# Patient Record
Sex: FEMALE | Race: WHITE | NOT HISPANIC OR LATINO
[De-identification: names, ages, dates, MRNs, and addresses within clinical notes are randomized per-mention and may not be internally consistent; named-entity substitution may affect disease eponyms.]

---

## 2021-10-29 PROBLEM — Z00.00 ENCOUNTER FOR PREVENTIVE HEALTH EXAMINATION: Status: ACTIVE | Noted: 2021-10-29

## 2021-11-08 ENCOUNTER — APPOINTMENT (OUTPATIENT)
Dept: NEUROSURGERY | Facility: CLINIC | Age: 61
End: 2021-11-08
Payer: COMMERCIAL

## 2021-11-08 VITALS
OXYGEN SATURATION: 99 % | HEART RATE: 107 BPM | TEMPERATURE: 98 F | BODY MASS INDEX: 23.04 KG/M2 | SYSTOLIC BLOOD PRESSURE: 124 MMHG | DIASTOLIC BLOOD PRESSURE: 84 MMHG | HEIGHT: 63 IN | WEIGHT: 130 LBS

## 2021-11-08 DIAGNOSIS — Z87.891 PERSONAL HISTORY OF NICOTINE DEPENDENCE: ICD-10-CM

## 2021-11-08 DIAGNOSIS — Z78.9 OTHER SPECIFIED HEALTH STATUS: ICD-10-CM

## 2021-11-08 DIAGNOSIS — Z80.0 FAMILY HISTORY OF MALIGNANT NEOPLASM OF DIGESTIVE ORGANS: ICD-10-CM

## 2021-11-08 PROCEDURE — 99203 OFFICE O/P NEW LOW 30 MIN: CPT

## 2021-11-08 RX ORDER — MULTIVITAMIN
TABLET ORAL
Refills: 0 | Status: ACTIVE | COMMUNITY

## 2021-11-08 RX ORDER — LEVOTHYROXINE SODIUM 0.17 MG/1
TABLET ORAL
Refills: 0 | Status: ACTIVE | COMMUNITY

## 2021-11-08 RX ORDER — PANTOPRAZOLE SODIUM 40 MG/10ML
40 INJECTION, POWDER, FOR SOLUTION INTRAVENOUS
Refills: 0 | Status: ACTIVE | COMMUNITY

## 2021-11-08 NOTE — CONSULT LETTER
[Dear  ___] : Dear  [unfilled], [Courtesy Letter:] : I had the pleasure of seeing your patient, [unfilled], in my office today. [Sincerely,] : Sincerely, [FreeTextEntry2] : [facility]\par [address] [place] [address] Rd\[place] [address] [FreeTextEntry1] : This very pleasant 61-year-old right-handed woman presents for discussion of a recently identified intracranial mass.  The patient has a significant past history of thyroid cancer when she was in her early 20s which required resection followed by chemotherapy and radiation.  There is no metastatic disease.  The patient works as an  and has otherwise been healthy and involved in normal activities without restriction.  The patient suffered incidental fall with a closed head injury on 16 October 2021.  She underwent investigation at local trauma emergency room and underwent cranial imaging.  The imaging was negative for any intracerebral hemorrhage or skull fracture.  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  The recommendation at the outside medical facility was for neurosurgical follow-up and consideration of surgical resection.\par \par The patient denies any problems with headaches or balance.  She has not had any increasing problems with vision blurring.  She does not have any neurologic symptoms with exertion.  She has not had any problems with speech or concentration.  She denies any problems with strength or sensation in the upper or lower limbs.  Bowel and bladder function have remained normal.  She has not experienced any recent constitutional changes such as weight gain or weight loss or night sweats.\par \par I have reviewed with the patient and her  who is present in support the most recent MRI scan of the brain performed at [facility].  I have pointed out clearly the lesion that bridges the septum pellucidum in the midline and does have some impact on the corpus callosum and extends down to but does not extract the for a man of [place].  There is no evidence of obstructive hydrocephalus.  The differential diagnosis here is extensive especially with the patient's past history of thyroid cancer and radiation treatment.  It is most likely this represents a ependymoma or subependymoma, although a post radiation meningioma or a more rare central neurocytoma could also be possible.  No other lesions are seen within the brain parenchyma aside from some incidental finding of deep white matter changes which may be microvascular in nature.\par \par On examination the patient is appropriately concerned and emotional regarding this recent finding and is quite afraid.  The patient's neck is supple.  Pupils are equal and reactive to light.  The patient has intact movement of the face and sensation is normal.  Hearing is intact.  The patient has normal sensation and strength in all 4 limbs.  The patient's balance is stable.\par \par I have had an extended discussion with the patient regarding her diagnosis and potential treatment options.  At this point in time it is very reasonable to do a very short interval follow-up MRI scan because the patient is completely asymptomatic.  An MRI prescription for 1 month is provided.  In addition because of the important differential diagnosis of ependymoma or subependymoma with a high likelihood of drop metastasis MRI imaging of the cervical, thoracic, and lumbar spine have been ordered also.  The patient is aware that the most important risk here is for further expansion causing obstructive hydrocephalus.  The patient is aware of the signs or symptoms that would warrant early or emergent investigation.  Those include increasing headaches especially with exertion as well as early morning headaches associated with visual blurring.  Overall the patient is aware that the most definitive diagnosis will be obtained up by a biopsy.  A subtotal or safe maximal resection would be most important with respect to removing the risk of developing obstructive hydrocephalus.  It is likely that the patient may require post surgery radiation based on the differential diagnosis of the lesion.  I have discussed with the patient and her  that I would consider a minimally invasive brain path approach to this lesion through the cortex on the right-hand side.  The patient and her  expressed good understanding and are in agreement with this further imaging staging as well as consideration of surgery in the next several months.\par \par Thank you for very kindly including me in the evaluation and treatment of your patient.  Please do not hesitate to contact me should you have any questions or concerns regarding this evaluation, the patient's recent finding of an intracranial mass lesion, or the recommendation for further diagnostic studies with a view to surgical planning. [FreeTextEntry3] : Humberto Valle MD, PhD, FRCPSC \par Attending Neurosurgeon \par  of Neurosurgery \par [facility] \par [address], [address] floor \[place] [address] \par Office: [phone] \par Fax: [phone]\par \par

## 2021-11-08 NOTE — REASON FOR VISIT
[New Patient Visit] : a new patient visit [Spouse] : spouse [FreeTextEntry1] : newly diagnosed brain intraventricular mass

## 2021-12-03 ENCOUNTER — APPOINTMENT (OUTPATIENT)
Dept: MRI IMAGING | Facility: CLINIC | Age: 61
End: 2021-12-03

## 2021-12-16 ENCOUNTER — APPOINTMENT (OUTPATIENT)
Dept: MRI IMAGING | Facility: CLINIC | Age: 61
End: 2021-12-16
Payer: COMMERCIAL

## 2021-12-16 ENCOUNTER — OUTPATIENT (OUTPATIENT)
Dept: OUTPATIENT SERVICES | Facility: HOSPITAL | Age: 61
LOS: 1 days | End: 2021-12-16
Payer: COMMERCIAL

## 2021-12-16 DIAGNOSIS — D49.6 NEOPLASM OF UNSPECIFIED BEHAVIOR OF BRAIN: ICD-10-CM

## 2021-12-16 PROCEDURE — 72158 MRI LUMBAR SPINE W/O & W/DYE: CPT | Mod: 26

## 2021-12-16 PROCEDURE — A9585: CPT

## 2021-12-16 PROCEDURE — 72157 MRI CHEST SPINE W/O & W/DYE: CPT

## 2021-12-16 PROCEDURE — 72158 MRI LUMBAR SPINE W/O & W/DYE: CPT

## 2021-12-16 PROCEDURE — 72157 MRI CHEST SPINE W/O & W/DYE: CPT | Mod: 26

## 2021-12-17 ENCOUNTER — APPOINTMENT (OUTPATIENT)
Dept: MRI IMAGING | Facility: CLINIC | Age: 61
End: 2021-12-17
Payer: COMMERCIAL

## 2021-12-17 ENCOUNTER — OUTPATIENT (OUTPATIENT)
Dept: OUTPATIENT SERVICES | Facility: HOSPITAL | Age: 61
LOS: 1 days | End: 2021-12-17
Payer: COMMERCIAL

## 2021-12-17 DIAGNOSIS — D49.6 NEOPLASM OF UNSPECIFIED BEHAVIOR OF BRAIN: ICD-10-CM

## 2021-12-17 PROCEDURE — 72156 MRI NECK SPINE W/O & W/DYE: CPT | Mod: 26

## 2021-12-17 PROCEDURE — 70553 MRI BRAIN STEM W/O & W/DYE: CPT | Mod: 26

## 2021-12-17 PROCEDURE — A9585: CPT

## 2021-12-17 PROCEDURE — 72156 MRI NECK SPINE W/O & W/DYE: CPT

## 2021-12-17 PROCEDURE — 70553 MRI BRAIN STEM W/O & W/DYE: CPT

## 2022-01-12 ENCOUNTER — APPOINTMENT (OUTPATIENT)
Dept: NEUROSURGERY | Facility: CLINIC | Age: 62
End: 2022-01-12
Payer: COMMERCIAL

## 2022-01-12 VITALS
SYSTOLIC BLOOD PRESSURE: 151 MMHG | HEIGHT: 63 IN | TEMPERATURE: 97.8 F | DIASTOLIC BLOOD PRESSURE: 87 MMHG | OXYGEN SATURATION: 98 % | HEART RATE: 106 BPM | BODY MASS INDEX: 23.04 KG/M2 | WEIGHT: 130 LBS

## 2022-01-12 DIAGNOSIS — D49.6 NEOPLASM OF UNSPECIFIED BEHAVIOR OF BRAIN: ICD-10-CM

## 2022-01-12 DIAGNOSIS — E03.9 HYPOTHYROIDISM, UNSPECIFIED: ICD-10-CM

## 2022-01-12 DIAGNOSIS — Z85.850 PERSONAL HISTORY OF MALIGNANT NEOPLASM OF THYROID: ICD-10-CM

## 2022-01-12 PROCEDURE — 99215 OFFICE O/P EST HI 40 MIN: CPT

## 2022-01-12 RX ORDER — LEVOTHYROXINE SODIUM 0.14 MG/1
137 TABLET ORAL DAILY
Qty: 14 | Refills: 0 | Status: ACTIVE | COMMUNITY
Start: 2022-01-12 | End: 1900-01-01

## 2022-01-16 PROBLEM — Z85.850 HISTORY OF MALIGNANT NEOPLASM OF THYROID: Status: RESOLVED | Noted: 2021-11-08 | Resolved: 2022-01-16

## 2022-01-16 PROBLEM — D49.6 INTRACRANIAL TUMOR: Status: ACTIVE | Noted: 2021-11-08

## 2022-01-16 PROBLEM — E03.9 ACQUIRED HYPOTHYROIDISM: Status: ACTIVE | Noted: 2022-01-12

## 2022-03-08 ENCOUNTER — RESULT REVIEW (OUTPATIENT)
Age: 62
End: 2022-03-08

## 2022-03-08 ENCOUNTER — OUTPATIENT (OUTPATIENT)
Dept: OUTPATIENT SERVICES | Facility: HOSPITAL | Age: 62
LOS: 1 days | End: 2022-03-08
Payer: COMMERCIAL

## 2022-03-08 VITALS
RESPIRATION RATE: 16 BRPM | OXYGEN SATURATION: 98 % | HEIGHT: 62 IN | HEART RATE: 92 BPM | SYSTOLIC BLOOD PRESSURE: 132 MMHG | TEMPERATURE: 98 F | WEIGHT: 121.25 LBS | DIASTOLIC BLOOD PRESSURE: 85 MMHG

## 2022-03-08 DIAGNOSIS — Z01.818 ENCOUNTER FOR OTHER PREPROCEDURAL EXAMINATION: ICD-10-CM

## 2022-03-08 DIAGNOSIS — Z98.890 OTHER SPECIFIED POSTPROCEDURAL STATES: Chronic | ICD-10-CM

## 2022-03-08 DIAGNOSIS — Z29.9 ENCOUNTER FOR PROPHYLACTIC MEASURES, UNSPECIFIED: ICD-10-CM

## 2022-03-08 DIAGNOSIS — Z90.89 ACQUIRED ABSENCE OF OTHER ORGANS: Chronic | ICD-10-CM

## 2022-03-08 DIAGNOSIS — Z90.49 ACQUIRED ABSENCE OF OTHER SPECIFIED PARTS OF DIGESTIVE TRACT: Chronic | ICD-10-CM

## 2022-03-08 DIAGNOSIS — E89.0 POSTPROCEDURAL HYPOTHYROIDISM: Chronic | ICD-10-CM

## 2022-03-08 DIAGNOSIS — Z85.850 PERSONAL HISTORY OF MALIGNANT NEOPLASM OF THYROID: ICD-10-CM

## 2022-03-08 LAB
ANION GAP SERPL CALC-SCNC: 7 MMOL/L — SIGNIFICANT CHANGE UP (ref 5–17)
APPEARANCE UR: ABNORMAL
APTT BLD: 31.6 SEC — SIGNIFICANT CHANGE UP (ref 27.5–35.5)
BASOPHILS # BLD AUTO: 0.06 K/UL — SIGNIFICANT CHANGE UP (ref 0–0.2)
BASOPHILS NFR BLD AUTO: 1.3 % — SIGNIFICANT CHANGE UP (ref 0–2)
BILIRUB UR-MCNC: ABNORMAL
BUN SERPL-MCNC: 20 MG/DL — SIGNIFICANT CHANGE UP (ref 7–23)
CALCIUM SERPL-MCNC: 9.6 MG/DL — SIGNIFICANT CHANGE UP (ref 8.5–10.1)
CHLORIDE SERPL-SCNC: 101 MMOL/L — SIGNIFICANT CHANGE UP (ref 96–108)
CO2 SERPL-SCNC: 26 MMOL/L — SIGNIFICANT CHANGE UP (ref 22–31)
COLOR SPEC: ABNORMAL
CREAT SERPL-MCNC: 0.83 MG/DL — SIGNIFICANT CHANGE UP (ref 0.5–1.3)
DIFF PNL FLD: ABNORMAL
EGFR: 80 ML/MIN/1.73M2 — SIGNIFICANT CHANGE UP
EOSINOPHIL # BLD AUTO: 0.05 K/UL — SIGNIFICANT CHANGE UP (ref 0–0.5)
EOSINOPHIL NFR BLD AUTO: 1.1 % — SIGNIFICANT CHANGE UP (ref 0–6)
GLUCOSE SERPL-MCNC: 109 MG/DL — HIGH (ref 70–99)
GLUCOSE UR QL: NEGATIVE — SIGNIFICANT CHANGE UP
HCT VFR BLD CALC: 30.4 % — LOW (ref 34.5–45)
HGB BLD-MCNC: 10.1 G/DL — LOW (ref 11.5–15.5)
IMM GRANULOCYTES NFR BLD AUTO: 0.2 % — SIGNIFICANT CHANGE UP (ref 0–1.5)
INR BLD: 1.21 RATIO — HIGH (ref 0.88–1.16)
KETONES UR-MCNC: ABNORMAL
LEUKOCYTE ESTERASE UR-ACNC: ABNORMAL
LYMPHOCYTES # BLD AUTO: 0.9 K/UL — LOW (ref 1–3.3)
LYMPHOCYTES # BLD AUTO: 20 % — SIGNIFICANT CHANGE UP (ref 13–44)
MCHC RBC-ENTMCNC: 31.5 PG — SIGNIFICANT CHANGE UP (ref 27–34)
MCHC RBC-ENTMCNC: 33.2 GM/DL — SIGNIFICANT CHANGE UP (ref 32–36)
MCV RBC AUTO: 94.7 FL — SIGNIFICANT CHANGE UP (ref 80–100)
MONOCYTES # BLD AUTO: 0.55 K/UL — SIGNIFICANT CHANGE UP (ref 0–0.9)
MONOCYTES NFR BLD AUTO: 12.2 % — SIGNIFICANT CHANGE UP (ref 2–14)
NEUTROPHILS # BLD AUTO: 2.93 K/UL — SIGNIFICANT CHANGE UP (ref 1.8–7.4)
NEUTROPHILS NFR BLD AUTO: 65.2 % — SIGNIFICANT CHANGE UP (ref 43–77)
NITRITE UR-MCNC: NEGATIVE — SIGNIFICANT CHANGE UP
PH UR: 6 — SIGNIFICANT CHANGE UP (ref 5–8)
PLATELET # BLD AUTO: 197 K/UL — SIGNIFICANT CHANGE UP (ref 150–400)
POTASSIUM SERPL-MCNC: 4.1 MMOL/L — SIGNIFICANT CHANGE UP (ref 3.5–5.3)
POTASSIUM SERPL-SCNC: 4.1 MMOL/L — SIGNIFICANT CHANGE UP (ref 3.5–5.3)
PROT UR-MCNC: 30 MG/DL
PROTHROM AB SERPL-ACNC: 14.1 SEC — HIGH (ref 10.5–13.4)
RBC # BLD: 3.21 M/UL — LOW (ref 3.8–5.2)
RBC # FLD: 17.2 % — HIGH (ref 10.3–14.5)
SODIUM SERPL-SCNC: 134 MMOL/L — LOW (ref 135–145)
SP GR SPEC: 1.02 — SIGNIFICANT CHANGE UP (ref 1.01–1.02)
UROBILINOGEN FLD QL: 8
WBC # BLD: 4.5 K/UL — SIGNIFICANT CHANGE UP (ref 3.8–10.5)
WBC # FLD AUTO: 4.5 K/UL — SIGNIFICANT CHANGE UP (ref 3.8–10.5)

## 2022-03-08 PROCEDURE — 85730 THROMBOPLASTIN TIME PARTIAL: CPT

## 2022-03-08 PROCEDURE — 86901 BLOOD TYPING SEROLOGIC RH(D): CPT

## 2022-03-08 PROCEDURE — 71046 X-RAY EXAM CHEST 2 VIEWS: CPT

## 2022-03-08 PROCEDURE — 87640 STAPH A DNA AMP PROBE: CPT

## 2022-03-08 PROCEDURE — 71046 X-RAY EXAM CHEST 2 VIEWS: CPT | Mod: 26

## 2022-03-08 PROCEDURE — 81001 URINALYSIS AUTO W/SCOPE: CPT

## 2022-03-08 PROCEDURE — 85610 PROTHROMBIN TIME: CPT

## 2022-03-08 PROCEDURE — 93005 ELECTROCARDIOGRAM TRACING: CPT

## 2022-03-08 PROCEDURE — 36415 COLL VENOUS BLD VENIPUNCTURE: CPT

## 2022-03-08 PROCEDURE — 93010 ELECTROCARDIOGRAM REPORT: CPT

## 2022-03-08 PROCEDURE — 86920 COMPATIBILITY TEST SPIN: CPT

## 2022-03-08 PROCEDURE — 86850 RBC ANTIBODY SCREEN: CPT

## 2022-03-08 PROCEDURE — 86900 BLOOD TYPING SEROLOGIC ABO: CPT

## 2022-03-08 PROCEDURE — 85025 COMPLETE CBC W/AUTO DIFF WBC: CPT

## 2022-03-08 PROCEDURE — 80048 BASIC METABOLIC PNL TOTAL CA: CPT

## 2022-03-08 PROCEDURE — 87641 MR-STAPH DNA AMP PROBE: CPT

## 2022-03-08 PROCEDURE — G0463: CPT | Mod: 25

## 2022-03-08 NOTE — H&P PST ADULT - NSICDXPASTSURGICALHX_GEN_ALL_CORE_FT
PAST SURGICAL HISTORY:  H/O total thyroidectomy age 21's for Thyroid cancer, postop complication arterial bleed, second surgery    History of appendectomy age 5    History of tonsillectomy age 5

## 2022-03-08 NOTE — H&P PST ADULT - NEGATIVE CARDIOVASCULAR SYMPTOMS
CERTIFICATE OF RETURN TO WORK    February 6, 2017      Re:   Rhina ARVIZU Jeniffermatteo  [address] [place] [id number]                        This is to certify that Rhina Moseley was seen on 2/6/2017 and is unable to work on 2/7/2017.            SIGNATURE:___________________________________________,   2/6/2017      [facility]  [address] [facility] [address]  [phone]   no chest pain/no palpitations/no dyspnea on exertion

## 2022-03-08 NOTE — H&P PST ADULT - ASSESSMENT
62 y.o female scheduled for  62 y.o female scheduled for  Right Frontal Craniotomy and Resection of Ventricle Mass with Brain Path Neuronavigation, Neuromonitoring   Plan  1. Stop all NSAIDS, herbal supplements and vitamins for 7 days.  2. NPO at midnight.  3. Take the following medications Levothyroxine with small sips of water on the morning of your procedure/surgery.  4. Use mupirocin as directed  5. Labs, EKG, CXR as per surgeon  6. PMD D. Stoebe  visit for optimization prior to surgery as per surgeon.  7. COVID swab appt: 3/12/2022    CAPRINI SCORE    AGE RELATED RISK FACTORS                                                       MOBILITY RELATED FACTORS  [ ] Age 41-60 years                                            (1 Point)                  [ ] Bed rest                                                        (1 Point)  [x ] Age: 61-74 years                                           (2 Points)                [ ] Plaster cast                                                   (2 Points)  [ ] Age= 75 years                                              (3 Points)                 [ ] Bed bound for more than 72 hours                   (2 Points)    DISEASE RELATED RISK FACTORS                                               GENDER SPECIFIC FACTORS  [ ] Edema in the lower extremities                       (1 Point)                  [ ] Pregnancy                                                     (1 Point)  [ ] Varicose veins                                               (1 Point)                  [ ] Post-partum < 6 weeks                                   (1 Point)             [x ] BMI > 25 Kg/m2                                            (1 Point)                  [ ] Hormonal therapy  or oral contraception            (1 Point)                 [ ] Sepsis (in the previous month)                        (1 Point)                  [ ] History of pregnancy complications  [ ] Pneumonia or serious lung disease                                               [ ] Unexplained or recurrent                       (1 Point)           (in the previous month)                               (1 Point)  [ ] Abnormal pulmonary function test                     (1 Point)                 SURGERY RELATED RISK FACTORS  [ ] Acute myocardial infarction                              (1 Point)                 [ ]  Section                                            (1 Point)  [ ] Congestive heart failure (in the previous month)  (1 Point)                 [ ] Minor surgery                                                 (1 Point)   [ ] Inflammatory bowel disease                             (1 Point)                 [ ] Arthroscopic surgery                                        (2 Points)  [ ] Central venous access                                    (2 Points)                [x ] General surgery lasting more than 45 minutes   (2 Points)       [ ] Stroke (in the previous month)                          (5 Points)               [ ] Elective arthroplasty                                        (5 Points)                                                                                                                                               HEMATOLOGY RELATED FACTORS                                                 TRAUMA RELATED RISK FACTORS  [ ] Prior episodes of VTE                                     (3 Points)                 [ ] Fracture of the hip, pelvis, or leg                       (5 Points)  [ ] Positive family history for VTE                         (3 Points)                 [ ] Acute spinal cord injury (in the previous month)  (5 Points)  [ ] Prothrombin 37022 A                                      (3 Points)                 [ ] Paralysis  (less than 1 month)                          (5 Points)  [ ] Factor V Leiden                                             (3 Points)                 [ ] Multiple Trauma within 1 month                         (5 Points)  [ ] Lupus anticoagulants                                     (3 Points)                                                           [ ] Anticardiolipin antibodies                                (3 Points)                                                       [ ] High homocysteine in the blood                      (3 Points)                                             [ ] Other congenital or acquired thrombophilia       (3 Points)                                                [ ] Heparin induced thrombocytopenia                  (3 Points)                                          Total Score [     5     ]    The Caprini score indicates this patient is at risk for a VTE event (score 3-5).  Most surgical patients in this group would benefit from pharmacologic prophylaxis.  The surgical team will determine the balance between VTE risk and bleeding risk     62 y.o female scheduled for  Right Frontal Craniotomy and Resection of Ventricle Mass with Brain Path Neuronavigation, Neuromonitoring   Plan  1. Stop all NSAIDS, herbal supplements and vitamins for 7 days.  2. NPO at midnight.  3. Take the following medications Levothyroxine with small sips of water on the morning of your procedure/surgery.  4. Use mupirocin as directed  5. Labs, EKG, CXR as per surgeon  6. PMD D. Stoebe  visit for optimization prior to surgery as per surgeon.  7. COVID swab appt: 3/12/2022    CAPRINI SCORE    AGE RELATED RISK FACTORS                                                       MOBILITY RELATED FACTORS  [ ] Age 41-60 years                                            (1 Point)                  [ ] Bed rest                                                        (1 Point)  [x ] Age: 61-74 years                                           (2 Points)                [ ] Plaster cast                                                   (2 Points)  [ ] Age= 75 years                                              (3 Points)                 [ ] Bed bound for more than 72 hours                   (2 Points)    DISEASE RELATED RISK FACTORS                                               GENDER SPECIFIC FACTORS  [ ] Edema in the lower extremities                       (1 Point)                  [ ] Pregnancy                                                     (1 Point)  [ ] Varicose veins                                               (1 Point)                  [ ] Post-partum < 6 weeks                                   (1 Point)             [x ] BMI > 25 Kg/m2                                            (1 Point)                  [ ] Hormonal therapy  or oral contraception            (1 Point)                 [ ] Sepsis (in the previous month)                        (1 Point)                  [ ] History of pregnancy complications  [ ] Pneumonia or serious lung disease                                               [ ] Unexplained or recurrent                       (1 Point)           (in the previous month)                               (1 Point)  [ ] Abnormal pulmonary function test                     (1 Point)                 SURGERY RELATED RISK FACTORS  [ ] Acute myocardial infarction                              (1 Point)                 [ ]  Section                                            (1 Point)  [ ] Congestive heart failure (in the previous month)  (1 Point)                 [ ] Minor surgery                                                 (1 Point)   [ ] Inflammatory bowel disease                             (1 Point)                 [ ] Arthroscopic surgery                                        (2 Points)  [ ] Central venous access                                    (2 Points)                [x ] General surgery lasting more than 45 minutes   (2 Points)       [ ] Stroke (in the previous month)                          (5 Points)               [ ] Elective arthroplasty                                        (5 Points)                                                                                                                                               HEMATOLOGY RELATED FACTORS                                                 TRAUMA RELATED RISK FACTORS  [ ] Prior episodes of VTE                                     (3 Points)                 [ ] Fracture of the hip, pelvis, or leg                       (5 Points)  [ ] Positive family history for VTE                         (3 Points)                 [ ] Acute spinal cord injury (in the previous month)  (5 Points)  [ ] Prothrombin 89748 A                                      (3 Points)                 [ ] Paralysis  (less than 1 month)                          (5 Points)  [ ] Factor V Leiden                                             (3 Points)                 [ ] Multiple Trauma within 1 month                         (5 Points)  [ ] Lupus anticoagulants                                     (3 Points)                                                           [ ] Anticardiolipin antibodies                                (3 Points)                                                       [ ] High homocysteine in the blood                      (3 Points)                                             [ ] Other congenital or acquired thrombophilia       (3 Points)                                                [ ] Heparin induced thrombocytopenia                  (3 Points)                                          Total Score [     5     ]    The Caprini score indicates this patient is at risk for a VTE event (score 3-5).  Most surgical patients in this group would benefit from pharmacologic prophylaxis.  The surgical team will determine the balance between VTE risk and bleeding risk     62 y.o female scheduled for  Right Frontal Craniotomy and Resection of Ventricle Mass with Brain Path Neuronavigation, Neuromonitoring   Plan  1. Stop all NSAIDS, herbal supplements and vitamins for 7 days.  2. NPO at midnight.  3. Take the following medications Levothyroxine with small sips of water on the morning of your procedure/surgery.  4. Use mupirocin as directed  5. Labs, EKG, CXR as per surgeon  6. PMD D. Stoebe  visit for optimization prior to surgery as per surgeon.  7. COVID swab appt: 3/12/2022    CAPRINI SCORE    AGE RELATED RISK FACTORS                                                       MOBILITY RELATED FACTORS  [ ] Age 41-60 years                                            (1 Point)                  [ ] Bed rest                                                        (1 Point)  [x ] Age: 61-74 years                                           (2 Points)                [ ] Plaster cast                                                   (2 Points)  [ ] Age= 75 years                                              (3 Points)                 [ ] Bed bound for more than 72 hours                   (2 Points)    DISEASE RELATED RISK FACTORS                                               GENDER SPECIFIC FACTORS  [ ] Edema in the lower extremities                       (1 Point)                  [ ] Pregnancy                                                     (1 Point)  [ ] Varicose veins                                               (1 Point)                  [ ] Post-partum < 6 weeks                                   (1 Point)             [x ] BMI > 25 Kg/m2                                            (1 Point)                  [ ] Hormonal therapy  or oral contraception            (1 Point)                 [ ] Sepsis (in the previous month)                        (1 Point)                  [ ] History of pregnancy complications  [ ] Pneumonia or serious lung disease                                               [ ] Unexplained or recurrent                       (1 Point)           (in the previous month)                               (1 Point)  [ ] Abnormal pulmonary function test                     (1 Point)                 SURGERY RELATED RISK FACTORS  [ ] Acute myocardial infarction                              (1 Point)                 [ ]  Section                                            (1 Point)  [ ] Congestive heart failure (in the previous month)  (1 Point)                 [ ] Minor surgery                                                 (1 Point)   [ ] Inflammatory bowel disease                             (1 Point)                 [ ] Arthroscopic surgery                                        (2 Points)  [ ] Central venous access                                    (2 Points)                [x ] General surgery lasting more than 45 minutes   (2 Points)       [ ] Stroke (in the previous month)                          (5 Points)               [ ] Elective arthroplasty                                        (5 Points)                                                                                                                                               HEMATOLOGY RELATED FACTORS                                                 TRAUMA RELATED RISK FACTORS  [ ] Prior episodes of VTE                                     (3 Points)                 [ ] Fracture of the hip, pelvis, or leg                       (5 Points)  [ ] Positive family history for VTE                         (3 Points)                 [ ] Acute spinal cord injury (in the previous month)  (5 Points)  [ ] Prothrombin 78081 A                                      (3 Points)                 [ ] Paralysis  (less than 1 month)                          (5 Points)  [ ] Factor V Leiden                                             (3 Points)                 [ ] Multiple Trauma within 1 month                         (5 Points)  [ ] Lupus anticoagulants                                     (3 Points)                                                           [ ] Anticardiolipin antibodies                                (3 Points)                                                       [ ] High homocysteine in the blood                      (3 Points)                                             [ ] Other congenital or acquired thrombophilia       (3 Points)                                                [ ] Heparin induced thrombocytopenia                  (3 Points)                                          Total Score [     5     ]    The Caprini score indicates this patient is at risk for a VTE event (score 3-5).  Most surgical patients in this group would benefit from pharmacologic prophylaxis.  The surgical team will determine the balance between VTE risk and bleeding risk

## 2022-03-08 NOTE — H&P PST ADULT - DOCUMENT STATUS
After Visit Instructions:     Thank you for coming to [facility] for your care. It is my goal to partner with you to help you reach your optimal state of health.     Continue daily self-care, identifying contributing factors, and monitoring variations in pain level. Continue to integrate self-care into your life.      1. Schedule physical therapy assessment with MARIBE[facility]. They wiill call you to schedule.   2. Schedule VIDEO follow-up with CARLOS A Higuera NP-C in 1-2 months   3. Procedures recommended: Cervical epidural injection. We will call you to schedule.   4. Medication recommendations:   1. No medication changes at this time.       CARLOS A Bass NP-C  [facility]    Clinic Number:  [phone]     Call with any questions about your care and for scheduling assistance.     Calls are returned Monday through Friday between 8 AM and 4:30 PM. We usually get back to you within 2 business days depending on the issue/request.    If we are prescribing your medications:    For opioid medication refills, call the clinic or send a Solulink message 7 days in advance.  Please include:    Name of requested medication    Name of the pharmacy.    For non-opioid medications, call your pharmacy directly to request a refill. Please allow 3-4 days to be processed.     Per [place] State Law:    All controlled substance prescriptions must be filled within 30 days of being written.      For those controlled substances allowing refills, pickup must occur within 30 days of last fill.      We believe regular attendance is key to your success in our program!      Any time you are unable to keep your appointment we ask that you call us at least 24 hours in advance to cancel.This will allow us to offer the appointment time to another patient.   Multiple missed appointments may lead to dismissal from the clinic.         
Authored by Resident/PA/NP

## 2022-03-08 NOTE — H&P PST ADULT - NEUROLOGICAL COMMENTS
See HPI Face/Smile symmetrical, tongue midline, +strong, equal hand grasps, speech clear, ROY well, gait steady

## 2022-03-08 NOTE — H&P PST ADULT - HISTORY OF PRESENT ILLNESS
62 y.o WD, WN  62 y.o WD, WN pleasant female presents to PST with hx of intracranial tumor. Patient reports a fall back in December 2021, tripped over a grate in her driveway. She hit her head and was evaluated in ER. Diagnostics revealed no acute injury but an incidental finding of an intracranial tumor. She does report an occasional headache but denies vertigo or visual disturbance. Hx significant for Thyroid cancer age 21's for which she had a Total Thyroidectomy, radiation and radioactive iodide.  Patient has followed with neurosurgeon and now scheduled for Right Frontal Craniotomy and Resection of Ventricle Mass with Brain Path Neuronavigation, Neuromonitoring

## 2022-03-08 NOTE — H&P PST ADULT - NSICDXPASTMEDICALHX_GEN_ALL_CORE_FT
PAST MEDICAL HISTORY:  COVID-19 virus infection 11/2021--recovered at home    Eczema     GERD (gastroesophageal reflux disease)     HTN (hypertension) off medication for over one year--states BP has been normal    Intracranial tumor     Thyroid cancer surgery. radiation, Radioactive iodine    UTI (urinary tract infection) currently being treated--will complete antibiotics 3/8/2022

## 2022-03-08 NOTE — H&P PST ADULT - NSALCOHOLMD_GEN_A_CORE_SD
Dr Valle/Name of MD Notified: (Please Specify) Dr Valle office aware spoke with Vielka surgical coordinator/Name of MD Notified: (Please Specify)

## 2022-03-09 DIAGNOSIS — Z85.850 PERSONAL HISTORY OF MALIGNANT NEOPLASM OF THYROID: ICD-10-CM

## 2022-03-09 DIAGNOSIS — Z01.818 ENCOUNTER FOR OTHER PREPROCEDURAL EXAMINATION: ICD-10-CM

## 2022-03-09 LAB
MRSA PCR RESULT.: SIGNIFICANT CHANGE UP
S AUREUS DNA NOSE QL NAA+PROBE: SIGNIFICANT CHANGE UP

## 2022-03-14 RX ORDER — OXYCODONE HYDROCHLORIDE 5 MG/1
5 TABLET ORAL ONCE
Refills: 0 | Status: DISCONTINUED | OUTPATIENT
Start: 2022-03-15 | End: 2022-03-15

## 2022-03-14 RX ORDER — FENTANYL CITRATE 50 UG/ML
50 INJECTION INTRAVENOUS
Refills: 0 | Status: DISCONTINUED | OUTPATIENT
Start: 2022-03-15 | End: 2022-03-15

## 2022-03-14 RX ORDER — SODIUM CHLORIDE 9 MG/ML
1000 INJECTION, SOLUTION INTRAVENOUS
Refills: 0 | Status: DISCONTINUED | OUTPATIENT
Start: 2022-03-15 | End: 2022-03-15

## 2022-03-14 RX ORDER — ONDANSETRON 8 MG/1
4 TABLET, FILM COATED ORAL ONCE
Refills: 0 | Status: DISCONTINUED | OUTPATIENT
Start: 2022-03-15 | End: 2022-03-15

## 2022-03-15 ENCOUNTER — RESULT REVIEW (OUTPATIENT)
Age: 62
End: 2022-03-15

## 2022-03-15 ENCOUNTER — INPATIENT (INPATIENT)
Facility: HOSPITAL | Age: 62
LOS: 2 days | Discharge: HOME | DRG: 21 | End: 2022-03-18
Attending: SPECIALIST | Admitting: SPECIALIST
Payer: COMMERCIAL

## 2022-03-15 ENCOUNTER — APPOINTMENT (OUTPATIENT)
Dept: NEUROSURGERY | Facility: HOSPITAL | Age: 62
End: 2022-03-15

## 2022-03-15 VITALS
OXYGEN SATURATION: 100 % | HEIGHT: 62 IN | WEIGHT: 121.92 LBS | SYSTOLIC BLOOD PRESSURE: 127 MMHG | HEART RATE: 80 BPM | DIASTOLIC BLOOD PRESSURE: 68 MMHG | TEMPERATURE: 98 F | RESPIRATION RATE: 16 BRPM

## 2022-03-15 DIAGNOSIS — Z79.899 OTHER LONG TERM (CURRENT) DRUG THERAPY: ICD-10-CM

## 2022-03-15 DIAGNOSIS — Z87.891 PERSONAL HISTORY OF NICOTINE DEPENDENCE: ICD-10-CM

## 2022-03-15 DIAGNOSIS — Z91.81 HISTORY OF FALLING: ICD-10-CM

## 2022-03-15 DIAGNOSIS — Z92.3 PERSONAL HISTORY OF IRRADIATION: ICD-10-CM

## 2022-03-15 DIAGNOSIS — Z88.8 ALLERGY STATUS TO OTHER DRUGS, MEDICAMENTS AND BIOLOGICAL SUBSTANCES: ICD-10-CM

## 2022-03-15 DIAGNOSIS — E43 UNSPECIFIED SEVERE PROTEIN-CALORIE MALNUTRITION: ICD-10-CM

## 2022-03-15 DIAGNOSIS — D49.6 NEOPLASM OF UNSPECIFIED BEHAVIOR OF BRAIN: ICD-10-CM

## 2022-03-15 DIAGNOSIS — K21.9 GASTRO-ESOPHAGEAL REFLUX DISEASE WITHOUT ESOPHAGITIS: ICD-10-CM

## 2022-03-15 DIAGNOSIS — R29.810 FACIAL WEAKNESS: ICD-10-CM

## 2022-03-15 DIAGNOSIS — Z86.16 PERSONAL HISTORY OF COVID-19: ICD-10-CM

## 2022-03-15 DIAGNOSIS — I61.5 NONTRAUMATIC INTRACEREBRAL HEMORRHAGE, INTRAVENTRICULAR: ICD-10-CM

## 2022-03-15 DIAGNOSIS — Z79.890 HORMONE REPLACEMENT THERAPY: ICD-10-CM

## 2022-03-15 DIAGNOSIS — Z90.49 ACQUIRED ABSENCE OF OTHER SPECIFIED PARTS OF DIGESTIVE TRACT: Chronic | ICD-10-CM

## 2022-03-15 DIAGNOSIS — E89.0 POSTPROCEDURAL HYPOTHYROIDISM: ICD-10-CM

## 2022-03-15 DIAGNOSIS — I10 ESSENTIAL (PRIMARY) HYPERTENSION: ICD-10-CM

## 2022-03-15 DIAGNOSIS — Z90.89 ACQUIRED ABSENCE OF OTHER ORGANS: Chronic | ICD-10-CM

## 2022-03-15 DIAGNOSIS — E87.6 HYPOKALEMIA: ICD-10-CM

## 2022-03-15 DIAGNOSIS — Z85.850 PERSONAL HISTORY OF MALIGNANT NEOPLASM OF THYROID: ICD-10-CM

## 2022-03-15 DIAGNOSIS — L30.9 DERMATITIS, UNSPECIFIED: ICD-10-CM

## 2022-03-15 DIAGNOSIS — Z88.1 ALLERGY STATUS TO OTHER ANTIBIOTIC AGENTS STATUS: ICD-10-CM

## 2022-03-15 DIAGNOSIS — E89.0 POSTPROCEDURAL HYPOTHYROIDISM: Chronic | ICD-10-CM

## 2022-03-15 PROCEDURE — 69990 MICROSURGERY ADD-ON: CPT | Mod: AS,59

## 2022-03-15 PROCEDURE — 97162 PT EVAL MOD COMPLEX 30 MIN: CPT | Mod: GP

## 2022-03-15 PROCEDURE — 85027 COMPLETE CBC AUTOMATED: CPT

## 2022-03-15 PROCEDURE — 69990 MICROSURGERY ADD-ON: CPT | Mod: 59

## 2022-03-15 PROCEDURE — 97530 THERAPEUTIC ACTIVITIES: CPT | Mod: GP

## 2022-03-15 PROCEDURE — C1729: CPT

## 2022-03-15 PROCEDURE — 70450 CT HEAD/BRAIN W/O DYE: CPT | Mod: 26

## 2022-03-15 PROCEDURE — 70450 CT HEAD/BRAIN W/O DYE: CPT

## 2022-03-15 PROCEDURE — 88331 PATH CONSLTJ SURG 1 BLK 1SPC: CPT

## 2022-03-15 PROCEDURE — 97116 GAIT TRAINING THERAPY: CPT | Mod: GP

## 2022-03-15 PROCEDURE — 61510 CRNEC TREPH EXC BRN TUM STTL: CPT

## 2022-03-15 PROCEDURE — 88307 TISSUE EXAM BY PATHOLOGIST: CPT | Mod: 26

## 2022-03-15 PROCEDURE — 36415 COLL VENOUS BLD VENIPUNCTURE: CPT

## 2022-03-15 PROCEDURE — 61781 SCAN PROC CRANIAL INTRA: CPT

## 2022-03-15 PROCEDURE — 84100 ASSAY OF PHOSPHORUS: CPT

## 2022-03-15 PROCEDURE — 61781 SCAN PROC CRANIAL INTRA: CPT | Mod: AS

## 2022-03-15 PROCEDURE — 83735 ASSAY OF MAGNESIUM: CPT

## 2022-03-15 PROCEDURE — 80048 BASIC METABOLIC PNL TOTAL CA: CPT

## 2022-03-15 PROCEDURE — 88307 TISSUE EXAM BY PATHOLOGIST: CPT

## 2022-03-15 PROCEDURE — 61510 CRNEC TREPH EXC BRN TUM STTL: CPT | Mod: AS

## 2022-03-15 PROCEDURE — 88331 PATH CONSLTJ SURG 1 BLK 1SPC: CPT | Mod: 26

## 2022-03-15 PROCEDURE — C1763: CPT

## 2022-03-15 PROCEDURE — C1889: CPT

## 2022-03-15 RX ORDER — LEVOTHYROXINE SODIUM 125 MCG
137 TABLET ORAL DAILY
Refills: 0 | Status: DISCONTINUED | OUTPATIENT
Start: 2022-03-15 | End: 2022-03-18

## 2022-03-15 RX ORDER — ACETAMINOPHEN 500 MG
1000 TABLET ORAL ONCE
Refills: 0 | Status: COMPLETED | OUTPATIENT
Start: 2022-03-15 | End: 2022-03-15

## 2022-03-15 RX ORDER — ACETAMINOPHEN 500 MG
1000 TABLET ORAL ONCE
Refills: 0 | Status: DISCONTINUED | OUTPATIENT
Start: 2022-03-15 | End: 2022-03-18

## 2022-03-15 RX ORDER — PANTOPRAZOLE SODIUM 20 MG/1
40 TABLET, DELAYED RELEASE ORAL
Refills: 0 | Status: DISCONTINUED | OUTPATIENT
Start: 2022-03-15 | End: 2022-03-18

## 2022-03-15 RX ORDER — INFLUENZA VIRUS VACCINE 15; 15; 15; 15 UG/.5ML; UG/.5ML; UG/.5ML; UG/.5ML
0.5 SUSPENSION INTRAMUSCULAR ONCE
Refills: 0 | Status: DISCONTINUED | OUTPATIENT
Start: 2022-03-15 | End: 2022-03-18

## 2022-03-15 RX ORDER — FENTANYL CITRATE 50 UG/ML
25 INJECTION INTRAVENOUS
Refills: 0 | Status: DISCONTINUED | OUTPATIENT
Start: 2022-03-15 | End: 2022-03-16

## 2022-03-15 RX ORDER — SODIUM CHLORIDE 9 MG/ML
1000 INJECTION INTRAMUSCULAR; INTRAVENOUS; SUBCUTANEOUS
Refills: 0 | Status: DISCONTINUED | OUTPATIENT
Start: 2022-03-15 | End: 2022-03-16

## 2022-03-15 RX ORDER — CEFAZOLIN SODIUM 1 G
1000 VIAL (EA) INJECTION EVERY 8 HOURS
Refills: 0 | Status: DISCONTINUED | OUTPATIENT
Start: 2022-03-15 | End: 2022-03-15

## 2022-03-15 RX ORDER — ONDANSETRON 8 MG/1
4 TABLET, FILM COATED ORAL EVERY 6 HOURS
Refills: 0 | Status: DISCONTINUED | OUTPATIENT
Start: 2022-03-15 | End: 2022-03-18

## 2022-03-15 RX ORDER — METOPROLOL TARTRATE 50 MG
5 TABLET ORAL ONCE
Refills: 0 | Status: COMPLETED | OUTPATIENT
Start: 2022-03-15 | End: 2022-03-15

## 2022-03-15 RX ORDER — METOCLOPRAMIDE HCL 10 MG
10 TABLET ORAL EVERY 6 HOURS
Refills: 0 | Status: DISCONTINUED | OUTPATIENT
Start: 2022-03-15 | End: 2022-03-16

## 2022-03-15 RX ORDER — HYDRALAZINE HCL 50 MG
10 TABLET ORAL ONCE
Refills: 0 | Status: COMPLETED | OUTPATIENT
Start: 2022-03-15 | End: 2022-03-15

## 2022-03-15 RX ORDER — CEFAZOLIN SODIUM 1 G
1000 VIAL (EA) INJECTION EVERY 8 HOURS
Refills: 0 | Status: DISCONTINUED | OUTPATIENT
Start: 2022-03-15 | End: 2022-03-17

## 2022-03-15 RX ADMIN — Medication 1000 MILLIGRAM(S): at 21:00

## 2022-03-15 RX ADMIN — Medication 400 MILLIGRAM(S): at 20:33

## 2022-03-15 RX ADMIN — Medication 1000 MILLIGRAM(S): at 22:09

## 2022-03-15 RX ADMIN — Medication 10 MILLIGRAM(S): at 17:28

## 2022-03-15 RX ADMIN — Medication 5 MILLIGRAM(S): at 17:09

## 2022-03-15 RX ADMIN — SODIUM CHLORIDE 75 MILLILITER(S): 9 INJECTION INTRAMUSCULAR; INTRAVENOUS; SUBCUTANEOUS at 20:33

## 2022-03-15 RX ADMIN — FENTANYL CITRATE 50 MICROGRAM(S): 50 INJECTION INTRAVENOUS at 16:29

## 2022-03-15 NOTE — CONSULT NOTE ADULT - SUBJECTIVE AND OBJECTIVE BOX
Patient is a 62y old  Female who presents with a chief complaint of   HPI:    Allergies: Macrobid  Nexium    PAST MEDICAL & SURGICAL HISTORY:  HTN (hypertension)  off medication for over one year--states BP has been normal    UTI (urinary tract infection)  currently being treated--will complete antibiotics 3/8/2022    Intracranial tumor    GERD (gastroesophageal reflux disease)    Eczema    Thyroid cancer  surgery. radiation, Radioactive iodine    COVID-19 virus infection  2021--recovered at home    H/O total thyroidectomy  age 21&#x27;s for Thyroid cancer, postop complication arterial bleed, second surgery    History of tonsillectomy  age 5    History of appendectomy  age 5      FAMILY HISTORY:  FH: heart disease  Father, age 85,     FH: pancreatic cancer  Brother, age 59,       SOCIAL HISTORY:    Home Medications:    Review of Systems:  Constitutional: no fever, chills, fatigue  Neuro: no headache, numbness, weakness  Resp: no cough, wheezing, shortness of breath  CVS: no chest pain, palpitations, leg swelling  GI: no abdominal pain, nausea, vomiting, diarrhea   : no dysuria, frequency, incontinence  Skin: no itching, burning, rashes, or lesions   Msk: no joint pain or swelling  Psych: no depression, anxiety, mood swings    T(F): 97.3 (03-15-22 @ 16:00), Max: 97.8 (03-15-22 @ 07:21)  HR: 117 (03-15-22 @ 16:15) (80 - 124)  BP: 137/89 (03-15-22 @ 16:15) (118/99 - 141/90)  RR: 14 (03-15-22 @ 16:15) (12 - 16)  SpO2: 100% (03-15-22 @ 16:15)  Wt(kg): --    CAPILLARY BLOOD GLUCOSE        I&O's Summary    15 Mar 2022 07:01  -  15 Mar 2022 17:03  --------------------------------------------------------  IN: 2700 mL / OUT: 0 mL / NET: 2700 mL        Physical Exam:     Gen:  Neuro:  HEENT:  CVS:  Resp:  Abd:  Ext:  Skin:    Meds:  ceFAZolin  Injectable. 1000 milliGRAM(s) IV Push every 8 hours       levothyroxine 137 MICROGram(s) Oral daily        acetaminophen   IVPB .. 1000 milliGRAM(s) IV Intermittent once PRN  fentaNYL    Injectable 25 MICROGram(s) IV Push every 30 minutes PRN  fentaNYL    Injectable 50 MICROGram(s) IV Push every 10 minutes PRN  metoclopramide Injectable 10 milliGRAM(s) IV Push every 6 hours PRN  ondansetron Injectable 4 milliGRAM(s) IV Push every 6 hours PRN  ondansetron Injectable 4 milliGRAM(s) IV Push once PRN  oxyCODONE    IR 5 milliGRAM(s) Oral once PRN           pantoprazole    Tablet 40 milliGRAM(s) Oral before breakfast        lactated ringers. 1000 milliLiter(s) IV Continuous <Continuous>  sodium chloride 0.9%. 1000 milliLiter(s) IV Continuous <Continuous>     influenza   Vaccine 0.5 milliLiter(s) IntraMuscular once          Radiology:   < from: Xray Chest 2 Views PA/Lat (22 @ 15:27) >    ACC: [id number] EXAM:  XR CHEST PA LAT 2V                          PROCEDURE DATE:  2022          INTERPRETATION:  Clinical history: 62-year-old female, preop.    Two views of the chest without comparison demonstrate a normal cardiac   silhouette and normal pulmonary vasculature with no consolidation,   effusion, gross adenopathy, pneumothorax or acute osseous finding.    IMPRESSION:  No acute radiographic findings    --- End of Report ---            GEORGIA TOLEDO DO; Attending Radiologist  This document has been electronically signed. Mar  9 2022 11:31AM    < end of copied text >      Problems  -Brain Neoplasm    Assessment/Plan:    -Q1 neurovascular checks  -  -  -  -  - Patient is a 62y old  Female who presents with a chief complaint of   HPI:    Allergies: Macrobid  Nexium    PAST MEDICAL & SURGICAL HISTORY:  HTN (hypertension)  off medication for over one year--states BP has been normal    UTI (urinary tract infection)  currently being treated--will complete antibiotics 3/8/2022    Intracranial tumor    GERD (gastroesophageal reflux disease)    Eczema    Thyroid cancer  surgery. radiation, Radioactive iodine    COVID-19 virus infection  2021--recovered at home    H/O total thyroidectomy  age 21&#x27;s for Thyroid cancer, postop complication arterial bleed, second surgery    History of tonsillectomy  age 5    History of appendectomy  age 5      FAMILY HISTORY:  FH: heart disease  Father, age 85,     FH: pancreatic cancer  Brother, age 59,       SOCIAL HISTORY:    Home Medications:    Review of Systems:  Constitutional: no fever, chills, fatigue  Neuro: no headache, numbness, weakness  Resp: no cough, wheezing, shortness of breath  CVS: no chest pain, palpitations, leg swelling  GI: no abdominal pain, nausea, vomiting, diarrhea   : no dysuria, frequency, incontinence  Skin: no itching, burning, rashes, or lesions   Msk: no joint pain or swelling  Psych: no depression, anxiety, mood swings    T(F): 97.3 (03-15-22 @ 16:00), Max: 97.8 (03-15-22 @ 07:21)  HR: 117 (03-15-22 @ 16:15) (80 - 124)  BP: 137/89 (03-15-22 @ 16:15) (118/99 - 141/90)  RR: 14 (03-15-22 @ 16:15) (12 - 16)  SpO2: 100% (03-15-22 @ 16:15)  Wt(kg): --    CAPILLARY BLOOD GLUCOSE        I&O's Summary    15 Mar 2022 07:01  -  15 Mar 2022 17:03  --------------------------------------------------------  IN: 2700 mL / OUT: 0 mL / NET: 2700 mL        Physical Exam:     Gen:  Neuro:  HEENT:  CVS:  Resp:  Abd:  Ext:  Skin:    Meds:  ceFAZolin  Injectable. 1000 milliGRAM(s) IV Push every 8 hours       levothyroxine 137 MICROGram(s) Oral daily        acetaminophen   IVPB .. 1000 milliGRAM(s) IV Intermittent once PRN  fentaNYL    Injectable 25 MICROGram(s) IV Push every 30 minutes PRN  fentaNYL    Injectable 50 MICROGram(s) IV Push every 10 minutes PRN  metoclopramide Injectable 10 milliGRAM(s) IV Push every 6 hours PRN  ondansetron Injectable 4 milliGRAM(s) IV Push every 6 hours PRN  ondansetron Injectable 4 milliGRAM(s) IV Push once PRN  oxyCODONE    IR 5 milliGRAM(s) Oral once PRN           pantoprazole    Tablet 40 milliGRAM(s) Oral before breakfast        lactated ringers. 1000 milliLiter(s) IV Continuous <Continuous>  sodium chloride 0.9%. 1000 milliLiter(s) IV Continuous <Continuous>     influenza   Vaccine 0.5 milliLiter(s) IntraMuscular once          Radiology:   < from: Xray Chest 2 Views PA/Lat (22 @ 15:27) >    ACC: [id number] EXAM:  XR CHEST PA LAT 2V                          PROCEDURE DATE:  2022          INTERPRETATION:  Clinical history: 62-year-old female, preop.    Two views of the chest without comparison demonstrate a normal cardiac   silhouette and normal pulmonary vasculature with no consolidation,   effusion, gross adenopathy, pneumothorax or acute osseous finding.    IMPRESSION:  No acute radiographic findings    --- End of Report ---            GEORGIA TOLEDO DO; Attending Radiologist  This document has been electronically signed. Mar  9 2022 11:31AM    < end of copied text >      Problems  -Brain Neoplasm    Assessment/Plan:    -Q1 neurovascular checks  -  -  -  -  - Patient is a 62y old  Female who presents with a chief complaint of   HPI:    Allergies: Macrobid  Nexium    PAST MEDICAL & SURGICAL HISTORY:  HTN (hypertension)  off medication for over one year--states BP has been normal    UTI (urinary tract infection)  currently being treated--will complete antibiotics 3/8/2022    Intracranial tumor    GERD (gastroesophageal reflux disease)    Eczema    Thyroid cancer  surgery. radiation, Radioactive iodine    COVID-19 virus infection  2021--recovered at home    H/O total thyroidectomy  age 21&#x27;s for Thyroid cancer, postop complication arterial bleed, second surgery    History of tonsillectomy  age 5    History of appendectomy  age 5      FAMILY HISTORY:  FH: heart disease  Father, age 85,     FH: pancreatic cancer  Brother, age 59,       SOCIAL HISTORY:    Home Medications:    Review of Systems:  Constitutional: no fever, chills, fatigue  Neuro: no headache, numbness, weakness  Resp: no cough, wheezing, shortness of breath  CVS: no chest pain, palpitations, leg swelling  GI: no abdominal pain, nausea, vomiting, diarrhea   : no dysuria, frequency, incontinence  Skin: no itching, burning, rashes, or lesions   Msk: no joint pain or swelling  Psych: no depression, anxiety, mood swings    T(F): 97.3 (03-15-22 @ 16:00), Max: 97.8 (03-15-22 @ 07:21)  HR: 117 (03-15-22 @ 16:15) (80 - 124)  BP: 137/89 (03-15-22 @ 16:15) (118/99 - 141/90)  RR: 14 (03-15-22 @ 16:15) (12 - 16)  SpO2: 100% (03-15-22 @ 16:15)  Wt(kg): --    CAPILLARY BLOOD GLUCOSE        I&O's Summary    15 Mar 2022 07:01  -  15 Mar 2022 17:03  --------------------------------------------------------  IN: 2700 mL / OUT: 0 mL / NET: 2700 mL        Physical Exam:     Gen:  Neuro:  HEENT:  CVS:  Resp:  Abd:  Ext:  Skin:    Meds:  ceFAZolin  Injectable. 1000 milliGRAM(s) IV Push every 8 hours       levothyroxine 137 MICROGram(s) Oral daily        acetaminophen   IVPB .. 1000 milliGRAM(s) IV Intermittent once PRN  fentaNYL    Injectable 25 MICROGram(s) IV Push every 30 minutes PRN  fentaNYL    Injectable 50 MICROGram(s) IV Push every 10 minutes PRN  metoclopramide Injectable 10 milliGRAM(s) IV Push every 6 hours PRN  ondansetron Injectable 4 milliGRAM(s) IV Push every 6 hours PRN  ondansetron Injectable 4 milliGRAM(s) IV Push once PRN  oxyCODONE    IR 5 milliGRAM(s) Oral once PRN           pantoprazole    Tablet 40 milliGRAM(s) Oral before breakfast        lactated ringers. 1000 milliLiter(s) IV Continuous <Continuous>  sodium chloride 0.9%. 1000 milliLiter(s) IV Continuous <Continuous>     influenza   Vaccine 0.5 milliLiter(s) IntraMuscular once          Radiology:   < from: Xray Chest 2 Views PA/Lat (22 @ 15:27) >    ACC: [id number] EXAM:  XR CHEST PA LAT 2V                          PROCEDURE DATE:  2022          INTERPRETATION:  Clinical history: 62-year-old female, preop.    Two views of the chest without comparison demonstrate a normal cardiac   silhouette and normal pulmonary vasculature with no consolidation,   effusion, gross adenopathy, pneumothorax or acute osseous finding.    IMPRESSION:  No acute radiographic findings    --- End of Report ---            GEORGIA TOLEDO DO; Attending Radiologist  This document has been electronically signed. Mar  9 2022 11:31AM    < end of copied text >      Problems  -Brain Neoplasm    Assessment/Plan:    -Q1 neurovascular checks  -  -  -  -  - Patient is a 62y old  Female who presents with a chief complaint of brain neoplasm    HPI:  61 yo F pmhx HTN presented to  earlier today for elective resection of brain neoplasm. POD#0 s/p craniotomy for surgical removal of neoplasm. EBL ~100cc. At bedside patient with EVD in place @ 10cm H20. Patient complains of headache with some slurred speech.     Allergies: Macrobid  Nexium    PAST MEDICAL & SURGICAL HISTORY:  HTN (hypertension)  off medication for over one year--states BP has been normal    UTI (urinary tract infection)  currently being treated--will complete antibiotics 3/8/2022    Intracranial tumor    GERD (gastroesophageal reflux disease)    Eczema    Thyroid cancer  surgery. radiation, Radioactive iodine    COVID-19 virus infection  2021--recovered at home    H/O total thyroidectomy  age 21&#x27;s for Thyroid cancer, postop complication arterial bleed, second surgery    History of tonsillectomy  age 5    History of appendectomy  age 5      FAMILY HISTORY:  FH: heart disease  Father, age 85,     FH: pancreatic cancer  Brother, age 59,       SOCIAL HISTORY:    Home Medications:    Review of Systems:  Pertient positives noted above, all other ROS neg x10    T(F): 97.3 (03-15-22 @ 16:00), Max: 97.8 (03-15-22 @ 07:21)  HR: 117 (03-15-22 @ 16:15) (80 - 124)  BP: 137/89 (03-15-22 @ 16:15) (118/99 - 141/90)  RR: 14 (03-15-22 @ 16:15) (12 - 16)  SpO2: 100% (03-15-22 @ 16:15)  Wt(kg): --    CAPILLARY BLOOD GLUCOSE        I&O's Summary    15 Mar 2022 07:01  -  15 Mar 2022 17:03  --------------------------------------------------------  IN: 2700 mL / OUT: 0 mL / NET: 2700 mL        Physical Exam:     Gen: ill appearing  Neuro: awake/alert, follows commands, strength and sensation intact extremities, facial sensation intact. Slurred speech. Cranial dressing C/D/I. EVD @10cm H2O minimal output  CVS: +S1S2  Resp: CTA  Abd: soft, NT, ND  Ext: warm, dry, no edema  Skin: well perfused    Meds:  ceFAZolin  Injectable. 1000 milliGRAM(s) IV Push every 8 hours       levothyroxine 137 MICROGram(s) Oral daily        acetaminophen   IVPB .. 1000 milliGRAM(s) IV Intermittent once PRN  fentaNYL    Injectable 25 MICROGram(s) IV Push every 30 minutes PRN  fentaNYL    Injectable 50 MICROGram(s) IV Push every 10 minutes PRN  metoclopramide Injectable 10 milliGRAM(s) IV Push every 6 hours PRN  ondansetron Injectable 4 milliGRAM(s) IV Push every 6 hours PRN  ondansetron Injectable 4 milliGRAM(s) IV Push once PRN  oxyCODONE    IR 5 milliGRAM(s) Oral once PRN           pantoprazole    Tablet 40 milliGRAM(s) Oral before breakfast        lactated ringers. 1000 milliLiter(s) IV Continuous <Continuous>  sodium chloride 0.9%. 1000 milliLiter(s) IV Continuous <Continuous>     influenza   Vaccine 0.5 milliLiter(s) IntraMuscular once          Radiology:   < from: Xray Chest 2 Views PA/Lat (22 @ 15:27) >    ACC: [id number] EXAM:  XR CHEST PA LAT 2V                          PROCEDURE DATE:  2022          INTERPRETATION:  Clinical history: 62-year-old female, preop.    Two views of the chest without comparison demonstrate a normal cardiac   silhouette and normal pulmonary vasculature with no consolidation,   effusion, gross adenopathy, pneumothorax or acute osseous finding.    IMPRESSION:  No acute radiographic findings    --- End of Report ---            GEORGIA TOLEDO DO; Attending Radiologist  This document has been electronically signed. Mar  9 2022 11:31AM    < end of copied text >      Problems  -Brain Neoplasm    Assessment/Plan:    -Q1 neurovascular checks. Neurosurgery following  -EVD @10cm H20. Monitor drain output  -CTH ordered post-op  -Strict bedrest, keep HOB elevated 15-30 degrees  -Pain control PRN  -Strict BP control, keep SBP <150.   -Perioperative Ancef  -Clear liquid diet, advance as tolerated  -DVT PPX: SCD    Admit to ICU for neurovascular monitoring. Discussed w/ Intensivist Dr. lizarraga Patient is a 62y old  Female who presents with a chief complaint of brain neoplasm    HPI:  61 yo F pmhx HTN presented to  earlier today for elective resection of brain neoplasm. POD#0 s/p craniotomy for surgical removal of neoplasm. EBL ~100cc. At bedside patient with EVD in place @ 10cm H20. Patient complains of headache with some slurred speech.     Allergies: Macrobid  Nexium    PAST MEDICAL & SURGICAL HISTORY:  HTN (hypertension)  off medication for over one year--states BP has been normal    UTI (urinary tract infection)  currently being treated--will complete antibiotics 3/8/2022    Intracranial tumor    GERD (gastroesophageal reflux disease)    Eczema    Thyroid cancer  surgery. radiation, Radioactive iodine    COVID-19 virus infection  2021--recovered at home    H/O total thyroidectomy  age 21&#x27;s for Thyroid cancer, postop complication arterial bleed, second surgery    History of tonsillectomy  age 5    History of appendectomy  age 5      FAMILY HISTORY:  FH: heart disease  Father, age 85,     FH: pancreatic cancer  Brother, age 59,       SOCIAL HISTORY:    Home Medications:    Review of Systems:  Pertient positives noted above, all other ROS neg x10    T(F): 97.3 (03-15-22 @ 16:00), Max: 97.8 (03-15-22 @ 07:21)  HR: 117 (03-15-22 @ 16:15) (80 - 124)  BP: 137/89 (03-15-22 @ 16:15) (118/99 - 141/90)  RR: 14 (03-15-22 @ 16:15) (12 - 16)  SpO2: 100% (03-15-22 @ 16:15)  Wt(kg): --    CAPILLARY BLOOD GLUCOSE        I&O's Summary    15 Mar 2022 07:01  -  15 Mar 2022 17:03  --------------------------------------------------------  IN: 2700 mL / OUT: 0 mL / NET: 2700 mL        Physical Exam:     Gen: ill appearing  Neuro: awake/alert, follows commands, strength and sensation intact extremities, facial sensation intact. Slurred speech. Cranial dressing C/D/I. EVD @10cm H2O minimal output  CVS: +S1S2  Resp: CTA  Abd: soft, NT, ND  Ext: warm, dry, no edema  Skin: well perfused    Meds:  ceFAZolin  Injectable. 1000 milliGRAM(s) IV Push every 8 hours       levothyroxine 137 MICROGram(s) Oral daily        acetaminophen   IVPB .. 1000 milliGRAM(s) IV Intermittent once PRN  fentaNYL    Injectable 25 MICROGram(s) IV Push every 30 minutes PRN  fentaNYL    Injectable 50 MICROGram(s) IV Push every 10 minutes PRN  metoclopramide Injectable 10 milliGRAM(s) IV Push every 6 hours PRN  ondansetron Injectable 4 milliGRAM(s) IV Push every 6 hours PRN  ondansetron Injectable 4 milliGRAM(s) IV Push once PRN  oxyCODONE    IR 5 milliGRAM(s) Oral once PRN           pantoprazole    Tablet 40 milliGRAM(s) Oral before breakfast        lactated ringers. 1000 milliLiter(s) IV Continuous <Continuous>  sodium chloride 0.9%. 1000 milliLiter(s) IV Continuous <Continuous>     influenza   Vaccine 0.5 milliLiter(s) IntraMuscular once          Radiology:   < from: Xray Chest 2 Views PA/Lat (22 @ 15:27) >    ACC: [id number] EXAM:  XR CHEST PA LAT 2V                          PROCEDURE DATE:  2022          INTERPRETATION:  Clinical history: 62-year-old female, preop.    Two views of the chest without comparison demonstrate a normal cardiac   silhouette and normal pulmonary vasculature with no consolidation,   effusion, gross adenopathy, pneumothorax or acute osseous finding.    IMPRESSION:  No acute radiographic findings    --- End of Report ---            GEORGIA TOLEDO DO; Attending Radiologist  This document has been electronically signed. Mar  9 2022 11:31AM    < end of copied text >      Problems  -Brain Neoplasm    Assessment/Plan:    -Q1 neurovascular checks. Neurosurgery following  -EVD @10cm H20. Monitor drain output  -CTH ordered post-op  -Strict bedrest, keep HOB elevated 15-30 degrees  -Pain control PRN  -Strict BP control, keep SBP <150.   -Perioperative Ancef  -Clear liquid diet, advance as tolerated  -DVT PPX: SCD    Admit to ICU for neurovascular monitoring. Discussed w/ Intensivist Dr. lizarraga Patient is a 62y old  Female who presents with a chief complaint of brain neoplasm    HPI:  61 yo F pmhx HTN presented to  earlier today for elective resection of brain neoplasm. POD#0 s/p craniotomy for surgical removal of neoplasm. EBL ~100cc. At bedside patient with EVD in place @ 10cm H20. Patient complains of headache with some slurred speech.     Allergies: Macrobid  Nexium    PAST MEDICAL & SURGICAL HISTORY:  HTN (hypertension)  off medication for over one year--states BP has been normal    UTI (urinary tract infection)  currently being treated--will complete antibiotics 3/8/2022    Intracranial tumor    GERD (gastroesophageal reflux disease)    Eczema    Thyroid cancer  surgery. radiation, Radioactive iodine    COVID-19 virus infection  2021--recovered at home    H/O total thyroidectomy  age 21&#x27;s for Thyroid cancer, postop complication arterial bleed, second surgery    History of tonsillectomy  age 5    History of appendectomy  age 5      FAMILY HISTORY:  FH: heart disease  Father, age 85,     FH: pancreatic cancer  Brother, age 59,       SOCIAL HISTORY:    Home Medications:    Review of Systems:  Pertient positives noted above, all other ROS neg x10    T(F): 97.3 (03-15-22 @ 16:00), Max: 97.8 (03-15-22 @ 07:21)  HR: 117 (03-15-22 @ 16:15) (80 - 124)  BP: 137/89 (03-15-22 @ 16:15) (118/99 - 141/90)  RR: 14 (03-15-22 @ 16:15) (12 - 16)  SpO2: 100% (03-15-22 @ 16:15)  Wt(kg): --    CAPILLARY BLOOD GLUCOSE        I&O's Summary    15 Mar 2022 07:01  -  15 Mar 2022 17:03  --------------------------------------------------------  IN: 2700 mL / OUT: 0 mL / NET: 2700 mL        Physical Exam:     Gen: ill appearing  Neuro: awake/alert, follows commands, strength and sensation intact extremities, facial sensation intact. Slurred speech. Cranial dressing C/D/I. EVD @10cm H2O minimal output  CVS: +S1S2  Resp: CTA  Abd: soft, NT, ND  Ext: warm, dry, no edema  Skin: well perfused    Meds:  ceFAZolin  Injectable. 1000 milliGRAM(s) IV Push every 8 hours       levothyroxine 137 MICROGram(s) Oral daily        acetaminophen   IVPB .. 1000 milliGRAM(s) IV Intermittent once PRN  fentaNYL    Injectable 25 MICROGram(s) IV Push every 30 minutes PRN  fentaNYL    Injectable 50 MICROGram(s) IV Push every 10 minutes PRN  metoclopramide Injectable 10 milliGRAM(s) IV Push every 6 hours PRN  ondansetron Injectable 4 milliGRAM(s) IV Push every 6 hours PRN  ondansetron Injectable 4 milliGRAM(s) IV Push once PRN  oxyCODONE    IR 5 milliGRAM(s) Oral once PRN           pantoprazole    Tablet 40 milliGRAM(s) Oral before breakfast        lactated ringers. 1000 milliLiter(s) IV Continuous <Continuous>  sodium chloride 0.9%. 1000 milliLiter(s) IV Continuous <Continuous>     influenza   Vaccine 0.5 milliLiter(s) IntraMuscular once          Radiology:   < from: Xray Chest 2 Views PA/Lat (22 @ 15:27) >    ACC: [id number] EXAM:  XR CHEST PA LAT 2V                          PROCEDURE DATE:  2022          INTERPRETATION:  Clinical history: 62-year-old female, preop.    Two views of the chest without comparison demonstrate a normal cardiac   silhouette and normal pulmonary vasculature with no consolidation,   effusion, gross adenopathy, pneumothorax or acute osseous finding.    IMPRESSION:  No acute radiographic findings    --- End of Report ---            GEORGIA TOLEDO DO; Attending Radiologist  This document has been electronically signed. Mar  9 2022 11:31AM    < end of copied text >      Problems  -Brain Neoplasm    Assessment/Plan:    -Q1 neurovascular checks. Neurosurgery following  -EVD @10cm H20. Monitor drain output  -CTH ordered post-op  -Strict bedrest, keep HOB elevated 15-30 degrees  -Pain control PRN  -Strict BP control, keep SBP <150.   -Perioperative Ancef  -Clear liquid diet, advance as tolerated  -DVT PPX: SCD    Admit to ICU for neurovascular monitoring. Discussed w/ Intensivist Dr. lizarraga

## 2022-03-15 NOTE — PATIENT PROFILE ADULT - INTERNATIONAL TRAVEL
What Type Of Note Output Would You Prefer (Optional)?: Standard Output Is The Patient Presenting As Previously Scheduled?: No, they are a work-in How Severe Is Your Rash?: mild Is This A New Presentation, Or A Follow-Up?: Rash No

## 2022-03-15 NOTE — PATIENT PROFILE ADULT - FALL HARM RISK - UNIVERSAL INTERVENTIONS
Bed in lowest position, wheels locked, appropriate side rails in place/Call bell, personal items and telephone in reach/Instruct patient to call for assistance before getting out of bed or chair/Non-slip footwear when patient is out of bed/[place] to call system/Physically safe environment - no spills, clutter or unnecessary equipment/Purposeful Proactive Rounding/Room/bathroom lighting operational, light cord in reach Bed in lowest position, wheels locked, appropriate side rails in place/Call bell, personal items and telephone in reach/Instruct patient to call for assistance before getting out of bed or chair/Non-slip footwear when patient is out of bed/[place] to call system/Physically safe environment - no spills, clutter or unnecessary equipment/Purposeful Proactive Rounding/Room/bathroom lighting operational, light cord in reach Bed in lowest position, wheels locked, appropriate side rails in place/Call bell, personal items and telephone in reach/Instruct patient to call for assistance before getting out of bed or chair/Non-slip footwear when patient is out of bed/[place] to call system/Physically safe environment - no spills, clutter or unnecessary equipment/Purposeful Proactive Rounding/Room/bathroom lighting operational, light cord in reach

## 2022-03-15 NOTE — BRIEF OPERATIVE NOTE - OPERATION/FINDINGS
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

## 2022-03-15 NOTE — BRIEF OPERATIVE NOTE - NSICDXBRIEFPROCEDURE_GEN_ALL_CORE_FT
PROCEDURES:  Right sided craniotomy for surgical removal of neoplasm 15-Mar-2022 15:50:14  Humberto Valle  Insertion, external ventricular drain 15-Mar-2022 15:57:11  Humberto Valle

## 2022-03-16 LAB
ANION GAP SERPL CALC-SCNC: 7 MMOL/L — SIGNIFICANT CHANGE UP (ref 5–17)
BUN SERPL-MCNC: 9 MG/DL — SIGNIFICANT CHANGE UP (ref 7–23)
CALCIUM SERPL-MCNC: 8.8 MG/DL — SIGNIFICANT CHANGE UP (ref 8.5–10.1)
CHLORIDE SERPL-SCNC: 109 MMOL/L — HIGH (ref 96–108)
CO2 SERPL-SCNC: 25 MMOL/L — SIGNIFICANT CHANGE UP (ref 22–31)
CREAT SERPL-MCNC: 0.54 MG/DL — SIGNIFICANT CHANGE UP (ref 0.5–1.3)
EGFR: 105 ML/MIN/1.73M2 — SIGNIFICANT CHANGE UP
GLUCOSE SERPL-MCNC: 121 MG/DL — HIGH (ref 70–99)
HCT VFR BLD CALC: 27.1 % — LOW (ref 34.5–45)
HGB BLD-MCNC: 9 G/DL — LOW (ref 11.5–15.5)
MAGNESIUM SERPL-MCNC: 2 MG/DL — SIGNIFICANT CHANGE UP (ref 1.6–2.6)
MCHC RBC-ENTMCNC: 32 PG — SIGNIFICANT CHANGE UP (ref 27–34)
MCHC RBC-ENTMCNC: 33.2 GM/DL — SIGNIFICANT CHANGE UP (ref 32–36)
MCV RBC AUTO: 96.4 FL — SIGNIFICANT CHANGE UP (ref 80–100)
PHOSPHATE SERPL-MCNC: 3.6 MG/DL — SIGNIFICANT CHANGE UP (ref 2.5–4.5)
PLATELET # BLD AUTO: 217 K/UL — SIGNIFICANT CHANGE UP (ref 150–400)
POTASSIUM SERPL-MCNC: 3.5 MMOL/L — SIGNIFICANT CHANGE UP (ref 3.5–5.3)
POTASSIUM SERPL-SCNC: 3.5 MMOL/L — SIGNIFICANT CHANGE UP (ref 3.5–5.3)
RBC # BLD: 2.81 M/UL — LOW (ref 3.8–5.2)
RBC # FLD: 17.1 % — HIGH (ref 10.3–14.5)
SODIUM SERPL-SCNC: 141 MMOL/L — SIGNIFICANT CHANGE UP (ref 135–145)
WBC # BLD: 6.93 K/UL — SIGNIFICANT CHANGE UP (ref 3.8–10.5)
WBC # FLD AUTO: 6.93 K/UL — SIGNIFICANT CHANGE UP (ref 3.8–10.5)

## 2022-03-16 PROCEDURE — 99233 SBSQ HOSP IP/OBS HIGH 50: CPT

## 2022-03-16 PROCEDURE — 70450 CT HEAD/BRAIN W/O DYE: CPT | Mod: 26

## 2022-03-16 RX ORDER — ACETAMINOPHEN 500 MG
1000 TABLET ORAL ONCE
Refills: 0 | Status: COMPLETED | OUTPATIENT
Start: 2022-03-16 | End: 2022-03-16

## 2022-03-16 RX ORDER — ESCITALOPRAM OXALATE 10 MG/1
10 TABLET, FILM COATED ORAL DAILY
Refills: 0 | Status: DISCONTINUED | OUTPATIENT
Start: 2022-03-16 | End: 2022-03-16

## 2022-03-16 RX ORDER — POTASSIUM CHLORIDE 20 MEQ
40 PACKET (EA) ORAL ONCE
Refills: 0 | Status: COMPLETED | OUTPATIENT
Start: 2022-03-16 | End: 2022-03-16

## 2022-03-16 RX ORDER — BENZOCAINE AND MENTHOL 5; 1 G/100ML; G/100ML
1 LIQUID ORAL
Refills: 0 | Status: DISCONTINUED | OUTPATIENT
Start: 2022-03-16 | End: 2022-03-18

## 2022-03-16 RX ORDER — SENNA PLUS 8.6 MG/1
2 TABLET ORAL ONCE
Refills: 0 | Status: COMPLETED | OUTPATIENT
Start: 2022-03-16 | End: 2022-03-16

## 2022-03-16 RX ORDER — ESCITALOPRAM OXALATE 10 MG/1
10 TABLET, FILM COATED ORAL DAILY
Refills: 0 | Status: DISCONTINUED | OUTPATIENT
Start: 2022-03-17 | End: 2022-03-18

## 2022-03-16 RX ORDER — FENTANYL CITRATE 50 UG/ML
25 INJECTION INTRAVENOUS ONCE
Refills: 0 | Status: DISCONTINUED | OUTPATIENT
Start: 2022-03-16 | End: 2022-03-16

## 2022-03-16 RX ORDER — ESCITALOPRAM OXALATE 10 MG/1
10 TABLET, FILM COATED ORAL ONCE
Refills: 0 | Status: COMPLETED | OUTPATIENT
Start: 2022-03-16 | End: 2022-03-16

## 2022-03-16 RX ADMIN — ESCITALOPRAM OXALATE 10 MILLIGRAM(S): 10 TABLET, FILM COATED ORAL at 04:04

## 2022-03-16 RX ADMIN — Medication 400 MILLIGRAM(S): at 13:28

## 2022-03-16 RX ADMIN — Medication 1000 MILLIGRAM(S): at 20:50

## 2022-03-16 RX ADMIN — Medication 400 MILLIGRAM(S): at 02:57

## 2022-03-16 RX ADMIN — Medication 1000 MILLIGRAM(S): at 05:41

## 2022-03-16 RX ADMIN — Medication 1000 MILLIGRAM(S): at 14:00

## 2022-03-16 RX ADMIN — BENZOCAINE AND MENTHOL 1 LOZENGE: 5; 1 LIQUID ORAL at 02:18

## 2022-03-16 RX ADMIN — Medication 1000 MILLIGRAM(S): at 21:06

## 2022-03-16 RX ADMIN — FENTANYL CITRATE 25 MICROGRAM(S): 50 INJECTION INTRAVENOUS at 18:30

## 2022-03-16 RX ADMIN — Medication 40 MILLIEQUIVALENT(S): at 08:22

## 2022-03-16 RX ADMIN — Medication 137 MICROGRAM(S): at 05:42

## 2022-03-16 RX ADMIN — FENTANYL CITRATE 25 MICROGRAM(S): 50 INJECTION INTRAVENOUS at 00:13

## 2022-03-16 RX ADMIN — Medication 10 MILLIGRAM(S): at 03:09

## 2022-03-16 RX ADMIN — Medication 1000 MILLIGRAM(S): at 03:30

## 2022-03-16 RX ADMIN — SODIUM CHLORIDE 75 MILLILITER(S): 9 INJECTION INTRAMUSCULAR; INTRAVENOUS; SUBCUTANEOUS at 10:06

## 2022-03-16 RX ADMIN — Medication 1000 MILLIGRAM(S): at 13:27

## 2022-03-16 RX ADMIN — FENTANYL CITRATE 25 MICROGRAM(S): 50 INJECTION INTRAVENOUS at 19:21

## 2022-03-16 RX ADMIN — Medication 400 MILLIGRAM(S): at 20:19

## 2022-03-16 RX ADMIN — PANTOPRAZOLE SODIUM 40 MILLIGRAM(S): 20 TABLET, DELAYED RELEASE ORAL at 07:39

## 2022-03-16 RX ADMIN — FENTANYL CITRATE 25 MICROGRAM(S): 50 INJECTION INTRAVENOUS at 00:30

## 2022-03-16 RX ADMIN — SENNA PLUS 2 TABLET(S): 8.6 TABLET ORAL at 07:39

## 2022-03-16 NOTE — DIETITIAN INITIAL EVALUATION ADULT. - PHYSCIAL ASSESSMENT
azra score of 14; no PU documented  no BM documented; bowel regimen being added today (3/16) underweight

## 2022-03-16 NOTE — DIETITIAN INITIAL EVALUATION ADULT. - NS FNS REASON FOR WEIGHT CHANG
pt reported usual wt of 135#, 2 months ago; 21# wt loss x 2 mo (16%), clinically significant./decreased po intake/other (specify)

## 2022-03-16 NOTE — DIETITIAN NUTRITION RISK NOTIFICATION - ADDITIONAL COMMENTS/DIETITIAN RECOMMENDATIONS
1) advance diet as tolerated to regular with ensure enlive BID to optimize PO intake 2) consider checking vitamin D level and supplement prn 3) monitor BM; if > 3 days without BM, adjust bowel regimen prn 4) daily wt checks to track/trend changes

## 2022-03-16 NOTE — DIETITIAN INITIAL EVALUATION ADULT. - PERTINENT LABORATORY DATA
03-16    141  |  109<H>  |  9   ----------------------------<  121<H>  3.5   |  25  |  0.54    Ca    8.8      16 Mar 2022 05:45  Phos  3.6     03-16  Mg     2.0     03-16

## 2022-03-16 NOTE — DIETITIAN INITIAL EVALUATION ADULT. - OTHER INFO
63yo female with PMH significant for HTN, UTI, intracranial tumor, GERD, eczema, thyroid CA, COVID-19 admitted for elective resection of brain neoplasm, now s/p craniotomy on 3/15. 61yo female with PMH significant for HTN, UTI, intracranial tumor, GERD, eczema, thyroid CA, COVID-19 admitted for elective resection of brain neoplasm, now s/p craniotomy on 3/15.

## 2022-03-16 NOTE — PHYSICAL THERAPY INITIAL EVALUATION ADULT - GENERAL OBSERVATIONS, REHAB EVAL
Patient received in bed in ICU, +ICU monitors, +A-line monitor, +EVD (both disconnected by RN for session), +Monroe. + head bandaged with gauze.  Patient denied pain. Spouse in post session.

## 2022-03-16 NOTE — PHYSICAL THERAPY INITIAL EVALUATION ADULT - MODALITIES TREATMENT COMMENTS
Patient ambulated well with RW, given HEP for face and L hand.  Patient  left in chair with CBIR and chair alarm active. Spouse at bedside. RN informed of session/status.

## 2022-03-16 NOTE — DIETITIAN INITIAL EVALUATION ADULT. - MALNUTRITION
severe malnutrition in chronic illness severe malnutrition in chronic illness r/t decreased PO intake with increased needs 2/2 CA AEB 6% wt loss x 8 days; meeting <75% of ENN x > 2 mo; severe fat/mod muscle wasting

## 2022-03-16 NOTE — PROGRESS NOTE ADULT - SUBJECTIVE AND OBJECTIVE BOX
Patient is a 62y old  Female who presents with a chief complaint of intracranial tumor     24 hour events: s/p R frontal craniotomy with resection of lateral ventricle tumor and EVD placement POD 1  denies headache today   denies N, V, dizziness    PAST MEDICAL & SURGICAL HISTORY:  HTN (hypertension)  off medication for over one year--states BP has been normal    UTI (urinary tract infection)  currently being treated--will complete antibiotics 3/8/2022    Intracranial tumor    GERD (gastroesophageal reflux disease)    Eczema    Thyroid cancer  surgery. radiation, Radioactive iodine    COVID-19 virus infection  11/2021--recovered at home    H/O total thyroidectomy  age 21&#x27;s for Thyroid cancer, postop complication arterial bleed, second surgery    History of tonsillectomy  age 5    History of appendectomy  age 5        Allergies    Macrobid (Rash)  Nexium (Rash)    Intolerances      REVIEW OF SYSTEMS: SEE BELOW       ICU Vital Signs Last 24 Hrs  T(C): 36.8 (16 Mar 2022 10:00), Max: 37.6 (15 Mar 2022 18:45)  T(F): 98.2 (16 Mar 2022 10:00), Max: 99.7 (15 Mar 2022 18:45)  HR: 80 (16 Mar 2022 10:00) (80 - 134)  BP: 139/84 (16 Mar 2022 06:00) (113/61 - 151/87)  BP(mean): 97 (16 Mar 2022 06:00) (74 - 97)  ABP: 149/76 (16 Mar 2022 10:00) (119/60 - 156/82)  ABP(mean): 106 (16 Mar 2022 10:00) (82 - 113)  RR: 16 (15 Mar 2022 18:45) (12 - 20)  SpO2: 96% (16 Mar 2022 10:00) (95% - 100%)      CAPILLARY BLOOD GLUCOSE          I&O's Summary    15 Mar 2022 07:01  -  16 Mar 2022 07:00  --------------------------------------------------------  IN: 4126 mL / OUT: 2225 mL / NET: 1901 mL    16 Mar 2022 07:01  -  16 Mar 2022 11:10  --------------------------------------------------------  IN: 0 mL / OUT: 128 mL / NET: -128 mL            MEDICATIONS  (STANDING):  ceFAZolin  Injectable. 1000 milliGRAM(s) IV Push every 8 hours  escitalopram 10 milliGRAM(s) Oral daily  influenza   Vaccine 0.5 milliLiter(s) IntraMuscular once  levothyroxine 137 MICROGram(s) Oral daily  pantoprazole    Tablet 40 milliGRAM(s) Oral before breakfast  sodium chloride 0.9%. 1000 milliLiter(s) (75 mL/Hr) IV Continuous <Continuous>      MEDICATIONS  (PRN):  acetaminophen   IVPB .. 1000 milliGRAM(s) IV Intermittent once PRN Moderate Pain (4 - 6)  benzocaine 15 mG/menthol 3.6 mG Lozenge 1 Lozenge Oral every 2 hours PRN Sore Throat  ondansetron Injectable 4 milliGRAM(s) IV Push every 6 hours PRN Nausea and/or Vomiting      PHYSICAL EXAM: SEE BELOW                          9.0    6.93  )-----------( 217      ( 16 Mar 2022 05:45 )             27.1       03-16    141  |  109<H>  |  9   ----------------------------<  121<H>  3.5   |  25  |  0.54    Ca    8.8      16 Mar 2022 05:45  Phos  3.6     03-16  Mg     2.0     03-16

## 2022-03-16 NOTE — DIETITIAN NUTRITION RISK NOTIFICATION - TREATMENT: THE FOLLOWING DIET HAS BEEN RECOMMENDED
Diet, Regular (03-16-22 @ 08:59) [Available for Activation]  Diet, Clear Liquid (03-16-22 @ 08:59) [Available for Activation]

## 2022-03-16 NOTE — PHYSICAL THERAPY INITIAL EVALUATION ADULT - LEVEL OF CONSCIOUSNESS, REHAB EVAL
mixing up times, forgetting spouse was in earlier, stating she was in the ICU before this room./alert/confused

## 2022-03-16 NOTE — DIETITIAN INITIAL EVALUATION ADULT. - PERTINENT MEDS FT
MEDICATIONS  (STANDING):  ceFAZolin  Injectable. 1000 milliGRAM(s) IV Push every 8 hours  influenza   Vaccine 0.5 milliLiter(s) IntraMuscular once  levothyroxine 137 MICROGram(s) Oral daily  pantoprazole    Tablet 40 milliGRAM(s) Oral before breakfast  sodium chloride 0.9%. 1000 milliLiter(s) (75 mL/Hr) IV Continuous <Continuous>    MEDICATIONS  (PRN):  acetaminophen   IVPB .. 1000 milliGRAM(s) IV Intermittent once PRN Moderate Pain (4 - 6)  benzocaine 15 mG/menthol 3.6 mG Lozenge 1 Lozenge Oral every 2 hours PRN Sore Throat  fentaNYL    Injectable 25 MICROGram(s) IV Push every 30 minutes PRN Severe Pain (7 - 10)  metoclopramide Injectable 10 milliGRAM(s) IV Push every 6 hours PRN nausea / vomiting  ondansetron Injectable 4 milliGRAM(s) IV Push every 6 hours PRN Nausea and/or Vomiting

## 2022-03-16 NOTE — PROGRESS NOTE ADULT - MENTAL STATUS
speech clear, L facial droop, motor strength normal and equal bilaterally, no drift, no abnormal eye movements

## 2022-03-16 NOTE — CHART NOTE - NSCHARTNOTEFT_GEN_A_CORE
Pt seen and examined post-operatively   head CT reviewed with Dr. Leonard HERNANDEZ noted to be intact but with no drainage  pt doing well, c/o mild headache at the time, denies any n/v, numbness, tingling, visual disturbances.   Pt is awake and alert oriented x3 clear coherent speech, follows commands well   ROY x4 antigravity no pronator drift, strength intact thru out    d/w Leonard Smith and CT scan reviewed   will give pt IV tylenol and continue close neuro checks  d/w ICU RN advised to notify NSX PA for any changes in MS or any lethargy

## 2022-03-16 NOTE — DIETITIAN INITIAL EVALUATION ADULT. - NAME AND PHONE
Suzanne Hayes MA, RDN, CDN, [facility]: [phone] Suzanne Hayes MA, RDN, CDN, [facility]: [phone] Suzanne Hayes MA, RDN, CDN, [facility]: [phone]

## 2022-03-17 LAB
ADD ON TEST-SPECIMEN IN LAB: SIGNIFICANT CHANGE UP
ANION GAP SERPL CALC-SCNC: 8 MMOL/L — SIGNIFICANT CHANGE UP (ref 5–17)
BUN SERPL-MCNC: 8 MG/DL — SIGNIFICANT CHANGE UP (ref 7–23)
CALCIUM SERPL-MCNC: 8.6 MG/DL — SIGNIFICANT CHANGE UP (ref 8.5–10.1)
CHLORIDE SERPL-SCNC: 103 MMOL/L — SIGNIFICANT CHANGE UP (ref 96–108)
CO2 SERPL-SCNC: 26 MMOL/L — SIGNIFICANT CHANGE UP (ref 22–31)
CREAT SERPL-MCNC: 0.34 MG/DL — LOW (ref 0.5–1.3)
EGFR: 117 ML/MIN/1.73M2 — SIGNIFICANT CHANGE UP
GLUCOSE SERPL-MCNC: 96 MG/DL — SIGNIFICANT CHANGE UP (ref 70–99)
HCT VFR BLD CALC: 29.1 % — LOW (ref 34.5–45)
HGB BLD-MCNC: 9.4 G/DL — LOW (ref 11.5–15.5)
MAGNESIUM SERPL-MCNC: 2 MG/DL — SIGNIFICANT CHANGE UP (ref 1.6–2.6)
MCHC RBC-ENTMCNC: 31.9 PG — SIGNIFICANT CHANGE UP (ref 27–34)
MCHC RBC-ENTMCNC: 32.3 GM/DL — SIGNIFICANT CHANGE UP (ref 32–36)
MCV RBC AUTO: 98.6 FL — SIGNIFICANT CHANGE UP (ref 80–100)
PHOSPHATE SERPL-MCNC: 2.5 MG/DL — SIGNIFICANT CHANGE UP (ref 2.5–4.5)
PLATELET # BLD AUTO: 214 K/UL — SIGNIFICANT CHANGE UP (ref 150–400)
POTASSIUM SERPL-MCNC: 2.9 MMOL/L — CRITICAL LOW (ref 3.5–5.3)
POTASSIUM SERPL-SCNC: 2.9 MMOL/L — CRITICAL LOW (ref 3.5–5.3)
RBC # BLD: 2.95 M/UL — LOW (ref 3.8–5.2)
RBC # FLD: 17.1 % — HIGH (ref 10.3–14.5)
SODIUM SERPL-SCNC: 137 MMOL/L — SIGNIFICANT CHANGE UP (ref 135–145)
WBC # BLD: 6.85 K/UL — SIGNIFICANT CHANGE UP (ref 3.8–10.5)
WBC # FLD AUTO: 6.85 K/UL — SIGNIFICANT CHANGE UP (ref 3.8–10.5)

## 2022-03-17 PROCEDURE — 70450 CT HEAD/BRAIN W/O DYE: CPT | Mod: 26

## 2022-03-17 PROCEDURE — 99232 SBSQ HOSP IP/OBS MODERATE 35: CPT

## 2022-03-17 RX ORDER — POLYETHYLENE GLYCOL 3350 17 G/17G
17 POWDER, FOR SOLUTION ORAL DAILY
Refills: 0 | Status: DISCONTINUED | OUTPATIENT
Start: 2022-03-17 | End: 2022-03-18

## 2022-03-17 RX ORDER — ACETAMINOPHEN 500 MG
650 TABLET ORAL EVERY 6 HOURS
Refills: 0 | Status: DISCONTINUED | OUTPATIENT
Start: 2022-03-17 | End: 2022-03-18

## 2022-03-17 RX ORDER — HYDRALAZINE HCL 50 MG
10 TABLET ORAL ONCE
Refills: 0 | Status: COMPLETED | OUTPATIENT
Start: 2022-03-17 | End: 2022-03-17

## 2022-03-17 RX ORDER — POTASSIUM CHLORIDE 20 MEQ
40 PACKET (EA) ORAL EVERY 4 HOURS
Refills: 0 | Status: COMPLETED | OUTPATIENT
Start: 2022-03-17 | End: 2022-03-17

## 2022-03-17 RX ORDER — ACETAMINOPHEN 500 MG
1000 TABLET ORAL ONCE
Refills: 0 | Status: COMPLETED | OUTPATIENT
Start: 2022-03-17 | End: 2022-03-17

## 2022-03-17 RX ORDER — SENNA PLUS 8.6 MG/1
2 TABLET ORAL AT BEDTIME
Refills: 0 | Status: DISCONTINUED | OUTPATIENT
Start: 2022-03-17 | End: 2022-03-18

## 2022-03-17 RX ORDER — OXYCODONE HYDROCHLORIDE 5 MG/1
5 TABLET ORAL EVERY 6 HOURS
Refills: 0 | Status: DISCONTINUED | OUTPATIENT
Start: 2022-03-17 | End: 2022-03-18

## 2022-03-17 RX ADMIN — OXYCODONE HYDROCHLORIDE 5 MILLIGRAM(S): 5 TABLET ORAL at 18:45

## 2022-03-17 RX ADMIN — SENNA PLUS 2 TABLET(S): 8.6 TABLET ORAL at 22:22

## 2022-03-17 RX ADMIN — Medication 40 MILLIEQUIVALENT(S): at 13:09

## 2022-03-17 RX ADMIN — ESCITALOPRAM OXALATE 10 MILLIGRAM(S): 10 TABLET, FILM COATED ORAL at 09:20

## 2022-03-17 RX ADMIN — PANTOPRAZOLE SODIUM 40 MILLIGRAM(S): 20 TABLET, DELAYED RELEASE ORAL at 05:46

## 2022-03-17 RX ADMIN — Medication 40 MILLIEQUIVALENT(S): at 18:16

## 2022-03-17 RX ADMIN — Medication 400 MILLIGRAM(S): at 02:26

## 2022-03-17 RX ADMIN — Medication 650 MILLIGRAM(S): at 13:39

## 2022-03-17 RX ADMIN — Medication 1000 MILLIGRAM(S): at 03:00

## 2022-03-17 RX ADMIN — Medication 10 MILLIGRAM(S): at 04:21

## 2022-03-17 RX ADMIN — Medication 40 MILLIEQUIVALENT(S): at 09:19

## 2022-03-17 RX ADMIN — OXYCODONE HYDROCHLORIDE 5 MILLIGRAM(S): 5 TABLET ORAL at 18:15

## 2022-03-17 RX ADMIN — POLYETHYLENE GLYCOL 3350 17 GRAM(S): 17 POWDER, FOR SOLUTION ORAL at 13:10

## 2022-03-17 RX ADMIN — Medication 650 MILLIGRAM(S): at 13:09

## 2022-03-17 RX ADMIN — Medication 137 MICROGRAM(S): at 05:46

## 2022-03-17 NOTE — PROGRESS NOTE ADULT - ASSESSMENT
Patient is a 62 year old female presents to  for scheduled procedure.   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    POD#2, right frontal craniotomy  EVD now removed     - Advance diet and activity as tolerated  - continue supportive care in ICU, possible downgrade later today   -Physical therapy  -Maintain SBP < 150   -CT head stable   -f/u pathology- most likely will not be available until after discharge   -Venodynes for DVT PPX   -possible, d/c home tomorrow 
Patient is a 62 year old female presents to  for scheduled procedure. 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    POD#1 right frontal craniotomy, EVD placement    - Continue ICU care  - Advance diet  - Head CT today reviewed- appears stable  - Continue EVD at 10cm H2O for now  - Ancef while drain in place  - OOB to chair/PT  - D/c dhillon pending OOB assessment  - SCDs DVT ppx  - PPI GI ppx    Discussed with Dr. Valle

## 2022-03-17 NOTE — PROGRESS NOTE ADULT - NEUROLOGICAL DETAILS
alert and oriented x 3/responds to pain/responds to verbal commands/no spontaneous movement/normal strength
alert and oriented x 3/responds to pain/responds to verbal commands/no spontaneous movement

## 2022-03-17 NOTE — PROGRESS NOTE ADULT - NUTRITIONAL ASSESSMENT
This patient has been assessed with a concern for Malnutrition and has been determined to have a diagnosis/diagnoses of Severe protein-calorie malnutrition.    This patient is being managed with:   Diet Regular-  Entered: Mar 16 2022 12:32PM    
This patient has been assessed with a concern for Malnutrition and has been determined to have a diagnosis/diagnoses of Severe protein-calorie malnutrition.    This patient is being managed with:   Diet Regular-  Entered: Mar 16 2022 12:32PM    
This patient has been assessed with a concern for Malnutrition and has been determined to have a diagnosis/diagnoses of Severe protein-calorie malnutrition.

## 2022-03-17 NOTE — PROGRESS NOTE ADULT - SUBJECTIVE AND OBJECTIVE BOX
Patient is a 62y old  Female who presents with a chief complaint of intraventricular hemorrhage (17 Mar 2022 10:48)    24 hour events: c/o headache overnight - denies now   stable otherwise   EVD removed 3/16     PAST MEDICAL & SURGICAL HISTORY:  HTN (hypertension)  off medication for over one year--states BP has been normal    UTI (urinary tract infection)  currently being treated--will complete antibiotics 3/8/2022    Intracranial tumor    GERD (gastroesophageal reflux disease)    Eczema    Thyroid cancer  surgery. radiation, Radioactive iodine    COVID-19 virus infection  11/2021--recovered at home    H/O total thyroidectomy  age 21&#x27;s for Thyroid cancer, postop complication arterial bleed, second surgery    History of tonsillectomy  age 5    History of appendectomy  age 5        Allergies    Macrobid (Rash)  Nexium (Rash)    Intolerances      REVIEW OF SYSTEMS: SEE BELOW       ICU Vital Signs Last 24 Hrs  T(C): 36.7 (17 Mar 2022 08:00), Max: 36.9 (16 Mar 2022 20:00)  T(F): 98.1 (17 Mar 2022 08:00), Max: 98.5 (16 Mar 2022 20:00)  HR: 85 (17 Mar 2022 10:00) (73 - 106)  BP: 107/71 (17 Mar 2022 10:00) (107/71 - 152/81)  BP(mean): 81 (17 Mar 2022 10:00) (76 - 104)  ABP: 148/70 (16 Mar 2022 18:00) (119/66 - 158/84)  ABP(mean): 101 (16 Mar 2022 18:00) (87 - 116)  RR: 18 (16 Mar 2022 22:00) (16 - 18)  SpO2: 95% (17 Mar 2022 10:00) (92% - 99%)      CAPILLARY BLOOD GLUCOSE          I&O's Summary    16 Mar 2022 07:01  -  17 Mar 2022 07:00  --------------------------------------------------------  IN: 900 mL / OUT: 1428 mL / NET: -528 mL            MEDICATIONS  (STANDING):  escitalopram 10 milliGRAM(s) Oral daily  influenza   Vaccine 0.5 milliLiter(s) IntraMuscular once  levothyroxine 137 MICROGram(s) Oral daily  pantoprazole    Tablet 40 milliGRAM(s) Oral before breakfast  potassium chloride    Tablet ER 40 milliEquivalent(s) Oral every 4 hours      MEDICATIONS  (PRN):  acetaminophen   IVPB .. 1000 milliGRAM(s) IV Intermittent once PRN Moderate Pain (4 - 6)  benzocaine 15 mG/menthol 3.6 mG Lozenge 1 Lozenge Oral every 2 hours PRN Sore Throat  ondansetron Injectable 4 milliGRAM(s) IV Push every 6 hours PRN Nausea and/or Vomiting      PHYSICAL EXAM: SEE BELOW                          9.4    6.85  )-----------( 214      ( 17 Mar 2022 06:50 )             29.1       03-17    137  |  103  |  8   ----------------------------<  96  2.9<LL>   |  26  |  0.34<L>    Ca    8.6      17 Mar 2022 06:50  Phos  2.5     03-17  Mg     2.0     03-17

## 2022-03-17 NOTE — PROGRESS NOTE ADULT - RS GEN PE MLT RESP DETAILS PC
breath sounds equal/good air movement/respirations non-labored/clear to auscultation bilaterally
breath sounds equal/good air movement/respirations non-labored/clear to auscultation bilaterally

## 2022-03-17 NOTE — PROGRESS NOTE ADULT - TIME BILLING
62F previous smoker, PMH thyroid ca in her 20s s/p thyroidectomy/radiation/radioactive iodine, recently found to have an intracranial tumor while undergoing w/u after a fall, now s/p R frontal craniotomy with resection of lateral ventricle tumor and EVD placement on 3/15 - tolerated surgery well.     Denies any complaints at this time   Clinically stable       Plan:  Monitor neuro status, no deficit currently except L facial droop   EVD d/c'd 3/16  D/c Ancef   Repeat CT head reviewed - mx per neurosx   Surgical path pending   Hypokalemia - replete K     Stable for 1N, d/w neurosurgery       DVT ppx: SCDs  Nutrition: po  Central line: no  Arterial line: no  Monroe: no  Restraints: no  Activity: oob    Code status: full    Disposition: ICU     Updated:  at bedside
62F previous smoker, PMH thyroid ca in her 20s s/p thyroidectomy/radiation/radioactive iodine, recently found to have an intracranial tumor while undergoing w/u after a fall, now s/p R frontal craniotomy with resection of lateral ventricle tumor and EVD placement on 3/15 - tolerated surgery well.     Denies any complaints at this time   Clinically stable       Plan:  Monitor neuro status, no deficit currently except L facial droop   EVD mx per neurosx   Repeat CT head today   F/u path   Resume lexapro (confirmed with outpatient pharmacy)   Continue synthroid   Abx while drain in place   Keep dhillon in for 1 more day per neurosx     Further w/u and mx based on clinical course and path findings       DVT ppx: SCDs  Nutrition: po  Central line: no  Arterial line: no  Dhillon: yes, keep   Restraints: no  Activity: bedrest (defer activity to neurosx)    Code status: full    Disposition: ICU     Updated: patient, will speak with  later today

## 2022-03-17 NOTE — PROGRESS NOTE ADULT - SUBJECTIVE AND OBJECTIVE BOX
HPI: Patient is a 62 year old female presents to  for scheduled procedure. 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 In PACU patient noted to have left facial droop, swelling left side of face. Postop Head CT with hemorrhage right lateral ventricle and postop air. Monitored in ICU EVD open at [id number], no output noted. +alejo    3/16- POD#1 Patient seen and examined with Dr. Valle this AM. Patient with noted left facial droop which per Son at bedside is new for her. She complains of mild headache. Denies n/v, numb/tingling, weakness, dizziness. Tolerating clears.    3/17- POD #2, pt seen and examined in the ICU, c/o mild headache, no nausea or vomiting, afebrile, tolerating PO, continued left lower facial weakness, good strength in all our extremities, was able to ambulate with assistance to the bathroom, EVD was removed yesterday. Needed on dose of IV hydralazine overnight for elevated BP.     Vital Signs Last 24 Hrs  T(C): 36.7 (17 Mar 2022 08:00), Max: 36.9 (16 Mar 2022 20:00)  T(F): 98.1 (17 Mar 2022 08:00), Max: 98.5 (16 Mar 2022 20:00)  HR: 85 (17 Mar 2022 10:00) (73 - 106)  BP: 107/71 (17 Mar 2022 10:00) (107/71 - 152/81)  BP(mean): 81 (17 Mar 2022 10:00) (76 - 104)  RR: 18 (16 Mar 2022 22:00) (16 - 18)  SpO2: 95% (17 Mar 2022 10:00) (92% - 99%)    MEDICATIONS  (STANDING):  escitalopram 10 milliGRAM(s) Oral daily  influenza   Vaccine 0.5 milliLiter(s) IntraMuscular once  levothyroxine 137 MICROGram(s) Oral daily  pantoprazole    Tablet 40 milliGRAM(s) Oral before breakfast  potassium chloride    Tablet ER 40 milliEquivalent(s) Oral every 4 hours    MEDICATIONS  (PRN):  acetaminophen   IVPB .. 1000 milliGRAM(s) IV Intermittent once PRN Moderate Pain (4 - 6)  benzocaine 15 mG/menthol 3.6 mG Lozenge 1 Lozenge Oral every 2 hours PRN Sore Throat  ondansetron Injectable 4 milliGRAM(s) IV Push every 6 hours PRN Nausea and/or Vomiting    ROS: pertinent positives in HPI, all other ROS were reviewed and are negative     PHYSICAL EXAM:  Constitutional: Awake/alert, sitting up in bed NAD  Neurological:  HD: A&Ox3 Normal affect, No dysarthria or aphasia  left facial droop, VFF, tongue midline  Motor: No pronator drift. Strength 5/5 x 4 extremities no tremor or bradykinesia  Sensation: Intact to LT  Coord: No FTN dysmetria  Gait: Not assessed  SKIN: staples intact, no redness, swelling, bleeding or discharge at incision     LABS:                         9.4    6.85  )-----------( 214      ( 17 Mar 2022 06:50 )             29.1     03-17    137  |  103  |  8   ----------------------------<  96  2.9<LL>   |  26  |  0.34<L>    Ca    8.6      17 Mar 2022 06:50  Phos  2.5     03-17  Mg     2.0     03-17    RADIOLOGY:  < from: CT Head No Cont (03.17.22 @ 09:52) >  Bifrontal craniotomy is again seen. Previously noted right frontal shunt   catheter has been removed. There is evidence of hemorrhage and edema   along the shunt catheter tract. There is evidence of abnormal mass with   associated calcification involving the septum pellucidum region. This   finding measures approximately 2.1 x 2.0 cm and previously measured   approximately 1.8 x 1.5 cm.    The size and configuration of the ventricles appear unchanged    Intraventricular hemorrhage is again seen    Bifrontal extra axial air is seen which is related postop changes    Bilateral sphenoid polyps versus retention cysts are seen    Both mastoid and middle ear appear clear.    IMPRESSION: Previously noted right frontal shunt catheter as been removed.     HPI: Patient is a 62 year old female presents to  for scheduled procedure. 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 In PACU patient noted to have left facial droop, swelling left side of face. Postop Head CT with hemorrhage right lateral ventricle and postop air. Monitored in ICU EVD open at [id number], no output noted. +alejo    3/16- POD#1 Patient seen and examined with Dr. Valle this AM. Patient with noted left facial droop which per Son at bedside is new for her. She complains of mild headache. Denies n/v, numb/tingling, weakness, dizziness. Tolerating clears.    3/17- POD #2, pt seen and examined in the ICU, c/o mild headache, no nausea or vomiting, afebrile, tolerating PO, continued left lower facial weakness, good strength in all our extremities, was able to ambulate with assistance to the bathroom, EVD was removed yesterday. Needed on dose of IV hydralazine overnight for elevated BP.     Vital Signs Last 24 Hrs  T(C): 36.7 (17 Mar 2022 08:00), Max: 36.9 (16 Mar 2022 20:00)  T(F): 98.1 (17 Mar 2022 08:00), Max: 98.5 (16 Mar 2022 20:00)  HR: 85 (17 Mar 2022 10:00) (73 - 106)  BP: 107/71 (17 Mar 2022 10:00) (107/71 - 152/81)  BP(mean): 81 (17 Mar 2022 10:00) (76 - 104)  RR: 18 (16 Mar 2022 22:00) (16 - 18)  SpO2: 95% (17 Mar 2022 10:00) (92% - 99%)    MEDICATIONS  (STANDING):  escitalopram 10 milliGRAM(s) Oral daily  influenza   Vaccine 0.5 milliLiter(s) IntraMuscular once  levothyroxine 137 MICROGram(s) Oral daily  pantoprazole    Tablet 40 milliGRAM(s) Oral before breakfast  potassium chloride    Tablet ER 40 milliEquivalent(s) Oral every 4 hours    MEDICATIONS  (PRN):  acetaminophen   IVPB .. 1000 milliGRAM(s) IV Intermittent once PRN Moderate Pain (4 - 6)  benzocaine 15 mG/menthol 3.6 mG Lozenge 1 Lozenge Oral every 2 hours PRN Sore Throat  ondansetron Injectable 4 milliGRAM(s) IV Push every 6 hours PRN Nausea and/or Vomiting    ROS: pertinent positives in HPI, all other ROS were reviewed and are negative     PHYSICAL EXAM:  Constitutional: Awake/alert, sitting up in bed NAD  Neurological:  HD: A&Ox3 Normal affect, No dysarthria or aphasia  left facial droop, VFF, tongue midline  Motor: No pronator drift. Strength 5/5 x 4 extremities no tremor or bradykinesia  Sensation: Intact to LT  Coord: No FTN dysmetria  Gait: Not assessed  SKIN: staples intact, no redness, swelling, bleeding or discharge at incision     LABS:                         9.4    6.85  )-----------( 214      ( 17 Mar 2022 06:50 )             29.1     03-17    137  |  103  |  8   ----------------------------<  96  2.9<LL>   |  26  |  0.34<L>    Ca    8.6      17 Mar 2022 06:50  Phos  2.5     03-17  Mg     2.0     03-17    RADIOLOGY:  < from: CT Head No Cont (03.17.22 @ 09:52) >  Bifrontal craniotomy is again seen. Previously noted right frontal shunt   catheter has been removed. There is evidence of hemorrhage and edema   along the shunt catheter tract. There is evidence of abnormal mass with   associated calcification involving the septum pellucidum region. This   finding measures approximately 2.1 x 2.0 cm and previously measured   approximately 1.8 x 1.5 cm.    The size and configuration of the ventricles appear unchanged    Intraventricular hemorrhage is again seen    Bifrontal extra axial air is seen which is related postop changes    Bilateral sphenoid polyps versus retention cysts are seen    Both mastoid and middle ear appear clear.    IMPRESSION: Previously noted right frontal shunt catheter as been removed.     HPI: Patient is a 62 year old female presents to  for scheduled procedure. 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 In PACU patient noted to have left facial droop, swelling left side of face. Postop Head CT with hemorrhage right lateral ventricle and postop air. Monitored in ICU EVD open at [id number], no output noted. +alejo    3/16- POD#1 Patient seen and examined with Dr. Valle this AM. Patient with noted left facial droop which per Son at bedside is new for her. She complains of mild headache. Denies n/v, numb/tingling, weakness, dizziness. Tolerating clears.    3/17- POD #2, pt seen and examined in the ICU, c/o mild headache, no nausea or vomiting, afebrile, tolerating PO, continued left lower facial weakness, good strength in all our extremities, was able to ambulate with assistance to the bathroom, EVD was removed yesterday. Needed on dose of IV hydralazine overnight for elevated BP.     Vital Signs Last 24 Hrs  T(C): 36.7 (17 Mar 2022 08:00), Max: 36.9 (16 Mar 2022 20:00)  T(F): 98.1 (17 Mar 2022 08:00), Max: 98.5 (16 Mar 2022 20:00)  HR: 85 (17 Mar 2022 10:00) (73 - 106)  BP: 107/71 (17 Mar 2022 10:00) (107/71 - 152/81)  BP(mean): 81 (17 Mar 2022 10:00) (76 - 104)  RR: 18 (16 Mar 2022 22:00) (16 - 18)  SpO2: 95% (17 Mar 2022 10:00) (92% - 99%)    MEDICATIONS  (STANDING):  escitalopram 10 milliGRAM(s) Oral daily  influenza   Vaccine 0.5 milliLiter(s) IntraMuscular once  levothyroxine 137 MICROGram(s) Oral daily  pantoprazole    Tablet 40 milliGRAM(s) Oral before breakfast  potassium chloride    Tablet ER 40 milliEquivalent(s) Oral every 4 hours    MEDICATIONS  (PRN):  acetaminophen   IVPB .. 1000 milliGRAM(s) IV Intermittent once PRN Moderate Pain (4 - 6)  benzocaine 15 mG/menthol 3.6 mG Lozenge 1 Lozenge Oral every 2 hours PRN Sore Throat  ondansetron Injectable 4 milliGRAM(s) IV Push every 6 hours PRN Nausea and/or Vomiting    ROS: pertinent positives in HPI, all other ROS were reviewed and are negative     PHYSICAL EXAM:  Constitutional: Awake/alert, sitting up in bed NAD  Neurological:  HD: A&Ox3 Normal affect, No dysarthria or aphasia  left facial droop, VFF, tongue midline  Motor: No pronator drift. Strength 5/5 x 4 extremities no tremor or bradykinesia  Sensation: Intact to LT  Coord: No FTN dysmetria  Gait: Not assessed  SKIN: staples intact, no redness, swelling, bleeding or discharge at incision     LABS:                         9.4    6.85  )-----------( 214      ( 17 Mar 2022 06:50 )             29.1     03-17    137  |  103  |  8   ----------------------------<  96  2.9<LL>   |  26  |  0.34<L>    Ca    8.6      17 Mar 2022 06:50  Phos  2.5     03-17  Mg     2.0     03-17    RADIOLOGY:  < from: CT Head No Cont (03.17.22 @ 09:52) >  Bifrontal craniotomy is again seen. Previously noted right frontal shunt   catheter has been removed. There is evidence of hemorrhage and edema   along the shunt catheter tract. There is evidence of abnormal mass with   associated calcification involving the septum pellucidum region. This   finding measures approximately 2.1 x 2.0 cm and previously measured   approximately 1.8 x 1.5 cm.    The size and configuration of the ventricles appear unchanged    Intraventricular hemorrhage is again seen    Bifrontal extra axial air is seen which is related postop changes    Bilateral sphenoid polyps versus retention cysts are seen    Both mastoid and middle ear appear clear.    IMPRESSION: Previously noted right frontal shunt catheter as been removed.

## 2022-03-17 NOTE — PROGRESS NOTE ADULT - NS NEC GEN PE MLT EXAM PC
No. BRIANA screening performed.  STOP BANG Legend: 0-2 = LOW Risk; 3-4 = INTERMEDIATE Risk; 5-8 = HIGH Risk
detailed exam
detailed exam

## 2022-03-18 ENCOUNTER — TRANSCRIPTION ENCOUNTER (OUTPATIENT)
Age: 62
End: 2022-03-18

## 2022-03-18 VITALS
RESPIRATION RATE: 18 BRPM | OXYGEN SATURATION: 97 % | TEMPERATURE: 99 F | SYSTOLIC BLOOD PRESSURE: 111 MMHG | HEART RATE: 76 BPM | DIASTOLIC BLOOD PRESSURE: 63 MMHG

## 2022-03-18 LAB
ANION GAP SERPL CALC-SCNC: 3 MMOL/L — LOW (ref 5–17)
BUN SERPL-MCNC: 13 MG/DL — SIGNIFICANT CHANGE UP (ref 7–23)
CALCIUM SERPL-MCNC: 9.4 MG/DL — SIGNIFICANT CHANGE UP (ref 8.5–10.1)
CHLORIDE SERPL-SCNC: 106 MMOL/L — SIGNIFICANT CHANGE UP (ref 96–108)
CO2 SERPL-SCNC: 26 MMOL/L — SIGNIFICANT CHANGE UP (ref 22–31)
CREAT SERPL-MCNC: 0.38 MG/DL — LOW (ref 0.5–1.3)
EGFR: 114 ML/MIN/1.73M2 — SIGNIFICANT CHANGE UP
GLUCOSE SERPL-MCNC: 102 MG/DL — HIGH (ref 70–99)
HCT VFR BLD CALC: 29.7 % — LOW (ref 34.5–45)
HGB BLD-MCNC: 9.6 G/DL — LOW (ref 11.5–15.5)
MAGNESIUM SERPL-MCNC: 2 MG/DL — SIGNIFICANT CHANGE UP (ref 1.6–2.6)
MCHC RBC-ENTMCNC: 32.3 GM/DL — SIGNIFICANT CHANGE UP (ref 32–36)
MCHC RBC-ENTMCNC: 32.4 PG — SIGNIFICANT CHANGE UP (ref 27–34)
MCV RBC AUTO: 100.3 FL — HIGH (ref 80–100)
PHOSPHATE SERPL-MCNC: 2.7 MG/DL — SIGNIFICANT CHANGE UP (ref 2.5–4.5)
PLATELET # BLD AUTO: 232 K/UL — SIGNIFICANT CHANGE UP (ref 150–400)
POTASSIUM SERPL-MCNC: 4.3 MMOL/L — SIGNIFICANT CHANGE UP (ref 3.5–5.3)
POTASSIUM SERPL-SCNC: 4.3 MMOL/L — SIGNIFICANT CHANGE UP (ref 3.5–5.3)
RBC # BLD: 2.96 M/UL — LOW (ref 3.8–5.2)
RBC # FLD: 16.5 % — HIGH (ref 10.3–14.5)
SODIUM SERPL-SCNC: 135 MMOL/L — SIGNIFICANT CHANGE UP (ref 135–145)
WBC # BLD: 6.17 K/UL — SIGNIFICANT CHANGE UP (ref 3.8–10.5)
WBC # FLD AUTO: 6.17 K/UL — SIGNIFICANT CHANGE UP (ref 3.8–10.5)

## 2022-03-18 RX ORDER — OXYCODONE HYDROCHLORIDE 5 MG/1
1 TABLET ORAL
Qty: 10 | Refills: 0
Start: 2022-03-18 | End: 2022-03-21

## 2022-03-18 RX ORDER — SENNA PLUS 8.6 MG/1
2 TABLET ORAL
Qty: 14 | Refills: 0
Start: 2022-03-18 | End: 2022-03-24

## 2022-03-18 RX ADMIN — Medication 137 MICROGRAM(S): at 05:31

## 2022-03-18 RX ADMIN — ESCITALOPRAM OXALATE 10 MILLIGRAM(S): 10 TABLET, FILM COATED ORAL at 09:53

## 2022-03-18 RX ADMIN — OXYCODONE HYDROCHLORIDE 5 MILLIGRAM(S): 5 TABLET ORAL at 01:17

## 2022-03-18 RX ADMIN — PANTOPRAZOLE SODIUM 40 MILLIGRAM(S): 20 TABLET, DELAYED RELEASE ORAL at 05:34

## 2022-03-18 NOTE — DISCHARGE NOTE NURSING/CASE MANAGEMENT/SOCIAL WORK - PATIENT PORTAL LINK FT
You can access the FollowMyHealth Patient Portal offered by [facility] by registering at the following website: [email or web address]. By joining [facility]’s FollowMyHealth portal, you will also be able to view your health information using other applications (apps) compatible with our system. You can access the FollowMyHealth Patient Portal offered by [facility] by registering at the following website: [email or web address]. By joining [email or web address]’s FollowMyHealth portal, you will also be able to view your health information using other applications (apps) compatible with our system. You can access the FollowMyHealth Patient Portal offered by [facility] by registering at the following website: [email or web address]. By joining [facility]’s FollowMyHealth portal, you will also be able to view your health information using other applications (apps) compatible with our system.

## 2022-03-18 NOTE — DISCHARGE NOTE PROVIDER - NSDCCPCAREPLAN_GEN_ALL_CORE_FT
PRINCIPAL DISCHARGE DIAGNOSIS  Diagnosis: Brain mass  Assessment and Plan of Treatment: Follow up with Dr. Valle in 2 weeks. Call the office, or visit the nearest ER if you experience redness, swelling, drainage of your wound, fevers >101.3F, severe headache not relieved by medication, lethargy. You may shower, wash with baby shampoo, rinse and pat dry. Leave open to air. Continue activity as tolerated. No heavy lifting > 10lbs, no strenuous activity. Do not drive.   You may take Tylenol and Advil as needed for pain. You have been prescribed oxycodone as needed for severe pain.

## 2022-03-18 NOTE — DISCHARGE NOTE PROVIDER - DETAILS OF MALNUTRITION DIAGNOSIS/DIAGNOSES
This patient has been assessed with a concern for Malnutrition and was treated during this hospitalization for the following Nutrition diagnosis/diagnoses:     -  03/16/2022: Severe protein-calorie malnutrition

## 2022-03-18 NOTE — DISCHARGE NOTE PROVIDER - CARE PROVIDERS DIRECT ADDRESSES
,[email or web address] ,[email or web address]

## 2022-03-18 NOTE — DISCHARGE NOTE PROVIDER - NSDCMRMEDTOKEN_GEN_ALL_CORE_FT
levothyroxine 137 mcg (0.137 mg) oral tablet: 1 tab(s) orally once a day  Lexapro 10 mg oral tablet: 1 tab(s) orally once a day  oxyCODONE 5 mg oral tablet: 1 tab(s) orally every 6 hours, As needed, Severe Pain (7 - 10) MDD:4  pantoprazole 40 mg oral delayed release tablet: 1 tab(s) orally once a day (at bedtime)  senna oral tablet: 2 tab(s) orally once a day (at bedtime), As Needed -for constipation

## 2022-03-18 NOTE — DISCHARGE NOTE PROVIDER - NSDCCPTREATMENT_GEN_ALL_CORE_FT
PRINCIPAL PROCEDURE  Procedure: Right sided craniotomy for surgical removal of neoplasm  Findings and Treatment:       SECONDARY PROCEDURE  Procedure: Insertion, external ventricular drain  Findings and Treatment:

## 2022-03-18 NOTE — DISCHARGE NOTE NURSING/CASE MANAGEMENT/SOCIAL WORK - NSDCPEFALRISK_GEN_ALL_CORE
For information on Fall & Injury Prevention, visit: [email or web address] OR  [email or web address]://www.[email or web address]-[email or web address]-injury OR  https://www.cdc.gov/steadi/patient.html For information on Fall & Injury Prevention, visit: [email or web address]://www.[facility]/news/fall-prevention-protects-[email or web address]-maintains-health-[email or web address]-mobility OR  https://www.[facility]/news/fall-prevention-tips-to-avoid-injury OR  https://www.cdc.gov/steadi/patient.html For information on Fall & Injury Prevention, visit: [email or web address]://www.[facility]/news/fall-prevention-protects-[email or web address]-maintains-health-and-mobility OR  https://www.[facility]/news/fall-prevention-tips-to-avoid-injury OR  https://www.cdc.gov/steadi/patient.html

## 2022-03-18 NOTE — DISCHARGE NOTE PROVIDER - HOSPITAL COURSE
Patient is a 62 year old female presents to  for scheduled procedure. 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 In PACU patient noted to have left facial droop, swelling left side of face. Postop Head CT with hemorrhage right lateral ventricle and postop air. Monitored in ICU EVD open at [id number], no output noted. +dhillon    3/16- POD#1 Patient seen and examined with Dr. Valle this AM. Patient with noted left facial droop which per Son at bedside is new for her. She complains of mild headache. Denies n/v, numb/tingling, weakness, dizziness. Tolerating clears.    3/17- POD #2, pt seen and examined in the ICU, c/o mild headache, no nausea or vomiting, afebrile, tolerating PO, continued left lower facial weakness, good strength in all our extremities, was able to ambulate with assistance to the bathroom, EVD was removed yesterday. Needed on dose of IV hydralazine overnight for elevated BP.     3/18- POD#3 Patient seen and examined with Dr. Valle on 1 north. No acute events overnight. Head Ct performed yesterday stable. Patient is voiding, tolerating diet. Ambulating with PT recommending discharge home with home PT. Patient is stable from neurosurgical standpoint for discharge. Patient is a 62 year old female presents to  for scheduled procedure. 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 In PACU patient noted to have left facial droop, swelling left side of face. Postop Head CT with hemorrhage right lateral ventricle and postop air. Monitored in ICU EVD open at [id number], no output noted. +dhillon    3/16- POD#1 Patient seen and examined with Dr. Valle this AM. Patient with noted left facial droop which per Son at bedside is new for her. She complains of mild headache. Denies n/v, numb/tingling, weakness, dizziness. Tolerating clears.    3/17- POD #2, pt seen and examined in the ICU, c/o mild headache, no nausea or vomiting, afebrile, tolerating PO, continued left lower facial weakness, good strength in all our extremities, was able to ambulate with assistance to the bathroom, EVD was removed yesterday. Needed on dose of IV hydralazine overnight for elevated BP.     3/18- POD#3 Patient seen and examined with Dr. Valle on 1 north. No acute events overnight. Head Ct performed yesterday stable. Patient is voiding, tolerating diet. Ambulating with PT recommending discharge home with home PT. Patient is stable from neurosurgical standpoint for discharge. Patient is a 62 year old female presents to  for scheduled procedure. 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 In PACU patient noted to have left facial droop, swelling left side of face. Postop Head CT with hemorrhage right lateral ventricle and postop air. Monitored in ICU EVD open at [id number], no output noted. +dhillon    3/16- POD#1 Patient seen and examined with Dr. Valle this AM. Patient with noted left facial droop which per Son at bedside is new for her. She complains of mild headache. Denies n/v, numb/tingling, weakness, dizziness. Tolerating clears.    3/17- POD #2, pt seen and examined in the ICU, c/o mild headache, no nausea or vomiting, afebrile, tolerating PO, continued left lower facial weakness, good strength in all our extremities, was able to ambulate with assistance to the bathroom, EVD was removed yesterday. Needed on dose of IV hydralazine overnight for elevated BP.     3/18- POD#3 Patient seen and examined with Dr. Valle on 1 north. No acute events overnight. Head Ct performed yesterday stable. Patient is voiding, tolerating diet. Ambulating with PT recommending discharge home with home PT. Patient is stable from neurosurgical standpoint for discharge.

## 2022-03-18 NOTE — DISCHARGE NOTE PROVIDER - CARE PROVIDER_API CALL
Humberto Valle; PhD)  Neurosurgery  [address], [address]nd [place] [address]  Phone: [phone]  Fax: [phone]  Follow Up Time: 2 weeks   Humberto Valle; PhD)  Neurosurgery  [address], [address]nd [place] [address]  Phone: [phone]  Fax: [phone]  Follow Up Time: 2 weeks   Humberto Valle; PhD)  Neurosurgery  [address], [address]nd [place] [address]  Phone: [phone]  Fax: [phone]  Follow Up Time: 2 weeks

## 2022-03-21 ENCOUNTER — RESULT REVIEW (OUTPATIENT)
Age: 62
End: 2022-03-21

## 2022-03-29 ENCOUNTER — APPOINTMENT (OUTPATIENT)
Dept: NEUROSURGERY | Facility: CLINIC | Age: 62
End: 2022-03-29
Payer: COMMERCIAL

## 2022-03-29 VITALS
DIASTOLIC BLOOD PRESSURE: 97 MMHG | HEART RATE: 92 BPM | TEMPERATURE: 97.8 F | WEIGHT: 125 LBS | SYSTOLIC BLOOD PRESSURE: 158 MMHG | BODY MASS INDEX: 22.15 KG/M2 | HEIGHT: 63 IN | OXYGEN SATURATION: 98 %

## 2022-03-29 PROCEDURE — 99024 POSTOP FOLLOW-UP VISIT: CPT

## 2022-04-18 NOTE — CONSULT LETTER
[Dear  ___] : Dear ~DOMINGA, [Courtesy Letter:] : I had the pleasure of seeing your patient, [unfilled], in my office today. [Sincerely,] : Sincerely, [FreeTextEntry2] : [facility]\par [address]\[place] [address] \par  [FreeTextEntry1] : This very pleasant 62-year-old woman who returns at her two week postop evaluation s/p right frontal craniotomy and resection of a intracranial ventricular mass.  The tumor was originally identified in October 2021.  As you now know the patient suffered a mechanical fall with closed head injury on 16 October 2021.  She was investigated in the emergency room for potential intracranial hemorrhage.  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  Prior to this event the patient was asymptomatic with respect to headaches.  She has not had any changes in vision.  She specifically denies any exertional head pain.  The patient has a important background history of thyroid cancer when she was in her 20s.  She underwent radiation treatment.  She currently is on thyroid replacement with 137 mcg of levothyroxine daily.  The patient returns today after undergoing a right frontal craniotomy and tumor removal 3/15/2022. Some tumor was left attached to the ventricle floor in order to avoid fornix injury and stroke of the choroidal artery.  She reports no headaches or seizures.  Her sleeping patterns have improved.  Overall she is doing well.   The incision is closed with staples and healing well.  \par \par No images were reviewed today.  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  \par \par On examination the patient is in no acute distress and appears well.  Memory and recall were intact.  EOMI were intact and no upward gaze palsy.  No dizziness.  There is no double vision on lateral gaze.  There is  nystagmus from left to right which resolved upon repetitive pursuit.  Visual fields are full to confrontational exam.  Face sensation is symmetric and normal.  Face movement is symmetric and normal.  Voice quality and swallow mechanism are normal.  There is no finger-nose pointing dysmetria.  Romberg test is negative.  The patient has slight ataxia when walking.  The patient's neck is supple.  Staples were removed from her incision line with out difficulty.  The site was clean and dry with skin intact.  \par \par Overall I am pleased with her recovery.   The patient was given post op instructions and to avoid bending and lifting for the next two weeks.  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  If she chooses to drive she will begin with a  at first.  Wound care was provided for the incision site and all staples were removed.   The wound should be kept clean and dry.  In 24 hours, the patient will wash the site with gentle shampoo and water and pat dry.  No abrasive motion when washing. No hair brushing for one to two weeks.  If any signs of infection including redness, swelling, pain, fever, or drainage she will contact the office. In six weeks, Mid May, she will obtain an MRI of the brain w/wo contrast and return to the office for image review.   If any neurologic symptoms occur she will contact the office.   \par \par Thank you for very kindly including me in the evaluation and treatment of your patient.  Please do not hesitate to contact me should you have any concerns or questions regarding this evaluation, the patient's diagnosis of a Neurocytoma WHO Grade II, intracranial, intraventricular mass, or the follow up plan of care.   [FreeTextEntry3] : Humberto Valle MD, PhD, FRCPSC \par Attending Neurosurgeon \par  of Neurosurgery \par [facility] \par [address] floor \[place] [address] \par Office: [phone] \par Fax: [phone]\par \par

## 2022-04-18 NOTE — REASON FOR VISIT
[de-identified] : right frontal craniotomy and subtotal resection of central neurocytoma [de-identified] : 3/15//2022 [de-identified] : 2 [Spouse] : spouse

## 2022-05-03 ENCOUNTER — OUTPATIENT (OUTPATIENT)
Dept: OUTPATIENT SERVICES | Facility: HOSPITAL | Age: 62
LOS: 1 days | End: 2022-05-03
Payer: COMMERCIAL

## 2022-05-03 ENCOUNTER — APPOINTMENT (OUTPATIENT)
Dept: MRI IMAGING | Facility: CLINIC | Age: 62
End: 2022-05-03
Payer: COMMERCIAL

## 2022-05-03 DIAGNOSIS — Z90.89 ACQUIRED ABSENCE OF OTHER ORGANS: Chronic | ICD-10-CM

## 2022-05-03 DIAGNOSIS — Z00.8 ENCOUNTER FOR OTHER GENERAL EXAMINATION: ICD-10-CM

## 2022-05-03 DIAGNOSIS — Z90.49 ACQUIRED ABSENCE OF OTHER SPECIFIED PARTS OF DIGESTIVE TRACT: Chronic | ICD-10-CM

## 2022-05-03 DIAGNOSIS — D49.6 NEOPLASM OF UNSPECIFIED BEHAVIOR OF BRAIN: ICD-10-CM

## 2022-05-03 DIAGNOSIS — E89.0 POSTPROCEDURAL HYPOTHYROIDISM: Chronic | ICD-10-CM

## 2022-05-03 PROCEDURE — 70553 MRI BRAIN STEM W/O & W/DYE: CPT

## 2022-05-03 PROCEDURE — 70553 MRI BRAIN STEM W/O & W/DYE: CPT | Mod: 26

## 2022-05-03 PROCEDURE — A9585: CPT

## 2022-05-11 ENCOUNTER — APPOINTMENT (OUTPATIENT)
Dept: NEUROSURGERY | Facility: CLINIC | Age: 62
End: 2022-05-11
Payer: COMMERCIAL

## 2022-05-11 VITALS
HEART RATE: 90 BPM | TEMPERATURE: 98.1 F | SYSTOLIC BLOOD PRESSURE: 158 MMHG | HEIGHT: 63 IN | WEIGHT: 125 LBS | BODY MASS INDEX: 22.15 KG/M2 | DIASTOLIC BLOOD PRESSURE: 93 MMHG | OXYGEN SATURATION: 99 %

## 2022-05-11 DIAGNOSIS — D33.2 BENIGN NEOPLASM OF BRAIN, UNSPECIFIED: ICD-10-CM

## 2022-05-11 DIAGNOSIS — Z98.890 OTHER SPECIFIED POSTPROCEDURAL STATES: ICD-10-CM

## 2022-05-11 PROCEDURE — 99024 POSTOP FOLLOW-UP VISIT: CPT

## 2022-05-11 NOTE — CONSULT LETTER
[Dear  ___] : Dear  [unfilled], [Courtesy Letter:] : I had the pleasure of seeing your patient, [unfilled], in my office today. [Sincerely,] : Sincerely, [FreeTextEntry2] : [facility]\par [address]\[place] [address] \par  [FreeTextEntry1] : This very pleasant 61-year-old woman returns at a 2-month interval for evaluation of a intracranial, intraventricular tumor identified in October 2021.  As you now know the patient suffered a mechanical fall with closed head injury on 16 October 2021.  She was investigated in the emergency room for potential intracranial hemorrhage.  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  Prior to this event the patient was asymptomatic with respect to headaches.  She has not had any changes in vision.  She specifically denies any exertional head pain.  The patient has a important background history of thyroid cancer when she was in her 20s.  She underwent radiation treatment.  The patient has not experienced any recent weight gain or weight loss.  She is not having any night sweats.  She denies any heat or cold intolerance.  Her general energy level has been stable.  She currently is on thyroid replacement with 137 mcg of levothyroxine daily.  The patient returns today after undergoing an updated MRI scan of the brain.  In addition she has undergone imaging of the cervical, thoracic, and lumbar spine to rule out the possibility of metastatic disease involving the rest of the neural axis.  The patient indicates no change in her symptoms since we saw her last.\par \par I have reviewed with the patient as well as her  who is present with her the entire imaging sequence of the neural axis.  Imaging was performed at [facility].  Starting particularly with the cranial imaging I have pointed out in detail the mass lesion within the lateral ventricle bridging the septum pellucidum.  It extends inferiorly to the central floor of the lateral ventricles and is not clearly  from the fornices.  It does not occlude the foramen of Mendiola but with further growth this certainly would create obstructive hydrocephalus.  The lesion is nonuniform and has evidence of possible calcification throughout the lesion.  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  Ependymoma, meningioma, and metastatic disease are also possible but now less likely.  No evidence of pathology was found throughout the neural axis.  There is an incidental finding of degenerative disc and osteophyte complex at C67 without cord compression or compromise of the foramina.  At T12 there is evidence of a chronic significant wedge compression fracture.  No degenerative changes were identified within the lumbar spine.\par \par On examination the patient is in no acute distress but shows appropriate concern over her diagnosis of a brain lesion..  Once again the cranial nerve examination is within normal parameters.  Pupils are equal and reactive to light.  There is no upward gaze palsy.  There is no double vision on lateral gaze.  There is no nystagmus.  Visual fields are full to confrontational exam.  Face sensation is symmetric and normal.  Face movement is symmetric and normal.  The tongue protrudes in the midline.  Voice quality and swallow mechanism are normal.  There is no finger-nose pointing dysmetria.  Romberg test is negative.  The patient has intact balance when walking.  The patient's neck is supple.  Strength and sensation in all 4 limbs is normal.  There is no clonus.\par \par I have discussed in detail with the patient the important implications of a mass lesion in this deep central location.  The potential for acute obstructive hydrocephalus is significant.  At this time we do not have a tissue diagnosis and other treatment options such as radiation or chemotherapy are not clear.  I have therefore discussed with the patient who is right-hand dominant a right frontal craniotomy and transcortical approach to the lesion using brain path and neuro navigation.  The objective of the surgery is to obtain tissue diagnosis but also to do maximal resection with opening of the septum pellucidum to avoid the potential for acute obstructive hydrocephalus.  Care will be taken to ensure that there is no injury to the fornices which can cause profound longstanding memory issues.  The patient understands the risks of further observation and acute hydrocephalus with loss of consciousness.  I have used therefore surgical models as well as the patient's own imaging to discuss in detail the technical aspects of surgery.  We have reviewed the potential risks and complications of surgery including those of anesthesia, new neurologic injury especially memory difficulty, seizure, intracranial hemorrhage, hydrocephalus and the potential need for a ventriculoperitoneal shunt, and postoperative pain and recovery expectations.  At the end of our discussion the patient indicated good understanding of my descriptions and explanations.  All questions were fully addressed.  The patient has provided consent to move forward with surgery as soon as medical clearance has been obtained and we can safely schedule surgery during the current COVID-19 surge.\par \par Thank you for very kindly including me in the evaluation and treatment of your patient.  Please do not hesitate to contact me should you have any concerns or questions regarding this evaluation, the patient's diagnosis of a significant intracranial, intraventricular mass most likely a central neurocytoma, or the recommendation for surgical resection. [FreeTextEntry3] : Humberto Valle MD, PhD, FRCPSC \par Attending Neurosurgeon \par  of Neurosurgery \par [facility] \par [address], [address]nd floor \[place] [address] \par Office: [phone] \par Fax: [phone]\par \par

## 2022-05-11 NOTE — CONSULT LETTER
[Dear  ___] : Dear ~DOMINGA, [Courtesy Letter:] : I had the pleasure of seeing your patient, [unfilled], in my office today. [Sincerely,] : Sincerely, [FreeTextEntry2] : [facility]\par [address] [place] [address] Rd\[place] [address]  [FreeTextEntry1] : This very pleasant 62-year-old woman returns to our office for routine postsurgical follow-up.  You may recall that the patient was found to have an incidental ventricular mass arising from the septum pellucidum during an emergency room investigation after a traumatic fall in October 2021.  Further imaging studies defined a lesion consistent with a central neurocytoma with sufficient mass to be a threat to cause obstructive hydrocephalus.  The decision was therefore made to undergo surgical resection.  The patient underwent a right frontal craniotomy and subtotal resection of the lesion on 15 March 2022.  It was not possible to safely resect all of the tumor because of attachment to critical arterial blood supply as well as the fornix which is critical for normal acute memory function.  Since surgery the patient indicates that she had some initial problems with headaches but these have now resolved.  She has some frontal sinus discomfort which is most likely related to seasonal pollen.  She denies headaches in the morning or with exertion.  She denies any changes in vision.  She has not had any balance issues and no nausea or vomiting.  The patient is working with physical therapy and has been able to return to her work environment.\par \par I have reviewed with the patient as well as her  who is present in support the most recent MRI scan of the brain.  This identifies very good resection in the midline and a small amount of tissue is seen attached to the choroidal arteries as well as to the fornix in the roof of the third ventricle.  The foramen of Mendiola are open.  There is no evidence of obstructive hydrocephalus.  Incidental note is made on the T2 flair sequence of inflammation of the maxillary sinuses in particular and some involvement of the frontal sinus.  This is likely consistent with the patient's current headache profile.\par \par On examination the patient is in no acute distress.  The incision area has healed extremely well with no signs of redness swelling or fluctuance.  The hair has grown back along the incision region.  The cranial nerve examination is within normal parameters.  Pupils are equal and reactive to light.  There is no nystagmus.  There is no double vision on lateral gaze and no upward gaze palsy.  Face sensation and movement is symmetric and normal.  The tongue protrudes in the midline.  Hearing is intact.  The patient's neck is supple.  There is no pronator drift.  There is no evidence of focal motor or sensory changes in the upper or lower extremities.  The patient's walking balance is normal.  In particular the patient has normal emotions and is acutely and appropriately engaged in conversation.  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  There is the possibility of recurrence over time.  Currently there is no standard with respect to having the patient undergo stereotactic radiosurgery or chemotherapy.  It is therefore my recommendation that we would continue with close sequential surveillance MRI images.  I recommend a 6-month follow-up with MRI.  The patient is well-informed regarding symptoms or signs that would warrant an earlier investigation.  The patient is in agreement with this conservative approach to ongoing support.\par \par Thank you for very kindly including me in the evaluation and treatment of your patient.  Please do not hesitate to contact me should you have any questions or concerns regarding this evaluation, the patient's diagnosis of a central neurocytoma, her recent surgical excision, or her ongoing follow-up care plan.\par \par  [FreeTextEntry3] : Humberto Valle MD, PhD, FRCPSC                           \par Attending Neurosurgeon \par  of Neurosurgery \par [facility] \par [address], [address]nd floor \[place] [address] \par Office: [phone] \par Fax: [phone]\par

## 2022-05-11 NOTE — REASON FOR VISIT
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

## 2022-11-21 ENCOUNTER — APPOINTMENT (OUTPATIENT)
Dept: MRI IMAGING | Facility: CLINIC | Age: 62
End: 2022-11-21

## 2022-12-01 ENCOUNTER — APPOINTMENT (OUTPATIENT)
Dept: NEUROSURGERY | Facility: CLINIC | Age: 62
End: 2022-12-01

## 2022-12-05 ENCOUNTER — RESULT REVIEW (OUTPATIENT)
Age: 62
End: 2022-12-05

## 2022-12-20 ENCOUNTER — INPATIENT (INPATIENT)
Facility: HOSPITAL | Age: 62
LOS: 19 days | DRG: 432 | End: 2023-01-09
Attending: TRANSPLANT SURGERY | Admitting: STUDENT IN AN ORGANIZED HEALTH CARE EDUCATION/TRAINING PROGRAM
Payer: COMMERCIAL

## 2022-12-20 VITALS
HEART RATE: 94 BPM | WEIGHT: 145.95 LBS | SYSTOLIC BLOOD PRESSURE: 124 MMHG | TEMPERATURE: 98 F | RESPIRATION RATE: 21 BRPM | DIASTOLIC BLOOD PRESSURE: 73 MMHG | HEIGHT: 65 IN

## 2022-12-20 DIAGNOSIS — K76.9 LIVER DISEASE, UNSPECIFIED: ICD-10-CM

## 2022-12-20 DIAGNOSIS — E89.0 POSTPROCEDURAL HYPOTHYROIDISM: Chronic | ICD-10-CM

## 2022-12-20 DIAGNOSIS — Z90.89 ACQUIRED ABSENCE OF OTHER ORGANS: Chronic | ICD-10-CM

## 2022-12-20 DIAGNOSIS — Z90.49 ACQUIRED ABSENCE OF OTHER SPECIFIED PARTS OF DIGESTIVE TRACT: Chronic | ICD-10-CM

## 2022-12-20 DIAGNOSIS — N17.9 ACUTE KIDNEY FAILURE, UNSPECIFIED: ICD-10-CM

## 2022-12-20 LAB
ALBUMIN SERPL ELPH-MCNC: 3.5 G/DL — SIGNIFICANT CHANGE UP (ref 3.3–5)
ALP SERPL-CCNC: 132 U/L — HIGH (ref 40–120)
ALT FLD-CCNC: 44 U/L — SIGNIFICANT CHANGE UP (ref 10–45)
AMMONIA BLD-MCNC: 82 UMOL/L — HIGH (ref 11–55)
ANION GAP SERPL CALC-SCNC: 22 MMOL/L — HIGH (ref 5–17)
APTT BLD: 51.8 SEC — HIGH (ref 27.5–35.5)
AST SERPL-CCNC: 144 U/L — HIGH (ref 10–40)
BILIRUB SERPL-MCNC: 28.9 MG/DL — HIGH (ref 0.2–1.2)
BLD GP AB SCN SERPL QL: NEGATIVE — SIGNIFICANT CHANGE UP
BUN SERPL-MCNC: 46 MG/DL — HIGH (ref 7–23)
CALCIUM SERPL-MCNC: 8.3 MG/DL — LOW (ref 8.4–10.5)
CHLORIDE SERPL-SCNC: 96 MMOL/L — SIGNIFICANT CHANGE UP (ref 96–108)
CO2 SERPL-SCNC: 16 MMOL/L — LOW (ref 22–31)
CREAT SERPL-MCNC: 5.31 MG/DL — HIGH (ref 0.5–1.3)
EGFR: 9 ML/MIN/1.73M2 — LOW
GAS PNL BLDA: SIGNIFICANT CHANGE UP
GLUCOSE SERPL-MCNC: 197 MG/DL — HIGH (ref 70–99)
HCT VFR BLD CALC: 22.5 % — LOW (ref 34.5–45)
HGB BLD-MCNC: 7.8 G/DL — LOW (ref 11.5–15.5)
INR BLD: 2.71 RATIO — HIGH (ref 0.88–1.16)
MAGNESIUM SERPL-MCNC: 2.3 MG/DL — SIGNIFICANT CHANGE UP (ref 1.6–2.6)
MCHC RBC-ENTMCNC: 33.2 PG — SIGNIFICANT CHANGE UP (ref 27–34)
MCHC RBC-ENTMCNC: 34.7 GM/DL — SIGNIFICANT CHANGE UP (ref 32–36)
MCV RBC AUTO: 95.7 FL — SIGNIFICANT CHANGE UP (ref 80–100)
NRBC # BLD: 0 /100 WBCS — SIGNIFICANT CHANGE UP (ref 0–0)
PHOSPHATE SERPL-MCNC: 5.1 MG/DL — HIGH (ref 2.5–4.5)
PLATELET # BLD AUTO: 109 K/UL — LOW (ref 150–400)
POTASSIUM SERPL-MCNC: 3.6 MMOL/L — SIGNIFICANT CHANGE UP (ref 3.5–5.3)
POTASSIUM SERPL-SCNC: 3.6 MMOL/L — SIGNIFICANT CHANGE UP (ref 3.5–5.3)
PROT SERPL-MCNC: 6 G/DL — SIGNIFICANT CHANGE UP (ref 6–8.3)
PROTHROM AB SERPL-ACNC: 31.5 SEC — HIGH (ref 10.5–13.4)
RBC # BLD: 2.35 M/UL — LOW (ref 3.8–5.2)
RBC # FLD: 15.5 % — HIGH (ref 10.3–14.5)
RH IG SCN BLD-IMP: NEGATIVE — SIGNIFICANT CHANGE UP
SODIUM SERPL-SCNC: 134 MMOL/L — LOW (ref 135–145)
WBC # BLD: 26.3 K/UL — HIGH (ref 3.8–10.5)
WBC # FLD AUTO: 26.3 K/UL — HIGH (ref 3.8–10.5)

## 2022-12-20 PROCEDURE — 99223 1ST HOSP IP/OBS HIGH 75: CPT | Mod: GC

## 2022-12-20 PROCEDURE — 99291 CRITICAL CARE FIRST HOUR: CPT | Mod: GC

## 2022-12-20 PROCEDURE — 71045 X-RAY EXAM CHEST 1 VIEW: CPT | Mod: 26

## 2022-12-20 RX ORDER — DEXMEDETOMIDINE HYDROCHLORIDE IN 0.9% SODIUM CHLORIDE 4 UG/ML
0.5 INJECTION INTRAVENOUS
Qty: 200 | Refills: 0 | Status: DISCONTINUED | OUTPATIENT
Start: 2022-12-20 | End: 2022-12-20

## 2022-12-20 RX ORDER — PROPOFOL 10 MG/ML
25 INJECTION, EMULSION INTRAVENOUS
Qty: 1000 | Refills: 0 | Status: DISCONTINUED | OUTPATIENT
Start: 2022-12-20 | End: 2022-12-21

## 2022-12-20 RX ORDER — LACTULOSE 10 G/15ML
20 SOLUTION ORAL EVERY 6 HOURS
Refills: 0 | Status: DISCONTINUED | OUTPATIENT
Start: 2022-12-20 | End: 2022-12-21

## 2022-12-20 RX ORDER — HYDROMORPHONE HYDROCHLORIDE 2 MG/ML
0.5 INJECTION INTRAMUSCULAR; INTRAVENOUS; SUBCUTANEOUS
Refills: 0 | Status: DISCONTINUED | OUTPATIENT
Start: 2022-12-20 | End: 2022-12-24

## 2022-12-20 RX ORDER — MEROPENEM 1 G/30ML
1000 INJECTION INTRAVENOUS EVERY 12 HOURS
Refills: 0 | Status: DISCONTINUED | OUTPATIENT
Start: 2022-12-20 | End: 2022-12-25

## 2022-12-20 RX ORDER — FLUCONAZOLE 150 MG/1
200 TABLET ORAL EVERY 24 HOURS
Refills: 0 | Status: DISCONTINUED | OUTPATIENT
Start: 2022-12-20 | End: 2022-12-26

## 2022-12-20 RX ORDER — CHLORHEXIDINE GLUCONATE 213 G/1000ML
15 SOLUTION TOPICAL EVERY 12 HOURS
Refills: 0 | Status: DISCONTINUED | OUTPATIENT
Start: 2022-12-20 | End: 2022-12-23

## 2022-12-20 RX ORDER — LEVOTHYROXINE SODIUM 125 MCG
70 TABLET ORAL AT BEDTIME
Refills: 0 | Status: DISCONTINUED | OUTPATIENT
Start: 2022-12-20 | End: 2022-12-24

## 2022-12-20 RX ORDER — CHLORHEXIDINE GLUCONATE 213 G/1000ML
1 SOLUTION TOPICAL
Refills: 0 | Status: DISCONTINUED | OUTPATIENT
Start: 2022-12-20 | End: 2022-12-25

## 2022-12-20 RX ORDER — VASOPRESSIN 20 [USP'U]/ML
0.03 INJECTION INTRAVENOUS
Qty: 40 | Refills: 0 | Status: DISCONTINUED | OUTPATIENT
Start: 2022-12-20 | End: 2022-12-25

## 2022-12-20 RX ORDER — CALCIUM GLUCONATE 100 MG/ML
2 VIAL (ML) INTRAVENOUS ONCE
Refills: 0 | Status: COMPLETED | OUTPATIENT
Start: 2022-12-20 | End: 2022-12-21

## 2022-12-20 RX ORDER — DEXMEDETOMIDINE HYDROCHLORIDE IN 0.9% SODIUM CHLORIDE 4 UG/ML
0.5 INJECTION INTRAVENOUS
Qty: 200 | Refills: 0 | Status: DISCONTINUED | OUTPATIENT
Start: 2022-12-20 | End: 2022-12-21

## 2022-12-20 RX ORDER — OCTREOTIDE ACETATE 200 UG/ML
50 INJECTION, SOLUTION INTRAVENOUS; SUBCUTANEOUS
Qty: 500 | Refills: 0 | Status: DISCONTINUED | OUTPATIENT
Start: 2022-12-20 | End: 2022-12-21

## 2022-12-20 RX ORDER — LEVETIRACETAM 250 MG/1
750 TABLET, FILM COATED ORAL EVERY 12 HOURS
Refills: 0 | Status: DISCONTINUED | OUTPATIENT
Start: 2022-12-20 | End: 2022-12-24

## 2022-12-20 RX ORDER — NOREPINEPHRINE BITARTRATE/D5W 8 MG/250ML
0.13 PLASTIC BAG, INJECTION (ML) INTRAVENOUS
Qty: 8 | Refills: 0 | Status: DISCONTINUED | OUTPATIENT
Start: 2022-12-20 | End: 2022-12-25

## 2022-12-20 RX ADMIN — VASOPRESSIN 4.5 UNIT(S)/MIN: 20 INJECTION INTRAVENOUS at 22:39

## 2022-12-20 RX ADMIN — Medication 16.1 MICROGRAM(S)/KG/MIN: at 22:39

## 2022-12-20 RX ADMIN — LEVETIRACETAM 400 MILLIGRAM(S): 250 TABLET, FILM COATED ORAL at 23:48

## 2022-12-20 RX ADMIN — PROPOFOL 9.93 MICROGRAM(S)/KG/MIN: 10 INJECTION, EMULSION INTRAVENOUS at 22:39

## 2022-12-20 RX ADMIN — FLUCONAZOLE 100 MILLIGRAM(S): 150 TABLET ORAL at 22:40

## 2022-12-20 NOTE — H&P ADULT - NSHPPHYSICALEXAM_GEN_ALL_CORE
PHYSICAL EXAM:  Constitutional: Jaundice, malnourished   Eyes: Sclera icteric, PERRLA   ENMT: nc/at. NGT bilious  Neck: SuppleLeft IJ TL cath   Respiratory: CTA B/L, intubated on full vent support   Cardiovascular: RRR  Gastrointestinal: Soft abdomen, NT, distended, OGT, rectal tube    Genitourinary:  Voiding via dhillon  Extremities: SCD's in place and working bilaterally  Vascular: Palpable dp pulses bilaterally, Left Rad a-line, Right fem non tunnled HD cath   Neurological: sedated on Prop, pupils 2 sluggish reactive, nonpurposeful  movements   Skin: Warm,dry,intact, jaundice PHYSICAL EXAM:  Constitutional: Jaundice, malnourished   Eyes: Sclera icteric, PERRLA   ENMT: nc/at. NGT bilious  Neck: Supple, Left IJ TL cath   Respiratory: CTA B/L, intubated on full vent support   Cardiovascular: RRR  Gastrointestinal: Soft abdomen, NT, distended, OGT, rectal tube    Genitourinary:  Voiding via dhillon  Extremities: SCD's in place and working bilaterally  Vascular: Palpable dp pulses bilaterally, Left Rad a-line, Right fem non tunnled HD cath   Neurological: sedated on Prop, pupils 2 sluggish reactive, nonpurposeful  movements   Skin: Warm,dry,intact, jaundice

## 2022-12-20 NOTE — PATIENT PROFILE ADULT - HAS THE PATIENT USED TOBACCO IN THE PAST 30 DAYS?
SPIRITUAL CARE DEPARTMENT - [facility] [address]  PROGRESS NOTE    Shift date: 02/01/2022  Shift day: Tuesday   Shift # 1    Room # 2979/7141-55   Name: Eric Vick            Age: 48 y.o. Gender: female          Sabianism: [address] of [employer]: [facility]    Referral: Routine Visit    Admit Date & Time: 1/22/2022  4:43 PM    PATIENT/EVENT DESCRIPTION:  Eric Vick is a 48 y.o. female   Patient sitting up in bed talking on the phone with back facing the door. SPIRITUAL ASSESSMENT/INTERVENTION:  This  was called in by Nurse Rebeca Smith to talk with patient. This  provided active, compassionate listening for the patient. Patient stated that she was very anxious. Patient stated that she had suffered multiple losses. Patient was grieving appropriately. This  quoted scripture to the patient. This  provided words of comfort and reassurance to the patient. A resident doctor came into the room and stayed and spoke to the patient while this  was present and this  and the doctor continued to / and the doctor provided medical care for the anxiety that the patient was feeling. Both the  and the doctor listened to the patient's needs. The doctor said that this  was providing good spiritual care to the patient and was helping the patient \"talk through\" some of her issues. This  offered prayer after the doctor left. The patient accepted and was grateful. This  offered to come back the following day. The patient was grateful. SPIRITUAL CARE FOLLOW-UP PLAN:  Chaplains will remain available to offer spiritual and emotional support as needed.       Electronically signed by Bert Greenfield on 2/1/2022 at 7:10 PM.  [facility]  [phone]     02/01/22 1903   Encounter Summary   Services provided to: Patient   Referral/Consult From: Nurse   [address] [place] [address] of [employer]ologist [facility]   Contact Anabaptism No   Continue Visiting   (02/01/2022)   Complexity of Encounter Moderate   Length of Encounter 1 hour   Spiritual Assessment Completed Yes   Routine   Type Initial   Assessment Anxious;Grieving;Tearful;Fearful;Loneliness; Hopeless   Intervention Active listening;Explored feelings, thoughts, concerns;Nurtured hope;Prayer;Scripture;Sustaining presence/ Ministry of presence;Empowerment; Discussed meaning/purpose;Discussed relationship with God;Discussed belief system/Judaism practices/henry   Outcome Acceptance;Comfort;Expressed gratitude; Concerns relieved;Engaged in conversation;Expressed feelings/needs/concerns; Less anxious, less agitated; Shared reminiscences; Encouraged;Receptive No

## 2022-12-20 NOTE — CONSULT NOTE ADULT - PROBLEM SELECTOR RECOMMENDATION 9
Patient with Serum creatinine of 0.78 (12/6/22) noted to have had an uptrend on 12/19/22 (SCr: 6.91), suspected to be in the setting of ATN vs HRS. noted to have been oliguric prior to her presentation to the hospital. Patient urine sodium outside noted to be <30 both on 12/19 and 12/1. 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      [] Please send urine studies, Monitor serum chemistries and urine output to monitor for renal recovery   [] will initiate patient on CRRT while awaiting liver transplantation

## 2022-12-20 NOTE — PATIENT PROFILE ADULT - FALL HARM RISK - HARM RISK INTERVENTIONS
Assistance OOB with selected safe patient handling equipment/Communicate Risk of Fall with Harm to all staff/Discuss with provider need for PT consult/Monitor gait and stability/Provide patient with walking aids - walker, cane, crutches/Reinforce activity limits and safety measures with patient and family/Tailored Fall Risk Interventions/Visual Cue: Yellow wristband and red socks/Bed in lowest position, wheels locked, appropriate side rails in place/Call bell, personal items and telephone in reach/Instruct patient to call for assistance before getting out of bed or chair/Non-slip footwear when patient is out of bed/[place] to call system/Physically safe environment - no spills, clutter or unnecessary equipment/Purposeful Proactive Rounding/Room/bathroom lighting operational, light cord in reach Assistance OOB with selected safe patient handling equipment/Communicate Risk of Fall with Harm to all staff/Discuss with provider need for PT consult/Monitor gait and stability/Provide patient with walking aids - walker, cane, crutches/Reinforce activity limits and safety measures with patient and family/Tailored Fall Risk Interventions/Visual Cue: Yellow wristband and red socks/Bed in lowest position, wheels locked, appropriate side rails in place/Call bell, personal items and telephone in reach/Instruct patient to call for assistance before getting out of bed or chair/Non-slip footwear when patient is out of bed/[place] to call system/Physically safe environment - no spills, clutter or unnecessary equipment/Purposeful Proactive Rounding/Room/bathroom lighting operational, light cord in reach Assistance OOB with selected safe patient handling equipment/Communicate Risk of Fall with Harm to all staff/Discuss with provider need for PT consult/Monitor gait and stability/Provide patient with walking aids - walker, cane, crutches/Reinforce activity limits and safety measures with patient and family/Tailored Fall Risk Interventions/Visual Cue: Yellow wristband and red socks/Bed in lowest position, wheels locked, appropriate side rails in place/Call bell, personal items and telephone in reach/Instruct patient to call for assistance before getting out of bed or chair/Non-slip footwear when patient is out of bed/[place] to call system/Physically safe environment - no spills, clutter or unnecessary equipment/Purposeful Proactive Rounding/Room/bathroom lighting operational, light cord in reach

## 2022-12-20 NOTE — CONSULT NOTE ADULT - SUBJECTIVE AND OBJECTIVE BOX
[facility] DIVISION OF KIDNEY DISEASES AND HYPERTENSION -- INITIAL CONSULT NOTE  --------------------------------------------------------------------------------  -----------------------------------------------------------------  HPI:    This is a 62 year old male with PMH of liver cirrhosis secondary to ETOH use as well as brain mass s/p resection admitted to [facility] for hyponatremia. Presented to outside hospital for seizure like activity.     patient was noted to have RED in th setting of cirrhosis and admitted to the ICU, in her stay at [facility] patient was initiated on IV pressors and intubated as well as started on Rifaximin and lactulose for hepatic encephalopathy. Patient Renal function did not improve for which she received 1 session fo HD outside (RED from HRS vs ATN.)  Patient was transferred to [facility] for evaluation of liver transplant.           PAST HISTORY  --------------------------------------------------------------------------------  PAST MEDICAL & SURGICAL HISTORY:  HTN (hypertension)  off medication for over one year--states BP has been normal      UTI (urinary tract infection)  currently being treated--will complete antibiotics 3/8/2022      Intracranial tumor      GERD (gastroesophageal reflux disease)      Eczema      Thyroid cancer  surgery. radiation, Radioactive iodine      COVID-19 virus infection  2021--recovered at home      H/O total thyroidectomy  age 21&#x27;s for Thyroid cancer, postop complication arterial bleed, second surgery      History of tonsillectomy  age 5      History of appendectomy  age 5        FAMILY HISTORY:  FH: heart disease  Father, age 85,     FH: pancreatic cancer  Brother, age 59,       PAST SOCIAL HISTORY:    ALLERGIES & MEDICATIONS  --------------------------------------------------------------------------------  Allergies    Macrobid (Rash)  Nexium (Rash)    Intolerances      Standing Inpatient Medications  calcium gluconate IVPB 2 Gram(s) IV Intermittent once  chlorhexidine 0.12% Liquid 15 milliLiter(s) Oral Mucosa every 12 hours  chlorhexidine 2% Cloths 1 Application(s) Topical <User Schedule>  dexMEDEtomidine Infusion 0.5 MICROgram(s)/kG/Hr IV Continuous <Continuous>  fluconAZOLE IVPB 200 milliGRAM(s) IV Intermittent every 24 hours  lactulose Syrup 20 Gram(s) Oral every 6 hours  levETIRAcetam  IVPB 750 milliGRAM(s) IV Intermittent every 12 hours  levothyroxine Injectable 70 MICROGram(s) IV Push at bedtime  norepinephrine Infusion 0.13 MICROgram(s)/kG/Min IV Continuous <Continuous>  octreotide  Infusion 50 MICROgram(s)/Hr IV Continuous <Continuous>  propofol Infusion 25 MICROgram(s)/kG/Min IV Continuous <Continuous>  rifAXIMin 550 milliGRAM(s) Oral every 12 hours  vasopressin Infusion 0.03 Unit(s)/Min IV Continuous <Continuous>    PRN Inpatient Medications      REVIEW OF SYSTEMS  --------------------------------------------------------------------------------  Constitutional:No Fever,Chills , Fatigue , Weight change   HEENT:No Blurred vision,Eye Pain ,Headache, Runny nose,Sore Throat   Respiratory: No Cough, Wheezing ,Shortness of breath  Cardiovascular: No Chest Pain, Palpitations, RAYA, PND, Orthopnea  Gastrointestinal:No Abdominal Pain, Diarrhea, Constipation, Hemorrhoids, Nausea,  Vomiting  Genitourinary: No Nocturia, Dysuria, Incontinence  Extremities: No Swelling, Joint Pain  Neurologic:No Focal deficit, Paresthesia,  Syncope  Lymphatic: No Swelling, Lymphadenopathy   Skin: No Rash, Ecchymoses, Wounds, Lesions  Psychiatry: No Depression ,  Suicidal/Homicidal Ideation, Anxiety, Sleep Disturbances        All other systems were reviewed and are negative, except as noted.    VITALS/PHYSICAL EXAM  --------------------------------------------------------------------------------  T(C): --  HR: 92 (22 22:00) (92 - 94)  BP: 124/73 (22 @ 21:37) (124/73 - 124/73)  RR: 26 (22 22:00) (21 - 26)  SpO2: 100% (22 22:00) (100% - 100%)  Wt(kg): --    Height (cm): 165.1 (22 @ 21:37)  Weight (kg): 66.2 (22 @ 21:37)  BMI (kg/m2): 24.3 (22 21:37)  BSA (m2): 1.73 (22 @ 21:37)  Daily Height in cm: 165.1 (20 Dec 2022 21:37)    Daily Weight in k.2 (20 Dec 2022 21:37)  I&O's Summary    20 Dec 2022 07:01  -  20 Dec 2022 22:49  --------------------------------------------------------  IN: 43.6 mL / OUT: 15 mL / NET: 28.6 mL          22 @ 07:01  -  22 @ 22:49  --------------------------------------------------------  IN: 43.6 mL / OUT: 15 mL / NET: 28.6 mL        Physical Exam:  Gen: NAD   HEENT: anicteric  Pulm: CTA B/L  CV: RRR, Normal S1 S2  Abd: soft, nontender, nondistended  CNS: AAO x 3, No asterixis  : No Monroe  LE: Warm, no андрей  Skin: Warm, without rashes  Vascular access: none        LABS/STUDIES  --------------------------------------------------------------------------------              7.8    26.30 >-----------<  109      [22 @ 22:41]              22.5                 Creatinine Trend:    Urinalysis - [22 @ 14:40]      Color Yoselin / Appearance Slightly Turbid / SG 1.020 / pH 6.0      Gluc Negative / Ketone Trace  / Bili Small / Urobili 8       Blood Trace / Protein 30 / Leuk Est Small / Nitrite Negative      RBC 0-5 / WBC 6-10 / Hyaline  / Gran  / Sq Epi  / Non Sq Epi Few / Bacteria Occasional              Radiology  --------------------------------------------------------------------------------    --------------------------------------------------------------------------------  Lorenza Vela  [id number] [facility] DIVISION OF KIDNEY DISEASES AND HYPERTENSION -- INITIAL CONSULT NOTE  --------------------------------------------------------------------------------  -----------------------------------------------------------------  HPI:    This is a 62 year old male with PMH of liver cirrhosis secondary to ETOH use as well as brain mass s/p resection admitted to [facility] for hyponatremia. Presented to outside hospital for seizure like activity.     patient was noted to have RED in th setting of cirrhosis and admitted to the ICU, in her stay at [facility] patient was initiated on IV pressors and intubated as well as started on Rifaximin and lactulose for hepatic encephalopathy. Patient Renal function did not improve for which she received 1 session fo HD outside (RED from HRS vs ATN.)  Patient was transferred to [facility] for evaluation of liver transplant.           PAST HISTORY  --------------------------------------------------------------------------------  PAST MEDICAL & SURGICAL HISTORY:  HTN (hypertension)  off medication for over one year--states BP has been normal      UTI (urinary tract infection)  currently being treated--will complete antibiotics 3/8/2022      Intracranial tumor      GERD (gastroesophageal reflux disease)      Eczema      Thyroid cancer  surgery. radiation, Radioactive iodine      COVID-19 virus infection  2021--recovered at home      H/O total thyroidectomy  age 21&#x27;s for Thyroid cancer, postop complication arterial bleed, second surgery      History of tonsillectomy  age 5      History of appendectomy  age 5        FAMILY HISTORY:  FH: heart disease  Father, age 85,     FH: pancreatic cancer  Brother, age 59,       PAST SOCIAL HISTORY:    ALLERGIES & MEDICATIONS  --------------------------------------------------------------------------------  Allergies    Macrobid (Rash)  Nexium (Rash)    Intolerances      Standing Inpatient Medications  calcium gluconate IVPB 2 Gram(s) IV Intermittent once  chlorhexidine 0.12% Liquid 15 milliLiter(s) Oral Mucosa every 12 hours  chlorhexidine 2% Cloths 1 Application(s) Topical <User Schedule>  dexMEDEtomidine Infusion 0.5 MICROgram(s)/kG/Hr IV Continuous <Continuous>  fluconAZOLE IVPB 200 milliGRAM(s) IV Intermittent every 24 hours  lactulose Syrup 20 Gram(s) Oral every 6 hours  levETIRAcetam  IVPB 750 milliGRAM(s) IV Intermittent every 12 hours  levothyroxine Injectable 70 MICROGram(s) IV Push at bedtime  norepinephrine Infusion 0.13 MICROgram(s)/kG/Min IV Continuous <Continuous>  octreotide  Infusion 50 MICROgram(s)/Hr IV Continuous <Continuous>  propofol Infusion 25 MICROgram(s)/kG/Min IV Continuous <Continuous>  rifAXIMin 550 milliGRAM(s) Oral every 12 hours  vasopressin Infusion 0.03 Unit(s)/Min IV Continuous <Continuous>    PRN Inpatient Medications      REVIEW OF SYSTEMS  --------------------------------------------------------------------------------  Constitutional:No Fever,Chills , Fatigue , Weight change   HEENT:No Blurred vision,Eye Pain ,Headache, Runny nose,Sore Throat   Respiratory: No Cough, Wheezing ,Shortness of breath  Cardiovascular: No Chest Pain, Palpitations, RAYA, PND, Orthopnea  Gastrointestinal:No Abdominal Pain, Diarrhea, Constipation, Hemorrhoids, Nausea,  Vomiting  Genitourinary: No Nocturia, Dysuria, Incontinence  Extremities: No Swelling, Joint Pain  Neurologic:No Focal deficit, Paresthesia,  Syncope  Lymphatic: No Swelling, Lymphadenopathy   Skin: No Rash, Ecchymoses, Wounds, Lesions  Psychiatry: No Depression ,  Suicidal/Homicidal Ideation, Anxiety, Sleep Disturbances        All other systems were reviewed and are negative, except as noted.    VITALS/PHYSICAL EXAM  --------------------------------------------------------------------------------  T(C): --  HR: 92 (22 22:00) (92 - 94)  BP: 124/73 (22 @ 21:37) (124/73 - 124/73)  RR: 26 (22 22:00) (21 - 26)  SpO2: 100% (22 22:00) (100% - 100%)  Wt(kg): --    Height (cm): 165.1 (22 @ 21:37)  Weight (kg): 66.2 (22 @ 21:37)  BMI (kg/m2): 24.3 (22 21:37)  BSA (m2): 1.73 (22 @ 21:37)  Daily Height in cm: 165.1 (20 Dec 2022 21:37)    Daily Weight in k.2 (20 Dec 2022 21:37)  I&O's Summary    20 Dec 2022 07:01  -  20 Dec 2022 22:49  --------------------------------------------------------  IN: 43.6 mL / OUT: 15 mL / NET: 28.6 mL          22 @ 07:01  -  22 @ 22:49  --------------------------------------------------------  IN: 43.6 mL / OUT: 15 mL / NET: 28.6 mL        Physical Exam:  Gen: NAD   HEENT: anicteric  Pulm: CTA B/L  CV: RRR, Normal S1 S2  Abd: soft, nontender, nondistended  CNS: AAO x 3, No asterixis  : No Monroe  LE: Warm, no андрей  Skin: Warm, without rashes  Vascular access: none        LABS/STUDIES  --------------------------------------------------------------------------------              7.8    26.30 >-----------<  109      [22 @ 22:41]              22.5                 Creatinine Trend:    Urinalysis - [22 @ 14:40]      Color Yoselin / Appearance Slightly Turbid / SG 1.020 / pH 6.0      Gluc Negative / Ketone Trace  / Bili Small / Urobili 8       Blood Trace / Protein 30 / Leuk Est Small / Nitrite Negative      RBC 0-5 / WBC 6-10 / Hyaline  / Gran  / Sq Epi  / Non Sq Epi Few / Bacteria Occasional              Radiology  --------------------------------------------------------------------------------    --------------------------------------------------------------------------------  Lorenza Vela  [id number] [facility] DIVISION OF KIDNEY DISEASES AND HYPERTENSION -- INITIAL CONSULT NOTE  --------------------------------------------------------------------------------  -----------------------------------------------------------------  HPI:    This is a 62 year old male with PMH of liver cirrhosis secondary to ETOH use as well as brain mass s/p resection admitted to [facility] for hyponatremia. Presented to outside hospital for seizure like activity.     patient was noted to have RED in th setting of cirrhosis and admitted to the ICU, in her stay at [facility] patient was initiated on IV pressors and intubated as well as started on Rifaximin and lactulose for hepatic encephalopathy. Patient Renal function did not improve for which she received 1 session fo HD outside (RED from HRS vs ATN.)  Patient was transferred to [facility] for evaluation of liver transplant.           PAST HISTORY  --------------------------------------------------------------------------------  PAST MEDICAL & SURGICAL HISTORY:  HTN (hypertension)  off medication for over one year--states BP has been normal      UTI (urinary tract infection)  currently being treated--will complete antibiotics 3/8/2022      Intracranial tumor      GERD (gastroesophageal reflux disease)      Eczema      Thyroid cancer  surgery. radiation, Radioactive iodine      COVID-19 virus infection  2021--recovered at home      H/O total thyroidectomy  age 21&#x27;s for Thyroid cancer, postop complication arterial bleed, second surgery      History of tonsillectomy  age 5      History of appendectomy  age 5        FAMILY HISTORY:  FH: heart disease  Father, age 85,     FH: pancreatic cancer  Brother, age 59,       PAST SOCIAL HISTORY:    ALLERGIES & MEDICATIONS  --------------------------------------------------------------------------------  Allergies    Macrobid (Rash)  Nexium (Rash)    Intolerances      Standing Inpatient Medications  calcium gluconate IVPB 2 Gram(s) IV Intermittent once  chlorhexidine 0.12% Liquid 15 milliLiter(s) Oral Mucosa every 12 hours  chlorhexidine 2% Cloths 1 Application(s) Topical <User Schedule>  dexMEDEtomidine Infusion 0.5 MICROgram(s)/kG/Hr IV Continuous <Continuous>  fluconAZOLE IVPB 200 milliGRAM(s) IV Intermittent every 24 hours  lactulose Syrup 20 Gram(s) Oral every 6 hours  levETIRAcetam  IVPB 750 milliGRAM(s) IV Intermittent every 12 hours  levothyroxine Injectable 70 MICROGram(s) IV Push at bedtime  norepinephrine Infusion 0.13 MICROgram(s)/kG/Min IV Continuous <Continuous>  octreotide  Infusion 50 MICROgram(s)/Hr IV Continuous <Continuous>  propofol Infusion 25 MICROgram(s)/kG/Min IV Continuous <Continuous>  rifAXIMin 550 milliGRAM(s) Oral every 12 hours  vasopressin Infusion 0.03 Unit(s)/Min IV Continuous <Continuous>    PRN Inpatient Medications      REVIEW OF SYSTEMS  --------------------------------------------------------------------------------  Constitutional:No Fever,Chills , Fatigue , Weight change   HEENT:No Blurred vision,Eye Pain ,Headache, Runny nose,Sore Throat   Respiratory: No Cough, Wheezing ,Shortness of breath  Cardiovascular: No Chest Pain, Palpitations, RAYA, PND, Orthopnea  Gastrointestinal:No Abdominal Pain, Diarrhea, Constipation, Hemorrhoids, Nausea,  Vomiting  Genitourinary: No Nocturia, Dysuria, Incontinence  Extremities: No Swelling, Joint Pain  Neurologic:No Focal deficit, Paresthesia,  Syncope  Lymphatic: No Swelling, Lymphadenopathy   Skin: No Rash, Ecchymoses, Wounds, Lesions  Psychiatry: No Depression ,  Suicidal/Homicidal Ideation, Anxiety, Sleep Disturbances        All other systems were reviewed and are negative, except as noted.    VITALS/PHYSICAL EXAM  --------------------------------------------------------------------------------  T(C): --  HR: 92 (22 22:00) (92 - 94)  BP: 124/73 (22 @ 21:37) (124/73 - 124/73)  RR: 26 (22 22:00) (21 - 26)  SpO2: 100% (22 22:00) (100% - 100%)  Wt(kg): --    Height (cm): 165.1 (22 @ 21:37)  Weight (kg): 66.2 (22 @ 21:37)  BMI (kg/m2): 24.3 (22 21:37)  BSA (m2): 1.73 (22 @ 21:37)  Daily Height in cm: 165.1 (20 Dec 2022 21:37)    Daily Weight in k.2 (20 Dec 2022 21:37)  I&O's Summary    20 Dec 2022 07:01  -  20 Dec 2022 22:49  --------------------------------------------------------  IN: 43.6 mL / OUT: 15 mL / NET: 28.6 mL          22 @ 07:01  -  22 @ 22:49  --------------------------------------------------------  IN: 43.6 mL / OUT: 15 mL / NET: 28.6 mL        Physical Exam:  Gen: NAD   HEENT: anicteric  Pulm: CTA B/L  CV: RRR, Normal S1 S2  Abd: soft, nontender, nondistended  CNS: AAO x 3, No asterixis  : No Monroe  LE: Warm, no андрей  Skin: Warm, without rashes  Vascular access: none        LABS/STUDIES  --------------------------------------------------------------------------------              7.8    26.30 >-----------<  109      [22 @ 22:41]              22.5                 Creatinine Trend:    Urinalysis - [22 @ 14:40]      Color Yoselin / Appearance Slightly Turbid / SG 1.020 / pH 6.0      Gluc Negative / Ketone Trace  / Bili Small / Urobili 8       Blood Trace / Protein 30 / Leuk Est Small / Nitrite Negative      RBC 0-5 / WBC 6-10 / Hyaline  / Gran  / Sq Epi  / Non Sq Epi Few / Bacteria Occasional              Radiology  --------------------------------------------------------------------------------    --------------------------------------------------------------------------------  Lorenza Vela  [id number]

## 2022-12-20 NOTE — H&P ADULT - NSHPLABSRESULTS_GEN_ALL_CORE
Vital Signs Last 24 Hrs  T(C): --  T(F): --  HR: 92 (20 Dec 2022 22:00) (92 - 94)  BP: 124/73 (20 Dec 2022 21:37) (124/73 - 124/73)  BP(mean): 91 (20 Dec 2022 21:37) (91 - 91)  RR: 26 (20 Dec 2022 22:00) (21 - 26)  SpO2: 100% (20 Dec 2022 22:00) (100% - 100%)    Parameters below as of 20 Dec 2022 21:37  Patient On (Oxygen Delivery Method): ventilator    O2 Concentration (%): 30      12-20-22 @ 07:01  -  12-20-22 @ 22:49  --------------------------------------------------------  IN: 43.6 mL / OUT: 15 mL / NET: 28.6 mL        LABS:                        7.8    26.30 )-----------( 109      ( 20 Dec 2022 22:41 )             22.5

## 2022-12-20 NOTE — H&P ADULT - HISTORY OF PRESENT ILLNESS
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 post operative course complicated  Postop Head CT with hemorrhage right lateral ventricle and postop air is transferred from [facility] for further management of acute liver failure and trans[plant evaluation  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 post operative course complicated  Postop Head CT with hemorrhage right lateral ventricle and postop air is transferred from [facility] for further management of acute liver failure and trans[plant evaluation  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   She was admitted to [facility] on 12/19 with seizures no reported hx of seizures.  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   She was admitted to [facility] on 12/19 with seizures no reported hx of seizures.  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   She was admitted to [facility] on 12/19 with seizures no reported hx of seizures.

## 2022-12-20 NOTE — H&P ADULT - ASSESSMENT
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 post operative course complicated  Postop Head CT with hemorrhage right lateral ventricle and postop air is transferred from [facility] for further management of acute liver failure and trans[plant evaluation  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 post operative course complicated  Postop Head CT with hemorrhage right lateral ventricle and postop air is transferred from [facility] for further management of acute liver failure and trans[plant evaluation  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       Plan:   -Admit to Transplant surgery   -CBC,CMP,Mg/Phos, Ammonia, T&Sx2  -Trend labs  -Infectious workup  -Broad spectrum Abx/Anti-funagl   -Doppler US pending    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 post operative course complicated  Postop Head CT with hemorrhage right lateral ventricle and postop air is transferred from [facility] for further management of acute liver failure and trans[plant evaluation.   She was admitted to [facility] on 12/19 with seizures no reported hx of seizures.     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   She was admitted to [facility] on 12/19 with seizures no reported hx of seizures.     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   She was admitted to [facility] on 12/19 with seizures no reported hx of seizures.     Plan:   -Admit to Transplant surgery   -CBC,CMP,Mg/Phos, Ammonia, T&Sx2  -Trend labs  -Infectious workup  -Broad spectrum Abx/Anti-funagl   -Doppler US pending   -Rifaximin/Lactulose  -Octreotide gtt

## 2022-12-21 ENCOUNTER — RESULT REVIEW (OUTPATIENT)
Age: 62
End: 2022-12-21

## 2022-12-21 LAB
ALBUMIN FLD-MCNC: 0.8 G/DL — SIGNIFICANT CHANGE UP
ALBUMIN SERPL ELPH-MCNC: 3.3 G/DL — SIGNIFICANT CHANGE UP (ref 3.3–5)
ALBUMIN SERPL ELPH-MCNC: 3.7 G/DL — SIGNIFICANT CHANGE UP (ref 3.3–5)
ALBUMIN SERPL ELPH-MCNC: 3.9 G/DL — SIGNIFICANT CHANGE UP (ref 3.3–5)
ALP SERPL-CCNC: 109 U/L — SIGNIFICANT CHANGE UP (ref 40–120)
ALP SERPL-CCNC: 132 U/L — HIGH (ref 40–120)
ALP SERPL-CCNC: 133 U/L — HIGH (ref 40–120)
ALP SERPL-CCNC: 136 U/L — HIGH (ref 40–120)
ALT FLD-CCNC: 37 U/L — SIGNIFICANT CHANGE UP (ref 10–45)
ALT FLD-CCNC: 43 U/L — SIGNIFICANT CHANGE UP (ref 10–45)
ALT FLD-CCNC: 44 U/L — SIGNIFICANT CHANGE UP (ref 10–45)
AMMONIA BLD-MCNC: 69 UMOL/L — HIGH (ref 11–55)
ANION GAP SERPL CALC-SCNC: 18 MMOL/L — HIGH (ref 5–17)
ANION GAP SERPL CALC-SCNC: 19 MMOL/L — HIGH (ref 5–17)
APPEARANCE UR: ABNORMAL
APTT BLD: 51.3 SEC — HIGH (ref 27.5–35.5)
AST SERPL-CCNC: 135 U/L — HIGH (ref 10–40)
AST SERPL-CCNC: 140 U/L — HIGH (ref 10–40)
AST SERPL-CCNC: 144 U/L — HIGH (ref 10–40)
AST SERPL-CCNC: 99 U/L — HIGH (ref 10–40)
B PERT IGG+IGM PNL SER: CLEAR — SIGNIFICANT CHANGE UP
BACTERIA # UR AUTO: NEGATIVE — SIGNIFICANT CHANGE UP
BILIRUB SERPL-MCNC: 24.6 MG/DL — HIGH (ref 0.2–1.2)
BILIRUB SERPL-MCNC: 25.5 MG/DL — HIGH (ref 0.2–1.2)
BILIRUB SERPL-MCNC: 26.5 MG/DL — HIGH (ref 0.2–1.2)
BILIRUB SERPL-MCNC: 26.7 MG/DL — HIGH (ref 0.2–1.2)
BILIRUB UR-MCNC: ABNORMAL
BUN SERPL-MCNC: 29 MG/DL — HIGH (ref 7–23)
BUN SERPL-MCNC: 30 MG/DL — HIGH (ref 7–23)
BUN SERPL-MCNC: 37 MG/DL — HIGH (ref 7–23)
CALCIUM SERPL-MCNC: 8.5 MG/DL — SIGNIFICANT CHANGE UP (ref 8.4–10.5)
CALCIUM SERPL-MCNC: 8.6 MG/DL — SIGNIFICANT CHANGE UP (ref 8.4–10.5)
CALCIUM SERPL-MCNC: 8.8 MG/DL — SIGNIFICANT CHANGE UP (ref 8.4–10.5)
CHLORIDE SERPL-SCNC: 98 MMOL/L — SIGNIFICANT CHANGE UP (ref 96–108)
CHLORIDE SERPL-SCNC: 99 MMOL/L — SIGNIFICANT CHANGE UP (ref 96–108)
CO2 SERPL-SCNC: 17 MMOL/L — LOW (ref 22–31)
CO2 SERPL-SCNC: 18 MMOL/L — LOW (ref 22–31)
COD CRY URNS QL: ABNORMAL
COLOR FLD: YELLOW — SIGNIFICANT CHANGE UP
COLOR SPEC: ABNORMAL
COMMENT - URINE: SIGNIFICANT CHANGE UP
CREAT ?TM UR-MCNC: 21 MG/DL — SIGNIFICANT CHANGE UP
CREAT SERPL-MCNC: 3.52 MG/DL — HIGH (ref 0.5–1.3)
CREAT SERPL-MCNC: 3.59 MG/DL — HIGH (ref 0.5–1.3)
CREAT SERPL-MCNC: 4.3 MG/DL — HIGH (ref 0.5–1.3)
DIFF PNL FLD: ABNORMAL
EGFR: 11 ML/MIN/1.73M2 — LOW
EGFR: 14 ML/MIN/1.73M2 — LOW
EPI CELLS # UR: ABNORMAL
FLUID INTAKE SUBSTANCE CLASS: SIGNIFICANT CHANGE UP
GAS PNL BLDA: SIGNIFICANT CHANGE UP
GLUCOSE BLDC GLUCOMTR-MCNC: 113 MG/DL — HIGH (ref 70–99)
GLUCOSE FLD-MCNC: 140 MG/DL — SIGNIFICANT CHANGE UP
GLUCOSE SERPL-MCNC: 104 MG/DL — HIGH (ref 70–99)
GLUCOSE SERPL-MCNC: 122 MG/DL — HIGH (ref 70–99)
GLUCOSE SERPL-MCNC: 135 MG/DL — HIGH (ref 70–99)
GLUCOSE SERPL-MCNC: 164 MG/DL — HIGH (ref 70–99)
GLUCOSE UR QL: ABNORMAL
GRAM STN FLD: SIGNIFICANT CHANGE UP
HCT VFR BLD CALC: 20.6 % — CRITICAL LOW (ref 34.5–45)
HCT VFR BLD CALC: 22.1 % — LOW (ref 34.5–45)
HCT VFR BLD CALC: 22.5 % — LOW (ref 34.5–45)
HCV AB S/CO SERPL IA: 0.15 S/CO — SIGNIFICANT CHANGE UP (ref 0–0.99)
HCV AB SERPL-IMP: SIGNIFICANT CHANGE UP
HGB BLD-MCNC: 6.9 G/DL — CRITICAL LOW (ref 11.5–15.5)
HGB BLD-MCNC: 7.6 G/DL — LOW (ref 11.5–15.5)
HGB BLD-MCNC: 7.7 G/DL — LOW (ref 11.5–15.5)
HYALINE CASTS # UR AUTO: ABNORMAL /LPF
INR BLD: 2.93 RATIO — HIGH (ref 0.88–1.16)
KETONES UR-MCNC: ABNORMAL
LDH SERPL L TO P-CCNC: 63 U/L — SIGNIFICANT CHANGE UP
LEUKOCYTE ESTERASE UR-ACNC: ABNORMAL
LYMPHOCYTES # FLD: 17 % — SIGNIFICANT CHANGE UP
MAGNESIUM SERPL-MCNC: 2.2 MG/DL — SIGNIFICANT CHANGE UP (ref 1.6–2.6)
MAGNESIUM SERPL-MCNC: 2.3 MG/DL — SIGNIFICANT CHANGE UP (ref 1.6–2.6)
MCHC RBC-ENTMCNC: 32.5 PG — SIGNIFICANT CHANGE UP (ref 27–34)
MCHC RBC-ENTMCNC: 32.6 PG — SIGNIFICANT CHANGE UP (ref 27–34)
MCHC RBC-ENTMCNC: 32.8 PG — SIGNIFICANT CHANGE UP (ref 27–34)
MCHC RBC-ENTMCNC: 33.5 GM/DL — SIGNIFICANT CHANGE UP (ref 32–36)
MCHC RBC-ENTMCNC: 34.2 GM/DL — SIGNIFICANT CHANGE UP (ref 32–36)
MCHC RBC-ENTMCNC: 34.4 GM/DL — SIGNIFICANT CHANGE UP (ref 32–36)
MCV RBC AUTO: 95.3 FL — SIGNIFICANT CHANGE UP (ref 80–100)
MCV RBC AUTO: 95.7 FL — SIGNIFICANT CHANGE UP (ref 80–100)
MCV RBC AUTO: 97.2 FL — SIGNIFICANT CHANGE UP (ref 80–100)
MESOTHL CELL # FLD: 2 % — SIGNIFICANT CHANGE UP
MONOS+MACROS # FLD: 58 % — SIGNIFICANT CHANGE UP
NEUTROPHILS-BODY FLUID: 23 % — SIGNIFICANT CHANGE UP
NITRITE UR-MCNC: NEGATIVE — SIGNIFICANT CHANGE UP
NRBC # BLD: 0 /100 WBCS — SIGNIFICANT CHANGE UP (ref 0–0)
OSMOLALITY SERPL: 286 MOSMOL/KG — SIGNIFICANT CHANGE UP (ref 280–301)
OSMOLALITY UR: 288 MOS/KG — LOW (ref 300–900)
PH UR: 6 — SIGNIFICANT CHANGE UP (ref 5–8)
PHOSPHATE SERPL-MCNC: 4.5 MG/DL — SIGNIFICANT CHANGE UP (ref 2.5–4.5)
PHOSPHATE SERPL-MCNC: 4.8 MG/DL — HIGH (ref 2.5–4.5)
PHOSPHATE SERPL-MCNC: 5.5 MG/DL — HIGH (ref 2.5–4.5)
PHOSPHATE SERPL-MCNC: 5.7 MG/DL — HIGH (ref 2.5–4.5)
PLATELET # BLD AUTO: 81 K/UL — LOW (ref 150–400)
PLATELET # BLD AUTO: 91 K/UL — LOW (ref 150–400)
PLATELET # BLD AUTO: 93 K/UL — LOW (ref 150–400)
PLATELET # BLD AUTO: 99 K/UL — LOW (ref 150–400)
POTASSIUM SERPL-MCNC: 3.7 MMOL/L — SIGNIFICANT CHANGE UP (ref 3.5–5.3)
POTASSIUM SERPL-MCNC: 3.8 MMOL/L — SIGNIFICANT CHANGE UP (ref 3.5–5.3)
POTASSIUM SERPL-SCNC: 3.7 MMOL/L — SIGNIFICANT CHANGE UP (ref 3.5–5.3)
POTASSIUM SERPL-SCNC: 3.8 MMOL/L — SIGNIFICANT CHANGE UP (ref 3.5–5.3)
POTASSIUM UR-SCNC: 8 MMOL/L — SIGNIFICANT CHANGE UP
PROT FLD-MCNC: 1.2 G/DL — SIGNIFICANT CHANGE UP
PROT SERPL-MCNC: 5.8 G/DL — LOW (ref 6–8.3)
PROT SERPL-MCNC: 6 G/DL — SIGNIFICANT CHANGE UP (ref 6–8.3)
PROT SERPL-MCNC: 6.1 G/DL — SIGNIFICANT CHANGE UP (ref 6–8.3)
PROT UR-MCNC: ABNORMAL
PROTHROM AB SERPL-ACNC: 34.1 SEC — HIGH (ref 10.5–13.4)
RBC # BLD: 2.12 M/UL — LOW (ref 3.8–5.2)
RBC # BLD: 2.32 M/UL — LOW (ref 3.8–5.2)
RBC # BLD: 2.35 M/UL — LOW (ref 3.8–5.2)
RBC # BLD: 2.36 M/UL — LOW (ref 3.8–5.2)
RBC # FLD: 15.6 % — HIGH (ref 10.3–14.5)
RBC # FLD: 15.7 % — HIGH (ref 10.3–14.5)
RBC CASTS # UR COMP ASSIST: SIGNIFICANT CHANGE UP /HPF (ref 0–4)
RCV VOL RI: < 2000 /UL — SIGNIFICANT CHANGE UP (ref 0–0)
SARS-COV-2 RNA SPEC QL NAA+PROBE: SIGNIFICANT CHANGE UP
SODIUM SERPL-SCNC: 134 MMOL/L — LOW (ref 135–145)
SODIUM SERPL-SCNC: 135 MMOL/L — SIGNIFICANT CHANGE UP (ref 135–145)
SODIUM SERPL-SCNC: 136 MMOL/L — SIGNIFICANT CHANGE UP (ref 135–145)
SODIUM UR-SCNC: 138 MMOL/L — SIGNIFICANT CHANGE UP
SP GR SPEC: 1.03 — HIGH (ref 1.01–1.02)
SPECIMEN SOURCE: SIGNIFICANT CHANGE UP
TOTAL NUCLEATED CELL COUNT, BODY FLUID: 39 /UL — SIGNIFICANT CHANGE UP
TUBE TYPE: SIGNIFICANT CHANGE UP
UROBILINOGEN FLD QL: NEGATIVE — SIGNIFICANT CHANGE UP
WBC # BLD: 21.55 K/UL — HIGH (ref 3.8–10.5)
WBC # BLD: 24.21 K/UL — HIGH (ref 3.8–10.5)
WBC # BLD: 24.82 K/UL — HIGH (ref 3.8–10.5)
WBC # BLD: 25.81 K/UL — HIGH (ref 3.8–10.5)
WBC # FLD AUTO: 21.55 K/UL — HIGH (ref 3.8–10.5)
WBC # FLD AUTO: 24.21 K/UL — HIGH (ref 3.8–10.5)
WBC # FLD AUTO: 24.82 K/UL — HIGH (ref 3.8–10.5)
WBC # FLD AUTO: 25.81 K/UL — HIGH (ref 3.8–10.5)
WBC UR QL: ABNORMAL

## 2022-12-21 PROCEDURE — 49083 ABD PARACENTESIS W/IMAGING: CPT

## 2022-12-21 PROCEDURE — 70450 CT HEAD/BRAIN W/O DYE: CPT | Mod: 26

## 2022-12-21 PROCEDURE — 99223 1ST HOSP IP/OBS HIGH 75: CPT

## 2022-12-21 PROCEDURE — 88305 TISSUE EXAM BY PATHOLOGIST: CPT | Mod: 26

## 2022-12-21 PROCEDURE — 88112 CYTOPATH CELL ENHANCE TECH: CPT | Mod: 26

## 2022-12-21 PROCEDURE — 99291 CRITICAL CARE FIRST HOUR: CPT

## 2022-12-21 PROCEDURE — 74176 CT ABD & PELVIS W/O CONTRAST: CPT | Mod: 26

## 2022-12-21 PROCEDURE — 99222 1ST HOSP IP/OBS MODERATE 55: CPT

## 2022-12-21 PROCEDURE — 71045 X-RAY EXAM CHEST 1 VIEW: CPT | Mod: 26

## 2022-12-21 PROCEDURE — 93975 VASCULAR STUDY: CPT | Mod: 26

## 2022-12-21 PROCEDURE — 93306 TTE W/DOPPLER COMPLETE: CPT | Mod: 26

## 2022-12-21 PROCEDURE — 71250 CT THORAX DX C-: CPT | Mod: 26

## 2022-12-21 RX ORDER — ALBUMIN HUMAN 25 %
50 VIAL (ML) INTRAVENOUS ONCE
Refills: 0 | Status: COMPLETED | OUTPATIENT
Start: 2022-12-21 | End: 2022-12-21

## 2022-12-21 RX ORDER — SODIUM CHLORIDE 9 MG/ML
10 INJECTION INTRAMUSCULAR; INTRAVENOUS; SUBCUTANEOUS
Refills: 0 | Status: DISCONTINUED | OUTPATIENT
Start: 2022-12-21 | End: 2022-12-22

## 2022-12-21 RX ORDER — CALCIUM GLUCONATE 100 MG/ML
2 VIAL (ML) INTRAVENOUS ONCE
Refills: 0 | Status: COMPLETED | OUTPATIENT
Start: 2022-12-21 | End: 2022-12-21

## 2022-12-21 RX ORDER — INSULIN LISPRO 100/ML
VIAL (ML) SUBCUTANEOUS EVERY 6 HOURS
Refills: 0 | Status: DISCONTINUED | OUTPATIENT
Start: 2022-12-21 | End: 2022-12-24

## 2022-12-21 RX ORDER — VANCOMYCIN HCL 1 G
1000 VIAL (EA) INTRAVENOUS EVERY 12 HOURS
Refills: 0 | Status: DISCONTINUED | OUTPATIENT
Start: 2022-12-21 | End: 2022-12-22

## 2022-12-21 RX ORDER — LACTULOSE 10 G/15ML
20 SOLUTION ORAL EVERY 6 HOURS
Refills: 0 | Status: DISCONTINUED | OUTPATIENT
Start: 2022-12-21 | End: 2022-12-24

## 2022-12-21 RX ORDER — ALBUMIN HUMAN 25 %
100 VIAL (ML) INTRAVENOUS EVERY 8 HOURS
Refills: 0 | Status: COMPLETED | OUTPATIENT
Start: 2022-12-21 | End: 2022-12-24

## 2022-12-21 RX ORDER — DEXMEDETOMIDINE HYDROCHLORIDE IN 0.9% SODIUM CHLORIDE 4 UG/ML
0.5 INJECTION INTRAVENOUS
Qty: 200 | Refills: 0 | Status: DISCONTINUED | OUTPATIENT
Start: 2022-12-21 | End: 2022-12-23

## 2022-12-21 RX ORDER — CHLORHEXIDINE GLUCONATE 213 G/1000ML
1 SOLUTION TOPICAL
Refills: 0 | Status: DISCONTINUED | OUTPATIENT
Start: 2022-12-21 | End: 2022-12-25

## 2022-12-21 RX ADMIN — VASOPRESSIN 4.5 UNIT(S)/MIN: 20 INJECTION INTRAVENOUS at 05:17

## 2022-12-21 RX ADMIN — HYDROMORPHONE HYDROCHLORIDE 0.5 MILLIGRAM(S): 2 INJECTION INTRAMUSCULAR; INTRAVENOUS; SUBCUTANEOUS at 14:20

## 2022-12-21 RX ADMIN — Medication 16.1 MICROGRAM(S)/KG/MIN: at 05:18

## 2022-12-21 RX ADMIN — LEVETIRACETAM 400 MILLIGRAM(S): 250 TABLET, FILM COATED ORAL at 17:02

## 2022-12-21 RX ADMIN — Medication 16.1 MICROGRAM(S)/KG/MIN: at 19:37

## 2022-12-21 RX ADMIN — HYDROMORPHONE HYDROCHLORIDE 0.5 MILLIGRAM(S): 2 INJECTION INTRAMUSCULAR; INTRAVENOUS; SUBCUTANEOUS at 15:02

## 2022-12-21 RX ADMIN — Medication 70 MICROGRAM(S): at 22:32

## 2022-12-21 RX ADMIN — Medication 200 GRAM(S): at 00:07

## 2022-12-21 RX ADMIN — MEROPENEM 100 MILLIGRAM(S): 1 INJECTION INTRAVENOUS at 05:19

## 2022-12-21 RX ADMIN — DEXMEDETOMIDINE HYDROCHLORIDE IN 0.9% SODIUM CHLORIDE 8.28 MICROGRAM(S)/KG/HR: 4 INJECTION INTRAVENOUS at 05:17

## 2022-12-21 RX ADMIN — VASOPRESSIN 4.5 UNIT(S)/MIN: 20 INJECTION INTRAVENOUS at 19:37

## 2022-12-21 RX ADMIN — CHLORHEXIDINE GLUCONATE 15 MILLILITER(S): 213 SOLUTION TOPICAL at 17:01

## 2022-12-21 RX ADMIN — LACTULOSE 20 GRAM(S): 10 SOLUTION ORAL at 05:18

## 2022-12-21 RX ADMIN — Medication 50 MILLILITER(S): at 15:18

## 2022-12-21 RX ADMIN — CHLORHEXIDINE GLUCONATE 15 MILLILITER(S): 213 SOLUTION TOPICAL at 05:18

## 2022-12-21 RX ADMIN — CHLORHEXIDINE GLUCONATE 1 APPLICATION(S): 213 SOLUTION TOPICAL at 05:29

## 2022-12-21 RX ADMIN — LEVETIRACETAM 400 MILLIGRAM(S): 250 TABLET, FILM COATED ORAL at 05:19

## 2022-12-21 RX ADMIN — Medication 16.1 MICROGRAM(S)/KG/MIN: at 09:16

## 2022-12-21 RX ADMIN — LACTULOSE 20 GRAM(S): 10 SOLUTION ORAL at 17:01

## 2022-12-21 RX ADMIN — OCTREOTIDE ACETATE 10 MICROGRAM(S)/HR: 200 INJECTION, SOLUTION INTRAVENOUS; SUBCUTANEOUS at 09:17

## 2022-12-21 RX ADMIN — DEXMEDETOMIDINE HYDROCHLORIDE IN 0.9% SODIUM CHLORIDE 8.28 MICROGRAM(S)/KG/HR: 4 INJECTION INTRAVENOUS at 17:31

## 2022-12-21 RX ADMIN — DEXMEDETOMIDINE HYDROCHLORIDE IN 0.9% SODIUM CHLORIDE 8.28 MICROGRAM(S)/KG/HR: 4 INJECTION INTRAVENOUS at 19:37

## 2022-12-21 RX ADMIN — Medication 50 MILLILITER(S): at 18:23

## 2022-12-21 RX ADMIN — OCTREOTIDE ACETATE 10 MICROGRAM(S)/HR: 200 INJECTION, SOLUTION INTRAVENOUS; SUBCUTANEOUS at 05:17

## 2022-12-21 RX ADMIN — Medication 50 MILLILITER(S): at 18:50

## 2022-12-21 RX ADMIN — FLUCONAZOLE 100 MILLIGRAM(S): 150 TABLET ORAL at 22:32

## 2022-12-21 RX ADMIN — MEROPENEM 100 MILLIGRAM(S): 1 INJECTION INTRAVENOUS at 17:02

## 2022-12-21 RX ADMIN — VASOPRESSIN 4.5 UNIT(S)/MIN: 20 INJECTION INTRAVENOUS at 09:16

## 2022-12-21 RX ADMIN — Medication 250 MILLIGRAM(S): at 17:25

## 2022-12-21 RX ADMIN — LACTULOSE 20 GRAM(S): 10 SOLUTION ORAL at 11:11

## 2022-12-21 RX ADMIN — Medication 50 MILLILITER(S): at 17:57

## 2022-12-21 RX ADMIN — Medication 50 MILLILITER(S): at 21:10

## 2022-12-21 NOTE — DIETITIAN INITIAL EVALUATION ADULT - OTHER INFO
GI/Intake:   -NPO with OGT for medication   -Rectal tube in place; 350cc out thus far (); Lactulose ordered   -Presenting with decompensated ETOH Cirrhosis; MELD 43 ()     Endo:  -No hx of DM noted  -SSI ordered for coverage   -No steroid use at this time   -Synthroid ordered - IV     Renal:   -CRRT with Nepro following   -Lytes WDL     Resp:   -Intubated on vent     CV:  -On pressor support: Levophed, Vasopressin     Weight Hx:   -Current dosin pounds   -Per [facility]aus HIE: 125 pounds (22)   -? fluid vs true weight gain

## 2022-12-21 NOTE — DIETITIAN INITIAL EVALUATION ADULT - REASON INDICATOR FOR ASSESSMENT
Seen for SICU LOS  Sources: EMR, Team, Patient - intubated  -At time of assessment, Pt transported off of unit for further testing

## 2022-12-21 NOTE — CONSULT NOTE ADULT - ATTENDING COMMENTS
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 She was transferred to [facility] for liver transplant evaluation which we anticipate pursuing once her septic shock and respiratory failure improve. Currently, she is on broad-spectrum antimicrobial coverage with meropenem (12/20- ) and fluconazole (12/20- ) pending culture results. S/p 2.9L paracentesis today and will follow-up ascitic fluid studies. Non-contrast CT head, chest, abdomen, and pelvis today suggestive of multifocal pneumonia as the source of her infection and she may need bronchoscopy if not improving. ABO O with current MELD-Na 44. Appreciate continued excellent SICU care.    Please don't hesitate to call with any questions or concerns.    Javier Goldstein M.D., Ph.D.  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 She was transferred to [facility] for liver transplant evaluation which we anticipate pursuing once her septic shock and respiratory failure improve. Currently, she is on broad-spectrum antimicrobial coverage with meropenem (12/20- ) and fluconazole (12/20- ) pending culture results. S/p 2.9L paracentesis today and will follow-up ascitic fluid studies. Non-contrast CT head, chest, abdomen, and pelvis today suggestive of multifocal pneumonia as the source of her infection and she may need bronchoscopy if not improving. ABO O with current MELD-Na 44. Appreciate continued excellent SICU care.    Please don't hesitate to call with any questions or concerns.    Javier Goldstein M.D., Ph.D.  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 She was transferred to [facility] for liver transplant evaluation which we anticipate pursuing once her septic shock and respiratory failure improve. Currently, she is on broad-spectrum antimicrobial coverage with meropenem (12/20- ) and fluconazole (12/20- ) pending culture results. S/p 2.9L paracentesis today and will follow-up ascitic fluid studies. Non-contrast CT head, chest, abdomen, and pelvis today suggestive of multifocal pneumonia as the source of her infection and she may need bronchoscopy if not improving. ABO O with current MELD-Na 44. Appreciate continued excellent SICU care.    Please don't hesitate to call with any questions or concerns.    Javier Goldstein M.D., Ph.D.  Transplant Hepatology

## 2022-12-21 NOTE — PROGRESS NOTE ADULT - SUBJECTIVE AND OBJECTIVE BOX
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  Arthur ([phone]) and Daughter Taylor at [facility] provided additional history.     Of note was recently admitted to  11/2022 for workup and eval of jaundice- during that hospitalization was found to have a UTI and dx with alc hep and started on steroids (was deemed a non-responder and steroids were subsequently stopped); s/p LVP at [facility] 11/2022. Previously hospitalized in 2021 at [place] for ETOH detox. Went to ETOH rehab 08/2022 and was asked to leave the facility when she was COVID+; was sober for 1 week until she started drinking again. Had also attended a few AA meetings in the past. Transferred from [facility] 12/20 for further management of acute liver failure and liver transplant evaluation.       Interval events:  intubated/sedated  on low dose pressors  on CVVH net even, anuric  stable vent settings    Potential Discharge date: pending clinical stability    Education:  Medications    Plan of care:  See Below    MEDICATIONS  (STANDING):  albumin human 25% IVPB 100 milliLiter(s) IV Intermittent every 8 hours  chlorhexidine 0.12% Liquid 15 milliLiter(s) Oral Mucosa every 12 hours  chlorhexidine 2% Cloths 1 Application(s) Topical <User Schedule>  chlorhexidine 4% Liquid 1 Application(s) Topical <User Schedule>  CRRT Treatment    <Continuous>  fluconAZOLE IVPB 200 milliGRAM(s) IV Intermittent every 24 hours  insulin lispro (ADMELOG) corrective regimen sliding scale   SubCutaneous every 6 hours  lactulose Syrup 20 Gram(s) Oral every 6 hours  levETIRAcetam  IVPB 750 milliGRAM(s) IV Intermittent every 12 hours  levothyroxine Injectable 70 MICROGram(s) IV Push at bedtime  meropenem  IVPB 1000 milliGRAM(s) IV Intermittent every 12 hours  norepinephrine Infusion 0.13 MICROgram(s)/kG/Min (16.1 mL/Hr) IV Continuous <Continuous>  Phoxillum Filtration BK 4 / 2.5 5000 milliLiter(s) (800 mL/Hr) CRRT <Continuous>  Phoxillum Filtration BK 4 / 2.5 5000 milliLiter(s) (200 mL/Hr) CRRT <Continuous>  Phoxillum Filtration BK 4 / 2.5 5000 milliLiter(s) (1000 mL/Hr) CRRT <Continuous>  rifAXIMin 550 milliGRAM(s) Oral every 12 hours  vasopressin Infusion 0.03 Unit(s)/Min (4.5 mL/Hr) IV Continuous <Continuous>    MEDICATIONS  (PRN):  HYDROmorphone  Injectable 0.5 milliGRAM(s) IV Push every 3 hours PRN breakthrough pain  sodium chloride 0.9% lock flush 10 milliLiter(s) IV Push every 1 hour PRN Pre/post blood products, medications, blood draw, and to maintain line patency      PAST MEDICAL & SURGICAL HISTORY:  HTN (hypertension)  off medication for over one year--states BP has been normal      UTI (urinary tract infection)  currently being treated--will complete antibiotics 3/8/2022      Intracranial tumor      GERD (gastroesophageal reflux disease)      Eczema      Thyroid cancer  surgery. radiation, Radioactive iodine      COVID-19 virus infection  11/2021--recovered at home      H/O total thyroidectomy  age 21&#x27;s for Thyroid cancer, postop complication arterial bleed, second surgery      History of tonsillectomy  age 5      History of appendectomy  age 5          Vital Signs Last 24 Hrs  T(C): 36.8 (21 Dec 2022 11:00), Max: 36.8 (21 Dec 2022 11:00)  T(F): 98.2 (21 Dec 2022 11:00), Max: 98.2 (21 Dec 2022 11:00)  HR: 75 (21 Dec 2022 13:56) (66 - 100)  BP: 124/73 (20 Dec 2022 21:45) (124/73 - 124/73)  BP(mean): 91 (20 Dec 2022 21:45) (91 - 91)  RR: 16 (21 Dec 2022 13:30) (15 - 26)  SpO2: 98% (21 Dec 2022 13:56) (94% - 100%)    Parameters below as of 21 Dec 2022 11:00  Patient On (Oxygen Delivery Method): ventilator    O2 Concentration (%): 30    I&O's Summary    20 Dec 2022 07:01  -  21 Dec 2022 07:00  --------------------------------------------------------  IN: 959.6 mL / OUT: 793 mL / NET: 166.6 mL    21 Dec 2022 07:01  -  21 Dec 2022 13:59  --------------------------------------------------------  IN: 262.5 mL / OUT: 298 mL / NET: -35.5 mL                              7.7    24.21 )-----------( 93       ( 21 Dec 2022 11:52 )             22.5     12-21    135  |  99  |  29<H>  ----------------------------<  104<H>  3.8   |  18<L>  |  3.52<H>    Ca    8.5      21 Dec 2022 11:52  Phos  4.5     12-21  Mg     2.3     12-21    TPro  6.0  /  Alb  3.3  /  TBili  25.5<H>  /  DBili  x   /  AST  144<H>  /  ALT  44  /  AlkPhos  136<H>  12-21        Review of systems  intubated/sedated      PHYSICAL EXAM:  Constitutional: Well developed / well nourished  Eyes: icteric, PERRLA  ENMT: nc/at, no thrush  Neck: supple, central line   Respiratory: CTA B/L  Cardiovascular: RRR  Gastrointestinal: Soft abdomen, distended  Genitourinary: Urinary catheter in place, anuric  Extremities: SCD's in place and working bilaterally, no edema  Vascular: Palpable dp pulses bilaterally.   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  Arthur ([phone]) and Daughter Taylor at [facility] provided additional history.     Of note was recently admitted to  11/2022 for workup and eval of jaundice- during that hospitalization was found to have a UTI and dx with alc hep and started on steroids (was deemed a non-responder and steroids were subsequently stopped); s/p LVP at [facility] 11/2022. Previously hospitalized in 2021 at [place] for ETOH detox. Went to ETOH rehab 08/2022 and was asked to leave the facility when she was COVID+; was sober for 1 week until she started drinking again. Had also attended a few AA meetings in the past. Transferred from [facility] 12/20 for further management of acute liver failure and liver transplant evaluation.       Interval events:  intubated/sedated  on low dose pressors  on CVVH net even, anuric  stable vent settings    Potential Discharge date: pending clinical stability    Education:  Medications    Plan of care:  See Below    MEDICATIONS  (STANDING):  albumin human 25% IVPB 100 milliLiter(s) IV Intermittent every 8 hours  chlorhexidine 0.12% Liquid 15 milliLiter(s) Oral Mucosa every 12 hours  chlorhexidine 2% Cloths 1 Application(s) Topical <User Schedule>  chlorhexidine 4% Liquid 1 Application(s) Topical <User Schedule>  CRRT Treatment    <Continuous>  fluconAZOLE IVPB 200 milliGRAM(s) IV Intermittent every 24 hours  insulin lispro (ADMELOG) corrective regimen sliding scale   SubCutaneous every 6 hours  lactulose Syrup 20 Gram(s) Oral every 6 hours  levETIRAcetam  IVPB 750 milliGRAM(s) IV Intermittent every 12 hours  levothyroxine Injectable 70 MICROGram(s) IV Push at bedtime  meropenem  IVPB 1000 milliGRAM(s) IV Intermittent every 12 hours  norepinephrine Infusion 0.13 MICROgram(s)/kG/Min (16.1 mL/Hr) IV Continuous <Continuous>  Phoxillum Filtration BK 4 / 2.5 5000 milliLiter(s) (800 mL/Hr) CRRT <Continuous>  Phoxillum Filtration BK 4 / 2.5 5000 milliLiter(s) (200 mL/Hr) CRRT <Continuous>  Phoxillum Filtration BK 4 / 2.5 5000 milliLiter(s) (1000 mL/Hr) CRRT <Continuous>  rifAXIMin 550 milliGRAM(s) Oral every 12 hours  vasopressin Infusion 0.03 Unit(s)/Min (4.5 mL/Hr) IV Continuous <Continuous>    MEDICATIONS  (PRN):  HYDROmorphone  Injectable 0.5 milliGRAM(s) IV Push every 3 hours PRN breakthrough pain  sodium chloride 0.9% lock flush 10 milliLiter(s) IV Push every 1 hour PRN Pre/post blood products, medications, blood draw, and to maintain line patency      PAST MEDICAL & SURGICAL HISTORY:  HTN (hypertension)  off medication for over one year--states BP has been normal      UTI (urinary tract infection)  currently being treated--will complete antibiotics 3/8/2022      Intracranial tumor      GERD (gastroesophageal reflux disease)      Eczema      Thyroid cancer  surgery. radiation, Radioactive iodine      COVID-19 virus infection  11/2021--recovered at home      H/O total thyroidectomy  age 21&#x27;s for Thyroid cancer, postop complication arterial bleed, second surgery      History of tonsillectomy  age 5      History of appendectomy  age 5          Vital Signs Last 24 Hrs  T(C): 36.8 (21 Dec 2022 11:00), Max: 36.8 (21 Dec 2022 11:00)  T(F): 98.2 (21 Dec 2022 11:00), Max: 98.2 (21 Dec 2022 11:00)  HR: 75 (21 Dec 2022 13:56) (66 - 100)  BP: 124/73 (20 Dec 2022 21:45) (124/73 - 124/73)  BP(mean): 91 (20 Dec 2022 21:45) (91 - 91)  RR: 16 (21 Dec 2022 13:30) (15 - 26)  SpO2: 98% (21 Dec 2022 13:56) (94% - 100%)    Parameters below as of 21 Dec 2022 11:00  Patient On (Oxygen Delivery Method): ventilator    O2 Concentration (%): 30    I&O's Summary    20 Dec 2022 07:01  -  21 Dec 2022 07:00  --------------------------------------------------------  IN: 959.6 mL / OUT: 793 mL / NET: 166.6 mL    21 Dec 2022 07:01  -  21 Dec 2022 13:59  --------------------------------------------------------  IN: 262.5 mL / OUT: 298 mL / NET: -35.5 mL                              7.7    24.21 )-----------( 93       ( 21 Dec 2022 11:52 )             22.5     12-21    135  |  99  |  29<H>  ----------------------------<  104<H>  3.8   |  18<L>  |  3.52<H>    Ca    8.5      21 Dec 2022 11:52  Phos  4.5     12-21  Mg     2.3     12-21    TPro  6.0  /  Alb  3.3  /  TBili  25.5<H>  /  DBili  x   /  AST  144<H>  /  ALT  44  /  AlkPhos  136<H>  12-21        Review of systems  intubated/sedated      PHYSICAL EXAM:  Constitutional: Well developed / well nourished  Eyes: icteric, PERRLA  ENMT: nc/at, no thrush  Neck: supple, central line   Respiratory: CTA B/L  Cardiovascular: RRR  Gastrointestinal: Soft abdomen, distended  Genitourinary: Urinary catheter in place, anuric  Extremities: SCD's in place and working bilaterally, no edema  Vascular: Palpable dp pulses bilaterally.   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  Arthur ([phone]) and Daughter Taylor at [facility] provided additional history.     Of note was recently admitted to  11/2022 for workup and eval of jaundice- during that hospitalization was found to have a UTI and dx with alc hep and started on steroids (was deemed a non-responder and steroids were subsequently stopped); s/p LVP at [facility] 11/2022. Previously hospitalized in 2021 at [place] for ETOH detox. Went to ETOH rehab 08/2022 and was asked to leave the facility when she was COVID+; was sober for 1 week until she started drinking again. Had also attended a few AA meetings in the past. Transferred from [facility] 12/20 for further management of acute liver failure and liver transplant evaluation.       Interval events:  intubated/sedated  on low dose pressors  on CVVH net even, anuric  stable vent settings    Potential Discharge date: pending clinical stability    Education:  Medications    Plan of care:  See Below    MEDICATIONS  (STANDING):  albumin human 25% IVPB 100 milliLiter(s) IV Intermittent every 8 hours  chlorhexidine 0.12% Liquid 15 milliLiter(s) Oral Mucosa every 12 hours  chlorhexidine 2% Cloths 1 Application(s) Topical <User Schedule>  chlorhexidine 4% Liquid 1 Application(s) Topical <User Schedule>  CRRT Treatment    <Continuous>  fluconAZOLE IVPB 200 milliGRAM(s) IV Intermittent every 24 hours  insulin lispro (ADMELOG) corrective regimen sliding scale   SubCutaneous every 6 hours  lactulose Syrup 20 Gram(s) Oral every 6 hours  levETIRAcetam  IVPB 750 milliGRAM(s) IV Intermittent every 12 hours  levothyroxine Injectable 70 MICROGram(s) IV Push at bedtime  meropenem  IVPB 1000 milliGRAM(s) IV Intermittent every 12 hours  norepinephrine Infusion 0.13 MICROgram(s)/kG/Min (16.1 mL/Hr) IV Continuous <Continuous>  Phoxillum Filtration BK 4 / 2.5 5000 milliLiter(s) (800 mL/Hr) CRRT <Continuous>  Phoxillum Filtration BK 4 / 2.5 5000 milliLiter(s) (200 mL/Hr) CRRT <Continuous>  Phoxillum Filtration BK 4 / 2.5 5000 milliLiter(s) (1000 mL/Hr) CRRT <Continuous>  rifAXIMin 550 milliGRAM(s) Oral every 12 hours  vasopressin Infusion 0.03 Unit(s)/Min (4.5 mL/Hr) IV Continuous <Continuous>    MEDICATIONS  (PRN):  HYDROmorphone  Injectable 0.5 milliGRAM(s) IV Push every 3 hours PRN breakthrough pain  sodium chloride 0.9% lock flush 10 milliLiter(s) IV Push every 1 hour PRN Pre/post blood products, medications, blood draw, and to maintain line patency      PAST MEDICAL & SURGICAL HISTORY:  HTN (hypertension)  off medication for over one year--states BP has been normal      UTI (urinary tract infection)  currently being treated--will complete antibiotics 3/8/2022      Intracranial tumor      GERD (gastroesophageal reflux disease)      Eczema      Thyroid cancer  surgery. radiation, Radioactive iodine      COVID-19 virus infection  11/2021--recovered at home      H/O total thyroidectomy  age 21&#x27;s for Thyroid cancer, postop complication arterial bleed, second surgery      History of tonsillectomy  age 5      History of appendectomy  age 5          Vital Signs Last 24 Hrs  T(C): 36.8 (21 Dec 2022 11:00), Max: 36.8 (21 Dec 2022 11:00)  T(F): 98.2 (21 Dec 2022 11:00), Max: 98.2 (21 Dec 2022 11:00)  HR: 75 (21 Dec 2022 13:56) (66 - 100)  BP: 124/73 (20 Dec 2022 21:45) (124/73 - 124/73)  BP(mean): 91 (20 Dec 2022 21:45) (91 - 91)  RR: 16 (21 Dec 2022 13:30) (15 - 26)  SpO2: 98% (21 Dec 2022 13:56) (94% - 100%)    Parameters below as of 21 Dec 2022 11:00  Patient On (Oxygen Delivery Method): ventilator    O2 Concentration (%): 30    I&O's Summary    20 Dec 2022 07:01  -  21 Dec 2022 07:00  --------------------------------------------------------  IN: 959.6 mL / OUT: 793 mL / NET: 166.6 mL    21 Dec 2022 07:01  -  21 Dec 2022 13:59  --------------------------------------------------------  IN: 262.5 mL / OUT: 298 mL / NET: -35.5 mL                              7.7    24.21 )-----------( 93       ( 21 Dec 2022 11:52 )             22.5     12-21    135  |  99  |  29<H>  ----------------------------<  104<H>  3.8   |  18<L>  |  3.52<H>    Ca    8.5      21 Dec 2022 11:52  Phos  4.5     12-21  Mg     2.3     12-21    TPro  6.0  /  Alb  3.3  /  TBili  25.5<H>  /  DBili  x   /  AST  144<H>  /  ALT  44  /  AlkPhos  136<H>  12-21        Review of systems  intubated/sedated      PHYSICAL EXAM:  Constitutional: Well developed / well nourished  Eyes: icteric, PERRLA  ENMT: nc/at, no thrush  Neck: supple, central line   Respiratory: CTA B/L  Cardiovascular: RRR  Gastrointestinal: Soft abdomen, distended  Genitourinary: Urinary catheter in place, anuric  Extremities: SCD's in place and working bilaterally, no edema  Vascular: Palpable dp pulses bilaterally.   Neurological: intubated/sedated  Skin: no rashes, ulcerations, lesions  Musculoskeletal: intubated/sedated  Psychiatric: intubated/sedated

## 2022-12-21 NOTE — DIETITIAN INITIAL EVALUATION ADULT - PERTINENT MEDS FT
MEDICATIONS  (STANDING):  albumin human 25% IVPB 100 milliLiter(s) IV Intermittent every 8 hours  chlorhexidine 0.12% Liquid 15 milliLiter(s) Oral Mucosa every 12 hours  chlorhexidine 2% Cloths 1 Application(s) Topical <User Schedule>  chlorhexidine 4% Liquid 1 Application(s) Topical <User Schedule>  CRRT Treatment    <Continuous>  fluconAZOLE IVPB 200 milliGRAM(s) IV Intermittent every 24 hours  insulin lispro (ADMELOG) corrective regimen sliding scale   SubCutaneous every 6 hours  lactulose Syrup 20 Gram(s) Oral every 6 hours  levETIRAcetam  IVPB 750 milliGRAM(s) IV Intermittent every 12 hours  levothyroxine Injectable 70 MICROGram(s) IV Push at bedtime  meropenem  IVPB 1000 milliGRAM(s) IV Intermittent every 12 hours  norepinephrine Infusion 0.13 MICROgram(s)/kG/Min (16.1 mL/Hr) IV Continuous <Continuous>  Phoxillum Filtration BK 4 / 2.5 5000 milliLiter(s) (800 mL/Hr) CRRT <Continuous>  Phoxillum Filtration BK 4 / 2.5 5000 milliLiter(s) (200 mL/Hr) CRRT <Continuous>  Phoxillum Filtration BK 4 / 2.5 5000 milliLiter(s) (1000 mL/Hr) CRRT <Continuous>  rifAXIMin 550 milliGRAM(s) Oral every 12 hours  vasopressin Infusion 0.03 Unit(s)/Min (4.5 mL/Hr) IV Continuous <Continuous>    MEDICATIONS  (PRN):  HYDROmorphone  Injectable 0.5 milliGRAM(s) IV Push every 3 hours PRN breakthrough pain  sodium chloride 0.9% lock flush 10 milliLiter(s) IV Push every 1 hour PRN Pre/post blood products, medications, blood draw, and to maintain line patency

## 2022-12-21 NOTE — PROGRESS NOTE ADULT - CRITICAL CARE ATTENDING COMMENT
Dr. Anthony (Attending Physician)  N - poor mental status, 69 from 82 with lactulose and rifaxamin, had benign cranial mass s/p resection on keppra, mild agitation moving everything with precedex off  P - minimal vent settings,   C - vasoplegic versus septic shock on NE 0.07, vasopressin 0.03   GI - subacute liver failure, with alcoholic hepatitis not responsive to hepatitis panel sent, MELD 43, bili 26   - acute renal failure likely hepatorenal versus possible sepsis-induced   H - INR elevated  ID - on bs abx for elevated wbc on vanc, zonia, fluc  E - ssi,   PPx - ppi, no dvt ppx  Dispo - full code, liver tx work-up

## 2022-12-21 NOTE — CONSULT NOTE ADULT - SUBJECTIVE AND OBJECTIVE BOX
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   She was admitted to [facility] on 12/19 with seizures no reported hx of seizures.        PAST MEDICAL HISTORY: HTN (hypertension)    UTI (urinary tract infection)    Intracranial tumor    GERD (gastroesophageal reflux disease)    Eczema    Thyroid cancer    COVID-19 virus infection        PAST SURGICAL HISTORY: History of thyroid surgery    H/O total thyroidectomy    History of tonsillectomy    History of appendectomy        FAMILY HISTORY: FH: heart disease    FH: pancreatic cancer        SOCIAL HISTORY:    CODE STATUS:     HOME MEDICATIONS:    ALLERGIES: Macrobid (Rash)  Nexium (Rash)      VITAL SIGNS:  ICU Vital Signs Last 24 Hrs  T(C): 36.3 (20 Dec 2022 23:00), Max: 36.3 (20 Dec 2022 23:00)  T(F): 97.3 (20 Dec 2022 23:00), Max: 97.3 (20 Dec 2022 23:00)  HR: 74 (21 Dec 2022 00:00) (74 - 100)  BP: 124/73 (20 Dec 2022 21:45) (124/73 - 124/73)  BP(mean): 91 (20 Dec 2022 21:45) (91 - 91)  ABP: 95/45 (21 Dec 2022 00:00) (95/45 - 145/63)  ABP(mean): 63 (21 Dec 2022 00:00) (63 - 96)  RR: 15 (21 Dec 2022 00:00) (15 - 26)  SpO2: 97% (21 Dec 2022 00:00) (94% - 100%)    O2 Parameters below as of 20 Dec 2022 23:00  Patient On (Oxygen Delivery Method): ventilator    O2 Concentration (%): 30        PHYSICAL EXAM:  Gen: critically ill appearing, jaundice, sedated on propofol  Neuro: sedated, not following commands on propofol  Resp: endotracheally intubated, mechanically ventilated   Cardiac: on levo and vaso, palpable radial pulse   Abd: soft, distended, no palpable masses or organomegaly  : Monroe   Daniel catheter right groin     NEURO  Meds:dexMEDEtomidine Infusion 0.5 MICROgram(s)/kG/Hr IV Continuous <Continuous>  HYDROmorphone  Injectable 0.5 milliGRAM(s) IV Push every 3 hours PRN breakthrough pain  levETIRAcetam  IVPB 750 milliGRAM(s) IV Intermittent every 12 hours  propofol Infusion 25 MICROgram(s)/kG/Min IV Continuous <Continuous>      RESPIRATORY  Mechanical Ventilation: Mode: AC/ CMV (Assist Control/ Continuous Mandatory Ventilation), RR (machine): 15, RR (patient): 22, TV (machine): 450, FiO2: 35, PEEP: 5, ITime: 0.9, MAP: 7.4, PIP: 12  ABG - ( 20 Dec 2022 22:20 )  pH: 7.40  /  pCO2: 27    /  pO2: 93    / HCO3: 17    / Base Excess: -7.2  /  SaO2: 97.9    Lactate: x          Meds:    CARDIOVASCULAR    Cardiac Rhythm:  Meds:norepinephrine Infusion 0.13 MICROgram(s)/kG/Min IV Continuous <Continuous>      GI/NUTRITION  Diet:  Meds:lactulose Syrup 20 Gram(s) Oral every 6 hours      GENITOURINARY/RENAL  Meds:    12-20 @ 07:01  -  12-21 @ 00:29  --------------------------------------------------------  IN:    Norepinephrine: 16.1 mL    Propofol: 23 mL    Vasopressin: 4.5 mL  Total IN: 43.6 mL    OUT:    Indwelling Catheter - Urethral (mL): 15 mL  Total OUT: 15 mL    Total NET: 28.6 mL        Weight (kg): 66.2 (12-20 @ 21:37)  12-20    134<L>  |  96  |  46<H>  ----------------------------<  197<H>  3.6   |  16<L>  |  5.31<H>    Ca    8.3<L>      20 Dec 2022 22:41  Phos  5.1     12-20  Mg     2.3     12-20    TPro  6.0  /  Alb  3.5  /  TBili  28.9<H>  /  DBili  x   /  AST  144<H>  /  ALT  44  /  AlkPhos  132<H>  12-20    [ ] Monroe catheter, indication: urine output monitoring in critically ill patient    HEMATOLOGIC  [ ] VTE Prophylaxis:                          7.8    26.30 )-----------( 109      ( 20 Dec 2022 22:41 )             22.5     PT/INR - ( 20 Dec 2022 22:41 )   PT: 31.5 sec;   INR: 2.71 ratio         PTT - ( 20 Dec 2022 22:41 )  PTT:51.8 sec  Transfusion: [ ] PRBC	[ ] Platelets	[ ] FFP	[ ] Cryoprecipitate      INFECTIOUS DISEASES  Meds:fluconAZOLE IVPB 200 milliGRAM(s) IV Intermittent every 24 hours  meropenem  IVPB 1000 milliGRAM(s) IV Intermittent every 12 hours  rifAXIMin 550 milliGRAM(s) Oral every 12 hours    RECENT CULTURES:      ENDOCRINE  Meds:levothyroxine Injectable 70 MICROGram(s) IV Push at bedtime  octreotide  Infusion 50 MICROgram(s)/Hr IV Continuous <Continuous>  vasopressin Infusion 0.03 Unit(s)/Min IV Continuous <Continuous>    CAPILLARY BLOOD GLUCOSE          PATIENT CARE ACCESS DEVICES:  [x] Peripheral IV  [x] Central Venous Line	[] R	[x] L	[x] IJ	[ ] Fem	[ ] SC	Placed: OSH 12/20  Central line (HD - daniel cath in right femoral vein, placed OSH 12/20  [x] Arterial Line		[ ] R	[x] L	[ ] Fem	[x] Rad	[ ] Ax	Placed: OSH 12/20  [ ] PICC:					[ ] Mediport  [x] Urinary Catheter, Date Placed: OSH 12/20   [x] Necessity of urinary, arterial, and venous catheters discussed    OTHER MEDICATIONS: chlorhexidine 0.12% Liquid 15 milliLiter(s) Oral Mucosa every 12 hours  chlorhexidine 2% Cloths 1 Application(s) Topical <User Schedule>  CRRT Treatment    <Continuous>  Phoxillum Filtration BK 4 / 2.5 5000 milliLiter(s) CRRT <Continuous>  Phoxillum Filtration BK 4 / 2.5 5000 milliLiter(s) CRRT <Continuous>  Phoxillum Filtration BK 4 / 2.5 5000 milliLiter(s) CRRT <Continuous>      IMAGING STUDIES: HISTORY OF PRESENT ILLNESS:  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   She was admitted to [facility] on 12/19 with seizures no reported hx of seizures.        PAST MEDICAL HISTORY: HTN (hypertension)    UTI (urinary tract infection)    Intracranial tumor    GERD (gastroesophageal reflux disease)    Eczema    Thyroid cancer    COVID-19 virus infection        PAST SURGICAL HISTORY: History of thyroid surgery    H/O total thyroidectomy    History of tonsillectomy    History of appendectomy        FAMILY HISTORY: FH: heart disease    FH: pancreatic cancer        SOCIAL HISTORY:    CODE STATUS:     HOME MEDICATIONS:    ALLERGIES: Macrobid (Rash)  Nexium (Rash)      VITAL SIGNS:  ICU Vital Signs Last 24 Hrs  T(C): 36.3 (20 Dec 2022 23:00), Max: 36.3 (20 Dec 2022 23:00)  T(F): 97.3 (20 Dec 2022 23:00), Max: 97.3 (20 Dec 2022 23:00)  HR: 74 (21 Dec 2022 00:00) (74 - 100)  BP: 124/73 (20 Dec 2022 21:45) (124/73 - 124/73)  BP(mean): 91 (20 Dec 2022 21:45) (91 - 91)  ABP: 95/45 (21 Dec 2022 00:00) (95/45 - 145/63)  ABP(mean): 63 (21 Dec 2022 00:00) (63 - 96)  RR: 15 (21 Dec 2022 00:00) (15 - 26)  SpO2: 97% (21 Dec 2022 00:00) (94% - 100%)    O2 Parameters below as of 20 Dec 2022 23:00  Patient On (Oxygen Delivery Method): ventilator    O2 Concentration (%): 30        PHYSICAL EXAM:  Gen: critically ill appearing, jaundice, sedated on propofol  Neuro: sedated, not following commands on propofol  Resp: endotracheally intubated, mechanically ventilated   Cardiac: on levo and vaso, palpable radial pulse   Abd: soft, distended, no palpable masses or organomegaly  : Monroe   Daniel catheter right groin     NEURO  Meds:dexMEDEtomidine Infusion 0.5 MICROgram(s)/kG/Hr IV Continuous <Continuous>  HYDROmorphone  Injectable 0.5 milliGRAM(s) IV Push every 3 hours PRN breakthrough pain  levETIRAcetam  IVPB 750 milliGRAM(s) IV Intermittent every 12 hours  propofol Infusion 25 MICROgram(s)/kG/Min IV Continuous <Continuous>      RESPIRATORY  Mechanical Ventilation: Mode: AC/ CMV (Assist Control/ Continuous Mandatory Ventilation), RR (machine): 15, RR (patient): 22, TV (machine): 450, FiO2: 35, PEEP: 5, ITime: 0.9, MAP: 7.4, PIP: 12  ABG - ( 20 Dec 2022 22:20 )  pH: 7.40  /  pCO2: 27    /  pO2: 93    / HCO3: 17    / Base Excess: -7.2  /  SaO2: 97.9    Lactate: x          Meds:    CARDIOVASCULAR    Cardiac Rhythm:  Meds:norepinephrine Infusion 0.13 MICROgram(s)/kG/Min IV Continuous <Continuous>      GI/NUTRITION  Diet:  Meds:lactulose Syrup 20 Gram(s) Oral every 6 hours      GENITOURINARY/RENAL  Meds:    12-20 @ 07:01  -  12-21 @ 00:29  --------------------------------------------------------  IN:    Norepinephrine: 16.1 mL    Propofol: 23 mL    Vasopressin: 4.5 mL  Total IN: 43.6 mL    OUT:    Indwelling Catheter - Urethral (mL): 15 mL  Total OUT: 15 mL    Total NET: 28.6 mL        Weight (kg): 66.2 (12-20 @ 21:37)  12-20    134<L>  |  96  |  46<H>  ----------------------------<  197<H>  3.6   |  16<L>  |  5.31<H>    Ca    8.3<L>      20 Dec 2022 22:41  Phos  5.1     12-20  Mg     2.3     12-20    TPro  6.0  /  Alb  3.5  /  TBili  28.9<H>  /  DBili  x   /  AST  144<H>  /  ALT  44  /  AlkPhos  132<H>  12-20    [ ] Monroe catheter, indication: urine output monitoring in critically ill patient    HEMATOLOGIC  [ ] VTE Prophylaxis:                          7.8    26.30 )-----------( 109      ( 20 Dec 2022 22:41 )             22.5     PT/INR - ( 20 Dec 2022 22:41 )   PT: 31.5 sec;   INR: 2.71 ratio         PTT - ( 20 Dec 2022 22:41 )  PTT:51.8 sec  Transfusion: [ ] PRBC	[ ] Platelets	[ ] FFP	[ ] Cryoprecipitate      INFECTIOUS DISEASES  Meds:fluconAZOLE IVPB 200 milliGRAM(s) IV Intermittent every 24 hours  meropenem  IVPB 1000 milliGRAM(s) IV Intermittent every 12 hours  rifAXIMin 550 milliGRAM(s) Oral every 12 hours    RECENT CULTURES:      ENDOCRINE  Meds:levothyroxine Injectable 70 MICROGram(s) IV Push at bedtime  octreotide  Infusion 50 MICROgram(s)/Hr IV Continuous <Continuous>  vasopressin Infusion 0.03 Unit(s)/Min IV Continuous <Continuous>    CAPILLARY BLOOD GLUCOSE          PATIENT CARE ACCESS DEVICES:  [x] Peripheral IV  [x] Central Venous Line	[] R	[x] L	[x] IJ	[ ] Fem	[ ] SC	Placed: OSH 12/20  Central line (HD - daniel cath in right femoral vein, placed OSH 12/20  [x] Arterial Line		[ ] R	[x] L	[ ] Fem	[x] Rad	[ ] Ax	Placed: OSH 12/20  [ ] PICC:					[ ] Mediport  [x] Urinary Catheter, Date Placed: OSH 12/20   [x] Necessity of urinary, arterial, and venous catheters discussed    OTHER MEDICATIONS: chlorhexidine 0.12% Liquid 15 milliLiter(s) Oral Mucosa every 12 hours  chlorhexidine 2% Cloths 1 Application(s) Topical <User Schedule>  CRRT Treatment    <Continuous>  Phoxillum Filtration BK 4 / 2.5 5000 milliLiter(s) CRRT <Continuous>  Phoxillum Filtration BK 4 / 2.5 5000 milliLiter(s) CRRT <Continuous>  Phoxillum Filtration BK 4 / 2.5 5000 milliLiter(s) CRRT <Continuous>      IMAGING STUDIES: HISTORY OF PRESENT ILLNESS:  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   She was admitted to [facility] on 12/19 with seizures no reported hx of seizures.        PAST MEDICAL HISTORY: HTN (hypertension)    UTI (urinary tract infection)    Intracranial tumor    GERD (gastroesophageal reflux disease)    Eczema    Thyroid cancer    COVID-19 virus infection        PAST SURGICAL HISTORY: History of thyroid surgery    H/O total thyroidectomy    History of tonsillectomy    History of appendectomy        FAMILY HISTORY: FH: heart disease    FH: pancreatic cancer        SOCIAL HISTORY:    CODE STATUS:     HOME MEDICATIONS:    ALLERGIES: Macrobid (Rash)  Nexium (Rash)      VITAL SIGNS:  ICU Vital Signs Last 24 Hrs  T(C): 36.3 (20 Dec 2022 23:00), Max: 36.3 (20 Dec 2022 23:00)  T(F): 97.3 (20 Dec 2022 23:00), Max: 97.3 (20 Dec 2022 23:00)  HR: 74 (21 Dec 2022 00:00) (74 - 100)  BP: 124/73 (20 Dec 2022 21:45) (124/73 - 124/73)  BP(mean): 91 (20 Dec 2022 21:45) (91 - 91)  ABP: 95/45 (21 Dec 2022 00:00) (95/45 - 145/63)  ABP(mean): 63 (21 Dec 2022 00:00) (63 - 96)  RR: 15 (21 Dec 2022 00:00) (15 - 26)  SpO2: 97% (21 Dec 2022 00:00) (94% - 100%)    O2 Parameters below as of 20 Dec 2022 23:00  Patient On (Oxygen Delivery Method): ventilator    O2 Concentration (%): 30        PHYSICAL EXAM:  Gen: critically ill appearing, jaundice, sedated on propofol  Neuro: sedated, not following commands on propofol  Resp: endotracheally intubated, mechanically ventilated   Cardiac: on levo and vaso, palpable radial pulse   Abd: soft, distended, no palpable masses or organomegaly  : Monroe   Daniel catheter right groin     NEURO  Meds:dexMEDEtomidine Infusion 0.5 MICROgram(s)/kG/Hr IV Continuous <Continuous>  HYDROmorphone  Injectable 0.5 milliGRAM(s) IV Push every 3 hours PRN breakthrough pain  levETIRAcetam  IVPB 750 milliGRAM(s) IV Intermittent every 12 hours  propofol Infusion 25 MICROgram(s)/kG/Min IV Continuous <Continuous>      RESPIRATORY  Mechanical Ventilation: Mode: AC/ CMV (Assist Control/ Continuous Mandatory Ventilation), RR (machine): 15, RR (patient): 22, TV (machine): 450, FiO2: 35, PEEP: 5, ITime: 0.9, MAP: 7.4, PIP: 12  ABG - ( 20 Dec 2022 22:20 )  pH: 7.40  /  pCO2: 27    /  pO2: 93    / HCO3: 17    / Base Excess: -7.2  /  SaO2: 97.9    Lactate: x          Meds:    CARDIOVASCULAR    Cardiac Rhythm:  Meds:norepinephrine Infusion 0.13 MICROgram(s)/kG/Min IV Continuous <Continuous>      GI/NUTRITION  Diet:  Meds:lactulose Syrup 20 Gram(s) Oral every 6 hours      GENITOURINARY/RENAL  Meds:    12-20 @ 07:01  -  12-21 @ 00:29  --------------------------------------------------------  IN:    Norepinephrine: 16.1 mL    Propofol: 23 mL    Vasopressin: 4.5 mL  Total IN: 43.6 mL    OUT:    Indwelling Catheter - Urethral (mL): 15 mL  Total OUT: 15 mL    Total NET: 28.6 mL        Weight (kg): 66.2 (12-20 @ 21:37)  12-20    134<L>  |  96  |  46<H>  ----------------------------<  197<H>  3.6   |  16<L>  |  5.31<H>    Ca    8.3<L>      20 Dec 2022 22:41  Phos  5.1     12-20  Mg     2.3     12-20    TPro  6.0  /  Alb  3.5  /  TBili  28.9<H>  /  DBili  x   /  AST  144<H>  /  ALT  44  /  AlkPhos  132<H>  12-20    [ ] Monroe catheter, indication: urine output monitoring in critically ill patient    HEMATOLOGIC  [ ] VTE Prophylaxis:                          7.8    26.30 )-----------( 109      ( 20 Dec 2022 22:41 )             22.5     PT/INR - ( 20 Dec 2022 22:41 )   PT: 31.5 sec;   INR: 2.71 ratio         PTT - ( 20 Dec 2022 22:41 )  PTT:51.8 sec  Transfusion: [ ] PRBC	[ ] Platelets	[ ] FFP	[ ] Cryoprecipitate      INFECTIOUS DISEASES  Meds:fluconAZOLE IVPB 200 milliGRAM(s) IV Intermittent every 24 hours  meropenem  IVPB 1000 milliGRAM(s) IV Intermittent every 12 hours  rifAXIMin 550 milliGRAM(s) Oral every 12 hours    RECENT CULTURES:      ENDOCRINE  Meds:levothyroxine Injectable 70 MICROGram(s) IV Push at bedtime  octreotide  Infusion 50 MICROgram(s)/Hr IV Continuous <Continuous>  vasopressin Infusion 0.03 Unit(s)/Min IV Continuous <Continuous>    CAPILLARY BLOOD GLUCOSE          PATIENT CARE ACCESS DEVICES:  [x] Peripheral IV  [x] Central Venous Line	[] R	[x] L	[x] IJ	[ ] Fem	[ ] SC	Placed: OSH 12/20  Central line (HD - daniel cath in right femoral vein, placed OSH 12/20  [x] Arterial Line		[ ] R	[x] L	[ ] Fem	[x] Rad	[ ] Ax	Placed: OSH 12/20  [ ] PICC:					[ ] Mediport  [x] Urinary Catheter, Date Placed: OSH 12/20   [x] Necessity of urinary, arterial, and venous catheters discussed    OTHER MEDICATIONS: chlorhexidine 0.12% Liquid 15 milliLiter(s) Oral Mucosa every 12 hours  chlorhexidine 2% Cloths 1 Application(s) Topical <User Schedule>  CRRT Treatment    <Continuous>  Phoxillum Filtration BK 4 / 2.5 5000 milliLiter(s) CRRT <Continuous>  Phoxillum Filtration BK 4 / 2.5 5000 milliLiter(s) CRRT <Continuous>  Phoxillum Filtration BK 4 / 2.5 5000 milliLiter(s) CRRT <Continuous>      IMAGING STUDIES:

## 2022-12-21 NOTE — PROGRESS NOTE ADULT - ASSESSMENT
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   Transferred from [facility] 12/20 for further management of acute liver failure and liver transplant evaluation 2/2 known ETOH Cirrhosis.     Decompensated ETOH Cirrhosis  ABO O, MELD 43 12/21  -vent and pressors per SICU  -CVVH per Nephrology with strict I&Os  -Albumin q8H  -CTH/CAP non-con  -liver doppler  -pan culture  -needs a diagnostic paracentesis/LVP as able (1:1 Albumin replacement)  -stop octreotide  -broad spectrum ABX, Transplant ID consulted  -Lactulose, increase prn. 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   Transferred from [facility] 12/20 for further management of acute liver failure and liver transplant evaluation 2/2 known ETOH Cirrhosis.     Decompensated ETOH Cirrhosis  ABO O, MELD 43 12/21  -vent and pressors per SICU  -CVVH per Nephrology with strict I&Os  -Albumin q8H  -CTH/CAP non-con  -liver doppler  -pan culture  -needs a diagnostic paracentesis/LVP as able (1:1 Albumin replacement)  -stop octreotide  -broad spectrum ABX, Transplant ID consulted  -Lactulose, increase prn. 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 12/19 with new onset seizures.   Transferred from [facility] 12/20 for further management of acute liver failure and liver transplant evaluation 2/2 known ETOH Cirrhosis.     Decompensated ETOH Cirrhosis  ABO O, MELD 43 12/21  -vent and pressors per SICU  -CVVH per Nephrology with strict I&Os  -Albumin q8H  -CTH/CAP non-con  -liver doppler  -pan culture  -needs a diagnostic paracentesis/LVP as able (1:1 Albumin replacement)  -stop octreotide  -broad spectrum ABX, Transplant ID consulted  -Lactulose, increase prn. 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   Transferred from [facility] 12/20 for further management of acute liver failure and liver transplant evaluation 2/2 known ETOH Cirrhosis.     Decompensated ETOH Cirrhosis  ABO O, MELD 43 12/21  -vent and pressors per SICU  -CVVH per Nephrology with strict I&Os  -Albumin q8H  -CTH/CAP non-con  -liver doppler  -pan culture  -needs a diagnostic paracentesis/LVP as able (1:1 Albumin replacement)  -stop octreotide  -broad spectrum ABX, Transplant ID consulted  -Lactulose, increase prn. 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   Transferred from [facility] 12/20 for further management of acute liver failure and liver transplant evaluation 2/2 known ETOH Cirrhosis.     Decompensated ETOH Cirrhosis  ABO O, MELD 43 12/21  -vent and pressors per SICU  -CVVH per Nephrology with strict I&Os  -Albumin q8H  -CTH/CAP non-con  -liver doppler  -pan culture  -needs a diagnostic paracentesis/LVP as able (1:1 Albumin replacement)  -stop octreotide  -broad spectrum ABX, Transplant ID consulted  -Lactulose, increase prn. 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 12/19 with new onset seizures.   Transferred from [facility] 12/20 for further management of acute liver failure and liver transplant evaluation 2/2 known ETOH Cirrhosis.     Decompensated ETOH Cirrhosis  ABO O, MELD 43 12/21  -vent and pressors per SICU  -CVVH per Nephrology with strict I&Os  -Albumin q8H  -CTH/CAP non-con  -liver doppler  -pan culture  -needs a diagnostic paracentesis/LVP as able (1:1 Albumin replacement)  -stop octreotide  -broad spectrum ABX, Transplant ID consulted  -Lactulose, increase prn. continue Rifaximin  -PPI  -on Keppra for seizures, involve Neuro  -check urine Lytes, follow daily Nephrology recs

## 2022-12-21 NOTE — DIETITIAN INITIAL EVALUATION ADULT - ADD RECOMMEND
2) Add multivitamin, folic acid, thiamine daily   3) Monitor lytes closely  4) Monitor diet tolerance, weight trends, labs, GI function, and skin integrity

## 2022-12-21 NOTE — DIETITIAN INITIAL EVALUATION ADULT - PERTINENT LABORATORY DATA
12-21    135  |  99  |  29<H>  ----------------------------<  104<H>  3.8   |  18<L>  |  3.52<H>    Ca    8.5      21 Dec 2022 11:52  Phos  4.5     12-21  Mg     2.3     12-21    TPro  6.0  /  Alb  3.3  /  TBili  25.5<H>  /  DBili  x   /  AST  144<H>  /  ALT  44  /  AlkPhos  136<H>  12-21  POCT Blood Glucose.: 113 mg/dL (12-21-22 @ 05:35)

## 2022-12-21 NOTE — CONSULT NOTE ADULT - SUBJECTIVE AND OBJECTIVE BOX
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   She was admitted to [facility] on  with seizures no reported hx of seizures.  (20 Dec 2022 21:21)     prior hospital charts reviewed [  ]  primary team notes reviewed [  ]  other consultant notes reviewed [  ]    PAST MEDICAL & SURGICAL HISTORY:  HTN (hypertension)  off medication for over one year--states BP has been normal      UTI (urinary tract infection)  currently being treated--will complete antibiotics 3/8/2022      Intracranial tumor      GERD (gastroesophageal reflux disease)      Eczema      Thyroid cancer  surgery. radiation, Radioactive iodine      COVID-19 virus infection  2021--recovered at home      H/O total thyroidectomy  age 21&#x27;s for Thyroid cancer, postop complication arterial bleed, second surgery      History of tonsillectomy  age 5      History of appendectomy  age 5          Allergies  Macrobid (Rash)  Nexium (Rash)    ANTIMICROBIALS (past 90 days)  MEDICATIONS  (STANDING):  fluconAZOLE IVPB   100 mL/Hr IV Intermittent (22 @ 22:40)    meropenem  IVPB   100 mL/Hr IV Intermittent (22 @ 05:19)    rifAXIMin   550 milliGRAM(s) Oral (22 @ 05:18)      ANTIMICROBIALS:    fluconAZOLE IVPB 200 every 24 hours  meropenem  IVPB 1000 every 12 hours  rifAXIMin 550 every 12 hours  vancomycin  IVPB 1000 every 12 hours    OTHER MEDS: MEDICATIONS  (STANDING):  HYDROmorphone  Injectable 0.5 every 3 hours PRN  insulin lispro (ADMELOG) corrective regimen sliding scale  every 6 hours  lactulose Syrup 20 every 6 hours  levETIRAcetam  IVPB 750 every 12 hours  levothyroxine Injectable 70 at bedtime  norepinephrine Infusion 0.13 <Continuous>  vasopressin Infusion 0.03 <Continuous>    SOCIAL HISTORY:   hx smoking  non-smoker    FAMILY HISTORY:  FH: heart disease  Father, age 85,     FH: pancreatic cancer  Brother, age 59,       REVIEW OF SYSTEMS  [  ] ROS unobtainable because:    [  ] All other systems negative except as noted below:	    Constitutional:  [ ] fever [ ] chills  [ ] weight loss  [ ] weakness  Skin:  [ ] rash [ ] phlebitis	  Eyes: [ ] icterus [ ] pain  [ ] discharge	  ENMT: [ ] sore throat  [ ] thrush [ ] ulcers [ ] exudates  Respiratory: [ ] dyspnea [ ] hemoptysis [ ] cough [ ] sputum	  Cardiovascular:  [ ] chest pain [ ] palpitations [ ] edema	  Gastrointestinal:  [ ] nausea [ ] vomiting [ ] diarrhea [ ] constipation [ ] pain	  Genitourinary:  [ ] dysuria [ ] frequency [ ] hematuria [ ] discharge [ ] flank pain  [ ] incontinence  Musculoskeletal:  [ ] myalgias [ ] arthralgias [ ] arthritis  [ ] back pain  Neurological:  [ ] headache [ ] seizures  [ ] confusion/altered mental status  Psychiatric:  [ ] anxiety [ ] depression	  Hematology/Lymphatics:  [ ] lymphadenopathy  Endocrine:  [ ] adrenal [ ] thyroid  Allergic/Immunologic:	 [ ] transplant [ ] seasonal    Vital Signs Last 24 Hrs  T(F): 97 (22 @ 15:00), Max: 98.2 (22 @ 11:00)  Vital Signs Last 24 Hrs  HR: 72 (22 @ 16:30) (66 - 100)  BP: 100/60 (22 @ 16:00) (100/60 - 124/73)  RR: 15 (22 @ 16:30)  SpO2: 96% (22 @ 16:30) (94% - 100%)  Wt(kg): --    PHYSICAL EXAMINATION:  General: Alert and Awake, NAD  HEENT: PERRL, EOMI, No subconjunctival hemorrhages, Oropharynx Clear, MMM  Neck: Supple, No CAROLYN  Cardiac: RRR, No M/R/G  Resp: CTAB, No Wh/Rh/Ra  Abdomen: NBS, NT/ND, No HSM, No rigidity or guarding  MSK: No LE edema. No stigmata of IE. No evidence of phlebitis. No evidence of synovitis.  : No dhillon  Skin: No rashes or lesions. Skin is warm and dry to the touch.   Neuro: Alert and Awake. CN 2-12 Grossly intact. Moves all four extremities spontaneously.  Psych: Calm, Pleasant, Cooperative                          7.7    24. )-----------( 93       ( 21 Dec 2022 11:52 )             22.5     12    135  |  99  |  29<H>  ----------------------------<  104<H>  3.8   |  18<L>  |  3.52<H>    Ca    8.5      21 Dec 2022 11:52  Phos  4.5       Mg     2.3         TPro  6.0  /  Alb  3.3  /  TBili  25.5<H>  /  DBili  x   /  AST  144<H>  /  ALT  44  /  AlkPhos  136<H>      Urinalysis Basic - ( 20 Dec 2022 23:55 )    Color: Dark Yellow / Appearance: Turbid / S.029 / pH: x  Gluc: x / Ketone: Small  / Bili: Large / Urobili: Negative   Blood: x / Protein: 300 mg/dL / Nitrite: Negative   Leuk Esterase: Large / RBC: 5-10 /hpf / WBC 11-25   Sq Epi: x / Non Sq Epi: Moderate / Bacteria: Negative    MICROBIOLOGY:    Culture - Sputum (collected 21 Dec 2022 01:19)  Source: Trach Asp Tracheal Aspirate  Gram Stain (21 Dec 2022 08:45):    No polymorphonuclear leukocytes per low power field    Numerous Squamous epithelial cells per low power field    Moderate Gram Positive Rods seen per oil power field    Few Yeast like cells seen per oil power field    RADIOLOGY:    <The imaging below has been reviewed and visualized by me independently. 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   She was admitted to [facility] on  with seizures no reported hx of seizures.  (20 Dec 2022 21:21)     prior hospital charts reviewed [  ]  primary team notes reviewed [  ]  other consultant notes reviewed [  ]    PAST MEDICAL & SURGICAL HISTORY:  HTN (hypertension)  off medication for over one year--states BP has been normal      UTI (urinary tract infection)  currently being treated--will complete antibiotics 3/8/2022      Intracranial tumor      GERD (gastroesophageal reflux disease)      Eczema      Thyroid cancer  surgery. radiation, Radioactive iodine      COVID-19 virus infection  2021--recovered at home      H/O total thyroidectomy  age 21&#x27;s for Thyroid cancer, postop complication arterial bleed, second surgery      History of tonsillectomy  age 5      History of appendectomy  age 5          Allergies  Macrobid (Rash)  Nexium (Rash)    ANTIMICROBIALS (past 90 days)  MEDICATIONS  (STANDING):  fluconAZOLE IVPB   100 mL/Hr IV Intermittent (22 @ 22:40)    meropenem  IVPB   100 mL/Hr IV Intermittent (22 @ 05:19)    rifAXIMin   550 milliGRAM(s) Oral (22 @ 05:18)      ANTIMICROBIALS:    fluconAZOLE IVPB 200 every 24 hours  meropenem  IVPB 1000 every 12 hours  rifAXIMin 550 every 12 hours  vancomycin  IVPB 1000 every 12 hours    OTHER MEDS: MEDICATIONS  (STANDING):  HYDROmorphone  Injectable 0.5 every 3 hours PRN  insulin lispro (ADMELOG) corrective regimen sliding scale  every 6 hours  lactulose Syrup 20 every 6 hours  levETIRAcetam  IVPB 750 every 12 hours  levothyroxine Injectable 70 at bedtime  norepinephrine Infusion 0.13 <Continuous>  vasopressin Infusion 0.03 <Continuous>    SOCIAL HISTORY:   hx smoking  non-smoker    FAMILY HISTORY:  FH: heart disease  Father, age 85,     FH: pancreatic cancer  Brother, age 59,       REVIEW OF SYSTEMS  [  ] ROS unobtainable because:    [  ] All other systems negative except as noted below:	    Constitutional:  [ ] fever [ ] chills  [ ] weight loss  [ ] weakness  Skin:  [ ] rash [ ] phlebitis	  Eyes: [ ] icterus [ ] pain  [ ] discharge	  ENMT: [ ] sore throat  [ ] thrush [ ] ulcers [ ] exudates  Respiratory: [ ] dyspnea [ ] hemoptysis [ ] cough [ ] sputum	  Cardiovascular:  [ ] chest pain [ ] palpitations [ ] edema	  Gastrointestinal:  [ ] nausea [ ] vomiting [ ] diarrhea [ ] constipation [ ] pain	  Genitourinary:  [ ] dysuria [ ] frequency [ ] hematuria [ ] discharge [ ] flank pain  [ ] incontinence  Musculoskeletal:  [ ] myalgias [ ] arthralgias [ ] arthritis  [ ] back pain  Neurological:  [ ] headache [ ] seizures  [ ] confusion/altered mental status  Psychiatric:  [ ] anxiety [ ] depression	  Hematology/Lymphatics:  [ ] lymphadenopathy  Endocrine:  [ ] adrenal [ ] thyroid  Allergic/Immunologic:	 [ ] transplant [ ] seasonal    Vital Signs Last 24 Hrs  T(F): 97 (22 @ 15:00), Max: 98.2 (22 @ 11:00)  Vital Signs Last 24 Hrs  HR: 72 (22 @ 16:30) (66 - 100)  BP: 100/60 (22 @ 16:00) (100/60 - 124/73)  RR: 15 (22 @ 16:30)  SpO2: 96% (22 @ 16:30) (94% - 100%)  Wt(kg): --    PHYSICAL EXAMINATION:  General: Alert and Awake, NAD  HEENT: PERRL, EOMI, No subconjunctival hemorrhages, Oropharynx Clear, MMM  Neck: Supple, No CAROLYN  Cardiac: RRR, No M/R/G  Resp: CTAB, No Wh/Rh/Ra  Abdomen: NBS, NT/ND, No HSM, No rigidity or guarding  MSK: No LE edema. No stigmata of IE. No evidence of phlebitis. No evidence of synovitis.  : No dhillon  Skin: No rashes or lesions. Skin is warm and dry to the touch.   Neuro: Alert and Awake. CN 2-12 Grossly intact. Moves all four extremities spontaneously.  Psych: Calm, Pleasant, Cooperative                          7.7    24. )-----------( 93       ( 21 Dec 2022 11:52 )             22.5     12    135  |  99  |  29<H>  ----------------------------<  104<H>  3.8   |  18<L>  |  3.52<H>    Ca    8.5      21 Dec 2022 11:52  Phos  4.5       Mg     2.3         TPro  6.0  /  Alb  3.3  /  TBili  25.5<H>  /  DBili  x   /  AST  144<H>  /  ALT  44  /  AlkPhos  136<H>      Urinalysis Basic - ( 20 Dec 2022 23:55 )    Color: Dark Yellow / Appearance: Turbid / S.029 / pH: x  Gluc: x / Ketone: Small  / Bili: Large / Urobili: Negative   Blood: x / Protein: 300 mg/dL / Nitrite: Negative   Leuk Esterase: Large / RBC: 5-10 /hpf / WBC 11-25   Sq Epi: x / Non Sq Epi: Moderate / Bacteria: Negative    MICROBIOLOGY:    Culture - Sputum (collected 21 Dec 2022 01:19)  Source: Trach Asp Tracheal Aspirate  Gram Stain (21 Dec 2022 08:45):    No polymorphonuclear leukocytes per low power field    Numerous Squamous epithelial cells per low power field    Moderate Gram Positive Rods seen per oil power field    Few Yeast like cells seen per oil power field    RADIOLOGY:    <The imaging below has been reviewed and visualized by me independently. 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 for further management of acute liver failure and trans[plant evaluation.   She was admitted to [facility] on  with seizures no reported hx of seizures.  (20 Dec 2022 21:21)     prior hospital charts reviewed [  ]  primary team notes reviewed [  ]  other consultant notes reviewed [  ]    PAST MEDICAL & SURGICAL HISTORY:  HTN (hypertension)  off medication for over one year--states BP has been normal      UTI (urinary tract infection)  currently being treated--will complete antibiotics 3/8/2022      Intracranial tumor      GERD (gastroesophageal reflux disease)      Eczema      Thyroid cancer  surgery. radiation, Radioactive iodine      COVID-19 virus infection  2021--recovered at home      H/O total thyroidectomy  age 21&#x27;s for Thyroid cancer, postop complication arterial bleed, second surgery      History of tonsillectomy  age 5      History of appendectomy  age 5          Allergies  Macrobid (Rash)  Nexium (Rash)    ANTIMICROBIALS (past 90 days)  MEDICATIONS  (STANDING):  fluconAZOLE IVPB   100 mL/Hr IV Intermittent (22 @ 22:40)    meropenem  IVPB   100 mL/Hr IV Intermittent (22 @ 05:19)    rifAXIMin   550 milliGRAM(s) Oral (22 @ 05:18)      ANTIMICROBIALS:    fluconAZOLE IVPB 200 every 24 hours  meropenem  IVPB 1000 every 12 hours  rifAXIMin 550 every 12 hours  vancomycin  IVPB 1000 every 12 hours    OTHER MEDS: MEDICATIONS  (STANDING):  HYDROmorphone  Injectable 0.5 every 3 hours PRN  insulin lispro (ADMELOG) corrective regimen sliding scale  every 6 hours  lactulose Syrup 20 every 6 hours  levETIRAcetam  IVPB 750 every 12 hours  levothyroxine Injectable 70 at bedtime  norepinephrine Infusion 0.13 <Continuous>  vasopressin Infusion 0.03 <Continuous>    SOCIAL HISTORY:   hx smoking  non-smoker    FAMILY HISTORY:  FH: heart disease  Father, age 85,     FH: pancreatic cancer  Brother, age 59,       REVIEW OF SYSTEMS  [  ] ROS unobtainable because:    [  ] All other systems negative except as noted below:	    Constitutional:  [ ] fever [ ] chills  [ ] weight loss  [ ] weakness  Skin:  [ ] rash [ ] phlebitis	  Eyes: [ ] icterus [ ] pain  [ ] discharge	  ENMT: [ ] sore throat  [ ] thrush [ ] ulcers [ ] exudates  Respiratory: [ ] dyspnea [ ] hemoptysis [ ] cough [ ] sputum	  Cardiovascular:  [ ] chest pain [ ] palpitations [ ] edema	  Gastrointestinal:  [ ] nausea [ ] vomiting [ ] diarrhea [ ] constipation [ ] pain	  Genitourinary:  [ ] dysuria [ ] frequency [ ] hematuria [ ] discharge [ ] flank pain  [ ] incontinence  Musculoskeletal:  [ ] myalgias [ ] arthralgias [ ] arthritis  [ ] back pain  Neurological:  [ ] headache [ ] seizures  [ ] confusion/altered mental status  Psychiatric:  [ ] anxiety [ ] depression	  Hematology/Lymphatics:  [ ] lymphadenopathy  Endocrine:  [ ] adrenal [ ] thyroid  Allergic/Immunologic:	 [ ] transplant [ ] seasonal    Vital Signs Last 24 Hrs  T(F): 97 (22 @ 15:00), Max: 98.2 (22 @ 11:00)  Vital Signs Last 24 Hrs  HR: 72 (22 @ 16:30) (66 - 100)  BP: 100/60 (22 @ 16:00) (100/60 - 124/73)  RR: 15 (22 @ 16:30)  SpO2: 96% (22 @ 16:30) (94% - 100%)  Wt(kg): --    PHYSICAL EXAMINATION:  General: Alert and Awake, NAD  HEENT: PERRL, EOMI, No subconjunctival hemorrhages, Oropharynx Clear, MMM  Neck: Supple, No CAROLYN  Cardiac: RRR, No M/R/G  Resp: CTAB, No Wh/Rh/Ra  Abdomen: NBS, NT/ND, No HSM, No rigidity or guarding  MSK: No LE edema. No stigmata of IE. No evidence of phlebitis. No evidence of synovitis.  : No dhillon  Skin: No rashes or lesions. Skin is warm and dry to the touch.   Neuro: Alert and Awake. CN 2-12 Grossly intact. Moves all four extremities spontaneously.  Psych: Calm, Pleasant, Cooperative                          7.7    24. )-----------( 93       ( 21 Dec 2022 11:52 )             22.5     12    135  |  99  |  29<H>  ----------------------------<  104<H>  3.8   |  18<L>  |  3.52<H>    Ca    8.5      21 Dec 2022 11:52  Phos  4.5       Mg     2.3         TPro  6.0  /  Alb  3.3  /  TBili  25.5<H>  /  DBili  x   /  AST  144<H>  /  ALT  44  /  AlkPhos  136<H>      Urinalysis Basic - ( 20 Dec 2022 23:55 )    Color: Dark Yellow / Appearance: Turbid / S.029 / pH: x  Gluc: x / Ketone: Small  / Bili: Large / Urobili: Negative   Blood: x / Protein: 300 mg/dL / Nitrite: Negative   Leuk Esterase: Large / RBC: 5-10 /hpf / WBC 11-25   Sq Epi: x / Non Sq Epi: Moderate / Bacteria: Negative    MICROBIOLOGY:    Culture - Sputum (collected 21 Dec 2022 01:19)  Source: Trach Asp Tracheal Aspirate  Gram Stain (21 Dec 2022 08:45):    No polymorphonuclear leukocytes per low power field    Numerous Squamous epithelial cells per low power field    Moderate Gram Positive Rods seen per oil power field    Few Yeast like cells seen per oil power field    RADIOLOGY:    <The imaging below has been reviewed and visualized by me independently. Findings as detailed in report below>     Patient is a 62y old  Female who presents with a chief complaint of Acute liver failure (21 Dec 2022 15:16)      HPI:    62 y.o Hx significant for decompensated ETOH cirrhosis c/b ascites (dx 2022), alc hep (dx 2022; non-responsive to steroids), remote h/o thyroid cancer in her 20s s/p total thyroidectomy + RTX + radioactive iodide, HTN, ventrical neoplasm (dx ) s/p right frontal craniotomy (2022) for resection post operative course c/b hemorrhage right lateral ventricle (managed non-operatively) who was initially admitted to   with new onset seizures and transferred to [facility] for [facility]. History obtained from pt's /JOSUE Gibson ([phone]) and daughter Taylor at [facility].     Of note was recently admitted to  2022 for workup and eval of new onset jaundice- during that hospitalization was found to have a UTI and dx with alc hep was treated for UTI w/ abx and started on steroids (was deemed a non-responder and steroids were subsequently stopped); s/p LVP at [place] 2022. Previously hospitalized in  at [place] for ETOH detox but pt reportedly unaware of any liver dysfunction at that time. Went to ETOH rehab 2022 and was asked to leave the facility when she was COVID+; was sober for 1 week until she started drinking again. Had also attended a few AA meetings in the past.     Has never had EGD/colon before. Per pt's family- no reported fever/chills, abdominal pain, nausea, vomiting, diarrhea, melena, hematemesis, hematochezia.     prior hospital charts reviewed [  ]  primary team notes reviewed [x  ]  other consultant notes reviewed [ x ]    PAST MEDICAL & SURGICAL HISTORY:  HTN (hypertension)  off medication for over one year--states BP has been normal      UTI (urinary tract infection)  currently being treated--will complete antibiotics 3/8/2022      Intracranial tumor      GERD (gastroesophageal reflux disease)      Eczema      Thyroid cancer  surgery. radiation, Radioactive iodine      COVID-19 virus infection  2021--recovered at home      H/O total thyroidectomy  age 21&#x27;s for Thyroid cancer, postop complication arterial bleed, second surgery      History of tonsillectomy  age 5      History of appendectomy  age 5          Allergies  Macrobid (Rash)  Nexium (Rash)    ANTIMICROBIALS (past 90 days)  MEDICATIONS  (STANDING):  fluconAZOLE IVPB   100 mL/Hr IV Intermittent (22 @ 22:40)    meropenem  IVPB   100 mL/Hr IV Intermittent (22 @ 05:19)    rifAXIMin   550 milliGRAM(s) Oral (22 @ 05:18)      ANTIMICROBIALS:    fluconAZOLE IVPB 200 every 24 hours  meropenem  IVPB 1000 every 12 hours  rifAXIMin 550 every 12 hours  vancomycin  IVPB 1000 every 12 hours    OTHER MEDS: MEDICATIONS  (STANDING):  HYDROmorphone  Injectable 0.5 every 3 hours PRN  insulin lispro (ADMELOG) corrective regimen sliding scale  every 6 hours  lactulose Syrup 20 every 6 hours  levETIRAcetam  IVPB 750 every 12 hours  levothyroxine Injectable 70 at bedtime  norepinephrine Infusion 0.13 <Continuous>  vasopressin Infusion 0.03 <Continuous>    SOCIAL HISTORY:   per : No recent travel. Last etoh drink was on 2022    FAMILY HISTORY:  FH: heart disease  Father, age 85,     FH: pancreatic cancer  Brother, age 59,       REVIEW OF SYSTEMS  [ x ] ROS unobtainable because: intubated    [  ] All other systems negative except as noted below:	    Constitutional:  [ ] fever [ ] chills  [ ] weight loss  [ ] weakness  Skin:  [ ] rash [ ] phlebitis	  Eyes: [ ] icterus [ ] pain  [ ] discharge	  ENMT: [ ] sore throat  [ ] thrush [ ] ulcers [ ] exudates  Respiratory: [ ] dyspnea [ ] hemoptysis [ ] cough [ ] sputum	  Cardiovascular:  [ ] chest pain [ ] palpitations [ ] edema	  Gastrointestinal:  [ ] nausea [ ] vomiting [ ] diarrhea [ ] constipation [ ] pain	  Genitourinary:  [ ] dysuria [ ] frequency [ ] hematuria [ ] discharge [ ] flank pain  [ ] incontinence  Musculoskeletal:  [ ] myalgias [ ] arthralgias [ ] arthritis  [ ] back pain  Neurological:  [ ] headache [ ] seizures  [ ] confusion/altered mental status  Psychiatric:  [ ] anxiety [ ] depression	  Hematology/Lymphatics:  [ ] lymphadenopathy  Endocrine:  [ ] adrenal [ ] thyroid  Allergic/Immunologic:	 [ ] transplant [ ] seasonal    Vital Signs Last 24 Hrs  T(F): 97 (22 @ 15:00), Max: 98.2 (22 @ 11:00)  Vital Signs Last 24 Hrs  HR: 72 (22 @ 16:30) (66 - 100)  BP: 100/60 (22 @ 16:00) (100/60 - 124/73)  RR: 15 (22 @ 16:30)  SpO2: 96% (22 @ 16:30) (94% - 100%)  Wt(kg): --    PHYSICAL EXAMINATION:  General: Intubated and Sedated, Jaundice  HEENT: +ETT, Scleral Icterus  Neck: Supple  Cardiac: RRR, No M/R/G  Resp: CTAB, No Wh/Rh/Ra  Abdomen: NBS, NT/ND, No HSM, No rigidity or guarding  MSK: No LE edema. No Calf tenderness  : [place]  Skin: No rashes or lesions. Skin is warm and dry to the touch.   Neuro: Intubated and Sedated  Psych: Unable to assess - intubated and sedated                        7.7    24. )-----------( 93       ( 21 Dec 2022 11:52 )             22.5     12    135  |  99  |  29<H>  ----------------------------<  104<H>  3.8   |  18<L>  |  3.52<H>    Ca    8.5      21 Dec 2022 11:52  Phos  4.5       Mg     2.3         TPro  6.0  /  Alb  3.3  /  TBili  25.5<H>  /  DBili  x   /  AST  144<H>  /  ALT  44  /  AlkPhos  136<H>      Urinalysis Basic - ( 20 Dec 2022 23:55 )    Color: Dark Yellow / Appearance: Turbid / S.029 / pH: x  Gluc: x / Ketone: Small  / Bili: Large / Urobili: Negative   Blood: x / Protein: 300 mg/dL / Nitrite: Negative   Leuk Esterase: Large / RBC: 5-10 /hpf / WBC 11-25   Sq Epi: x / Non Sq Epi: Moderate / Bacteria: Negative    MICROBIOLOGY:    Culture - Sputum (collected 21 Dec 2022 01:19)  Source: Trach Asp Tracheal Aspirate  Gram Stain (21 Dec 2022 08:45):    No polymorphonuclear leukocytes per low power field    Numerous Squamous epithelial cells per low power field    Moderate Gram Positive Rods seen per oil power field    Few Yeast like cells seen per oil power field    RADIOLOGY:    <The imaging below has been reviewed and visualized by me independently. Findings as detailed in report below>    < from: US Abdomen Doppler (22 @ 13:34) >  IMPRESSION:    Cirrhosis. Splenomegaly. Ascites.    Marked portal hypertension with Hepatofugal flow (reversed flow) within   the portal venous system.  Elevated velocities within the hepatic arteries is due to compensatory   flow.  Patent hepatic veins.    < end of copied text >       Patient is a 62y old  Female who presents with a chief complaint of Acute liver failure (21 Dec 2022 15:16)      HPI:    62 y.o Hx significant for decompensated ETOH cirrhosis c/b ascites (dx 2022), alc hep (dx 2022; non-responsive to steroids), remote h/o thyroid cancer in her 20s s/p total thyroidectomy + RTX + radioactive iodide, HTN, ventrical neoplasm (dx ) s/p right frontal craniotomy (2022) for resection post operative course c/b hemorrhage right lateral ventricle (managed non-operatively) who was initially admitted to   with new onset seizures and transferred to [facility]  for LT eval for [facility]. History obtained from pt's /JOSUE Gibson ([phone]) and daughter Taylor at [facility].     Of note was recently admitted to  2022 for workup and eval of new onset jaundice- during that hospitalization was found to have a UTI and dx with alc hep was treated for UTI w/ abx and started on steroids (was deemed a non-responder and steroids were subsequently stopped); s/p LVP at [place] 2022. Previously hospitalized in  at [place] for ETOH detox but pt reportedly unaware of any liver dysfunction at that time. Went to ETOH rehab 2022 and was asked to leave the facility when she was COVID+; was sober for 1 week until she started drinking again. Had also attended a few AA meetings in the past.     Has never had EGD/colon before. Per pt's family- no reported fever/chills, abdominal pain, nausea, vomiting, diarrhea, melena, hematemesis, hematochezia.     prior hospital charts reviewed [  ]  primary team notes reviewed [x  ]  other consultant notes reviewed [ x ]    PAST MEDICAL & SURGICAL HISTORY:  HTN (hypertension)  off medication for over one year--states BP has been normal      UTI (urinary tract infection)  currently being treated--will complete antibiotics 3/8/2022      Intracranial tumor      GERD (gastroesophageal reflux disease)      Eczema      Thyroid cancer  surgery. radiation, Radioactive iodine      COVID-19 virus infection  2021--recovered at home      H/O total thyroidectomy  age 21&#x27;s for Thyroid cancer, postop complication arterial bleed, second surgery      History of tonsillectomy  age 5      History of appendectomy  age 5          Allergies  Macrobid (Rash)  Nexium (Rash)    ANTIMICROBIALS (past 90 days)  MEDICATIONS  (STANDING):  fluconAZOLE IVPB   100 mL/Hr IV Intermittent (22 @ 22:40)    meropenem  IVPB   100 mL/Hr IV Intermittent (22 @ 05:19)    rifAXIMin   550 milliGRAM(s) Oral (22 @ 05:18)      ANTIMICROBIALS:    fluconAZOLE IVPB 200 every 24 hours  meropenem  IVPB 1000 every 12 hours  rifAXIMin 550 every 12 hours  vancomycin  IVPB 1000 every 12 hours    OTHER MEDS: MEDICATIONS  (STANDING):  HYDROmorphone  Injectable 0.5 every 3 hours PRN  insulin lispro (ADMELOG) corrective regimen sliding scale  every 6 hours  lactulose Syrup 20 every 6 hours  levETIRAcetam  IVPB 750 every 12 hours  levothyroxine Injectable 70 at bedtime  norepinephrine Infusion 0.13 <Continuous>  vasopressin Infusion 0.03 <Continuous>    SOCIAL HISTORY:   per : No recent travel. Last etoh drink was on 2022    FAMILY HISTORY:  FH: heart disease  Father, age 85,     FH: pancreatic cancer  Brother, age 59,       REVIEW OF SYSTEMS  [ x ] ROS unobtainable because: intubated    [  ] All other systems negative except as noted below:	    Constitutional:  [ ] fever [ ] chills  [ ] weight loss  [ ] weakness  Skin:  [ ] rash [ ] phlebitis	  Eyes: [ ] icterus [ ] pain  [ ] discharge	  ENMT: [ ] sore throat  [ ] thrush [ ] ulcers [ ] exudates  Respiratory: [ ] dyspnea [ ] hemoptysis [ ] cough [ ] sputum	  Cardiovascular:  [ ] chest pain [ ] palpitations [ ] edema	  Gastrointestinal:  [ ] nausea [ ] vomiting [ ] diarrhea [ ] constipation [ ] pain	  Genitourinary:  [ ] dysuria [ ] frequency [ ] hematuria [ ] discharge [ ] flank pain  [ ] incontinence  Musculoskeletal:  [ ] myalgias [ ] arthralgias [ ] arthritis  [ ] back pain  Neurological:  [ ] headache [ ] seizures  [ ] confusion/altered mental status  Psychiatric:  [ ] anxiety [ ] depression	  Hematology/Lymphatics:  [ ] lymphadenopathy  Endocrine:  [ ] adrenal [ ] thyroid  Allergic/Immunologic:	 [ ] transplant [ ] seasonal    Vital Signs Last 24 Hrs  T(F): 97 (22 @ 15:00), Max: 98.2 (22 @ 11:00)  Vital Signs Last 24 Hrs  HR: 72 (22 @ 16:30) (66 - 100)  BP: 100/60 (22 @ 16:00) (100/60 - 124/73)  RR: 15 (22 @ 16:30)  SpO2: 96% (22 @ 16:30) (94% - 100%)  Wt(kg): --    PHYSICAL EXAMINATION:  General: Intubated and Sedated, Jaundice  HEENT: +ETT, Scleral Icterus  Neck: Supple  Cardiac: RRR, No M/R/G  Resp: CTAB, No Wh/Rh/Ra  Abdomen: NBS, NT/ND, No HSM, No rigidity or guarding  MSK: No LE edema. No Calf tenderness  : [place]  Skin: No rashes or lesions. Skin is warm and dry to the touch.   Neuro: Intubated and Sedated  Psych: Unable to assess - intubated and sedated                        7.7    24. )-----------( 93       ( 21 Dec 2022 11:52 )             22.5     12    135  |  99  |  29<H>  ----------------------------<  104<H>  3.8   |  18<L>  |  3.52<H>    Ca    8.5      21 Dec 2022 11:52  Phos  4.5       Mg     2.3         TPro  6.0  /  Alb  3.3  /  TBili  25.5<H>  /  DBili  x   /  AST  144<H>  /  ALT  44  /  AlkPhos  136<H>      Urinalysis Basic - ( 20 Dec 2022 23:55 )    Color: Dark Yellow / Appearance: Turbid / S.029 / pH: x  Gluc: x / Ketone: Small  / Bili: Large / Urobili: Negative   Blood: x / Protein: 300 mg/dL / Nitrite: Negative   Leuk Esterase: Large / RBC: 5-10 /hpf / WBC 11-25   Sq Epi: x / Non Sq Epi: Moderate / Bacteria: Negative    MICROBIOLOGY:    Culture - Sputum (collected 21 Dec 2022 01:19)  Source: Trach Asp Tracheal Aspirate  Gram Stain (21 Dec 2022 08:45):    No polymorphonuclear leukocytes per low power field    Numerous Squamous epithelial cells per low power field    Moderate Gram Positive Rods seen per oil power field    Few Yeast like cells seen per oil power field    RADIOLOGY:    <The imaging below has been reviewed and visualized by me independently. Findings as detailed in report below>    < from: US Abdomen Doppler (22 @ 13:34) >  IMPRESSION:    Cirrhosis. Splenomegaly. Ascites.    Marked portal hypertension with Hepatofugal flow (reversed flow) within   the portal venous system.  Elevated velocities within the hepatic arteries is due to compensatory   flow.  Patent hepatic veins.    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 History obtained from pt's /JOSUE Gibson ([phone]) and daughter Taylor at [facility].     Of note was recently admitted to  2022 for workup and eval of new onset jaundice- during that hospitalization was found to have a UTI and dx with alc hep was treated for UTI w/ abx and started on steroids (was deemed a non-responder and steroids were subsequently stopped); s/p LVP at [facility] 2022. Previously hospitalized in  at [facility] for ETOH detox but pt reportedly unaware of any liver dysfunction at that time. Went to ETOH rehab 2022 and was asked to leave the facility when she was COVID+; was sober for 1 week until she started drinking again. Had also attended a few AA meetings in the past.     Has never had EGD/colon before. Per pt's family- no reported fever/chills, abdominal pain, nausea, vomiting, diarrhea, melena, hematemesis, hematochezia.     prior hospital charts reviewed [  ]  primary team notes reviewed [x  ]  other consultant notes reviewed [ x ]    PAST MEDICAL & SURGICAL HISTORY:  HTN (hypertension)  off medication for over one year--states BP has been normal      UTI (urinary tract infection)  currently being treated--will complete antibiotics 3/8/2022      Intracranial tumor      GERD (gastroesophageal reflux disease)      Eczema      Thyroid cancer  surgery. radiation, Radioactive iodine      COVID-19 virus infection  2021--recovered at home      H/O total thyroidectomy  age 21&#x27;s for Thyroid cancer, postop complication arterial bleed, second surgery      History of tonsillectomy  age 5      History of appendectomy  age 5          Allergies  Macrobid (Rash)  Nexium (Rash)    ANTIMICROBIALS (past 90 days)  MEDICATIONS  (STANDING):  fluconAZOLE IVPB   100 mL/Hr IV Intermittent (22 @ 22:40)    meropenem  IVPB   100 mL/Hr IV Intermittent (22 @ 05:19)    rifAXIMin   550 milliGRAM(s) Oral (22 @ 05:18)      ANTIMICROBIALS:    fluconAZOLE IVPB 200 every 24 hours  meropenem  IVPB 1000 every 12 hours  rifAXIMin 550 every 12 hours  vancomycin  IVPB 1000 every 12 hours    OTHER MEDS: MEDICATIONS  (STANDING):  HYDROmorphone  Injectable 0.5 every 3 hours PRN  insulin lispro (ADMELOG) corrective regimen sliding scale  every 6 hours  lactulose Syrup 20 every 6 hours  levETIRAcetam  IVPB 750 every 12 hours  levothyroxine Injectable 70 at bedtime  norepinephrine Infusion 0.13 <Continuous>  vasopressin Infusion 0.03 <Continuous>    SOCIAL HISTORY:   per : No recent travel. Last etoh drink was on 2022    FAMILY HISTORY:  FH: heart disease  Father, age 85,     FH: pancreatic cancer  Brother, age 59,       REVIEW OF SYSTEMS  [ x ] ROS unobtainable because: intubated    [  ] All other systems negative except as noted below:	    Constitutional:  [ ] fever [ ] chills  [ ] weight loss  [ ] weakness  Skin:  [ ] rash [ ] phlebitis	  Eyes: [ ] icterus [ ] pain  [ ] discharge	  ENMT: [ ] sore throat  [ ] thrush [ ] ulcers [ ] exudates  Respiratory: [ ] dyspnea [ ] hemoptysis [ ] cough [ ] sputum	  Cardiovascular:  [ ] chest pain [ ] palpitations [ ] edema	  Gastrointestinal:  [ ] nausea [ ] vomiting [ ] diarrhea [ ] constipation [ ] pain	  Genitourinary:  [ ] dysuria [ ] frequency [ ] hematuria [ ] discharge [ ] flank pain  [ ] incontinence  Musculoskeletal:  [ ] myalgias [ ] arthralgias [ ] arthritis  [ ] back pain  Neurological:  [ ] headache [ ] seizures  [ ] confusion/altered mental status  Psychiatric:  [ ] anxiety [ ] depression	  Hematology/Lymphatics:  [ ] lymphadenopathy  Endocrine:  [ ] adrenal [ ] thyroid  Allergic/Immunologic:	 [ ] transplant [ ] seasonal    Vital Signs Last 24 Hrs  T(F): 97 (22 @ 15:00), Max: 98.2 (22 @ 11:00)  Vital Signs Last 24 Hrs  HR: 72 (22 @ 16:30) (66 - 100)  BP: 100/60 (22 @ 16:00) (100/60 - 124/73)  RR: 15 (22 @ 16:30)  SpO2: 96% (22 @ 16:30) (94% - 100%)  Wt(kg): --    PHYSICAL EXAMINATION:  General: Intubated and Sedated, Jaundice  HEENT: +ETT, Scleral Icterus  Neck: Supple  Cardiac: RRR, No M/R/G  Resp: CTAB, No Wh/Rh/Ra  Abdomen: NBS, NT/ND, No HSM, No rigidity or guarding  MSK: No LE edema. No Calf tenderness  : [place]  Skin: No rashes or lesions. Skin is warm and dry to the touch.   Neuro: Intubated and Sedated  Psych: Unable to assess - intubated and sedated                        7.7    24. )-----------( 93       ( 21 Dec 2022 11:52 )             22.5     12    135  |  99  |  29<H>  ----------------------------<  104<H>  3.8   |  18<L>  |  3.52<H>    Ca    8.5      21 Dec 2022 11:52  Phos  4.5       Mg     2.3         TPro  6.0  /  Alb  3.3  /  TBili  25.5<H>  /  DBili  x   /  AST  144<H>  /  ALT  44  /  AlkPhos  136<H>      Urinalysis Basic - ( 20 Dec 2022 23:55 )    Color: Dark Yellow / Appearance: Turbid / S.029 / pH: x  Gluc: x / Ketone: Small  / Bili: Large / Urobili: Negative   Blood: x / Protein: 300 mg/dL / Nitrite: Negative   Leuk Esterase: Large / RBC: 5-10 /hpf / WBC 11-25   Sq Epi: x / Non Sq Epi: Moderate / Bacteria: Negative    MICROBIOLOGY:    Culture - Sputum (collected 21 Dec 2022 01:19)  Source: Trach Asp Tracheal Aspirate  Gram Stain (21 Dec 2022 08:45):    No polymorphonuclear leukocytes per low power field    Numerous Squamous epithelial cells per low power field    Moderate Gram Positive Rods seen per oil power field    Few Yeast like cells seen per oil power field    RADIOLOGY:    <The imaging below has been reviewed and visualized by me independently. Findings as detailed in report below>    < from: US Abdomen Doppler (22 @ 13:34) >  IMPRESSION:    Cirrhosis. Splenomegaly. Ascites.    Marked portal hypertension with Hepatofugal flow (reversed flow) within   the portal venous system.  Elevated velocities within the hepatic arteries is due to compensatory   flow.  Patent hepatic veins.    < end of copied text >

## 2022-12-21 NOTE — DIETITIAN INITIAL EVALUATION ADULT - ENTERAL
1) Upon advancement, recommend Vital 1.2 @ 10mL increasing x10L q8hr - no knowledge of intake PTA, ? concern for refeeding (monitor lytes closely)   -Goal Rate: 55mL x 24hrs providing 1320mL of formula, 1584kcal/day (24kcal/kg), 99g protein/day (1.5g/kg)   -Defer fluids to Team  -Monitor tolerance and adjust PRN

## 2022-12-21 NOTE — CONSULT NOTE ADULT - ASSESSMENT
ASSESSMENT: 62yFemale presents with hx thyroid cancer s/p thyroidectomy, craniotomy with resection of ventricular mass, with acute liver failure, new seizures, transfer from OSH for transplant evaluation.     PLAN:   Neurologic:  - Precedex, wean as tolerated  - hepatic encephalopathy, lactulose and rifaximin   - F/u ammonia   - keppra (seizure at OSH)    Respiratory:  - Mechanical ventilation, minimal vent settings   - CXR    Cardiovascular:  - Hypotension, on levo and vaso (downtrending since transfer)    Gastrointestinal/Nutrition:  - Acute decompensated liver failure  - Workup per transplant recs   - Monitor BM, rectal tube, on lactulose and rifaximin   - Octreotide   - NPO/OGT    Genitourinary/Renal:  - Dialyzed at OSH  - Chemistry reviewed no emergent HD indication  - No evidence of fluid overload   - Has HD access right groin   - Cr increased (baseline approx 0.6, now 5.31)     Hematologic:  - Hold VTE ppx  - thrombocytopenia   - H/H stable  - INR 2.71 likely hepatic synthetic dysfunction     Infectious Disease:  - Empiric abx meropenem, antifungals fluconazole  - Sepsis workup, F/u cx   - Hepatitis workup     Endocrine:  - Hypothyroidism on synthroid IV 70  - No steroids  - ISS    Disposition: SICU

## 2022-12-21 NOTE — CONSULT NOTE ADULT - ASSESSMENT
Impression:  #acute liver failure - noted by severe hepatocelluar liver injury and synthetic dysfunction  #new onset seizure- unknown trigger/etiology- currently on Keppra    # De***Compensated etiology cirrhosis c/b ascites/SBP/EV/EVH/HE/HCC       -MELD-Na =        -Ascites:        -SBP:        -Varices:        -Hepatic encephalopathy: Ammonia, Serum: 69 umol/L *H* [11 ] (22 @ 04:51)  lactulose Syrup 20 Gram(s) Oral every 6 hours, 22 @ 00:43,   rifAXIMin 550 milliGRAM(s) Oral every 12 hours, 22 @ 22:08, Routine         -HCC:        -Other: ***HRS, PVT, HPS, hepatic hydrothorax, etc***           -LT candidacy and evaluation:            [ ] Blood type:            [ ] Psychosocial            [ ] Infectious            [ ] Cardiology:                    Cardiac cath:                    Stress test:             [ ] Colonoscopy:             [ ] Mammography:             [ ] Pap smear:             [ ] Dental            [ ] PT/Frailty    Recommendations:  -trend clinical symptoms, exam findings, vital signs, CBC, CMP, INR, Mg, phosphorus  ***[for FRANTZ/urgent LT eval]-trend ammonia, fibrinogen, LDH, haptoglobin q6h  -ABO/Rh blood type sent on 2 different blood samples at least 15 minutes apart  -acetaminophen level x2 at least 4 hours apart  -check PETH level and serum/urine drug screening  ***-check pregnancy test with serum HCG  -rule out viral hepatitis with HBV PCR, HSV PCR, VZV PCR, EBV PCR, CMV IgG & PCR  -check HAV IgM, HBsAg, HBsAb, HBcAb IgM, HCV Ab [with reflex HCV PCR if positive], and HEV Ab  -assess for autoimmune etiology with MIRNA [antinuclear antibody], ASMA [anti smooth muscle antibody], anti-LKM [liver kidney microsomal antibody], anti-sLA [soluble liver antigen antibody], and quantitative immunoglobulins [IgG, IgA, IgM]  -check ceruloplasmin  -check RUQ US with dopplers  -contrast enhanced cross-sectional imaging with either CT or MRI abdomen/pelvis  -Social work evaluation +/- psychiatry eval as needed  -Infectious disease consultation       -HIV screening       -check quantiferon gold for TB screening       -rubella IgG, syphillis screen, toxoplasma IgG, VZV IgG  -Cardiology consultation       -TTE +/- NST/LHC as needed  -Dental consultation and evaluation  -ensure up-to-date on remainder of age-appropriate cancer screening        Recommendations:  - please send viral etiologies: HAV IgG, HBVcAb, HBVsAb (quantitative), HBVsAg, HBV PCR, HCV IgG, HCV PCR, HEV IgM, HEV IgG  - HSV 1/2 PCRs (), HSV 1/2 IgM, HSV 1/2 IgG; EBV serologies, EBV PCR, CMV IgM, CMV IgG, CMV PCR, Parvovirus B19 serologies, parvovirus B19 PCR  - send following for autoimmune etiologies: MIRNA, anti-smooth muscle antibody, anti-mitochondrial antibody, immunoglobulin panel  - send ferritin, iron panel, AFP, CEA, CA 19-9  - send UTOx  - send TSH, PRP, quantiferon gold, B12  - please send ABO x2 (to be drawn 10 minutes apart)  - HIV (rapid)  - full infectious workup  - c/w broad spectrum antibiotics  - monitor UOP q 1 hour  - please check the following labs q 4: CBC, CMP, INR, fibrinogen, VBG, Mg, Phos 62 y.o Hx significant for decompensated ETOH cirrhosis c/b ascites (dx 11/2022), alc hep (dx 11/2022; non-responsive to steroids), remote h/o thyroid cancer in her 20s s/p total thyroidectomy + RTX + radioactive iodide, HTN, ventrical neoplasm (dx 2021) s/p right frontal craniotomy (03/2022) for resection post operative course c/b hemorrhage right lateral ventricle (managed non-operatively) who was initially admitted to  12/19 with new onset seizures and transferred to [facility] 12/20 for LT eval for FRANTZ.       Impression:  #acute liver failure - noted by severe hepatocelluar liver injury and synthetic dysfunction  #AUD  #Decompensated ETOH cirrhosis c/b prior ascites req LVP- UNOS/OPTN MELD-Na 43 (12/21)  Of note was recently admitted to  11/2022 for workup and eval of new onset jaundice- during that hospitalization was found to have a UTI and dx with alc hep was treated for UTI w/ abx and started on steroids (was deemed a non-responder and steroids were subsequently stopped); s/p LVP at [place] 11/2022. Previously hospitalized in 2021 at [place] for ETOH detox but pt reportedly unaware of any liver dysfunction at that time. Went to ETOH rehab 08/2022 and was asked to leave the facility when she was COVID+; was sober for 1 week until she started drinking again. Had also attended a few AA meetings in the past.       -Ascites: pending abd imaging       -SBP: pending US RUQ       -Varices: no h/o EGD       -Hepatic encephalopathy: unable to assess; intubated; on lactulose/rifaximin    #new onset seizure- unknown trigger/etiology- currently on Keppra    Recommendations:  -trend clinical symptoms, exam findings, vital signs, CBC, CMP, INR, Mg, phosphorus  -ABO/Rh blood type sent on 2 different blood samples at least 15 minutes apart  -acetaminophen level x2 at least 4 hours apart  -check PETH level and serum/urine drug screening  -rule out viral hepatitis with HBV PCR, HSV PCR, VZV PCR, EBV PCR, CMV IgG & PCR  -check HAV IgM, HBsAg, HBsAb, HBcAb IgM, HCV Ab [with reflex HCV PCR if positive], and HEV Ab  -assess for autoimmune etiology with MIRNA [antinuclear antibody], ASMA [anti smooth muscle antibody], anti-LKM [liver kidney microsomal antibody], anti-sLA [soluble liver antigen antibody], and quantitative immunoglobulins [IgG, IgA, IgM]  -check RUQ US with dopplers  -contrast enhanced cross-sectional imaging with either CT or MRI abdomen/pelvis  -check CT head noncont  -albumin 25% 100 mL q8h  -stop octreotide  -check urine lytes if pt has UOP  -Infectious disease consultation       -pending full infectious workup - bl cx, UA +urine cx, dx para, abd imaging       -HIV screening       -check quantiferon gold for TB screening       -rubella IgG, syphillis screen, toxoplasma IgG, VZV IgG       -c/w broad spectrum antibiotics  - obtain TTE  - send UTOx  - send TSH, PRP, B12  - please send ABO x2 (to be drawn 10 minutes apart)        **pt's /JOSUE Gibson ([phone])    **THIS NOTE IS NOT FINALIZED UNTIL SIGNED BY THE ATTENDING**    Arleen Rawls MD  GI Fellow, PGY-5  Available via [employer] Teams    NON-URGENT CONSULTS:  Please email [email or web address]  AT NIGHT AND ON WEEKENDS:  Contact on-call GI fellow via answering service ([phone]) from 5pm-8am and on weekends/holidays  MONDAY-FRIDAY 8AM-5PM:  Pager# [phone] ([facility]) or [phone] ([facility])  GI Phone# [phone] ([facility])   62 y.o Hx significant for decompensated ETOH cirrhosis c/b ascites (dx 11/2022), alc hep (dx 11/2022; non-responsive to steroids), remote h/o thyroid cancer in her 20s s/p total thyroidectomy + RTX + radioactive iodide, HTN, ventrical neoplasm (dx 2021) s/p right frontal craniotomy (03/2022) for resection post operative course c/b hemorrhage right lateral ventricle (managed non-operatively) who was initially admitted to  12/19 with new onset seizures and transferred to [facility] 12/20 for LT eval for FRANTZ.       Impression:  #acute liver failure - noted by severe hepatocelluar liver injury and synthetic dysfunction  #AUD  #Decompensated ETOH cirrhosis c/b prior ascites req LVP- UNOS/OPTN MELD-Na 43 (12/21)  Of note was recently admitted to  11/2022 for workup and eval of new onset jaundice- during that hospitalization was found to have a UTI and dx with alc hep was treated for UTI w/ abx and started on steroids (was deemed a non-responder and steroids were subsequently stopped); s/p LVP at [place] 11/2022. Previously hospitalized in 2021 at [place] for ETOH detox but pt reportedly unaware of any liver dysfunction at that time. Went to ETOH rehab 08/2022 and was asked to leave the facility when she was COVID+; was sober for 1 week until she started drinking again. Had also attended a few AA meetings in the past.       -Ascites: pending abd imaging       -SBP: pending US RUQ       -Varices: no h/o EGD       -Hepatic encephalopathy: unable to assess; intubated; on lactulose/rifaximin    #new onset seizure- unknown trigger/etiology- currently on Keppra    Recommendations:  -trend clinical symptoms, exam findings, vital signs, CBC, CMP, INR, Mg, phosphorus  -ABO/Rh blood type sent on 2 different blood samples at least 15 minutes apart  -acetaminophen level x2 at least 4 hours apart  -check PETH level and serum/urine drug screening  -rule out viral hepatitis with HBV PCR, HSV PCR, VZV PCR, EBV PCR, CMV IgG & PCR  -check HAV IgM, HBsAg, HBsAb, HBcAb IgM, HCV Ab [with reflex HCV PCR if positive], and HEV Ab  -assess for autoimmune etiology with MIRNA [antinuclear antibody], ASMA [anti smooth muscle antibody], anti-LKM [liver kidney microsomal antibody], anti-sLA [soluble liver antigen antibody], and quantitative immunoglobulins [IgG, IgA, IgM]  -check RUQ US with dopplers  -contrast enhanced cross-sectional imaging with either CT or MRI abdomen/pelvis  -check CT head noncont  -albumin 25% 100 mL q8h  -stop octreotide  -check urine lytes if pt has UOP  -Infectious disease consultation       -pending full infectious workup - bl cx, UA +urine cx, dx para, abd imaging       -HIV screening       -check quantiferon gold for TB screening       -rubella IgG, syphillis screen, toxoplasma IgG, VZV IgG       -c/w broad spectrum antibiotics  - obtain TTE  - send UTOx  - send TSH, PRP, B12  - please send ABO x2 (to be drawn 10 minutes apart)        **pt's /JOSUE Gibson ([phone])    **THIS NOTE IS NOT FINALIZED UNTIL SIGNED BY THE ATTENDING**    Arleen Rawls MD  GI Fellow, PGY-5  Available via Microsoft Teams    NON-URGENT CONSULTS:  Please email [email or web address]  [email or web address]  AT NIGHT AND ON WEEKENDS:  Contact on-call GI fellow via answering service ([phone]) from 5pm-8am and on weekends/holidays  MONDAY-FRIDAY 8AM-5PM:  Pager# [phone] ([facility]) or [phone] ([facility])  GI Phone# [phone] ([facility])   62 y.o Hx significant for decompensated ETOH cirrhosis c/b ascites (dx 11/2022), alc hep (dx 11/2022; non-responsive to steroids), remote h/o thyroid cancer in her 20s s/p total thyroidectomy + RTX + radioactive iodide, HTN, ventrical neoplasm (dx 2021) s/p right frontal craniotomy (03/2022) for resection post operative course c/b hemorrhage right lateral ventricle (managed non-operatively) who was initially admitted to  12/19 with new onset seizures and transferred to [facility] 12/20 for LT eval for FRANTZ.       Impression:  #acute liver failure - noted by severe hepatocelluar liver injury and synthetic dysfunction  #AUD  #Decompensated ETOH cirrhosis c/b prior ascites req LVP- UNOS/OPTN MELD-Na 43 (12/21)  Of note was recently admitted to  11/2022 for workup and eval of new onset jaundice- during that hospitalization was found to have a UTI and dx with alc hep was treated for UTI w/ abx and started on steroids (was deemed a non-responder and steroids were subsequently stopped); s/p LVP at [place] 11/2022. Previously hospitalized in 2021 at [place] for ETOH detox but pt reportedly unaware of any liver dysfunction at that time. Went to ETOH rehab 08/2022 and was asked to leave the facility when she was COVID+; was sober for 1 week until she started drinking again. Had also attended a few AA meetings in the past.       -Ascites: pending abd imaging       -SBP: pending US RUQ       -Varices: no h/o EGD       -Hepatic encephalopathy: unable to assess; intubated; on lactulose/rifaximin    #new onset seizure- unknown trigger/etiology- currently on Keppra    Recommendations:  -trend clinical symptoms, exam findings, vital signs, CBC, CMP, INR, Mg, phosphorus  -ABO/Rh blood type sent on 2 different blood samples at least 15 minutes apart  -acetaminophen level x2 at least 4 hours apart  -check PETH level and serum/urine drug screening  -rule out viral hepatitis with HBV PCR, HSV PCR, VZV PCR, EBV PCR, CMV IgG & PCR  -check HAV IgM, HBsAg, HBsAb, HBcAb IgM, HCV Ab [with reflex HCV PCR if positive], and HEV Ab  -assess for autoimmune etiology with MIRNA [antinuclear antibody], ASMA [anti smooth muscle antibody], anti-LKM [liver kidney microsomal antibody], anti-sLA [soluble liver antigen antibody], and quantitative immunoglobulins [IgG, IgA, IgM]  -check RUQ US with dopplers  -contrast enhanced cross-sectional imaging with either CT or MRI abdomen/pelvis  -check CT head noncont  -albumin 25% 100 mL q8h  -stop octreotide  -check urine lytes if pt has UOP  -Infectious disease consultation       -pending full infectious workup - bl cx, UA +urine cx, dx para, abd imaging       -HIV screening       -check quantiferon gold for TB screening       -rubella IgG, syphillis screen, toxoplasma IgG, VZV IgG       -c/w broad spectrum antibiotics  - obtain TTE  - send UTOx  - send TSH, PRP, B12  - please send ABO x2 (to be drawn 10 minutes apart)        **pt's /JOSUE Gibson ([phone])    **THIS NOTE IS NOT FINALIZED UNTIL SIGNED BY THE ATTENDING**    Arleen Rawls MD  GI Fellow, PGY-5  Available via [employer] Teams    NON-URGENT CONSULTS:  Please email [email or web address]  AT NIGHT AND ON WEEKENDS:  Contact on-call GI fellow via answering service ([phone]) from 5pm-8am and on weekends/holidays  MONDAY-FRIDAY 8AM-5PM:  Pager# [phone] ([facility]) or [phone] ([facility])  GI Phone# [phone] ([facility])   62 y.o Hx significant for decompensated ETOH cirrhosis c/b ascites (dx 11/2022), alc hep (dx 11/2022; non-responsive to steroids), remote h/o thyroid cancer in her 20s s/p total thyroidectomy + RTX + radioactive iodide, HTN, ventrical neoplasm (dx 2021) s/p right frontal craniotomy (03/2022) for resection post operative course c/b hemorrhage right lateral ventricle (managed non-operatively) who was initially admitted to  12/19 with new onset seizures and transferred to [facility] 12/20 for LT eval for FRANTZ.       Impression:  #acute liver failure - noted by severe hepatocelluar liver injury and synthetic dysfunction  #AUD  #Decompensated ETOH cirrhosis c/b prior ascites req LVP- UNOS/OPTN MELD-Na 43 (12/21)  Of note was recently admitted to  11/2022 for workup and eval of new onset jaundice- during that hospitalization was found to have a UTI and dx with alc hep was treated for UTI w/ abx and started on steroids (was deemed a non-responder and steroids were subsequently stopped); s/p LVP at [place] 11/2022. Previously hospitalized in 2021 at [place] for ETOH detox but pt reportedly unaware of any liver dysfunction at that time. Went to ETOH rehab 08/2022 and was asked to leave the facility when she was COVID+; was sober for 1 week until she started drinking again. Had also attended a few AA meetings in the past.       -Ascites: pending abd imaging       -SBP: pending US RUQ       -Varices: no h/o EGD       -Hepatic encephalopathy: unable to assess; intubated; on lactulose/rifaximin    #new onset seizure- unknown trigger/etiology- currently on Keppra    Recommendations:  -trend clinical symptoms, exam findings, vital signs, CBC, CMP, INR, Mg, phosphorus  -ABO/Rh blood type sent on 2 different blood samples at least 15 minutes apart  -acetaminophen level x2 at least 4 hours apart  -check PETH level and serum/urine drug screening  -rule out viral hepatitis with HBV PCR, HSV PCR, VZV PCR, EBV PCR, CMV IgG & PCR  -check HAV IgM, HBsAg, HBsAb, HBcAb IgM, HCV Ab [with reflex HCV PCR if positive], and HEV Ab  -check RUQ US with dopplers  -contrast enhanced cross-sectional imaging with either CT or MRI abdomen/pelvis  -check CT head noncont  -albumin 25% 100 mL q8h  -stop octreotide  -check urine lytes if pt has UOP  -Infectious disease consultation       -pending full infectious workup - bl cx, UA +urine cx, dx para, abd imaging       -HIV screening       -check quantiferon gold for TB screening       -rubella IgG, syphillis screen, toxoplasma IgG, VZV IgG       -c/w broad spectrum antibiotics  - obtain TTE  - send UTOx  - send TSH, RPR, B12        **pt's /JOSUE Gibson ([phone])    **THIS NOTE IS NOT FINALIZED UNTIL SIGNED BY THE ATTENDING**    Arleen Rawls MD  GI Fellow, PGY-5  Available via Microsoft Teams    NON-URGENT CONSULTS:  Please email [email or web address]  [email or web address]  AT NIGHT AND ON WEEKENDS:  Contact on-call GI fellow via answering service ([phone]) from 5pm-8am and on weekends/holidays  MONDAY-FRIDAY 8AM-5PM:  Pager# [phone] ([facility]) or [phone] ([facility])  GI Phone# [phone] ([facility])   62 y.o Hx significant for decompensated ETOH cirrhosis c/b ascites (dx 11/2022), alc hep (dx 11/2022; non-responsive to steroids), remote h/o thyroid cancer in her 20s s/p total thyroidectomy + RTX + radioactive iodide, HTN, ventrical neoplasm (dx 2021) s/p right frontal craniotomy (03/2022) for resection post operative course c/b hemorrhage right lateral ventricle (managed non-operatively) who was initially admitted to  12/19 with new onset seizures and transferred to [facility] 12/20 for LT eval for FRANTZ.       Impression:  #acute liver failure - noted by severe hepatocelluar liver injury and synthetic dysfunction  #AUD  #Decompensated ETOH cirrhosis c/b prior ascites req LVP- UNOS/OPTN MELD-Na 43 (12/21)  Of note was recently admitted to  11/2022 for workup and eval of new onset jaundice- during that hospitalization was found to have a UTI and dx with alc hep was treated for UTI w/ abx and started on steroids (was deemed a non-responder and steroids were subsequently stopped); s/p LVP at [place] 11/2022. Previously hospitalized in 2021 at [place] for ETOH detox but pt reportedly unaware of any liver dysfunction at that time. Went to ETOH rehab 08/2022 and was asked to leave the facility when she was COVID+; was sober for 1 week until she started drinking again. Had also attended a few AA meetings in the past.       -Ascites: pending abd imaging       -SBP: pending US RUQ       -Varices: no h/o EGD       -Hepatic encephalopathy: unable to assess; intubated; on lactulose/rifaximin    #new onset seizure- unknown trigger/etiology- currently on Keppra    Recommendations:  -trend clinical symptoms, exam findings, vital signs, CBC, CMP, INR, Mg, phosphorus  -ABO/Rh blood type sent on 2 different blood samples at least 15 minutes apart  -acetaminophen level x2 at least 4 hours apart  -check PETH level and serum/urine drug screening  -rule out viral hepatitis with HBV PCR, HSV PCR, VZV PCR, EBV PCR, CMV IgG & PCR  -check HAV IgM, HBsAg, HBsAb, HBcAb IgM, HCV Ab [with reflex HCV PCR if positive], and HEV Ab  -check RUQ US with dopplers  -contrast enhanced cross-sectional imaging with either CT or MRI abdomen/pelvis  -check CT head noncont  -albumin 25% 100 mL q8h  -stop octreotide  -check urine lytes if pt has UOP  -Infectious disease consultation       -pending full infectious workup - bl cx, UA +urine cx, dx para, abd imaging       -HIV screening       -check quantiferon gold for TB screening       -rubella IgG, syphillis screen, toxoplasma IgG, VZV IgG       -c/w broad spectrum antibiotics  - obtain TTE  - send UTOx  - send TSH, RPR, B12        **pt's /JOSUE Gibson ([phone])    **THIS NOTE IS NOT FINALIZED UNTIL SIGNED BY THE ATTENDING**    Arleen Rawls MD  GI Fellow, PGY-5  Available via [employer] Teams    NON-URGENT CONSULTS:  Please email [email or web address]  [email or web address]  AT NIGHT AND ON WEEKENDS:  Contact on-call GI fellow via answering service ([phone]) from 5pm-8am and on weekends/holidays  MONDAY-FRIDAY 8AM-5PM:  Pager# [phone] ([facility]) or [phone] ([facility])  GI Phone# [phone] ([facility])   62 y.o Hx significant for decompensated ETOH cirrhosis c/b ascites (dx 11/2022), alc hep (dx 11/2022; non-responsive to steroids), remote h/o thyroid cancer in her 20s s/p total thyroidectomy + RTX + radioactive iodide, HTN, ventrical neoplasm (dx 2021) s/p right frontal craniotomy (03/2022) for resection post operative course c/b hemorrhage right lateral ventricle (managed non-operatively) who was initially admitted to  12/19 with new onset seizures and transferred to [facility] 12/20 for LT eval for FRANTZ.       Impression:  #acute liver failure - noted by severe hepatocelluar liver injury and synthetic dysfunction  #AUD  #Decompensated ETOH cirrhosis c/b prior ascites req LVP- UNOS/OPTN MELD-Na 43 (12/21)  Of note was recently admitted to  11/2022 for workup and eval of new onset jaundice- during that hospitalization was found to have a UTI and dx with alc hep was treated for UTI w/ abx and started on steroids (was deemed a non-responder and steroids were subsequently stopped); s/p LVP at [place] 11/2022. Previously hospitalized in 2021 at [place] for ETOH detox but pt reportedly unaware of any liver dysfunction at that time. Went to ETOH rehab 08/2022 and was asked to leave the facility when she was COVID+; was sober for 1 week until she started drinking again. Had also attended a few AA meetings in the past.       -Ascites: pending abd imaging       -SBP: pending US RUQ       -Varices: no h/o EGD       -Hepatic encephalopathy: unable to assess; intubated; on lactulose/rifaximin    #new onset seizure- unknown trigger/etiology- currently on Keppra    Recommendations:  -trend clinical symptoms, exam findings, vital signs, CBC, CMP, INR, Mg, phosphorus  -ABO/Rh blood type sent on 2 different blood samples at least 15 minutes apart  -acetaminophen level x2 at least 4 hours apart  -check PETH level and serum/urine drug screening  -rule out viral hepatitis with HBV PCR, HSV PCR, VZV PCR, EBV PCR, CMV IgG & PCR  -check HAV IgM, HBsAg, HBsAb, HBcAb IgM, HCV Ab [with reflex HCV PCR if positive], and HEV Ab  -check RUQ US with dopplers  -contrast enhanced cross-sectional imaging with either CT or MRI abdomen/pelvis  -check CT head noncont  -albumin 25% 100 mL q8h  -stop octreotide  -check urine lytes if pt has UOP  -Infectious disease consultation       -pending full infectious workup - bl cx, UA +urine cx, dx para, abd imaging       -HIV screening       -check quantiferon gold for TB screening       -rubella IgG, syphillis screen, toxoplasma IgG, VZV IgG       -c/w broad spectrum antibiotics  - obtain TTE  - send UTOx  - send TSH, RPR, B12        **pt's /JOSUE Gibson ([phone])    **THIS NOTE IS NOT FINALIZED UNTIL SIGNED BY THE ATTENDING**    Arleen Rawls MD  GI Fellow, PGY-5  Available via [employer] Teams    NON-URGENT CONSULTS:  Please email [email or web address]  AT NIGHT AND ON WEEKENDS:  Contact on-call GI fellow via answering service ([phone]) from 5pm-8am and on weekends/holidays  MONDAY-FRIDAY 8AM-5PM:  Pager# [phone] ([facility]) or [phone] ([facility])  GI Phone# [phone] ([facility])   62 y.o Hx significant for decompensated ETOH cirrhosis c/b ascites (dx 11/2022), alc hep (dx 11/2022; non-responsive to steroids), remote h/o thyroid cancer in her 20s s/p total thyroidectomy + RTX + radioactive iodide, HTN, ventrical neoplasm (dx 2021) s/p right frontal craniotomy (03/2022) for resection post operative course c/b hemorrhage right lateral ventricle (managed non-operatively) who was initially admitted to  12/19 with new onset seizures and transferred to [facility] 12/20 for LT eval for FRANTZ.       Impression:  #ACLF with underlying cirrhosis  #AUD  #Decompensated ETOH cirrhosis c/b prior ascites req LVP- UNOS/OPTN MELD-Na 43 (12/21)  Of note was recently admitted to  11/2022 for workup and eval of new onset jaundice- during that hospitalization was found to have a UTI and dx with alc hep was treated for UTI w/ abx and started on steroids (was deemed a non-responder and steroids were subsequently stopped); s/p LVP at [place] 11/2022. Previously hospitalized in 2021 at [place] for ETOH detox but pt reportedly unaware of any liver dysfunction at that time. Went to ETOH rehab 08/2022 and was asked to leave the facility when she was COVID+; was sober for 1 week until she started drinking again. Had also attended a few AA meetings in the past.       -Ascites: pending abd imaging       -SBP: pending US RUQ       -Varices: no h/o EGD       -Hepatic encephalopathy: unable to assess; intubated; on lactulose/rifaximin    #new onset seizure- unknown trigger/etiology- currently on Keppra    Recommendations:  -trend clinical symptoms, exam findings, vital signs, CBC, CMP, INR, Mg, phosphorus  -ABO/Rh blood type sent on 2 different blood samples at least 15 minutes apart  -acetaminophen level x2 at least 4 hours apart  -check PETH level and serum/urine drug screening  -rule out viral hepatitis with HBV PCR, HSV PCR, VZV PCR, EBV PCR, CMV IgG & PCR  -check HAV IgM, HBsAg, HBsAb, HBcAb IgM, HCV Ab [with reflex HCV PCR if positive], and HEV Ab  -check RUQ US with dopplers  -contrast enhanced cross-sectional imaging with either CT or MRI abdomen/pelvis  -check CT head noncont  -albumin 25% 100 mL q8h  -stop octreotide  -check urine lytes if pt has UOP  -Infectious disease consultation       -pending full infectious workup - bl cx, UA +urine cx, dx para, abd imaging       -HIV screening       -check quantiferon gold for TB screening       -rubella IgG, syphillis screen, toxoplasma IgG, VZV IgG       -c/w broad spectrum antibiotics  - obtain TTE  - send UTOx  - send TSH, RPR, B12        **pt's /JOSUE Gibson ([phone])    **THIS NOTE IS NOT FINALIZED UNTIL SIGNED BY THE ATTENDING**    Arleen Rawls MD  GI Fellow, PGY-5  Available via Microsoft Teams    NON-URGENT CONSULTS:  Please email [email or web address]  [email or web address]  AT NIGHT AND ON WEEKENDS:  Contact on-call GI fellow via answering service ([phone]) from 5pm-8am and on weekends/holidays  MONDAY-FRIDAY 8AM-5PM:  Pager# [phone] ([facility]) or [phone] ([facility])  GI Phone# [phone] ([facility])   62 y.o Hx significant for decompensated ETOH cirrhosis c/b ascites (dx 11/2022), alc hep (dx 11/2022; non-responsive to steroids), remote h/o thyroid cancer in her 20s s/p total thyroidectomy + RTX + radioactive iodide, HTN, 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       Impression:  #ACLF with underlying cirrhosis  #AUD  #Decompensated ETOH cirrhosis c/b prior ascites req LVP- UNOS/OPTN MELD-Na 43 (12/21)  Of note was recently admitted to  11/2022 for workup and eval of new onset jaundice- during that hospitalization was found to have a UTI and dx with alc hep was treated for UTI w/ abx and started on steroids (was deemed a non-responder and steroids were subsequently stopped); s/p LVP at [place] 11/2022. Previously hospitalized in 2021 at [place] for ETOH detox but pt reportedly unaware of any liver dysfunction at that time. Went to ETOH rehab 08/2022 and was asked to leave the facility when she was COVID+; was sober for 1 week until she started drinking again. Had also attended a few AA meetings in the past.       -Ascites: pending abd imaging       -SBP: pending US RUQ       -Varices: no h/o EGD       -Hepatic encephalopathy: unable to assess; intubated; on lactulose/rifaximin    #new onset seizure- unknown trigger/etiology- currently on Keppra    Recommendations:  -trend clinical symptoms, exam findings, vital signs, CBC, CMP, INR, Mg, phosphorus  -ABO/Rh blood type sent on 2 different blood samples at least 15 minutes apart  -acetaminophen level x2 at least 4 hours apart  -check PETH level and serum/urine drug screening  -rule out viral hepatitis with HBV PCR, HSV PCR, VZV PCR, EBV PCR, CMV IgG & PCR  -check HAV IgM, HBsAg, HBsAb, HBcAb IgM, HCV Ab [with reflex HCV PCR if positive], and HEV Ab  -check RUQ US with dopplers  -contrast enhanced cross-sectional imaging with either CT or MRI abdomen/pelvis  -check CT head noncont  -albumin 25% 100 mL q8h  -stop octreotide  -check urine lytes if pt has UOP  -Infectious disease consultation       -pending full infectious workup - bl cx, UA +urine cx, dx para, abd imaging       -HIV screening       -check quantiferon gold for TB screening       -rubella IgG, syphillis screen, toxoplasma IgG, VZV IgG       -c/w broad spectrum antibiotics  - obtain TTE  - send UTOx  - send TSH, RPR, B12        **pt's /JOSUE Gibson ([phone])    **THIS NOTE IS NOT FINALIZED UNTIL SIGNED BY THE ATTENDING**    Arleen Rawls MD  GI Fellow, PGY-5  Available via Microsoft Teams    NON-URGENT CONSULTS:  Please email [email or web address]  [email or web address]  AT NIGHT AND ON WEEKENDS:  Contact on-call GI fellow via answering service ([phone]) from 5pm-8am and on weekends/holidays  MONDAY-FRIDAY 8AM-5PM:  Pager# [phone] ([facility]) or [phone] ([facility])  GI Phone# [phone] ([facility])   62 y.o Hx significant for decompensated ETOH cirrhosis c/b ascites (dx 11/2022), alc hep (dx 11/2022; non-responsive to steroids), remote h/o thyroid cancer in her 20s s/p total thyroidectomy + RTX + radioactive iodide, HTN, 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       Impression:  #ACLF with underlying cirrhosis  #AUD  #Decompensated ETOH cirrhosis c/b prior ascites req LVP- UNOS/OPTN MELD-Na 43 (12/21)  Of note was recently admitted to  11/2022 for workup and eval of new onset jaundice- during that hospitalization was found to have a UTI and dx with alc hep was treated for UTI w/ abx and started on steroids (was deemed a non-responder and steroids were subsequently stopped); s/p LVP at [place] 11/2022. Previously hospitalized in 2021 at [place] for ETOH detox but pt reportedly unaware of any liver dysfunction at that time. Went to ETOH rehab 08/2022 and was asked to leave the facility when she was COVID+; was sober for 1 week until she started drinking again. Had also attended a few AA meetings in the past.       -Ascites: pending abd imaging       -SBP: pending US RUQ       -Varices: no h/o EGD       -Hepatic encephalopathy: unable to assess; intubated; on lactulose/rifaximin    #new onset seizure- unknown trigger/etiology- currently on Keppra    Recommendations:  -trend clinical symptoms, exam findings, vital signs, CBC, CMP, INR, Mg, phosphorus  -ABO/Rh blood type sent on 2 different blood samples at least 15 minutes apart  -acetaminophen level x2 at least 4 hours apart  -check PETH level and serum/urine drug screening  -rule out viral hepatitis with HBV PCR, HSV PCR, VZV PCR, EBV PCR, CMV IgG & PCR  -check HAV IgM, HBsAg, HBsAb, HBcAb IgM, HCV Ab [with reflex HCV PCR if positive], and HEV Ab  -check RUQ US with dopplers  -contrast enhanced cross-sectional imaging with either CT or MRI abdomen/pelvis  -check CT head noncont  -albumin 25% 100 mL q8h  -stop octreotide  -check urine lytes if pt has UOP  -Infectious disease consultation       -pending full infectious workup - bl cx, UA +urine cx, dx para, abd imaging       -HIV screening       -check quantiferon gold for TB screening       -rubella IgG, syphillis screen, toxoplasma IgG, VZV IgG       -c/w broad spectrum antibiotics  - obtain TTE  - send UTOx  - send TSH, RPR, B12        **pt's /JOSUE Gibson ([phone])    **THIS NOTE IS NOT FINALIZED UNTIL SIGNED BY THE ATTENDING**    Arleen Rawls MD  GI Fellow, PGY-5  Available via Microsoft Teams    NON-URGENT CONSULTS:  Please email [email or web address]  [email or web address]  AT NIGHT AND ON WEEKENDS:  Contact on-call GI fellow via answering service ([phone]) from 5pm-8am and on weekends/holidays  MONDAY-FRIDAY 8AM-5PM:  Pager# [phone] ([facility]) or [phone] ([facility])  GI Phone# [phone] ([facility])

## 2022-12-21 NOTE — CONSULT NOTE ADULT - ATTENDING COMMENTS
62 year old woman transferred from outside hospital with acute liver failure.   Course complicated by acute renal failure requiring dialysis.   Arrived intubated and on moderate dose pressors.     NEURO - Wean propofol in setting of hypotension. Assess neurologic status. F/up Ammonia level.   RESP -  On minimal vent settings. Continue to wean as tolerated. STAT CXR to confirm ETT placement  CVS - Hypotension requiring moderate dose Levo and vaso. May improve with weaning of propofol.   GI - Acute liver failure. F/up transplant regarding next steps in evaluation.   -Continue lactulose and rifaximin    - Acute renal failure s/p dialysis at outside hospital. Creatinine 5.1. Acidosis improved and patient hypokalemic however will initiate CVVH as per transplant team request.  HEME - No evidence of active bleeding. Coagulopathy secondary to acute liver failure. SCDs for DVT ppx.   ID - No evidence of active infection. Continue empiric fluconazole and Meropenem as per transplant. F/up cultures.   ENDO - s/p Thyroidectomy, continue home synthroid. Mild hyperglycemia controlled with sliding scale.

## 2022-12-21 NOTE — DIETITIAN INITIAL EVALUATION ADULT - REASON FOR ADMISSION
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 "Recently admitted to  11/2022 for workup and eval of jaundice- during that hospitalization was found to have a UTI and dx with alc hep and started on steroids (was deemed a non-responder and steroids were subsequently stopped); s/p LVP at [place] 11/2022. Previously hospitalized in 2021 at [place] for ETOH detox. Went to ETOH rehab 08/2022 and was asked to leave the facility when she was COVID+; was sober for 1 week until she started drinking again. Had also attended a few AA meetings in the past. 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 "Recently admitted to  11/2022 for workup and eval of jaundice- during that hospitalization was found to have a UTI and dx with alc hep and started on steroids (was deemed a non-responder and steroids were subsequently stopped); s/p LVP at [place] 11/2022. Previously hospitalized in 2021 at [place] for ETOH detox. Went to ETOH rehab 08/2022 and was asked to leave the facility when she was COVID+; was sober for 1 week until she started drinking again. Had also attended a few AA meetings in the past. 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 "Recently admitted to  11/2022 for workup and eval of jaundice- during that hospitalization was found to have a UTI and dx with alc hep and started on steroids (was deemed a non-responder and steroids were subsequently stopped); s/p LVP at [place] 11/2022. Previously hospitalized in 2021 at [place] for ETOH detox. Went to ETOH rehab 08/2022 and was asked to leave the facility when she was COVID+; was sober for 1 week until she started drinking again. Had also attended a few AA meetings in the past. Transferred from [facility] 12/20 for further management of acute liver failure and liver transplant evaluation."

## 2022-12-21 NOTE — CONSULT NOTE ADULT - SUBJECTIVE AND OBJECTIVE BOX
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   She was admitted to [facility] on  with seizures no reported hx of seizures.     Otherwise, patient denies fevers, chills, weight loss, dysphagia, odynophagia, early satiety, poor oral intake, abdominal pain, nausea, vomiting, diarrhea, melena, hematemesis, hematochezia, change in stool caliber, or family history of GI-related cancers.    Allergies:  Macrobid (Rash)  Nexium (Rash)      Home Medications:    Hospital Medications:  chlorhexidine 0.12% Liquid 15 milliLiter(s) Oral Mucosa every 12 hours  chlorhexidine 2% Cloths 1 Application(s) Topical <User Schedule>  chlorhexidine 4% Liquid 1 Application(s) Topical <User Schedule>  CRRT Treatment    <Continuous>  fluconAZOLE IVPB 200 milliGRAM(s) IV Intermittent every 24 hours  HYDROmorphone  Injectable 0.5 milliGRAM(s) IV Push every 3 hours PRN  insulin lispro (ADMELOG) corrective regimen sliding scale   SubCutaneous every 6 hours  lactulose Syrup 20 Gram(s) Oral every 6 hours  levETIRAcetam  IVPB 750 milliGRAM(s) IV Intermittent every 12 hours  levothyroxine Injectable 70 MICROGram(s) IV Push at bedtime  meropenem  IVPB 1000 milliGRAM(s) IV Intermittent every 12 hours  norepinephrine Infusion 0.13 MICROgram(s)/kG/Min IV Continuous <Continuous>  octreotide  Infusion 50 MICROgram(s)/Hr IV Continuous <Continuous>  Phoxillum Filtration BK 4 / 2.5 5000 milliLiter(s) CRRT <Continuous>  Phoxillum Filtration BK 4 / 2.5 5000 milliLiter(s) CRRT <Continuous>  Phoxillum Filtration BK 4 / 2.5 5000 milliLiter(s) CRRT <Continuous>  rifAXIMin 550 milliGRAM(s) Oral every 12 hours  sodium chloride 0.9% lock flush 10 milliLiter(s) IV Push every 1 hour PRN  vasopressin Infusion 0.03 Unit(s)/Min IV Continuous <Continuous>      PMHX/PSHX:  HTN (hypertension)    UTI (urinary tract infection)    Intracranial tumor    GERD (gastroesophageal reflux disease)    Eczema    Thyroid cancer    COVID-19 virus infection    History of thyroid surgery    H/O total thyroidectomy    History of tonsillectomy    History of appendectomy        Family history:  FH: heart disease    FH: pancreatic cancer     Denies any family history of GI-related disease or cancers.    Social History:   ETOH: denies  Tobacco: denies  Illicit drug use: denies    ROS: unable to obtain as pt is intubated/sedated      Vital Signs:  Vital Signs Last 24 Hrs  T(C): 36.8 (21 Dec 2022 11:00), Max: 36.8 (21 Dec 2022 11:00)  T(F): 98.2 (21 Dec 2022 11:00), Max: 98.2 (21 Dec 2022 11:00)  HR: 74 (21 Dec 2022 13:00) (66 - 100)  BP: 124/73 (20 Dec 2022 21:45) (124/73 - 124/73)  BP(mean): 91 (20 Dec 2022 21:45) (91 - 91)  RR: 17 (21 Dec 2022 13:00) (15 - 26)  SpO2: 96% (21 Dec 2022 13:00) (94% - 100%)    Parameters below as of 21 Dec 2022 11:00  Patient On (Oxygen Delivery Method): ventilator    O2 Concentration (%): 30  Daily Height in cm: 165.1 (20 Dec 2022 21:37)    Daily Weight in k.4 (21 Dec 2022 00:00)    PHYSICAL EXAM:     GENERAL:  Appears stated age, critically ill appearing  HEENT:  NC/AT,  eyes closed  CHEST:  +vented breath sounds  HEART:  Regular rhythm, S1, S2, no murmur/rub/S3/S4  ABDOMEN:  Soft, non-tender, non-distended, +bowel sounds,    EXTREMITIES:  no cyanosis,clubbing or edema  SKIN:  No rash/erythema/ecchymoses/petechiae/wounds/abscess/warm/dry  NEURO:  +sedated      LABS:                        7.7    24.21 )-----------( 93       ( 21 Dec 2022 11:52 )             22.5     12-21    135  |  99  |  29<H>  ----------------------------<  104<H>  3.8   |  18<L>  |  3.52<H>    Ca    8.5      21 Dec 2022 11:52  Phos  4.5       Mg     2.3         TPro  6.0  /  Alb  3.3  /  TBili  25.5<H>  /  DBili  x   /  AST  144<H>  /  ALT  44  /  AlkPhos  136<H>      LIVER FUNCTIONS - ( 21 Dec 2022 11:52 )  Alb: 3.3 g/dL / Pro: 6.0 g/dL / ALK PHOS: 136 U/L / ALT: 44 U/L / AST: 144 U/L / GGT: x           PT/INR - ( 21 Dec 2022 05:08 )   PT: 34.1 sec;   INR: 2.93 ratio         PTT - ( 21 Dec 2022 05:08 )  PTT:51.3 sec  Urinalysis Basic - ( 20 Dec 2022 23:55 )    Color: Dark Yellow / Appearance: Turbid / S.029 / pH: x  Gluc: x / Ketone: Small  / Bili: Large / Urobili: Negative   Blood: x / Protein: 300 mg/dL / Nitrite: Negative   Leuk Esterase: Large / RBC: 5-10 /hpf / WBC 11-25   Sq Epi: x / Non Sq Epi: Moderate / Bacteria: Negative      Amylase Serum--      Lipase serum--       [id number]  Amylase Serum--      Lipase serum--       [id number]      Imaging: No new abdominal imaging               Chief Complaint:  Patient is a 62y old  Female who presents with a chief complaint of Acute liver failure (21 Dec 2022 00:28)      HPI: 62 y.o Hx significant for remote h/o thyroid cancer in her 20s s/p total thyroidectomy + RTX + radioactive iodide, HTN, 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   She was admitted to [facility] on  with seizures no reported hx of seizures.     Otherwise, patient denies fevers, chills, weight loss, dysphagia, odynophagia, early satiety, poor oral intake, abdominal pain, nausea, vomiting, diarrhea, melena, hematemesis, hematochezia, change in stool caliber, or family history of GI-related cancers.    Allergies:  Macrobid (Rash)  Nexium (Rash)      Home Medications:    Hospital Medications:  chlorhexidine 0.12% Liquid 15 milliLiter(s) Oral Mucosa every 12 hours  chlorhexidine 2% Cloths 1 Application(s) Topical <User Schedule>  chlorhexidine 4% Liquid 1 Application(s) Topical <User Schedule>  CRRT Treatment    <Continuous>  fluconAZOLE IVPB 200 milliGRAM(s) IV Intermittent every 24 hours  HYDROmorphone  Injectable 0.5 milliGRAM(s) IV Push every 3 hours PRN  insulin lispro (ADMELOG) corrective regimen sliding scale   SubCutaneous every 6 hours  lactulose Syrup 20 Gram(s) Oral every 6 hours  levETIRAcetam  IVPB 750 milliGRAM(s) IV Intermittent every 12 hours  levothyroxine Injectable 70 MICROGram(s) IV Push at bedtime  meropenem  IVPB 1000 milliGRAM(s) IV Intermittent every 12 hours  norepinephrine Infusion 0.13 MICROgram(s)/kG/Min IV Continuous <Continuous>  octreotide  Infusion 50 MICROgram(s)/Hr IV Continuous <Continuous>  Phoxillum Filtration BK 4 / 2.5 5000 milliLiter(s) CRRT <Continuous>  Phoxillum Filtration BK 4 / 2.5 5000 milliLiter(s) CRRT <Continuous>  Phoxillum Filtration BK 4 / 2.5 5000 milliLiter(s) CRRT <Continuous>  rifAXIMin 550 milliGRAM(s) Oral every 12 hours  sodium chloride 0.9% lock flush 10 milliLiter(s) IV Push every 1 hour PRN  vasopressin Infusion 0.03 Unit(s)/Min IV Continuous <Continuous>      PMHX/PSHX:  HTN (hypertension)    UTI (urinary tract infection)    Intracranial tumor    GERD (gastroesophageal reflux disease)    Eczema    Thyroid cancer    COVID-19 virus infection    History of thyroid surgery    H/O total thyroidectomy    History of tonsillectomy    History of appendectomy        Family history:  FH: heart disease    FH: pancreatic cancer     Denies any family history of GI-related disease or cancers.    Social History:   ETOH: denies  Tobacco: denies  Illicit drug use: denies    ROS: unable to obtain as pt is intubated/sedated      Vital Signs:  Vital Signs Last 24 Hrs  T(C): 36.8 (21 Dec 2022 11:00), Max: 36.8 (21 Dec 2022 11:00)  T(F): 98.2 (21 Dec 2022 11:00), Max: 98.2 (21 Dec 2022 11:00)  HR: 74 (21 Dec 2022 13:00) (66 - 100)  BP: 124/73 (20 Dec 2022 21:45) (124/73 - 124/73)  BP(mean): 91 (20 Dec 2022 21:45) (91 - 91)  RR: 17 (21 Dec 2022 13:00) (15 - 26)  SpO2: 96% (21 Dec 2022 13:00) (94% - 100%)    Parameters below as of 21 Dec 2022 11:00  Patient On (Oxygen Delivery Method): ventilator    O2 Concentration (%): 30  Daily Height in cm: 165.1 (20 Dec 2022 21:37)    Daily Weight in k.4 (21 Dec 2022 00:00)    PHYSICAL EXAM:     GENERAL:  Appears stated age, critically ill appearing  HEENT:  NC/AT,  eyes closed  CHEST:  +vented breath sounds  HEART:  Regular rhythm, S1, S2, no murmur/rub/S3/S4  ABDOMEN:  Soft, non-tender, non-distended, +bowel sounds,    EXTREMITIES:  no cyanosis,clubbing or edema  SKIN:  No rash/erythema/ecchymoses/petechiae/wounds/abscess/warm/dry  NEURO:  +sedated      LABS:                        7.7    24.21 )-----------( 93       ( 21 Dec 2022 11:52 )             22.5     12-21    135  |  99  |  29<H>  ----------------------------<  104<H>  3.8   |  18<L>  |  3.52<H>    Ca    8.5      21 Dec 2022 11:52  Phos  4.5       Mg     2.3         TPro  6.0  /  Alb  3.3  /  TBili  25.5<H>  /  DBili  x   /  AST  144<H>  /  ALT  44  /  AlkPhos  136<H>      LIVER FUNCTIONS - ( 21 Dec 2022 11:52 )  Alb: 3.3 g/dL / Pro: 6.0 g/dL / ALK PHOS: 136 U/L / ALT: 44 U/L / AST: 144 U/L / GGT: x           PT/INR - ( 21 Dec 2022 05:08 )   PT: 34.1 sec;   INR: 2.93 ratio         PTT - ( 21 Dec 2022 05:08 )  PTT:51.3 sec  Urinalysis Basic - ( 20 Dec 2022 23:55 )    Color: Dark Yellow / Appearance: Turbid / S.029 / pH: x  Gluc: x / Ketone: Small  / Bili: Large / Urobili: Negative   Blood: x / Protein: 300 mg/dL / Nitrite: Negative   Leuk Esterase: Large / RBC: 5-10 /hpf / WBC 11-25   Sq Epi: x / Non Sq Epi: Moderate / Bacteria: Negative      Amylase Serum--      Lipase serum--       [id number]  Amylase Serum--      Lipase serum--       [id number]      Imaging: No new abdominal imaging               Chief Complaint:  Patient is a 62y old  Female who presents with a chief complaint of Acute liver failure (21 Dec 2022 00:28)      HPI: 62 y.o Hx significant for remote h/o thyroid cancer in her 20s s/p total thyroidectomy + RTX + radioactive iodide, HTN, 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   She was admitted to [facility] on  with seizures no reported hx of seizures.     Otherwise, patient denies fevers, chills, weight loss, dysphagia, odynophagia, early satiety, poor oral intake, abdominal pain, nausea, vomiting, diarrhea, melena, hematemesis, hematochezia, change in stool caliber, or family history of GI-related cancers.    Allergies:  Macrobid (Rash)  Nexium (Rash)      Home Medications:    Hospital Medications:  chlorhexidine 0.12% Liquid 15 milliLiter(s) Oral Mucosa every 12 hours  chlorhexidine 2% Cloths 1 Application(s) Topical <User Schedule>  chlorhexidine 4% Liquid 1 Application(s) Topical <User Schedule>  CRRT Treatment    <Continuous>  fluconAZOLE IVPB 200 milliGRAM(s) IV Intermittent every 24 hours  HYDROmorphone  Injectable 0.5 milliGRAM(s) IV Push every 3 hours PRN  insulin lispro (ADMELOG) corrective regimen sliding scale   SubCutaneous every 6 hours  lactulose Syrup 20 Gram(s) Oral every 6 hours  levETIRAcetam  IVPB 750 milliGRAM(s) IV Intermittent every 12 hours  levothyroxine Injectable 70 MICROGram(s) IV Push at bedtime  meropenem  IVPB 1000 milliGRAM(s) IV Intermittent every 12 hours  norepinephrine Infusion 0.13 MICROgram(s)/kG/Min IV Continuous <Continuous>  octreotide  Infusion 50 MICROgram(s)/Hr IV Continuous <Continuous>  Phoxillum Filtration BK 4 / 2.5 5000 milliLiter(s) CRRT <Continuous>  Phoxillum Filtration BK 4 / 2.5 5000 milliLiter(s) CRRT <Continuous>  Phoxillum Filtration BK 4 / 2.5 5000 milliLiter(s) CRRT <Continuous>  rifAXIMin 550 milliGRAM(s) Oral every 12 hours  sodium chloride 0.9% lock flush 10 milliLiter(s) IV Push every 1 hour PRN  vasopressin Infusion 0.03 Unit(s)/Min IV Continuous <Continuous>      PMHX/PSHX:  HTN (hypertension)    UTI (urinary tract infection)    Intracranial tumor    GERD (gastroesophageal reflux disease)    Eczema    Thyroid cancer    COVID-19 virus infection    History of thyroid surgery    H/O total thyroidectomy    History of tonsillectomy    History of appendectomy        Family history:  FH: heart disease    FH: pancreatic cancer     Denies any family history of GI-related disease or cancers.    Social History:   ETOH: denies  Tobacco: denies  Illicit drug use: denies    ROS: unable to obtain as pt is intubated/sedated      Vital Signs:  Vital Signs Last 24 Hrs  T(C): 36.8 (21 Dec 2022 11:00), Max: 36.8 (21 Dec 2022 11:00)  T(F): 98.2 (21 Dec 2022 11:00), Max: 98.2 (21 Dec 2022 11:00)  HR: 74 (21 Dec 2022 13:00) (66 - 100)  BP: 124/73 (20 Dec 2022 21:45) (124/73 - 124/73)  BP(mean): 91 (20 Dec 2022 21:45) (91 - 91)  RR: 17 (21 Dec 2022 13:00) (15 - 26)  SpO2: 96% (21 Dec 2022 13:00) (94% - 100%)    Parameters below as of 21 Dec 2022 11:00  Patient On (Oxygen Delivery Method): ventilator    O2 Concentration (%): 30  Daily Height in cm: 165.1 (20 Dec 2022 21:37)    Daily Weight in k.4 (21 Dec 2022 00:00)    PHYSICAL EXAM:     GENERAL:  Appears stated age, critically ill appearing  HEENT:  NC/AT,  eyes closed  CHEST:  +vented breath sounds  HEART:  Regular rhythm, S1, S2, no murmur/rub/S3/S4  ABDOMEN:  Soft, non-tender, non-distended, +bowel sounds,    EXTREMITIES:  no cyanosis,clubbing or edema  SKIN:  No rash/erythema/ecchymoses/petechiae/wounds/abscess/warm/dry  NEURO:  +sedated      LABS:                        7.7    24.21 )-----------( 93       ( 21 Dec 2022 11:52 )             22.5     12-21    135  |  99  |  29<H>  ----------------------------<  104<H>  3.8   |  18<L>  |  3.52<H>    Ca    8.5      21 Dec 2022 11:52  Phos  4.5       Mg     2.3         TPro  6.0  /  Alb  3.3  /  TBili  25.5<H>  /  DBili  x   /  AST  144<H>  /  ALT  44  /  AlkPhos  136<H>      LIVER FUNCTIONS - ( 21 Dec 2022 11:52 )  Alb: 3.3 g/dL / Pro: 6.0 g/dL / ALK PHOS: 136 U/L / ALT: 44 U/L / AST: 144 U/L / GGT: x           PT/INR - ( 21 Dec 2022 05:08 )   PT: 34.1 sec;   INR: 2.93 ratio         PTT - ( 21 Dec 2022 05:08 )  PTT:51.3 sec  Urinalysis Basic - ( 20 Dec 2022 23:55 )    Color: Dark Yellow / Appearance: Turbid / S.029 / pH: x  Gluc: x / Ketone: Small  / Bili: Large / Urobili: Negative   Blood: x / Protein: 300 mg/dL / Nitrite: Negative   Leuk Esterase: Large / RBC: 5-10 /hpf / WBC 11-25   Sq Epi: x / Non Sq Epi: Moderate / Bacteria: Negative      Amylase Serum--      Lipase serum--       [id number]  Amylase Serum--      Lipase serum--       [id number]      Imaging: No new abdominal imaging               Chief Complaint:  Patient is a 62y old  Female who presents with a chief complaint of Acute liver failure (21 Dec 2022 00:28)      HPI: 62 y.o Hx significant for remote AUD, h/o thyroid cancer in her 20s s/p total thyroidectomy + RTX + radioactive iodide, HTN, 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  Arthur ([phone]) and Daughter Taylor at [facility] provided additional history. Of note was recently admitted to  2022 for workup and eval of jaundice- during that hospitalization was found to have a UTI and dx with alc hep and started on steroids (was deemed a non-responder and steroids were subsequently stopped); s/p LVP at [place] 2022. Previously hospitalized in  at [place] for ETOH detox. Went to ETOH rehab 2022 and was asked to leave the facility when she was COVID+; was sober for 1 week until she started drinking again. Had also attended a few AA meetings in the past. Transferred from [facility]  for further management of acute liver failure and liver transplant evaluation.     Has never had EGD/colon before.   Otherwise, patient denies fevers, chills, weight loss, dysphagia, odynophagia, early satiety, poor oral intake, abdominal pain, nausea, vomiting, diarrhea, melena, hematemesis, hematochezia, change in stool caliber, or family history of GI-related cancers.    Allergies:  Macrobid (Rash)  Nexium (Rash)      Home Medications:    Hospital Medications:  chlorhexidine 0.12% Liquid 15 milliLiter(s) Oral Mucosa every 12 hours  chlorhexidine 2% Cloths 1 Application(s) Topical <User Schedule>  chlorhexidine 4% Liquid 1 Application(s) Topical <User Schedule>  CRRT Treatment    <Continuous>  fluconAZOLE IVPB 200 milliGRAM(s) IV Intermittent every 24 hours  HYDROmorphone  Injectable 0.5 milliGRAM(s) IV Push every 3 hours PRN  insulin lispro (ADMELOG) corrective regimen sliding scale   SubCutaneous every 6 hours  lactulose Syrup 20 Gram(s) Oral every 6 hours  levETIRAcetam  IVPB 750 milliGRAM(s) IV Intermittent every 12 hours  levothyroxine Injectable 70 MICROGram(s) IV Push at bedtime  meropenem  IVPB 1000 milliGRAM(s) IV Intermittent every 12 hours  norepinephrine Infusion 0.13 MICROgram(s)/kG/Min IV Continuous <Continuous>  octreotide  Infusion 50 MICROgram(s)/Hr IV Continuous <Continuous>  Phoxillum Filtration BK 4 / 2.5 5000 milliLiter(s) CRRT <Continuous>  Phoxillum Filtration BK 4 / 2.5 5000 milliLiter(s) CRRT <Continuous>  Phoxillum Filtration BK 4 / 2.5 5000 milliLiter(s) CRRT <Continuous>  rifAXIMin 550 milliGRAM(s) Oral every 12 hours  sodium chloride 0.9% lock flush 10 milliLiter(s) IV Push every 1 hour PRN  vasopressin Infusion 0.03 Unit(s)/Min IV Continuous <Continuous>      PMHX/PSHX:  HTN (hypertension)    UTI (urinary tract infection)    Intracranial tumor    GERD (gastroesophageal reflux disease)    Eczema    Thyroid cancer    COVID-19 virus infection    History of thyroid surgery    H/O total thyroidectomy    History of tonsillectomy    History of appendectomy        Family history:  FH: heart disease    FH: pancreatic cancer- brother (in his 50s)    Social History:   ETOH: +last drink 2022; drinking 2 white wine bottles/day; no h/o DUIs  Tobacco: +former smoker; quit in 20s  Drug use: none     ROS: unable to obtain as pt is intubated/sedated      Vital Signs:  Vital Signs Last 24 Hrs  T(C): 36.8 (21 Dec 2022 11:00), Max: 36.8 (21 Dec 2022 11:00)  T(F): 98.2 (21 Dec 2022 11:00), Max: 98.2 (21 Dec 2022 11:00)  HR: 74 (21 Dec 2022 13:00) (66 - 100)  BP: 124/73 (20 Dec 2022 21:45) (124/73 - 124/73)  BP(mean): 91 (20 Dec 2022 21:45) (91 - 91)  RR: 17 (21 Dec 2022 13:00) (15 - 26)  SpO2: 96% (21 Dec 2022 13:00) (94% - 100%)    Parameters below as of 21 Dec 2022 11:00  Patient On (Oxygen Delivery Method): ventilator    O2 Concentration (%): 30  Daily Height in cm: 165.1 (20 Dec 2022 21:37)    Daily Weight in k.4 (21 Dec 2022 00:00)    PHYSICAL EXAM:     GENERAL:  Appears stated age, critically ill appearing  HEENT:  NC/AT,  eyes closed  CHEST:  +vented breath sounds  HEART:  Regular rhythm, S1, S2, no murmur/rub/S3/S4  ABDOMEN:  Soft, non-tender, non-distended, +bowel sounds,    EXTREMITIES:  no cyanosis,clubbing or edema  SKIN:  No rash/erythema/ecchymoses/petechiae/wounds/abscess/warm/dry  NEURO:  +sedated      LABS:                        7.7    24.21 )-----------( 93       ( 21 Dec 2022 11:52 )             22.5     12-    135  |  99  |  29<H>  ----------------------------<  104<H>  3.8   |  18<L>  |  3.52<H>    Ca    8.5      21 Dec 2022 11:52  Phos  4.5     12  Mg     2.3         TPro  6.0  /  Alb  3.3  /  TBili  25.5<H>  /  DBili  x   /  AST  144<H>  /  ALT  44  /  AlkPhos  136<H>  12-21    LIVER FUNCTIONS - ( 21 Dec 2022 11:52 )  Alb: 3.3 g/dL / Pro: 6.0 g/dL / ALK PHOS: 136 U/L / ALT: 44 U/L / AST: 144 U/L / GGT: x           PT/INR - ( 21 Dec 2022 05:08 )   PT: 34.1 sec;   INR: 2.93 ratio         PTT - ( 21 Dec 2022 05:08 )  PTT:51.3 sec  Urinalysis Basic - ( 20 Dec 2022 23:55 )    Color: Dark Yellow / Appearance: Turbid / S.029 / pH: x  Gluc: x / Ketone: Small  / Bili: Large / Urobili: Negative   Blood: x / Protein: 300 mg/dL / Nitrite: Negative   Leuk Esterase: Large / RBC: 5-10 /hpf / WBC 11-25   Sq Epi: x / Non Sq Epi: Moderate / Bacteria: Negative      Amylase Serum--      Lipase serum--       [id number]  Amylase Serum--      Lipase serum--       [id number]      Imaging: No new abdominal imaging               Chief Complaint:  Patient is a 62y old  Female who presents with a chief complaint of Acute liver failure (21 Dec 2022 00:28)      HPI: 62 y.o Hx significant for remote AUD, h/o thyroid cancer in her 20s s/p total thyroidectomy + RTX + radioactive iodide, HTN, 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  Arthur ([phone]) and Daughter Taylor at [facility] provided additional history. Of note was recently admitted to  2022 for workup and eval of jaundice- during that hospitalization was found to have a UTI and dx with alc hep and started on steroids (was deemed a non-responder and steroids were subsequently stopped); s/p LVP at [place] 2022. Previously hospitalized in  at [place] for ETOH detox. Went to ETOH rehab 2022 and was asked to leave the facility when she was COVID+; was sober for 1 week until she started drinking again. Had also attended a few AA meetings in the past. Transferred from [facility]  for further management of acute liver failure and liver transplant evaluation.     Has never had EGD/colon before.   Otherwise, patient denies fevers, chills, weight loss, dysphagia, odynophagia, early satiety, poor oral intake, abdominal pain, nausea, vomiting, diarrhea, melena, hematemesis, hematochezia, change in stool caliber, or family history of GI-related cancers.    Allergies:  Macrobid (Rash)  Nexium (Rash)      Home Medications:    Hospital Medications:  chlorhexidine 0.12% Liquid 15 milliLiter(s) Oral Mucosa every 12 hours  chlorhexidine 2% Cloths 1 Application(s) Topical <User Schedule>  chlorhexidine 4% Liquid 1 Application(s) Topical <User Schedule>  CRRT Treatment    <Continuous>  fluconAZOLE IVPB 200 milliGRAM(s) IV Intermittent every 24 hours  HYDROmorphone  Injectable 0.5 milliGRAM(s) IV Push every 3 hours PRN  insulin lispro (ADMELOG) corrective regimen sliding scale   SubCutaneous every 6 hours  lactulose Syrup 20 Gram(s) Oral every 6 hours  levETIRAcetam  IVPB 750 milliGRAM(s) IV Intermittent every 12 hours  levothyroxine Injectable 70 MICROGram(s) IV Push at bedtime  meropenem  IVPB 1000 milliGRAM(s) IV Intermittent every 12 hours  norepinephrine Infusion 0.13 MICROgram(s)/kG/Min IV Continuous <Continuous>  octreotide  Infusion 50 MICROgram(s)/Hr IV Continuous <Continuous>  Phoxillum Filtration BK 4 / 2.5 5000 milliLiter(s) CRRT <Continuous>  Phoxillum Filtration BK 4 / 2.5 5000 milliLiter(s) CRRT <Continuous>  Phoxillum Filtration BK 4 / 2.5 5000 milliLiter(s) CRRT <Continuous>  rifAXIMin 550 milliGRAM(s) Oral every 12 hours  sodium chloride 0.9% lock flush 10 milliLiter(s) IV Push every 1 hour PRN  vasopressin Infusion 0.03 Unit(s)/Min IV Continuous <Continuous>      PMHX/PSHX:  HTN (hypertension)    UTI (urinary tract infection)    Intracranial tumor    GERD (gastroesophageal reflux disease)    Eczema    Thyroid cancer    COVID-19 virus infection    History of thyroid surgery    H/O total thyroidectomy    History of tonsillectomy    History of appendectomy        Family history:  FH: heart disease    FH: pancreatic cancer- brother (in his 50s)    Social History:   ETOH: +last drink 2022; drinking 2 white wine bottles/day; no h/o DUIs  Tobacco: +former smoker; quit in 20s  Drug use: none     ROS: unable to obtain as pt is intubated/sedated      Vital Signs:  Vital Signs Last 24 Hrs  T(C): 36.8 (21 Dec 2022 11:00), Max: 36.8 (21 Dec 2022 11:00)  T(F): 98.2 (21 Dec 2022 11:00), Max: 98.2 (21 Dec 2022 11:00)  HR: 74 (21 Dec 2022 13:00) (66 - 100)  BP: 124/73 (20 Dec 2022 21:45) (124/73 - 124/73)  BP(mean): 91 (20 Dec 2022 21:45) (91 - 91)  RR: 17 (21 Dec 2022 13:00) (15 - 26)  SpO2: 96% (21 Dec 2022 13:00) (94% - 100%)    Parameters below as of 21 Dec 2022 11:00  Patient On (Oxygen Delivery Method): ventilator    O2 Concentration (%): 30  Daily Height in cm: 165.1 (20 Dec 2022 21:37)    Daily Weight in k.4 (21 Dec 2022 00:00)    PHYSICAL EXAM:     GENERAL:  Appears stated age, critically ill appearing  HEENT:  NC/AT,  eyes closed  CHEST:  +vented breath sounds  HEART:  Regular rhythm, S1, S2, no murmur/rub/S3/S4  ABDOMEN:  Soft, non-tender, non-distended, +bowel sounds,    EXTREMITIES:  no cyanosis,clubbing or edema  SKIN:  No rash/erythema/ecchymoses/petechiae/wounds/abscess/warm/dry  NEURO:  +sedated      LABS:                        7.7    24.21 )-----------( 93       ( 21 Dec 2022 11:52 )             22.5     12-    135  |  99  |  29<H>  ----------------------------<  104<H>  3.8   |  18<L>  |  3.52<H>    Ca    8.5      21 Dec 2022 11:52  Phos  4.5     12  Mg     2.3         TPro  6.0  /  Alb  3.3  /  TBili  25.5<H>  /  DBili  x   /  AST  144<H>  /  ALT  44  /  AlkPhos  136<H>  12-21    LIVER FUNCTIONS - ( 21 Dec 2022 11:52 )  Alb: 3.3 g/dL / Pro: 6.0 g/dL / ALK PHOS: 136 U/L / ALT: 44 U/L / AST: 144 U/L / GGT: x           PT/INR - ( 21 Dec 2022 05:08 )   PT: 34.1 sec;   INR: 2.93 ratio         PTT - ( 21 Dec 2022 05:08 )  PTT:51.3 sec  Urinalysis Basic - ( 20 Dec 2022 23:55 )    Color: Dark Yellow / Appearance: Turbid / S.029 / pH: x  Gluc: x / Ketone: Small  / Bili: Large / Urobili: Negative   Blood: x / Protein: 300 mg/dL / Nitrite: Negative   Leuk Esterase: Large / RBC: 5-10 /hpf / WBC 11-25   Sq Epi: x / Non Sq Epi: Moderate / Bacteria: Negative      Amylase Serum--      Lipase serum--       [id number]  Amylase Serum--      Lipase serum--       [id number]      Imaging: No new abdominal imaging               Chief Complaint:  Patient is a 62y old  Female who presents with a chief complaint of Acute liver failure (21 Dec 2022 00:28)      HPI: 62 y.o Hx significant for remote AUD, h/o thyroid cancer in her 20s s/p total thyroidectomy + RTX + radioactive iodide, HTN, 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  Arthur ([phone]) and Daughter Taylor at [facility] provided additional history. Of note was recently admitted to  2022 for workup and eval of jaundice- during that hospitalization was found to have a UTI and dx with alc hep and started on steroids (was deemed a non-responder and steroids were subsequently stopped); s/p LVP at [place] 2022. Previously hospitalized in  at [place] for ETOH detox. Went to ETOH rehab 2022 and was asked to leave the facility when she was COVID+; was sober for 1 week until she started drinking again. Had also attended a few AA meetings in the past. Transferred from [facility]  for further management of acute liver failure and liver transplant evaluation.     Has never had EGD/colon before.   Otherwise, patient denies fevers, chills, weight loss, dysphagia, odynophagia, early satiety, poor oral intake, abdominal pain, nausea, vomiting, diarrhea, melena, hematemesis, hematochezia, change in stool caliber, or family history of GI-related cancers.    Allergies:  Macrobid (Rash)  Nexium (Rash)      Home Medications:    Hospital Medications:  chlorhexidine 0.12% Liquid 15 milliLiter(s) Oral Mucosa every 12 hours  chlorhexidine 2% Cloths 1 Application(s) Topical <User Schedule>  chlorhexidine 4% Liquid 1 Application(s) Topical <User Schedule>  CRRT Treatment    <Continuous>  fluconAZOLE IVPB 200 milliGRAM(s) IV Intermittent every 24 hours  HYDROmorphone  Injectable 0.5 milliGRAM(s) IV Push every 3 hours PRN  insulin lispro (ADMELOG) corrective regimen sliding scale   SubCutaneous every 6 hours  lactulose Syrup 20 Gram(s) Oral every 6 hours  levETIRAcetam  IVPB 750 milliGRAM(s) IV Intermittent every 12 hours  levothyroxine Injectable 70 MICROGram(s) IV Push at bedtime  meropenem  IVPB 1000 milliGRAM(s) IV Intermittent every 12 hours  norepinephrine Infusion 0.13 MICROgram(s)/kG/Min IV Continuous <Continuous>  octreotide  Infusion 50 MICROgram(s)/Hr IV Continuous <Continuous>  Phoxillum Filtration BK 4 / 2.5 5000 milliLiter(s) CRRT <Continuous>  Phoxillum Filtration BK 4 / 2.5 5000 milliLiter(s) CRRT <Continuous>  Phoxillum Filtration BK 4 / 2.5 5000 milliLiter(s) CRRT <Continuous>  rifAXIMin 550 milliGRAM(s) Oral every 12 hours  sodium chloride 0.9% lock flush 10 milliLiter(s) IV Push every 1 hour PRN  vasopressin Infusion 0.03 Unit(s)/Min IV Continuous <Continuous>      PMHX/PSHX:  HTN (hypertension)    UTI (urinary tract infection)    Intracranial tumor    GERD (gastroesophageal reflux disease)    Eczema    Thyroid cancer    COVID-19 virus infection    History of thyroid surgery    H/O total thyroidectomy    History of tonsillectomy    History of appendectomy        Family history:  FH: heart disease    FH: pancreatic cancer- brother (in his 50s)    Social History:   ETOH: +last drink 2022; drinking 2 white wine bottles/day; no h/o DUIs  Tobacco: +former smoker; quit in 20s  Drug use: none     ROS: unable to obtain as pt is intubated/sedated      Vital Signs:  Vital Signs Last 24 Hrs  T(C): 36.8 (21 Dec 2022 11:00), Max: 36.8 (21 Dec 2022 11:00)  T(F): 98.2 (21 Dec 2022 11:00), Max: 98.2 (21 Dec 2022 11:00)  HR: 74 (21 Dec 2022 13:00) (66 - 100)  BP: 124/73 (20 Dec 2022 21:45) (124/73 - 124/73)  BP(mean): 91 (20 Dec 2022 21:45) (91 - 91)  RR: 17 (21 Dec 2022 13:00) (15 - 26)  SpO2: 96% (21 Dec 2022 13:00) (94% - 100%)    Parameters below as of 21 Dec 2022 11:00  Patient On (Oxygen Delivery Method): ventilator    O2 Concentration (%): 30  Daily Height in cm: 165.1 (20 Dec 2022 21:37)    Daily Weight in k.4 (21 Dec 2022 00:00)    PHYSICAL EXAM:     GENERAL:  Appears stated age, critically ill appearing  HEENT:  NC/AT,  eyes closed  CHEST:  +vented breath sounds  HEART:  Regular rhythm, S1, S2, no murmur/rub/S3/S4  ABDOMEN:  Soft, non-tender, non-distended, +bowel sounds,    EXTREMITIES:  no cyanosis,clubbing or edema  SKIN:  No rash/erythema/ecchymoses/petechiae/wounds/abscess/warm/dry  NEURO:  +sedated      LABS:                        7.7    24.21 )-----------( 93       ( 21 Dec 2022 11:52 )             22.5     12-    135  |  99  |  29<H>  ----------------------------<  104<H>  3.8   |  18<L>  |  3.52<H>    Ca    8.5      21 Dec 2022 11:52  Phos  4.5     12  Mg     2.3         TPro  6.0  /  Alb  3.3  /  TBili  25.5<H>  /  DBili  x   /  AST  144<H>  /  ALT  44  /  AlkPhos  136<H>  12-21    LIVER FUNCTIONS - ( 21 Dec 2022 11:52 )  Alb: 3.3 g/dL / Pro: 6.0 g/dL / ALK PHOS: 136 U/L / ALT: 44 U/L / AST: 144 U/L / GGT: x           PT/INR - ( 21 Dec 2022 05:08 )   PT: 34.1 sec;   INR: 2.93 ratio         PTT - ( 21 Dec 2022 05:08 )  PTT:51.3 sec  Urinalysis Basic - ( 20 Dec 2022 23:55 )    Color: Dark Yellow / Appearance: Turbid / S.029 / pH: x  Gluc: x / Ketone: Small  / Bili: Large / Urobili: Negative   Blood: x / Protein: 300 mg/dL / Nitrite: Negative   Leuk Esterase: Large / RBC: 5-10 /hpf / WBC 11-25   Sq Epi: x / Non Sq Epi: Moderate / Bacteria: Negative      Amylase Serum--      Lipase serum--       [id number]  Amylase Serum--      Lipase serum--       [id number]      Imaging: No new abdominal imaging               Chief Complaint:  Patient is a 62y old  Female who presents with a chief complaint of Acute liver failure (21 Dec 2022 00:28)      HPI: 62 y.o Hx significant for decompensated ETOH cirrhosis c/b ascites, alc hep (non-responsive to steroids), remote h/o thyroid cancer in her 20s s/p total thyroidectomy + RTX + radioactive iodide, HTN, 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 History obtained from pt's /NOK Arthur ([phone]) and daughter Taylor at [facility].     Of note was recently admitted to  2022 for workup and eval of new onset jaundice- during that hospitalization was found to have a UTI and dx with alc hep was treated for UTI w/ abx and started on steroids (was deemed a non-responder and steroids were subsequently stopped); s/p LVP at [place] 2022. Previously hospitalized in  at [place] for ETOH detox but pt reportedly unaware of any liver dysfunction at that time. Went to ETOH rehab 2022 and was asked to leave the facility when she was COVID+; was sober for 1 week until she started drinking again. Had also attended a few AA meetings in the past.     Has never had EGD/colon before. Per pt's family- no reported fever/chills, abdominal pain, nausea, vomiting, diarrhea, melena, hematemesis, hematochezia.    Allergies:  Macrobid (Rash)  Nexium (Rash)      Home Medications:    Hospital Medications:  chlorhexidine 0.12% Liquid 15 milliLiter(s) Oral Mucosa every 12 hours  chlorhexidine 2% Cloths 1 Application(s) Topical <User Schedule>  chlorhexidine 4% Liquid 1 Application(s) Topical <User Schedule>  CRRT Treatment    <Continuous>  fluconAZOLE IVPB 200 milliGRAM(s) IV Intermittent every 24 hours  HYDROmorphone  Injectable 0.5 milliGRAM(s) IV Push every 3 hours PRN  insulin lispro (ADMELOG) corrective regimen sliding scale   SubCutaneous every 6 hours  lactulose Syrup 20 Gram(s) Oral every 6 hours  levETIRAcetam  IVPB 750 milliGRAM(s) IV Intermittent every 12 hours  levothyroxine Injectable 70 MICROGram(s) IV Push at bedtime  meropenem  IVPB 1000 milliGRAM(s) IV Intermittent every 12 hours  norepinephrine Infusion 0.13 MICROgram(s)/kG/Min IV Continuous <Continuous>  octreotide  Infusion 50 MICROgram(s)/Hr IV Continuous <Continuous>  Phoxillum Filtration BK 4 / 2.5 5000 milliLiter(s) CRRT <Continuous>  Phoxillum Filtration BK 4 / 2.5 5000 milliLiter(s) CRRT <Continuous>  Phoxillum Filtration BK 4 / 2.5 5000 milliLiter(s) CRRT <Continuous>  rifAXIMin 550 milliGRAM(s) Oral every 12 hours  sodium chloride 0.9% lock flush 10 milliLiter(s) IV Push every 1 hour PRN  vasopressin Infusion 0.03 Unit(s)/Min IV Continuous <Continuous>      PMHX/PSHX:  HTN (hypertension)    UTI (urinary tract infection)    Intracranial tumor    GERD (gastroesophageal reflux disease)    Eczema    Thyroid cancer    COVID-19 virus infection    History of thyroid surgery    H/O total thyroidectomy    History of tonsillectomy    History of appendectomy        Family history:  FH: heart disease    FH: pancreatic cancer- brother (in his 50s)    Social History:   ETOH: +last drink 2022; drinking 2 white wine bottles/day; no h/o DUIs  Tobacco: +former smoker; quit in 20s  Drug use: none     ROS: unable to obtain as pt is intubated/sedated      Vital Signs:  Vital Signs Last 24 Hrs  T(C): 36.8 (21 Dec 2022 11:00), Max: 36.8 (21 Dec 2022 11:00)  T(F): 98.2 (21 Dec 2022 11:00), Max: 98.2 (21 Dec 2022 11:00)  HR: 74 (21 Dec 2022 13:00) (66 - 100)  BP: 124/73 (20 Dec 2022 21:45) (124/73 - 124/73)  BP(mean): 91 (20 Dec 2022 21:45) (91 - 91)  RR: 17 (21 Dec 2022 13:00) (15 - 26)  SpO2: 96% (21 Dec 2022 13:00) (94% - 100%)    Parameters below as of 21 Dec 2022 11:00  Patient On (Oxygen Delivery Method): ventilator    O2 Concentration (%): 30  Daily Height in cm: 165.1 (20 Dec 2022 21:37)    Daily Weight in k.4 (21 Dec 2022 00:00)    PHYSICAL EXAM:     GENERAL:  Appears stated age, critically ill appearing  HEENT:  NC/AT,  eyes closed  CHEST:  +vented breath sounds  HEART:  Regular rhythm, S1, S2, no murmur/rub/S3/S4  ABDOMEN:  Soft, non-tender, +mildly-distended, +bowel sounds,    EXTREMITIES:  no cyanosis,clubbing or edema  SKIN:  No rash/erythema/ecchymoses/petechiae/wounds/abscess/warm/dry  NEURO:  +sedated      LABS:                        7.7    24.21 )-----------( 93       ( 21 Dec 2022 11:52 )             22.5     12-    135  |  99  |  29<H>  ----------------------------<  104<H>  3.8   |  18<L>  |  3.52<H>    Ca    8.5      21 Dec 2022 11:52  Phos  4.5     12  Mg     2.3     12-    TPro  6.0  /  Alb  3.3  /  TBili  25.5<H>  /  DBili  x   /  AST  144<H>  /  ALT  44  /  AlkPhos  136<H>  12-21    LIVER FUNCTIONS - ( 21 Dec 2022 11:52 )  Alb: 3.3 g/dL / Pro: 6.0 g/dL / ALK PHOS: 136 U/L / ALT: 44 U/L / AST: 144 U/L / GGT: x           PT/INR - ( 21 Dec 2022 05:08 )   PT: 34.1 sec;   INR: 2.93 ratio         PTT - ( 21 Dec 2022 05:08 )  PTT:51.3 sec  Urinalysis Basic - ( 20 Dec 2022 23:55 )    Color: Dark Yellow / Appearance: Turbid / S.029 / pH: x  Gluc: x / Ketone: Small  / Bili: Large / Urobili: Negative   Blood: x / Protein: 300 mg/dL / Nitrite: Negative   Leuk Esterase: Large / RBC: 5-10 /hpf / WBC 11-25   Sq Epi: x / Non Sq Epi: Moderate / Bacteria: Negative      Amylase Serum--      Lipase serum--       [id number]  Amylase Serum--      Lipase serum--       [id number]      Imaging: No new abdominal imaging               Chief Complaint:  Patient is a 62y old  Female who presents with a chief complaint of Acute liver failure (21 Dec 2022 00:28)      HPI: 62 y.o Hx significant for decompensated ETOH cirrhosis c/b ascites, alc hep (non-responsive to steroids), remote h/o thyroid cancer in her 20s s/p total thyroidectomy + RTX + radioactive iodide, HTN, 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 History obtained from pt's /NOK Arthur ([phone]) and daughter Taylor at [facility].     Of note was recently admitted to  2022 for workup and eval of new onset jaundice- during that hospitalization was found to have a UTI and dx with alc hep was treated for UTI w/ abx and started on steroids (was deemed a non-responder and steroids were subsequently stopped); s/p LVP at [place] 2022. Previously hospitalized in  at [place] for ETOH detox but pt reportedly unaware of any liver dysfunction at that time. Went to ETOH rehab 2022 and was asked to leave the facility when she was COVID+; was sober for 1 week until she started drinking again. Had also attended a few AA meetings in the past.     Has never had EGD/colon before. Per pt's family- no reported fever/chills, abdominal pain, nausea, vomiting, diarrhea, melena, hematemesis, hematochezia.    Allergies:  Macrobid (Rash)  Nexium (Rash)      Home Medications:    Hospital Medications:  chlorhexidine 0.12% Liquid 15 milliLiter(s) Oral Mucosa every 12 hours  chlorhexidine 2% Cloths 1 Application(s) Topical <User Schedule>  chlorhexidine 4% Liquid 1 Application(s) Topical <User Schedule>  CRRT Treatment    <Continuous>  fluconAZOLE IVPB 200 milliGRAM(s) IV Intermittent every 24 hours  HYDROmorphone  Injectable 0.5 milliGRAM(s) IV Push every 3 hours PRN  insulin lispro (ADMELOG) corrective regimen sliding scale   SubCutaneous every 6 hours  lactulose Syrup 20 Gram(s) Oral every 6 hours  levETIRAcetam  IVPB 750 milliGRAM(s) IV Intermittent every 12 hours  levothyroxine Injectable 70 MICROGram(s) IV Push at bedtime  meropenem  IVPB 1000 milliGRAM(s) IV Intermittent every 12 hours  norepinephrine Infusion 0.13 MICROgram(s)/kG/Min IV Continuous <Continuous>  octreotide  Infusion 50 MICROgram(s)/Hr IV Continuous <Continuous>  Phoxillum Filtration BK 4 / 2.5 5000 milliLiter(s) CRRT <Continuous>  Phoxillum Filtration BK 4 / 2.5 5000 milliLiter(s) CRRT <Continuous>  Phoxillum Filtration BK 4 / 2.5 5000 milliLiter(s) CRRT <Continuous>  rifAXIMin 550 milliGRAM(s) Oral every 12 hours  sodium chloride 0.9% lock flush 10 milliLiter(s) IV Push every 1 hour PRN  vasopressin Infusion 0.03 Unit(s)/Min IV Continuous <Continuous>      PMHX/PSHX:  HTN (hypertension)    UTI (urinary tract infection)    Intracranial tumor    GERD (gastroesophageal reflux disease)    Eczema    Thyroid cancer    COVID-19 virus infection    History of thyroid surgery    H/O total thyroidectomy    History of tonsillectomy    History of appendectomy        Family history:  FH: heart disease    FH: pancreatic cancer- brother (in his 50s)    Social History:   ETOH: +last drink 2022; drinking 2 white wine bottles/day; no h/o DUIs  Tobacco: +former smoker; quit in 20s  Drug use: none     ROS: unable to obtain as pt is intubated/sedated      Vital Signs:  Vital Signs Last 24 Hrs  T(C): 36.8 (21 Dec 2022 11:00), Max: 36.8 (21 Dec 2022 11:00)  T(F): 98.2 (21 Dec 2022 11:00), Max: 98.2 (21 Dec 2022 11:00)  HR: 74 (21 Dec 2022 13:00) (66 - 100)  BP: 124/73 (20 Dec 2022 21:45) (124/73 - 124/73)  BP(mean): 91 (20 Dec 2022 21:45) (91 - 91)  RR: 17 (21 Dec 2022 13:00) (15 - 26)  SpO2: 96% (21 Dec 2022 13:00) (94% - 100%)    Parameters below as of 21 Dec 2022 11:00  Patient On (Oxygen Delivery Method): ventilator    O2 Concentration (%): 30  Daily Height in cm: 165.1 (20 Dec 2022 21:37)    Daily Weight in k.4 (21 Dec 2022 00:00)    PHYSICAL EXAM:     GENERAL:  Appears stated age, critically ill appearing  HEENT:  NC/AT,  eyes closed  CHEST:  +vented breath sounds  HEART:  Regular rhythm, S1, S2, no murmur/rub/S3/S4  ABDOMEN:  Soft, non-tender, +mildly-distended, +bowel sounds,    EXTREMITIES:  no cyanosis,clubbing or edema  SKIN:  No rash/erythema/ecchymoses/petechiae/wounds/abscess/warm/dry  NEURO:  +sedated      LABS:                        7.7    24.21 )-----------( 93       ( 21 Dec 2022 11:52 )             22.5     12-    135  |  99  |  29<H>  ----------------------------<  104<H>  3.8   |  18<L>  |  3.52<H>    Ca    8.5      21 Dec 2022 11:52  Phos  4.5     12  Mg     2.3     12-    TPro  6.0  /  Alb  3.3  /  TBili  25.5<H>  /  DBili  x   /  AST  144<H>  /  ALT  44  /  AlkPhos  136<H>  12-21    LIVER FUNCTIONS - ( 21 Dec 2022 11:52 )  Alb: 3.3 g/dL / Pro: 6.0 g/dL / ALK PHOS: 136 U/L / ALT: 44 U/L / AST: 144 U/L / GGT: x           PT/INR - ( 21 Dec 2022 05:08 )   PT: 34.1 sec;   INR: 2.93 ratio         PTT - ( 21 Dec 2022 05:08 )  PTT:51.3 sec  Urinalysis Basic - ( 20 Dec 2022 23:55 )    Color: Dark Yellow / Appearance: Turbid / S.029 / pH: x  Gluc: x / Ketone: Small  / Bili: Large / Urobili: Negative   Blood: x / Protein: 300 mg/dL / Nitrite: Negative   Leuk Esterase: Large / RBC: 5-10 /hpf / WBC 11-25   Sq Epi: x / Non Sq Epi: Moderate / Bacteria: Negative      Amylase Serum--      Lipase serum--       [id number]  Amylase Serum--      Lipase serum--       [id number]      Imaging: No new abdominal imaging               Chief Complaint:  Patient is a 62y old  Female who presents with a chief complaint of Acute liver failure (21 Dec 2022 00:28)      HPI: 62 y.o Hx significant for decompensated ETOH cirrhosis c/b ascites, alc hep (non-responsive to steroids), remote h/o thyroid cancer in her 20s s/p total thyroidectomy + RTX + radioactive iodide, HTN, 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 History obtained from pt's /NOK Arthur ([phone]) and daughter Taylor at [facility].     Of note was recently admitted to  2022 for workup and eval of new onset jaundice- during that hospitalization was found to have a UTI and dx with alc hep was treated for UTI w/ abx and started on steroids (was deemed a non-responder and steroids were subsequently stopped); s/p LVP at [place] 2022. Previously hospitalized in  at [place] for ETOH detox but pt reportedly unaware of any liver dysfunction at that time. Went to ETOH rehab 2022 and was asked to leave the facility when she was COVID+; was sober for 1 week until she started drinking again. Had also attended a few AA meetings in the past.     Has never had EGD/colon before. Per pt's family- no reported fever/chills, abdominal pain, nausea, vomiting, diarrhea, melena, hematemesis, hematochezia.    Allergies:  Macrobid (Rash)  Nexium (Rash)      Home Medications:    Hospital Medications:  chlorhexidine 0.12% Liquid 15 milliLiter(s) Oral Mucosa every 12 hours  chlorhexidine 2% Cloths 1 Application(s) Topical <User Schedule>  chlorhexidine 4% Liquid 1 Application(s) Topical <User Schedule>  CRRT Treatment    <Continuous>  fluconAZOLE IVPB 200 milliGRAM(s) IV Intermittent every 24 hours  HYDROmorphone  Injectable 0.5 milliGRAM(s) IV Push every 3 hours PRN  insulin lispro (ADMELOG) corrective regimen sliding scale   SubCutaneous every 6 hours  lactulose Syrup 20 Gram(s) Oral every 6 hours  levETIRAcetam  IVPB 750 milliGRAM(s) IV Intermittent every 12 hours  levothyroxine Injectable 70 MICROGram(s) IV Push at bedtime  meropenem  IVPB 1000 milliGRAM(s) IV Intermittent every 12 hours  norepinephrine Infusion 0.13 MICROgram(s)/kG/Min IV Continuous <Continuous>  octreotide  Infusion 50 MICROgram(s)/Hr IV Continuous <Continuous>  Phoxillum Filtration BK 4 / 2.5 5000 milliLiter(s) CRRT <Continuous>  Phoxillum Filtration BK 4 / 2.5 5000 milliLiter(s) CRRT <Continuous>  Phoxillum Filtration BK 4 / 2.5 5000 milliLiter(s) CRRT <Continuous>  rifAXIMin 550 milliGRAM(s) Oral every 12 hours  sodium chloride 0.9% lock flush 10 milliLiter(s) IV Push every 1 hour PRN  vasopressin Infusion 0.03 Unit(s)/Min IV Continuous <Continuous>      PMHX/PSHX:  HTN (hypertension)    UTI (urinary tract infection)    Intracranial tumor    GERD (gastroesophageal reflux disease)    Eczema    Thyroid cancer    COVID-19 virus infection    History of thyroid surgery    H/O total thyroidectomy    History of tonsillectomy    History of appendectomy        Family history:  FH: heart disease    FH: pancreatic cancer- brother (in his 50s)    Social History:   ETOH: +last drink 2022; drinking 2 white wine bottles/day; no h/o DUIs  Tobacco: +former smoker; quit in 20s  Drug use: none     ROS: unable to obtain as pt is intubated/sedated      Vital Signs:  Vital Signs Last 24 Hrs  T(C): 36.8 (21 Dec 2022 11:00), Max: 36.8 (21 Dec 2022 11:00)  T(F): 98.2 (21 Dec 2022 11:00), Max: 98.2 (21 Dec 2022 11:00)  HR: 74 (21 Dec 2022 13:00) (66 - 100)  BP: 124/73 (20 Dec 2022 21:45) (124/73 - 124/73)  BP(mean): 91 (20 Dec 2022 21:45) (91 - 91)  RR: 17 (21 Dec 2022 13:00) (15 - 26)  SpO2: 96% (21 Dec 2022 13:00) (94% - 100%)    Parameters below as of 21 Dec 2022 11:00  Patient On (Oxygen Delivery Method): ventilator    O2 Concentration (%): 30  Daily Height in cm: 165.1 (20 Dec 2022 21:37)    Daily Weight in k.4 (21 Dec 2022 00:00)    PHYSICAL EXAM:     GENERAL:  Appears stated age, critically ill appearing  HEENT:  NC/AT,  eyes closed  CHEST:  +vented breath sounds  HEART:  Regular rhythm, S1, S2, no murmur/rub/S3/S4  ABDOMEN:  Soft, non-tender, +mildly-distended, +bowel sounds,    EXTREMITIES:  no cyanosis,clubbing or edema  SKIN:  No rash/erythema/ecchymoses/petechiae/wounds/abscess/warm/dry  NEURO:  +sedated      LABS:                        7.7    24.21 )-----------( 93       ( 21 Dec 2022 11:52 )             22.5     12-    135  |  99  |  29<H>  ----------------------------<  104<H>  3.8   |  18<L>  |  3.52<H>    Ca    8.5      21 Dec 2022 11:52  Phos  4.5     12  Mg     2.3     12-    TPro  6.0  /  Alb  3.3  /  TBili  25.5<H>  /  DBili  x   /  AST  144<H>  /  ALT  44  /  AlkPhos  136<H>  12-21    LIVER FUNCTIONS - ( 21 Dec 2022 11:52 )  Alb: 3.3 g/dL / Pro: 6.0 g/dL / ALK PHOS: 136 U/L / ALT: 44 U/L / AST: 144 U/L / GGT: x           PT/INR - ( 21 Dec 2022 05:08 )   PT: 34.1 sec;   INR: 2.93 ratio         PTT - ( 21 Dec 2022 05:08 )  PTT:51.3 sec  Urinalysis Basic - ( 20 Dec 2022 23:55 )    Color: Dark Yellow / Appearance: Turbid / S.029 / pH: x  Gluc: x / Ketone: Small  / Bili: Large / Urobili: Negative   Blood: x / Protein: 300 mg/dL / Nitrite: Negative   Leuk Esterase: Large / RBC: 5-10 /hpf / WBC 11-25   Sq Epi: x / Non Sq Epi: Moderate / Bacteria: Negative      Amylase Serum--      Lipase serum--       [id number]  Amylase Serum--      Lipase serum--       [id number]      Imaging: No new abdominal imaging               Chief Complaint:  Patient is a 62y old  Female who presents with a chief complaint of Acute liver failure (21 Dec 2022 00:28)      HPI: 62 y.o Hx significant for decompensated ETOH cirrhosis c/b ascites (dx 2022), alc hep (dx 2022; non-responsive to steroids), remote h/o thyroid cancer in her 20s s/p total thyroidectomy + RTX + radioactive iodide, HTN, 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 History obtained from pt's /JOSUE Gibson ([phone]) and daughter Taylor at [facility].     Of note was recently admitted to  2022 for workup and eval of new onset jaundice- during that hospitalization was found to have a UTI and dx with alc hep was treated for UTI w/ abx and started on steroids (was deemed a non-responder and steroids were subsequently stopped); s/p LVP at [place] 2022. Previously hospitalized in  at [place] for ETOH detox but pt reportedly unaware of any liver dysfunction at that time. Went to ETOH rehab 2022 and was asked to leave the facility when she was COVID+; was sober for 1 week until she started drinking again. Had also attended a few AA meetings in the past.     Has never had EGD/colon before. Per pt's family- no reported fever/chills, abdominal pain, nausea, vomiting, diarrhea, melena, hematemesis, hematochezia.    Allergies:  Macrobid (Rash)  Nexium (Rash)      Home Medications:    Hospital Medications:  chlorhexidine 0.12% Liquid 15 milliLiter(s) Oral Mucosa every 12 hours  chlorhexidine 2% Cloths 1 Application(s) Topical <User Schedule>  chlorhexidine 4% Liquid 1 Application(s) Topical <User Schedule>  CRRT Treatment    <Continuous>  fluconAZOLE IVPB 200 milliGRAM(s) IV Intermittent every 24 hours  HYDROmorphone  Injectable 0.5 milliGRAM(s) IV Push every 3 hours PRN  insulin lispro (ADMELOG) corrective regimen sliding scale   SubCutaneous every 6 hours  lactulose Syrup 20 Gram(s) Oral every 6 hours  levETIRAcetam  IVPB 750 milliGRAM(s) IV Intermittent every 12 hours  levothyroxine Injectable 70 MICROGram(s) IV Push at bedtime  meropenem  IVPB 1000 milliGRAM(s) IV Intermittent every 12 hours  norepinephrine Infusion 0.13 MICROgram(s)/kG/Min IV Continuous <Continuous>  octreotide  Infusion 50 MICROgram(s)/Hr IV Continuous <Continuous>  Phoxillum Filtration BK 4 / 2.5 5000 milliLiter(s) CRRT <Continuous>  Phoxillum Filtration BK 4 / 2.5 5000 milliLiter(s) CRRT <Continuous>  Phoxillum Filtration BK 4 / 2.5 5000 milliLiter(s) CRRT <Continuous>  rifAXIMin 550 milliGRAM(s) Oral every 12 hours  sodium chloride 0.9% lock flush 10 milliLiter(s) IV Push every 1 hour PRN  vasopressin Infusion 0.03 Unit(s)/Min IV Continuous <Continuous>      PMHX/PSHX:  HTN (hypertension)    UTI (urinary tract infection)    Intracranial tumor    GERD (gastroesophageal reflux disease)    Eczema    Thyroid cancer    COVID-19 virus infection    History of thyroid surgery    H/O total thyroidectomy    History of tonsillectomy    History of appendectomy        Family history:  FH: heart disease    FH: pancreatic cancer- brother (in his 50s)    Social History:   ETOH: +last drink 2022; drinking 2 white wine bottles/day; no h/o DUIs  Tobacco: +former smoker; quit in 20s  Drug use: none     ROS: unable to obtain as pt is intubated/sedated      Vital Signs:  Vital Signs Last 24 Hrs  T(C): 36.8 (21 Dec 2022 11:00), Max: 36.8 (21 Dec 2022 11:00)  T(F): 98.2 (21 Dec 2022 11:00), Max: 98.2 (21 Dec 2022 11:00)  HR: 74 (21 Dec 2022 13:00) (66 - 100)  BP: 124/73 (20 Dec 2022 21:45) (124/73 - 124/73)  BP(mean): 91 (20 Dec 2022 21:45) (91 - 91)  RR: 17 (21 Dec 2022 13:00) (15 - 26)  SpO2: 96% (21 Dec 2022 13:00) (94% - 100%)    Parameters below as of 21 Dec 2022 11:00  Patient On (Oxygen Delivery Method): ventilator    O2 Concentration (%): 30  Daily Height in cm: 165.1 (20 Dec 2022 21:37)    Daily Weight in k.4 (21 Dec 2022 00:00)    PHYSICAL EXAM:     GENERAL:  Appears stated age, critically ill appearing  HEENT:  NC/AT,  eyes closed  CHEST:  +vented breath sounds  HEART:  Regular rhythm, S1, S2, no murmur/rub/S3/S4  ABDOMEN:  Soft, non-tender, +mildly-distended, +bowel sounds,    EXTREMITIES:  no cyanosis,clubbing or edema  SKIN:  No rash/erythema/ecchymoses/petechiae/wounds/abscess/warm/dry  NEURO:  +sedated      LABS:                        7.7    24.21 )-----------( 93       ( 21 Dec 2022 11:52 )             22.5     12-    135  |  99  |  29<H>  ----------------------------<  104<H>  3.8   |  18<L>  |  3.52<H>    Ca    8.5      21 Dec 2022 11:52  Phos  4.5     12-  Mg     2.3     12-    TPro  6.0  /  Alb  3.3  /  TBili  25.5<H>  /  DBili  x   /  AST  144<H>  /  ALT  44  /  AlkPhos  136<H>  12-21    LIVER FUNCTIONS - ( 21 Dec 2022 11:52 )  Alb: 3.3 g/dL / Pro: 6.0 g/dL / ALK PHOS: 136 U/L / ALT: 44 U/L / AST: 144 U/L / GGT: x           PT/INR - ( 21 Dec 2022 05:08 )   PT: 34.1 sec;   INR: 2.93 ratio         PTT - ( 21 Dec 2022 05:08 )  PTT:51.3 sec  Urinalysis Basic - ( 20 Dec 2022 23:55 )    Color: Dark Yellow / Appearance: Turbid / S.029 / pH: x  Gluc: x / Ketone: Small  / Bili: Large / Urobili: Negative   Blood: x / Protein: 300 mg/dL / Nitrite: Negative   Leuk Esterase: Large / RBC: 5-10 /hpf / WBC 11-25   Sq Epi: x / Non Sq Epi: Moderate / Bacteria: Negative      Amylase Serum--      Lipase serum--       [id number]  Amylase Serum--      Lipase serum--       [id number]      Imaging: Pending abdominal imaging               Chief Complaint:  Patient is a 62y old  Female who presents with a chief complaint of Acute liver failure (21 Dec 2022 00:28)      HPI: 62 y.o Hx significant for decompensated ETOH cirrhosis c/b ascites (dx 2022), alc hep (dx 2022; non-responsive to steroids), remote h/o thyroid cancer in her 20s s/p total thyroidectomy + RTX + radioactive iodide, HTN, 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 History obtained from pt's /JOSUE Gibson ([phone]) and daughter Taylor at [facility].     Of note was recently admitted to  2022 for workup and eval of new onset jaundice- during that hospitalization was found to have a UTI and dx with alc hep was treated for UTI w/ abx and started on steroids (was deemed a non-responder and steroids were subsequently stopped); s/p LVP at [place] 2022. Previously hospitalized in  at [place] for ETOH detox but pt reportedly unaware of any liver dysfunction at that time. Went to ETOH rehab 2022 and was asked to leave the facility when she was COVID+; was sober for 1 week until she started drinking again. Had also attended a few AA meetings in the past.     Has never had EGD/colon before. Per pt's family- no reported fever/chills, abdominal pain, nausea, vomiting, diarrhea, melena, hematemesis, hematochezia.    Allergies:  Macrobid (Rash)  Nexium (Rash)      Home Medications:    Hospital Medications:  chlorhexidine 0.12% Liquid 15 milliLiter(s) Oral Mucosa every 12 hours  chlorhexidine 2% Cloths 1 Application(s) Topical <User Schedule>  chlorhexidine 4% Liquid 1 Application(s) Topical <User Schedule>  CRRT Treatment    <Continuous>  fluconAZOLE IVPB 200 milliGRAM(s) IV Intermittent every 24 hours  HYDROmorphone  Injectable 0.5 milliGRAM(s) IV Push every 3 hours PRN  insulin lispro (ADMELOG) corrective regimen sliding scale   SubCutaneous every 6 hours  lactulose Syrup 20 Gram(s) Oral every 6 hours  levETIRAcetam  IVPB 750 milliGRAM(s) IV Intermittent every 12 hours  levothyroxine Injectable 70 MICROGram(s) IV Push at bedtime  meropenem  IVPB 1000 milliGRAM(s) IV Intermittent every 12 hours  norepinephrine Infusion 0.13 MICROgram(s)/kG/Min IV Continuous <Continuous>  octreotide  Infusion 50 MICROgram(s)/Hr IV Continuous <Continuous>  Phoxillum Filtration BK 4 / 2.5 5000 milliLiter(s) CRRT <Continuous>  Phoxillum Filtration BK 4 / 2.5 5000 milliLiter(s) CRRT <Continuous>  Phoxillum Filtration BK 4 / 2.5 5000 milliLiter(s) CRRT <Continuous>  rifAXIMin 550 milliGRAM(s) Oral every 12 hours  sodium chloride 0.9% lock flush 10 milliLiter(s) IV Push every 1 hour PRN  vasopressin Infusion 0.03 Unit(s)/Min IV Continuous <Continuous>      PMHX/PSHX:  HTN (hypertension)    UTI (urinary tract infection)    Intracranial tumor    GERD (gastroesophageal reflux disease)    Eczema    Thyroid cancer    COVID-19 virus infection    History of thyroid surgery    H/O total thyroidectomy    History of tonsillectomy    History of appendectomy        Family history:  FH: heart disease    FH: pancreatic cancer- brother (in his 50s)    Social History:   ETOH: +last drink 2022; drinking 2 white wine bottles/day; no h/o DUIs  Tobacco: +former smoker; quit in 20s  Drug use: none     ROS: unable to obtain as pt is intubated/sedated      Vital Signs:  Vital Signs Last 24 Hrs  T(C): 36.8 (21 Dec 2022 11:00), Max: 36.8 (21 Dec 2022 11:00)  T(F): 98.2 (21 Dec 2022 11:00), Max: 98.2 (21 Dec 2022 11:00)  HR: 74 (21 Dec 2022 13:00) (66 - 100)  BP: 124/73 (20 Dec 2022 21:45) (124/73 - 124/73)  BP(mean): 91 (20 Dec 2022 21:45) (91 - 91)  RR: 17 (21 Dec 2022 13:00) (15 - 26)  SpO2: 96% (21 Dec 2022 13:00) (94% - 100%)    Parameters below as of 21 Dec 2022 11:00  Patient On (Oxygen Delivery Method): ventilator    O2 Concentration (%): 30  Daily Height in cm: 165.1 (20 Dec 2022 21:37)    Daily Weight in k.4 (21 Dec 2022 00:00)    PHYSICAL EXAM:     GENERAL:  Appears stated age, critically ill appearing  HEENT:  NC/AT,  eyes closed  CHEST:  +vented breath sounds  HEART:  Regular rhythm, S1, S2, no murmur/rub/S3/S4  ABDOMEN:  Soft, non-tender, +mildly-distended, +bowel sounds,    EXTREMITIES:  no cyanosis,clubbing or edema  SKIN:  No rash/erythema/ecchymoses/petechiae/wounds/abscess/warm/dry  NEURO:  +sedated      LABS:                        7.7    24.21 )-----------( 93       ( 21 Dec 2022 11:52 )             22.5     12-    135  |  99  |  29<H>  ----------------------------<  104<H>  3.8   |  18<L>  |  3.52<H>    Ca    8.5      21 Dec 2022 11:52  Phos  4.5     12-  Mg     2.3     12-    TPro  6.0  /  Alb  3.3  /  TBili  25.5<H>  /  DBili  x   /  AST  144<H>  /  ALT  44  /  AlkPhos  136<H>  12-21    LIVER FUNCTIONS - ( 21 Dec 2022 11:52 )  Alb: 3.3 g/dL / Pro: 6.0 g/dL / ALK PHOS: 136 U/L / ALT: 44 U/L / AST: 144 U/L / GGT: x           PT/INR - ( 21 Dec 2022 05:08 )   PT: 34.1 sec;   INR: 2.93 ratio         PTT - ( 21 Dec 2022 05:08 )  PTT:51.3 sec  Urinalysis Basic - ( 20 Dec 2022 23:55 )    Color: Dark Yellow / Appearance: Turbid / S.029 / pH: x  Gluc: x / Ketone: Small  / Bili: Large / Urobili: Negative   Blood: x / Protein: 300 mg/dL / Nitrite: Negative   Leuk Esterase: Large / RBC: 5-10 /hpf / WBC 11-25   Sq Epi: x / Non Sq Epi: Moderate / Bacteria: Negative      Amylase Serum--      Lipase serum--       [id number]  Amylase Serum--      Lipase serum--       [id number]      Imaging: Pending abdominal imaging               Chief Complaint:  Patient is a 62y old  Female who presents with a chief complaint of Acute liver failure (21 Dec 2022 00:28)      HPI: 62 y.o Hx significant for decompensated ETOH cirrhosis c/b ascites (dx 2022), alc hep (dx 2022; non-responsive to steroids), remote h/o thyroid cancer in her 20s s/p total thyroidectomy + RTX + radioactive iodide, HTN, 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 History obtained from pt's /JOSUE Gibson ([phone]) and daughter Taylor at [facility].     Of note was recently admitted to  2022 for workup and eval of new onset jaundice- during that hospitalization was found to have a UTI and dx with alc hep was treated for UTI w/ abx and started on steroids (was deemed a non-responder and steroids were subsequently stopped); s/p LVP at [place] 2022. Previously hospitalized in  at [place] for ETOH detox but pt reportedly unaware of any liver dysfunction at that time. Went to ETOH rehab 2022 and was asked to leave the facility when she was COVID+; was sober for 1 week until she started drinking again. Had also attended a few AA meetings in the past.     Has never had EGD/colon before. Per pt's family- no reported fever/chills, abdominal pain, nausea, vomiting, diarrhea, melena, hematemesis, hematochezia.    Allergies:  Macrobid (Rash)  Nexium (Rash)      Home Medications:    Hospital Medications:  chlorhexidine 0.12% Liquid 15 milliLiter(s) Oral Mucosa every 12 hours  chlorhexidine 2% Cloths 1 Application(s) Topical <User Schedule>  chlorhexidine 4% Liquid 1 Application(s) Topical <User Schedule>  CRRT Treatment    <Continuous>  fluconAZOLE IVPB 200 milliGRAM(s) IV Intermittent every 24 hours  HYDROmorphone  Injectable 0.5 milliGRAM(s) IV Push every 3 hours PRN  insulin lispro (ADMELOG) corrective regimen sliding scale   SubCutaneous every 6 hours  lactulose Syrup 20 Gram(s) Oral every 6 hours  levETIRAcetam  IVPB 750 milliGRAM(s) IV Intermittent every 12 hours  levothyroxine Injectable 70 MICROGram(s) IV Push at bedtime  meropenem  IVPB 1000 milliGRAM(s) IV Intermittent every 12 hours  norepinephrine Infusion 0.13 MICROgram(s)/kG/Min IV Continuous <Continuous>  octreotide  Infusion 50 MICROgram(s)/Hr IV Continuous <Continuous>  Phoxillum Filtration BK 4 / 2.5 5000 milliLiter(s) CRRT <Continuous>  Phoxillum Filtration BK 4 / 2.5 5000 milliLiter(s) CRRT <Continuous>  Phoxillum Filtration BK 4 / 2.5 5000 milliLiter(s) CRRT <Continuous>  rifAXIMin 550 milliGRAM(s) Oral every 12 hours  sodium chloride 0.9% lock flush 10 milliLiter(s) IV Push every 1 hour PRN  vasopressin Infusion 0.03 Unit(s)/Min IV Continuous <Continuous>      PMHX/PSHX:  HTN (hypertension)    UTI (urinary tract infection)    Intracranial tumor    GERD (gastroesophageal reflux disease)    Eczema    Thyroid cancer    COVID-19 virus infection    History of thyroid surgery    H/O total thyroidectomy    History of tonsillectomy    History of appendectomy        Family history:  FH: heart disease    FH: pancreatic cancer- brother (in his 50s)    Social History:   ETOH: +last drink 2022; drinking 2 white wine bottles/day; no h/o DUIs  Tobacco: +former smoker; quit in 20s  Drug use: none     ROS: unable to obtain as pt is intubated/sedated      Vital Signs:  Vital Signs Last 24 Hrs  T(C): 36.8 (21 Dec 2022 11:00), Max: 36.8 (21 Dec 2022 11:00)  T(F): 98.2 (21 Dec 2022 11:00), Max: 98.2 (21 Dec 2022 11:00)  HR: 74 (21 Dec 2022 13:00) (66 - 100)  BP: 124/73 (20 Dec 2022 21:45) (124/73 - 124/73)  BP(mean): 91 (20 Dec 2022 21:45) (91 - 91)  RR: 17 (21 Dec 2022 13:00) (15 - 26)  SpO2: 96% (21 Dec 2022 13:00) (94% - 100%)    Parameters below as of 21 Dec 2022 11:00  Patient On (Oxygen Delivery Method): ventilator    O2 Concentration (%): 30  Daily Height in cm: 165.1 (20 Dec 2022 21:37)    Daily Weight in k.4 (21 Dec 2022 00:00)    PHYSICAL EXAM:     GENERAL:  Appears stated age, critically ill appearing  HEENT:  NC/AT,  eyes closed  CHEST:  +vented breath sounds  HEART:  Regular rhythm, S1, S2, no murmur/rub/S3/S4  ABDOMEN:  Soft, non-tender, +mildly-distended, +bowel sounds,    EXTREMITIES:  no cyanosis,clubbing or edema  SKIN:  No rash/erythema/ecchymoses/petechiae/wounds/abscess/warm/dry  NEURO:  +sedated      LABS:                        7.7    24.21 )-----------( 93       ( 21 Dec 2022 11:52 )             22.5     12-    135  |  99  |  29<H>  ----------------------------<  104<H>  3.8   |  18<L>  |  3.52<H>    Ca    8.5      21 Dec 2022 11:52  Phos  4.5     12-  Mg     2.3     12-    TPro  6.0  /  Alb  3.3  /  TBili  25.5<H>  /  DBili  x   /  AST  144<H>  /  ALT  44  /  AlkPhos  136<H>  12-21    LIVER FUNCTIONS - ( 21 Dec 2022 11:52 )  Alb: 3.3 g/dL / Pro: 6.0 g/dL / ALK PHOS: 136 U/L / ALT: 44 U/L / AST: 144 U/L / GGT: x           PT/INR - ( 21 Dec 2022 05:08 )   PT: 34.1 sec;   INR: 2.93 ratio         PTT - ( 21 Dec 2022 05:08 )  PTT:51.3 sec  Urinalysis Basic - ( 20 Dec 2022 23:55 )    Color: Dark Yellow / Appearance: Turbid / S.029 / pH: x  Gluc: x / Ketone: Small  / Bili: Large / Urobili: Negative   Blood: x / Protein: 300 mg/dL / Nitrite: Negative   Leuk Esterase: Large / RBC: 5-10 /hpf / WBC 11-25   Sq Epi: x / Non Sq Epi: Moderate / Bacteria: Negative      Amylase Serum--      Lipase serum--       [id number]  Amylase Serum--      Lipase serum--       [id number]      Imaging: Pending abdominal imaging

## 2022-12-21 NOTE — PRE PROCEDURE NOTE - PRE PROCEDURE EVALUATION
Interventional Radiology    HPI: 62 y.o Hx significant for remote AUD, h/o thyroid cancer in her 20s s/p total thyroidectomy + RTX + radioactive iodide, HTN, ventricle neoplasm (dx 2021) s/p right frontal craniotomy (03/2022) for resection, post operative course c/b hemorrhage right lateral ventricle (managed non-operatively) who was initially admitted to [facility] 12/19 with new onset seizures.   Transferred from [facility] 12/20 for further management of acute liver failure and liver transplant evaluation 2/2 known ETOH Cirrhosis. IR consulted for diagnostic and therapeutic paracentesis.     Allergies: Macrobid (Rash)  Nexium (Rash)    Medications (Abx/Cardiac/Anticoagulation/Blood Products)  fluconAZOLE IVPB: 100 mL/Hr IV Intermittent (12-20 @ 22:40)  meropenem  IVPB: 100 mL/Hr IV Intermittent (12-21 @ 05:19)  norepinephrine Infusion: 16.1 mL/Hr IV Continuous (12-21 @ 05:19)  rifAXIMin: 550 milliGRAM(s) Oral (12-21 @ 05:18)    Data:  165.1  66.2  T(C): 36.1  HR: 72  BP: 100/60  RR: 15  SpO2: 96%    Disorder of liver    FH: heart disease    FH: pancreatic cancer    Handoff    HTN (hypertension)    UTI (urinary tract infection)    Intracranial tumor    GERD (gastroesophageal reflux disease)    Eczema    Thyroid cancer    COVID-19 virus infection    RED (acute kidney injury)    History of thyroid surgery    H/O total thyroidectomy    History of tonsillectomy    History of appendectomy    ENCOUNTER FOR OTHER GENERAL EX    SysAdmin_VstLnk        Exam  General: No acute distress  Chest: Non labored breathing    -WBC 24.21 / HgB 7.7 / Hct 22.5 / Plt 93  -Na 135 / Cl 99 / BUN 29 / Glucose 104  -K 3.8 / CO2 18 / Cr 3.52  -ALT 44 / Alk Phos 136 / T.Bili 25.5  -INR2.93    Imaging:     Plan: 62y Female presents for paracentesis.  - will drain to completion with 1:1 albumin replacement  -Risks/Benefits/alternatives explained with the patient and/or healthcare proxy and witnessed informed consent obtained.    Interventional Radiology    HPI: 62 y.o Hx significant for remote AUD, h/o thyroid cancer in her 20s s/p total thyroidectomy + RTX + radioactive iodide, HTN, ventricle neoplasm (dx 2021) s/p right frontal craniotomy (03/2022) for resection, post operative course c/b hemorrhage right lateral ventricle (managed non-operatively) who was initially admitted to [facility] 12/19 with new onset seizures.   Transferred from [facility] 12/20 for further management of acute liver failure and liver transplant evaluation 2/2 known ETOH Cirrhosis. IR consulted for diagnostic and therapeutic paracentesis.     Allergies: Macrobid (Rash)  Nexium (Rash)    Medications (Abx/Cardiac/Anticoagulation/Blood Products)  fluconAZOLE IVPB: 100 mL/Hr IV Intermittent (12-20 @ 22:40)  meropenem  IVPB: 100 mL/Hr IV Intermittent (12-21 @ 05:19)  norepinephrine Infusion: 16.1 mL/Hr IV Continuous (12-21 @ 05:19)  rifAXIMin: 550 milliGRAM(s) Oral (12-21 @ 05:18)    Data:  165.1  66.2  T(C): 36.1  HR: 72  BP: 100/60  RR: 15  SpO2: 96%    Disorder of liver    FH: heart disease    FH: pancreatic cancer    Handoff    HTN (hypertension)    UTI (urinary tract infection)    Intracranial tumor    GERD (gastroesophageal reflux disease)    Eczema    Thyroid cancer    COVID-19 virus infection    RED (acute kidney injury)    History of thyroid surgery    H/O total thyroidectomy    History of tonsillectomy    History of appendectomy    ENCOUNTER FOR OTHER GENERAL EX    SysAdmin_VstLnk        Exam  General: No acute distress  Chest: Non labored breathing    -WBC 24.21 / HgB 7.7 / Hct 22.5 / Plt 93  -Na 135 / Cl 99 / BUN 29 / Glucose 104  -K 3.8 / CO2 18 / Cr 3.52  -ALT 44 / Alk Phos 136 / T.Bili 25.5  -INR2.93    Imaging:     Plan: 62y Female presents for paracentesis.  - will drain to completion with 1:1 albumin replacement  -Risks/Benefits/alternatives explained with the patient and/or healthcare proxy and witnessed informed consent obtained.    Interventional Radiology    HPI: 62 y.o Hx significant for remote AUD, h/o thyroid cancer in her 20s s/p total thyroidectomy + RTX + radioactive iodide, HTN, ventricle neoplasm (dx 2021) s/p right frontal craniotomy (03/2022) for resection, post operative course c/b hemorrhage right lateral ventricle (managed non-operatively) who was initially admitted to [facility] 12/19 with new onset seizures.   Transferred from [facility] 12/20 for further management of acute liver failure and liver transplant evaluation 2/2 known ETOH Cirrhosis. IR consulted for diagnostic and therapeutic paracentesis.     Allergies: Macrobid (Rash)  Nexium (Rash)    Medications (Abx/Cardiac/Anticoagulation/Blood Products)  fluconAZOLE IVPB: 100 mL/Hr IV Intermittent (12-20 @ 22:40)  meropenem  IVPB: 100 mL/Hr IV Intermittent (12-21 @ 05:19)  norepinephrine Infusion: 16.1 mL/Hr IV Continuous (12-21 @ 05:19)  rifAXIMin: 550 milliGRAM(s) Oral (12-21 @ 05:18)    Data:  165.1  66.2  T(C): 36.1  HR: 72  BP: 100/60  RR: 15  SpO2: 96%    Disorder of liver    FH: heart disease    FH: pancreatic cancer    Handoff    HTN (hypertension)    UTI (urinary tract infection)    Intracranial tumor    GERD (gastroesophageal reflux disease)    Eczema    Thyroid cancer    COVID-19 virus infection    RED (acute kidney injury)    History of thyroid surgery    H/O total thyroidectomy    History of tonsillectomy    History of appendectomy    ENCOUNTER FOR OTHER GENERAL EX    SysAdmin_VstLnk        Exam  General: No acute distress  Chest: Non labored breathing    -WBC 24.21 / HgB 7.7 / Hct 22.5 / Plt 93  -Na 135 / Cl 99 / BUN 29 / Glucose 104  -K 3.8 / CO2 18 / Cr 3.52  -ALT 44 / Alk Phos 136 / T.Bili 25.5  -INR2.93    Imaging:     Plan: 62y Female presents for paracentesis.  - will drain to completion with 1:1 albumin replacement  -Risks/Benefits/alternatives explained with the patient and/or healthcare proxy and witnessed informed consent obtained.

## 2022-12-21 NOTE — DIETITIAN INITIAL EVALUATION ADULT - [id number]_A_CORE
12-20-22 @ 07:01  -  12-21-22 @ 07:00  --------------------------------------------------------  OUT:    Rectal Tube (mL): 350 mL  Total OUT: 350 mL    Total NET: -350 mL

## 2022-12-21 NOTE — CONSULT NOTE ADULT - ASSESSMENT
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    UA (12/19) Moderate Leukocyte Esterase  UCx (12/19) No Growth  BCx (12/19) Gram Positive Rods (1/4 Bottles)  MRSA Nasal PCR (12/19) Negative  Paracentesis (12/19) Cell counts not suggestive of SBP    Favor that gram positive will be representative of contaminant but reasonable to cover for now    #Leukocytosis, Positive Blood Culture, Transaminitis, Cirrhosis  --Recommend addition of Vancomycin pending gram positive ID  --Agree with empiric meropenem for now  --If continued seizures would also pursue lumbar puncuture to exclude CNS infection (if safe to pursue)  --Continue to follow CBC with diff  --Continue to follow transaminases  --Continue to follow temperature curve  --Follow up on preliminary blood cultures    I will continue to follow. Please feel free to contact me with any further questions.    Jonah Mendoza M.D.  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 12/20 for LT eval for [facility].    UA (12/19) Moderate Leukocyte Esterase  UCx (12/19) No Growth  BCx (12/19) Gram Positive Rods (1/4 Bottles)  MRSA Nasal PCR (12/19) Negative  Paracentesis (12/19) Cell counts not suggestive of SBP    Favor that gram positive will be representative of contaminant but reasonable to cover for now    #Leukocytosis, Positive Blood Culture, Transaminitis, Cirrhosis  --Recommend addition of Vancomycin pending gram positive ID  --Agree with empiric meropenem for now  --If continued seizures would also pursue lumbar puncuture to exclude CNS infection (if safe to pursue)  --Continue to follow CBC with diff  --Continue to follow transaminases  --Continue to follow temperature curve  --Follow up on preliminary blood cultures    I will continue to follow. Please feel free to contact me with any further questions.    Jonah Mendoza M.D.  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    UA (12/19) Moderate Leukocyte Esterase  UCx (12/19) No Growth  BCx (12/19) Gram Positive Rods (1/4 Bottles)  MRSA Nasal PCR (12/19) Negative  Paracentesis (12/19) Cell counts not suggestive of SBP    Favor that gram positive will be representative of contaminant but reasonable to cover for now    #Leukocytosis, Positive Blood Culture, Transaminitis, Cirrhosis  --Recommend addition of Vancomycin pending gram positive ID  --Agree with empiric meropenem for now  --If continued seizures would also pursue lumbar puncuture to exclude CNS infection (if safe to pursue)  --Continue to follow CBC with diff  --Continue to follow transaminases  --Continue to follow temperature curve  --Follow up on preliminary blood cultures    I will continue to follow. Please feel free to contact me with any further questions.    Jonah Mendoza M.D.  [facility] Division of Infectious Disease  8AM-5PM Monday - Friday: Available on Microsoft Teams  After Hours and Holidays (or if no response on Microsoft Teams): Please contact the Infectious Diseases Office at [phone]    The above assessment and plan were discussed with 8ICU Team and Al, transplant surgery PA

## 2022-12-21 NOTE — CONSULT NOTE ADULT - SUBJECTIVE AND OBJECTIVE BOX
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   Transferred from [facility] 12/20 for further management of acute liver failure and liver transplant evaluation 2/2 known ETOH Cirrhosis. IR consulted for diagnostic and therapeutic paracentesis.     Allergies: Macrobid (Rash)  Nexium (Rash)    Medications (Abx/Cardiac/Anticoagulation/Blood Products)  fluconAZOLE IVPB: 100 mL/Hr IV Intermittent (12-20 @ 22:40)  meropenem  IVPB: 100 mL/Hr IV Intermittent (12-21 @ 05:19)  norepinephrine Infusion: 16.1 mL/Hr IV Continuous (12-21 @ 05:19)  rifAXIMin: 550 milliGRAM(s) Oral (12-21 @ 05:18)    Data:  165.1  66.2  T(C): 36.8  HR: 72  BP: 124/73  RR: 15  SpO2: 96%    -WBC 24.21 / HgB 7.7 / Hct 22.5 / Plt 93  -Na 135 / Cl 99 / BUN 29 / Glucose 104  -K 3.8 / CO2 18 / Cr 3.52  -ALT 44 / Alk Phos 136 / T.Bili 25.5  -INR 2.93 / PTT 51.3    Radiology: reviewed    Assessment/Plan: 62 y.o Hx significant for remote AUD, h/o thyroid cancer in her 20s s/p total thyroidectomy + RTX + radioactive iodide, HTN, 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   Transferred from [facility] 12/20 for further management of acute liver failure and liver transplant evaluation 2/2 known ETOH Cirrhosis. IR consulted for diagnostic and therapeutic paracentesis.     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 12/19 with new onset seizures.   Transferred from [facility] 12/20 for further management of acute liver failure and liver transplant evaluation 2/2 known ETOH Cirrhosis. IR consulted for diagnostic and therapeutic paracentesis.     Allergies: Macrobid (Rash)  Nexium (Rash)    Medications (Abx/Cardiac/Anticoagulation/Blood Products)  fluconAZOLE IVPB: 100 mL/Hr IV Intermittent (12-20 @ 22:40)  meropenem  IVPB: 100 mL/Hr IV Intermittent (12-21 @ 05:19)  norepinephrine Infusion: 16.1 mL/Hr IV Continuous (12-21 @ 05:19)  rifAXIMin: 550 milliGRAM(s) Oral (12-21 @ 05:18)    Data:  165.1  66.2  T(C): 36.8  HR: 72  BP: 124/73  RR: 15  SpO2: 96%    -WBC 24.21 / HgB 7.7 / Hct 22.5 / Plt 93  -Na 135 / Cl 99 / BUN 29 / Glucose 104  -K 3.8 / CO2 18 / Cr 3.52  -ALT 44 / Alk Phos 136 / T.Bili 25.5  -INR 2.93 / PTT 51.3    Radiology: reviewed    Assessment/Plan: 62 y.o Hx significant for remote AUD, h/o thyroid cancer in her 20s s/p total thyroidectomy + RTX + radioactive iodide, HTN, 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 12/19 with new onset seizures.   Transferred from [facility] 12/20 for further management of acute liver failure and liver transplant evaluation 2/2 known ETOH Cirrhosis. IR consulted for diagnostic and therapeutic paracentesis.     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   Transferred from [facility] 12/20 for further management of acute liver failure and liver transplant evaluation 2/2 known ETOH Cirrhosis. IR consulted for diagnostic and therapeutic paracentesis.     Allergies: Macrobid (Rash)  Nexium (Rash)    Medications (Abx/Cardiac/Anticoagulation/Blood Products)  fluconAZOLE IVPB: 100 mL/Hr IV Intermittent (12-20 @ 22:40)  meropenem  IVPB: 100 mL/Hr IV Intermittent (12-21 @ 05:19)  norepinephrine Infusion: 16.1 mL/Hr IV Continuous (12-21 @ 05:19)  rifAXIMin: 550 milliGRAM(s) Oral (12-21 @ 05:18)    Data:  165.1  66.2  T(C): 36.8  HR: 72  BP: 124/73  RR: 15  SpO2: 96%    -WBC 24.21 / HgB 7.7 / Hct 22.5 / Plt 93  -Na 135 / Cl 99 / BUN 29 / Glucose 104  -K 3.8 / CO2 18 / Cr 3.52  -ALT 44 / Alk Phos 136 / T.Bili 25.5  -INR 2.93 / PTT 51.3    Radiology: reviewed    Assessment/Plan: 62 y.o Hx significant for remote AUD, h/o thyroid cancer in her 20s s/p total thyroidectomy + RTX + radioactive iodide, HTN, 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   Transferred from [facility] 12/20 for further management of acute liver failure and liver transplant evaluation 2/2 known ETOH Cirrhosis. IR consulted for diagnostic and therapeutic paracentesis.     - case reviewed and approved for today  - please place IR procedure order under ANNA De La Cruz  - will drain to completion with 1:1 albumin replacement  - d/w primary team    Laurie De La Cruz NP  Available on Teams

## 2022-12-21 NOTE — DIETITIAN INITIAL EVALUATION ADULT - OTHER CALCULATIONS
-Estimated needs calculated with concern for intubation status  -[place]: 1339kcal/day (12/21)   -Defer fluids to Team  -Estimated needs calculated with concern for intubation status  -[place]: 1339kcal/day (12/21)   -Defer fluids to Team  -Estimated needs calculated with concern for intubation status  -[place]: 1339kcal/day (12/21)   -Defer fluids to Team

## 2022-12-22 DIAGNOSIS — E83.39 OTHER DISORDERS OF PHOSPHORUS METABOLISM: ICD-10-CM

## 2022-12-22 DIAGNOSIS — E87.20 ACIDOSIS, UNSPECIFIED: ICD-10-CM

## 2022-12-22 LAB
ALBUMIN SERPL ELPH-MCNC: 3.8 G/DL — SIGNIFICANT CHANGE UP (ref 3.3–5)
ALBUMIN SERPL ELPH-MCNC: 4 G/DL — SIGNIFICANT CHANGE UP (ref 3.3–5)
ALBUMIN SERPL ELPH-MCNC: 4.2 G/DL — SIGNIFICANT CHANGE UP (ref 3.3–5)
ALP SERPL-CCNC: 110 U/L — SIGNIFICANT CHANGE UP (ref 40–120)
ALP SERPL-CCNC: 117 U/L — SIGNIFICANT CHANGE UP (ref 40–120)
ALP SERPL-CCNC: 119 U/L — SIGNIFICANT CHANGE UP (ref 40–120)
ALP SERPL-CCNC: 124 U/L — HIGH (ref 40–120)
ALT FLD-CCNC: 35 U/L — SIGNIFICANT CHANGE UP (ref 10–45)
ALT FLD-CCNC: 36 U/L — SIGNIFICANT CHANGE UP (ref 10–45)
ALT FLD-CCNC: 37 U/L — SIGNIFICANT CHANGE UP (ref 10–45)
ALT FLD-CCNC: 40 U/L — SIGNIFICANT CHANGE UP (ref 10–45)
AMMONIA BLD-MCNC: 93 UMOL/L — HIGH (ref 11–55)
ANION GAP SERPL CALC-SCNC: 18 MMOL/L — HIGH (ref 5–17)
ANION GAP SERPL CALC-SCNC: 19 MMOL/L — HIGH (ref 5–17)
APTT BLD: 56.7 SEC — HIGH (ref 27.5–35.5)
AST SERPL-CCNC: 116 U/L — HIGH (ref 10–40)
AST SERPL-CCNC: 154 U/L — HIGH (ref 10–40)
AST SERPL-CCNC: 91 U/L — HIGH (ref 10–40)
AST SERPL-CCNC: 92 U/L — HIGH (ref 10–40)
BILIRUB SERPL-MCNC: 26.1 MG/DL — HIGH (ref 0.2–1.2)
BILIRUB SERPL-MCNC: 26.4 MG/DL — HIGH (ref 0.2–1.2)
BILIRUB SERPL-MCNC: 26.6 MG/DL — HIGH (ref 0.2–1.2)
BILIRUB SERPL-MCNC: 27.2 MG/DL — HIGH (ref 0.2–1.2)
BUN SERPL-MCNC: 20 MG/DL — SIGNIFICANT CHANGE UP (ref 7–23)
BUN SERPL-MCNC: 24 MG/DL — HIGH (ref 7–23)
BUN SERPL-MCNC: 32 MG/DL — HIGH (ref 7–23)
CALCIUM SERPL-MCNC: 9.2 MG/DL — SIGNIFICANT CHANGE UP (ref 8.4–10.5)
CALCIUM SERPL-MCNC: 9.3 MG/DL — SIGNIFICANT CHANGE UP (ref 8.4–10.5)
CALCIUM SERPL-MCNC: 9.4 MG/DL — SIGNIFICANT CHANGE UP (ref 8.4–10.5)
CHLORIDE SERPL-SCNC: 100 MMOL/L — SIGNIFICANT CHANGE UP (ref 96–108)
CHLORIDE SERPL-SCNC: 101 MMOL/L — SIGNIFICANT CHANGE UP (ref 96–108)
CHLORIDE SERPL-SCNC: 102 MMOL/L — SIGNIFICANT CHANGE UP (ref 96–108)
CO2 SERPL-SCNC: 16 MMOL/L — LOW (ref 22–31)
CO2 SERPL-SCNC: 17 MMOL/L — LOW (ref 22–31)
CO2 SERPL-SCNC: 18 MMOL/L — LOW (ref 22–31)
CREAT SERPL-MCNC: 2.34 MG/DL — HIGH (ref 0.5–1.3)
CREAT SERPL-MCNC: 2.82 MG/DL — HIGH (ref 0.5–1.3)
CREAT SERPL-MCNC: 3.73 MG/DL — HIGH (ref 0.5–1.3)
CREAT SERPL-MCNC: 3.74 MG/DL — HIGH (ref 0.5–1.3)
EGFR: 13 ML/MIN/1.73M2 — LOW
EGFR: 18 ML/MIN/1.73M2 — LOW
EGFR: 23 ML/MIN/1.73M2 — LOW
GAS PNL BLDA: SIGNIFICANT CHANGE UP
GLUCOSE BLDC GLUCOMTR-MCNC: 157 MG/DL — HIGH (ref 70–99)
GLUCOSE BLDC GLUCOMTR-MCNC: 164 MG/DL — HIGH (ref 70–99)
GLUCOSE SERPL-MCNC: 139 MG/DL — HIGH (ref 70–99)
GLUCOSE SERPL-MCNC: 152 MG/DL — HIGH (ref 70–99)
GLUCOSE SERPL-MCNC: 94 MG/DL — SIGNIFICANT CHANGE UP (ref 70–99)
GLUCOSE SERPL-MCNC: 98 MG/DL — SIGNIFICANT CHANGE UP (ref 70–99)
GRAM STN FLD: SIGNIFICANT CHANGE UP
HCT VFR BLD CALC: 22.8 % — LOW (ref 34.5–45)
HCT VFR BLD CALC: 23 % — LOW (ref 34.5–45)
HCT VFR BLD CALC: 23.3 % — LOW (ref 34.5–45)
HCT VFR BLD CALC: 24.5 % — LOW (ref 34.5–45)
HGB BLD-MCNC: 7.9 G/DL — LOW (ref 11.5–15.5)
HGB BLD-MCNC: 8 G/DL — LOW (ref 11.5–15.5)
HGB BLD-MCNC: 8.2 G/DL — LOW (ref 11.5–15.5)
INR BLD: 2.4 RATIO — HIGH (ref 0.88–1.16)
INR BLD: 2.41 RATIO — HIGH (ref 0.88–1.16)
MAGNESIUM SERPL-MCNC: 2.2 MG/DL — SIGNIFICANT CHANGE UP (ref 1.6–2.6)
MAGNESIUM SERPL-MCNC: 2.3 MG/DL — SIGNIFICANT CHANGE UP (ref 1.6–2.6)
MAGNESIUM SERPL-MCNC: 2.4 MG/DL — SIGNIFICANT CHANGE UP (ref 1.6–2.6)
MCHC RBC-ENTMCNC: 31.5 PG — SIGNIFICANT CHANGE UP (ref 27–34)
MCHC RBC-ENTMCNC: 31.9 PG — SIGNIFICANT CHANGE UP (ref 27–34)
MCHC RBC-ENTMCNC: 32.3 PG — SIGNIFICANT CHANGE UP (ref 27–34)
MCHC RBC-ENTMCNC: 32.5 PG — SIGNIFICANT CHANGE UP (ref 27–34)
MCHC RBC-ENTMCNC: 33.5 GM/DL — SIGNIFICANT CHANGE UP (ref 32–36)
MCHC RBC-ENTMCNC: 34.3 GM/DL — SIGNIFICANT CHANGE UP (ref 32–36)
MCHC RBC-ENTMCNC: 34.6 GM/DL — SIGNIFICANT CHANGE UP (ref 32–36)
MCV RBC AUTO: 91.6 FL — SIGNIFICANT CHANGE UP (ref 80–100)
MCV RBC AUTO: 93.8 FL — SIGNIFICANT CHANGE UP (ref 80–100)
MCV RBC AUTO: 94 FL — SIGNIFICANT CHANGE UP (ref 80–100)
MCV RBC AUTO: 95.3 FL — SIGNIFICANT CHANGE UP (ref 80–100)
MELD SCORE WITH DIALYSIS: >40 POINTS — SIGNIFICANT CHANGE UP
MELD SCORE WITHOUT DIALYSIS: >40 POINTS — SIGNIFICANT CHANGE UP
MRSA PCR RESULT.: SIGNIFICANT CHANGE UP
NRBC # BLD: 0 /100 WBCS — SIGNIFICANT CHANGE UP (ref 0–0)
PHOSPHATE SERPL-MCNC: 4.1 MG/DL — SIGNIFICANT CHANGE UP (ref 2.5–4.5)
PHOSPHATE SERPL-MCNC: 4.7 MG/DL — HIGH (ref 2.5–4.5)
PHOSPHATE SERPL-MCNC: 5.6 MG/DL — HIGH (ref 2.5–4.5)
PHOSPHATE SERPL-MCNC: 6.1 MG/DL — HIGH (ref 2.5–4.5)
PLATELET # BLD AUTO: 76 K/UL — LOW (ref 150–400)
PLATELET # BLD AUTO: 81 K/UL — LOW (ref 150–400)
PLATELET # BLD AUTO: 82 K/UL — LOW (ref 150–400)
POTASSIUM SERPL-MCNC: 3.7 MMOL/L — SIGNIFICANT CHANGE UP (ref 3.5–5.3)
POTASSIUM SERPL-MCNC: 3.8 MMOL/L — SIGNIFICANT CHANGE UP (ref 3.5–5.3)
POTASSIUM SERPL-MCNC: 3.9 MMOL/L — SIGNIFICANT CHANGE UP (ref 3.5–5.3)
POTASSIUM SERPL-MCNC: 4 MMOL/L — SIGNIFICANT CHANGE UP (ref 3.5–5.3)
POTASSIUM SERPL-SCNC: 3.7 MMOL/L — SIGNIFICANT CHANGE UP (ref 3.5–5.3)
POTASSIUM SERPL-SCNC: 3.8 MMOL/L — SIGNIFICANT CHANGE UP (ref 3.5–5.3)
POTASSIUM SERPL-SCNC: 3.9 MMOL/L — SIGNIFICANT CHANGE UP (ref 3.5–5.3)
POTASSIUM SERPL-SCNC: 4 MMOL/L — SIGNIFICANT CHANGE UP (ref 3.5–5.3)
PROT SERPL-MCNC: 5.9 G/DL — LOW (ref 6–8.3)
PROT SERPL-MCNC: 6.1 G/DL — SIGNIFICANT CHANGE UP (ref 6–8.3)
PROT SERPL-MCNC: 6.4 G/DL — SIGNIFICANT CHANGE UP (ref 6–8.3)
PROT SERPL-MCNC: 6.6 G/DL — SIGNIFICANT CHANGE UP (ref 6–8.3)
PROTHROM AB SERPL-ACNC: 28.1 SEC — HIGH (ref 10.5–13.4)
PROTHROM AB SERPL-ACNC: 28.2 SEC — HIGH (ref 10.5–13.4)
RBC # BLD: 2.43 M/UL — LOW (ref 3.8–5.2)
RBC # BLD: 2.48 M/UL — LOW (ref 3.8–5.2)
RBC # BLD: 2.51 M/UL — LOW (ref 3.8–5.2)
RBC # BLD: 2.57 M/UL — LOW (ref 3.8–5.2)
RBC # FLD: 15.9 % — HIGH (ref 10.3–14.5)
RBC # FLD: 16.6 % — HIGH (ref 10.3–14.5)
RBC # FLD: 16.7 % — HIGH (ref 10.3–14.5)
RBC # FLD: 16.8 % — HIGH (ref 10.3–14.5)
S AUREUS DNA NOSE QL NAA+PROBE: SIGNIFICANT CHANGE UP
SODIUM SERPL-SCNC: 134 MMOL/L — LOW (ref 135–145)
SODIUM SERPL-SCNC: 135 MMOL/L — SIGNIFICANT CHANGE UP (ref 135–145)
SODIUM SERPL-SCNC: 136 MMOL/L — SIGNIFICANT CHANGE UP (ref 135–145)
SODIUM SERPL-SCNC: 138 MMOL/L — SIGNIFICANT CHANGE UP (ref 135–145)
SPECIMEN SOURCE: SIGNIFICANT CHANGE UP
VANCOMYCIN FLD-MCNC: 13.8 UG/ML — SIGNIFICANT CHANGE UP
WBC # BLD: 20.22 K/UL — HIGH (ref 3.8–10.5)
WBC # BLD: 20.55 K/UL — HIGH (ref 3.8–10.5)
WBC # BLD: 21.64 K/UL — HIGH (ref 3.8–10.5)
WBC # BLD: 21.81 K/UL — HIGH (ref 3.8–10.5)
WBC # FLD AUTO: 20.22 K/UL — HIGH (ref 3.8–10.5)
WBC # FLD AUTO: 20.55 K/UL — HIGH (ref 3.8–10.5)
WBC # FLD AUTO: 21.64 K/UL — HIGH (ref 3.8–10.5)
WBC # FLD AUTO: 21.81 K/UL — HIGH (ref 3.8–10.5)

## 2022-12-22 PROCEDURE — 90945 DIALYSIS ONE EVALUATION: CPT | Mod: GC

## 2022-12-22 PROCEDURE — 71045 X-RAY EXAM CHEST 1 VIEW: CPT | Mod: 26

## 2022-12-22 PROCEDURE — 99232 SBSQ HOSP IP/OBS MODERATE 35: CPT

## 2022-12-22 PROCEDURE — 99232 SBSQ HOSP IP/OBS MODERATE 35: CPT | Mod: GC

## 2022-12-22 PROCEDURE — 99291 CRITICAL CARE FIRST HOUR: CPT

## 2022-12-22 RX ORDER — PANTOPRAZOLE SODIUM 20 MG/1
40 TABLET, DELAYED RELEASE ORAL EVERY 24 HOURS
Refills: 0 | Status: DISCONTINUED | OUTPATIENT
Start: 2022-12-22 | End: 2022-12-24

## 2022-12-22 RX ADMIN — Medication 16.1 MICROGRAM(S)/KG/MIN: at 19:23

## 2022-12-22 RX ADMIN — Medication 200 GRAM(S): at 01:01

## 2022-12-22 RX ADMIN — CHLORHEXIDINE GLUCONATE 15 MILLILITER(S): 213 SOLUTION TOPICAL at 06:11

## 2022-12-22 RX ADMIN — CHLORHEXIDINE GLUCONATE 1 APPLICATION(S): 213 SOLUTION TOPICAL at 06:09

## 2022-12-22 RX ADMIN — Medication 250 MILLIGRAM(S): at 17:19

## 2022-12-22 RX ADMIN — LEVETIRACETAM 400 MILLIGRAM(S): 250 TABLET, FILM COATED ORAL at 06:07

## 2022-12-22 RX ADMIN — Medication 70 MICROGRAM(S): at 22:20

## 2022-12-22 RX ADMIN — Medication 250 MILLIGRAM(S): at 06:55

## 2022-12-22 RX ADMIN — Medication 50 MILLILITER(S): at 22:19

## 2022-12-22 RX ADMIN — CHLORHEXIDINE GLUCONATE 15 MILLILITER(S): 213 SOLUTION TOPICAL at 17:31

## 2022-12-22 RX ADMIN — Medication 2: at 01:01

## 2022-12-22 RX ADMIN — FLUCONAZOLE 100 MILLIGRAM(S): 150 TABLET ORAL at 22:20

## 2022-12-22 RX ADMIN — MEROPENEM 100 MILLIGRAM(S): 1 INJECTION INTRAVENOUS at 06:08

## 2022-12-22 RX ADMIN — DEXMEDETOMIDINE HYDROCHLORIDE IN 0.9% SODIUM CHLORIDE 8.28 MICROGRAM(S)/KG/HR: 4 INJECTION INTRAVENOUS at 19:23

## 2022-12-22 RX ADMIN — LACTULOSE 20 GRAM(S): 10 SOLUTION ORAL at 17:19

## 2022-12-22 RX ADMIN — LACTULOSE 20 GRAM(S): 10 SOLUTION ORAL at 01:02

## 2022-12-22 RX ADMIN — LACTULOSE 20 GRAM(S): 10 SOLUTION ORAL at 11:35

## 2022-12-22 RX ADMIN — HYDROMORPHONE HYDROCHLORIDE 0.5 MILLIGRAM(S): 2 INJECTION INTRAMUSCULAR; INTRAVENOUS; SUBCUTANEOUS at 01:49

## 2022-12-22 RX ADMIN — LEVETIRACETAM 400 MILLIGRAM(S): 250 TABLET, FILM COATED ORAL at 17:19

## 2022-12-22 RX ADMIN — Medication 50 MILLILITER(S): at 06:07

## 2022-12-22 RX ADMIN — HYDROMORPHONE HYDROCHLORIDE 0.5 MILLIGRAM(S): 2 INJECTION INTRAMUSCULAR; INTRAVENOUS; SUBCUTANEOUS at 15:54

## 2022-12-22 RX ADMIN — HYDROMORPHONE HYDROCHLORIDE 0.5 MILLIGRAM(S): 2 INJECTION INTRAMUSCULAR; INTRAVENOUS; SUBCUTANEOUS at 01:34

## 2022-12-22 RX ADMIN — Medication 50 MILLILITER(S): at 13:49

## 2022-12-22 RX ADMIN — PANTOPRAZOLE SODIUM 40 MILLIGRAM(S): 20 TABLET, DELAYED RELEASE ORAL at 11:35

## 2022-12-22 RX ADMIN — Medication 2: at 06:13

## 2022-12-22 RX ADMIN — MEROPENEM 100 MILLIGRAM(S): 1 INJECTION INTRAVENOUS at 17:19

## 2022-12-22 RX ADMIN — VASOPRESSIN 4.5 UNIT(S)/MIN: 20 INJECTION INTRAVENOUS at 19:23

## 2022-12-22 RX ADMIN — LACTULOSE 20 GRAM(S): 10 SOLUTION ORAL at 06:09

## 2022-12-22 RX ADMIN — HYDROMORPHONE HYDROCHLORIDE 0.5 MILLIGRAM(S): 2 INJECTION INTRAMUSCULAR; INTRAVENOUS; SUBCUTANEOUS at 16:10

## 2022-12-22 NOTE — PROGRESS NOTE ADULT - ASSESSMENT
61 yo F with h/o decompensated ETOH cirrhosis with ascites, thyroid cancer (s/p total thyroidectomy, RTX, and radioactive iodide), HTN, ventrical neoplasm who was initially admitted to  12/19 with new onset seizures and transferred to [facility] 12/20 for liver transplant evaluation. Pt being seen for RED requiring CRRT. 61 yo F with h/o decompensated ETOH cirrhosis with ascites, thyroid cancer (s/p total thyroidectomy, RTX, and radioactive iodide), HTN, ventrical neoplasm who was initially admitted to  12/19 with new onset seizures and transferred to [facility] 12/20 for liver transplant evaluation. Pt being seen for RED requiring CRRT. 63 yo F with h/o decompensated ETOH cirrhosis with ascites, thyroid cancer (s/p total thyroidectomy, RTX, and radioactive iodide), HTN, ventrical neoplasm who was initially admitted to  12/19 with new onset seizures and transferred to [facility] 12/20 for liver transplant evaluation. Pt being seen for RED requiring CRRT.

## 2022-12-22 NOTE — PROGRESS NOTE ADULT - PROBLEM SELECTOR PLAN 1
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 Upon review of [place], SCr was 0.78 on 12/6/22, increased to 5.31 on admission (12/20), and remains elevated at 3.73 today. Pt is oliguric/anuric, documented urine output is 26 cc over the past 24 hours. Pt with likely HRS vs ATN vs bile cast nephropathy. Plan to continue with CRRT today as urine output remains low. Monitor labs and urine output. Avoid nephrotoxins. Dose medications as per eGFR. 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 Upon review of [place], SCr was 0.78 on 12/6/22, increased to 5.31 on admission (12/20), and remains elevated at 3.73 today. Pt is oliguric/anuric, documented urine output is 26 cc over the past 24 hours. Pt with likely HRS vs ATN vs bile cast nephropathy. Plan to continue with CRRT today as urine output remains low. Monitor labs and urine output. Avoid nephrotoxins. Dose medications as per eGFR. 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 Upon review of [place], SCr was 0.78 on 12/6/22, increased to 5.31 on admission (12/20), and remains elevated at 3.73 today. Pt is oliguric/anuric, documented urine output is 26 cc over the past 24 hours. Pt with likely HRS vs ATN vs bile cast nephropathy. Plan to continue with CRRT today as urine output remains low. Monitor labs and urine output. Avoid nephrotoxins. Dose medications as per eGFR.

## 2022-12-22 NOTE — PROGRESS NOTE ADULT - ASSESSMENT
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 12/20 for LT eval for FRANTZ.       Impression:  #ACLF with underlying cirrhosis  #AUD  #Decompensated ETOH cirrhosis c/b prior ascites req LVP- UNOS/OPTN MELD-Na 43 (12/21)  Of note was recently admitted to  11/2022 for workup and eval of new onset jaundice- during that hospitalization was found to have a UTI and dx with alc hep was treated for UTI w/ abx and started on steroids (was deemed a non-responder and steroids were subsequently stopped); s/p LVP at [place] 11/2022. Previously hospitalized in 2021 at [place] for ETOH detox but pt reportedly unaware of any liver dysfunction at that time. Went to ETOH rehab 08/2022 and was asked to leave the facility when she was COVID+; was sober for 1 week until she started drinking again. Had also attended a few AA meetings in the past.       -Ascites: large volume on CT A/P       -HCC: pending contrasted imaging study       -SBP: negative on para 12/21       -Varices: no h/o EGD       -Hepatic encephalopathy: unable to assess; intubated; on lactulose/rifaximin    #new onset seizure- unknown trigger/etiology- currently on Keppra    Recommendations:  -trend clinical symptoms, exam findings, vital signs, CBC, CMP, INR, Mg, phosphorus  -ABO/Rh blood type sent on 2 different blood samples at least 15 minutes apart  -f/u acetaminophen level, PETH level and serum/urine drug screening  -rule out viral hepatitis with HBV PCR, HSV PCR, VZV PCR, EBV PCR, CMV IgG & PCR  -check HAV IgM, HBsAg, HBsAb, HBcAb IgM, HCV Ab [with reflex HCV PCR if positive], and HEV Ab  -albumin 25% 100 mL q8h  -resume CRRT  -consider bronch/BAL  -wean pressors as tolerated (on vaso + levo)  -check urine lytes if pt has UOP  -Infectious disease consultation       -pending full infectious workup - bl cx, UA +urine cx, dx para       -HIV screening       -check quantiferon gold for TB screening       -rubella IgG, syphillis screen, toxoplasma IgG, VZV IgG       -c/w broad spectrum antibiotics- fluconazole (12/20 ->), meropenem (12/20 ->), vanco (12/21 ->)  - send TSH, RPR, B12        **pt's /JOSUE Gibson ([phone])    **THIS NOTE IS NOT FINALIZED UNTIL SIGNED BY THE ATTENDING**    Arleen Rawls MD  GI Fellow, PGY-5  Available via Microsoft Teams    NON-URGENT CONSULTS:  Please email [email or web address]  AT NIGHT AND ON WEEKENDS:  Contact on-call GI fellow via answering service ([phone]) from 5pm-8am and on weekends/holidays  MONDAY-FRIDAY 8AM-5PM:  Pager# [phone] ([facility]) or [phone] ([facility])  GI Phone# [phone] (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       Impression:  #ACLF with underlying cirrhosis  #AUD  #Decompensated ETOH cirrhosis c/b prior ascites req LVP- UNOS/OPTN MELD-Na 43 (12/21)  Of note was recently admitted to  11/2022 for workup and eval of new onset jaundice- during that hospitalization was found to have a UTI and dx with alc hep was treated for UTI w/ abx and started on steroids (was deemed a non-responder and steroids were subsequently stopped); s/p LVP at [place] 11/2022. Previously hospitalized in 2021 at [place] for ETOH detox but pt reportedly unaware of any liver dysfunction at that time. Went to ETOH rehab 08/2022 and was asked to leave the facility when she was COVID+; was sober for 1 week until she started drinking again. Had also attended a few AA meetings in the past.       -Ascites: large volume on CT A/P       -HCC: pending contrasted imaging study       -SBP: negative on para 12/21       -Varices: no h/o EGD       -Hepatic encephalopathy: unable to assess; intubated; on lactulose/rifaximin    #new onset seizure- unknown trigger/etiology- currently on Keppra    Recommendations:  -trend clinical symptoms, exam findings, vital signs, CBC, CMP, INR, Mg, phosphorus  -ABO/Rh blood type sent on 2 different blood samples at least 15 minutes apart  -f/u acetaminophen level, PETH level and serum/urine drug screening  -rule out viral hepatitis with HBV PCR, HSV PCR, VZV PCR, EBV PCR, CMV IgG & PCR  -check HAV IgM, HBsAg, HBsAb, HBcAb IgM, HCV Ab [with reflex HCV PCR if positive], and HEV Ab  -albumin 25% 100 mL q8h  -resume CRRT  -consider bronch/BAL  -wean pressors as tolerated (on vaso + levo)  -check urine lytes if pt has UOP  -Infectious disease consultation       -pending full infectious workup - bl cx, UA +urine cx, dx para       -HIV screening       -check quantiferon gold for TB screening       -rubella IgG, syphillis screen, toxoplasma IgG, VZV IgG       -c/w broad spectrum antibiotics- fluconazole (12/20 ->), meropenem (12/20 ->), vanco (12/21 ->)  - send TSH, RPR, B12        **pt's /JOSUE Gibson ([phone])    **THIS NOTE IS NOT FINALIZED UNTIL SIGNED BY THE ATTENDING**    Arleen Rawls MD  GI Fellow, PGY-5  Available via Microsoft Teams    NON-URGENT CONSULTS:  Please email [email or web address]  AT NIGHT AND ON WEEKENDS:  Contact on-call GI fellow via answering service ([phone]) from 5pm-8am and on weekends/holidays  MONDAY-FRIDAY 8AM-5PM:  Pager# [phone] ([facility]) or [phone] ([facility])  GI Phone# [phone] ([facility])     62 y.o Hx significant for decompensated ETOH cirrhosis c/b ascites (dx 11/2022), alc hep (dx 11/2022; non-responsive to steroids), remote h/o thyroid cancer in her 20s s/p total thyroidectomy + RTX + radioactive iodide, HTN, ventrical neoplasm (dx 2021) s/p right frontal craniotomy (03/2022) for resection post operative course c/b hemorrhage right lateral ventricle (managed non-operatively) who was initially admitted to  12/19 with new onset seizures and transferred to [facility] 12/20 for LT eval for FRANTZ.       Impression:  #ACLF with underlying cirrhosis  #AUD  #Decompensated ETOH cirrhosis c/b prior ascites req LVP- UNOS/OPTN MELD-Na 43 (12/21)  Of note was recently admitted to  11/2022 for workup and eval of new onset jaundice- during that hospitalization was found to have a UTI and dx with alc hep was treated for UTI w/ abx and started on steroids (was deemed a non-responder and steroids were subsequently stopped); s/p LVP at [place] 11/2022. Previously hospitalized in 2021 at [place] for ETOH detox but pt reportedly unaware of any liver dysfunction at that time. Went to ETOH rehab 08/2022 and was asked to leave the facility when she was COVID+; was sober for 1 week until she started drinking again. Had also attended a few AA meetings in the past.       -Ascites: large volume on CT A/P       -HCC: pending contrasted imaging study       -SBP: negative on para 12/21       -Varices: no h/o EGD       -Hepatic encephalopathy: unable to assess; intubated; on lactulose/rifaximin    #new onset seizure- unknown trigger/etiology- currently on Keppra    Recommendations:  -trend clinical symptoms, exam findings, vital signs, CBC, CMP, INR, Mg, phosphorus  -ABO/Rh blood type sent on 2 different blood samples at least 15 minutes apart  -f/u acetaminophen level, PETH level and serum/urine drug screening  -rule out viral hepatitis with HBV PCR, HSV PCR, VZV PCR, EBV PCR, CMV IgG & PCR  -check HAV IgM, HBsAg, HBsAb, HBcAb IgM, HCV Ab [with reflex HCV PCR if positive], and HEV Ab  -albumin 25% 100 mL q8h  -resume CRRT  -consider bronch/BAL  -wean pressors as tolerated (on vaso + levo)  -check urine lytes if pt has UOP  -Infectious disease consultation       -pending full infectious workup - bl cx, UA +urine cx, dx para       -HIV screening       -check quantiferon gold for TB screening       -rubella IgG, syphillis screen, toxoplasma IgG, VZV IgG       -c/w broad spectrum antibiotics- fluconazole (12/20 ->), meropenem (12/20 ->), vanco (12/21 ->)  - send TSH, RPR, B12        **pt's /JOSUE Gibson ([phone])    **THIS NOTE IS NOT FINALIZED UNTIL SIGNED BY THE ATTENDING**    Arleen Rawls MD  GI Fellow, PGY-5  Available via Microsoft Teams    NON-URGENT CONSULTS:  Please email [email or web address]  AT NIGHT AND ON WEEKENDS:  Contact on-call GI fellow via answering service ([phone]) from 5pm-8am and on weekends/holidays  MONDAY-FRIDAY 8AM-5PM:  Pager# [phone] ([facility]) or [phone] ([facility])  GI Phone# [phone] ([facility])     62 y.o Hx significant for decompensated ETOH cirrhosis c/b ascites (dx 11/2022), alc hep (dx 11/2022; non-responsive to steroids), remote h/o thyroid cancer in her 20s s/p total thyroidectomy + RTX + radioactive iodide, HTN, ventrical neoplasm (dx 2021) s/p right frontal craniotomy (03/2022) for resection post operative course c/b hemorrhage right lateral ventricle (managed non-operatively) who was initially admitted to  12/19 with new onset seizures and transferred to [facility] 12/20 for LT eval for FRANTZ.       Impression:  #ACLF with underlying cirrhosis  #AUD  #Decompensated ETOH cirrhosis c/b prior ascites req LVP- UNOS/OPTN MELD-Na 42 (12/22)  Of note was recently admitted to  11/2022 for workup and eval of new onset jaundice- during that hospitalization was found to have a UTI and dx with alc hep was treated for UTI w/ abx and started on steroids (was deemed a non-responder and steroids were subsequently stopped); s/p LVP at [place] 11/2022. Previously hospitalized in 2021 at [place] for ETOH detox but pt reportedly unaware of any liver dysfunction at that time. Went to ETOH rehab 08/2022 and was asked to leave the facility when she was COVID+; was sober for 1 week until she started drinking again. Had also attended a few AA meetings in the past.       -Ascites: large volume on CT A/P       -HCC: pending contrasted imaging study       -SBP: negative on para 12/21       -Varices: no h/o EGD       -Hepatic encephalopathy: unable to assess; intubated; on lactulose/rifaximin    #new onset seizure- unknown trigger/etiology- currently on Keppra    Recommendations:  -trend clinical symptoms, exam findings, vital signs, CBC, CMP, INR, Mg, phosphorus  -ABO/Rh blood type sent on 2 different blood samples at least 15 minutes apart  -f/u acetaminophen level, PETH level and serum/urine drug screening  -rule out viral hepatitis with HBV PCR, HSV PCR, VZV PCR, EBV PCR, CMV IgG & PCR  -check HAV IgM, HBsAg, HBsAb, HBcAb IgM, HCV Ab [with reflex HCV PCR if positive], and HEV Ab  -albumin 25% 100 mL q8h  -resume CRRT  -consider bronch/BAL  -wean pressors as tolerated (on vaso + levo)  -check urine lytes if pt has UOP  -Infectious disease consultation       -pending full infectious workup - bl cx, UA +urine cx, dx para       -HIV screening       -check quantiferon gold for TB screening       -rubella IgG, syphillis screen, toxoplasma IgG, VZV IgG       -c/w broad spectrum antibiotics- fluconazole (12/20 ->), meropenem (12/20 ->), vanco (12/21 ->)  - send TSH, RPR, B12        **pt's /JOSUE Gibson ([phone])    **THIS NOTE IS NOT FINALIZED UNTIL SIGNED BY THE ATTENDING**    Arleen Rawls MD  GI Fellow, PGY-5  Available via Microsoft Teams    NON-URGENT CONSULTS:  Please email [email or web address]  AT NIGHT AND ON WEEKENDS:  Contact on-call GI fellow via answering service ([phone]) from 5pm-8am and on weekends/holidays  MONDAY-FRIDAY 8AM-5PM:  Pager# [phone] ([facility]) or [phone] ([facility])  GI Phone# [phone] ([facility])     62 y.o Hx significant for decompensated ETOH cirrhosis c/b ascites (dx 11/2022), alc hep (dx 11/2022; non-responsive to steroids), remote h/o thyroid cancer in her 20s s/p total thyroidectomy + RTX + radioactive iodide, HTN, 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       Impression:  #ACLF with underlying cirrhosis  #AUD  #Decompensated ETOH cirrhosis c/b prior ascites req LVP- UNOS/OPTN MELD-Na 42 (12/22)  Of note was recently admitted to  11/2022 for workup and eval of new onset jaundice- during that hospitalization was found to have a UTI and dx with alc hep was treated for UTI w/ abx and started on steroids (was deemed a non-responder and steroids were subsequently stopped); s/p LVP at [place] 11/2022. Previously hospitalized in 2021 at [place] for ETOH detox but pt reportedly unaware of any liver dysfunction at that time. Went to ETOH rehab 08/2022 and was asked to leave the facility when she was COVID+; was sober for 1 week until she started drinking again. Had also attended a few AA meetings in the past.       -Ascites: large volume on CT A/P       -HCC: pending contrasted imaging study       -SBP: negative on para 12/21       -Varices: no h/o EGD       -Hepatic encephalopathy: unable to assess; intubated; on lactulose/rifaximin    #new onset seizure- unknown trigger/etiology- currently on Keppra    Recommendations:  -trend clinical symptoms, exam findings, vital signs, CBC, CMP, INR, Mg, phosphorus  -ABO/Rh blood type sent on 2 different blood samples at least 15 minutes apart  -f/u acetaminophen level, PETH level and serum/urine drug screening  -rule out viral hepatitis with HBV PCR, HSV PCR, VZV PCR, EBV PCR, CMV IgG & PCR  -check HAV IgM, HBsAg, HBsAb, HBcAb IgM, HCV Ab [with reflex HCV PCR if positive], and HEV Ab  -albumin 25% 100 mL q8h  -resume CRRT  -consider bronch/BAL  -wean pressors as tolerated (on vaso + levo)  -check urine lytes if pt has UOP  -Infectious disease consultation       -pending full infectious workup - bl cx, UA +urine cx, dx para       -HIV screening       -check quantiferon gold for TB screening       -rubella IgG, syphillis screen, toxoplasma IgG, VZV IgG       -c/w broad spectrum antibiotics- fluconazole (12/20 ->), meropenem (12/20 ->), vanco (12/21 ->)  - send TSH, RPR, B12        **pt's /JOSUE Gibson ([phone])    **THIS NOTE IS NOT FINALIZED UNTIL SIGNED BY THE ATTENDING**    Arleen Rawls MD  GI Fellow, PGY-5  Available via Microsoft Teams    NON-URGENT CONSULTS:  Please email [email or web address]  AT NIGHT AND ON WEEKENDS:  Contact on-call GI fellow via answering service ([phone]) from 5pm-8am and on weekends/holidays  MONDAY-FRIDAY 8AM-5PM:  Pager# [phone] ([facility]) or [phone] ([facility])  GI Phone# [phone] ([facility])     62 y.o Hx significant for decompensated ETOH cirrhosis c/b ascites (dx 11/2022), alc hep (dx 11/2022; non-responsive to steroids), remote h/o thyroid cancer in her 20s s/p total thyroidectomy + RTX + radioactive iodide, HTN, ventrical neoplasm (dx 2021) s/p right frontal craniotomy (03/2022) for resection post operative course c/b hemorrhage right lateral ventricle (managed non-operatively) who was initially admitted to  12/19 with new onset seizures and transferred to [facility] 12/20 for LT eval for FRANTZ.       Impression:  #ACLF with underlying cirrhosis  #AUD  #Decompensated ETOH cirrhosis c/b prior ascites req LVP- UNOS/OPTN MELD-Na 42 (12/22)  Of note was recently admitted to  11/2022 for workup and eval of new onset jaundice- during that hospitalization was found to have a UTI and dx with alc hep was treated for UTI w/ abx and started on steroids (was deemed a non-responder and steroids were subsequently stopped); s/p LVP at [place] 11/2022. Previously hospitalized in 2021 at [place] for ETOH detox but pt reportedly unaware of any liver dysfunction at that time. Went to ETOH rehab 08/2022 and was asked to leave the facility when she was COVID+; was sober for 1 week until she started drinking again. Had also attended a few AA meetings in the past.       -Ascites: large volume on CT A/P       -HCC: pending contrasted imaging study       -SBP: negative on para 12/21       -Varices: no h/o EGD       -Hepatic encephalopathy: unable to assess; intubated; on lactulose/rifaximin    #new onset seizure- unknown trigger/etiology- currently on Keppra    Recommendations:  -trend clinical symptoms, exam findings, vital signs, CBC, CMP, INR, Mg, phosphorus  -ABO/Rh blood type sent on 2 different blood samples at least 15 minutes apart  -f/u acetaminophen level, PETH level and serum/urine drug screening  -rule out viral hepatitis with HBV PCR, HSV PCR, VZV PCR, EBV PCR, CMV IgG & PCR  -check HAV IgM, HBsAg, HBsAb, HBcAb IgM, HCV Ab [with reflex HCV PCR if positive], and HEV Ab  -albumin 25% 100 mL q8h  -resume CRRT  -consider bronch/BAL  -wean pressors as tolerated (on vaso + levo)  -check urine lytes if pt has UOP  -Infectious disease consultation       -pending full infectious workup - bl cx, UA +urine cx, dx para       -HIV screening       -check quantiferon gold for TB screening       -rubella IgG, syphillis screen, toxoplasma IgG, VZV IgG       -c/w broad spectrum antibiotics- fluconazole (12/20 ->), meropenem (12/20 ->), vanco (12/21 ->)  - send TSH, RPR, B12        **pt's /JOSUE Gibson ([phone])    **THIS NOTE IS NOT FINALIZED UNTIL SIGNED BY THE ATTENDING**    Arleen Rawls MD  GI Fellow, PGY-5  Available via Microsoft Teams    NON-URGENT CONSULTS:  Please email [email or web address]  [email or web address]  AT NIGHT AND ON WEEKENDS:  Contact on-call GI fellow via answering service ([phone]) from 5pm-8am and on weekends/holidays  MONDAY-FRIDAY 8AM-5PM:  Pager# [phone] ([facility]) or [phone] ([facility])  GI Phone# [phone] ([facility])     62 y.o Hx significant for decompensated ETOH cirrhosis c/b ascites (dx 11/2022), alc hep (dx 11/2022; non-responsive to steroids), remote h/o thyroid cancer in her 20s s/p total thyroidectomy + RTX + radioactive iodide, HTN, ventrical neoplasm (dx 2021) s/p right frontal craniotomy (03/2022) for resection post operative course c/b hemorrhage right lateral ventricle (managed non-operatively) who was initially admitted to  12/19 with new onset seizures and transferred to [facility] 12/20 for LT eval for FRANTZ.       Impression:  #ACLF with underlying cirrhosis  #AUD  #Decompensated ETOH cirrhosis c/b prior ascites req LVP- UNOS/OPTN MELD-Na 42 (12/22); blood group O neg  Of note was recently admitted to  11/2022 for workup and eval of new onset jaundice- during that hospitalization was found to have a UTI and dx with alc hep was treated for UTI w/ abx and started on steroids (was deemed a non-responder and steroids were subsequently stopped); s/p LVP at [place] 11/2022. Previously hospitalized in 2021 at [place] for ETOH detox but pt reportedly unaware of any liver dysfunction at that time. Went to ETOH rehab 08/2022 and was asked to leave the facility when she was COVID+; was sober for 1 week until she started drinking again. Had also attended a few AA meetings in the past.       -Ascites: large volume on CT A/P       -HCC: pending contrasted imaging study       -SBP: negative on para 12/21       -Varices: no h/o EGD       -Hepatic encephalopathy: unable to assess; intubated; on lactulose/rifaximin    #new onset seizure- unknown trigger/etiology- currently on Keppra    Recommendations:  -trend clinical symptoms, exam findings, vital signs, CBC, CMP, INR, Mg, phosphorus  -ABO/Rh blood type sent on 2 different blood samples at least 15 minutes apart  -f/u acetaminophen level, PETH level and serum/urine drug screening  -rule out viral hepatitis with HBV PCR, HSV PCR, VZV PCR, EBV PCR, CMV IgG & PCR  -check HAV IgM, HBsAg, HBsAb, HBcAb IgM, HCV Ab [with reflex HCV PCR if positive], and HEV Ab  -albumin 25% 100 mL q8h  -resume CRRT  -consider bronch/BAL  -wean pressors as tolerated (on vaso + levo)  -check urine lytes if pt has UOP  -Infectious disease consultation       -pending full infectious workup - bl cx, UA +urine cx, dx para       -HIV screening       -check quantiferon gold for TB screening       -rubella IgG, syphillis screen, toxoplasma IgG, VZV IgG       -c/w broad spectrum antibiotics- fluconazole (12/20 ->), meropenem (12/20 ->), vanco (12/21 ->)  - send TSH, RPR, B12        **pt's /JOSUE Gibson ([phone])    **THIS NOTE IS NOT FINALIZED UNTIL SIGNED BY THE ATTENDING**    Arleen Rawls MD  GI Fellow, PGY-5  Available via [employer] Teams    NON-URGENT CONSULTS:  Please email [email or web address]  AT NIGHT AND ON WEEKENDS:  Contact on-call GI fellow via answering service ([phone]) from 5pm-8am and on weekends/holidays  MONDAY-FRIDAY 8AM-5PM:  Pager# [phone] ([facility]) or [phone] ([facility])  GI Phone# [phone] ([facility])     62 y.o Hx significant for decompensated ETOH cirrhosis c/b ascites (dx 11/2022), alc hep (dx 11/2022; non-responsive to steroids), remote h/o thyroid cancer in her 20s s/p total thyroidectomy + RTX + radioactive iodide, HTN, ventrical neoplasm (dx 2021) s/p right frontal craniotomy (03/2022) for resection post operative course c/b hemorrhage right lateral ventricle (managed non-operatively) who was initially admitted to  12/19 with new onset seizures and transferred to [facility] 12/20 for LT eval for FRANTZ.       Impression:  #ACLF with underlying cirrhosis  #AUD  #Decompensated ETOH cirrhosis c/b prior ascites req LVP- UNOS/OPTN MELD-Na 42 (12/22); blood group O neg  Of note was recently admitted to  11/2022 for workup and eval of new onset jaundice- during that hospitalization was found to have a UTI and dx with alc hep was treated for UTI w/ abx and started on steroids (was deemed a non-responder and steroids were subsequently stopped); s/p LVP at [place] 11/2022. Previously hospitalized in 2021 at [place] for ETOH detox but pt reportedly unaware of any liver dysfunction at that time. Went to ETOH rehab 08/2022 and was asked to leave the facility when she was COVID+; was sober for 1 week until she started drinking again. Had also attended a few AA meetings in the past.       -Ascites: large volume on CT A/P       -HCC: pending contrasted imaging study       -SBP: negative on para 12/21       -Varices: no h/o EGD       -Hepatic encephalopathy: unable to assess; intubated; on lactulose/rifaximin    #new onset seizure- unknown trigger/etiology- currently on Keppra    Recommendations:  -trend clinical symptoms, exam findings, vital signs, CBC, CMP, INR, Mg, phosphorus  -ABO/Rh blood type sent on 2 different blood samples at least 15 minutes apart  -f/u acetaminophen level, PETH level and serum/urine drug screening  -rule out viral hepatitis with HBV PCR, HSV PCR, VZV PCR, EBV PCR, CMV IgG & PCR  -check HAV IgM, HBsAg, HBsAb, HBcAb IgM, HCV Ab [with reflex HCV PCR if positive], and HEV Ab  -albumin 25% 100 mL q8h  -resume CRRT  -consider bronch/BAL  -wean pressors as tolerated (on vaso + levo)  -check urine lytes if pt has UOP  -Infectious disease consultation       -pending full infectious workup - bl cx, UA +urine cx, dx para       -HIV screening       -check quantiferon gold for TB screening       -rubella IgG, syphillis screen, toxoplasma IgG, VZV IgG       -c/w broad spectrum antibiotics- fluconazole (12/20 ->), meropenem (12/20 ->), vanco (12/21 ->)  - send TSH, RPR, B12        **pt's /JOSUE Gibson ([phone])    **THIS NOTE IS NOT FINALIZED UNTIL SIGNED BY THE ATTENDING**    Arleen Rawls MD  GI Fellow, PGY-5  Available via [employer] Teams    NON-URGENT CONSULTS:  Please email [email or web address]  AT NIGHT AND ON WEEKENDS:  Contact on-call GI fellow via answering service ([phone]) from 5pm-8am and on weekends/holidays  MONDAY-FRIDAY 8AM-5PM:  Pager# [phone] ([facility]) or [phone] ([facility])  GI Phone# [phone] ([facility])     62 y.o Hx significant for decompensated ETOH cirrhosis c/b ascites (dx 11/2022), alc hep (dx 11/2022; non-responsive to steroids), remote h/o thyroid cancer in her 20s s/p total thyroidectomy + RTX + radioactive iodide, HTN, 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       Impression:  #ACLF with underlying cirrhosis  #AUD  #Decompensated ETOH cirrhosis c/b prior ascites req LVP- UNOS/OPTN MELD-Na 42 (12/22); blood group O neg  Of note was recently admitted to  11/2022 for workup and eval of new onset jaundice- during that hospitalization was found to have a UTI and dx with alc hep was treated for UTI w/ abx and started on steroids (was deemed a non-responder and steroids were subsequently stopped); s/p LVP at [place] 11/2022. Previously hospitalized in 2021 at [place] for ETOH detox but pt reportedly unaware of any liver dysfunction at that time. Went to ETOH rehab 08/2022 and was asked to leave the facility when she was COVID+; was sober for 1 week until she started drinking again. Had also attended a few AA meetings in the past.       -Ascites: large volume on CT A/P       -HCC: pending contrasted imaging study       -SBP: negative on para 12/21       -Varices: no h/o EGD       -Hepatic encephalopathy: unable to assess; intubated; on lactulose/rifaximin    #new onset seizure- unknown trigger/etiology- currently on Keppra    Recommendations:  -trend clinical symptoms, exam findings, vital signs, CBC, CMP, INR, Mg, phosphorus  -ABO/Rh blood type sent on 2 different blood samples at least 15 minutes apart  -f/u acetaminophen level, PETH level and serum/urine drug screening  -rule out viral hepatitis with HBV PCR, HSV PCR, VZV PCR, EBV PCR, CMV IgG & PCR  -check HAV IgM, HBsAg, HBsAb, HBcAb IgM, HCV Ab [with reflex HCV PCR if positive], and HEV Ab  -albumin 25% 100 mL q8h  -resume CRRT  -consider bronch/BAL  -wean pressors as tolerated (on vaso + levo)  -check urine lytes if pt has UOP  -Infectious disease consultation       -pending full infectious workup - bl cx, UA +urine cx, dx para       -HIV screening       -check quantiferon gold for TB screening       -rubella IgG, syphillis screen, toxoplasma IgG, VZV IgG       -c/w broad spectrum antibiotics- fluconazole (12/20 ->), meropenem (12/20 ->), vanco (12/21 ->)  - send TSH, RPR, B12        **pt's /JOSUE Gibson ([phone])    **THIS NOTE IS NOT FINALIZED UNTIL SIGNED BY THE ATTENDING**    Arleen Rawls MD  GI Fellow, PGY-5  Available via [employer] Teams    NON-URGENT CONSULTS:  Please email [email or web address]  AT NIGHT AND ON WEEKENDS:  Contact on-call GI fellow via answering service ([phone]) from 5pm-8am and on weekends/holidays  MONDAY-FRIDAY 8AM-5PM:  Pager# [phone] ([facility]) or [phone] ([facility])  GI Phone# [phone] ([facility])

## 2022-12-22 NOTE — PROGRESS NOTE ADULT - SUBJECTIVE AND OBJECTIVE BOX
Chief Complaint:  Patient is a 62y old  Female who presents with a chief complaint of Acute liver failure (22 Dec 2022 18:21)      Interval Events:   - CTH : 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 Previous hemorrhages intraventricular as well as extending from the ventricle to the subcortical region on the right at the prior resection site have resolved. There is residual lucency at the site of prior hemorrhage in the right posterior frontal region subjacent to the craniotomy  -CT C/A/P: Cirrhosis and portal hypertension.  Large amount of abdominal pelvic ascites and small bilateral pleural   effusions.  Bibasilar consolidation additional lingular clustered nodular and patchy airspace opacity concerning for infectious/inflammatory etiology. No evidence of suspicious mass or adenopathy.  -ECHO: EF 66%  -s/p paracentesis 2.9L removed  -Hgb 6.9 -> [facility]    Hospital Medications:  albumin human 25% IVPB 100 milliLiter(s) IV Intermittent every 8 hours  chlorhexidine 0.12% Liquid 15 milliLiter(s) Oral Mucosa every 12 hours  chlorhexidine 2% Cloths 1 Application(s) Topical <User Schedule>  chlorhexidine 4% Liquid 1 Application(s) Topical <User Schedule>  CRRT Treatment    <Continuous>  dexMEDEtomidine Infusion 0.5 MICROgram(s)/kG/Hr IV Continuous <Continuous>  fluconAZOLE IVPB 200 milliGRAM(s) IV Intermittent every 24 hours  HYDROmorphone  Injectable 0.5 milliGRAM(s) IV Push every 3 hours PRN  insulin lispro (ADMELOG) corrective regimen sliding scale   SubCutaneous every 6 hours  lactulose Syrup 20 Gram(s) Oral every 6 hours  levETIRAcetam  IVPB 750 milliGRAM(s) IV Intermittent every 12 hours  levothyroxine Injectable 70 MICROGram(s) IV Push at bedtime  meropenem  IVPB 1000 milliGRAM(s) IV Intermittent every 12 hours  norepinephrine Infusion 0.13 MICROgram(s)/kG/Min IV Continuous <Continuous>  pantoprazole  Injectable 40 milliGRAM(s) IV Push every 24 hours  Phoxillum Filtration BK 4 / 2.5 5000 milliLiter(s) CRRT <Continuous>  Phoxillum Filtration BK 4 / 2.5 5000 milliLiter(s) CRRT <Continuous>  Phoxillum Filtration BK 4 / 2.5 5000 milliLiter(s) CRRT <Continuous>  rifAXIMin 550 milliGRAM(s) Oral every 12 hours  sodium chloride 0.9% lock flush 10 milliLiter(s) IV Push every 1 hour PRN  vasopressin Infusion 0.03 Unit(s)/Min IV Continuous <Continuous>      PMHX/PSHX:  HTN (hypertension)    UTI (urinary tract infection)    Intracranial tumor    GERD (gastroesophageal reflux disease)    Eczema    Thyroid cancer    COVID-19 virus infection    History of thyroid surgery    H/O total thyroidectomy    History of tonsillectomy    History of appendectomy            ROS: unable to obtain as pt is intubated        PHYSICAL EXAM:       GENERAL:  Appears stated age, critically ill appearing  HEENT:  NC/AT,  eyes closed  CHEST:  +vented breath sounds  HEART:  Regular rhythm, S1, S2, no murmur/rub/S3/S4  ABDOMEN:  Soft, non-tender, +mildly-distended, +bowel sounds,    EXTREMITIES:  no cyanosis, clubbing or edema  SKIN:  No rash/erythema/ecchymoses/petechiae/wounds/abscess/warm/dry  NEURO:  +sedated    Vital Signs:  Vital Signs Last 24 Hrs  T(C): 35.9 (22 Dec 2022 15:00), Max: 36 (21 Dec 2022 23:00)  T(F): 96.6 (22 Dec 2022 15:00), Max: 96.8 (21 Dec 2022 23:00)  HR: 83 (22 Dec 2022 19:00) (72 - 89)  BP: 94/56 (21 Dec 2022 19:45) (94/56 - 94/56)  BP(mean): 70 (21 Dec 2022 19:45) (70 - 70)  RR: 19 (22 Dec 2022 19:00) (15 - 30)  SpO2: 96% (22 Dec 2022 19:00) (93% - 100%)    Parameters below as of 22 Dec 2022 07:00  Patient On (Oxygen Delivery Method): ventilator  O2 Flow (L/min): 30    Daily     Daily Weight in k.2 (22 Dec 2022 00:00)    LABS:                        7.9    20.22 )-----------( 82       ( 22 Dec 2022 17:26 )             22.8     12-22    138  |  102  |  24<H>  ----------------------------<  94  3.9   |  17<L>  |  2.82<H>    Ca    9.3      22 Dec 2022 17:26  Phos  4.7       Mg     2.3         TPro  6.4  /  Alb  4.2  /  TBili  26.6<H>  /  DBili  x   /  AST  116<H>  /  ALT  36  /  AlkPhos  117      LIVER FUNCTIONS - ( 22 Dec 2022 17:26 )  Alb: 4.2 g/dL / Pro: 6.4 g/dL / ALK PHOS: 117 U/L / ALT: 36 U/L / AST: 116 U/L / GGT: x           PT/INR - ( 22 Dec 2022 05:06 )   PT: 28.2 sec;   INR: 2.41 ratio         PTT - ( 22 Dec 2022 05:06 )  PTT:56.7 sec  Urinalysis Basic - ( 20 Dec 2022 23:55 )    Color: Dark Yellow / Appearance: Turbid / S.029 / pH: x  Gluc: x / Ketone: Small  / Bili: Large / Urobili: Negative   Blood: x / Protein: 300 mg/dL / Nitrite: Negative   Leuk Esterase: Large / RBC: 5-10 /hpf / WBC 11-25   Sq Epi: x / Non Sq Epi: Moderate / Bacteria: Negative      Amylase Serum--      Lipase serum--       [id number]      Imaging:      ACC: [id number] EXAM:  CT ABDOMEN AND PELVIS                        ACC: [id number] EXAM:  CT CHEST                          PROCEDURE DATE:  2022          INTERPRETATION:  CLINICAL INFORMATION: Liver transplant evaluation.    COMPARISON: None.    CONTRAST/COMPLICATIONS:  IV Contrast: NONE  Oral Contrast: NONE  Complications: None reported at time of study completion    PROCEDURE:  CT of the Chest, Abdomen and Pelvis was performed.  Sagittal and coronal reformats were performed.    FINDINGS:  CHEST:  LUNGS AND LARGE AIRWAYS: Patent central airways. Endotracheal tube in   appropriate position. Bibasilar consolidation, right greater than left.   Clustered nodular and patchy airspace opacity in the lingula, concerning   for infectious/inflammatory etiology.  PLEURA: Small bilateral pleural effusions.  VESSELS: Left-sided central venous catheter terminates in the SVC.  HEART: Heart size is normal. No pericardial effusion.  MEDIASTINUM AND SAMMY: No lymphadenopathy.  CHEST WALL AND LOWER NECK: Within normal limits.    ABDOMEN AND PELVIS:  LIVER: Cirrhotic liver.  BILE DUCTS: Layering biliary sludge.  GALLBLADDER: Within normal limits.  SPLEEN: Within normal limits.  PANCREAS: Within normal limits.  ADRENALS: Within normal limits.  KIDNEYS/URETERS: Within normal limits.    BLADDER: Decompressed with a Monroe catheter.  REPRODUCTIVE ORGANS: Uterus and bilateral adnexa within normal limits.    BOWEL: No bowel obstruction. Nasogastric tube terminates in the stomach.   Rectal catheter.  PERITONEUM: Large amount of ascites.  VESSELS: Mild atherosclerotic calcifications.  RETROPERITONEUM/LYMPH NODES: No lymphadenopathy.  ABDOMINAL WALL: Anasarca.  BONES: Old T12 compression fracture.    IMPRESSION:  *  Cirrhosis and portal hypertension.  *  Large amount of abdominal pelvic ascites and small bilateral pleural   effusions.  *  Bibasilar consolidation additional lingular clustered nodular and   patchy airspace opacity concerning for infectious/inflammatory etiology.  *  No evidence of suspicious mass or adenopathy.         Chief Complaint:  Patient is a 62y old  Female who presents with a chief complaint of Acute liver failure (22 Dec 2022 18:21)      Interval Events:   - CTH : 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 Previous hemorrhages intraventricular as well as extending from the ventricle to the subcortical region on the right at the prior resection site have resolved. There is residual lucency at the site of prior hemorrhage in the right posterior frontal region subjacent to the craniotomy  -CT C/A/P: Cirrhosis and portal hypertension.  Large amount of abdominal pelvic ascites and small bilateral pleural   effusions.  Bibasilar consolidation additional lingular clustered nodular and patchy airspace opacity concerning for infectious/inflammatory etiology. No evidence of suspicious mass or adenopathy.  -ECHO: EF 66%  -s/p paracentesis 2.9L removed  -[facility] 6.9 [facility]    Hospital Medications:  albumin human 25% IVPB 100 milliLiter(s) IV Intermittent every 8 hours  chlorhexidine 0.12% Liquid 15 milliLiter(s) Oral Mucosa every 12 hours  chlorhexidine 2% Cloths 1 Application(s) Topical <User Schedule>  chlorhexidine 4% Liquid 1 Application(s) Topical <User Schedule>  CRRT Treatment    <Continuous>  dexMEDEtomidine Infusion 0.5 MICROgram(s)/kG/Hr IV Continuous <Continuous>  fluconAZOLE IVPB 200 milliGRAM(s) IV Intermittent every 24 hours  HYDROmorphone  Injectable 0.5 milliGRAM(s) IV Push every 3 hours PRN  insulin lispro (ADMELOG) corrective regimen sliding scale   SubCutaneous every 6 hours  lactulose Syrup 20 Gram(s) Oral every 6 hours  levETIRAcetam  IVPB 750 milliGRAM(s) IV Intermittent every 12 hours  levothyroxine Injectable 70 MICROGram(s) IV Push at bedtime  meropenem  IVPB 1000 milliGRAM(s) IV Intermittent every 12 hours  norepinephrine Infusion 0.13 MICROgram(s)/kG/Min IV Continuous <Continuous>  pantoprazole  Injectable 40 milliGRAM(s) IV Push every 24 hours  Phoxillum Filtration BK 4 / 2.5 5000 milliLiter(s) CRRT <Continuous>  Phoxillum Filtration BK 4 / 2.5 5000 milliLiter(s) CRRT <Continuous>  Phoxillum Filtration BK 4 / 2.5 5000 milliLiter(s) CRRT <Continuous>  rifAXIMin 550 milliGRAM(s) Oral every 12 hours  sodium chloride 0.9% lock flush 10 milliLiter(s) IV Push every 1 hour PRN  vasopressin Infusion 0.03 Unit(s)/Min IV Continuous <Continuous>      PMHX/PSHX:  HTN (hypertension)    UTI (urinary tract infection)    Intracranial tumor    GERD (gastroesophageal reflux disease)    Eczema    Thyroid cancer    COVID-19 virus infection    History of thyroid surgery    H/O total thyroidectomy    History of tonsillectomy    History of appendectomy            ROS: unable to obtain as pt is intubated        PHYSICAL EXAM:       GENERAL:  Appears stated age, critically ill appearing  HEENT:  NC/AT,  eyes closed  CHEST:  +vented breath sounds  HEART:  Regular rhythm, S1, S2, no murmur/rub/S3/S4  ABDOMEN:  Soft, non-tender, +mildly-distended, +bowel sounds,    EXTREMITIES:  no cyanosis, clubbing or edema  SKIN:  No rash/erythema/ecchymoses/petechiae/wounds/abscess/warm/dry  NEURO:  +sedated    Vital Signs:  Vital Signs Last 24 Hrs  T(C): 35.9 (22 Dec 2022 15:00), Max: 36 (21 Dec 2022 23:00)  T(F): 96.6 (22 Dec 2022 15:00), Max: 96.8 (21 Dec 2022 23:00)  HR: 83 (22 Dec 2022 19:00) (72 - 89)  BP: 94/56 (21 Dec 2022 19:45) (94/56 - 94/56)  BP(mean): 70 (21 Dec 2022 19:45) (70 - 70)  RR: 19 (22 Dec 2022 19:00) (15 - 30)  SpO2: 96% (22 Dec 2022 19:00) (93% - 100%)    Parameters below as of 22 Dec 2022 07:00  Patient On (Oxygen Delivery Method): ventilator  O2 Flow (L/min): 30    Daily     Daily Weight in k.2 (22 Dec 2022 00:00)    LABS:                        7.9    20.22 )-----------( 82       ( 22 Dec 2022 17:26 )             22.8     12-22    138  |  102  |  24<H>  ----------------------------<  94  3.9   |  17<L>  |  2.82<H>    Ca    9.3      22 Dec 2022 17:26  Phos  4.7       Mg     2.3         TPro  6.4  /  Alb  4.2  /  TBili  26.6<H>  /  DBili  x   /  AST  116<H>  /  ALT  36  /  AlkPhos  117      LIVER FUNCTIONS - ( 22 Dec 2022 17:26 )  Alb: 4.2 g/dL / Pro: 6.4 g/dL / ALK PHOS: 117 U/L / ALT: 36 U/L / AST: 116 U/L / GGT: x           PT/INR - ( 22 Dec 2022 05:06 )   PT: 28.2 sec;   INR: 2.41 ratio         PTT - ( 22 Dec 2022 05:06 )  PTT:56.7 sec  Urinalysis Basic - ( 20 Dec 2022 23:55 )    Color: Dark Yellow / Appearance: Turbid / S.029 / pH: x  Gluc: x / Ketone: Small  / Bili: Large / Urobili: Negative   Blood: x / Protein: 300 mg/dL / Nitrite: Negative   Leuk Esterase: Large / RBC: 5-10 /hpf / WBC 11-25   Sq Epi: x / Non Sq Epi: Moderate / Bacteria: Negative      Amylase Serum--      Lipase serum--       [id number]      Imaging:      ACC: [id number] EXAM:  CT ABDOMEN AND PELVIS                        ACC: [id number] EXAM:  CT CHEST                          PROCEDURE DATE:  2022          INTERPRETATION:  CLINICAL INFORMATION: Liver transplant evaluation.    COMPARISON: None.    CONTRAST/COMPLICATIONS:  IV Contrast: NONE  Oral Contrast: NONE  Complications: None reported at time of study completion    PROCEDURE:  CT of the Chest, Abdomen and Pelvis was performed.  Sagittal and coronal reformats were performed.    FINDINGS:  CHEST:  LUNGS AND LARGE AIRWAYS: Patent central airways. Endotracheal tube in   appropriate position. Bibasilar consolidation, right greater than left.   Clustered nodular and patchy airspace opacity in the lingula, concerning   for infectious/inflammatory etiology.  PLEURA: Small bilateral pleural effusions.  VESSELS: Left-sided central venous catheter terminates in the SVC.  HEART: Heart size is normal. No pericardial effusion.  MEDIASTINUM AND SAMMY: No lymphadenopathy.  CHEST WALL AND LOWER NECK: Within normal limits.    ABDOMEN AND PELVIS:  LIVER: Cirrhotic liver.  BILE DUCTS: Layering biliary sludge.  GALLBLADDER: Within normal limits.  SPLEEN: Within normal limits.  PANCREAS: Within normal limits.  ADRENALS: Within normal limits.  KIDNEYS/URETERS: Within normal limits.    BLADDER: Decompressed with a Monroe catheter.  REPRODUCTIVE ORGANS: Uterus and bilateral adnexa within normal limits.    BOWEL: No bowel obstruction. Nasogastric tube terminates in the stomach.   Rectal catheter.  PERITONEUM: Large amount of ascites.  VESSELS: Mild atherosclerotic calcifications.  RETROPERITONEUM/LYMPH NODES: No lymphadenopathy.  ABDOMINAL WALL: Anasarca.  BONES: Old T12 compression fracture.    IMPRESSION:  *  Cirrhosis and portal hypertension.  *  Large amount of abdominal pelvic ascites and small bilateral pleural   effusions.  *  Bibasilar consolidation additional lingular clustered nodular and   patchy airspace opacity concerning for infectious/inflammatory etiology.  *  No evidence of suspicious mass or adenopathy.         Chief Complaint:  Patient is a 62y old  Female who presents with a chief complaint of Acute liver failure (22 Dec 2022 18:21)      Interval Events:   - CTH : 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 Previous hemorrhages intraventricular as well as extending from the ventricle to the subcortical region on the right at the prior resection site have resolved. There is residual lucency at the site of prior hemorrhage in the right posterior frontal region subjacent to the craniotomy  -CT C/A/P: Cirrhosis and portal hypertension.  Large amount of abdominal pelvic ascites and small bilateral pleural   effusions.  Bibasilar consolidation additional lingular clustered nodular and patchy airspace opacity concerning for infectious/inflammatory etiology. No evidence of suspicious mass or adenopathy.  -ECHO: EF 66%  -s/p paracentesis 2.9L removed  -[facility] 6.9 [facility]    Hospital Medications:  albumin human 25% IVPB 100 milliLiter(s) IV Intermittent every 8 hours  chlorhexidine 0.12% Liquid 15 milliLiter(s) Oral Mucosa every 12 hours  chlorhexidine 2% Cloths 1 Application(s) Topical <User Schedule>  chlorhexidine 4% Liquid 1 Application(s) Topical <User Schedule>  CRRT Treatment    <Continuous>  dexMEDEtomidine Infusion 0.5 MICROgram(s)/kG/Hr IV Continuous <Continuous>  fluconAZOLE IVPB 200 milliGRAM(s) IV Intermittent every 24 hours  HYDROmorphone  Injectable 0.5 milliGRAM(s) IV Push every 3 hours PRN  insulin lispro (ADMELOG) corrective regimen sliding scale   SubCutaneous every 6 hours  lactulose Syrup 20 Gram(s) Oral every 6 hours  levETIRAcetam  IVPB 750 milliGRAM(s) IV Intermittent every 12 hours  levothyroxine Injectable 70 MICROGram(s) IV Push at bedtime  meropenem  IVPB 1000 milliGRAM(s) IV Intermittent every 12 hours  norepinephrine Infusion 0.13 MICROgram(s)/kG/Min IV Continuous <Continuous>  pantoprazole  Injectable 40 milliGRAM(s) IV Push every 24 hours  Phoxillum Filtration BK 4 / 2.5 5000 milliLiter(s) CRRT <Continuous>  Phoxillum Filtration BK 4 / 2.5 5000 milliLiter(s) CRRT <Continuous>  Phoxillum Filtration BK 4 / 2.5 5000 milliLiter(s) CRRT <Continuous>  rifAXIMin 550 milliGRAM(s) Oral every 12 hours  sodium chloride 0.9% lock flush 10 milliLiter(s) IV Push every 1 hour PRN  vasopressin Infusion 0.03 Unit(s)/Min IV Continuous <Continuous>      PMHX/PSHX:  HTN (hypertension)    UTI (urinary tract infection)    Intracranial tumor    GERD (gastroesophageal reflux disease)    Eczema    Thyroid cancer    COVID-19 virus infection    History of thyroid surgery    H/O total thyroidectomy    History of tonsillectomy    History of appendectomy            ROS: unable to obtain as pt is intubated        PHYSICAL EXAM:       GENERAL:  Appears stated age, critically ill appearing  HEENT:  NC/AT,  eyes closed  CHEST:  +vented breath sounds  HEART:  Regular rhythm, S1, S2, no murmur/rub/S3/S4  ABDOMEN:  Soft, non-tender, +mildly-distended, +bowel sounds,    EXTREMITIES:  no cyanosis, clubbing or edema  SKIN:  No rash/erythema/ecchymoses/petechiae/wounds/abscess/warm/dry  NEURO:  +sedated    Vital Signs:  Vital Signs Last 24 Hrs  T(C): 35.9 (22 Dec 2022 15:00), Max: 36 (21 Dec 2022 23:00)  T(F): 96.6 (22 Dec 2022 15:00), Max: 96.8 (21 Dec 2022 23:00)  HR: 83 (22 Dec 2022 19:00) (72 - 89)  BP: 94/56 (21 Dec 2022 19:45) (94/56 - 94/56)  BP(mean): 70 (21 Dec 2022 19:45) (70 - 70)  RR: 19 (22 Dec 2022 19:00) (15 - 30)  SpO2: 96% (22 Dec 2022 19:00) (93% - 100%)    Parameters below as of 22 Dec 2022 07:00  Patient On (Oxygen Delivery Method): ventilator  O2 Flow (L/min): 30    Daily     Daily Weight in k.2 (22 Dec 2022 00:00)    LABS:                        7.9    20.22 )-----------( 82       ( 22 Dec 2022 17:26 )             22.8     12-22    138  |  102  |  24<H>  ----------------------------<  94  3.9   |  17<L>  |  2.82<H>    Ca    9.3      22 Dec 2022 17:26  Phos  4.7       Mg     2.3         TPro  6.4  /  Alb  4.2  /  TBili  26.6<H>  /  DBili  x   /  AST  116<H>  /  ALT  36  /  AlkPhos  117      LIVER FUNCTIONS - ( 22 Dec 2022 17:26 )  Alb: 4.2 g/dL / Pro: 6.4 g/dL / ALK PHOS: 117 U/L / ALT: 36 U/L / AST: 116 U/L / GGT: x           PT/INR - ( 22 Dec 2022 05:06 )   PT: 28.2 sec;   INR: 2.41 ratio         PTT - ( 22 Dec 2022 05:06 )  PTT:56.7 sec  Urinalysis Basic - ( 20 Dec 2022 23:55 )    Color: Dark Yellow / Appearance: Turbid / S.029 / pH: x  Gluc: x / Ketone: Small  / Bili: Large / Urobili: Negative   Blood: x / Protein: 300 mg/dL / Nitrite: Negative   Leuk Esterase: Large / RBC: 5-10 /hpf / WBC 11-25   Sq Epi: x / Non Sq Epi: Moderate / Bacteria: Negative      Amylase Serum--      Lipase serum--       [id number]      Imaging:      ACC: [id number] EXAM:  CT ABDOMEN AND PELVIS                        ACC: [id number] EXAM:  CT CHEST                          PROCEDURE DATE:  2022          INTERPRETATION:  CLINICAL INFORMATION: Liver transplant evaluation.    COMPARISON: None.    CONTRAST/COMPLICATIONS:  IV Contrast: NONE  Oral Contrast: NONE  Complications: None reported at time of study completion    PROCEDURE:  CT of the Chest, Abdomen and Pelvis was performed.  Sagittal and coronal reformats were performed.    FINDINGS:  CHEST:  LUNGS AND LARGE AIRWAYS: Patent central airways. Endotracheal tube in   appropriate position. Bibasilar consolidation, right greater than left.   Clustered nodular and patchy airspace opacity in the lingula, concerning   for infectious/inflammatory etiology.  PLEURA: Small bilateral pleural effusions.  VESSELS: Left-sided central venous catheter terminates in the SVC.  HEART: Heart size is normal. No pericardial effusion.  MEDIASTINUM AND SAMMY: No lymphadenopathy.  CHEST WALL AND LOWER NECK: Within normal limits.    ABDOMEN AND PELVIS:  LIVER: Cirrhotic liver.  BILE DUCTS: Layering biliary sludge.  GALLBLADDER: Within normal limits.  SPLEEN: Within normal limits.  PANCREAS: Within normal limits.  ADRENALS: Within normal limits.  KIDNEYS/URETERS: Within normal limits.    BLADDER: Decompressed with a Monroe catheter.  REPRODUCTIVE ORGANS: Uterus and bilateral adnexa within normal limits.    BOWEL: No bowel obstruction. Nasogastric tube terminates in the stomach.   Rectal catheter.  PERITONEUM: Large amount of ascites.  VESSELS: Mild atherosclerotic calcifications.  RETROPERITONEUM/LYMPH NODES: No lymphadenopathy.  ABDOMINAL WALL: Anasarca.  BONES: Old T12 compression fracture.    IMPRESSION:  *  Cirrhosis and portal hypertension.  *  Large amount of abdominal pelvic ascites and small bilateral pleural   effusions.  *  Bibasilar consolidation additional lingular clustered nodular and   patchy airspace opacity concerning for infectious/inflammatory etiology.  *  No evidence of suspicious mass or adenopathy.

## 2022-12-22 NOTE — PROGRESS NOTE ADULT - ATTENDING COMMENTS
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 She was transferred to [facility] for liver transplant evaluation (12/21).     N - Hold precedex for neuro exam. CTH to evaluate for any ICH given seizures. EEG. Continue keppra.   C - Likely septic shock requiring moderate dose vasopressors. TTE pending.   P - PRVC minimal settings, no SBT today  G - Acute on chronic liver failure. NPO, OGT to suction. Hold feeds for now. PPI prophylaxis (has intolerance listed as rash).  R - Anuric acute renal failure. Hold CRRT after CT scans, will resume if meets criteria.   H - Holding VTE ppx. No evidence of external bleeding.   I - CT CAP to look for infectious source. BCx, UCx and sputum culture sent. Empiric vanc, zosyn and fluconazole. Send MRSA swab.   E - ISS  TLD - R femoral HD line, RIJ TLC, radial erna, dhillon, OGT  DISPO - SICU, full code    I have seen and examined this patient with the ICU resident/APC. I have reviewed all new labs, imaging and reports. I have participated in formulating the plan, and have read and agree with the history, ROS, exam, assessment and plan as stated above.    Total time spent in the critical care of this patient today (excluding teaching & procedures): 50 minutes    Over 50% of the total time was spent in discussion and coordination of care with consulting services, dietary and rehab services.    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 She was transferred to [facility] for liver transplant evaluation (12/21).     N - Hold precedex for neuro exam. CTH to evaluate for any ICH given seizures. EEG. Continue keppra.   C - Likely septic shock requiring moderate dose vasopressors. TTE pending.   P - PRVC minimal settings, no SBT today  G - Acute on chronic liver failure. NPO, OGT to suction. Hold feeds for now. PPI prophylaxis (has intolerance listed as rash).  R - Anuric acute renal failure. Hold CRRT after CT scans, will resume if meets criteria.   H - Holding VTE ppx. No evidence of external bleeding.   I - CT CAP to look for infectious source. BCx, UCx and sputum culture sent. Empiric vanc, zosyn and fluconazole. Send MRSA swab.   E - ISS  TLD - R femoral HD line, RIJ TLC, radial erna, dhillon, OGT  DISPO - SICU, full code    I have seen and examined this patient with the ICU resident/APC. I have reviewed all new labs, imaging and reports. I have participated in formulating the plan, and have read and agree with the history, ROS, exam, assessment and plan as stated above.    Total time spent in the critical care of this patient today (excluding teaching & procedures): 50 minutes    Over 50% of the total time was spent in discussion and coordination of care with consulting services, dietary and rehab services.    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 She was transferred to [facility] for liver transplant evaluation (12/21).     N - Hold precedex for neuro exam. CTH to evaluate for any ICH given seizures. EEG. Continue keppra.   C - Likely septic shock requiring moderate dose vasopressors. TTE pending.   P - PRVC minimal settings, no SBT today  G - Acute on chronic liver failure. NPO, OGT to suction. Hold feeds for now. PPI prophylaxis (has intolerance listed as rash).  R - Anuric acute renal failure. Hold CRRT after CT scans, will resume if meets criteria.   H - Holding VTE ppx. No evidence of external bleeding.   I - CT CAP to look for infectious source. BCx, UCx and sputum culture sent. Empiric vanc, zosyn and fluconazole. Send MRSA swab.   E - ISS  TLD - R femoral HD line, RIJ TLC, radial erna, dhillon, OGT  DISPO - SICU, full code    I have seen and examined this patient with the ICU resident/APC. I have reviewed all new labs, imaging and reports. I have participated in formulating the plan, and have read and agree with the history, ROS, exam, assessment and plan as stated above.    Total time spent in the critical care of this patient today (excluding teaching & procedures): 50 minutes    Over 50% of the total time was spent in discussion and coordination of care with consulting services, dietary and rehab services.    Vilma Mitchell MD  Surgical Critical Care

## 2022-12-22 NOTE — PROGRESS NOTE ADULT - SUBJECTIVE AND OBJECTIVE BOX
Follow Up:      Interval History:    REVIEW OF SYSTEMS  [  ] ROS unobtainable because:    [  ] All other systems negative except as noted below    Constitutional:  [ ] fever [ ] chills  [ ] weight loss  [ ] weakness  Skin:  [ ] rash [ ] phlebitis	  Eyes: [ ] icterus [ ] pain  [ ] discharge	  ENMT: [ ] sore throat  [ ] thrush [ ] ulcers [ ] exudates  Respiratory: [ ] dyspnea [ ] hemoptysis [ ] cough [ ] sputum	  Cardiovascular:  [ ] chest pain [ ] palpitations [ ] edema	  Gastrointestinal:  [ ] nausea [ ] vomiting [ ] diarrhea [ ] constipation [ ] pain	  Genitourinary:  [ ] dysuria [ ] frequency [ ] hematuria [ ] discharge [ ] flank pain  [ ] incontinence  Musculoskeletal:  [ ] myalgias [ ] arthralgias [ ] arthritis  [ ] back pain  Neurological:  [ ] headache [ ] seizures  [ ] confusion/altered mental status    Allergies  Macrobid (Rash)        ANTIMICROBIALS:  fluconAZOLE IVPB 200 every 24 hours  meropenem  IVPB 1000 every 12 hours  rifAXIMin 550 every 12 hours  vancomycin  IVPB 1000 every 12 hours      OTHER MEDS:  MEDICATIONS  (STANDING):  dexMEDEtomidine Infusion 0.5 <Continuous>  HYDROmorphone  Injectable 0.5 every 3 hours PRN  insulin lispro (ADMELOG) corrective regimen sliding scale  every 6 hours  lactulose Syrup 20 every 6 hours  levETIRAcetam  IVPB 750 every 12 hours  levothyroxine Injectable 70 at bedtime  norepinephrine Infusion 0.13 <Continuous>  pantoprazole  Injectable 40 every 24 hours  vasopressin Infusion 0.03 <Continuous>      Vital Signs Last 24 Hrs  T(C): 35.9 (22 Dec 2022 15:00), Max: 36 (21 Dec 2022 19:00)  T(F): 96.6 (22 Dec 2022 15:00), Max: 96.8 (21 Dec 2022 19:00)  HR: 85 (22 Dec 2022 15:00) (72 - 85)  BP: 94/56 (21 Dec 2022 19:45) (94/56 - 100/60)  BP(mean): 70 (21 Dec 2022 19:45) (70 - 74)  RR: 16 (22 Dec 2022 15:00) (15 - 30)  SpO2: 97% (22 Dec 2022 15:00) (96% - 100%)    Parameters below as of 22 Dec 2022 07:00  Patient On (Oxygen Delivery Method): ventilator  O2 Flow (L/min): 30      PHYSICAL EXAMINATION:  General: Alert and Awake, NAD  HEENT: PERRL, EOMI  Neck: Supple  Cardiac: RRR, No M/R/G  Resp: CTAB, No Wh/Rh/Ra  Abdomen: NBS, NT/ND, No HSM, No rigidity or guarding  MSK: No LE edema. No Calf tenderness  : No dhillon  Skin: No rashes or lesions. Skin is warm and dry to the touch.   Neuro: Alert and Awake. CN 2-12 Grossly intact. Moves all four extremities spontaneously.  Psych: Calm, Pleasant, Cooperative                          8.0    . )-----------( 82       ( 22 Dec 2022 11:34 )             23.3           136  |  101  |  32<H>  ----------------------------<  139<H>  3.7   |  16<L>  |  3.74<H>    Ca    9.2      22 Dec 2022 11:34  Phos  5.6       Mg     2.2         TPro  5.9<L>  /  Alb  4.0  /  TBili  26.1<H>  /  DBili  x   /  AST  92<H>  /  ALT  35  /  AlkPhos  110        Urinalysis Basic - ( 20 Dec 2022 23:55 )    Color: Dark Yellow / Appearance: Turbid / S.029 / pH: x  Gluc: x / Ketone: Small  / Bili: Large / Urobili: Negative   Blood: x / Protein: 300 mg/dL / Nitrite: Negative   Leuk Esterase: Large / RBC: 5-10 /hpf / WBC 11-25   Sq Epi: x / Non Sq Epi: Moderate / Bacteria: Negative        MICROBIOLOGY:  Vancomycin Level, Random: 13.8 ug/mL (22 @ 06:17)  v  Ascites Fl Ascites Fluid  22   Testing in progress  --    polymorphonuclear leukocytes seen  No organisms seen  by cytocentrifuge      Trach Asp Tracheal Aspirate  22   Normal Respiratory Cassandra present  --    No polymorphonuclear leukocytes per low power field  Numerous Squamous epithelial cells per low power field  Moderate Gram Positive Rods seen per oil power field  Few Yeast like cells seen per oil power field      .Blood Blood-Peripheral  22   No growth to date.  --  --      .Blood Blood-Peripheral  22   No growth to date.  --  --                RADIOLOGY:    <The imaging below has been reviewed and visualized by me independently. Findings as detailed in report below> Follow Up:  [facility]    Interval History: afebrile. remains intubated and on CRRT    REVIEW OF SYSTEMS  [ x ] ROS unobtainable because: intubated    [  ] All other systems negative except as noted below:	    Constitutional:  [ ] fever [ ] chills  [ ] weight loss  [ ] weakness  Skin:  [ ] rash [ ] phlebitis	  Eyes: [ ] icterus [ ] pain  [ ] discharge	  ENMT: [ ] sore throat  [ ] thrush [ ] ulcers [ ] exudates  Respiratory: [ ] dyspnea [ ] hemoptysis [ ] cough [ ] sputum	  Cardiovascular:  [ ] chest pain [ ] palpitations [ ] edema	  Gastrointestinal:  [ ] nausea [ ] vomiting [ ] diarrhea [ ] constipation [ ] pain	  Genitourinary:  [ ] dysuria [ ] frequency [ ] hematuria [ ] discharge [ ] flank pain  [ ] incontinence  Musculoskeletal:  [ ] myalgias [ ] arthralgias [ ] arthritis  [ ] back pain  Neurological:  [ ] headache [ ] seizures  [ ] confusion/altered mental status  Psychiatric:  [ ] anxiety [ ] depression	  Hematology/Lymphatics:  [ ] lymphadenopathy  Endocrine:  [ ] adrenal [ ] thyroid  Allergic/Immunologic:	 [ ] transplant [ ] seasonal      Allergies  Macrobid (Rash)        ANTIMICROBIALS:  fluconAZOLE IVPB 200 every 24 hours  meropenem  IVPB 1000 every 12 hours  rifAXIMin 550 every 12 hours  vancomycin  IVPB 1000 every 12 hours      OTHER MEDS:  MEDICATIONS  (STANDING):  dexMEDEtomidine Infusion 0.5 <Continuous>  HYDROmorphone  Injectable 0.5 every 3 hours PRN  insulin lispro (ADMELOG) corrective regimen sliding scale  every 6 hours  lactulose Syrup 20 every 6 hours  levETIRAcetam  IVPB 750 every 12 hours  levothyroxine Injectable 70 at bedtime  norepinephrine Infusion 0.13 <Continuous>  pantoprazole  Injectable 40 every 24 hours  vasopressin Infusion 0.03 <Continuous>      Vital Signs Last 24 Hrs  T(C): 35.9 (22 Dec 2022 15:00), Max: 36 (21 Dec 2022 19:00)  T(F): 96.6 (22 Dec 2022 15:00), Max: 96.8 (21 Dec 2022 19:00)  HR: 85 (22 Dec 2022 15:00) (72 - 85)  BP: 94/56 (21 Dec 2022 19:45) (94/56 - 100/60)  BP(mean): 70 (21 Dec 2022 19:45) (70 - 74)  RR: 16 (22 Dec 2022 15:00) (15 - 30)  SpO2: 97% (22 Dec 2022 15:00) (96% - 100%)    Parameters below as of 22 Dec 2022 07:00  Patient On (Oxygen Delivery Method): ventilator  O2 Flow (L/min): 30    PHYSICAL EXAMINATION:  General: Intubated and Sedated, Jaundice  HEENT: +ETT, Scleral Icterus  Neck: Supple  Cardiac: RRR, No M/R/G  Resp: CTAB, No Wh/Rh/Ra  Abdomen: NBS, NT/ND, No HSM, No rigidity or guarding  MSK: No LE edema. No Calf tenderness  : [place]  Skin: No rashes or lesions. Skin is warm and dry to the touch.   Neuro: Intubated and Sedated  Psych: Unable to assess - intubated and sedated                                   8.0    20.55 )-----------( 82       ( 22 Dec 2022 11:34 )             23.3           136  |  101  |  32<H>  ----------------------------<  139<H>  3.7   |  16<L>  |  3.74<H>    Ca    9.2      22 Dec 2022 11:34  Phos  5.6       Mg     2.2         TPro  5.9<L>  /  Alb  4.0  /  TBili  26.1<H>  /  DBili  x   /  AST  92<H>  /  ALT  35  /  AlkPhos  110        Urinalysis Basic - ( 20 Dec 2022 23:55 )    Color: Dark Yellow / Appearance: Turbid / S.029 / pH: x  Gluc: x / Ketone: Small  / Bili: Large / Urobili: Negative   Blood: x / Protein: 300 mg/dL / Nitrite: Negative   Leuk Esterase: Large / RBC: 5-10 /hpf / WBC 11-25   Sq Epi: x / Non Sq Epi: Moderate / Bacteria: Negative        MICROBIOLOGY:  Vancomycin Level, Random: 13.8 ug/mL (22 @ 06:17)  v  Ascites Fl Ascites Fluid  22   Testing in progress  --    polymorphonuclear leukocytes seen  No organisms seen  by cytocentrifuge      Trach Asp Tracheal Aspirate  22   Normal Respiratory Cassandra present  --    No polymorphonuclear leukocytes per low power field  Numerous Squamous epithelial cells per low power field  Moderate Gram Positive Rods seen per oil power field  Few Yeast like cells seen per oil power field      .Blood Blood-Peripheral  22   No growth to date.  --  --      .Blood Blood-Peripheral  22   No growth to date.  --  --    RADIOLOGY:    <The imaging below has been reviewed and visualized by me independently. Findings as detailed in report below>    < from: Xray Chest 1 View- PORTABLE-Routine (Xray Chest 1 View- PORTABLE-Routine in AM.) (22 @ 07:54) >  IMPRESSION:  Line and tubes as above.    Elevated right hemidiaphragm.    Hazy right lower lung opacity, not significantly changed, likely   corresponding to a right pleural effusion and right lung consolidation   reported on the CT scan.    Increasing left basilar and retrocardiac opacity which could be due to a   left pleural effusion with associated passive atelectasis noted on the CT   scan, atelectasis of other cause, and/or pneumonia.    < end of copied text >

## 2022-12-22 NOTE — PROGRESS NOTE ADULT - ASSESSMENT
Patient is a 62yFemale presents with hx thyroid cancer s/p thyroidectomy, craniotomy with resection of ventricular mass, with acute liver failure, new seizures, transfer from [facility] for transplant evaluation.       Neurologic: hepatic encephalopathy  - Precedex, wean as tolerated  - hepatic encephalopathy, lactulose and rifaximin   - F/u ammonia   - keppra (seizure at OSH new onset)    Respiratory: acute hypoxic resp failure   - Mechanical ventilation, minimal vent settings: 15/450/30/5  - CXR /ABG reviewed    Cardiovascular:   - Hypotension, on levo and vaso (downtrending since transfer)  -titrate to MAP >65  -lactate trend  -hold anti htn    Gastrointestinal/Nutrition:  - Acute decompensated liver failure  - Workup per transplant recs   - Monitor BM, rectal tube, on lactulose and rifaximin   - Octreotide   - NPO    Genitourinary/Renal: RED 2/2 ATN vs HRS  - Dialyzed at OSH  -s/p CRRT now on hold  -oliguric  - No evidence of fluid overload   - Has HD access right groin   - Cr increased (baseline approx 0.6, now 5.31)   -monitor electrolytes    Hematologic:- thrombocytopenia   - Chem VTE ppx: hold   - monitor PL  - H/H stable  - INR 2.71 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       Neurologic: hepatic encephalopathy  - Precedex, wean as tolerated  - hepatic encephalopathy, lactulose and rifaximin   - F/u ammonia   - keppra (seizure at OSH new onset)    Respiratory: acute hypoxic resp failure   - Mechanical ventilation, minimal vent settings: 15/450/30/5  - CXR /ABG reviewed    Cardiovascular:   - Hypotension, on levo and vaso (downtrending since transfer)  -titrate to MAP >65  -lactate trend  -hold anti htn    Gastrointestinal/Nutrition:  - Acute decompensated liver failure  - Workup per transplant recs   - Monitor BM, rectal tube, on lactulose and rifaximin   - Octreotide   - NPO    Genitourinary/Renal: RED 2/2 ATN vs HRS  - Dialyzed at OSH  -s/p CRRT now on hold  -oliguric  - No evidence of fluid overload   - Has HD access right groin   - Cr increased (baseline approx 0.6, now 5.31)   -monitor electrolytes  -albumin 25% 100cc q8h    Hematologic:- thrombocytopenia   - Chem VTE ppx: hold   - monitor PL  - H/H stable  - INR 2.71 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     Neurologic: hepatic encephalopathy - improving  - Precedex, wean as tolerated  - currently awake and able to cooperate with questions/move to command  - c/w lactulose and rifaximin   - ammonia 96, trend  - c/w keppra 750 BID    Respiratory: acute hypoxic resp failure   - Mechanical ventilation, minimal vent settings: 15/450/30/5  - CXR b/l infiltrates RLL>LLL  - send combicath cx    Cardiovascular:   - on levo and vaso . titrate as appropraite  -titrate to MAP >65  -lactate ~1.4  -hold home anti htn    Gastrointestinal/Nutrition:  - Acute decompensated liver failure, s/p para 2.9L in ED  - Workup per transplant recs pending  - Monitor BM, rectal tube, on lactulose and rifaximin   - add PPI, dc'd octreotide  - NPO    Genitourinary/Renal: RED 2/2 ATN vs HRS  - off CRRT for ~6hr and became aneuric, restarted CCRT  - possible fluid OL on CXR   - HD access right groin   - Cr increased (baseline approx 0.6, now 5.31)   - monitor electrolytes  - albumin 25% 100cc q8h for 3d    Hematologic:  - Chem VTE ppx: hold   - thrombocytopenic, monitor plts  - s/p 1U PRBC 12/21, trend labs  - INR 2.71 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     Neurologic: hepatic encephalopathy - improving  - Precedex, wean as tolerated  - currently awake and able to cooperate with questions/move to command  - c/w lactulose and rifaximin   - ammonia 96, trend  - c/w keppra 750 BID    Respiratory: acute hypoxic resp failure   - Mechanical ventilation, minimal vent settings: 15/450/30/5  - CXR b/l infiltrates RLL>LLL  - send combicath cx    Cardiovascular:   - on levo and vaso . titrate as appropraite  -titrate to MAP >65  -lactate ~1.4  -hold home anti htn    Gastrointestinal/Nutrition:  - Acute decompensated liver failure, s/p para 2.9L in ED  - Workup per transplant recs pending  - Monitor BM, rectal tube, on lactulose and rifaximin   - add PPI, dc'd octreotide  - NPO    Genitourinary/Renal: ERD 2/2 ATN vs HRS  - off CRRT for ~6hr and became aneuric, restarted CCRT  - possible fluid OL on CXR   - HD access right groin   - Cr increased (baseline approx 0.6, now 5.31)   - monitor electrolytes  - albumin 25% 100cc q8h for 3d    Hematologic:  - Chem VTE ppx: hold   - thrombocytopenic, monitor plts  - s/p 1U PRBC 12/21, trend labs  - INR 2.71 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     Neurologic: hepatic encephalopathy - improving  - Precedex, wean as tolerated  - currently awake and able to cooperate with questions/move to command  - c/w lactulose and rifaximin   - ammonia 96, trend  - c/w keppra 750 BID    Respiratory: acute hypoxic resp failure   - Mechanical ventilation, minimal vent settings: 15/450/30/5  - CXR b/l infiltrates RLL>LLL  - send combicath cx    Cardiovascular:   - on levo and vaso . titrate as appropraite  -titrate to MAP >65  -lactate ~1.4  -hold home anti htn    Gastrointestinal/Nutrition:  - Acute decompensated liver failure, s/p para 2.9L in ED  - Workup per transplant recs pending  - Monitor BM, rectal tube, on lactulose and rifaximin   - add PPI, dc'd octreotide  - NPO    Genitourinary/Renal: RED 2/2 ATN vs HRS  - off CRRT for ~6hr and became aneuric, restarted CCRT  - possible fluid OL on CXR   - HD access right groin   - Cr increased (baseline approx 0.6, now 5.31)   - monitor electrolytes  - albumin 25% 100cc q8h for 3d    Hematologic:  - Chem VTE ppx: hold   - thrombocytopenic, monitor plts  - s/p 1U PRBC 12/21, trend labs  - INR 2.71 likely hepatic synthetic dysfunction   - on vit K for 3d for coagulopathy    Infectious Disease:  - Empiric abx meropenem, fluconazole, vanc  - ascites, blood, urine & fungal Cx pending  - send combicath cx  - Hepatitis workup     Endocrine:  - Hypothyroidism c/w synthroid IV 70  - No steroids  - ISS FS    Ethics: FULL CODE    Dispo/Lines:  R IJ  A line  [place]  SICU

## 2022-12-22 NOTE — PROGRESS NOTE ADULT - SUBJECTIVE AND OBJECTIVE BOX
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 Previous hemorrhages intraventricular as well as extending from the ventricle to the subcortical region on the right at the prior resection site have resolved. There is residual lucency at the site of prior hemorrhage in the right posterior frontal region subjacent to the craniotomy  -CT chest, AP:*  Cirrhosis and portal hypertension.*  Large amount of abdominal pelvic ascites and small bilateral pleural   effusions.*  Bibasilar consolidation additional lingular clustered nodular and   patchy airspace opacity concerning for infectious/inflammatory etiology.  *  No evidence of suspicious mass or adenopathy.  -ECHO: EF 66%  -s/p paracenthesis 2.9L taken out  -CRRT on hold          SUBJECTIVE/ROS:  [x ] A ten-point review of systems was otherwise negative except as noted.  [ ] Due to altered mental status/intubation, subjective information were not able to be obtained from the patient. History was obtained, to the extent possible, from review of the chart and collateral sources of information.      NEURO  Exam: opens eyes, moves lower extr off sedation  Meds: dexMEDEtomidine Infusion 0.5 MICROgram(s)/kG/Hr IV Continuous <Continuous>  HYDROmorphone  Injectable 0.5 milliGRAM(s) IV Push every 3 hours PRN breakthrough pain  levETIRAcetam  IVPB 750 milliGRAM(s) IV Intermittent every 12 hours    [x] Adequacy of sedation and pain control has been assessed and adjusted      RESPIRATORY  RR: 15 (12-22-22 @ 03:00) (15 - 21)  SpO2: 99% (12-22-22 @ 03:00) (95% - 99%)  Exam: unlabored, clear to auscultation bilaterally  Mechanical Ventilation: Mode: AC/ CMV (Assist Control/ Continuous Mandatory Ventilation), RR (machine): 15, RR (patient): 15, TV (machine): 450, FiO2: 30, PEEP: 5, ITime: 1, MAP: 8.5, PIP: 23  ABG - ( 21 Dec 2022 22:38 )  pH: 7.38  /  pCO2: 31    /  pO2: 129   / HCO3: 18    / Base Excess: -6.2  /  SaO2: 98.6        [N/A] Extubation Readiness Assessed        CARDIOVASCULAR  HR: 72 (12-22-22 @ 03:00) (72 - 86)  BP: 94/56 (12-21-22 @ 19:45) (94/56 - 100/60)  BP(mean): 70 (12-21-22 @ 19:45) (70 - 74)  ABP: 98/48 (12-22-22 @ 03:00) (82/38 - 181/77)  ABP(mean): 69 (12-22-22 @ 03:00) (54 - 121)      Exam: regular rate and rhythm  Cardiac Rhythm: sinus  Perfusion     [x]Adequate   [ ]Inadequate  Mentation   [x]Normal       [ ]Reduced  Extremities  [x]Warm         [ ]Cool  Volume Status [ ]Hypervolemic [x]Euvolemic [ ]Hypovolemic  Meds: norepinephrine Infusion 0.13 MICROgram(s)/kG/Min IV Continuous <Continuous>        GI/NUTRITION  Exam: soft, nontender, nondistended, incision C/D/I  Diet: npo  Meds: lactulose Syrup 20 Gram(s) Oral every 6 hours      GENITOURINARY  I&O's Detail    12-20 @ 07:01  -  12-21 @ 07:00  --------------------------------------------------------  IN:    Dexmedetomidine: 107.6 mL    Enteral Tube Flush: 120 mL    IV PiggyBack: 300 mL    IV PiggyBack: 150 mL    Norepinephrine: 154 mL    Octreotide: 60 mL    Propofol: 23 mL    Vasopressin: 45 mL  Total IN: 959.6 mL    OUT:    Indwelling Catheter - Urethral (mL): 25 mL    Other (mL): 418 mL    Rectal Tube (mL): 350 mL  Total OUT: 793 mL    Total NET: 166.6 mL      12-21 @ 07:01  -  12-22 @ 03:10  --------------------------------------------------------  IN:    Albumin 25%  -  50 mL: 250 mL    Dexmedetomidine: 24.3 mL    Dexmedetomidine: 8.3 mL    Enteral Tube Flush: 100 mL    IV PiggyBack: 550 mL    IV PiggyBack: 50 mL    IV PiggyBack: 100 mL    Norepinephrine: 237.3 mL    Octreotide: 60 mL    PRBCs (Packed Red Blood Cells): 300 mL    Vasopressin: 81 mL  Total IN: 1760.9 mL    OUT:    Indwelling Catheter - Urethral (mL): 22 mL    Other (mL): 288 mL    Propofol: 0 mL    Rectal Tube (mL): 800 mL  Total OUT: 1110 mL    Total NET: 650.9 mL          12-21    134<L>  |  99  |  29<H>  ----------------------------<  164<H>  3.8   |  17<L>  |  3.59<H>    Ca    8.6      21 Dec 2022 22:54  Phos  5.7     12-21  Mg     2.2     12-21    TPro  6.1  /  Alb  3.9  /  TBili  24.6<H>  /  DBili  x   /  AST  99<H>  /  ALT  37  /  AlkPhos  109  12-21    [ ] Monroe catheter, indication: N/A  Meds: albumin human 25% IVPB 100 milliLiter(s) IV Intermittent every 8 hours  sodium chloride 0.9% lock flush 10 milliLiter(s) IV Push every 1 hour PRN Pre/post blood products, medications, blood draw, and to maintain line patency        HEMATOLOGIC  Meds:   [x] VTE Prophylaxis                        6.9    21.55 )-----------( 81       ( 21 Dec 2022 22:54 )             20.6     PT/INR - ( 21 Dec 2022 05:08 )   PT: 34.1 sec;   INR: 2.93 ratio         PTT - ( 21 Dec 2022 05:08 )  PTT:51.3 sec  Transfusion     [ ] PRBC   [ ] Platelets   [ ] FFP   [ ] Cryoprecipitate      INFECTIOUS DISEASES  WBC Count: 21.55 K/uL (12-21 @ 22:54)  WBC Count: 25.81 K/uL (12-21 @ 17:13)  WBC Count: 24.21 K/uL (12-21 @ 11:52)  WBC Count: 24.82 K/uL (12-21 @ 05:06)    RECENT CULTURES:  Specimen Source: Ascites Fl Ascites Fluid  Date/Time: 12-21 @ 19:53  Culture Results: --  Gram Stain:   polymorphonuclear leukocytes seen  No organisms seen  by cytocentrifuge  Organism: --  Specimen Source: Trach Asp Tracheal Aspirate  Date/Time: 12-21 @ 01:19  Culture Results: --  Gram Stain:   No polymorphonuclear leukocytes per low power field  Numerous Squamous epithelial cells per low power field  Moderate Gram Positive Rods seen per oil power field  Few Yeast like cells seen per oil power field  Organism: --  Specimen Source: .Blood Blood-Peripheral  Date/Time: 12-20 @ 22:38  Culture Results:   No growth to date.  Gram Stain: --  Organism: --    Meds: fluconAZOLE IVPB 200 milliGRAM(s) IV Intermittent every 24 hours  meropenem  IVPB 1000 milliGRAM(s) IV Intermittent every 12 hours  rifAXIMin 550 milliGRAM(s) Oral every 12 hours  vancomycin  IVPB 1000 milliGRAM(s) IV Intermittent every 12 hours        ENDOCRINE  CAPILLARY BLOOD GLUCOSE      POCT Blood Glucose.: 164 mg/dL (22 Dec 2022 00:58)  POCT Blood Glucose.: 113 mg/dL (21 Dec 2022 05:35)    Meds: insulin lispro (ADMELOG) corrective regimen sliding scale   SubCutaneous every 6 hours  levothyroxine Injectable 70 MICROGram(s) IV Push at bedtime  vasopressin Infusion 0.03 Unit(s)/Min IV Continuous <Continuous>        ACCESS DEVICES:  [ ] Peripheral IV  [ ] Central Venous Line	[ ] R	[ ] L	[ ] IJ	[ ] Fem	[ ] SC	Placed:   [ ] Arterial Line		[ ] R	[ ] L	[ ] Fem	[ ] Rad	[ ] Ax	Placed:   [ ] PICC:					[ ] Mediport  [ ] Urinary Catheter, Date Placed:   [x] Necessity of urinary, arterial, and venous catheters discussed    OTHER MEDICATIONS:  chlorhexidine 0.12% Liquid 15 milliLiter(s) Oral Mucosa every 12 hours  chlorhexidine 2% Cloths 1 Application(s) Topical <User Schedule>  chlorhexidine 4% Liquid 1 Application(s) Topical <User Schedule>  CRRT Treatment    <Continuous>  Phoxillum Filtration BK 4 / 2.5 5000 milliLiter(s) CRRT <Continuous>  Phoxillum Filtration BK 4 / 2.5 5000 milliLiter(s) CRRT <Continuous>  Phoxillum Filtration BK 4 / 2.5 5000 milliLiter(s) CRRT <Continuous>      CODE STATUS:         24 HOUR EVENTS:  - CTH : 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 Previous hemorrhages intraventricular as well as extending from the ventricle to the subcortical region on the right at the prior resection site have resolved. There is residual lucency at the site of prior hemorrhage in the right posterior frontal region subjacent to the craniotomy  -CT chest, AP:*  Cirrhosis and portal hypertension.*  Large amount of abdominal pelvic ascites and small bilateral pleural   effusions.*  Bibasilar consolidation additional lingular clustered nodular and   patchy airspace opacity concerning for infectious/inflammatory etiology.  *  No evidence of suspicious mass or adenopathy.  -ECHO: EF 66%  -s/p paracenthesis 2.9L taken out  -CRRT on hold          SUBJECTIVE/ROS:  [ ] A ten-point review of systems was otherwise negative except as noted.  [x Due to altered mental status/intubation, subjective information were not able to be obtained from the patient. History was obtained, to the extent possible, from review of the chart and collateral sources of information.      NEURO  Exam: opens eyes, nods yes-no, can move extremities weakly to command  Meds: dexMEDEtomidine Infusion 0.5 MICROgram(s)/kG/Hr IV Continuous <Continuous>  HYDROmorphone  Injectable 0.5 milliGRAM(s) IV Push every 3 hours PRN breakthrough pain  levETIRAcetam  IVPB 750 milliGRAM(s) IV Intermittent every 12 hours    [x] Adequacy of sedation and pain control has been assessed and adjusted      RESPIRATORY  RR: 15 (12-22-22 @ 03:00) (15 - 21)  SpO2: 99% (12-22-22 @ 03:00) (95% - 99%)  Exam: unlabored, clear to auscultation bilaterally  Mechanical Ventilation: Mode: AC/ CMV (Assist Control/ Continuous Mandatory Ventilation), RR (machine): 15, RR (patient): 15, TV (machine): 450, FiO2: 30, PEEP: 5, ITime: 1, MAP: 8.5, PIP: 23  ABG - ( 21 Dec 2022 22:38 )  pH: 7.38  /  pCO2: 31    /  pO2: 129   / HCO3: 18    / Base Excess: -6.2  /  SaO2: 98.6        [N/A] Extubation Readiness Assessed        CARDIOVASCULAR  HR: 72 (12-22-22 @ 03:00) (72 - 86)  BP: 94/56 (12-21-22 @ 19:45) (94/56 - 100/60)  BP(mean): 70 (12-21-22 @ 19:45) (70 - 74)  ABP: 98/48 (12-22-22 @ 03:00) (82/38 - 181/77)  ABP(mean): 69 (12-22-22 @ 03:00) (54 - 121)      Exam: regular rate and rhythm  Cardiac Rhythm: sinus  Perfusion     [x]Adequate   [ ]Inadequate  Mentation   [x]Normal       [ ]Reduced  Extremities  [x]Warm         [ ]Cool  Volume Status [ ]Hypervolemic [x]Euvolemic [ ]Hypovolemic  Meds: norepinephrine Infusion 0.13 MICROgram(s)/kG/Min IV Continuous <Continuous>        GI/NUTRITION  Exam: soft, nontender, nondistended  Diet: npo  Meds: lactulose Syrup 20 Gram(s) Oral every 6 hours      GENITOURINARY  I&O's Detail    12-20 @ 07:01  -  12-21 @ 07:00  --------------------------------------------------------  IN:    Dexmedetomidine: 107.6 mL    Enteral Tube Flush: 120 mL    IV PiggyBack: 300 mL    IV PiggyBack: 150 mL    Norepinephrine: 154 mL    Octreotide: 60 mL    Propofol: 23 mL    Vasopressin: 45 mL  Total IN: 959.6 mL    OUT:    Indwelling Catheter - Urethral (mL): 25 mL    Other (mL): 418 mL    Rectal Tube (mL): 350 mL  Total OUT: 793 mL    Total NET: 166.6 mL      12-21 @ 07:01  -  12-22 @ 03:10  --------------------------------------------------------  IN:    Albumin 25%  -  50 mL: 250 mL    Dexmedetomidine: 24.3 mL    Dexmedetomidine: 8.3 mL    Enteral Tube Flush: 100 mL    IV PiggyBack: 550 mL    IV PiggyBack: 50 mL    IV PiggyBack: 100 mL    Norepinephrine: 237.3 mL    Octreotide: 60 mL    PRBCs (Packed Red Blood Cells): 300 mL    Vasopressin: 81 mL  Total IN: 1760.9 mL    OUT:    Indwelling Catheter - Urethral (mL): 22 mL    Other (mL): 288 mL    Propofol: 0 mL    Rectal Tube (mL): 800 mL  Total OUT: 1110 mL    Total NET: 650.9 mL          12-21    134<L>  |  99  |  29<H>  ----------------------------<  164<H>  3.8   |  17<L>  |  3.59<H>    Ca    8.6      21 Dec 2022 22:54  Phos  5.7     12-21  Mg     2.2     12-21    TPro  6.1  /  Alb  3.9  /  TBili  24.6<H>  /  DBili  x   /  AST  99<H>  /  ALT  37  /  AlkPhos  109  12-21    [ ] Monroe catheter, indication: N/A  Meds: albumin human 25% IVPB 100 milliLiter(s) IV Intermittent every 8 hours  sodium chloride 0.9% lock flush 10 milliLiter(s) IV Push every 1 hour PRN Pre/post blood products, medications, blood draw, and to maintain line patency        HEMATOLOGIC  Meds:   [x] VTE Prophylaxis                        6.9    21.55 )-----------( 81       ( 21 Dec 2022 22:54 )             20.6     PT/INR - ( 21 Dec 2022 05:08 )   PT: 34.1 sec;   INR: 2.93 ratio         PTT - ( 21 Dec 2022 05:08 )  PTT:51.3 sec  Transfusion     [ ] PRBC   [ ] Platelets   [ ] FFP   [ ] Cryoprecipitate      INFECTIOUS DISEASES  WBC Count: 21.55 K/uL (12-21 @ 22:54)  WBC Count: 25.81 K/uL (12-21 @ 17:13)  WBC Count: 24.21 K/uL (12-21 @ 11:52)  WBC Count: 24.82 K/uL (12-21 @ 05:06)    RECENT CULTURES:  Specimen Source: Ascites Fl Ascites Fluid  Date/Time: 12-21 @ 19:53  Culture Results: --  Gram Stain:   polymorphonuclear leukocytes seen  No organisms seen  by cytocentrifuge  Organism: --  Specimen Source: Trach Asp Tracheal Aspirate  Date/Time: 12-21 @ 01:19  Culture Results: --  Gram Stain:   No polymorphonuclear leukocytes per low power field  Numerous Squamous epithelial cells per low power field  Moderate Gram Positive Rods seen per oil power field  Few Yeast like cells seen per oil power field  Organism: --  Specimen Source: .Blood Blood-Peripheral  Date/Time: 12-20 @ 22:38  Culture Results:   No growth to date.  Gram Stain: --  Organism: --    Meds: fluconAZOLE IVPB 200 milliGRAM(s) IV Intermittent every 24 hours  meropenem  IVPB 1000 milliGRAM(s) IV Intermittent every 12 hours  rifAXIMin 550 milliGRAM(s) Oral every 12 hours  vancomycin  IVPB 1000 milliGRAM(s) IV Intermittent every 12 hours        ENDOCRINE  CAPILLARY BLOOD GLUCOSE      POCT Blood Glucose.: 164 mg/dL (22 Dec 2022 00:58)  POCT Blood Glucose.: 113 mg/dL (21 Dec 2022 05:35)    Meds: insulin lispro (ADMELOG) corrective regimen sliding scale   SubCutaneous every 6 hours  levothyroxine Injectable 70 MICROGram(s) IV Push at bedtime  vasopressin Infusion 0.03 Unit(s)/Min IV Continuous <Continuous>        ACCESS DEVICES:  [ ] Peripheral IV  [x] Central Venous Line	[ ] R	[ ] L	[ ] IJ	[ ] Fem	[ ] SC	Placed:   [x] Arterial Line		[ ] R	[ ] L	[ ] Fem	[ ] Rad	[ ] Ax	Placed:   [ ] PICC:					[ ] Mediport  [x] Urinary Catheter, Date Placed:   [x] Necessity of urinary, arterial, and venous catheters discussed    OTHER MEDICATIONS:  chlorhexidine 0.12% Liquid 15 milliLiter(s) Oral Mucosa every 12 hours  chlorhexidine 2% Cloths 1 Application(s) Topical <User Schedule>  chlorhexidine 4% Liquid 1 Application(s) Topical <User Schedule>  CRRT Treatment    <Continuous>  Phoxillum Filtration BK 4 / 2.5 5000 milliLiter(s) CRRT <Continuous>  Phoxillum Filtration BK 4 / 2.5 5000 milliLiter(s) CRRT <Continuous>  Phoxillum Filtration BK 4 / 2.5 5000 milliLiter(s) CRRT <Continuous>      CODE STATUS:

## 2022-12-22 NOTE — PROGRESS NOTE ADULT - SUBJECTIVE AND OBJECTIVE BOX
[facility] DIVISION OF KIDNEY DISEASES AND HYPERTENSION -- FOLLOW UP NOTE  --------------------------------------------------------------------------------  HPI: 61 yo F with h/o decompensated ETOH cirrhosis with ascites, thyroid cancer (s/p total thyroidectomy, RTX, and radioactive iodide), HTN, ventrical neoplasm who was initially admitted to  12/19 with new onset seizures and transferred to [facility] 12/20 for liver transplant evaluation. Pt being seen for RED requiring CRRT.    24 hour events/subjective: CRRT was discontinued at ~1 am to monitor for urine output. Pt remains oliguric. Pt was seen and evaluated in the ICU this morning with family present. Pt is intubated, sedated, and requiring pressor support. Unable to obtain ROS.        PAST HISTORY  --------------------------------------------------------------------------------  No significant changes to PMH, PSH, FHx, SHx, unless otherwise noted    ALLERGIES & MEDICATIONS  --------------------------------------------------------------------------------  Allergies    Macrobid (Rash)  Nexium (Rash)    Intolerances      Standing Inpatient Medications  albumin human 25% IVPB 100 milliLiter(s) IV Intermittent every 8 hours  chlorhexidine 0.12% Liquid 15 milliLiter(s) Oral Mucosa every 12 hours  chlorhexidine 2% Cloths 1 Application(s) Topical <User Schedule>  chlorhexidine 4% Liquid 1 Application(s) Topical <User Schedule>  CRRT Treatment    <Continuous>  dexMEDEtomidine Infusion 0.5 MICROgram(s)/kG/Hr IV Continuous <Continuous>  fluconAZOLE IVPB 200 milliGRAM(s) IV Intermittent every 24 hours  insulin lispro (ADMELOG) corrective regimen sliding scale   SubCutaneous every 6 hours  lactulose Syrup 20 Gram(s) Oral every 6 hours  levETIRAcetam  IVPB 750 milliGRAM(s) IV Intermittent every 12 hours  levothyroxine Injectable 70 MICROGram(s) IV Push at bedtime  meropenem  IVPB 1000 milliGRAM(s) IV Intermittent every 12 hours  norepinephrine Infusion 0.13 MICROgram(s)/kG/Min IV Continuous <Continuous>  Phoxillum Filtration BK 4 / 2.5 5000 milliLiter(s) CRRT <Continuous>  Phoxillum Filtration BK 4 / 2.5 5000 milliLiter(s) CRRT <Continuous>  Phoxillum Filtration BK 4 / 2.5 5000 milliLiter(s) CRRT <Continuous>  rifAXIMin 550 milliGRAM(s) Oral every 12 hours  vancomycin  IVPB 1000 milliGRAM(s) IV Intermittent every 12 hours  vasopressin Infusion 0.03 Unit(s)/Min IV Continuous <Continuous>    PRN Inpatient Medications  HYDROmorphone  Injectable 0.5 milliGRAM(s) IV Push every 3 hours PRN  sodium chloride 0.9% lock flush 10 milliLiter(s) IV Push every 1 hour PRN      REVIEW OF SYSTEMS  --------------------------------------------------------------------------------  Unable to obtain ROS    VITALS/PHYSICAL EXAM  --------------------------------------------------------------------------------  T(C): 36 (12-22-22 @ 07:00), Max: 36.8 (12-21-22 @ 11:00)  HR: 73 (12-22-22 @ 10:00) (72 - 86)  BP: 94/56 (12-21-22 @ 19:45) (94/56 - 100/60)  RR: 15 (12-22-22 @ 10:00) (15 - 30)  SpO2: 99% (12-22-22 @ 10:00) (95% - 100%)  Wt(kg): --  Height (cm): 165.1 (12-20-22 @ 21:37)  Weight (kg): 66.2 (12-20-22 @ 21:37)  BMI (kg/m2): 24.3 (12-20-22 @ 21:37)  BSA (m2): 1.73 (12-20-22 @ 21:37)      12-21-22 @ 07:01  -  12-22-22 @ 07:00  --------------------------------------------------------  IN: 2196.2 mL / OUT: 1814 mL / NET: 382.2 mL    12-22-22 @ 07:01  -  12-22-22 @ 10:56  --------------------------------------------------------  IN: 62 mL / OUT: 0 mL / NET: 62 mL      Physical Exam:  Gen: Intubated, sedated  HEENT: +ET tube   Pulm: CTA B/L, no crackles   CV: RRR, S1S2+  Abd: +BS, soft, distended  : +urinary catheter  MSK: +BL LE edema  Psych: Sedated  Skin: Warm  Access: +Non-tunneled HD catheter    LABS/STUDIES  --------------------------------------------------------------------------------              8.2    21.64 >-----------<  81       [12-22-22 @ 05:07]              24.5     135  |  100  |  32  ----------------------------<  152      [12-22-22 @ 05:06]  4.0   |  16  |  3.73        Ca     9.4     [12-22-22 @ 05:06]      Mg     2.2     [12-22-22 @ 05:06]      Phos  6.1     [12-22-22 @ 05:06]    TPro  6.1  /  Alb  3.8  /  TBili  27.2  /  DBili  x   /  AST  91  /  ALT  37  /  AlkPhos  119  [12-22-22 @ 05:06]    PT/INR: PT 28.2 , INR 2.41       [12-22-22 @ 05:06]  PTT: 56.7       [12-22-22 @ 05:06]    Serum Osmolality 286      [12-21-22 @ 11:52]    Creatinine Trend:  SCr 3.73 [12-22 @ 05:06]  SCr 3.59 [12-21 @ 22:54]  SCr 3.52 [12-21 @ 17:13]  SCr 3.52 [12-21 @ 11:52]  SCr 4.30 [12-21 @ 05:08]      Urinalysis - [12-20-22 @ 23:55]      Color Dark Yellow / Appearance Turbid / SG 1.029 / pH 6.0      Gluc 100 mg/dL / Ketone Small  / Bili Large / Urobili Negative       Blood Moderate / Protein 300 mg/dL / Leuk Est Large / Nitrite Negative      RBC 5-10 / WBC 11-25 / Hyaline 0-2 / Gran  / Sq Epi  / Non Sq Epi Moderate / Bacteria Negative    Urine Creatinine 21      [12-21-22 @ 11:55]  Urine Sodium 138      [12-21-22 @ 11:55]  Urine Potassium 8      [12-21-22 @ 11:55]  Urine Osmolality 288      [12-21-22 @ 11:55]    HCV 0.15, Nonreact      [12-21-22 @ 05:09] [facility] DIVISION OF KIDNEY DISEASES AND HYPERTENSION -- FOLLOW UP NOTE  --------------------------------------------------------------------------------  HPI: 61 yo F with h/o decompensated ETOH cirrhosis with ascites, thyroid cancer (s/p total thyroidectomy, RTX, and radioactive iodide), HTN, ventrical neoplasm who was initially admitted to  12/19 with new onset seizures and transferred to [facility] 12/20 for liver transplant evaluation. Pt being seen for RED requiring CRRT.    24 hour events/subjective: CRRT was discontinued at ~1 am to monitor for urine output. Pt remains oliguric. Pt was seen and evaluated in the ICU this morning with family present. Pt is intubated, sedated, and requiring pressor support. Unable to obtain ROS.        PAST HISTORY  --------------------------------------------------------------------------------  No significant changes to PMH, PSH, FHx, SHx, unless otherwise noted    ALLERGIES & MEDICATIONS  --------------------------------------------------------------------------------  Allergies    Macrobid (Rash)  Nexium (Rash)    Intolerances      Standing Inpatient Medications  albumin human 25% IVPB 100 milliLiter(s) IV Intermittent every 8 hours  chlorhexidine 0.12% Liquid 15 milliLiter(s) Oral Mucosa every 12 hours  chlorhexidine 2% Cloths 1 Application(s) Topical <User Schedule>  chlorhexidine 4% Liquid 1 Application(s) Topical <User Schedule>  CRRT Treatment    <Continuous>  dexMEDEtomidine Infusion 0.5 MICROgram(s)/kG/Hr IV Continuous <Continuous>  fluconAZOLE IVPB 200 milliGRAM(s) IV Intermittent every 24 hours  insulin lispro (ADMELOG) corrective regimen sliding scale   SubCutaneous every 6 hours  lactulose Syrup 20 Gram(s) Oral every 6 hours  levETIRAcetam  IVPB 750 milliGRAM(s) IV Intermittent every 12 hours  levothyroxine Injectable 70 MICROGram(s) IV Push at bedtime  meropenem  IVPB 1000 milliGRAM(s) IV Intermittent every 12 hours  norepinephrine Infusion 0.13 MICROgram(s)/kG/Min IV Continuous <Continuous>  Phoxillum Filtration BK 4 / 2.5 5000 milliLiter(s) CRRT <Continuous>  Phoxillum Filtration BK 4 / 2.5 5000 milliLiter(s) CRRT <Continuous>  Phoxillum Filtration BK 4 / 2.5 5000 milliLiter(s) CRRT <Continuous>  rifAXIMin 550 milliGRAM(s) Oral every 12 hours  vancomycin  IVPB 1000 milliGRAM(s) IV Intermittent every 12 hours  vasopressin Infusion 0.03 Unit(s)/Min IV Continuous <Continuous>    PRN Inpatient Medications  HYDROmorphone  Injectable 0.5 milliGRAM(s) IV Push every 3 hours PRN  sodium chloride 0.9% lock flush 10 milliLiter(s) IV Push every 1 hour PRN      REVIEW OF SYSTEMS  --------------------------------------------------------------------------------  Unable to obtain ROS    VITALS/PHYSICAL EXAM  --------------------------------------------------------------------------------  T(C): 36 (12-22-22 @ 07:00), Max: 36.8 (12-21-22 @ 11:00)  HR: 73 (12-22-22 @ 10:00) (72 - 86)  BP: 94/56 (12-21-22 @ 19:45) (94/56 - 100/60)  RR: 15 (12-22-22 @ 10:00) (15 - 30)  SpO2: 99% (12-22-22 @ 10:00) (95% - 100%)  Wt(kg): --  Height (cm): 165.1 (12-20-22 @ 21:37)  Weight (kg): 66.2 (12-20-22 @ 21:37)  BMI (kg/m2): 24.3 (12-20-22 @ 21:37)  BSA (m2): 1.73 (12-20-22 @ 21:37)      12-21-22 @ 07:01  -  12-22-22 @ 07:00  --------------------------------------------------------  IN: 2196.2 mL / OUT: 1814 mL / NET: 382.2 mL    12-22-22 @ 07:01  -  12-22-22 @ 10:56  --------------------------------------------------------  IN: 62 mL / OUT: 0 mL / NET: 62 mL      Physical Exam:  Gen: Intubated, sedated  HEENT: +ET tube   Pulm: CTA B/L, no crackles   CV: RRR, S1S2+  Abd: +BS, soft, distended  : +urinary catheter  MSK: +BL LE edema  Psych: Sedated  Skin: Warm  Access: +Non-tunneled HD catheter    LABS/STUDIES  --------------------------------------------------------------------------------              8.2    21.64 >-----------<  81       [12-22-22 @ 05:07]              24.5     135  |  100  |  32  ----------------------------<  152      [12-22-22 @ 05:06]  4.0   |  16  |  3.73        Ca     9.4     [12-22-22 @ 05:06]      Mg     2.2     [12-22-22 @ 05:06]      Phos  6.1     [12-22-22 @ 05:06]    TPro  6.1  /  Alb  3.8  /  TBili  27.2  /  DBili  x   /  AST  91  /  ALT  37  /  AlkPhos  119  [12-22-22 @ 05:06]    PT/INR: PT 28.2 , INR 2.41       [12-22-22 @ 05:06]  PTT: 56.7       [12-22-22 @ 05:06]    Serum Osmolality 286      [12-21-22 @ 11:52]    Creatinine Trend:  SCr 3.73 [12-22 @ 05:06]  SCr 3.59 [12-21 @ 22:54]  SCr 3.52 [12-21 @ 17:13]  SCr 3.52 [12-21 @ 11:52]  SCr 4.30 [12-21 @ 05:08]      Urinalysis - [12-20-22 @ 23:55]      Color Dark Yellow / Appearance Turbid / SG 1.029 / pH 6.0      Gluc 100 mg/dL / Ketone Small  / Bili Large / Urobili Negative       Blood Moderate / Protein 300 mg/dL / Leuk Est Large / Nitrite Negative      RBC 5-10 / WBC 11-25 / Hyaline 0-2 / Gran  / Sq Epi  / Non Sq Epi Moderate / Bacteria Negative    Urine Creatinine 21      [12-21-22 @ 11:55]  Urine Sodium 138      [12-21-22 @ 11:55]  Urine Potassium 8      [12-21-22 @ 11:55]  Urine Osmolality 288      [12-21-22 @ 11:55]    HCV 0.15, Nonreact      [12-21-22 @ 05:09] [facility] DIVISION OF KIDNEY DISEASES AND HYPERTENSION -- FOLLOW UP NOTE  --------------------------------------------------------------------------------  HPI: 63 yo F with h/o decompensated ETOH cirrhosis with ascites, thyroid cancer (s/p total thyroidectomy, RTX, and radioactive iodide), HTN, ventrical neoplasm who was initially admitted to  12/19 with new onset seizures and transferred to [facility] 12/20 for liver transplant evaluation. Pt being seen for RED requiring CRRT.    24 hour events/subjective: CRRT was discontinued at ~1 am to monitor for urine output. Pt remains oliguric. Pt was seen and evaluated in the ICU this morning with family present. Pt is intubated, sedated, and requiring pressor support. Unable to obtain ROS.        PAST HISTORY  --------------------------------------------------------------------------------  No significant changes to PMH, PSH, FHx, SHx, unless otherwise noted    ALLERGIES & MEDICATIONS  --------------------------------------------------------------------------------  Allergies    Macrobid (Rash)  Nexium (Rash)    Intolerances      Standing Inpatient Medications  albumin human 25% IVPB 100 milliLiter(s) IV Intermittent every 8 hours  chlorhexidine 0.12% Liquid 15 milliLiter(s) Oral Mucosa every 12 hours  chlorhexidine 2% Cloths 1 Application(s) Topical <User Schedule>  chlorhexidine 4% Liquid 1 Application(s) Topical <User Schedule>  CRRT Treatment    <Continuous>  dexMEDEtomidine Infusion 0.5 MICROgram(s)/kG/Hr IV Continuous <Continuous>  fluconAZOLE IVPB 200 milliGRAM(s) IV Intermittent every 24 hours  insulin lispro (ADMELOG) corrective regimen sliding scale   SubCutaneous every 6 hours  lactulose Syrup 20 Gram(s) Oral every 6 hours  levETIRAcetam  IVPB 750 milliGRAM(s) IV Intermittent every 12 hours  levothyroxine Injectable 70 MICROGram(s) IV Push at bedtime  meropenem  IVPB 1000 milliGRAM(s) IV Intermittent every 12 hours  norepinephrine Infusion 0.13 MICROgram(s)/kG/Min IV Continuous <Continuous>  Phoxillum Filtration BK 4 / 2.5 5000 milliLiter(s) CRRT <Continuous>  Phoxillum Filtration BK 4 / 2.5 5000 milliLiter(s) CRRT <Continuous>  Phoxillum Filtration BK 4 / 2.5 5000 milliLiter(s) CRRT <Continuous>  rifAXIMin 550 milliGRAM(s) Oral every 12 hours  vancomycin  IVPB 1000 milliGRAM(s) IV Intermittent every 12 hours  vasopressin Infusion 0.03 Unit(s)/Min IV Continuous <Continuous>    PRN Inpatient Medications  HYDROmorphone  Injectable 0.5 milliGRAM(s) IV Push every 3 hours PRN  sodium chloride 0.9% lock flush 10 milliLiter(s) IV Push every 1 hour PRN      REVIEW OF SYSTEMS  --------------------------------------------------------------------------------  Unable to obtain ROS    VITALS/PHYSICAL EXAM  --------------------------------------------------------------------------------  T(C): 36 (12-22-22 @ 07:00), Max: 36.8 (12-21-22 @ 11:00)  HR: 73 (12-22-22 @ 10:00) (72 - 86)  BP: 94/56 (12-21-22 @ 19:45) (94/56 - 100/60)  RR: 15 (12-22-22 @ 10:00) (15 - 30)  SpO2: 99% (12-22-22 @ 10:00) (95% - 100%)  Wt(kg): --  Height (cm): 165.1 (12-20-22 @ 21:37)  Weight (kg): 66.2 (12-20-22 @ 21:37)  BMI (kg/m2): 24.3 (12-20-22 @ 21:37)  BSA (m2): 1.73 (12-20-22 @ 21:37)      12-21-22 @ 07:01  -  12-22-22 @ 07:00  --------------------------------------------------------  IN: 2196.2 mL / OUT: 1814 mL / NET: 382.2 mL    12-22-22 @ 07:01  -  12-22-22 @ 10:56  --------------------------------------------------------  IN: 62 mL / OUT: 0 mL / NET: 62 mL      Physical Exam:  Gen: Intubated, sedated  HEENT: +ET tube   Pulm: CTA B/L, no crackles   CV: RRR, S1S2+  Abd: +BS, soft, distended  : +urinary catheter  MSK: +BL LE edema  Psych: Sedated  Skin: Warm  Access: +Non-tunneled HD catheter    LABS/STUDIES  --------------------------------------------------------------------------------              8.2    21.64 >-----------<  81       [12-22-22 @ 05:07]              24.5     135  |  100  |  32  ----------------------------<  152      [12-22-22 @ 05:06]  4.0   |  16  |  3.73        Ca     9.4     [12-22-22 @ 05:06]      Mg     2.2     [12-22-22 @ 05:06]      Phos  6.1     [12-22-22 @ 05:06]    TPro  6.1  /  Alb  3.8  /  TBili  27.2  /  DBili  x   /  AST  91  /  ALT  37  /  AlkPhos  119  [12-22-22 @ 05:06]    PT/INR: PT 28.2 , INR 2.41       [12-22-22 @ 05:06]  PTT: 56.7       [12-22-22 @ 05:06]    Serum Osmolality 286      [12-21-22 @ 11:52]    Creatinine Trend:  SCr 3.73 [12-22 @ 05:06]  SCr 3.59 [12-21 @ 22:54]  SCr 3.52 [12-21 @ 17:13]  SCr 3.52 [12-21 @ 11:52]  SCr 4.30 [12-21 @ 05:08]      Urinalysis - [12-20-22 @ 23:55]      Color Dark Yellow / Appearance Turbid / SG 1.029 / pH 6.0      Gluc 100 mg/dL / Ketone Small  / Bili Large / Urobili Negative       Blood Moderate / Protein 300 mg/dL / Leuk Est Large / Nitrite Negative      RBC 5-10 / WBC 11-25 / Hyaline 0-2 / Gran  / Sq Epi  / Non Sq Epi Moderate / Bacteria Negative    Urine Creatinine 21      [12-21-22 @ 11:55]  Urine Sodium 138      [12-21-22 @ 11:55]  Urine Potassium 8      [12-21-22 @ 11:55]  Urine Osmolality 288      [12-21-22 @ 11:55]    HCV 0.15, Nonreact      [12-21-22 @ 05:09]

## 2022-12-22 NOTE — PROGRESS NOTE ADULT - ATTENDING COMMENTS
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 complicated by right lateral ventricular hemorrhage managed non-operatively, with MRI in 5/2022 with residual neoplasm but no ICH), history of mild COVID-19 (11/2021), AUD (with a past history of detoxification at [facility] 1 year ago and more recent rehab attempt with relapse, in early remission since 11/2022), and decompensated ALD cirrhosis complicated by ascites and first diagnosed in 11/2022 when she was hospitalized and treated with steroids but non-responder, admitted to [facility] on 12/19 after a witnessed seizure and with septic shock requiring pressor support, acute hypoxic respiratory failure (s/p intubation on 12/19), RED (s/p first HD on 12/20 and s/p CVVHD 12/21 and re-started today due to persistent anuria), and ACLF. She was transferred to [facility] for liver transplant evaluation which we anticipate pursuing if/when her septic shock and respiratory failure improve. Currently, she is on broad-spectrum antimicrobial coverage with meropenem (12/20- ) and fluconazole (12/20- ) pending further infectious work-up. Given suspicion for multifocal pneumonia based on CT, planned for Combi-cath today. ABO O with current MELD-Na 42. Appreciate continued excellent SICU care. Family updated at bedside.    Please don't hesitate to call with any questions or concerns.    Javier Goldstein M.D., Ph.D.  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 with post-resection course complicated by right lateral ventricular hemorrhage managed non-operatively, with MRI in 5/2022 with residual neoplasm but no ICH), history of mild COVID-19 (11/2021), AUD (with a past history of detoxification at [facility] 1 year ago and more recent rehab attempt with relapse, in early remission since 11/2022), and decompensated ALD cirrhosis complicated by ascites and first diagnosed in 11/2022 when she was hospitalized and treated with steroids but non-responder, admitted to [facility] on 12/19 after a witnessed seizure and with septic shock requiring pressor support, acute hypoxic respiratory failure (s/p intubation on 12/19), RED (s/p first HD on 12/20 and s/p CVVHD 12/21 and re-started today due to persistent anuria), and ACLF. She was transferred to [facility] for liver transplant evaluation which we anticipate pursuing if/when her septic shock and respiratory failure improve. Currently, she is on broad-spectrum antimicrobial coverage with meropenem (12/20- ) and fluconazole (12/20- ) pending further infectious work-up. Given suspicion for multifocal pneumonia based on CT, planned for Combi-cath today. ABO O with current MELD-Na 42. Appreciate continued excellent SICU care. Family updated at bedside.    Please don't hesitate to call with any questions or concerns.    Javier Goldstein M.D., Ph.D.  Transplant Hepatology 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 complicated by right lateral ventricular hemorrhage managed non-operatively, with MRI in 5/2022 with residual neoplasm but no ICH), history of mild COVID-19 (11/2021), AUD (with a past history of detoxification at [facility] 1 year ago and more recent rehab attempt with relapse, in early remission since 11/2022), and decompensated ALD cirrhosis complicated by ascites and first diagnosed in 11/2022 when she was hospitalized and treated with steroids but non-responder, admitted to [facility] on 12/19 after a witnessed seizure and with septic shock requiring pressor support, acute hypoxic respiratory failure (s/p intubation on 12/19), RED (s/p first HD on 12/20 and s/p CVVHD 12/21 and re-started today due to persistent anuria), and ACLF. She was transferred to [facility] for liver transplant evaluation which we anticipate pursuing if/when her septic shock and respiratory failure improve. Currently, she is on broad-spectrum antimicrobial coverage with meropenem (12/20- ) and fluconazole (12/20- ) pending further infectious work-up. Given suspicion for multifocal pneumonia based on CT, planned for Combi-cath today. ABO O with current MELD-Na 42. Appreciate continued excellent SICU care. Family updated at bedside.    Please don't hesitate to call with any questions or concerns.    Javier Goldstein M.D., Ph.D.  Transplant Hepatology

## 2022-12-22 NOTE — PROGRESS NOTE ADULT - ASSESSMENT
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    UA (12/19) Moderate Leukocyte Esterase  UCx (12/19) No Growth  BCx (12/19) Gram Positive Rods (1/4 Bottles)  MRSA Nasal PCR (12/19) Negative  Paracentesis (12/19) Cell counts not suggestive of SBP    CT Chest with possible pneumonia versus atelectasis    Favor that gram positive will be representative of contaminant but reasonable to cover for now    #Leukocytosis, Positive Blood Culture, Transaminitis, Cirrhosis  --Continue Vancomycin pending gram positive ID  --Agree with empiric meropenem for now  --If continued seizures would also pursue lumbar puncture to exclude CNS infection (if safe to pursue)  --Continue to follow CBC with diff  --Continue to follow transaminases  --Continue to follow temperature curve  --Follow up on preliminary blood cultures    I will continue to follow. Please feel free to contact me with any further questions.    Jonah Mendoza M.D.  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    UA (12/19) Moderate Leukocyte Esterase  UCx (12/19) No Growth  BCx (12/19) Gram Positive Rods (1/4 Bottles)  MRSA Nasal PCR (12/19) Negative  Paracentesis (12/19) Cell counts not suggestive of SBP    CT Chest with possible pneumonia versus atelectasis    Favor that gram positive will be representative of contaminant but reasonable to cover for now    #Leukocytosis, Positive Blood Culture, Transaminitis, Cirrhosis  --Continue Vancomycin pending gram positive ID  --Agree with empiric meropenem for now  --If continued seizures would also pursue lumbar puncture to exclude CNS infection (if safe to pursue)  --Continue to follow CBC with diff  --Continue to follow transaminases  --Continue to follow temperature curve  --Follow up on preliminary blood cultures    I will continue to follow. Please feel free to contact me with any further questions.    Jonah Mendoza M.D.  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    UA (12/19) Moderate Leukocyte Esterase  UCx (12/19) No Growth  BCx (12/19) Gram Positive Rods (1/4 Bottles)  MRSA Nasal PCR (12/19) Negative  Paracentesis (12/19) Cell counts not suggestive of SBP    CT Chest with possible pneumonia versus atelectasis    Favor that gram positive will be representative of contaminant but reasonable to cover for now    #Leukocytosis, Positive Blood Culture, Transaminitis, Cirrhosis  --Continue Vancomycin pending gram positive ID  --Agree with empiric meropenem for now  --If continued seizures would also pursue lumbar puncture to exclude CNS infection (if safe to pursue)  --Continue to follow CBC with diff  --Continue to follow transaminases  --Continue to follow temperature curve  --Follow up on preliminary blood cultures    I will continue to follow. Please feel free to contact me with any further questions.    Jonah Mendoza M.D.  [facility] Division of Infectious Disease  8AM-5PM Monday - Friday: Available on [employer] Teams  After Hours and Holidays (or if no response on [employer] Teams): Please contact the Infectious Diseases Office at [phone]    The above assessment and plan were discussed with 8ICU Team

## 2022-12-22 NOTE — PROGRESS NOTE ADULT - SUBJECTIVE AND OBJECTIVE BOX
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  Arthur ([phone]) and Daughter Taylor at [facility] provided additional history.     Of note was recently admitted to  11/2022 for workup and eval of jaundice- during that hospitalization was found to have a UTI and dx with alc hep and started on steroids (was deemed a non-responder and steroids were subsequently stopped); s/p LVP at [place] 11/2022. Previously hospitalized in 2021 at [place] for ETOH detox. Went to ETOH rehab 08/2022 and was asked to leave the facility when she was COVID+; was sober for 1 week until she started drinking again. Had also attended a few AA meetings in the past. Transferred from [facility] 12/20 for further management of acute liver failure and liver transplant evaluation.       Interval events:  intubated/sedated  on norepo/vaso  CVVH held since 1 PM yesterday  on zonia/vanco  Pan scan yesterday: CT head w/ calcified lesion at septum c/w patient's known hx of neurocytoma. Hemorrhages pt had after resection have resolved. Just a residual lucency in the R posterior frontal region. CT chest/abd pelvis w/ large ascites, b/l small effusions. Bibasilar consolidation and a lingular clustered nodular and betzaida airspace c/f infectious vs inflammatory etiology No masses. Cirrhosis and portal HTN.     Potential Discharge date: pending clinical stability    Education:  Medications    Plan of care:  See Below      MEDICATIONS  (STANDING):  albumin human 25% IVPB 100 milliLiter(s) IV Intermittent every 8 hours  chlorhexidine 0.12% Liquid 15 milliLiter(s) Oral Mucosa every 12 hours  chlorhexidine 2% Cloths 1 Application(s) Topical <User Schedule>  chlorhexidine 4% Liquid 1 Application(s) Topical <User Schedule>  CRRT Treatment    <Continuous>  dexMEDEtomidine Infusion 0.5 MICROgram(s)/kG/Hr (8.28 mL/Hr) IV Continuous <Continuous>  fluconAZOLE IVPB 200 milliGRAM(s) IV Intermittent every 24 hours  insulin lispro (ADMELOG) corrective regimen sliding scale   SubCutaneous every 6 hours  lactulose Syrup 20 Gram(s) Oral every 6 hours  levETIRAcetam  IVPB 750 milliGRAM(s) IV Intermittent every 12 hours  levothyroxine Injectable 70 MICROGram(s) IV Push at bedtime  meropenem  IVPB 1000 milliGRAM(s) IV Intermittent every 12 hours  norepinephrine Infusion 0.13 MICROgram(s)/kG/Min (16.1 mL/Hr) IV Continuous <Continuous>  pantoprazole  Injectable 40 milliGRAM(s) IV Push every 24 hours  Phoxillum Filtration BK 4 / 2.5 5000 milliLiter(s) (800 mL/Hr) CRRT <Continuous>  Phoxillum Filtration BK 4 / 2.5 5000 milliLiter(s) (200 mL/Hr) CRRT <Continuous>  Phoxillum Filtration BK 4 / 2.5 5000 milliLiter(s) (1000 mL/Hr) CRRT <Continuous>  rifAXIMin 550 milliGRAM(s) Oral every 12 hours  vancomycin  IVPB 1000 milliGRAM(s) IV Intermittent every 12 hours  vasopressin Infusion 0.03 Unit(s)/Min (4.5 mL/Hr) IV Continuous <Continuous>    MEDICATIONS  (PRN):  HYDROmorphone  Injectable 0.5 milliGRAM(s) IV Push every 3 hours PRN breakthrough pain  sodium chloride 0.9% lock flush 10 milliLiter(s) IV Push every 1 hour PRN Pre/post blood products, medications, blood draw, and to maintain line patency      PAST MEDICAL & SURGICAL HISTORY:  HTN (hypertension)  off medication for over one year--states BP has been normal      UTI (urinary tract infection)  currently being treated--will complete antibiotics 3/8/2022      Intracranial tumor      GERD (gastroesophageal reflux disease)      Eczema      Thyroid cancer  surgery. radiation, Radioactive iodine      COVID-19 virus infection  11/2021--recovered at home      H/O total thyroidectomy  age 21&#x27;s for Thyroid cancer, postop complication arterial bleed, second surgery      History of tonsillectomy  age 5      History of appendectomy  age 5          Vital Signs Last 24 Hrs  T(C): 35.9 (22 Dec 2022 15:00), Max: 36 (21 Dec 2022 19:00)  T(F): 96.6 (22 Dec 2022 15:00), Max: 96.8 (21 Dec 2022 19:00)  HR: 84 (22 Dec 2022 18:00) (72 - 89)  BP: 94/56 (21 Dec 2022 19:45) (94/56 - 94/56)  BP(mean): 70 (21 Dec 2022 19:45) (70 - 70)  RR: 18 (22 Dec 2022 18:00) (15 - 30)  SpO2: 95% (22 Dec 2022 18:00) (93% - 100%)    Parameters below as of 22 Dec 2022 07:00  Patient On (Oxygen Delivery Method): ventilator  O2 Flow (L/min): 30      I&O's Summary    21 Dec 2022 07:01  -  22 Dec 2022 07:00  --------------------------------------------------------  IN: 2196.2 mL / OUT: 1814 mL / NET: 382.2 mL    22 Dec 2022 07:01  -  22 Dec 2022 18:25  --------------------------------------------------------  IN: 475.8 mL / OUT: 403 mL / NET: 72.8 mL                              7.9    20.22 )-----------( 82       ( 22 Dec 2022 17:26 )             22.8     12-22    138  |  102  |  24<H>  ----------------------------<  94  3.9   |  17<L>  |  2.82<H>    Ca    9.3      22 Dec 2022 17:26  Phos  4.7     12-22  Mg     2.3     12-22    TPro  6.4  /  Alb  4.2  /  TBili  26.6<H>  /  DBili  x   /  AST  116<H>  /  ALT  36  /  AlkPhos  117  12-22          Culture - Fungal, Body Fluid (collected 12-21-22 @ 19:53)  Source: Peritoneal Peritoneal Fluid  Preliminary Report (12-22-22 @ 13:17):    Testing in progress    Culture - Body Fluid with Gram Stain (collected 12-21-22 @ 19:53)  Source: Ascites Fl Ascites Fluid  Gram Stain (12-22-22 @ 03:04):    polymorphonuclear leukocytes seen    No organisms seen    by cytocentrifuge    Culture - Sputum (collected 12-21-22 @ 01:19)  Source: Trach Asp Tracheal Aspirate  Gram Stain (12-21-22 @ 08:45):    No polymorphonuclear leukocytes per low power field    Numerous Squamous epithelial cells per low power field    Moderate Gram Positive Rods seen per oil power field    Few Yeast like cells seen per oil power field  Preliminary Report (12-22-22 @ 09:40):    Normal Respiratory Cassandra present    Culture - Blood (collected 12-20-22 @ 23:30)  Source: .Blood Blood-Peripheral  Preliminary Report (12-22-22 @ 04:01):    No growth to date.    Culture - Blood (collected 12-20-22 @ 22:38)  Source: .Blood Blood-Peripheral  Preliminary Report (12-22-22 @ 03:02):    No growth to date.        Review of systems  intubated/sedated      PHYSICAL EXAM:  Constitutional: Well developed / well nourished  Eyes: icteric, PERRLA  ENMT: nc/at, no thrush  Neck: supple, central line   Respiratory: CTA B/L  Cardiovascular: RRR  Gastrointestinal: Soft abdomen, distended  Genitourinary: Urinary catheter in place, anuric  Extremities: SCD's in place and working bilaterally, no edema  Vascular: Palpable dp pulses bilaterally.   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  Arthur ([phone]) and Daughter Taylor at [facility] provided additional history.     Of note was recently admitted to  11/2022 for workup and eval of jaundice- during that hospitalization was found to have a UTI and dx with alc hep and started on steroids (was deemed a non-responder and steroids were subsequently stopped); s/p LVP at [place] 11/2022. Previously hospitalized in 2021 at [place] for ETOH detox. Went to ETOH rehab 08/2022 and was asked to leave the facility when she was COVID+; was sober for 1 week until she started drinking again. Had also attended a few AA meetings in the past. Transferred from [facility] 12/20 for further management of acute liver failure and liver transplant evaluation.       Interval events:  intubated/sedated  on norepo/vaso  CVVH held since 1 PM yesterday  on zonia/vanco  Pan scan yesterday: CT head w/ calcified lesion at septum c/w patient's known hx of neurocytoma. Hemorrhages pt had after resection have resolved. Just a residual lucency in the R posterior frontal region. CT chest/abd pelvis w/ large ascites, b/l small effusions. Bibasilar consolidation and a lingular clustered nodular and betzaida airspace c/f infectious vs inflammatory etiology No masses. Cirrhosis and portal HTN.     Potential Discharge date: pending clinical stability    Education:  Medications    Plan of care:  See Below      MEDICATIONS  (STANDING):  albumin human 25% IVPB 100 milliLiter(s) IV Intermittent every 8 hours  chlorhexidine 0.12% Liquid 15 milliLiter(s) Oral Mucosa every 12 hours  chlorhexidine 2% Cloths 1 Application(s) Topical <User Schedule>  chlorhexidine 4% Liquid 1 Application(s) Topical <User Schedule>  CRRT Treatment    <Continuous>  dexMEDEtomidine Infusion 0.5 MICROgram(s)/kG/Hr (8.28 mL/Hr) IV Continuous <Continuous>  fluconAZOLE IVPB 200 milliGRAM(s) IV Intermittent every 24 hours  insulin lispro (ADMELOG) corrective regimen sliding scale   SubCutaneous every 6 hours  lactulose Syrup 20 Gram(s) Oral every 6 hours  levETIRAcetam  IVPB 750 milliGRAM(s) IV Intermittent every 12 hours  levothyroxine Injectable 70 MICROGram(s) IV Push at bedtime  meropenem  IVPB 1000 milliGRAM(s) IV Intermittent every 12 hours  norepinephrine Infusion 0.13 MICROgram(s)/kG/Min (16.1 mL/Hr) IV Continuous <Continuous>  pantoprazole  Injectable 40 milliGRAM(s) IV Push every 24 hours  Phoxillum Filtration BK 4 / 2.5 5000 milliLiter(s) (800 mL/Hr) CRRT <Continuous>  Phoxillum Filtration BK 4 / 2.5 5000 milliLiter(s) (200 mL/Hr) CRRT <Continuous>  Phoxillum Filtration BK 4 / 2.5 5000 milliLiter(s) (1000 mL/Hr) CRRT <Continuous>  rifAXIMin 550 milliGRAM(s) Oral every 12 hours  vancomycin  IVPB 1000 milliGRAM(s) IV Intermittent every 12 hours  vasopressin Infusion 0.03 Unit(s)/Min (4.5 mL/Hr) IV Continuous <Continuous>    MEDICATIONS  (PRN):  HYDROmorphone  Injectable 0.5 milliGRAM(s) IV Push every 3 hours PRN breakthrough pain  sodium chloride 0.9% lock flush 10 milliLiter(s) IV Push every 1 hour PRN Pre/post blood products, medications, blood draw, and to maintain line patency      PAST MEDICAL & SURGICAL HISTORY:  HTN (hypertension)  off medication for over one year--states BP has been normal      UTI (urinary tract infection)  currently being treated--will complete antibiotics 3/8/2022      Intracranial tumor      GERD (gastroesophageal reflux disease)      Eczema      Thyroid cancer  surgery. radiation, Radioactive iodine      COVID-19 virus infection  11/2021--recovered at home      H/O total thyroidectomy  age 21&#x27;s for Thyroid cancer, postop complication arterial bleed, second surgery      History of tonsillectomy  age 5      History of appendectomy  age 5          Vital Signs Last 24 Hrs  T(C): 35.9 (22 Dec 2022 15:00), Max: 36 (21 Dec 2022 19:00)  T(F): 96.6 (22 Dec 2022 15:00), Max: 96.8 (21 Dec 2022 19:00)  HR: 84 (22 Dec 2022 18:00) (72 - 89)  BP: 94/56 (21 Dec 2022 19:45) (94/56 - 94/56)  BP(mean): 70 (21 Dec 2022 19:45) (70 - 70)  RR: 18 (22 Dec 2022 18:00) (15 - 30)  SpO2: 95% (22 Dec 2022 18:00) (93% - 100%)    Parameters below as of 22 Dec 2022 07:00  Patient On (Oxygen Delivery Method): ventilator  O2 Flow (L/min): 30      I&O's Summary    21 Dec 2022 07:01  -  22 Dec 2022 07:00  --------------------------------------------------------  IN: 2196.2 mL / OUT: 1814 mL / NET: 382.2 mL    22 Dec 2022 07:01  -  22 Dec 2022 18:25  --------------------------------------------------------  IN: 475.8 mL / OUT: 403 mL / NET: 72.8 mL                              7.9    20.22 )-----------( 82       ( 22 Dec 2022 17:26 )             22.8     12-22    138  |  102  |  24<H>  ----------------------------<  94  3.9   |  17<L>  |  2.82<H>    Ca    9.3      22 Dec 2022 17:26  Phos  4.7     12-22  Mg     2.3     12-22    TPro  6.4  /  Alb  4.2  /  TBili  26.6<H>  /  DBili  x   /  AST  116<H>  /  ALT  36  /  AlkPhos  117  12-22          Culture - Fungal, Body Fluid (collected 12-21-22 @ 19:53)  Source: Peritoneal Peritoneal Fluid  Preliminary Report (12-22-22 @ 13:17):    Testing in progress    Culture - Body Fluid with Gram Stain (collected 12-21-22 @ 19:53)  Source: Ascites Fl Ascites Fluid  Gram Stain (12-22-22 @ 03:04):    polymorphonuclear leukocytes seen    No organisms seen    by cytocentrifuge    Culture - Sputum (collected 12-21-22 @ 01:19)  Source: Trach Asp Tracheal Aspirate  Gram Stain (12-21-22 @ 08:45):    No polymorphonuclear leukocytes per low power field    Numerous Squamous epithelial cells per low power field    Moderate Gram Positive Rods seen per oil power field    Few Yeast like cells seen per oil power field  Preliminary Report (12-22-22 @ 09:40):    Normal Respiratory Cassandra present    Culture - Blood (collected 12-20-22 @ 23:30)  Source: .Blood Blood-Peripheral  Preliminary Report (12-22-22 @ 04:01):    No growth to date.    Culture - Blood (collected 12-20-22 @ 22:38)  Source: .Blood Blood-Peripheral  Preliminary Report (12-22-22 @ 03:02):    No growth to date.        Review of systems  intubated/sedated      PHYSICAL EXAM:  Constitutional: Well developed / well nourished  Eyes: icteric, PERRLA  ENMT: nc/at, no thrush  Neck: supple, central line   Respiratory: CTA B/L  Cardiovascular: RRR  Gastrointestinal: Soft abdomen, distended  Genitourinary: Urinary catheter in place, anuric  Extremities: SCD's in place and working bilaterally, no edema  Vascular: Palpable dp pulses bilaterally.   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  Arthur ([phone]) and Daughter Taylor at [facility] provided additional history.     Of note was recently admitted to  11/2022 for workup and eval of jaundice- during that hospitalization was found to have a UTI and dx with alc hep and started on steroids (was deemed a non-responder and steroids were subsequently stopped); s/p LVP at [place] 11/2022. Previously hospitalized in 2021 at [place] for ETOH detox. Went to ETOH rehab 08/2022 and was asked to leave the facility when she was COVID+; was sober for 1 week until she started drinking again. Had also attended a few AA meetings in the past. Transferred from [facility] 12/20 for further management of acute liver failure and liver transplant evaluation.       Interval events:  intubated/sedated  on norepo/vaso  CVVH held since 1 PM yesterday  on zonia/vanco  Pan scan yesterday: CT head w/ calcified lesion at septum c/w patient's known hx of neurocytoma. Hemorrhages pt had after resection have resolved. Just a residual lucency in the R posterior frontal region. CT chest/abd pelvis w/ large ascites, b/l small effusions. Bibasilar consolidation and a lingular clustered nodular and betzaida airspace c/f infectious vs inflammatory etiology No masses. Cirrhosis and portal HTN.     Potential Discharge date: pending clinical stability    Education:  Medications    Plan of care:  See Below      MEDICATIONS  (STANDING):  albumin human 25% IVPB 100 milliLiter(s) IV Intermittent every 8 hours  chlorhexidine 0.12% Liquid 15 milliLiter(s) Oral Mucosa every 12 hours  chlorhexidine 2% Cloths 1 Application(s) Topical <User Schedule>  chlorhexidine 4% Liquid 1 Application(s) Topical <User Schedule>  CRRT Treatment    <Continuous>  dexMEDEtomidine Infusion 0.5 MICROgram(s)/kG/Hr (8.28 mL/Hr) IV Continuous <Continuous>  fluconAZOLE IVPB 200 milliGRAM(s) IV Intermittent every 24 hours  insulin lispro (ADMELOG) corrective regimen sliding scale   SubCutaneous every 6 hours  lactulose Syrup 20 Gram(s) Oral every 6 hours  levETIRAcetam  IVPB 750 milliGRAM(s) IV Intermittent every 12 hours  levothyroxine Injectable 70 MICROGram(s) IV Push at bedtime  meropenem  IVPB 1000 milliGRAM(s) IV Intermittent every 12 hours  norepinephrine Infusion 0.13 MICROgram(s)/kG/Min (16.1 mL/Hr) IV Continuous <Continuous>  pantoprazole  Injectable 40 milliGRAM(s) IV Push every 24 hours  Phoxillum Filtration BK 4 / 2.5 5000 milliLiter(s) (800 mL/Hr) CRRT <Continuous>  Phoxillum Filtration BK 4 / 2.5 5000 milliLiter(s) (200 mL/Hr) CRRT <Continuous>  Phoxillum Filtration BK 4 / 2.5 5000 milliLiter(s) (1000 mL/Hr) CRRT <Continuous>  rifAXIMin 550 milliGRAM(s) Oral every 12 hours  vancomycin  IVPB 1000 milliGRAM(s) IV Intermittent every 12 hours  vasopressin Infusion 0.03 Unit(s)/Min (4.5 mL/Hr) IV Continuous <Continuous>    MEDICATIONS  (PRN):  HYDROmorphone  Injectable 0.5 milliGRAM(s) IV Push every 3 hours PRN breakthrough pain  sodium chloride 0.9% lock flush 10 milliLiter(s) IV Push every 1 hour PRN Pre/post blood products, medications, blood draw, and to maintain line patency      PAST MEDICAL & SURGICAL HISTORY:  HTN (hypertension)  off medication for over one year--states BP has been normal      UTI (urinary tract infection)  currently being treated--will complete antibiotics 3/8/2022      Intracranial tumor      GERD (gastroesophageal reflux disease)      Eczema      Thyroid cancer  surgery. radiation, Radioactive iodine      COVID-19 virus infection  11/2021--recovered at home      H/O total thyroidectomy  age 21&#x27;s for Thyroid cancer, postop complication arterial bleed, second surgery      History of tonsillectomy  age 5      History of appendectomy  age 5          Vital Signs Last 24 Hrs  T(C): 35.9 (22 Dec 2022 15:00), Max: 36 (21 Dec 2022 19:00)  T(F): 96.6 (22 Dec 2022 15:00), Max: 96.8 (21 Dec 2022 19:00)  HR: 84 (22 Dec 2022 18:00) (72 - 89)  BP: 94/56 (21 Dec 2022 19:45) (94/56 - 94/56)  BP(mean): 70 (21 Dec 2022 19:45) (70 - 70)  RR: 18 (22 Dec 2022 18:00) (15 - 30)  SpO2: 95% (22 Dec 2022 18:00) (93% - 100%)    Parameters below as of 22 Dec 2022 07:00  Patient On (Oxygen Delivery Method): ventilator  O2 Flow (L/min): 30      I&O's Summary    21 Dec 2022 07:01  -  22 Dec 2022 07:00  --------------------------------------------------------  IN: 2196.2 mL / OUT: 1814 mL / NET: 382.2 mL    22 Dec 2022 07:01  -  22 Dec 2022 18:25  --------------------------------------------------------  IN: 475.8 mL / OUT: 403 mL / NET: 72.8 mL                              7.9    20.22 )-----------( 82       ( 22 Dec 2022 17:26 )             22.8     12-22    138  |  102  |  24<H>  ----------------------------<  94  3.9   |  17<L>  |  2.82<H>    Ca    9.3      22 Dec 2022 17:26  Phos  4.7     12-22  Mg     2.3     12-22    TPro  6.4  /  Alb  4.2  /  TBili  26.6<H>  /  DBili  x   /  AST  116<H>  /  ALT  36  /  AlkPhos  117  12-22          Culture - Fungal, Body Fluid (collected 12-21-22 @ 19:53)  Source: Peritoneal Peritoneal Fluid  Preliminary Report (12-22-22 @ 13:17):    Testing in progress    Culture - Body Fluid with Gram Stain (collected 12-21-22 @ 19:53)  Source: Ascites Fl Ascites Fluid  Gram Stain (12-22-22 @ 03:04):    polymorphonuclear leukocytes seen    No organisms seen    by cytocentrifuge    Culture - Sputum (collected 12-21-22 @ 01:19)  Source: Trach Asp Tracheal Aspirate  Gram Stain (12-21-22 @ 08:45):    No polymorphonuclear leukocytes per low power field    Numerous Squamous epithelial cells per low power field    Moderate Gram Positive Rods seen per oil power field    Few Yeast like cells seen per oil power field  Preliminary Report (12-22-22 @ 09:40):    Normal Respiratory Cassandra present    Culture - Blood (collected 12-20-22 @ 23:30)  Source: .Blood Blood-Peripheral  Preliminary Report (12-22-22 @ 04:01):    No growth to date.    Culture - Blood (collected 12-20-22 @ 22:38)  Source: .Blood Blood-Peripheral  Preliminary Report (12-22-22 @ 03:02):    No growth to date.        Review of systems  intubated/sedated      PHYSICAL EXAM:  Constitutional: Well developed / well nourished  Eyes: icteric, PERRLA  ENMT: nc/at, no thrush  Neck: supple, central line   Respiratory: CTA B/L  Cardiovascular: RRR  Gastrointestinal: Soft abdomen, distended  Genitourinary: Urinary catheter in place, anuric  Extremities: SCD's in place and working bilaterally, no edema  Vascular: Palpable dp pulses bilaterally.   Neurological: intubated/sedated  Skin: no rashes, ulcerations, lesions  Musculoskeletal: intubated/sedated  Psychiatric: intubated/sedated

## 2022-12-22 NOTE — PROGRESS NOTE ADULT - PROBLEM SELECTOR PLAN 3
Pt with hyperphosphatemia in the setting of acute renal failure, and phoxyllium use. Serum phos is elevated at 6.1 today. Plan to continue with CRRT today, discontinue phoxyllium.     If you have any questions, please feel free to contact me  Christin Emerson  Nephrology Fellow  [phone] / [employer] Teams(Preferred)  (After 5pm or on weekends please page the on-call fellow) Pt with hyperphosphatemia in the setting of acute renal failure, and phoxyllium use. Serum phos is elevated at 6.1 today. Plan to continue with CRRT today, discontinue phoxyllium.     If you have any questions, please feel free to contact me  Christin Emerson  Nephrology Fellow  [phone] / [employer] Teams(Preferred)  (After 5pm or on weekends please page the on-call fellow) Pt with hyperphosphatemia in the setting of acute renal failure, and phoxyllium use. Serum phos is elevated at 6.1 today. Plan to continue with CRRT today, discontinue phoxyllium.     If you have any questions, please feel free to contact me  Christin Emerson  Nephrology Fellow  [phone] / [employer] Teams(Preferred)  (After 5pm or on weekends please page the on-call fellow)

## 2022-12-22 NOTE — PROGRESS NOTE ADULT - ATTENDING COMMENTS
Oliguric RED -ATN/HRS/Biliary cast nephropathy . started on CRRT since 12/20  remans oliguric, continue CRRT for now.

## 2022-12-23 LAB
ALBUMIN SERPL ELPH-MCNC: 4.4 G/DL — SIGNIFICANT CHANGE UP (ref 3.3–5)
ALBUMIN SERPL ELPH-MCNC: 4.5 G/DL — SIGNIFICANT CHANGE UP (ref 3.3–5)
ALP SERPL-CCNC: 116 U/L — SIGNIFICANT CHANGE UP (ref 40–120)
ALP SERPL-CCNC: 117 U/L — SIGNIFICANT CHANGE UP (ref 40–120)
ALP SERPL-CCNC: 121 U/L — HIGH (ref 40–120)
ALT FLD-CCNC: 40 U/L — SIGNIFICANT CHANGE UP (ref 10–45)
ALT FLD-CCNC: 41 U/L — SIGNIFICANT CHANGE UP (ref 10–45)
ALT FLD-CCNC: 42 U/L — SIGNIFICANT CHANGE UP (ref 10–45)
AMMONIA BLD-MCNC: 84 UMOL/L — HIGH (ref 11–55)
ANION GAP SERPL CALC-SCNC: 15 MMOL/L — SIGNIFICANT CHANGE UP (ref 5–17)
ANION GAP SERPL CALC-SCNC: 17 MMOL/L — SIGNIFICANT CHANGE UP (ref 5–17)
APTT BLD: 53.5 SEC — HIGH (ref 27.5–35.5)
AST SERPL-CCNC: 144 U/L — HIGH (ref 10–40)
AST SERPL-CCNC: 154 U/L — HIGH (ref 10–40)
AST SERPL-CCNC: 162 U/L — HIGH (ref 10–40)
BILIRUB SERPL-MCNC: 24.5 MG/DL — HIGH (ref 0.2–1.2)
BILIRUB SERPL-MCNC: 24.6 MG/DL — HIGH (ref 0.2–1.2)
BILIRUB SERPL-MCNC: 24.9 MG/DL — HIGH (ref 0.2–1.2)
BUN SERPL-MCNC: 12 MG/DL — SIGNIFICANT CHANGE UP (ref 7–23)
BUN SERPL-MCNC: 15 MG/DL — SIGNIFICANT CHANGE UP (ref 7–23)
BUN SERPL-MCNC: 9 MG/DL — SIGNIFICANT CHANGE UP (ref 7–23)
CALCIUM SERPL-MCNC: 9 MG/DL — SIGNIFICANT CHANGE UP (ref 8.4–10.5)
CALCIUM SERPL-MCNC: 9.1 MG/DL — SIGNIFICANT CHANGE UP (ref 8.4–10.5)
CHLORIDE SERPL-SCNC: 101 MMOL/L — SIGNIFICANT CHANGE UP (ref 96–108)
CHLORIDE SERPL-SCNC: 102 MMOL/L — SIGNIFICANT CHANGE UP (ref 96–108)
CO2 SERPL-SCNC: 18 MMOL/L — LOW (ref 22–31)
CO2 SERPL-SCNC: 19 MMOL/L — LOW (ref 22–31)
CO2 SERPL-SCNC: 20 MMOL/L — LOW (ref 22–31)
CREAT SERPL-MCNC: 1.31 MG/DL — HIGH (ref 0.5–1.3)
CREAT SERPL-MCNC: 1.66 MG/DL — HIGH (ref 0.5–1.3)
CREAT SERPL-MCNC: 1.97 MG/DL — HIGH (ref 0.5–1.3)
CULTURE RESULTS: SIGNIFICANT CHANGE UP
EGFR: 28 ML/MIN/1.73M2 — LOW
EGFR: 35 ML/MIN/1.73M2 — LOW
EGFR: 46 ML/MIN/1.73M2 — LOW
GAS PNL BLDA: SIGNIFICANT CHANGE UP
GLUCOSE BLDC GLUCOMTR-MCNC: 107 MG/DL — HIGH (ref 70–99)
GLUCOSE BLDC GLUCOMTR-MCNC: 126 MG/DL — HIGH (ref 70–99)
GLUCOSE BLDC GLUCOMTR-MCNC: 90 MG/DL — SIGNIFICANT CHANGE UP (ref 70–99)
GLUCOSE SERPL-MCNC: 111 MG/DL — HIGH (ref 70–99)
GLUCOSE SERPL-MCNC: 116 MG/DL — HIGH (ref 70–99)
GLUCOSE SERPL-MCNC: 145 MG/DL — HIGH (ref 70–99)
GRAM STN FLD: SIGNIFICANT CHANGE UP
HCT VFR BLD CALC: 21.2 % — LOW (ref 34.5–45)
HCT VFR BLD CALC: 21.5 % — LOW (ref 34.5–45)
HCT VFR BLD CALC: 22.4 % — LOW (ref 34.5–45)
HGB BLD-MCNC: 7.2 G/DL — LOW (ref 11.5–15.5)
HGB BLD-MCNC: 7.4 G/DL — LOW (ref 11.5–15.5)
HGB BLD-MCNC: 7.7 G/DL — LOW (ref 11.5–15.5)
INR BLD: 2.38 RATIO — HIGH (ref 0.88–1.16)
MAGNESIUM SERPL-MCNC: 2.4 MG/DL — SIGNIFICANT CHANGE UP (ref 1.6–2.6)
MAGNESIUM SERPL-MCNC: 2.5 MG/DL — SIGNIFICANT CHANGE UP (ref 1.6–2.6)
MCHC RBC-ENTMCNC: 31.7 PG — SIGNIFICANT CHANGE UP (ref 27–34)
MCHC RBC-ENTMCNC: 32 PG — SIGNIFICANT CHANGE UP (ref 27–34)
MCHC RBC-ENTMCNC: 32.2 PG — SIGNIFICANT CHANGE UP (ref 27–34)
MCHC RBC-ENTMCNC: 34 GM/DL — SIGNIFICANT CHANGE UP (ref 32–36)
MCHC RBC-ENTMCNC: 34.4 GM/DL — SIGNIFICANT CHANGE UP (ref 32–36)
MCV RBC AUTO: 93.1 FL — SIGNIFICANT CHANGE UP (ref 80–100)
MCV RBC AUTO: 93.4 FL — SIGNIFICANT CHANGE UP (ref 80–100)
MCV RBC AUTO: 93.7 FL — SIGNIFICANT CHANGE UP (ref 80–100)
NRBC # BLD: 0 /100 WBCS — SIGNIFICANT CHANGE UP (ref 0–0)
PHOSPHATE SERPL-MCNC: 2.5 MG/DL — SIGNIFICANT CHANGE UP (ref 2.5–4.5)
PHOSPHATE SERPL-MCNC: 3.8 MG/DL — SIGNIFICANT CHANGE UP (ref 2.5–4.5)
PHOSPHATE SERPL-MCNC: 4.1 MG/DL — SIGNIFICANT CHANGE UP (ref 2.5–4.5)
PLATELET # BLD AUTO: 64 K/UL — LOW (ref 150–400)
PLATELET # BLD AUTO: 71 K/UL — LOW (ref 150–400)
PLATELET # BLD AUTO: 83 K/UL — LOW (ref 150–400)
POTASSIUM SERPL-MCNC: 3.8 MMOL/L — SIGNIFICANT CHANGE UP (ref 3.5–5.3)
POTASSIUM SERPL-MCNC: 3.9 MMOL/L — SIGNIFICANT CHANGE UP (ref 3.5–5.3)
POTASSIUM SERPL-MCNC: 4.2 MMOL/L — SIGNIFICANT CHANGE UP (ref 3.5–5.3)
POTASSIUM SERPL-SCNC: 3.8 MMOL/L — SIGNIFICANT CHANGE UP (ref 3.5–5.3)
POTASSIUM SERPL-SCNC: 3.9 MMOL/L — SIGNIFICANT CHANGE UP (ref 3.5–5.3)
POTASSIUM SERPL-SCNC: 4.2 MMOL/L — SIGNIFICANT CHANGE UP (ref 3.5–5.3)
PROCALCITONIN SERPL-MCNC: 1.58 NG/ML — HIGH (ref 0.02–0.1)
PROT SERPL-MCNC: 6.2 G/DL — SIGNIFICANT CHANGE UP (ref 6–8.3)
PROT SERPL-MCNC: 6.5 G/DL — SIGNIFICANT CHANGE UP (ref 6–8.3)
PROTHROM AB SERPL-ACNC: 27.6 SEC — HIGH (ref 10.5–13.4)
RBC # BLD: 2.27 M/UL — LOW (ref 3.8–5.2)
RBC # BLD: 2.31 M/UL — LOW (ref 3.8–5.2)
RBC # BLD: 2.39 M/UL — LOW (ref 3.8–5.2)
RBC # FLD: 17.1 % — HIGH (ref 10.3–14.5)
RBC # FLD: 17.2 % — HIGH (ref 10.3–14.5)
SODIUM SERPL-SCNC: 136 MMOL/L — SIGNIFICANT CHANGE UP (ref 135–145)
SODIUM SERPL-SCNC: 138 MMOL/L — SIGNIFICANT CHANGE UP (ref 135–145)
SPECIMEN SOURCE: SIGNIFICANT CHANGE UP
WBC # BLD: 20.84 K/UL — HIGH (ref 3.8–10.5)
WBC # BLD: 22.34 K/UL — HIGH (ref 3.8–10.5)
WBC # BLD: 22.97 K/UL — HIGH (ref 3.8–10.5)
WBC # FLD AUTO: 20.84 K/UL — HIGH (ref 3.8–10.5)
WBC # FLD AUTO: 22.34 K/UL — HIGH (ref 3.8–10.5)
WBC # FLD AUTO: 22.97 K/UL — HIGH (ref 3.8–10.5)

## 2022-12-23 PROCEDURE — 71045 X-RAY EXAM CHEST 1 VIEW: CPT | Mod: 26

## 2022-12-23 PROCEDURE — 99232 SBSQ HOSP IP/OBS MODERATE 35: CPT | Mod: GC

## 2022-12-23 PROCEDURE — 99291 CRITICAL CARE FIRST HOUR: CPT

## 2022-12-23 PROCEDURE — 99232 SBSQ HOSP IP/OBS MODERATE 35: CPT

## 2022-12-23 PROCEDURE — 90945 DIALYSIS ONE EVALUATION: CPT | Mod: GC

## 2022-12-23 RX ORDER — MIDODRINE HYDROCHLORIDE 2.5 MG/1
10 TABLET ORAL EVERY 8 HOURS
Refills: 0 | Status: DISCONTINUED | OUTPATIENT
Start: 2022-12-23 | End: 2022-12-24

## 2022-12-23 RX ORDER — INFLUENZA VIRUS VACCINE 15; 15; 15; 15 UG/.5ML; UG/.5ML; UG/.5ML; UG/.5ML
0.5 SUSPENSION INTRAMUSCULAR ONCE
Refills: 0 | Status: DISCONTINUED | OUTPATIENT
Start: 2022-12-23 | End: 2023-01-09

## 2022-12-23 RX ORDER — LIDOCAINE 4 G/100G
1 CREAM TOPICAL ONCE
Refills: 0 | Status: COMPLETED | OUTPATIENT
Start: 2022-12-23 | End: 2022-12-23

## 2022-12-23 RX ORDER — POTASSIUM CHLORIDE 20 MEQ
20 PACKET (EA) ORAL ONCE
Refills: 0 | Status: COMPLETED | OUTPATIENT
Start: 2022-12-23 | End: 2022-12-23

## 2022-12-23 RX ORDER — DEXTROSE 10 % IN WATER 10 %
1000 INTRAVENOUS SOLUTION INTRAVENOUS
Refills: 0 | Status: DISCONTINUED | OUTPATIENT
Start: 2022-12-23 | End: 2022-12-24

## 2022-12-23 RX ADMIN — LACTULOSE 20 GRAM(S): 10 SOLUTION ORAL at 13:38

## 2022-12-23 RX ADMIN — Medication 30 MILLILITER(S): at 06:50

## 2022-12-23 RX ADMIN — MEROPENEM 100 MILLIGRAM(S): 1 INJECTION INTRAVENOUS at 06:10

## 2022-12-23 RX ADMIN — HYDROMORPHONE HYDROCHLORIDE 0.5 MILLIGRAM(S): 2 INJECTION INTRAMUSCULAR; INTRAVENOUS; SUBCUTANEOUS at 21:49

## 2022-12-23 RX ADMIN — Medication 30 MILLILITER(S): at 21:45

## 2022-12-23 RX ADMIN — LIDOCAINE 1 PATCH: 4 CREAM TOPICAL at 21:46

## 2022-12-23 RX ADMIN — MEROPENEM 100 MILLIGRAM(S): 1 INJECTION INTRAVENOUS at 17:12

## 2022-12-23 RX ADMIN — VASOPRESSIN 4.5 UNIT(S)/MIN: 20 INJECTION INTRAVENOUS at 21:45

## 2022-12-23 RX ADMIN — Medication 16.1 MICROGRAM(S)/KG/MIN: at 06:50

## 2022-12-23 RX ADMIN — DEXMEDETOMIDINE HYDROCHLORIDE IN 0.9% SODIUM CHLORIDE 8.28 MICROGRAM(S)/KG/HR: 4 INJECTION INTRAVENOUS at 00:29

## 2022-12-23 RX ADMIN — CHLORHEXIDINE GLUCONATE 15 MILLILITER(S): 213 SOLUTION TOPICAL at 05:20

## 2022-12-23 RX ADMIN — Medication 70 MICROGRAM(S): at 21:46

## 2022-12-23 RX ADMIN — VASOPRESSIN 4.5 UNIT(S)/MIN: 20 INJECTION INTRAVENOUS at 06:50

## 2022-12-23 RX ADMIN — Medication 50 MILLILITER(S): at 05:21

## 2022-12-23 RX ADMIN — HYDROMORPHONE HYDROCHLORIDE 0.5 MILLIGRAM(S): 2 INJECTION INTRAMUSCULAR; INTRAVENOUS; SUBCUTANEOUS at 00:58

## 2022-12-23 RX ADMIN — LEVETIRACETAM 400 MILLIGRAM(S): 250 TABLET, FILM COATED ORAL at 05:22

## 2022-12-23 RX ADMIN — LACTULOSE 20 GRAM(S): 10 SOLUTION ORAL at 17:12

## 2022-12-23 RX ADMIN — Medication 30 MILLILITER(S): at 00:28

## 2022-12-23 RX ADMIN — CHLORHEXIDINE GLUCONATE 1 APPLICATION(S): 213 SOLUTION TOPICAL at 05:22

## 2022-12-23 RX ADMIN — FLUCONAZOLE 100 MILLIGRAM(S): 150 TABLET ORAL at 21:46

## 2022-12-23 RX ADMIN — HYDROMORPHONE HYDROCHLORIDE 0.5 MILLIGRAM(S): 2 INJECTION INTRAMUSCULAR; INTRAVENOUS; SUBCUTANEOUS at 22:19

## 2022-12-23 RX ADMIN — Medication 16.1 MICROGRAM(S)/KG/MIN: at 05:06

## 2022-12-23 RX ADMIN — LACTULOSE 20 GRAM(S): 10 SOLUTION ORAL at 00:28

## 2022-12-23 RX ADMIN — DEXMEDETOMIDINE HYDROCHLORIDE IN 0.9% SODIUM CHLORIDE 8.28 MICROGRAM(S)/KG/HR: 4 INJECTION INTRAVENOUS at 06:50

## 2022-12-23 RX ADMIN — LEVETIRACETAM 400 MILLIGRAM(S): 250 TABLET, FILM COATED ORAL at 17:12

## 2022-12-23 RX ADMIN — Medication 100 MILLIEQUIVALENT(S): at 13:10

## 2022-12-23 RX ADMIN — HYDROMORPHONE HYDROCHLORIDE 0.5 MILLIGRAM(S): 2 INJECTION INTRAMUSCULAR; INTRAVENOUS; SUBCUTANEOUS at 16:34

## 2022-12-23 RX ADMIN — LACTULOSE 20 GRAM(S): 10 SOLUTION ORAL at 05:20

## 2022-12-23 RX ADMIN — HYDROMORPHONE HYDROCHLORIDE 0.5 MILLIGRAM(S): 2 INJECTION INTRAMUSCULAR; INTRAVENOUS; SUBCUTANEOUS at 01:11

## 2022-12-23 RX ADMIN — MIDODRINE HYDROCHLORIDE 10 MILLIGRAM(S): 2.5 TABLET ORAL at 21:46

## 2022-12-23 RX ADMIN — Medication 50 MILLILITER(S): at 13:10

## 2022-12-23 RX ADMIN — Medication 16.1 MICROGRAM(S)/KG/MIN: at 21:45

## 2022-12-23 RX ADMIN — VASOPRESSIN 4.5 UNIT(S)/MIN: 20 INJECTION INTRAVENOUS at 00:29

## 2022-12-23 RX ADMIN — Medication 16.1 MICROGRAM(S)/KG/MIN: at 00:30

## 2022-12-23 RX ADMIN — DEXMEDETOMIDINE HYDROCHLORIDE IN 0.9% SODIUM CHLORIDE 8.28 MICROGRAM(S)/KG/HR: 4 INJECTION INTRAVENOUS at 05:20

## 2022-12-23 RX ADMIN — PANTOPRAZOLE SODIUM 40 MILLIGRAM(S): 20 TABLET, DELAYED RELEASE ORAL at 11:44

## 2022-12-23 RX ADMIN — HYDROMORPHONE HYDROCHLORIDE 0.5 MILLIGRAM(S): 2 INJECTION INTRAMUSCULAR; INTRAVENOUS; SUBCUTANEOUS at 16:19

## 2022-12-23 RX ADMIN — Medication 50 MILLILITER(S): at 21:47

## 2022-12-23 NOTE — PROGRESS NOTE ADULT - PROBLEM SELECTOR PLAN 3
Pt with hyperphosphatemia in the setting of acute renal failure, and phoxyllium use. No improved following adjustment of CRRT fluid. Monitor serum phos level.    If you have any questions, please feel free to contact me  Christin Emerson  Nephrology Fellow  [phone] / [employer] Teams(Preferred)  (After 5pm or on weekends please page the on-call fellow) Pt with hyperphosphatemia in the setting of acute renal failure, and phoxyllium use. No improved following adjustment of CRRT fluid. Monitor serum phos level.    If you have any questions, please feel free to contact me  Christin Emerson  Nephrology Fellow  [phone] / [employer] Teams(Preferred)  (After 5pm or on weekends please page the on-call fellow) Pt with hyperphosphatemia in the setting of acute renal failure, and phoxyllium use. No improved following adjustment of CRRT fluid. Monitor serum phos level.    If you have any questions, please feel free to contact me  Christin Emerson  Nephrology Fellow  [phone] / [employer] Teams(Preferred)  (After 5pm or on weekends please page the on-call fellow)

## 2022-12-23 NOTE — PROGRESS NOTE ADULT - PROBLEM SELECTOR PLAN 1
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 Upon review of [facility], SCr was 0.78 on 12/6/22, increased to 5.31 on admission (12/20), and improved to 1.97 today with CRRT. Pt is oliguric/anuric, documented urine output is 16 cc over the past 24 hours. Pt with likely HRS vs ATN vs bile cast nephropathy. Plan to continue with CRRT today as urine output remains low. Monitor labs and urine output. Avoid nephrotoxins. Dose medications as per eGFR. 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 Upon review of [facility], SCr was 0.78 on 12/6/22, increased to 5.31 on admission (12/20), and improved to 1.97 today with CRRT. Pt is oliguric/anuric, documented urine output is 16 cc over the past 24 hours. Pt with likely HRS vs ATN vs bile cast nephropathy. Plan to continue with CRRT today as urine output remains low. Monitor labs and urine output. Avoid nephrotoxins. Dose medications as per eGFR. 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 Upon review of [facility], SCr was 0.78 on 12/6/22, increased to 5.31 on admission (12/20), and improved to 1.97 today with CRRT. Pt is oliguric/anuric, documented urine output is 16 cc over the past 24 hours. Pt with likely HRS vs ATN vs bile cast nephropathy. Plan to continue with CRRT today as urine output remains low. Monitor labs and urine output. Avoid nephrotoxins. Dose medications as per eGFR.

## 2022-12-23 NOTE — PROGRESS NOTE ADULT - ATTENDING COMMENTS
Oliguric RED -ATN/HRS/Biliary cast nephropathy . started on CRRT since 12/20  Continue CRRT for now.

## 2022-12-23 NOTE — PROGRESS NOTE ADULT - ATTENDING COMMENTS
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 She was transferred to [facility] for liver transplant evaluation (12/21).     Overall clinically improving. Mental status much improved, will try to extubate today. Remains on moderate dose pressors but will use to facilitate fluid removal on CRRT.     N - Following commands off sedation. CTH negative for acute pathology. EEG negative for seizures. Continue keppra. Lactulose and rifaximin, ammonia 84.  C - Likely septic shock requiring moderate dose vasopressors. TTE 12/21 normal function. Start midodrine 10mg q8h.  P - SBT today, attempt extubation  G - Acute on chronic liver failure. If extubates will attempt bedside swallow, if fails will need nasoduodenal tube for TFs. PPI prophylaxis.  R - Anuric acute renal failure. Continue CRRT. Pull 50cc/hr if tolerates.   H - Holding VTE ppx. No evidence of external bleeding.   I - CT chest with bilateral infiltrates - inflammatory vs infectious. CT A/P unremarkable. BCx NGTD. UA negative. Tracheal aspirate with rare yeast. Empiric zosyn and fluconazole. MRSA swab negative, vanc discontinued.  E - ISS, home synthroid  TLD - R femoral HD line, RIJ TLC, radial erna, dhillon, OGT  DISPO - SICU, full code    I have seen and examined this patient with the ICU resident/APC. I have reviewed all new labs, imaging and reports. I have participated in formulating the plan, and have read and agree with the history, ROS, exam, assessment and plan as stated above.    Total time spent in the critical care of this patient today (excluding teaching & procedures): 50 minutes    Over 50% of the total time was spent in discussion and coordination of care with consulting services, dietary and rehab services.    Vilma Mitchell MD  Surgical Critical Care . 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 She was transferred to [facility] for liver transplant evaluation (12/21).     Overall clinically improving. Mental status much improved, will try to extubate today. Remains on moderate dose pressors but will use to facilitate fluid removal on CRRT.     N - Following commands off sedation. CTH negative for acute pathology. EEG negative for seizures. Continue keppra. Lactulose and rifaximin, ammonia 84.  C - Likely septic shock requiring moderate dose vasopressors. TTE 12/21 normal function. Start midodrine 10mg q8h.  P - SBT today, attempt extubation  G - Acute on chronic liver failure. If extubates will attempt bedside swallow, if fails will need nasoduodenal tube for TFs. PPI prophylaxis.  R - Anuric acute renal failure. Continue CRRT. Pull 50cc/hr if tolerates.   H - Holding VTE ppx. No evidence of external bleeding.   I - CT chest with bilateral infiltrates - inflammatory vs infectious. CT A/P unremarkable. BCx NGTD. UA negative. Tracheal aspirate with rare yeast. Empiric zosyn and fluconazole. MRSA swab negative, vanc discontinued.  E - ISS, home synthroid  TLD - R femoral HD line, RIJ TLC, radial erna, dhillon, OGT  DISPO - SICU, full code    I have seen and examined this patient with the ICU resident/APC. I have reviewed all new labs, imaging and reports. I have participated in formulating the plan, and have read and agree with the history, ROS, exam, assessment and plan as stated above.    Total time spent in the critical care of this patient today (excluding teaching & procedures): 50 minutes    Over 50% of the total time was spent in discussion and coordination of care with consulting services, dietary and rehab services.    Vilma Mitchell MD  Surgical Critical Care . 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 She was transferred to [facility] for liver transplant evaluation (12/21).     Overall clinically improving. Mental status much improved, will try to extubate today. Remains on moderate dose pressors but will use to facilitate fluid removal on CRRT.     N - Following commands off sedation. CTH negative for acute pathology. EEG negative for seizures. Continue keppra. Lactulose and rifaximin, ammonia 84.  C - Likely septic shock requiring moderate dose vasopressors. TTE 12/21 normal function. Start midodrine 10mg q8h.  P - SBT today, attempt extubation  G - Acute on chronic liver failure. If extubates will attempt bedside swallow, if fails will need nasoduodenal tube for TFs. PPI prophylaxis.  R - Anuric acute renal failure. Continue CRRT. Pull 50cc/hr if tolerates.   H - Holding VTE ppx. No evidence of external bleeding.   I - CT chest with bilateral infiltrates - inflammatory vs infectious. CT A/P unremarkable. BCx NGTD. UA negative. Tracheal aspirate with rare yeast. Empiric zosyn and fluconazole. MRSA swab negative, vanc discontinued.  E - ISS, home synthroid  TLD - R femoral HD line, RIJ TLC, radial erna, dhillon, OGT  DISPO - SICU, full code    I have seen and examined this patient with the ICU resident/APC. I have reviewed all new labs, imaging and reports. I have participated in formulating the plan, and have read and agree with the history, ROS, exam, assessment and plan as stated above.    Total time spent in the critical care of this patient today (excluding teaching & procedures): 50 minutes    Over 50% of the total time was spent in discussion and coordination of care with consulting services, dietary and rehab services.    Vilma Mitchell MD  Surgical Critical Care .

## 2022-12-23 NOTE — PHYSICAL THERAPY INITIAL EVALUATION ADULT - PLANNED THERAPY INTERVENTIONS, PT EVAL
Called her back , she is aware and confirmed.   [facility] and [facility]. She said she will get us the record!    GOAL: Pt will negotiate 1 flight of steps +UHR independently in 4 weeks./balance training/bed mobility training/gait training/strengthening/transfer training

## 2022-12-23 NOTE — PROVIDER CONTACT NOTE (OTHER) - BACKGROUND
admitted as transfer from [place], intubated, hepatorenal failure. admitted as transfer from [place], intubated, hepatorenal failure. admitted as transfer from [place], intubated, hepatorenal failure.

## 2022-12-23 NOTE — AIRWAY REMOVAL NOTE  ADULT & PEDS - ARTIFICAL AIRWAY REMOVAL COMMENTS
Written order for extubation verified. the patient was identified by full name and birth date compared to the identification band. Present during the procedure was [place]

## 2022-12-23 NOTE — OCCUPATIONAL THERAPY INITIAL EVALUATION ADULT - LIVES WITH, PROFILE
Pt lives with family in  with a walk in shower. Recently patient has required more assist for ADLs and mobility./spouse

## 2022-12-23 NOTE — PHYSICAL THERAPY INITIAL EVALUATION ADULT - PERTINENT HX OF CURRENT PROBLEM, REHAB EVAL
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  Arthur ([phone]) and Daughter Taylor at [facility] provided additional history. Of note was recently admitted to  11/2022 for workup and eval of jaundice- during that hospitalization was found to have a UTI and dx with alc hep and started on steroids (was deemed a non-responder and steroids were subsequently stopped); s/p LVP at [place] 11/2022. Previously hospitalized in 2021 at [place] for ETOH detox. Went to ETOH rehab 08/2022 and was asked to leave the facility when she was COVID+; was sober for 1 week until she started drinking again. Had also attended a few AA meetings in the past. Transferred from [facility] 12/20 for further management of acute liver failure and liver transplant evaluation. Pt started on CVVHD, pressors, intubated and sedated upon arrival, now extubated. 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  Arthur ([phone]) and Daughter Taylor at [facility] provided additional history. Of note was recently admitted to  11/2022 for workup and eval of jaundice- during that hospitalization was found to have a UTI and dx with alc hep and started on steroids (was deemed a non-responder and steroids were subsequently stopped); s/p LVP at [place] 11/2022. Previously hospitalized in 2021 at [place] for ETOH detox. Went to ETOH rehab 08/2022 and was asked to leave the facility when she was COVID+; was sober for 1 week until she started drinking again. Had also attended a few AA meetings in the past. Transferred from [facility] 12/20 for further management of acute liver failure and liver transplant evaluation. Pt started on CVVHD, pressors, intubated and sedated upon arrival, now extubated. 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  Arthur ([phone]) and Daughter Taylor at [facility] provided additional history. Of note was recently admitted to  11/2022 for workup and eval of jaundice- during that hospitalization was found to have a UTI and dx with alc hep and started on steroids (was deemed a non-responder and steroids were subsequently stopped); s/p LVP at [place] 11/2022. Previously hospitalized in 2021 at [place] for ETOH detox. Went to ETOH rehab 08/2022 and was asked to leave the facility when she was COVID+; was sober for 1 week until she started drinking again. Had also attended a few AA meetings in the past. Transferred from [facility] 12/20 for further management of acute liver failure and liver transplant evaluation. Pt started on CVVHD, pressors, intubated and sedated upon arrival, now extubated.

## 2022-12-23 NOTE — OCCUPATIONAL THERAPY INITIAL EVALUATION ADULT - NSOTDISCHREC_GEN_A_CORE
TBD pending hospital course and further functional evaluation If patient goes home, home OT and assist for ALL ADLs and mobility/Acute Inpatient Rehab

## 2022-12-23 NOTE — PROGRESS NOTE ADULT - SUBJECTIVE AND OBJECTIVE BOX
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  Arthur ([phone]) and Daughter Taylor at [facility] provided additional history.     Of note was recently admitted to  11/2022 for workup and eval of jaundice- during that hospitalization was found to have a UTI and dx with alc hep and started on steroids (was deemed a non-responder and steroids were subsequently stopped); s/p LVP at [place] 11/2022. Previously hospitalized in 2021 at [place] for ETOH detox. Went to ETOH rehab 08/2022 and was asked to leave the facility when she was COVID+; was sober for 1 week until she started drinking again. Had also attended a few AA meetings in the past. Transferred from [facility] 12/20 for further management of acute liver failure and liver transplant evaluation.       Interval events:  intubated/sedated  on norepo/vaso  CVVH held since 1 PM yesterday  on zonia/vanco  Pan scan yesterday: CT head w/ calcified lesion at septum c/w patient's known hx of neurocytoma. Hemorrhages pt had after resection have resolved. Just a residual lucency in the R posterior frontal region. CT chest/abd pelvis w/ large ascites, b/l small effusions. Bibasilar consolidation and a lingular clustered nodular and betzaida airspace c/f infectious vs inflammatory etiology No masses. Cirrhosis and portal HTN.     Potential Discharge date: pending clinical stability    Education:  Medications    Plan of care:  See Below      MEDICATIONS  (STANDING):  albumin human 25% IVPB 100 milliLiter(s) IV Intermittent every 8 hours  chlorhexidine 2% Cloths 1 Application(s) Topical <User Schedule>  chlorhexidine 4% Liquid 1 Application(s) Topical <User Schedule>  CRRT Treatment    <Continuous>  dexMEDEtomidine Infusion 0.5 MICROgram(s)/kG/Hr (8.28 mL/Hr) IV Continuous <Continuous>  dextrose 10%. 1000 milliLiter(s) (30 mL/Hr) IV Continuous <Continuous>  fluconAZOLE IVPB 200 milliGRAM(s) IV Intermittent every 24 hours  influenza   Vaccine 0.5 milliLiter(s) IntraMuscular once  insulin lispro (ADMELOG) corrective regimen sliding scale   SubCutaneous every 6 hours  lactulose Syrup 20 Gram(s) Oral every 6 hours  levETIRAcetam  IVPB 750 milliGRAM(s) IV Intermittent every 12 hours  levothyroxine Injectable 70 MICROGram(s) IV Push at bedtime  meropenem  IVPB 1000 milliGRAM(s) IV Intermittent every 12 hours  midodrine 10 milliGRAM(s) Oral every 8 hours  norepinephrine Infusion 0.13 MICROgram(s)/kG/Min (16.1 mL/Hr) IV Continuous <Continuous>  pantoprazole  Injectable 40 milliGRAM(s) IV Push every 24 hours  PrismaSATE Dialysate BGK 4 / 2.5 5000 milliLiter(s) (1500 mL/Hr) CRRT <Continuous>  PrismaSOL Filtration BGK 4 / 2.5 5000 milliLiter(s) (800 mL/Hr) CRRT <Continuous>  PrismaSOL Filtration BGK 4 / 2.5 5000 milliLiter(s) (200 mL/Hr) CRRT <Continuous>  rifAXIMin 550 milliGRAM(s) Oral every 12 hours  vasopressin Infusion 0.03 Unit(s)/Min (4.5 mL/Hr) IV Continuous <Continuous>    MEDICATIONS  (PRN):  HYDROmorphone  Injectable 0.5 milliGRAM(s) IV Push every 3 hours PRN breakthrough pain      PAST MEDICAL & SURGICAL HISTORY:  HTN (hypertension)  off medication for over one year--states BP has been normal      UTI (urinary tract infection)  currently being treated--will complete antibiotics 3/8/2022      Intracranial tumor      GERD (gastroesophageal reflux disease)      Eczema      Thyroid cancer  surgery. radiation, Radioactive iodine      COVID-19 virus infection  11/2021--recovered at home      H/O total thyroidectomy  age 21&#x27;s for Thyroid cancer, postop complication arterial bleed, second surgery      History of tonsillectomy  age 5      History of appendectomy  age 5          Vital Signs Last 24 Hrs  T(C): 36.4 (23 Dec 2022 15:00), Max: 37.3 (23 Dec 2022 02:00)  T(F): 97.5 (23 Dec 2022 15:00), Max: 99.1 (23 Dec 2022 02:00)  HR: 91 (23 Dec 2022 15:00) (76 - 98)  BP: 93/53 (22 Dec 2022 19:30) (93/53 - 93/53)  BP(mean): 67 (22 Dec 2022 19:30) (67 - 67)  RR: 22 (23 Dec 2022 15:00) (15 - 73)  SpO2: 97% (23 Dec 2022 15:00) (93% - 100%)    Parameters below as of 23 Dec 2022 11:46      O2 Concentration (%): 30    I&O's Summary    22 Dec 2022 07:01  -  23 Dec 2022 07:00  --------------------------------------------------------  IN: 1955.6 mL / OUT: 3107 mL / NET: -1151.4 mL    23 Dec 2022 07:01  -  23 Dec 2022 15:52  --------------------------------------------------------  IN: 391.3 mL / OUT: 963 mL / NET: -571.7 mL                              7.4    22.34 )-----------( 71       ( 23 Dec 2022 10:46 )             21.5     12-23    136  |  101  |  12  ----------------------------<  116<H>  3.9   |  18<L>  |  1.66<H>    Ca    9.0      23 Dec 2022 10:46  Phos  3.8     12-23  Mg     2.5     12-23    TPro  6.2  /  Alb  4.4  /  TBili  24.6<H>  /  DBili  x   /  AST  154<H>  /  ALT  40  /  AlkPhos  121<H>  12-23          Culture - Bronchial (collected 12-22-22 @ 17:02)  Source: Combi-Cath Combi-Cath  Gram Stain (12-23-22 @ 06:59):    Moderate polymorphonuclear leukocytes seen per low power field    No squamous epithelial cells seen per low power field    No organisms seen per oil power field    Culture - Fungal, Body Fluid (collected 12-21-22 @ 19:53)  Source: Peritoneal Peritoneal Fluid  Preliminary Report (12-22-22 @ 13:17):    Testing in progress    Culture - Body Fluid with Gram Stain (collected 12-21-22 @ 19:53)  Source: Ascites Fl Ascites Fluid  Gram Stain (12-22-22 @ 03:04):    polymorphonuclear leukocytes seen    No organisms seen    by cytocentrifuge  Preliminary Report (12-23-22 @ 08:47):    No growth    Culture - Sputum (collected 12-21-22 @ 01:19)  Source: Trach Asp Tracheal Aspirate  Gram Stain (12-21-22 @ 08:45):    No polymorphonuclear leukocytes per low power field    Numerous Squamous epithelial cells per low power field    Moderate Gram Positive Rods seen per oil power field    Few Yeast like cells seen per oil power field  Final Report (12-23-22 @ 14:05):    Normal Respiratory Cassandra present    Culture - Blood (collected 12-20-22 @ 23:30)  Source: .Blood Blood-Peripheral  Preliminary Report (12-22-22 @ 04:01):    No growth to date.    Culture - Blood (collected 12-20-22 @ 22:38)  Source: .Blood Blood-Peripheral  Preliminary Report (12-22-22 @ 03:02):    No growth to date.                    Constitutional: Well developed / well nourished  Eyes: icteric, PERRLA  ENMT: nc/at, no thrush  Neck: supple, central line   Respiratory: CTA B/L  Cardiovascular: RRR  Gastrointestinal: Soft abdomen, distended  Genitourinary: Urinary catheter in place, anuric  Extremities: SCD's in place and working bilaterally, no edema  Vascular: Palpable dp pulses bilaterally.   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  Arthur ([phone]) and Daughter Taylor at [facility] provided additional history.     Of note was recently admitted to  11/2022 for workup and eval of jaundice- during that hospitalization was found to have a UTI and dx with alc hep and started on steroids (was deemed a non-responder and steroids were subsequently stopped); s/p LVP at [place] 11/2022. Previously hospitalized in 2021 at [place] for ETOH detox. Went to ETOH rehab 08/2022 and was asked to leave the facility when she was COVID+; was sober for 1 week until she started drinking again. Had also attended a few AA meetings in the past. Transferred from [facility] 12/20 for further management of acute liver failure and liver transplant evaluation.       Interval events:  intubated/sedated  on norepo/vaso  CVVH held since 1 PM yesterday  on zonia/vanco  Pan scan yesterday: CT head w/ calcified lesion at septum c/w patient's known hx of neurocytoma. Hemorrhages pt had after resection have resolved. Just a residual lucency in the R posterior frontal region. CT chest/abd pelvis w/ large ascites, b/l small effusions. Bibasilar consolidation and a lingular clustered nodular and betzaida airspace c/f infectious vs inflammatory etiology No masses. Cirrhosis and portal HTN.     Potential Discharge date: pending clinical stability    Education:  Medications    Plan of care:  See Below      MEDICATIONS  (STANDING):  albumin human 25% IVPB 100 milliLiter(s) IV Intermittent every 8 hours  chlorhexidine 2% Cloths 1 Application(s) Topical <User Schedule>  chlorhexidine 4% Liquid 1 Application(s) Topical <User Schedule>  CRRT Treatment    <Continuous>  dexMEDEtomidine Infusion 0.5 MICROgram(s)/kG/Hr (8.28 mL/Hr) IV Continuous <Continuous>  dextrose 10%. 1000 milliLiter(s) (30 mL/Hr) IV Continuous <Continuous>  fluconAZOLE IVPB 200 milliGRAM(s) IV Intermittent every 24 hours  influenza   Vaccine 0.5 milliLiter(s) IntraMuscular once  insulin lispro (ADMELOG) corrective regimen sliding scale   SubCutaneous every 6 hours  lactulose Syrup 20 Gram(s) Oral every 6 hours  levETIRAcetam  IVPB 750 milliGRAM(s) IV Intermittent every 12 hours  levothyroxine Injectable 70 MICROGram(s) IV Push at bedtime  meropenem  IVPB 1000 milliGRAM(s) IV Intermittent every 12 hours  midodrine 10 milliGRAM(s) Oral every 8 hours  norepinephrine Infusion 0.13 MICROgram(s)/kG/Min (16.1 mL/Hr) IV Continuous <Continuous>  pantoprazole  Injectable 40 milliGRAM(s) IV Push every 24 hours  PrismaSATE Dialysate BGK 4 / 2.5 5000 milliLiter(s) (1500 mL/Hr) CRRT <Continuous>  PrismaSOL Filtration BGK 4 / 2.5 5000 milliLiter(s) (800 mL/Hr) CRRT <Continuous>  PrismaSOL Filtration BGK 4 / 2.5 5000 milliLiter(s) (200 mL/Hr) CRRT <Continuous>  rifAXIMin 550 milliGRAM(s) Oral every 12 hours  vasopressin Infusion 0.03 Unit(s)/Min (4.5 mL/Hr) IV Continuous <Continuous>    MEDICATIONS  (PRN):  HYDROmorphone  Injectable 0.5 milliGRAM(s) IV Push every 3 hours PRN breakthrough pain      PAST MEDICAL & SURGICAL HISTORY:  HTN (hypertension)  off medication for over one year--states BP has been normal      UTI (urinary tract infection)  currently being treated--will complete antibiotics 3/8/2022      Intracranial tumor      GERD (gastroesophageal reflux disease)      Eczema      Thyroid cancer  surgery. radiation, Radioactive iodine      COVID-19 virus infection  11/2021--recovered at home      H/O total thyroidectomy  age 21&#x27;s for Thyroid cancer, postop complication arterial bleed, second surgery      History of tonsillectomy  age 5      History of appendectomy  age 5          Vital Signs Last 24 Hrs  T(C): 36.4 (23 Dec 2022 15:00), Max: 37.3 (23 Dec 2022 02:00)  T(F): 97.5 (23 Dec 2022 15:00), Max: 99.1 (23 Dec 2022 02:00)  HR: 91 (23 Dec 2022 15:00) (76 - 98)  BP: 93/53 (22 Dec 2022 19:30) (93/53 - 93/53)  BP(mean): 67 (22 Dec 2022 19:30) (67 - 67)  RR: 22 (23 Dec 2022 15:00) (15 - 73)  SpO2: 97% (23 Dec 2022 15:00) (93% - 100%)    Parameters below as of 23 Dec 2022 11:46      O2 Concentration (%): 30    I&O's Summary    22 Dec 2022 07:01  -  23 Dec 2022 07:00  --------------------------------------------------------  IN: 1955.6 mL / OUT: 3107 mL / NET: -1151.4 mL    23 Dec 2022 07:01  -  23 Dec 2022 15:52  --------------------------------------------------------  IN: 391.3 mL / OUT: 963 mL / NET: -571.7 mL                              7.4    22.34 )-----------( 71       ( 23 Dec 2022 10:46 )             21.5     12-23    136  |  101  |  12  ----------------------------<  116<H>  3.9   |  18<L>  |  1.66<H>    Ca    9.0      23 Dec 2022 10:46  Phos  3.8     12-23  Mg     2.5     12-23    TPro  6.2  /  Alb  4.4  /  TBili  24.6<H>  /  DBili  x   /  AST  154<H>  /  ALT  40  /  AlkPhos  121<H>  12-23          Culture - Bronchial (collected 12-22-22 @ 17:02)  Source: Combi-Cath Combi-Cath  Gram Stain (12-23-22 @ 06:59):    Moderate polymorphonuclear leukocytes seen per low power field    No squamous epithelial cells seen per low power field    No organisms seen per oil power field    Culture - Fungal, Body Fluid (collected 12-21-22 @ 19:53)  Source: Peritoneal Peritoneal Fluid  Preliminary Report (12-22-22 @ 13:17):    Testing in progress    Culture - Body Fluid with Gram Stain (collected 12-21-22 @ 19:53)  Source: Ascites Fl Ascites Fluid  Gram Stain (12-22-22 @ 03:04):    polymorphonuclear leukocytes seen    No organisms seen    by cytocentrifuge  Preliminary Report (12-23-22 @ 08:47):    No growth    Culture - Sputum (collected 12-21-22 @ 01:19)  Source: Trach Asp Tracheal Aspirate  Gram Stain (12-21-22 @ 08:45):    No polymorphonuclear leukocytes per low power field    Numerous Squamous epithelial cells per low power field    Moderate Gram Positive Rods seen per oil power field    Few Yeast like cells seen per oil power field  Final Report (12-23-22 @ 14:05):    Normal Respiratory Cassandra present    Culture - Blood (collected 12-20-22 @ 23:30)  Source: .Blood Blood-Peripheral  Preliminary Report (12-22-22 @ 04:01):    No growth to date.    Culture - Blood (collected 12-20-22 @ 22:38)  Source: .Blood Blood-Peripheral  Preliminary Report (12-22-22 @ 03:02):    No growth to date.                    Constitutional: Well developed / well nourished  Eyes: icteric, PERRLA  ENMT: nc/at, no thrush  Neck: supple, central line   Respiratory: CTA B/L  Cardiovascular: RRR  Gastrointestinal: Soft abdomen, distended  Genitourinary: Urinary catheter in place, anuric  Extremities: SCD's in place and working bilaterally, no edema  Vascular: Palpable dp pulses bilaterally.   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  Arthur ([phone]) and Daughter Taylor at [facility] provided additional history.     Of note was recently admitted to  11/2022 for workup and eval of jaundice- during that hospitalization was found to have a UTI and dx with alc hep and started on steroids (was deemed a non-responder and steroids were subsequently stopped); s/p LVP at [place] 11/2022. Previously hospitalized in 2021 at [place] for ETOH detox. Went to ETOH rehab 08/2022 and was asked to leave the facility when she was COVID+; was sober for 1 week until she started drinking again. Had also attended a few AA meetings in the past. Transferred from [facility] 12/20 for further management of acute liver failure and liver transplant evaluation.       Interval events:  intubated/sedated  on norepo/vaso  CVVH held since 1 PM yesterday  on zonia/vanco  Pan scan yesterday: CT head w/ calcified lesion at septum c/w patient's known hx of neurocytoma. Hemorrhages pt had after resection have resolved. Just a residual lucency in the R posterior frontal region. CT chest/abd pelvis w/ large ascites, b/l small effusions. Bibasilar consolidation and a lingular clustered nodular and betzaida airspace c/f infectious vs inflammatory etiology No masses. Cirrhosis and portal HTN.     Potential Discharge date: pending clinical stability    Education:  Medications    Plan of care:  See Below      MEDICATIONS  (STANDING):  albumin human 25% IVPB 100 milliLiter(s) IV Intermittent every 8 hours  chlorhexidine 2% Cloths 1 Application(s) Topical <User Schedule>  chlorhexidine 4% Liquid 1 Application(s) Topical <User Schedule>  CRRT Treatment    <Continuous>  dexMEDEtomidine Infusion 0.5 MICROgram(s)/kG/Hr (8.28 mL/Hr) IV Continuous <Continuous>  dextrose 10%. 1000 milliLiter(s) (30 mL/Hr) IV Continuous <Continuous>  fluconAZOLE IVPB 200 milliGRAM(s) IV Intermittent every 24 hours  influenza   Vaccine 0.5 milliLiter(s) IntraMuscular once  insulin lispro (ADMELOG) corrective regimen sliding scale   SubCutaneous every 6 hours  lactulose Syrup 20 Gram(s) Oral every 6 hours  levETIRAcetam  IVPB 750 milliGRAM(s) IV Intermittent every 12 hours  levothyroxine Injectable 70 MICROGram(s) IV Push at bedtime  meropenem  IVPB 1000 milliGRAM(s) IV Intermittent every 12 hours  midodrine 10 milliGRAM(s) Oral every 8 hours  norepinephrine Infusion 0.13 MICROgram(s)/kG/Min (16.1 mL/Hr) IV Continuous <Continuous>  pantoprazole  Injectable 40 milliGRAM(s) IV Push every 24 hours  PrismaSATE Dialysate BGK 4 / 2.5 5000 milliLiter(s) (1500 mL/Hr) CRRT <Continuous>  PrismaSOL Filtration BGK 4 / 2.5 5000 milliLiter(s) (800 mL/Hr) CRRT <Continuous>  PrismaSOL Filtration BGK 4 / 2.5 5000 milliLiter(s) (200 mL/Hr) CRRT <Continuous>  rifAXIMin 550 milliGRAM(s) Oral every 12 hours  vasopressin Infusion 0.03 Unit(s)/Min (4.5 mL/Hr) IV Continuous <Continuous>    MEDICATIONS  (PRN):  HYDROmorphone  Injectable 0.5 milliGRAM(s) IV Push every 3 hours PRN breakthrough pain      PAST MEDICAL & SURGICAL HISTORY:  HTN (hypertension)  off medication for over one year--states BP has been normal      UTI (urinary tract infection)  currently being treated--will complete antibiotics 3/8/2022      Intracranial tumor      GERD (gastroesophageal reflux disease)      Eczema      Thyroid cancer  surgery. radiation, Radioactive iodine      COVID-19 virus infection  11/2021--recovered at home      H/O total thyroidectomy  age 21&#x27;s for Thyroid cancer, postop complication arterial bleed, second surgery      History of tonsillectomy  age 5      History of appendectomy  age 5          Vital Signs Last 24 Hrs  T(C): 36.4 (23 Dec 2022 15:00), Max: 37.3 (23 Dec 2022 02:00)  T(F): 97.5 (23 Dec 2022 15:00), Max: 99.1 (23 Dec 2022 02:00)  HR: 91 (23 Dec 2022 15:00) (76 - 98)  BP: 93/53 (22 Dec 2022 19:30) (93/53 - 93/53)  BP(mean): 67 (22 Dec 2022 19:30) (67 - 67)  RR: 22 (23 Dec 2022 15:00) (15 - 73)  SpO2: 97% (23 Dec 2022 15:00) (93% - 100%)    Parameters below as of 23 Dec 2022 11:46      O2 Concentration (%): 30    I&O's Summary    22 Dec 2022 07:01  -  23 Dec 2022 07:00  --------------------------------------------------------  IN: 1955.6 mL / OUT: 3107 mL / NET: -1151.4 mL    23 Dec 2022 07:01  -  23 Dec 2022 15:52  --------------------------------------------------------  IN: 391.3 mL / OUT: 963 mL / NET: -571.7 mL                              7.4    22.34 )-----------( 71       ( 23 Dec 2022 10:46 )             21.5     12-23    136  |  101  |  12  ----------------------------<  116<H>  3.9   |  18<L>  |  1.66<H>    Ca    9.0      23 Dec 2022 10:46  Phos  3.8     12-23  Mg     2.5     12-23    TPro  6.2  /  Alb  4.4  /  TBili  24.6<H>  /  DBili  x   /  AST  154<H>  /  ALT  40  /  AlkPhos  121<H>  12-23          Culture - Bronchial (collected 12-22-22 @ 17:02)  Source: Combi-Cath Combi-Cath  Gram Stain (12-23-22 @ 06:59):    Moderate polymorphonuclear leukocytes seen per low power field    No squamous epithelial cells seen per low power field    No organisms seen per oil power field    Culture - Fungal, Body Fluid (collected 12-21-22 @ 19:53)  Source: Peritoneal Peritoneal Fluid  Preliminary Report (12-22-22 @ 13:17):    Testing in progress    Culture - Body Fluid with Gram Stain (collected 12-21-22 @ 19:53)  Source: Ascites Fl Ascites Fluid  Gram Stain (12-22-22 @ 03:04):    polymorphonuclear leukocytes seen    No organisms seen    by cytocentrifuge  Preliminary Report (12-23-22 @ 08:47):    No growth    Culture - Sputum (collected 12-21-22 @ 01:19)  Source: Trach Asp Tracheal Aspirate  Gram Stain (12-21-22 @ 08:45):    No polymorphonuclear leukocytes per low power field    Numerous Squamous epithelial cells per low power field    Moderate Gram Positive Rods seen per oil power field    Few Yeast like cells seen per oil power field  Final Report (12-23-22 @ 14:05):    Normal Respiratory Cassandra present    Culture - Blood (collected 12-20-22 @ 23:30)  Source: .Blood Blood-Peripheral  Preliminary Report (12-22-22 @ 04:01):    No growth to date.    Culture - Blood (collected 12-20-22 @ 22:38)  Source: .Blood Blood-Peripheral  Preliminary Report (12-22-22 @ 03:02):    No growth to date.                    Constitutional: Well developed / well nourished  Eyes: icteric, PERRLA  ENMT: nc/at, no thrush  Neck: supple, central line   Respiratory: CTA B/L  Cardiovascular: RRR  Gastrointestinal: Soft abdomen, distended  Genitourinary: Urinary catheter in place, anuric  Extremities: SCD's in place and working bilaterally, no edema  Vascular: Palpable dp pulses bilaterally.   Neurological: intubated/sedated  Skin: no rashes, ulcerations, lesions  Musculoskeletal: intubated/sedated  Psychiatric: intubated/sedated

## 2022-12-23 NOTE — PROGRESS NOTE ADULT - ASSESSMENT
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    UA (12/19) Moderate Leukocyte Esterase  UCx (12/19) No Growth  BCx (12/19) Corynebacterium (1/4 Bottles)  MRSA Nasal PCR (12/19) Negative  Paracentesis (12/19) Cell counts not suggestive of SBP    CT Chest with possible pneumonia versus atelectasis    #Leukocytosis, Positive Blood Culture, Transaminitis, Cirrhosis  --Stop Vancomycin as gram positive is corynebacterium and likely contaminant  --Would complete 7 day course of Meropenem for possible pneumonia  --Continue to follow CBC with diff  --Continue to follow transaminases  --Continue to follow temperature curve  --Follow up on preliminary blood cultures    I will be away over this upcoming weekend. Please contact the Infectious Diseases Office with any further questions or concerns.     Jonah Mendoza M.D.  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    UA (12/19) Moderate Leukocyte Esterase  UCx (12/19) No Growth  BCx (12/19) Corynebacterium (1/4 Bottles)  MRSA Nasal PCR (12/19) Negative  Paracentesis (12/19) Cell counts not suggestive of SBP    CT Chest with possible pneumonia versus atelectasis    #Leukocytosis, Positive Blood Culture, Transaminitis, Cirrhosis  --Stop Vancomycin as gram positive is corynebacterium and likely contaminant  --Would complete 7 day course of Meropenem for possible pneumonia  --Continue to follow CBC with diff  --Continue to follow transaminases  --Continue to follow temperature curve  --Follow up on preliminary blood cultures    I will be away over this upcoming weekend. Please contact the Infectious Diseases Office with any further questions or concerns.     Jonah Mendoza M.D.  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    UA (12/19) Moderate Leukocyte Esterase  UCx (12/19) No Growth  BCx (12/19) Corynebacterium (1/4 Bottles)  MRSA Nasal PCR (12/19) Negative  Paracentesis (12/19) Cell counts not suggestive of SBP    CT Chest with possible pneumonia versus atelectasis    #Leukocytosis, Positive Blood Culture, Transaminitis, Cirrhosis  --Stop Vancomycin as gram positive is corynebacterium and likely contaminant  --Would complete 7 day course of Meropenem for possible pneumonia  --Continue to follow CBC with diff  --Continue to follow transaminases  --Continue to follow temperature curve  --Follow up on preliminary blood cultures    I will be away over this upcoming weekend. Please contact the Infectious Diseases Office with any further questions or concerns.     Jonah Mendoza M.D.  [facility] Division of Infectious Disease  8AM-5PM Monday - Friday: Available on [employer] Teams  After Hours and Holidays (or if no response on [employer] Teams): Please contact the Infectious Diseases Office at [phone]     The above assessment and plan were discussed with Dr Goldstein

## 2022-12-23 NOTE — PROGRESS NOTE ADULT - SUBJECTIVE AND OBJECTIVE BOX
HISTORY  62y Female    24 HOUR EVENTS:    SUBJECTIVE/ROS:  [ ] A ten-point review of systems was otherwise negative except as noted.  [ ] Due to altered mental status/intubation, subjective information were not able to be obtained from the patient. History was obtained, to the extent possible, from review of the chart and collateral sources of information.      NEURO  RASS:     GCS:     [facility] ICU:  Exam: awake, alert, oriented  Meds: dexMEDEtomidine Infusion 0.5 MICROgram(s)/kG/Hr IV Continuous <Continuous>  HYDROmorphone  Injectable 0.5 milliGRAM(s) IV Push every 3 hours PRN breakthrough pain  levETIRAcetam  IVPB 750 milliGRAM(s) IV Intermittent every 12 hours    [x] Adequacy of sedation and pain control has been assessed and adjusted      RESPIRATORY  RR: 15 (12-23-22 @ 01:45) (15 - 30)  SpO2: 98% (12-23-22 @ 01:45) (93% - 100%)  Wt(kg): --  Exam: unlabored, clear to auscultation bilaterally  Mechanical Ventilation: Mode: AC/ CMV (Assist Control/ Continuous Mandatory Ventilation), RR (machine): 15, RR (patient): 17, TV (machine): 450, FiO2: 30, PEEP: 5, ITime: 0.9, MAP: 8.8, PIP: 24  ABG - ( 22 Dec 2022 23:16 )  pH: 7.42  /  pCO2: 29    /  pO2: 109   / HCO3: 19    / Base Excess: -5.0  /  SaO2: 100.0   Lactate: x                [N/A] Extubation Readiness Assessed  Meds:       CARDIOVASCULAR  HR: 82 (12-23-22 @ 01:45) (72 - 89)  BP: 93/53 (12-22-22 @ 19:30) (93/53 - 93/53)  BP(mean): 67 (12-22-22 @ 19:30) (67 - 67)  ABP: 114/50 (12-23-22 @ 01:45) (81/40 - 123/53)  ABP(mean): 70 (12-23-22 @ 01:45) (60 - 79)  Wt(kg): --  CVP(cm H2O): --      Exam: regular rate and rhythm  Cardiac Rhythm: sinus  Perfusion     [x]Adequate   [ ]Inadequate  Mentation   [x]Normal       [ ]Reduced  Extremities  [x]Warm         [ ]Cool  Volume Status [ ]Hypervolemic [x]Euvolemic [ ]Hypovolemic  Meds: norepinephrine Infusion 0.13 MICROgram(s)/kG/Min IV Continuous <Continuous>        GI/NUTRITION  Exam: soft, nontender, nondistended, incision C/D/I  Diet:  Meds: lactulose Syrup 20 Gram(s) Oral every 6 hours  pantoprazole  Injectable 40 milliGRAM(s) IV Push every 24 hours      GENITOURINARY  I&O's Detail    12-21 @ 07:01  -  12-22 @ 07:00  --------------------------------------------------------  IN:    Albumin 25%  -  50 mL: 350 mL    Dexmedetomidine: 8.3 mL    Dexmedetomidine: 39.3 mL    Enteral Tube Flush: 180 mL    IV PiggyBack: 150 mL    IV PiggyBack: 650 mL    IV PiggyBack: 50 mL    Norepinephrine: 300.6 mL    Octreotide: 60 mL    PRBCs (Packed Red Blood Cells): 300 mL    Vasopressin: 108 mL  Total IN: 2196.2 mL    OUT:    Indwelling Catheter - Urethral (mL): 26 mL    Other (mL): 288 mL    Propofol: 0 mL    Rectal Tube (mL): 1500 mL  Total OUT: 1814 mL    Total NET: 382.2 mL      12-22 @ 07:01  -  12-23 @ 02:05  --------------------------------------------------------  IN:    Albumin 25%  -  50 mL: 150 mL    Dexmedetomidine: 73.4 mL    dextrose 10%: 60 mL    Enteral Tube Flush: 60 mL    IV PiggyBack: 50 mL    IV PiggyBack: 250 mL    IV PiggyBack: 200 mL    Norepinephrine: 288.1 mL    Vasopressin: 81 mL  Total IN: 1212.5 mL    OUT:    Indwelling Catheter - Urethral (mL): 16 mL    Other (mL): 1149 mL    Rectal Tube (mL): 600 mL  Total OUT: 1765 mL    Total NET: -552.5 mL          12-22    136  |  100  |  20  ----------------------------<  98  3.8   |  18<L>  |  2.34<H>    Ca    9.2      22 Dec 2022 23:20  Phos  4.1     12-22  Mg     2.4     12-22    TPro  6.6  /  Alb  4.2  /  TBili  26.4<H>  /  DBili  x   /  AST  154<H>  /  ALT  40  /  AlkPhos  124<H>  12-22    [ ] Monroe catheter, indication: N/A  Meds: albumin human 25% IVPB 100 milliLiter(s) IV Intermittent every 8 hours  dextrose 10%. 1000 milliLiter(s) IV Continuous <Continuous>        HEMATOLOGIC  Meds:   [x] VTE Prophylaxis                        7.9    21.81 )-----------( 76       ( 22 Dec 2022 23:20 )             23.0     PT/INR - ( 22 Dec 2022 05:06 )   PT: 28.2 sec;   INR: 2.41 ratio         PTT - ( 22 Dec 2022 05:06 )  PTT:56.7 sec  Transfusion     [ ] PRBC   [ ] Platelets   [ ] FFP   [ ] Cryoprecipitate      INFECTIOUS DISEASES  WBC Count: 21.81 K/uL (12-22 @ 23:20)  WBC Count: 20.22 K/uL (12-22 @ 17:26)  WBC Count: 20.55 K/uL (12-22 @ 11:34)  WBC Count: 21.64 K/uL (12-22 @ 05:07)    RECENT CULTURES:  Specimen Source: Ascites Fl Ascites Fluid  Date/Time: 12-21 @ 19:53  Culture Results:   Testing in progress  Gram Stain:   polymorphonuclear leukocytes seen  No organisms seen  by cytocentrifuge  Organism: --  Specimen Source: Trach Asp Tracheal Aspirate  Date/Time: 12-21 @ 01:19  Culture Results:   Normal Respiratory Cassandra present  Gram Stain:   No polymorphonuclear leukocytes per low power field  Numerous Squamous epithelial cells per low power field  Moderate Gram Positive Rods seen per oil power field  Few Yeast like cells seen per oil power field  Organism: --  Specimen Source: .Blood Blood-Peripheral  Date/Time: 12-20 @ 23:30  Culture Results:   No growth to date.  Gram Stain: --  Organism: --  Specimen Source: .Blood Blood-Peripheral  Date/Time: 12-20 @ 22:38  Culture Results:   No growth to date.  Gram Stain: --  Organism: --    Meds: fluconAZOLE IVPB 200 milliGRAM(s) IV Intermittent every 24 hours  meropenem  IVPB 1000 milliGRAM(s) IV Intermittent every 12 hours  rifAXIMin 550 milliGRAM(s) Oral every 12 hours        ENDOCRINE  CAPILLARY BLOOD GLUCOSE      POCT Blood Glucose.: 90 mg/dL (23 Dec 2022 00:13)  POCT Blood Glucose.: 157 mg/dL (22 Dec 2022 06:12)    Meds: insulin lispro (ADMELOG) corrective regimen sliding scale   SubCutaneous every 6 hours  levothyroxine Injectable 70 MICROGram(s) IV Push at bedtime  vasopressin Infusion 0.03 Unit(s)/Min IV Continuous <Continuous>        ACCESS DEVICES:  [ ] Peripheral IV  [ ] Central Venous Line	[ ] R	[ ] L	[ ] IJ	[ ] Fem	[ ] SC	Placed:   [ ] Arterial Line		[ ] R	[ ] L	[ ] Fem	[ ] Rad	[ ] Ax	Placed:   [ ] PICC:					[ ] Mediport  [ ] Urinary Catheter, Date Placed:   [x] Necessity of urinary, arterial, and venous catheters discussed    OTHER MEDICATIONS:  chlorhexidine 0.12% Liquid 15 milliLiter(s) Oral Mucosa every 12 hours  chlorhexidine 2% Cloths 1 Application(s) Topical <User Schedule>  chlorhexidine 4% Liquid 1 Application(s) Topical <User Schedule>  CRRT Treatment    <Continuous>  Phoxillum Filtration BK 4 / 2.5 5000 milliLiter(s) CRRT <Continuous>  Phoxillum Filtration BK 4 / 2.5 5000 milliLiter(s) CRRT <Continuous>  Phoxillum Filtration BK 4 / 2.5 5000 milliLiter(s) CRRT <Continuous>      CODE STATUS:      IMAGING:   24 HOUR EVENTS:  - 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  [ ] Due to altered mental status/intubation, subjective information were not able to be obtained from the patient. History was obtained, to the extent possible, from review of the chart and collateral sources of information.      NEURO  Exam: RASS -2  Meds: dexMEDEtomidine Infusion 0.5 MICROgram(s)/kG/Hr IV Continuous <Continuous>  HYDROmorphone  Injectable 0.5 milliGRAM(s) IV Push every 3 hours PRN breakthrough pain  levETIRAcetam  IVPB 750 milliGRAM(s) IV Intermittent every 12 hours    [x] Adequacy of sedation and pain control has been assessed and adjusted      RESPIRATORY  RR: 15 (12-23-22 @ 01:45) (15 - 30)  SpO2: 98% (12-23-22 @ 01:45) (93% - 100%)  Exam: unlabored, clear to auscultation bilaterally  Mechanical Ventilation: Mode: AC/ CMV (Assist Control/ Continuous Mandatory Ventilation), RR (machine): 15, RR (patient): 17, TV (machine): 450, FiO2: 30, PEEP: 5, ITime: 0.9, MAP: 8.8, PIP: 24  ABG - ( 22 Dec 2022 23:16 )  pH: 7.42  /  pCO2: 29    /  pO2: 109   / HCO3: 19    / Base Excess: -5.0  /  SaO2: 100.0             [N/A] Extubation Readiness Assessed        CARDIOVASCULAR  HR: 82 (12-23-22 @ 01:45) (72 - 89)  BP: 93/53 (12-22-22 @ 19:30) (93/53 - 93/53)  BP(mean): 67 (12-22-22 @ 19:30) (67 - 67)  ABP: 114/50 (12-23-22 @ 01:45) (81/40 - 123/53)  ABP(mean): 70 (12-23-22 @ 01:45) (60 - 79)      Exam: regular rate and rhythm  Cardiac Rhythm: sinus  Perfusion     [x]Adequate   [ ]Inadequate  Mentation   [x]Normal       [ ]Reduced  Extremities  [x]Warm         [ ]Cool  Volume Status [ ]Hypervolemic [x]Euvolemic [ ]Hypovolemic  Meds: norepinephrine Infusion 0.13 MICROgram(s)/kG/Min IV Continuous <Continuous>        GI/NUTRITION  Exam: soft, nontender, nondistended, incision C/D/I  Diet: npo  Meds: lactulose Syrup 20 Gram(s) Oral every 6 hours  pantoprazole  Injectable 40 milliGRAM(s) IV Push every 24 hours      GENITOURINARY  I&O's Detail    12-21 @ 07:01  -  12-22 @ 07:00  --------------------------------------------------------  IN:    Albumin 25%  -  50 mL: 350 mL    Dexmedetomidine: 8.3 mL    Dexmedetomidine: 39.3 mL    Enteral Tube Flush: 180 mL    IV PiggyBack: 150 mL    IV PiggyBack: 650 mL    IV PiggyBack: 50 mL    Norepinephrine: 300.6 mL    Octreotide: 60 mL    PRBCs (Packed Red Blood Cells): 300 mL    Vasopressin: 108 mL  Total IN: 2196.2 mL    OUT:    Indwelling Catheter - Urethral (mL): 26 mL    Other (mL): 288 mL    Propofol: 0 mL    Rectal Tube (mL): 1500 mL  Total OUT: 1814 mL    Total NET: 382.2 mL      12-22 @ 07:01  -  12-23 @ 02:05  --------------------------------------------------------  IN:    Albumin 25%  -  50 mL: 150 mL    Dexmedetomidine: 73.4 mL    dextrose 10%: 60 mL    Enteral Tube Flush: 60 mL    IV PiggyBack: 50 mL    IV PiggyBack: 250 mL    IV PiggyBack: 200 mL    Norepinephrine: 288.1 mL    Vasopressin: 81 mL  Total IN: 1212.5 mL    OUT:    Indwelling Catheter - Urethral (mL): 16 mL    Other (mL): 1149 mL    Rectal Tube (mL): 600 mL  Total OUT: 1765 mL    Total NET: -552.5 mL          12-22    136  |  100  |  20  ----------------------------<  98  3.8   |  18<L>  |  2.34<H>    Ca    9.2      22 Dec 2022 23:20  Phos  4.1     12-22  Mg     2.4     12-22    TPro  6.6  /  Alb  4.2  /  TBili  26.4<H>  /  DBili  x   /  AST  154<H>  /  ALT  40  /  AlkPhos  124<H>  12-22    [ ] Monroe catheter, indication: N/A  Meds: albumin human 25% IVPB 100 milliLiter(s) IV Intermittent every 8 hours  dextrose 10%. 1000 milliLiter(s) IV Continuous <Continuous>        HEMATOLOGIC  Meds:   [x] VTE Prophylaxis                        7.9    21.81 )-----------( 76       ( 22 Dec 2022 23:20 )             23.0     PT/INR - ( 22 Dec 2022 05:06 )   PT: 28.2 sec;   INR: 2.41 ratio         PTT - ( 22 Dec 2022 05:06 )  PTT:56.7 sec  Transfusion     [ ] PRBC   [ ] Platelets   [ ] FFP   [ ] Cryoprecipitate      INFECTIOUS DISEASES  WBC Count: 21.81 K/uL (12-22 @ 23:20)  WBC Count: 20.22 K/uL (12-22 @ 17:26)  WBC Count: 20.55 K/uL (12-22 @ 11:34)  WBC Count: 21.64 K/uL (12-22 @ 05:07)    RECENT CULTURES:  Specimen Source: Ascites Fl Ascites Fluid  Date/Time: 12-21 @ 19:53  Culture Results:   Testing in progress  Gram Stain:   polymorphonuclear leukocytes seen  No organisms seen  by cytocentrifuge  Organism: --  Specimen Source: Trach Asp Tracheal Aspirate  Date/Time: 12-21 @ 01:19  Culture Results:   Normal Respiratory Cassandra present  Gram Stain:   No polymorphonuclear leukocytes per low power field  Numerous Squamous epithelial cells per low power field  Moderate Gram Positive Rods seen per oil power field  Few Yeast like cells seen per oil power field  Organism: --  Specimen Source: .Blood Blood-Peripheral  Date/Time: 12-20 @ 23:30  Culture Results:   No growth to date.  Gram Stain: --  Organism: --  Specimen Source: .Blood Blood-Peripheral  Date/Time: 12-20 @ 22:38  Culture Results:   No growth to date.  Gram Stain: --  Organism: --    Meds: fluconAZOLE IVPB 200 milliGRAM(s) IV Intermittent every 24 hours  meropenem  IVPB 1000 milliGRAM(s) IV Intermittent every 12 hours  rifAXIMin 550 milliGRAM(s) Oral every 12 hours        ENDOCRINE  CAPILLARY BLOOD GLUCOSE      POCT Blood Glucose.: 90 mg/dL (23 Dec 2022 00:13)  POCT Blood Glucose.: 157 mg/dL (22 Dec 2022 06:12)    Meds: insulin lispro (ADMELOG) corrective regimen sliding scale   SubCutaneous every 6 hours  levothyroxine Injectable 70 MICROGram(s) IV Push at bedtime  vasopressin Infusion 0.03 Unit(s)/Min IV Continuous <Continuous>        ACCESS DEVICES:  [ ] Peripheral IV  [ ] Central Venous Line	[ ] R	[ ] L	[ ] IJ	[ ] Fem	[ ] SC	Placed:   [ ] Arterial Line		[ ] R	[ ] L	[ ] Fem	[ ] Rad	[ ] Ax	Placed:   [ ] PICC:					[ ] Mediport  [ ] Urinary Catheter, Date Placed:   [x] Necessity of urinary, arterial, and venous catheters discussed    OTHER MEDICATIONS:  chlorhexidine 0.12% Liquid 15 milliLiter(s) Oral Mucosa every 12 hours  chlorhexidine 2% Cloths 1 Application(s) Topical <User Schedule>  chlorhexidine 4% Liquid 1 Application(s) Topical <User Schedule>  CRRT Treatment    <Continuous>  Phoxillum Filtration BK 4 / 2.5 5000 milliLiter(s) CRRT <Continuous>  Phoxillum Filtration BK 4 / 2.5 5000 milliLiter(s) CRRT <Continuous>  Phoxillum Filtration BK 4 / 2.5 5000 milliLiter(s) CRRT <Continuous>      CODE STATUS:      IMAGING: 24 HOUR EVENTS:  - hypoglycemia overnight, started on D10  - extubated 12/23 AM    SUBJECTIVE/ROS:  [ ] A ten-point review of systems was otherwise negative except as noted.  [ x] Due to altered mental status/intubation, subjective information were not able to be obtained from the patient. History was obtained, to the extent possible, from review of the chart and collateral sources of information.      NEURO  Exam: RASS -2  Meds: dexMEDEtomidine Infusion 0.5 MICROgram(s)/kG/Hr IV Continuous <Continuous>  HYDROmorphone  Injectable 0.5 milliGRAM(s) IV Push every 3 hours PRN breakthrough pain  levETIRAcetam  IVPB 750 milliGRAM(s) IV Intermittent every 12 hours    [x] Adequacy of sedation and pain control has been assessed and adjusted      RESPIRATORY  RR: 15 (12-23-22 @ 01:45) (15 - 30)  SpO2: 98% (12-23-22 @ 01:45) (93% - 100%)  Exam: unlabored, clear to auscultation bilaterally  Mechanical Ventilation: Mode: AC/ CMV (Assist Control/ Continuous Mandatory Ventilation), RR (machine): 15, RR (patient): 17, TV (machine): 450, FiO2: 30, PEEP: 5, ITime: 0.9, MAP: 8.8, PIP: 24  ABG - ( 22 Dec 2022 23:16 )  pH: 7.42  /  pCO2: 29    /  pO2: 109   / HCO3: 19    / Base Excess: -5.0  /  SaO2: 100.0     [N/A] Extubation Readiness Assessed        CARDIOVASCULAR  HR: 82 (12-23-22 @ 01:45) (72 - 89)  BP: 93/53 (12-22-22 @ 19:30) (93/53 - 93/53)  BP(mean): 67 (12-22-22 @ 19:30) (67 - 67)  ABP: 114/50 (12-23-22 @ 01:45) (81/40 - 123/53)  ABP(mean): 70 (12-23-22 @ 01:45) (60 - 79)      Exam: regular rate and rhythm  Cardiac Rhythm: sinus  Perfusion     [x]Adequate   [ ]Inadequate  Mentation   [x]Normal       [ ]Reduced  Extremities  [x]Warm         [ ]Cool  Volume Status [ ]Hypervolemic [x]Euvolemic [ ]Hypovolemic  Meds: norepinephrine Infusion 0.13 MICROgram(s)/kG/Min IV Continuous <Continuous>        GI/NUTRITION  Exam: soft, nontender, nondistended  Diet: npo  Meds: lactulose Syrup 20 Gram(s) Oral every 6 hours  pantoprazole  Injectable 40 milliGRAM(s) IV Push every 24 hours      GENITOURINARY  I&O's Detail    12-21 @ 07:01  -  12-22 @ 07:00  --------------------------------------------------------  IN:    Albumin 25%  -  50 mL: 350 mL    Dexmedetomidine: 8.3 mL    Dexmedetomidine: 39.3 mL    Enteral Tube Flush: 180 mL    IV PiggyBack: 150 mL    IV PiggyBack: 650 mL    IV PiggyBack: 50 mL    Norepinephrine: 300.6 mL    Octreotide: 60 mL    PRBCs (Packed Red Blood Cells): 300 mL    Vasopressin: 108 mL  Total IN: 2196.2 mL    OUT:    Indwelling Catheter - Urethral (mL): 26 mL    Other (mL): 288 mL    Propofol: 0 mL    Rectal Tube (mL): 1500 mL  Total OUT: 1814 mL    Total NET: 382.2 mL      12-22 @ 07:01  -  12-23 @ 02:05  --------------------------------------------------------  IN:    Albumin 25%  -  50 mL: 150 mL    Dexmedetomidine: 73.4 mL    dextrose 10%: 60 mL    Enteral Tube Flush: 60 mL    IV PiggyBack: 50 mL    IV PiggyBack: 250 mL    IV PiggyBack: 200 mL    Norepinephrine: 288.1 mL    Vasopressin: 81 mL  Total IN: 1212.5 mL    OUT:    Indwelling Catheter - Urethral (mL): 16 mL    Other (mL): 1149 mL    Rectal Tube (mL): 600 mL  Total OUT: 1765 mL    Total NET: -552.5 mL    SKIN: NO RASH visualized on upper back or body        12-22    136  |  100  |  20  ----------------------------<  98  3.8   |  18<L>  |  2.34<H>    Ca    9.2      22 Dec 2022 23:20  Phos  4.1     12-22  Mg     2.4     12-22    TPro  6.6  /  Alb  4.2  /  TBili  26.4<H>  /  DBili  x   /  AST  154<H>  /  ALT  40  /  AlkPhos  124<H>  12-22    [ ] Monroe catheter, indication: N/A  Meds: albumin human 25% IVPB 100 milliLiter(s) IV Intermittent every 8 hours  dextrose 10%. 1000 milliLiter(s) IV Continuous <Continuous>        HEMATOLOGIC  Meds:   [x] VTE Prophylaxis                        7.9    21.81 )-----------( 76       ( 22 Dec 2022 23:20 )             23.0     PT/INR - ( 22 Dec 2022 05:06 )   PT: 28.2 sec;   INR: 2.41 ratio         PTT - ( 22 Dec 2022 05:06 )  PTT:56.7 sec  Transfusion     [ ] PRBC   [ ] Platelets   [ ] FFP   [ ] Cryoprecipitate      INFECTIOUS DISEASES  WBC Count: 21.81 K/uL (12-22 @ 23:20)  WBC Count: 20.22 K/uL (12-22 @ 17:26)  WBC Count: 20.55 K/uL (12-22 @ 11:34)  WBC Count: 21.64 K/uL (12-22 @ 05:07)    RECENT CULTURES:  Specimen Source: Ascites Fl Ascites Fluid  Date/Time: 12-21 @ 19:53  Culture Results:   Testing in progress  Gram Stain:   polymorphonuclear leukocytes seen  No organisms seen  by cytocentrifuge  Organism: --  Specimen Source: Trach Asp Tracheal Aspirate  Date/Time: 12-21 @ 01:19  Culture Results:   Normal Respiratory Cassandra present  Gram Stain:   No polymorphonuclear leukocytes per low power field  Numerous Squamous epithelial cells per low power field  Moderate Gram Positive Rods seen per oil power field  Few Yeast like cells seen per oil power field  Organism: --  Specimen Source: .Blood Blood-Peripheral  Date/Time: 12-20 @ 23:30  Culture Results:   No growth to date.  Gram Stain: --  Organism: --  Specimen Source: .Blood Blood-Peripheral  Date/Time: 12-20 @ 22:38  Culture Results:   No growth to date.  Gram Stain: --  Organism: --    Meds: fluconAZOLE IVPB 200 milliGRAM(s) IV Intermittent every 24 hours  meropenem  IVPB 1000 milliGRAM(s) IV Intermittent every 12 hours  rifAXIMin 550 milliGRAM(s) Oral every 12 hours        ENDOCRINE  CAPILLARY BLOOD GLUCOSE      POCT Blood Glucose.: 90 mg/dL (23 Dec 2022 00:13)  POCT Blood Glucose.: 157 mg/dL (22 Dec 2022 06:12)    Meds: insulin lispro (ADMELOG) corrective regimen sliding scale   SubCutaneous every 6 hours  levothyroxine Injectable 70 MICROGram(s) IV Push at bedtime  vasopressin Infusion 0.03 Unit(s)/Min IV Continuous <Continuous>        ACCESS DEVICES:  [ ] Peripheral IV  [ ] Central Venous Line	[ ] R	[ ] L	[ ] IJ	[ ] Fem	[ ] SC	Placed:   [ ] Arterial Line		[ ] R	[ ] L	[ ] Fem	[ ] Rad	[ ] Ax	Placed:   [ ] PICC:					[ ] Mediport  [ ] Urinary Catheter, Date Placed:   [x] Necessity of urinary, arterial, and venous catheters discussed    OTHER MEDICATIONS:  chlorhexidine 0.12% Liquid 15 milliLiter(s) Oral Mucosa every 12 hours  chlorhexidine 2% Cloths 1 Application(s) Topical <User Schedule>  chlorhexidine 4% Liquid 1 Application(s) Topical <User Schedule>  CRRT Treatment    <Continuous>  Phoxillum Filtration BK 4 / 2.5 5000 milliLiter(s) CRRT <Continuous>  Phoxillum Filtration BK 4 / 2.5 5000 milliLiter(s) CRRT <Continuous>  Phoxillum Filtration BK 4 / 2.5 5000 milliLiter(s) CRRT <Continuous>      CODE STATUS:      IMAGING:

## 2022-12-23 NOTE — PROGRESS NOTE ADULT - ASSESSMENT
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   Transferred from [facility] 12/20 for further management of acute liver failure and liver transplant evaluation 2/2 known ETOH Cirrhosis.     Decompensated ETOH Cirrhosis  ABO O, MELD 43 12/21  -vent and pressors per SICU  -CVVH restarted   -Albumin q8H  -CTH/CAP non-con  -liver doppler  -pan culture  -needs a diagnostic paracentesis/LVP as able (1:1 Albumin replacement)  -stop octreotide  -broad spectrum ABX, Transplant ID consulted, please re-send blood cx  -Lactulose, increase prn. 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   Transferred from [facility] 12/20 for further management of acute liver failure and liver transplant evaluation 2/2 known ETOH Cirrhosis.     Decompensated ETOH Cirrhosis  ABO O, MELD 43 12/21  -vent and pressors per SICU  -CVVH restarted   -Albumin q8H  -CTH/CAP non-con  -liver doppler  -pan culture  -needs a diagnostic paracentesis/LVP as able (1:1 Albumin replacement)  -stop octreotide  -broad spectrum ABX, Transplant ID consulted, please re-send blood cx  -Lactulose, increase prn. 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   Transferred from [facility] 12/20 for further management of acute liver failure and liver transplant evaluation 2/2 known ETOH Cirrhosis.     Decompensated ETOH Cirrhosis  ABO O, MELD 43 12/21  -vent and pressors per SICU  -CVVH restarted   -Albumin q8H  -CTH/CAP non-con  -liver doppler  -pan culture  -needs a diagnostic paracentesis/LVP as able (1:1 Albumin replacement)  -stop octreotide  -broad spectrum ABX, Transplant ID consulted, please re-send blood cx  -Lactulose, increase prn. continue Rifaximin  -PPI  -on Keppra for seizures, involve Neuro  -check urine Lytes, follow daily Nephrology recs

## 2022-12-23 NOTE — PROGRESS NOTE ADULT - ATTENDING COMMENTS
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 Currently still with septic shock likely secondary to multifocal pneumonia, still requiring relatively stable norepinephrine and vasopressin infusions despite continued broad-spectrum antibiotics with meropenem (12/20- ) and fluconazole (12/20- ). Cultures negative to date and transplant ID following. Mentation has improved and she was successfully extubated today. S/p first HD on 12/20 and continuing on CVVHD (12/21- ). On D10W due to recent hypoglycemia. ABO O with current MELD-Na 42. Appreciate continued excellent SICU care. Family updated at bedside. We plan to initiate an expedited liver transplant evaluation this admission.    Please don't hesitate to call with any questions or concerns.    Javier Goldstein M.D., Ph.D.  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 Currently still with septic shock likely secondary to multifocal pneumonia, still requiring relatively stable norepinephrine and vasopressin infusions despite continued broad-spectrum antibiotics with meropenem (12/20- ) and fluconazole (12/20- ). Cultures negative to date and transplant ID following. Mentation has improved and she was successfully extubated today. S/p first HD on 12/20 and continuing on CVVHD (12/21- ). On D10W due to recent hypoglycemia. ABO O with current MELD-Na 42. Appreciate continued excellent SICU care. Family updated at bedside. We plan to initiate an expedited liver transplant evaluation this admission.    Please don't hesitate to call with any questions or concerns.    Javier Goldstein M.D., Ph.D.  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 on 12/19 after a witnessed seizure and with septic shock with associated hypoxic respiratory failure (intubated 12/19) and RED, and transferred to [facility] on 12/20 for liver transplant evaluation. Currently still with septic shock likely secondary to multifocal pneumonia, still requiring relatively stable norepinephrine and vasopressin infusions despite continued broad-spectrum antibiotics with meropenem (12/20- ) and fluconazole (12/20- ). Cultures negative to date and transplant ID following. Mentation has improved and she was successfully extubated today. S/p first HD on 12/20 and continuing on CVVHD (12/21- ). On D10W due to recent hypoglycemia. ABO O with current MELD-Na 42. Appreciate continued excellent SICU care. Family updated at bedside. We plan to initiate an expedited liver transplant evaluation this admission.    Please don't hesitate to call with any questions or concerns.    Javier Goldstein M.D., Ph.D.  Transplant Hepatology

## 2022-12-23 NOTE — PROGRESS NOTE ADULT - ASSESSMENT
62 y.o Hx significant for decompensated ETOH cirrhosis c/b ascites (dx 11/2022), alc hep (dx 11/2022; non-responsive to steroids), remote h/o thyroid cancer in her 20s s/p total thyroidectomy + RTX + radioactive iodide, HTN, ventrical neoplasm (dx 2021) s/p right frontal craniotomy (03/2022) for resection post operative course c/b hemorrhage right lateral ventricle (managed non-operatively) who was initially admitted to  12/19 with new onset seizures and transferred to [facility] 12/20 for LT eval for FRANTZ.       Impression:  #ACLF with underlying cirrhosis  #AUD  #Decompensated ETOH cirrhosis c/b prior ascites req LVP- UNOS/OPTN MELD-Na 42 (12/23); blood group O neg  Of note was recently admitted to  11/2022 for workup and eval of new onset jaundice- during that hospitalization was found to have a UTI and dx with alc hep was treated for UTI w/ abx and started on steroids (was deemed a non-responder and steroids were subsequently stopped); s/p LVP at [place] 11/2022. Previously hospitalized in 2021 at [place] for ETOH detox but pt reportedly unaware of any liver dysfunction at that time. Went to ETOH rehab 08/2022 and was asked to leave the facility when she was COVID+; was sober for 1 week until she started drinking again. Had also attended a few AA meetings in the past.       -Ascites: large volume on CT A/P       -HCC: pending contrasted imaging study       -SBP: negative on para 12/21       -Varices: no h/o EGD       -Hepatic encephalopathy: unable to assess; intubated; on lactulose/rifaximin    #new onset seizure- unknown trigger/etiology- currently on Keppra    Recommendations:  -trend clinical symptoms, exam findings, vital signs, CBC, CMP, INR, Mg, phosphorus  -ABO/Rh blood type sent on 2 different blood samples at least 15 minutes apart  -f/u acetaminophen level, PETH level and serum/urine drug screening  -rule out viral hepatitis with HBV PCR, HSV PCR, VZV PCR, EBV PCR, CMV IgG & PCR  -check HAV IgM, HBsAg, HBsAb, HBcAb IgM, HCV Ab [with reflex HCV PCR if positive], and HEV Ab  -albumin 25% 100 mL q8h  -c/w CRRT  -consider miniBAL  -wean pressors as tolerated (on vaso + levo)  -check urine lytes if pt has UOP  -Infectious disease consultation       -pending full infectious workup - bl cx, UA +urine cx, dx para       -HIV screening       -check quantiferon gold for TB screening       -rubella IgG, syphillis screen, toxoplasma IgG, VZV IgG       -c/w broad spectrum antibiotics- fluconazole (12/20 ->), meropenem (12/20 ->);  s/p vanco (12/21 -12/22)  - send TSH, RPR, B12        **pt's /JOSUE Gibson ([phone])    **THIS NOTE IS NOT FINALIZED UNTIL SIGNED BY THE ATTENDING**    Arleen Rawls MD  GI Fellow, PGY-5  Available via [employer] Teams    NON-URGENT CONSULTS:  Please email [email or web address]  AT NIGHT AND ON WEEKENDS:  Contact on-call GI fellow via answering service ([phone]) from 5pm-8am and on weekends/holidays  MONDAY-FRIDAY 8AM-5PM:  Pager# [phone] ([facility]) or [phone] ([facility])  GI Phone# [phone] ([facility])   62 y.o Hx significant for decompensated ETOH cirrhosis c/b ascites (dx 11/2022), alc hep (dx 11/2022; non-responsive to steroids), remote h/o thyroid cancer in her 20s s/p total thyroidectomy + RTX + radioactive iodide, HTN, ventrical neoplasm (dx 2021) s/p right frontal craniotomy (03/2022) for resection post operative course c/b hemorrhage right lateral ventricle (managed non-operatively) who was initially admitted to  12/19 with new onset seizures and transferred to [facility] 12/20 for LT eval for FRANTZ.       Impression:  #ACLF with underlying cirrhosis  #AUD  #Decompensated ETOH cirrhosis c/b prior ascites req LVP- UNOS/OPTN MELD-Na 42 (12/23); blood group O neg  Of note was recently admitted to  11/2022 for workup and eval of new onset jaundice- during that hospitalization was found to have a UTI and dx with alc hep was treated for UTI w/ abx and started on steroids (was deemed a non-responder and steroids were subsequently stopped); s/p LVP at [place] 11/2022. Previously hospitalized in 2021 at [place] for ETOH detox but pt reportedly unaware of any liver dysfunction at that time. Went to ETOH rehab 08/2022 and was asked to leave the facility when she was COVID+; was sober for 1 week until she started drinking again. Had also attended a few AA meetings in the past.       -Ascites: large volume on CT A/P       -HCC: pending contrasted imaging study       -SBP: negative on para 12/21       -Varices: no h/o EGD       -Hepatic encephalopathy: unable to assess; intubated; on lactulose/rifaximin    #new onset seizure- unknown trigger/etiology- currently on Keppra    Recommendations:  -trend clinical symptoms, exam findings, vital signs, CBC, CMP, INR, Mg, phosphorus  -ABO/Rh blood type sent on 2 different blood samples at least 15 minutes apart  -f/u acetaminophen level, PETH level and serum/urine drug screening  -rule out viral hepatitis with HBV PCR, HSV PCR, VZV PCR, EBV PCR, CMV IgG & PCR  -check HAV IgM, HBsAg, HBsAb, HBcAb IgM, HCV Ab [with reflex HCV PCR if positive], and HEV Ab  -albumin 25% 100 mL q8h  -c/w CRRT  -consider miniBAL  -wean pressors as tolerated (on vaso + levo)  -check urine lytes if pt has UOP  -Infectious disease consultation       -pending full infectious workup - bl cx, UA +urine cx, dx para       -HIV screening       -check quantiferon gold for TB screening       -rubella IgG, syphillis screen, toxoplasma IgG, VZV IgG       -c/w broad spectrum antibiotics- fluconazole (12/20 ->), meropenem (12/20 ->);  s/p vanco (12/21 -12/22)  - send TSH, RPR, B12        **pt's /JOSUE Gibson ([phone])    **THIS NOTE IS NOT FINALIZED UNTIL SIGNED BY THE ATTENDING**    Arleen Rawls MD  GI Fellow, PGY-5  Available via [employer] Teams    NON-URGENT CONSULTS:  Please email [email or web address]  AT NIGHT AND ON WEEKENDS:  Contact on-call GI fellow via answering service ([phone]) from 5pm-8am and on weekends/holidays  MONDAY-FRIDAY 8AM-5PM:  Pager# [phone] ([facility]) or [phone] ([facility])  GI Phone# [phone] ([facility])   62 y.o Hx significant for decompensated ETOH cirrhosis c/b ascites (dx 11/2022), alc hep (dx 11/2022; non-responsive to steroids), remote h/o thyroid cancer in her 20s s/p total thyroidectomy + RTX + radioactive iodide, HTN, ventrical neoplasm (dx 2021) s/p right frontal craniotomy (03/2022) for resection post operative course c/b hemorrhage right lateral ventricle (managed non-operatively) who was initially admitted to  12/19 with new onset seizures and transferred to [facility] 12/20 for LT eval for FRANTZ.       Impression:  #ACLF with underlying cirrhosis  #AUD  #Decompensated ETOH cirrhosis c/b prior ascites req LVP- UNOS/OPTN MELD-Na 42 (12/23); blood group O neg  Of note was recently admitted to  11/2022 for workup and eval of new onset jaundice- during that hospitalization was found to have a UTI and dx with alc hep was treated for UTI w/ abx and started on steroids (was deemed a non-responder and steroids were subsequently stopped); s/p LVP at [place] 11/2022. Previously hospitalized in 2021 at [place] for ETOH detox but pt reportedly unaware of any liver dysfunction at that time. Went to ETOH rehab 08/2022 and was asked to leave the facility when she was COVID+; was sober for 1 week until she started drinking again. Had also attended a few AA meetings in the past.       -Ascites: large volume on CT A/P       -HCC: pending contrasted imaging study       -SBP: negative on para 12/21       -Varices: no h/o EGD       -Hepatic encephalopathy: unable to assess; intubated; on lactulose/rifaximin    #new onset seizure- unknown trigger/etiology- currently on Keppra    Recommendations:  -trend clinical symptoms, exam findings, vital signs, CBC, CMP, INR, Mg, phosphorus  -ABO/Rh blood type sent on 2 different blood samples at least 15 minutes apart  -f/u acetaminophen level, PETH level and serum/urine drug screening  -rule out viral hepatitis with HBV PCR, HSV PCR, VZV PCR, EBV PCR, CMV IgG & PCR  -check HAV IgM, HBsAg, HBsAb, HBcAb IgM, HCV Ab [with reflex HCV PCR if positive], and HEV Ab  -albumin 25% 100 mL q8h  -c/w CRRT  -consider miniBAL  -wean pressors as tolerated (on vaso + levo)  -check urine lytes if pt has UOP  -Infectious disease consultation       -pending full infectious workup - bl cx, UA +urine cx, dx para       -HIV screening       -check quantiferon gold for TB screening       -rubella IgG, syphillis screen, toxoplasma IgG, VZV IgG       -c/w broad spectrum antibiotics- fluconazole (12/20 ->), meropenem (12/20 ->);  s/p vanco (12/21 -12/22)  - send TSH, RPR, B12        **pt's /JOSUE Gibson ([phone])    **THIS NOTE IS NOT FINALIZED UNTIL SIGNED BY THE ATTENDING**    Arleen Rawls MD  GI Fellow, PGY-5  Available via [employer] Teams    NON-URGENT CONSULTS:  Please email [email or web address]  AT NIGHT AND ON WEEKENDS:  Contact on-call GI fellow via answering service ([phone]) from 5pm-8am and on weekends/holidays  MONDAY-FRIDAY 8AM-5PM:  Pager# [phone] ([facility]) or [phone] ([facility])  GI Phone# [phone] ([facility])

## 2022-12-23 NOTE — PHYSICAL THERAPY INITIAL EVALUATION ADULT - ADDITIONAL COMMENTS
Pts spouse reported since Thanksgiving 11/22 the pt has been residing with him in his private home. Prior to Thanksgiving the pt resides in a private home alone with 6 steps to enter. Pts spouse reported prior to thanksgiving the pt was independent with ADLs and ambulation.  Pts spouse stated since Thanksgiving the pt has needed increasingly more assistance from him and the pts three children with ADLs and uses a RW for ambulation

## 2022-12-23 NOTE — PROGRESS NOTE ADULT - SUBJECTIVE AND OBJECTIVE BOX
Follow Up:      Interval History:    REVIEW OF SYSTEMS  [  ] ROS unobtainable because:    [  ] All other systems negative except as noted below    Constitutional:  [ ] fever [ ] chills  [ ] weight loss  [ ] weakness  Skin:  [ ] rash [ ] phlebitis	  Eyes: [ ] icterus [ ] pain  [ ] discharge	  ENMT: [ ] sore throat  [ ] thrush [ ] ulcers [ ] exudates  Respiratory: [ ] dyspnea [ ] hemoptysis [ ] cough [ ] sputum	  Cardiovascular:  [ ] chest pain [ ] palpitations [ ] edema	  Gastrointestinal:  [ ] nausea [ ] vomiting [ ] diarrhea [ ] constipation [ ] pain	  Genitourinary:  [ ] dysuria [ ] frequency [ ] hematuria [ ] discharge [ ] flank pain  [ ] incontinence  Musculoskeletal:  [ ] myalgias [ ] arthralgias [ ] arthritis  [ ] back pain  Neurological:  [ ] headache [ ] seizures  [ ] confusion/altered mental status    Allergies  Macrobid (Rash)        ANTIMICROBIALS:  fluconAZOLE IVPB 200 every 24 hours  meropenem  IVPB 1000 every 12 hours  rifAXIMin 550 every 12 hours      OTHER MEDS:  MEDICATIONS  (STANDING):  dexMEDEtomidine Infusion 0.5 <Continuous>  HYDROmorphone  Injectable 0.5 every 3 hours PRN  influenza   Vaccine 0.5 once  insulin lispro (ADMELOG) corrective regimen sliding scale  every 6 hours  lactulose Syrup 20 every 6 hours  levETIRAcetam  IVPB 750 every 12 hours  levothyroxine Injectable 70 at bedtime  norepinephrine Infusion 0.13 <Continuous>  pantoprazole  Injectable 40 every 24 hours  vasopressin Infusion 0.03 <Continuous>      Vital Signs Last 24 Hrs  T(C): 36.4 (23 Dec 2022 15:00), Max: 37.3 (23 Dec 2022 02:00)  T(F): 97.5 (23 Dec 2022 15:00), Max: 99.1 (23 Dec 2022 02:00)  HR: 91 (23 Dec 2022 15:00) (76 - 98)  BP: 93/53 (22 Dec 2022 19:30) (93/53 - 93/53)  BP(mean): 67 (22 Dec 2022 19:30) (67 - 67)  RR: 22 (23 Dec 2022 15:00) (15 - 73)  SpO2: 97% (23 Dec 2022 15:00) (93% - 100%)    Parameters below as of 23 Dec 2022 11:46      O2 Concentration (%): 30    PHYSICAL EXAMINATION:  General: Alert and Awake, NAD  HEENT: PERRL, EOMI  Neck: Supple  Cardiac: RRR, No M/R/G  Resp: CTAB, No Wh/Rh/Ra  Abdomen: NBS, NT/ND, No HSM, No rigidity or guarding  MSK: No LE edema. No Calf tenderness  : No dhillon  Skin: No rashes or lesions. Skin is warm and dry to the touch.   Neuro: Alert and Awake. CN 2-12 Grossly intact. Moves all four extremities spontaneously.  Psych: Calm, Pleasant, Cooperative                          7.4    22.34 )-----------( 71       ( 23 Dec 2022 10:46 )             21.5       12-23    136  |  101  |  12  ----------------------------<  116<H>  3.9   |  18<L>  |  1.66<H>    Ca    9.0      23 Dec 2022 10:46  Phos  3.8     12-23  Mg     2.5     12-23    TPro  6.2  /  Alb  4.4  /  TBili  24.6<H>  /  DBili  x   /  AST  154<H>  /  ALT  40  /  AlkPhos  121<H>  12-23          MICROBIOLOGY:  v  Combi-Cath Combi-Cath  12-22-22 --  --    Moderate polymorphonuclear leukocytes seen per low power field  No squamous epithelial cells seen per low power field  No organisms seen per oil power field      Ascites Fl Ascites Fluid  12-21-22   No growth  --    polymorphonuclear leukocytes seen  No organisms seen  by cytocentrifuge      Trach Asp Tracheal Aspirate  12-21-22   Normal Respiratory Cassandra present  --    No polymorphonuclear leukocytes per low power field  Numerous Squamous epithelial cells per low power field  Moderate Gram Positive Rods seen per oil power field  Few Yeast like cells seen per oil power field      .Blood Blood-Peripheral  12-20-22   No growth to date.  --  --      .Blood Blood-Peripheral  12-20-22   No growth to date.  --  --                RADIOLOGY:    <The imaging below has been reviewed and visualized by me independently. Findings as detailed in report below> Follow Up:  Shock    Interval History: extubated today. afebrile.     REVIEW OF SYSTEMS  [  ] ROS unobtainable because:    [x  ] All other systems negative except as noted below    Constitutional:  [ ] fever [ ] chills  [ ] weight loss  [ ] weakness  Skin:  [ ] rash [ ] phlebitis	  Eyes: [ ] icterus [ ] pain  [ ] discharge	  ENMT: [ ] sore throat  [ ] thrush [ ] ulcers [ ] exudates  Respiratory: [ ] dyspnea [ ] hemoptysis [ ] cough [ ] sputum	  Cardiovascular:  [ ] chest pain [ ] palpitations [ ] edema	  Gastrointestinal:  [ ] nausea [ ] vomiting [ ] diarrhea [ ] constipation [ ] pain	  Genitourinary:  [ ] dysuria [ ] frequency [ ] hematuria [ ] discharge [ ] flank pain  [ ] incontinence  Musculoskeletal:  [ ] myalgias [ ] arthralgias [ ] arthritis  [ ] back pain  Neurological:  [ ] headache [ ] seizures  [x ] confusion/altered mental status    Allergies  Macrobid (Rash)        ANTIMICROBIALS:  fluconAZOLE IVPB 200 every 24 hours  meropenem  IVPB 1000 every 12 hours  rifAXIMin 550 every 12 hours      OTHER MEDS:  MEDICATIONS  (STANDING):  dexMEDEtomidine Infusion 0.5 <Continuous>  HYDROmorphone  Injectable 0.5 every 3 hours PRN  influenza   Vaccine 0.5 once  insulin lispro (ADMELOG) corrective regimen sliding scale  every 6 hours  lactulose Syrup 20 every 6 hours  levETIRAcetam  IVPB 750 every 12 hours  levothyroxine Injectable 70 at bedtime  norepinephrine Infusion 0.13 <Continuous>  pantoprazole  Injectable 40 every 24 hours  vasopressin Infusion 0.03 <Continuous>      Vital Signs Last 24 Hrs  T(C): 36.4 (23 Dec 2022 15:00), Max: 37.3 (23 Dec 2022 02:00)  T(F): 97.5 (23 Dec 2022 15:00), Max: 99.1 (23 Dec 2022 02:00)  HR: 91 (23 Dec 2022 15:00) (76 - 98)  BP: 93/53 (22 Dec 2022 19:30) (93/53 - 93/53)  BP(mean): 67 (22 Dec 2022 19:30) (67 - 67)  RR: 22 (23 Dec 2022 15:00) (15 - 73)  SpO2: 97% (23 Dec 2022 15:00) (93% - 100%)    Parameters below as of 23 Dec 2022 11:46      O2 Concentration (%): 30    PHYSICAL EXAMINATION:  General: Alert and Awake, NAD, jaundice  HEENT: PERRL, EOMI, scleral icterus  Cardiac: RRR, No M/R/G  Resp: CTAB, No Wh/Rh/Ra  Abdomen: NBS, NT/ND, No HSM, No rigidity or guarding  MSK: No LE edema. No Calf tenderness  Skin: No rashes or lesions. Skin is warm and dry to the touch.   Neuro: Alert and Awake. CN 2-12 Grossly intact. Moves all four extremities spontaneously.  Psych: Encephalopathic - unable to assess                          7.4    22.34 )-----------( 71       ( 23 Dec 2022 10:46 )             21.5       12-23    136  |  101  |  12  ----------------------------<  116<H>  3.9   |  18<L>  |  1.66<H>    Ca    9.0      23 Dec 2022 10:46  Phos  3.8     12-23  Mg     2.5     12-23    TPro  6.2  /  Alb  4.4  /  TBili  24.6<H>  /  DBili  x   /  AST  154<H>  /  ALT  40  /  AlkPhos  121<H>  12-23          MICROBIOLOGY:  v  Combi-Cath Combi-Cath  12-22-22 --  --    Moderate polymorphonuclear leukocytes seen per low power field  No squamous epithelial cells seen per low power field  No organisms seen per oil power field      Ascites Fl Ascites Fluid  12-21-22   No growth  --    polymorphonuclear leukocytes seen  No organisms seen  by cytocentrifuge      Trach Asp Tracheal Aspirate  12-21-22   Normal Respiratory Cassandra present  --    No polymorphonuclear leukocytes per low power field  Numerous Squamous epithelial cells per low power field  Moderate Gram Positive Rods seen per oil power field  Few Yeast like cells seen per oil power field      .Blood Blood-Peripheral  12-20-22   No growth to date.  --  --      .Blood Blood-Peripheral  12-20-22   No growth to date.  --  --                RADIOLOGY:    <The imaging below has been reviewed and visualized by me independently. Findings as detailed in report below>    < from: Xray Chest 1 View- PORTABLE-Routine (Xray Chest 1 View- PORTABLE-Routine in AM.) (12.23.22 @ 07:42) >  IMPRESSION:  Bibasilar opacities consistent with effusions and atelectasis.    < end of copied text >

## 2022-12-23 NOTE — OCCUPATIONAL THERAPY INITIAL EVALUATION ADULT - PERTINENT HX OF CURRENT PROBLEM, REHAB EVAL
62yFemale presents with hx thyroid cancer s/p thyroidectomy, craniotomy with resection of ventricular mass, with acute liver failure, new seizures, transfer from OSH for transplant evaluation. 62y Female presents with hx thyroid cancer s/p thyroidectomy, craniotomy with resection of ventricular mass, with acute liver failure, new seizures, transfer from OSH for transplant evaluation.

## 2022-12-23 NOTE — OCCUPATIONAL THERAPY INITIAL EVALUATION ADULT - GENERAL OBSERVATIONS, REHAB EVAL
Pt received semi-supine, +CVVHD, +a-line, +ICU monitoring, +dhillon, +rectal tube, family at bedside

## 2022-12-23 NOTE — PROGRESS NOTE ADULT - ASSESSMENT
61 yo F with h/o decompensated ETOH cirrhosis with ascites, thyroid cancer (s/p total thyroidectomy, RTX, and radioactive iodide), HTN, ventrical neoplasm who was initially admitted to  12/19 with new onset seizures and transferred to [facility] 12/20 for liver transplant evaluation. Pt being seen for RED requiring CRRT. 63 yo F with h/o decompensated ETOH cirrhosis with ascites, thyroid cancer (s/p total thyroidectomy, RTX, and radioactive iodide), HTN, ventrical neoplasm who was initially admitted to  12/19 with new onset seizures and transferred to [facility] 12/20 for liver transplant evaluation. Pt being seen for RED requiring CRRT. 63 yo F with h/o decompensated ETOH cirrhosis with ascites, thyroid cancer (s/p total thyroidectomy, RTX, and radioactive iodide), HTN, ventrical neoplasm who was initially admitted to  12/19 with new onset seizures and transferred to [facility] 12/20 for liver transplant evaluation. Pt being seen for RED requiring CRRT.

## 2022-12-23 NOTE — PROGRESS NOTE ADULT - SUBJECTIVE AND OBJECTIVE BOX
Chief Complaint:  Patient is a 62y old  Female who presents with a chief complaint of Acute liver failure (23 Dec 2022 10:26)      Interval Events:   More awake and interactive this AM. Stable pressor reqs. Plan for SBT today.    Hospital Medications:  albumin human 25% IVPB 100 milliLiter(s) IV Intermittent every 8 hours  chlorhexidine 2% Cloths 1 Application(s) Topical <User Schedule>  chlorhexidine 4% Liquid 1 Application(s) Topical <User Schedule>  CRRT Treatment    <Continuous>  dexMEDEtomidine Infusion 0.5 MICROgram(s)/kG/Hr IV Continuous <Continuous>  dextrose 10%. 1000 milliLiter(s) IV Continuous <Continuous>  fluconAZOLE IVPB 200 milliGRAM(s) IV Intermittent every 24 hours  HYDROmorphone  Injectable 0.5 milliGRAM(s) IV Push every 3 hours PRN  influenza   Vaccine 0.5 milliLiter(s) IntraMuscular once  insulin lispro (ADMELOG) corrective regimen sliding scale   SubCutaneous every 6 hours  lactulose Syrup 20 Gram(s) Oral every 6 hours  levETIRAcetam  IVPB 750 milliGRAM(s) IV Intermittent every 12 hours  levothyroxine Injectable 70 MICROGram(s) IV Push at bedtime  meropenem  IVPB 1000 milliGRAM(s) IV Intermittent every 12 hours  norepinephrine Infusion 0.13 MICROgram(s)/kG/Min IV Continuous <Continuous>  pantoprazole  Injectable 40 milliGRAM(s) IV Push every 24 hours  PrismaSATE Dialysate BGK 4 / 2.5 5000 milliLiter(s) CRRT <Continuous>  PrismaSOL Filtration BGK 4 / 2.5 5000 milliLiter(s) CRRT <Continuous>  PrismaSOL Filtration BGK 4 / 2.5 5000 milliLiter(s) CRRT <Continuous>  rifAXIMin 550 milliGRAM(s) Oral every 12 hours  vasopressin Infusion 0.03 Unit(s)/Min IV Continuous <Continuous>      PMHX/PSHX:  HTN (hypertension)    UTI (urinary tract infection)    Intracranial tumor    GERD (gastroesophageal reflux disease)    Eczema    Thyroid cancer    COVID-19 virus infection    History of thyroid surgery    H/O total thyroidectomy    History of tonsillectomy    History of appendectomy        ROS: unable to obtain as pt is intubated      PHYSICAL EXAM:     GENERAL:  Appears stated age, critically ill appearing  HEENT:  NC/AT,  +scleral icterus; +ETT  CHEST:  +vented breath sounds  HEART:  Regular rhythm, S1, S2, no murmur/rub/S3/S4  ABDOMEN:  Soft, non-tender, +mildly-distended  EXTREMITIES:  no cyanosis, clubbing or edema  SKIN:  No rash/erythema/ecchymoses/petechiae/wounds/abscess/warm/dry  NEURO:  +awake; following simple 1 step commands    Vital Signs:  Vital Signs Last 24 Hrs  T(C): 36.2 (23 Dec 2022 11:00), Max: 37.3 (23 Dec 2022 02:00)  T(F): 97.2 (23 Dec 2022 11:00), Max: 99.1 (23 Dec 2022 02:00)  HR: 92 (23 Dec 2022 14:00) (76 - 98)  BP: 93/53 (22 Dec 2022 19:30) (93/53 - 93/53)  BP(mean): 67 (22 Dec 2022 19:30) (67 - 67)  RR: 24 (23 Dec 2022 14:00) (15 - 73)  SpO2: 97% (23 Dec 2022 14:00) (93% - 100%)    Parameters below as of 23 Dec 2022 11:46      O2 Concentration (%): 30  Daily     Daily Weight in k.6 (23 Dec 2022 02:08)    LABS:                        7.4    22.34 )-----------( 71       ( 23 Dec 2022 10:46 )             21.5     12    136  |  101  |  12  ----------------------------<  116<H>  3.9   |  18<L>  |  1.66<H>    Ca    9.0      23 Dec 2022 10:46  Phos  3.8       Mg     2.5         TPro  6.2  /  Alb  4.4  /  TBili  24.6<H>  /  DBili  x   /  AST  154<H>  /  ALT  40  /  AlkPhos  121<H>      LIVER FUNCTIONS - ( 23 Dec 2022 10:46 )  Alb: 4.4 g/dL / Pro: 6.2 g/dL / ALK PHOS: 121 U/L / ALT: 40 U/L / AST: 154 U/L / GGT: x           PT/INR - ( 23 Dec 2022 05:30 )   PT: 27.6 sec;   INR: 2.38 ratio         PTT - ( 23 Dec 2022 05:30 )  PTT:53.5 sec    Amylase Serum--      Lipase serum--       [id number]      Imaging: No new abdominal imaging               Chief Complaint:  Patient is a 62y old  Female who presents with a chief complaint of Acute liver failure (23 Dec 2022 10:26)      Interval Events:   More awake and interactive this AM. Stable pressor reqs. Plan for SBT today.    Hospital Medications:  albumin human 25% IVPB 100 milliLiter(s) IV Intermittent every 8 hours  chlorhexidine 2% Cloths 1 Application(s) Topical <User Schedule>  chlorhexidine 4% Liquid 1 Application(s) Topical <User Schedule>  CRRT Treatment    <Continuous>  dexMEDEtomidine Infusion 0.5 MICROgram(s)/kG/Hr IV Continuous <Continuous>  dextrose 10%. 1000 milliLiter(s) IV Continuous <Continuous>  fluconAZOLE IVPB 200 milliGRAM(s) IV Intermittent every 24 hours  HYDROmorphone  Injectable 0.5 milliGRAM(s) IV Push every 3 hours PRN  influenza   Vaccine 0.5 milliLiter(s) IntraMuscular once  insulin lispro (ADMELOG) corrective regimen sliding scale   SubCutaneous every 6 hours  lactulose Syrup 20 Gram(s) Oral every 6 hours  levETIRAcetam  IVPB 750 milliGRAM(s) IV Intermittent every 12 hours  levothyroxine Injectable 70 MICROGram(s) IV Push at bedtime  meropenem  IVPB 1000 milliGRAM(s) IV Intermittent every 12 hours  norepinephrine Infusion 0.13 MICROgram(s)/kG/Min IV Continuous <Continuous>  pantoprazole  Injectable 40 milliGRAM(s) IV Push every 24 hours  PrismaSATE Dialysate BGK 4 / 2.5 5000 milliLiter(s) CRRT <Continuous>  PrismaSOL Filtration BGK 4 / 2.5 5000 milliLiter(s) CRRT <Continuous>  PrismaSOL Filtration BGK 4 / 2.5 5000 milliLiter(s) CRRT <Continuous>  rifAXIMin 550 milliGRAM(s) Oral every 12 hours  vasopressin Infusion 0.03 Unit(s)/Min IV Continuous <Continuous>      PMHX/PSHX:  HTN (hypertension)    UTI (urinary tract infection)    Intracranial tumor    GERD (gastroesophageal reflux disease)    Eczema    Thyroid cancer    COVID-19 virus infection    History of thyroid surgery    H/O total thyroidectomy    History of tonsillectomy    History of appendectomy        ROS: unable to obtain as pt is intubated      PHYSICAL EXAM:     GENERAL:  Appears stated age, critically ill appearing  HEENT:  NC/AT,  +scleral icterus; +ETT  CHEST:  +vented breath sounds  HEART:  Regular rhythm, S1, S2, no murmur/rub/S3/S4  ABDOMEN:  Soft, non-tender, +mildly-distended  EXTREMITIES:  no cyanosis, clubbing or edema  SKIN:  No rash/erythema/ecchymoses/petechiae/wounds/abscess/warm/dry  NEURO:  +awake; following simple 1 step commands    Vital Signs:  Vital Signs Last 24 Hrs  T(C): 36.2 (23 Dec 2022 11:00), Max: 37.3 (23 Dec 2022 02:00)  T(F): 97.2 (23 Dec 2022 11:00), Max: 99.1 (23 Dec 2022 02:00)  HR: 92 (23 Dec 2022 14:00) (76 - 98)  BP: 93/53 (22 Dec 2022 19:30) (93/53 - 93/53)  BP(mean): 67 (22 Dec 2022 19:30) (67 - 67)  RR: 24 (23 Dec 2022 14:00) (15 - 73)  SpO2: 97% (23 Dec 2022 14:00) (93% - 100%)    Parameters below as of 23 Dec 2022 11:46      O2 Concentration (%): 30  Daily     Daily Weight in k.6 (23 Dec 2022 02:08)    LABS:                        7.4    22.34 )-----------( 71       ( 23 Dec 2022 10:46 )             21.5     12    136  |  101  |  12  ----------------------------<  116<H>  3.9   |  18<L>  |  1.66<H>    Ca    9.0      23 Dec 2022 10:46  Phos  3.8       Mg     2.5         TPro  6.2  /  Alb  4.4  /  TBili  24.6<H>  /  DBili  x   /  AST  154<H>  /  ALT  40  /  AlkPhos  121<H>      LIVER FUNCTIONS - ( 23 Dec 2022 10:46 )  Alb: 4.4 g/dL / Pro: 6.2 g/dL / ALK PHOS: 121 U/L / ALT: 40 U/L / AST: 154 U/L / GGT: x           PT/INR - ( 23 Dec 2022 05:30 )   PT: 27.6 sec;   INR: 2.38 ratio         PTT - ( 23 Dec 2022 05:30 )  PTT:53.5 sec    Amylase Serum--      Lipase serum--       [id number]      Imaging: No new abdominal imaging               Chief Complaint:  Patient is a 62y old  Female who presents with a chief complaint of Acute liver failure (23 Dec 2022 10:26)      Interval Events:   More awake and interactive this AM. Stable pressor reqs. Plan for SBT today.    Hospital Medications:  albumin human 25% IVPB 100 milliLiter(s) IV Intermittent every 8 hours  chlorhexidine 2% Cloths 1 Application(s) Topical <User Schedule>  chlorhexidine 4% Liquid 1 Application(s) Topical <User Schedule>  CRRT Treatment    <Continuous>  dexMEDEtomidine Infusion 0.5 MICROgram(s)/kG/Hr IV Continuous <Continuous>  dextrose 10%. 1000 milliLiter(s) IV Continuous <Continuous>  fluconAZOLE IVPB 200 milliGRAM(s) IV Intermittent every 24 hours  HYDROmorphone  Injectable 0.5 milliGRAM(s) IV Push every 3 hours PRN  influenza   Vaccine 0.5 milliLiter(s) IntraMuscular once  insulin lispro (ADMELOG) corrective regimen sliding scale   SubCutaneous every 6 hours  lactulose Syrup 20 Gram(s) Oral every 6 hours  levETIRAcetam  IVPB 750 milliGRAM(s) IV Intermittent every 12 hours  levothyroxine Injectable 70 MICROGram(s) IV Push at bedtime  meropenem  IVPB 1000 milliGRAM(s) IV Intermittent every 12 hours  norepinephrine Infusion 0.13 MICROgram(s)/kG/Min IV Continuous <Continuous>  pantoprazole  Injectable 40 milliGRAM(s) IV Push every 24 hours  PrismaSATE Dialysate BGK 4 / 2.5 5000 milliLiter(s) CRRT <Continuous>  PrismaSOL Filtration BGK 4 / 2.5 5000 milliLiter(s) CRRT <Continuous>  PrismaSOL Filtration BGK 4 / 2.5 5000 milliLiter(s) CRRT <Continuous>  rifAXIMin 550 milliGRAM(s) Oral every 12 hours  vasopressin Infusion 0.03 Unit(s)/Min IV Continuous <Continuous>      PMHX/PSHX:  HTN (hypertension)    UTI (urinary tract infection)    Intracranial tumor    GERD (gastroesophageal reflux disease)    Eczema    Thyroid cancer    COVID-19 virus infection    History of thyroid surgery    H/O total thyroidectomy    History of tonsillectomy    History of appendectomy        ROS: unable to obtain as pt is intubated      PHYSICAL EXAM:     GENERAL:  Appears stated age, critically ill appearing  HEENT:  NC/AT,  +scleral icterus; +ETT  CHEST:  +vented breath sounds  HEART:  Regular rhythm, S1, S2, no murmur/rub/S3/S4  ABDOMEN:  Soft, non-tender, +mildly-distended  EXTREMITIES:  no cyanosis, clubbing or edema  SKIN:  No rash/erythema/ecchymoses/petechiae/wounds/abscess/warm/dry  NEURO:  +awake; following simple 1 step commands    Vital Signs:  Vital Signs Last 24 Hrs  T(C): 36.2 (23 Dec 2022 11:00), Max: 37.3 (23 Dec 2022 02:00)  T(F): 97.2 (23 Dec 2022 11:00), Max: 99.1 (23 Dec 2022 02:00)  HR: 92 (23 Dec 2022 14:00) (76 - 98)  BP: 93/53 (22 Dec 2022 19:30) (93/53 - 93/53)  BP(mean): 67 (22 Dec 2022 19:30) (67 - 67)  RR: 24 (23 Dec 2022 14:00) (15 - 73)  SpO2: 97% (23 Dec 2022 14:00) (93% - 100%)    Parameters below as of 23 Dec 2022 11:46      O2 Concentration (%): 30  Daily     Daily Weight in k.6 (23 Dec 2022 02:08)    LABS:                        7.4    22.34 )-----------( 71       ( 23 Dec 2022 10:46 )             21.5     12    136  |  101  |  12  ----------------------------<  116<H>  3.9   |  18<L>  |  1.66<H>    Ca    9.0      23 Dec 2022 10:46  Phos  3.8       Mg     2.5         TPro  6.2  /  Alb  4.4  /  TBili  24.6<H>  /  DBili  x   /  AST  154<H>  /  ALT  40  /  AlkPhos  121<H>      LIVER FUNCTIONS - ( 23 Dec 2022 10:46 )  Alb: 4.4 g/dL / Pro: 6.2 g/dL / ALK PHOS: 121 U/L / ALT: 40 U/L / AST: 154 U/L / GGT: x           PT/INR - ( 23 Dec 2022 05:30 )   PT: 27.6 sec;   INR: 2.38 ratio         PTT - ( 23 Dec 2022 05:30 )  PTT:53.5 sec    Amylase Serum--      Lipase serum--       [id number]      Imaging: No new abdominal imaging

## 2022-12-23 NOTE — PHYSICAL THERAPY INITIAL EVALUATION ADULT - GENERAL OBSERVATIONS, REHAB EVAL
Received semisupine +[place], +ICU monitoring, +NGT, +rectal tube. Pt A&OX2, follows 75% simple commands. Received semisupine +[place], +ICU monitoring, +NGT, +rectal tube. Pt A&OX2, follows 75% simple commands. Received semisupine +[place], +ICU monitoring, +NGT, +rectal tube. Pt A&OX2, follows 75% simple commands.

## 2022-12-23 NOTE — PROGRESS NOTE ADULT - SUBJECTIVE AND OBJECTIVE BOX
[facility] DIVISION OF KIDNEY DISEASES AND HYPERTENSION -- FOLLOW UP NOTE  --------------------------------------------------------------------------------  HPI: 63 yo F with h/o decompensated ETOH cirrhosis with ascites, thyroid cancer (s/p total thyroidectomy, RTX, and radioactive iodide), HTN, ventrical neoplasm who was initially admitted to  12/19 with new onset seizures and transferred to [facility] 12/20 for liver transplant evaluation. Pt being seen for RED requiring CRRT.    24 hour events/subjective: Pt was seen and evaluated in the ICU this morning with family present. Pt remains intubated but appears more awake today. Unable to obtain ROS.      PAST HISTORY  --------------------------------------------------------------------------------  No significant changes to PMH, PSH, FHx, SHx, unless otherwise noted    ALLERGIES & MEDICATIONS  --------------------------------------------------------------------------------  Allergies    Macrobid (Rash)    Intolerances    Nexium (Stomach Upset)    Standing Inpatient Medications  albumin human 25% IVPB 100 milliLiter(s) IV Intermittent every 8 hours  chlorhexidine 0.12% Liquid 15 milliLiter(s) Oral Mucosa every 12 hours  chlorhexidine 2% Cloths 1 Application(s) Topical <User Schedule>  chlorhexidine 4% Liquid 1 Application(s) Topical <User Schedule>  CRRT Treatment    <Continuous>  dexMEDEtomidine Infusion 0.5 MICROgram(s)/kG/Hr IV Continuous <Continuous>  dextrose 10%. 1000 milliLiter(s) IV Continuous <Continuous>  fluconAZOLE IVPB 200 milliGRAM(s) IV Intermittent every 24 hours  influenza   Vaccine 0.5 milliLiter(s) IntraMuscular once  insulin lispro (ADMELOG) corrective regimen sliding scale   SubCutaneous every 6 hours  lactulose Syrup 20 Gram(s) Oral every 6 hours  levETIRAcetam  IVPB 750 milliGRAM(s) IV Intermittent every 12 hours  levothyroxine Injectable 70 MICROGram(s) IV Push at bedtime  meropenem  IVPB 1000 milliGRAM(s) IV Intermittent every 12 hours  norepinephrine Infusion 0.13 MICROgram(s)/kG/Min IV Continuous <Continuous>  pantoprazole  Injectable 40 milliGRAM(s) IV Push every 24 hours  Phoxillum Filtration BK 4 / 2.5 5000 milliLiter(s) CRRT <Continuous>  Phoxillum Filtration BK 4 / 2.5 5000 milliLiter(s) CRRT <Continuous>  Phoxillum Filtration BK 4 / 2.5 5000 milliLiter(s) CRRT <Continuous>  rifAXIMin 550 milliGRAM(s) Oral every 12 hours  vasopressin Infusion 0.03 Unit(s)/Min IV Continuous <Continuous>    PRN Inpatient Medications  HYDROmorphone  Injectable 0.5 milliGRAM(s) IV Push every 3 hours PRN      REVIEW OF SYSTEMS  --------------------------------------------------------------------------------  Unable to obtain ROS    VITALS/PHYSICAL EXAM  --------------------------------------------------------------------------------  T(C): 36.2 (12-23-22 @ 07:00), Max: 37.3 (12-23-22 @ 02:00)  HR: 92 (12-23-22 @ 09:45) (76 - 92)  BP: 93/53 (12-22-22 @ 19:30) (93/53 - 93/53)  RR: 58 (12-23-22 @ 09:45) (15 - 73)  SpO2: 98% (12-23-22 @ 09:45) (93% - 100%)  Wt(kg): --        12-22-22 @ 07:01  -  12-23-22 @ 07:00  --------------------------------------------------------  IN: 1955.6 mL / OUT: 3107 mL / NET: -1151.4 mL    12-23-22 @ 07:01  -  12-23-22 @ 10:26  --------------------------------------------------------  IN: 150.3 mL / OUT: 363 mL / NET: -212.7 mL      Physical Exam:  Gen: Intubated, sedated  HEENT: +ET tube   Pulm: CTA B/L, no crackles   CV: RRR, S1S2+  Abd: +BS, soft, distended  : +urinary catheter  MSK: +BL LE edema  Psych: Sedated  Skin: Warm  Access: +Non-tunneled HD catheter      LABS/STUDIES  --------------------------------------------------------------------------------              7.7    20.84 >-----------<  83       [12-23-22 @ 05:30]              22.4     136  |  102  |  15  ----------------------------<  111      [12-23-22 @ 05:30]  3.8   |  19  |  1.97        Ca     9.1     [12-23-22 @ 05:30]      Mg     2.4     [12-23-22 @ 05:30]      Phos  4.1     [12-23-22 @ 05:30]    TPro  6.2  /  Alb  4.4  /  TBili  24.9  /  DBili  x   /  AST  162  /  ALT  42  /  AlkPhos  117  [12-23-22 @ 05:30]    PT/INR: PT 27.6 , INR 2.38       [12-23-22 @ 05:30]  PTT: 53.5       [12-23-22 @ 05:30]    Serum Osmolality 286      [12-21-22 @ 11:52]    Creatinine Trend:  SCr 1.97 [12-23 @ 05:30]  SCr 2.34 [12-22 @ 23:20]  SCr 2.82 [12-22 @ 17:26]  SCr 3.74 [12-22 @ 11:34]  SCr 3.73 [12-22 @ 05:06]    Urinalysis - [12-20-22 @ 23:55]      Color Dark Yellow / Appearance Turbid / SG 1.029 / pH 6.0      Gluc 100 mg/dL / Ketone Small  / Bili Large / Urobili Negative       Blood Moderate / Protein 300 mg/dL / Leuk Est Large / Nitrite Negative      RBC 5-10 / WBC 11-25 / Hyaline 0-2 / Gran  / Sq Epi  / Non Sq Epi Moderate / Bacteria Negative    Urine Creatinine 21      [12-21-22 @ 11:55]  Urine Sodium 138      [12-21-22 @ 11:55]  Urine Potassium 8      [12-21-22 @ 11:55]  Urine Osmolality 288      [12-21-22 @ 11:55]      HCV 0.15, Nonreact      [12-21-22 @ 05:09] Health system DIVISION OF KIDNEY DISEASES AND HYPERTENSION -- FOLLOW UP NOTE  --------------------------------------------------------------------------------  HPI: 61 yo F with h/o decompensated ETOH cirrhosis with ascites, thyroid cancer (s/p total thyroidectomy, RTX, and radioactive iodide), HTN, ventrical neoplasm who was initially admitted to  12/19 with new onset seizures and transferred to [facility] 12/20 for liver transplant evaluation. Pt being seen for RED requiring CRRT.    24 hour events/subjective: Pt was seen and evaluated in the ICU this morning with family present. Pt remains intubated but appears more awake today. Unable to obtain ROS.      PAST HISTORY  --------------------------------------------------------------------------------  No significant changes to PMH, PSH, FHx, SHx, unless otherwise noted    ALLERGIES & MEDICATIONS  --------------------------------------------------------------------------------  Allergies    Macrobid (Rash)    Intolerances    Nexium (Stomach Upset)    Standing Inpatient Medications  albumin human 25% IVPB 100 milliLiter(s) IV Intermittent every 8 hours  chlorhexidine 0.12% Liquid 15 milliLiter(s) Oral Mucosa every 12 hours  chlorhexidine 2% Cloths 1 Application(s) Topical <User Schedule>  chlorhexidine 4% Liquid 1 Application(s) Topical <User Schedule>  CRRT Treatment    <Continuous>  dexMEDEtomidine Infusion 0.5 MICROgram(s)/kG/Hr IV Continuous <Continuous>  dextrose 10%. 1000 milliLiter(s) IV Continuous <Continuous>  fluconAZOLE IVPB 200 milliGRAM(s) IV Intermittent every 24 hours  influenza   Vaccine 0.5 milliLiter(s) IntraMuscular once  insulin lispro (ADMELOG) corrective regimen sliding scale   SubCutaneous every 6 hours  lactulose Syrup 20 Gram(s) Oral every 6 hours  levETIRAcetam  IVPB 750 milliGRAM(s) IV Intermittent every 12 hours  levothyroxine Injectable 70 MICROGram(s) IV Push at bedtime  meropenem  IVPB 1000 milliGRAM(s) IV Intermittent every 12 hours  norepinephrine Infusion 0.13 MICROgram(s)/kG/Min IV Continuous <Continuous>  pantoprazole  Injectable 40 milliGRAM(s) IV Push every 24 hours  Phoxillum Filtration BK 4 / 2.5 5000 milliLiter(s) CRRT <Continuous>  Phoxillum Filtration BK 4 / 2.5 5000 milliLiter(s) CRRT <Continuous>  Phoxillum Filtration BK 4 / 2.5 5000 milliLiter(s) CRRT <Continuous>  rifAXIMin 550 milliGRAM(s) Oral every 12 hours  vasopressin Infusion 0.03 Unit(s)/Min IV Continuous <Continuous>    PRN Inpatient Medications  HYDROmorphone  Injectable 0.5 milliGRAM(s) IV Push every 3 hours PRN      REVIEW OF SYSTEMS  --------------------------------------------------------------------------------  Unable to obtain ROS    VITALS/PHYSICAL EXAM  --------------------------------------------------------------------------------  T(C): 36.2 (12-23-22 @ 07:00), Max: 37.3 (12-23-22 @ 02:00)  HR: 92 (12-23-22 @ 09:45) (76 - 92)  BP: 93/53 (12-22-22 @ 19:30) (93/53 - 93/53)  RR: 58 (12-23-22 @ 09:45) (15 - 73)  SpO2: 98% (12-23-22 @ 09:45) (93% - 100%)  Wt(kg): --        12-22-22 @ 07:01  -  12-23-22 @ 07:00  --------------------------------------------------------  IN: 1955.6 mL / OUT: 3107 mL / NET: -1151.4 mL    12-23-22 @ 07:01  -  12-23-22 @ 10:26  --------------------------------------------------------  IN: 150.3 mL / OUT: 363 mL / NET: -212.7 mL      Physical Exam:  Gen: Intubated, sedated  HEENT: +ET tube   Pulm: CTA B/L, no crackles   CV: RRR, S1S2+  Abd: +BS, soft, distended  : +urinary catheter  MSK: +BL LE edema  Psych: Sedated  Skin: Warm  Access: +Non-tunneled HD catheter      LABS/STUDIES  --------------------------------------------------------------------------------              7.7    20.84 >-----------<  83       [12-23-22 @ 05:30]              22.4     136  |  102  |  15  ----------------------------<  111      [12-23-22 @ 05:30]  3.8   |  19  |  1.97        Ca     9.1     [12-23-22 @ 05:30]      Mg     2.4     [12-23-22 @ 05:30]      Phos  4.1     [12-23-22 @ 05:30]    TPro  6.2  /  Alb  4.4  /  TBili  24.9  /  DBili  x   /  AST  162  /  ALT  42  /  AlkPhos  117  [12-23-22 @ 05:30]    PT/INR: PT 27.6 , INR 2.38       [12-23-22 @ 05:30]  PTT: 53.5       [12-23-22 @ 05:30]    Serum Osmolality 286      [12-21-22 @ 11:52]    Creatinine Trend:  SCr 1.97 [12-23 @ 05:30]  SCr 2.34 [12-22 @ 23:20]  SCr 2.82 [12-22 @ 17:26]  SCr 3.74 [12-22 @ 11:34]  SCr 3.73 [12-22 @ 05:06]    Urinalysis - [12-20-22 @ 23:55]      Color Dark Yellow / Appearance Turbid / SG 1.029 / pH 6.0      Gluc 100 mg/dL / Ketone Small  / Bili Large / Urobili Negative       Blood Moderate / Protein 300 mg/dL / Leuk Est Large / Nitrite Negative      RBC 5-10 / WBC 11-25 / Hyaline 0-2 / Gran  / Sq Epi  / Non Sq Epi Moderate / Bacteria Negative    Urine Creatinine 21      [12-21-22 @ 11:55]  Urine Sodium 138      [12-21-22 @ 11:55]  Urine Potassium 8      [12-21-22 @ 11:55]  Urine Osmolality 288      [12-21-22 @ 11:55]      HCV 0.15, Nonreact      [12-21-22 @ 05:09] [facility] DIVISION OF KIDNEY DISEASES AND HYPERTENSION -- FOLLOW UP NOTE  --------------------------------------------------------------------------------  HPI: 63 yo F with h/o decompensated ETOH cirrhosis with ascites, thyroid cancer (s/p total thyroidectomy, RTX, and radioactive iodide), HTN, ventrical neoplasm who was initially admitted to  12/19 with new onset seizures and transferred to [facility] 12/20 for liver transplant evaluation. Pt being seen for RED requiring CRRT.    24 hour events/subjective: Pt was seen and evaluated in the ICU this morning with family present. Pt remains intubated but appears more awake today. Unable to obtain ROS.      PAST HISTORY  --------------------------------------------------------------------------------  No significant changes to PMH, PSH, FHx, SHx, unless otherwise noted    ALLERGIES & MEDICATIONS  --------------------------------------------------------------------------------  Allergies    Macrobid (Rash)    Intolerances    Nexium (Stomach Upset)    Standing Inpatient Medications  albumin human 25% IVPB 100 milliLiter(s) IV Intermittent every 8 hours  chlorhexidine 0.12% Liquid 15 milliLiter(s) Oral Mucosa every 12 hours  chlorhexidine 2% Cloths 1 Application(s) Topical <User Schedule>  chlorhexidine 4% Liquid 1 Application(s) Topical <User Schedule>  CRRT Treatment    <Continuous>  dexMEDEtomidine Infusion 0.5 MICROgram(s)/kG/Hr IV Continuous <Continuous>  dextrose 10%. 1000 milliLiter(s) IV Continuous <Continuous>  fluconAZOLE IVPB 200 milliGRAM(s) IV Intermittent every 24 hours  influenza   Vaccine 0.5 milliLiter(s) IntraMuscular once  insulin lispro (ADMELOG) corrective regimen sliding scale   SubCutaneous every 6 hours  lactulose Syrup 20 Gram(s) Oral every 6 hours  levETIRAcetam  IVPB 750 milliGRAM(s) IV Intermittent every 12 hours  levothyroxine Injectable 70 MICROGram(s) IV Push at bedtime  meropenem  IVPB 1000 milliGRAM(s) IV Intermittent every 12 hours  norepinephrine Infusion 0.13 MICROgram(s)/kG/Min IV Continuous <Continuous>  pantoprazole  Injectable 40 milliGRAM(s) IV Push every 24 hours  Phoxillum Filtration BK 4 / 2.5 5000 milliLiter(s) CRRT <Continuous>  Phoxillum Filtration BK 4 / 2.5 5000 milliLiter(s) CRRT <Continuous>  Phoxillum Filtration BK 4 / 2.5 5000 milliLiter(s) CRRT <Continuous>  rifAXIMin 550 milliGRAM(s) Oral every 12 hours  vasopressin Infusion 0.03 Unit(s)/Min IV Continuous <Continuous>    PRN Inpatient Medications  HYDROmorphone  Injectable 0.5 milliGRAM(s) IV Push every 3 hours PRN      REVIEW OF SYSTEMS  --------------------------------------------------------------------------------  Unable to obtain ROS    VITALS/PHYSICAL EXAM  --------------------------------------------------------------------------------  T(C): 36.2 (12-23-22 @ 07:00), Max: 37.3 (12-23-22 @ 02:00)  HR: 92 (12-23-22 @ 09:45) (76 - 92)  BP: 93/53 (12-22-22 @ 19:30) (93/53 - 93/53)  RR: 58 (12-23-22 @ 09:45) (15 - 73)  SpO2: 98% (12-23-22 @ 09:45) (93% - 100%)  Wt(kg): --        12-22-22 @ 07:01  -  12-23-22 @ 07:00  --------------------------------------------------------  IN: 1955.6 mL / OUT: 3107 mL / NET: -1151.4 mL    12-23-22 @ 07:01  -  12-23-22 @ 10:26  --------------------------------------------------------  IN: 150.3 mL / OUT: 363 mL / NET: -212.7 mL      Physical Exam:  Gen: Intubated, sedated  HEENT: +ET tube   Pulm: CTA B/L, no crackles   CV: RRR, S1S2+  Abd: +BS, soft, distended  : +urinary catheter  MSK: +BL LE edema  Psych: Sedated  Skin: Warm  Access: +Non-tunneled HD catheter      LABS/STUDIES  --------------------------------------------------------------------------------              7.7    20.84 >-----------<  83       [12-23-22 @ 05:30]              22.4     136  |  102  |  15  ----------------------------<  111      [12-23-22 @ 05:30]  3.8   |  19  |  1.97        Ca     9.1     [12-23-22 @ 05:30]      Mg     2.4     [12-23-22 @ 05:30]      Phos  4.1     [12-23-22 @ 05:30]    TPro  6.2  /  Alb  4.4  /  TBili  24.9  /  DBili  x   /  AST  162  /  ALT  42  /  AlkPhos  117  [12-23-22 @ 05:30]    PT/INR: PT 27.6 , INR 2.38       [12-23-22 @ 05:30]  PTT: 53.5       [12-23-22 @ 05:30]    Serum Osmolality 286      [12-21-22 @ 11:52]    Creatinine Trend:  SCr 1.97 [12-23 @ 05:30]  SCr 2.34 [12-22 @ 23:20]  SCr 2.82 [12-22 @ 17:26]  SCr 3.74 [12-22 @ 11:34]  SCr 3.73 [12-22 @ 05:06]    Urinalysis - [12-20-22 @ 23:55]      Color Dark Yellow / Appearance Turbid / SG 1.029 / pH 6.0      Gluc 100 mg/dL / Ketone Small  / Bili Large / Urobili Negative       Blood Moderate / Protein 300 mg/dL / Leuk Est Large / Nitrite Negative      RBC 5-10 / WBC 11-25 / Hyaline 0-2 / Gran  / Sq Epi  / Non Sq Epi Moderate / Bacteria Negative    Urine Creatinine 21      [12-21-22 @ 11:55]  Urine Sodium 138      [12-21-22 @ 11:55]  Urine Potassium 8      [12-21-22 @ 11:55]  Urine Osmolality 288      [12-21-22 @ 11:55]      HCV 0.15, Nonreact      [12-21-22 @ 05:09]

## 2022-12-23 NOTE — PROGRESS NOTE ADULT - ASSESSMENT
Patient is a 62yFemale presents with hx thyroid cancer s/p thyroidectomy, craniotomy with resection of ventricular mass, with acute liver failure, new seizures, transfer from OSH for transplant evaluation.     Neurologic: hepatic encephalopathy - improving  - Precedex, wean as tolerated  - currently awake and able to cooperate with questions/move to command, moving all 4 extr, LUE some weakness   - c/w lactulose and rifaximin   - ammonia 96, trend  - c/w keppra 750 BID ( new onset of seizere)    Respiratory: acute hypoxic resp failure   - Mechanical ventilation, minimal vent settings: 15/450/30/5  - CXR b/l infiltrates RLL>LLL  - send combicath cx    Cardiovascular:   - on levo and vaso . titrate as appropraite  -titrate to MAP >65  -lactate ~1.4  -hold home anti htn    Gastrointestinal/Nutrition:  - Acute decompensated liver failure, s/p para 2.9L in ED  - Workup per transplant recs pending  - Monitor BM, rectal tube, on lactulose and rifaximin   - add PPI, dc'd octreotide  - NPO    Genitourinary/Renal: RED 2/2 ATN vs HRS  - off CRRT for ~6hr and became aneuric, restarted CCRT  - HD access right groin   - Cr increased (baseline approx 0.6, now 5.31)   - monitor electrolytes  - albumin 25% 100cc q8h for 3d    Hematologic:  - Chem VTE ppx: hold   - thrombocytopenic, monitor plts  - s/p 1U PRBC 12/21, trend labs  - INR 2.71 likely hepatic synthetic dysfunction   - on vit K for 3d for coagulopathy    Infectious Disease:  - Empiric abx meropenem, fluconazole, vanc  - ascites, blood, urine & fungal Cx pending  - send combicath cx  - Hepatitis workup     Endocrine:  - Hypothyroidism c/w synthroid IV 70  - No steroids  - ISS FS    Ethics: FULL CODE    Dispo/Lines:  R IJ  A line  [place]  [facility]       Patient is a 62yFemale presents with hx thyroid cancer s/p thyroidectomy, craniotomy with resection of ventricular mass, with acute liver failure, new seizures, transfer from OSH for transplant evaluation.     Neurologic: hepatic encephalopathy - improving  - off sedation, AAOx4  - c/w lactulose and rifaximin   - ammonia 96 > 64  - c/w keppra 750 BID ( new onset of seizure    Respiratory: acute hypoxic resp failure   - CXR b/l infiltrates RLL>LLL, improving  - extubated 12/23 AM  - combicath cx no significant growth to date    Cardiovascular:   - on levo and vaso . titrate as appropriate  -titrate to MAP >65  -hold home anti htn    Gastrointestinal/Nutrition:  - Acute decompensated liver failure, s/p para 2.9L in ED  - Workup per transplant recs pending  - Monitor BM, rectal tube, on lactulose and rifaximin   - add PPI, dc'd octreotide  - NPO    Genitourinary/Renal: RED 2/2 ATN vs HRS  - c/w CRRT  - HD access right groin   - Cr improving (baseline approx 0.6)  - monitor electrolytes  - nephro following  - dysphagia screen    Hematologic:  - Chem VTE ppx: hold   - thrombocytopenic, monitor plts  - s/p 1U PRBC 12/21, trend labs  - INR elevated likely hepatic synthetic dysfunction  - s/p vit K for 3d for coagulopathy    Infectious Disease:  - Empiric abx meropenem, fluconazole, vanc  - ascites, blood, urine & fungal Cx so far NG  - repeat BCx today x2 sets  - send combicath cx  - Hepatitis workup     Endocrine:  - Hypothyroidism c/w synthroid IV 70  - No steroids  - ISS FS    Ethics: FULL CODE    Dispo/Lines:  R IJ  A line  [place]  [facility]       Patient is a 62yFemale presents with hx thyroid cancer s/p thyroidectomy, craniotomy with resection of ventricular mass, with acute liver failure, new seizures, transfer from OSH for transplant evaluation.     Neurologic: hepatic encephalopathy - improving  - off sedation, AAOx4  - c/w lactulose and rifaximin   - ammonia 96 > 84  - c/w keppra 750 BID ( new onset of seizure    Respiratory: acute hypoxic resp failure   - CXR b/l infiltrates RLL>LLL, improving  - extubated 12/23 AM  - combicath cx no significant growth to date    Cardiovascular:   - on levo and vaso . titrate as appropriate  -titrate to MAP >65  -hold home anti htn    Gastrointestinal/Nutrition:  - Acute decompensated liver failure, s/p para 2.9L in ED  - Workup per transplant recs pending  - Monitor BM, rectal tube, on lactulose and rifaximin   - add PPI, dc'd octreotide  - NPO    Genitourinary/Renal: RED 2/2 ATN vs HRS  - c/w CRRT  - HD access right groin   - Cr improving (baseline approx 0.6)  - monitor electrolytes  - nephro following  - dysphagia screen    Hematologic:  - Chem VTE ppx: hold   - thrombocytopenic, monitor plts  - s/p 1U PRBC 12/21, trend labs  - INR elevated likely hepatic synthetic dysfunction  - s/p vit K for 3d for coagulopathy    Infectious Disease:  - Empiric abx meropenem, fluconazole, vanc  - ascites, blood, urine & fungal Cx so far NG  - repeat BCx today x2 sets  - send combicath cx  - Hepatitis workup     Endocrine:  - Hypothyroidism c/w synthroid IV 70  - No steroids  - ISS FS    Ethics: FULL CODE    Dispo/Lines:  R IJ  A line  [place]  [facility]

## 2022-12-24 LAB
ALBUMIN SERPL ELPH-MCNC: 4.4 G/DL — SIGNIFICANT CHANGE UP (ref 3.3–5)
ALBUMIN SERPL ELPH-MCNC: 4.5 G/DL — SIGNIFICANT CHANGE UP (ref 3.3–5)
ALBUMIN SERPL ELPH-MCNC: 4.6 G/DL — SIGNIFICANT CHANGE UP (ref 3.3–5)
ALBUMIN SERPL ELPH-MCNC: 4.7 G/DL — SIGNIFICANT CHANGE UP (ref 3.3–5)
ALP SERPL-CCNC: 115 U/L — SIGNIFICANT CHANGE UP (ref 40–120)
ALP SERPL-CCNC: 116 U/L — SIGNIFICANT CHANGE UP (ref 40–120)
ALP SERPL-CCNC: 117 U/L — SIGNIFICANT CHANGE UP (ref 40–120)
ALP SERPL-CCNC: 123 U/L — HIGH (ref 40–120)
ALP SERPL-CCNC: 129 U/L — HIGH (ref 40–120)
ALT FLD-CCNC: 39 U/L — SIGNIFICANT CHANGE UP (ref 10–45)
ALT FLD-CCNC: 40 U/L — SIGNIFICANT CHANGE UP (ref 10–45)
ALT FLD-CCNC: 41 U/L — SIGNIFICANT CHANGE UP (ref 10–45)
ALT FLD-CCNC: 42 U/L — SIGNIFICANT CHANGE UP (ref 10–45)
ALT FLD-CCNC: 44 U/L — SIGNIFICANT CHANGE UP (ref 10–45)
AMMONIA BLD-MCNC: 75 UMOL/L — HIGH (ref 11–55)
AMMONIA BLD-MCNC: 84 UMOL/L — HIGH (ref 11–55)
ANION GAP SERPL CALC-SCNC: 14 MMOL/L — SIGNIFICANT CHANGE UP (ref 5–17)
ANION GAP SERPL CALC-SCNC: 15 MMOL/L — SIGNIFICANT CHANGE UP (ref 5–17)
ANION GAP SERPL CALC-SCNC: 18 MMOL/L — HIGH (ref 5–17)
APAP SERPL-MCNC: <15 UG/ML — SIGNIFICANT CHANGE UP (ref 10–30)
APTT BLD: 49.5 SEC — HIGH (ref 27.5–35.5)
APTT BLD: 51.2 SEC — HIGH (ref 27.5–35.5)
AST SERPL-CCNC: 131 U/L — HIGH (ref 10–40)
AST SERPL-CCNC: 133 U/L — HIGH (ref 10–40)
AST SERPL-CCNC: 134 U/L — HIGH (ref 10–40)
AST SERPL-CCNC: 135 U/L — HIGH (ref 10–40)
AST SERPL-CCNC: 137 U/L — HIGH (ref 10–40)
BILIRUB SERPL-MCNC: 24.9 MG/DL — HIGH (ref 0.2–1.2)
BILIRUB SERPL-MCNC: 25.3 MG/DL — HIGH (ref 0.2–1.2)
BILIRUB SERPL-MCNC: 25.7 MG/DL — HIGH (ref 0.2–1.2)
BILIRUB SERPL-MCNC: 25.8 MG/DL — HIGH (ref 0.2–1.2)
BILIRUB SERPL-MCNC: 26.6 MG/DL — HIGH (ref 0.2–1.2)
BUN SERPL-MCNC: 4 MG/DL — LOW (ref 7–23)
BUN SERPL-MCNC: 5 MG/DL — LOW (ref 7–23)
BUN SERPL-MCNC: 7 MG/DL — SIGNIFICANT CHANGE UP (ref 7–23)
BUN SERPL-MCNC: <4 MG/DL — LOW (ref 7–23)
CALCIUM SERPL-MCNC: 8.8 MG/DL — SIGNIFICANT CHANGE UP (ref 8.4–10.5)
CALCIUM SERPL-MCNC: 9 MG/DL — SIGNIFICANT CHANGE UP (ref 8.4–10.5)
CALCIUM SERPL-MCNC: 9.2 MG/DL — SIGNIFICANT CHANGE UP (ref 8.4–10.5)
CHLORIDE SERPL-SCNC: 100 MMOL/L — SIGNIFICANT CHANGE UP (ref 96–108)
CHLORIDE SERPL-SCNC: 101 MMOL/L — SIGNIFICANT CHANGE UP (ref 96–108)
CHLORIDE SERPL-SCNC: 102 MMOL/L — SIGNIFICANT CHANGE UP (ref 96–108)
CO2 SERPL-SCNC: 20 MMOL/L — LOW (ref 22–31)
CO2 SERPL-SCNC: 21 MMOL/L — LOW (ref 22–31)
CO2 SERPL-SCNC: 22 MMOL/L — SIGNIFICANT CHANGE UP (ref 22–31)
CREAT SERPL-MCNC: 0.8 MG/DL — SIGNIFICANT CHANGE UP (ref 0.5–1.3)
CREAT SERPL-MCNC: 0.91 MG/DL — SIGNIFICANT CHANGE UP (ref 0.5–1.3)
CREAT SERPL-MCNC: 0.95 MG/DL — SIGNIFICANT CHANGE UP (ref 0.5–1.3)
CREAT SERPL-MCNC: 1.11 MG/DL — SIGNIFICANT CHANGE UP (ref 0.5–1.3)
CULTURE RESULTS: SIGNIFICANT CHANGE UP
EGFR: 57 ML/MIN/1.73M2 — LOW
EGFR: 68 ML/MIN/1.73M2 — SIGNIFICANT CHANGE UP
EGFR: 72 ML/MIN/1.73M2 — SIGNIFICANT CHANGE UP
EGFR: 84 ML/MIN/1.73M2 — SIGNIFICANT CHANGE UP
ETHANOL SERPL-MCNC: <10 MG/DL — SIGNIFICANT CHANGE UP (ref 0–10)
GAS PNL BLDA: SIGNIFICANT CHANGE UP
GLUCOSE BLDC GLUCOMTR-MCNC: 144 MG/DL — HIGH (ref 70–99)
GLUCOSE BLDC GLUCOMTR-MCNC: 152 MG/DL — HIGH (ref 70–99)
GLUCOSE SERPL-MCNC: 145 MG/DL — HIGH (ref 70–99)
GLUCOSE SERPL-MCNC: 154 MG/DL — HIGH (ref 70–99)
GLUCOSE SERPL-MCNC: 156 MG/DL — HIGH (ref 70–99)
GLUCOSE SERPL-MCNC: 157 MG/DL — HIGH (ref 70–99)
GLUCOSE SERPL-MCNC: 177 MG/DL — HIGH (ref 70–99)
HCG SERPL-ACNC: <2 MIU/ML — SIGNIFICANT CHANGE UP
HCT VFR BLD CALC: 20.6 % — CRITICAL LOW (ref 34.5–45)
HCT VFR BLD CALC: 23.4 % — LOW (ref 34.5–45)
HCT VFR BLD CALC: 23.7 % — LOW (ref 34.5–45)
HCT VFR BLD CALC: 24 % — LOW (ref 34.5–45)
HCT VFR BLD CALC: 24.4 % — LOW (ref 34.5–45)
HGB BLD-MCNC: 7 G/DL — CRITICAL LOW (ref 11.5–15.5)
HGB BLD-MCNC: 8 G/DL — LOW (ref 11.5–15.5)
HGB BLD-MCNC: 8.1 G/DL — LOW (ref 11.5–15.5)
HGB BLD-MCNC: 8.2 G/DL — LOW (ref 11.5–15.5)
HIV 1 & 2 AB SERPL IA.RAPID: SIGNIFICANT CHANGE UP
INR BLD: 2.3 RATIO — HIGH (ref 0.88–1.16)
LACTATE SERPL-SCNC: 2 MMOL/L — SIGNIFICANT CHANGE UP (ref 0.5–2)
MAGNESIUM SERPL-MCNC: 2.4 MG/DL — SIGNIFICANT CHANGE UP (ref 1.6–2.6)
MCHC RBC-ENTMCNC: 31.4 PG — SIGNIFICANT CHANGE UP (ref 27–34)
MCHC RBC-ENTMCNC: 32 PG — SIGNIFICANT CHANGE UP (ref 27–34)
MCHC RBC-ENTMCNC: 32.6 PG — SIGNIFICANT CHANGE UP (ref 27–34)
MCHC RBC-ENTMCNC: 33.6 GM/DL — SIGNIFICANT CHANGE UP (ref 32–36)
MCHC RBC-ENTMCNC: 33.8 GM/DL — SIGNIFICANT CHANGE UP (ref 32–36)
MCHC RBC-ENTMCNC: 34 GM/DL — SIGNIFICANT CHANGE UP (ref 32–36)
MCHC RBC-ENTMCNC: 34.2 GM/DL — SIGNIFICANT CHANGE UP (ref 32–36)
MCV RBC AUTO: 93 FL — SIGNIFICANT CHANGE UP (ref 80–100)
MCV RBC AUTO: 93.5 FL — SIGNIFICANT CHANGE UP (ref 80–100)
MCV RBC AUTO: 93.6 FL — SIGNIFICANT CHANGE UP (ref 80–100)
MCV RBC AUTO: 93.7 FL — SIGNIFICANT CHANGE UP (ref 80–100)
MCV RBC AUTO: 95.8 FL — SIGNIFICANT CHANGE UP (ref 80–100)
NRBC # BLD: 0 /100 WBCS — SIGNIFICANT CHANGE UP (ref 0–0)
PHOSPHATE SERPL-MCNC: 2 MG/DL — LOW (ref 2.5–4.5)
PHOSPHATE SERPL-MCNC: 2.3 MG/DL — LOW (ref 2.5–4.5)
PHOSPHATE SERPL-MCNC: 2.7 MG/DL — SIGNIFICANT CHANGE UP (ref 2.5–4.5)
PHOSPHATE SERPL-MCNC: 4 MG/DL — SIGNIFICANT CHANGE UP (ref 2.5–4.5)
PLATELET # BLD AUTO: 56 K/UL — LOW (ref 150–400)
PLATELET # BLD AUTO: 60 K/UL — LOW (ref 150–400)
PLATELET # BLD AUTO: 69 K/UL — LOW (ref 150–400)
POTASSIUM SERPL-MCNC: 3.9 MMOL/L — SIGNIFICANT CHANGE UP (ref 3.5–5.3)
POTASSIUM SERPL-MCNC: 4 MMOL/L — SIGNIFICANT CHANGE UP (ref 3.5–5.3)
POTASSIUM SERPL-SCNC: 3.9 MMOL/L — SIGNIFICANT CHANGE UP (ref 3.5–5.3)
POTASSIUM SERPL-SCNC: 4 MMOL/L — SIGNIFICANT CHANGE UP (ref 3.5–5.3)
PROT SERPL-MCNC: 6.2 G/DL — SIGNIFICANT CHANGE UP (ref 6–8.3)
PROT SERPL-MCNC: 6.5 G/DL — SIGNIFICANT CHANGE UP (ref 6–8.3)
PROT SERPL-MCNC: 6.6 G/DL — SIGNIFICANT CHANGE UP (ref 6–8.3)
PROT SERPL-MCNC: 6.7 G/DL — SIGNIFICANT CHANGE UP (ref 6–8.3)
PROT SERPL-MCNC: 6.8 G/DL — SIGNIFICANT CHANGE UP (ref 6–8.3)
PROTHROM AB SERPL-ACNC: 26.7 SEC — HIGH (ref 10.5–13.4)
RAPIDTEG MAXIMUM AMPLITUDE: 49.5 MM (ref 52–70)
RBC # BLD: 2.15 M/UL — LOW (ref 3.8–5.2)
RBC # BLD: 2.5 M/UL — LOW (ref 3.8–5.2)
RBC # BLD: 2.53 M/UL — LOW (ref 3.8–5.2)
RBC # BLD: 2.58 M/UL — LOW (ref 3.8–5.2)
RBC # BLD: 2.61 M/UL — LOW (ref 3.8–5.2)
RBC # FLD: 17.2 % — HIGH (ref 10.3–14.5)
RBC # FLD: 17.7 % — HIGH (ref 10.3–14.5)
RBC # FLD: 18 % — HIGH (ref 10.3–14.5)
RBC # FLD: 18.2 % — HIGH (ref 10.3–14.5)
SALICYLATES SERPL-MCNC: <2 MG/DL — LOW (ref 15–30)
SODIUM SERPL-SCNC: 136 MMOL/L — SIGNIFICANT CHANGE UP (ref 135–145)
SODIUM SERPL-SCNC: 137 MMOL/L — SIGNIFICANT CHANGE UP (ref 135–145)
SODIUM SERPL-SCNC: 138 MMOL/L — SIGNIFICANT CHANGE UP (ref 135–145)
SODIUM SERPL-SCNC: 139 MMOL/L — SIGNIFICANT CHANGE UP (ref 135–145)
SPECIMEN SOURCE: SIGNIFICANT CHANGE UP
TEG FUNCTIONAL FIBRINOGEN: 16.1 MM — SIGNIFICANT CHANGE UP (ref 15–32)
TEG LY30 (LYSIS): 0 % — SIGNIFICANT CHANGE UP (ref 0–2.6)
TEG REACTION TIME: 11 MIN (ref 4.6–9.1)
URATE SERPL-MCNC: 0.9 MG/DL — LOW (ref 2.5–7)
WBC # BLD: 22.03 K/UL — HIGH (ref 3.8–10.5)
WBC # BLD: 22.3 K/UL — HIGH (ref 3.8–10.5)
WBC # BLD: 22.32 K/UL — HIGH (ref 3.8–10.5)
WBC # BLD: 23.44 K/UL — HIGH (ref 3.8–10.5)
WBC # BLD: 23.71 K/UL — HIGH (ref 3.8–10.5)
WBC # FLD AUTO: 22.03 K/UL — HIGH (ref 3.8–10.5)
WBC # FLD AUTO: 22.3 K/UL — HIGH (ref 3.8–10.5)
WBC # FLD AUTO: 22.32 K/UL — HIGH (ref 3.8–10.5)
WBC # FLD AUTO: 23.44 K/UL — HIGH (ref 3.8–10.5)
WBC # FLD AUTO: 23.71 K/UL — HIGH (ref 3.8–10.5)

## 2022-12-24 PROCEDURE — 99232 SBSQ HOSP IP/OBS MODERATE 35: CPT | Mod: GC

## 2022-12-24 PROCEDURE — 99291 CRITICAL CARE FIRST HOUR: CPT

## 2022-12-24 PROCEDURE — 90945 DIALYSIS ONE EVALUATION: CPT

## 2022-12-24 PROCEDURE — 71045 X-RAY EXAM CHEST 1 VIEW: CPT | Mod: 26

## 2022-12-24 RX ORDER — OXYCODONE HYDROCHLORIDE 5 MG/1
10 TABLET ORAL EVERY 4 HOURS
Refills: 0 | Status: DISCONTINUED | OUTPATIENT
Start: 2022-12-24 | End: 2022-12-28

## 2022-12-24 RX ORDER — MIDODRINE HYDROCHLORIDE 2.5 MG/1
15 TABLET ORAL EVERY 8 HOURS
Refills: 0 | Status: DISCONTINUED | OUTPATIENT
Start: 2022-12-24 | End: 2022-12-25

## 2022-12-24 RX ORDER — LACTULOSE 10 G/15ML
20 SOLUTION ORAL EVERY 6 HOURS
Refills: 0 | Status: DISCONTINUED | OUTPATIENT
Start: 2022-12-24 | End: 2022-12-26

## 2022-12-24 RX ORDER — LEVETIRACETAM 250 MG/1
750 TABLET, FILM COATED ORAL EVERY 12 HOURS
Refills: 0 | Status: DISCONTINUED | OUTPATIENT
Start: 2022-12-24 | End: 2022-12-25

## 2022-12-24 RX ORDER — OXYCODONE HYDROCHLORIDE 5 MG/1
5 TABLET ORAL EVERY 4 HOURS
Refills: 0 | Status: DISCONTINUED | OUTPATIENT
Start: 2022-12-24 | End: 2022-12-28

## 2022-12-24 RX ORDER — LEVOTHYROXINE SODIUM 125 MCG
137 TABLET ORAL DAILY
Refills: 0 | Status: DISCONTINUED | OUTPATIENT
Start: 2022-12-24 | End: 2022-12-25

## 2022-12-24 RX ORDER — MULTIVIT-MIN/FERROUS GLUCONATE 9 MG/15 ML
15 LIQUID (ML) ORAL DAILY
Refills: 0 | Status: DISCONTINUED | OUTPATIENT
Start: 2022-12-24 | End: 2022-12-25

## 2022-12-24 RX ORDER — PANTOPRAZOLE SODIUM 20 MG/1
40 TABLET, DELAYED RELEASE ORAL EVERY 24 HOURS
Refills: 0 | Status: DISCONTINUED | OUTPATIENT
Start: 2022-12-24 | End: 2023-01-09

## 2022-12-24 RX ORDER — LEVETIRACETAM 250 MG/1
750 TABLET, FILM COATED ORAL EVERY 12 HOURS
Refills: 0 | Status: DISCONTINUED | OUTPATIENT
Start: 2022-12-24 | End: 2022-12-24

## 2022-12-24 RX ORDER — SODIUM CHLORIDE 0.65 %
1 AEROSOL, SPRAY (ML) NASAL
Refills: 0 | Status: DISCONTINUED | OUTPATIENT
Start: 2022-12-24 | End: 2023-01-09

## 2022-12-24 RX ORDER — THIAMINE MONONITRATE (VIT B1) 100 MG
100 TABLET ORAL ONCE
Refills: 0 | Status: COMPLETED | OUTPATIENT
Start: 2022-12-24 | End: 2022-12-24

## 2022-12-24 RX ORDER — FOLIC ACID 0.8 MG
1 TABLET ORAL DAILY
Refills: 0 | Status: DISCONTINUED | OUTPATIENT
Start: 2022-12-24 | End: 2023-01-09

## 2022-12-24 RX ORDER — TETRACAINE/BENZOCAINE/BUTAMBEN 2%-14%-2%
1 OINTMENT (GRAM) TOPICAL
Refills: 0 | Status: DISCONTINUED | OUTPATIENT
Start: 2022-12-24 | End: 2023-01-09

## 2022-12-24 RX ADMIN — LIDOCAINE 1 PATCH: 4 CREAM TOPICAL at 07:14

## 2022-12-24 RX ADMIN — HYDROMORPHONE HYDROCHLORIDE 0.5 MILLIGRAM(S): 2 INJECTION INTRAMUSCULAR; INTRAVENOUS; SUBCUTANEOUS at 05:31

## 2022-12-24 RX ADMIN — FLUCONAZOLE 100 MILLIGRAM(S): 150 TABLET ORAL at 22:15

## 2022-12-24 RX ADMIN — PANTOPRAZOLE SODIUM 40 MILLIGRAM(S): 20 TABLET, DELAYED RELEASE ORAL at 11:29

## 2022-12-24 RX ADMIN — Medication 255 MILLIMOLE(S): at 03:57

## 2022-12-24 RX ADMIN — Medication 50 MILLILITER(S): at 05:01

## 2022-12-24 RX ADMIN — CHLORHEXIDINE GLUCONATE 1 APPLICATION(S): 213 SOLUTION TOPICAL at 05:00

## 2022-12-24 RX ADMIN — VASOPRESSIN 4.5 UNIT(S)/MIN: 20 INJECTION INTRAVENOUS at 07:59

## 2022-12-24 RX ADMIN — HYDROMORPHONE HYDROCHLORIDE 0.5 MILLIGRAM(S): 2 INJECTION INTRAMUSCULAR; INTRAVENOUS; SUBCUTANEOUS at 05:01

## 2022-12-24 RX ADMIN — Medication 16.1 MICROGRAM(S)/KG/MIN: at 07:59

## 2022-12-24 RX ADMIN — OXYCODONE HYDROCHLORIDE 5 MILLIGRAM(S): 5 TABLET ORAL at 11:00

## 2022-12-24 RX ADMIN — Medication 2: at 00:28

## 2022-12-24 RX ADMIN — LEVETIRACETAM 400 MILLIGRAM(S): 250 TABLET, FILM COATED ORAL at 17:48

## 2022-12-24 RX ADMIN — LACTULOSE 20 GRAM(S): 10 SOLUTION ORAL at 05:02

## 2022-12-24 RX ADMIN — OXYCODONE HYDROCHLORIDE 5 MILLIGRAM(S): 5 TABLET ORAL at 14:47

## 2022-12-24 RX ADMIN — OXYCODONE HYDROCHLORIDE 10 MILLIGRAM(S): 5 TABLET ORAL at 18:30

## 2022-12-24 RX ADMIN — CHLORHEXIDINE GLUCONATE 1 APPLICATION(S): 213 SOLUTION TOPICAL at 05:04

## 2022-12-24 RX ADMIN — MIDODRINE HYDROCHLORIDE 10 MILLIGRAM(S): 2.5 TABLET ORAL at 05:01

## 2022-12-24 RX ADMIN — VASOPRESSIN 4.5 UNIT(S)/MIN: 20 INJECTION INTRAVENOUS at 21:00

## 2022-12-24 RX ADMIN — Medication 16.1 MICROGRAM(S)/KG/MIN: at 22:15

## 2022-12-24 RX ADMIN — OXYCODONE HYDROCHLORIDE 5 MILLIGRAM(S): 5 TABLET ORAL at 10:33

## 2022-12-24 RX ADMIN — LIDOCAINE 1 PATCH: 4 CREAM TOPICAL at 09:22

## 2022-12-24 RX ADMIN — LACTULOSE 20 GRAM(S): 10 SOLUTION ORAL at 17:24

## 2022-12-24 RX ADMIN — MEROPENEM 100 MILLIGRAM(S): 1 INJECTION INTRAVENOUS at 17:24

## 2022-12-24 RX ADMIN — MEROPENEM 100 MILLIGRAM(S): 1 INJECTION INTRAVENOUS at 05:01

## 2022-12-24 RX ADMIN — OXYCODONE HYDROCHLORIDE 10 MILLIGRAM(S): 5 TABLET ORAL at 18:00

## 2022-12-24 RX ADMIN — Medication 1 SPRAY(S): at 07:57

## 2022-12-24 RX ADMIN — MIDODRINE HYDROCHLORIDE 15 MILLIGRAM(S): 2.5 TABLET ORAL at 13:25

## 2022-12-24 RX ADMIN — OXYCODONE HYDROCHLORIDE 5 MILLIGRAM(S): 5 TABLET ORAL at 15:17

## 2022-12-24 RX ADMIN — LACTULOSE 20 GRAM(S): 10 SOLUTION ORAL at 11:29

## 2022-12-24 RX ADMIN — LACTULOSE 20 GRAM(S): 10 SOLUTION ORAL at 23:14

## 2022-12-24 RX ADMIN — MIDODRINE HYDROCHLORIDE 15 MILLIGRAM(S): 2.5 TABLET ORAL at 21:00

## 2022-12-24 RX ADMIN — LEVETIRACETAM 400 MILLIGRAM(S): 250 TABLET, FILM COATED ORAL at 05:40

## 2022-12-24 RX ADMIN — Medication 100 MILLIGRAM(S): at 17:24

## 2022-12-24 RX ADMIN — Medication 255 MILLIMOLE(S): at 13:25

## 2022-12-24 NOTE — PROGRESS NOTE ADULT - SUBJECTIVE AND OBJECTIVE BOX
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  Arthur ([phone]) and Daughter Taylor at [facility] provided additional history.     Of note was recently admitted to  11/2022 for workup and eval of jaundice- during that hospitalization was found to have a UTI and dx with alc hep and started on steroids (was deemed a non-responder and steroids were subsequently stopped); s/p LVP at [place] 11/2022. Previously hospitalized in 2021 at [place] for ETOH detox. Went to ETOH rehab 08/2022 and was asked to leave the facility when she was COVID+; was sober for 1 week until she started drinking again. Had also attended a few AA meetings in the past. Transferred from [facility] 12/20 for further management of acute liver failure and liver transplant evaluation.       Interval events:  intubated/sedated  on norepo/vaso  CVVH held since 1 PM yesterday  on zonia/vanco  Pan scan yesterday: CT head w/ calcified lesion at septum c/w patient's known hx of neurocytoma. Hemorrhages pt had after resection have resolved. Just a residual lucency in the R posterior frontal region. CT chest/abd pelvis w/ large ascites, b/l small effusions. Bibasilar consolidation and a lingular clustered nodular and betzaida airspace c/f infectious vs inflammatory etiology No masses. Cirrhosis and portal HTN.     Potential Discharge date: pending clinical stability  Education:  Medications  Plan of care:  See Below         MEDICATIONS  (STANDING):  chlorhexidine 2% Cloths 1 Application(s) Topical <User Schedule>  chlorhexidine 4% Liquid 1 Application(s) Topical <User Schedule>  CRRT Treatment    <Continuous>  fluconAZOLE IVPB 200 milliGRAM(s) IV Intermittent every 24 hours  influenza   Vaccine 0.5 milliLiter(s) IntraMuscular once  lactulose Syrup 20 Gram(s) Oral every 6 hours  levETIRAcetam 750 milliGRAM(s) Oral every 12 hours  levothyroxine 137 MICROGram(s) Oral daily  meropenem  IVPB 1000 milliGRAM(s) IV Intermittent every 12 hours  midodrine 15 milliGRAM(s) Oral every 8 hours  norepinephrine Infusion 0.13 MICROgram(s)/kG/Min (16.1 mL/Hr) IV Continuous <Continuous>  pantoprazole   Suspension 40 milliGRAM(s) Oral every 24 hours  PrismaSATE Dialysate BGK 4 / 2.5 5000 milliLiter(s) (1500 mL/Hr) CRRT <Continuous>  PrismaSOL Filtration BGK 4 / 2.5 5000 milliLiter(s) (800 mL/Hr) CRRT <Continuous>  PrismaSOL Filtration BGK 4 / 2.5 5000 milliLiter(s) (200 mL/Hr) CRRT <Continuous>  rifAXIMin 550 milliGRAM(s) Oral every 12 hours  vasopressin Infusion 0.03 Unit(s)/Min (4.5 mL/Hr) IV Continuous <Continuous>    MEDICATIONS  (PRN):  oxyCODONE    Solution 10 milliGRAM(s) Oral every 4 hours PRN Severe Pain (7 - 10)  oxyCODONE    Solution 5 milliGRAM(s) Oral every 4 hours PRN Moderate Pain (4 - 6)  sodium chloride 0.65% Nasal 1 Spray(s) Both Nostrils every 3 hours PRN Nasal Congestion  tetracaine/benzocaine/butamben Spray 1 Spray(s) Topical every 3 hours PRN for ngt discomfort      PAST MEDICAL & SURGICAL HISTORY:  HTN (hypertension)  off medication for over one year--states BP has been normal  UTI (urinary tract infection)  currently being treated--will complete antibiotics 3/8/2022  Intracranial tumor  GERD (gastroesophageal reflux disease)  Eczema  Thyroid cancer  surgery. radiation, Radioactive iodine  COVID-19 virus infection  11/2021--recovered at home  H/O total thyroidectomy  age 21&#x27;s for Thyroid cancer, postop complication arterial bleed, second surgery  History of tonsillectomy age 5  History of appendectomy age 5      Vital Signs Last 24 Hrs  T(C): 37.3 (24 Dec 2022 07:00), Max: 37.3 (24 Dec 2022 07:00)  T(F): 99.1 (24 Dec 2022 07:00), Max: 99.1 (24 Dec 2022 07:00)  HR: 89 (24 Dec 2022 10:45) (79 - 97)  BP: 102/60 (24 Dec 2022 07:45) (102/60 - 102/60)  BP(mean): 75 (24 Dec 2022 07:45) (75 - 75)  RR: 25 (24 Dec 2022 10:45) (10 - 53)  SpO2: 93% (24 Dec 2022 10:45) (90% - 99%)    Parameters below as of 24 Dec 2022 07:00  Patient On (Oxygen Delivery Method): nasal cannula  O2 Flow (L/min): 2      I&O's Summary    23 Dec 2022 07:01  -  24 Dec 2022 07:00  --------------------------------------------------------  IN: 2347.1 mL / OUT: 4020 mL / NET: -1672.9 mL    24 Dec 2022 07:01  -  24 Dec 2022 11:01  --------------------------------------------------------  IN: 80.1 mL / OUT: 179 mL / NET: -98.9 mL                       8.1    22.03 )-----------( 60       ( 24 Dec 2022 05:35 )             23.7     12-24    138  |  100  |  5<L>  ----------------------------<  177<H>  3.9   |  20<L>  |  0.95    Ca    9.0      24 Dec 2022 05:34  Phos  4.0     12-24  Mg     2.4     12-24    TPro  6.6  /  Alb  4.5  /  TBili  25.3<H>  /  DBili  x   /  AST  134<H>  /  ALT  41  /  AlkPhos  116  12-24      Culture - Bronchial (collected 12-22-22 @ 17:02)  Source: Combi-Cath Combi-Cath  Gram Stain (12-23-22 @ 06:59):    Moderate polymorphonuclear leukocytes seen per low power field    No squamous epithelial cells seen per low power field    No organisms seen per oil power field  Preliminary Report (12-23-22 @ 19:41):    Normal Respiratory Cassandra present    Culture - Urine (collected 12-21-22 @ 22:55)  Source: Catheterized Catheterized  Final Report (12-24-22 @ 00:55):    >100,000 CFU/ml Leticia dubliniensis "Susceptibilities not performed"    Culture - Fungal, Body Fluid (collected 12-21-22 @ 19:53)  Source: Peritoneal Peritoneal Fluid  Preliminary Report (12-22-22 @ 13:17):    Testing in progress    Culture - Body Fluid with Gram Stain (collected 12-21-22 @ 19:53)  Source: Ascites Fl Ascites Fluid  Gram Stain (12-22-22 @ 03:04):    polymorphonuclear leukocytes seen    No organisms seen    by cytocentrifuge  Preliminary Report (12-23-22 @ 08:47):    No growth    Culture - Sputum (collected 12-21-22 @ 01:19)  Source: Trach Asp Tracheal Aspirate  Gram Stain (12-21-22 @ 08:45):    No polymorphonuclear leukocytes per low power field    Numerous Squamous epithelial cells per low power field    Moderate Gram Positive Rods seen per oil power field    Few Yeast like cells seen per oil power field  Final Report (12-23-22 @ 14:05):    Normal Respiratory Cassandra present    Culture - Blood (collected 12-20-22 @ 23:30)  Source: .Blood Blood-Peripheral  Preliminary Report (12-22-22 @ 04:01):    No growth to date.    Culture - Blood (collected 12-20-22 @ 22:38)  Source: .Blood Blood-Peripheral  Preliminary Report (12-22-22 @ 03:02):    No growth to date.        PE:  Constitutional: Well developed / well nourished  Eyes: icteric, PERRLA  ENMT: nc/at, no thrush  Neck: supple, central line   Respiratory: CTA B/L  Cardiovascular: RRR  Gastrointestinal: Soft abdomen, distended  Genitourinary: Urinary catheter in place, anuric  Extremities: SCD's in place and working bilaterally, no edema  Vascular: Palpable dp pulses bilaterally.   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  Arthur ([phone]) and Daughter Taylor at [facility] provided additional history.     Of note was recently admitted to  11/2022 for workup and eval of jaundice- during that hospitalization was found to have a UTI and dx with alc hep and started on steroids (was deemed a non-responder and steroids were subsequently stopped); s/p LVP at [place] 11/2022. Previously hospitalized in 2021 at [place] for ETOH detox. Went to ETOH rehab 08/2022 and was asked to leave the facility when she was COVID+; was sober for 1 week until she started drinking again. Had also attended a few AA meetings in the past. Transferred from [facility] 12/20 for further management of acute liver failure and liver transplant evaluation.       Interval events:  intubated/sedated  on norepo/vaso  CVVH held since 1 PM yesterday  on zonia/vanco  Pan scan yesterday: CT head w/ calcified lesion at septum c/w patient's known hx of neurocytoma. Hemorrhages pt had after resection have resolved. Just a residual lucency in the R posterior frontal region. CT chest/abd pelvis w/ large ascites, b/l small effusions. Bibasilar consolidation and a lingular clustered nodular and betzaida airspace c/f infectious vs inflammatory etiology No masses. Cirrhosis and portal HTN.     Potential Discharge date: pending clinical stability  Education:  Medications  Plan of care:  See Below         MEDICATIONS  (STANDING):  chlorhexidine 2% Cloths 1 Application(s) Topical <User Schedule>  chlorhexidine 4% Liquid 1 Application(s) Topical <User Schedule>  CRRT Treatment    <Continuous>  fluconAZOLE IVPB 200 milliGRAM(s) IV Intermittent every 24 hours  influenza   Vaccine 0.5 milliLiter(s) IntraMuscular once  lactulose Syrup 20 Gram(s) Oral every 6 hours  levETIRAcetam 750 milliGRAM(s) Oral every 12 hours  levothyroxine 137 MICROGram(s) Oral daily  meropenem  IVPB 1000 milliGRAM(s) IV Intermittent every 12 hours  midodrine 15 milliGRAM(s) Oral every 8 hours  norepinephrine Infusion 0.13 MICROgram(s)/kG/Min (16.1 mL/Hr) IV Continuous <Continuous>  pantoprazole   Suspension 40 milliGRAM(s) Oral every 24 hours  PrismaSATE Dialysate BGK 4 / 2.5 5000 milliLiter(s) (1500 mL/Hr) CRRT <Continuous>  PrismaSOL Filtration BGK 4 / 2.5 5000 milliLiter(s) (800 mL/Hr) CRRT <Continuous>  PrismaSOL Filtration BGK 4 / 2.5 5000 milliLiter(s) (200 mL/Hr) CRRT <Continuous>  rifAXIMin 550 milliGRAM(s) Oral every 12 hours  vasopressin Infusion 0.03 Unit(s)/Min (4.5 mL/Hr) IV Continuous <Continuous>    MEDICATIONS  (PRN):  oxyCODONE    Solution 10 milliGRAM(s) Oral every 4 hours PRN Severe Pain (7 - 10)  oxyCODONE    Solution 5 milliGRAM(s) Oral every 4 hours PRN Moderate Pain (4 - 6)  sodium chloride 0.65% Nasal 1 Spray(s) Both Nostrils every 3 hours PRN Nasal Congestion  tetracaine/benzocaine/butamben Spray 1 Spray(s) Topical every 3 hours PRN for ngt discomfort      PAST MEDICAL & SURGICAL HISTORY:  HTN (hypertension)  off medication for over one year--states BP has been normal  UTI (urinary tract infection)  currently being treated--will complete antibiotics 3/8/2022  Intracranial tumor  GERD (gastroesophageal reflux disease)  Eczema  Thyroid cancer  surgery. radiation, Radioactive iodine  COVID-19 virus infection  11/2021--recovered at home  H/O total thyroidectomy  age 21&#x27;s for Thyroid cancer, postop complication arterial bleed, second surgery  History of tonsillectomy age 5  History of appendectomy age 5      Vital Signs Last 24 Hrs  T(C): 37.3 (24 Dec 2022 07:00), Max: 37.3 (24 Dec 2022 07:00)  T(F): 99.1 (24 Dec 2022 07:00), Max: 99.1 (24 Dec 2022 07:00)  HR: 89 (24 Dec 2022 10:45) (79 - 97)  BP: 102/60 (24 Dec 2022 07:45) (102/60 - 102/60)  BP(mean): 75 (24 Dec 2022 07:45) (75 - 75)  RR: 25 (24 Dec 2022 10:45) (10 - 53)  SpO2: 93% (24 Dec 2022 10:45) (90% - 99%)    Parameters below as of 24 Dec 2022 07:00  Patient On (Oxygen Delivery Method): nasal cannula  O2 Flow (L/min): 2      I&O's Summary    23 Dec 2022 07:01  -  24 Dec 2022 07:00  --------------------------------------------------------  IN: 2347.1 mL / OUT: 4020 mL / NET: -1672.9 mL    24 Dec 2022 07:01  -  24 Dec 2022 11:01  --------------------------------------------------------  IN: 80.1 mL / OUT: 179 mL / NET: -98.9 mL                       8.1    22.03 )-----------( 60       ( 24 Dec 2022 05:35 )             23.7     12-24    138  |  100  |  5<L>  ----------------------------<  177<H>  3.9   |  20<L>  |  0.95    Ca    9.0      24 Dec 2022 05:34  Phos  4.0     12-24  Mg     2.4     12-24    TPro  6.6  /  Alb  4.5  /  TBili  25.3<H>  /  DBili  x   /  AST  134<H>  /  ALT  41  /  AlkPhos  116  12-24      Culture - Bronchial (collected 12-22-22 @ 17:02)  Source: Combi-Cath Combi-Cath  Gram Stain (12-23-22 @ 06:59):    Moderate polymorphonuclear leukocytes seen per low power field    No squamous epithelial cells seen per low power field    No organisms seen per oil power field  Preliminary Report (12-23-22 @ 19:41):    Normal Respiratory Cassandra present    Culture - Urine (collected 12-21-22 @ 22:55)  Source: Catheterized Catheterized  Final Report (12-24-22 @ 00:55):    >100,000 CFU/ml Leticia dubliniensis "Susceptibilities not performed"    Culture - Fungal, Body Fluid (collected 12-21-22 @ 19:53)  Source: Peritoneal Peritoneal Fluid  Preliminary Report (12-22-22 @ 13:17):    Testing in progress    Culture - Body Fluid with Gram Stain (collected 12-21-22 @ 19:53)  Source: Ascites Fl Ascites Fluid  Gram Stain (12-22-22 @ 03:04):    polymorphonuclear leukocytes seen    No organisms seen    by cytocentrifuge  Preliminary Report (12-23-22 @ 08:47):    No growth    Culture - Sputum (collected 12-21-22 @ 01:19)  Source: Trach Asp Tracheal Aspirate  Gram Stain (12-21-22 @ 08:45):    No polymorphonuclear leukocytes per low power field    Numerous Squamous epithelial cells per low power field    Moderate Gram Positive Rods seen per oil power field    Few Yeast like cells seen per oil power field  Final Report (12-23-22 @ 14:05):    Normal Respiratory Cassandra present    Culture - Blood (collected 12-20-22 @ 23:30)  Source: .Blood Blood-Peripheral  Preliminary Report (12-22-22 @ 04:01):    No growth to date.    Culture - Blood (collected 12-20-22 @ 22:38)  Source: .Blood Blood-Peripheral  Preliminary Report (12-22-22 @ 03:02):    No growth to date.        PE:  Constitutional: Well developed / well nourished  Eyes: icteric, PERRLA  ENMT: nc/at, no thrush  Neck: supple, central line   Respiratory: CTA B/L  Cardiovascular: RRR  Gastrointestinal: Soft abdomen, distended  Genitourinary: Urinary catheter in place, anuric  Extremities: SCD's in place and working bilaterally, no edema  Vascular: Palpable dp pulses bilaterally.   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  Arthur ([phone]) and Daughter Taylor at [facility] provided additional history.     Of note was recently admitted to  11/2022 for workup and eval of jaundice- during that hospitalization was found to have a UTI and dx with alc hep and started on steroids (was deemed a non-responder and steroids were subsequently stopped); s/p LVP at [place] 11/2022. Previously hospitalized in 2021 at [place] for ETOH detox. Went to ETOH rehab 08/2022 and was asked to leave the facility when she was COVID+; was sober for 1 week until she started drinking again. Had also attended a few AA meetings in the past. Transferred from [facility] 12/20 for further management of acute liver failure and liver transplant evaluation.       Interval events:  intubated/sedated  on norepo/vaso  CVVH held since 1 PM yesterday  on zonia/vanco  Pan scan yesterday: CT head w/ calcified lesion at septum c/w patient's known hx of neurocytoma. Hemorrhages pt had after resection have resolved. Just a residual lucency in the R posterior frontal region. CT chest/abd pelvis w/ large ascites, b/l small effusions. Bibasilar consolidation and a lingular clustered nodular and betzaida airspace c/f infectious vs inflammatory etiology No masses. Cirrhosis and portal HTN.     Potential Discharge date: pending clinical stability  Education:  Medications  Plan of care:  See Below         MEDICATIONS  (STANDING):  chlorhexidine 2% Cloths 1 Application(s) Topical <User Schedule>  chlorhexidine 4% Liquid 1 Application(s) Topical <User Schedule>  CRRT Treatment    <Continuous>  fluconAZOLE IVPB 200 milliGRAM(s) IV Intermittent every 24 hours  influenza   Vaccine 0.5 milliLiter(s) IntraMuscular once  lactulose Syrup 20 Gram(s) Oral every 6 hours  levETIRAcetam 750 milliGRAM(s) Oral every 12 hours  levothyroxine 137 MICROGram(s) Oral daily  meropenem  IVPB 1000 milliGRAM(s) IV Intermittent every 12 hours  midodrine 15 milliGRAM(s) Oral every 8 hours  norepinephrine Infusion 0.13 MICROgram(s)/kG/Min (16.1 mL/Hr) IV Continuous <Continuous>  pantoprazole   Suspension 40 milliGRAM(s) Oral every 24 hours  PrismaSATE Dialysate BGK 4 / 2.5 5000 milliLiter(s) (1500 mL/Hr) CRRT <Continuous>  PrismaSOL Filtration BGK 4 / 2.5 5000 milliLiter(s) (800 mL/Hr) CRRT <Continuous>  PrismaSOL Filtration BGK 4 / 2.5 5000 milliLiter(s) (200 mL/Hr) CRRT <Continuous>  rifAXIMin 550 milliGRAM(s) Oral every 12 hours  vasopressin Infusion 0.03 Unit(s)/Min (4.5 mL/Hr) IV Continuous <Continuous>    MEDICATIONS  (PRN):  oxyCODONE    Solution 10 milliGRAM(s) Oral every 4 hours PRN Severe Pain (7 - 10)  oxyCODONE    Solution 5 milliGRAM(s) Oral every 4 hours PRN Moderate Pain (4 - 6)  sodium chloride 0.65% Nasal 1 Spray(s) Both Nostrils every 3 hours PRN Nasal Congestion  tetracaine/benzocaine/butamben Spray 1 Spray(s) Topical every 3 hours PRN for ngt discomfort      PAST MEDICAL & SURGICAL HISTORY:  HTN (hypertension)  off medication for over one year--states BP has been normal  UTI (urinary tract infection)  currently being treated--will complete antibiotics 3/8/2022  Intracranial tumor  GERD (gastroesophageal reflux disease)  Eczema  Thyroid cancer  surgery. radiation, Radioactive iodine  COVID-19 virus infection  11/2021--recovered at home  H/O total thyroidectomy  age 21&#x27;s for Thyroid cancer, postop complication arterial bleed, second surgery  History of tonsillectomy age 5  History of appendectomy age 5      Vital Signs Last 24 Hrs  T(C): 37.3 (24 Dec 2022 07:00), Max: 37.3 (24 Dec 2022 07:00)  T(F): 99.1 (24 Dec 2022 07:00), Max: 99.1 (24 Dec 2022 07:00)  HR: 89 (24 Dec 2022 10:45) (79 - 97)  BP: 102/60 (24 Dec 2022 07:45) (102/60 - 102/60)  BP(mean): 75 (24 Dec 2022 07:45) (75 - 75)  RR: 25 (24 Dec 2022 10:45) (10 - 53)  SpO2: 93% (24 Dec 2022 10:45) (90% - 99%)    Parameters below as of 24 Dec 2022 07:00  Patient On (Oxygen Delivery Method): nasal cannula  O2 Flow (L/min): 2      I&O's Summary    23 Dec 2022 07:01  -  24 Dec 2022 07:00  --------------------------------------------------------  IN: 2347.1 mL / OUT: 4020 mL / NET: -1672.9 mL    24 Dec 2022 07:01  -  24 Dec 2022 11:01  --------------------------------------------------------  IN: 80.1 mL / OUT: 179 mL / NET: -98.9 mL                       8.1    22.03 )-----------( 60       ( 24 Dec 2022 05:35 )             23.7     12-24    138  |  100  |  5<L>  ----------------------------<  177<H>  3.9   |  20<L>  |  0.95    Ca    9.0      24 Dec 2022 05:34  Phos  4.0     12-24  Mg     2.4     12-24    TPro  6.6  /  Alb  4.5  /  TBili  25.3<H>  /  DBili  x   /  AST  134<H>  /  ALT  41  /  AlkPhos  116  12-24      Culture - Bronchial (collected 12-22-22 @ 17:02)  Source: Combi-Cath Combi-Cath  Gram Stain (12-23-22 @ 06:59):    Moderate polymorphonuclear leukocytes seen per low power field    No squamous epithelial cells seen per low power field    No organisms seen per oil power field  Preliminary Report (12-23-22 @ 19:41):    Normal Respiratory Cassandra present    Culture - Urine (collected 12-21-22 @ 22:55)  Source: Catheterized Catheterized  Final Report (12-24-22 @ 00:55):    >100,000 CFU/ml Leticia dubliniensis "Susceptibilities not performed"    Culture - Fungal, Body Fluid (collected 12-21-22 @ 19:53)  Source: Peritoneal Peritoneal Fluid  Preliminary Report (12-22-22 @ 13:17):    Testing in progress    Culture - Body Fluid with Gram Stain (collected 12-21-22 @ 19:53)  Source: Ascites Fl Ascites Fluid  Gram Stain (12-22-22 @ 03:04):    polymorphonuclear leukocytes seen    No organisms seen    by cytocentrifuge  Preliminary Report (12-23-22 @ 08:47):    No growth    Culture - Sputum (collected 12-21-22 @ 01:19)  Source: Trach Asp Tracheal Aspirate  Gram Stain (12-21-22 @ 08:45):    No polymorphonuclear leukocytes per low power field    Numerous Squamous epithelial cells per low power field    Moderate Gram Positive Rods seen per oil power field    Few Yeast like cells seen per oil power field  Final Report (12-23-22 @ 14:05):    Normal Respiratory Cassandra present    Culture - Blood (collected 12-20-22 @ 23:30)  Source: .Blood Blood-Peripheral  Preliminary Report (12-22-22 @ 04:01):    No growth to date.    Culture - Blood (collected 12-20-22 @ 22:38)  Source: .Blood Blood-Peripheral  Preliminary Report (12-22-22 @ 03:02):    No growth to date.        PE:  Constitutional: Well developed / well nourished  Eyes: icteric, PERRLA  ENMT: nc/at, no thrush  Neck: supple, central line   Respiratory: CTA B/L  Cardiovascular: RRR  Gastrointestinal: Soft abdomen, distended  Genitourinary: Urinary catheter in place, anuric  Extremities: SCD's in place and working bilaterally, no edema  Vascular: Palpable dp pulses bilaterally.   Neurological: intubated/sedated  Skin: no rashes, ulcerations, lesions  Musculoskeletal: intubated/sedated  Psychiatric: intubated/sedated   Transplant Surgery Progress Note    Present: Patient seen and examined with multidisciplinary transplant team including Transplant Surgeon Dr. Moore, hepatologist Dr. Goldstein, ANJEL Espinoza and unit RN during am rounds. 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  Arthur ([phone]) and Daughter Taylor at [facility] provided additional history. Of note was recently admitted to  11/2022 for workup and eval of jaundice- during that hospitalization was found to have a UTI and dx with alc hep and started on steroids (was deemed a non-responder and steroids were subsequently stopped); s/p LVP at [place] 11/2022. Previously hospitalized in 2021 at [place] for ETOH detox. Went to ETOH rehab 08/2022 and was asked to leave the facility when she was COVID+; was sober for 1 week until she started drinking again. Had also attended a few AA meetings in the past. Transferred from [facility] 12/20 for further management of acute liver failure and liver transplant evaluation.       Interval events:  - extubated   - on levo/vaso  - AO x 3 , following commands  - on Tube feeds   - received 1u PRBC overnight     Potential Discharge date: pending clinical stability  Education:  Medications  Plan of care:  See Below    MEDICATIONS  (STANDING):  chlorhexidine 2% Cloths 1 Application(s) Topical <User Schedule>  chlorhexidine 4% Liquid 1 Application(s) Topical <User Schedule>  CRRT Treatment    <Continuous>  fluconAZOLE IVPB 200 milliGRAM(s) IV Intermittent every 24 hours  influenza   Vaccine 0.5 milliLiter(s) IntraMuscular once  lactulose Syrup 20 Gram(s) Oral every 6 hours  levETIRAcetam 750 milliGRAM(s) Oral every 12 hours  levothyroxine 137 MICROGram(s) Oral daily  meropenem  IVPB 1000 milliGRAM(s) IV Intermittent every 12 hours  midodrine 15 milliGRAM(s) Oral every 8 hours  norepinephrine Infusion 0.13 MICROgram(s)/kG/Min (16.1 mL/Hr) IV Continuous <Continuous>  pantoprazole   Suspension 40 milliGRAM(s) Oral every 24 hours  PrismaSATE Dialysate BGK 4 / 2.5 5000 milliLiter(s) (1500 mL/Hr) CRRT <Continuous>  PrismaSOL Filtration BGK 4 / 2.5 5000 milliLiter(s) (800 mL/Hr) CRRT <Continuous>  PrismaSOL Filtration BGK 4 / 2.5 5000 milliLiter(s) (200 mL/Hr) CRRT <Continuous>  rifAXIMin 550 milliGRAM(s) Oral every 12 hours  vasopressin Infusion 0.03 Unit(s)/Min (4.5 mL/Hr) IV Continuous <Continuous>    MEDICATIONS  (PRN):  oxyCODONE    Solution 5 milliGRAM(s) Oral every 4 hours PRN Moderate Pain (4 - 6)  oxyCODONE    Solution 10 milliGRAM(s) Oral every 4 hours PRN Severe Pain (7 - 10)  sodium chloride 0.65% Nasal 1 Spray(s) Both Nostrils every 3 hours PRN Nasal Congestion  tetracaine/benzocaine/butamben Spray 1 Spray(s) Topical every 3 hours PRN for ngt discomfort    PAST MEDICAL & SURGICAL HISTORY:  HTN (hypertension)  off medication for over one year--states BP has been normal  UTI (urinary tract infection)  currently being treated--will complete antibiotics 3/8/2022  Intracranial tumor  GERD (gastroesophageal reflux disease)  Eczema  Thyroid cancer  surgery. radiation, Radioactive iodine  COVID-19 virus infection  11/2021--recovered at home  H/O total thyroidectomy  age 21&#x27;s for Thyroid cancer, postop complication arterial bleed, second surgery  History of tonsillectomy age 5  History of appendectomy age 5    Vital Signs Last 24 Hrs  T(C): 37.3 (24 Dec 2022 11:00), Max: 37.3 (24 Dec 2022 07:00)  T(F): 99.1 (24 Dec 2022 11:00), Max: 99.1 (24 Dec 2022 07:00)  HR: 88 (24 Dec 2022 13:00) (79 - 97)  BP: 102/60 (24 Dec 2022 07:45) (102/60 - 102/60)  BP(mean): 75 (24 Dec 2022 07:45) (75 - 75)  RR: 14 (24 Dec 2022 13:00) (10 - 39)  SpO2: 93% (24 Dec 2022 13:00) (90% - 99%)    Parameters below as of 24 Dec 2022 07:00  Patient On (Oxygen Delivery Method): nasal cannula  O2 Flow (L/min): 2      I&O's Summary    23 Dec 2022 07:01  -  24 Dec 2022 07:00  --------------------------------------------------------  IN: 2347.1 mL / OUT: 4020 mL / NET: -1672.9 mL    24 Dec 2022 07:01  -  24 Dec 2022 13:14  --------------------------------------------------------  IN: 190.2 mL / OUT: 421 mL / NET: -230.8 mL                          8.0    22.30 )-----------( 60       ( 24 Dec 2022 11:54 )             23.4     12-24    139  |  102  |  4<L>  ----------------------------<  145<H>  4.0   |  22  |  0.91    Ca    9.0      24 Dec 2022 11:54  Phos  2.3     12-24  Mg     2.4     12-24    TPro  6.7  /  Alb  4.6  /  TBili  25.7<H>  /  DBili  x   /  AST  131<H>  /  ALT  40  /  AlkPhos  117  12-24      Culture - Bronchial (collected 12-22-22 @ 17:02)  Source: Combi-Cath Combi-Cath  Gram Stain (12-23-22 @ 06:59):    Moderate polymorphonuclear leukocytes seen per low power field    No squamous epithelial cells seen per low power field    No organisms seen per oil power field  Preliminary Report (12-23-22 @ 19:41):    Normal Respiratory Cassandra present    Culture - Urine (collected 12-21-22 @ 22:55)  Source: Catheterized Catheterized  Final Report (12-24-22 @ 00:55):    >100,000 CFU/ml Leticia dubliniensis "Susceptibilities not performed"    Culture - Fungal, Body Fluid (collected 12-21-22 @ 19:53)  Source: Peritoneal Peritoneal Fluid  Preliminary Report (12-22-22 @ 13:17):    Testing in progress    Culture - Body Fluid with Gram Stain (collected 12-21-22 @ 19:53)  Source: Ascites Fl Ascites Fluid  Gram Stain (12-22-22 @ 03:04):    polymorphonuclear leukocytes seen    No organisms seen    by cytocentrifuge  Preliminary Report (12-23-22 @ 08:47):    No growth    Culture - Sputum (collected 12-21-22 @ 01:19)  Source: Trach Asp Tracheal Aspirate  Gram Stain (12-21-22 @ 08:45):    No polymorphonuclear leukocytes per low power field    Numerous Squamous epithelial cells per low power field    Moderate Gram Positive Rods seen per oil power field    Few Yeast like cells seen per oil power field  Final Report (12-23-22 @ 14:05):    Normal Respiratory Cassandra present    Culture - Blood (collected 12-20-22 @ 23:30)  Source: .Blood Blood-Peripheral  Preliminary Report (12-22-22 @ 04:01):    No growth to date.    Culture - Blood (collected 12-20-22 @ 22:38)  Source: .Blood Blood-Peripheral  Preliminary Report (12-22-22 @ 03:02):    No growth to date.    Review of systems  Gen: No weight changes, fatigue, fevers/chills, weakness  Skin: No rashes  Head/Eyes/Ears/Mouth: No headache; Normal hearing; Normal vision w/o blurriness; No sinus pain/discomfort, sore throat  Respiratory: No dyspnea, cough, wheezing, hemoptysis  CV: No chest pain, PND, orthopnea  GI: C/O mild abdominal pain at surgical site, No diarrhea, constipation, nausea, vomiting, melena, hematochezia  : No increased frequency, dysuria, hematuria, nocturia  MSK: No joint pain/swelling; no back pain; no edema  Neuro: No dizziness/lightheadedness, weakness, seizures, numbness, tingling  Heme: No easy bruising or bleeding  Endo: No heat/cold intolerance  Psych: No significant nervousness, anxiety, stress, depression  All other systems were reviewed and are negative, except as noted.    Physical Exam:  Constitutional: NAD   Eyes: icteric, PERRLA  ENMT: nc/at, no thrush  Neck: supple, central line   Respiratory: CTA B/L  Cardiovascular: RRR  Gastrointestinal: Soft abdomen, distended  Genitourinary: Urinary catheter in place, anuric  Extremities: SCD's in place and working bilaterally, no edema  Vascular: Palpable dp pulses bilaterally.   Neurological: intubated/sedated  Skin: no rashes, ulcerations, lesions  Musculoskeletal: intubated/sedated  Psychiatric: intubated/sedated   Transplant Surgery Progress Note    Present: Patient seen and examined with multidisciplinary transplant team including Transplant Surgeon Dr. Moore, hepatologist Dr. Goldstein, ANJEL Espinoza and unit RN during am rounds. 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  Arthur ([phone]) and Daughter Taylor at [facility] provided additional history. Of note was recently admitted to  11/2022 for workup and eval of jaundice- during that hospitalization was found to have a UTI and dx with alc hep and started on steroids (was deemed a non-responder and steroids were subsequently stopped); s/p LVP at [facility] 11/2022. Previously hospitalized in 2021 at [place] for ETOH detox. Went to ETOH rehab 08/2022 and was asked to leave the facility when she was COVID+; was sober for 1 week until she started drinking again. Had also attended a few AA meetings in the past. Transferred from [facility] 12/20 for further management of acute liver failure and liver transplant evaluation.       Interval events:  - extubated   - on levo/vaso  - AO x 3 , following commands  - on Tube feeds   - received 1u PRBC overnight     Potential Discharge date: pending clinical stability  Education:  Medications  Plan of care:  See Below    MEDICATIONS  (STANDING):  chlorhexidine 2% Cloths 1 Application(s) Topical <User Schedule>  chlorhexidine 4% Liquid 1 Application(s) Topical <User Schedule>  CRRT Treatment    <Continuous>  fluconAZOLE IVPB 200 milliGRAM(s) IV Intermittent every 24 hours  influenza   Vaccine 0.5 milliLiter(s) IntraMuscular once  lactulose Syrup 20 Gram(s) Oral every 6 hours  levETIRAcetam 750 milliGRAM(s) Oral every 12 hours  levothyroxine 137 MICROGram(s) Oral daily  meropenem  IVPB 1000 milliGRAM(s) IV Intermittent every 12 hours  midodrine 15 milliGRAM(s) Oral every 8 hours  norepinephrine Infusion 0.13 MICROgram(s)/kG/Min (16.1 mL/Hr) IV Continuous <Continuous>  pantoprazole   Suspension 40 milliGRAM(s) Oral every 24 hours  PrismaSATE Dialysate BGK 4 / 2.5 5000 milliLiter(s) (1500 mL/Hr) CRRT <Continuous>  PrismaSOL Filtration BGK 4 / 2.5 5000 milliLiter(s) (800 mL/Hr) CRRT <Continuous>  PrismaSOL Filtration BGK 4 / 2.5 5000 milliLiter(s) (200 mL/Hr) CRRT <Continuous>  rifAXIMin 550 milliGRAM(s) Oral every 12 hours  vasopressin Infusion 0.03 Unit(s)/Min (4.5 mL/Hr) IV Continuous <Continuous>    MEDICATIONS  (PRN):  oxyCODONE    Solution 5 milliGRAM(s) Oral every 4 hours PRN Moderate Pain (4 - 6)  oxyCODONE    Solution 10 milliGRAM(s) Oral every 4 hours PRN Severe Pain (7 - 10)  sodium chloride 0.65% Nasal 1 Spray(s) Both Nostrils every 3 hours PRN Nasal Congestion  tetracaine/benzocaine/butamben Spray 1 Spray(s) Topical every 3 hours PRN for ngt discomfort    PAST MEDICAL & SURGICAL HISTORY:  HTN (hypertension)  off medication for over one year--states BP has been normal  UTI (urinary tract infection)  currently being treated--will complete antibiotics 3/8/2022  Intracranial tumor  GERD (gastroesophageal reflux disease)  Eczema  Thyroid cancer  surgery. radiation, Radioactive iodine  COVID-19 virus infection  11/2021--recovered at home  H/O total thyroidectomy  age 21&#x27;s for Thyroid cancer, postop complication arterial bleed, second surgery  History of tonsillectomy age 5  History of appendectomy age 5    Vital Signs Last 24 Hrs  T(C): 37.3 (24 Dec 2022 11:00), Max: 37.3 (24 Dec 2022 07:00)  T(F): 99.1 (24 Dec 2022 11:00), Max: 99.1 (24 Dec 2022 07:00)  HR: 88 (24 Dec 2022 13:00) (79 - 97)  BP: 102/60 (24 Dec 2022 07:45) (102/60 - 102/60)  BP(mean): 75 (24 Dec 2022 07:45) (75 - 75)  RR: 14 (24 Dec 2022 13:00) (10 - 39)  SpO2: 93% (24 Dec 2022 13:00) (90% - 99%)    Parameters below as of 24 Dec 2022 07:00  Patient On (Oxygen Delivery Method): nasal cannula  O2 Flow (L/min): 2      I&O's Summary    23 Dec 2022 07:01  -  24 Dec 2022 07:00  --------------------------------------------------------  IN: 2347.1 mL / OUT: 4020 mL / NET: -1672.9 mL    24 Dec 2022 07:01  -  24 Dec 2022 13:14  --------------------------------------------------------  IN: 190.2 mL / OUT: 421 mL / NET: -230.8 mL                          8.0    22.30 )-----------( 60       ( 24 Dec 2022 11:54 )             23.4     12-24    139  |  102  |  4<L>  ----------------------------<  145<H>  4.0   |  22  |  0.91    Ca    9.0      24 Dec 2022 11:54  Phos  2.3     12-24  Mg     2.4     12-24    TPro  6.7  /  Alb  4.6  /  TBili  25.7<H>  /  DBili  x   /  AST  131<H>  /  ALT  40  /  AlkPhos  117  12-24      Culture - Bronchial (collected 12-22-22 @ 17:02)  Source: Combi-Cath Combi-Cath  Gram Stain (12-23-22 @ 06:59):    Moderate polymorphonuclear leukocytes seen per low power field    No squamous epithelial cells seen per low power field    No organisms seen per oil power field  Preliminary Report (12-23-22 @ 19:41):    Normal Respiratory Cassandra present    Culture - Urine (collected 12-21-22 @ 22:55)  Source: Catheterized Catheterized  Final Report (12-24-22 @ 00:55):    >100,000 CFU/ml Leticia dubliniensis "Susceptibilities not performed"    Culture - Fungal, Body Fluid (collected 12-21-22 @ 19:53)  Source: Peritoneal Peritoneal Fluid  Preliminary Report (12-22-22 @ 13:17):    Testing in progress    Culture - Body Fluid with Gram Stain (collected 12-21-22 @ 19:53)  Source: Ascites Fl Ascites Fluid  Gram Stain (12-22-22 @ 03:04):    polymorphonuclear leukocytes seen    No organisms seen    by cytocentrifuge  Preliminary Report (12-23-22 @ 08:47):    No growth    Culture - Sputum (collected 12-21-22 @ 01:19)  Source: Trach Asp Tracheal Aspirate  Gram Stain (12-21-22 @ 08:45):    No polymorphonuclear leukocytes per low power field    Numerous Squamous epithelial cells per low power field    Moderate Gram Positive Rods seen per oil power field    Few Yeast like cells seen per oil power field  Final Report (12-23-22 @ 14:05):    Normal Respiratory Cassandra present    Culture - Blood (collected 12-20-22 @ 23:30)  Source: .Blood Blood-Peripheral  Preliminary Report (12-22-22 @ 04:01):    No growth to date.    Culture - Blood (collected 12-20-22 @ 22:38)  Source: .Blood Blood-Peripheral  Preliminary Report (12-22-22 @ 03:02):    No growth to date.    Review of systems  Gen: No weight changes, fatigue, fevers/chills, weakness  Skin: No rashes  Head/Eyes/Ears/Mouth: No headache; Normal hearing; Normal vision w/o blurriness; No sinus pain/discomfort, sore throat  Respiratory: No dyspnea, cough, wheezing, hemoptysis  CV: No chest pain, PND, orthopnea  GI: C/O mild abdominal pain at surgical site, No diarrhea, constipation, nausea, vomiting, melena, hematochezia  : No increased frequency, dysuria, hematuria, nocturia  MSK: No joint pain/swelling; no back pain; no edema  Neuro: No dizziness/lightheadedness, weakness, seizures, numbness, tingling  Heme: No easy bruising or bleeding  Endo: No heat/cold intolerance  Psych: No significant nervousness, anxiety, stress, depression  All other systems were reviewed and are negative, except as noted.    Physical Exam:  Constitutional: NAD   Eyes: icteric, PERRLA  ENMT: nc/at, no thrush  Neck: supple, central line   Respiratory: CTA B/L  Cardiovascular: RRR  Gastrointestinal: Soft abdomen, distended  Genitourinary: Urinary catheter in place, anuric  Extremities: SCD's in place and working bilaterally, no edema  Vascular: Palpable dp pulses bilaterally.   Neurological: intubated/sedated  Skin: no rashes, ulcerations, lesions  Musculoskeletal: intubated/sedated  Psychiatric: intubated/sedated   Transplant Surgery Progress Note    Present: Patient seen and examined with multidisciplinary transplant team including Transplant Surgeon Dr. Moore, hepatologist Dr. Goldstein, ANJEL Espinoza and unit RN during am rounds. 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  Arthur ([phone]) and Daughter Taylor at [facility] provided additional history. Of note was recently admitted to  11/2022 for workup and eval of jaundice- during that hospitalization was found to have a UTI and dx with alc hep and started on steroids (was deemed a non-responder and steroids were subsequently stopped); s/p LVP at [facility] 11/2022. Previously hospitalized in 2021 at [place] for ETOH detox. Went to ETOH rehab 08/2022 and was asked to leave the facility when she was COVID+; was sober for 1 week until she started drinking again. Had also attended a few AA meetings in the past. Transferred from [facility] 12/20 for further management of acute liver failure and liver transplant evaluation.       Interval events:  - extubated   - on levo/vaso  - AO x 3 , following commands  - on Tube feeds   - received 1u PRBC overnight     Potential Discharge date: pending clinical stability  Education:  Medications  Plan of care:  See Below    MEDICATIONS  (STANDING):  chlorhexidine 2% Cloths 1 Application(s) Topical <User Schedule>  chlorhexidine 4% Liquid 1 Application(s) Topical <User Schedule>  CRRT Treatment    <Continuous>  fluconAZOLE IVPB 200 milliGRAM(s) IV Intermittent every 24 hours  influenza   Vaccine 0.5 milliLiter(s) IntraMuscular once  lactulose Syrup 20 Gram(s) Oral every 6 hours  levETIRAcetam 750 milliGRAM(s) Oral every 12 hours  levothyroxine 137 MICROGram(s) Oral daily  meropenem  IVPB 1000 milliGRAM(s) IV Intermittent every 12 hours  midodrine 15 milliGRAM(s) Oral every 8 hours  norepinephrine Infusion 0.13 MICROgram(s)/kG/Min (16.1 mL/Hr) IV Continuous <Continuous>  pantoprazole   Suspension 40 milliGRAM(s) Oral every 24 hours  PrismaSATE Dialysate BGK 4 / 2.5 5000 milliLiter(s) (1500 mL/Hr) CRRT <Continuous>  PrismaSOL Filtration BGK 4 / 2.5 5000 milliLiter(s) (800 mL/Hr) CRRT <Continuous>  PrismaSOL Filtration BGK 4 / 2.5 5000 milliLiter(s) (200 mL/Hr) CRRT <Continuous>  rifAXIMin 550 milliGRAM(s) Oral every 12 hours  vasopressin Infusion 0.03 Unit(s)/Min (4.5 mL/Hr) IV Continuous <Continuous>    MEDICATIONS  (PRN):  oxyCODONE    Solution 5 milliGRAM(s) Oral every 4 hours PRN Moderate Pain (4 - 6)  oxyCODONE    Solution 10 milliGRAM(s) Oral every 4 hours PRN Severe Pain (7 - 10)  sodium chloride 0.65% Nasal 1 Spray(s) Both Nostrils every 3 hours PRN Nasal Congestion  tetracaine/benzocaine/butamben Spray 1 Spray(s) Topical every 3 hours PRN for ngt discomfort    PAST MEDICAL & SURGICAL HISTORY:  HTN (hypertension)  off medication for over one year--states BP has been normal  UTI (urinary tract infection)  currently being treated--will complete antibiotics 3/8/2022  Intracranial tumor  GERD (gastroesophageal reflux disease)  Eczema  Thyroid cancer  surgery. radiation, Radioactive iodine  COVID-19 virus infection  11/2021--recovered at home  H/O total thyroidectomy  age 21&#x27;s for Thyroid cancer, postop complication arterial bleed, second surgery  History of tonsillectomy age 5  History of appendectomy age 5    Vital Signs Last 24 Hrs  T(C): 37.3 (24 Dec 2022 11:00), Max: 37.3 (24 Dec 2022 07:00)  T(F): 99.1 (24 Dec 2022 11:00), Max: 99.1 (24 Dec 2022 07:00)  HR: 88 (24 Dec 2022 13:00) (79 - 97)  BP: 102/60 (24 Dec 2022 07:45) (102/60 - 102/60)  BP(mean): 75 (24 Dec 2022 07:45) (75 - 75)  RR: 14 (24 Dec 2022 13:00) (10 - 39)  SpO2: 93% (24 Dec 2022 13:00) (90% - 99%)    Parameters below as of 24 Dec 2022 07:00  Patient On (Oxygen Delivery Method): nasal cannula  O2 Flow (L/min): 2      I&O's Summary    23 Dec 2022 07:01  -  24 Dec 2022 07:00  --------------------------------------------------------  IN: 2347.1 mL / OUT: 4020 mL / NET: -1672.9 mL    24 Dec 2022 07:01  -  24 Dec 2022 13:14  --------------------------------------------------------  IN: 190.2 mL / OUT: 421 mL / NET: -230.8 mL                          8.0    22.30 )-----------( 60       ( 24 Dec 2022 11:54 )             23.4     12-24    139  |  102  |  4<L>  ----------------------------<  145<H>  4.0   |  22  |  0.91    Ca    9.0      24 Dec 2022 11:54  Phos  2.3     12-24  Mg     2.4     12-24    TPro  6.7  /  Alb  4.6  /  TBili  25.7<H>  /  DBili  x   /  AST  131<H>  /  ALT  40  /  AlkPhos  117  12-24      Culture - Bronchial (collected 12-22-22 @ 17:02)  Source: Combi-Cath Combi-Cath  Gram Stain (12-23-22 @ 06:59):    Moderate polymorphonuclear leukocytes seen per low power field    No squamous epithelial cells seen per low power field    No organisms seen per oil power field  Preliminary Report (12-23-22 @ 19:41):    Normal Respiratory Cassandra present    Culture - Urine (collected 12-21-22 @ 22:55)  Source: Catheterized Catheterized  Final Report (12-24-22 @ 00:55):    >100,000 CFU/ml Leticia dubliniensis "Susceptibilities not performed"    Culture - Fungal, Body Fluid (collected 12-21-22 @ 19:53)  Source: Peritoneal Peritoneal Fluid  Preliminary Report (12-22-22 @ 13:17):    Testing in progress    Culture - Body Fluid with Gram Stain (collected 12-21-22 @ 19:53)  Source: Ascites Fl Ascites Fluid  Gram Stain (12-22-22 @ 03:04):    polymorphonuclear leukocytes seen    No organisms seen    by cytocentrifuge  Preliminary Report (12-23-22 @ 08:47):    No growth    Culture - Sputum (collected 12-21-22 @ 01:19)  Source: Trach Asp Tracheal Aspirate  Gram Stain (12-21-22 @ 08:45):    No polymorphonuclear leukocytes per low power field    Numerous Squamous epithelial cells per low power field    Moderate Gram Positive Rods seen per oil power field    Few Yeast like cells seen per oil power field  Final Report (12-23-22 @ 14:05):    Normal Respiratory Cassandra present    Culture - Blood (collected 12-20-22 @ 23:30)  Source: .Blood Blood-Peripheral  Preliminary Report (12-22-22 @ 04:01):    No growth to date.    Culture - Blood (collected 12-20-22 @ 22:38)  Source: .Blood Blood-Peripheral  Preliminary Report (12-22-22 @ 03:02):    No growth to date.    Review of systems  Gen: No weight changes, fatigue, fevers/chills, weakness  Skin: No rashes  Head/Eyes/Ears/Mouth: No headache; Normal hearing; Normal vision w/o blurriness; No sinus pain/discomfort, sore throat  Respiratory: No dyspnea, cough, wheezing, hemoptysis  CV: No chest pain, PND, orthopnea  GI: C/O mild abdominal pain at surgical site, No diarrhea, constipation, nausea, vomiting, melena, hematochezia  : No increased frequency, dysuria, hematuria, nocturia  MSK: No joint pain/swelling; no back pain; no edema  Neuro: No dizziness/lightheadedness, weakness, seizures, numbness, tingling  Heme: No easy bruising or bleeding  Endo: No heat/cold intolerance  Psych: No significant nervousness, anxiety, stress, depression  All other systems were reviewed and are negative, except as noted.    Physical Exam:  Constitutional: NAD   Eyes: icteric, PERRLA  ENMT: nc/at, no thrush  Neck: supple, central line   Respiratory: CTA B/L  Cardiovascular: RRR  Gastrointestinal: Soft abdomen, distended  Genitourinary: Urinary catheter in place, anuric  Extremities: SCD's in place and working bilaterally, no edema  Vascular: Palpable dp pulses bilaterally.   Neurological: intubated/sedated  Skin: no rashes, ulcerations, lesions  Musculoskeletal: intubated/sedated  Psychiatric: intubated/sedated

## 2022-12-24 NOTE — PROGRESS NOTE ADULT - SUBJECTIVE AND OBJECTIVE BOX
[facility] DIVISION OF KIDNEY DISEASES AND HYPERTENSION -- FOLLOW UP NOTE  --------------------------------------------------------------------------------  Chief Complaint:    24 hour events/subjective:  Patient was seen and examined at bedside        PAST HISTORY  --------------------------------------------------------------------------------  No significant changes to PMH, PSH, FHx, SHx, unless otherwise noted    ALLERGIES & MEDICATIONS  --------------------------------------------------------------------------------  Allergies    Macrobid (Rash)    Intolerances    Nexium (Stomach Upset)    Standing Inpatient Medications  chlorhexidine 2% Cloths 1 Application(s) Topical <User Schedule>  chlorhexidine 4% Liquid 1 Application(s) Topical <User Schedule>  CRRT Treatment    <Continuous>  fluconAZOLE IVPB 200 milliGRAM(s) IV Intermittent every 24 hours  influenza   Vaccine 0.5 milliLiter(s) IntraMuscular once  lactulose Syrup 20 Gram(s) Oral every 6 hours  levETIRAcetam  IVPB 750 milliGRAM(s) IV Intermittent every 12 hours  levothyroxine Injectable 70 MICROGram(s) IV Push at bedtime  meropenem  IVPB 1000 milliGRAM(s) IV Intermittent every 12 hours  midodrine 10 milliGRAM(s) Oral every 8 hours  norepinephrine Infusion 0.13 MICROgram(s)/kG/Min IV Continuous <Continuous>  pantoprazole  Injectable 40 milliGRAM(s) IV Push every 24 hours  PrismaSATE Dialysate BGK 4 / 2.5 5000 milliLiter(s) CRRT <Continuous>  PrismaSOL Filtration BGK 4 / 2.5 5000 milliLiter(s) CRRT <Continuous>  PrismaSOL Filtration BGK 4 / 2.5 5000 milliLiter(s) CRRT <Continuous>  rifAXIMin 550 milliGRAM(s) Oral every 12 hours  vasopressin Infusion 0.03 Unit(s)/Min IV Continuous <Continuous>    PRN Inpatient Medications  HYDROmorphone  Injectable 0.5 milliGRAM(s) IV Push every 3 hours PRN  sodium chloride 0.65% Nasal 1 Spray(s) Both Nostrils every 3 hours PRN  tetracaine/benzocaine/butamben Spray 1 Spray(s) Topical every 3 hours PRN      REVIEW OF SYSTEMS- unable to obtain       VITALS/PHYSICAL EXAM  --------------------------------------------------------------------------------  T(C): 37.3 (12-24-22 @ 07:00), Max: 37.3 (12-24-22 @ 07:00)  HR: 90 (12-24-22 @ 09:30) (79 - 98)  BP: 102/60 (12-24-22 @ 07:45) (102/60 - 102/60)  RR: 23 (12-24-22 @ 09:30) (10 - 53)  SpO2: 92% (12-24-22 @ 09:30) (90% - 100%)  Wt(kg): --        12-23-22 @ 07:01  -  12-24-22 @ 07:00  --------------------------------------------------------  IN: 2347.1 mL / OUT: 4020 mL / NET: -1672.9 mL    12-24-22 @ 07:01  -  12-24-22 @ 09:54  --------------------------------------------------------  IN: 53.2 mL / OUT: 179 mL / NET: -125.8 mL      Physical Exam:  Gen: Intubated, sedated  HEENT: +ET tube   Pulm: CTA B/L, no crackles   CV: RRR, S1S2+  Abd: +BS, soft, distended  : +urinary catheter  MSK: +BL LE edema  Psych: Sedated  Skin: Warm  Access: +Non-tunneled HD catheter    LABS/STUDIES  --------------------------------------------------------------------------------              8.1    22.03 >-----------<  60       [12-24-22 @ 05:35]              23.7     138  |  100  |  5   ----------------------------<  177      [12-24-22 @ 05:34]  3.9   |  20  |  0.95        Ca     9.0     [12-24-22 @ 05:34]      Mg     2.4     [12-24-22 @ 05:34]      Phos  4.0     [12-24-22 @ 05:34]    TPro  6.6  /  Alb  4.5  /  TBili  25.3  /  DBili  x   /  AST  134  /  ALT  41  /  AlkPhos  116  [12-24-22 @ 05:34]    PT/INR: PT 27.6 , INR 2.38       [12-23-22 @ 05:30]  PTT: 51.2       [12-24-22 @ 00:11]      Creatinine Trend:  SCr 0.95 [12-24 @ 05:34]  SCr 1.11 [12-24 @ 00:11]  SCr 1.31 [12-23 @ 17:22]  SCr 1.66 [12-23 @ 10:46]  SCr 1.97 [12-23 @ 05:30]            Urinalysis - [12-20-22 @ 23:55]      Color Dark Yellow / Appearance Turbid / SG 1.029 / pH 6.0      Gluc 100 mg/dL / Ketone Small  / Bili Large / Urobili Negative       Blood Moderate / Protein 300 mg/dL / Leuk Est Large / Nitrite Negative      RBC 5-10 / WBC 11-25 / Hyaline 0-2 / Gran  / Sq Epi  / Non Sq Epi Moderate / Bacteria Negative    Urine Creatinine 21      [12-21-22 @ 11:55]  Urine Sodium 138      [12-21-22 @ 11:55]  Urine Potassium 8      [12-21-22 @ 11:55]  Urine Osmolality 288      [12-21-22 @ 11:55]      HCV 0.15, Nonreact      [12-21-22 @ 05:09]       [facility] DIVISION OF KIDNEY DISEASES AND HYPERTENSION -- FOLLOW UP NOTE  --------------------------------------------------------------------------------  Chief Complaint:    24 hour events/subjective:  Patient was seen and examined at bedside        PAST HISTORY  --------------------------------------------------------------------------------  No significant changes to PMH, PSH, FHx, SHx, unless otherwise noted    ALLERGIES & MEDICATIONS  --------------------------------------------------------------------------------  Allergies    Macrobid (Rash)    Intolerances    Nexium (Stomach Upset)    Standing Inpatient Medications  chlorhexidine 2% Cloths 1 Application(s) Topical <User Schedule>  chlorhexidine 4% Liquid 1 Application(s) Topical <User Schedule>  CRRT Treatment    <Continuous>  fluconAZOLE IVPB 200 milliGRAM(s) IV Intermittent every 24 hours  influenza   Vaccine 0.5 milliLiter(s) IntraMuscular once  lactulose Syrup 20 Gram(s) Oral every 6 hours  levETIRAcetam  IVPB 750 milliGRAM(s) IV Intermittent every 12 hours  levothyroxine Injectable 70 MICROGram(s) IV Push at bedtime  meropenem  IVPB 1000 milliGRAM(s) IV Intermittent every 12 hours  midodrine 10 milliGRAM(s) Oral every 8 hours  norepinephrine Infusion 0.13 MICROgram(s)/kG/Min IV Continuous <Continuous>  pantoprazole  Injectable 40 milliGRAM(s) IV Push every 24 hours  PrismaSATE Dialysate BGK 4 / 2.5 5000 milliLiter(s) CRRT <Continuous>  PrismaSOL Filtration BGK 4 / 2.5 5000 milliLiter(s) CRRT <Continuous>  PrismaSOL Filtration BGK 4 / 2.5 5000 milliLiter(s) CRRT <Continuous>  rifAXIMin 550 milliGRAM(s) Oral every 12 hours  vasopressin Infusion 0.03 Unit(s)/Min IV Continuous <Continuous>    PRN Inpatient Medications  HYDROmorphone  Injectable 0.5 milliGRAM(s) IV Push every 3 hours PRN  sodium chloride 0.65% Nasal 1 Spray(s) Both Nostrils every 3 hours PRN  tetracaine/benzocaine/butamben Spray 1 Spray(s) Topical every 3 hours PRN      REVIEW OF SYSTEMS- unable to obtain       VITALS/PHYSICAL EXAM  --------------------------------------------------------------------------------  T(C): 37.3 (12-24-22 @ 07:00), Max: 37.3 (12-24-22 @ 07:00)  HR: 90 (12-24-22 @ 09:30) (79 - 98)  BP: 102/60 (12-24-22 @ 07:45) (102/60 - 102/60)  RR: 23 (12-24-22 @ 09:30) (10 - 53)  SpO2: 92% (12-24-22 @ 09:30) (90% - 100%)  Wt(kg): --        12-23-22 @ 07:01  -  12-24-22 @ 07:00  --------------------------------------------------------  IN: 2347.1 mL / OUT: 4020 mL / NET: -1672.9 mL    12-24-22 @ 07:01  -  12-24-22 @ 09:54  --------------------------------------------------------  IN: 53.2 mL / OUT: 179 mL / NET: -125.8 mL      Physical Exam:  Gen: Intubated, sedated  HEENT: +ET tube   Pulm: CTA B/L, no crackles   CV: RRR, S1S2+  Abd: +BS, soft, distended  : +urinary catheter  MSK: +BL LE edema  Psych: Sedated  Skin: Warm  Access: +Non-tunneled HD catheter    LABS/STUDIES  --------------------------------------------------------------------------------              8.1    22.03 >-----------<  60       [12-24-22 @ 05:35]              23.7     138  |  100  |  5   ----------------------------<  177      [12-24-22 @ 05:34]  3.9   |  20  |  0.95        Ca     9.0     [12-24-22 @ 05:34]      Mg     2.4     [12-24-22 @ 05:34]      Phos  4.0     [12-24-22 @ 05:34]    TPro  6.6  /  Alb  4.5  /  TBili  25.3  /  DBili  x   /  AST  134  /  ALT  41  /  AlkPhos  116  [12-24-22 @ 05:34]    PT/INR: PT 27.6 , INR 2.38       [12-23-22 @ 05:30]  PTT: 51.2       [12-24-22 @ 00:11]      Creatinine Trend:  SCr 0.95 [12-24 @ 05:34]  SCr 1.11 [12-24 @ 00:11]  SCr 1.31 [12-23 @ 17:22]  SCr 1.66 [12-23 @ 10:46]  SCr 1.97 [12-23 @ 05:30]            Urinalysis - [12-20-22 @ 23:55]      Color Dark Yellow / Appearance Turbid / SG 1.029 / pH 6.0      Gluc 100 mg/dL / Ketone Small  / Bili Large / Urobili Negative       Blood Moderate / Protein 300 mg/dL / Leuk Est Large / Nitrite Negative      RBC 5-10 / WBC 11-25 / Hyaline 0-2 / Gran  / Sq Epi  / Non Sq Epi Moderate / Bacteria Negative    Urine Creatinine 21      [12-21-22 @ 11:55]  Urine Sodium 138      [12-21-22 @ 11:55]  Urine Potassium 8      [12-21-22 @ 11:55]  Urine Osmolality 288      [12-21-22 @ 11:55]      HCV 0.15, Nonreact      [12-21-22 @ 05:09]       [facility] DIVISION OF KIDNEY DISEASES AND HYPERTENSION -- FOLLOW UP NOTE  --------------------------------------------------------------------------------  Chief Complaint:    24 hour events/subjective:  Patient was seen and examined at bedside        PAST HISTORY  --------------------------------------------------------------------------------  No significant changes to PMH, PSH, FHx, SHx, unless otherwise noted    ALLERGIES & MEDICATIONS  --------------------------------------------------------------------------------  Allergies    Macrobid (Rash)    Intolerances    Nexium (Stomach Upset)    Standing Inpatient Medications  chlorhexidine 2% Cloths 1 Application(s) Topical <User Schedule>  chlorhexidine 4% Liquid 1 Application(s) Topical <User Schedule>  CRRT Treatment    <Continuous>  fluconAZOLE IVPB 200 milliGRAM(s) IV Intermittent every 24 hours  influenza   Vaccine 0.5 milliLiter(s) IntraMuscular once  lactulose Syrup 20 Gram(s) Oral every 6 hours  levETIRAcetam  IVPB 750 milliGRAM(s) IV Intermittent every 12 hours  levothyroxine Injectable 70 MICROGram(s) IV Push at bedtime  meropenem  IVPB 1000 milliGRAM(s) IV Intermittent every 12 hours  midodrine 10 milliGRAM(s) Oral every 8 hours  norepinephrine Infusion 0.13 MICROgram(s)/kG/Min IV Continuous <Continuous>  pantoprazole  Injectable 40 milliGRAM(s) IV Push every 24 hours  PrismaSATE Dialysate BGK 4 / 2.5 5000 milliLiter(s) CRRT <Continuous>  PrismaSOL Filtration BGK 4 / 2.5 5000 milliLiter(s) CRRT <Continuous>  PrismaSOL Filtration BGK 4 / 2.5 5000 milliLiter(s) CRRT <Continuous>  rifAXIMin 550 milliGRAM(s) Oral every 12 hours  vasopressin Infusion 0.03 Unit(s)/Min IV Continuous <Continuous>    PRN Inpatient Medications  HYDROmorphone  Injectable 0.5 milliGRAM(s) IV Push every 3 hours PRN  sodium chloride 0.65% Nasal 1 Spray(s) Both Nostrils every 3 hours PRN  tetracaine/benzocaine/butamben Spray 1 Spray(s) Topical every 3 hours PRN      REVIEW OF SYSTEMS- unable to obtain       VITALS/PHYSICAL EXAM  --------------------------------------------------------------------------------  T(C): 37.3 (12-24-22 @ 07:00), Max: 37.3 (12-24-22 @ 07:00)  HR: 90 (12-24-22 @ 09:30) (79 - 98)  BP: 102/60 (12-24-22 @ 07:45) (102/60 - 102/60)  RR: 23 (12-24-22 @ 09:30) (10 - 53)  SpO2: 92% (12-24-22 @ 09:30) (90% - 100%)  Wt(kg): --        12-23-22 @ 07:01  -  12-24-22 @ 07:00  --------------------------------------------------------  IN: 2347.1 mL / OUT: 4020 mL / NET: -1672.9 mL    12-24-22 @ 07:01  -  12-24-22 @ 09:54  --------------------------------------------------------  IN: 53.2 mL / OUT: 179 mL / NET: -125.8 mL      Physical Exam:  Gen: Intubated, sedated  HEENT: +ET tube   Pulm: CTA B/L, no crackles   CV: RRR, S1S2+  Abd: +BS, soft, distended  : +urinary catheter  MSK: +BL LE edema  Psych: Sedated  Skin: Warm  Access: +Non-tunneled HD catheter    LABS/STUDIES  --------------------------------------------------------------------------------              8.1    22.03 >-----------<  60       [12-24-22 @ 05:35]              23.7     138  |  100  |  5   ----------------------------<  177      [12-24-22 @ 05:34]  3.9   |  20  |  0.95        Ca     9.0     [12-24-22 @ 05:34]      Mg     2.4     [12-24-22 @ 05:34]      Phos  4.0     [12-24-22 @ 05:34]    TPro  6.6  /  Alb  4.5  /  TBili  25.3  /  DBili  x   /  AST  134  /  ALT  41  /  AlkPhos  116  [12-24-22 @ 05:34]    PT/INR: PT 27.6 , INR 2.38       [12-23-22 @ 05:30]  PTT: 51.2       [12-24-22 @ 00:11]      Creatinine Trend:  SCr 0.95 [12-24 @ 05:34]  SCr 1.11 [12-24 @ 00:11]  SCr 1.31 [12-23 @ 17:22]  SCr 1.66 [12-23 @ 10:46]  SCr 1.97 [12-23 @ 05:30]            Urinalysis - [12-20-22 @ 23:55]      Color Dark Yellow / Appearance Turbid / SG 1.029 / pH 6.0      Gluc 100 mg/dL / Ketone Small  / Bili Large / Urobili Negative       Blood Moderate / Protein 300 mg/dL / Leuk Est Large / Nitrite Negative      RBC 5-10 / WBC 11-25 / Hyaline 0-2 / Gran  / Sq Epi  / Non Sq Epi Moderate / Bacteria Negative    Urine Creatinine 21      [12-21-22 @ 11:55]  Urine Sodium 138      [12-21-22 @ 11:55]  Urine Potassium 8      [12-21-22 @ 11:55]  Urine Osmolality 288      [12-21-22 @ 11:55]      HCV 0.15, Nonreact      [12-21-22 @ 05:09]

## 2022-12-24 NOTE — PROGRESS NOTE ADULT - SUBJECTIVE AND OBJECTIVE BOX
24 HOUR EVENTS:  Remains on Levo 0.1, Vaso 0.03. Midodrine started.  Diet advanced to clears.   Hgb downtrending to 7, given 1 PRBC  Insulin sliding scale off, will hold D10 and monitor glucose.    SUBJECTIVE/ROS:  [ X ] A ten-point review of systems was otherwise negative except as noted.  [ ] Due to altered mental status/intubation, subjective information were not able to be obtained from the patient. History was obtained, to the extent possible, from review of the chart and collateral sources of information.      NEURO  Exam: AOx3. NAD. Follows commands. Moves all extremities. Strength and sensation intact.   Meds: HYDROmorphone  Injectable 0.5 milliGRAM(s) IV Push every 3 hours PRN breakthrough pain  levETIRAcetam  IVPB 750 milliGRAM(s) IV Intermittent every 12 hours  [x] Adequacy of sedation and pain control has been assessed and adjusted      RESPIRATORY  RR: 20 (12-24-22 @ 02:00) (13 - 73)  SpO2: 95% (12-24-22 @ 02:00) (90% - 100%)  Wt(kg): --  Exam: CTA b/l. No murmurs, rubs, gallops appreciated.   Mechanical Ventilation: Mode: CPAP with PS, RR (patient): 16, FiO2: 30, PEEP: 5, PS: 5, MAP: 8.1, PIP: 15  ABG - ( 24 Dec 2022 00:02 )  pH: 7.40  /  pCO2: 39    /  pO2: 114   / HCO3: 24    / Base Excess: -0.5  /  SaO2: 99.6    Lactate: x      [ ] Extubation Readiness Assessed  Meds:       CARDIOVASCULAR  HR: 84 (12-24-22 @ 02:00) (76 - 98)  BP: --  BP(mean): --  ABP: 105/51 (12-24-22 @ 02:00) (89/43 - 126/64)  ABP(mean): 73 (12-24-22 @ 02:00) (59 - 87)  Wt(kg): --  CVP(cm H2O): --  Exam: S1S2. No murmurs, rubs, gallops appreciated.  Cardiac Rhythm: NSR rate 86  Meds: midodrine 10 milliGRAM(s) Oral every 8 hours  norepinephrine Infusion 0.13 MICROgram(s)/kG/Min IV Continuous <Continuous>        GI/NUTRITION  Exam: Soft, non-distended, non-tender.   Diet: Clear liquid  Meds: lactulose Syrup 20 Gram(s) Oral every 6 hours  pantoprazole  Injectable 40 milliGRAM(s) IV Push every 24 hours      GENITOURINARY  I&O's Detail    12-22 @ 07:01  -  12-23 @ 07:00  --------------------------------------------------------  IN:    Albumin 25%  -  50 mL: 250 mL    Dexmedetomidine: 90 mL    dextrose 10%: 240 mL    Enteral Tube Flush: 250 mL    IV PiggyBack: 250 mL    IV PiggyBack: 100 mL    IV PiggyBack: 300 mL    Norepinephrine: 367.6 mL    Vasopressin: 108 mL  Total IN: 1955.6 mL    OUT:    Indwelling Catheter - Urethral (mL): 16 mL    Other (mL): 1691 mL    Rectal Tube (mL): 1400 mL  Total OUT: 3107 mL    Total NET: -1151.4 mL      12-23 @ 07:01  -  12-24 @ 02:12  --------------------------------------------------------  IN:    Albumin 25%  -  50 mL: 150 mL    Dexmedetomidine: 3.3 mL    dextrose 10%: 540 mL    IV PiggyBack: 200 mL    Norepinephrine: 248.6 mL    PRBCs (Packed Red Blood Cells): 250 mL    Vasopressin: 81 mL  Total IN: 1472.9 mL    OUT:    Indwelling Catheter - Urethral (mL): 20 mL    Other (mL): 2128 mL    Rectal Tube (mL): 550 mL  Total OUT: 2698 mL    Total NET: -1225.1 mL          12-24    136  |  100  |  7   ----------------------------<  154<H>  4.0   |  21<L>  |  1.11    Ca    9.2      24 Dec 2022 00:11  Phos  2.0     12-24  Mg     2.4     12-24    TPro  6.8  /  Alb  4.7  /  TBili  24.9<H>  /  DBili  x   /  AST  133<H>  /  ALT  39  /  AlkPhos  115  12-24    [ ] Monroe catheter, indication: N/A  Meds: albumin human 25% IVPB 100 milliLiter(s) IV Intermittent every 8 hours  sodium phosphate 30 milliMole(s)/500 mL IVPB 30 milliMole(s) IV Intermittent once        HEMATOLOGIC  Meds:   [x] VTE Prophylaxis                        7.0    22.32 )-----------( 69       ( 24 Dec 2022 00:11 )             20.6     PT/INR - ( 23 Dec 2022 05:30 )   PT: 27.6 sec;   INR: 2.38 ratio         PTT - ( 24 Dec 2022 00:11 )  PTT:51.2 sec  Transfusion     [ ] PRBC   [ ] Platelets   [ ] FFP   [ ] Cryoprecipitate      INFECTIOUS DISEASES  T(C): 37.1 (12-23-22 @ 23:00), Max: 37.1 (12-23-22 @ 23:00)  Wt(kg): --  WBC Count: 22.32 K/uL (12-24 @ 00:11)  WBC Count: 22.97 K/uL (12-23 @ 17:22)  WBC Count: 22.34 K/uL (12-23 @ 10:46)  WBC Count: 20.84 K/uL (12-23 @ 05:30)    Recent Cultures:  Specimen Source: Combi-Cath Combi-Cath, 12-22 @ 17:02; Results   Normal Respiratory Cassandra present; Gram Stain:   Moderate polymorphonuclear leukocytes seen per low power field  No squamous epithelial cells seen per low power field  No organisms seen per oil power field; Organism: --  Specimen Source: Catheterized Catheterized, 12-21 @ 22:55; Results   >100,000 CFU/ml Leticia dubliniensis "Susceptibilities not performed"; Gram Stain: --; Organism: --  Specimen Source: Ascites Fl Ascites Fluid, 12-21 @ 19:53; Results   No growth; Gram Stain:   polymorphonuclear leukocytes seen  No organisms seen  by cytocentrifuge; Organism: --  Specimen Source: Trach Asp Tracheal Aspirate, 12-21 @ 01:19; Results   Normal Respiratory Cassandra present; Gram Stain:   No polymorphonuclear leukocytes per low power field  Numerous Squamous epithelial cells per low power field  Moderate Gram Positive Rods seen per oil power field  Few Yeast like cells seen per oil power field; Organism: --  Specimen Source: .Blood Blood-Peripheral, 12-20 @ 23:30; Results   No growth to date.; Gram Stain: --; Organism: --  Specimen Source: .Blood Blood-Peripheral, 12-20 @ 22:38; Results   No growth to date.; Gram Stain: --; Organism: --    Meds: fluconAZOLE IVPB 200 milliGRAM(s) IV Intermittent every 24 hours  influenza   Vaccine 0.5 milliLiter(s) IntraMuscular once  meropenem  IVPB 1000 milliGRAM(s) IV Intermittent every 12 hours  rifAXIMin 550 milliGRAM(s) Oral every 12 hours        ENDOCRINE  Capillary Blood Glucose    Meds: levothyroxine Injectable 70 MICROGram(s) IV Push at bedtime  vasopressin Infusion 0.03 Unit(s)/Min IV Continuous <Continuous>        ACCESS DEVICES:  [ X ] Peripheral IV  [ X ] Central Venous Line	[ X] R	[X ] L	[X ] IJ	[X ] Fem	[ ] SC	Placed: 12/20  [ ] Arterial Line		[ ] R	[ ] L	[ ] Fem	[ ] Rad	[ ] Ax	Placed:   [ ] PICC:					[ ] Mediport  [ ] Urinary Catheter, Date Placed:   [ X ] Necessity of urinary, arterial, and venous catheters discussed 12/21    OTHER MEDICATIONS:  chlorhexidine 2% Cloths 1 Application(s) Topical <User Schedule>  chlorhexidine 4% Liquid 1 Application(s) Topical <User Schedule>  CRRT Treatment    <Continuous>  PrismaSATE Dialysate BGK 4 / 2.5 5000 milliLiter(s) CRRT <Continuous>  PrismaSOL Filtration BGK 4 / 2.5 5000 milliLiter(s) CRRT <Continuous>  PrismaSOL Filtration BGK 4 / 2.5 5000 milliLiter(s) CRRT <Continuous>      CODE STATUS:     IMAGING:

## 2022-12-24 NOTE — PROGRESS NOTE ADULT - ATTENDING COMMENTS
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 Successfully extubated yesterday and currently on NC. CXR today still with bilateral opacities likely known multifocal pneumonia versus pulmonary edema. As UOP only 25 mL in past 24h, recommend to continue CVVHD (12/21- ) today with tentative plan for holding dialysis and challenging with diuretics tomorrow. Pressor requirements improving but still on both norepinephrine and vasopressin, with midodrine added yesterday. Continuing broad-spectrum antibiotics with meropenem (12/20- ) and fluconazole (12/20- ). Cultures negative to date and transplant ID following. Continuing rifaximin and lactulose for goal stool output 600-1000 mL/day. ABO O with current MELD-Na 42. Appreciate continued excellent SICU care. Family updated at bedside. Initiating liver transplant evaluation today.    Please don't hesitate to call with any questions or concerns.    Javier Goldstein M.D., Ph.D.  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 Successfully extubated yesterday and currently on NC. CXR today still with bilateral opacities likely known multifocal pneumonia versus pulmonary edema. As UOP only 25 mL in past 24h, recommend to continue CVVHD (12/21- ) today with tentative plan for holding dialysis and challenging with diuretics tomorrow. Pressor requirements improving but still on both norepinephrine and vasopressin, with midodrine added yesterday. Continuing broad-spectrum antibiotics with meropenem (12/20- ) and fluconazole (12/20- ). Cultures negative to date and transplant ID following. Continuing rifaximin and lactulose for goal stool output 600-1000 mL/day. ABO O with current MELD-Na 42. Appreciate continued excellent SICU care. Family updated at bedside. Initiating liver transplant evaluation today.    Please don't hesitate to call with any questions or concerns.    Javier Goldstein M.D., Ph.D.  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 Successfully extubated yesterday and currently on NC. CXR today still with bilateral opacities likely known multifocal pneumonia versus pulmonary edema. As UOP only 25 mL in past 24h, recommend to continue CVVHD (12/21- ) today with tentative plan for holding dialysis and challenging with diuretics tomorrow. Pressor requirements improving but still on both norepinephrine and vasopressin, with midodrine added yesterday. Continuing broad-spectrum antibiotics with meropenem (12/20- ) and fluconazole (12/20- ). Cultures negative to date and transplant ID following. Continuing rifaximin and lactulose for goal stool output 600-1000 mL/day. ABO O with current MELD-Na 42. Appreciate continued excellent SICU care. Family updated at bedside. Initiating liver transplant evaluation today.    Please don't hesitate to call with any questions or concerns.    Javier Goldstein M.D., Ph.D.  Transplant Hepatology

## 2022-12-24 NOTE — PROGRESS NOTE ADULT - SUBJECTIVE AND OBJECTIVE BOX
Gastroenterology/Hepatology Progress Note    Interval Events:   - extubated yesterday   - levo 0.05 (downtrending)   - ON 1U pRBC     Allergies:  Macrobid (Rash)  Nexium (Stomach Upset)      Hospital Medications:  chlorhexidine 2% Cloths 1 Application(s) Topical <User Schedule>  chlorhexidine 4% Liquid 1 Application(s) Topical <User Schedule>  CRRT Treatment    <Continuous>  fluconAZOLE IVPB 200 milliGRAM(s) IV Intermittent every 24 hours  influenza   Vaccine 0.5 milliLiter(s) IntraMuscular once  lactulose Syrup 20 Gram(s) Oral every 6 hours  levETIRAcetam 750 milliGRAM(s) Oral every 12 hours  levothyroxine 137 MICROGram(s) Oral daily  meropenem  IVPB 1000 milliGRAM(s) IV Intermittent every 12 hours  midodrine 15 milliGRAM(s) Oral every 8 hours  norepinephrine Infusion 0.13 MICROgram(s)/kG/Min IV Continuous <Continuous>  oxyCODONE    Solution 5 milliGRAM(s) Oral every 4 hours PRN  oxyCODONE    Solution 10 milliGRAM(s) Oral every 4 hours PRN  pantoprazole   Suspension 40 milliGRAM(s) Oral every 24 hours  PrismaSATE Dialysate BGK 4 / 2.5 5000 milliLiter(s) CRRT <Continuous>  PrismaSOL Filtration BGK 4 / 2.5 5000 milliLiter(s) CRRT <Continuous>  PrismaSOL Filtration BGK 4 / 2.5 5000 milliLiter(s) CRRT <Continuous>  rifAXIMin 550 milliGRAM(s) Oral every 12 hours  sodium chloride 0.65% Nasal 1 Spray(s) Both Nostrils every 3 hours PRN  tetracaine/benzocaine/butamben Spray 1 Spray(s) Topical every 3 hours PRN  vasopressin Infusion 0.03 Unit(s)/Min IV Continuous <Continuous>    ROS: Unable to be obtained     PHYSICAL EXAM:   Vital Signs:  Vital Signs Last 24 Hrs  T(C): 37.4 (24 Dec 2022 15:00), Max: 37.4 (24 Dec 2022 15:00)  T(F): 99.3 (24 Dec 2022 15:00), Max: 99.3 (24 Dec 2022 15:00)  HR: 85 (24 Dec 2022 15:30) (79 - 96)  BP: 102/60 (24 Dec 2022 07:45) (102/60 - 102/60)  BP(mean): 75 (24 Dec 2022 07:45) (75 - 75)  RR: 27 (24 Dec 2022 15:30) (10 - 39)  SpO2: 94% (24 Dec 2022 15:30) (91% - 97%)    Parameters below as of 24 Dec 2022 07:00  Patient On (Oxygen Delivery Method): nasal cannula  O2 Flow (L/min): 2    Daily     Daily     GENERAL:  Appears stated age, critically ill appearing  HEENT:  NC/AT,  +scleral icterus; +ETT  CHEST:  +vented breath sounds  HEART:  Regular rhythm, S1, S2, no murmur/rub/S3/S4  ABDOMEN:  Soft, non-tender, +mildly-distended  EXTREMITIES:  no cyanosis, clubbing or edema  SKIN:  No rash/erythema/ecchymoses/petechiae/wounds/abscess/warm/dry  NEURO:  +awake; following simple 1 step commands    LABS:                        8.0    22.30 )-----------( 60       ( 24 Dec 2022 11:54 )             23.4     Mean Cell Volume: 93.6 fl (12-24-22 @ 11:54)    12-24    139  |  102  |  4<L>  ----------------------------<  145<H>  4.0   |  22  |  0.91    Ca    9.0      24 Dec 2022 11:54  Phos  2.3     12-24  Mg     2.4     12-24    TPro  6.7  /  Alb  4.6  /  TBili  25.7<H>  /  DBili  x   /  AST  131<H>  /  ALT  40  /  AlkPhos  117  12-24    LIVER FUNCTIONS - ( 24 Dec 2022 11:54 )  Alb: 4.6 g/dL / Pro: 6.7 g/dL / ALK PHOS: 117 U/L / ALT: 40 U/L / AST: 131 U/L / GGT: x           PT/INR - ( 23 Dec 2022 05:30 )   PT: 27.6 sec;   INR: 2.38 ratio         PTT - ( 24 Dec 2022 00:11 )  PTT:51.2 sec    Amylase Serum--      Lipase serum--       [id number]        Imaging:           Gastroenterology/Hepatology Progress Note    Interval Events:   - extubated yesterday   - levo 0.05 (downtrending)   - ON 1U pRBC     Allergies:  Macrobid (Rash)  Nexium (Stomach Upset)      Hospital Medications:  chlorhexidine 2% Cloths 1 Application(s) Topical <User Schedule>  chlorhexidine 4% Liquid 1 Application(s) Topical <User Schedule>  CRRT Treatment    <Continuous>  fluconAZOLE IVPB 200 milliGRAM(s) IV Intermittent every 24 hours  influenza   Vaccine 0.5 milliLiter(s) IntraMuscular once  lactulose Syrup 20 Gram(s) Oral every 6 hours  levETIRAcetam 750 milliGRAM(s) Oral every 12 hours  levothyroxine 137 MICROGram(s) Oral daily  meropenem  IVPB 1000 milliGRAM(s) IV Intermittent every 12 hours  midodrine 15 milliGRAM(s) Oral every 8 hours  norepinephrine Infusion 0.13 MICROgram(s)/kG/Min IV Continuous <Continuous>  oxyCODONE    Solution 5 milliGRAM(s) Oral every 4 hours PRN  oxyCODONE    Solution 10 milliGRAM(s) Oral every 4 hours PRN  pantoprazole   Suspension 40 milliGRAM(s) Oral every 24 hours  PrismaSATE Dialysate BGK 4 / 2.5 5000 milliLiter(s) CRRT <Continuous>  PrismaSOL Filtration BGK 4 / 2.5 5000 milliLiter(s) CRRT <Continuous>  PrismaSOL Filtration BGK 4 / 2.5 5000 milliLiter(s) CRRT <Continuous>  rifAXIMin 550 milliGRAM(s) Oral every 12 hours  sodium chloride 0.65% Nasal 1 Spray(s) Both Nostrils every 3 hours PRN  tetracaine/benzocaine/butamben Spray 1 Spray(s) Topical every 3 hours PRN  vasopressin Infusion 0.03 Unit(s)/Min IV Continuous <Continuous>    ROS: Unable to be obtained     PHYSICAL EXAM:   Vital Signs:  Vital Signs Last 24 Hrs  T(C): 37.4 (24 Dec 2022 15:00), Max: 37.4 (24 Dec 2022 15:00)  T(F): 99.3 (24 Dec 2022 15:00), Max: 99.3 (24 Dec 2022 15:00)  HR: 85 (24 Dec 2022 15:30) (79 - 96)  BP: 102/60 (24 Dec 2022 07:45) (102/60 - 102/60)  BP(mean): 75 (24 Dec 2022 07:45) (75 - 75)  RR: 27 (24 Dec 2022 15:30) (10 - 39)  SpO2: 94% (24 Dec 2022 15:30) (91% - 97%)    Parameters below as of 24 Dec 2022 07:00  Patient On (Oxygen Delivery Method): nasal cannula  O2 Flow (L/min): 2    Daily     Daily     GENERAL:  Appears stated age, critically ill appearing  HEENT:  NC/AT,  +scleral icterus; +ETT  CHEST:  +vented breath sounds  HEART:  Regular rhythm, S1, S2, no murmur/rub/S3/S4  ABDOMEN:  Soft, non-tender, +mildly-distended  EXTREMITIES:  no cyanosis, clubbing or edema  SKIN:  No rash/erythema/ecchymoses/petechiae/wounds/abscess/warm/dry  NEURO:  +awake; following simple 1 step commands    LABS:                        8.0    22.30 )-----------( 60       ( 24 Dec 2022 11:54 )             23.4     Mean Cell Volume: 93.6 fl (12-24-22 @ 11:54)    12-24    139  |  102  |  4<L>  ----------------------------<  145<H>  4.0   |  22  |  0.91    Ca    9.0      24 Dec 2022 11:54  Phos  2.3     12-24  Mg     2.4     12-24    TPro  6.7  /  Alb  4.6  /  TBili  25.7<H>  /  DBili  x   /  AST  131<H>  /  ALT  40  /  AlkPhos  117  12-24    LIVER FUNCTIONS - ( 24 Dec 2022 11:54 )  Alb: 4.6 g/dL / Pro: 6.7 g/dL / ALK PHOS: 117 U/L / ALT: 40 U/L / AST: 131 U/L / GGT: x           PT/INR - ( 23 Dec 2022 05:30 )   PT: 27.6 sec;   INR: 2.38 ratio         PTT - ( 24 Dec 2022 00:11 )  PTT:51.2 sec    Amylase Serum--      Lipase serum--       [id number]        Imaging:           Gastroenterology/Hepatology Progress Note    Interval Events:   - extubated yesterday   - levo 0.05 (downtrending)   - ON 1U pRBC     Allergies:  Macrobid (Rash)  Nexium (Stomach Upset)      Hospital Medications:  chlorhexidine 2% Cloths 1 Application(s) Topical <User Schedule>  chlorhexidine 4% Liquid 1 Application(s) Topical <User Schedule>  CRRT Treatment    <Continuous>  fluconAZOLE IVPB 200 milliGRAM(s) IV Intermittent every 24 hours  influenza   Vaccine 0.5 milliLiter(s) IntraMuscular once  lactulose Syrup 20 Gram(s) Oral every 6 hours  levETIRAcetam 750 milliGRAM(s) Oral every 12 hours  levothyroxine 137 MICROGram(s) Oral daily  meropenem  IVPB 1000 milliGRAM(s) IV Intermittent every 12 hours  midodrine 15 milliGRAM(s) Oral every 8 hours  norepinephrine Infusion 0.13 MICROgram(s)/kG/Min IV Continuous <Continuous>  oxyCODONE    Solution 5 milliGRAM(s) Oral every 4 hours PRN  oxyCODONE    Solution 10 milliGRAM(s) Oral every 4 hours PRN  pantoprazole   Suspension 40 milliGRAM(s) Oral every 24 hours  PrismaSATE Dialysate BGK 4 / 2.5 5000 milliLiter(s) CRRT <Continuous>  PrismaSOL Filtration BGK 4 / 2.5 5000 milliLiter(s) CRRT <Continuous>  PrismaSOL Filtration BGK 4 / 2.5 5000 milliLiter(s) CRRT <Continuous>  rifAXIMin 550 milliGRAM(s) Oral every 12 hours  sodium chloride 0.65% Nasal 1 Spray(s) Both Nostrils every 3 hours PRN  tetracaine/benzocaine/butamben Spray 1 Spray(s) Topical every 3 hours PRN  vasopressin Infusion 0.03 Unit(s)/Min IV Continuous <Continuous>    ROS: Unable to be obtained     PHYSICAL EXAM:   Vital Signs:  Vital Signs Last 24 Hrs  T(C): 37.4 (24 Dec 2022 15:00), Max: 37.4 (24 Dec 2022 15:00)  T(F): 99.3 (24 Dec 2022 15:00), Max: 99.3 (24 Dec 2022 15:00)  HR: 85 (24 Dec 2022 15:30) (79 - 96)  BP: 102/60 (24 Dec 2022 07:45) (102/60 - 102/60)  BP(mean): 75 (24 Dec 2022 07:45) (75 - 75)  RR: 27 (24 Dec 2022 15:30) (10 - 39)  SpO2: 94% (24 Dec 2022 15:30) (91% - 97%)    Parameters below as of 24 Dec 2022 07:00  Patient On (Oxygen Delivery Method): nasal cannula  O2 Flow (L/min): 2    Daily     Daily     GENERAL:  Appears stated age, critically ill appearing  HEENT:  NC/AT,  +scleral icterus; +ETT  CHEST:  +vented breath sounds  HEART:  Regular rhythm, S1, S2, no murmur/rub/S3/S4  ABDOMEN:  Soft, non-tender, +mildly-distended  EXTREMITIES:  no cyanosis, clubbing or edema  SKIN:  No rash/erythema/ecchymoses/petechiae/wounds/abscess/warm/dry  NEURO:  +awake; following simple 1 step commands    LABS:                        8.0    22.30 )-----------( 60       ( 24 Dec 2022 11:54 )             23.4     Mean Cell Volume: 93.6 fl (12-24-22 @ 11:54)    12-24    139  |  102  |  4<L>  ----------------------------<  145<H>  4.0   |  22  |  0.91    Ca    9.0      24 Dec 2022 11:54  Phos  2.3     12-24  Mg     2.4     12-24    TPro  6.7  /  Alb  4.6  /  TBili  25.7<H>  /  DBili  x   /  AST  131<H>  /  ALT  40  /  AlkPhos  117  12-24    LIVER FUNCTIONS - ( 24 Dec 2022 11:54 )  Alb: 4.6 g/dL / Pro: 6.7 g/dL / ALK PHOS: 117 U/L / ALT: 40 U/L / AST: 131 U/L / GGT: x           PT/INR - ( 23 Dec 2022 05:30 )   PT: 27.6 sec;   INR: 2.38 ratio         PTT - ( 24 Dec 2022 00:11 )  PTT:51.2 sec    Amylase Serum--      Lipase serum--       [id number]        Imaging:

## 2022-12-24 NOTE — PROGRESS NOTE ADULT - ASSESSMENT
62 y.o Hx significant for decompensated ETOH cirrhosis c/b ascites (dx 11/2022), alc hep (dx 11/2022; non-responsive to steroids), remote h/o thyroid cancer in her 20s s/p total thyroidectomy + RTX + radioactive iodide, HTN, ventrical neoplasm (dx 2021) s/p right frontal craniotomy (03/2022) for resection post operative course c/b hemorrhage right lateral ventricle (managed non-operatively) who was initially admitted to  12/19 with new onset seizures and transferred to [facility] 12/20 for LT eval for FRANTZ.     Impression:  #ACLF with underlying cirrhosis  #AUD  #Decompensated ETOH cirrhosis c/b prior ascites req LVP- UNOS/OPTN MELD-Na 42 (12/23); blood group O neg  Of note was recently admitted to  11/2022 for workup and eval of new onset jaundice- during that hospitalization was found to have a UTI and dx with alc hep was treated for UTI w/ abx and started on steroids (was deemed a non-responder and steroids were subsequently stopped); s/p LVP at [place] 11/2022. Previously hospitalized in 2021 at [facility] for ETOH detox but pt reportedly unaware of any liver dysfunction at that time. Went to ETOH rehab 08/2022 and was asked to leave the facility when she was COVID+; was sober for 1 week until she started drinking again. Had also attended a few AA meetings in the past.       -Ascites: large volume on CT A/P       -HCC: pending contrasted imaging study       -SBP: negative on para 12/21       -Varices: no h/o EGD       -Hepatic encephalopathy: unable to assess; intubated; on lactulose/rifaximin    #new onset seizure- unknown trigger/etiology- currently on Keppra    Recommendations:  -trend clinical symptoms, exam findings, vital signs, CBC, CMP, INR, Mg, phosphorus  -ABO/Rh blood type sent on 2 different blood samples at least 15 minutes apart  -f/u acetaminophen level, PETH level and serum/urine drug screening  -rule out viral hepatitis with HBV PCR, HSV PCR, VZV PCR, EBV PCR, CMV IgG & PCR  -check HAV IgM, HBsAg, HBsAb, HBcAb IgM, HCV Ab [with reflex HCV PCR if positive], and HEV Ab  -albumin 25% 100 mL q8h  -c/w CRRT  -wean pressors as tolerated (on vaso + levo)  -check urine lytes if pt has UOP  -Infectious disease consultation       -pending full infectious workup - bl cx, UA +urine cx, dx para       -HIV screening       -check quantiferon gold for TB screening       -rubella IgG, syphillis screen, toxoplasma IgG, VZV IgG       -c/w broad spectrum antibiotics- fluconazole (12/20 ->), meropenem (12/20 ->);  s/p vanco (12/21 -12/22)  - send TSH, RPR, B12    **pt's /JOSUE Gibson ([phone])     62 y.o Hx significant for decompensated ETOH cirrhosis c/b ascites (dx 11/2022), alc hep (dx 11/2022; non-responsive to steroids), remote h/o thyroid cancer in her 20s s/p total thyroidectomy + RTX + radioactive iodide, HTN, ventrical neoplasm (dx 2021) s/p right frontal craniotomy (03/2022) for resection post operative course c/b hemorrhage right lateral ventricle (managed non-operatively) who was initially admitted to  12/19 with new onset seizures and transferred to [facility] 12/20 for LT eval for FRANTZ.     Impression:  #ACLF with underlying cirrhosis  #AUD  #Decompensated ETOH cirrhosis c/b prior ascites req LVP- UNOS/OPTN MELD-Na 42 (12/23); blood group O neg  Of note was recently admitted to  11/2022 for workup and eval of new onset jaundice- during that hospitalization was found to have a UTI and dx with alc hep was treated for UTI w/ abx and started on steroids (was deemed a non-responder and steroids were subsequently stopped); s/p LVP at [place] 11/2022. Previously hospitalized in 2021 at [facility] for ETOH detox but pt reportedly unaware of any liver dysfunction at that time. Went to ETOH rehab 08/2022 and was asked to leave the facility when she was COVID+; was sober for 1 week until she started drinking again. Had also attended a few AA meetings in the past.       -Ascites: large volume on CT A/P       -HCC: pending contrasted imaging study       -SBP: negative on para 12/21       -Varices: no h/o EGD       -Hepatic encephalopathy: unable to assess; intubated; on lactulose/rifaximin    #new onset seizure- unknown trigger/etiology- currently on Keppra    Recommendations:  -trend clinical symptoms, exam findings, vital signs, CBC, CMP, INR, Mg, phosphorus  -ABO/Rh blood type sent on 2 different blood samples at least 15 minutes apart  -f/u acetaminophen level, PETH level and serum/urine drug screening  -rule out viral hepatitis with HBV PCR, HSV PCR, VZV PCR, EBV PCR, CMV IgG & PCR  -check HAV IgM, HBsAg, HBsAb, HBcAb IgM, HCV Ab [with reflex HCV PCR if positive], and HEV Ab  -albumin 25% 100 mL q8h  -c/w CRRT  -wean pressors as tolerated (on vaso + levo)  -check urine lytes if pt has UOP  -Infectious disease consultation       -pending full infectious workup - bl cx, UA +urine cx, dx para       -HIV screening       -check quantiferon gold for TB screening       -rubella IgG, syphillis screen, toxoplasma IgG, VZV IgG       -c/w broad spectrum antibiotics- fluconazole (12/20 ->), meropenem (12/20 ->);  s/p vanco (12/21 -12/22)  - send TSH, RPR, B12    **pt's /JOSUE Gibson ([phone])     62 y.o Hx significant for decompensated ETOH cirrhosis c/b ascites (dx 11/2022), alc hep (dx 11/2022; non-responsive to steroids), remote h/o thyroid cancer in her 20s s/p total thyroidectomy + RTX + radioactive iodide, HTN, ventrical neoplasm (dx 2021) s/p right frontal craniotomy (03/2022) for resection post operative course c/b hemorrhage right lateral ventricle (managed non-operatively) who was initially admitted to  12/19 with new onset seizures and transferred to [facility] 12/20 for LT eval for FRANTZ.     Impression:  #ACLF with underlying cirrhosis  #AUD  #Decompensated ETOH cirrhosis c/b prior ascites req LVP- UNOS/OPTN MELD-Na 42 (12/23); blood group O neg  Of note was recently admitted to  11/2022 for workup and eval of new onset jaundice- during that hospitalization was found to have a UTI and dx with alc hep was treated for UTI w/ abx and started on steroids (was deemed a non-responder and steroids were subsequently stopped); s/p LVP at [place] 11/2022. Previously hospitalized in 2021 at [place] for ETOH detox but pt reportedly unaware of any liver dysfunction at that time. Went to ETOH rehab 08/2022 and was asked to leave the facility when she was COVID+; was sober for 1 week until she started drinking again. Had also attended a few AA meetings in the past.       -Ascites: large volume on CT A/P       -HCC: pending contrasted imaging study       -SBP: negative on para 12/21       -Varices: no h/o EGD       -Hepatic encephalopathy: unable to assess; intubated; on lactulose/rifaximin    #new onset seizure- unknown trigger/etiology- currently on Keppra    Recommendations:  -trend clinical symptoms, exam findings, vital signs, CBC, CMP, INR, Mg, phosphorus  -ABO/Rh blood type sent on 2 different blood samples at least 15 minutes apart  -f/u acetaminophen level, PETH level and serum/urine drug screening  -rule out viral hepatitis with HBV PCR, HSV PCR, VZV PCR, EBV PCR, CMV IgG & PCR  -check HAV IgM, HBsAg, HBsAb, HBcAb IgM, HCV Ab [with reflex HCV PCR if positive], and HEV Ab  -albumin 25% 100 mL q8h  -c/w CRRT  -wean pressors as tolerated (on vaso + levo)  -check urine lytes if pt has UOP  -Infectious disease consultation       -pending full infectious workup - bl cx, UA +urine cx, dx para       -HIV screening       -check quantiferon gold for TB screening       -rubella IgG, syphillis screen, toxoplasma IgG, VZV IgG       -c/w broad spectrum antibiotics- fluconazole (12/20 ->), meropenem (12/20 ->);  s/p vanco (12/21 -12/22)  - send TSH, RPR, B12    **pt's /JOSUE Gibson ([phone])

## 2022-12-24 NOTE — PROGRESS NOTE ADULT - ATTENDING COMMENTS
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 She was transferred to [facility] for liver transplant evaluation (12/21).     Extubated  KO tube for feeds  1U PRBC    N - CTH negative for acute pathology. EEG negative for seizures. Continue keppra. Lactulose and rifaximin, ammonia downtrending to 75.  C - Levo 0.07, vaso 0.03. TTE 12/21 normal function. Increase midodrine to 15mg q8h.  P - supplemental O2 PRN  G - Acute on chronic liver failure. Dobhoff tube placed, advance to goal. PPI prophylaxis.  R - Anuric acute renal failure. Continue CRRT. Pull 50cc/hr.   H - Holding VTE ppx. No evidence of external bleeding.   I - Leukocytosis to 22. CT chest with bilateral infiltrates - inflammatory vs infectious. CT A/P unremarkable. BCx NGTD. UA negative. Tracheal aspirate with rare yeast. Empiric meropenem (7d) and fluconazole. MRSA swab negative.  E - ISS, home synthroid  TLD - R femoral HD line, RIJ TLC, radial erna, dhillon, OGT  DISPO - SICU, full code    I have seen and examined this patient with the ICU resident/APC. I have reviewed all new labs, imaging and reports. I have participated in formulating the plan, and have read and agree with the history, ROS, exam, assessment and plan as stated above.    Total time spent in the critical care of this patient today (excluding teaching & procedures): 50 minutes    Over 50% of the total time was spent in discussion and coordination of care with consulting services, dietary and rehab services.    Vilma Mitchell MD  Surgical Critical Care . 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 She was transferred to [facility] for liver transplant evaluation (12/21).     Extubated  KO tube for feeds  1U PRBC    N - CTH negative for acute pathology. EEG negative for seizures. Continue keppra. Lactulose and rifaximin, ammonia downtrending to 75.  C - Levo 0.07, vaso 0.03. TTE 12/21 normal function. Increase midodrine to 15mg q8h.  P - supplemental O2 PRN  G - Acute on chronic liver failure. Dobhoff tube placed, advance to goal. PPI prophylaxis.  R - Anuric acute renal failure. Continue CRRT. Pull 50cc/hr.   H - Holding VTE ppx. No evidence of external bleeding.   I - Leukocytosis to 22. CT chest with bilateral infiltrates - inflammatory vs infectious. CT A/P unremarkable. BCx NGTD. UA negative. Tracheal aspirate with rare yeast. Empiric meropenem (7d) and fluconazole. MRSA swab negative.  E - ISS, home synthroid  TLD - R femoral HD line, RIJ TLC, radial erna, dhillon, OGT  DISPO - SICU, full code    I have seen and examined this patient with the ICU resident/APC. I have reviewed all new labs, imaging and reports. I have participated in formulating the plan, and have read and agree with the history, ROS, exam, assessment and plan as stated above.    Total time spent in the critical care of this patient today (excluding teaching & procedures): 50 minutes    Over 50% of the total time was spent in discussion and coordination of care with consulting services, dietary and rehab services.    Vilma Mitchell MD  Surgical Critical Care . 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 She was transferred to [facility] for liver transplant evaluation (12/21).     Extubated  KO tube for feeds  1U PRBC    N - CTH negative for acute pathology. EEG negative for seizures. Continue keppra. Lactulose and rifaximin, ammonia downtrending to 75.  C - Levo 0.07, vaso 0.03. TTE 12/21 normal function. Increase midodrine to 15mg q8h.  P - supplemental O2 PRN  G - Acute on chronic liver failure. Dobhoff tube placed, advance to goal. PPI prophylaxis.  R - Anuric acute renal failure. Continue CRRT. Pull 50cc/hr.   H - Holding VTE ppx. No evidence of external bleeding.   I - Leukocytosis to 22. CT chest with bilateral infiltrates - inflammatory vs infectious. CT A/P unremarkable. BCx NGTD. UA negative. Tracheal aspirate with rare yeast. Empiric meropenem (7d) and fluconazole. MRSA swab negative.  E - ISS, home synthroid  TLD - R femoral HD line, RIJ TLC, radial erna, dhillon, OGT  DISPO - SICU, full code    I have seen and examined this patient with the ICU resident/APC. I have reviewed all new labs, imaging and reports. I have participated in formulating the plan, and have read and agree with the history, ROS, exam, assessment and plan as stated above.    Total time spent in the critical care of this patient today (excluding teaching & procedures): 50 minutes    Over 50% of the total time was spent in discussion and coordination of care with consulting services, dietary and rehab services.    Vilma Mitchell MD  Surgical Critical Care . 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 She was transferred to [facility] for liver transplant evaluation (12/21).     Extubated yesterday without complications. Remains on moderate dose vasopressors, attempting to pull fluid on CRRT. Mental status waxing and waning. Failed bedside swallow, nasoduodenal tube placed for feeds and meds.     N - CTH negative for acute pathology. EEG negative for seizures. Continue keppra. Cont rifaximin, lactulose to goal stool >600, ammonia downtrending to 75.  C - Moderate levo and vaso. TTE 12/21 normal function. Increase midodrine to 15mg q8h.  P - supplemental O2 PRN  G - Acute on chronic liver failure. Dobhoff tube placed, advance to goal. PPI prophylaxis.  R - Anuric acute renal failure. Continue CRRT. Pull 50cc/hr.   H - Holding VTE ppx. No evidence of external bleeding.   I - Leukocytosis to 22. CT chest with bilateral infiltrates - inflammatory vs infectious. CT A/P unremarkable. BCx NGTD. UA negative. Tracheal aspirate with rare yeast. Empiric meropenem (7d) and fluconazole. MRSA swab negative.  E - ISS, home synthroid  TLD - R femoral HD line, RIJ TLC, radial erna, dhillon, OGT  DISPO - SICU, full code    I have seen and examined this patient with the ICU resident/APC. I have reviewed all new labs, imaging and reports. I have participated in formulating the plan, and have read and agree with the history, ROS, exam, assessment and plan as stated above.    Total time spent in the critical care of this patient today (excluding teaching & procedures): 50 minutes    Over 50% of the total time was spent in discussion and coordination of care with consulting services, dietary and rehab services.    Vilma Mitchell MD  Surgical Critical Care . 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 She was transferred to [facility] for liver transplant evaluation (12/21).     Extubated yesterday without complications. Remains on moderate dose vasopressors, attempting to pull fluid on CRRT. Mental status waxing and waning. Failed bedside swallow, nasoduodenal tube placed for feeds and meds.     N - CTH negative for acute pathology. EEG negative for seizures. Continue keppra. Cont rifaximin, lactulose to goal stool >600, ammonia downtrending to 75.  C - Moderate levo and vaso. TTE 12/21 normal function. Increase midodrine to 15mg q8h.  P - supplemental O2 PRN  G - Acute on chronic liver failure. Dobhoff tube placed, advance to goal. PPI prophylaxis.  R - Anuric acute renal failure. Continue CRRT. Pull 50cc/hr.   H - Holding VTE ppx. No evidence of external bleeding.   I - Leukocytosis to 22. CT chest with bilateral infiltrates - inflammatory vs infectious. CT A/P unremarkable. BCx NGTD. UA negative. Tracheal aspirate with rare yeast. Empiric meropenem (7d) and fluconazole. MRSA swab negative.  E - ISS, home synthroid  TLD - R femoral HD line, RIJ TLC, radial erna, dhillon, OGT  DISPO - SICU, full code    I have seen and examined this patient with the ICU resident/APC. I have reviewed all new labs, imaging and reports. I have participated in formulating the plan, and have read and agree with the history, ROS, exam, assessment and plan as stated above.    Total time spent in the critical care of this patient today (excluding teaching & procedures): 50 minutes    Over 50% of the total time was spent in discussion and coordination of care with consulting services, dietary and rehab services.    Vilma Mitchell MD  Surgical Critical Care . 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 She was transferred to [facility] for liver transplant evaluation (12/21).     Extubated yesterday without complications. Remains on moderate dose vasopressors, attempting to pull fluid on CRRT. Mental status waxing and waning. Failed bedside swallow, nasoduodenal tube placed for feeds and meds.     N - CTH negative for acute pathology. EEG negative for seizures. Continue keppra. Cont rifaximin, lactulose to goal stool >600, ammonia downtrending to 75.  C - Moderate levo and vaso. TTE 12/21 normal function. Increase midodrine to 15mg q8h.  P - supplemental O2 PRN  G - Acute on chronic liver failure. Dobhoff tube placed, advance to goal. PPI prophylaxis.  R - Anuric acute renal failure. Continue CRRT. Pull 50cc/hr.   H - Holding VTE ppx. No evidence of external bleeding.   I - Leukocytosis to 22. CT chest with bilateral infiltrates - inflammatory vs infectious. CT A/P unremarkable. BCx NGTD. UA negative. Tracheal aspirate with rare yeast. Empiric meropenem (7d) and fluconazole. MRSA swab negative.  E - ISS, home synthroid  TLD - R femoral HD line, RIJ TLC, radial erna, dhillon, OGT  DISPO - SICU, full code    I have seen and examined this patient with the ICU resident/APC. I have reviewed all new labs, imaging and reports. I have participated in formulating the plan, and have read and agree with the history, ROS, exam, assessment and plan as stated above.    Total time spent in the critical care of this patient today (excluding teaching & procedures): 50 minutes    Over 50% of the total time was spent in discussion and coordination of care with consulting services, dietary and rehab services.    Vilma Mitchell MD  Surgical Critical Care .

## 2022-12-24 NOTE — PROGRESS NOTE ADULT - ASSESSMENT
62 y.o Hx significant for remote AUD, h/o thyroid cancer in her 20s s/p total thyroidectomy + RTX + radioactive iodide, HTN, ventricle neoplasm (dx 2021) s/p right frontal craniotomy (03/2022) for resection, post operative course c/b hemorrhage right lateral ventricle (managed non-operatively) who was initially admitted to [facility] 12/19 with new onset seizures.   Transferred from [facility] 12/20 for further management of acute liver failure and liver transplant evaluation 2/2 known ETOH Cirrhosis.     Decompensated ETOH Cirrhosis  ABO O, MELD 43 12/21  -vent and pressors per SICU  -CVVH restarted   -Albumin q8H  -CTH/CAP non-con  -liver doppler  -pan culture  -needs a diagnostic paracentesis/LVP as able (1:1 Albumin replacement)  -stop octreotide  -broad spectrum ABX, Transplant ID consulted, please re-send blood cx  -Lactulose, increase prn. continue Rifaximin  -PPI  -on Keppra for seizures, involve Neuro  -check urine Lytes, follow daily Nephrology recs       62 y.o Hx significant for remote AUD, h/o thyroid cancer in her 20s s/p total thyroidectomy + RTX + radioactive iodide, HTN, ventricle neoplasm (dx 2021) s/p right frontal craniotomy (03/2022) for resection, post operative course c/b hemorrhage right lateral ventricle (managed non-operatively) who was initially admitted to [facility] 12/19 with new onset seizures.   Transferred from [facility] 12/20 for further management of acute liver failure and liver transplant evaluation 2/2 known ETOH Cirrhosis.     Decompensated ETOH Cirrhosis  ABO O, MELD 43 12/21  -vent and pressors per SICU  -CVVH restarted   -Albumin q8H  -CTH/CAP non-con  -liver doppler  -pan culture  -needs a diagnostic paracentesis/LVP as able (1:1 Albumin replacement)  -stop octreotide  -broad spectrum ABX, Transplant ID consulted, please re-send blood cx  -Lactulose, increase prn. continue Rifaximin  -PPI  -on Keppra for seizures, involve Neuro  -check urine Lytes, follow daily Nephrology recs       62 y.o Hx significant for remote AUD, h/o thyroid cancer in her 20s s/p total thyroidectomy + RTX + radioactive iodide, HTN, ventricle neoplasm (dx 2021) s/p right frontal craniotomy (03/2022) for resection, post operative course c/b hemorrhage right lateral ventricle (managed non-operatively) who was initially admitted to [facility] 12/19 with new onset seizures.   Transferred from [facility] 12/20 for further management of acute liver failure and liver transplant evaluation 2/2 known ETOH Cirrhosis.     Decompensated ETOH Cirrhosis  ABO O, MELD 43 12/21  -vent and pressors per SICU  -CVVH restarted   -Albumin q8H  -CTH/CAP non-con  -liver doppler  -pan culture  -needs a diagnostic paracentesis/LVP as able (1:1 Albumin replacement)  -stop octreotide  -broad spectrum ABX, Transplant ID consulted, please re-send blood cx  -Lactulose, increase prn. continue Rifaximin  -PPI  -on Keppra for seizures, involve Neuro  -check urine Lytes, follow daily Nephrology recs       62 y.o Hx significant for remote AUD, h/o thyroid cancer in her 20s s/p total thyroidectomy + RTX + radioactive iodide, HTN, ventricle neoplasm (dx 2021) s/p right frontal craniotomy (03/2022) for resection, post operative course c/b hemorrhage right lateral ventricle (managed non-operatively) who was initially admitted to [facility] 12/19 with new onset seizures.   Transferred from [facility] 12/20 for further management of acute liver failure and liver transplant evaluation 2/2 known ETOH Cirrhosis.     [] Decompensated ETOH Cirrhosis  - extubated  - wean off pressors as tolerated  - incr midodrine 15mg tid   - remains on CVVH   - ID: cont zonia/fluc   - HE: lactulose/rifaximin   - on Keppra for seizures,   - Liver eval started        62 y.o Hx significant for remote AUD, h/o thyroid cancer in her 20s s/p total thyroidectomy + RTX + radioactive iodide, HTN, ventricle neoplasm (dx 2021) s/p right frontal craniotomy (03/2022) for resection, post operative course c/b hemorrhage right lateral ventricle (managed non-operatively) who was initially admitted to [facility] 12/19 with new onset seizures.   Transferred from [facility] 12/20 for further management of acute liver failure and liver transplant evaluation 2/2 known ETOH Cirrhosis.     [] Decompensated ETOH Cirrhosis  - extubated  - wean off pressors as tolerated  - incr midodrine 15mg tid   - remains on CVVH   - ID: cont zonia/fluc   - HE: lactulose/rifaximin   - on Keppra for seizures,   - Liver eval started        62 y.o Hx significant for remote AUD, h/o thyroid cancer in her 20s s/p total thyroidectomy + RTX + radioactive iodide, HTN, ventricle neoplasm (dx 2021) s/p right frontal craniotomy (03/2022) for resection, post operative course c/b hemorrhage right lateral ventricle (managed non-operatively) who was initially admitted to [facility] 12/19 with new onset seizures.   Transferred from [facility] 12/20 for further management of acute liver failure and liver transplant evaluation 2/2 known ETOH Cirrhosis.     [] Decompensated ETOH Cirrhosis  - extubated  - wean off pressors as tolerated  - incr midodrine 15mg tid   - remains on CVVH   - ID: cont zonia/fluc   - HE: lactulose/rifaximin   - on Keppra for seizures,   - Liver eval started

## 2022-12-24 NOTE — PROGRESS NOTE ADULT - ASSESSMENT
61 yo F with h/o decompensated ETOH cirrhosis with ascites, thyroid cancer (s/p total thyroidectomy, RTX, and radioactive iodide), HTN, ventrical neoplasm who was initially admitted to  12/19 with new onset seizures and transferred to [facility] 12/20 for liver transplant evaluation. Pt being seen for RED requiring CRRT.      Oliguric RED -ATN/HRS/Biliary cast nephropathy . started on CRRT since 12/20  remains oliguric. on pressor support.  Continue CRRT for now.    63 yo F with h/o decompensated ETOH cirrhosis with ascites, thyroid cancer (s/p total thyroidectomy, RTX, and radioactive iodide), HTN, ventrical neoplasm who was initially admitted to  12/19 with new onset seizures and transferred to [facility] 12/20 for liver transplant evaluation. Pt being seen for RED requiring CRRT.      Oliguric RED -ATN/HRS/Biliary cast nephropathy . started on CRRT since 12/20  remains oliguric. on pressor support.  Continue CRRT for now.    63 yo F with h/o decompensated ETOH cirrhosis with ascites, thyroid cancer (s/p total thyroidectomy, RTX, and radioactive iodide), HTN, ventrical neoplasm who was initially admitted to  12/19 with new onset seizures and transferred to [facility] 12/20 for liver transplant evaluation. Pt being seen for RED requiring CRRT.      Oliguric RED -ATN/HRS/Biliary cast nephropathy . started on CRRT since 12/20  remains oliguric. on pressor support.  Continue CRRT for now.

## 2022-12-24 NOTE — PROGRESS NOTE ADULT - ASSESSMENT
Patient is a 62yFemale presents with hx thyroid cancer s/p thyroidectomy, craniotomy with resection of ventricular mass, with acute liver failure, new seizures, transfer [facility] for transplant evaluation.     Neurologic: hepatic encephalopathy - improving  - off sedation, AAOx4  - c/w lactulose and rifaximin   - ammonia 96 --> 84 --> 75  - c/w keppra 750 BID ( new onset of seizure    Respiratory: acute hypoxic resp failure   - CXR b/l infiltrates RLL>LLL, improving  - Extubated 12/23 AM  - combicath cx no significant growth to date    Cardiovascular:   - Remains on Levo 0.1, Vaso 0.03.   - Midodrine started 10mg [id number]  -titrate to MAP >65  -hold home anti htn    Gastrointestinal/Nutrition:  - Acute decompensated liver failure, s/p para 2.9L in ED  - Workup per transplant recs pending  - Monitor BM, rectal tube, on lactulose and rifaximin   - add PPI, dc'd octreotide  - Diet advanced to clears.     Genitourinary/Renal: RED 2/2 ATN vs HRS  - c/w CRRT  - HD access right groin   - Cr improving (baseline approx 0.6)  - monitor electrolytes  - nephro following  - dysphagia screen    Hematologic:  - Chem VTE ppx: hold   - Thrombocytopenic, monitor plts  - Hgb downtrending to 7, given 1 PRBC  - INR elevated likely hepatic synthetic dysfunction  - s/p vit K for 3d for coagulopathy    Infectious Disease:  - Empiric abx meropenem, fluconazole, vanc  - ascites, blood, urine & fungal Cx so far NG  - repeat BCx today x2 sets  - send combicath cx  - Hepatitis workup     Endocrine:  - Hypothyroidism c/w synthroid IV 70  - No steroids  - Insulin sliding scale off, will hold D10 and monitor glucose.    Ethics: FULL CODE    Dispo/Lines:  R IJ TLC  R Fem Shiley  A line L Radial  [place]  SICU   Patient is a 62yFemale presents with hx thyroid cancer s/p thyroidectomy, craniotomy with resection of ventricular mass, with acute liver failure, new seizures, transfer from [facility] for transplant evaluation.     Neurologic: hepatic encephalopathy - improving  - off sedation, AAOx4  - c/w lactulose and rifaximin   - ammonia 96 --> 84 --> 75  - c/w keppra 750 BID ( new onset of seizure    Respiratory: acute hypoxic resp failure   - CXR b/l infiltrates RLL>LLL, improving  - Extubated 12/23 AM  - combicath cx no significant growth to date    Cardiovascular:   - Remains on Levo 0.1, Vaso 0.03.   - Midodrine started 10mg [id number]  -titrate to MAP >65  -hold home anti htn    Gastrointestinal/Nutrition:  - Acute decompensated liver failure, s/p para 2.9L in ED  - Workup per transplant recs pending  - Monitor BM, rectal tube, on lactulose and rifaximin   - add PPI, dc'd octreotide  - Diet advanced to clears.     Genitourinary/Renal: RED 2/2 ATN vs HRS  - c/w CRRT  - HD access right groin   - Cr improving (baseline approx 0.6)  - monitor electrolytes  - nephro following  - dysphagia screen    Hematologic:  - Chem VTE ppx: hold   - Thrombocytopenic, monitor plts  - Hgb downtrending to 7, given 1 PRBC  - INR elevated likely hepatic synthetic dysfunction  - s/p vit K for 3d for coagulopathy    Infectious Disease:  - Empiric abx meropenem, fluconazole, vanc  - ascites, blood, urine & fungal Cx so far NG  - repeat BCx today x2 sets  - send combicath cx  - Hepatitis workup     Endocrine:  - Hypothyroidism c/w synthroid IV 70  - No steroids  - Insulin sliding scale off, will hold D10 and monitor glucose.    Ethics: FULL CODE    Dispo/Lines:  R IJ TLC  R Fem Shiley  A line L Radial  [place]  SICU   Patient is a 62yFemale presents with hx thyroid cancer s/p thyroidectomy, craniotomy with resection of ventricular mass, with acute liver failure, new seizures, transfer from [facility] for transplant evaluation.     Neurologic: hepatic encephalopathy - improving  - off sedation, AAOx4  - c/w lactulose and rifaximin   - ammonia 96 --> 84 --> 75  - c/w keppra 750 BID ( new onset of seizure    Respiratory: acute hypoxic resp failure   - CXR b/l infiltrates RLL>LLL, improving  - Extubated 12/23 AM  - combicath cx no significant growth to date    Cardiovascular:   - Remains on Levo 0.1, Vaso 0.03.   - Midodrine started 10mg [id number]  -titrate to MAP >65  -hold home anti htn    Gastrointestinal/Nutrition:  - Acute decompensated liver failure, s/p para 2.9L in ED  - Workup per transplant recs pending  - Monitor BM, rectal tube, on lactulose and rifaximin   - add PPI, dc'd octreotide  - Diet advanced to clears.     Genitourinary/Renal: RED 2/2 ATN vs HRS  - c/w CRRT  - HD access right groin   - Cr improving (baseline approx 0.6)  - monitor electrolytes  - nephro following  - dysphagia screen    Hematologic:  - Chem VTE ppx: hold   - Thrombocytopenic, monitor plts  - Hgb downtrending to 7, given 1 PRBC  - INR elevated likely hepatic synthetic dysfunction  - s/p vit K for 3d for coagulopathy    Infectious Disease:  - Empiric abx meropenem, fluconazole, vanc  - ascites, blood, urine & fungal Cx so far NG  - repeat BCx today x2 sets  - send combicath cx  - Hepatitis workup     Endocrine:  - Hypothyroidism c/w synthroid IV 70  - No steroids  - Insulin sliding scale off, will hold D10 and monitor glucose.    Ethics: FULL CODE    Dispo/Lines:  R IJ TLC  R Fem Shiley  A line L Radial  [place]  SICU

## 2022-12-25 LAB
A1C WITH ESTIMATED AVERAGE GLUCOSE RESULT: 4.9 % — SIGNIFICANT CHANGE UP (ref 4–5.6)
AFP-TM SERPL-MCNC: 4.2 NG/ML — SIGNIFICANT CHANGE UP
ALBUMIN SERPL ELPH-MCNC: 4 G/DL — SIGNIFICANT CHANGE UP (ref 3.3–5)
ALBUMIN SERPL ELPH-MCNC: 4.1 G/DL — SIGNIFICANT CHANGE UP (ref 3.3–5)
ALP SERPL-CCNC: 135 U/L — HIGH (ref 40–120)
ALP SERPL-CCNC: 146 U/L — HIGH (ref 40–120)
ALP SERPL-CCNC: 154 U/L — HIGH (ref 40–120)
ALP SERPL-CCNC: 155 U/L — HIGH (ref 40–120)
ALT FLD-CCNC: 43 U/L — SIGNIFICANT CHANGE UP (ref 10–45)
ALT FLD-CCNC: 44 U/L — SIGNIFICANT CHANGE UP (ref 10–45)
ALT FLD-CCNC: 45 U/L — SIGNIFICANT CHANGE UP (ref 10–45)
ALT FLD-CCNC: 46 U/L — HIGH (ref 10–45)
ANION GAP SERPL CALC-SCNC: 14 MMOL/L — SIGNIFICANT CHANGE UP (ref 5–17)
ANION GAP SERPL CALC-SCNC: 15 MMOL/L — SIGNIFICANT CHANGE UP (ref 5–17)
ANION GAP SERPL CALC-SCNC: 16 MMOL/L — SIGNIFICANT CHANGE UP (ref 5–17)
AST SERPL-CCNC: 118 U/L — HIGH (ref 10–40)
AST SERPL-CCNC: 125 U/L — HIGH (ref 10–40)
AST SERPL-CCNC: 131 U/L — HIGH (ref 10–40)
AST SERPL-CCNC: 132 U/L — HIGH (ref 10–40)
BILIRUB SERPL-MCNC: 25.5 MG/DL — HIGH (ref 0.2–1.2)
BILIRUB SERPL-MCNC: 25.9 MG/DL — HIGH (ref 0.2–1.2)
BILIRUB SERPL-MCNC: 26.3 MG/DL — HIGH (ref 0.2–1.2)
BILIRUB SERPL-MCNC: 26.6 MG/DL — HIGH (ref 0.2–1.2)
BUN SERPL-MCNC: 4 MG/DL — LOW (ref 7–23)
BUN SERPL-MCNC: 5 MG/DL — LOW (ref 7–23)
CALCIUM SERPL-MCNC: 8.5 MG/DL — SIGNIFICANT CHANGE UP (ref 8.4–10.5)
CALCIUM SERPL-MCNC: 8.7 MG/DL — SIGNIFICANT CHANGE UP (ref 8.4–10.5)
CALCIUM SERPL-MCNC: 8.8 MG/DL — SIGNIFICANT CHANGE UP (ref 8.4–10.5)
CALCIUM SERPL-MCNC: 9 MG/DL — SIGNIFICANT CHANGE UP (ref 8.4–10.5)
CEA SERPL-MCNC: 3.4 NG/ML — SIGNIFICANT CHANGE UP (ref 0–3.8)
CERULOPLASMIN SERPL-MCNC: 12 MG/DL — LOW (ref 16–45)
CHLORIDE SERPL-SCNC: 101 MMOL/L — SIGNIFICANT CHANGE UP (ref 96–108)
CHLORIDE SERPL-SCNC: 102 MMOL/L — SIGNIFICANT CHANGE UP (ref 96–108)
CMV IGG FLD QL: 5.1 U/ML — HIGH
CMV IGG SERPL-IMP: POSITIVE
CO2 SERPL-SCNC: 20 MMOL/L — LOW (ref 22–31)
CO2 SERPL-SCNC: 21 MMOL/L — LOW (ref 22–31)
CO2 SERPL-SCNC: 22 MMOL/L — SIGNIFICANT CHANGE UP (ref 22–31)
CO2 SERPL-SCNC: 23 MMOL/L — SIGNIFICANT CHANGE UP (ref 22–31)
COVID-19 SPIKE DOMAIN AB INTERP: POSITIVE
COVID-19 SPIKE DOMAIN ANTIBODY RESULT: >250 U/ML — HIGH
CREAT SERPL-MCNC: 0.71 MG/DL — SIGNIFICANT CHANGE UP (ref 0.5–1.3)
CREAT SERPL-MCNC: 0.72 MG/DL — SIGNIFICANT CHANGE UP (ref 0.5–1.3)
CREAT SERPL-MCNC: 0.76 MG/DL — SIGNIFICANT CHANGE UP (ref 0.5–1.3)
EBV EA AB SER IA-ACNC: <5 U/ML — SIGNIFICANT CHANGE UP
EBV EA AB TITR SER IF: POSITIVE
EBV EA IGG SER-ACNC: NEGATIVE — SIGNIFICANT CHANGE UP
EBV NA IGG SER IA-ACNC: 538 U/ML — HIGH
EBV PATRN SPEC IB-IMP: SIGNIFICANT CHANGE UP
EBV VCA IGG AVIDITY SER QL IA: POSITIVE
EBV VCA IGM SER IA-ACNC: 340 U/ML — HIGH
EBV VCA IGM SER IA-ACNC: <10 U/ML — SIGNIFICANT CHANGE UP
EBV VCA IGM TITR FLD: NEGATIVE — SIGNIFICANT CHANGE UP
EGFR: 89 ML/MIN/1.73M2 — SIGNIFICANT CHANGE UP
EGFR: 95 ML/MIN/1.73M2 — SIGNIFICANT CHANGE UP
EGFR: 97 ML/MIN/1.73M2 — SIGNIFICANT CHANGE UP
ESTIMATED AVERAGE GLUCOSE: 94 MG/DL — SIGNIFICANT CHANGE UP (ref 68–114)
FERRITIN SERPL-MCNC: 5747 NG/ML — HIGH (ref 15–150)
GAS PNL BLDA: SIGNIFICANT CHANGE UP
GI PCR PANEL: SIGNIFICANT CHANGE UP
GLUCOSE BLDC GLUCOMTR-MCNC: 157 MG/DL — HIGH (ref 70–99)
GLUCOSE BLDC GLUCOMTR-MCNC: 158 MG/DL — HIGH (ref 70–99)
GLUCOSE BLDC GLUCOMTR-MCNC: 184 MG/DL — HIGH (ref 70–99)
GLUCOSE SERPL-MCNC: 102 MG/DL — HIGH (ref 70–99)
GLUCOSE SERPL-MCNC: 139 MG/DL — HIGH (ref 70–99)
GLUCOSE SERPL-MCNC: 150 MG/DL — HIGH (ref 70–99)
GLUCOSE SERPL-MCNC: 203 MG/DL — HIGH (ref 70–99)
HAV IGG SER QL IA: SIGNIFICANT CHANGE UP
HAV IGM SER-ACNC: SIGNIFICANT CHANGE UP
HBV CORE AB SER-ACNC: SIGNIFICANT CHANGE UP
HBV SURFACE AB SER-ACNC: <3 MIU/ML — LOW
HBV SURFACE AB SER-ACNC: SIGNIFICANT CHANGE UP
HBV SURFACE AG SER-ACNC: SIGNIFICANT CHANGE UP
HCT VFR BLD CALC: 23.6 % — LOW (ref 34.5–45)
HCT VFR BLD CALC: 24.6 % — LOW (ref 34.5–45)
HCT VFR BLD CALC: 25.1 % — LOW (ref 34.5–45)
HCT VFR BLD CALC: 25.7 % — LOW (ref 34.5–45)
HCV AB S/CO SERPL IA: 0.12 S/CO — SIGNIFICANT CHANGE UP (ref 0–0.99)
HCV AB SERPL-IMP: SIGNIFICANT CHANGE UP
HGB BLD-MCNC: 8.1 G/DL — LOW (ref 11.5–15.5)
HGB BLD-MCNC: 8.2 G/DL — LOW (ref 11.5–15.5)
HGB BLD-MCNC: 8.3 G/DL — LOW (ref 11.5–15.5)
HGB BLD-MCNC: 8.5 G/DL — LOW (ref 11.5–15.5)
HIV 1+2 AB+HIV1 P24 AG SERPL QL IA: SIGNIFICANT CHANGE UP
HSV1 IGG SER-ACNC: 16.3 INDEX — HIGH
HSV1 IGG SERPL QL IA: POSITIVE
HSV2 IGG FLD-ACNC: 0.11 INDEX — SIGNIFICANT CHANGE UP
HSV2 IGG SERPL QL IA: NEGATIVE — SIGNIFICANT CHANGE UP
MAGNESIUM SERPL-MCNC: 2.3 MG/DL — SIGNIFICANT CHANGE UP (ref 1.6–2.6)
MAGNESIUM SERPL-MCNC: 2.4 MG/DL — SIGNIFICANT CHANGE UP (ref 1.6–2.6)
MCHC RBC-ENTMCNC: 30.6 PG — SIGNIFICANT CHANGE UP (ref 27–34)
MCHC RBC-ENTMCNC: 31 PG — SIGNIFICANT CHANGE UP (ref 27–34)
MCHC RBC-ENTMCNC: 31.2 PG — SIGNIFICANT CHANGE UP (ref 27–34)
MCHC RBC-ENTMCNC: 32.3 PG — SIGNIFICANT CHANGE UP (ref 27–34)
MCHC RBC-ENTMCNC: 32.7 GM/DL — SIGNIFICANT CHANGE UP (ref 32–36)
MCHC RBC-ENTMCNC: 33.1 GM/DL — SIGNIFICANT CHANGE UP (ref 32–36)
MCHC RBC-ENTMCNC: 33.7 GM/DL — SIGNIFICANT CHANGE UP (ref 32–36)
MCHC RBC-ENTMCNC: 34.3 GM/DL — SIGNIFICANT CHANGE UP (ref 32–36)
MCV RBC AUTO: 92.5 FL — SIGNIFICANT CHANGE UP (ref 80–100)
MCV RBC AUTO: 93.7 FL — SIGNIFICANT CHANGE UP (ref 80–100)
MCV RBC AUTO: 93.8 FL — SIGNIFICANT CHANGE UP (ref 80–100)
MCV RBC AUTO: 94 FL — SIGNIFICANT CHANGE UP (ref 80–100)
MEV IGG SER-ACNC: 30.9 AU/ML — SIGNIFICANT CHANGE UP
MEV IGG+IGM SER-IMP: POSITIVE — SIGNIFICANT CHANGE UP
MITOCHONDRIA AB SER-ACNC: SIGNIFICANT CHANGE UP
MUV AB SER-ACNC: POSITIVE — SIGNIFICANT CHANGE UP
MUV IGG FLD-ACNC: >300 AU/ML — SIGNIFICANT CHANGE UP
NRBC # BLD: 0 /100 WBCS — SIGNIFICANT CHANGE UP (ref 0–0)
PHOSPHATE SERPL-MCNC: 2.1 MG/DL — LOW (ref 2.5–4.5)
PHOSPHATE SERPL-MCNC: 2.3 MG/DL — LOW (ref 2.5–4.5)
PHOSPHATE SERPL-MCNC: 3 MG/DL — SIGNIFICANT CHANGE UP (ref 2.5–4.5)
PHOSPHATE SERPL-MCNC: 3.5 MG/DL — SIGNIFICANT CHANGE UP (ref 2.5–4.5)
PLATELET # BLD AUTO: 47 K/UL — LOW (ref 150–400)
PLATELET # BLD AUTO: 51 K/UL — LOW (ref 150–400)
PLATELET # BLD AUTO: 53 K/UL — LOW (ref 150–400)
PLATELET # BLD AUTO: 59 K/UL — LOW (ref 150–400)
POTASSIUM SERPL-MCNC: 3.7 MMOL/L — SIGNIFICANT CHANGE UP (ref 3.5–5.3)
POTASSIUM SERPL-MCNC: 3.9 MMOL/L — SIGNIFICANT CHANGE UP (ref 3.5–5.3)
POTASSIUM SERPL-MCNC: 4 MMOL/L — SIGNIFICANT CHANGE UP (ref 3.5–5.3)
POTASSIUM SERPL-SCNC: 3.7 MMOL/L — SIGNIFICANT CHANGE UP (ref 3.5–5.3)
POTASSIUM SERPL-SCNC: 3.9 MMOL/L — SIGNIFICANT CHANGE UP (ref 3.5–5.3)
POTASSIUM SERPL-SCNC: 4 MMOL/L — SIGNIFICANT CHANGE UP (ref 3.5–5.3)
PROT SERPL-MCNC: 6.1 G/DL — SIGNIFICANT CHANGE UP (ref 6–8.3)
PROT SERPL-MCNC: 6.3 G/DL — SIGNIFICANT CHANGE UP (ref 6–8.3)
PSA SERPL-MCNC: <0.01 NG/ML — SIGNIFICANT CHANGE UP (ref 0–4)
RBC # BLD: 2.51 M/UL — LOW (ref 3.8–5.2)
RBC # BLD: 2.66 M/UL — LOW (ref 3.8–5.2)
RBC # BLD: 2.68 M/UL — LOW (ref 3.8–5.2)
RBC # BLD: 2.74 M/UL — LOW (ref 3.8–5.2)
RBC # FLD: 17.5 % — HIGH (ref 10.3–14.5)
RBC # FLD: 17.7 % — HIGH (ref 10.3–14.5)
RBC # FLD: 17.8 % — HIGH (ref 10.3–14.5)
RBC # FLD: 18 % — HIGH (ref 10.3–14.5)
RUBV IGG SER-ACNC: 7.5 INDEX — SIGNIFICANT CHANGE UP
RUBV IGG SER-IMP: POSITIVE — SIGNIFICANT CHANGE UP
SARS-COV-2 IGG+IGM SERPL QL IA: >250 U/ML — HIGH
SARS-COV-2 IGG+IGM SERPL QL IA: POSITIVE
SMOOTH MUSCLE AB SER-ACNC: SIGNIFICANT CHANGE UP
SODIUM SERPL-SCNC: 137 MMOL/L — SIGNIFICANT CHANGE UP (ref 135–145)
SODIUM SERPL-SCNC: 138 MMOL/L — SIGNIFICANT CHANGE UP (ref 135–145)
SODIUM SERPL-SCNC: 139 MMOL/L — SIGNIFICANT CHANGE UP (ref 135–145)
T GONDII IGG SER QL: <3 IU/ML — SIGNIFICANT CHANGE UP
T GONDII IGG SER QL: NEGATIVE — SIGNIFICANT CHANGE UP
T PALLIDUM AB TITR SER: NEGATIVE — SIGNIFICANT CHANGE UP
T4 FREE SERPL-MCNC: 0.9 NG/DL — SIGNIFICANT CHANGE UP (ref 0.9–1.8)
TSH SERPL-MCNC: 0.05 UIU/ML — LOW (ref 0.27–4.2)
VZV IGG FLD QL IA: 959.3 INDEX — SIGNIFICANT CHANGE UP
VZV IGG FLD QL IA: POSITIVE — SIGNIFICANT CHANGE UP
WBC # BLD: 23.29 K/UL — HIGH (ref 3.8–10.5)
WBC # BLD: 26.46 K/UL — HIGH (ref 3.8–10.5)
WBC # BLD: 28.98 K/UL — HIGH (ref 3.8–10.5)
WBC # BLD: 29.21 K/UL — HIGH (ref 3.8–10.5)
WBC # FLD AUTO: 23.29 K/UL — HIGH (ref 3.8–10.5)
WBC # FLD AUTO: 26.46 K/UL — HIGH (ref 3.8–10.5)
WBC # FLD AUTO: 28.98 K/UL — HIGH (ref 3.8–10.5)
WBC # FLD AUTO: 29.21 K/UL — HIGH (ref 3.8–10.5)

## 2022-12-25 PROCEDURE — 71045 X-RAY EXAM CHEST 1 VIEW: CPT | Mod: 26,76

## 2022-12-25 PROCEDURE — 99232 SBSQ HOSP IP/OBS MODERATE 35: CPT | Mod: GC

## 2022-12-25 PROCEDURE — 90945 DIALYSIS ONE EVALUATION: CPT

## 2022-12-25 PROCEDURE — 99291 CRITICAL CARE FIRST HOUR: CPT

## 2022-12-25 RX ORDER — ESCITALOPRAM OXALATE 10 MG/1
10 TABLET, FILM COATED ORAL DAILY
Refills: 0 | Status: DISCONTINUED | OUTPATIENT
Start: 2022-12-25 | End: 2022-12-25

## 2022-12-25 RX ORDER — NOREPINEPHRINE BITARTRATE/D5W 8 MG/250ML
0.05 PLASTIC BAG, INJECTION (ML) INTRAVENOUS
Qty: 8 | Refills: 0 | Status: DISCONTINUED | OUTPATIENT
Start: 2022-12-25 | End: 2022-12-26

## 2022-12-25 RX ORDER — POTASSIUM CHLORIDE 20 MEQ
20 PACKET (EA) ORAL ONCE
Refills: 0 | Status: COMPLETED | OUTPATIENT
Start: 2022-12-25 | End: 2022-12-25

## 2022-12-25 RX ORDER — LEVOTHYROXINE SODIUM 125 MCG
137 TABLET ORAL DAILY
Refills: 0 | Status: DISCONTINUED | OUTPATIENT
Start: 2022-12-25 | End: 2022-12-25

## 2022-12-25 RX ORDER — CHLORHEXIDINE GLUCONATE 213 G/1000ML
1 SOLUTION TOPICAL
Refills: 0 | Status: DISCONTINUED | OUTPATIENT
Start: 2022-12-25 | End: 2023-01-09

## 2022-12-25 RX ORDER — MIDODRINE HYDROCHLORIDE 2.5 MG/1
15 TABLET ORAL EVERY 8 HOURS
Refills: 0 | Status: DISCONTINUED | OUTPATIENT
Start: 2022-12-25 | End: 2022-12-25

## 2022-12-25 RX ORDER — KETAMINE HYDROCHLORIDE 100 MG/ML
30 INJECTION INTRAMUSCULAR; INTRAVENOUS ONCE
Refills: 0 | Status: DISCONTINUED | OUTPATIENT
Start: 2022-12-25 | End: 2022-12-25

## 2022-12-25 RX ORDER — INSULIN LISPRO 100/ML
VIAL (ML) SUBCUTANEOUS
Refills: 0 | Status: DISCONTINUED | OUTPATIENT
Start: 2022-12-25 | End: 2022-12-25

## 2022-12-25 RX ORDER — INSULIN LISPRO 100/ML
VIAL (ML) SUBCUTANEOUS EVERY 6 HOURS
Refills: 0 | Status: DISCONTINUED | OUTPATIENT
Start: 2022-12-25 | End: 2023-01-01

## 2022-12-25 RX ORDER — HEPARIN SODIUM 5000 [USP'U]/ML
5000 INJECTION INTRAVENOUS; SUBCUTANEOUS EVERY 12 HOURS
Refills: 0 | Status: DISCONTINUED | OUTPATIENT
Start: 2022-12-25 | End: 2022-12-25

## 2022-12-25 RX ORDER — MULTIVIT-MIN/FERROUS GLUCONATE 9 MG/15 ML
15 LIQUID (ML) ORAL DAILY
Refills: 0 | Status: DISCONTINUED | OUTPATIENT
Start: 2022-12-25 | End: 2022-12-25

## 2022-12-25 RX ORDER — MIDODRINE HYDROCHLORIDE 2.5 MG/1
20 TABLET ORAL EVERY 8 HOURS
Refills: 0 | Status: DISCONTINUED | OUTPATIENT
Start: 2022-12-25 | End: 2023-01-09

## 2022-12-25 RX ORDER — MULTIVIT-MIN/FERROUS GLUCONATE 9 MG/15 ML
15 LIQUID (ML) ORAL DAILY
Refills: 0 | Status: DISCONTINUED | OUTPATIENT
Start: 2022-12-25 | End: 2023-01-09

## 2022-12-25 RX ORDER — ESCITALOPRAM OXALATE 10 MG/1
10 TABLET, FILM COATED ORAL DAILY
Refills: 0 | Status: DISCONTINUED | OUTPATIENT
Start: 2022-12-25 | End: 2023-01-09

## 2022-12-25 RX ORDER — LEVOTHYROXINE SODIUM 125 MCG
137 TABLET ORAL DAILY
Refills: 0 | Status: DISCONTINUED | OUTPATIENT
Start: 2022-12-25 | End: 2023-01-01

## 2022-12-25 RX ORDER — VASOPRESSIN 20 [USP'U]/ML
0.03 INJECTION INTRAVENOUS
Qty: 40 | Refills: 0 | Status: DISCONTINUED | OUTPATIENT
Start: 2022-12-25 | End: 2023-01-09

## 2022-12-25 RX ORDER — MEROPENEM 1 G/30ML
1000 INJECTION INTRAVENOUS EVERY 8 HOURS
Refills: 0 | Status: COMPLETED | OUTPATIENT
Start: 2022-12-25 | End: 2023-01-01

## 2022-12-25 RX ORDER — LEVETIRACETAM 250 MG/1
750 TABLET, FILM COATED ORAL
Refills: 0 | Status: DISCONTINUED | OUTPATIENT
Start: 2022-12-25 | End: 2022-12-25

## 2022-12-25 RX ORDER — LEVETIRACETAM 250 MG/1
750 TABLET, FILM COATED ORAL
Refills: 0 | Status: DISCONTINUED | OUTPATIENT
Start: 2022-12-25 | End: 2023-01-09

## 2022-12-25 RX ORDER — CALCIUM GLUCONATE 100 MG/ML
2 VIAL (ML) INTRAVENOUS ONCE
Refills: 0 | Status: COMPLETED | OUTPATIENT
Start: 2022-12-25 | End: 2022-12-25

## 2022-12-25 RX ORDER — KETAMINE HYDROCHLORIDE 100 MG/ML
60 INJECTION INTRAMUSCULAR; INTRAVENOUS ONCE
Refills: 0 | Status: DISCONTINUED | OUTPATIENT
Start: 2022-12-25 | End: 2022-12-25

## 2022-12-25 RX ORDER — THIAMINE MONONITRATE (VIT B1) 100 MG
100 TABLET ORAL DAILY
Refills: 0 | Status: DISCONTINUED | OUTPATIENT
Start: 2022-12-25 | End: 2023-01-09

## 2022-12-25 RX ORDER — LEVOTHYROXINE SODIUM 125 MCG
103 TABLET ORAL AT BEDTIME
Refills: 0 | Status: DISCONTINUED | OUTPATIENT
Start: 2022-12-25 | End: 2022-12-25

## 2022-12-25 RX ADMIN — KETAMINE HYDROCHLORIDE 30 MILLIGRAM(S): 100 INJECTION INTRAMUSCULAR; INTRAVENOUS at 17:45

## 2022-12-25 RX ADMIN — MIDODRINE HYDROCHLORIDE 20 MILLIGRAM(S): 2.5 TABLET ORAL at 21:07

## 2022-12-25 RX ADMIN — Medication 2: at 06:09

## 2022-12-25 RX ADMIN — KETAMINE HYDROCHLORIDE 60 MILLIGRAM(S): 100 INJECTION INTRAMUSCULAR; INTRAVENOUS at 17:22

## 2022-12-25 RX ADMIN — Medication 6.21 MICROGRAM(S)/KG/MIN: at 21:44

## 2022-12-25 RX ADMIN — LACTULOSE 20 GRAM(S): 10 SOLUTION ORAL at 11:13

## 2022-12-25 RX ADMIN — Medication 20 MILLIEQUIVALENT(S): at 05:57

## 2022-12-25 RX ADMIN — MIDODRINE HYDROCHLORIDE 15 MILLIGRAM(S): 2.5 TABLET ORAL at 05:29

## 2022-12-25 RX ADMIN — Medication 15 MILLILITER(S): at 11:13

## 2022-12-25 RX ADMIN — Medication 2: at 17:05

## 2022-12-25 RX ADMIN — Medication 200 GRAM(S): at 05:29

## 2022-12-25 RX ADMIN — Medication 127.5 MILLIMOLE(S): at 09:41

## 2022-12-25 RX ADMIN — FLUCONAZOLE 100 MILLIGRAM(S): 150 TABLET ORAL at 21:17

## 2022-12-25 RX ADMIN — Medication 255 MILLIMOLE(S): at 03:33

## 2022-12-25 RX ADMIN — Medication 1 MILLIGRAM(S): at 11:13

## 2022-12-25 RX ADMIN — KETAMINE HYDROCHLORIDE 30 MILLIGRAM(S): 100 INJECTION INTRAMUSCULAR; INTRAVENOUS at 17:30

## 2022-12-25 RX ADMIN — VASOPRESSIN 4.5 UNIT(S)/MIN: 20 INJECTION INTRAVENOUS at 20:24

## 2022-12-25 RX ADMIN — Medication 100 MILLIGRAM(S): at 11:13

## 2022-12-25 RX ADMIN — MIDODRINE HYDROCHLORIDE 20 MILLIGRAM(S): 2.5 TABLET ORAL at 13:41

## 2022-12-25 RX ADMIN — CHLORHEXIDINE GLUCONATE 1 APPLICATION(S): 213 SOLUTION TOPICAL at 05:31

## 2022-12-25 RX ADMIN — MEROPENEM 100 MILLIGRAM(S): 1 INJECTION INTRAVENOUS at 05:30

## 2022-12-25 RX ADMIN — LEVETIRACETAM 750 MILLIGRAM(S): 250 TABLET, FILM COATED ORAL at 17:04

## 2022-12-25 RX ADMIN — LEVETIRACETAM 400 MILLIGRAM(S): 250 TABLET, FILM COATED ORAL at 05:29

## 2022-12-25 RX ADMIN — VASOPRESSIN 4.5 UNIT(S)/MIN: 20 INJECTION INTRAVENOUS at 10:11

## 2022-12-25 RX ADMIN — LACTULOSE 20 GRAM(S): 10 SOLUTION ORAL at 05:29

## 2022-12-25 RX ADMIN — MEROPENEM 100 MILLIGRAM(S): 1 INJECTION INTRAVENOUS at 21:07

## 2022-12-25 RX ADMIN — Medication 137 MICROGRAM(S): at 05:30

## 2022-12-25 RX ADMIN — Medication 2: at 11:13

## 2022-12-25 RX ADMIN — VASOPRESSIN 4.5 UNIT(S)/MIN: 20 INJECTION INTRAVENOUS at 21:17

## 2022-12-25 RX ADMIN — CHLORHEXIDINE GLUCONATE 1 APPLICATION(S): 213 SOLUTION TOPICAL at 05:30

## 2022-12-25 RX ADMIN — PANTOPRAZOLE SODIUM 40 MILLIGRAM(S): 20 TABLET, DELAYED RELEASE ORAL at 11:15

## 2022-12-25 RX ADMIN — MEROPENEM 100 MILLIGRAM(S): 1 INJECTION INTRAVENOUS at 13:42

## 2022-12-25 NOTE — PROGRESS NOTE ADULT - SUBJECTIVE AND OBJECTIVE BOX
Gastroenterology/Hepatology Progress Note    Interval Events:   - off pressors   - started TF  - started liver transplant evaluation     Allergies:  Macrobid (Rash)  Nexium (Stomach Upset)      Hospital Medications:  chlorhexidine 2% Cloths 1 Application(s) Topical <User Schedule>  CRRT Treatment    <Continuous>  escitalopram 10 milliGRAM(s) Oral daily  fluconAZOLE IVPB 200 milliGRAM(s) IV Intermittent every 24 hours  folic acid 1 milliGRAM(s) Oral daily  influenza   Vaccine 0.5 milliLiter(s) IntraMuscular once  insulin lispro (ADMELOG) corrective regimen sliding scale   SubCutaneous every 6 hours  lactulose Syrup 20 Gram(s) Oral every 6 hours  levETIRAcetam  Solution 750 milliGRAM(s) Enteral Tube two times a day  levothyroxine 137 MICROGram(s) Oral daily  meropenem  IVPB 1000 milliGRAM(s) IV Intermittent every 8 hours  midodrine 20 milliGRAM(s) Oral every 8 hours  multivitamin/minerals/iron Oral Solution (CENTRUM) 15 milliLiter(s) Oral daily  oxyCODONE    Solution 5 milliGRAM(s) Oral every 4 hours PRN  oxyCODONE    Solution 10 milliGRAM(s) Oral every 4 hours PRN  pantoprazole   Suspension 40 milliGRAM(s) Oral every 24 hours  PrismaSATE Dialysate BGK 4 / 2.5 5000 milliLiter(s) CRRT <Continuous>  PrismaSOL Filtration BGK 4 / 2.5 5000 milliLiter(s) CRRT <Continuous>  PrismaSOL Filtration BGK 4 / 2.5 5000 milliLiter(s) CRRT <Continuous>  rifAXIMin 550 milliGRAM(s) Oral every 12 hours  sodium chloride 0.65% Nasal 1 Spray(s) Both Nostrils every 3 hours PRN  tetracaine/benzocaine/butamben Spray 1 Spray(s) Topical every 3 hours PRN  thiamine 100 milliGRAM(s) Enteral Tube daily  vasopressin Infusion 0.03 Unit(s)/Min IV Continuous <Continuous>      ROS: Unable to be obtained     PHYSICAL EXAM:   Vital Signs:  Vital Signs Last 24 Hrs  T(C): 36.8 (25 Dec 2022 15:00), Max: 37.5 (24 Dec 2022 21:00)  T(F): 98.2 (25 Dec 2022 15:00), Max: 99.5 (24 Dec 2022 21:00)  HR: 86 (25 Dec 2022 16:00) (79 - 103)  BP: --  BP(mean): --  RR: 34 (25 Dec 2022 16:00) (14 - 35)  SpO2: 96% (25 Dec 2022 16:00) (91% - 97%)    Parameters below as of 25 Dec 2022 07:00  Patient On (Oxygen Delivery Method): nasal cannula  O2 Flow (L/min): 4    Daily     Daily Weight in k.3 (25 Dec 2022 05:15)    GENERAL:  Appears stated age, critically ill appearing  HEENT:  NC/AT,  +scleral icterus; +ETT  CHEST:  +vented breath sounds  HEART:  Regular rhythm, S1, S2, no murmur/rub/S3/S4  ABDOMEN:  Soft, non-tender, +mildly-distended  EXTREMITIES:  no cyanosis, clubbing or edema  SKIN:  No rash/erythema/ecchymoses/petechiae/wounds/abscess/warm/dry  NEURO:  +awake; following simple 1 step commands    LABS:                        8.5    28.98 )-----------( 51       ( 25 Dec 2022 15:02 )             25.7     Mean Cell Volume: 93.8 fl (- @ 15:02)        137  |  101  |  5<L>  ----------------------------<  150<H>  4.0   |  20<L>  |  0.72    Ca    8.7      25 Dec 2022 15:02  Phos  3.0       Mg     2.3         TPro  6.3  /  Alb  4.0  /  TBili  26.6<H>  /  DBili  x   /  AST  125<H>  /  ALT  46<H>  /  AlkPhos  154<H>      LIVER FUNCTIONS - ( 25 Dec 2022 15:02 )  Alb: 4.0 g/dL / Pro: 6.3 g/dL / ALK PHOS: 154 U/L / ALT: 46 U/L / AST: 125 U/L / GGT: x           PT/INR - ( 24 Dec 2022 20:59 )   PT: 26.7 sec;   INR: 2.30 ratio         PTT - ( 24 Dec 2022 20:59 )  PTT:49.5 sec    Amylase Serum--      Lipase serum--       [id number]        Imaging:           Gastroenterology/Hepatology Progress Note    Interval Events:   - off pressors   - started TF  - started liver transplant evaluation     Allergies:  Macrobid (Rash)  Nexium (Stomach Upset)      Hospital Medications:  chlorhexidine 2% Cloths 1 Application(s) Topical <User Schedule>  CRRT Treatment    <Continuous>  escitalopram 10 milliGRAM(s) Oral daily  fluconAZOLE IVPB 200 milliGRAM(s) IV Intermittent every 24 hours  folic acid 1 milliGRAM(s) Oral daily  influenza   Vaccine 0.5 milliLiter(s) IntraMuscular once  insulin lispro (ADMELOG) corrective regimen sliding scale   SubCutaneous every 6 hours  lactulose Syrup 20 Gram(s) Oral every 6 hours  levETIRAcetam  Solution 750 milliGRAM(s) Enteral Tube two times a day  levothyroxine 137 MICROGram(s) Oral daily  meropenem  IVPB 1000 milliGRAM(s) IV Intermittent every 8 hours  midodrine 20 milliGRAM(s) Oral every 8 hours  multivitamin/minerals/iron Oral Solution (CENTRUM) 15 milliLiter(s) Oral daily  oxyCODONE    Solution 5 milliGRAM(s) Oral every 4 hours PRN  oxyCODONE    Solution 10 milliGRAM(s) Oral every 4 hours PRN  pantoprazole   Suspension 40 milliGRAM(s) Oral every 24 hours  PrismaSATE Dialysate BGK 4 / 2.5 5000 milliLiter(s) CRRT <Continuous>  PrismaSOL Filtration BGK 4 / 2.5 5000 milliLiter(s) CRRT <Continuous>  PrismaSOL Filtration BGK 4 / 2.5 5000 milliLiter(s) CRRT <Continuous>  rifAXIMin 550 milliGRAM(s) Oral every 12 hours  sodium chloride 0.65% Nasal 1 Spray(s) Both Nostrils every 3 hours PRN  tetracaine/benzocaine/butamben Spray 1 Spray(s) Topical every 3 hours PRN  thiamine 100 milliGRAM(s) Enteral Tube daily  vasopressin Infusion 0.03 Unit(s)/Min IV Continuous <Continuous>      ROS: Unable to be obtained     PHYSICAL EXAM:   Vital Signs:  Vital Signs Last 24 Hrs  T(C): 36.8 (25 Dec 2022 15:00), Max: 37.5 (24 Dec 2022 21:00)  T(F): 98.2 (25 Dec 2022 15:00), Max: 99.5 (24 Dec 2022 21:00)  HR: 86 (25 Dec 2022 16:00) (79 - 103)  BP: --  BP(mean): --  RR: 34 (25 Dec 2022 16:00) (14 - 35)  SpO2: 96% (25 Dec 2022 16:00) (91% - 97%)    Parameters below as of 25 Dec 2022 07:00  Patient On (Oxygen Delivery Method): nasal cannula  O2 Flow (L/min): 4    Daily     Daily Weight in k.3 (25 Dec 2022 05:15)    GENERAL:  Appears stated age, critically ill appearing  HEENT:  NC/AT,  +scleral icterus; +ETT  CHEST:  +vented breath sounds  HEART:  Regular rhythm, S1, S2, no murmur/rub/S3/S4  ABDOMEN:  Soft, non-tender, +mildly-distended  EXTREMITIES:  no cyanosis, clubbing or edema  SKIN:  No rash/erythema/ecchymoses/petechiae/wounds/abscess/warm/dry  NEURO:  +awake; following simple 1 step commands    LABS:                        8.5    28.98 )-----------( 51       ( 25 Dec 2022 15:02 )             25.7     Mean Cell Volume: 93.8 fl (- @ 15:02)        137  |  101  |  5<L>  ----------------------------<  150<H>  4.0   |  20<L>  |  0.72    Ca    8.7      25 Dec 2022 15:02  Phos  3.0       Mg     2.3         TPro  6.3  /  Alb  4.0  /  TBili  26.6<H>  /  DBili  x   /  AST  125<H>  /  ALT  46<H>  /  AlkPhos  154<H>      LIVER FUNCTIONS - ( 25 Dec 2022 15:02 )  Alb: 4.0 g/dL / Pro: 6.3 g/dL / ALK PHOS: 154 U/L / ALT: 46 U/L / AST: 125 U/L / GGT: x           PT/INR - ( 24 Dec 2022 20:59 )   PT: 26.7 sec;   INR: 2.30 ratio         PTT - ( 24 Dec 2022 20:59 )  PTT:49.5 sec    Amylase Serum--      Lipase serum--       [id number]        Imaging:           Gastroenterology/Hepatology Progress Note    Interval Events:   - off pressors   - started TF  - started liver transplant evaluation     Allergies:  Macrobid (Rash)  Nexium (Stomach Upset)      Hospital Medications:  chlorhexidine 2% Cloths 1 Application(s) Topical <User Schedule>  CRRT Treatment    <Continuous>  escitalopram 10 milliGRAM(s) Oral daily  fluconAZOLE IVPB 200 milliGRAM(s) IV Intermittent every 24 hours  folic acid 1 milliGRAM(s) Oral daily  influenza   Vaccine 0.5 milliLiter(s) IntraMuscular once  insulin lispro (ADMELOG) corrective regimen sliding scale   SubCutaneous every 6 hours  lactulose Syrup 20 Gram(s) Oral every 6 hours  levETIRAcetam  Solution 750 milliGRAM(s) Enteral Tube two times a day  levothyroxine 137 MICROGram(s) Oral daily  meropenem  IVPB 1000 milliGRAM(s) IV Intermittent every 8 hours  midodrine 20 milliGRAM(s) Oral every 8 hours  multivitamin/minerals/iron Oral Solution (CENTRUM) 15 milliLiter(s) Oral daily  oxyCODONE    Solution 5 milliGRAM(s) Oral every 4 hours PRN  oxyCODONE    Solution 10 milliGRAM(s) Oral every 4 hours PRN  pantoprazole   Suspension 40 milliGRAM(s) Oral every 24 hours  PrismaSATE Dialysate BGK 4 / 2.5 5000 milliLiter(s) CRRT <Continuous>  PrismaSOL Filtration BGK 4 / 2.5 5000 milliLiter(s) CRRT <Continuous>  PrismaSOL Filtration BGK 4 / 2.5 5000 milliLiter(s) CRRT <Continuous>  rifAXIMin 550 milliGRAM(s) Oral every 12 hours  sodium chloride 0.65% Nasal 1 Spray(s) Both Nostrils every 3 hours PRN  tetracaine/benzocaine/butamben Spray 1 Spray(s) Topical every 3 hours PRN  thiamine 100 milliGRAM(s) Enteral Tube daily  vasopressin Infusion 0.03 Unit(s)/Min IV Continuous <Continuous>      ROS: Unable to be obtained     PHYSICAL EXAM:   Vital Signs:  Vital Signs Last 24 Hrs  T(C): 36.8 (25 Dec 2022 15:00), Max: 37.5 (24 Dec 2022 21:00)  T(F): 98.2 (25 Dec 2022 15:00), Max: 99.5 (24 Dec 2022 21:00)  HR: 86 (25 Dec 2022 16:00) (79 - 103)  BP: --  BP(mean): --  RR: 34 (25 Dec 2022 16:00) (14 - 35)  SpO2: 96% (25 Dec 2022 16:00) (91% - 97%)    Parameters below as of 25 Dec 2022 07:00  Patient On (Oxygen Delivery Method): nasal cannula  O2 Flow (L/min): 4    Daily     Daily Weight in k.3 (25 Dec 2022 05:15)    GENERAL:  Appears stated age, critically ill appearing  HEENT:  NC/AT,  +scleral icterus; +ETT  CHEST:  +vented breath sounds  HEART:  Regular rhythm, S1, S2, no murmur/rub/S3/S4  ABDOMEN:  Soft, non-tender, +mildly-distended  EXTREMITIES:  no cyanosis, clubbing or edema  SKIN:  No rash/erythema/ecchymoses/petechiae/wounds/abscess/warm/dry  NEURO:  +awake; following simple 1 step commands    LABS:                        8.5    28.98 )-----------( 51       ( 25 Dec 2022 15:02 )             25.7     Mean Cell Volume: 93.8 fl (- @ 15:02)        137  |  101  |  5<L>  ----------------------------<  150<H>  4.0   |  20<L>  |  0.72    Ca    8.7      25 Dec 2022 15:02  Phos  3.0       Mg     2.3         TPro  6.3  /  Alb  4.0  /  TBili  26.6<H>  /  DBili  x   /  AST  125<H>  /  ALT  46<H>  /  AlkPhos  154<H>      LIVER FUNCTIONS - ( 25 Dec 2022 15:02 )  Alb: 4.0 g/dL / Pro: 6.3 g/dL / ALK PHOS: 154 U/L / ALT: 46 U/L / AST: 125 U/L / GGT: x           PT/INR - ( 24 Dec 2022 20:59 )   PT: 26.7 sec;   INR: 2.30 ratio         PTT - ( 24 Dec 2022 20:59 )  PTT:49.5 sec    Amylase Serum--      Lipase serum--       [id number]        Imaging:           Gastroenterology/Hepatology Progress Note    Interval Events:   - off pressors   - started TF  - started liver transplant evaluation     Allergies:  Macrobid (Rash)  Nexium (Stomach Upset)      Hospital Medications:  chlorhexidine 2% Cloths 1 Application(s) Topical <User Schedule>  CRRT Treatment    <Continuous>  escitalopram 10 milliGRAM(s) Oral daily  fluconAZOLE IVPB 200 milliGRAM(s) IV Intermittent every 24 hours  folic acid 1 milliGRAM(s) Oral daily  influenza   Vaccine 0.5 milliLiter(s) IntraMuscular once  insulin lispro (ADMELOG) corrective regimen sliding scale   SubCutaneous every 6 hours  lactulose Syrup 20 Gram(s) Oral every 6 hours  levETIRAcetam  Solution 750 milliGRAM(s) Enteral Tube two times a day  levothyroxine 137 MICROGram(s) Oral daily  meropenem  IVPB 1000 milliGRAM(s) IV Intermittent every 8 hours  midodrine 20 milliGRAM(s) Oral every 8 hours  multivitamin/minerals/iron Oral Solution (CENTRUM) 15 milliLiter(s) Oral daily  oxyCODONE    Solution 5 milliGRAM(s) Oral every 4 hours PRN  oxyCODONE    Solution 10 milliGRAM(s) Oral every 4 hours PRN  pantoprazole   Suspension 40 milliGRAM(s) Oral every 24 hours  PrismaSATE Dialysate BGK 4 / 2.5 5000 milliLiter(s) CRRT <Continuous>  PrismaSOL Filtration BGK 4 / 2.5 5000 milliLiter(s) CRRT <Continuous>  PrismaSOL Filtration BGK 4 / 2.5 5000 milliLiter(s) CRRT <Continuous>  rifAXIMin 550 milliGRAM(s) Oral every 12 hours  sodium chloride 0.65% Nasal 1 Spray(s) Both Nostrils every 3 hours PRN  tetracaine/benzocaine/butamben Spray 1 Spray(s) Topical every 3 hours PRN  thiamine 100 milliGRAM(s) Enteral Tube daily  vasopressin Infusion 0.03 Unit(s)/Min IV Continuous <Continuous>      ROS: Unable to be obtained     PHYSICAL EXAM:   Vital Signs:  Vital Signs Last 24 Hrs  T(C): 36.8 (25 Dec 2022 15:00), Max: 37.5 (24 Dec 2022 21:00)  T(F): 98.2 (25 Dec 2022 15:00), Max: 99.5 (24 Dec 2022 21:00)  HR: 86 (25 Dec 2022 16:00) (79 - 103)  BP: --  BP(mean): --  RR: 34 (25 Dec 2022 16:00) (14 - 35)  SpO2: 96% (25 Dec 2022 16:00) (91% - 97%)    Parameters below as of 25 Dec 2022 07:00  Patient On (Oxygen Delivery Method): nasal cannula  O2 Flow (L/min): 4    Daily     Daily Weight in k.3 (25 Dec 2022 05:15)    GENERAL:  Appears stated age, critically ill appearing  HEENT:  NC/AT,  +scleral icterus   CHEST: no respiratory distress   HEART:  Regular rhythm, S1, S2, no murmur/rub/S3/S4  ABDOMEN:  Soft, non-tender, +mildly-distended  EXTREMITIES:  no cyanosis, clubbing or edema  SKIN:  No rash/erythema/ecchymoses/petechiae/wounds/abscess/warm/dry  NEURO:  +awake; following simple 1 step commands    LABS:                        8.5    28.98 )-----------( 51       ( 25 Dec 2022 15:02 )             25.7     Mean Cell Volume: 93.8 fl (- @ 15:02)        137  |  101  |  5<L>  ----------------------------<  150<H>  4.0   |  20<L>  |  0.72    Ca    8.7      25 Dec 2022 15:02  Phos  3.0       Mg     2.3         TPro  6.3  /  Alb  4.0  /  TBili  26.6<H>  /  DBili  x   /  AST  125<H>  /  ALT  46<H>  /  AlkPhos  154<H>      LIVER FUNCTIONS - ( 25 Dec 2022 15:02 )  Alb: 4.0 g/dL / Pro: 6.3 g/dL / ALK PHOS: 154 U/L / ALT: 46 U/L / AST: 125 U/L / GGT: x           PT/INR - ( 24 Dec 2022 20:59 )   PT: 26.7 sec;   INR: 2.30 ratio         PTT - ( 24 Dec 2022 20:59 )  PTT:49.5 sec    Amylase Serum--      Lipase serum--       [id number]        Imaging:           Gastroenterology/Hepatology Progress Note    Interval Events:   - off pressors   - started TF  - started liver transplant evaluation     Allergies:  Macrobid (Rash)  Nexium (Stomach Upset)      Hospital Medications:  chlorhexidine 2% Cloths 1 Application(s) Topical <User Schedule>  CRRT Treatment    <Continuous>  escitalopram 10 milliGRAM(s) Oral daily  fluconAZOLE IVPB 200 milliGRAM(s) IV Intermittent every 24 hours  folic acid 1 milliGRAM(s) Oral daily  influenza   Vaccine 0.5 milliLiter(s) IntraMuscular once  insulin lispro (ADMELOG) corrective regimen sliding scale   SubCutaneous every 6 hours  lactulose Syrup 20 Gram(s) Oral every 6 hours  levETIRAcetam  Solution 750 milliGRAM(s) Enteral Tube two times a day  levothyroxine 137 MICROGram(s) Oral daily  meropenem  IVPB 1000 milliGRAM(s) IV Intermittent every 8 hours  midodrine 20 milliGRAM(s) Oral every 8 hours  multivitamin/minerals/iron Oral Solution (CENTRUM) 15 milliLiter(s) Oral daily  oxyCODONE    Solution 5 milliGRAM(s) Oral every 4 hours PRN  oxyCODONE    Solution 10 milliGRAM(s) Oral every 4 hours PRN  pantoprazole   Suspension 40 milliGRAM(s) Oral every 24 hours  PrismaSATE Dialysate BGK 4 / 2.5 5000 milliLiter(s) CRRT <Continuous>  PrismaSOL Filtration BGK 4 / 2.5 5000 milliLiter(s) CRRT <Continuous>  PrismaSOL Filtration BGK 4 / 2.5 5000 milliLiter(s) CRRT <Continuous>  rifAXIMin 550 milliGRAM(s) Oral every 12 hours  sodium chloride 0.65% Nasal 1 Spray(s) Both Nostrils every 3 hours PRN  tetracaine/benzocaine/butamben Spray 1 Spray(s) Topical every 3 hours PRN  thiamine 100 milliGRAM(s) Enteral Tube daily  vasopressin Infusion 0.03 Unit(s)/Min IV Continuous <Continuous>      ROS: Unable to be obtained     PHYSICAL EXAM:   Vital Signs:  Vital Signs Last 24 Hrs  T(C): 36.8 (25 Dec 2022 15:00), Max: 37.5 (24 Dec 2022 21:00)  T(F): 98.2 (25 Dec 2022 15:00), Max: 99.5 (24 Dec 2022 21:00)  HR: 86 (25 Dec 2022 16:00) (79 - 103)  BP: --  BP(mean): --  RR: 34 (25 Dec 2022 16:00) (14 - 35)  SpO2: 96% (25 Dec 2022 16:00) (91% - 97%)    Parameters below as of 25 Dec 2022 07:00  Patient On (Oxygen Delivery Method): nasal cannula  O2 Flow (L/min): 4    Daily     Daily Weight in k.3 (25 Dec 2022 05:15)    GENERAL:  Appears stated age, critically ill appearing  HEENT:  NC/AT,  +scleral icterus   CHEST: no respiratory distress   HEART:  Regular rhythm, S1, S2, no murmur/rub/S3/S4  ABDOMEN:  Soft, non-tender, +mildly-distended  EXTREMITIES:  no cyanosis, clubbing or edema  SKIN:  No rash/erythema/ecchymoses/petechiae/wounds/abscess/warm/dry  NEURO:  +awake; following simple 1 step commands    LABS:                        8.5    28.98 )-----------( 51       ( 25 Dec 2022 15:02 )             25.7     Mean Cell Volume: 93.8 fl (- @ 15:02)        137  |  101  |  5<L>  ----------------------------<  150<H>  4.0   |  20<L>  |  0.72    Ca    8.7      25 Dec 2022 15:02  Phos  3.0       Mg     2.3         TPro  6.3  /  Alb  4.0  /  TBili  26.6<H>  /  DBili  x   /  AST  125<H>  /  ALT  46<H>  /  AlkPhos  154<H>      LIVER FUNCTIONS - ( 25 Dec 2022 15:02 )  Alb: 4.0 g/dL / Pro: 6.3 g/dL / ALK PHOS: 154 U/L / ALT: 46 U/L / AST: 125 U/L / GGT: x           PT/INR - ( 24 Dec 2022 20:59 )   PT: 26.7 sec;   INR: 2.30 ratio         PTT - ( 24 Dec 2022 20:59 )  PTT:49.5 sec    Amylase Serum--      Lipase serum--       [id number]        Imaging:           Gastroenterology/Hepatology Progress Note    Interval Events:   - off pressors   - started TF  - started liver transplant evaluation     Allergies:  Macrobid (Rash)  Nexium (Stomach Upset)      Hospital Medications:  chlorhexidine 2% Cloths 1 Application(s) Topical <User Schedule>  CRRT Treatment    <Continuous>  escitalopram 10 milliGRAM(s) Oral daily  fluconAZOLE IVPB 200 milliGRAM(s) IV Intermittent every 24 hours  folic acid 1 milliGRAM(s) Oral daily  influenza   Vaccine 0.5 milliLiter(s) IntraMuscular once  insulin lispro (ADMELOG) corrective regimen sliding scale   SubCutaneous every 6 hours  lactulose Syrup 20 Gram(s) Oral every 6 hours  levETIRAcetam  Solution 750 milliGRAM(s) Enteral Tube two times a day  levothyroxine 137 MICROGram(s) Oral daily  meropenem  IVPB 1000 milliGRAM(s) IV Intermittent every 8 hours  midodrine 20 milliGRAM(s) Oral every 8 hours  multivitamin/minerals/iron Oral Solution (CENTRUM) 15 milliLiter(s) Oral daily  oxyCODONE    Solution 5 milliGRAM(s) Oral every 4 hours PRN  oxyCODONE    Solution 10 milliGRAM(s) Oral every 4 hours PRN  pantoprazole   Suspension 40 milliGRAM(s) Oral every 24 hours  PrismaSATE Dialysate BGK 4 / 2.5 5000 milliLiter(s) CRRT <Continuous>  PrismaSOL Filtration BGK 4 / 2.5 5000 milliLiter(s) CRRT <Continuous>  PrismaSOL Filtration BGK 4 / 2.5 5000 milliLiter(s) CRRT <Continuous>  rifAXIMin 550 milliGRAM(s) Oral every 12 hours  sodium chloride 0.65% Nasal 1 Spray(s) Both Nostrils every 3 hours PRN  tetracaine/benzocaine/butamben Spray 1 Spray(s) Topical every 3 hours PRN  thiamine 100 milliGRAM(s) Enteral Tube daily  vasopressin Infusion 0.03 Unit(s)/Min IV Continuous <Continuous>      ROS: Unable to be obtained     PHYSICAL EXAM:   Vital Signs:  Vital Signs Last 24 Hrs  T(C): 36.8 (25 Dec 2022 15:00), Max: 37.5 (24 Dec 2022 21:00)  T(F): 98.2 (25 Dec 2022 15:00), Max: 99.5 (24 Dec 2022 21:00)  HR: 86 (25 Dec 2022 16:00) (79 - 103)  BP: --  BP(mean): --  RR: 34 (25 Dec 2022 16:00) (14 - 35)  SpO2: 96% (25 Dec 2022 16:00) (91% - 97%)    Parameters below as of 25 Dec 2022 07:00  Patient On (Oxygen Delivery Method): nasal cannula  O2 Flow (L/min): 4    Daily     Daily Weight in k.3 (25 Dec 2022 05:15)    GENERAL:  Appears stated age, critically ill appearing  HEENT:  NC/AT,  +scleral icterus   CHEST: no respiratory distress   HEART:  Regular rhythm, S1, S2, no murmur/rub/S3/S4  ABDOMEN:  Soft, non-tender, +mildly-distended  EXTREMITIES:  no cyanosis, clubbing or edema  SKIN:  No rash/erythema/ecchymoses/petechiae/wounds/abscess/warm/dry  NEURO:  +awake; following simple 1 step commands    LABS:                        8.5    28.98 )-----------( 51       ( 25 Dec 2022 15:02 )             25.7     Mean Cell Volume: 93.8 fl (- @ 15:02)        137  |  101  |  5<L>  ----------------------------<  150<H>  4.0   |  20<L>  |  0.72    Ca    8.7      25 Dec 2022 15:02  Phos  3.0       Mg     2.3         TPro  6.3  /  Alb  4.0  /  TBili  26.6<H>  /  DBili  x   /  AST  125<H>  /  ALT  46<H>  /  AlkPhos  154<H>      LIVER FUNCTIONS - ( 25 Dec 2022 15:02 )  Alb: 4.0 g/dL / Pro: 6.3 g/dL / ALK PHOS: 154 U/L / ALT: 46 U/L / AST: 125 U/L / GGT: x           PT/INR - ( 24 Dec 2022 20:59 )   PT: 26.7 sec;   INR: 2.30 ratio         PTT - ( 24 Dec 2022 20:59 )  PTT:49.5 sec    Amylase Serum--      Lipase serum--       [id number]        Imaging:

## 2022-12-25 NOTE — PROGRESS NOTE ADULT - ATTENDING COMMENTS
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 S/p successful extubation (12/23) and currently receiving supplemental O2 via NC. CXR today still with significant pulmonary edema and recommend increasing fluid removal via CVVHD (12/21- ), especially given her improved hemodynamics. She is now off pressors since early this morning and can increase midodrine if needed to support fluid removal. Continuing broad-spectrum antibiotics with meropenem (12/20- ) and fluconazole (12/20- ) for multifocal pneumonia. Continuing rifaximin and lactulose for goal stool output 600-1000 mL/day. ABO O with current MELD-Na 41. Liver transplant evaluation in progress. Appreciate continued excellent SICU care. Family updated at bedside.    Please don't hesitate to call with any questions or concerns.    Javier Goldstein M.D., Ph.D.  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 S/p successful extubation (12/23) and currently receiving supplemental O2 via NC. CXR today still with significant pulmonary edema and recommend increasing fluid removal via CVVHD (12/21- ), especially given her improved hemodynamics. She is now off pressors since early this morning and can increase midodrine if needed to support fluid removal. Continuing broad-spectrum antibiotics with meropenem (12/20- ) and fluconazole (12/20- ) for multifocal pneumonia. Continuing rifaximin and lactulose for goal stool output 600-1000 mL/day. ABO O with current MELD-Na 41. Liver transplant evaluation in progress. Appreciate continued excellent SICU care. Family updated at bedside.    Please don't hesitate to call with any questions or concerns.    Javier Goldstein M.D., Ph.D.  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 S/p successful extubation (12/23) and currently receiving supplemental O2 via NC. CXR today still with significant pulmonary edema and recommend increasing fluid removal via CVVHD (12/21- ), especially given her improved hemodynamics. She is now off pressors since early this morning and can increase midodrine if needed to support fluid removal. Continuing broad-spectrum antibiotics with meropenem (12/20- ) and fluconazole (12/20- ) for multifocal pneumonia. Continuing rifaximin and lactulose for goal stool output 600-1000 mL/day. ABO O with current MELD-Na 41. Liver transplant evaluation in progress. Appreciate continued excellent SICU care. Family updated at bedside.    Please don't hesitate to call with any questions or concerns.    Javier Goldstein M.D., Ph.D.  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 S/p successful extubation (12/23) and currently receiving supplemental O2 via NC. CXR today still with significant pulmonary edema and recommend increasing fluid removal via CVVHD (12/21- ), especially given her improved hemodynamics. She is now off pressors since early this morning and can increase midodrine if needed to support fluid removal. Continuing broad-spectrum antibiotics with meropenem (12/20- ) and fluconazole (12/20- ) for multifocal pneumonia. Continuing rifaximin and lactulose for goal stool output 600-1000 mL/day. ABO O with current MELD-Na 41. Liver transplant evaluation in progress. Appreciate continued excellent SICU care. Family updated at bedside. Recommend to start VTE prophylaxis.    Please don't hesitate to call with any questions or concerns.    Javier Goldstein M.D., Ph.D.  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 S/p successful extubation (12/23) and currently receiving supplemental O2 via NC. CXR today still with significant pulmonary edema and recommend increasing fluid removal via CVVHD (12/21- ), especially given her improved hemodynamics. She is now off pressors since early this morning and can increase midodrine if needed to support fluid removal. Continuing broad-spectrum antibiotics with meropenem (12/20- ) and fluconazole (12/20- ) for multifocal pneumonia. Continuing rifaximin and lactulose for goal stool output 600-1000 mL/day. ABO O with current MELD-Na 41. Liver transplant evaluation in progress. Appreciate continued excellent SICU care. Family updated at bedside. Recommend to start VTE prophylaxis.    Please don't hesitate to call with any questions or concerns.    Javier Goldstein M.D., Ph.D.  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 S/p successful extubation (12/23) and currently receiving supplemental O2 via NC. CXR today still with significant pulmonary edema and recommend increasing fluid removal via CVVHD (12/21- ), especially given her improved hemodynamics. She is now off pressors since early this morning and can increase midodrine if needed to support fluid removal. Continuing broad-spectrum antibiotics with meropenem (12/20- ) and fluconazole (12/20- ) for multifocal pneumonia. Continuing rifaximin and lactulose for goal stool output 600-1000 mL/day. ABO O with current MELD-Na 41. Liver transplant evaluation in progress. Appreciate continued excellent SICU care. Family updated at bedside. Recommend to start VTE prophylaxis.    Please don't hesitate to call with any questions or concerns.    Javier Goldstein M.D., Ph.D.  Transplant Hepatology

## 2022-12-25 NOTE — PROGRESS NOTE ADULT - ASSESSMENT
62 y.o Hx significant for remote AUD, h/o thyroid cancer in her 20s s/p total thyroidectomy + RTX + radioactive iodide, HTN, ventricle neoplasm (dx 2021) s/p right frontal craniotomy (03/2022) for resection, post operative course c/b hemorrhage right lateral ventricle (managed non-operatively) who was initially admitted to [facility] 12/19 with new onset seizures.   Transferred from [facility] 12/20 for further management of acute liver failure and liver transplant evaluation 2/2 known ETOH Cirrhosis.     [] Decompensated ETOH Cirrhosis  - Off pressors   - midodrine 15mg tid   - remains on CVVH   - ID: cont zonia/fluc   - HE: lactulose/rifaximin   - on Keppra for seizures,   - Abd US to evaluate for ascites   - Liver eval started        62 y.o Hx significant for remote AUD, h/o thyroid cancer in her 20s s/p total thyroidectomy + RTX + radioactive iodide, HTN, ventricle neoplasm (dx 2021) s/p right frontal craniotomy (03/2022) for resection, post operative course c/b hemorrhage right lateral ventricle (managed non-operatively) who was initially admitted to [facility] 12/19 with new onset seizures.   Transferred from [facility] 12/20 for further management of acute liver failure and liver transplant evaluation 2/2 known ETOH Cirrhosis.     [] Decompensated ETOH Cirrhosis  - Off pressors   - midodrine 15mg tid   - remains on CVVH   - ID: cont zonia/fluc   - HE: lactulose/rifaximin   - on Keppra for seizures,   - Abd US to evaluate for ascites   - Liver eval started        62 y.o Hx significant for remote AUD, h/o thyroid cancer in her 20s s/p total thyroidectomy + RTX + radioactive iodide, HTN, ventricle neoplasm (dx 2021) s/p right frontal craniotomy (03/2022) for resection, post operative course c/b hemorrhage right lateral ventricle (managed non-operatively) who was initially admitted to [facility] 12/19 with new onset seizures.   Transferred from [facility] 12/20 for further management of acute liver failure and liver transplant evaluation 2/2 known ETOH Cirrhosis.     [] Decompensated ETOH Cirrhosis  - Off pressors   - midodrine 15mg tid   - remains on CVVH   - ID: cont zonia/fluc   - HE: lactulose/rifaximin   - on Keppra for seizures,   - Abd US to evaluate for ascites   - Liver eval started

## 2022-12-25 NOTE — PROGRESS NOTE ADULT - SUBJECTIVE AND OBJECTIVE BOX
[facility] DIVISION OF KIDNEY DISEASES AND HYPERTENSION -- FOLLOW UP NOTE  --------------------------------------------------------------------------------  Chief Complaint:    24 hour events/subjective:  Patient was seen and examined at bedside      PAST HISTORY  --------------------------------------------------------------------------------  No significant changes to PMH, PSH, FHx, SHx, unless otherwise noted    ALLERGIES & MEDICATIONS  --------------------------------------------------------------------------------  Allergies    Macrobid (Rash)    Intolerances    Nexium (Stomach Upset)    Standing Inpatient Medications  chlorhexidine 2% Cloths 1 Application(s) Topical <User Schedule>  CRRT Treatment    <Continuous>  fluconAZOLE IVPB 200 milliGRAM(s) IV Intermittent every 24 hours  folic acid 1 milliGRAM(s) Oral daily  influenza   Vaccine 0.5 milliLiter(s) IntraMuscular once  insulin lispro (ADMELOG) corrective regimen sliding scale   SubCutaneous every 6 hours  lactulose Syrup 20 Gram(s) Oral every 6 hours  levETIRAcetam  IVPB 750 milliGRAM(s) IV Intermittent every 12 hours  levothyroxine Injectable 103 MICROGram(s) IV Push at bedtime  meropenem  IVPB 1000 milliGRAM(s) IV Intermittent every 8 hours  midodrine 20 milliGRAM(s) Oral every 8 hours  multivitamin/minerals/iron Oral Solution (CENTRUM) 15 milliLiter(s) Oral daily  pantoprazole   Suspension 40 milliGRAM(s) Oral every 24 hours  PrismaSATE Dialysate BGK 4 / 2.5 5000 milliLiter(s) CRRT <Continuous>  PrismaSOL Filtration BGK 4 / 2.5 5000 milliLiter(s) CRRT <Continuous>  PrismaSOL Filtration BGK 4 / 2.5 5000 milliLiter(s) CRRT <Continuous>  rifAXIMin 550 milliGRAM(s) Oral every 12 hours  thiamine 100 milliGRAM(s) Enteral Tube daily  vasopressin Infusion 0.03 Unit(s)/Min IV Continuous <Continuous>    PRN Inpatient Medications  oxyCODONE    Solution 5 milliGRAM(s) Oral every 4 hours PRN  oxyCODONE    Solution 10 milliGRAM(s) Oral every 4 hours PRN  sodium chloride 0.65% Nasal 1 Spray(s) Both Nostrils every 3 hours PRN  tetracaine/benzocaine/butamben Spray 1 Spray(s) Topical every 3 hours PRN      REVIEW OF SYSTEMS- unable to obtain     VITALS/PHYSICAL EXAM  --------------------------------------------------------------------------------  T(C): 37.3 (12-25-22 @ 08:00), Max: 37.5 (12-24-22 @ 21:00)  HR: 96 (12-25-22 @ 10:00) (79 - 103)  BP: --  RR: 35 (12-25-22 @ 10:00) (12 - 35)  SpO2: 96% (12-25-22 @ 10:00) (91% - 97%)  Wt(kg): --        12-24-22 @ 07:01  -  12-25-22 @ 07:00  --------------------------------------------------------  IN: 2180 mL / OUT: 3517 mL / NET: -1337 mL    12-25-22 @ 07:01  -  12-25-22 @ 10:58  --------------------------------------------------------  IN: 359.5 mL / OUT: 170 mL / NET: 189.5 mL      Physical Exam:  Gen: Intubated, awake, responsive   HEENT: +ET tube   Pulm: CTA B/L, no crackles   CV: RRR, S1S2+  Abd: +BS, soft, distended  : +urinary catheter  MSK: +BL LE edema  Psych: Sedated  Skin: Warm  Access: +Non-tunneled HD catheter    LABS/STUDIES  --------------------------------------------------------------------------------              8.2    26.46 >-----------<  53       [12-25-22 @ 08:43]              25.1     139  |  102  |  4   ----------------------------<  102      [12-25-22 @ 08:43]  3.7   |  23  |  0.76        Ca     9.0     [12-25-22 @ 08:43]      Mg     2.4     [12-25-22 @ 08:43]      Phos  2.3     [12-25-22 @ 08:43]    TPro  6.3  /  Alb  4.1  /  TBili  26.3  /  DBili  x   /  AST  132  /  ALT  45  /  AlkPhos  146  [12-25-22 @ 08:43]    PT/INR: PT 26.7 , INR 2.30       [12-24-22 @ 20:59]  PTT: 49.5       [12-24-22 @ 20:59]    Uric acid 0.9      [12-24-22 @ 20:59]    Creatinine Trend:  SCr 0.76 [12-25 @ 08:43]  SCr 0.76 [12-25 @ 03:16]  SCr 0.80 [12-24 @ 20:59]  SCr 0.80 [12-24 @ 17:50]  SCr 0.91 [12-24 @ 11:54]          Urinalysis - [12-20-22 @ 23:55]      Color Dark Yellow / Appearance Turbid / SG 1.029 / pH 6.0      Gluc 100 mg/dL / Ketone Small  / Bili Large / Urobili Negative       Blood Moderate / Protein 300 mg/dL / Leuk Est Large / Nitrite Negative      RBC 5-10 / WBC 11-25 / Hyaline 0-2 / Gran  / Sq Epi  / Non Sq Epi Moderate / Bacteria Negative    Urine Creatinine 21      [12-21-22 @ 11:55]  Urine Sodium 138      [12-21-22 @ 11:55]  Urine Potassium 8      [12-21-22 @ 11:55]  Urine Osmolality 288      [12-21-22 @ 11:55]    Ferritin 5747      [12-24-22 @ 20:58]  TSH 0.05      [12-24-22 @ 20:58]    HBsAb <3.0      [12-24-22 @ 20:59]  HBsAb Nonreact      [12-24-22 @ 20:59]  HBsAg Nonreact      [12-24-22 @ 20:59]  HBcAb Nonreact      [12-24-22 @ 20:59]  HCV 0.12, Nonreact      [12-24-22 @ 20:59]  HIV Nonreact      [12-24-22 @ 20:58]  HIV Nonreact      [12-24-22 @ 20:58]    Syphilis Screen (Treponema Pallidum Ab) Negative      [12-24-22 @ 20:59]     [facility] DIVISION OF KIDNEY DISEASES AND HYPERTENSION -- FOLLOW UP NOTE  --------------------------------------------------------------------------------  Chief Complaint:    24 hour events/subjective:  Patient was seen and examined at bedside      PAST HISTORY  --------------------------------------------------------------------------------  No significant changes to PMH, PSH, FHx, SHx, unless otherwise noted    ALLERGIES & MEDICATIONS  --------------------------------------------------------------------------------  Allergies    Macrobid (Rash)    Intolerances    Nexium (Stomach Upset)    Standing Inpatient Medications  chlorhexidine 2% Cloths 1 Application(s) Topical <User Schedule>  CRRT Treatment    <Continuous>  fluconAZOLE IVPB 200 milliGRAM(s) IV Intermittent every 24 hours  folic acid 1 milliGRAM(s) Oral daily  influenza   Vaccine 0.5 milliLiter(s) IntraMuscular once  insulin lispro (ADMELOG) corrective regimen sliding scale   SubCutaneous every 6 hours  lactulose Syrup 20 Gram(s) Oral every 6 hours  levETIRAcetam  IVPB 750 milliGRAM(s) IV Intermittent every 12 hours  levothyroxine Injectable 103 MICROGram(s) IV Push at bedtime  meropenem  IVPB 1000 milliGRAM(s) IV Intermittent every 8 hours  midodrine 20 milliGRAM(s) Oral every 8 hours  multivitamin/minerals/iron Oral Solution (CENTRUM) 15 milliLiter(s) Oral daily  pantoprazole   Suspension 40 milliGRAM(s) Oral every 24 hours  PrismaSATE Dialysate BGK 4 / 2.5 5000 milliLiter(s) CRRT <Continuous>  PrismaSOL Filtration BGK 4 / 2.5 5000 milliLiter(s) CRRT <Continuous>  PrismaSOL Filtration BGK 4 / 2.5 5000 milliLiter(s) CRRT <Continuous>  rifAXIMin 550 milliGRAM(s) Oral every 12 hours  thiamine 100 milliGRAM(s) Enteral Tube daily  vasopressin Infusion 0.03 Unit(s)/Min IV Continuous <Continuous>    PRN Inpatient Medications  oxyCODONE    Solution 5 milliGRAM(s) Oral every 4 hours PRN  oxyCODONE    Solution 10 milliGRAM(s) Oral every 4 hours PRN  sodium chloride 0.65% Nasal 1 Spray(s) Both Nostrils every 3 hours PRN  tetracaine/benzocaine/butamben Spray 1 Spray(s) Topical every 3 hours PRN      REVIEW OF SYSTEMS- unable to obtain     VITALS/PHYSICAL EXAM  --------------------------------------------------------------------------------  T(C): 37.3 (12-25-22 @ 08:00), Max: 37.5 (12-24-22 @ 21:00)  HR: 96 (12-25-22 @ 10:00) (79 - 103)  BP: --  RR: 35 (12-25-22 @ 10:00) (12 - 35)  SpO2: 96% (12-25-22 @ 10:00) (91% - 97%)  Wt(kg): --        12-24-22 @ 07:01  -  12-25-22 @ 07:00  --------------------------------------------------------  IN: 2180 mL / OUT: 3517 mL / NET: -1337 mL    12-25-22 @ 07:01  -  12-25-22 @ 10:58  --------------------------------------------------------  IN: 359.5 mL / OUT: 170 mL / NET: 189.5 mL      Physical Exam:  Gen: Intubated, awake, responsive   HEENT: +ET tube   Pulm: CTA B/L, no crackles   CV: RRR, S1S2+  Abd: +BS, soft, distended  : +urinary catheter  MSK: +BL LE edema  Psych: Sedated  Skin: Warm  Access: +Non-tunneled HD catheter    LABS/STUDIES  --------------------------------------------------------------------------------              8.2    26.46 >-----------<  53       [12-25-22 @ 08:43]              25.1     139  |  102  |  4   ----------------------------<  102      [12-25-22 @ 08:43]  3.7   |  23  |  0.76        Ca     9.0     [12-25-22 @ 08:43]      Mg     2.4     [12-25-22 @ 08:43]      Phos  2.3     [12-25-22 @ 08:43]    TPro  6.3  /  Alb  4.1  /  TBili  26.3  /  DBili  x   /  AST  132  /  ALT  45  /  AlkPhos  146  [12-25-22 @ 08:43]    PT/INR: PT 26.7 , INR 2.30       [12-24-22 @ 20:59]  PTT: 49.5       [12-24-22 @ 20:59]    Uric acid 0.9      [12-24-22 @ 20:59]    Creatinine Trend:  SCr 0.76 [12-25 @ 08:43]  SCr 0.76 [12-25 @ 03:16]  SCr 0.80 [12-24 @ 20:59]  SCr 0.80 [12-24 @ 17:50]  SCr 0.91 [12-24 @ 11:54]          Urinalysis - [12-20-22 @ 23:55]      Color Dark Yellow / Appearance Turbid / SG 1.029 / pH 6.0      Gluc 100 mg/dL / Ketone Small  / Bili Large / Urobili Negative       Blood Moderate / Protein 300 mg/dL / Leuk Est Large / Nitrite Negative      RBC 5-10 / WBC 11-25 / Hyaline 0-2 / Gran  / Sq Epi  / Non Sq Epi Moderate / Bacteria Negative    Urine Creatinine 21      [12-21-22 @ 11:55]  Urine Sodium 138      [12-21-22 @ 11:55]  Urine Potassium 8      [12-21-22 @ 11:55]  Urine Osmolality 288      [12-21-22 @ 11:55]    Ferritin 5747      [12-24-22 @ 20:58]  TSH 0.05      [12-24-22 @ 20:58]    HBsAb <3.0      [12-24-22 @ 20:59]  HBsAb Nonreact      [12-24-22 @ 20:59]  HBsAg Nonreact      [12-24-22 @ 20:59]  HBcAb Nonreact      [12-24-22 @ 20:59]  HCV 0.12, Nonreact      [12-24-22 @ 20:59]  HIV Nonreact      [12-24-22 @ 20:58]  HIV Nonreact      [12-24-22 @ 20:58]    Syphilis Screen (Treponema Pallidum Ab) Negative      [12-24-22 @ 20:59]     [facility] DIVISION OF KIDNEY DISEASES AND HYPERTENSION -- FOLLOW UP NOTE  --------------------------------------------------------------------------------  Chief Complaint:    24 hour events/subjective:  Patient was seen and examined at bedside      PAST HISTORY  --------------------------------------------------------------------------------  No significant changes to PMH, PSH, FHx, SHx, unless otherwise noted    ALLERGIES & MEDICATIONS  --------------------------------------------------------------------------------  Allergies    Macrobid (Rash)    Intolerances    Nexium (Stomach Upset)    Standing Inpatient Medications  chlorhexidine 2% Cloths 1 Application(s) Topical <User Schedule>  CRRT Treatment    <Continuous>  fluconAZOLE IVPB 200 milliGRAM(s) IV Intermittent every 24 hours  folic acid 1 milliGRAM(s) Oral daily  influenza   Vaccine 0.5 milliLiter(s) IntraMuscular once  insulin lispro (ADMELOG) corrective regimen sliding scale   SubCutaneous every 6 hours  lactulose Syrup 20 Gram(s) Oral every 6 hours  levETIRAcetam  IVPB 750 milliGRAM(s) IV Intermittent every 12 hours  levothyroxine Injectable 103 MICROGram(s) IV Push at bedtime  meropenem  IVPB 1000 milliGRAM(s) IV Intermittent every 8 hours  midodrine 20 milliGRAM(s) Oral every 8 hours  multivitamin/minerals/iron Oral Solution (CENTRUM) 15 milliLiter(s) Oral daily  pantoprazole   Suspension 40 milliGRAM(s) Oral every 24 hours  PrismaSATE Dialysate BGK 4 / 2.5 5000 milliLiter(s) CRRT <Continuous>  PrismaSOL Filtration BGK 4 / 2.5 5000 milliLiter(s) CRRT <Continuous>  PrismaSOL Filtration BGK 4 / 2.5 5000 milliLiter(s) CRRT <Continuous>  rifAXIMin 550 milliGRAM(s) Oral every 12 hours  thiamine 100 milliGRAM(s) Enteral Tube daily  vasopressin Infusion 0.03 Unit(s)/Min IV Continuous <Continuous>    PRN Inpatient Medications  oxyCODONE    Solution 5 milliGRAM(s) Oral every 4 hours PRN  oxyCODONE    Solution 10 milliGRAM(s) Oral every 4 hours PRN  sodium chloride 0.65% Nasal 1 Spray(s) Both Nostrils every 3 hours PRN  tetracaine/benzocaine/butamben Spray 1 Spray(s) Topical every 3 hours PRN      REVIEW OF SYSTEMS- unable to obtain     VITALS/PHYSICAL EXAM  --------------------------------------------------------------------------------  T(C): 37.3 (12-25-22 @ 08:00), Max: 37.5 (12-24-22 @ 21:00)  HR: 96 (12-25-22 @ 10:00) (79 - 103)  BP: --  RR: 35 (12-25-22 @ 10:00) (12 - 35)  SpO2: 96% (12-25-22 @ 10:00) (91% - 97%)  Wt(kg): --        12-24-22 @ 07:01  -  12-25-22 @ 07:00  --------------------------------------------------------  IN: 2180 mL / OUT: 3517 mL / NET: -1337 mL    12-25-22 @ 07:01  -  12-25-22 @ 10:58  --------------------------------------------------------  IN: 359.5 mL / OUT: 170 mL / NET: 189.5 mL      Physical Exam:  Gen: Intubated, awake, responsive   HEENT: +ET tube   Pulm: CTA B/L, no crackles   CV: RRR, S1S2+  Abd: +BS, soft, distended  : +urinary catheter  MSK: +BL LE edema  Psych: Sedated  Skin: Warm  Access: +Non-tunneled HD catheter    LABS/STUDIES  --------------------------------------------------------------------------------              8.2    26.46 >-----------<  53       [12-25-22 @ 08:43]              25.1     139  |  102  |  4   ----------------------------<  102      [12-25-22 @ 08:43]  3.7   |  23  |  0.76        Ca     9.0     [12-25-22 @ 08:43]      Mg     2.4     [12-25-22 @ 08:43]      Phos  2.3     [12-25-22 @ 08:43]    TPro  6.3  /  Alb  4.1  /  TBili  26.3  /  DBili  x   /  AST  132  /  ALT  45  /  AlkPhos  146  [12-25-22 @ 08:43]    PT/INR: PT 26.7 , INR 2.30       [12-24-22 @ 20:59]  PTT: 49.5       [12-24-22 @ 20:59]    Uric acid 0.9      [12-24-22 @ 20:59]    Creatinine Trend:  SCr 0.76 [12-25 @ 08:43]  SCr 0.76 [12-25 @ 03:16]  SCr 0.80 [12-24 @ 20:59]  SCr 0.80 [12-24 @ 17:50]  SCr 0.91 [12-24 @ 11:54]          Urinalysis - [12-20-22 @ 23:55]      Color Dark Yellow / Appearance Turbid / SG 1.029 / pH 6.0      Gluc 100 mg/dL / Ketone Small  / Bili Large / Urobili Negative       Blood Moderate / Protein 300 mg/dL / Leuk Est Large / Nitrite Negative      RBC 5-10 / WBC 11-25 / Hyaline 0-2 / Gran  / Sq Epi  / Non Sq Epi Moderate / Bacteria Negative    Urine Creatinine 21      [12-21-22 @ 11:55]  Urine Sodium 138      [12-21-22 @ 11:55]  Urine Potassium 8      [12-21-22 @ 11:55]  Urine Osmolality 288      [12-21-22 @ 11:55]    Ferritin 5747      [12-24-22 @ 20:58]  TSH 0.05      [12-24-22 @ 20:58]    HBsAb <3.0      [12-24-22 @ 20:59]  HBsAb Nonreact      [12-24-22 @ 20:59]  HBsAg Nonreact      [12-24-22 @ 20:59]  HBcAb Nonreact      [12-24-22 @ 20:59]  HCV 0.12, Nonreact      [12-24-22 @ 20:59]  HIV Nonreact      [12-24-22 @ 20:58]  HIV Nonreact      [12-24-22 @ 20:58]    Syphilis Screen (Treponema Pallidum Ab) Negative      [12-24-22 @ 20:59]

## 2022-12-25 NOTE — PROGRESS NOTE ADULT - SUBJECTIVE AND OBJECTIVE BOX
Transplant Surgery Progress Note    Present: Patient seen and examined with multidisciplinary transplant team including Transplant Surgeon Dr. Moore, hepatologist Dr. Goldstein, ANJEL Espinoza and unit RN during am rounds. 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  Arthur ([phone]) and Daughter Taylor at [facility] provided additional history. Of note was recently admitted to  11/2022 for workup and eval of jaundice- during that hospitalization was found to have a UTI and dx with alc hep and started on steroids (was deemed a non-responder and steroids were subsequently stopped); s/p LVP at [place] 11/2022. Previously hospitalized in 2021 at [place] for ETOH detox. Went to ETOH rehab 08/2022 and was asked to leave the facility when she was COVID+; was sober for 1 week until she started drinking again. Had also attended a few AA meetings in the past. Transferred from [facility] 12/20 for further management of acute liver failure and liver transplant evaluation.       Interval events:  - off pressors  - Mental status better, awake, following commands  - On CRRT with fluid remova;  - started TF  - consented for liver txp eval      Potential Discharge date: pending clinical stability  Education:  Medications  Plan of care:  See Below    MEDICATIONS  (STANDING):  chlorhexidine 2% Cloths 1 Application(s) Topical <User Schedule>  CRRT Treatment    <Continuous>  escitalopram 10 milliGRAM(s) Oral daily  fluconAZOLE IVPB 200 milliGRAM(s) IV Intermittent every 24 hours  folic acid 1 milliGRAM(s) Oral daily  influenza   Vaccine 0.5 milliLiter(s) IntraMuscular once  insulin lispro (ADMELOG) corrective regimen sliding scale   SubCutaneous every 6 hours  lactulose Syrup 20 Gram(s) Oral every 6 hours  levETIRAcetam  Solution 750 milliGRAM(s) Enteral Tube two times a day  levothyroxine 137 MICROGram(s) Oral daily  meropenem  IVPB 1000 milliGRAM(s) IV Intermittent every 8 hours  midodrine 20 milliGRAM(s) Oral every 8 hours  multivitamin/minerals/iron Oral Solution (CENTRUM) 15 milliLiter(s) Oral daily  pantoprazole   Suspension 40 milliGRAM(s) Oral every 24 hours  PrismaSATE Dialysate BGK 4 / 2.5 5000 milliLiter(s) (1500 mL/Hr) CRRT <Continuous>  PrismaSOL Filtration BGK 4 / 2.5 5000 milliLiter(s) (800 mL/Hr) CRRT <Continuous>  PrismaSOL Filtration BGK 4 / 2.5 5000 milliLiter(s) (200 mL/Hr) CRRT <Continuous>  rifAXIMin 550 milliGRAM(s) Oral every 12 hours  thiamine 100 milliGRAM(s) Enteral Tube daily  vasopressin Infusion 0.03 Unit(s)/Min (4.5 mL/Hr) IV Continuous <Continuous>    MEDICATIONS  (PRN):  oxyCODONE    Solution 5 milliGRAM(s) Oral every 4 hours PRN Moderate Pain (4 - 6)  oxyCODONE    Solution 10 milliGRAM(s) Oral every 4 hours PRN Severe Pain (7 - 10)  sodium chloride 0.65% Nasal 1 Spray(s) Both Nostrils every 3 hours PRN Nasal Congestion  tetracaine/benzocaine/butamben Spray 1 Spray(s) Topical every 3 hours PRN for ngt discomfort      PAST MEDICAL & SURGICAL HISTORY:  HTN (hypertension)  UTI (urinary tract infection) currently being treated--will complete antibiotics 3/8/2022  Intracranial tumor  GERD (gastroesophageal reflux disease)  Eczema  Thyroid cancer  surgery. radiation, Radioactive iodine  COVID-19 virus infection  11/2021--recovered at home  H/O total thyroidectomy  age 21&#x27;s for Thyroid cancer, postop complication arterial bleed, second surgery  History of tonsillectomy age 5  History of appendectomy age 5    Vital Signs Last 24 Hrs  T(C): 36.8 (25 Dec 2022 11:00), Max: 37.5 (24 Dec 2022 21:00)  T(F): 98.2 (25 Dec 2022 11:00), Max: 99.5 (24 Dec 2022 21:00)  HR: 92 (25 Dec 2022 13:00) (79 - 103)  BP: --  BP(mean): --  RR: 19 (25 Dec 2022 13:00) (14 - 35)  SpO2: 96% (25 Dec 2022 13:00) (91% - 97%)    Parameters below as of 25 Dec 2022 07:00  Patient On (Oxygen Delivery Method): nasal cannula  O2 Flow (L/min): 4      I&O's Summary    24 Dec 2022 07:01  -  25 Dec 2022 07:00  --------------------------------------------------------  IN: 2180 mL / OUT: 3517 mL / NET: -1337 mL    25 Dec 2022 07:01  -  25 Dec 2022 13:56  --------------------------------------------------------  IN: 1017.5 mL / OUT: 940 mL / NET: 77.5 mL                          8.2    26.46 )-----------( 53       ( 25 Dec 2022 08:43 )             25.1     12-25    139  |  102  |  4<L>  ----------------------------<  102<H>  3.7   |  23  |  0.76    Ca    9.0      25 Dec 2022 08:43  Phos  2.3     12-25  Mg     2.4     12-25    TPro  6.3  /  Alb  4.1  /  TBili  26.3<H>  /  DBili  x   /  AST  132<H>  /  ALT  45  /  AlkPhos  146<H>  12-25    Culture - Blood (collected 12-23-22 @ 13:35)  Source: .Blood Blood  Preliminary Report (12-24-22 @ 18:01):    No growth to date.    Culture - Blood (collected 12-23-22 @ 13:25)  Source: .Blood Blood  Preliminary Report (12-24-22 @ 18:01):    No growth to date.    Culture - Bronchial (collected 12-22-22 @ 17:02)  Source: Combi-Cath Combi-Cath  Gram Stain (12-23-22 @ 06:59):    Moderate polymorphonuclear leukocytes seen per low power field    No squamous epithelial cells seen per low power field    No organisms seen per oil power field  Final Report (12-24-22 @ 15:11):    Normal Respiratory Cassandra present    Culture - Urine (collected 12-21-22 @ 22:55)  Source: Catheterized Catheterized  Final Report (12-24-22 @ 00:55):    >100,000 CFU/ml Leticia dubliniensis "Susceptibilities not performed"    Culture - Fungal, Body Fluid (collected 12-21-22 @ 19:53)  Source: Peritoneal Peritoneal Fluid  Preliminary Report (12-24-22 @ 15:02):    Culture is being performed. Fungal cultures are held for 4 weeks.    Culture - Body Fluid with Gram Stain (collected 12-21-22 @ 19:53)  Source: Ascites Fl Ascites Fluid  Gram Stain (12-22-22 @ 03:04):    polymorphonuclear leukocytes seen    No organisms seen    by cytocentrifuge  Preliminary Report (12-23-22 @ 08:47):    No growth    Culture - Sputum (collected 12-21-22 @ 01:19)  Source: Trach Asp Tracheal Aspirate  Gram Stain (12-21-22 @ 08:45):    No polymorphonuclear leukocytes per low power field    Numerous Squamous epithelial cells per low power field    Moderate Gram Positive Rods seen per oil power field    Few Yeast like cells seen per oil power field  Final Report (12-23-22 @ 14:05):    Normal Respiratory Cassandra present    Culture - Blood (collected 12-20-22 @ 23:30)  Source: .Blood Blood-Peripheral  Preliminary Report (12-22-22 @ 04:01):    No growth to date.    Culture - Blood (collected 12-20-22 @ 22:38)  Source: .Blood Blood-Peripheral  Preliminary Report (12-22-22 @ 03:02):    No growth to date.    Review of systems  Gen: No weight changes, fatigue, fevers/chills, weakness  Skin: No rashes  Head/Eyes/Ears/Mouth: No headache; Normal hearing; Normal vision w/o blurriness; No sinus pain/discomfort, sore throat  Respiratory: No dyspnea, cough, wheezing, hemoptysis  CV: No chest pain, PND, orthopnea  GI: C/O mild abdominal pain at surgical site, No diarrhea, constipation, nausea, vomiting, melena, hematochezia  : No increased frequency, dysuria, hematuria, nocturia  MSK: No joint pain/swelling; no back pain; no edema  Neuro: No dizziness/lightheadedness, weakness, seizures, numbness, tingling  Heme: No easy bruising or bleeding  Endo: No heat/cold intolerance  Psych: No significant nervousness, anxiety, stress, depression  All other systems were reviewed and are negative, except as noted.    Physical Exam:  Constitutional: NAD   Eyes: icteric, PERRLA  ENMT: nc/at, no thrush  Neck: supple, central line   Respiratory: CTA B/L  Cardiovascular: RRR  Gastrointestinal: Soft abdomen, distended  Genitourinary: Urinary catheter in place, anuric  Extremities: SCD's in place and working bilaterally, no edema  Vascular: Palpable dp pulses bilaterally.   Neurological: intubated/sedated  Skin: no rashes, ulcerations, lesions  Musculoskeletal: intubated/sedated  Psychiatric: intubated/sedated   Transplant Surgery Progress Note    Present: Patient seen and examined with multidisciplinary transplant team including Transplant Surgeon Dr. Moore, hepatologist Dr. Goldstein, ANJEL Espinoza and unit RN during am rounds. 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  Arthur ([phone]) and Daughter Taylor at [facility] provided additional history. Of note was recently admitted to  11/2022 for workup and eval of jaundice- during that hospitalization was found to have a UTI and dx with alc hep and started on steroids (was deemed a non-responder and steroids were subsequently stopped); s/p LVP at [place] 11/2022. Previously hospitalized in 2021 at [place] for ETOH detox. Went to ETOH rehab 08/2022 and was asked to leave the facility when she was COVID+; was sober for 1 week until she started drinking again. Had also attended a few AA meetings in the past. Transferred from [facility] 12/20 for further management of acute liver failure and liver transplant evaluation.       Interval events:  - off pressors  - Mental status better, awake, following commands  - On CRRT with fluid remova;  - started TF  - consented for liver txp eval      Potential Discharge date: pending clinical stability  Education:  Medications  Plan of care:  See Below    MEDICATIONS  (STANDING):  chlorhexidine 2% Cloths 1 Application(s) Topical <User Schedule>  CRRT Treatment    <Continuous>  escitalopram 10 milliGRAM(s) Oral daily  fluconAZOLE IVPB 200 milliGRAM(s) IV Intermittent every 24 hours  folic acid 1 milliGRAM(s) Oral daily  influenza   Vaccine 0.5 milliLiter(s) IntraMuscular once  insulin lispro (ADMELOG) corrective regimen sliding scale   SubCutaneous every 6 hours  lactulose Syrup 20 Gram(s) Oral every 6 hours  levETIRAcetam  Solution 750 milliGRAM(s) Enteral Tube two times a day  levothyroxine 137 MICROGram(s) Oral daily  meropenem  IVPB 1000 milliGRAM(s) IV Intermittent every 8 hours  midodrine 20 milliGRAM(s) Oral every 8 hours  multivitamin/minerals/iron Oral Solution (CENTRUM) 15 milliLiter(s) Oral daily  pantoprazole   Suspension 40 milliGRAM(s) Oral every 24 hours  PrismaSATE Dialysate BGK 4 / 2.5 5000 milliLiter(s) (1500 mL/Hr) CRRT <Continuous>  PrismaSOL Filtration BGK 4 / 2.5 5000 milliLiter(s) (800 mL/Hr) CRRT <Continuous>  PrismaSOL Filtration BGK 4 / 2.5 5000 milliLiter(s) (200 mL/Hr) CRRT <Continuous>  rifAXIMin 550 milliGRAM(s) Oral every 12 hours  thiamine 100 milliGRAM(s) Enteral Tube daily  vasopressin Infusion 0.03 Unit(s)/Min (4.5 mL/Hr) IV Continuous <Continuous>    MEDICATIONS  (PRN):  oxyCODONE    Solution 5 milliGRAM(s) Oral every 4 hours PRN Moderate Pain (4 - 6)  oxyCODONE    Solution 10 milliGRAM(s) Oral every 4 hours PRN Severe Pain (7 - 10)  sodium chloride 0.65% Nasal 1 Spray(s) Both Nostrils every 3 hours PRN Nasal Congestion  tetracaine/benzocaine/butamben Spray 1 Spray(s) Topical every 3 hours PRN for ngt discomfort      PAST MEDICAL & SURGICAL HISTORY:  HTN (hypertension)  UTI (urinary tract infection) currently being treated--will complete antibiotics 3/8/2022  Intracranial tumor  GERD (gastroesophageal reflux disease)  Eczema  Thyroid cancer  surgery. radiation, Radioactive iodine  COVID-19 virus infection  11/2021--recovered at home  H/O total thyroidectomy  age 21&#x27;s for Thyroid cancer, postop complication arterial bleed, second surgery  History of tonsillectomy age 5  History of appendectomy age 5    Vital Signs Last 24 Hrs  T(C): 36.8 (25 Dec 2022 11:00), Max: 37.5 (24 Dec 2022 21:00)  T(F): 98.2 (25 Dec 2022 11:00), Max: 99.5 (24 Dec 2022 21:00)  HR: 92 (25 Dec 2022 13:00) (79 - 103)  BP: --  BP(mean): --  RR: 19 (25 Dec 2022 13:00) (14 - 35)  SpO2: 96% (25 Dec 2022 13:00) (91% - 97%)    Parameters below as of 25 Dec 2022 07:00  Patient On (Oxygen Delivery Method): nasal cannula  O2 Flow (L/min): 4      I&O's Summary    24 Dec 2022 07:01  -  25 Dec 2022 07:00  --------------------------------------------------------  IN: 2180 mL / OUT: 3517 mL / NET: -1337 mL    25 Dec 2022 07:01  -  25 Dec 2022 13:56  --------------------------------------------------------  IN: 1017.5 mL / OUT: 940 mL / NET: 77.5 mL                          8.2    26.46 )-----------( 53       ( 25 Dec 2022 08:43 )             25.1     12-25    139  |  102  |  4<L>  ----------------------------<  102<H>  3.7   |  23  |  0.76    Ca    9.0      25 Dec 2022 08:43  Phos  2.3     12-25  Mg     2.4     12-25    TPro  6.3  /  Alb  4.1  /  TBili  26.3<H>  /  DBili  x   /  AST  132<H>  /  ALT  45  /  AlkPhos  146<H>  12-25    Culture - Blood (collected 12-23-22 @ 13:35)  Source: .Blood Blood  Preliminary Report (12-24-22 @ 18:01):    No growth to date.    Culture - Blood (collected 12-23-22 @ 13:25)  Source: .Blood Blood  Preliminary Report (12-24-22 @ 18:01):    No growth to date.    Culture - Bronchial (collected 12-22-22 @ 17:02)  Source: Combi-Cath Combi-Cath  Gram Stain (12-23-22 @ 06:59):    Moderate polymorphonuclear leukocytes seen per low power field    No squamous epithelial cells seen per low power field    No organisms seen per oil power field  Final Report (12-24-22 @ 15:11):    Normal Respiratory Cassandra present    Culture - Urine (collected 12-21-22 @ 22:55)  Source: Catheterized Catheterized  Final Report (12-24-22 @ 00:55):    >100,000 CFU/ml Leticia dubliniensis "Susceptibilities not performed"    Culture - Fungal, Body Fluid (collected 12-21-22 @ 19:53)  Source: Peritoneal Peritoneal Fluid  Preliminary Report (12-24-22 @ 15:02):    Culture is being performed. Fungal cultures are held for 4 weeks.    Culture - Body Fluid with Gram Stain (collected 12-21-22 @ 19:53)  Source: Ascites Fl Ascites Fluid  Gram Stain (12-22-22 @ 03:04):    polymorphonuclear leukocytes seen    No organisms seen    by cytocentrifuge  Preliminary Report (12-23-22 @ 08:47):    No growth    Culture - Sputum (collected 12-21-22 @ 01:19)  Source: Trach Asp Tracheal Aspirate  Gram Stain (12-21-22 @ 08:45):    No polymorphonuclear leukocytes per low power field    Numerous Squamous epithelial cells per low power field    Moderate Gram Positive Rods seen per oil power field    Few Yeast like cells seen per oil power field  Final Report (12-23-22 @ 14:05):    Normal Respiratory Cassandra present    Culture - Blood (collected 12-20-22 @ 23:30)  Source: .Blood Blood-Peripheral  Preliminary Report (12-22-22 @ 04:01):    No growth to date.    Culture - Blood (collected 12-20-22 @ 22:38)  Source: .Blood Blood-Peripheral  Preliminary Report (12-22-22 @ 03:02):    No growth to date.    Review of systems  Gen: No weight changes, fatigue, fevers/chills, weakness  Skin: No rashes  Head/Eyes/Ears/Mouth: No headache; Normal hearing; Normal vision w/o blurriness; No sinus pain/discomfort, sore throat  Respiratory: No dyspnea, cough, wheezing, hemoptysis  CV: No chest pain, PND, orthopnea  GI: C/O mild abdominal pain at surgical site, No diarrhea, constipation, nausea, vomiting, melena, hematochezia  : No increased frequency, dysuria, hematuria, nocturia  MSK: No joint pain/swelling; no back pain; no edema  Neuro: No dizziness/lightheadedness, weakness, seizures, numbness, tingling  Heme: No easy bruising or bleeding  Endo: No heat/cold intolerance  Psych: No significant nervousness, anxiety, stress, depression  All other systems were reviewed and are negative, except as noted.    Physical Exam:  Constitutional: NAD   Eyes: icteric, PERRLA  ENMT: nc/at, no thrush  Neck: supple, central line   Respiratory: CTA B/L  Cardiovascular: RRR  Gastrointestinal: Soft abdomen, distended  Genitourinary: Urinary catheter in place, anuric  Extremities: SCD's in place and working bilaterally, no edema  Vascular: Palpable dp pulses bilaterally.   Neurological: intubated/sedated  Skin: no rashes, ulcerations, lesions  Musculoskeletal: intubated/sedated  Psychiatric: intubated/sedated   Transplant Surgery Progress Note    Present: Patient seen and examined with multidisciplinary transplant team including Transplant Surgeon Dr. Moore, hepatologist Dr. Goldstein, ANJEL Espinoza and unit RN during am rounds. 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  Arthur ([phone]) and Daughter Taylor at [facility] provided additional history. Of note was recently admitted to  11/2022 for workup and eval of jaundice- during that hospitalization was found to have a UTI and dx with alc hep and started on steroids (was deemed a non-responder and steroids were subsequently stopped); s/p LVP at [place] 11/2022. Previously hospitalized in 2021 at [place] for ETOH detox. Went to ETOH rehab 08/2022 and was asked to leave the facility when she was COVID+; was sober for 1 week until she started drinking again. Had also attended a few AA meetings in the past. Transferred from [facility] 12/20 for further management of acute liver failure and liver transplant evaluation.       Interval events:  - off pressors  - Mental status better, awake, following commands  - On CRRT with fluid remova;  - started TF  - consented for liver txp eval      Potential Discharge date: pending clinical stability  Education:  Medications  Plan of care:  See Below    MEDICATIONS  (STANDING):  chlorhexidine 2% Cloths 1 Application(s) Topical <User Schedule>  CRRT Treatment    <Continuous>  escitalopram 10 milliGRAM(s) Oral daily  fluconAZOLE IVPB 200 milliGRAM(s) IV Intermittent every 24 hours  folic acid 1 milliGRAM(s) Oral daily  influenza   Vaccine 0.5 milliLiter(s) IntraMuscular once  insulin lispro (ADMELOG) corrective regimen sliding scale   SubCutaneous every 6 hours  lactulose Syrup 20 Gram(s) Oral every 6 hours  levETIRAcetam  Solution 750 milliGRAM(s) Enteral Tube two times a day  levothyroxine 137 MICROGram(s) Oral daily  meropenem  IVPB 1000 milliGRAM(s) IV Intermittent every 8 hours  midodrine 20 milliGRAM(s) Oral every 8 hours  multivitamin/minerals/iron Oral Solution (CENTRUM) 15 milliLiter(s) Oral daily  pantoprazole   Suspension 40 milliGRAM(s) Oral every 24 hours  PrismaSATE Dialysate BGK 4 / 2.5 5000 milliLiter(s) (1500 mL/Hr) CRRT <Continuous>  PrismaSOL Filtration BGK 4 / 2.5 5000 milliLiter(s) (800 mL/Hr) CRRT <Continuous>  PrismaSOL Filtration BGK 4 / 2.5 5000 milliLiter(s) (200 mL/Hr) CRRT <Continuous>  rifAXIMin 550 milliGRAM(s) Oral every 12 hours  thiamine 100 milliGRAM(s) Enteral Tube daily  vasopressin Infusion 0.03 Unit(s)/Min (4.5 mL/Hr) IV Continuous <Continuous>    MEDICATIONS  (PRN):  oxyCODONE    Solution 5 milliGRAM(s) Oral every 4 hours PRN Moderate Pain (4 - 6)  oxyCODONE    Solution 10 milliGRAM(s) Oral every 4 hours PRN Severe Pain (7 - 10)  sodium chloride 0.65% Nasal 1 Spray(s) Both Nostrils every 3 hours PRN Nasal Congestion  tetracaine/benzocaine/butamben Spray 1 Spray(s) Topical every 3 hours PRN for ngt discomfort      PAST MEDICAL & SURGICAL HISTORY:  HTN (hypertension)  UTI (urinary tract infection) currently being treated--will complete antibiotics 3/8/2022  Intracranial tumor  GERD (gastroesophageal reflux disease)  Eczema  Thyroid cancer  surgery. radiation, Radioactive iodine  COVID-19 virus infection  11/2021--recovered at home  H/O total thyroidectomy  age 21&#x27;s for Thyroid cancer, postop complication arterial bleed, second surgery  History of tonsillectomy age 5  History of appendectomy age 5    Vital Signs Last 24 Hrs  T(C): 36.8 (25 Dec 2022 11:00), Max: 37.5 (24 Dec 2022 21:00)  T(F): 98.2 (25 Dec 2022 11:00), Max: 99.5 (24 Dec 2022 21:00)  HR: 92 (25 Dec 2022 13:00) (79 - 103)  BP: --  BP(mean): --  RR: 19 (25 Dec 2022 13:00) (14 - 35)  SpO2: 96% (25 Dec 2022 13:00) (91% - 97%)    Parameters below as of 25 Dec 2022 07:00  Patient On (Oxygen Delivery Method): nasal cannula  O2 Flow (L/min): 4      I&O's Summary    24 Dec 2022 07:01  -  25 Dec 2022 07:00  --------------------------------------------------------  IN: 2180 mL / OUT: 3517 mL / NET: -1337 mL    25 Dec 2022 07:01  -  25 Dec 2022 13:56  --------------------------------------------------------  IN: 1017.5 mL / OUT: 940 mL / NET: 77.5 mL                          8.2    26.46 )-----------( 53       ( 25 Dec 2022 08:43 )             25.1     12-25    139  |  102  |  4<L>  ----------------------------<  102<H>  3.7   |  23  |  0.76    Ca    9.0      25 Dec 2022 08:43  Phos  2.3     12-25  Mg     2.4     12-25    TPro  6.3  /  Alb  4.1  /  TBili  26.3<H>  /  DBili  x   /  AST  132<H>  /  ALT  45  /  AlkPhos  146<H>  12-25    Culture - Blood (collected 12-23-22 @ 13:35)  Source: .Blood Blood  Preliminary Report (12-24-22 @ 18:01):    No growth to date.    Culture - Blood (collected 12-23-22 @ 13:25)  Source: .Blood Blood  Preliminary Report (12-24-22 @ 18:01):    No growth to date.    Culture - Bronchial (collected 12-22-22 @ 17:02)  Source: Combi-Cath Combi-Cath  Gram Stain (12-23-22 @ 06:59):    Moderate polymorphonuclear leukocytes seen per low power field    No squamous epithelial cells seen per low power field    No organisms seen per oil power field  Final Report (12-24-22 @ 15:11):    Normal Respiratory Cassandra present    Culture - Urine (collected 12-21-22 @ 22:55)  Source: Catheterized Catheterized  Final Report (12-24-22 @ 00:55):    >100,000 CFU/ml Leticia dubliniensis "Susceptibilities not performed"    Culture - Fungal, Body Fluid (collected 12-21-22 @ 19:53)  Source: Peritoneal Peritoneal Fluid  Preliminary Report (12-24-22 @ 15:02):    Culture is being performed. Fungal cultures are held for 4 weeks.    Culture - Body Fluid with Gram Stain (collected 12-21-22 @ 19:53)  Source: Ascites Fl Ascites Fluid  Gram Stain (12-22-22 @ 03:04):    polymorphonuclear leukocytes seen    No organisms seen    by cytocentrifuge  Preliminary Report (12-23-22 @ 08:47):    No growth    Culture - Sputum (collected 12-21-22 @ 01:19)  Source: Trach Asp Tracheal Aspirate  Gram Stain (12-21-22 @ 08:45):    No polymorphonuclear leukocytes per low power field    Numerous Squamous epithelial cells per low power field    Moderate Gram Positive Rods seen per oil power field    Few Yeast like cells seen per oil power field  Final Report (12-23-22 @ 14:05):    Normal Respiratory Cassandra present    Culture - Blood (collected 12-20-22 @ 23:30)  Source: .Blood Blood-Peripheral  Preliminary Report (12-22-22 @ 04:01):    No growth to date.    Culture - Blood (collected 12-20-22 @ 22:38)  Source: .Blood Blood-Peripheral  Preliminary Report (12-22-22 @ 03:02):    No growth to date.    Review of systems  Gen: No weight changes, fatigue, fevers/chills, weakness  Skin: No rashes  Head/Eyes/Ears/Mouth: No headache; Normal hearing; Normal vision w/o blurriness; No sinus pain/discomfort, sore throat  Respiratory: No dyspnea, cough, wheezing, hemoptysis  CV: No chest pain, PND, orthopnea  GI: C/O mild abdominal pain at surgical site, No diarrhea, constipation, nausea, vomiting, melena, hematochezia  : No increased frequency, dysuria, hematuria, nocturia  MSK: No joint pain/swelling; no back pain; no edema  Neuro: No dizziness/lightheadedness, weakness, seizures, numbness, tingling  Heme: No easy bruising or bleeding  Endo: No heat/cold intolerance  Psych: No significant nervousness, anxiety, stress, depression  All other systems were reviewed and are negative, except as noted.    Physical Exam:  Constitutional: NAD   Eyes: icteric, PERRLA  ENMT: nc/at, no thrush  Neck: supple, central line   Respiratory: CTA B/L  Cardiovascular: RRR  Gastrointestinal: Soft abdomen, distended  Genitourinary: Urinary catheter in place, anuric  Extremities: SCD's in place and working bilaterally, no edema  Vascular: Palpable dp pulses bilaterally.   Neurological: intubated/sedated  Skin: no rashes, ulcerations, lesions  Musculoskeletal: intubated/sedated  Psychiatric: intubated/sedated

## 2022-12-25 NOTE — PROGRESS NOTE ADULT - ASSESSMENT
Patient is a 62yFemale presents with hx thyroid cancer s/p thyroidectomy, craniotomy with resection of ventricular mass, with acute liver failure, new seizures, transfer from OSH for transplant evaluation.     Neurologic: hepatic encephalopathy - improving  - off sedation, AAOx4  - c/w lactulose and rifaximin   - ammonia 96 --> 84 --> 75 --> 84  - c/w keppra 750 BID ( new onset of seizure    Respiratory: acute hypoxic resp failure   - CXR b/l infiltrates RLL>LLL, improving  - Extubated 12/23 AM  - combicath cx no significant growth to date    Cardiovascular:   - Midodrine increased from 10mg to 15mg [id number], Levo gtt titrated off. Vaso gtt remains on.  -titrate to MAP >65  -hold home anti htn    Gastrointestinal/Nutrition:  - Acute decompensated liver failure, s/p para 2.9L in ED  - KO tube placed for feeds  - Ammonia remains elevated, most recently 84  - Workup per transplant recs pending  - Monitor BM, rectal tube, on lactulose and rifaximin   - add PPI, dc'd octreotide    Genitourinary/Renal: RED 2/2 ATN vs HRS  - Will maintain CRRT net negative 50cc/hr with plan to hold CRRT in AM and attempt Lasix challenge.  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    Ethics: FULL CODE    Dispo/Lines:  R IJ TLC  R Fem Shiley  A line L Radial  [place]  [facility] Patient is a 62yFemale presents with hx thyroid cancer s/p thyroidectomy, craniotomy with resection of ventricular mass, with acute liver failure, new seizures, transfer from OSH for transplant evaluation.     Neurologic: hepatic encephalopathy - improving  - off sedation, AAOx4  - c/w lactulose and rifaximin   - ammonia 96 --> 84 --> 75 --> 84  - c/w keppra 750 BID ( new onset of seizure    Respiratory: acute hypoxic resp failure   - CXR b/l infiltrates RLL>LLL, improving  - Extubated 12/23 AM  - combicath cx no significant growth to date    Cardiovascular:   - Midodrine increased from 10mg to 15mg [id number], Levo gtt titrated off. Vaso gtt remains on.  -titrate to MAP >65  -hold home anti htn    Gastrointestinal/Nutrition:  - Acute decompensated liver failure, s/p para 2.9L in ED  - KO tube placed for feeds  - Ammonia remains elevated, most recently 84  - Workup per transplant recs pending  - Monitor BM, rectal tube, on lactulose and rifaximin   - add PPI, dc'd octreotide    Genitourinary/Renal: RED 2/2 ATN vs HRS  - Will maintain CRRT net negative 50cc/hr with plan to hold CRRT in AM and attempt Lasix challenge.  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    Ethics: FULL CODE    Dispo/Lines:  R IJ TLC  R Fem Shiley  A line L Radial  [place]  [facility] Patient is a 62yFemale presents with hx thyroid cancer s/p thyroidectomy, craniotomy with resection of ventricular mass, with acute liver failure, new seizures, transfer from OSH for transplant evaluation.     Neurologic: hepatic encephalopathy - improving  - off sedation, AAOx4  - c/w lactulose and rifaximin   - ammonia 96 --> 84 --> 75 --> 84  - c/w keppra 750 BID ( new onset of seizure    Respiratory: acute hypoxic resp failure   - CXR b/l infiltrates RLL>LLL, improving  - Extubated 12/23 AM  - combicath cx no significant growth to date    Cardiovascular:   - Midodrine increased from 10mg to 15mg [id number], Levo gtt titrated off. Vaso gtt remains on.  -titrate to MAP >65  -hold home anti htn    Gastrointestinal/Nutrition:  - Acute decompensated liver failure, s/p para 2.9L in ED  - KO tube placed for feeds  - Ammonia remains elevated, most recently 84  - Workup per transplant recs pending  - Monitor BM, rectal tube, on lactulose and rifaximin   - add PPI, dc'd octreotide    Genitourinary/Renal: RED 2/2 ATN vs HRS  - Will maintain CRRT net negative 50cc/hr with plan to hold CRRT in AM and attempt Lasix challenge.  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    Ethics: FULL CODE    Dispo/Lines:  R IJ TLC  R Fem Shiley  A line L Radial  [place]  [facility]

## 2022-12-25 NOTE — PROGRESS NOTE ADULT - ASSESSMENT
61 yo F with h/o decompensated ETOH cirrhosis with ascites, thyroid cancer (s/p total thyroidectomy, RTX, and radioactive iodide), HTN, ventrical neoplasm who was initially admitted to  12/19 with new onset seizures and transferred to [facility] 12/20 for liver transplant evaluation. Pt being seen for RED requiring CRRT.    Oliguric RED -ATN/HRS/Biliary cast nephropathy . started on CRRT since 12/20  Remains oliguric. on Vaso and midodrine   Continue CRRT for now, target net negative fluid balance     61 yo F with h/o decompensated ETOH cirrhosis with ascites, thyroid cancer (s/p total thyroidectomy, RTX, and radioactive iodide), HTN, ventrical neoplasm who was initially admitted to  12/19 with new onset seizures and transferred to [facility] 12/20 for liver transplant evaluation. Pt being seen for RED requiring CRRT.    Oliguric RED -ATN/HRS/Biliary cast nephropathy . started on CRRT since 12/20  Remains oliguric. on Vaso and midodrine   Continue CRRT for now, target net negative fluid balance     61 yo F with h/o decompensated ETOH cirrhosis with ascites, thyroid cancer (s/p total thyroidectomy, RTX, and radioactive iodide), HTN, ventrical neoplasm who was initially admitted to  12/19 with new onset seizures and transferred to [facility] 12/20 for liver transplant evaluation. Pt being seen for RED requiring CRRT.    Oliguric RED -ATN/HRS/Biliary cast nephropathy . started on CRRT since 12/20  Remains oliguric. on Vaso and midodrine   Continue CRRT for now, target net negative fluid balance

## 2022-12-25 NOTE — PROGRESS NOTE ADULT - SUBJECTIVE AND OBJECTIVE BOX
24 HOUR EVENTS:  - Midodrine increased from 10mg to 15mg [id number], Levo gtt titrated off. Vaso gtt remains on.  - Will maintain CRRT net negative 50cc/hr with plan to hold CRRT in AM and attempt Lasix challenge  - KO tube placed for feeds  - Ammonia remains elevated, most recently 84    SUBJECTIVE/ROS:  [ X ] A ten-point review of systems was otherwise negative except as noted.  [ ] Due to altered mental status/intubation, subjective information were not able to be obtained from the patient. History was obtained, to the extent possible, from review of the chart and collateral sources of information.      NEURO  Exam: AOx3. NAD. Follows commands. Moves all extremities. Strength and sensation intact.  Meds: levETIRAcetam  IVPB 750 milliGRAM(s) IV Intermittent every 12 hours  oxyCODONE    Solution 5 milliGRAM(s) Oral every 4 hours PRN Moderate Pain (4 - 6)  oxyCODONE    Solution 10 milliGRAM(s) Oral every 4 hours PRN Severe Pain (7 - 10)    [x] Adequacy of sedation and pain control has been assessed and adjusted      RESPIRATORY  RR: 26 (12-25-22 @ 01:00) (10 - 39)  SpO2: 93% (12-25-22 @ 01:00) (91% - 97%)  Wt(kg): --  Exam: CTA b/l. No murmurs, rubs, gallops appreciated.   Mechanical Ventilation:   ABG - ( 24 Dec 2022 21:03 )  pH: 7.40  /  pCO2: 40    /  pO2: 79    / HCO3: 25    / Base Excess: 0.0   /  SaO2: 98.0    Lactate: x      [ ] Extubation Readiness Assessed  Meds:       CARDIOVASCULAR  HR: 82 (12-25-22 @ 01:00) (79 - 95)  BP: 102/60 (12-24-22 @ 07:45) (102/60 - 102/60)  BP(mean): 75 (12-24-22 @ 07:45) (75 - 75)  ABP: 105/47 (12-25-22 @ 01:00) (90/44 - 132/62)  ABP(mean): 66 (12-25-22 @ 01:00) (60 - 88)  Wt(kg): --  CVP(cm H2O): --  Lactate, Blood: 2.0 mmol/L (12-24 @ 20:58)    Exam: S1S2. No murmurs, rubs, gallops appreciated.  Cardiac Rhythm: NSR rate 83  Meds: midodrine 15 milliGRAM(s) Oral every 8 hours  norepinephrine Infusion 0.13 MICROgram(s)/kG/Min IV Continuous <Continuous>        GI/NUTRITION  Exam: Soft, non-distended, non-tender.   Diet: Nepro via NGT  Meds: lactulose Syrup 20 Gram(s) Oral every 6 hours  pantoprazole   Suspension 40 milliGRAM(s) Oral every 24 hours      GENITOURINARY  I&O's Detail    12-23 @ 07:01  -  12-24 @ 07:00  --------------------------------------------------------  IN:    Albumin 25%  -  50 mL: 250 mL    Dexmedetomidine: 3.3 mL    dextrose 10%: 570 mL    IV PiggyBack: 50 mL    IV PiggyBack: 800 mL    Nepro with Carb Steady: 10 mL    Norepinephrine: 305.8 mL    PRBCs (Packed Red Blood Cells): 250 mL    Vasopressin: 108 mL  Total IN: 2347.1 mL    OUT:    Indwelling Catheter - Urethral (mL): 25 mL    Other (mL): 3245 mL    Rectal Tube (mL): 750 mL  Total OUT: 4020 mL    Total NET: -1672.9 mL      12-24 @ 07:01  -  12-25 @ 01:25  --------------------------------------------------------  IN:    Enteral Tube Flush: 30 mL    IV PiggyBack: 50 mL    IV PiggyBack: 450 mL    Nepro with Carb Steady: 495 mL    Norepinephrine: 81 mL    Vasopressin: 76.5 mL  Total IN: 1182.5 mL    OUT:    Indwelling Catheter - Urethral (mL): 5 mL    Other (mL): 1755 mL    Rectal Tube (mL): 400 mL  Total OUT: 2160 mL    Total NET: -977.5 mL          12-24    138  |  101  |  <4<L>  ----------------------------<  157<H>  3.9   |  22  |  0.80    Ca    8.8      24 Dec 2022 20:59  Phos  2.0     12-24  Mg     2.4     12-24    TPro  6.5  /  Alb  4.4  /  TBili  26.6<H>  /  DBili  >10.0<H>  /  AST  135<H>  /  ALT  44  /  AlkPhos  129<H>  12-24    [ ] Monroe catheter, indication: N/A  Meds: folic acid 1 milliGRAM(s) Oral daily  multivitamin/minerals/iron Oral Solution (CENTRUM) 15 milliLiter(s) Oral daily        HEMATOLOGIC  Meds:   [x] VTE Prophylaxis                        8.1    23.44 )-----------( 56       ( 24 Dec 2022 20:58 )             24.0     PT/INR - ( 24 Dec 2022 20:59 )   PT: 26.7 sec;   INR: 2.30 ratio         PTT - ( 24 Dec 2022 20:59 )  PTT:49.5 sec  Transfusion     [ ] PRBC   [ ] Platelets   [ ] FFP   [ ] Cryoprecipitate      INFECTIOUS DISEASES  T(C): 37.4 (12-25-22 @ 00:00), Max: 37.5 (12-24-22 @ 21:00)  Wt(kg): --  WBC Count: 23.44 K/uL (12-24 @ 20:58)  WBC Count: 23.71 K/uL (12-24 @ 17:50)  WBC Count: 22.30 K/uL (12-24 @ 11:54)  WBC Count: 22.03 K/uL (12-24 @ 05:35)    Recent Cultures:  Specimen Source: .Blood Blood, 12-23 @ 13:35; Results   No growth to date.; Gram Stain: --; Organism: --  Specimen Source: .Blood Blood, 12-23 @ 13:25; Results   No growth to date.; Gram Stain: --; Organism: --  Specimen Source: Combi-Cath Combi-Cath, 12-22 @ 17:02; Results   Normal Respiratory Cassandra present; Gram Stain:   Moderate polymorphonuclear leukocytes seen per low power field  No squamous epithelial cells seen per low power field  No organisms seen per oil power field; Organism: --  Specimen Source: Catheterized Catheterized, 12-21 @ 22:55; Results   >100,000 CFU/ml Leticia dubliniensis "Susceptibilities not performed"; Gram Stain: --; Organism: --  Specimen Source: Ascites Fl Ascites Fluid, 12-21 @ 19:53; Results   No growth; Gram Stain:   polymorphonuclear leukocytes seen  No organisms seen  by cytocentrifuge; Organism: --  Specimen Source: Trach Asp Tracheal Aspirate, 12-21 @ 01:19; Results   Normal Respiratory Cassandra present; Gram Stain:   No polymorphonuclear leukocytes per low power field  Numerous Squamous epithelial cells per low power field  Moderate Gram Positive Rods seen per oil power field  Few Yeast like cells seen per oil power field; Organism: --  Specimen Source: .Blood Blood-Peripheral, 12-20 @ 23:30; Results   No growth to date.; Gram Stain: --; Organism: --  Specimen Source: .Blood Blood-Peripheral, 12-20 @ 22:38; Results   No growth to date.; Gram Stain: --; Organism: --    Meds: fluconAZOLE IVPB 200 milliGRAM(s) IV Intermittent every 24 hours  influenza   Vaccine 0.5 milliLiter(s) IntraMuscular once  meropenem  IVPB 1000 milliGRAM(s) IV Intermittent every 12 hours  rifAXIMin 550 milliGRAM(s) Oral every 12 hours        ENDOCRINE  Capillary Blood Glucose    Meds: levothyroxine 137 MICROGram(s) Oral daily  vasopressin Infusion 0.03 Unit(s)/Min IV Continuous <Continuous>        ACCESS DEVICES:  [ X ] Peripheral IV  [ X ] Central Venous Line	[ X ] R	[ X ] L	[ C ] IJ	[ X ] Fem	[ ] SC	Placed: 12/20, 12/20  [ X ] Arterial Line		[ ] R	[ X ] L	[ ] Fem	[ X ] Rad	[ ] Ax	Placed: OSH  [ ] PICC:					[ ] Mediport  [ X ] Urinary Catheter, Date Placed: 12/21  [ ] Necessity of urinary, arterial, and venous catheters discussed    OTHER MEDICATIONS:  chlorhexidine 2% Cloths 1 Application(s) Topical <User Schedule>  chlorhexidine 4% Liquid 1 Application(s) Topical <User Schedule>  CRRT Treatment    <Continuous>  PrismaSATE Dialysate BGK 4 / 2.5 5000 milliLiter(s) CRRT <Continuous>  PrismaSOL Filtration BGK 4 / 2.5 5000 milliLiter(s) CRRT <Continuous>  PrismaSOL Filtration BGK 4 / 2.5 5000 milliLiter(s) CRRT <Continuous>  sodium chloride 0.65% Nasal 1 Spray(s) Both Nostrils every 3 hours PRN  tetracaine/benzocaine/butamben Spray 1 Spray(s) Topical every 3 hours PRN      CODE STATUS: Full    IMAGING: 24 HOUR EVENTS:  - Midodrine increased from 10mg to 15mg [id number], Levo gtt titrated off. Vaso gtt remains on.  - Will maintain CRRT net negative 50cc/hr with plan to hold CRRT in AM and attempt Lasix challenge  - KO tube placed for feeds  - Ammonia remains elevated, most recently 84    SUBJECTIVE/ROS:  [ X ] A ten-point review of systems was otherwise negative except as noted.  [ ] Due to altered mental status/intubation, subjective information were not able to be obtained from the patient. History was obtained, to the extent possible, from review of the chart and collateral sources of information.      NEURO  Exam: AOx3. NAD. Follows commands. Moves all extremities. Strength and sensation intact.  Meds: levETIRAcetam  IVPB 750 milliGRAM(s) IV Intermittent every 12 hours  oxyCODONE    Solution 5 milliGRAM(s) Oral every 4 hours PRN Moderate Pain (4 - 6)  oxyCODONE    Solution 10 milliGRAM(s) Oral every 4 hours PRN Severe Pain (7 - 10)    [x] Adequacy of sedation and pain control has been assessed and adjusted      RESPIRATORY  RR: 26 (12-25-22 @ 01:00) (10 - 39)  SpO2: 93% (12-25-22 @ 01:00) (91% - 97%)  Wt(kg): --  Exam: CTA b/l. No murmurs, rubs, gallops appreciated.   Mechanical Ventilation:   ABG - ( 24 Dec 2022 21:03 )  pH: 7.40  /  pCO2: 40    /  pO2: 79    / HCO3: 25    / Base Excess: 0.0   /  SaO2: 98.0    Lactate: x      [ ] Extubation Readiness Assessed  Meds:       CARDIOVASCULAR  HR: 82 (12-25-22 @ 01:00) (79 - 95)  BP: 102/60 (12-24-22 @ 07:45) (102/60 - 102/60)  BP(mean): 75 (12-24-22 @ 07:45) (75 - 75)  ABP: 105/47 (12-25-22 @ 01:00) (90/44 - 132/62)  ABP(mean): 66 (12-25-22 @ 01:00) (60 - 88)  Wt(kg): --  CVP(cm H2O): --  Lactate, Blood: 2.0 mmol/L (12-24 @ 20:58)    Exam: S1S2. No murmurs, rubs, gallops appreciated.  Cardiac Rhythm: NSR rate 83  Meds: midodrine 15 milliGRAM(s) Oral every 8 hours  norepinephrine Infusion 0.13 MICROgram(s)/kG/Min IV Continuous <Continuous>        GI/NUTRITION  Exam: Soft, non-distended, non-tender.   Diet: Nepro via NGT  Meds: lactulose Syrup 20 Gram(s) Oral every 6 hours  pantoprazole   Suspension 40 milliGRAM(s) Oral every 24 hours      GENITOURINARY  I&O's Detail    12-23 @ 07:01  -  12-24 @ 07:00  --------------------------------------------------------  IN:    Albumin 25%  -  50 mL: 250 mL    Dexmedetomidine: 3.3 mL    dextrose 10%: 570 mL    IV PiggyBack: 50 mL    IV PiggyBack: 800 mL    Nepro with Carb Steady: 10 mL    Norepinephrine: 305.8 mL    PRBCs (Packed Red Blood Cells): 250 mL    Vasopressin: 108 mL  Total IN: 2347.1 mL    OUT:    Indwelling Catheter - Urethral (mL): 25 mL    Other (mL): 3245 mL    Rectal Tube (mL): 750 mL  Total OUT: 4020 mL    Total NET: -1672.9 mL      12-24 @ 07:01  -  12-25 @ 01:25  --------------------------------------------------------  IN:    Enteral Tube Flush: 30 mL    IV PiggyBack: 50 mL    IV PiggyBack: 450 mL    Nepro with Carb Steady: 495 mL    Norepinephrine: 81 mL    Vasopressin: 76.5 mL  Total IN: 1182.5 mL    OUT:    Indwelling Catheter - Urethral (mL): 5 mL    Other (mL): 1755 mL    Rectal Tube (mL): 400 mL  Total OUT: 2160 mL    Total NET: -977.5 mL          12-24    138  |  101  |  <4<L>  ----------------------------<  157<H>  3.9   |  22  |  0.80    Ca    8.8      24 Dec 2022 20:59  Phos  2.0     12-24  Mg     2.4     12-24    TPro  6.5  /  Alb  4.4  /  TBili  26.6<H>  /  DBili  >10.0<H>  /  AST  135<H>  /  ALT  44  /  AlkPhos  129<H>  12-24    [ ] Monroe catheter, indication: N/A  Meds: folic acid 1 milliGRAM(s) Oral daily  multivitamin/minerals/iron Oral Solution (CENTRUM) 15 milliLiter(s) Oral daily        HEMATOLOGIC  Meds:   [x] VTE Prophylaxis                        8.1    23.44 )-----------( 56       ( 24 Dec 2022 20:58 )             24.0     PT/INR - ( 24 Dec 2022 20:59 )   PT: 26.7 sec;   INR: 2.30 ratio         PTT - ( 24 Dec 2022 20:59 )  PTT:49.5 sec  Transfusion     [ ] PRBC   [ ] Platelets   [ ] FFP   [ ] Cryoprecipitate      INFECTIOUS DISEASES  T(C): 37.4 (12-25-22 @ 00:00), Max: 37.5 (12-24-22 @ 21:00)  Wt(kg): --  WBC Count: 23.44 K/uL (12-24 @ 20:58)  WBC Count: 23.71 K/uL (12-24 @ 17:50)  WBC Count: 22.30 K/uL (12-24 @ 11:54)  WBC Count: 22.03 K/uL (12-24 @ 05:35)    Recent Cultures:  Specimen Source: .Blood Blood, 12-23 @ 13:35; Results   No growth to date.; Gram Stain: --; Organism: --  Specimen Source: .Blood Blood, 12-23 @ 13:25; Results   No growth to date.; Gram Stain: --; Organism: --  Specimen Source: Combi-Cath Combi-Cath, 12-22 @ 17:02; Results   Normal Respiratory Cassandra present; Gram Stain:   Moderate polymorphonuclear leukocytes seen per low power field  No squamous epithelial cells seen per low power field  No organisms seen per oil power field; Organism: --  Specimen Source: Catheterized Catheterized, 12-21 @ 22:55; Results   >100,000 CFU/ml Leticia dubliniensis "Susceptibilities not performed"; Gram Stain: --; Organism: --  Specimen Source: Ascites Fl Ascites Fluid, 12-21 @ 19:53; Results   No growth; Gram Stain:   polymorphonuclear leukocytes seen  No organisms seen  by cytocentrifuge; Organism: --  Specimen Source: Trach Asp Tracheal Aspirate, 12-21 @ 01:19; Results   Normal Respiratory Cassandra present; Gram Stain:   No polymorphonuclear leukocytes per low power field  Numerous Squamous epithelial cells per low power field  Moderate Gram Positive Rods seen per oil power field  Few Yeast like cells seen per oil power field; Organism: --  Specimen Source: .Blood Blood-Peripheral, 12-20 @ 23:30; Results   No growth to date.; Gram Stain: --; Organism: --  Specimen Source: .Blood Blood-Peripheral, 12-20 @ 22:38; Results   No growth to date.; Gram Stain: --; Organism: --    Meds: fluconAZOLE IVPB 200 milliGRAM(s) IV Intermittent every 24 hours  influenza   Vaccine 0.5 milliLiter(s) IntraMuscular once  meropenem  IVPB 1000 milliGRAM(s) IV Intermittent every 12 hours  rifAXIMin 550 milliGRAM(s) Oral every 12 hours        ENDOCRINE  Capillary Blood Glucose    Meds: levothyroxine 137 MICROGram(s) Oral daily  vasopressin Infusion 0.03 Unit(s)/Min IV Continuous <Continuous>        ACCESS DEVICES:  [ X ] Peripheral IV  [ X ] Central Venous Line	[ X ] R	[ X ] L	[ C ] IJ	[ X ] Fem	[ ] SC	Placed: 12/20, 12/20  [ X ] Arterial Line		[ ] R	[ X ] L	[ ] Fem	[ X ] Rad	[ ] Ax	Placed: OSH  [ ] PICC:					[ ] Mediport  [ X ] Urinary Catheter, Date Placed: 12/21  [ ] Necessity of urinary, arterial, and venous catheters discussed    OTHER MEDICATIONS:  chlorhexidine 2% Cloths 1 Application(s) Topical <User Schedule>  chlorhexidine 4% Liquid 1 Application(s) Topical <User Schedule>  CRRT Treatment    <Continuous>  PrismaSATE Dialysate BGK 4 / 2.5 5000 milliLiter(s) CRRT <Continuous>  PrismaSOL Filtration BGK 4 / 2.5 5000 milliLiter(s) CRRT <Continuous>  PrismaSOL Filtration BGK 4 / 2.5 5000 milliLiter(s) CRRT <Continuous>  sodium chloride 0.65% Nasal 1 Spray(s) Both Nostrils every 3 hours PRN  tetracaine/benzocaine/butamben Spray 1 Spray(s) Topical every 3 hours PRN      CODE STATUS: Full    IMAGING: 24 HOUR EVENTS:  - Midodrine increased from 10mg to 15mg [id number], Levo gtt titrated off. Vaso gtt remains on.  - Will maintain CRRT net negative 50cc/hr with plan to hold CRRT in AM and attempt Lasix challenge  - KO tube placed for feeds  - Ammonia remains elevated, most recently 84    SUBJECTIVE/ROS:  [ X ] A ten-point review of systems was otherwise negative except as noted.  [ ] Due to altered mental status/intubation, subjective information were not able to be obtained from the patient. History was obtained, to the extent possible, from review of the chart and collateral sources of information.      NEURO  Exam: AOx3. NAD. Follows commands. Moves all extremities. Strength and sensation intact.  Meds: levETIRAcetam  IVPB 750 milliGRAM(s) IV Intermittent every 12 hours  oxyCODONE    Solution 5 milliGRAM(s) Oral every 4 hours PRN Moderate Pain (4 - 6)  oxyCODONE    Solution 10 milliGRAM(s) Oral every 4 hours PRN Severe Pain (7 - 10)    [x] Adequacy of sedation and pain control has been assessed and adjusted      RESPIRATORY  RR: 26 (12-25-22 @ 01:00) (10 - 39)  SpO2: 93% (12-25-22 @ 01:00) (91% - 97%)  Wt(kg): --  Exam: CTA b/l. No murmurs, rubs, gallops appreciated.   Mechanical Ventilation:   ABG - ( 24 Dec 2022 21:03 )  pH: 7.40  /  pCO2: 40    /  pO2: 79    / HCO3: 25    / Base Excess: 0.0   /  SaO2: 98.0    Lactate: x      [ ] Extubation Readiness Assessed  Meds:       CARDIOVASCULAR  HR: 82 (12-25-22 @ 01:00) (79 - 95)  BP: 102/60 (12-24-22 @ 07:45) (102/60 - 102/60)  BP(mean): 75 (12-24-22 @ 07:45) (75 - 75)  ABP: 105/47 (12-25-22 @ 01:00) (90/44 - 132/62)  ABP(mean): 66 (12-25-22 @ 01:00) (60 - 88)  Wt(kg): --  CVP(cm H2O): --  Lactate, Blood: 2.0 mmol/L (12-24 @ 20:58)    Exam: S1S2. No murmurs, rubs, gallops appreciated.  Cardiac Rhythm: NSR rate 83  Meds: midodrine 15 milliGRAM(s) Oral every 8 hours  norepinephrine Infusion 0.13 MICROgram(s)/kG/Min IV Continuous <Continuous>        GI/NUTRITION  Exam: Soft, non-distended, non-tender.   Diet: Nepro via NGT  Meds: lactulose Syrup 20 Gram(s) Oral every 6 hours  pantoprazole   Suspension 40 milliGRAM(s) Oral every 24 hours      GENITOURINARY  I&O's Detail    12-23 @ 07:01  -  12-24 @ 07:00  --------------------------------------------------------  IN:    Albumin 25%  -  50 mL: 250 mL    Dexmedetomidine: 3.3 mL    dextrose 10%: 570 mL    IV PiggyBack: 50 mL    IV PiggyBack: 800 mL    Nepro with Carb Steady: 10 mL    Norepinephrine: 305.8 mL    PRBCs (Packed Red Blood Cells): 250 mL    Vasopressin: 108 mL  Total IN: 2347.1 mL    OUT:    Indwelling Catheter - Urethral (mL): 25 mL    Other (mL): 3245 mL    Rectal Tube (mL): 750 mL  Total OUT: 4020 mL    Total NET: -1672.9 mL      12-24 @ 07:01  -  12-25 @ 01:25  --------------------------------------------------------  IN:    Enteral Tube Flush: 30 mL    IV PiggyBack: 50 mL    IV PiggyBack: 450 mL    Nepro with Carb Steady: 495 mL    Norepinephrine: 81 mL    Vasopressin: 76.5 mL  Total IN: 1182.5 mL    OUT:    Indwelling Catheter - Urethral (mL): 5 mL    Other (mL): 1755 mL    Rectal Tube (mL): 400 mL  Total OUT: 2160 mL    Total NET: -977.5 mL          12-24    138  |  101  |  <4<L>  ----------------------------<  157<H>  3.9   |  22  |  0.80    Ca    8.8      24 Dec 2022 20:59  Phos  2.0     12-24  Mg     2.4     12-24    TPro  6.5  /  Alb  4.4  /  TBili  26.6<H>  /  DBili  >10.0<H>  /  AST  135<H>  /  ALT  44  /  AlkPhos  129<H>  12-24    [ ] Monroe catheter, indication: N/A  Meds: folic acid 1 milliGRAM(s) Oral daily  multivitamin/minerals/iron Oral Solution (CENTRUM) 15 milliLiter(s) Oral daily        HEMATOLOGIC  Meds:   [x] VTE Prophylaxis                        8.1    23.44 )-----------( 56       ( 24 Dec 2022 20:58 )             24.0     PT/INR - ( 24 Dec 2022 20:59 )   PT: 26.7 sec;   INR: 2.30 ratio         PTT - ( 24 Dec 2022 20:59 )  PTT:49.5 sec  Transfusion     [ ] PRBC   [ ] Platelets   [ ] FFP   [ ] Cryoprecipitate      INFECTIOUS DISEASES  T(C): 37.4 (12-25-22 @ 00:00), Max: 37.5 (12-24-22 @ 21:00)  Wt(kg): --  WBC Count: 23.44 K/uL (12-24 @ 20:58)  WBC Count: 23.71 K/uL (12-24 @ 17:50)  WBC Count: 22.30 K/uL (12-24 @ 11:54)  WBC Count: 22.03 K/uL (12-24 @ 05:35)    Recent Cultures:  Specimen Source: .Blood Blood, 12-23 @ 13:35; Results   No growth to date.; Gram Stain: --; Organism: --  Specimen Source: .Blood Blood, 12-23 @ 13:25; Results   No growth to date.; Gram Stain: --; Organism: --  Specimen Source: Combi-Cath Combi-Cath, 12-22 @ 17:02; Results   Normal Respiratory Cassandra present; Gram Stain:   Moderate polymorphonuclear leukocytes seen per low power field  No squamous epithelial cells seen per low power field  No organisms seen per oil power field; Organism: --  Specimen Source: Catheterized Catheterized, 12-21 @ 22:55; Results   >100,000 CFU/ml Leticia dubliniensis "Susceptibilities not performed"; Gram Stain: --; Organism: --  Specimen Source: Ascites Fl Ascites Fluid, 12-21 @ 19:53; Results   No growth; Gram Stain:   polymorphonuclear leukocytes seen  No organisms seen  by cytocentrifuge; Organism: --  Specimen Source: Trach Asp Tracheal Aspirate, 12-21 @ 01:19; Results   Normal Respiratory Cassandra present; Gram Stain:   No polymorphonuclear leukocytes per low power field  Numerous Squamous epithelial cells per low power field  Moderate Gram Positive Rods seen per oil power field  Few Yeast like cells seen per oil power field; Organism: --  Specimen Source: .Blood Blood-Peripheral, 12-20 @ 23:30; Results   No growth to date.; Gram Stain: --; Organism: --  Specimen Source: .Blood Blood-Peripheral, 12-20 @ 22:38; Results   No growth to date.; Gram Stain: --; Organism: --    Meds: fluconAZOLE IVPB 200 milliGRAM(s) IV Intermittent every 24 hours  influenza   Vaccine 0.5 milliLiter(s) IntraMuscular once  meropenem  IVPB 1000 milliGRAM(s) IV Intermittent every 12 hours  rifAXIMin 550 milliGRAM(s) Oral every 12 hours        ENDOCRINE  Capillary Blood Glucose    Meds: levothyroxine 137 MICROGram(s) Oral daily  vasopressin Infusion 0.03 Unit(s)/Min IV Continuous <Continuous>        ACCESS DEVICES:  [ X ] Peripheral IV  [ X ] Central Venous Line	[ X ] R	[ X ] L	[ C ] IJ	[ X ] Fem	[ ] SC	Placed: 12/20, 12/20  [ X ] Arterial Line		[ ] R	[ X ] L	[ ] Fem	[ X ] Rad	[ ] Ax	Placed: OSH  [ ] PICC:					[ ] Mediport  [ X ] Urinary Catheter, Date Placed: 12/21  [ ] Necessity of urinary, arterial, and venous catheters discussed    OTHER MEDICATIONS:  chlorhexidine 2% Cloths 1 Application(s) Topical <User Schedule>  chlorhexidine 4% Liquid 1 Application(s) Topical <User Schedule>  CRRT Treatment    <Continuous>  PrismaSATE Dialysate BGK 4 / 2.5 5000 milliLiter(s) CRRT <Continuous>  PrismaSOL Filtration BGK 4 / 2.5 5000 milliLiter(s) CRRT <Continuous>  PrismaSOL Filtration BGK 4 / 2.5 5000 milliLiter(s) CRRT <Continuous>  sodium chloride 0.65% Nasal 1 Spray(s) Both Nostrils every 3 hours PRN  tetracaine/benzocaine/butamben Spray 1 Spray(s) Topical every 3 hours PRN      CODE STATUS: Full    IMAGING:

## 2022-12-25 NOTE — PROCEDURE NOTE - ADDITIONAL PROCEDURE DETAILS
RIJ dual lumen shiley catheter placed for HD with sterile ultrasound guided technique.  Good blood return in both lumens.  Procedural sedation with ketamine, tolerated well. Sterile dressing applied.

## 2022-12-25 NOTE — PROGRESS NOTE ADULT - ASSESSMENT
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     Impression:  #ACLF with underlying cirrhosis  #AUD  #Decompensated ETOH cirrhosis c/b prior ascites req LVP- UNOS/OPTN MELD-Na 42 (12/23); blood group O neg  Of note was recently admitted to  11/2022 for workup and eval of new onset jaundice- during that hospitalization was found to have a UTI and dx with alc hep was treated for UTI w/ abx and started on steroids (was deemed a non-responder and steroids were subsequently stopped); s/p LVP at [place] 11/2022. Previously hospitalized in 2021 at [place] for ETOH detox but pt reportedly unaware of any liver dysfunction at that time. Went to ETOH rehab 08/2022 and was asked to leave the facility when she was COVID+; was sober for 1 week until she started drinking again. Had also attended a few AA meetings in the past.       -Ascites: large volume on CT A/P       -HCC: pending contrasted imaging study       -SBP: negative on para 12/21       -Varices: no h/o EGD       -Hepatic encephalopathy: unable to assess; intubated; on lactulose/rifaximin    #liver transplant workup   -hepatitis B surface <3, hepatitis A IgM, B core, B surface, A IgG, B surface nonreactive  - EBV IgM and early antigen negative   - CMV IgG positive   - acetaminophen negative   - TSH 0.05, RPR negative    #new onset seizure- unknown trigger/etiology- currently on Keppra    Recommendations:  -trend clinical symptoms, exam findings, vital signs, CBC, CMP, INR, Mg, phosphorus  -f/u  PETH level  - CMV PCR  - HEV Ab  -albumin 25% 100 mL q8h  -c/w CRRT  - off pressors  -Infectious disease consultation       -pending full infectious workup - bl cx, UA +urine cx, dx para       -HIV screening       -check quantiferon gold for TB screening       -rubella IgG, syphillis screen, toxoplasma IgG, VZV IgG       -c/w broad spectrum antibiotics- fluconazole (12/20 ->), meropenem (12/20 ->);  s/p vanco (12/21 -12/22)  - send B12    **pt's /JOSUE Gibson ([phone])   62 y.o Hx significant for decompensated ETOH cirrhosis c/b ascites (dx 11/2022), alc hep (dx 11/2022; non-responsive to steroids), remote h/o thyroid cancer in her 20s s/p total thyroidectomy + RTX + radioactive iodide, HTN, ventrical neoplasm (dx 2021) s/p right frontal craniotomy (03/2022) for resection post operative course c/b hemorrhage right lateral ventricle (managed non-operatively) who was initially admitted to  12/19 with new onset seizures and transferred to [facility] 12/20 for LT eval for FRANTZ.     Impression:  #ACLF with underlying cirrhosis  #AUD  #Decompensated ETOH cirrhosis c/b prior ascites req LVP- UNOS/OPTN MELD-Na 42 (12/23); blood group O neg  Of note was recently admitted to  11/2022 for workup and eval of new onset jaundice- during that hospitalization was found to have a UTI and dx with alc hep was treated for UTI w/ abx and started on steroids (was deemed a non-responder and steroids were subsequently stopped); s/p LVP at [place] 11/2022. Previously hospitalized in 2021 at [place] for ETOH detox but pt reportedly unaware of any liver dysfunction at that time. Went to ETOH rehab 08/2022 and was asked to leave the facility when she was COVID+; was sober for 1 week until she started drinking again. Had also attended a few AA meetings in the past.       -Ascites: large volume on CT A/P       -HCC: pending contrasted imaging study       -SBP: negative on para 12/21       -Varices: no h/o EGD       -Hepatic encephalopathy: unable to assess; intubated; on lactulose/rifaximin    #liver transplant workup   -hepatitis B surface <3, hepatitis A IgM, B core, B surface, A IgG, B surface nonreactive  - EBV IgM and early antigen negative   - CMV IgG positive   - acetaminophen negative   - TSH 0.05, RPR negative    #new onset seizure- unknown trigger/etiology- currently on Keppra    Recommendations:  -trend clinical symptoms, exam findings, vital signs, CBC, CMP, INR, Mg, phosphorus  -f/u  PETH level  - CMV PCR  - HEV Ab  -albumin 25% 100 mL q8h  -c/w CRRT  - off pressors  -Infectious disease consultation       -pending full infectious workup - bl cx, UA +urine cx, dx para       -HIV screening       -check quantiferon gold for TB screening       -rubella IgG, syphillis screen, toxoplasma IgG, VZV IgG       -c/w broad spectrum antibiotics- fluconazole (12/20 ->), meropenem (12/20 ->);  s/p vanco (12/21 -12/22)  - send B12    **pt's /JOSUE Gibson ([phone])   62 y.o Hx significant for decompensated ETOH cirrhosis c/b ascites (dx 11/2022), alc hep (dx 11/2022; non-responsive to steroids), remote h/o thyroid cancer in her 20s s/p total thyroidectomy + RTX + radioactive iodide, HTN, ventrical neoplasm (dx 2021) s/p right frontal craniotomy (03/2022) for resection post operative course c/b hemorrhage right lateral ventricle (managed non-operatively) who was initially admitted to  12/19 with new onset seizures and transferred to [facility] 12/20 for LT eval for FRANTZ.     Impression:  #ACLF with underlying cirrhosis  #AUD  #Decompensated ETOH cirrhosis c/b prior ascites req LVP- UNOS/OPTN MELD-Na 42 (12/23); blood group O neg  Of note was recently admitted to  11/2022 for workup and eval of new onset jaundice- during that hospitalization was found to have a UTI and dx with alc hep was treated for UTI w/ abx and started on steroids (was deemed a non-responder and steroids were subsequently stopped); s/p LVP at [place] 11/2022. Previously hospitalized in 2021 at [place] for ETOH detox but pt reportedly unaware of any liver dysfunction at that time. Went to ETOH rehab 08/2022 and was asked to leave the facility when she was COVID+; was sober for 1 week until she started drinking again. Had also attended a few AA meetings in the past.       -Ascites: large volume on CT A/P       -HCC: pending contrasted imaging study       -SBP: negative on para 12/21       -Varices: no h/o EGD       -Hepatic encephalopathy: unable to assess; intubated; on lactulose/rifaximin    #liver transplant workup   -hepatitis B surface <3, hepatitis A IgM, B core, B surface, A IgG, B surface nonreactive  - EBV IgM and early antigen negative   - CMV IgG positive   - acetaminophen negative   - TSH 0.05, RPR negative    #new onset seizure- unknown trigger/etiology- currently on Keppra    Recommendations:  -trend clinical symptoms, exam findings, vital signs, CBC, CMP, INR, Mg, phosphorus  -f/u  PETH level  - CMV PCR  - HEV Ab  -albumin 25% 100 mL q8h  -c/w CRRT  - off pressors  -Infectious disease consultation       -pending full infectious workup - bl cx, UA +urine cx, dx para       -HIV screening       -check quantiferon gold for TB screening       -rubella IgG, syphillis screen, toxoplasma IgG, VZV IgG       -c/w broad spectrum antibiotics- fluconazole (12/20 ->), meropenem (12/20 ->);  s/p vanco (12/21 -12/22)  - send B12    **pt's /JOSUE Gibson ([phone])   62 y.o Hx significant for decompensated ETOH cirrhosis c/b ascites (dx 11/2022), alc hep (dx 11/2022; non-responsive to steroids), remote h/o thyroid cancer in her 20s s/p total thyroidectomy + RTX + radioactive iodide, HTN, ventrical neoplasm (dx 2021) s/p right frontal craniotomy (03/2022) for resection post operative course c/b hemorrhage right lateral ventricle (managed non-operatively) who was initially admitted to  12/19 with new onset seizures and transferred to [facility] 12/20 for LT eval for FRANTZ.     Impression:  #ACLF with underlying cirrhosis  #AUD  #Decompensated ETOH cirrhosis c/b prior ascites req LVP- UNOS/OPTN MELD-Na 42 (12/23); blood group O neg  Of note was recently admitted to  11/2022 for workup and eval of new onset jaundice- during that hospitalization was found to have a UTI and dx with alc hep was treated for UTI w/ abx and started on steroids (was deemed a non-responder and steroids were subsequently stopped); s/p LVP at [place] 11/2022. Previously hospitalized in 2021 at [place] for ETOH detox but pt reportedly unaware of any liver dysfunction at that time. Went to ETOH rehab 08/2022 and was asked to leave the facility when she was COVID+; was sober for 1 week until she started drinking again. Had also attended a few AA meetings in the past.       -Ascites: large volume on CT A/P       -HCC: pending contrasted imaging study       -SBP: negative on para 12/21       -Varices: no h/o EGD       -Hepatic encephalopathy: unable to assess; intubated; on lactulose/rifaximin    #liver transplant workup   -hepatitis B surface <3, hepatitis A IgM, B core, B surface, A IgG, B surface nonreactive  - EBV IgM and early antigen negative   - CMV IgG positive   - acetaminophen negative   - TSH 0.05, RPR negative    #new onset seizure- unknown trigger/etiology- currently on Keppra    Recommendations:  -c/w CRRT  - off pressors  -Infectious disease consultation       -pending full infectious workup - bl cx, UA +urine cx, dx para       -HIV screening       -check quantiferon gold for TB screening       -rubella IgG, syphillis screen, toxoplasma IgG, VZV IgG       -c/w broad spectrum antibiotics- fluconazole (12/20 ->), meropenem (12/20 ->);  s/p vanco (12/21 -12/22)  - send B12    **pt's /JOSUE Gibson ([phone])   62 y.o Hx significant for decompensated ETOH cirrhosis c/b ascites (dx 11/2022), alc hep (dx 11/2022; non-responsive to steroids), remote h/o thyroid cancer in her 20s s/p total thyroidectomy + RTX + radioactive iodide, HTN, ventrical neoplasm (dx 2021) s/p right frontal craniotomy (03/2022) for resection post operative course c/b hemorrhage right lateral ventricle (managed non-operatively) who was initially admitted to  12/19 with new onset seizures and transferred to [facility] 12/20 for LT eval for FRANTZ.     Impression:  #ACLF with underlying cirrhosis  #AUD  #Decompensated ETOH cirrhosis c/b prior ascites req LVP- UNOS/OPTN MELD-Na 42 (12/23); blood group O neg  Of note was recently admitted to  11/2022 for workup and eval of new onset jaundice- during that hospitalization was found to have a UTI and dx with alc hep was treated for UTI w/ abx and started on steroids (was deemed a non-responder and steroids were subsequently stopped); s/p LVP at [place] 11/2022. Previously hospitalized in 2021 at [place] for ETOH detox but pt reportedly unaware of any liver dysfunction at that time. Went to ETOH rehab 08/2022 and was asked to leave the facility when she was COVID+; was sober for 1 week until she started drinking again. Had also attended a few AA meetings in the past.       -Ascites: large volume on CT A/P       -HCC: pending contrasted imaging study       -SBP: negative on para 12/21       -Varices: no h/o EGD       -Hepatic encephalopathy: unable to assess; intubated; on lactulose/rifaximin    #liver transplant workup   -hepatitis B surface <3, hepatitis A IgM, B core, B surface, A IgG, B surface nonreactive  - EBV IgM and early antigen negative   - CMV IgG positive   - acetaminophen negative   - TSH 0.05, RPR negative    #new onset seizure- unknown trigger/etiology- currently on Keppra    Recommendations:  -c/w CRRT  - off pressors  -Infectious disease consultation       -pending full infectious workup - bl cx, UA +urine cx, dx para       -HIV screening       -check quantiferon gold for TB screening       -rubella IgG, syphillis screen, toxoplasma IgG, VZV IgG       -c/w broad spectrum antibiotics- fluconazole (12/20 ->), meropenem (12/20 ->);  s/p vanco (12/21 -12/22)  - send B12    **pt's /JOSUE Gibson ([phone])   62 y.o Hx significant for decompensated ETOH cirrhosis c/b ascites (dx 11/2022), alc hep (dx 11/2022; non-responsive to steroids), remote h/o thyroid cancer in her 20s s/p total thyroidectomy + RTX + radioactive iodide, HTN, ventrical neoplasm (dx 2021) s/p right frontal craniotomy (03/2022) for resection post operative course c/b hemorrhage right lateral ventricle (managed non-operatively) who was initially admitted to  12/19 with new onset seizures and transferred to [facility] 12/20 for LT eval for FRANTZ.     Impression:  #ACLF with underlying cirrhosis  #AUD  #Decompensated ETOH cirrhosis c/b prior ascites req LVP- UNOS/OPTN MELD-Na 42 (12/23); blood group O neg  Of note was recently admitted to  11/2022 for workup and eval of new onset jaundice- during that hospitalization was found to have a UTI and dx with alc hep was treated for UTI w/ abx and started on steroids (was deemed a non-responder and steroids were subsequently stopped); s/p LVP at [place] 11/2022. Previously hospitalized in 2021 at [place] for ETOH detox but pt reportedly unaware of any liver dysfunction at that time. Went to ETOH rehab 08/2022 and was asked to leave the facility when she was COVID+; was sober for 1 week until she started drinking again. Had also attended a few AA meetings in the past.       -Ascites: large volume on CT A/P       -HCC: pending contrasted imaging study       -SBP: negative on para 12/21       -Varices: no h/o EGD       -Hepatic encephalopathy: unable to assess; intubated; on lactulose/rifaximin    #liver transplant workup   -hepatitis B surface <3, hepatitis A IgM, B core, B surface, A IgG, B surface nonreactive  - EBV IgM and early antigen negative   - CMV IgG positive   - acetaminophen negative   - TSH 0.05, RPR negative    #new onset seizure- unknown trigger/etiology- currently on Keppra    Recommendations:  -c/w CRRT  - off pressors  -Infectious disease consultation       -pending full infectious workup - bl cx, UA +urine cx, dx para       -HIV screening       -check quantiferon gold for TB screening       -rubella IgG, syphillis screen, toxoplasma IgG, VZV IgG       -c/w broad spectrum antibiotics- fluconazole (12/20 ->), meropenem (12/20 ->);  s/p vanco (12/21 -12/22)  - send B12    **pt's /JOSUE Gibson ([phone])

## 2022-12-25 NOTE — PROGRESS NOTE ADULT - ATTENDING COMMENTS
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 She was transferred to [facility] for liver transplant evaluation (12/21).     N - CTH negative for acute pathology. EEG negative for seizures. Continue keppra. Lactulose and rifaximin, ammonia slowly downtrending.  C - Off vasopressors. TTE 12/21 normal function. Increase midodrine to 20mg q8h.  P - supplemental O2 PRN, CXR increased pulmonary congestion   G - Acute on chronic liver failure. TFs at goal via NDT. PPI prophylaxis.  R - Anuric acute renal failure. Continue CRRT. Pull 75-100cc/hr, use vaso to facilitate.    H - Holding VTE ppx. No evidence of external bleeding.   I - Leukocytosis to 26. CT chest with bilateral infiltrates - inflammatory vs infectious. CT A/P unremarkable. BCx NGTD. UA negative. Tracheal aspirate with rare yeast. Empiric meropenem (7d) and fluconazole. MRSA swab negative.  E - ISS, home synthroid  TLD - D/C right femoral shiley to RIJ, d/c JC dhillon TLC, radial erna  DISPO - SICU, full code    I have seen and examined this patient with the ICU resident/APC. I have reviewed all new labs, imaging and reports. I have participated in formulating the plan, and have read and agree with the history, ROS, exam, assessment and plan as stated above.    Total time spent in the critical care of this patient today (excluding teaching & procedures): 50 minutes    Over 50% of the total time was spent in discussion and coordination of care with consulting services, dietary and rehab services.    Vilma Mitchell MD  Surgical Critical Care . 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 She was transferred to [facility] for liver transplant evaluation (12/21).     N - CTH negative for acute pathology. EEG negative for seizures. Continue keppra. Lactulose and rifaximin, ammonia slowly downtrending.  C - Off vasopressors. TTE 12/21 normal function. Increase midodrine to 20mg q8h.  P - supplemental O2 PRN, CXR increased pulmonary congestion   G - Acute on chronic liver failure. TFs at goal via NDT. PPI prophylaxis.  R - Anuric acute renal failure. Continue CRRT. Pull 75-100cc/hr, use vaso to facilitate.    H - Holding VTE ppx. No evidence of external bleeding.   I - Leukocytosis to 26. CT chest with bilateral infiltrates - inflammatory vs infectious. CT A/P unremarkable. BCx NGTD. UA negative. Tracheal aspirate with rare yeast. Empiric meropenem (7d) and fluconazole. MRSA swab negative.  E - ISS, home synthroid  TLD - D/C right femoral shiley to RIJ, d/c JC dhillon TLC, radial erna  DISPO - SICU, full code    I have seen and examined this patient with the ICU resident/APC. I have reviewed all new labs, imaging and reports. I have participated in formulating the plan, and have read and agree with the history, ROS, exam, assessment and plan as stated above.    Total time spent in the critical care of this patient today (excluding teaching & procedures): 50 minutes    Over 50% of the total time was spent in discussion and coordination of care with consulting services, dietary and rehab services.    Vilma Mitchell MD  Surgical Critical Care . 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 She was transferred to [facility] for liver transplant evaluation (12/21).     N - CTH negative for acute pathology. EEG negative for seizures. Continue keppra. Lactulose and rifaximin, ammonia slowly downtrending.  C - Off vasopressors. TTE 12/21 normal function. Increase midodrine to 20mg q8h.  P - supplemental O2 PRN, CXR increased pulmonary congestion   G - Acute on chronic liver failure. TFs at goal via NDT. PPI prophylaxis.  R - Anuric acute renal failure. Continue CRRT. Pull 75-100cc/hr, use vaso to facilitate.    H - Holding VTE ppx. No evidence of external bleeding.   I - Leukocytosis to 26. CT chest with bilateral infiltrates - inflammatory vs infectious. CT A/P unremarkable. BCx NGTD. UA negative. Tracheal aspirate with rare yeast. Empiric meropenem (7d) and fluconazole. MRSA swab negative.  E - ISS, home synthroid  TLD - D/C right femoral shiley to RIJ, d/c JC dhillon TLC, radial erna  DISPO - SICU, full code    I have seen and examined this patient with the ICU resident/APC. I have reviewed all new labs, imaging and reports. I have participated in formulating the plan, and have read and agree with the history, ROS, exam, assessment and plan as stated above.    Total time spent in the critical care of this patient today (excluding teaching & procedures): 50 minutes    Over 50% of the total time was spent in discussion and coordination of care with consulting services, dietary and rehab services.    Vilma Mitchell MD  Surgical Critical Care . 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 She was transferred to [facility] for liver transplant evaluation (12/21).     N - CTH negative for acute pathology. EEG negative for seizures. Continue keppra. Cont rifaximin, lactulose to goal stool >600, ammonia downtrending. Resume home lexapro.  C - Off vasopressors. TTE 12/21 normal function. Increase midodrine to 20mg q8h to facilitate pulling fluid  P - requiring supplemental O2 NC, CXR increased pulmonary congestion   G - Acute on chronic liver failure. TFs at goal via NDT. PPI prophylaxis.  R - Anuric acute renal failure. Continue CRRT. Pull 75-100cc/hr, use vaso to facilitate.    H - Holding VTE ppx. No evidence of external bleeding.   I - Leukocytosis continues to rise, will switch out femoral line. CT chest with bilateral infiltrates - inflammatory vs infectious. CT A/P unremarkable. BCx NGTD. UA negative. Tracheal aspirate with rare yeast. Empiric meropenem (7d) and fluconazole. MRSA swab negative.  E - ISS, home synthroid  TLD - Change right femoral shiley to RIJ, d/JC sheets TLC, radial erna  DISPO - SICU, full code    I have seen and examined this patient with the ICU resident/APC. I have reviewed all new labs, imaging and reports. I have participated in formulating the plan, and have read and agree with the history, ROS, exam, assessment and plan as stated above.    Total time spent in the critical care of this patient today (excluding teaching & procedures): 50 minutes    Over 50% of the total time was spent in discussion and coordination of care with consulting services, dietary and rehab services.    Vilma Mitchell MD  Surgical Critical Care . 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 She was transferred to [facility] for liver transplant evaluation (12/21).     N - CTH negative for acute pathology. EEG negative for seizures. Continue keppra. Cont rifaximin, lactulose to goal stool >600, ammonia downtrending. Resume home lexapro.  C - Off vasopressors. TTE 12/21 normal function. Increase midodrine to 20mg q8h to facilitate pulling fluid  P - requiring supplemental O2 NC, CXR increased pulmonary congestion   G - Acute on chronic liver failure. TFs at goal via NDT. PPI prophylaxis.  R - Anuric acute renal failure. Continue CRRT. Pull 75-100cc/hr, use vaso to facilitate.    H - Holding VTE ppx. No evidence of external bleeding.   I - Leukocytosis continues to rise, will switch out femoral line. CT chest with bilateral infiltrates - inflammatory vs infectious. CT A/P unremarkable. BCx NGTD. UA negative. Tracheal aspirate with rare yeast. Empiric meropenem (7d) and fluconazole. MRSA swab negative.  E - ISS, home synthroid  TLD - Change right femoral shiley to RIJ, d/JC sheets TLC, radial erna  DISPO - SICU, full code    I have seen and examined this patient with the ICU resident/APC. I have reviewed all new labs, imaging and reports. I have participated in formulating the plan, and have read and agree with the history, ROS, exam, assessment and plan as stated above.    Total time spent in the critical care of this patient today (excluding teaching & procedures): 50 minutes    Over 50% of the total time was spent in discussion and coordination of care with consulting services, dietary and rehab services.    Vilma Mitchell MD  Surgical Critical Care . 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 She was transferred to [facility] for liver transplant evaluation (12/21).     N - CTH negative for acute pathology. EEG negative for seizures. Continue keppra. Cont rifaximin, lactulose to goal stool >600, ammonia downtrending. Resume home lexapro.  C - Off vasopressors. TTE 12/21 normal function. Increase midodrine to 20mg q8h to facilitate pulling fluid  P - requiring supplemental O2 NC, CXR increased pulmonary congestion   G - Acute on chronic liver failure. TFs at goal via NDT. PPI prophylaxis.  R - Anuric acute renal failure. Continue CRRT. Pull 75-100cc/hr, use vaso to facilitate.    H - Holding VTE ppx. No evidence of external bleeding.   I - Leukocytosis continues to rise, will switch out femoral line. CT chest with bilateral infiltrates - inflammatory vs infectious. CT A/P unremarkable. BCx NGTD. UA negative. Tracheal aspirate with rare yeast. Empiric meropenem (7d) and fluconazole. MRSA swab negative.  E - ISS, home synthroid  TLD - Change right femoral shiley to RIJ, d/JC sheets TLC, radial erna  DISPO - SICU, full code    I have seen and examined this patient with the ICU resident/APC. I have reviewed all new labs, imaging and reports. I have participated in formulating the plan, and have read and agree with the history, ROS, exam, assessment and plan as stated above.    Total time spent in the critical care of this patient today (excluding teaching & procedures): 50 minutes    Over 50% of the total time was spent in discussion and coordination of care with consulting services, dietary and rehab services.    Vilma Mitchell MD  Surgical Critical Care . 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 She was transferred to [facility] for liver transplant evaluation (12/21).     Remains extubated with waxing and waning mental status and O2 requirement. CXR with increasing pulmonary congestion. Will pull more on CRRT and use vasopressors to facilitate. Also WBC continues to climb without clear source, will d/c right femoral shiley and place in RIJ.     N - CTH negative for acute pathology. EEG negative for seizures. Continue keppra. Cont rifaximin, lactulose to goal stool >600, ammonia downtrending. Resume home lexapro.  C - Off vasopressors. TTE 12/21 normal function. Increase midodrine to 20mg q8h to facilitate pulling fluid  P - requiring supplemental O2 NC, CXR increased pulmonary congestion   G - Acute on chronic liver failure. TFs at goal via NDT. PPI prophylaxis.  R - Anuric acute renal failure. Continue CRRT. Pull 75-100cc/hr, use vaso to facilitate.    H - Holding VTE ppx. No evidence of external bleeding.   I - Leukocytosis continues to rise, will switch out femoral line. CT chest with bilateral infiltrates - inflammatory vs infectious. CT A/P unremarkable. BCx NGTD. UA negative. Tracheal aspirate with rare yeast. Empiric meropenem (7d) and fluconazole. MRSA swab negative.  E - ISS, home synthroid  TLD - Change right femoral shiley to [facility], d/c JC dhillon TLC, radial erna  DISPO - SICU, full code    I have seen and examined this patient with the ICU resident/APC. I have reviewed all new labs, imaging and reports. I have participated in formulating the plan, and have read and agree with the history, ROS, exam, assessment and plan as stated above.    Total time spent in the critical care of this patient today (excluding teaching & procedures): 50 minutes    Over 50% of the total time was spent in discussion and coordination of care with consulting services, dietary and rehab services.    Vilma Mitchell MD  Surgical Critical Care . 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 She was transferred to [facility] for liver transplant evaluation (12/21).     Remains extubated with waxing and waning mental status and O2 requirement. CXR with increasing pulmonary congestion. Will pull more on CRRT and use vasopressors to facilitate. Also WBC continues to climb without clear source, will d/c right femoral shiley and place in RIJ.     N - CTH negative for acute pathology. EEG negative for seizures. Continue keppra. Cont rifaximin, lactulose to goal stool >600, ammonia downtrending. Resume home lexapro.  C - Off vasopressors. TTE 12/21 normal function. Increase midodrine to 20mg q8h to facilitate pulling fluid  P - requiring supplemental O2 NC, CXR increased pulmonary congestion   G - Acute on chronic liver failure. TFs at goal via NDT. PPI prophylaxis.  R - Anuric acute renal failure. Continue CRRT. Pull 75-100cc/hr, use vaso to facilitate.    H - Holding VTE ppx. No evidence of external bleeding.   I - Leukocytosis continues to rise, will switch out femoral line. CT chest with bilateral infiltrates - inflammatory vs infectious. CT A/P unremarkable. BCx NGTD. UA negative. Tracheal aspirate with rare yeast. Empiric meropenem (7d) and fluconazole. MRSA swab negative.  E - ISS, home synthroid  TLD - Change right femoral shiley to [facility], d/c JC dhillon TLC, radial erna  DISPO - SICU, full code    I have seen and examined this patient with the ICU resident/APC. I have reviewed all new labs, imaging and reports. I have participated in formulating the plan, and have read and agree with the history, ROS, exam, assessment and plan as stated above.    Total time spent in the critical care of this patient today (excluding teaching & procedures): 50 minutes    Over 50% of the total time was spent in discussion and coordination of care with consulting services, dietary and rehab services.    Vilma Mitchell MD  Surgical Critical Care . 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 She was transferred to [facility] for liver transplant evaluation (12/21).     Remains extubated with waxing and waning mental status and O2 requirement. CXR with increasing pulmonary congestion. Will pull more on CRRT and use vasopressors to facilitate. Also WBC continues to climb without clear source, will d/c right femoral shiley and place in RIJ.     N - CTH negative for acute pathology. EEG negative for seizures. Continue keppra. Cont rifaximin, lactulose to goal stool >600, ammonia downtrending. Resume home lexapro.  C - Off vasopressors. TTE 12/21 normal function. Increase midodrine to 20mg q8h to facilitate pulling fluid  P - requiring supplemental O2 NC, CXR increased pulmonary congestion   G - Acute on chronic liver failure. TFs at goal via NDT. PPI prophylaxis.  R - Anuric acute renal failure. Continue CRRT. Pull 75-100cc/hr, use vaso to facilitate.    H - Holding VTE ppx. No evidence of external bleeding.   I - Leukocytosis continues to rise, will switch out femoral line. CT chest with bilateral infiltrates - inflammatory vs infectious. CT A/P unremarkable. BCx NGTD. UA negative. Tracheal aspirate with rare yeast. Empiric meropenem (7d) and fluconazole. MRSA swab negative.  E - ISS, home synthroid  TLD - Change right femoral shiley to [facility], d/c JC dhillon TLC, radial erna  DISPO - SICU, full code    I have seen and examined this patient with the ICU resident/APC. I have reviewed all new labs, imaging and reports. I have participated in formulating the plan, and have read and agree with the history, ROS, exam, assessment and plan as stated above.    Total time spent in the critical care of this patient today (excluding teaching & procedures): 50 minutes    Over 50% of the total time was spent in discussion and coordination of care with consulting services, dietary and rehab services.    Vilma Mitchell MD  Surgical Critical Care .

## 2022-12-26 LAB
ALBUMIN FLD-MCNC: 1.3 G/DL — SIGNIFICANT CHANGE UP
ALBUMIN SERPL ELPH-MCNC: 3.7 G/DL — SIGNIFICANT CHANGE UP (ref 3.3–5)
ALBUMIN SERPL ELPH-MCNC: 3.8 G/DL — SIGNIFICANT CHANGE UP (ref 3.3–5)
ALBUMIN SERPL ELPH-MCNC: 3.9 G/DL — SIGNIFICANT CHANGE UP (ref 3.3–5)
ALP SERPL-CCNC: 163 U/L — HIGH (ref 40–120)
ALP SERPL-CCNC: 166 U/L — HIGH (ref 40–120)
ALP SERPL-CCNC: 167 U/L — HIGH (ref 40–120)
ALP SERPL-CCNC: 169 U/L — HIGH (ref 40–120)
ALT FLD-CCNC: 44 U/L — SIGNIFICANT CHANGE UP (ref 10–45)
ALT FLD-CCNC: 45 U/L — SIGNIFICANT CHANGE UP (ref 10–45)
AMMONIA BLD-MCNC: 71 UMOL/L — HIGH (ref 11–55)
ANION GAP SERPL CALC-SCNC: 14 MMOL/L — SIGNIFICANT CHANGE UP (ref 5–17)
ANION GAP SERPL CALC-SCNC: 15 MMOL/L — SIGNIFICANT CHANGE UP (ref 5–17)
ANION GAP SERPL CALC-SCNC: 16 MMOL/L — SIGNIFICANT CHANGE UP (ref 5–17)
APTT BLD: 52.8 SEC — HIGH (ref 27.5–35.5)
AST SERPL-CCNC: 114 U/L — HIGH (ref 10–40)
AST SERPL-CCNC: 118 U/L — HIGH (ref 10–40)
AST SERPL-CCNC: 121 U/L — HIGH (ref 10–40)
B PERT IGG+IGM PNL SER: CLEAR — SIGNIFICANT CHANGE UP
BILIRUB SERPL-MCNC: 26.4 MG/DL — HIGH (ref 0.2–1.2)
BILIRUB SERPL-MCNC: 26.7 MG/DL — HIGH (ref 0.2–1.2)
BILIRUB SERPL-MCNC: 26.9 MG/DL — HIGH (ref 0.2–1.2)
BILIRUB SERPL-MCNC: 27.2 MG/DL — HIGH (ref 0.2–1.2)
BLD GP AB SCN SERPL QL: NEGATIVE — SIGNIFICANT CHANGE UP
BUN SERPL-MCNC: 5 MG/DL — LOW (ref 7–23)
BUN SERPL-MCNC: 6 MG/DL — LOW (ref 7–23)
BUN SERPL-MCNC: 7 MG/DL — SIGNIFICANT CHANGE UP (ref 7–23)
CALCIUM SERPL-MCNC: 8.3 MG/DL — LOW (ref 8.4–10.5)
CALCIUM SERPL-MCNC: 8.5 MG/DL — SIGNIFICANT CHANGE UP (ref 8.4–10.5)
CALCIUM SERPL-MCNC: 8.7 MG/DL — SIGNIFICANT CHANGE UP (ref 8.4–10.5)
CALCIUM SERPL-MCNC: 8.8 MG/DL — SIGNIFICANT CHANGE UP (ref 8.4–10.5)
CHLORIDE SERPL-SCNC: 102 MMOL/L — SIGNIFICANT CHANGE UP (ref 96–108)
CHLORIDE SERPL-SCNC: 103 MMOL/L — SIGNIFICANT CHANGE UP (ref 96–108)
CHLORIDE SERPL-SCNC: 104 MMOL/L — SIGNIFICANT CHANGE UP (ref 96–108)
CMV DNA CSF QL NAA+PROBE: SIGNIFICANT CHANGE UP
CMV DNA SPEC NAA+PROBE-LOG#: ABNORMAL LOG10IU/ML
CO2 SERPL-SCNC: 20 MMOL/L — LOW (ref 22–31)
CO2 SERPL-SCNC: 21 MMOL/L — LOW (ref 22–31)
CO2 SERPL-SCNC: 22 MMOL/L — SIGNIFICANT CHANGE UP (ref 22–31)
COLOR FLD: YELLOW — SIGNIFICANT CHANGE UP
CREAT SERPL-MCNC: 0.63 MG/DL — SIGNIFICANT CHANGE UP (ref 0.5–1.3)
CREAT SERPL-MCNC: 0.67 MG/DL — SIGNIFICANT CHANGE UP (ref 0.5–1.3)
CREAT SERPL-MCNC: 0.71 MG/DL — SIGNIFICANT CHANGE UP (ref 0.5–1.3)
CULTURE RESULTS: SIGNIFICANT CHANGE UP
EBV DNA SERPL NAA+PROBE-ACNC: ABNORMAL IU/ML
EBVPCR LOG: ABNORMAL LOG10IU/ML
EGFR: 101 ML/MIN/1.73M2 — SIGNIFICANT CHANGE UP
EGFR: 97 ML/MIN/1.73M2 — SIGNIFICANT CHANGE UP
EGFR: 99 ML/MIN/1.73M2 — SIGNIFICANT CHANGE UP
FLUID INTAKE SUBSTANCE CLASS: SIGNIFICANT CHANGE UP
GAS PNL BLDA: SIGNIFICANT CHANGE UP
GLUCOSE BLDC GLUCOMTR-MCNC: 139 MG/DL — HIGH (ref 70–99)
GLUCOSE BLDC GLUCOMTR-MCNC: 143 MG/DL — HIGH (ref 70–99)
GLUCOSE BLDC GLUCOMTR-MCNC: 179 MG/DL — HIGH (ref 70–99)
GLUCOSE FLD-MCNC: 159 MG/DL — SIGNIFICANT CHANGE UP
GLUCOSE SERPL-MCNC: 123 MG/DL — HIGH (ref 70–99)
GLUCOSE SERPL-MCNC: 158 MG/DL — HIGH (ref 70–99)
GLUCOSE SERPL-MCNC: 165 MG/DL — HIGH (ref 70–99)
GLUCOSE SERPL-MCNC: 167 MG/DL — HIGH (ref 70–99)
GRAM STN FLD: SIGNIFICANT CHANGE UP
HCT VFR BLD CALC: 23.8 % — LOW (ref 34.5–45)
HCT VFR BLD CALC: 24.6 % — LOW (ref 34.5–45)
HCT VFR BLD CALC: 25.2 % — LOW (ref 34.5–45)
HCT VFR BLD CALC: 26.4 % — LOW (ref 34.5–45)
HGB BLD-MCNC: 8.2 G/DL — LOW (ref 11.5–15.5)
HGB BLD-MCNC: 8.3 G/DL — LOW (ref 11.5–15.5)
HGB BLD-MCNC: 8.6 G/DL — LOW (ref 11.5–15.5)
IGA FLD-MCNC: 1036 MG/DL — HIGH (ref 84–499)
IGG FLD-MCNC: 958 MG/DL — SIGNIFICANT CHANGE UP (ref 610–1660)
IGM SERPL-MCNC: 122 MG/DL — SIGNIFICANT CHANGE UP (ref 35–242)
INR BLD: 2.71 RATIO — HIGH (ref 0.88–1.16)
LDH SERPL L TO P-CCNC: 90 U/L — SIGNIFICANT CHANGE UP
LYMPHOCYTES # FLD: 8 % — SIGNIFICANT CHANGE UP
MAGNESIUM SERPL-MCNC: 2.2 MG/DL — SIGNIFICANT CHANGE UP (ref 1.6–2.6)
MAGNESIUM SERPL-MCNC: 2.3 MG/DL — SIGNIFICANT CHANGE UP (ref 1.6–2.6)
MAGNESIUM SERPL-MCNC: 2.4 MG/DL — SIGNIFICANT CHANGE UP (ref 1.6–2.6)
MCHC RBC-ENTMCNC: 30.9 PG — SIGNIFICANT CHANGE UP (ref 27–34)
MCHC RBC-ENTMCNC: 31 PG — SIGNIFICANT CHANGE UP (ref 27–34)
MCHC RBC-ENTMCNC: 31.5 PG — SIGNIFICANT CHANGE UP (ref 27–34)
MCHC RBC-ENTMCNC: 32.6 GM/DL — SIGNIFICANT CHANGE UP (ref 32–36)
MCHC RBC-ENTMCNC: 32.9 GM/DL — SIGNIFICANT CHANGE UP (ref 32–36)
MCHC RBC-ENTMCNC: 33.7 GM/DL — SIGNIFICANT CHANGE UP (ref 32–36)
MCHC RBC-ENTMCNC: 34.5 GM/DL — SIGNIFICANT CHANGE UP (ref 32–36)
MCV RBC AUTO: 91.5 FL — SIGNIFICANT CHANGE UP (ref 80–100)
MCV RBC AUTO: 91.8 FL — SIGNIFICANT CHANGE UP (ref 80–100)
MCV RBC AUTO: 94 FL — SIGNIFICANT CHANGE UP (ref 80–100)
MCV RBC AUTO: 95 FL — SIGNIFICANT CHANGE UP (ref 80–100)
MESOTHL CELL # FLD: 4 % — SIGNIFICANT CHANGE UP
MONOS+MACROS # FLD: 71 % — SIGNIFICANT CHANGE UP
NEUTROPHILS-BODY FLUID: 17 % — SIGNIFICANT CHANGE UP
NRBC # BLD: 0 /100 WBCS — SIGNIFICANT CHANGE UP (ref 0–0)
PHOSPHATE SERPL-MCNC: 1.2 MG/DL — LOW (ref 2.5–4.5)
PHOSPHATE SERPL-MCNC: 1.5 MG/DL — LOW (ref 2.5–4.5)
PHOSPHATE SERPL-MCNC: 2.8 MG/DL — SIGNIFICANT CHANGE UP (ref 2.5–4.5)
PHOSPHATE SERPL-MCNC: 5.5 MG/DL — HIGH (ref 2.5–4.5)
PLATELET # BLD AUTO: 48 K/UL — LOW (ref 150–400)
PLATELET # BLD AUTO: 49 K/UL — LOW (ref 150–400)
POTASSIUM SERPL-MCNC: 3.8 MMOL/L — SIGNIFICANT CHANGE UP (ref 3.5–5.3)
POTASSIUM SERPL-MCNC: 4.1 MMOL/L — SIGNIFICANT CHANGE UP (ref 3.5–5.3)
POTASSIUM SERPL-MCNC: 4.2 MMOL/L — SIGNIFICANT CHANGE UP (ref 3.5–5.3)
POTASSIUM SERPL-MCNC: 4.3 MMOL/L — SIGNIFICANT CHANGE UP (ref 3.5–5.3)
POTASSIUM SERPL-SCNC: 3.8 MMOL/L — SIGNIFICANT CHANGE UP (ref 3.5–5.3)
POTASSIUM SERPL-SCNC: 4.1 MMOL/L — SIGNIFICANT CHANGE UP (ref 3.5–5.3)
POTASSIUM SERPL-SCNC: 4.2 MMOL/L — SIGNIFICANT CHANGE UP (ref 3.5–5.3)
POTASSIUM SERPL-SCNC: 4.3 MMOL/L — SIGNIFICANT CHANGE UP (ref 3.5–5.3)
PROCALCITONIN SERPL-MCNC: 1.26 NG/ML — HIGH (ref 0.02–0.1)
PROT FLD-MCNC: 2.1 G/DL — SIGNIFICANT CHANGE UP
PROT SERPL-MCNC: 6 G/DL — SIGNIFICANT CHANGE UP (ref 6–8.3)
PROT SERPL-MCNC: 6.1 G/DL — SIGNIFICANT CHANGE UP (ref 6–8.3)
PROT SERPL-MCNC: 6.2 G/DL — SIGNIFICANT CHANGE UP (ref 6–8.3)
PROTHROM AB SERPL-ACNC: 31.7 SEC — HIGH (ref 10.5–13.4)
RBC # BLD: 2.6 M/UL — LOW (ref 3.8–5.2)
RBC # BLD: 2.68 M/UL — LOW (ref 3.8–5.2)
RBC # BLD: 2.78 M/UL — LOW (ref 3.8–5.2)
RBC # FLD: 17.2 % — HIGH (ref 10.3–14.5)
RBC # FLD: 17.5 % — HIGH (ref 10.3–14.5)
RBC # FLD: 17.6 % — HIGH (ref 10.3–14.5)
RBC # FLD: 17.7 % — HIGH (ref 10.3–14.5)
RCV VOL RI: 2000 /UL — HIGH (ref 0–0)
RH IG SCN BLD-IMP: NEGATIVE — SIGNIFICANT CHANGE UP
SODIUM SERPL-SCNC: 137 MMOL/L — SIGNIFICANT CHANGE UP (ref 135–145)
SODIUM SERPL-SCNC: 139 MMOL/L — SIGNIFICANT CHANGE UP (ref 135–145)
SODIUM SERPL-SCNC: 141 MMOL/L — SIGNIFICANT CHANGE UP (ref 135–145)
SPECIMEN SOURCE: SIGNIFICANT CHANGE UP
TOTAL NUCLEATED CELL COUNT, BODY FLUID: 170 /UL — SIGNIFICANT CHANGE UP
TUBE TYPE: SIGNIFICANT CHANGE UP
WBC # BLD: 30.34 K/UL — HIGH (ref 3.8–10.5)
WBC # BLD: 31.79 K/UL — HIGH (ref 3.8–10.5)
WBC # BLD: 33.04 K/UL — HIGH (ref 3.8–10.5)
WBC # BLD: 35.53 K/UL — HIGH (ref 3.8–10.5)
WBC # FLD AUTO: 30.34 K/UL — HIGH (ref 3.8–10.5)
WBC # FLD AUTO: 31.79 K/UL — HIGH (ref 3.8–10.5)
WBC # FLD AUTO: 33.04 K/UL — HIGH (ref 3.8–10.5)
WBC # FLD AUTO: 35.53 K/UL — HIGH (ref 3.8–10.5)

## 2022-12-26 PROCEDURE — 99232 SBSQ HOSP IP/OBS MODERATE 35: CPT

## 2022-12-26 PROCEDURE — 49082 ABD PARACENTESIS: CPT

## 2022-12-26 PROCEDURE — 76705 ECHO EXAM OF ABDOMEN: CPT | Mod: 26

## 2022-12-26 PROCEDURE — 99233 SBSQ HOSP IP/OBS HIGH 50: CPT | Mod: 25

## 2022-12-26 PROCEDURE — 99232 SBSQ HOSP IP/OBS MODERATE 35: CPT | Mod: GC

## 2022-12-26 PROCEDURE — 71045 X-RAY EXAM CHEST 1 VIEW: CPT | Mod: 26

## 2022-12-26 PROCEDURE — 90945 DIALYSIS ONE EVALUATION: CPT

## 2022-12-26 RX ORDER — DEXMEDETOMIDINE HYDROCHLORIDE IN 0.9% SODIUM CHLORIDE 4 UG/ML
0.2 INJECTION INTRAVENOUS
Qty: 200 | Refills: 0 | Status: DISCONTINUED | OUTPATIENT
Start: 2022-12-26 | End: 2022-12-26

## 2022-12-26 RX ORDER — CASPOFUNGIN ACETATE 7 MG/ML
70 INJECTION, POWDER, LYOPHILIZED, FOR SOLUTION INTRAVENOUS ONCE
Refills: 0 | Status: COMPLETED | OUTPATIENT
Start: 2022-12-26 | End: 2022-12-26

## 2022-12-26 RX ORDER — NOREPINEPHRINE BITARTRATE/D5W 8 MG/250ML
0.05 PLASTIC BAG, INJECTION (ML) INTRAVENOUS
Qty: 8 | Refills: 0 | Status: DISCONTINUED | OUTPATIENT
Start: 2022-12-26 | End: 2023-01-08

## 2022-12-26 RX ORDER — CASPOFUNGIN ACETATE 7 MG/ML
INJECTION, POWDER, LYOPHILIZED, FOR SOLUTION INTRAVENOUS
Refills: 0 | Status: DISCONTINUED | OUTPATIENT
Start: 2022-12-26 | End: 2023-01-09

## 2022-12-26 RX ORDER — DEXMEDETOMIDINE HYDROCHLORIDE IN 0.9% SODIUM CHLORIDE 4 UG/ML
0.02 INJECTION INTRAVENOUS
Qty: 200 | Refills: 0 | Status: DISCONTINUED | OUTPATIENT
Start: 2022-12-26 | End: 2022-12-26

## 2022-12-26 RX ORDER — LACTULOSE 10 G/15ML
20 SOLUTION ORAL EVERY 8 HOURS
Refills: 0 | Status: DISCONTINUED | OUTPATIENT
Start: 2022-12-26 | End: 2022-12-30

## 2022-12-26 RX ORDER — CASPOFUNGIN ACETATE 7 MG/ML
50 INJECTION, POWDER, LYOPHILIZED, FOR SOLUTION INTRAVENOUS EVERY 24 HOURS
Refills: 0 | Status: DISCONTINUED | OUTPATIENT
Start: 2022-12-27 | End: 2023-01-09

## 2022-12-26 RX ADMIN — Medication 137 MICROGRAM(S): at 05:15

## 2022-12-26 RX ADMIN — OXYCODONE HYDROCHLORIDE 10 MILLIGRAM(S): 5 TABLET ORAL at 22:47

## 2022-12-26 RX ADMIN — LEVETIRACETAM 750 MILLIGRAM(S): 250 TABLET, FILM COATED ORAL at 17:26

## 2022-12-26 RX ADMIN — LEVETIRACETAM 750 MILLIGRAM(S): 250 TABLET, FILM COATED ORAL at 05:14

## 2022-12-26 RX ADMIN — Medication 100 MILLIGRAM(S): at 11:59

## 2022-12-26 RX ADMIN — ESCITALOPRAM OXALATE 10 MILLIGRAM(S): 10 TABLET, FILM COATED ORAL at 11:59

## 2022-12-26 RX ADMIN — Medication 15 MILLILITER(S): at 11:58

## 2022-12-26 RX ADMIN — LACTULOSE 20 GRAM(S): 10 SOLUTION ORAL at 14:54

## 2022-12-26 RX ADMIN — Medication 6.21 MICROGRAM(S)/KG/MIN: at 07:28

## 2022-12-26 RX ADMIN — Medication 2: at 17:26

## 2022-12-26 RX ADMIN — Medication 255 MILLIMOLE(S): at 13:06

## 2022-12-26 RX ADMIN — OXYCODONE HYDROCHLORIDE 10 MILLIGRAM(S): 5 TABLET ORAL at 11:10

## 2022-12-26 RX ADMIN — VASOPRESSIN 4.5 UNIT(S)/MIN: 20 INJECTION INTRAVENOUS at 07:28

## 2022-12-26 RX ADMIN — CASPOFUNGIN ACETATE 260 MILLIGRAM(S): 7 INJECTION, POWDER, LYOPHILIZED, FOR SOLUTION INTRAVENOUS at 10:16

## 2022-12-26 RX ADMIN — MIDODRINE HYDROCHLORIDE 20 MILLIGRAM(S): 2.5 TABLET ORAL at 05:14

## 2022-12-26 RX ADMIN — PANTOPRAZOLE SODIUM 40 MILLIGRAM(S): 20 TABLET, DELAYED RELEASE ORAL at 11:58

## 2022-12-26 RX ADMIN — Medication 255 MILLIMOLE(S): at 11:34

## 2022-12-26 RX ADMIN — DEXMEDETOMIDINE HYDROCHLORIDE IN 0.9% SODIUM CHLORIDE 3.31 MICROGRAM(S)/KG/HR: 4 INJECTION INTRAVENOUS at 00:49

## 2022-12-26 RX ADMIN — MEROPENEM 100 MILLIGRAM(S): 1 INJECTION INTRAVENOUS at 21:00

## 2022-12-26 RX ADMIN — MEROPENEM 100 MILLIGRAM(S): 1 INJECTION INTRAVENOUS at 14:53

## 2022-12-26 RX ADMIN — OXYCODONE HYDROCHLORIDE 10 MILLIGRAM(S): 5 TABLET ORAL at 10:36

## 2022-12-26 RX ADMIN — OXYCODONE HYDROCHLORIDE 10 MILLIGRAM(S): 5 TABLET ORAL at 23:17

## 2022-12-26 RX ADMIN — VASOPRESSIN 4.5 UNIT(S)/MIN: 20 INJECTION INTRAVENOUS at 19:04

## 2022-12-26 RX ADMIN — CHLORHEXIDINE GLUCONATE 1 APPLICATION(S): 213 SOLUTION TOPICAL at 05:14

## 2022-12-26 RX ADMIN — Medication 6.21 MICROGRAM(S)/KG/MIN: at 19:04

## 2022-12-26 RX ADMIN — OXYCODONE HYDROCHLORIDE 5 MILLIGRAM(S): 5 TABLET ORAL at 21:00

## 2022-12-26 RX ADMIN — Medication 1 MILLIGRAM(S): at 11:58

## 2022-12-26 RX ADMIN — Medication 6.21 MICROGRAM(S)/KG/MIN: at 21:00

## 2022-12-26 RX ADMIN — MEROPENEM 100 MILLIGRAM(S): 1 INJECTION INTRAVENOUS at 05:14

## 2022-12-26 RX ADMIN — VASOPRESSIN 4.5 UNIT(S)/MIN: 20 INJECTION INTRAVENOUS at 21:01

## 2022-12-26 RX ADMIN — OXYCODONE HYDROCHLORIDE 10 MILLIGRAM(S): 5 TABLET ORAL at 16:22

## 2022-12-26 RX ADMIN — MIDODRINE HYDROCHLORIDE 20 MILLIGRAM(S): 2.5 TABLET ORAL at 14:54

## 2022-12-26 RX ADMIN — MIDODRINE HYDROCHLORIDE 20 MILLIGRAM(S): 2.5 TABLET ORAL at 21:00

## 2022-12-26 RX ADMIN — OXYCODONE HYDROCHLORIDE 5 MILLIGRAM(S): 5 TABLET ORAL at 21:30

## 2022-12-26 RX ADMIN — OXYCODONE HYDROCHLORIDE 10 MILLIGRAM(S): 5 TABLET ORAL at 17:00

## 2022-12-26 RX ADMIN — Medication 255 MILLIMOLE(S): at 10:15

## 2022-12-26 NOTE — DIETITIAN NUTRITION RISK NOTIFICATION - TREATMENT: THE FOLLOWING DIET HAS BEEN RECOMMENDED
Diet, Regular:   Tube Feeding Modality: Nasogastric  Nepro with Carb Steady (NEPRORTH)  Total Volume for 24 Hours (mL): 1200  Continuous  Starting Tube Feed Rate {mL per Hour}: 50  Until Goal Tube Feed Rate (mL per Hour): 50  Tube Feed Duration (in Hours): 24  Tube Feed Start Time: 14:00 (12-26-22 @ 14:16) [Active]

## 2022-12-26 NOTE — PROGRESS NOTE ADULT - SUBJECTIVE AND OBJECTIVE BOX
Gastroenterology/Hepatology Progress Note    Interval Events:   - CXR with worsening consolidation, WBC 31    Allergies:  Macrobid (Rash)  Nexium (Stomach Upset)      Hospital Medications:  caspofungin IVPB      caspofungin IVPB 70 milliGRAM(s) IV Intermittent once  chlorhexidine 2% Cloths 1 Application(s) Topical <User Schedule>  CRRT Treatment    <Continuous>  escitalopram 10 milliGRAM(s) Oral daily  folic acid 1 milliGRAM(s) Oral daily  influenza   Vaccine 0.5 milliLiter(s) IntraMuscular once  insulin lispro (ADMELOG) corrective regimen sliding scale   SubCutaneous every 6 hours  lactulose Syrup 20 Gram(s) Oral every 8 hours  levETIRAcetam  Solution 750 milliGRAM(s) Enteral Tube two times a day  levothyroxine 137 MICROGram(s) Oral daily  meropenem  IVPB 1000 milliGRAM(s) IV Intermittent every 8 hours  midodrine 20 milliGRAM(s) Oral every 8 hours  multivitamin/minerals/iron Oral Solution (CENTRUM) 15 milliLiter(s) Oral daily  norepinephrine Infusion 0.05 MICROgram(s)/kG/Min IV Continuous <Continuous>  oxyCODONE    Solution 5 milliGRAM(s) Oral every 4 hours PRN  oxyCODONE    Solution 10 milliGRAM(s) Oral every 4 hours PRN  pantoprazole   Suspension 40 milliGRAM(s) Oral every 24 hours  PrismaSATE Dialysate BGK 4 / 2.5 5000 milliLiter(s) CRRT <Continuous>  PrismaSOL Filtration BGK 4 / 2.5 5000 milliLiter(s) CRRT <Continuous>  PrismaSOL Filtration BGK 4 / 2.5 5000 milliLiter(s) CRRT <Continuous>  rifAXIMin 550 milliGRAM(s) Oral every 12 hours  sodium chloride 0.65% Nasal 1 Spray(s) Both Nostrils every 3 hours PRN  sodium phosphate 15 milliMole(s)/250 mL IVPB 15 milliMole(s) IV Intermittent every 1 hour  tetracaine/benzocaine/butamben Spray 1 Spray(s) Topical every 3 hours PRN  thiamine 100 milliGRAM(s) Enteral Tube daily  vasopressin Infusion 0.03 Unit(s)/Min IV Continuous <Continuous>      ROS: 14 point ROS unable to obtain     PHYSICAL EXAM:   Vital Signs:  Vital Signs Last 24 Hrs  T(C): 36.5 (26 Dec 2022 07:00), Max: 37.1 (25 Dec 2022 23:00)  T(F): 97.7 (26 Dec 2022 07:00), Max: 98.8 (25 Dec 2022 23:00)  HR: 83 (26 Dec 2022 09:30) (74 - 110)  BP: 121/63 (26 Dec 2022 07:45) (121/63 - 125/68)  BP(mean): 84 (26 Dec 2022 07:45) (84 - 88)  RR: 23 (26 Dec 2022 09:30) (16 - 35)  SpO2: 95% (26 Dec 2022 09:30) (92% - 96%)    Parameters below as of 26 Dec 2022 07:00  Patient On (Oxygen Delivery Method): nasal cannula  O2 Flow (L/min): 3    Daily     Daily Weight in k.7 (26 Dec 2022 00:30)    GENERAL:  Appears stated age, critically ill appearing  HEENT:  NC/AT,  +scleral icterus; +ETT  CHEST: no respiratory distress   HEART:  Regular rate   ABDOMEN:  Soft, non-tender, + distended  EXTREMITIES:  no cyanosis, clubbing or edema  SKIN:  No rash/erythema/ecchymoses/petechiae/wounds/abscess/warm/dry  NEURO:  +awake; following commands    LABS:                        8.6    31.79 )-----------( 49       ( 26 Dec 2022 08:16 )             26.4     Mean Cell Volume: 95.0 fl (-22 @ 08:16)        137  |  103  |  6<L>  ----------------------------<  165<H>  4.2   |  20<L>  |  0.63    Ca    8.7      26 Dec 2022 08:16  Phos  1.2       Mg     2.2         TPro  6.1  /  Alb  3.8  /  TBili  26.4<H>  /  DBili  x   /  AST  121<H>  /  ALT  44  /  AlkPhos  167<H>      LIVER FUNCTIONS - ( 26 Dec 2022 08:16 )  Alb: 3.8 g/dL / Pro: 6.1 g/dL / ALK PHOS: 167 U/L / ALT: 44 U/L / AST: 121 U/L / GGT: x           PT/INR - ( 26 Dec 2022 02:18 )   PT: 31.7 sec;   INR: 2.71 ratio         PTT - ( 26 Dec 2022 02:18 )  PTT:52.8 sec    Amylase Serum--      Lipase serum--       [id number]        Imaging:           Neurontin and flexeril are helping.  He is off the crutches.  He has a caregiver that was initally full time, but is now doing more on his own.  He takes the flexeril 10mg about TID, it helps.  He reports the gabapentin made a huge difference, but is not working quite as well anymore.  He still does have right-sided sciatica, but the gabapentin helps considerably.  Denies saddle anesthesia and incontinence.   Gastroenterology/Hepatology Progress Note    Interval Events:   - CXR with worsening consolidation, WBC 31    Allergies:  Macrobid (Rash)  Nexium (Stomach Upset)      Hospital Medications:  caspofungin IVPB      caspofungin IVPB 70 milliGRAM(s) IV Intermittent once  chlorhexidine 2% Cloths 1 Application(s) Topical <User Schedule>  CRRT Treatment    <Continuous>  escitalopram 10 milliGRAM(s) Oral daily  folic acid 1 milliGRAM(s) Oral daily  influenza   Vaccine 0.5 milliLiter(s) IntraMuscular once  insulin lispro (ADMELOG) corrective regimen sliding scale   SubCutaneous every 6 hours  lactulose Syrup 20 Gram(s) Oral every 8 hours  levETIRAcetam  Solution 750 milliGRAM(s) Enteral Tube two times a day  levothyroxine 137 MICROGram(s) Oral daily  meropenem  IVPB 1000 milliGRAM(s) IV Intermittent every 8 hours  midodrine 20 milliGRAM(s) Oral every 8 hours  multivitamin/minerals/iron Oral Solution (CENTRUM) 15 milliLiter(s) Oral daily  norepinephrine Infusion 0.05 MICROgram(s)/kG/Min IV Continuous <Continuous>  oxyCODONE    Solution 5 milliGRAM(s) Oral every 4 hours PRN  oxyCODONE    Solution 10 milliGRAM(s) Oral every 4 hours PRN  pantoprazole   Suspension 40 milliGRAM(s) Oral every 24 hours  PrismaSATE Dialysate BGK 4 / 2.5 5000 milliLiter(s) CRRT <Continuous>  PrismaSOL Filtration BGK 4 / 2.5 5000 milliLiter(s) CRRT <Continuous>  PrismaSOL Filtration BGK 4 / 2.5 5000 milliLiter(s) CRRT <Continuous>  rifAXIMin 550 milliGRAM(s) Oral every 12 hours  sodium chloride 0.65% Nasal 1 Spray(s) Both Nostrils every 3 hours PRN  sodium phosphate 15 milliMole(s)/250 mL IVPB 15 milliMole(s) IV Intermittent every 1 hour  tetracaine/benzocaine/butamben Spray 1 Spray(s) Topical every 3 hours PRN  thiamine 100 milliGRAM(s) Enteral Tube daily  vasopressin Infusion 0.03 Unit(s)/Min IV Continuous <Continuous>      ROS: 14 point ROS unable to obtain     PHYSICAL EXAM:   Vital Signs:  Vital Signs Last 24 Hrs  T(C): 36.5 (26 Dec 2022 07:00), Max: 37.1 (25 Dec 2022 23:00)  T(F): 97.7 (26 Dec 2022 07:00), Max: 98.8 (25 Dec 2022 23:00)  HR: 83 (26 Dec 2022 09:30) (74 - 110)  BP: 121/63 (26 Dec 2022 07:45) (121/63 - 125/68)  BP(mean): 84 (26 Dec 2022 07:45) (84 - 88)  RR: 23 (26 Dec 2022 09:30) (16 - 35)  SpO2: 95% (26 Dec 2022 09:30) (92% - 96%)    Parameters below as of 26 Dec 2022 07:00  Patient On (Oxygen Delivery Method): nasal cannula  O2 Flow (L/min): 3    Daily     Daily Weight in k.7 (26 Dec 2022 00:30)    GENERAL:  Appears stated age, critically ill appearing  HEENT:  NC/AT,  +scleral icterus; +ETT  CHEST: no respiratory distress   HEART:  Regular rate   ABDOMEN:  Soft, non-tender, + distended  EXTREMITIES:  no cyanosis, clubbing or edema  SKIN:  No rash/erythema/ecchymoses/petechiae/wounds/abscess/warm/dry  NEURO:  +awake; following commands    LABS:                        8.6    31.79 )-----------( 49       ( 26 Dec 2022 08:16 )             26.4     Mean Cell Volume: 95.0 fl (-22 @ 08:16)        137  |  103  |  6<L>  ----------------------------<  165<H>  4.2   |  20<L>  |  0.63    Ca    8.7      26 Dec 2022 08:16  Phos  1.2       Mg     2.2         TPro  6.1  /  Alb  3.8  /  TBili  26.4<H>  /  DBili  x   /  AST  121<H>  /  ALT  44  /  AlkPhos  167<H>      LIVER FUNCTIONS - ( 26 Dec 2022 08:16 )  Alb: 3.8 g/dL / Pro: 6.1 g/dL / ALK PHOS: 167 U/L / ALT: 44 U/L / AST: 121 U/L / GGT: x           PT/INR - ( 26 Dec 2022 02:18 )   PT: 31.7 sec;   INR: 2.71 ratio         PTT - ( 26 Dec 2022 02:18 )  PTT:52.8 sec    Amylase Serum--      Lipase serum--       [id number]        Imaging:           Gastroenterology/Hepatology Progress Note    Interval Events:   - CXR with worsening consolidation, WBC 31    Allergies:  Macrobid (Rash)  Nexium (Stomach Upset)      Hospital Medications:  caspofungin IVPB      caspofungin IVPB 70 milliGRAM(s) IV Intermittent once  chlorhexidine 2% Cloths 1 Application(s) Topical <User Schedule>  CRRT Treatment    <Continuous>  escitalopram 10 milliGRAM(s) Oral daily  folic acid 1 milliGRAM(s) Oral daily  influenza   Vaccine 0.5 milliLiter(s) IntraMuscular once  insulin lispro (ADMELOG) corrective regimen sliding scale   SubCutaneous every 6 hours  lactulose Syrup 20 Gram(s) Oral every 8 hours  levETIRAcetam  Solution 750 milliGRAM(s) Enteral Tube two times a day  levothyroxine 137 MICROGram(s) Oral daily  meropenem  IVPB 1000 milliGRAM(s) IV Intermittent every 8 hours  midodrine 20 milliGRAM(s) Oral every 8 hours  multivitamin/minerals/iron Oral Solution (CENTRUM) 15 milliLiter(s) Oral daily  norepinephrine Infusion 0.05 MICROgram(s)/kG/Min IV Continuous <Continuous>  oxyCODONE    Solution 5 milliGRAM(s) Oral every 4 hours PRN  oxyCODONE    Solution 10 milliGRAM(s) Oral every 4 hours PRN  pantoprazole   Suspension 40 milliGRAM(s) Oral every 24 hours  PrismaSATE Dialysate BGK 4 / 2.5 5000 milliLiter(s) CRRT <Continuous>  PrismaSOL Filtration BGK 4 / 2.5 5000 milliLiter(s) CRRT <Continuous>  PrismaSOL Filtration BGK 4 / 2.5 5000 milliLiter(s) CRRT <Continuous>  rifAXIMin 550 milliGRAM(s) Oral every 12 hours  sodium chloride 0.65% Nasal 1 Spray(s) Both Nostrils every 3 hours PRN  sodium phosphate 15 milliMole(s)/250 mL IVPB 15 milliMole(s) IV Intermittent every 1 hour  tetracaine/benzocaine/butamben Spray 1 Spray(s) Topical every 3 hours PRN  thiamine 100 milliGRAM(s) Enteral Tube daily  vasopressin Infusion 0.03 Unit(s)/Min IV Continuous <Continuous>      ROS: 14 point ROS unable to obtain     PHYSICAL EXAM:   Vital Signs:  Vital Signs Last 24 Hrs  T(C): 36.5 (26 Dec 2022 07:00), Max: 37.1 (25 Dec 2022 23:00)  T(F): 97.7 (26 Dec 2022 07:00), Max: 98.8 (25 Dec 2022 23:00)  HR: 83 (26 Dec 2022 09:30) (74 - 110)  BP: 121/63 (26 Dec 2022 07:45) (121/63 - 125/68)  BP(mean): 84 (26 Dec 2022 07:45) (84 - 88)  RR: 23 (26 Dec 2022 09:30) (16 - 35)  SpO2: 95% (26 Dec 2022 09:30) (92% - 96%)    Parameters below as of 26 Dec 2022 07:00  Patient On (Oxygen Delivery Method): nasal cannula  O2 Flow (L/min): 3    Daily     Daily Weight in k.7 (26 Dec 2022 00:30)    GENERAL:  Appears stated age, critically ill appearing  HEENT:  NC/AT,  +scleral icterus; +ETT  CHEST: no respiratory distress   HEART:  Regular rate   ABDOMEN:  Soft, non-tender, + distended  EXTREMITIES:  no cyanosis, clubbing or edema  SKIN:  No rash/erythema/ecchymoses/petechiae/wounds/abscess/warm/dry  NEURO:  +awake; following commands    LABS:                        8.6    31.79 )-----------( 49       ( 26 Dec 2022 08:16 )             26.4     Mean Cell Volume: 95.0 fl (-22 @ 08:16)        137  |  103  |  6<L>  ----------------------------<  165<H>  4.2   |  20<L>  |  0.63    Ca    8.7      26 Dec 2022 08:16  Phos  1.2       Mg     2.2         TPro  6.1  /  Alb  3.8  /  TBili  26.4<H>  /  DBili  x   /  AST  121<H>  /  ALT  44  /  AlkPhos  167<H>      LIVER FUNCTIONS - ( 26 Dec 2022 08:16 )  Alb: 3.8 g/dL / Pro: 6.1 g/dL / ALK PHOS: 167 U/L / ALT: 44 U/L / AST: 121 U/L / GGT: x           PT/INR - ( 26 Dec 2022 02:18 )   PT: 31.7 sec;   INR: 2.71 ratio         PTT - ( 26 Dec 2022 02:18 )  PTT:52.8 sec    Amylase Serum--      Lipase serum--       [id number]        Imaging:

## 2022-12-26 NOTE — PROGRESS NOTE ADULT - ASSESSMENT
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 12/20 for LT eval for assisted.    UA (12/19) Moderate Leukocyte Esterase  UCx (12/19) No Growth  BCx (12/19) Corynebacterium (1/4 Bottles)  MRSA Nasal PCR (12/19) Negative  Paracentesis (12/19) Cell counts not suggestive of SBP    CT Chest with possible pneumonia versus atelectasis    #Leukocytosis, Positive Blood Culture, Transaminitis, Cirrhosis  --Stop Vancomycin as gram positive is corynebacterium and likely contaminant  --Would complete 7 day course of Meropenem for possible pneumonia  -- antifungal with caspofungin   --Continue to follow CBC with diff  --Continue to follow transaminases  --Continue to follow temperature curve  --Follow up blood cultures are no growth    continue present anti infectives    Ayan Duomnt MD  Can be called via Teams  After 5pm/weekends [phone]     62 year old female PMH decompensated ETOH cirrhosis c/b ascites (dx 11/2022), alc hep (dx 11/2022; non-responsive to steroids), remote h/o thyroid cancer in her 20s s/p total thyroidectomy + RTX + radioactive iodide, HTN, 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    UA (12/19) Moderate Leukocyte Esterase  UCx (12/19) No Growth  BCx (12/19) Corynebacterium (1/4 Bottles)  MRSA Nasal PCR (12/19) Negative  Paracentesis (12/19) Cell counts not suggestive of SBP    CT Chest with possible pneumonia versus atelectasis    #Leukocytosis, Positive Blood Culture, Transaminitis, Cirrhosis  --Stop Vancomycin as gram positive is corynebacterium and likely contaminant  --Would complete 7 day course of Meropenem for possible pneumonia  -- antifungal with caspofungin   --Continue to follow CBC with diff  --Continue to follow transaminases  --Continue to follow temperature curve  --Follow up blood cultures are no growth    continue present anti infectives    Ayan Dumont MD  Can be called via Teams  After 5pm/weekends [phone]     62 year old female PMH decompensated ETOH cirrhosis c/b ascites (dx 11/2022), alc hep (dx 11/2022; non-responsive to steroids), remote h/o thyroid cancer in her 20s s/p total thyroidectomy + RTX + radioactive iodide, HTN, 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    UA (12/19) Moderate Leukocyte Esterase  UCx (12/19) No Growth  BCx (12/19) Corynebacterium (1/4 Bottles)  MRSA Nasal PCR (12/19) Negative  Paracentesis (12/19) Cell counts not suggestive of SBP    CT Chest with possible pneumonia versus atelectasis    #Leukocytosis, Positive Blood Culture, Transaminitis, Cirrhosis  --Stop Vancomycin as gram positive is corynebacterium and likely contaminant  --Would complete 7 day course of Meropenem for possible pneumonia  -- antifungal with caspofungin   --Continue to follow CBC with diff  --Continue to follow transaminases  --Continue to follow temperature curve  --Follow up blood cultures are no growth    continue present anti infectives    Ayan Dumont MD  Can be called via Teams  After 5pm/weekends [phone]

## 2022-12-26 NOTE — PROGRESS NOTE ADULT - SUBJECTIVE AND OBJECTIVE BOX
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  Arthur ([phone]) and Daughter Taylor at [facility] provided additional history. Of note was recently admitted to  11/2022 for workup and eval of jaundice- during that hospitalization was found to have a UTI and dx with alc hep and started on steroids (was deemed a non-responder and steroids were subsequently stopped); s/p LVP at [place] 11/2022. Previously hospitalized in 2021 at [place] for ETOH detox. Went to ETOH rehab 08/2022 and was asked to leave the facility when she was COVID+; was sober for 1 week until she started drinking again. Had also attended a few AA meetings in the past. Transferred from [facility] 12/20 for further management of acute liver failure and liver transplant evaluation.       Interval events:  - increasing pressor requirements o/n. afebrile  - Mental status better, awake, following commands  - On CRRT  - on TF    Potential Discharge date: pending clinical stability  Education:  Medications  Plan of care:  See Below      MEDICATIONS  (STANDING):  caspofungin IVPB      chlorhexidine 2% Cloths 1 Application(s) Topical <User Schedule>  CRRT Treatment    <Continuous>  escitalopram 10 milliGRAM(s) Oral daily  folic acid 1 milliGRAM(s) Oral daily  influenza   Vaccine 0.5 milliLiter(s) IntraMuscular once  insulin lispro (ADMELOG) corrective regimen sliding scale   SubCutaneous every 6 hours  lactulose Syrup 20 Gram(s) Oral every 8 hours  levETIRAcetam  Solution 750 milliGRAM(s) Enteral Tube two times a day  levothyroxine 137 MICROGram(s) Oral daily  meropenem  IVPB 1000 milliGRAM(s) IV Intermittent every 8 hours  midodrine 20 milliGRAM(s) Oral every 8 hours  multivitamin/minerals/iron Oral Solution (CENTRUM) 15 milliLiter(s) Oral daily  norepinephrine Infusion 0.05 MICROgram(s)/kG/Min (6.21 mL/Hr) IV Continuous <Continuous>  pantoprazole   Suspension 40 milliGRAM(s) Oral every 24 hours  PrismaSATE Dialysate BGK 4 / 2.5 5000 milliLiter(s) (1500 mL/Hr) CRRT <Continuous>  PrismaSOL Filtration BGK 4 / 2.5 5000 milliLiter(s) (800 mL/Hr) CRRT <Continuous>  PrismaSOL Filtration BGK 4 / 2.5 5000 milliLiter(s) (200 mL/Hr) CRRT <Continuous>  rifAXIMin 550 milliGRAM(s) Oral every 12 hours  sodium phosphate 15 milliMole(s)/250 mL IVPB 15 milliMole(s) IV Intermittent every 1 hour  thiamine 100 milliGRAM(s) Enteral Tube daily  vasopressin Infusion 0.03 Unit(s)/Min (4.5 mL/Hr) IV Continuous <Continuous>    MEDICATIONS  (PRN):  oxyCODONE    Solution 5 milliGRAM(s) Oral every 4 hours PRN Moderate Pain (4 - 6)  oxyCODONE    Solution 10 milliGRAM(s) Oral every 4 hours PRN Severe Pain (7 - 10)  sodium chloride 0.65% Nasal 1 Spray(s) Both Nostrils every 3 hours PRN Nasal Congestion  tetracaine/benzocaine/butamben Spray 1 Spray(s) Topical every 3 hours PRN for ngt discomfort      PAST MEDICAL & SURGICAL HISTORY:  HTN (hypertension)  off medication for over one year--states BP has been normal      UTI (urinary tract infection)  currently being treated--will complete antibiotics 3/8/2022      Intracranial tumor      GERD (gastroesophageal reflux disease)      Eczema      Thyroid cancer  surgery. radiation, Radioactive iodine      COVID-19 virus infection  11/2021--recovered at home      H/O total thyroidectomy  age 21&#x27;s for Thyroid cancer, postop complication arterial bleed, second surgery      History of tonsillectomy  age 5      History of appendectomy  age 5          Vital Signs Last 24 Hrs  T(C): 36.6 (26 Dec 2022 11:00), Max: 37.1 (25 Dec 2022 23:00)  T(F): 97.8 (26 Dec 2022 11:00), Max: 98.8 (25 Dec 2022 23:00)  HR: 76 (26 Dec 2022 11:30) (74 - 110)  BP: 121/63 (26 Dec 2022 07:45) (121/63 - 125/68)  BP(mean): 84 (26 Dec 2022 07:45) (84 - 88)  RR: 33 (26 Dec 2022 11:30) (16 - 35)  SpO2: 94% (26 Dec 2022 11:30) (92% - 96%)    Parameters below as of 26 Dec 2022 11:00  Patient On (Oxygen Delivery Method): nasal cannula  O2 Flow (L/min): 3      I&O's Summary    25 Dec 2022 07:01  -  26 Dec 2022 07:00  --------------------------------------------------------  IN: 2114.2 mL / OUT: 4648 mL / NET: -2533.8 mL    26 Dec 2022 07:01  -  26 Dec 2022 11:50  --------------------------------------------------------  IN: 935.2 mL / OUT: 308 mL / NET: 627.2 mL                              8.6    31.79 )-----------( 49       ( 26 Dec 2022 08:16 )             26.4     12-26    137  |  103  |  6<L>  ----------------------------<  165<H>  4.2   |  20<L>  |  0.63    Ca    8.7      26 Dec 2022 08:16  Phos  1.2     12-26  Mg     2.2     12-26    TPro  6.1  /  Alb  3.8  /  TBili  26.4<H>  /  DBili  x   /  AST  121<H>  /  ALT  44  /  AlkPhos  167<H>  12-26          Culture - Blood (collected 12-24-22 @ 20:55)  Source: .Blood Blood  Preliminary Report (12-26-22 @ 01:03):    No growth to date.    Culture - Blood (collected 12-24-22 @ 20:20)  Source: .Blood Blood  Preliminary Report (12-26-22 @ 01:03):    No growth to date.    Culture - Blood (collected 12-23-22 @ 13:35)  Source: .Blood Blood  Preliminary Report (12-24-22 @ 18:01):    No growth to date.    Culture - Blood (collected 12-23-22 @ 13:25)  Source: .Blood Blood  Preliminary Report (12-24-22 @ 18:01):    No growth to date.    Culture - Bronchial (collected 12-22-22 @ 17:02)  Source: Combi-Cath Combi-Cath  Gram Stain (12-23-22 @ 06:59):    Moderate polymorphonuclear leukocytes seen per low power field    No squamous epithelial cells seen per low power field    No organisms seen per oil power field  Final Report (12-24-22 @ 15:11):    Normal Respiratory Cassandra present    Culture - Urine (collected 12-21-22 @ 22:55)  Source: Catheterized Catheterized  Final Report (12-24-22 @ 00:55):    >100,000 CFU/ml Leticia dubliniensis "Susceptibilities not performed"    Culture - Fungal, Body Fluid (collected 12-21-22 @ 19:53)  Source: Peritoneal Peritoneal Fluid  Preliminary Report (12-24-22 @ 15:02):    Culture is being performed. Fungal cultures are held for 4 weeks.    Culture - Body Fluid with Gram Stain (collected 12-21-22 @ 19:53)  Source: Ascites Fl Ascites Fluid  Gram Stain (12-22-22 @ 03:04):    polymorphonuclear leukocytes seen    No organisms seen    by cytocentrifuge  Preliminary Report (12-23-22 @ 08:47):    No growth    Culture - Sputum (collected 12-21-22 @ 01:19)  Source: Trach Asp Tracheal Aspirate  Gram Stain (12-21-22 @ 08:45):    No polymorphonuclear leukocytes per low power field    Numerous Squamous epithelial cells per low power field    Moderate Gram Positive Rods seen per oil power field    Few Yeast like cells seen per oil power field  Final Report (12-23-22 @ 14:05):    Normal Respiratory Cassandra present    Culture - Blood (collected 12-20-22 @ 23:30)  Source: .Blood Blood-Peripheral  Final Report (12-26-22 @ 04:00):    No Growth Final    Culture - Blood (collected 12-20-22 @ 22:38)  Source: .Blood Blood-Peripheral  Final Report (12-26-22 @ 03:00):    No Growth Final                      Review of systems  Gen: No weight changes, fatigue, fevers/chills, weakness  Skin: No rashes  Head/Eyes/Ears/Mouth: No headache; Normal hearing; Normal vision w/o blurriness; No sinus pain/discomfort, sore throat  Respiratory: No dyspnea, cough, wheezing, hemoptysis  CV: No chest pain, PND, orthopnea  GI: C/O mild abdominal pain at surgical site, No diarrhea, constipation, nausea, vomiting, melena, hematochezia  : No increased frequency, dysuria, hematuria, nocturia  MSK: No joint pain/swelling; no back pain; no edema  Neuro: No dizziness/lightheadedness, weakness, seizures, numbness, tingling  Heme: No easy bruising or bleeding  Endo: No heat/cold intolerance  Psych: No significant nervousness, anxiety, stress, depression  All other systems were reviewed and are negative, except as noted.    Physical Exam:  Constitutional: NAD   Eyes: icteric, PERRLA  ENMT: nc/at, no thrush  Neck: supple, central line   Respiratory: CTA B/L  Cardiovascular: RRR  Gastrointestinal: Soft abdomen, distended  Genitourinary: Urinary catheter in place, anuric  Extremities: SCD's in place and working bilaterally, no edema  Vascular: Palpable dp pulses bilaterally.   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  Arthur ([phone]) and Daughter Taylor at [facility] provided additional history. Of note was recently admitted to  11/2022 for workup and eval of jaundice- during that hospitalization was found to have a UTI and dx with alc hep and started on steroids (was deemed a non-responder and steroids were subsequently stopped); s/p LVP at [place] 11/2022. Previously hospitalized in 2021 at [place] for ETOH detox. Went to ETOH rehab 08/2022 and was asked to leave the facility when she was COVID+; was sober for 1 week until she started drinking again. Had also attended a few AA meetings in the past. Transferred from [facility] 12/20 for further management of acute liver failure and liver transplant evaluation.       Interval events:  - increasing pressor requirements o/n. afebrile  - Mental status better, awake, following commands  - On CRRT  - on TF    Potential Discharge date: pending clinical stability  Education:  Medications  Plan of care:  See Below      MEDICATIONS  (STANDING):  caspofungin IVPB      chlorhexidine 2% Cloths 1 Application(s) Topical <User Schedule>  CRRT Treatment    <Continuous>  escitalopram 10 milliGRAM(s) Oral daily  folic acid 1 milliGRAM(s) Oral daily  influenza   Vaccine 0.5 milliLiter(s) IntraMuscular once  insulin lispro (ADMELOG) corrective regimen sliding scale   SubCutaneous every 6 hours  lactulose Syrup 20 Gram(s) Oral every 8 hours  levETIRAcetam  Solution 750 milliGRAM(s) Enteral Tube two times a day  levothyroxine 137 MICROGram(s) Oral daily  meropenem  IVPB 1000 milliGRAM(s) IV Intermittent every 8 hours  midodrine 20 milliGRAM(s) Oral every 8 hours  multivitamin/minerals/iron Oral Solution (CENTRUM) 15 milliLiter(s) Oral daily  norepinephrine Infusion 0.05 MICROgram(s)/kG/Min (6.21 mL/Hr) IV Continuous <Continuous>  pantoprazole   Suspension 40 milliGRAM(s) Oral every 24 hours  PrismaSATE Dialysate BGK 4 / 2.5 5000 milliLiter(s) (1500 mL/Hr) CRRT <Continuous>  PrismaSOL Filtration BGK 4 / 2.5 5000 milliLiter(s) (800 mL/Hr) CRRT <Continuous>  PrismaSOL Filtration BGK 4 / 2.5 5000 milliLiter(s) (200 mL/Hr) CRRT <Continuous>  rifAXIMin 550 milliGRAM(s) Oral every 12 hours  sodium phosphate 15 milliMole(s)/250 mL IVPB 15 milliMole(s) IV Intermittent every 1 hour  thiamine 100 milliGRAM(s) Enteral Tube daily  vasopressin Infusion 0.03 Unit(s)/Min (4.5 mL/Hr) IV Continuous <Continuous>    MEDICATIONS  (PRN):  oxyCODONE    Solution 5 milliGRAM(s) Oral every 4 hours PRN Moderate Pain (4 - 6)  oxyCODONE    Solution 10 milliGRAM(s) Oral every 4 hours PRN Severe Pain (7 - 10)  sodium chloride 0.65% Nasal 1 Spray(s) Both Nostrils every 3 hours PRN Nasal Congestion  tetracaine/benzocaine/butamben Spray 1 Spray(s) Topical every 3 hours PRN for ngt discomfort      PAST MEDICAL & SURGICAL HISTORY:  HTN (hypertension)  off medication for over one year--states BP has been normal      UTI (urinary tract infection)  currently being treated--will complete antibiotics 3/8/2022      Intracranial tumor      GERD (gastroesophageal reflux disease)      Eczema      Thyroid cancer  surgery. radiation, Radioactive iodine      COVID-19 virus infection  11/2021--recovered at home      H/O total thyroidectomy  age 21&#x27;s for Thyroid cancer, postop complication arterial bleed, second surgery      History of tonsillectomy  age 5      History of appendectomy  age 5          Vital Signs Last 24 Hrs  T(C): 36.6 (26 Dec 2022 11:00), Max: 37.1 (25 Dec 2022 23:00)  T(F): 97.8 (26 Dec 2022 11:00), Max: 98.8 (25 Dec 2022 23:00)  HR: 76 (26 Dec 2022 11:30) (74 - 110)  BP: 121/63 (26 Dec 2022 07:45) (121/63 - 125/68)  BP(mean): 84 (26 Dec 2022 07:45) (84 - 88)  RR: 33 (26 Dec 2022 11:30) (16 - 35)  SpO2: 94% (26 Dec 2022 11:30) (92% - 96%)    Parameters below as of 26 Dec 2022 11:00  Patient On (Oxygen Delivery Method): nasal cannula  O2 Flow (L/min): 3      I&O's Summary    25 Dec 2022 07:01  -  26 Dec 2022 07:00  --------------------------------------------------------  IN: 2114.2 mL / OUT: 4648 mL / NET: -2533.8 mL    26 Dec 2022 07:01  -  26 Dec 2022 11:50  --------------------------------------------------------  IN: 935.2 mL / OUT: 308 mL / NET: 627.2 mL                              8.6    31.79 )-----------( 49       ( 26 Dec 2022 08:16 )             26.4     12-26    137  |  103  |  6<L>  ----------------------------<  165<H>  4.2   |  20<L>  |  0.63    Ca    8.7      26 Dec 2022 08:16  Phos  1.2     12-26  Mg     2.2     12-26    TPro  6.1  /  Alb  3.8  /  TBili  26.4<H>  /  DBili  x   /  AST  121<H>  /  ALT  44  /  AlkPhos  167<H>  12-26          Culture - Blood (collected 12-24-22 @ 20:55)  Source: .Blood Blood  Preliminary Report (12-26-22 @ 01:03):    No growth to date.    Culture - Blood (collected 12-24-22 @ 20:20)  Source: .Blood Blood  Preliminary Report (12-26-22 @ 01:03):    No growth to date.    Culture - Blood (collected 12-23-22 @ 13:35)  Source: .Blood Blood  Preliminary Report (12-24-22 @ 18:01):    No growth to date.    Culture - Blood (collected 12-23-22 @ 13:25)  Source: .Blood Blood  Preliminary Report (12-24-22 @ 18:01):    No growth to date.    Culture - Bronchial (collected 12-22-22 @ 17:02)  Source: Combi-Cath Combi-Cath  Gram Stain (12-23-22 @ 06:59):    Moderate polymorphonuclear leukocytes seen per low power field    No squamous epithelial cells seen per low power field    No organisms seen per oil power field  Final Report (12-24-22 @ 15:11):    Normal Respiratory Cassandra present    Culture - Urine (collected 12-21-22 @ 22:55)  Source: Catheterized Catheterized  Final Report (12-24-22 @ 00:55):    >100,000 CFU/ml Leticia dubliniensis "Susceptibilities not performed"    Culture - Fungal, Body Fluid (collected 12-21-22 @ 19:53)  Source: Peritoneal Peritoneal Fluid  Preliminary Report (12-24-22 @ 15:02):    Culture is being performed. Fungal cultures are held for 4 weeks.    Culture - Body Fluid with Gram Stain (collected 12-21-22 @ 19:53)  Source: Ascites Fl Ascites Fluid  Gram Stain (12-22-22 @ 03:04):    polymorphonuclear leukocytes seen    No organisms seen    by cytocentrifuge  Preliminary Report (12-23-22 @ 08:47):    No growth    Culture - Sputum (collected 12-21-22 @ 01:19)  Source: Trach Asp Tracheal Aspirate  Gram Stain (12-21-22 @ 08:45):    No polymorphonuclear leukocytes per low power field    Numerous Squamous epithelial cells per low power field    Moderate Gram Positive Rods seen per oil power field    Few Yeast like cells seen per oil power field  Final Report (12-23-22 @ 14:05):    Normal Respiratory Cassandra present    Culture - Blood (collected 12-20-22 @ 23:30)  Source: .Blood Blood-Peripheral  Final Report (12-26-22 @ 04:00):    No Growth Final    Culture - Blood (collected 12-20-22 @ 22:38)  Source: .Blood Blood-Peripheral  Final Report (12-26-22 @ 03:00):    No Growth Final                      Review of systems  Gen: No weight changes, fatigue, fevers/chills, weakness  Skin: No rashes  Head/Eyes/Ears/Mouth: No headache; Normal hearing; Normal vision w/o blurriness; No sinus pain/discomfort, sore throat  Respiratory: No dyspnea, cough, wheezing, hemoptysis  CV: No chest pain, PND, orthopnea  GI: C/O mild abdominal pain at surgical site, No diarrhea, constipation, nausea, vomiting, melena, hematochezia  : No increased frequency, dysuria, hematuria, nocturia  MSK: No joint pain/swelling; no back pain; no edema  Neuro: No dizziness/lightheadedness, weakness, seizures, numbness, tingling  Heme: No easy bruising or bleeding  Endo: No heat/cold intolerance  Psych: No significant nervousness, anxiety, stress, depression  All other systems were reviewed and are negative, except as noted.    Physical Exam:  Constitutional: NAD   Eyes: icteric, PERRLA  ENMT: nc/at, no thrush  Neck: supple, central line   Respiratory: CTA B/L  Cardiovascular: RRR  Gastrointestinal: Soft abdomen, distended  Genitourinary: Urinary catheter in place, anuric  Extremities: SCD's in place and working bilaterally, no edema  Vascular: Palpable dp pulses bilaterally.   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  Arthur ([phone]) and Daughter Taylor at [facility] provided additional history. Of note was recently admitted to  11/2022 for workup and eval of jaundice- during that hospitalization was found to have a UTI and dx with alc hep and started on steroids (was deemed a non-responder and steroids were subsequently stopped); s/p LVP at [place] 11/2022. Previously hospitalized in 2021 at [place] for ETOH detox. Went to ETOH rehab 08/2022 and was asked to leave the facility when she was COVID+; was sober for 1 week until she started drinking again. Had also attended a few AA meetings in the past. Transferred from [facility] 12/20 for further management of acute liver failure and liver transplant evaluation.       Interval events:  - increasing pressor requirements o/n. afebrile  - Mental status better, awake, following commands  - On CRRT  - on TF    Potential Discharge date: pending clinical stability  Education:  Medications  Plan of care:  See Below      MEDICATIONS  (STANDING):  caspofungin IVPB      chlorhexidine 2% Cloths 1 Application(s) Topical <User Schedule>  CRRT Treatment    <Continuous>  escitalopram 10 milliGRAM(s) Oral daily  folic acid 1 milliGRAM(s) Oral daily  influenza   Vaccine 0.5 milliLiter(s) IntraMuscular once  insulin lispro (ADMELOG) corrective regimen sliding scale   SubCutaneous every 6 hours  lactulose Syrup 20 Gram(s) Oral every 8 hours  levETIRAcetam  Solution 750 milliGRAM(s) Enteral Tube two times a day  levothyroxine 137 MICROGram(s) Oral daily  meropenem  IVPB 1000 milliGRAM(s) IV Intermittent every 8 hours  midodrine 20 milliGRAM(s) Oral every 8 hours  multivitamin/minerals/iron Oral Solution (CENTRUM) 15 milliLiter(s) Oral daily  norepinephrine Infusion 0.05 MICROgram(s)/kG/Min (6.21 mL/Hr) IV Continuous <Continuous>  pantoprazole   Suspension 40 milliGRAM(s) Oral every 24 hours  PrismaSATE Dialysate BGK 4 / 2.5 5000 milliLiter(s) (1500 mL/Hr) CRRT <Continuous>  PrismaSOL Filtration BGK 4 / 2.5 5000 milliLiter(s) (800 mL/Hr) CRRT <Continuous>  PrismaSOL Filtration BGK 4 / 2.5 5000 milliLiter(s) (200 mL/Hr) CRRT <Continuous>  rifAXIMin 550 milliGRAM(s) Oral every 12 hours  sodium phosphate 15 milliMole(s)/250 mL IVPB 15 milliMole(s) IV Intermittent every 1 hour  thiamine 100 milliGRAM(s) Enteral Tube daily  vasopressin Infusion 0.03 Unit(s)/Min (4.5 mL/Hr) IV Continuous <Continuous>    MEDICATIONS  (PRN):  oxyCODONE    Solution 5 milliGRAM(s) Oral every 4 hours PRN Moderate Pain (4 - 6)  oxyCODONE    Solution 10 milliGRAM(s) Oral every 4 hours PRN Severe Pain (7 - 10)  sodium chloride 0.65% Nasal 1 Spray(s) Both Nostrils every 3 hours PRN Nasal Congestion  tetracaine/benzocaine/butamben Spray 1 Spray(s) Topical every 3 hours PRN for ngt discomfort      PAST MEDICAL & SURGICAL HISTORY:  HTN (hypertension)  off medication for over one year--states BP has been normal      UTI (urinary tract infection)  currently being treated--will complete antibiotics 3/8/2022      Intracranial tumor      GERD (gastroesophageal reflux disease)      Eczema      Thyroid cancer  surgery. radiation, Radioactive iodine      COVID-19 virus infection  11/2021--recovered at home      H/O total thyroidectomy  age 21&#x27;s for Thyroid cancer, postop complication arterial bleed, second surgery      History of tonsillectomy  age 5      History of appendectomy  age 5          Vital Signs Last 24 Hrs  T(C): 36.6 (26 Dec 2022 11:00), Max: 37.1 (25 Dec 2022 23:00)  T(F): 97.8 (26 Dec 2022 11:00), Max: 98.8 (25 Dec 2022 23:00)  HR: 76 (26 Dec 2022 11:30) (74 - 110)  BP: 121/63 (26 Dec 2022 07:45) (121/63 - 125/68)  BP(mean): 84 (26 Dec 2022 07:45) (84 - 88)  RR: 33 (26 Dec 2022 11:30) (16 - 35)  SpO2: 94% (26 Dec 2022 11:30) (92% - 96%)    Parameters below as of 26 Dec 2022 11:00  Patient On (Oxygen Delivery Method): nasal cannula  O2 Flow (L/min): 3      I&O's Summary    25 Dec 2022 07:01  -  26 Dec 2022 07:00  --------------------------------------------------------  IN: 2114.2 mL / OUT: 4648 mL / NET: -2533.8 mL    26 Dec 2022 07:01  -  26 Dec 2022 11:50  --------------------------------------------------------  IN: 935.2 mL / OUT: 308 mL / NET: 627.2 mL                              8.6    31.79 )-----------( 49       ( 26 Dec 2022 08:16 )             26.4     12-26    137  |  103  |  6<L>  ----------------------------<  165<H>  4.2   |  20<L>  |  0.63    Ca    8.7      26 Dec 2022 08:16  Phos  1.2     12-26  Mg     2.2     12-26    TPro  6.1  /  Alb  3.8  /  TBili  26.4<H>  /  DBili  x   /  AST  121<H>  /  ALT  44  /  AlkPhos  167<H>  12-26          Culture - Blood (collected 12-24-22 @ 20:55)  Source: .Blood Blood  Preliminary Report (12-26-22 @ 01:03):    No growth to date.    Culture - Blood (collected 12-24-22 @ 20:20)  Source: .Blood Blood  Preliminary Report (12-26-22 @ 01:03):    No growth to date.    Culture - Blood (collected 12-23-22 @ 13:35)  Source: .Blood Blood  Preliminary Report (12-24-22 @ 18:01):    No growth to date.    Culture - Blood (collected 12-23-22 @ 13:25)  Source: .Blood Blood  Preliminary Report (12-24-22 @ 18:01):    No growth to date.    Culture - Bronchial (collected 12-22-22 @ 17:02)  Source: Combi-Cath Combi-Cath  Gram Stain (12-23-22 @ 06:59):    Moderate polymorphonuclear leukocytes seen per low power field    No squamous epithelial cells seen per low power field    No organisms seen per oil power field  Final Report (12-24-22 @ 15:11):    Normal Respiratory Cassandra present    Culture - Urine (collected 12-21-22 @ 22:55)  Source: Catheterized Catheterized  Final Report (12-24-22 @ 00:55):    >100,000 CFU/ml Leticia dubliniensis "Susceptibilities not performed"    Culture - Fungal, Body Fluid (collected 12-21-22 @ 19:53)  Source: Peritoneal Peritoneal Fluid  Preliminary Report (12-24-22 @ 15:02):    Culture is being performed. Fungal cultures are held for 4 weeks.    Culture - Body Fluid with Gram Stain (collected 12-21-22 @ 19:53)  Source: Ascites Fl Ascites Fluid  Gram Stain (12-22-22 @ 03:04):    polymorphonuclear leukocytes seen    No organisms seen    by cytocentrifuge  Preliminary Report (12-23-22 @ 08:47):    No growth    Culture - Sputum (collected 12-21-22 @ 01:19)  Source: Trach Asp Tracheal Aspirate  Gram Stain (12-21-22 @ 08:45):    No polymorphonuclear leukocytes per low power field    Numerous Squamous epithelial cells per low power field    Moderate Gram Positive Rods seen per oil power field    Few Yeast like cells seen per oil power field  Final Report (12-23-22 @ 14:05):    Normal Respiratory Cassandra present    Culture - Blood (collected 12-20-22 @ 23:30)  Source: .Blood Blood-Peripheral  Final Report (12-26-22 @ 04:00):    No Growth Final    Culture - Blood (collected 12-20-22 @ 22:38)  Source: .Blood Blood-Peripheral  Final Report (12-26-22 @ 03:00):    No Growth Final                      Review of systems  Gen: No weight changes, fatigue, fevers/chills, weakness  Skin: No rashes  Head/Eyes/Ears/Mouth: No headache; Normal hearing; Normal vision w/o blurriness; No sinus pain/discomfort, sore throat  Respiratory: No dyspnea, cough, wheezing, hemoptysis  CV: No chest pain, PND, orthopnea  GI: C/O mild abdominal pain at surgical site, No diarrhea, constipation, nausea, vomiting, melena, hematochezia  : No increased frequency, dysuria, hematuria, nocturia  MSK: No joint pain/swelling; no back pain; no edema  Neuro: No dizziness/lightheadedness, weakness, seizures, numbness, tingling  Heme: No easy bruising or bleeding  Endo: No heat/cold intolerance  Psych: No significant nervousness, anxiety, stress, depression  All other systems were reviewed and are negative, except as noted.    Physical Exam:  Constitutional: NAD   Eyes: icteric, PERRLA  ENMT: nc/at, no thrush  Neck: supple, central line   Respiratory: CTA B/L  Cardiovascular: RRR  Gastrointestinal: Soft abdomen, distended  Genitourinary: Urinary catheter in place, anuric  Extremities: SCD's in place and working bilaterally, no edema  Vascular: Palpable dp pulses bilaterally.   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  Arthur ([phone]) and Daughter Taylor at [facility] provided additional history. Of note was recently admitted to  11/2022 for workup and eval of jaundice- during that hospitalization was found to have a UTI and dx with alc hep and started on steroids (was deemed a non-responder and steroids were subsequently stopped); s/p LVP at [place] 11/2022. Previously hospitalized in 2021 at [place] for ETOH detox. Went to ETOH rehab 08/2022 and was asked to leave the facility when she was COVID+; was sober for 1 week until she started drinking again. Had also attended a few AA meetings in the past. Transferred from [facility] 12/20 for further management of acute liver failure and liver transplant evaluation.       Interval events:  - increasing pressor requirements o/n. afebrile  - Mental status better, awake, following commands  - Remains anuric, on CRRT net even  - R fem shiley dced;   - Rising WBC, recultured     Potential Discharge date: pending clinical stability  Education:  Medications  Plan of care:  See Below    MEDICATIONS  (STANDING):  caspofungin IVPB      chlorhexidine 2% Cloths 1 Application(s) Topical <User Schedule>  CRRT Treatment    <Continuous>  escitalopram 10 milliGRAM(s) Oral daily  folic acid 1 milliGRAM(s) Oral daily  influenza   Vaccine 0.5 milliLiter(s) IntraMuscular once  insulin lispro (ADMELOG) corrective regimen sliding scale   SubCutaneous every 6 hours  lactulose Syrup 20 Gram(s) Oral every 8 hours  levETIRAcetam  Solution 750 milliGRAM(s) Enteral Tube two times a day  levothyroxine 137 MICROGram(s) Oral daily  meropenem  IVPB 1000 milliGRAM(s) IV Intermittent every 8 hours  midodrine 20 milliGRAM(s) Oral every 8 hours  multivitamin/minerals/iron Oral Solution (CENTRUM) 15 milliLiter(s) Oral daily  norepinephrine Infusion 0.05 MICROgram(s)/kG/Min (6.21 mL/Hr) IV Continuous <Continuous>  pantoprazole   Suspension 40 milliGRAM(s) Oral every 24 hours  PrismaSATE Dialysate BGK 4 / 2.5 5000 milliLiter(s) (1500 mL/Hr) CRRT <Continuous>  PrismaSOL Filtration BGK 4 / 2.5 5000 milliLiter(s) (800 mL/Hr) CRRT <Continuous>  PrismaSOL Filtration BGK 4 / 2.5 5000 milliLiter(s) (200 mL/Hr) CRRT <Continuous>  rifAXIMin 550 milliGRAM(s) Oral every 12 hours  sodium phosphate 15 milliMole(s)/250 mL IVPB 15 milliMole(s) IV Intermittent every 1 hour  thiamine 100 milliGRAM(s) Enteral Tube daily  vasopressin Infusion 0.03 Unit(s)/Min (4.5 mL/Hr) IV Continuous <Continuous>    MEDICATIONS  (PRN):  oxyCODONE    Solution 5 milliGRAM(s) Oral every 4 hours PRN Moderate Pain (4 - 6)  oxyCODONE    Solution 10 milliGRAM(s) Oral every 4 hours PRN Severe Pain (7 - 10)  sodium chloride 0.65% Nasal 1 Spray(s) Both Nostrils every 3 hours PRN Nasal Congestion  tetracaine/benzocaine/butamben Spray 1 Spray(s) Topical every 3 hours PRN for ngt discomfort      PAST MEDICAL & SURGICAL HISTORY:  HTN (hypertension)  off medication for over one year--states BP has been normal  UTI (urinary tract infection)  currently being treated--will complete antibiotics 3/8/2022  Intracranial tumor  GERD (gastroesophageal reflux disease)  Eczema  Thyroid cancer  surgery. radiation, Radioactive iodine  COVID-19 virus infection  11/2021--recovered at home  H/O total thyroidectomy  History of tonsillectomy  History of appendectomy    Vital Signs Last 24 Hrs  T(C): 36.6 (26 Dec 2022 11:00), Max: 37.1 (25 Dec 2022 23:00)  T(F): 97.8 (26 Dec 2022 11:00), Max: 98.8 (25 Dec 2022 23:00)  HR: 76 (26 Dec 2022 11:30) (74 - 110)  BP: 121/63 (26 Dec 2022 07:45) (121/63 - 125/68)  BP(mean): 84 (26 Dec 2022 07:45) (84 - 88)  RR: 33 (26 Dec 2022 11:30) (16 - 35)  SpO2: 94% (26 Dec 2022 11:30) (92% - 96%)    Parameters below as of 26 Dec 2022 11:00  Patient On (Oxygen Delivery Method): nasal cannula  O2 Flow (L/min): 3      I&O's Summary    25 Dec 2022 07:01  -  26 Dec 2022 07:00  --------------------------------------------------------  IN: 2114.2 mL / OUT: 4648 mL / NET: -2533.8 mL    26 Dec 2022 07:01  -  26 Dec 2022 11:50  --------------------------------------------------------  IN: 935.2 mL / OUT: 308 mL / NET: 627.2 mL                         8.6    31.79 )-----------( 49       ( 26 Dec 2022 08:16 )             26.4     12-26    137  |  103  |  6<L>  ----------------------------<  165<H>  4.2   |  20<L>  |  0.63    Ca    8.7      26 Dec 2022 08:16  Phos  1.2     12-26  Mg     2.2     12-26    TPro  6.1  /  Alb  3.8  /  TBili  26.4<H>  /  DBili  x   /  AST  121<H>  /  ALT  44  /  AlkPhos  167<H>  12-26    Culture - Blood (collected 12-24-22 @ 20:55)  Source: .Blood Blood  Preliminary Report (12-26-22 @ 01:03):    No growth to date.    Culture - Blood (collected 12-24-22 @ 20:20)  Source: .Blood Blood  Preliminary Report (12-26-22 @ 01:03):    No growth to date.    Culture - Blood (collected 12-23-22 @ 13:35)  Source: .Blood Blood  Preliminary Report (12-24-22 @ 18:01):    No growth to date.    Culture - Blood (collected 12-23-22 @ 13:25)  Source: .Blood Blood  Preliminary Report (12-24-22 @ 18:01):    No growth to date.    Culture - Bronchial (collected 12-22-22 @ 17:02)  Source: Combi-Cath Combi-Cath  Gram Stain (12-23-22 @ 06:59):    Moderate polymorphonuclear leukocytes seen per low power field    No squamous epithelial cells seen per low power field    No organisms seen per oil power field  Final Report (12-24-22 @ 15:11):    Normal Respiratory Cassandra present    Culture - Urine (collected 12-21-22 @ 22:55)  Source: Catheterized Catheterized  Final Report (12-24-22 @ 00:55):    >100,000 CFU/ml Leticia dubliniensis "Susceptibilities not performed"    Culture - Fungal, Body Fluid (collected 12-21-22 @ 19:53)  Source: Peritoneal Peritoneal Fluid  Preliminary Report (12-24-22 @ 15:02):    Culture is being performed. Fungal cultures are held for 4 weeks.    Culture - Body Fluid with Gram Stain (collected 12-21-22 @ 19:53)  Source: Ascites Fl Ascites Fluid  Gram Stain (12-22-22 @ 03:04):    polymorphonuclear leukocytes seen    No organisms seen    by cytocentrifuge  Preliminary Report (12-23-22 @ 08:47):    No growth    Culture - Sputum (collected 12-21-22 @ 01:19)  Source: Trach Asp Tracheal Aspirate  Gram Stain (12-21-22 @ 08:45):    No polymorphonuclear leukocytes per low power field    Numerous Squamous epithelial cells per low power field    Moderate Gram Positive Rods seen per oil power field    Few Yeast like cells seen per oil power field  Final Report (12-23-22 @ 14:05):    Normal Respiratory Cassandra present    Culture - Blood (collected 12-20-22 @ 23:30)  Source: .Blood Blood-Peripheral  Final Report (12-26-22 @ 04:00):    No Growth Final    Culture - Blood (collected 12-20-22 @ 22:38)  Source: .Blood Blood-Peripheral  Final Report (12-26-22 @ 03:00):    No Growth Final    Review of systems  Gen: No weight changes, fatigue, fevers/chills, weakness  Skin: No rashes  Head/Eyes/Ears/Mouth: No headache; Normal hearing; Normal vision w/o blurriness; No sinus pain/discomfort, sore throat  Respiratory: No dyspnea, cough, wheezing, hemoptysis  CV: No chest pain, PND, orthopnea  GI: C/O mild abdominal pain at surgical site, No diarrhea, constipation, nausea, vomiting, melena, hematochezia  : No increased frequency, dysuria, hematuria, nocturia  MSK: No joint pain/swelling; no back pain; no edema  Neuro: No dizziness/lightheadedness, weakness, seizures, numbness, tingling  Heme: No easy bruising or bleeding  Endo: No heat/cold intolerance  Psych: No significant nervousness, anxiety, stress, depression  All other systems were reviewed and are negative, except as noted.    Physical Exam:  Constitutional: NAD   Eyes: icteric, PERRLA  ENMT: nc/at, no thrush  Neck: supple, central line   Respiratory: CTA B/L  Cardiovascular: RRR  Gastrointestinal: Soft abdomen, distended  Genitourinary: Urinary catheter in place, anuric  Extremities: SCD's in place and working bilaterally, no edema  Vascular: Palpable dp pulses bilaterally.   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  Arthur ([phone]) and Daughter Taylor at [facility] provided additional history. Of note was recently admitted to  11/2022 for workup and eval of jaundice- during that hospitalization was found to have a UTI and dx with alc hep and started on steroids (was deemed a non-responder and steroids were subsequently stopped); s/p LVP at [place] 11/2022. Previously hospitalized in 2021 at [facility] for ETOH detox. Went to ETOH rehab 08/2022 and was asked to leave the facility when she was COVID+; was sober for 1 week until she started drinking again. Had also attended a few AA meetings in the past. Transferred from [facility] 12/20 for further management of acute liver failure and liver transplant evaluation.       Interval events:  - increasing pressor requirements o/n. afebrile  - Mental status better, awake, following commands  - Remains anuric, on CRRT net even  - R fem shiley dced;   - Rising WBC, recultured     Potential Discharge date: pending clinical stability  Education:  Medications  Plan of care:  See Below    MEDICATIONS  (STANDING):  caspofungin IVPB      chlorhexidine 2% Cloths 1 Application(s) Topical <User Schedule>  CRRT Treatment    <Continuous>  escitalopram 10 milliGRAM(s) Oral daily  folic acid 1 milliGRAM(s) Oral daily  influenza   Vaccine 0.5 milliLiter(s) IntraMuscular once  insulin lispro (ADMELOG) corrective regimen sliding scale   SubCutaneous every 6 hours  lactulose Syrup 20 Gram(s) Oral every 8 hours  levETIRAcetam  Solution 750 milliGRAM(s) Enteral Tube two times a day  levothyroxine 137 MICROGram(s) Oral daily  meropenem  IVPB 1000 milliGRAM(s) IV Intermittent every 8 hours  midodrine 20 milliGRAM(s) Oral every 8 hours  multivitamin/minerals/iron Oral Solution (CENTRUM) 15 milliLiter(s) Oral daily  norepinephrine Infusion 0.05 MICROgram(s)/kG/Min (6.21 mL/Hr) IV Continuous <Continuous>  pantoprazole   Suspension 40 milliGRAM(s) Oral every 24 hours  PrismaSATE Dialysate BGK 4 / 2.5 5000 milliLiter(s) (1500 mL/Hr) CRRT <Continuous>  PrismaSOL Filtration BGK 4 / 2.5 5000 milliLiter(s) (800 mL/Hr) CRRT <Continuous>  PrismaSOL Filtration BGK 4 / 2.5 5000 milliLiter(s) (200 mL/Hr) CRRT <Continuous>  rifAXIMin 550 milliGRAM(s) Oral every 12 hours  sodium phosphate 15 milliMole(s)/250 mL IVPB 15 milliMole(s) IV Intermittent every 1 hour  thiamine 100 milliGRAM(s) Enteral Tube daily  vasopressin Infusion 0.03 Unit(s)/Min (4.5 mL/Hr) IV Continuous <Continuous>    MEDICATIONS  (PRN):  oxyCODONE    Solution 5 milliGRAM(s) Oral every 4 hours PRN Moderate Pain (4 - 6)  oxyCODONE    Solution 10 milliGRAM(s) Oral every 4 hours PRN Severe Pain (7 - 10)  sodium chloride 0.65% Nasal 1 Spray(s) Both Nostrils every 3 hours PRN Nasal Congestion  tetracaine/benzocaine/butamben Spray 1 Spray(s) Topical every 3 hours PRN for ngt discomfort      PAST MEDICAL & SURGICAL HISTORY:  HTN (hypertension)  off medication for over one year--states BP has been normal  UTI (urinary tract infection)  currently being treated--will complete antibiotics 3/8/2022  Intracranial tumor  GERD (gastroesophageal reflux disease)  Eczema  Thyroid cancer  surgery. radiation, Radioactive iodine  COVID-19 virus infection  11/2021--recovered at home  H/O total thyroidectomy  History of tonsillectomy  History of appendectomy    Vital Signs Last 24 Hrs  T(C): 36.6 (26 Dec 2022 11:00), Max: 37.1 (25 Dec 2022 23:00)  T(F): 97.8 (26 Dec 2022 11:00), Max: 98.8 (25 Dec 2022 23:00)  HR: 76 (26 Dec 2022 11:30) (74 - 110)  BP: 121/63 (26 Dec 2022 07:45) (121/63 - 125/68)  BP(mean): 84 (26 Dec 2022 07:45) (84 - 88)  RR: 33 (26 Dec 2022 11:30) (16 - 35)  SpO2: 94% (26 Dec 2022 11:30) (92% - 96%)    Parameters below as of 26 Dec 2022 11:00  Patient On (Oxygen Delivery Method): nasal cannula  O2 Flow (L/min): 3      I&O's Summary    25 Dec 2022 07:01  -  26 Dec 2022 07:00  --------------------------------------------------------  IN: 2114.2 mL / OUT: 4648 mL / NET: -2533.8 mL    26 Dec 2022 07:01  -  26 Dec 2022 11:50  --------------------------------------------------------  IN: 935.2 mL / OUT: 308 mL / NET: 627.2 mL                         8.6    31.79 )-----------( 49       ( 26 Dec 2022 08:16 )             26.4     12-26    137  |  103  |  6<L>  ----------------------------<  165<H>  4.2   |  20<L>  |  0.63    Ca    8.7      26 Dec 2022 08:16  Phos  1.2     12-26  Mg     2.2     12-26    TPro  6.1  /  Alb  3.8  /  TBili  26.4<H>  /  DBili  x   /  AST  121<H>  /  ALT  44  /  AlkPhos  167<H>  12-26    Culture - Blood (collected 12-24-22 @ 20:55)  Source: .Blood Blood  Preliminary Report (12-26-22 @ 01:03):    No growth to date.    Culture - Blood (collected 12-24-22 @ 20:20)  Source: .Blood Blood  Preliminary Report (12-26-22 @ 01:03):    No growth to date.    Culture - Blood (collected 12-23-22 @ 13:35)  Source: .Blood Blood  Preliminary Report (12-24-22 @ 18:01):    No growth to date.    Culture - Blood (collected 12-23-22 @ 13:25)  Source: .Blood Blood  Preliminary Report (12-24-22 @ 18:01):    No growth to date.    Culture - Bronchial (collected 12-22-22 @ 17:02)  Source: Combi-Cath Combi-Cath  Gram Stain (12-23-22 @ 06:59):    Moderate polymorphonuclear leukocytes seen per low power field    No squamous epithelial cells seen per low power field    No organisms seen per oil power field  Final Report (12-24-22 @ 15:11):    Normal Respiratory Cassandra present    Culture - Urine (collected 12-21-22 @ 22:55)  Source: Catheterized Catheterized  Final Report (12-24-22 @ 00:55):    >100,000 CFU/ml Leticia dubliniensis "Susceptibilities not performed"    Culture - Fungal, Body Fluid (collected 12-21-22 @ 19:53)  Source: Peritoneal Peritoneal Fluid  Preliminary Report (12-24-22 @ 15:02):    Culture is being performed. Fungal cultures are held for 4 weeks.    Culture - Body Fluid with Gram Stain (collected 12-21-22 @ 19:53)  Source: Ascites Fl Ascites Fluid  Gram Stain (12-22-22 @ 03:04):    polymorphonuclear leukocytes seen    No organisms seen    by cytocentrifuge  Preliminary Report (12-23-22 @ 08:47):    No growth    Culture - Sputum (collected 12-21-22 @ 01:19)  Source: Trach Asp Tracheal Aspirate  Gram Stain (12-21-22 @ 08:45):    No polymorphonuclear leukocytes per low power field    Numerous Squamous epithelial cells per low power field    Moderate Gram Positive Rods seen per oil power field    Few Yeast like cells seen per oil power field  Final Report (12-23-22 @ 14:05):    Normal Respiratory Cassandra present    Culture - Blood (collected 12-20-22 @ 23:30)  Source: .Blood Blood-Peripheral  Final Report (12-26-22 @ 04:00):    No Growth Final    Culture - Blood (collected 12-20-22 @ 22:38)  Source: .Blood Blood-Peripheral  Final Report (12-26-22 @ 03:00):    No Growth Final    Review of systems  Gen: No weight changes, fatigue, fevers/chills, weakness  Skin: No rashes  Head/Eyes/Ears/Mouth: No headache; Normal hearing; Normal vision w/o blurriness; No sinus pain/discomfort, sore throat  Respiratory: No dyspnea, cough, wheezing, hemoptysis  CV: No chest pain, PND, orthopnea  GI: C/O mild abdominal pain at surgical site, No diarrhea, constipation, nausea, vomiting, melena, hematochezia  : No increased frequency, dysuria, hematuria, nocturia  MSK: No joint pain/swelling; no back pain; no edema  Neuro: No dizziness/lightheadedness, weakness, seizures, numbness, tingling  Heme: No easy bruising or bleeding  Endo: No heat/cold intolerance  Psych: No significant nervousness, anxiety, stress, depression  All other systems were reviewed and are negative, except as noted.    Physical Exam:  Constitutional: NAD   Eyes: icteric, PERRLA  ENMT: nc/at, no thrush  Neck: supple, central line   Respiratory: CTA B/L  Cardiovascular: RRR  Gastrointestinal: Soft abdomen, distended  Genitourinary: Urinary catheter in place, anuric  Extremities: SCD's in place and working bilaterally, no edema  Vascular: Palpable dp pulses bilaterally.   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  Arthur ([phone]) and Daughter Taylor at [facility] provided additional history. Of note was recently admitted to  11/2022 for workup and eval of jaundice- during that hospitalization was found to have a UTI and dx with alc hep and started on steroids (was deemed a non-responder and steroids were subsequently stopped); s/p LVP at [place] 11/2022. Previously hospitalized in 2021 at [place] for ETOH detox. Went to ETOH rehab 08/2022 and was asked to leave the facility when she was COVID+; was sober for 1 week until she started drinking again. Had also attended a few AA meetings in the past. Transferred from [facility] 12/20 for further management of acute liver failure and liver transplant evaluation.       Interval events:  - increasing pressor requirements o/n. afebrile  - Mental status better, awake, following commands  - Remains anuric, on CRRT net even  - R fem shiley dced;   - Rising WBC, recultured     Potential Discharge date: pending clinical stability  Education:  Medications  Plan of care:  See Below    MEDICATIONS  (STANDING):  caspofungin IVPB      chlorhexidine 2% Cloths 1 Application(s) Topical <User Schedule>  CRRT Treatment    <Continuous>  escitalopram 10 milliGRAM(s) Oral daily  folic acid 1 milliGRAM(s) Oral daily  influenza   Vaccine 0.5 milliLiter(s) IntraMuscular once  insulin lispro (ADMELOG) corrective regimen sliding scale   SubCutaneous every 6 hours  lactulose Syrup 20 Gram(s) Oral every 8 hours  levETIRAcetam  Solution 750 milliGRAM(s) Enteral Tube two times a day  levothyroxine 137 MICROGram(s) Oral daily  meropenem  IVPB 1000 milliGRAM(s) IV Intermittent every 8 hours  midodrine 20 milliGRAM(s) Oral every 8 hours  multivitamin/minerals/iron Oral Solution (CENTRUM) 15 milliLiter(s) Oral daily  norepinephrine Infusion 0.05 MICROgram(s)/kG/Min (6.21 mL/Hr) IV Continuous <Continuous>  pantoprazole   Suspension 40 milliGRAM(s) Oral every 24 hours  PrismaSATE Dialysate BGK 4 / 2.5 5000 milliLiter(s) (1500 mL/Hr) CRRT <Continuous>  PrismaSOL Filtration BGK 4 / 2.5 5000 milliLiter(s) (800 mL/Hr) CRRT <Continuous>  PrismaSOL Filtration BGK 4 / 2.5 5000 milliLiter(s) (200 mL/Hr) CRRT <Continuous>  rifAXIMin 550 milliGRAM(s) Oral every 12 hours  sodium phosphate 15 milliMole(s)/250 mL IVPB 15 milliMole(s) IV Intermittent every 1 hour  thiamine 100 milliGRAM(s) Enteral Tube daily  vasopressin Infusion 0.03 Unit(s)/Min (4.5 mL/Hr) IV Continuous <Continuous>    MEDICATIONS  (PRN):  oxyCODONE    Solution 5 milliGRAM(s) Oral every 4 hours PRN Moderate Pain (4 - 6)  oxyCODONE    Solution 10 milliGRAM(s) Oral every 4 hours PRN Severe Pain (7 - 10)  sodium chloride 0.65% Nasal 1 Spray(s) Both Nostrils every 3 hours PRN Nasal Congestion  tetracaine/benzocaine/butamben Spray 1 Spray(s) Topical every 3 hours PRN for ngt discomfort      PAST MEDICAL & SURGICAL HISTORY:  HTN (hypertension)  off medication for over one year--states BP has been normal  UTI (urinary tract infection)  currently being treated--will complete antibiotics 3/8/2022  Intracranial tumor  GERD (gastroesophageal reflux disease)  Eczema  Thyroid cancer  surgery. radiation, Radioactive iodine  COVID-19 virus infection  11/2021--recovered at home  H/O total thyroidectomy  History of tonsillectomy  History of appendectomy    Vital Signs Last 24 Hrs  T(C): 36.6 (26 Dec 2022 11:00), Max: 37.1 (25 Dec 2022 23:00)  T(F): 97.8 (26 Dec 2022 11:00), Max: 98.8 (25 Dec 2022 23:00)  HR: 76 (26 Dec 2022 11:30) (74 - 110)  BP: 121/63 (26 Dec 2022 07:45) (121/63 - 125/68)  BP(mean): 84 (26 Dec 2022 07:45) (84 - 88)  RR: 33 (26 Dec 2022 11:30) (16 - 35)  SpO2: 94% (26 Dec 2022 11:30) (92% - 96%)    Parameters below as of 26 Dec 2022 11:00  Patient On (Oxygen Delivery Method): nasal cannula  O2 Flow (L/min): 3      I&O's Summary    25 Dec 2022 07:01  -  26 Dec 2022 07:00  --------------------------------------------------------  IN: 2114.2 mL / OUT: 4648 mL / NET: -2533.8 mL    26 Dec 2022 07:01  -  26 Dec 2022 11:50  --------------------------------------------------------  IN: 935.2 mL / OUT: 308 mL / NET: 627.2 mL                         8.6    31.79 )-----------( 49       ( 26 Dec 2022 08:16 )             26.4     12-26    137  |  103  |  6<L>  ----------------------------<  165<H>  4.2   |  20<L>  |  0.63    Ca    8.7      26 Dec 2022 08:16  Phos  1.2     12-26  Mg     2.2     12-26    TPro  6.1  /  Alb  3.8  /  TBili  26.4<H>  /  DBili  x   /  AST  121<H>  /  ALT  44  /  AlkPhos  167<H>  12-26    Culture - Blood (collected 12-24-22 @ 20:55)  Source: .Blood Blood  Preliminary Report (12-26-22 @ 01:03):    No growth to date.    Culture - Blood (collected 12-24-22 @ 20:20)  Source: .Blood Blood  Preliminary Report (12-26-22 @ 01:03):    No growth to date.    Culture - Blood (collected 12-23-22 @ 13:35)  Source: .Blood Blood  Preliminary Report (12-24-22 @ 18:01):    No growth to date.    Culture - Blood (collected 12-23-22 @ 13:25)  Source: .Blood Blood  Preliminary Report (12-24-22 @ 18:01):    No growth to date.    Culture - Bronchial (collected 12-22-22 @ 17:02)  Source: Combi-Cath Combi-Cath  Gram Stain (12-23-22 @ 06:59):    Moderate polymorphonuclear leukocytes seen per low power field    No squamous epithelial cells seen per low power field    No organisms seen per oil power field  Final Report (12-24-22 @ 15:11):    Normal Respiratory Cassandra present    Culture - Urine (collected 12-21-22 @ 22:55)  Source: Catheterized Catheterized  Final Report (12-24-22 @ 00:55):    >100,000 CFU/ml Leticia dubliniensis "Susceptibilities not performed"    Culture - Fungal, Body Fluid (collected 12-21-22 @ 19:53)  Source: Peritoneal Peritoneal Fluid  Preliminary Report (12-24-22 @ 15:02):    Culture is being performed. Fungal cultures are held for 4 weeks.    Culture - Body Fluid with Gram Stain (collected 12-21-22 @ 19:53)  Source: Ascites Fl Ascites Fluid  Gram Stain (12-22-22 @ 03:04):    polymorphonuclear leukocytes seen    No organisms seen    by cytocentrifuge  Preliminary Report (12-23-22 @ 08:47):    No growth    Culture - Sputum (collected 12-21-22 @ 01:19)  Source: Trach Asp Tracheal Aspirate  Gram Stain (12-21-22 @ 08:45):    No polymorphonuclear leukocytes per low power field    Numerous Squamous epithelial cells per low power field    Moderate Gram Positive Rods seen per oil power field    Few Yeast like cells seen per oil power field  Final Report (12-23-22 @ 14:05):    Normal Respiratory Cassandra present    Culture - Blood (collected 12-20-22 @ 23:30)  Source: .Blood Blood-Peripheral  Final Report (12-26-22 @ 04:00):    No Growth Final    Culture - Blood (collected 12-20-22 @ 22:38)  Source: .Blood Blood-Peripheral  Final Report (12-26-22 @ 03:00):    No Growth Final    Review of systems  Gen: No weight changes, fatigue, fevers/chills, weakness  Skin: No rashes  Head/Eyes/Ears/Mouth: No headache; Normal hearing; Normal vision w/o blurriness; No sinus pain/discomfort, sore throat  Respiratory: No dyspnea, cough, wheezing, hemoptysis  CV: No chest pain, PND, orthopnea  GI: C/O mild abdominal pain at surgical site, No diarrhea, constipation, nausea, vomiting, melena, hematochezia  : No increased frequency, dysuria, hematuria, nocturia  MSK: No joint pain/swelling; no back pain; no edema  Neuro: No dizziness/lightheadedness, weakness, seizures, numbness, tingling  Heme: No easy bruising or bleeding  Endo: No heat/cold intolerance  Psych: No significant nervousness, anxiety, stress, depression  All other systems were reviewed and are negative, except as noted.    Physical Exam:  Constitutional: NAD   Eyes: icteric, PERRLA  ENMT: nc/at, no thrush  Neck: supple, central line   Respiratory: CTA B/L  Cardiovascular: RRR  Gastrointestinal: Soft abdomen, distended  Genitourinary: Urinary catheter in place, anuric  Extremities: SCD's in place and working bilaterally, no edema  Vascular: Palpable dp pulses bilaterally.   Neurological: following commands, mentation improving  Skin: no rashes, ulcerations, lesions  Musculoskeletal: moving extremities  Psychiatric: calm

## 2022-12-26 NOTE — PROGRESS NOTE ADULT - ASSESSMENT
62yFemale presents with hx thyroid cancer s/p thyroidectomy, craniotomy with resection of ventricular mass, with acute liver failure, new seizures, transfer from OSH for transplant evaluation.     Neurologic: hepatic encephalopathy - improving  - off sedation, AAOx4  - c/w lactulose and rifaximin   - ammonia 96 --> 84 --> 75 --> 84  - c/w keppra 750 BID ( new onset of seizure )    Respiratory: acute hypoxic resp failure   - CXR b/l infiltrates RLL>LLL, improving  - Extubated 12/23 AM  - combicath cx no significant growth to date    Cardiovascular:   - midodrine inc to 20 q8h  - restarted on vaso & levo  -titrate to MAP >65  -hold home anti htn    Gastrointestinal/Nutrition:  - Acute decompensated liver failure, s/p para 2.9L in ED  - KO tube placed for feeds  - Ammonia remains elevated, most recently 84  - Workup per transplant recs pending  - Monitor BM, rectal tube, on lactulose and rifaximin   - add PPI, dc'd octreotide    Genitourinary/Renal: RED 2/2 ATN vs HRS  - CRRT net negative 100cc/hr initially tolerated but after drop in MAP, dec to negative 50cc/hr  - HD access L IJ   - to remove R fem shiley when pt awake & given concern for coagulapathy   - Cr improving (baseline approx 0.6)  - monitor electrolytes  - nephro following  - dysphagia screen    Hematologic:  - Chem VTE ppx: hold   - Thrombocytopenic, monitor plts  - Hgb downtrending to 7, given 1 PRBC  - INR elevated likely hepatic synthetic dysfunction  - s/p vit K for 3d for coagulopathy    Infectious Disease:  - Empiric abx meropenem, fluconazole; off vanc  - ascites, blood, urine & fungal Cx so far NG  - repeat BCx 12/23 neg  - procal 1.58  - Hepatitis workup     Endocrine:  - Hypothyroidism c/w synthroid IV 70  - No steroids  - ISS, adjust as appropriate    Ethics: FULL CODE    Dispo/Lines:  R IJ TLC  L IJ shiley  R Fem Shiley  A line L Radial  [place]  SICU

## 2022-12-26 NOTE — PROGRESS NOTE ADULT - ASSESSMENT
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     Impression:  #ACLF with underlying cirrhosis  #AUD  #Decompensated ETOH cirrhosis c/b prior ascites req LVP- UNOS/OPTN MELD-Na 42 (12/23); blood group O neg  Of note was recently admitted to  11/2022 for workup and eval of new onset jaundice- during that hospitalization was found to have a UTI and dx with alc hep was treated for UTI w/ abx and started on steroids (was deemed a non-responder and steroids were subsequently stopped); s/p LVP at [place] 11/2022. Previously hospitalized in 2021 at [place] for ETOH detox but pt reportedly unaware of any liver dysfunction at that time. Went to ETOH rehab 08/2022 and was asked to leave the facility when she was COVID+; was sober for 1 week until she started drinking again. Had also attended a few AA meetings in the past.       -Ascites: large volume on CT A/P       -HCC: pending contrasted imaging study       -SBP: negative on para 12/21       -Varices: no h/o EGD       -Hepatic encephalopathy: unable to assess; intubated; on lactulose/rifaximin    #liver transplant workup   -hepatitis B surface <3, hepatitis A IgM, B core, B surface, A IgG, B surface nonreactive  - EBV IgM and early antigen negative   - CMV IgG positive   - acetaminophen negative   - TSH 0.05, RPR negative    #new onset seizure- unknown trigger/etiology- currently on Keppra    Recommendations:  -c/w CRRT  -pressors: vaso 0.03, levo 0.05  -repeat pan culture given rising WBC  -paracentesis if able today    -Infectious disease consultation       -pending full infectious workup - bl cx, UA +urine cx, dx para       -HIV screening       -check quantiferon gold for TB screening       -rubella IgG, syphillis screen, toxoplasma IgG, VZV IgG       -c/w broad spectrum antibiotics- fluconazole (12/20 ->12/26), broadened to caspo (12/26-) meropenem (12/20 ->);  s/p vanco (12/21 -12/22)  - send B12    **pt's /JOSUE Gibson ([phone])   62 y.o Hx significant for decompensated ETOH cirrhosis c/b ascites (dx 11/2022), alc hep (dx 11/2022; non-responsive to steroids), remote h/o thyroid cancer in her 20s s/p total thyroidectomy + RTX + radioactive iodide, HTN, 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     Impression:  #ACLF with underlying cirrhosis  #AUD  #Decompensated ETOH cirrhosis c/b prior ascites req LVP- UNOS/OPTN MELD-Na 42 (12/23); blood group O neg  Of note was recently admitted to  11/2022 for workup and eval of new onset jaundice- during that hospitalization was found to have a UTI and dx with alc hep was treated for UTI w/ abx and started on steroids (was deemed a non-responder and steroids were subsequently stopped); s/p LVP at [place] 11/2022. Previously hospitalized in 2021 at [place] for ETOH detox but pt reportedly unaware of any liver dysfunction at that time. Went to ETOH rehab 08/2022 and was asked to leave the facility when she was COVID+; was sober for 1 week until she started drinking again. Had also attended a few AA meetings in the past.       -Ascites: large volume on CT A/P       -HCC: pending contrasted imaging study       -SBP: negative on para 12/21       -Varices: no h/o EGD       -Hepatic encephalopathy: unable to assess; intubated; on lactulose/rifaximin    #liver transplant workup   -hepatitis B surface <3, hepatitis A IgM, B core, B surface, A IgG, B surface nonreactive  - EBV IgM and early antigen negative   - CMV IgG positive   - acetaminophen negative   - TSH 0.05, RPR negative    #new onset seizure- unknown trigger/etiology- currently on Keppra    Recommendations:  -c/w CRRT  -pressors: vaso 0.03, levo 0.05  -repeat pan culture given rising WBC  -paracentesis if able today    -Infectious disease consultation       -pending full infectious workup - bl cx, UA +urine cx, dx para       -HIV screening       -check quantiferon gold for TB screening       -rubella IgG, syphillis screen, toxoplasma IgG, VZV IgG       -c/w broad spectrum antibiotics- fluconazole (12/20 ->12/26), broadened to caspo (12/26-) meropenem (12/20 ->);  s/p vanco (12/21 -12/22)  - send B12    **pt's /JOSUE Gibson ([phone])   62 y.o Hx significant for decompensated ETOH cirrhosis c/b ascites (dx 11/2022), alc hep (dx 11/2022; non-responsive to steroids), remote h/o thyroid cancer in her 20s s/p total thyroidectomy + RTX + radioactive iodide, HTN, 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     Impression:  #ACLF with underlying cirrhosis  #AUD  #Decompensated ETOH cirrhosis c/b prior ascites req LVP- UNOS/OPTN MELD-Na 42 (12/23); blood group O neg  Of note was recently admitted to  11/2022 for workup and eval of new onset jaundice- during that hospitalization was found to have a UTI and dx with alc hep was treated for UTI w/ abx and started on steroids (was deemed a non-responder and steroids were subsequently stopped); s/p LVP at [place] 11/2022. Previously hospitalized in 2021 at [place] for ETOH detox but pt reportedly unaware of any liver dysfunction at that time. Went to ETOH rehab 08/2022 and was asked to leave the facility when she was COVID+; was sober for 1 week until she started drinking again. Had also attended a few AA meetings in the past.       -Ascites: large volume on CT A/P       -HCC: pending contrasted imaging study       -SBP: negative on para 12/21       -Varices: no h/o EGD       -Hepatic encephalopathy: unable to assess; intubated; on lactulose/rifaximin    #liver transplant workup   -hepatitis B surface <3, hepatitis A IgM, B core, B surface, A IgG, B surface nonreactive  - EBV IgM and early antigen negative   - CMV IgG positive   - acetaminophen negative   - TSH 0.05, RPR negative    #new onset seizure- unknown trigger/etiology- currently on Keppra    Recommendations:  -c/w CRRT  -pressors: vaso 0.03, levo 0.05  -repeat pan culture given rising WBC  -paracentesis if able today    -Infectious disease consultation       -pending full infectious workup - bl cx, UA +urine cx, dx para       -HIV screening       -check quantiferon gold for TB screening       -rubella IgG, syphillis screen, toxoplasma IgG, VZV IgG       -c/w broad spectrum antibiotics- fluconazole (12/20 ->12/26), broadened to caspo (12/26-) meropenem (12/20 ->);  s/p vanco (12/21 -12/22)  - send B12    **pt's /JOSUE Gibson ([phone])

## 2022-12-26 NOTE — PROGRESS NOTE ADULT - ASSESSMENT
63 yo F with h/o decompensated ETOH cirrhosis with ascites, thyroid cancer (s/p total thyroidectomy, RTX, and radioactive iodide), HTN, ventrical neoplasm who was initially admitted to  12/19 with new onset seizures and transferred to [facility] 12/20 for liver transplant evaluation. Pt being seen for RED requiring CRRT.    Oliguric RED -ATN/HRS/Biliary cast nephropathy . started on CRRT since 12/20  Remains oliguric. on vaso, levo and midodrine   Continue CRRT for now, target net even fluid balance     63 yo F with h/o decompensated ETOH cirrhosis with ascites, thyroid cancer (s/p total thyroidectomy, RTX, and radioactive iodide), HTN, ventrical neoplasm who was initially admitted to  12/19 with new onset seizures and transferred to [facility] 12/20 for liver transplant evaluation. Pt being seen for RED requiring CRRT.    Oliguric RED -ATN/HRS/Biliary cast nephropathy . started on CRRT since 12/20  Remains oliguric. on vaso, levo and midodrine   Continue CRRT for now, target net even fluid balance     63 yo F with h/o decompensated ETOH cirrhosis with ascites, thyroid cancer (s/p total thyroidectomy, RTX, and radioactive iodide), HTN, ventrical neoplasm who was initially admitted to  12/19 with new onset seizures and transferred to [facility] 12/20 for liver transplant evaluation. Pt being seen for RED requiring CRRT.    Oliguric RED -ATN/HRS/Biliary cast nephropathy . started on CRRT since 12/20  Remains oliguric. on vaso, levo and midodrine   Continue CRRT for now, target net even fluid balance

## 2022-12-26 NOTE — CHART NOTE - NSCHARTNOTEFT_GEN_A_CORE
Nutrition Follow Up Note  Patient seen for: SICU follow up/Liver Transplant Evaluation - Opened      Chart reviewed, events noted.    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 "      Diet Order:   Diet, Regular:   Tube Feeding Modality: Nasogastric  Nepro with Carb Steady (NEPRORTH)  Total Volume for 24 Hours (mL): 840  Continuous  Starting Tube Feed Rate {mL per Hour}: 35  Until Goal Tube Feed Rate (mL per Hour): 35  Tube Feed Duration (in Hours): 24  Tube Feed Start Time: 10:00 (22)    (Current EN Provision Provides: 840mL, 1512kcal/day, 68g protein/day)    Subjective Info PTA (per spouse):  -Very poor PO intake since  (~1 month), only consuming 1/2 grapefruit with a few bites of toast  -NKFA  -No hx of chewing/swallowing difficulties   -Denies vitamin/supplement use     Nutrition-related events:  -Intake: ordered for supplemental PO diet; no intake noted at this time   -EN: TF running at goal rate x2 days - held x1hr/day for synthroid; tolerating feeds   -GI: rectal tube in place - 850cc output thus far (); lactulose ordered; decompensated liver failure with large volume ascites; s/p para - 2.9L out in ED   -Endo: no Hx of DM noted; sliding scale insulin ordered for coverage   -Resp: acute respiratory failure; intubated , extubated    -Renal: RED 2/2 ATN vs HRS; requiring CRRT; central line access placed ; hypophosphatemic - NaPhos ordered for repletion   -CV: pressor support; Levo increased today - monitor closely     LIVER TRANSPLANT EVAL:   -Pt resides spouse; able to assist with meals s/p transplant  -BMI: 24.3 - normal   -HbA1c: 4.9% - no indication of DM   -Frailty: [pending PT evaluation]   -MELD: 42 ()     Education:  given to Spouse   -Reviewed post-transplant nutrition therapy and food safety guidelines for transplant recipients   -Reviewed recommendations to avoid grapefruit, pomegranate and star fruit while taking immunosuppressant medication.    -Reviewed recommendations for moderate intake of sodium and carbohydrates with transplant medications.      Pt was receptive and expressed understanding.     Pt with no known nutrition contraindication to transplant. Concern regarding malnutrition and possible frailty status; will optimize nutrition via PO supplements and diet.     Nutrition assessment communicated with Transplant Hepatology Team and outpatient Transplant RDs       Weights:   PTA (per Spouse) - UBW: 150 pounds x 1 month ago; new dry baseline 135 pounds   -11% weight loss x 1 month   In-House - Daily Weight in k.7 (-), Weight in k.3 (-25), Weight in k.6 (-), Weight in k.2 (-22), Weight in k.4 (12-21), Weight in k.2 (12-20)  -Weight fluctuations likely 2/2 fluid shifts - CRRT; continue to monitor and trend weights closely     Nutritionally Pertinent MEDICATIONS  (STANDING):  caspofungin IVPB  folic acid  insulin lispro (ADMELOG) corrective regimen sliding scale  lactulose Syrup  levothyroxine  meropenem  IVPB  midodrine  multivitamin/minerals/iron Oral Solution (CENTRUM)  norepinephrine Infusion  pantoprazole   Suspension  rifAXIMin  sodium phosphate 15 milliMole(s)/250 mL IVPB  thiamine  vasopressin Infusion    Pertinent Labs:  @ 08:16: Na 137, BUN 6<L>, Cr 0.63, <H>, K+ 4.2, Phos 1.2<L>, Mg 2.2, Alk Phos 167<H>, ALT/SGPT 44, AST/SGOT 121<H>, HbA1c --   @ 02:18: Na 139, BUN 5<L>, Cr 0.71, <H>, K+ 4.1, Phos 1.5<L>, Mg 2.4, Alk Phos 163<H>, ALT/SGPT 45, AST/SGOT 118<H>, HbA1c --   @ 21:01: Na 138, BUN 5<L>, Cr 0.71, <H>, K+ 3.9, Phos 2.1<L>, Mg 2.3, Alk Phos 155<H>, ALT/SGPT 44, AST/SGOT 118<H>, HbA1c --   @ 15:02: Na 137, BUN 5<L>, Cr 0.72, <H>, K+ 4.0, Phos 3.0, Mg 2.3, Alk Phos 154<H>, ALT/SGPT 46<H>, AST/SGOT 125<H>, HbA1c --    A1C with Estimated Average Glucose Result: 4.9 % (22 @ 20:59)    Finger Sticks:  POCT Blood Glucose.: 139 mg/dL ( @ 12:01)  POCT Blood Glucose.: 143 mg/dL ( @ 06:29)  POCT Blood Glucose.: 139 mg/dL ( @ 23:34)  POCT Blood Glucose.: 157 mg/dL ( @ 17:03)      (Per flowsheet)  Pressure Injuries: none noted     Edema:   -1+ dependent, generalized     Estimated Needs: based on 61.3 - dry weight   [x] recalculated: extubated   -Energy: 30-35kcal/kg (1839-2145kcal/day)   -Protein: 1.4-1.8g/kg (85-110g/day)   -Defer fluid to Team     Previous Nutrition Diagnosis: increased protein-energy needs (CRRT, liver failure)   Nutrition Diagnosis is: ongoing     New Nutrition Diagnosis:   P: Severe malnutrition in the context of acute illness  E: related to inadequate protein-energy intake in the setting of new onset liver failure   S: as evidenced by 11% weight loss x1 month; <50% intake x >5 days PTA   Goal: Pt to receive >80% of estimated needs    Nutrition Care Plan:  [x] In Progress  [] Achieved  [] Not applicable    Nutrition Interventions:     Education Provided:       [x] Yes: Provided brief introduction to education on post transplant nutrition therapy and food safety guidelines for transplant recipients to Spouse. Discussed needs to follow food safety guidelines with medications, increased needs for proper post-surgical healing, and moderate intake of sodium and carbohydrates; Spouse made aware detailed education with written material will be provided post-transplant. Spouse amenable - denies having questions/concerns about diet and nutrition at this time; made aware RD remains available.     Recommendations:      1) Recommend increasing EN provision to Nepro @ 45mL x 24hrs providing 1080mL of formula, 1944kcal/day, 87g protein/day, 785mL free water as medially feasible   -Goal provides: 31.7kcal/kg, 1.42g protein/kg based on 61.3 --> increase to higher end of protein-energy needs when pressor support decreases   -Monitor tolerance and adjust PRN  -Defer fluids to Team   2) Continue regular diet and promote PO intake as medically feasible  3) Continue multivitamin, folic acid, thiamine daily   4) Malnutrition sticker placed        Monitoring and Evaluation:   Continue to monitor nutritional intake, tolerance to diet prescription, weights, labs, skin integrity      RD remains available upon request and will follow up per protocol    Sarika Vanegas MS, RDN, CDN (Pager #[phone]) Nutrition Follow Up Note  Patient seen for: SICU follow up/Liver Transplant Evaluation - Opened      Chart reviewed, events noted.    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 "      Diet Order:   Diet, Regular:   Tube Feeding Modality: Nasogastric  Nepro with Carb Steady (NEPRORTH)  Total Volume for 24 Hours (mL): 840  Continuous  Starting Tube Feed Rate {mL per Hour}: 35  Until Goal Tube Feed Rate (mL per Hour): 35  Tube Feed Duration (in Hours): 24  Tube Feed Start Time: 10:00 (22)    (Current EN Provision Provides: 840mL, 1512kcal/day, 68g protein/day)    Subjective Info PTA (per spouse):  -Very poor PO intake since  (~1 month), only consuming 1/2 grapefruit with a few bites of toast  -NKFA  -No hx of chewing/swallowing difficulties   -Denies vitamin/supplement use     Nutrition-related events:  -Intake: ordered for supplemental PO diet; no intake noted at this time   -EN: TF running at goal rate x2 days - held x1hr/day for synthroid; tolerating feeds   -GI: rectal tube in place - 850cc output thus far (); lactulose ordered; decompensated liver failure with large volume ascites; s/p para - 2.9L out in ED   -Endo: no Hx of DM noted; sliding scale insulin ordered for coverage   -Resp: acute respiratory failure; intubated , extubated    -Renal: RED 2/2 ATN vs HRS; requiring CRRT; central line access placed ; hypophosphatemic - NaPhos ordered for repletion   -CV: pressor support; Levo increased today - monitor closely     LIVER TRANSPLANT EVAL:   -Pt resides spouse; able to assist with meals s/p transplant  -BMI: 24.3 - normal   -HbA1c: 4.9% - no indication of DM   -Frailty: [pending PT evaluation]   -MELD: 42 ()     Education:  given to Spouse   -Reviewed post-transplant nutrition therapy and food safety guidelines for transplant recipients   -Reviewed recommendations to avoid grapefruit, pomegranate and star fruit while taking immunosuppressant medication.    -Reviewed recommendations for moderate intake of sodium and carbohydrates with transplant medications.      Pt was receptive and expressed understanding.     Pt with no known nutrition contraindication to transplant. Concern regarding malnutrition and possible frailty status; will optimize nutrition via PO supplements and diet.     Nutrition assessment communicated with Transplant Hepatology Team and outpatient Transplant RDs       Weights:   PTA (per Spouse) - UBW: 150 pounds x 1 month ago; new dry baseline 135 pounds   -11% weight loss x 1 month   In-House - Daily Weight in k.7 (-), Weight in k.3 (-25), Weight in k.6 (-), Weight in k.2 (-22), Weight in k.4 (12-21), Weight in k.2 (12-20)  -Weight fluctuations likely 2/2 fluid shifts - CRRT; continue to monitor and trend weights closely     Nutritionally Pertinent MEDICATIONS  (STANDING):  caspofungin IVPB  folic acid  insulin lispro (ADMELOG) corrective regimen sliding scale  lactulose Syrup  levothyroxine  meropenem  IVPB  midodrine  multivitamin/minerals/iron Oral Solution (CENTRUM)  norepinephrine Infusion  pantoprazole   Suspension  rifAXIMin  sodium phosphate 15 milliMole(s)/250 mL IVPB  thiamine  vasopressin Infusion    Pertinent Labs:  @ 08:16: Na 137, BUN 6<L>, Cr 0.63, <H>, K+ 4.2, Phos 1.2<L>, Mg 2.2, Alk Phos 167<H>, ALT/SGPT 44, AST/SGOT 121<H>, HbA1c --   @ 02:18: Na 139, BUN 5<L>, Cr 0.71, <H>, K+ 4.1, Phos 1.5<L>, Mg 2.4, Alk Phos 163<H>, ALT/SGPT 45, AST/SGOT 118<H>, HbA1c --   @ 21:01: Na 138, BUN 5<L>, Cr 0.71, <H>, K+ 3.9, Phos 2.1<L>, Mg 2.3, Alk Phos 155<H>, ALT/SGPT 44, AST/SGOT 118<H>, HbA1c --   @ 15:02: Na 137, BUN 5<L>, Cr 0.72, <H>, K+ 4.0, Phos 3.0, Mg 2.3, Alk Phos 154<H>, ALT/SGPT 46<H>, AST/SGOT 125<H>, HbA1c --    A1C with Estimated Average Glucose Result: 4.9 % (22 @ 20:59)    Finger Sticks:  POCT Blood Glucose.: 139 mg/dL ( @ 12:01)  POCT Blood Glucose.: 143 mg/dL ( @ 06:29)  POCT Blood Glucose.: 139 mg/dL ( @ 23:34)  POCT Blood Glucose.: 157 mg/dL ( @ 17:03)      (Per flowsheet)  Pressure Injuries: none noted     Edema:   -1+ dependent, generalized     Estimated Needs: based on 61.3 - dry weight   [x] recalculated: extubated   -Energy: 30-35kcal/kg (1839-2145kcal/day)   -Protein: 1.4-1.8g/kg (85-110g/day)   -Defer fluid to Team     Previous Nutrition Diagnosis: increased protein-energy needs (CRRT, liver failure)   Nutrition Diagnosis is: ongoing     New Nutrition Diagnosis:   P: Severe malnutrition in the context of acute illness  E: related to inadequate protein-energy intake in the setting of new onset liver failure   S: as evidenced by 11% weight loss x1 month; <50% intake x >5 days PTA   Goal: Pt to receive >80% of estimated needs    Nutrition Care Plan:  [x] In Progress  [] Achieved  [] Not applicable    Nutrition Interventions:     Education Provided:       [x] Yes: Provided brief introduction to education on post transplant nutrition therapy and food safety guidelines for transplant recipients to Spouse. Discussed needs to follow food safety guidelines with medications, increased needs for proper post-surgical healing, and moderate intake of sodium and carbohydrates; Spouse made aware detailed education with written material will be provided post-transplant. Spouse amenable - denies having questions/concerns about diet and nutrition at this time; made aware RD remains available.     Recommendations:      1) Recommend increasing EN provision to Nepro @ 45mL x 24hrs providing 1080mL of formula, 1944kcal/day, 87g protein/day, 785mL free water as medially feasible   -Goal provides: 31.7kcal/kg, 1.42g protein/kg based on 61.3 --> increase to higher end of protein-energy needs when pressor support decreases   -Monitor tolerance and adjust PRN  -Defer fluids to Team   2) Continue regular diet and promote PO intake as medically feasible  3) Continue multivitamin, folic acid, thiamine daily   4) Malnutrition sticker placed        Monitoring and Evaluation:   Continue to monitor nutritional intake, tolerance to diet prescription, weights, labs, skin integrity      RD remains available upon request and will follow up per protocol    Sarika Vanegas MS, RDN, CDN (Pager #[phone]) Nutrition Follow Up Note  Patient seen for: SICU follow up/Liver Transplant Evaluation - Opened      Chart reviewed, events noted.    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 "      Diet Order:   Diet, Regular:   Tube Feeding Modality: Nasogastric  Nepro with Carb Steady (NEPRORTH)  Total Volume for 24 Hours (mL): 840  Continuous  Starting Tube Feed Rate {mL per Hour}: 35  Until Goal Tube Feed Rate (mL per Hour): 35  Tube Feed Duration (in Hours): 24  Tube Feed Start Time: 10:00 (22)    (Current EN Provision Provides: 840mL, 1512kcal/day, 68g protein/day)    Subjective Info PTA (per spouse):  -Very poor PO intake since  (~1 month), only consuming 1/2 grapefruit with a few bites of toast  -NKFA  -No hx of chewing/swallowing difficulties   -Denies vitamin/supplement use     Nutrition-related events:  -Intake: ordered for supplemental PO diet; no intake noted at this time   -EN: TF running at goal rate x2 days - held x1hr/day for synthroid; tolerating feeds   -GI: rectal tube in place - 850cc output thus far (); lactulose ordered; decompensated liver failure with large volume ascites; s/p para - 2.9L out in ED   -Endo: no Hx of DM noted; sliding scale insulin ordered for coverage   -Resp: acute respiratory failure; intubated , extubated    -Renal: RED 2/2 ATN vs HRS; requiring CRRT; central line access placed ; hypophosphatemic - NaPhos ordered for repletion   -CV: pressor support; Levo increased today - monitor closely     LIVER TRANSPLANT EVAL:   -Pt resides spouse; able to assist with meals s/p transplant  -BMI: 24.3 - normal   -HbA1c: 4.9% - no indication of DM   -Frailty: [pending PT evaluation]   -MELD: 42 ()     Education:  given to Spouse   -Reviewed post-transplant nutrition therapy and food safety guidelines for transplant recipients   -Reviewed recommendations to avoid grapefruit, pomegranate and star fruit while taking immunosuppressant medication.    -Reviewed recommendations for moderate intake of sodium and carbohydrates with transplant medications.      Pt was receptive and expressed understanding.     Pt with no known nutrition contraindication to transplant. Concern regarding malnutrition and possible frailty status; will optimize nutrition via PO supplements and diet.     Nutrition assessment communicated with Transplant Hepatology Team and outpatient Transplant RDs       Weights:   PTA (per Spouse) - UBW: 150 pounds x 1 month ago; new dry baseline 135 pounds   -11% weight loss x 1 month   In-House - Daily Weight in k.7 (-), Weight in k.3 (-25), Weight in k.6 (-), Weight in k.2 (-22), Weight in k.4 (12-21), Weight in k.2 (12-20)  -Weight fluctuations likely 2/2 fluid shifts - CRRT; continue to monitor and trend weights closely     Nutritionally Pertinent MEDICATIONS  (STANDING):  caspofungin IVPB  folic acid  insulin lispro (ADMELOG) corrective regimen sliding scale  lactulose Syrup  levothyroxine  meropenem  IVPB  midodrine  multivitamin/minerals/iron Oral Solution (CENTRUM)  norepinephrine Infusion  pantoprazole   Suspension  rifAXIMin  sodium phosphate 15 milliMole(s)/250 mL IVPB  thiamine  vasopressin Infusion    Pertinent Labs:  @ 08:16: Na 137, BUN 6<L>, Cr 0.63, <H>, K+ 4.2, Phos 1.2<L>, Mg 2.2, Alk Phos 167<H>, ALT/SGPT 44, AST/SGOT 121<H>, HbA1c --   @ 02:18: Na 139, BUN 5<L>, Cr 0.71, <H>, K+ 4.1, Phos 1.5<L>, Mg 2.4, Alk Phos 163<H>, ALT/SGPT 45, AST/SGOT 118<H>, HbA1c --   @ 21:01: Na 138, BUN 5<L>, Cr 0.71, <H>, K+ 3.9, Phos 2.1<L>, Mg 2.3, Alk Phos 155<H>, ALT/SGPT 44, AST/SGOT 118<H>, HbA1c --   @ 15:02: Na 137, BUN 5<L>, Cr 0.72, <H>, K+ 4.0, Phos 3.0, Mg 2.3, Alk Phos 154<H>, ALT/SGPT 46<H>, AST/SGOT 125<H>, HbA1c --    A1C with Estimated Average Glucose Result: 4.9 % (22 @ 20:59)    Finger Sticks:  POCT Blood Glucose.: 139 mg/dL ( @ 12:01)  POCT Blood Glucose.: 143 mg/dL ( @ 06:29)  POCT Blood Glucose.: 139 mg/dL ( @ 23:34)  POCT Blood Glucose.: 157 mg/dL ( @ 17:03)      (Per flowsheet)  Pressure Injuries: none noted     Edema:   -1+ dependent, generalized     Estimated Needs: based on 61.3 - dry weight   [x] recalculated: extubated   -Energy: 30-35kcal/kg (1839-2145kcal/day)   -Protein: 1.4-1.8g/kg (85-110g/day)   -Defer fluid to Team     Previous Nutrition Diagnosis: increased protein-energy needs (CRRT, liver failure)   Nutrition Diagnosis is: ongoing     New Nutrition Diagnosis:   P: Severe malnutrition in the context of acute illness  E: related to inadequate protein-energy intake in the setting of new onset liver failure   S: as evidenced by 11% weight loss x1 month; <50% intake x >5 days PTA   Goal: Pt to receive >80% of estimated needs    Nutrition Care Plan:  [x] In Progress  [] Achieved  [] Not applicable    Nutrition Interventions:     Education Provided:       [x] Yes: Provided brief introduction to education on post transplant nutrition therapy and food safety guidelines for transplant recipients to Spouse. Discussed needs to follow food safety guidelines with medications, increased needs for proper post-surgical healing, and moderate intake of sodium and carbohydrates; Spouse made aware detailed education with written material will be provided post-transplant. Spouse amenable - denies having questions/concerns about diet and nutrition at this time; made aware RD remains available.     Recommendations:      1) Recommend increasing EN provision to Nepro @ 45mL x 24hrs providing 1080mL of formula, 1944kcal/day, 87g protein/day, 785mL free water as medially feasible   -Goal provides: 31.7kcal/kg, 1.42g protein/kg based on 61.3 --> increase to higher end of protein-energy needs when pressor support decreases   -Monitor tolerance and adjust PRN  -Defer fluids to Team   2) Continue regular diet and promote PO intake as medically feasible  3) Continue multivitamin, folic acid, thiamine daily   4) Malnutrition sticker placed        Monitoring and Evaluation:   Continue to monitor nutritional intake, tolerance to diet prescription, weights, labs, skin integrity      RD remains available upon request and will follow up per protocol    Sarika Vanegas MS, RDN, CDN (Pager #[phone]) Nutrition Follow Up Note  Patient seen for: SICU follow up/Liver Transplant Evaluation - Opened      Chart reviewed, events noted.    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 "      Diet Order:   Diet, Regular:   Tube Feeding Modality: Nasogastric  Nepro with Carb Steady (NEPRORTH)  Total Volume for 24 Hours (mL): 840  Continuous  Starting Tube Feed Rate {mL per Hour}: 35  Until Goal Tube Feed Rate (mL per Hour): 35  Tube Feed Duration (in Hours): 24  Tube Feed Start Time: 10:00 (22)    (Current EN Provision Provides: 840mL, 1512kcal/day, 68g protein/day)    Subjective Info PTA (per spouse):  -Very poor PO intake since  (~1 month), only consuming 1/2 grapefruit with a few bites of toast  -NKFA  -No hx of chewing/swallowing difficulties   -Denies vitamin/supplement use     Nutrition-related events:  -Intake: per Team, passed dysphagia screening, ordered for supplemental PO diet; no intake noted at this time   -EN: TF running at goal rate x2 days - held x1hr/day for synthroid; tolerating feeds   -GI: rectal tube in place - 850cc output thus far (); lactulose ordered; decompensated liver failure with large volume ascites; s/p para - 2.9L out in ED   -Endo: no Hx of DM noted; sliding scale insulin ordered for coverage   -Resp: acute respiratory failure; intubated , extubated    -Renal: RED 2/2 ATN vs HRS; requiring CRRT; central line access placed ; hypophosphatemic - NaPhos ordered for repletion   -CV: pressor support; Levo increased today - monitor closely     LIVER TRANSPLANT EVAL:   -Pt resides spouse; able to assist with meals s/p transplant  -BMI: 24.3 - normal   -HbA1c: 4.9% - no indication of DM   -Frailty: [pending PT evaluation]   -MELD: 42 ()     Education:  given to Spouse   -Reviewed post-transplant nutrition therapy and food safety guidelines for transplant recipients   -Reviewed recommendations to avoid grapefruit, pomegranate and star fruit while taking immunosuppressant medication.    -Reviewed recommendations for moderate intake of sodium and carbohydrates with transplant medications.      Pt was receptive and expressed understanding.     Pt with no known nutrition contraindication to transplant. Concern regarding malnutrition and possible frailty status; will optimize nutrition via PO supplements and diet.     Nutrition assessment communicated with Transplant Hepatology Team and outpatient Transplant RDs       Weights:   PTA (per Spouse) - UBW: 150 pounds x 1 month ago; new dry baseline 135 pounds   -11% weight loss x 1 month   In-House - Daily Weight in k.7 (-), Weight in k.3 (-), Weight in k.6 (-), Weight in k.2 (-22), Weight in k.4 (12-21), Weight in k.2 (12-20)  -Weight fluctuations likely 2/2 fluid shifts - CRRT; continue to monitor and trend weights closely     Nutritionally Pertinent MEDICATIONS  (STANDING):  caspofungin IVPB  folic acid  insulin lispro (ADMELOG) corrective regimen sliding scale  lactulose Syrup  levothyroxine  meropenem  IVPB  midodrine  multivitamin/minerals/iron Oral Solution (CENTRUM)  norepinephrine Infusion  pantoprazole   Suspension  rifAXIMin  sodium phosphate 15 milliMole(s)/250 mL IVPB  thiamine  vasopressin Infusion    Pertinent Labs:  @ 08:16: Na 137, BUN 6<L>, Cr 0.63, <H>, K+ 4.2, Phos 1.2<L>, Mg 2.2, Alk Phos 167<H>, ALT/SGPT 44, AST/SGOT 121<H>, HbA1c --   @ 02:18: Na 139, BUN 5<L>, Cr 0.71, <H>, K+ 4.1, Phos 1.5<L>, Mg 2.4, Alk Phos 163<H>, ALT/SGPT 45, AST/SGOT 118<H>, HbA1c --   @ 21:01: Na 138, BUN 5<L>, Cr 0.71, <H>, K+ 3.9, Phos 2.1<L>, Mg 2.3, Alk Phos 155<H>, ALT/SGPT 44, AST/SGOT 118<H>, HbA1c --   @ 15:02: Na 137, BUN 5<L>, Cr 0.72, <H>, K+ 4.0, Phos 3.0, Mg 2.3, Alk Phos 154<H>, ALT/SGPT 46<H>, AST/SGOT 125<H>, HbA1c --    A1C with Estimated Average Glucose Result: 4.9 % (22 @ 20:59)    Finger Sticks:  POCT Blood Glucose.: 139 mg/dL ( @ 12:01)  POCT Blood Glucose.: 143 mg/dL ( @ 06:29)  POCT Blood Glucose.: 139 mg/dL ( @ 23:34)  POCT Blood Glucose.: 157 mg/dL ( @ 17:03)      (Per flowsheet)  Pressure Injuries: none noted     Edema:   -1+ dependent, generalized     Estimated Needs: based on 61.3 - dry weight   [x] recalculated: extubated   -Energy: 32-37kcal/kg (1961-2268kcal/day)   -Protein: 1.4-1.8g/kg (85-110g/day)   -Defer fluid to Team     Previous Nutrition Diagnosis: increased protein-energy needs (CRRT, liver failure)   Nutrition Diagnosis is: ongoing     New Nutrition Diagnosis:   P: Severe malnutrition in the context of acute illness  E: related to inadequate protein-energy intake in the setting of new onset liver failure   S: as evidenced by 11% weight loss x1 month; <50% intake x >5 days PTA   Goal: Pt to receive >80% of estimated needs    Nutrition Care Plan:  [x] In Progress  [] Achieved  [] Not applicable    Nutrition Interventions:     Education Provided:       [x] Yes: Provided brief introduction to education on post transplant nutrition therapy and food safety guidelines for transplant recipients to Spouse. Discussed needs to follow food safety guidelines with medications, increased needs for proper post-surgical healing, and moderate intake of sodium and carbohydrates; Spouse made aware detailed education with written material will be provided post-transplant. Spouse amenable - denies having questions/concerns about diet and nutrition at this time; made aware RD remains available.     Recommendations:      1) Spoke with Team, low dose pressors not a concern at this time; Increase EN provision to Nepro @ 50mL x 24hrs providing 1200mL of formula, 2160kcal/day, 97g protein/day, 872mL free water   -Goal provides: 35.2kcal/kg, 1.58g protein/kg based on 61.3   -Monitor tolerance and adjust PRN  -Defer fluids to Team   2) Continue regular diet and promote PO intake as medically feasible; will decrease EN as PO trials improve   3) Continue multivitamin, folic acid, thiamine daily   4) Malnutrition sticker placed        Monitoring and Evaluation:   Continue to monitor nutritional intake, tolerance to diet prescription, weights, labs, skin integrity      RD remains available upon request and will follow up per protocol    Sarika Vanegas MS, RDN, CDN (Pager #[phone]) Nutrition Follow Up Note  Patient seen for: SICU follow up/Liver Transplant Evaluation - Opened      Chart reviewed, events noted.    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 "      Diet Order:   Diet, Regular:   Tube Feeding Modality: Nasogastric  Nepro with Carb Steady (NEPRORTH)  Total Volume for 24 Hours (mL): 840  Continuous  Starting Tube Feed Rate {mL per Hour}: 35  Until Goal Tube Feed Rate (mL per Hour): 35  Tube Feed Duration (in Hours): 24  Tube Feed Start Time: 10:00 (22)    (Current EN Provision Provides: 840mL, 1512kcal/day, 68g protein/day)    Subjective Info PTA (per spouse):  -Very poor PO intake since  (~1 month), only consuming 1/2 grapefruit with a few bites of toast  -NKFA  -No hx of chewing/swallowing difficulties   -Denies vitamin/supplement use     Nutrition-related events:  -Intake: per Team, passed dysphagia screening, ordered for supplemental PO diet; no intake noted at this time   -EN: TF running at goal rate x2 days - held x1hr/day for synthroid; tolerating feeds   -GI: rectal tube in place - 850cc output thus far (); lactulose ordered; decompensated liver failure with large volume ascites; s/p para - 2.9L out in ED   -Endo: no Hx of DM noted; sliding scale insulin ordered for coverage   -Resp: acute respiratory failure; intubated , extubated    -Renal: RED 2/2 ATN vs HRS; requiring CRRT; central line access placed ; hypophosphatemic - NaPhos ordered for repletion   -CV: pressor support; Levo increased today - monitor closely     LIVER TRANSPLANT EVAL:   -Pt resides spouse; able to assist with meals s/p transplant  -BMI: 24.3 - normal   -HbA1c: 4.9% - no indication of DM   -Frailty: [pending PT evaluation]   -MELD: 42 ()     Education:  given to Spouse   -Reviewed post-transplant nutrition therapy and food safety guidelines for transplant recipients   -Reviewed recommendations to avoid grapefruit, pomegranate and star fruit while taking immunosuppressant medication.    -Reviewed recommendations for moderate intake of sodium and carbohydrates with transplant medications.      Pt was receptive and expressed understanding.     Pt with no known nutrition contraindication to transplant. Concern regarding malnutrition and possible frailty status; will optimize nutrition via PO supplements and diet.     Nutrition assessment communicated with Transplant Hepatology Team and outpatient Transplant RDs       Weights:   PTA (per Spouse) - UBW: 150 pounds x 1 month ago; new dry baseline 135 pounds   -11% weight loss x 1 month   In-House - Daily Weight in k.7 (-), Weight in k.3 (-), Weight in k.6 (-), Weight in k.2 (-22), Weight in k.4 (12-21), Weight in k.2 (12-20)  -Weight fluctuations likely 2/2 fluid shifts - CRRT; continue to monitor and trend weights closely     Nutritionally Pertinent MEDICATIONS  (STANDING):  caspofungin IVPB  folic acid  insulin lispro (ADMELOG) corrective regimen sliding scale  lactulose Syrup  levothyroxine  meropenem  IVPB  midodrine  multivitamin/minerals/iron Oral Solution (CENTRUM)  norepinephrine Infusion  pantoprazole   Suspension  rifAXIMin  sodium phosphate 15 milliMole(s)/250 mL IVPB  thiamine  vasopressin Infusion    Pertinent Labs:  @ 08:16: Na 137, BUN 6<L>, Cr 0.63, <H>, K+ 4.2, Phos 1.2<L>, Mg 2.2, Alk Phos 167<H>, ALT/SGPT 44, AST/SGOT 121<H>, HbA1c --   @ 02:18: Na 139, BUN 5<L>, Cr 0.71, <H>, K+ 4.1, Phos 1.5<L>, Mg 2.4, Alk Phos 163<H>, ALT/SGPT 45, AST/SGOT 118<H>, HbA1c --   @ 21:01: Na 138, BUN 5<L>, Cr 0.71, <H>, K+ 3.9, Phos 2.1<L>, Mg 2.3, Alk Phos 155<H>, ALT/SGPT 44, AST/SGOT 118<H>, HbA1c --   @ 15:02: Na 137, BUN 5<L>, Cr 0.72, <H>, K+ 4.0, Phos 3.0, Mg 2.3, Alk Phos 154<H>, ALT/SGPT 46<H>, AST/SGOT 125<H>, HbA1c --    A1C with Estimated Average Glucose Result: 4.9 % (22 @ 20:59)    Finger Sticks:  POCT Blood Glucose.: 139 mg/dL ( @ 12:01)  POCT Blood Glucose.: 143 mg/dL ( @ 06:29)  POCT Blood Glucose.: 139 mg/dL ( @ 23:34)  POCT Blood Glucose.: 157 mg/dL ( @ 17:03)      (Per flowsheet)  Pressure Injuries: none noted     Edema:   -1+ dependent, generalized     Estimated Needs: based on 61.3 - dry weight   [x] recalculated: extubated   -Energy: 32-37kcal/kg (1961-2268kcal/day)   -Protein: 1.4-1.8g/kg (85-110g/day)   -Defer fluid to Team     Previous Nutrition Diagnosis: increased protein-energy needs (CRRT, liver failure)   Nutrition Diagnosis is: ongoing     New Nutrition Diagnosis:   P: Severe malnutrition in the context of acute illness  E: related to inadequate protein-energy intake in the setting of new onset liver failure   S: as evidenced by 11% weight loss x1 month; <50% intake x >5 days PTA   Goal: Pt to receive >80% of estimated needs    Nutrition Care Plan:  [x] In Progress  [] Achieved  [] Not applicable    Nutrition Interventions:     Education Provided:       [x] Yes: Provided brief introduction to education on post transplant nutrition therapy and food safety guidelines for transplant recipients to Spouse. Discussed needs to follow food safety guidelines with medications, increased needs for proper post-surgical healing, and moderate intake of sodium and carbohydrates; Spouse made aware detailed education with written material will be provided post-transplant. Spouse amenable - denies having questions/concerns about diet and nutrition at this time; made aware RD remains available.     Recommendations:      1) Spoke with Team, low dose pressors not a concern at this time; Increase EN provision to Nepro @ 50mL x 24hrs providing 1200mL of formula, 2160kcal/day, 97g protein/day, 872mL free water   -Goal provides: 35.2kcal/kg, 1.58g protein/kg based on 61.3   -Monitor tolerance and adjust PRN  -Defer fluids to Team   2) Continue regular diet and promote PO intake as medically feasible; will decrease EN as PO trials improve   3) Continue multivitamin, folic acid, thiamine daily   4) Malnutrition sticker placed        Monitoring and Evaluation:   Continue to monitor nutritional intake, tolerance to diet prescription, weights, labs, skin integrity      RD remains available upon request and will follow up per protocol    Sarika Vanegas MS, RDN, CDN (Pager #[phone]) Nutrition Follow Up Note  Patient seen for: SICU follow up/Liver Transplant Evaluation - Opened      Chart reviewed, events noted.    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 "      Diet Order:   Diet, Regular:   Tube Feeding Modality: Nasogastric  Nepro with Carb Steady (NEPRORTH)  Total Volume for 24 Hours (mL): 840  Continuous  Starting Tube Feed Rate {mL per Hour}: 35  Until Goal Tube Feed Rate (mL per Hour): 35  Tube Feed Duration (in Hours): 24  Tube Feed Start Time: 10:00 (22)    (Current EN Provision Provides: 840mL, 1512kcal/day, 68g protein/day)    Subjective Info PTA (per spouse):  -Very poor PO intake since  (~1 month), only consuming 1/2 grapefruit with a few bites of toast  -NKFA  -No hx of chewing/swallowing difficulties   -Denies vitamin/supplement use     Nutrition-related events:  -Intake: per Team, passed dysphagia screening, ordered for supplemental PO diet; no intake noted at this time   -EN: TF running at goal rate x2 days - held x1hr/day for synthroid; tolerating feeds   -GI: rectal tube in place - 850cc output thus far (); lactulose ordered; decompensated liver failure with large volume ascites; s/p para - 2.9L out in ED   -Endo: no Hx of DM noted; sliding scale insulin ordered for coverage   -Resp: acute respiratory failure; intubated , extubated    -Renal: RED 2/2 ATN vs HRS; requiring CRRT; central line access placed ; hypophosphatemic - NaPhos ordered for repletion   -CV: pressor support; Levo increased today - monitor closely     LIVER TRANSPLANT EVAL:   -Pt resides spouse; able to assist with meals s/p transplant  -BMI: 24.3 - normal   -HbA1c: 4.9% - no indication of DM   -Frailty: [pending PT evaluation]   -MELD: 42 ()     Education:  given to Spouse   -Reviewed post-transplant nutrition therapy and food safety guidelines for transplant recipients   -Reviewed recommendations to avoid grapefruit, pomegranate and star fruit while taking immunosuppressant medication.    -Reviewed recommendations for moderate intake of sodium and carbohydrates with transplant medications.      Pt was receptive and expressed understanding.     Pt with no known nutrition contraindication to transplant. Concern regarding malnutrition and possible frailty status; will optimize nutrition via PO supplements and diet.     Nutrition assessment communicated with Transplant Hepatology Team and outpatient Transplant RDs       Weights:   PTA (per Spouse) - UBW: 150 pounds x 1 month ago; new dry baseline 135 pounds   -11% weight loss x 1 month   In-House - Daily Weight in k.7 (-), Weight in k.3 (-), Weight in k.6 (-), Weight in k.2 (-22), Weight in k.4 (12-21), Weight in k.2 (12-20)  -Weight fluctuations likely 2/2 fluid shifts - CRRT; continue to monitor and trend weights closely     Nutritionally Pertinent MEDICATIONS  (STANDING):  caspofungin IVPB  folic acid  insulin lispro (ADMELOG) corrective regimen sliding scale  lactulose Syrup  levothyroxine  meropenem  IVPB  midodrine  multivitamin/minerals/iron Oral Solution (CENTRUM)  norepinephrine Infusion  pantoprazole   Suspension  rifAXIMin  sodium phosphate 15 milliMole(s)/250 mL IVPB  thiamine  vasopressin Infusion    Pertinent Labs:  @ 08:16: Na 137, BUN 6<L>, Cr 0.63, <H>, K+ 4.2, Phos 1.2<L>, Mg 2.2, Alk Phos 167<H>, ALT/SGPT 44, AST/SGOT 121<H>, HbA1c --   @ 02:18: Na 139, BUN 5<L>, Cr 0.71, <H>, K+ 4.1, Phos 1.5<L>, Mg 2.4, Alk Phos 163<H>, ALT/SGPT 45, AST/SGOT 118<H>, HbA1c --   @ 21:01: Na 138, BUN 5<L>, Cr 0.71, <H>, K+ 3.9, Phos 2.1<L>, Mg 2.3, Alk Phos 155<H>, ALT/SGPT 44, AST/SGOT 118<H>, HbA1c --   @ 15:02: Na 137, BUN 5<L>, Cr 0.72, <H>, K+ 4.0, Phos 3.0, Mg 2.3, Alk Phos 154<H>, ALT/SGPT 46<H>, AST/SGOT 125<H>, HbA1c --    A1C with Estimated Average Glucose Result: 4.9 % (22 @ 20:59)    Finger Sticks:  POCT Blood Glucose.: 139 mg/dL ( @ 12:01)  POCT Blood Glucose.: 143 mg/dL ( @ 06:29)  POCT Blood Glucose.: 139 mg/dL ( @ 23:34)  POCT Blood Glucose.: 157 mg/dL ( @ 17:03)      (Per flowsheet)  Pressure Injuries: none noted     Edema:   -1+ dependent, generalized     Estimated Needs: based on 61.3 - dry weight   [x] recalculated: extubated   -Energy: 32-37kcal/kg (1961-2268kcal/day)   -Protein: 1.4-1.8g/kg (85-110g/day)   -Defer fluid to Team     Previous Nutrition Diagnosis: increased protein-energy needs (CRRT, liver failure)   Nutrition Diagnosis is: ongoing     New Nutrition Diagnosis:   P: Severe malnutrition in the context of acute illness  E: related to inadequate protein-energy intake in the setting of new onset liver failure   S: as evidenced by 11% weight loss x1 month; <50% intake x >5 days PTA   Goal: Pt to receive >80% of estimated needs    Nutrition Care Plan:  [x] In Progress  [] Achieved  [] Not applicable    Nutrition Interventions:     Education Provided:       [x] Yes: Provided brief introduction to education on post transplant nutrition therapy and food safety guidelines for transplant recipients to Spouse. Discussed needs to follow food safety guidelines with medications, increased needs for proper post-surgical healing, and moderate intake of sodium and carbohydrates; Spouse made aware detailed education with written material will be provided post-transplant. Spouse amenable - denies having questions/concerns about diet and nutrition at this time; made aware RD remains available.     Recommendations:      1) Spoke with Team, low dose pressors not a concern at this time; Increase EN provision to Nepro @ 50mL x 24hrs providing 1200mL of formula, 2160kcal/day, 97g protein/day, 872mL free water   -Goal provides: 35.2kcal/kg, 1.58g protein/kg based on 61.3   -Monitor tolerance and adjust PRN  -Defer fluids to Team   2) Continue regular diet and promote PO intake as medically feasible; will decrease EN as PO trials improve   3) Continue multivitamin, folic acid, thiamine daily   4) Malnutrition sticker placed        Monitoring and Evaluation:   Continue to monitor nutritional intake, tolerance to diet prescription, weights, labs, skin integrity      RD remains available upon request and will follow up per protocol    Sarika Vanegas MS, RDN, CDN (Pager #[phone])

## 2022-12-26 NOTE — PROGRESS NOTE ADULT - SUBJECTIVE AND OBJECTIVE BOX
[facility] DIVISION OF KIDNEY DISEASES AND HYPERTENSION -- FOLLOW UP NOTE  --------------------------------------------------------------------------------  Chief Complaint:    24 hour events/subjective:  Patient was seen and examined at bedside      PAST HISTORY  --------------------------------------------------------------------------------  No significant changes to PMH, PSH, FHx, SHx, unless otherwise noted    ALLERGIES & MEDICATIONS  --------------------------------------------------------------------------------  Allergies    Macrobid (Rash)    Intolerances    Nexium (Stomach Upset)    Standing Inpatient Medications  caspofungin IVPB      chlorhexidine 2% Cloths 1 Application(s) Topical <User Schedule>  CRRT Treatment    <Continuous>  escitalopram 10 milliGRAM(s) Oral daily  folic acid 1 milliGRAM(s) Oral daily  influenza   Vaccine 0.5 milliLiter(s) IntraMuscular once  insulin lispro (ADMELOG) corrective regimen sliding scale   SubCutaneous every 6 hours  lactulose Syrup 20 Gram(s) Oral every 8 hours  levETIRAcetam  Solution 750 milliGRAM(s) Enteral Tube two times a day  levothyroxine 137 MICROGram(s) Oral daily  meropenem  IVPB 1000 milliGRAM(s) IV Intermittent every 8 hours  midodrine 20 milliGRAM(s) Oral every 8 hours  multivitamin/minerals/iron Oral Solution (CENTRUM) 15 milliLiter(s) Oral daily  norepinephrine Infusion 0.05 MICROgram(s)/kG/Min IV Continuous <Continuous>  pantoprazole   Suspension 40 milliGRAM(s) Oral every 24 hours  PrismaSATE Dialysate BGK 4 / 2.5 5000 milliLiter(s) CRRT <Continuous>  PrismaSOL Filtration BGK 4 / 2.5 5000 milliLiter(s) CRRT <Continuous>  PrismaSOL Filtration BGK 4 / 2.5 5000 milliLiter(s) CRRT <Continuous>  rifAXIMin 550 milliGRAM(s) Oral every 12 hours  sodium phosphate 15 milliMole(s)/250 mL IVPB 15 milliMole(s) IV Intermittent every 1 hour  thiamine 100 milliGRAM(s) Enteral Tube daily  vasopressin Infusion 0.03 Unit(s)/Min IV Continuous <Continuous>    PRN Inpatient Medications  oxyCODONE    Solution 5 milliGRAM(s) Oral every 4 hours PRN  oxyCODONE    Solution 10 milliGRAM(s) Oral every 4 hours PRN  sodium chloride 0.65% Nasal 1 Spray(s) Both Nostrils every 3 hours PRN  tetracaine/benzocaine/butamben Spray 1 Spray(s) Topical every 3 hours PRN      REVIEW OF SYSTEMS- unable to obtain         VITALS/PHYSICAL EXAM  --------------------------------------------------------------------------------  T(C): 36.6 (12-26-22 @ 11:00), Max: 37.1 (12-25-22 @ 23:00)  HR: 79 (12-26-22 @ 11:00) (74 - 110)  BP: 121/63 (12-26-22 @ 07:45) (121/63 - 125/68)  RR: 33 (12-26-22 @ 11:00) (16 - 34)  SpO2: 93% (12-26-22 @ 11:00) (92% - 96%)  Wt(kg): --        12-25-22 @ 07:01  -  12-26-22 @ 07:00  --------------------------------------------------------  IN: 2114.2 mL / OUT: 4648 mL / NET: -2533.8 mL    12-26-22 @ 07:01  -  12-26-22 @ 11:13  --------------------------------------------------------  IN: 685.2 mL / OUT: 308 mL / NET: 377.2 mL      Physical Exam:  Gen: Intubated, awake, responsive   HEENT: +ET tube   Pulm: CTA B/L, no crackles   CV: RRR, S1S2+  Abd: +BS, soft, distended  : +urinary catheter  MSK: +BL LE edema  Psych: Sedated  Skin: Warm  Access: +Non-tunneled HD catheter      LABS/STUDIES  --------------------------------------------------------------------------------              8.6    31.79 >-----------<  49       [12-26-22 @ 08:16]              26.4     137  |  103  |  6   ----------------------------<  165      [12-26-22 @ 08:16]  4.2   |  20  |  0.63        Ca     8.7     [12-26-22 @ 08:16]      Mg     2.2     [12-26-22 @ 08:16]      Phos  1.2     [12-26-22 @ 08:16]    TPro  6.1  /  Alb  3.8  /  TBili  26.4  /  DBili  x   /  AST  121  /  ALT  44  /  AlkPhos  167  [12-26-22 @ 08:16]    PT/INR: PT 31.7 , INR 2.71       [12-26-22 @ 02:18]  PTT: 52.8       [12-26-22 @ 02:18]    Uric acid 0.9      [12-24-22 @ 20:59]    Creatinine Trend:  SCr 0.63 [12-26 @ 08:16]  SCr 0.71 [12-26 @ 02:18]  SCr 0.71 [12-25 @ 21:01]  SCr 0.72 [12-25 @ 15:02]  SCr 0.76 [12-25 @ 08:43]          Urinalysis - [12-20-22 @ 23:55]      Color Dark Yellow / Appearance Turbid / SG 1.029 / pH 6.0      Gluc 100 mg/dL / Ketone Small  / Bili Large / Urobili Negative       Blood Moderate / Protein 300 mg/dL / Leuk Est Large / Nitrite Negative      RBC 5-10 / WBC 11-25 / Hyaline 0-2 / Gran  / Sq Epi  / Non Sq Epi Moderate / Bacteria Negative    Urine Creatinine 21      [12-21-22 @ 11:55]  Urine Sodium 138      [12-21-22 @ 11:55]  Urine Potassium 8      [12-21-22 @ 11:55]  Urine Osmolality 288      [12-21-22 @ 11:55]    Ferritin 5747      [12-24-22 @ 20:58]  TSH 0.05      [12-24-22 @ 20:58]    HBsAb <3.0      [12-24-22 @ 20:59]  HBsAb Nonreact      [12-24-22 @ 20:59]  HBsAg Nonreact      [12-24-22 @ 20:59]  HBcAb Nonreact      [12-24-22 @ 20:59]  HCV 0.12, Nonreact      [12-24-22 @ 20:59]  HIV Nonreact      [12-24-22 @ 20:58]  HIV Nonreact      [12-24-22 @ 20:58]    Syphilis Screen (Treponema Pallidum Ab) Negative      [12-24-22 @ 20:59]     [facility] DIVISION OF KIDNEY DISEASES AND HYPERTENSION -- FOLLOW UP NOTE  --------------------------------------------------------------------------------  Chief Complaint:    24 hour events/subjective:  Patient was seen and examined at bedside      PAST HISTORY  --------------------------------------------------------------------------------  No significant changes to PMH, PSH, FHx, SHx, unless otherwise noted    ALLERGIES & MEDICATIONS  --------------------------------------------------------------------------------  Allergies    Macrobid (Rash)    Intolerances    Nexium (Stomach Upset)    Standing Inpatient Medications  caspofungin IVPB      chlorhexidine 2% Cloths 1 Application(s) Topical <User Schedule>  CRRT Treatment    <Continuous>  escitalopram 10 milliGRAM(s) Oral daily  folic acid 1 milliGRAM(s) Oral daily  influenza   Vaccine 0.5 milliLiter(s) IntraMuscular once  insulin lispro (ADMELOG) corrective regimen sliding scale   SubCutaneous every 6 hours  lactulose Syrup 20 Gram(s) Oral every 8 hours  levETIRAcetam  Solution 750 milliGRAM(s) Enteral Tube two times a day  levothyroxine 137 MICROGram(s) Oral daily  meropenem  IVPB 1000 milliGRAM(s) IV Intermittent every 8 hours  midodrine 20 milliGRAM(s) Oral every 8 hours  multivitamin/minerals/iron Oral Solution (CENTRUM) 15 milliLiter(s) Oral daily  norepinephrine Infusion 0.05 MICROgram(s)/kG/Min IV Continuous <Continuous>  pantoprazole   Suspension 40 milliGRAM(s) Oral every 24 hours  PrismaSATE Dialysate BGK 4 / 2.5 5000 milliLiter(s) CRRT <Continuous>  PrismaSOL Filtration BGK 4 / 2.5 5000 milliLiter(s) CRRT <Continuous>  PrismaSOL Filtration BGK 4 / 2.5 5000 milliLiter(s) CRRT <Continuous>  rifAXIMin 550 milliGRAM(s) Oral every 12 hours  sodium phosphate 15 milliMole(s)/250 mL IVPB 15 milliMole(s) IV Intermittent every 1 hour  thiamine 100 milliGRAM(s) Enteral Tube daily  vasopressin Infusion 0.03 Unit(s)/Min IV Continuous <Continuous>    PRN Inpatient Medications  oxyCODONE    Solution 5 milliGRAM(s) Oral every 4 hours PRN  oxyCODONE    Solution 10 milliGRAM(s) Oral every 4 hours PRN  sodium chloride 0.65% Nasal 1 Spray(s) Both Nostrils every 3 hours PRN  tetracaine/benzocaine/butamben Spray 1 Spray(s) Topical every 3 hours PRN      REVIEW OF SYSTEMS- unable to obtain         VITALS/PHYSICAL EXAM  --------------------------------------------------------------------------------  T(C): 36.6 (12-26-22 @ 11:00), Max: 37.1 (12-25-22 @ 23:00)  HR: 79 (12-26-22 @ 11:00) (74 - 110)  BP: 121/63 (12-26-22 @ 07:45) (121/63 - 125/68)  RR: 33 (12-26-22 @ 11:00) (16 - 34)  SpO2: 93% (12-26-22 @ 11:00) (92% - 96%)  Wt(kg): --        12-25-22 @ 07:01  -  12-26-22 @ 07:00  --------------------------------------------------------  IN: 2114.2 mL / OUT: 4648 mL / NET: -2533.8 mL    12-26-22 @ 07:01  -  12-26-22 @ 11:13  --------------------------------------------------------  IN: 685.2 mL / OUT: 308 mL / NET: 377.2 mL      Physical Exam:  Gen: Intubated, awake, responsive   HEENT: +ET tube   Pulm: CTA B/L, no crackles   CV: RRR, S1S2+  Abd: +BS, soft, distended  : +urinary catheter  MSK: +BL LE edema  Psych: Sedated  Skin: Warm  Access: +Non-tunneled HD catheter      LABS/STUDIES  --------------------------------------------------------------------------------              8.6    31.79 >-----------<  49       [12-26-22 @ 08:16]              26.4     137  |  103  |  6   ----------------------------<  165      [12-26-22 @ 08:16]  4.2   |  20  |  0.63        Ca     8.7     [12-26-22 @ 08:16]      Mg     2.2     [12-26-22 @ 08:16]      Phos  1.2     [12-26-22 @ 08:16]    TPro  6.1  /  Alb  3.8  /  TBili  26.4  /  DBili  x   /  AST  121  /  ALT  44  /  AlkPhos  167  [12-26-22 @ 08:16]    PT/INR: PT 31.7 , INR 2.71       [12-26-22 @ 02:18]  PTT: 52.8       [12-26-22 @ 02:18]    Uric acid 0.9      [12-24-22 @ 20:59]    Creatinine Trend:  SCr 0.63 [12-26 @ 08:16]  SCr 0.71 [12-26 @ 02:18]  SCr 0.71 [12-25 @ 21:01]  SCr 0.72 [12-25 @ 15:02]  SCr 0.76 [12-25 @ 08:43]          Urinalysis - [12-20-22 @ 23:55]      Color Dark Yellow / Appearance Turbid / SG 1.029 / pH 6.0      Gluc 100 mg/dL / Ketone Small  / Bili Large / Urobili Negative       Blood Moderate / Protein 300 mg/dL / Leuk Est Large / Nitrite Negative      RBC 5-10 / WBC 11-25 / Hyaline 0-2 / Gran  / Sq Epi  / Non Sq Epi Moderate / Bacteria Negative    Urine Creatinine 21      [12-21-22 @ 11:55]  Urine Sodium 138      [12-21-22 @ 11:55]  Urine Potassium 8      [12-21-22 @ 11:55]  Urine Osmolality 288      [12-21-22 @ 11:55]    Ferritin 5747      [12-24-22 @ 20:58]  TSH 0.05      [12-24-22 @ 20:58]    HBsAb <3.0      [12-24-22 @ 20:59]  HBsAb Nonreact      [12-24-22 @ 20:59]  HBsAg Nonreact      [12-24-22 @ 20:59]  HBcAb Nonreact      [12-24-22 @ 20:59]  HCV 0.12, Nonreact      [12-24-22 @ 20:59]  HIV Nonreact      [12-24-22 @ 20:58]  HIV Nonreact      [12-24-22 @ 20:58]    Syphilis Screen (Treponema Pallidum Ab) Negative      [12-24-22 @ 20:59]     [facility] DIVISION OF KIDNEY DISEASES AND HYPERTENSION -- FOLLOW UP NOTE  --------------------------------------------------------------------------------  Chief Complaint:    24 hour events/subjective:  Patient was seen and examined at bedside      PAST HISTORY  --------------------------------------------------------------------------------  No significant changes to PMH, PSH, FHx, SHx, unless otherwise noted    ALLERGIES & MEDICATIONS  --------------------------------------------------------------------------------  Allergies    Macrobid (Rash)    Intolerances    Nexium (Stomach Upset)    Standing Inpatient Medications  caspofungin IVPB      chlorhexidine 2% Cloths 1 Application(s) Topical <User Schedule>  CRRT Treatment    <Continuous>  escitalopram 10 milliGRAM(s) Oral daily  folic acid 1 milliGRAM(s) Oral daily  influenza   Vaccine 0.5 milliLiter(s) IntraMuscular once  insulin lispro (ADMELOG) corrective regimen sliding scale   SubCutaneous every 6 hours  lactulose Syrup 20 Gram(s) Oral every 8 hours  levETIRAcetam  Solution 750 milliGRAM(s) Enteral Tube two times a day  levothyroxine 137 MICROGram(s) Oral daily  meropenem  IVPB 1000 milliGRAM(s) IV Intermittent every 8 hours  midodrine 20 milliGRAM(s) Oral every 8 hours  multivitamin/minerals/iron Oral Solution (CENTRUM) 15 milliLiter(s) Oral daily  norepinephrine Infusion 0.05 MICROgram(s)/kG/Min IV Continuous <Continuous>  pantoprazole   Suspension 40 milliGRAM(s) Oral every 24 hours  PrismaSATE Dialysate BGK 4 / 2.5 5000 milliLiter(s) CRRT <Continuous>  PrismaSOL Filtration BGK 4 / 2.5 5000 milliLiter(s) CRRT <Continuous>  PrismaSOL Filtration BGK 4 / 2.5 5000 milliLiter(s) CRRT <Continuous>  rifAXIMin 550 milliGRAM(s) Oral every 12 hours  sodium phosphate 15 milliMole(s)/250 mL IVPB 15 milliMole(s) IV Intermittent every 1 hour  thiamine 100 milliGRAM(s) Enteral Tube daily  vasopressin Infusion 0.03 Unit(s)/Min IV Continuous <Continuous>    PRN Inpatient Medications  oxyCODONE    Solution 5 milliGRAM(s) Oral every 4 hours PRN  oxyCODONE    Solution 10 milliGRAM(s) Oral every 4 hours PRN  sodium chloride 0.65% Nasal 1 Spray(s) Both Nostrils every 3 hours PRN  tetracaine/benzocaine/butamben Spray 1 Spray(s) Topical every 3 hours PRN      REVIEW OF SYSTEMS- unable to obtain         VITALS/PHYSICAL EXAM  --------------------------------------------------------------------------------  T(C): 36.6 (12-26-22 @ 11:00), Max: 37.1 (12-25-22 @ 23:00)  HR: 79 (12-26-22 @ 11:00) (74 - 110)  BP: 121/63 (12-26-22 @ 07:45) (121/63 - 125/68)  RR: 33 (12-26-22 @ 11:00) (16 - 34)  SpO2: 93% (12-26-22 @ 11:00) (92% - 96%)  Wt(kg): --        12-25-22 @ 07:01  -  12-26-22 @ 07:00  --------------------------------------------------------  IN: 2114.2 mL / OUT: 4648 mL / NET: -2533.8 mL    12-26-22 @ 07:01  -  12-26-22 @ 11:13  --------------------------------------------------------  IN: 685.2 mL / OUT: 308 mL / NET: 377.2 mL      Physical Exam:  Gen: Intubated, awake, responsive   HEENT: +ET tube   Pulm: CTA B/L, no crackles   CV: RRR, S1S2+  Abd: +BS, soft, distended  : +urinary catheter  MSK: +BL LE edema  Psych: Sedated  Skin: Warm  Access: +Non-tunneled HD catheter      LABS/STUDIES  --------------------------------------------------------------------------------              8.6    31.79 >-----------<  49       [12-26-22 @ 08:16]              26.4     137  |  103  |  6   ----------------------------<  165      [12-26-22 @ 08:16]  4.2   |  20  |  0.63        Ca     8.7     [12-26-22 @ 08:16]      Mg     2.2     [12-26-22 @ 08:16]      Phos  1.2     [12-26-22 @ 08:16]    TPro  6.1  /  Alb  3.8  /  TBili  26.4  /  DBili  x   /  AST  121  /  ALT  44  /  AlkPhos  167  [12-26-22 @ 08:16]    PT/INR: PT 31.7 , INR 2.71       [12-26-22 @ 02:18]  PTT: 52.8       [12-26-22 @ 02:18]    Uric acid 0.9      [12-24-22 @ 20:59]    Creatinine Trend:  SCr 0.63 [12-26 @ 08:16]  SCr 0.71 [12-26 @ 02:18]  SCr 0.71 [12-25 @ 21:01]  SCr 0.72 [12-25 @ 15:02]  SCr 0.76 [12-25 @ 08:43]          Urinalysis - [12-20-22 @ 23:55]      Color Dark Yellow / Appearance Turbid / SG 1.029 / pH 6.0      Gluc 100 mg/dL / Ketone Small  / Bili Large / Urobili Negative       Blood Moderate / Protein 300 mg/dL / Leuk Est Large / Nitrite Negative      RBC 5-10 / WBC 11-25 / Hyaline 0-2 / Gran  / Sq Epi  / Non Sq Epi Moderate / Bacteria Negative    Urine Creatinine 21      [12-21-22 @ 11:55]  Urine Sodium 138      [12-21-22 @ 11:55]  Urine Potassium 8      [12-21-22 @ 11:55]  Urine Osmolality 288      [12-21-22 @ 11:55]    Ferritin 5747      [12-24-22 @ 20:58]  TSH 0.05      [12-24-22 @ 20:58]    HBsAb <3.0      [12-24-22 @ 20:59]  HBsAb Nonreact      [12-24-22 @ 20:59]  HBsAg Nonreact      [12-24-22 @ 20:59]  HBcAb Nonreact      [12-24-22 @ 20:59]  HCV 0.12, Nonreact      [12-24-22 @ 20:59]  HIV Nonreact      [12-24-22 @ 20:58]  HIV Nonreact      [12-24-22 @ 20:58]    Syphilis Screen (Treponema Pallidum Ab) Negative      [12-24-22 @ 20:59]

## 2022-12-26 NOTE — PROGRESS NOTE ADULT - SUBJECTIVE AND OBJECTIVE BOX
INFECTIOUS DISEASES FOLLOW UP-- Marleni Dumont  [phone]    This is a follow up note for this  62yFemale with  Disorder of liver  awake/alert  NG feeding tube in place        ROS:  CONSTITUTIONAL:  No fever,   CARDIOVASCULAR:  No chest pain or palpitations  RESPIRATORY:  No dyspnea  GASTROINTESTINAL:  No nausea, vomiting, diarrhea, or abdominal pain  GENITOURINARY:  No dysuria  NEUROLOGIC:  No headache,     Allergies    Macrobid (Rash)    Intolerances    Nexium (Stomach Upset)      ANTIBIOTICS/RELEVANT:  antimicrobials  caspofungin IVPB      meropenem  IVPB 1000 milliGRAM(s) IV Intermittent every 8 hours  rifAXIMin 550 milliGRAM(s) Oral every 12 hours    immunologic:  influenza   Vaccine 0.5 milliLiter(s) IntraMuscular once    OTHER:  chlorhexidine 2% Cloths 1 Application(s) Topical <User Schedule>  CRRT Treatment    <Continuous>  escitalopram 10 milliGRAM(s) Oral daily  folic acid 1 milliGRAM(s) Oral daily  insulin lispro (ADMELOG) corrective regimen sliding scale   SubCutaneous every 6 hours  lactulose Syrup 20 Gram(s) Oral every 8 hours  levETIRAcetam  Solution 750 milliGRAM(s) Enteral Tube two times a day  levothyroxine 137 MICROGram(s) Oral daily  midodrine 20 milliGRAM(s) Oral every 8 hours  multivitamin/minerals/iron Oral Solution (CENTRUM) 15 milliLiter(s) Oral daily  norepinephrine Infusion 0.05 MICROgram(s)/kG/Min IV Continuous <Continuous>  oxyCODONE    Solution 5 milliGRAM(s) Oral every 4 hours PRN  oxyCODONE    Solution 10 milliGRAM(s) Oral every 4 hours PRN  pantoprazole   Suspension 40 milliGRAM(s) Oral every 24 hours  PrismaSATE Dialysate BGK 4 / 2.5 5000 milliLiter(s) CRRT <Continuous>  PrismaSOL Filtration BGK 4 / 2.5 5000 milliLiter(s) CRRT <Continuous>  PrismaSOL Filtration BGK 4 / 2.5 5000 milliLiter(s) CRRT <Continuous>  sodium chloride 0.65% Nasal 1 Spray(s) Both Nostrils every 3 hours PRN  tetracaine/benzocaine/butamben Spray 1 Spray(s) Topical every 3 hours PRN  thiamine 100 milliGRAM(s) Enteral Tube daily  vasopressin Infusion 0.03 Unit(s)/Min IV Continuous <Continuous>      Objective:  Vital Signs Last 24 Hrs  T(C): 36.6 (26 Dec 2022 11:00), Max: 37.1 (25 Dec 2022 23:00)  T(F): 97.8 (26 Dec 2022 11:00), Max: 98.8 (25 Dec 2022 23:00)  HR: 90 (26 Dec 2022 15:15) (72 - 110)  BP: 121/63 (26 Dec 2022 07:45) (121/63 - 125/68)  BP(mean): 84 (26 Dec 2022 07:45) (84 - 88)  RR: 20 (26 Dec 2022 15:15) (16 - 36)  SpO2: 94% (26 Dec 2022 15:15) (92% - 96%)    Parameters below as of 26 Dec 2022 11:00  Patient On (Oxygen Delivery Method): nasal cannula  O2 Flow (L/min): 3      PHYSICAL EXAM:  Constitutional:no acute distress  Eyes:AUGUSTO, EOMI  Ear/Nose/Throat: no oral lesions, 	  Respiratory: clear BL  Cardiovascular: S1S2  Gastrointestinal:soft, (+) BS, mild tenderness  Extremities:no e/e/c ecchymoses bilateral  No Lymphadenopathy  IV sites not inflammed.    LABS:                        8.3    33.04 )-----------( 49       ( 26 Dec 2022 14:35 )             25.2     12-26    139  |  103  |  6<L>  ----------------------------<  167<H>  4.3   |  20<L>  |  0.67    Ca    8.3<L>      26 Dec 2022 14:35  Phos  5.5     12-26  Mg     2.3     12-26    TPro  6.1  /  Alb  3.7  /  TBili  26.7<H>  /  DBili  x   /  AST  118<H>  /  ALT  45  /  AlkPhos  169<H>  12-26    PT/INR - ( 26 Dec 2022 02:18 )   PT: 31.7 sec;   INR: 2.71 ratio         PTT - ( 26 Dec 2022 02:18 )  PTT:52.8 sec      MICROBIOLOGY:            RECENT CULTURES:  12-24 @ 20:55  .Blood Blood  --  --  --    No growth to date.  --  12-24 @ 20:20  .Blood Blood  --  --  --    No growth to date.  --  12-23 @ 13:35  .Blood Blood  --  --  --    No growth to date.  --  12-23 @ 13:25  .Blood Blood  --  --  --    No growth to date.  --  12-22 @ 17:02  Combi-Cath Combi-Cath  --  --  --    Normal Respiratory Cassandra present  --  12-21 @ 22:55  Catheterized Catheterized  --  --  --    >100,000 CFU/ml Leticia dubliniensis "Susceptibilities not performed"  --  12-21 @ 19:53  Ascites Fl Ascites Fluid  --  --  --    No growth  --  12-21 @ 01:19  Trach Asp Tracheal Aspirate  --  --  --    Normal Respiratory Cassandra present  --  12-20 @ 23:30  .Blood Blood-Peripheral  --  --  --    No Growth Final  --  12-20 @ 22:38  .Blood Blood-Peripheral  --  --  --    No Growth Final  --      RADIOLOGY & ADDITIONAL STUDIES:    < from: US Abdomen Limited (12.26.22 @ 11:55) >  IMPRESSION:    There is small to mild amount of ascites.    < end of copied text >   INFECTIOUS DISEASES FOLLOW UP-- Marleni Dumont  [phone]    This is a follow up note for this  62yFemale with  Disorder of liver  awake/alert  NG feeding tube in place        ROS:  CONSTITUTIONAL:  No fever,   CARDIOVASCULAR:  No chest pain or palpitations  RESPIRATORY:  No dyspnea  GASTROINTESTINAL:  No nausea, vomiting, diarrhea, or abdominal pain  GENITOURINARY:  No dysuria  NEUROLOGIC:  No headache,     Allergies    Macrobid (Rash)    Intolerances    Nexium (Stomach Upset)      ANTIBIOTICS/RELEVANT:  antimicrobials  caspofungin IVPB      meropenem  IVPB 1000 milliGRAM(s) IV Intermittent every 8 hours  rifAXIMin 550 milliGRAM(s) Oral every 12 hours    immunologic:  influenza   Vaccine 0.5 milliLiter(s) IntraMuscular once    OTHER:  chlorhexidine 2% Cloths 1 Application(s) Topical <User Schedule>  CRRT Treatment    <Continuous>  escitalopram 10 milliGRAM(s) Oral daily  folic acid 1 milliGRAM(s) Oral daily  insulin lispro (ADMELOG) corrective regimen sliding scale   SubCutaneous every 6 hours  lactulose Syrup 20 Gram(s) Oral every 8 hours  levETIRAcetam  Solution 750 milliGRAM(s) Enteral Tube two times a day  levothyroxine 137 MICROGram(s) Oral daily  midodrine 20 milliGRAM(s) Oral every 8 hours  multivitamin/minerals/iron Oral Solution (CENTRUM) 15 milliLiter(s) Oral daily  norepinephrine Infusion 0.05 MICROgram(s)/kG/Min IV Continuous <Continuous>  oxyCODONE    Solution 5 milliGRAM(s) Oral every 4 hours PRN  oxyCODONE    Solution 10 milliGRAM(s) Oral every 4 hours PRN  pantoprazole   Suspension 40 milliGRAM(s) Oral every 24 hours  PrismaSATE Dialysate BGK 4 / 2.5 5000 milliLiter(s) CRRT <Continuous>  PrismaSOL Filtration BGK 4 / 2.5 5000 milliLiter(s) CRRT <Continuous>  PrismaSOL Filtration BGK 4 / 2.5 5000 milliLiter(s) CRRT <Continuous>  sodium chloride 0.65% Nasal 1 Spray(s) Both Nostrils every 3 hours PRN  tetracaine/benzocaine/butamben Spray 1 Spray(s) Topical every 3 hours PRN  thiamine 100 milliGRAM(s) Enteral Tube daily  vasopressin Infusion 0.03 Unit(s)/Min IV Continuous <Continuous>      Objective:  Vital Signs Last 24 Hrs  T(C): 36.6 (26 Dec 2022 11:00), Max: 37.1 (25 Dec 2022 23:00)  T(F): 97.8 (26 Dec 2022 11:00), Max: 98.8 (25 Dec 2022 23:00)  HR: 90 (26 Dec 2022 15:15) (72 - 110)  BP: 121/63 (26 Dec 2022 07:45) (121/63 - 125/68)  BP(mean): 84 (26 Dec 2022 07:45) (84 - 88)  RR: 20 (26 Dec 2022 15:15) (16 - 36)  SpO2: 94% (26 Dec 2022 15:15) (92% - 96%)    Parameters below as of 26 Dec 2022 11:00  Patient On (Oxygen Delivery Method): nasal cannula  O2 Flow (L/min): 3      PHYSICAL EXAM:  Constitutional:no acute distress  Eyes:AUGUSTO, EOMI  Ear/Nose/Throat: no oral lesions, 	  Respiratory: clear BL  Cardiovascular: S1S2  Gastrointestinal:soft, (+) BS, mild tenderness  Extremities:no e/e/c ecchymoses bilateral  No Lymphadenopathy  IV sites not inflammed.    LABS:                        8.3    33.04 )-----------( 49       ( 26 Dec 2022 14:35 )             25.2     12-26    139  |  103  |  6<L>  ----------------------------<  167<H>  4.3   |  20<L>  |  0.67    Ca    8.3<L>      26 Dec 2022 14:35  Phos  5.5     12-26  Mg     2.3     12-26    TPro  6.1  /  Alb  3.7  /  TBili  26.7<H>  /  DBili  x   /  AST  118<H>  /  ALT  45  /  AlkPhos  169<H>  12-26    PT/INR - ( 26 Dec 2022 02:18 )   PT: 31.7 sec;   INR: 2.71 ratio         PTT - ( 26 Dec 2022 02:18 )  PTT:52.8 sec      MICROBIOLOGY:            RECENT CULTURES:  12-24 @ 20:55  .Blood Blood  --  --  --    No growth to date.  --  12-24 @ 20:20  .Blood Blood  --  --  --    No growth to date.  --  12-23 @ 13:35  .Blood Blood  --  --  --    No growth to date.  --  12-23 @ 13:25  .Blood Blood  --  --  --    No growth to date.  --  12-22 @ 17:02  Combi-Cath Combi-Cath  --  --  --    Normal Respiratory Cassandra present  --  12-21 @ 22:55  Catheterized Catheterized  --  --  --    >100,000 CFU/ml Leticia dubliniensis "Susceptibilities not performed"  --  12-21 @ 19:53  Ascites Fl Ascites Fluid  --  --  --    No growth  --  12-21 @ 01:19  Trach Asp Tracheal Aspirate  --  --  --    Normal Respiratory Cassandra present  --  12-20 @ 23:30  .Blood Blood-Peripheral  --  --  --    No Growth Final  --  12-20 @ 22:38  .Blood Blood-Peripheral  --  --  --    No Growth Final  --      RADIOLOGY & ADDITIONAL STUDIES:    < from: US Abdomen Limited (12.26.22 @ 11:55) >  IMPRESSION:    There is small to mild amount of ascites.    < end of copied text >   INFECTIOUS DISEASES FOLLOW UP-- Marleni Dumont  [phone]    This is a follow up note for this  62yFemale with  Disorder of liver  awake/alert  NG feeding tube in place        ROS:  CONSTITUTIONAL:  No fever,   CARDIOVASCULAR:  No chest pain or palpitations  RESPIRATORY:  No dyspnea  GASTROINTESTINAL:  No nausea, vomiting, diarrhea, or abdominal pain  GENITOURINARY:  No dysuria  NEUROLOGIC:  No headache,     Allergies    Macrobid (Rash)    Intolerances    Nexium (Stomach Upset)      ANTIBIOTICS/RELEVANT:  antimicrobials  caspofungin IVPB      meropenem  IVPB 1000 milliGRAM(s) IV Intermittent every 8 hours  rifAXIMin 550 milliGRAM(s) Oral every 12 hours    immunologic:  influenza   Vaccine 0.5 milliLiter(s) IntraMuscular once    OTHER:  chlorhexidine 2% Cloths 1 Application(s) Topical <User Schedule>  CRRT Treatment    <Continuous>  escitalopram 10 milliGRAM(s) Oral daily  folic acid 1 milliGRAM(s) Oral daily  insulin lispro (ADMELOG) corrective regimen sliding scale   SubCutaneous every 6 hours  lactulose Syrup 20 Gram(s) Oral every 8 hours  levETIRAcetam  Solution 750 milliGRAM(s) Enteral Tube two times a day  levothyroxine 137 MICROGram(s) Oral daily  midodrine 20 milliGRAM(s) Oral every 8 hours  multivitamin/minerals/iron Oral Solution (CENTRUM) 15 milliLiter(s) Oral daily  norepinephrine Infusion 0.05 MICROgram(s)/kG/Min IV Continuous <Continuous>  oxyCODONE    Solution 5 milliGRAM(s) Oral every 4 hours PRN  oxyCODONE    Solution 10 milliGRAM(s) Oral every 4 hours PRN  pantoprazole   Suspension 40 milliGRAM(s) Oral every 24 hours  PrismaSATE Dialysate BGK 4 / 2.5 5000 milliLiter(s) CRRT <Continuous>  PrismaSOL Filtration BGK 4 / 2.5 5000 milliLiter(s) CRRT <Continuous>  PrismaSOL Filtration BGK 4 / 2.5 5000 milliLiter(s) CRRT <Continuous>  sodium chloride 0.65% Nasal 1 Spray(s) Both Nostrils every 3 hours PRN  tetracaine/benzocaine/butamben Spray 1 Spray(s) Topical every 3 hours PRN  thiamine 100 milliGRAM(s) Enteral Tube daily  vasopressin Infusion 0.03 Unit(s)/Min IV Continuous <Continuous>      Objective:  Vital Signs Last 24 Hrs  T(C): 36.6 (26 Dec 2022 11:00), Max: 37.1 (25 Dec 2022 23:00)  T(F): 97.8 (26 Dec 2022 11:00), Max: 98.8 (25 Dec 2022 23:00)  HR: 90 (26 Dec 2022 15:15) (72 - 110)  BP: 121/63 (26 Dec 2022 07:45) (121/63 - 125/68)  BP(mean): 84 (26 Dec 2022 07:45) (84 - 88)  RR: 20 (26 Dec 2022 15:15) (16 - 36)  SpO2: 94% (26 Dec 2022 15:15) (92% - 96%)    Parameters below as of 26 Dec 2022 11:00  Patient On (Oxygen Delivery Method): nasal cannula  O2 Flow (L/min): 3      PHYSICAL EXAM:  Constitutional:no acute distress  Eyes:AUGUSTO, EOMI  Ear/Nose/Throat: no oral lesions, 	  Respiratory: clear BL  Cardiovascular: S1S2  Gastrointestinal:soft, (+) BS, mild tenderness  Extremities:no e/e/c ecchymoses bilateral  No Lymphadenopathy  IV sites not inflammed.    LABS:                        8.3    33.04 )-----------( 49       ( 26 Dec 2022 14:35 )             25.2     12-26    139  |  103  |  6<L>  ----------------------------<  167<H>  4.3   |  20<L>  |  0.67    Ca    8.3<L>      26 Dec 2022 14:35  Phos  5.5     12-26  Mg     2.3     12-26    TPro  6.1  /  Alb  3.7  /  TBili  26.7<H>  /  DBili  x   /  AST  118<H>  /  ALT  45  /  AlkPhos  169<H>  12-26    PT/INR - ( 26 Dec 2022 02:18 )   PT: 31.7 sec;   INR: 2.71 ratio         PTT - ( 26 Dec 2022 02:18 )  PTT:52.8 sec      MICROBIOLOGY:            RECENT CULTURES:  12-24 @ 20:55  .Blood Blood  --  --  --    No growth to date.  --  12-24 @ 20:20  .Blood Blood  --  --  --    No growth to date.  --  12-23 @ 13:35  .Blood Blood  --  --  --    No growth to date.  --  12-23 @ 13:25  .Blood Blood  --  --  --    No growth to date.  --  12-22 @ 17:02  Combi-Cath Combi-Cath  --  --  --    Normal Respiratory Cassandra present  --  12-21 @ 22:55  Catheterized Catheterized  --  --  --    >100,000 CFU/ml Leticia dubliniensis "Susceptibilities not performed"  --  12-21 @ 19:53  Ascites Fl Ascites Fluid  --  --  --    No growth  --  12-21 @ 01:19  Trach Asp Tracheal Aspirate  --  --  --    Normal Respiratory Cassandra present  --  12-20 @ 23:30  .Blood Blood-Peripheral  --  --  --    No Growth Final  --  12-20 @ 22:38  .Blood Blood-Peripheral  --  --  --    No Growth Final  --      RADIOLOGY & ADDITIONAL STUDIES:    < from: US Abdomen Limited (12.26.22 @ 11:55) >  IMPRESSION:    There is small to mild amount of ascites.    < end of copied text >

## 2022-12-26 NOTE — PROGRESS NOTE ADULT - ATTENDING COMMENTS
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 S/p successful extubation (12/23) and currently receiving supplemental O2 via NC. CXR today reviewed and concerning for worsening multifocal pneumonia, ARDS, or pulmonary edema. Recommend resuming fluid removal via CVVHD (12/21- ) for goal net negative another 1.5-2L in the next 24h. She is back on norepinephrine and vasopressin infusions but can also increase midodrine to 30 mg po q8h today to support fluid removal and weaning again from pressors. Continuing broad-spectrum antibiotics with meropenem (12/20- ). Recommend switching from fluconazole (12/20- ) to caspofungin today given concern for worsening septic shock including rising leukocytosis and worsened CXR. Recommend paracentesis to rule out SBP if sufficient ascites pocket seen on bedside US. Continuing rifaximin and lactulose for goal stool output 600-1000 mL/day. Recommend to start VTE prophylaxis. ABO O with current MELD-Na 43. Liver transplant evaluation in progress. Appreciate continued excellent SICU care. Family updated at bedside.    Please don't hesitate to call with any questions or concerns.    Javier Goldstein M.D., Ph.D.  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 on 12/20 for liver transplant evaluation. S/p successful extubation (12/23) and currently receiving supplemental O2 via NC. CXR today reviewed and concerning for worsening multifocal pneumonia, ARDS, or pulmonary edema. Recommend resuming fluid removal via CVVHD (12/21- ) for goal net negative another 1.5-2L in the next 24h. She is back on norepinephrine and vasopressin infusions but can also increase midodrine to 30 mg po q8h today to support fluid removal and weaning again from pressors. Continuing broad-spectrum antibiotics with meropenem (12/20- ). Recommend switching from fluconazole (12/20- ) to caspofungin today given concern for worsening septic shock including rising leukocytosis and worsened CXR. Recommend paracentesis to rule out SBP if sufficient ascites pocket seen on bedside US. Continuing rifaximin and lactulose for goal stool output 600-1000 mL/day. Recommend to start VTE prophylaxis. ABO O with current MELD-Na 43. Liver transplant evaluation in progress. Appreciate continued excellent SICU care. Family updated at bedside.    Please don't hesitate to call with any questions or concerns.    Javier Goldstein M.D., Ph.D.  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 S/p successful extubation (12/23) and currently receiving supplemental O2 via NC. CXR today reviewed and concerning for worsening multifocal pneumonia, ARDS, or pulmonary edema. Recommend resuming fluid removal via CVVHD (12/21- ) for goal net negative another 1.5-2L in the next 24h. She is back on norepinephrine and vasopressin infusions but can also increase midodrine to 30 mg po q8h today to support fluid removal and weaning again from pressors. Continuing broad-spectrum antibiotics with meropenem (12/20- ). Recommend switching from fluconazole (12/20- ) to caspofungin today given concern for worsening septic shock including rising leukocytosis and worsened CXR. Recommend paracentesis to rule out SBP if sufficient ascites pocket seen on bedside US. Continuing rifaximin and lactulose for goal stool output 600-1000 mL/day. Recommend to start VTE prophylaxis. ABO O with current MELD-Na 43. Liver transplant evaluation in progress. Appreciate continued excellent SICU care. Family updated at bedside.    Please don't hesitate to call with any questions or concerns.    Javier Goldstein M.D., Ph.D.  Transplant Hepatology

## 2022-12-26 NOTE — PROGRESS NOTE ADULT - SUBJECTIVE AND OBJECTIVE BOX
24 HOUR EVENTS:   - inc pressor requirements, restarted levo  - dec CVVHD to -50cc/hr  - f/u AM procal    SUBJECTIVE/ROS:  [x] A ten-point review of systems was otherwise negative except as noted.  [] Due to altered mental status/intubation, subjective information were not able to be obtained from the patient. History was obtained, to the extent possible, from review of the chart and collateral sources of information.      NEURO  RASS:     GCS:     CAM ICU:  Exam: alert, aware, still mildly confused about details but seems oriented to immediate situation   Meds: dexMEDEtomidine Infusion 0.2 MICROgram(s)/kG/Hr IV Continuous <Continuous>  escitalopram 10 milliGRAM(s) Oral daily  levETIRAcetam  Solution 750 milliGRAM(s) Enteral Tube two times a day  oxyCODONE    Solution 5 milliGRAM(s) Oral every 4 hours PRN Moderate Pain (4 - 6)  oxyCODONE    Solution 10 milliGRAM(s) Oral every 4 hours PRN Severe Pain (7 - 10)    [x] Adequacy of sedation and pain control has been assessed and adjusted      RESPIRATORY  RR: 20 (12-26-22 @ 00:45) (14 - 35)  SpO2: 93% (12-26-22 @ 00:45) (91% - 96%)  Exam: unlabored, clear to auscultation bilaterally  ABG - ( 25 Dec 2022 20:30 )  pH: 7.45  /  pCO2: 35    /  pO2: 83    / HCO3: 24    / Base Excess: 0.4   /  SaO2: 96.8    Lactate: x            CARDIOVASCULAR  HR: 94 (12-26-22 @ 00:45) (82 - 110)  BP: 125/68 (12-25-22 @ 19:15) (125/68 - 125/68)  BP(mean): 88 (12-25-22 @ 19:15) (88 - 88)  ABP: 99/43 (12-26-22 @ 00:45) (97/40 - 134/70)  ABP(mean): 65 (12-26-22 @ 00:45) (61 - 94)    Exam:  Cardiac Rhythm: S1S2  Perfusion     [x]Adequate   [ ]Inadequate  Meds: midodrine 20 milliGRAM(s) Oral every 8 hours  norepinephrine Infusion 0.05 MICROgram(s)/kG/Min IV Continuous <Continuous>      GI/NUTRITION  Exam: jaundice  Diet: NPO with tube feeds  Meds: lactulose Syrup 20 Gram(s) Oral every 6 hours  pantoprazole   Suspension 40 milliGRAM(s) Oral every 24 hours      GENITOURINARY  I&O's Detail    12-24 @ 07:01  -  12-25 @ 07:00  --------------------------------------------------------  IN:    Enteral Tube Flush: 60 mL    IV PiggyBack: 150 mL    IV PiggyBack: 950 mL    IV PiggyBack: 100 mL    Nepro with Carb Steady: 740 mL    Norepinephrine: 81 mL    Vasopressin: 99 mL  Total IN: 2180 mL    OUT:    Indwelling Catheter - Urethral (mL): 10 mL    Other (mL): 2707 mL    Rectal Tube (mL): 800 mL  Total OUT: 3517 mL    Total NET: -1337 mL      12-25 @ 07:01 - 12-26 @ 00:48  --------------------------------------------------------  IN:    Enteral Tube Flush: 310 mL    IV PiggyBack: 700 mL    Nepro with Carb Steady: 595 mL    Norepinephrine: 9.4 mL    Vasopressin: 4.5 mL    Vasopressin: 63 mL  Total IN: 1681.9 mL    OUT:    Indwelling Catheter - Urethral (mL): 5 mL    Other (mL): 2734 mL    Rectal Tube (mL): 750 mL  Total OUT: 3489 mL    Total NET: -1807.1 mL          12-25    138  |  102  |  5<L>  ----------------------------<  139<H>  3.9   |  21<L>  |  0.71    Ca    8.8      25 Dec 2022 21:01  Phos  2.1     12-25  Mg     2.3     12-25    TPro  6.1  /  Alb  4.0  /  TBili  25.9<H>  /  DBili  x   /  AST  118<H>  /  ALT  44  /  AlkPhos  155<H>  12-25    [ ] Monroe catheter, indication: N/A  Meds: folic acid 1 milliGRAM(s) Oral daily  multivitamin/minerals/iron Oral Solution (CENTRUM) 15 milliLiter(s) Oral daily  thiamine 100 milliGRAM(s) Enteral Tube daily        HEMATOLOGIC  Meds:   [x] VTE Prophylaxis                        8.3    29.21 )-----------( 47       ( 25 Dec 2022 21:01 )             24.6     PT/INR - ( 24 Dec 2022 20:59 )   PT: 26.7 sec;   INR: 2.30 ratio         PTT - ( 24 Dec 2022 20:59 )  PTT:49.5 sec  Transfusion     [ ] PRBC   [ ] Platelets   [ ] FFP   [ ] Cryoprecipitate      INFECTIOUS DISEASES  T(C): 37.1 (12-25-22 @ 23:00), Max: 37.4 (12-25-22 @ 02:00)  Wt(kg): --  WBC Count: 29.21 K/uL (12-25 @ 21:01)  WBC Count: 28.98 K/uL (12-25 @ 15:02)  WBC Count: 26.46 K/uL (12-25 @ 08:43)  WBC Count: 23.29 K/uL (12-25 @ 03:16)    Recent Cultures:  Specimen Source: .Blood Blood, 12-23 @ 13:35; Results   No growth to date.; Gram Stain: --; Organism: --  Specimen Source: .Blood Blood, 12-23 @ 13:25; Results   No growth to date.; Gram Stain: --; Organism: --  Specimen Source: Combi-Cath Combi-Cath, 12-22 @ 17:02; Results   Normal Respiratory Cassandra present; Gram Stain:   Moderate polymorphonuclear leukocytes seen per low power field  No squamous epithelial cells seen per low power field  No organisms seen per oil power field; Organism: --  Specimen Source: Catheterized Catheterized, 12-21 @ 22:55; Results   >100,000 CFU/ml Leticia dubliniensis "Susceptibilities not performed"; Gram Stain: --; Organism: --  Specimen Source: Ascites Fl Ascites Fluid, 12-21 @ 19:53; Results   No growth; Gram Stain:   polymorphonuclear leukocytes seen  No organisms seen  by cytocentrifuge; Organism: --  Specimen Source: Trach Asp Tracheal Aspirate, 12-21 @ 01:19; Results   Normal Respiratory Cassandra present; Gram Stain:   No polymorphonuclear leukocytes per low power field  Numerous Squamous epithelial cells per low power field  Moderate Gram Positive Rods seen per oil power field  Few Yeast like cells seen per oil power field; Organism: --  Specimen Source: .Blood Blood-Peripheral, 12-20 @ 23:30; Results   No growth to date.; Gram Stain: --; Organism: --  Specimen Source: .Blood Blood-Peripheral, 12-20 @ 22:38; Results   No growth to date.; Gram Stain: --; Organism: --    Meds: fluconAZOLE IVPB 200 milliGRAM(s) IV Intermittent every 24 hours  influenza   Vaccine 0.5 milliLiter(s) IntraMuscular once  meropenem  IVPB 1000 milliGRAM(s) IV Intermittent every 8 hours  rifAXIMin 550 milliGRAM(s) Oral every 12 hours        ENDOCRINE  Capillary Blood Glucose    Meds: insulin lispro (ADMELOG) corrective regimen sliding scale   SubCutaneous every 6 hours  levothyroxine 137 MICROGram(s) Oral daily  vasopressin Infusion 0.03 Unit(s)/Min IV Continuous <Continuous>        ACCESS DEVICES:  [ ] Peripheral IV  [ ] Central Venous Line	[ ] R	[ ] L	[ ] IJ	[ ] Fem	[ ] SC	Placed:   [ ] Arterial Line		[ ] R	[ ] L	[ ] Fem	[ ] Rad	[ ] Ax	Placed:   [ ] PICC:					[ ] Mediport  [ ] Urinary Catheter, Date Placed:   [ ] Necessity of urinary, arterial, and venous catheters discussed    OTHER MEDICATIONS:  chlorhexidine 2% Cloths 1 Application(s) Topical <User Schedule>  CRRT Treatment    <Continuous>  PrismaSATE Dialysate BGK 4 / 2.5 5000 milliLiter(s) CRRT <Continuous>  PrismaSOL Filtration BGK 4 / 2.5 5000 milliLiter(s) CRRT <Continuous>  PrismaSOL Filtration BGK 4 / 2.5 5000 milliLiter(s) CRRT <Continuous>  sodium chloride 0.65% Nasal 1 Spray(s) Both Nostrils every 3 hours PRN  tetracaine/benzocaine/butamben Spray 1 Spray(s) Topical every 3 hours PRN      CODE STATUS:     IMAGING:

## 2022-12-26 NOTE — PROGRESS NOTE ADULT - ATTENDING COMMENTS
62yFemale presents with hx thyroid cancer s/p thyroidectomy, craniotomy with resection of ventricular mass, with acute liver failure, new seizures, transfer from OSH for transplant evaluation.     More awake and alert, decrease Lactulose dose for high rectal tube output, continue Rifaximin  Titrate Levo, continue Vaso, on high dose Midodrine  Saturating well, CXR mild pul vascular congestion  NGT feed, increase Nepro to 40 cc at goal, can have clears  Empiric Abx Meropenem and Casopo  CRRT with net even balance  ISS  Mechanical DVT ppx 62yFemale presents with hx thyroid cancer s/p thyroidectomy, craniotomy with resection of ventricular mass, with acute liver failure, new seizures, transfer from OSH for transplant evaluation.     More awake and alert, decrease Lactulose dose for high rectal tube output, continue Rifaximin  Titrate Levo, continue Vaso, on high dose Midodrine  Saturating well, CXR mild pul vascular congestion  NGT feed, increase Nepro to 40 cc at goal, can have clears  Empiric Abx Meropenem and Caspo  CRRT with net even balance  ISS  Mechanical DVT ppx

## 2022-12-26 NOTE — PROGRESS NOTE ADULT - ASSESSMENT
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   Transferred from [facility] 12/20 for further management of acute liver failure and liver transplant evaluation 2/2 known ETOH Cirrhosis.     [] Decompensated ETOH Cirrhosis  - wean off pressors as able, continue Midodrine  - remains on CVVH. 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   Transferred from [facility] 12/20 for further management of acute liver failure and liver transplant evaluation 2/2 known ETOH Cirrhosis.     [] Decompensated ETOH Cirrhosis  - wean off pressors as able, continue Midodrine  - remains on CVVH. 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   Transferred from [facility] 12/20 for further management of acute liver failure and liver transplant evaluation 2/2 known ETOH Cirrhosis.     [] Decompensated ETOH Cirrhosis  - wean off pressors as able, continue Midodrine  - remains on CVVH. 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   Transferred from [facility] 12/20 for further management of acute liver failure and liver transplant evaluation 2/2 known ETOH Cirrhosis.     [] Decompensated ETOH Cirrhosis  - wean off pressors as able, continue Midodrine  - remains on CVVH. 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   Transferred from [facility] 12/20 for further management of acute liver failure and liver transplant evaluation 2/2 known ETOH Cirrhosis.     [] Decompensated ETOH Cirrhosis  - wean off pressors as able, continue Midodrine  - remains on CVVH. 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   Transferred from [facility] 12/20 for further management of acute liver failure and liver transplant evaluation 2/2 known ETOH Cirrhosis.     [] Decompensated ETOH Cirrhosis  - wean off pressors as able, continue Midodrine  - remains on CVVH. continued pulmonary edema on CXR  - ID: Leukocytosis, change fluc to caspo; cont zonia; bld cxs sent today   - HE: lactulose/rifaximin   - on Keppra for seizures, (no activity since admission)  - continue liver transplant evaluation w/u per protocol  - ABO O, MELD 43 today

## 2022-12-27 DIAGNOSIS — E83.39 OTHER DISORDERS OF PHOSPHORUS METABOLISM: ICD-10-CM

## 2022-12-27 LAB
ALBUMIN SERPL ELPH-MCNC: 3.8 G/DL — SIGNIFICANT CHANGE UP (ref 3.3–5)
ALBUMIN SERPL ELPH-MCNC: 3.9 G/DL — SIGNIFICANT CHANGE UP (ref 3.3–5)
ALP SERPL-CCNC: 162 U/L — HIGH (ref 40–120)
ALP SERPL-CCNC: 166 U/L — HIGH (ref 40–120)
ALP SERPL-CCNC: 167 U/L — HIGH (ref 40–120)
ALP SERPL-CCNC: 171 U/L — HIGH (ref 40–120)
ALT FLD-CCNC: 48 U/L — HIGH (ref 10–45)
ALT FLD-CCNC: 49 U/L — HIGH (ref 10–45)
ALT FLD-CCNC: 51 U/L — HIGH (ref 10–45)
ALT FLD-CCNC: 52 U/L — HIGH (ref 10–45)
AMMONIA BLD-MCNC: 67 UMOL/L — HIGH (ref 11–55)
ANION GAP SERPL CALC-SCNC: 13 MMOL/L — SIGNIFICANT CHANGE UP (ref 5–17)
ANION GAP SERPL CALC-SCNC: 15 MMOL/L — SIGNIFICANT CHANGE UP (ref 5–17)
ANION GAP SERPL CALC-SCNC: 16 MMOL/L — SIGNIFICANT CHANGE UP (ref 5–17)
ANION GAP SERPL CALC-SCNC: 17 MMOL/L — SIGNIFICANT CHANGE UP (ref 5–17)
APTT BLD: 51.4 SEC — HIGH (ref 27.5–35.5)
AST SERPL-CCNC: 119 U/L — HIGH (ref 10–40)
AST SERPL-CCNC: 127 U/L — HIGH (ref 10–40)
AST SERPL-CCNC: 134 U/L — HIGH (ref 10–40)
BILIRUB SERPL-MCNC: 27.5 MG/DL — HIGH (ref 0.2–1.2)
BILIRUB SERPL-MCNC: 27.6 MG/DL — HIGH (ref 0.2–1.2)
BILIRUB SERPL-MCNC: 28.9 MG/DL — HIGH (ref 0.2–1.2)
BILIRUB SERPL-MCNC: 29 MG/DL — HIGH (ref 0.2–1.2)
BUN SERPL-MCNC: 10 MG/DL — SIGNIFICANT CHANGE UP (ref 7–23)
BUN SERPL-MCNC: 8 MG/DL — SIGNIFICANT CHANGE UP (ref 7–23)
BUN SERPL-MCNC: 9 MG/DL — SIGNIFICANT CHANGE UP (ref 7–23)
CALCIUM SERPL-MCNC: 8.7 MG/DL — SIGNIFICANT CHANGE UP (ref 8.4–10.5)
CALCIUM SERPL-MCNC: 8.8 MG/DL — SIGNIFICANT CHANGE UP (ref 8.4–10.5)
CHLORIDE SERPL-SCNC: 100 MMOL/L — SIGNIFICANT CHANGE UP (ref 96–108)
CHLORIDE SERPL-SCNC: 101 MMOL/L — SIGNIFICANT CHANGE UP (ref 96–108)
CHLORIDE SERPL-SCNC: 102 MMOL/L — SIGNIFICANT CHANGE UP (ref 96–108)
CHOLEST SERPL-MCNC: 100 MG/DL — SIGNIFICANT CHANGE UP
CO2 SERPL-SCNC: 21 MMOL/L — LOW (ref 22–31)
CO2 SERPL-SCNC: 22 MMOL/L — SIGNIFICANT CHANGE UP (ref 22–31)
CO2 SERPL-SCNC: 23 MMOL/L — SIGNIFICANT CHANGE UP (ref 22–31)
CO2 SERPL-SCNC: 25 MMOL/L — SIGNIFICANT CHANGE UP (ref 22–31)
CREAT SERPL-MCNC: 0.7 MG/DL — SIGNIFICANT CHANGE UP (ref 0.5–1.3)
CREAT SERPL-MCNC: 0.72 MG/DL — SIGNIFICANT CHANGE UP (ref 0.5–1.3)
CULTURE RESULTS: SIGNIFICANT CHANGE UP
EGFR: 95 ML/MIN/1.73M2 — SIGNIFICANT CHANGE UP
EGFR: 98 ML/MIN/1.73M2 — SIGNIFICANT CHANGE UP
GAS PNL BLDA: SIGNIFICANT CHANGE UP
GAS PNL BLDV: SIGNIFICANT CHANGE UP
GGT SERPL-CCNC: 190 U/L — HIGH (ref 8–40)
GLUCOSE BLDC GLUCOMTR-MCNC: 116 MG/DL — HIGH (ref 70–99)
GLUCOSE BLDC GLUCOMTR-MCNC: 127 MG/DL — HIGH (ref 70–99)
GLUCOSE BLDC GLUCOMTR-MCNC: 133 MG/DL — HIGH (ref 70–99)
GLUCOSE BLDC GLUCOMTR-MCNC: 144 MG/DL — HIGH (ref 70–99)
GLUCOSE BLDC GLUCOMTR-MCNC: 158 MG/DL — HIGH (ref 70–99)
GLUCOSE SERPL-MCNC: 130 MG/DL — HIGH (ref 70–99)
GLUCOSE SERPL-MCNC: 133 MG/DL — HIGH (ref 70–99)
GLUCOSE SERPL-MCNC: 136 MG/DL — HIGH (ref 70–99)
GLUCOSE SERPL-MCNC: 150 MG/DL — HIGH (ref 70–99)
HBV DNA # SERPL NAA+PROBE: SIGNIFICANT CHANGE UP
HBV DNA SERPL NAA+PROBE-LOG#: SIGNIFICANT CHANGE UP LOGIU/ML
HCT VFR BLD CALC: 23.5 % — LOW (ref 34.5–45)
HCT VFR BLD CALC: 24 % — LOW (ref 34.5–45)
HCT VFR BLD CALC: 24.6 % — LOW (ref 34.5–45)
HCV RNA FLD QL NAA+PROBE: SIGNIFICANT CHANGE UP
HCV RNA SPEC QL PROBE+SIG AMP: SIGNIFICANT CHANGE UP
HDLC SERPL-MCNC: 9 MG/DL — LOW
HGB BLD-MCNC: 7.9 G/DL — LOW (ref 11.5–15.5)
HGB BLD-MCNC: 8.1 G/DL — LOW (ref 11.5–15.5)
HGB BLD-MCNC: 8.4 G/DL — LOW (ref 11.5–15.5)
INR BLD: 2.4 RATIO — HIGH (ref 0.88–1.16)
IRON SATN MFR SERPL: 51 UG/DL — SIGNIFICANT CHANGE UP (ref 30–160)
IRON SATN MFR SERPL: SIGNIFICANT CHANGE UP % (ref 14–50)
LIPID PNL WITH DIRECT LDL SERPL: 58 MG/DL — SIGNIFICANT CHANGE UP
LKM AB SER-ACNC: <20.1 UNITS — SIGNIFICANT CHANGE UP (ref 0–20)
MAGNESIUM SERPL-MCNC: 2.4 MG/DL — SIGNIFICANT CHANGE UP (ref 1.6–2.6)
MAGNESIUM SERPL-MCNC: 2.5 MG/DL — SIGNIFICANT CHANGE UP (ref 1.6–2.6)
MCHC RBC-ENTMCNC: 31.3 PG — SIGNIFICANT CHANGE UP (ref 27–34)
MCHC RBC-ENTMCNC: 31.4 PG — SIGNIFICANT CHANGE UP (ref 27–34)
MCHC RBC-ENTMCNC: 31.7 PG — SIGNIFICANT CHANGE UP (ref 27–34)
MCHC RBC-ENTMCNC: 31.8 PG — SIGNIFICANT CHANGE UP (ref 27–34)
MCHC RBC-ENTMCNC: 33.6 GM/DL — SIGNIFICANT CHANGE UP (ref 32–36)
MCHC RBC-ENTMCNC: 33.8 GM/DL — SIGNIFICANT CHANGE UP (ref 32–36)
MCHC RBC-ENTMCNC: 34.1 GM/DL — SIGNIFICANT CHANGE UP (ref 32–36)
MCV RBC AUTO: 92.8 FL — SIGNIFICANT CHANGE UP (ref 80–100)
MCV RBC AUTO: 93 FL — SIGNIFICANT CHANGE UP (ref 80–100)
MCV RBC AUTO: 93.3 FL — SIGNIFICANT CHANGE UP (ref 80–100)
MCV RBC AUTO: 94.1 FL — SIGNIFICANT CHANGE UP (ref 80–100)
NON HDL CHOLESTEROL: 91 MG/DL — SIGNIFICANT CHANGE UP
NRBC # BLD: 0 /100 WBCS — SIGNIFICANT CHANGE UP (ref 0–0)
PHOSPHATE SERPL-MCNC: 1.6 MG/DL — LOW (ref 2.5–4.5)
PHOSPHATE SERPL-MCNC: 2.1 MG/DL — LOW (ref 2.5–4.5)
PHOSPHATE SERPL-MCNC: 3.8 MG/DL — SIGNIFICANT CHANGE UP (ref 2.5–4.5)
PHOSPHATE SERPL-MCNC: 4 MG/DL — SIGNIFICANT CHANGE UP (ref 2.5–4.5)
PLATELET # BLD AUTO: 40 K/UL — LOW (ref 150–400)
PLATELET # BLD AUTO: 44 K/UL — LOW (ref 150–400)
PLATELET # BLD AUTO: 45 K/UL — LOW (ref 150–400)
PLATELET # BLD AUTO: 49 K/UL — LOW (ref 150–400)
POTASSIUM SERPL-MCNC: 3.8 MMOL/L — SIGNIFICANT CHANGE UP (ref 3.5–5.3)
POTASSIUM SERPL-MCNC: 3.9 MMOL/L — SIGNIFICANT CHANGE UP (ref 3.5–5.3)
POTASSIUM SERPL-MCNC: 4 MMOL/L — SIGNIFICANT CHANGE UP (ref 3.5–5.3)
POTASSIUM SERPL-MCNC: 4.1 MMOL/L — SIGNIFICANT CHANGE UP (ref 3.5–5.3)
POTASSIUM SERPL-SCNC: 3.8 MMOL/L — SIGNIFICANT CHANGE UP (ref 3.5–5.3)
POTASSIUM SERPL-SCNC: 3.9 MMOL/L — SIGNIFICANT CHANGE UP (ref 3.5–5.3)
POTASSIUM SERPL-SCNC: 4 MMOL/L — SIGNIFICANT CHANGE UP (ref 3.5–5.3)
POTASSIUM SERPL-SCNC: 4.1 MMOL/L — SIGNIFICANT CHANGE UP (ref 3.5–5.3)
PROT SERPL-MCNC: 6.4 G/DL — SIGNIFICANT CHANGE UP (ref 6–8.3)
PROT SERPL-MCNC: 6.5 G/DL — SIGNIFICANT CHANGE UP (ref 6–8.3)
PROT SERPL-MCNC: 6.6 G/DL — SIGNIFICANT CHANGE UP (ref 6–8.3)
PROTHROM AB SERPL-ACNC: 27.8 SEC — HIGH (ref 10.5–13.4)
RBC # BLD: 2.52 M/UL — LOW (ref 3.8–5.2)
RBC # BLD: 2.55 M/UL — LOW (ref 3.8–5.2)
RBC # BLD: 2.58 M/UL — LOW (ref 3.8–5.2)
RBC # BLD: 2.65 M/UL — LOW (ref 3.8–5.2)
RBC # FLD: 17.2 % — HIGH (ref 10.3–14.5)
RBC # FLD: 17.3 % — HIGH (ref 10.3–14.5)
SODIUM SERPL-SCNC: 138 MMOL/L — SIGNIFICANT CHANGE UP (ref 135–145)
SODIUM SERPL-SCNC: 139 MMOL/L — SIGNIFICANT CHANGE UP (ref 135–145)
SODIUM SERPL-SCNC: 140 MMOL/L — SIGNIFICANT CHANGE UP (ref 135–145)
SPECIMEN SOURCE: SIGNIFICANT CHANGE UP
TIBC SERPL-MCNC: SIGNIFICANT CHANGE UP UG/DL (ref 220–430)
TRIGL SERPL-MCNC: 164 MG/DL — HIGH
UIBC SERPL-MCNC: <20 UG/DL — LOW (ref 110–370)
WBC # BLD: 35.17 K/UL — HIGH (ref 3.8–10.5)
WBC # BLD: 36.32 K/UL — HIGH (ref 3.8–10.5)
WBC # BLD: 36.51 K/UL — HIGH (ref 3.8–10.5)
WBC # BLD: 36.85 K/UL — HIGH (ref 3.8–10.5)
WBC # FLD AUTO: 35.17 K/UL — HIGH (ref 3.8–10.5)
WBC # FLD AUTO: 36.32 K/UL — HIGH (ref 3.8–10.5)
WBC # FLD AUTO: 36.51 K/UL — HIGH (ref 3.8–10.5)
WBC # FLD AUTO: 36.85 K/UL — HIGH (ref 3.8–10.5)

## 2022-12-27 PROCEDURE — 71045 X-RAY EXAM CHEST 1 VIEW: CPT | Mod: 26

## 2022-12-27 PROCEDURE — 99223 1ST HOSP IP/OBS HIGH 75: CPT

## 2022-12-27 PROCEDURE — 71045 X-RAY EXAM CHEST 1 VIEW: CPT | Mod: 26,77

## 2022-12-27 PROCEDURE — 99233 SBSQ HOSP IP/OBS HIGH 50: CPT

## 2022-12-27 PROCEDURE — 90945 DIALYSIS ONE EVALUATION: CPT | Mod: GC

## 2022-12-27 RX ORDER — SODIUM CHLORIDE 9 MG/ML
500 INJECTION, SOLUTION INTRAVENOUS ONCE
Refills: 0 | Status: COMPLETED | OUTPATIENT
Start: 2022-12-27 | End: 2022-12-27

## 2022-12-27 RX ORDER — HYDROMORPHONE HYDROCHLORIDE 2 MG/ML
0.25 INJECTION INTRAMUSCULAR; INTRAVENOUS; SUBCUTANEOUS ONCE
Refills: 0 | Status: DISCONTINUED | OUTPATIENT
Start: 2022-12-27 | End: 2022-12-27

## 2022-12-27 RX ORDER — VANCOMYCIN HCL 1 G
1000 VIAL (EA) INTRAVENOUS ONCE
Refills: 0 | Status: COMPLETED | OUTPATIENT
Start: 2022-12-27 | End: 2022-12-27

## 2022-12-27 RX ORDER — HYDROMORPHONE HYDROCHLORIDE 2 MG/ML
0.5 INJECTION INTRAMUSCULAR; INTRAVENOUS; SUBCUTANEOUS ONCE
Refills: 0 | Status: DISCONTINUED | OUTPATIENT
Start: 2022-12-27 | End: 2022-12-27

## 2022-12-27 RX ORDER — ONDANSETRON 8 MG/1
4 TABLET, FILM COATED ORAL EVERY 6 HOURS
Refills: 0 | Status: DISCONTINUED | OUTPATIENT
Start: 2022-12-27 | End: 2023-01-02

## 2022-12-27 RX ORDER — ONDANSETRON 8 MG/1
4 TABLET, FILM COATED ORAL ONCE
Refills: 0 | Status: COMPLETED | OUTPATIENT
Start: 2022-12-27 | End: 2022-12-27

## 2022-12-27 RX ORDER — ALBUMIN HUMAN 25 %
100 VIAL (ML) INTRAVENOUS ONCE
Refills: 0 | Status: COMPLETED | OUTPATIENT
Start: 2022-12-27 | End: 2022-12-27

## 2022-12-27 RX ADMIN — SODIUM CHLORIDE 1000 MILLILITER(S): 9 INJECTION, SOLUTION INTRAVENOUS at 22:33

## 2022-12-27 RX ADMIN — Medication 1 MILLIGRAM(S): at 11:03

## 2022-12-27 RX ADMIN — MEROPENEM 100 MILLIGRAM(S): 1 INJECTION INTRAVENOUS at 14:09

## 2022-12-27 RX ADMIN — Medication 6.21 MICROGRAM(S)/KG/MIN: at 08:16

## 2022-12-27 RX ADMIN — OXYCODONE HYDROCHLORIDE 10 MILLIGRAM(S): 5 TABLET ORAL at 14:55

## 2022-12-27 RX ADMIN — MIDODRINE HYDROCHLORIDE 20 MILLIGRAM(S): 2.5 TABLET ORAL at 05:06

## 2022-12-27 RX ADMIN — Medication 6.21 MICROGRAM(S)/KG/MIN: at 05:06

## 2022-12-27 RX ADMIN — PANTOPRAZOLE SODIUM 40 MILLIGRAM(S): 20 TABLET, DELAYED RELEASE ORAL at 12:12

## 2022-12-27 RX ADMIN — SODIUM CHLORIDE 1000 MILLILITER(S): 9 INJECTION, SOLUTION INTRAVENOUS at 21:36

## 2022-12-27 RX ADMIN — HYDROMORPHONE HYDROCHLORIDE 0.25 MILLIGRAM(S): 2 INJECTION INTRAMUSCULAR; INTRAVENOUS; SUBCUTANEOUS at 23:36

## 2022-12-27 RX ADMIN — Medication 15 MILLILITER(S): at 11:03

## 2022-12-27 RX ADMIN — LACTULOSE 20 GRAM(S): 10 SOLUTION ORAL at 14:10

## 2022-12-27 RX ADMIN — OXYCODONE HYDROCHLORIDE 5 MILLIGRAM(S): 5 TABLET ORAL at 10:02

## 2022-12-27 RX ADMIN — MEROPENEM 100 MILLIGRAM(S): 1 INJECTION INTRAVENOUS at 05:06

## 2022-12-27 RX ADMIN — OXYCODONE HYDROCHLORIDE 10 MILLIGRAM(S): 5 TABLET ORAL at 14:09

## 2022-12-27 RX ADMIN — LACTULOSE 20 GRAM(S): 10 SOLUTION ORAL at 05:06

## 2022-12-27 RX ADMIN — Medication 255 MILLIMOLE(S): at 12:11

## 2022-12-27 RX ADMIN — MEROPENEM 100 MILLIGRAM(S): 1 INJECTION INTRAVENOUS at 21:38

## 2022-12-27 RX ADMIN — OXYCODONE HYDROCHLORIDE 10 MILLIGRAM(S): 5 TABLET ORAL at 04:32

## 2022-12-27 RX ADMIN — OXYCODONE HYDROCHLORIDE 5 MILLIGRAM(S): 5 TABLET ORAL at 05:19

## 2022-12-27 RX ADMIN — Medication 137 MICROGRAM(S): at 05:08

## 2022-12-27 RX ADMIN — VASOPRESSIN 4.5 UNIT(S)/MIN: 20 INJECTION INTRAVENOUS at 08:16

## 2022-12-27 RX ADMIN — VASOPRESSIN 4.5 UNIT(S)/MIN: 20 INJECTION INTRAVENOUS at 05:08

## 2022-12-27 RX ADMIN — ONDANSETRON 4 MILLIGRAM(S): 8 TABLET, FILM COATED ORAL at 12:31

## 2022-12-27 RX ADMIN — ONDANSETRON 4 MILLIGRAM(S): 8 TABLET, FILM COATED ORAL at 19:24

## 2022-12-27 RX ADMIN — Medication 100 MILLIGRAM(S): at 11:03

## 2022-12-27 RX ADMIN — CHLORHEXIDINE GLUCONATE 1 APPLICATION(S): 213 SOLUTION TOPICAL at 05:31

## 2022-12-27 RX ADMIN — Medication 50 MILLILITER(S): at 21:37

## 2022-12-27 RX ADMIN — OXYCODONE HYDROCHLORIDE 5 MILLIGRAM(S): 5 TABLET ORAL at 18:15

## 2022-12-27 RX ADMIN — MIDODRINE HYDROCHLORIDE 20 MILLIGRAM(S): 2.5 TABLET ORAL at 14:10

## 2022-12-27 RX ADMIN — OXYCODONE HYDROCHLORIDE 5 MILLIGRAM(S): 5 TABLET ORAL at 10:50

## 2022-12-27 RX ADMIN — Medication 50 MILLILITER(S): at 22:23

## 2022-12-27 RX ADMIN — Medication 250 MILLIGRAM(S): at 11:02

## 2022-12-27 RX ADMIN — ONDANSETRON 4 MILLIGRAM(S): 8 TABLET, FILM COATED ORAL at 18:34

## 2022-12-27 RX ADMIN — Medication 2: at 06:00

## 2022-12-27 RX ADMIN — HYDROMORPHONE HYDROCHLORIDE 0.25 MILLIGRAM(S): 2 INJECTION INTRAMUSCULAR; INTRAVENOUS; SUBCUTANEOUS at 23:51

## 2022-12-27 RX ADMIN — Medication 6.21 MICROGRAM(S)/KG/MIN: at 19:55

## 2022-12-27 RX ADMIN — Medication 6.21 MICROGRAM(S)/KG/MIN: at 06:00

## 2022-12-27 RX ADMIN — OXYCODONE HYDROCHLORIDE 5 MILLIGRAM(S): 5 TABLET ORAL at 17:16

## 2022-12-27 RX ADMIN — VASOPRESSIN 4.5 UNIT(S)/MIN: 20 INJECTION INTRAVENOUS at 19:55

## 2022-12-27 RX ADMIN — LEVETIRACETAM 750 MILLIGRAM(S): 250 TABLET, FILM COATED ORAL at 05:06

## 2022-12-27 RX ADMIN — LEVETIRACETAM 750 MILLIGRAM(S): 250 TABLET, FILM COATED ORAL at 17:16

## 2022-12-27 RX ADMIN — OXYCODONE HYDROCHLORIDE 5 MILLIGRAM(S): 5 TABLET ORAL at 05:49

## 2022-12-27 RX ADMIN — ESCITALOPRAM OXALATE 10 MILLIGRAM(S): 10 TABLET, FILM COATED ORAL at 11:03

## 2022-12-27 RX ADMIN — OXYCODONE HYDROCHLORIDE 10 MILLIGRAM(S): 5 TABLET ORAL at 05:02

## 2022-12-27 RX ADMIN — CASPOFUNGIN ACETATE 260 MILLIGRAM(S): 7 INJECTION, POWDER, LYOPHILIZED, FOR SOLUTION INTRAVENOUS at 09:08

## 2022-12-27 NOTE — PROGRESS NOTE ADULT - TIME BILLING
63 yo F with h/o decompensated ETOH cirrhosis with ascites, thyroid cancer (s/p total thyroidectomy, RTX, and radioactive iodide), HTN, ventrical neoplasm who was initially admitted to  12/19 with new onset seizures and transferred to [facility] 12/20 for liver transplant evaluation. Pt being seen for RED requiring CRRT.    Oliguric RED -  on CRRT since 12/20  Remains oliguric  Continue CRRT   I have seen the patient and reviewed CRRT prescription and flowsheet. CRRT vascular access is functioning well. CRRT prescription has been adjusted for optimized control of volume status, uremia and electrolytes. Management of additional metabolic abnormalities/anemia will continue to be addressed on follow up. Replacement fluids and dialysate adjusted for hypophosphatemia  I was present during and reviewed clinical and lab data as well as assessment and plan as documented by the house staff as noted. Please contact if any additional questions with any change in clinical condition or on availability of any additional information or reports. 61 yo F with h/o decompensated ETOH cirrhosis with ascites, thyroid cancer (s/p total thyroidectomy, RTX, and radioactive iodide), HTN, ventrical neoplasm who was initially admitted to  12/19 with new onset seizures and transferred to [facility] 12/20 for liver transplant evaluation. Pt being seen for RED requiring CRRT.    Oliguric RED -  on CRRT since 12/20  Remains oliguric  Continue CRRT   I have seen the patient and reviewed CRRT prescription and flowsheet. CRRT vascular access is functioning well. CRRT prescription has been adjusted for optimized control of volume status, uremia and electrolytes. Management of additional metabolic abnormalities/anemia will continue to be addressed on follow up. Replacement fluids and dialysate adjusted for hypophosphatemia  I was present during and reviewed clinical and lab data as well as assessment and plan as documented by the house staff as noted. Please contact if any additional questions with any change in clinical condition or on availability of any additional information or reports. 61 yo F with h/o decompensated ETOH cirrhosis with ascites, thyroid cancer (s/p total thyroidectomy, RTX, and radioactive iodide), HTN, ventrical neoplasm who was initially admitted to  12/19 with new onset seizures and transferred to [facility] 12/20 for liver transplant evaluation. Pt being seen for RED requiring CRRT.    Oliguric RED -  on CRRT since 12/20  Remains oliguric  Continue CRRT   I have seen the patient and reviewed CRRT prescription and flowsheet. CRRT vascular access is functioning well. CRRT prescription has been adjusted for optimized control of volume status, uremia and electrolytes. Management of additional metabolic abnormalities/anemia will continue to be addressed on follow up. Replacement fluids and dialysate adjusted for hypophosphatemia  I was present during and reviewed clinical and lab data as well as assessment and plan as documented by the house staff as noted. Please contact if any additional questions with any change in clinical condition or on availability of any additional information or reports.

## 2022-12-27 NOTE — PROGRESS NOTE ADULT - SUBJECTIVE AND OBJECTIVE BOX
24 HOUR EVENTS:  - Upgraded fluconazole -> caspofungin for rising WBC count as per transplant surgery  - Decreased lactulose from 20 g q6hrs -> q8hrs  - Started regular diet and keeping tube feeds to supplement unless PO intake is consistently adequate  - Advanced tube feeds w/ Nepro to goal of 50 mL/hr  - s/p diagnostic paracentesis w/ 100 mL of jinny-colored clear ascites drained    SUBJECTIVE/ROS:  [ ] A ten-point review of systems was otherwise negative except as noted.  [x] Due to altered mental status/intubation, subjective information were not able to be obtained from the patient. History was obtained, to the extent possible, from review of the chart and collateral sources of information.      NEURO  Exam: non-focal, but inc lethargy  Meds: escitalopram 10 milliGRAM(s) Oral daily  levETIRAcetam  Solution 750 milliGRAM(s) Enteral Tube two times a day  oxyCODONE    Solution 5 milliGRAM(s) Oral every 4 hours PRN Moderate Pain (4 - 6)  oxyCODONE    Solution 10 milliGRAM(s) Oral every 4 hours PRN Severe Pain (7 - 10)    [x] Adequacy of sedation and pain control has been assessed and adjusted      RESPIRATORY  RR: 14 (12-27-22 @ 01:30) (14 - 44)  SpO2: 95% (12-27-22 @ 01:30) (90% - 96%)  Wt(kg): --  Exam: unlabored, clear to auscultation bilaterally  Mechanical Ventilation:   ABG - ( 26 Dec 2022 20:06 )  pH: 7.46  /  pCO2: 33    /  pO2: 78    / HCO3: 24    / Base Excess: -0.1  /  SaO2: 98.7    Lactate: x           CARDIOVASCULAR  HR: 86 (12-27-22 @ 01:30) (72 - 99)  BP: 126/60 (12-26-22 @ 19:07) (91/50 - 126/60)  BP(mean): 85 (12-26-22 @ 19:07) (64 - 85)  ABP: 99/48 (12-27-22 @ 01:30) (89/39 - 120/54)  ABP(mean): 67 (12-27-22 @ 01:30) (56 - 81)      Exam:  Cardiac Rhythm: s1s2  Perfusion     [x]Adequate   [ ]Inadequate  Extremities  [x]Warm         [ ]Cool  Volume Status [x]Hypervolemic [ ]Euvolemic [ ]Hypovolemic  Meds: midodrine 20 milliGRAM(s) Oral every 8 hours  norepinephrine Infusion 0.05 MICROgram(s)/kG/Min IV Continuous <Continuous>        GI/NUTRITION  Exam: distended, jaundice  Diet: NPO  Meds: lactulose Syrup 20 Gram(s) Oral every 8 hours  pantoprazole   Suspension 40 milliGRAM(s) Oral every 24 hours      GENITOURINARY  I&O's Detail    12-25 @ 07:01 - 12-26 @ 07:00  --------------------------------------------------------  IN:    Dexmedetomidine: 33.2 mL    Enteral Tube Flush: 360 mL    IV PiggyBack: 750 mL    Nepro with Carb Steady: 805 mL    Norepinephrine: 67 mL    Vasopressin: 4.5 mL    Vasopressin: 94.5 mL  Total IN: 2114.2 mL    OUT:    Indwelling Catheter - Urethral (mL): 5 mL    Other (mL): 3243 mL    Rectal Tube (mL): 1400 mL  Total OUT: 4648 mL    Total NET: -2533.8 mL      12-26 @ 07:01 - 12-27 @ 01:44  --------------------------------------------------------  IN:    Enteral Tube Flush: 185 mL    IV PiggyBack: 250 mL    IV PiggyBack: 850 mL    Nepro with Carb Steady: 810 mL    Norepinephrine: 6.2 mL    Norepinephrine: 221.9 mL    Oral Fluid: 30 mL    Vasopressin: 81 mL  Total IN: 2434.1 mL    OUT:    Other (mL): 1878 mL    Rectal Tube (mL): 600 mL  Total OUT: 2478 mL    Total NET: -43.9 mL          12-26    141  |  104  |  7   ----------------------------<  123<H>  3.8   |  22  |  0.71    Ca    8.5      26 Dec 2022 20:20  Phos  2.8     12-26  Mg     2.4     12-26    TPro  6.0  /  Alb  3.7  /  TBili  26.9<H>  /  DBili  x   /  AST  114<H>  /  ALT  45  /  AlkPhos  166<H>  12-26    [ ] Monroe catheter, indication: N/A  Meds: folic acid 1 milliGRAM(s) Oral daily  multivitamin/minerals/iron Oral Solution (CENTRUM) 15 milliLiter(s) Oral daily  thiamine 100 milliGRAM(s) Enteral Tube daily        HEMATOLOGIC  Meds:   [x] VTE Prophylaxis                        8.2    35.53 )-----------( 48       ( 26 Dec 2022 20:19 )             23.8     PT/INR - ( 26 Dec 2022 02:18 )   PT: 31.7 sec;   INR: 2.71 ratio         PTT - ( 26 Dec 2022 02:18 )  PTT:52.8 sec  Transfusion     [ ] PRBC   [ ] Platelets   [ ] FFP   [ ] Cryoprecipitate      INFECTIOUS DISEASES  T(C): 36.8 (12-26-22 @ 23:00), Max: 37.1 (12-26-22 @ 15:00)  Wt(kg): --  WBC Count: 35.53 K/uL (12-26 @ 20:19)  WBC Count: 33.04 K/uL (12-26 @ 14:35)  WBC Count: 31.79 K/uL (12-26 @ 08:16)  WBC Count: 30.34 K/uL (12-26 @ 02:18)    Recent Cultures:  Specimen Source: Peritoneal Peritoneal Fluid, 12-26 @ 15:00; Results --; Gram Stain:   polymorphonuclear leukocytes seen  No organisms seen  by cytocentrifuge; Organism: --  Specimen Source: .Blood Blood, 12-24 @ 20:55; Results   No growth to date.; Gram Stain: --; Organism: --  Specimen Source: .Blood Blood, 12-24 @ 20:20; Results   No growth to date.; Gram Stain: --; Organism: --  Specimen Source: .Blood Blood, 12-23 @ 13:35; Results   No growth to date.; Gram Stain: --; Organism: --  Specimen Source: .Blood Blood, 12-23 @ 13:25; Results   No growth to date.; Gram Stain: --; Organism: --  Specimen Source: Combi-Cath Combi-Cath, 12-22 @ 17:02; Results   Normal Respiratory Cassandra present; Gram Stain:   Moderate polymorphonuclear leukocytes seen per low power field  No squamous epithelial cells seen per low power field  No organisms seen per oil power field; Organism: --  Specimen Source: Catheterized Catheterized, 12-21 @ 22:55; Results   >100,000 CFU/ml Leticia dubliniensis "Susceptibilities not performed"; Gram Stain: --; Organism: --  Specimen Source: Ascites Fl Ascites Fluid, 12-21 @ 19:53; Results   No growth; Gram Stain:   polymorphonuclear leukocytes seen  No organisms seen  by cytocentrifuge; Organism: --  Specimen Source: Trach Asp Tracheal Aspirate, 12-21 @ 01:19; Results   Normal Respiratory Cassandra present; Gram Stain:   No polymorphonuclear leukocytes per low power field  Numerous Squamous epithelial cells per low power field  Moderate Gram Positive Rods seen per oil power field  Few Yeast like cells seen per oil power field; Organism: --  Specimen Source: .Blood Blood-Peripheral, 12-20 @ 23:30; Results   No Growth Final; Gram Stain: --; Organism: --  Specimen Source: .Blood Blood-Peripheral, 12-20 @ 22:38; Results   No Growth Final; Gram Stain: --; Organism: --    Meds: caspofungin IVPB 50 milliGRAM(s) IV Intermittent every 24 hours  caspofungin IVPB      influenza   Vaccine 0.5 milliLiter(s) IntraMuscular once  meropenem  IVPB 1000 milliGRAM(s) IV Intermittent every 8 hours  rifAXIMin 550 milliGRAM(s) Oral every 12 hours        ENDOCRINE  Capillary Blood Glucose    Meds: insulin lispro (ADMELOG) corrective regimen sliding scale   SubCutaneous every 6 hours  levothyroxine 137 MICROGram(s) Oral daily  vasopressin Infusion 0.03 Unit(s)/Min IV Continuous <Continuous>        ACCESS DEVICES:  [ ] Peripheral IV  [ ] Central Venous Line	[ ] R	[ ] L	[ ] IJ	[ ] Fem	[ ] SC	Placed:   [ ] Arterial Line		[ ] R	[ ] L	[ ] Fem	[ ] Rad	[ ] Ax	Placed:   [ ] PICC:					[ ] Mediport  [ ] Urinary Catheter, Date Placed:   [ ] Necessity of urinary, arterial, and venous catheters discussed    OTHER MEDICATIONS:  chlorhexidine 2% Cloths 1 Application(s) Topical <User Schedule>  CRRT Treatment    <Continuous>  PrismaSATE Dialysate BGK 4 / 2.5 5000 milliLiter(s) CRRT <Continuous>  PrismaSOL Filtration BGK 4 / 2.5 5000 milliLiter(s) CRRT <Continuous>  PrismaSOL Filtration BGK 4 / 2.5 5000 milliLiter(s) CRRT <Continuous>  sodium chloride 0.65% Nasal 1 Spray(s) Both Nostrils every 3 hours PRN  tetracaine/benzocaine/butamben Spray 1 Spray(s) Topical every 3 hours PRN      CODE STATUS:     IMAGING:

## 2022-12-27 NOTE — PROGRESS NOTE ADULT - ASSESSMENT
62 y.o Hx significant for decompensated ETOH cirrhosis c/b ascites (dx 11/2022), alc hep (dx 11/2022; non-responsive to steroids), remote h/o thyroid cancer in her 20s s/p total thyroidectomy + RTX + radioactive iodide, HTN, ventrical neoplasm (dx 2021) s/p right frontal craniotomy (03/2022) for resection post operative course c/b hemorrhage right lateral ventricle (managed non-operatively) who was initially admitted to  12/19 with new onset seizures and transferred to [facility] 12/20 for LT eval for ACLF.  Of note was recently admitted to  11/2022 for workup and eval of new onset jaundice- during that hospitalization was found to have a UTI and dx with alc hep was treated for UTI w/ abx and started on steroids (was deemed a non-responder and steroids were subsequently stopped); s/p LVP at [place] 11/2022. Previously hospitalized in 2021 at [place] for ETOH detox but pt reportedly unaware of any liver dysfunction at that time. Went to ETOH rehab 08/2022 and was asked to leave the facility when she was COVID+; was sober for 1 week until she started drinking again. Had also attended a few AA meetings in the past.    #ACLF  #Worsening bilateral airspace opacities: likely ARDS vs PNA  #AUD  #New onset seizure- unknown trigger/etiology- currently on Keppra  #Decompensated alcohol-associated cirrhosis c/b prior ascites req LVP       -MELD-Na = 40 on 12/27/2022       -Ascites: large       -SBP: negative on 12/26/2022       -Varices: unknown       -Hepatic encephalopathy: yes, Ammonia, Serum: 67 umol/L *H* [11 - 55] (12-27-22 @ 02:26), currently on:  lactulose Syrup 20 Gram(s) Oral every 8 hours, 12-26-22 @ 09:31,   rifAXIMin 550 milliGRAM(s) Oral every 12 hours, 12-25-22 @ 09:07       -HCC: Alpha Fetoprotein - Tumor Marker: 4.2 ng/mL (12-24-22 @ 20:58)    Recommendations:  -trend clinical symptoms, exam findings, vital signs, CBC, CMP, INR  -continue CVVHD  -continue broad spectrum antibiotic coverage, broaden antifungal to caspofungin, appreciate assistance from ID  -wean pressors as able    Note incomplete until finalized by attending signature/attestation.    Ronnie Tatum  GI/Hepatology Fellow    MONDAY-FRIDAY 8AM-5PM:  Pager# [phone] ([facility]) or [phone] ([facility])    NON-URGENT CONSULTS:  Please email [email or web address]  AT NIGHT AND ON WEEKENDS:  Contact on-call GI fellow via answering service ([phone]) from 5pm-8am and on weekends/holidays   62 y.o Hx significant for decompensated ETOH cirrhosis c/b ascites (dx 11/2022), alc hep (dx 11/2022; non-responsive to steroids), remote h/o thyroid cancer in her 20s s/p total thyroidectomy + RTX + radioactive iodide, HTN, 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  Of note was recently admitted to  11/2022 for workup and eval of new onset jaundice- during that hospitalization was found to have a UTI and dx with alc hep was treated for UTI w/ abx and started on steroids (was deemed a non-responder and steroids were subsequently stopped); s/p LVP at [place] 11/2022. Previously hospitalized in 2021 at [place] for ETOH detox but pt reportedly unaware of any liver dysfunction at that time. Went to ETOH rehab 08/2022 and was asked to leave the facility when she was COVID+; was sober for 1 week until she started drinking again. Had also attended a few AA meetings in the past.    #ACLF  #Worsening bilateral airspace opacities: likely ARDS vs PNA  #AUD  #New onset seizure- unknown trigger/etiology- currently on Keppra  #Decompensated alcohol-associated cirrhosis c/b prior ascites req LVP       -MELD-Na = 40 on 12/27/2022       -Ascites: large       -SBP: negative on 12/26/2022       -Varices: unknown       -Hepatic encephalopathy: yes, Ammonia, Serum: 67 umol/L *H* [11 - 55] (12-27-22 @ 02:26), currently on:  lactulose Syrup 20 Gram(s) Oral every 8 hours, 12-26-22 @ 09:31,   rifAXIMin 550 milliGRAM(s) Oral every 12 hours, 12-25-22 @ 09:07       -HCC: Alpha Fetoprotein - Tumor Marker: 4.2 ng/mL (12-24-22 @ 20:58)    Recommendations:  -trend clinical symptoms, exam findings, vital signs, CBC, CMP, INR  -continue CVVHD  -continue broad spectrum antibiotic coverage, broaden antifungal to caspofungin, appreciate assistance from ID  -wean pressors as able    Note incomplete until finalized by attending signature/attestation.    Ronnie Tatum  GI/Hepatology Fellow    MONDAY-FRIDAY 8AM-5PM:  Pager# [phone] ([facility]) or [phone] ([facility])    NON-URGENT CONSULTS:  Please email [email or web address]  AT NIGHT AND ON WEEKENDS:  Contact on-call GI fellow via answering service ([phone]) from 5pm-8am and on weekends/holidays   62 y.o Hx significant for decompensated ETOH cirrhosis c/b ascites (dx 11/2022), alc hep (dx 11/2022; non-responsive to steroids), remote h/o thyroid cancer in her 20s s/p total thyroidectomy + RTX + radioactive iodide, HTN, 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  Of note was recently admitted to  11/2022 for workup and eval of new onset jaundice- during that hospitalization was found to have a UTI and dx with alc hep was treated for UTI w/ abx and started on steroids (was deemed a non-responder and steroids were subsequently stopped); s/p LVP at [place] 11/2022. Previously hospitalized in 2021 at [place] for ETOH detox but pt reportedly unaware of any liver dysfunction at that time. Went to ETOH rehab 08/2022 and was asked to leave the facility when she was COVID+; was sober for 1 week until she started drinking again. Had also attended a few AA meetings in the past.    #ACLF  #Worsening bilateral airspace opacities: likely ARDS vs PNA  #AUD  #New onset seizure- unknown trigger/etiology- currently on Keppra  #Decompensated alcohol-associated cirrhosis c/b prior ascites req LVP       -MELD-Na = 40 on 12/27/2022       -Ascites: large       -SBP: negative on 12/26/2022       -Varices: unknown       -Hepatic encephalopathy: yes, Ammonia, Serum: 67 umol/L *H* [11 - 55] (12-27-22 @ 02:26), currently on:  lactulose Syrup 20 Gram(s) Oral every 8 hours, 12-26-22 @ 09:31,   rifAXIMin 550 milliGRAM(s) Oral every 12 hours, 12-25-22 @ 09:07       -HCC: Alpha Fetoprotein - Tumor Marker: 4.2 ng/mL (12-24-22 @ 20:58)    Recommendations:  -trend clinical symptoms, exam findings, vital signs, CBC, CMP, INR  -continue CVVHD  -continue broad spectrum antibiotic coverage, broaden antifungal to caspofungin, appreciate assistance from ID  -wean pressors as able    Note incomplete until finalized by attending signature/attestation.    Ronnie Tatum  GI/Hepatology Fellow    MONDAY-FRIDAY 8AM-5PM:  Pager# [phone] ([facility]) or [phone] ([facility])    NON-URGENT CONSULTS:  Please email [email or web address] OR [email or web address]  AT NIGHT AND ON WEEKENDS:  Contact on-call GI fellow via answering service ([phone]) from 5pm-8am and on weekends/holidays

## 2022-12-27 NOTE — PROGRESS NOTE ADULT - SUBJECTIVE AND OBJECTIVE BOX
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  Arthur ([phone]) and Daughter Taylor at [facility] provided additional history. Of note was recently admitted to  11/2022 for workup and eval of jaundice- during that hospitalization was found to have a UTI and dx with alc hep and started on steroids (was deemed a non-responder and steroids were subsequently stopped); s/p LVP at [place] 11/2022. Previously hospitalized in 2021 at [place] for ETOH detox. Went to ETOH rehab 08/2022 and was asked to leave the facility when she was COVID+; was sober for 1 week until she started drinking again. Had also attended a few AA meetings in the past. Transferred from [facility] 12/20 for further management of acute liver failure and liver transplant evaluation.       Interval events:  - increasing pressor requirements o/n. afebrile  - increasing HFNC  - Rising WBC,    Potential Discharge date: pending clinical stability  Education:  Medications  Plan of care:  See Below      MEDICATIONS  (STANDING):  caspofungin IVPB      caspofungin IVPB 50 milliGRAM(s) IV Intermittent every 24 hours  chlorhexidine 2% Cloths 1 Application(s) Topical <User Schedule>  CRRT Treatment    <Continuous>  escitalopram 10 milliGRAM(s) Oral daily  folic acid 1 milliGRAM(s) Oral daily  influenza   Vaccine 0.5 milliLiter(s) IntraMuscular once  insulin lispro (ADMELOG) corrective regimen sliding scale   SubCutaneous every 6 hours  lactulose Syrup 20 Gram(s) Oral every 8 hours  levETIRAcetam  Solution 750 milliGRAM(s) Enteral Tube two times a day  levothyroxine 137 MICROGram(s) Oral daily  meropenem  IVPB 1000 milliGRAM(s) IV Intermittent every 8 hours  midodrine 20 milliGRAM(s) Oral every 8 hours  multivitamin/minerals/iron Oral Solution (CENTRUM) 15 milliLiter(s) Oral daily  norepinephrine Infusion 0.05 MICROgram(s)/kG/Min (6.21 mL/Hr) IV Continuous <Continuous>  pantoprazole   Suspension 40 milliGRAM(s) Oral every 24 hours  PrismaSATE Dialysate BGK 4 / 2.5 5000 milliLiter(s) (1500 mL/Hr) CRRT <Continuous>  PrismaSOL Filtration BGK 4 / 2.5 5000 milliLiter(s) (800 mL/Hr) CRRT <Continuous>  PrismaSOL Filtration BGK 4 / 2.5 5000 milliLiter(s) (200 mL/Hr) CRRT <Continuous>  rifAXIMin 550 milliGRAM(s) Oral every 12 hours  sodium phosphate 30 milliMole(s)/500 mL IVPB 30 milliMole(s) IV Intermittent once  thiamine 100 milliGRAM(s) Enteral Tube daily  vancomycin  IVPB 1000 milliGRAM(s) IV Intermittent once  vasopressin Infusion 0.03 Unit(s)/Min (4.5 mL/Hr) IV Continuous <Continuous>    MEDICATIONS  (PRN):  oxyCODONE    Solution 5 milliGRAM(s) Oral every 4 hours PRN Moderate Pain (4 - 6)  oxyCODONE    Solution 10 milliGRAM(s) Oral every 4 hours PRN Severe Pain (7 - 10)  sodium chloride 0.65% Nasal 1 Spray(s) Both Nostrils every 3 hours PRN Nasal Congestion  tetracaine/benzocaine/butamben Spray 1 Spray(s) Topical every 3 hours PRN for ngt discomfort      PAST MEDICAL & SURGICAL HISTORY:  HTN (hypertension)  off medication for over one year--states BP has been normal      UTI (urinary tract infection)  currently being treated--will complete antibiotics 3/8/2022      Intracranial tumor      GERD (gastroesophageal reflux disease)      Eczema      Thyroid cancer  surgery. radiation, Radioactive iodine      COVID-19 virus infection  11/2021--recovered at home      H/O total thyroidectomy  age 21&#x27;s for Thyroid cancer, postop complication arterial bleed, second surgery      History of tonsillectomy  age 5      History of appendectomy  age 5          Vital Signs Last 24 Hrs  T(C): 36.7 (27 Dec 2022 07:00), Max: 37.1 (26 Dec 2022 15:00)  T(F): 98.1 (27 Dec 2022 07:00), Max: 98.8 (26 Dec 2022 15:00)  HR: 94 (27 Dec 2022 10:30) (72 - 102)  BP: 126/60 (26 Dec 2022 19:07) (91/50 - 126/60)  BP(mean): 85 (26 Dec 2022 19:07) (64 - 85)  RR: 30 (27 Dec 2022 10:30) (11 - 44)  SpO2: 93% (27 Dec 2022 10:30) (90% - 96%)    Parameters below as of 27 Dec 2022 08:44  Patient On (Oxygen Delivery Method): nasal cannula, high flow  O2 Flow (L/min): 60  O2 Concentration (%): 50    I&O's Summary    26 Dec 2022 07:01  -  27 Dec 2022 07:00  --------------------------------------------------------  IN: 3012.9 mL / OUT: 3354 mL / NET: -341.1 mL    27 Dec 2022 07:01  -  27 Dec 2022 10:48  --------------------------------------------------------  IN: 466 mL / OUT: 416 mL / NET: 50 mL                              8.1    36.85 )-----------( 44       ( 27 Dec 2022 08:41 )             24.0     12-27    139  |  101  |  8   ----------------------------<  136<H>  4.1   |  23  |  0.72    Ca    8.7      27 Dec 2022 08:41  Phos  1.6     12-27  Mg     2.5     12-27    TPro  6.4  /  Alb  3.8  /  TBili  27.6<H>  /  DBili  x   /  AST  134<H>  /  ALT  51<H>  /  AlkPhos  162<H>  12-27          Culture - Fungal, Body Fluid (collected 12-26-22 @ 15:00)  Source: Peritoneal Peritoneal Fluid  Preliminary Report (12-27-22 @ 07:22):    Testing in progress    Culture - Body Fluid with Gram Stain (collected 12-26-22 @ 15:00)  Source: Peritoneal Peritoneal Fluid  Gram Stain (12-26-22 @ 23:25):    polymorphonuclear leukocytes seen    No organisms seen    by cytocentrifuge    Culture - Blood (collected 12-24-22 @ 20:55)  Source: .Blood Blood  Preliminary Report (12-26-22 @ 01:03):    No growth to date.    Culture - Blood (collected 12-24-22 @ 20:20)  Source: .Blood Blood  Preliminary Report (12-26-22 @ 01:03):    No growth to date.    Culture - Blood (collected 12-23-22 @ 13:35)  Source: .Blood Blood  Preliminary Report (12-24-22 @ 18:01):    No growth to date.    Culture - Blood (collected 12-23-22 @ 13:25)  Source: .Blood Blood  Preliminary Report (12-24-22 @ 18:01):    No growth to date.    Culture - Bronchial (collected 12-22-22 @ 17:02)  Source: Combi-Cath Combi-Cath  Gram Stain (12-23-22 @ 06:59):    Moderate polymorphonuclear leukocytes seen per low power field    No squamous epithelial cells seen per low power field    No organisms seen per oil power field  Final Report (12-24-22 @ 15:11):    Normal Respiratory Cassandra present    Culture - Urine (collected 12-21-22 @ 22:55)  Source: Catheterized Catheterized  Final Report (12-24-22 @ 00:55):    >100,000 CFU/ml Leticia dubliniensis "Susceptibilities not performed"    Culture - Fungal, Body Fluid (collected 12-21-22 @ 19:53)  Source: Peritoneal Peritoneal Fluid  Preliminary Report (12-24-22 @ 15:02):    Culture is being performed. Fungal cultures are held for 4 weeks.    Culture - Body Fluid with Gram Stain (collected 12-21-22 @ 19:53)  Source: Ascites Fl Ascites Fluid  Gram Stain (12-22-22 @ 03:04):    polymorphonuclear leukocytes seen    No organisms seen    by cytocentrifuge  Final Report (12-27-22 @ 10:27):    No growth at 5 days    Culture - Sputum (collected 12-21-22 @ 01:19)  Source: Trach Asp Tracheal Aspirate  Gram Stain (12-21-22 @ 08:45):    No polymorphonuclear leukocytes per low power field    Numerous Squamous epithelial cells per low power field    Moderate Gram Positive Rods seen per oil power field    Few Yeast like cells seen per oil power field  Final Report (12-23-22 @ 14:05):    Normal Respiratory Cassandra present    Culture - Blood (collected 12-20-22 @ 23:30)  Source: .Blood Blood-Peripheral  Final Report (12-26-22 @ 04:00):    No Growth Final    Culture - Blood (collected 12-20-22 @ 22:38)  Source: .Blood Blood-Peripheral  Final Report (12-26-22 @ 03:00):    No Growth Final                Physical Exam:  Constitutional: NAD   Eyes: icteric, PERRLA  ENMT: nc/at, no thrush  Neck: supple, central line   Respiratory: CTA B/L  Cardiovascular: RRR  Gastrointestinal: Soft abdomen, distended  Genitourinary: Urinary catheter in place, anuric  Extremities: SCD's in place and working bilaterally, no edema  Vascular: Palpable dp pulses bilaterally.   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  Arthur ([phone]) and Daughter Taylor at [facility] provided additional history. Of note was recently admitted to  11/2022 for workup and eval of jaundice- during that hospitalization was found to have a UTI and dx with alc hep and started on steroids (was deemed a non-responder and steroids were subsequently stopped); s/p LVP at [place] 11/2022. Previously hospitalized in 2021 at [place] for ETOH detox. Went to ETOH rehab 08/2022 and was asked to leave the facility when she was COVID+; was sober for 1 week until she started drinking again. Had also attended a few AA meetings in the past. Transferred from [facility] 12/20 for further management of acute liver failure and liver transplant evaluation.       Interval events:  - increasing pressor requirements o/n. afebrile  - increasing HFNC  - Rising WBC,    Potential Discharge date: pending clinical stability  Education:  Medications  Plan of care:  See Below      MEDICATIONS  (STANDING):  caspofungin IVPB      caspofungin IVPB 50 milliGRAM(s) IV Intermittent every 24 hours  chlorhexidine 2% Cloths 1 Application(s) Topical <User Schedule>  CRRT Treatment    <Continuous>  escitalopram 10 milliGRAM(s) Oral daily  folic acid 1 milliGRAM(s) Oral daily  influenza   Vaccine 0.5 milliLiter(s) IntraMuscular once  insulin lispro (ADMELOG) corrective regimen sliding scale   SubCutaneous every 6 hours  lactulose Syrup 20 Gram(s) Oral every 8 hours  levETIRAcetam  Solution 750 milliGRAM(s) Enteral Tube two times a day  levothyroxine 137 MICROGram(s) Oral daily  meropenem  IVPB 1000 milliGRAM(s) IV Intermittent every 8 hours  midodrine 20 milliGRAM(s) Oral every 8 hours  multivitamin/minerals/iron Oral Solution (CENTRUM) 15 milliLiter(s) Oral daily  norepinephrine Infusion 0.05 MICROgram(s)/kG/Min (6.21 mL/Hr) IV Continuous <Continuous>  pantoprazole   Suspension 40 milliGRAM(s) Oral every 24 hours  PrismaSATE Dialysate BGK 4 / 2.5 5000 milliLiter(s) (1500 mL/Hr) CRRT <Continuous>  PrismaSOL Filtration BGK 4 / 2.5 5000 milliLiter(s) (800 mL/Hr) CRRT <Continuous>  PrismaSOL Filtration BGK 4 / 2.5 5000 milliLiter(s) (200 mL/Hr) CRRT <Continuous>  rifAXIMin 550 milliGRAM(s) Oral every 12 hours  sodium phosphate 30 milliMole(s)/500 mL IVPB 30 milliMole(s) IV Intermittent once  thiamine 100 milliGRAM(s) Enteral Tube daily  vancomycin  IVPB 1000 milliGRAM(s) IV Intermittent once  vasopressin Infusion 0.03 Unit(s)/Min (4.5 mL/Hr) IV Continuous <Continuous>    MEDICATIONS  (PRN):  oxyCODONE    Solution 5 milliGRAM(s) Oral every 4 hours PRN Moderate Pain (4 - 6)  oxyCODONE    Solution 10 milliGRAM(s) Oral every 4 hours PRN Severe Pain (7 - 10)  sodium chloride 0.65% Nasal 1 Spray(s) Both Nostrils every 3 hours PRN Nasal Congestion  tetracaine/benzocaine/butamben Spray 1 Spray(s) Topical every 3 hours PRN for ngt discomfort      PAST MEDICAL & SURGICAL HISTORY:  HTN (hypertension)  off medication for over one year--states BP has been normal      UTI (urinary tract infection)  currently being treated--will complete antibiotics 3/8/2022      Intracranial tumor      GERD (gastroesophageal reflux disease)      Eczema      Thyroid cancer  surgery. radiation, Radioactive iodine      COVID-19 virus infection  11/2021--recovered at home      H/O total thyroidectomy  age 21&#x27;s for Thyroid cancer, postop complication arterial bleed, second surgery      History of tonsillectomy  age 5      History of appendectomy  age 5          Vital Signs Last 24 Hrs  T(C): 36.7 (27 Dec 2022 07:00), Max: 37.1 (26 Dec 2022 15:00)  T(F): 98.1 (27 Dec 2022 07:00), Max: 98.8 (26 Dec 2022 15:00)  HR: 94 (27 Dec 2022 10:30) (72 - 102)  BP: 126/60 (26 Dec 2022 19:07) (91/50 - 126/60)  BP(mean): 85 (26 Dec 2022 19:07) (64 - 85)  RR: 30 (27 Dec 2022 10:30) (11 - 44)  SpO2: 93% (27 Dec 2022 10:30) (90% - 96%)    Parameters below as of 27 Dec 2022 08:44  Patient On (Oxygen Delivery Method): nasal cannula, high flow  O2 Flow (L/min): 60  O2 Concentration (%): 50    I&O's Summary    26 Dec 2022 07:01  -  27 Dec 2022 07:00  --------------------------------------------------------  IN: 3012.9 mL / OUT: 3354 mL / NET: -341.1 mL    27 Dec 2022 07:01  -  27 Dec 2022 10:48  --------------------------------------------------------  IN: 466 mL / OUT: 416 mL / NET: 50 mL                              8.1    36.85 )-----------( 44       ( 27 Dec 2022 08:41 )             24.0     12-27    139  |  101  |  8   ----------------------------<  136<H>  4.1   |  23  |  0.72    Ca    8.7      27 Dec 2022 08:41  Phos  1.6     12-27  Mg     2.5     12-27    TPro  6.4  /  Alb  3.8  /  TBili  27.6<H>  /  DBili  x   /  AST  134<H>  /  ALT  51<H>  /  AlkPhos  162<H>  12-27          Culture - Fungal, Body Fluid (collected 12-26-22 @ 15:00)  Source: Peritoneal Peritoneal Fluid  Preliminary Report (12-27-22 @ 07:22):    Testing in progress    Culture - Body Fluid with Gram Stain (collected 12-26-22 @ 15:00)  Source: Peritoneal Peritoneal Fluid  Gram Stain (12-26-22 @ 23:25):    polymorphonuclear leukocytes seen    No organisms seen    by cytocentrifuge    Culture - Blood (collected 12-24-22 @ 20:55)  Source: .Blood Blood  Preliminary Report (12-26-22 @ 01:03):    No growth to date.    Culture - Blood (collected 12-24-22 @ 20:20)  Source: .Blood Blood  Preliminary Report (12-26-22 @ 01:03):    No growth to date.    Culture - Blood (collected 12-23-22 @ 13:35)  Source: .Blood Blood  Preliminary Report (12-24-22 @ 18:01):    No growth to date.    Culture - Blood (collected 12-23-22 @ 13:25)  Source: .Blood Blood  Preliminary Report (12-24-22 @ 18:01):    No growth to date.    Culture - Bronchial (collected 12-22-22 @ 17:02)  Source: Combi-Cath Combi-Cath  Gram Stain (12-23-22 @ 06:59):    Moderate polymorphonuclear leukocytes seen per low power field    No squamous epithelial cells seen per low power field    No organisms seen per oil power field  Final Report (12-24-22 @ 15:11):    Normal Respiratory Cassandra present    Culture - Urine (collected 12-21-22 @ 22:55)  Source: Catheterized Catheterized  Final Report (12-24-22 @ 00:55):    >100,000 CFU/ml Leticia dubliniensis "Susceptibilities not performed"    Culture - Fungal, Body Fluid (collected 12-21-22 @ 19:53)  Source: Peritoneal Peritoneal Fluid  Preliminary Report (12-24-22 @ 15:02):    Culture is being performed. Fungal cultures are held for 4 weeks.    Culture - Body Fluid with Gram Stain (collected 12-21-22 @ 19:53)  Source: Ascites Fl Ascites Fluid  Gram Stain (12-22-22 @ 03:04):    polymorphonuclear leukocytes seen    No organisms seen    by cytocentrifuge  Final Report (12-27-22 @ 10:27):    No growth at 5 days    Culture - Sputum (collected 12-21-22 @ 01:19)  Source: Trach Asp Tracheal Aspirate  Gram Stain (12-21-22 @ 08:45):    No polymorphonuclear leukocytes per low power field    Numerous Squamous epithelial cells per low power field    Moderate Gram Positive Rods seen per oil power field    Few Yeast like cells seen per oil power field  Final Report (12-23-22 @ 14:05):    Normal Respiratory Cassandra present    Culture - Blood (collected 12-20-22 @ 23:30)  Source: .Blood Blood-Peripheral  Final Report (12-26-22 @ 04:00):    No Growth Final    Culture - Blood (collected 12-20-22 @ 22:38)  Source: .Blood Blood-Peripheral  Final Report (12-26-22 @ 03:00):    No Growth Final                Physical Exam:  Constitutional: NAD   Eyes: icteric, PERRLA  ENMT: nc/at, no thrush  Neck: supple, central line   Respiratory: CTA B/L  Cardiovascular: RRR  Gastrointestinal: Soft abdomen, distended  Genitourinary: Urinary catheter in place, anuric  Extremities: SCD's in place and working bilaterally, no edema  Vascular: Palpable dp pulses bilaterally.   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  Arthur ([phone]) and Daughter Taylor at [facility] provided additional history. Of note was recently admitted to  11/2022 for workup and eval of jaundice- during that hospitalization was found to have a UTI and dx with alc hep and started on steroids (was deemed a non-responder and steroids were subsequently stopped); s/p LVP at [place] 11/2022. Previously hospitalized in 2021 at [place] for ETOH detox. Went to ETOH rehab 08/2022 and was asked to leave the facility when she was COVID+; was sober for 1 week until she started drinking again. Had also attended a few AA meetings in the past. Transferred from [facility] 12/20 for further management of acute liver failure and liver transplant evaluation.       Interval events:  - increasing pressor requirements o/n. afebrile  - increasing HFNC  - Rising WBC,    Potential Discharge date: pending clinical stability  Education:  Medications  Plan of care:  See Below      MEDICATIONS  (STANDING):  caspofungin IVPB      caspofungin IVPB 50 milliGRAM(s) IV Intermittent every 24 hours  chlorhexidine 2% Cloths 1 Application(s) Topical <User Schedule>  CRRT Treatment    <Continuous>  escitalopram 10 milliGRAM(s) Oral daily  folic acid 1 milliGRAM(s) Oral daily  influenza   Vaccine 0.5 milliLiter(s) IntraMuscular once  insulin lispro (ADMELOG) corrective regimen sliding scale   SubCutaneous every 6 hours  lactulose Syrup 20 Gram(s) Oral every 8 hours  levETIRAcetam  Solution 750 milliGRAM(s) Enteral Tube two times a day  levothyroxine 137 MICROGram(s) Oral daily  meropenem  IVPB 1000 milliGRAM(s) IV Intermittent every 8 hours  midodrine 20 milliGRAM(s) Oral every 8 hours  multivitamin/minerals/iron Oral Solution (CENTRUM) 15 milliLiter(s) Oral daily  norepinephrine Infusion 0.05 MICROgram(s)/kG/Min (6.21 mL/Hr) IV Continuous <Continuous>  pantoprazole   Suspension 40 milliGRAM(s) Oral every 24 hours  PrismaSATE Dialysate BGK 4 / 2.5 5000 milliLiter(s) (1500 mL/Hr) CRRT <Continuous>  PrismaSOL Filtration BGK 4 / 2.5 5000 milliLiter(s) (800 mL/Hr) CRRT <Continuous>  PrismaSOL Filtration BGK 4 / 2.5 5000 milliLiter(s) (200 mL/Hr) CRRT <Continuous>  rifAXIMin 550 milliGRAM(s) Oral every 12 hours  sodium phosphate 30 milliMole(s)/500 mL IVPB 30 milliMole(s) IV Intermittent once  thiamine 100 milliGRAM(s) Enteral Tube daily  vancomycin  IVPB 1000 milliGRAM(s) IV Intermittent once  vasopressin Infusion 0.03 Unit(s)/Min (4.5 mL/Hr) IV Continuous <Continuous>    MEDICATIONS  (PRN):  oxyCODONE    Solution 5 milliGRAM(s) Oral every 4 hours PRN Moderate Pain (4 - 6)  oxyCODONE    Solution 10 milliGRAM(s) Oral every 4 hours PRN Severe Pain (7 - 10)  sodium chloride 0.65% Nasal 1 Spray(s) Both Nostrils every 3 hours PRN Nasal Congestion  tetracaine/benzocaine/butamben Spray 1 Spray(s) Topical every 3 hours PRN for ngt discomfort      PAST MEDICAL & SURGICAL HISTORY:  HTN (hypertension)  off medication for over one year--states BP has been normal      UTI (urinary tract infection)  currently being treated--will complete antibiotics 3/8/2022      Intracranial tumor      GERD (gastroesophageal reflux disease)      Eczema      Thyroid cancer  surgery. radiation, Radioactive iodine      COVID-19 virus infection  11/2021--recovered at home      H/O total thyroidectomy  age 21&#x27;s for Thyroid cancer, postop complication arterial bleed, second surgery      History of tonsillectomy  age 5      History of appendectomy  age 5          Vital Signs Last 24 Hrs  T(C): 36.7 (27 Dec 2022 07:00), Max: 37.1 (26 Dec 2022 15:00)  T(F): 98.1 (27 Dec 2022 07:00), Max: 98.8 (26 Dec 2022 15:00)  HR: 94 (27 Dec 2022 10:30) (72 - 102)  BP: 126/60 (26 Dec 2022 19:07) (91/50 - 126/60)  BP(mean): 85 (26 Dec 2022 19:07) (64 - 85)  RR: 30 (27 Dec 2022 10:30) (11 - 44)  SpO2: 93% (27 Dec 2022 10:30) (90% - 96%)    Parameters below as of 27 Dec 2022 08:44  Patient On (Oxygen Delivery Method): nasal cannula, high flow  O2 Flow (L/min): 60  O2 Concentration (%): 50    I&O's Summary    26 Dec 2022 07:01  -  27 Dec 2022 07:00  --------------------------------------------------------  IN: 3012.9 mL / OUT: 3354 mL / NET: -341.1 mL    27 Dec 2022 07:01  -  27 Dec 2022 10:48  --------------------------------------------------------  IN: 466 mL / OUT: 416 mL / NET: 50 mL                              8.1    36.85 )-----------( 44       ( 27 Dec 2022 08:41 )             24.0     12-27    139  |  101  |  8   ----------------------------<  136<H>  4.1   |  23  |  0.72    Ca    8.7      27 Dec 2022 08:41  Phos  1.6     12-27  Mg     2.5     12-27    TPro  6.4  /  Alb  3.8  /  TBili  27.6<H>  /  DBili  x   /  AST  134<H>  /  ALT  51<H>  /  AlkPhos  162<H>  12-27          Culture - Fungal, Body Fluid (collected 12-26-22 @ 15:00)  Source: Peritoneal Peritoneal Fluid  Preliminary Report (12-27-22 @ 07:22):    Testing in progress    Culture - Body Fluid with Gram Stain (collected 12-26-22 @ 15:00)  Source: Peritoneal Peritoneal Fluid  Gram Stain (12-26-22 @ 23:25):    polymorphonuclear leukocytes seen    No organisms seen    by cytocentrifuge    Culture - Blood (collected 12-24-22 @ 20:55)  Source: .Blood Blood  Preliminary Report (12-26-22 @ 01:03):    No growth to date.    Culture - Blood (collected 12-24-22 @ 20:20)  Source: .Blood Blood  Preliminary Report (12-26-22 @ 01:03):    No growth to date.    Culture - Blood (collected 12-23-22 @ 13:35)  Source: .Blood Blood  Preliminary Report (12-24-22 @ 18:01):    No growth to date.    Culture - Blood (collected 12-23-22 @ 13:25)  Source: .Blood Blood  Preliminary Report (12-24-22 @ 18:01):    No growth to date.    Culture - Bronchial (collected 12-22-22 @ 17:02)  Source: Combi-Cath Combi-Cath  Gram Stain (12-23-22 @ 06:59):    Moderate polymorphonuclear leukocytes seen per low power field    No squamous epithelial cells seen per low power field    No organisms seen per oil power field  Final Report (12-24-22 @ 15:11):    Normal Respiratory Cassandra present    Culture - Urine (collected 12-21-22 @ 22:55)  Source: Catheterized Catheterized  Final Report (12-24-22 @ 00:55):    >100,000 CFU/ml Leticia dubliniensis "Susceptibilities not performed"    Culture - Fungal, Body Fluid (collected 12-21-22 @ 19:53)  Source: Peritoneal Peritoneal Fluid  Preliminary Report (12-24-22 @ 15:02):    Culture is being performed. Fungal cultures are held for 4 weeks.    Culture - Body Fluid with Gram Stain (collected 12-21-22 @ 19:53)  Source: Ascites Fl Ascites Fluid  Gram Stain (12-22-22 @ 03:04):    polymorphonuclear leukocytes seen    No organisms seen    by cytocentrifuge  Final Report (12-27-22 @ 10:27):    No growth at 5 days    Culture - Sputum (collected 12-21-22 @ 01:19)  Source: Trach Asp Tracheal Aspirate  Gram Stain (12-21-22 @ 08:45):    No polymorphonuclear leukocytes per low power field    Numerous Squamous epithelial cells per low power field    Moderate Gram Positive Rods seen per oil power field    Few Yeast like cells seen per oil power field  Final Report (12-23-22 @ 14:05):    Normal Respiratory Cassandra present    Culture - Blood (collected 12-20-22 @ 23:30)  Source: .Blood Blood-Peripheral  Final Report (12-26-22 @ 04:00):    No Growth Final    Culture - Blood (collected 12-20-22 @ 22:38)  Source: .Blood Blood-Peripheral  Final Report (12-26-22 @ 03:00):    No Growth Final                Physical Exam:  Constitutional: NAD   Eyes: icteric, PERRLA  ENMT: nc/at, no thrush  Neck: supple, central line   Respiratory: CTA B/L  Cardiovascular: RRR  Gastrointestinal: Soft abdomen, distended  Genitourinary: Urinary catheter in place, anuric  Extremities: SCD's in place and working bilaterally, no edema  Vascular: Palpable dp pulses bilaterally.   Neurological: following commands, mentation improving  Skin: no rashes, ulcerations, lesions  Musculoskeletal: moving extremities  Psychiatric: calm

## 2022-12-27 NOTE — PROGRESS NOTE ADULT - ATTENDING COMMENTS
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  Of note was recently admitted to  11/2022 for workup and eval of new onset jaundice- during that hospitalization was found to have a UTI and dx with alc hep was treated for UTI w/ abx and started on steroids (was deemed a non-responder and steroids were subsequently stopped); s/p LVP at [facility] 11/2022. Previously hospitalized in 2021 at [facility] for ETOH detox but pt reportedly unaware of any liver dysfunction at that time. Went to ETOH rehab 08/2022 and was asked to leave the facility when she was COVID+; was sober for 1 week until she started drinking again. Had also attended a few AA meetings in the past.    #ACLF  #Worsening bilateral airspace opacities: likely ARDS vs PNA  #AUD  #New onset seizure- unknown trigger/etiology- currently on Keppra  #Decompensated alcohol-associated cirrhosis c/b prior ascites req LVP       -MELD-Na = 40 on 12/27/2022       -Ascites: large       -SBP: negative on 12/26/2022       -Varices: unknown       -Hepatic encephalopathy: yes, Ammonia, Serum: 67 umol/L *H* [11 - 55] (12-27-22 @ 02:26), currently on:  lactulose Syrup 20 Gram(s) Oral every 8 hours, 12-26-22 @ 09:31,   rifAXIMin 550 milliGRAM(s) Oral every 12 hours, 12-25-22 @ 09:07       -HCC: Alpha Fetoprotein - Tumor Marker: 4.2 ng/mL (12-24-22 @ 20:58)    Recommendations:  -trend clinical symptoms, exam findings, vital signs, CBC, CMP, INR  -continue CVVHD  -continue broad spectrum antibiotic coverage, broaden antifungal to caspofungin, appreciate assistance from ID  -wean pressors as able 62 y.o Hx significant for decompensated ETOH cirrhosis c/b ascites (dx 11/2022), alc hep (dx 11/2022; non-responsive to steroids), remote h/o thyroid cancer in her 20s s/p total thyroidectomy + RTX + radioactive iodide, HTN, 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  Of note was recently admitted to  11/2022 for workup and eval of new onset jaundice- during that hospitalization was found to have a UTI and dx with alc hep was treated for UTI w/ abx and started on steroids (was deemed a non-responder and steroids were subsequently stopped); s/p LVP at [facility] 11/2022. Previously hospitalized in 2021 at [place] for ETOH detox but pt reportedly unaware of any liver dysfunction at that time. Went to ETOH rehab 08/2022 and was asked to leave the facility when she was COVID+; was sober for 1 week until she started drinking again. Had also attended a few AA meetings in the past.    #ACLF  #Worsening bilateral airspace opacities: likely ARDS vs PNA  #AUD  #New onset seizure- unknown trigger/etiology- currently on Keppra  #Decompensated alcohol-associated cirrhosis c/b prior ascites req LVP       -MELD-Na = 40 on 12/27/2022       -Ascites: large       -SBP: negative on 12/26/2022       -Varices: unknown       -Hepatic encephalopathy: yes, Ammonia, Serum: 67 umol/L *H* [11 - 55] (12-27-22 @ 02:26), currently on:  lactulose Syrup 20 Gram(s) Oral every 8 hours, 12-26-22 @ 09:31,   rifAXIMin 550 milliGRAM(s) Oral every 12 hours, 12-25-22 @ 09:07       -HCC: Alpha Fetoprotein - Tumor Marker: 4.2 ng/mL (12-24-22 @ 20:58)    Recommendations:  -trend clinical symptoms, exam findings, vital signs, CBC, CMP, INR  -continue CVVHD  -continue broad spectrum antibiotic coverage, broaden antifungal to caspofungin, appreciate assistance from ID  -wean pressors as able 62 y.o Hx significant for decompensated ETOH cirrhosis c/b ascites (dx 11/2022), alc hep (dx 11/2022; non-responsive to steroids), remote h/o thyroid cancer in her 20s s/p total thyroidectomy + RTX + radioactive iodide, HTN, 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  Of note was recently admitted to  11/2022 for workup and eval of new onset jaundice- during that hospitalization was found to have a UTI and dx with alc hep was treated for UTI w/ abx and started on steroids (was deemed a non-responder and steroids were subsequently stopped); s/p LVP at [place] 11/2022. Previously hospitalized in 2021 at [place] for ETOH detox but pt reportedly unaware of any liver dysfunction at that time. Went to ETOH rehab 08/2022 and was asked to leave the facility when she was COVID+; was sober for 1 week until she started drinking again. Had also attended a few AA meetings in the past.    #ACLF  #Worsening bilateral airspace opacities: likely ARDS vs PNA  #AUD  #New onset seizure- unknown trigger/etiology- currently on Keppra  #Decompensated alcohol-associated cirrhosis c/b prior ascites req LVP       -MELD-Na = 40 on 12/27/2022       -Ascites: large       -SBP: negative on 12/26/2022       -Varices: unknown       -Hepatic encephalopathy: yes, Ammonia, Serum: 67 umol/L *H* [11 - 55] (12-27-22 @ 02:26), currently on:  lactulose Syrup 20 Gram(s) Oral every 8 hours, 12-26-22 @ 09:31,   rifAXIMin 550 milliGRAM(s) Oral every 12 hours, 12-25-22 @ 09:07       -HCC: Alpha Fetoprotein - Tumor Marker: 4.2 ng/mL (12-24-22 @ 20:58)    Recommendations:  -trend clinical symptoms, exam findings, vital signs, CBC, CMP, INR  -continue CVVHD  -continue broad spectrum antibiotic coverage, broaden antifungal to caspofungin, appreciate assistance from ID  -wean pressors as able

## 2022-12-27 NOTE — CONSULT NOTE ADULT - ASSESSMENT
62 year-old woman with history and cardiovascular risk factors as above presents with hepatic encephalopathy secondary to EtOH abuse.  Patient has only a very remote smoking history. Per her daughter at bedside, she has not smoked in more than 30 years.  She has no known history of heart disease.   TTE reviewed.    A stress test is not feasible while she is critically ill and requiring pressor support.    No further cardiovascular testing is necessary at this time.  Discussed with Transplant Team.

## 2022-12-27 NOTE — PROGRESS NOTE ADULT - SUBJECTIVE AND OBJECTIVE BOX
Interval Events:   No acute events noted, however unable to obtain full HPI due to underlying mental status.  Daughter at bedside.    ROS:   Unable to fully obtain ROS.    Hospital Medications:  caspofungin IVPB 50 milliGRAM(s) IV Intermittent every 24 hours  caspofungin IVPB      chlorhexidine 2% Cloths 1 Application(s) Topical <User Schedule>  CRRT Treatment    <Continuous>  escitalopram 10 milliGRAM(s) Oral daily  folic acid 1 milliGRAM(s) Oral daily  influenza   Vaccine 0.5 milliLiter(s) IntraMuscular once  insulin lispro (ADMELOG) corrective regimen sliding scale   SubCutaneous every 6 hours  lactulose Syrup 20 Gram(s) Oral every 8 hours  levETIRAcetam  Solution 750 milliGRAM(s) Enteral Tube two times a day  levothyroxine 137 MICROGram(s) Oral daily  meropenem  IVPB 1000 milliGRAM(s) IV Intermittent every 8 hours  midodrine 20 milliGRAM(s) Oral every 8 hours  multivitamin/minerals/iron Oral Solution (CENTRUM) 15 milliLiter(s) Oral daily  norepinephrine Infusion 0.05 MICROgram(s)/kG/Min (6.21 mL/Hr) IV Continuous <Continuous>  ondansetron Injectable 4 milliGRAM(s) IV Push every 6 hours PRN  oxyCODONE    Solution 5 milliGRAM(s) Oral every 4 hours PRN  oxyCODONE    Solution 10 milliGRAM(s) Oral every 4 hours PRN  pantoprazole   Suspension 40 milliGRAM(s) Oral every 24 hours  Phoxillum Filtration BK 4 / 2.5 5000 milliLiter(s) (800 mL/Hr) CRRT <Continuous>  Phoxillum Filtration BK 4 / 2.5 5000 milliLiter(s) (200 mL/Hr) CRRT <Continuous>  Phoxillum Filtration BK 4 / 2.5 5000 milliLiter(s) (1000 mL/Hr) CRRT <Continuous>  rifAXIMin 550 milliGRAM(s) Oral every 12 hours  sodium chloride 0.65% Nasal 1 Spray(s) Both Nostrils every 3 hours PRN  tetracaine/benzocaine/butamben Spray 1 Spray(s) Topical every 3 hours PRN  thiamine 100 milliGRAM(s) Enteral Tube daily  vasopressin Infusion 0.03 Unit(s)/Min (4.5 mL/Hr) IV Continuous <Continuous>    MAR over past 24 hours:    caspofungin IVPB   260 mL/Hr IV Intermittent (12-27-22 @ 09:08)    chlorhexidine 2% Cloths   1 Application(s) Topical (12-27-22 @ 05:31)    escitalopram   10 milliGRAM(s) Oral (12-27-22 @ 11:03)    folic acid   1 milliGRAM(s) Oral (12-27-22 @ 11:03)    insulin lispro (ADMELOG) corrective regimen sliding scale   2 Unit(s) SubCutaneous (12-27-22 @ 06:00)   2 Unit(s) SubCutaneous (12-26-22 @ 17:26)    lactulose Syrup   20 Gram(s) Oral (12-27-22 @ 14:10)   20 Gram(s) Oral (12-27-22 @ 05:06)    levETIRAcetam  Solution   750 milliGRAM(s) Enteral Tube (12-27-22 @ 05:06)   750 milliGRAM(s) Enteral Tube (12-26-22 @ 17:26)    levothyroxine   137 MICROGram(s) Oral (12-27-22 @ 05:08)    meropenem  IVPB   100 mL/Hr IV Intermittent (12-27-22 @ 14:09)   100 mL/Hr IV Intermittent (12-27-22 @ 05:06)   100 mL/Hr IV Intermittent (12-26-22 @ 21:00)    midodrine   20 milliGRAM(s) Oral (12-27-22 @ 14:10)   20 milliGRAM(s) Oral (12-27-22 @ 05:06)   20 milliGRAM(s) Oral (12-26-22 @ 21:00)    multivitamin/minerals/iron Oral Solution (CENTRUM)   15 milliLiter(s) Oral (12-27-22 @ 11:03)    norepinephrine Infusion   6.21 mL/Hr IV Continuous (12-27-22 @ 06:01)   6.21 mL/Hr IV Continuous (12-27-22 @ 05:07)   6.21 mL/Hr IV Continuous (12-26-22 @ 21:01)   6.21 mL/Hr IV Continuous (12-26-22 @ 19:05)    ondansetron Injectable   4 milliGRAM(s) IV Push (12-27-22 @ 12:31)    oxyCODONE    Solution   5 milliGRAM(s) Oral (12-27-22 @ 10:02)   5 milliGRAM(s) Oral (12-27-22 @ 05:19)   5 milliGRAM(s) Oral (12-26-22 @ 21:00)    oxyCODONE    Solution   10 milliGRAM(s) Oral (12-27-22 @ 14:09)   10 milliGRAM(s) Oral (12-27-22 @ 04:32)   10 milliGRAM(s) Oral (12-26-22 @ 22:47)   10 milliGRAM(s) Oral (12-26-22 @ 16:22)    pantoprazole   Suspension   40 milliGRAM(s) Oral (12-27-22 @ 12:12)    Phoxillum Filtration BK 4 / 2.5   800 mL/Hr CRRT (12-27-22 @ 12:45)    Phoxillum Filtration BK 4 / 2.5   200 mL/Hr CRRT (12-27-22 @ 12:45)    Phoxillum Filtration BK 4 / 2.5   1000 mL/Hr CRRT (12-27-22 @ 12:45)    PrismaSATE Dialysate BGK 4 / 2.5   1500 mL/Hr CRRT (12-26-22 @ 20:15)   1500 mL/Hr CRRT (12-26-22 @ 18:16)   1500 mL/Hr CRRT (12-26-22 @ 16:25)    PrismaSOL Filtration BGK 4 / 2.5   800 mL/Hr CRRT (12-27-22 @ 05:08)   800 mL/Hr CRRT (12-26-22 @ 20:15)    PrismaSOL Filtration BGK 4 / 2.5   200 mL/Hr CRRT (12-27-22 @ 05:08)   200 mL/Hr CRRT (12-26-22 @ 20:14)    rifAXIMin   550 milliGRAM(s) Oral (12-27-22 @ 05:06)   550 milliGRAM(s) Oral (12-26-22 @ 17:25)    sodium phosphate 30 milliMole(s)/500 mL IVPB   255 mL/Hr IV Intermittent (12-27-22 @ 12:11)    thiamine   100 milliGRAM(s) Enteral Tube (12-27-22 @ 11:03)    vancomycin  IVPB   250 mL/Hr IV Intermittent (12-27-22 @ 11:02)    vasopressin Infusion   4.5 mL/Hr IV Continuous (12-27-22 @ 05:09)   4.5 mL/Hr IV Continuous (12-26-22 @ 21:02)   4.5 mL/Hr IV Continuous (12-26-22 @ 19:05)      Home Medications:  Last Order Reconciliation Date: 12-21-22 @ 13:03 (Admission Reconciliation)  cholecalciferol 25 mcg (1000 intl units) oral tablet: 1 tab(s) orally once a day  folic acid 1 mg oral tablet: 1 tab(s) orally once a day  levothyroxine 137 mcg (0.137 mg) oral tablet: 1 tab(s) orally once a day  Lexapro 10 mg oral tablet: 1 tab(s) orally once a day  nystatin 100,000 units/mL oral suspension: 5 milliliter(s) orally 4 times a day  pantoprazole 40 mg oral delayed release tablet: 1 tab(s) orally once a day (at bedtime)  senna oral tablet: 2 tab(s) orally once a day (at bedtime), As Needed -for constipation   Sodium Chloride 1 g oral tablet: 1 gram(s) orally once a day  thiamine 100 mg oral tablet: 1 tab(s) orally once a day  Vitamin B-100 oral tablet: 1 tab(s) orally once a day    PHYSICAL EXAM:   Vital Signs last 24 hours:  T(F): 97.9 (12-27-22 @ 11:00), Max: 98.2 (12-26-22 @ 23:00)  HR: 82 (12-27-22 @ 15:00) (73 - 102)  BP: 126/60 (12-26-22 @ 19:07) (91/50 - 126/60)  BP(mean): 85 (12-26-22 @ 19:07) (64 - 85)  ABP: 91/45 (12-27-22 @ 15:00) (89/43 - 128/68)  ABP(mean): 63 (12-27-22 @ 15:00) (53 - 92)  RR: 17 (12-27-22 @ 15:00) (11 - 44)  SpO2: 94% (12-27-22 @ 15:00) (90% - 96%)    I&Os:    12-26-22 @ 07:01  -  12-27-22 @ 07:00  --------------------------------------------------------  IN:    Enteral Tube Flush: 265 mL    IV PiggyBack: 250 mL    IV PiggyBack: 900 mL    Nepro with Carb Steady: 1110 mL    Norepinephrine: 343.7 mL    Norepinephrine: 6.2 mL    Oral Fluid: 30 mL    Vasopressin: 108 mL  Total IN: 3012.9 mL    OUT:    Other (mL): 2654 mL    Rectal Tube (mL): 700 mL  Total OUT: 3354 mL    Total NET: -341.1 mL      12-27-22 @ 07:01  -  12-27-22 @ 15:33  --------------------------------------------------------  IN:    Enteral Tube Flush: 50 mL    IV PiggyBack: 550 mL    IV PiggyBack: 500 mL    Nepro with Carb Steady: 200 mL    Norepinephrine: 124.3 mL    Vasopressin: 36 mL  Total IN: 1460.3 mL    OUT:    Other (mL): 1681 mL    Rectal Tube (mL): 100 mL  Total OUT: 1781 mL    Total NET: -320.7 mL        BMI (kg/m2): 24.3 (12-20-22 @ 21:37)  GENERAL: no acute distress  NEURO: not fully alert/oriented  HEENT: NCAT, no conjunctival pallor appreciated  CHEST: on HFNC, no accessory muscle use  CARDIAC: regular rate, +S1/S2  ABDOMEN: soft, nontender, no rebound or guarding  EXTREMITIES: warm, well perfused  SKIN: no lesions noted    DIAGNOSTICS:  WBC      Hg       PLT      Na       K        CO2     BUN      Cr       ALT      AST      TB       ALP  36.85    8.1      44       139      4.1      23       8        0.72     51       134      27.6     162      12-27-22 @ 08:41  36.51    8.4      49       140      3.9      25       8        0.72     48       127      28.9     171      12-27-22 @ 02:28  ------   ------   ------   141      3.8      22       7        0.71     45       114      26.9     166      12-26-22 @ 20:20  35.53    8.2      48       ------   ------   ------   ------   ------   ------   ------   ------   ------   12-26-22 @ 20:19  33.04    8.3      49       139      4.3      20       6        0.67     45       118      26.7     169      12-26-22 @ 14:35    PT             INR            MELDwith  MELDw/o  27.8           2.40           --             --             12-27-22 @ 02:28  31.7           2.71           --             --             12-26-22 @ 02:18  26.7           2.30           --             --             12-24-22 @ 20:59  27.6           2.38           --             --             12-23-22 @ 05:30  28.2           2.41           >40            >40            12-22-22 @ 05:06

## 2022-12-27 NOTE — PROGRESS NOTE ADULT - SUBJECTIVE AND OBJECTIVE BOX
[facility] DIVISION OF KIDNEY DISEASES AND HYPERTENSION -- FOLLOW UP NOTE  --------------------------------------------------------------------------------  HPI: 63 yo F with h/o decompensated ETOH cirrhosis with ascites, thyroid cancer (s/p total thyroidectomy, RTX, and radioactive iodide), HTN, ventrical neoplasm who was initially admitted to  12/19 with new onset seizures and transferred to [facility] 12/20 for liver transplant evaluation. Pt being seen for RED requiring CRRT.    24 hour events/subjective: Pt was seen and evaluated in the ICU this morning with family present. Pt on high flow, poor historian. Unable to obtain ROS.        PAST HISTORY  --------------------------------------------------------------------------------  No significant changes to PMH, PSH, FHx, SHx, unless otherwise noted    ALLERGIES & MEDICATIONS  --------------------------------------------------------------------------------  Allergies    Macrobid (Rash)    Intolerances    Nexium (Stomach Upset)    Standing Inpatient Medications  caspofungin IVPB 50 milliGRAM(s) IV Intermittent every 24 hours  caspofungin IVPB      chlorhexidine 2% Cloths 1 Application(s) Topical <User Schedule>  CRRT Treatment    <Continuous>  escitalopram 10 milliGRAM(s) Oral daily  folic acid 1 milliGRAM(s) Oral daily  influenza   Vaccine 0.5 milliLiter(s) IntraMuscular once  insulin lispro (ADMELOG) corrective regimen sliding scale   SubCutaneous every 6 hours  lactulose Syrup 20 Gram(s) Oral every 8 hours  levETIRAcetam  Solution 750 milliGRAM(s) Enteral Tube two times a day  levothyroxine 137 MICROGram(s) Oral daily  meropenem  IVPB 1000 milliGRAM(s) IV Intermittent every 8 hours  midodrine 20 milliGRAM(s) Oral every 8 hours  multivitamin/minerals/iron Oral Solution (CENTRUM) 15 milliLiter(s) Oral daily  norepinephrine Infusion 0.05 MICROgram(s)/kG/Min IV Continuous <Continuous>  pantoprazole   Suspension 40 milliGRAM(s) Oral every 24 hours  Phoxillum Filtration BK 4 / 2.5 5000 milliLiter(s) CRRT <Continuous>  Phoxillum Filtration BK 4 / 2.5 5000 milliLiter(s) CRRT <Continuous>  Phoxillum Filtration BK 4 / 2.5 5000 milliLiter(s) CRRT <Continuous>  rifAXIMin 550 milliGRAM(s) Oral every 12 hours  thiamine 100 milliGRAM(s) Enteral Tube daily  vasopressin Infusion 0.03 Unit(s)/Min IV Continuous <Continuous>    PRN Inpatient Medications  ondansetron Injectable 4 milliGRAM(s) IV Push every 6 hours PRN  oxyCODONE    Solution 5 milliGRAM(s) Oral every 4 hours PRN  oxyCODONE    Solution 10 milliGRAM(s) Oral every 4 hours PRN  sodium chloride 0.65% Nasal 1 Spray(s) Both Nostrils every 3 hours PRN  tetracaine/benzocaine/butamben Spray 1 Spray(s) Topical every 3 hours PRN      REVIEW OF SYSTEMS  --------------------------------------------------------------------------------  Unable to obtain ROS    VITALS/PHYSICAL EXAM  --------------------------------------------------------------------------------  T(C): 36.6 (12-27-22 @ 11:00), Max: 37.1 (12-26-22 @ 15:00)  HR: 73 (12-27-22 @ 13:00) (73 - 102)  BP: 126/60 (12-26-22 @ 19:07) (91/50 - 126/60)  RR: 12 (12-27-22 @ 13:00) (11 - 44)  SpO2: 94% (12-27-22 @ 13:00) (90% - 96%)  Wt(kg): --        12-26-22 @ 07:01  -  12-27-22 @ 07:00  --------------------------------------------------------  IN: 3012.9 mL / OUT: 3354 mL / NET: -341.1 mL    12-27-22 @ 07:01  -  12-27-22 @ 13:20  --------------------------------------------------------  IN: 864.3 mL / OUT: 1142 mL / NET: -277.7 mL      Physical Exam:  Gen: NAD  HEENT: On HFNC   Pulm: CTA B/L, no crackles   CV: RRR, S1S2+  Abd: +BS, soft, distended  : +urinary catheter  MSK: +BL LE edema  Psych: Awake  Skin: Warm  Access: +Non-tunneled HD catheter    LABS/STUDIES  --------------------------------------------------------------------------------              8.1    36.85 >-----------<  44       [12-27-22 @ 08:41]              24.0     139  |  101  |  8   ----------------------------<  136      [12-27-22 @ 08:41]  4.1   |  23  |  0.72        Ca     8.7     [12-27-22 @ 08:41]      Mg     2.5     [12-27-22 @ 08:41]      Phos  1.6     [12-27-22 @ 08:41]    TPro  6.4  /  Alb  3.8  /  TBili  27.6  /  DBili  x   /  AST  134  /  ALT  51  /  AlkPhos  162  [12-27-22 @ 08:41]    PT/INR: PT 27.8 , INR 2.40       [12-27-22 @ 02:28]  PTT: 51.4       [12-27-22 @ 02:28]      Creatinine Trend:  SCr 0.72 [12-27 @ 08:41]  SCr 0.72 [12-27 @ 02:28]  SCr 0.71 [12-26 @ 20:20]  SCr 0.67 [12-26 @ 14:35]  SCr 0.63 [12-26 @ 08:16]      Urinalysis - [12-20-22 @ 23:55]      Color Dark Yellow / Appearance Turbid / SG 1.029 / pH 6.0      Gluc 100 mg/dL / Ketone Small  / Bili Large / Urobili Negative       Blood Moderate / Protein 300 mg/dL / Leuk Est Large / Nitrite Negative      RBC 5-10 / WBC 11-25 / Hyaline 0-2 / Gran  / Sq Epi  / Non Sq Epi Moderate / Bacteria Negative    Urine Creatinine 21      [12-21-22 @ 11:55]  Urine Sodium 138      [12-21-22 @ 11:55]  Urine Potassium 8      [12-21-22 @ 11:55]  Urine Osmolality 288      [12-21-22 @ 11:55]    Iron 51, TIBC Unable to calculate Test Repeated, %sat Unable to calculate Test Repeated      [12-24-22 @ 20:58]  Ferritin 5747      [12-24-22 @ 20:58]  TSH 0.05      [12-24-22 @ 20:58]  Lipid: chol 100, , HDL 9, LDL --      [12-24-22 @ 20:58]    HBsAb <3.0      [12-24-22 @ 20:59]  HBsAb Nonreact      [12-24-22 @ 20:59]  HBsAg Nonreact      [12-24-22 @ 20:59]  HBcAb Nonreact      [12-24-22 @ 20:59]  HCV 0.12, Nonreact      [12-24-22 @ 20:59]  HIV Nonreact      [12-24-22 @ 20:58]  HIV Nonreact      [12-24-22 @ 20:58]    Syphilis Screen (Treponema Pallidum Ab) Negative      [12-24-22 @ 20:59] [facility] DIVISION OF KIDNEY DISEASES AND HYPERTENSION -- FOLLOW UP NOTE  --------------------------------------------------------------------------------  HPI: 61 yo F with h/o decompensated ETOH cirrhosis with ascites, thyroid cancer (s/p total thyroidectomy, RTX, and radioactive iodide), HTN, ventrical neoplasm who was initially admitted to  12/19 with new onset seizures and transferred to [facility] 12/20 for liver transplant evaluation. Pt being seen for RED requiring CRRT.    24 hour events/subjective: Pt was seen and evaluated in the ICU this morning with family present. Pt on high flow, poor historian. Unable to obtain ROS.        PAST HISTORY  --------------------------------------------------------------------------------  No significant changes to PMH, PSH, FHx, SHx, unless otherwise noted    ALLERGIES & MEDICATIONS  --------------------------------------------------------------------------------  Allergies    Macrobid (Rash)    Intolerances    Nexium (Stomach Upset)    Standing Inpatient Medications  caspofungin IVPB 50 milliGRAM(s) IV Intermittent every 24 hours  caspofungin IVPB      chlorhexidine 2% Cloths 1 Application(s) Topical <User Schedule>  CRRT Treatment    <Continuous>  escitalopram 10 milliGRAM(s) Oral daily  folic acid 1 milliGRAM(s) Oral daily  influenza   Vaccine 0.5 milliLiter(s) IntraMuscular once  insulin lispro (ADMELOG) corrective regimen sliding scale   SubCutaneous every 6 hours  lactulose Syrup 20 Gram(s) Oral every 8 hours  levETIRAcetam  Solution 750 milliGRAM(s) Enteral Tube two times a day  levothyroxine 137 MICROGram(s) Oral daily  meropenem  IVPB 1000 milliGRAM(s) IV Intermittent every 8 hours  midodrine 20 milliGRAM(s) Oral every 8 hours  multivitamin/minerals/iron Oral Solution (CENTRUM) 15 milliLiter(s) Oral daily  norepinephrine Infusion 0.05 MICROgram(s)/kG/Min IV Continuous <Continuous>  pantoprazole   Suspension 40 milliGRAM(s) Oral every 24 hours  Phoxillum Filtration BK 4 / 2.5 5000 milliLiter(s) CRRT <Continuous>  Phoxillum Filtration BK 4 / 2.5 5000 milliLiter(s) CRRT <Continuous>  Phoxillum Filtration BK 4 / 2.5 5000 milliLiter(s) CRRT <Continuous>  rifAXIMin 550 milliGRAM(s) Oral every 12 hours  thiamine 100 milliGRAM(s) Enteral Tube daily  vasopressin Infusion 0.03 Unit(s)/Min IV Continuous <Continuous>    PRN Inpatient Medications  ondansetron Injectable 4 milliGRAM(s) IV Push every 6 hours PRN  oxyCODONE    Solution 5 milliGRAM(s) Oral every 4 hours PRN  oxyCODONE    Solution 10 milliGRAM(s) Oral every 4 hours PRN  sodium chloride 0.65% Nasal 1 Spray(s) Both Nostrils every 3 hours PRN  tetracaine/benzocaine/butamben Spray 1 Spray(s) Topical every 3 hours PRN      REVIEW OF SYSTEMS  --------------------------------------------------------------------------------  Unable to obtain ROS    VITALS/PHYSICAL EXAM  --------------------------------------------------------------------------------  T(C): 36.6 (12-27-22 @ 11:00), Max: 37.1 (12-26-22 @ 15:00)  HR: 73 (12-27-22 @ 13:00) (73 - 102)  BP: 126/60 (12-26-22 @ 19:07) (91/50 - 126/60)  RR: 12 (12-27-22 @ 13:00) (11 - 44)  SpO2: 94% (12-27-22 @ 13:00) (90% - 96%)  Wt(kg): --        12-26-22 @ 07:01  -  12-27-22 @ 07:00  --------------------------------------------------------  IN: 3012.9 mL / OUT: 3354 mL / NET: -341.1 mL    12-27-22 @ 07:01  -  12-27-22 @ 13:20  --------------------------------------------------------  IN: 864.3 mL / OUT: 1142 mL / NET: -277.7 mL      Physical Exam:  Gen: NAD  HEENT: On HFNC   Pulm: CTA B/L, no crackles   CV: RRR, S1S2+  Abd: +BS, soft, distended  : +urinary catheter  MSK: +BL LE edema  Psych: Awake  Skin: Warm  Access: +Non-tunneled HD catheter    LABS/STUDIES  --------------------------------------------------------------------------------              8.1    36.85 >-----------<  44       [12-27-22 @ 08:41]              24.0     139  |  101  |  8   ----------------------------<  136      [12-27-22 @ 08:41]  4.1   |  23  |  0.72        Ca     8.7     [12-27-22 @ 08:41]      Mg     2.5     [12-27-22 @ 08:41]      Phos  1.6     [12-27-22 @ 08:41]    TPro  6.4  /  Alb  3.8  /  TBili  27.6  /  DBili  x   /  AST  134  /  ALT  51  /  AlkPhos  162  [12-27-22 @ 08:41]    PT/INR: PT 27.8 , INR 2.40       [12-27-22 @ 02:28]  PTT: 51.4       [12-27-22 @ 02:28]      Creatinine Trend:  SCr 0.72 [12-27 @ 08:41]  SCr 0.72 [12-27 @ 02:28]  SCr 0.71 [12-26 @ 20:20]  SCr 0.67 [12-26 @ 14:35]  SCr 0.63 [12-26 @ 08:16]      Urinalysis - [12-20-22 @ 23:55]      Color Dark Yellow / Appearance Turbid / SG 1.029 / pH 6.0      Gluc 100 mg/dL / Ketone Small  / Bili Large / Urobili Negative       Blood Moderate / Protein 300 mg/dL / Leuk Est Large / Nitrite Negative      RBC 5-10 / WBC 11-25 / Hyaline 0-2 / Gran  / Sq Epi  / Non Sq Epi Moderate / Bacteria Negative    Urine Creatinine 21      [12-21-22 @ 11:55]  Urine Sodium 138      [12-21-22 @ 11:55]  Urine Potassium 8      [12-21-22 @ 11:55]  Urine Osmolality 288      [12-21-22 @ 11:55]    Iron 51, TIBC Unable to calculate Test Repeated, %sat Unable to calculate Test Repeated      [12-24-22 @ 20:58]  Ferritin 5747      [12-24-22 @ 20:58]  TSH 0.05      [12-24-22 @ 20:58]  Lipid: chol 100, , HDL 9, LDL --      [12-24-22 @ 20:58]    HBsAb <3.0      [12-24-22 @ 20:59]  HBsAb Nonreact      [12-24-22 @ 20:59]  HBsAg Nonreact      [12-24-22 @ 20:59]  HBcAb Nonreact      [12-24-22 @ 20:59]  HCV 0.12, Nonreact      [12-24-22 @ 20:59]  HIV Nonreact      [12-24-22 @ 20:58]  HIV Nonreact      [12-24-22 @ 20:58]    Syphilis Screen (Treponema Pallidum Ab) Negative      [12-24-22 @ 20:59] [facility] DIVISION OF KIDNEY DISEASES AND HYPERTENSION -- FOLLOW UP NOTE  --------------------------------------------------------------------------------  HPI: 61 yo F with h/o decompensated ETOH cirrhosis with ascites, thyroid cancer (s/p total thyroidectomy, RTX, and radioactive iodide), HTN, ventrical neoplasm who was initially admitted to  12/19 with new onset seizures and transferred to [facility] 12/20 for liver transplant evaluation. Pt being seen for RED requiring CRRT.    24 hour events/subjective: Pt was seen and evaluated in the ICU this morning with family present. Pt on high flow, poor historian. Unable to obtain ROS.        PAST HISTORY  --------------------------------------------------------------------------------  No significant changes to PMH, PSH, FHx, SHx, unless otherwise noted    ALLERGIES & MEDICATIONS  --------------------------------------------------------------------------------  Allergies    Macrobid (Rash)    Intolerances    Nexium (Stomach Upset)    Standing Inpatient Medications  caspofungin IVPB 50 milliGRAM(s) IV Intermittent every 24 hours  caspofungin IVPB      chlorhexidine 2% Cloths 1 Application(s) Topical <User Schedule>  CRRT Treatment    <Continuous>  escitalopram 10 milliGRAM(s) Oral daily  folic acid 1 milliGRAM(s) Oral daily  influenza   Vaccine 0.5 milliLiter(s) IntraMuscular once  insulin lispro (ADMELOG) corrective regimen sliding scale   SubCutaneous every 6 hours  lactulose Syrup 20 Gram(s) Oral every 8 hours  levETIRAcetam  Solution 750 milliGRAM(s) Enteral Tube two times a day  levothyroxine 137 MICROGram(s) Oral daily  meropenem  IVPB 1000 milliGRAM(s) IV Intermittent every 8 hours  midodrine 20 milliGRAM(s) Oral every 8 hours  multivitamin/minerals/iron Oral Solution (CENTRUM) 15 milliLiter(s) Oral daily  norepinephrine Infusion 0.05 MICROgram(s)/kG/Min IV Continuous <Continuous>  pantoprazole   Suspension 40 milliGRAM(s) Oral every 24 hours  Phoxillum Filtration BK 4 / 2.5 5000 milliLiter(s) CRRT <Continuous>  Phoxillum Filtration BK 4 / 2.5 5000 milliLiter(s) CRRT <Continuous>  Phoxillum Filtration BK 4 / 2.5 5000 milliLiter(s) CRRT <Continuous>  rifAXIMin 550 milliGRAM(s) Oral every 12 hours  thiamine 100 milliGRAM(s) Enteral Tube daily  vasopressin Infusion 0.03 Unit(s)/Min IV Continuous <Continuous>    PRN Inpatient Medications  ondansetron Injectable 4 milliGRAM(s) IV Push every 6 hours PRN  oxyCODONE    Solution 5 milliGRAM(s) Oral every 4 hours PRN  oxyCODONE    Solution 10 milliGRAM(s) Oral every 4 hours PRN  sodium chloride 0.65% Nasal 1 Spray(s) Both Nostrils every 3 hours PRN  tetracaine/benzocaine/butamben Spray 1 Spray(s) Topical every 3 hours PRN      REVIEW OF SYSTEMS  --------------------------------------------------------------------------------  Unable to obtain ROS    VITALS/PHYSICAL EXAM  --------------------------------------------------------------------------------  T(C): 36.6 (12-27-22 @ 11:00), Max: 37.1 (12-26-22 @ 15:00)  HR: 73 (12-27-22 @ 13:00) (73 - 102)  BP: 126/60 (12-26-22 @ 19:07) (91/50 - 126/60)  RR: 12 (12-27-22 @ 13:00) (11 - 44)  SpO2: 94% (12-27-22 @ 13:00) (90% - 96%)  Wt(kg): --        12-26-22 @ 07:01  -  12-27-22 @ 07:00  --------------------------------------------------------  IN: 3012.9 mL / OUT: 3354 mL / NET: -341.1 mL    12-27-22 @ 07:01  -  12-27-22 @ 13:20  --------------------------------------------------------  IN: 864.3 mL / OUT: 1142 mL / NET: -277.7 mL      Physical Exam:  Gen: NAD  HEENT: On HFNC   Pulm: CTA B/L, no crackles   CV: RRR, S1S2+  Abd: +BS, soft, distended  : +urinary catheter  MSK: +BL LE edema  Psych: Awake  Skin: Warm  Access: +Non-tunneled HD catheter    LABS/STUDIES  --------------------------------------------------------------------------------              8.1    36.85 >-----------<  44       [12-27-22 @ 08:41]              24.0     139  |  101  |  8   ----------------------------<  136      [12-27-22 @ 08:41]  4.1   |  23  |  0.72        Ca     8.7     [12-27-22 @ 08:41]      Mg     2.5     [12-27-22 @ 08:41]      Phos  1.6     [12-27-22 @ 08:41]    TPro  6.4  /  Alb  3.8  /  TBili  27.6  /  DBili  x   /  AST  134  /  ALT  51  /  AlkPhos  162  [12-27-22 @ 08:41]    PT/INR: PT 27.8 , INR 2.40       [12-27-22 @ 02:28]  PTT: 51.4       [12-27-22 @ 02:28]      Creatinine Trend:  SCr 0.72 [12-27 @ 08:41]  SCr 0.72 [12-27 @ 02:28]  SCr 0.71 [12-26 @ 20:20]  SCr 0.67 [12-26 @ 14:35]  SCr 0.63 [12-26 @ 08:16]      Urinalysis - [12-20-22 @ 23:55]      Color Dark Yellow / Appearance Turbid / SG 1.029 / pH 6.0      Gluc 100 mg/dL / Ketone Small  / Bili Large / Urobili Negative       Blood Moderate / Protein 300 mg/dL / Leuk Est Large / Nitrite Negative      RBC 5-10 / WBC 11-25 / Hyaline 0-2 / Gran  / Sq Epi  / Non Sq Epi Moderate / Bacteria Negative    Urine Creatinine 21      [12-21-22 @ 11:55]  Urine Sodium 138      [12-21-22 @ 11:55]  Urine Potassium 8      [12-21-22 @ 11:55]  Urine Osmolality 288      [12-21-22 @ 11:55]    Iron 51, TIBC Unable to calculate Test Repeated, %sat Unable to calculate Test Repeated      [12-24-22 @ 20:58]  Ferritin 5747      [12-24-22 @ 20:58]  TSH 0.05      [12-24-22 @ 20:58]  Lipid: chol 100, , HDL 9, LDL --      [12-24-22 @ 20:58]    HBsAb <3.0      [12-24-22 @ 20:59]  HBsAb Nonreact      [12-24-22 @ 20:59]  HBsAg Nonreact      [12-24-22 @ 20:59]  HBcAb Nonreact      [12-24-22 @ 20:59]  HCV 0.12, Nonreact      [12-24-22 @ 20:59]  HIV Nonreact      [12-24-22 @ 20:58]  HIV Nonreact      [12-24-22 @ 20:58]    Syphilis Screen (Treponema Pallidum Ab) Negative      [12-24-22 @ 20:59]

## 2022-12-27 NOTE — PROGRESS NOTE ADULT - ASSESSMENT
63 yo F with h/o decompensated ETOH cirrhosis with ascites, thyroid cancer (s/p total thyroidectomy, RTX, and radioactive iodide), HTN, ventrical neoplasm who was initially admitted to  12/19 with new onset seizures and transferred to [facility] 12/20 for liver transplant evaluation. Pt being seen for RED requiring CRRT. 61 yo F with h/o decompensated ETOH cirrhosis with ascites, thyroid cancer (s/p total thyroidectomy, RTX, and radioactive iodide), HTN, ventrical neoplasm who was initially admitted to  12/19 with new onset seizures and transferred to [facility] 12/20 for liver transplant evaluation. Pt being seen for RED requiring CRRT. 63 yo F with h/o decompensated ETOH cirrhosis with ascites, thyroid cancer (s/p total thyroidectomy, RTX, and radioactive iodide), HTN, ventrical neoplasm who was initially admitted to  12/19 with new onset seizures and transferred to [facility] 12/20 for liver transplant evaluation. Pt being seen for RED requiring CRRT.

## 2022-12-27 NOTE — CONSULT NOTE ADULT - SUBJECTIVE AND OBJECTIVE BOX
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   She was admitted to [facility] on  with seizures no reported hx of seizures.  (20 Dec 2022 21:21)    PMH:   HTN (hypertension)    UTI (urinary tract infection)    Intracranial tumor    GERD (gastroesophageal reflux disease)    Eczema    Thyroid cancer    COVID-19 virus infection      PSH:   History of thyroid surgery    H/O total thyroidectomy    History of tonsillectomy    History of appendectomy      Medications:   caspofungin IVPB      caspofungin IVPB 50 milliGRAM(s) IV Intermittent every 24 hours  chlorhexidine 2% Cloths 1 Application(s) Topical <User Schedule>  CRRT Treatment    <Continuous>  escitalopram 10 milliGRAM(s) Oral daily  folic acid 1 milliGRAM(s) Oral daily  influenza   Vaccine 0.5 milliLiter(s) IntraMuscular once  insulin lispro (ADMELOG) corrective regimen sliding scale   SubCutaneous every 6 hours  lactulose Syrup 20 Gram(s) Oral every 8 hours  levETIRAcetam  Solution 750 milliGRAM(s) Enteral Tube two times a day  levothyroxine 137 MICROGram(s) Oral daily  meropenem  IVPB 1000 milliGRAM(s) IV Intermittent every 8 hours  midodrine 20 milliGRAM(s) Oral every 8 hours  multivitamin/minerals/iron Oral Solution (CENTRUM) 15 milliLiter(s) Oral daily  norepinephrine Infusion 0.05 MICROgram(s)/kG/Min IV Continuous <Continuous>  oxyCODONE    Solution 5 milliGRAM(s) Oral every 4 hours PRN  oxyCODONE    Solution 10 milliGRAM(s) Oral every 4 hours PRN  pantoprazole   Suspension 40 milliGRAM(s) Oral every 24 hours  PrismaSATE Dialysate BGK 4 / 2.5 5000 milliLiter(s) CRRT <Continuous>  PrismaSOL Filtration BGK 4 / 2.5 5000 milliLiter(s) CRRT <Continuous>  PrismaSOL Filtration BGK 4 / 2.5 5000 milliLiter(s) CRRT <Continuous>  rifAXIMin 550 milliGRAM(s) Oral every 12 hours  sodium chloride 0.65% Nasal 1 Spray(s) Both Nostrils every 3 hours PRN  tetracaine/benzocaine/butamben Spray 1 Spray(s) Topical every 3 hours PRN  thiamine 100 milliGRAM(s) Enteral Tube daily  vasopressin Infusion 0.03 Unit(s)/Min IV Continuous <Continuous>    Allergies:  Macrobid (Rash)  Nexium (Stomach Upset)    FAMILY HISTORY:  FH: heart disease  Father, age 85,     FH: pancreatic cancer  Brother, age 59,     Social History:   Smoking: former smoker (quit more than 30 years ago)  Alcohol: +EtOH abuse (drank two bottles of wine per day)  Drugs: no illicit drug use reported    Review of Systems:  [x]Unable to obtain due to delirium           Physical Exam:  T(C): 36.7 (22 @ 07:00), Max: 37.1 (22 @ 15:00)  HR: 96 (22 @ 08:44) (72 - 102)  BP: 126/60 (22 @ 19:07) (91/50 - 126/60)  RR: 19 (22 @ 08:44) (13 - 44)  SpO2: 94% (22 @ 08:44) (90% - 96%)  Wt(kg): --     @ 07:01  -   @ 07:00  --------------------------------------------------------  IN: 3012.9 mL / OUT: 3354 mL / NET: -341.1 mL     @ 07:01  -   @ 08:58  --------------------------------------------------------  IN: 72 mL / OUT: 121 mL / NET: -49 mL      Daily     Daily Weight in k.7 (27 Dec 2022 00:00)    Appearance: Normal, well groomed, NAD  Eyes: PERRLA, EOMI, pink conjunctiva, no scleral icterus   HENT: Normal oral mucosa  Cardiovascular: RRR, S1, S2, no murmur, rub, or gallop; no edema; no JVD  Procedural Access Site: Clean, dry, intact, without hematoma  Respiratory: Clear to auscultation bilaterally  Gastrointestinal: Soft, non-tender, non-distended, BS+  Musculoskeletal: No clubbing or joint deformity   Neurologic: No focal weakness  Lymphatic: No lymphadenopathy  Psychiatry: AAOx3 with appropriate mood and affect  Skin: No rashes, ecchymoses, or cyanosis    Cardiovascular Diagnostic Testing:  ECG:    Echo:    Stress Testing:    Cath:    Interpretation of Telemetry:    Imaging:    Labs:                        8.4    36.51 )-----------( 49       ( 27 Dec 2022 02:28 )             24.6         140  |  102  |  8   ----------------------------<  133<H>  3.9   |  25  |  0.72    Ca    8.8      27 Dec 2022 02:28  Phos  2.1       Mg     2.4         TPro  6.5  /  Alb  3.9  /  TBili  28.9<H>  /  DBili  x   /  AST  127<H>  /  ALT  48<H>  /  AlkPhos  171<H>      PT/INR - ( 27 Dec 2022 02:28 )   PT: 27.8 sec;   INR: 2.40 ratio         PTT - ( 27 Dec 2022 02:28 )  PTT:51.4 sec            Thyroid Stimulating Hormone, Serum: 0.05 uIU/mL ( @ 20:58)  Thyroid Stimulating Hormone, Serum: 0.05 uIU/mL ( @ 20:58)   Patient seen and evaluated @ 8am on 8 ICU  Chief Complaint: hepatic encephalopathy    HPI:  62 y.o Hx significant for Thyroid cancer age 21's for which she had a Total Thyroidectomy, radiation and radioactive iodide, HTN, Eczema, 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   She was admitted to [facility] on  with seizures no reported hx of seizures.  (20 Dec 2022 21:21)    PMH:   HTN (hypertension)    UTI (urinary tract infection)    Intracranial tumor    GERD (gastroesophageal reflux disease)    Eczema    Thyroid cancer    COVID-19 virus infection      PSH:   History of thyroid surgery    H/O total thyroidectomy    History of tonsillectomy    History of appendectomy      Medications:   caspofungin IVPB      caspofungin IVPB 50 milliGRAM(s) IV Intermittent every 24 hours  chlorhexidine 2% Cloths 1 Application(s) Topical <User Schedule>  CRRT Treatment    <Continuous>  escitalopram 10 milliGRAM(s) Oral daily  folic acid 1 milliGRAM(s) Oral daily  influenza   Vaccine 0.5 milliLiter(s) IntraMuscular once  insulin lispro (ADMELOG) corrective regimen sliding scale   SubCutaneous every 6 hours  lactulose Syrup 20 Gram(s) Oral every 8 hours  levETIRAcetam  Solution 750 milliGRAM(s) Enteral Tube two times a day  levothyroxine 137 MICROGram(s) Oral daily  meropenem  IVPB 1000 milliGRAM(s) IV Intermittent every 8 hours  midodrine 20 milliGRAM(s) Oral every 8 hours  multivitamin/minerals/iron Oral Solution (CENTRUM) 15 milliLiter(s) Oral daily  norepinephrine Infusion 0.05 MICROgram(s)/kG/Min IV Continuous <Continuous>  oxyCODONE    Solution 5 milliGRAM(s) Oral every 4 hours PRN  oxyCODONE    Solution 10 milliGRAM(s) Oral every 4 hours PRN  pantoprazole   Suspension 40 milliGRAM(s) Oral every 24 hours  PrismaSATE Dialysate BGK 4 / 2.5 5000 milliLiter(s) CRRT <Continuous>  PrismaSOL Filtration BGK 4 / 2.5 5000 milliLiter(s) CRRT <Continuous>  PrismaSOL Filtration BGK 4 / 2.5 5000 milliLiter(s) CRRT <Continuous>  rifAXIMin 550 milliGRAM(s) Oral every 12 hours  sodium chloride 0.65% Nasal 1 Spray(s) Both Nostrils every 3 hours PRN  tetracaine/benzocaine/butamben Spray 1 Spray(s) Topical every 3 hours PRN  thiamine 100 milliGRAM(s) Enteral Tube daily  vasopressin Infusion 0.03 Unit(s)/Min IV Continuous <Continuous>    Allergies:  Macrobid (Rash)  Nexium (Stomach Upset)    FAMILY HISTORY:  FH: heart disease  Father, age 85,     FH: pancreatic cancer  Brother, age 59,     Social History:   Smoking: former smoker (quit more than 30 years ago)  Alcohol: +EtOH abuse (drank two bottles of wine per day)  Drugs: no illicit drug use reported    Review of Systems:  [x]Unable to obtain due to delirium           Physical Exam:  T(C): 36.7 (22 @ 07:00), Max: 37.1 (22 @ 15:00)  HR: 96 (22 @ 08:44) (72 - 102)  BP: 126/60 (22 @ 19:07) (91/50 - 126/60)  RR: 19 (22 @ 08:44) (13 - 44)  SpO2: 94% (22 @ 08:44) (90% - 96%)  Wt(kg): --     @ 07:01  -   @ 07:00  --------------------------------------------------------  IN: 3012.9 mL / OUT: 3354 mL / NET: -341.1 mL     @ 07:01  -   @ 08:58  --------------------------------------------------------  IN: 72 mL / OUT: 121 mL / NET: -49 mL      Daily     Daily Weight in k.7 (27 Dec 2022 00:00)    Appearance: Normal, well groomed, NAD  Eyes: PERRLA, EOMI, pink conjunctiva, no scleral icterus   HENT: Normal oral mucosa  Cardiovascular: RRR, S1, S2, no murmur, rub, or gallop; no edema; no JVD  Procedural Access Site: Clean, dry, intact, without hematoma  Respiratory: Clear to auscultation bilaterally  Gastrointestinal: Soft, non-tender, non-distended, BS+  Musculoskeletal: No clubbing or joint deformity   Neurologic: No focal weakness  Lymphatic: No lymphadenopathy  Psychiatry: AAOx3 with appropriate mood and affect  Skin: No rashes, ecchymoses, or cyanosis    Cardiovascular Diagnostic Testing:  ECG:    Echo:    Stress Testing:    Cath:    Interpretation of Telemetry:    Imaging:    Labs:                        8.4    36.51 )-----------( 49       ( 27 Dec 2022 02:28 )             24.6         140  |  102  |  8   ----------------------------<  133<H>  3.9   |  25  |  0.72    Ca    8.8      27 Dec 2022 02:28  Phos  2.1       Mg     2.4         TPro  6.5  /  Alb  3.9  /  TBili  28.9<H>  /  DBili  x   /  AST  127<H>  /  ALT  48<H>  /  AlkPhos  171<H>      PT/INR - ( 27 Dec 2022 02:28 )   PT: 27.8 sec;   INR: 2.40 ratio         PTT - ( 27 Dec 2022 02:28 )  PTT:51.4 sec            Thyroid Stimulating Hormone, Serum: 0.05 uIU/mL ( @ 20:58)  Thyroid Stimulating Hormone, Serum: 0.05 uIU/mL ( @ 20:58)   Patient seen and evaluated @ 8am on 8 ICU  Chief Complaint: hepatic encephalopathy    HPI:  62 y.o Hx significant for Thyroid cancer age 21's for which she had a Total Thyroidectomy, radiation and radioactive iodide, HTN, Eczema, 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   She was admitted to [facility] on  with seizures no reported hx of seizures.  (20 Dec 2022 21:21)    PMH:   HTN (hypertension)    UTI (urinary tract infection)    Intracranial tumor    GERD (gastroesophageal reflux disease)    Eczema    Thyroid cancer    COVID-19 virus infection      PSH:   History of thyroid surgery    H/O total thyroidectomy    History of tonsillectomy    History of appendectomy      Medications:   caspofungin IVPB      caspofungin IVPB 50 milliGRAM(s) IV Intermittent every 24 hours  chlorhexidine 2% Cloths 1 Application(s) Topical <User Schedule>  CRRT Treatment    <Continuous>  escitalopram 10 milliGRAM(s) Oral daily  folic acid 1 milliGRAM(s) Oral daily  influenza   Vaccine 0.5 milliLiter(s) IntraMuscular once  insulin lispro (ADMELOG) corrective regimen sliding scale   SubCutaneous every 6 hours  lactulose Syrup 20 Gram(s) Oral every 8 hours  levETIRAcetam  Solution 750 milliGRAM(s) Enteral Tube two times a day  levothyroxine 137 MICROGram(s) Oral daily  meropenem  IVPB 1000 milliGRAM(s) IV Intermittent every 8 hours  midodrine 20 milliGRAM(s) Oral every 8 hours  multivitamin/minerals/iron Oral Solution (CENTRUM) 15 milliLiter(s) Oral daily  norepinephrine Infusion 0.05 MICROgram(s)/kG/Min IV Continuous <Continuous>  oxyCODONE    Solution 5 milliGRAM(s) Oral every 4 hours PRN  oxyCODONE    Solution 10 milliGRAM(s) Oral every 4 hours PRN  pantoprazole   Suspension 40 milliGRAM(s) Oral every 24 hours  PrismaSATE Dialysate BGK 4 / 2.5 5000 milliLiter(s) CRRT <Continuous>  PrismaSOL Filtration BGK 4 / 2.5 5000 milliLiter(s) CRRT <Continuous>  PrismaSOL Filtration BGK 4 / 2.5 5000 milliLiter(s) CRRT <Continuous>  rifAXIMin 550 milliGRAM(s) Oral every 12 hours  sodium chloride 0.65% Nasal 1 Spray(s) Both Nostrils every 3 hours PRN  tetracaine/benzocaine/butamben Spray 1 Spray(s) Topical every 3 hours PRN  thiamine 100 milliGRAM(s) Enteral Tube daily  vasopressin Infusion 0.03 Unit(s)/Min IV Continuous <Continuous>    Allergies:  Macrobid (Rash)  Nexium (Stomach Upset)    FAMILY HISTORY:  FH: heart disease  Father, age 85,     FH: pancreatic cancer  Brother, age 59,     Social History:   Smoking: former smoker (quit more than 30 years ago)  Alcohol: +EtOH abuse (drank two bottles of wine per day)  Drugs: no illicit drug use reported    Review of Systems:  [x]Unable to obtain due to delirium           Physical Exam:  T(C): 36.7 (22 @ 07:00), Max: 37.1 (22 @ 15:00)  HR: 96 (22 @ 08:44) (72 - 102)  BP: 126/60 (22 @ 19:07) (91/50 - 126/60)  RR: 19 (22 @ 08:44) (13 - 44)  SpO2: 94% (22 @ 08:44) (90% - 96%)  Wt(kg): --     @ 07:01  -   @ 07:00  --------------------------------------------------------  IN: 3012.9 mL / OUT: 3354 mL / NET: -341.1 mL     @ 07:01  -   @ 08:58  --------------------------------------------------------  IN: 72 mL / OUT: 121 mL / NET: -49 mL      Daily     Daily Weight in k.7 (27 Dec 2022 00:00)    Appearance: Normal, well groomed, NAD  Eyes: PERRLA, EOMI, pink conjunctiva, no scleral icterus   HENT: Normal oral mucosa  Cardiovascular: RRR, S1, S2, no murmur, rub, or gallop; no edema; no JVD  Procedural Access Site: Clean, dry, intact, without hematoma  Respiratory: Clear to auscultation bilaterally  Gastrointestinal: Soft, non-tender, non-distended, BS+  Musculoskeletal: No clubbing or joint deformity   Neurologic: No focal weakness  Lymphatic: No lymphadenopathy  Psychiatry: AAOx3 with appropriate mood and affect  Skin: No rashes, ecchymoses, or cyanosis    Cardiovascular Diagnostic Testing:  ECG:    Echo:    Stress Testing:    Cath:    Interpretation of Telemetry:    Imaging:    Labs:                        8.4    36.51 )-----------( 49       ( 27 Dec 2022 02:28 )             24.6         140  |  102  |  8   ----------------------------<  133<H>  3.9   |  25  |  0.72    Ca    8.8      27 Dec 2022 02:28  Phos  2.1       Mg     2.4         TPro  6.5  /  Alb  3.9  /  TBili  28.9<H>  /  DBili  x   /  AST  127<H>  /  ALT  48<H>  /  AlkPhos  171<H>      PT/INR - ( 27 Dec 2022 02:28 )   PT: 27.8 sec;   INR: 2.40 ratio         PTT - ( 27 Dec 2022 02:28 )  PTT:51.4 sec            Thyroid Stimulating Hormone, Serum: 0.05 uIU/mL ( @ 20:58)  Thyroid Stimulating Hormone, Serum: 0.05 uIU/mL ( @ 20:58)   Patient seen and evaluated @ 8am on 8 ICU  Chief Complaint: hepatic encephalopathy    HPI:  62 y.o Hx significant for Thyroid cancer age 21's for which she had a Total Thyroidectomy, radiation and radioactive iodide, HTN, Eczema, 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   She was admitted to [facility] on  with seizures no reported hx of seizures.  (20 Dec 2022 21:21)    PMH:   HTN (hypertension)    UTI (urinary tract infection)    Intracranial tumor    GERD (gastroesophageal reflux disease)    Eczema    Thyroid cancer    COVID-19 virus infection      PSH:   History of thyroid surgery    H/O total thyroidectomy    History of tonsillectomy    History of appendectomy      Medications:   caspofungin IVPB      caspofungin IVPB 50 milliGRAM(s) IV Intermittent every 24 hours  chlorhexidine 2% Cloths 1 Application(s) Topical <User Schedule>  CRRT Treatment    <Continuous>  escitalopram 10 milliGRAM(s) Oral daily  folic acid 1 milliGRAM(s) Oral daily  influenza   Vaccine 0.5 milliLiter(s) IntraMuscular once  insulin lispro (ADMELOG) corrective regimen sliding scale   SubCutaneous every 6 hours  lactulose Syrup 20 Gram(s) Oral every 8 hours  levETIRAcetam  Solution 750 milliGRAM(s) Enteral Tube two times a day  levothyroxine 137 MICROGram(s) Oral daily  meropenem  IVPB 1000 milliGRAM(s) IV Intermittent every 8 hours  midodrine 20 milliGRAM(s) Oral every 8 hours  multivitamin/minerals/iron Oral Solution (CENTRUM) 15 milliLiter(s) Oral daily  norepinephrine Infusion 0.05 MICROgram(s)/kG/Min IV Continuous <Continuous>  oxyCODONE    Solution 5 milliGRAM(s) Oral every 4 hours PRN  oxyCODONE    Solution 10 milliGRAM(s) Oral every 4 hours PRN  pantoprazole   Suspension 40 milliGRAM(s) Oral every 24 hours  PrismaSATE Dialysate BGK 4 / 2.5 5000 milliLiter(s) CRRT <Continuous>  PrismaSOL Filtration BGK 4 / 2.5 5000 milliLiter(s) CRRT <Continuous>  PrismaSOL Filtration BGK 4 / 2.5 5000 milliLiter(s) CRRT <Continuous>  rifAXIMin 550 milliGRAM(s) Oral every 12 hours  sodium chloride 0.65% Nasal 1 Spray(s) Both Nostrils every 3 hours PRN  tetracaine/benzocaine/butamben Spray 1 Spray(s) Topical every 3 hours PRN  thiamine 100 milliGRAM(s) Enteral Tube daily  vasopressin Infusion 0.03 Unit(s)/Min IV Continuous <Continuous>    Allergies:  Macrobid (Rash)  Nexium (Stomach Upset)    FAMILY HISTORY:  FH: heart disease  Father, age 85,     FH: pancreatic cancer  Brother, age 59,     Social History:   Smoking: former smoker (quit more than 30 years ago)  Alcohol: +EtOH abuse (drank two bottles of wine per day)  Drugs: no illicit drug use reported    Review of Systems:  [x]Unable to obtain due to delirium           Physical Exam:  T(C): 36.7 (22 @ 07:00), Max: 37.1 (22 @ 15:00)  HR: 96 (22 @ 08:44) (72 - 102)  BP: 126/60 (22 @ 19:07) (91/50 - 126/60)  RR: 19 (22 @ 08:44) (13 - 44)  SpO2: 94% (22 @ 08:44) (90% - 96%)  Wt(kg): --     @ 07:01  -   @ 07:00  --------------------------------------------------------  IN: 3012.9 mL / OUT: 3354 mL / NET: -341.1 mL     @ 07:01  -   @ 08:58  --------------------------------------------------------  IN: 72 mL / OUT: 121 mL / NET: -49 mL      Daily     Daily Weight in k.7 (27 Dec 2022 00:00)    Appearance: Jaundice, encephalopathic   Eyes: PERRLA, EOMI, pink conjunctiva, no scleral icterus   HENT: Normal oral mucosa  Cardiovascular: RRR, S1, S2, no murmur, rub, or gallop; no edema; no JVD  Procedural Access Site: Clean, dry, intact, without hematoma  Respiratory: Clear to auscultation bilaterally  Gastrointestinal: Soft, non-tender, non-distended, BS+  Musculoskeletal: Mitten restraints   Lymphatic: No lymphadenopathy  Skin: No rashes, ecchymoses, or cyanosis    Cardiovascular Diagnostic Testing:  ECG:     Echo:   < from: TTE with Doppler (w/Cont) (. @ 10:49) >  ------------------------------------------------------------------------  Dimensions:    Normal Values:  LA:     3.5    2.0 - 4.0 cm  Ao:     3.1    2.0 - 3.8 cm  SEPTUM: 0.7    0.6 - 1.2 cm  PWT:    0.6    0.6 - 1.1 cm  LVIDd: 5.4    3.0 - 5.6 cm  LVIDs:  2.9    1.8 - 4.0 cm  Derived variables:  LVMI: 69 g/m2  RWT: 0.22  Fractional short: 46 %  EF (Visual Estimate):  %  EF (Monroy Rule): 66 %Doppler Peak Velocity (m/sec):  AoV=1.4  ------------------------------------------------------------------------  Observations:  Mitral Valve: Normal mitral valve. Minimal mitral  regurgitation.  Aortic Valve/Aorta: Calcified trileaflet aortic valve with  normal opening. Peak transaortic valve gradient equals 8 mm  Hg. No aorticvalve regurgitation seen. Peak left  ventricular outflow tract gradient equals 4 mm Hg, mean  gradient is equal to 2 mm Hg, LVOT velocity time integral  equals 19 cm.  Aortic Root: 3.1 cm.  Ascending Aorta: 3.3 cm.  LVOT diameter: 2 cm.  Left Atrium:Normal left atrium.  LA volume index = 33  cc/m2.  Left Ventricle: Endocardial visualization enhanced with  intravenous injection of Ultrasonic Enhancing Agent  (Definity). Left ventricle suboptimally visualized despite  intravenous injection of ultrasonic enhancing agent; normal  left ventricular systolic function. No segmental wall  motion abnormalities. Normal left ventricular internal  dimensions and wall thicknesses. Normal diastolic function.  Right Heart: Normal right atrium. Normal right ventricular  size and function. Normal tricuspid valve. Mild-moderate  tricuspid regurgitation. Normal pulmonic valve. Minimal  pulmonic regurgitation.  Pericardium/Pleura: Normal pericardium with no pericardial  effusion.  Hemodynamic: Estimated right atrial pressure is 8 mm Hg.  Estimated right ventricular systolic pressure equals 29 mm  Hg, assuming right atrial pressure equals 8 mm Hg,  consistent with normal pulmonary pressures.  ------------------------------------------------------------------------  Conclusions:  1. Normal mitral valve. Minimal mitral regurgitation.  2. Calcified trileaflet aortic valve with normal opening.  No aortic valve regurgitation seen.  3. Endocardial visualization enhanced with intravenous  injection of Ultrasonic Enhancing Agent (Definity). Left  ventricle suboptimally visualized despite intravenous  injection of ultrasonic enhancing agent; normal left  ventricular systolic function. No segmental wall motion  abnormalities.  4. Normal right ventricular size and function.  *** No previous Echo exam.  ------------------------------------------------------------------------  Confirmed on  2022 - 13:40:44 by Rosalinda Ingram M.D.  ------------------------------------------------------------------------    < end of copied text >      Stress Testing:    Cath:    Interpretation of Telemetry:    Imaging:    Labs:                        8.4    36.51 )-----------( 49       ( 27 Dec 2022 02:28 )             24.6         140  |  102  |  8   ----------------------------<  133<H>  3.9   |  25  |  0.72    Ca    8.8      27 Dec 2022 02:28  Phos  2.1       Mg     2.4         TPro  6.5  /  Alb  3.9  /  TBili  28.9<H>  /  DBili  x   /  AST  127<H>  /  ALT  48<H>  /  AlkPhos  171<H>      PT/INR - ( 27 Dec 2022 02:28 )   PT: 27.8 sec;   INR: 2.40 ratio         PTT - ( 27 Dec 2022 02:28 )  PTT:51.4 sec    Thyroid Stimulating Hormone, Serum: 0.05 uIU/mL ( @ 20:58)  Thyroid Stimulating Hormone, Serum: 0.05 uIU/mL ( @ 20:58)   Patient seen and evaluated @ 8am on 8 ICU  Chief Complaint: hepatic encephalopathy    HPI:  62 y.o Hx significant for Thyroid cancer age 21's for which she had a Total Thyroidectomy, radiation and radioactive iodide, HTN, Eczema, 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   She was admitted to [facility] on  with seizures no reported hx of seizures.  (20 Dec 2022 21:21)    PMH:   HTN (hypertension)    UTI (urinary tract infection)    Intracranial tumor    GERD (gastroesophageal reflux disease)    Eczema    Thyroid cancer    COVID-19 virus infection      PSH:   History of thyroid surgery    H/O total thyroidectomy    History of tonsillectomy    History of appendectomy      Medications:   caspofungin IVPB      caspofungin IVPB 50 milliGRAM(s) IV Intermittent every 24 hours  chlorhexidine 2% Cloths 1 Application(s) Topical <User Schedule>  CRRT Treatment    <Continuous>  escitalopram 10 milliGRAM(s) Oral daily  folic acid 1 milliGRAM(s) Oral daily  influenza   Vaccine 0.5 milliLiter(s) IntraMuscular once  insulin lispro (ADMELOG) corrective regimen sliding scale   SubCutaneous every 6 hours  lactulose Syrup 20 Gram(s) Oral every 8 hours  levETIRAcetam  Solution 750 milliGRAM(s) Enteral Tube two times a day  levothyroxine 137 MICROGram(s) Oral daily  meropenem  IVPB 1000 milliGRAM(s) IV Intermittent every 8 hours  midodrine 20 milliGRAM(s) Oral every 8 hours  multivitamin/minerals/iron Oral Solution (CENTRUM) 15 milliLiter(s) Oral daily  norepinephrine Infusion 0.05 MICROgram(s)/kG/Min IV Continuous <Continuous>  oxyCODONE    Solution 5 milliGRAM(s) Oral every 4 hours PRN  oxyCODONE    Solution 10 milliGRAM(s) Oral every 4 hours PRN  pantoprazole   Suspension 40 milliGRAM(s) Oral every 24 hours  PrismaSATE Dialysate BGK 4 / 2.5 5000 milliLiter(s) CRRT <Continuous>  PrismaSOL Filtration BGK 4 / 2.5 5000 milliLiter(s) CRRT <Continuous>  PrismaSOL Filtration BGK 4 / 2.5 5000 milliLiter(s) CRRT <Continuous>  rifAXIMin 550 milliGRAM(s) Oral every 12 hours  sodium chloride 0.65% Nasal 1 Spray(s) Both Nostrils every 3 hours PRN  tetracaine/benzocaine/butamben Spray 1 Spray(s) Topical every 3 hours PRN  thiamine 100 milliGRAM(s) Enteral Tube daily  vasopressin Infusion 0.03 Unit(s)/Min IV Continuous <Continuous>    Allergies:  Macrobid (Rash)  Nexium (Stomach Upset)    FAMILY HISTORY:  FH: heart disease  Father, age 85,     FH: pancreatic cancer  Brother, age 59,     Social History:   Smoking: former smoker (quit more than 30 years ago)  Alcohol: +EtOH abuse (drank two bottles of wine per day)  Drugs: no illicit drug use reported    Review of Systems:  [x]Unable to obtain due to delirium           Physical Exam:  T(C): 36.7 (22 @ 07:00), Max: 37.1 (22 @ 15:00)  HR: 96 (22 @ 08:44) (72 - 102)  BP: 126/60 (22 @ 19:07) (91/50 - 126/60)  RR: 19 (22 @ 08:44) (13 - 44)  SpO2: 94% (22 @ 08:44) (90% - 96%)  Wt(kg): --     @ 07:01  -   @ 07:00  --------------------------------------------------------  IN: 3012.9 mL / OUT: 3354 mL / NET: -341.1 mL     @ 07:01  -   @ 08:58  --------------------------------------------------------  IN: 72 mL / OUT: 121 mL / NET: -49 mL      Daily     Daily Weight in k.7 (27 Dec 2022 00:00)    Appearance: Jaundice, encephalopathic   Eyes: PERRLA, EOMI, pink conjunctiva, no scleral icterus   HENT: Normal oral mucosa  Cardiovascular: RRR, S1, S2, no murmur, rub, or gallop; no edema; no JVD  Procedural Access Site: Clean, dry, intact, without hematoma  Respiratory: Clear to auscultation bilaterally  Gastrointestinal: Soft, non-tender, non-distended, BS+  Musculoskeletal: Mitten restraints   Lymphatic: No lymphadenopathy  Skin: No rashes, ecchymoses, or cyanosis    Cardiovascular Diagnostic Testing:  ECG:     Echo:   < from: TTE with Doppler (w/Cont) (. @ 10:49) >  ------------------------------------------------------------------------  Dimensions:    Normal Values:  LA:     3.5    2.0 - 4.0 cm  Ao:     3.1    2.0 - 3.8 cm  SEPTUM: 0.7    0.6 - 1.2 cm  PWT:    0.6    0.6 - 1.1 cm  LVIDd: 5.4    3.0 - 5.6 cm  LVIDs:  2.9    1.8 - 4.0 cm  Derived variables:  LVMI: 69 g/m2  RWT: 0.22  Fractional short: 46 %  EF (Visual Estimate):  %  EF (Monroy Rule): 66 %Doppler Peak Velocity (m/sec):  AoV=1.4  ------------------------------------------------------------------------  Observations:  Mitral Valve: Normal mitral valve. Minimal mitral  regurgitation.  Aortic Valve/Aorta: Calcified trileaflet aortic valve with  normal opening. Peak transaortic valve gradient equals 8 mm  Hg. No aorticvalve regurgitation seen. Peak left  ventricular outflow tract gradient equals 4 mm Hg, mean  gradient is equal to 2 mm Hg, LVOT velocity time integral  equals 19 cm.  Aortic Root: 3.1 cm.  Ascending Aorta: 3.3 cm.  LVOT diameter: 2 cm.  Left Atrium:Normal left atrium.  LA volume index = 33  cc/m2.  Left Ventricle: Endocardial visualization enhanced with  intravenous injection of Ultrasonic Enhancing Agent  (Definity). Left ventricle suboptimally visualized despite  intravenous injection of ultrasonic enhancing agent; normal  left ventricular systolic function. No segmental wall  motion abnormalities. Normal left ventricular internal  dimensions and wall thicknesses. Normal diastolic function.  Right Heart: Normal right atrium. Normal right ventricular  size and function. Normal tricuspid valve. Mild-moderate  tricuspid regurgitation. Normal pulmonic valve. Minimal  pulmonic regurgitation.  Pericardium/Pleura: Normal pericardium with no pericardial  effusion.  Hemodynamic: Estimated right atrial pressure is 8 mm Hg.  Estimated right ventricular systolic pressure equals 29 mm  Hg, assuming right atrial pressure equals 8 mm Hg,  consistent with normal pulmonary pressures.  ------------------------------------------------------------------------  Conclusions:  1. Normal mitral valve. Minimal mitral regurgitation.  2. Calcified trileaflet aortic valve with normal opening.  No aortic valve regurgitation seen.  3. Endocardial visualization enhanced with intravenous  injection of Ultrasonic Enhancing Agent (Definity). Left  ventricle suboptimally visualized despite intravenous  injection of ultrasonic enhancing agent; normal left  ventricular systolic function. No segmental wall motion  abnormalities.  4. Normal right ventricular size and function.  *** No previous Echo exam.  ------------------------------------------------------------------------  Confirmed on  2022 - 13:40:44 by Rosalinda Ingram M.D.  ------------------------------------------------------------------------    < end of copied text >      Stress Testing:    Cath:    Interpretation of Telemetry:    Imaging:    Labs:                        8.4    36.51 )-----------( 49       ( 27 Dec 2022 02:28 )             24.6         140  |  102  |  8   ----------------------------<  133<H>  3.9   |  25  |  0.72    Ca    8.8      27 Dec 2022 02:28  Phos  2.1       Mg     2.4         TPro  6.5  /  Alb  3.9  /  TBili  28.9<H>  /  DBili  x   /  AST  127<H>  /  ALT  48<H>  /  AlkPhos  171<H>      PT/INR - ( 27 Dec 2022 02:28 )   PT: 27.8 sec;   INR: 2.40 ratio         PTT - ( 27 Dec 2022 02:28 )  PTT:51.4 sec    Thyroid Stimulating Hormone, Serum: 0.05 uIU/mL ( @ 20:58)  Thyroid Stimulating Hormone, Serum: 0.05 uIU/mL ( @ 20:58)   Patient seen and evaluated @ 8am on 8 ICU  Chief Complaint: hepatic encephalopathy    HPI:  62 y.o Hx significant for Thyroid cancer age 21's for which she had a Total Thyroidectomy, radiation and radioactive iodide, HTN, Eczema, 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 for further management of acute liver failure and trans[plant evaluation.   She was admitted to [facility] on  with seizures no reported hx of seizures.  (20 Dec 2022 21:21)    PMH:   HTN (hypertension)    UTI (urinary tract infection)    Intracranial tumor    GERD (gastroesophageal reflux disease)    Eczema    Thyroid cancer    COVID-19 virus infection      PSH:   History of thyroid surgery    H/O total thyroidectomy    History of tonsillectomy    History of appendectomy      Medications:   caspofungin IVPB      caspofungin IVPB 50 milliGRAM(s) IV Intermittent every 24 hours  chlorhexidine 2% Cloths 1 Application(s) Topical <User Schedule>  CRRT Treatment    <Continuous>  escitalopram 10 milliGRAM(s) Oral daily  folic acid 1 milliGRAM(s) Oral daily  influenza   Vaccine 0.5 milliLiter(s) IntraMuscular once  insulin lispro (ADMELOG) corrective regimen sliding scale   SubCutaneous every 6 hours  lactulose Syrup 20 Gram(s) Oral every 8 hours  levETIRAcetam  Solution 750 milliGRAM(s) Enteral Tube two times a day  levothyroxine 137 MICROGram(s) Oral daily  meropenem  IVPB 1000 milliGRAM(s) IV Intermittent every 8 hours  midodrine 20 milliGRAM(s) Oral every 8 hours  multivitamin/minerals/iron Oral Solution (CENTRUM) 15 milliLiter(s) Oral daily  norepinephrine Infusion 0.05 MICROgram(s)/kG/Min IV Continuous <Continuous>  oxyCODONE    Solution 5 milliGRAM(s) Oral every 4 hours PRN  oxyCODONE    Solution 10 milliGRAM(s) Oral every 4 hours PRN  pantoprazole   Suspension 40 milliGRAM(s) Oral every 24 hours  PrismaSATE Dialysate BGK 4 / 2.5 5000 milliLiter(s) CRRT <Continuous>  PrismaSOL Filtration BGK 4 / 2.5 5000 milliLiter(s) CRRT <Continuous>  PrismaSOL Filtration BGK 4 / 2.5 5000 milliLiter(s) CRRT <Continuous>  rifAXIMin 550 milliGRAM(s) Oral every 12 hours  sodium chloride 0.65% Nasal 1 Spray(s) Both Nostrils every 3 hours PRN  tetracaine/benzocaine/butamben Spray 1 Spray(s) Topical every 3 hours PRN  thiamine 100 milliGRAM(s) Enteral Tube daily  vasopressin Infusion 0.03 Unit(s)/Min IV Continuous <Continuous>    Allergies:  Macrobid (Rash)  Nexium (Stomach Upset)    FAMILY HISTORY:  FH: heart disease  Father, age 85,     FH: pancreatic cancer  Brother, age 59,     Social History:   Smoking: former smoker (quit more than 30 years ago)  Alcohol: +EtOH abuse (drank two bottles of wine per day)  Drugs: no illicit drug use reported    Review of Systems:  [x]Unable to obtain due to delirium           Physical Exam:  T(C): 36.7 (22 @ 07:00), Max: 37.1 (22 @ 15:00)  HR: 96 (22 @ 08:44) (72 - 102)  BP: 126/60 (22 @ 19:07) (91/50 - 126/60)  RR: 19 (22 @ 08:44) (13 - 44)  SpO2: 94% (22 @ 08:44) (90% - 96%)  Wt(kg): --     @ 07:01  -   @ 07:00  --------------------------------------------------------  IN: 3012.9 mL / OUT: 3354 mL / NET: -341.1 mL     @ 07:01  -   @ 08:58  --------------------------------------------------------  IN: 72 mL / OUT: 121 mL / NET: -49 mL      Daily     Daily Weight in k.7 (27 Dec 2022 00:00)    Appearance: Jaundice, encephalopathic   Eyes: PERRLA, EOMI, pink conjunctiva, no scleral icterus   HENT: Normal oral mucosa  Cardiovascular: RRR, S1, S2, no murmur, rub, or gallop; no edema; no JVD  Procedural Access Site: Clean, dry, intact, without hematoma  Respiratory: Clear to auscultation bilaterally  Gastrointestinal: Soft, non-tender, non-distended, BS+  Musculoskeletal: Mitten restraints   Lymphatic: No lymphadenopathy  Skin: No rashes, ecchymoses, or cyanosis    Cardiovascular Diagnostic Testing:  ECG:     Echo:   < from: TTE with Doppler (w/Cont) (. @ 10:49) >  ------------------------------------------------------------------------  Dimensions:    Normal Values:  LA:     3.5    2.0 - 4.0 cm  Ao:     3.1    2.0 - 3.8 cm  SEPTUM: 0.7    0.6 - 1.2 cm  PWT:    0.6    0.6 - 1.1 cm  LVIDd: 5.4    3.0 - 5.6 cm  LVIDs:  2.9    1.8 - 4.0 cm  Derived variables:  LVMI: 69 g/m2  RWT: 0.22  Fractional short: 46 %  EF (Visual Estimate):  %  EF (Monroy Rule): 66 %Doppler Peak Velocity (m/sec):  AoV=1.4  ------------------------------------------------------------------------  Observations:  Mitral Valve: Normal mitral valve. Minimal mitral  regurgitation.  Aortic Valve/Aorta: Calcified trileaflet aortic valve with  normal opening. Peak transaortic valve gradient equals 8 mm  Hg. No aorticvalve regurgitation seen. Peak left  ventricular outflow tract gradient equals 4 mm Hg, mean  gradient is equal to 2 mm Hg, LVOT velocity time integral  equals 19 cm.  Aortic Root: 3.1 cm.  Ascending Aorta: 3.3 cm.  LVOT diameter: 2 cm.  Left Atrium:Normal left atrium.  LA volume index = 33  cc/m2.  Left Ventricle: Endocardial visualization enhanced with  intravenous injection of Ultrasonic Enhancing Agent  (Definity). Left ventricle suboptimally visualized despite  intravenous injection of ultrasonic enhancing agent; normal  left ventricular systolic function. No segmental wall  motion abnormalities. Normal left ventricular internal  dimensions and wall thicknesses. Normal diastolic function.  Right Heart: Normal right atrium. Normal right ventricular  size and function. Normal tricuspid valve. Mild-moderate  tricuspid regurgitation. Normal pulmonic valve. Minimal  pulmonic regurgitation.  Pericardium/Pleura: Normal pericardium with no pericardial  effusion.  Hemodynamic: Estimated right atrial pressure is 8 mm Hg.  Estimated right ventricular systolic pressure equals 29 mm  Hg, assuming right atrial pressure equals 8 mm Hg,  consistent with normal pulmonary pressures.  ------------------------------------------------------------------------  Conclusions:  1. Normal mitral valve. Minimal mitral regurgitation.  2. Calcified trileaflet aortic valve with normal opening.  No aortic valve regurgitation seen.  3. Endocardial visualization enhanced with intravenous  injection of Ultrasonic Enhancing Agent (Definity). Left  ventricle suboptimally visualized despite intravenous  injection of ultrasonic enhancing agent; normal left  ventricular systolic function. No segmental wall motion  abnormalities.  4. Normal right ventricular size and function.  *** No previous Echo exam.  ------------------------------------------------------------------------  Confirmed on  2022 - 13:40:44 by Rosalinda Ingram M.D.  ------------------------------------------------------------------------    < end of copied text >      Stress Testing:    Cath:    Interpretation of Telemetry:    Imaging:    Labs:                        8.4    36.51 )-----------( 49       ( 27 Dec 2022 02:28 )             24.6         140  |  102  |  8   ----------------------------<  133<H>  3.9   |  25  |  0.72    Ca    8.8      27 Dec 2022 02:28  Phos  2.1       Mg     2.4         TPro  6.5  /  Alb  3.9  /  TBili  28.9<H>  /  DBili  x   /  AST  127<H>  /  ALT  48<H>  /  AlkPhos  171<H>      PT/INR - ( 27 Dec 2022 02:28 )   PT: 27.8 sec;   INR: 2.40 ratio         PTT - ( 27 Dec 2022 02:28 )  PTT:51.4 sec    Thyroid Stimulating Hormone, Serum: 0.05 uIU/mL ( @ 20:58)  Thyroid Stimulating Hormone, Serum: 0.05 uIU/mL ( @ 20:58)

## 2022-12-27 NOTE — PROGRESS NOTE ADULT - ASSESSMENT
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 12/20 for LT eval for jail.    UA (12/19) Moderate Leukocyte Esterase  UCx (12/19) No Growth  BCx (12/19) Corynebacterium (1/4 Bottles)  MRSA Nasal PCR (12/19) Negative  Paracentesis (12/19, 12/26) Cell counts not suggestive of SBP    CT Chest with possible pneumonia versus atelectasis    #Leukocytosis, Positive Blood Culture, Transaminitis, Cirrhosis  --Agree with repeating cultures today given worsening clinical status  --Can continue Meropenem (Day 7 Today)  --Continue to follow CBC with diff  --Continue to follow transaminases  --Continue to follow temperature curve    #Positive UCx (Candida dublinensis)  Unclear Significance  Caspofungin gets poor urinary penetration but Candida dublinensis is intermittently azole resistance  --Can continue empiric Caspofungin for now    #Pre-Transplant Evaluation    #Encounter to Vaccinate Patient  COVID19: Would benefit from COVID19 Bivalent Booster  Influenza: Will require  Pneumococcal: Would benefit from PCV20  HAV: Check HAV IgG  HBV: Check HBVs Ab  MMR: Check MMR IgG   Varicella: Check Varicella IgG   Shingles: Will require Shingrix  Tdap: Will require Tdap     62 year old female PMH decompensated ETOH cirrhosis c/b ascites (dx 11/2022), alc hep (dx 11/2022; non-responsive to steroids), remote h/o thyroid cancer in her 20s s/p total thyroidectomy + RTX + radioactive iodide, HTN, 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 12/20 for LT eval for jail.    UA (12/19) Moderate Leukocyte Esterase  UCx (12/19) No Growth  BCx (12/19) Corynebacterium (1/4 Bottles)  MRSA Nasal PCR (12/19) Negative  Paracentesis (12/19, 12/26) Cell counts not suggestive of SBP    CT Chest with possible pneumonia versus atelectasis    #Leukocytosis, Positive Blood Culture, Transaminitis, Cirrhosis  --Agree with repeating cultures today given worsening clinical status  --Can continue Meropenem (Day 7 Today)  --Continue to follow CBC with diff  --Continue to follow transaminases  --Continue to follow temperature curve    #Positive UCx (Candida dublinensis)  Unclear Significance  Caspofungin gets poor urinary penetration but Candida dublinensis is intermittently azole resistance  --Can continue empiric Caspofungin for now    #Pre-Transplant Evaluation    #Encounter to Vaccinate Patient  COVID19: Would benefit from COVID19 Bivalent Booster  Influenza: Will require  Pneumococcal: Would benefit from PCV20  HAV: Check HAV IgG  HBV: Check HBVs Ab  MMR: Check MMR IgG   Varicella: Check Varicella IgG   Shingles: Will require Shingrix  Tdap: Will require Tdap     62 year old female PMH decompensated ETOH cirrhosis c/b ascites (dx 11/2022), alc hep (dx 11/2022; non-responsive to steroids), remote h/o thyroid cancer in her 20s s/p total thyroidectomy + RTX + radioactive iodide, HTN, 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    UA (12/19) Moderate Leukocyte Esterase  UCx (12/19) No Growth  BCx (12/19) Corynebacterium (1/4 Bottles)  MRSA Nasal PCR (12/19) Negative  Paracentesis (12/19, 12/26) Cell counts not suggestive of SBP    CT Chest with possible pneumonia versus atelectasis    #Leukocytosis, Positive Blood Culture, Transaminitis, Cirrhosis  --Agree with repeating cultures today given worsening clinical status  --Can continue Meropenem (Day 7 Today)  --Continue to follow CBC with diff  --Continue to follow transaminases  --Continue to follow temperature curve    #Positive UCx (Candida dublinensis)  Unclear Significance  Caspofungin gets poor urinary penetration but Candida dublinensis is intermittently azole resistance  --Can continue empiric Caspofungin for now    #Pre-Transplant Evaluation    #Encounter to Vaccinate Patient  COVID19: Would benefit from COVID19 Bivalent Booster  Influenza: Will require  Pneumococcal: Would benefit from PCV20  HAV: Check HAV IgG  HBV: Check HBVs Ab  MMR: Check MMR IgG   Varicella: Check Varicella IgG   Shingles: Will require Shingrix  Tdap: Will require Tdap     62 year old female PMH decompensated ETOH cirrhosis c/b ascites (dx 11/2022), alc hep (dx 11/2022; non-responsive to steroids), remote h/o thyroid cancer in her 20s s/p total thyroidectomy + RTX + radioactive iodide, HTN, 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 12/20 for LT eval for snf.    UA (12/19) Moderate Leukocyte Esterase  UCx (12/19) No Growth  BCx (12/19) Corynebacterium (1/4 Bottles)  MRSA Nasal PCR (12/19) Negative  Paracentesis (12/19, 12/26) Cell counts not suggestive of SBP    CT Chest with possible pneumonia versus atelectasis    #Leukocytosis, Positive Blood Culture, Transaminitis, Cirrhosis  --Agree with repeating cultures today given worsening clinical status  --Can continue Meropenem (Day 7 Today)  --Continue to follow CBC with diff  --Continue to follow transaminases  --Continue to follow temperature curve    #Positive UCx (Candida dublinensis)  Unclear Significance  Caspofungin gets poor urinary penetration but Candida dublinensis is intermittently azole resistance  --Can continue empiric Caspofungin for now    #Pre-Transplant Evaluation  --Recommend COVID19 Nucleocapsid Antibody  --Follow up on QuantiFeron Gold  --Follow up on Strongyloides Ab    #Encounter to Vaccinate Patient  Will address vaccination outside of the critical illness period  COVID19: Has had COVID19 in 2020 and again in ~8/2022. Denies prior COVID19 Vaccination  Influenza: Will require  Pneumococcal: Would benefit from PCV20  HAV: Will require Hepatitis A Vaccine  HBV: Will require Heplisav  MMR: Immune  Varicella: Immune  Shingles: Will require Shingrix  Tdap: Will require Tdap    I will continue to follow. Please feel free to contact me with any further questions.    Jonah Mendoza M.D.  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    UA (12/19) Moderate Leukocyte Esterase  UCx (12/19) No Growth  BCx (12/19) Corynebacterium (1/4 Bottles)  MRSA Nasal PCR (12/19) Negative  Paracentesis (12/19, 12/26) Cell counts not suggestive of SBP    CT Chest with possible pneumonia versus atelectasis    #Leukocytosis, Positive Blood Culture, Transaminitis, Cirrhosis  --Agree with repeating cultures today given worsening clinical status  --Can continue Meropenem (Day 7 Today)  --Continue to follow CBC with diff  --Continue to follow transaminases  --Continue to follow temperature curve    #Positive UCx (Candida dublinensis)  Unclear Significance  Caspofungin gets poor urinary penetration but Candida dublinensis is intermittently azole resistance  --Can continue empiric Caspofungin for now    #Pre-Transplant Evaluation  --Recommend COVID19 Nucleocapsid Antibody  --Follow up on QuantiFeron Gold  --Follow up on Strongyloides Ab    #Encounter to Vaccinate Patient  Will address vaccination outside of the critical illness period  COVID19: Has had COVID19 in 2020 and again in ~8/2022. Denies prior COVID19 Vaccination  Influenza: Will require  Pneumococcal: Would benefit from PCV20  HAV: Will require Hepatitis A Vaccine  HBV: Will require Heplisav  MMR: Immune  Varicella: Immune  Shingles: Will require Shingrix  Tdap: Will require Tdap    I will continue to follow. Please feel free to contact me with any further questions.    Jonah Mendoza M.D.  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    UA (12/19) Moderate Leukocyte Esterase  UCx (12/19) No Growth  BCx (12/19) Corynebacterium (1/4 Bottles)  MRSA Nasal PCR (12/19) Negative  Paracentesis (12/19, 12/26) Cell counts not suggestive of SBP    CT Chest with possible pneumonia versus atelectasis    #Leukocytosis, Positive Blood Culture, Transaminitis, Cirrhosis  --Agree with repeating cultures today given worsening clinical status  --Can continue Meropenem (Day 7 Today)  --Continue to follow CBC with diff  --Continue to follow transaminases  --Continue to follow temperature curve    #Positive UCx (Candida dublinensis)  Unclear Significance  Caspofungin gets poor urinary penetration but Candida dublinensis is intermittently azole resistance  --Can continue empiric Caspofungin for now    #Pre-Transplant Evaluation  --Recommend COVID19 Nucleocapsid Antibody  --Follow up on QuantiFeron Gold  --Follow up on Strongyloides Ab    #Encounter to Vaccinate Patient  Will address vaccination outside of the critical illness period  COVID19: Has had COVID19 in 2020 and again in ~8/2022. Denies prior COVID19 Vaccination  Influenza: Will require  Pneumococcal: Would benefit from PCV20  HAV: Will require Hepatitis A Vaccine  HBV: Will require Heplisav  MMR: Immune  Varicella: Immune  Shingles: Will require Shingrix  Tdap: Will require Tdap    I will continue to follow. Please feel free to contact me with any further questions.    Jonah Mendoza M.D.  [facility] Division of Infectious Disease  8AM-5PM Monday - Friday: Available on [employer] Teams  After Hours and Holidays (or if no response on [employer] Teams): Please contact the Infectious Diseases Office at [phone]    The above assessment and plan were discussed with transplant surgery team

## 2022-12-27 NOTE — PROGRESS NOTE ADULT - PROBLEM SELECTOR PLAN 1
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 Upon review of [place], SCr was 0.78 on 12/6/22, increased to 5.31 on admission (12/20), and improved to 0.72 today with CRRT. Pt remains anuric. Pt with likely HRS vs ATN vs bile cast nephropathy. Plan to continue with CRRT today as urine output remains low. Monitor labs and urine output. Avoid nephrotoxins. Dose medications as per eGFR. 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 Upon review of [place], SCr was 0.78 on 12/6/22, increased to 5.31 on admission (12/20), and improved to 0.72 today with CRRT. Pt remains anuric. Pt with likely HRS vs ATN vs bile cast nephropathy. Plan to continue with CRRT today as urine output remains low. Monitor labs and urine output. Avoid nephrotoxins. Dose medications as per eGFR. 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 Upon review of [place], SCr was 0.78 on 12/6/22, increased to 5.31 on admission (12/20), and improved to 0.72 today with CRRT. Pt remains anuric. Pt with likely HRS vs ATN vs bile cast nephropathy. Plan to continue with CRRT today as urine output remains low. Monitor labs and urine output. Avoid nephrotoxins. Dose medications as per eGFR.

## 2022-12-27 NOTE — PROGRESS NOTE ADULT - PROBLEM SELECTOR PLAN 3
Pt with hypophosphatemia while receiving CRRT. Serum phos is 1.6 today. Will switch CRRT fluid to phoxilium. Monitor serum phos level.    If you have any questions, please feel free to contact me  Christin Emerson  Nephrology Fellow  [phone] / [employer] Teams(Preferred)  (After 5pm or on weekends please page the on-call fellow) Pt with hypophosphatemia while receiving CRRT. Serum phos is 1.6 today. Will switch CRRT fluid to phoxilium. Monitor serum phos level.    If you have any questions, please feel free to contact me  Christin Emerson  Nephrology Fellow  [phone] / [employer] Teams(Preferred)  (After 5pm or on weekends please page the on-call fellow) Pt with hypophosphatemia while receiving CRRT. Serum phos is 1.6 today. Will switch CRRT fluid to phoxilium. Monitor serum phos level.    If you have any questions, please feel free to contact me  Christin Emerson  Nephrology Fellow  [phone] / [employer] Teams(Preferred)  (After 5pm or on weekends please page the on-call fellow)

## 2022-12-27 NOTE — PROGRESS NOTE ADULT - SUBJECTIVE AND OBJECTIVE BOX
Follow Up:      Interval History:    REVIEW OF SYSTEMS  [  ] ROS unobtainable because:    [  ] All other systems negative except as noted below    Constitutional:  [ ] fever [ ] chills  [ ] weight loss  [ ] weakness  Skin:  [ ] rash [ ] phlebitis	  Eyes: [ ] icterus [ ] pain  [ ] discharge	  ENMT: [ ] sore throat  [ ] thrush [ ] ulcers [ ] exudates  Respiratory: [ ] dyspnea [ ] hemoptysis [ ] cough [ ] sputum	  Cardiovascular:  [ ] chest pain [ ] palpitations [ ] edema	  Gastrointestinal:  [ ] nausea [ ] vomiting [ ] diarrhea [ ] constipation [ ] pain	  Genitourinary:  [ ] dysuria [ ] frequency [ ] hematuria [ ] discharge [ ] flank pain  [ ] incontinence  Musculoskeletal:  [ ] myalgias [ ] arthralgias [ ] arthritis  [ ] back pain  Neurological:  [ ] headache [ ] seizures  [ ] confusion/altered mental status    Allergies  Macrobid (Rash)        ANTIMICROBIALS:  caspofungin IVPB 50 every 24 hours  caspofungin IVPB    meropenem  IVPB 1000 every 8 hours  rifAXIMin 550 every 12 hours      OTHER MEDS:  MEDICATIONS  (STANDING):  escitalopram 10 daily  influenza   Vaccine 0.5 once  insulin lispro (ADMELOG) corrective regimen sliding scale  every 6 hours  lactulose Syrup 20 every 8 hours  levETIRAcetam  Solution 750 two times a day  levothyroxine 137 daily  midodrine 20 every 8 hours  norepinephrine Infusion 0.05 <Continuous>  ondansetron Injectable 4 every 6 hours PRN  oxyCODONE    Solution 5 every 4 hours PRN  oxyCODONE    Solution 10 every 4 hours PRN  pantoprazole   Suspension 40 every 24 hours  vasopressin Infusion 0.03 <Continuous>      Vital Signs Last 24 Hrs  T(C): 36.6 (27 Dec 2022 11:00), Max: 36.8 (26 Dec 2022 23:00)  T(F): 97.9 (27 Dec 2022 11:00), Max: 98.2 (26 Dec 2022 23:00)  HR: 89 (27 Dec 2022 16:45) (73 - 102)  BP: 126/60 (26 Dec 2022 19:07) (126/60 - 126/60)  BP(mean): 85 (26 Dec 2022 19:07) (85 - 85)  RR: 17 (27 Dec 2022 16:45) (11 - 44)  SpO2: 95% (27 Dec 2022 16:45) (91% - 96%)    Parameters below as of 27 Dec 2022 08:44  Patient On (Oxygen Delivery Method): nasal cannula, high flow  O2 Flow (L/min): 60  O2 Concentration (%): 50    PHYSICAL EXAMINATION:  General: Alert and Awake, NAD  HEENT: PERRL, EOMI  Neck: Supple  Cardiac: RRR, No M/R/G  Resp: CTAB, No Wh/Rh/Ra  Abdomen: NBS, NT/ND, No HSM, No rigidity or guarding  MSK: No LE edema. No Calf tenderness  : No dhillon  Skin: No rashes or lesions. Skin is warm and dry to the touch.   Neuro: Alert and Awake. CN 2-12 Grossly intact. Moves all four extremities spontaneously.  Psych: Calm, Pleasant, Cooperative                          8.1    35.17 )-----------( 45       ( 27 Dec 2022 15:20 )             24.0       12-27    138  |  100  |  9   ----------------------------<  150<H>  3.8   |  21<L>  |  0.70    Ca    8.7      27 Dec 2022 15:20  Phos  4.0     12-27  Mg     2.4     12-27    TPro  6.4  /  Alb  3.9  /  TBili  27.5<H>  /  DBili  x   /  AST  127<H>  /  ALT  52<H>  /  AlkPhos  167<H>  12-27          MICROBIOLOGY:  v  Peritoneal Peritoneal Fluid  12-26-22   Testing in progress  --    polymorphonuclear leukocytes seen  No organisms seen  by cytocentrifuge      .Blood Blood  12-24-22   No growth to date.  --  --      .Blood Blood  12-24-22   No growth to date.  --  --      .Blood Blood  12-23-22   No growth to date.  --  --      .Blood Blood  12-23-22   No growth to date.  --  --      Combi-Cath Combi-Cath  12-22-22   Normal Respiratory Cassandra present  --    Moderate polymorphonuclear leukocytes seen per low power field  No squamous epithelial cells seen per low power field  No organisms seen per oil power field      Catheterized Catheterized  12-21-22   >100,000 CFU/ml Leticia dubliniensis "Susceptibilities not performed"  --  --      Ascites Fl Ascites Fluid  12-21-22   No growth at 5 days  --    polymorphonuclear leukocytes seen  No organisms seen  by cytocentrifuge      Trach Asp Tracheal Aspirate  12-21-22   Normal Respiratory Cassandra present  --    No polymorphonuclear leukocytes per low power field  Numerous Squamous epithelial cells per low power field  Moderate Gram Positive Rods seen per oil power field  Few Yeast like cells seen per oil power field      .Blood Blood-Peripheral  12-20-22   No Growth Final  --  --      .Blood Blood-Peripheral  12-20-22   No Growth Final  --  --        CMV IgG Antibody: 5.10 U/mL (12-24-22 @ 20:59)  Toxoplasma IgG Screen: <3.0 IU/mL (12-24-22 @ 20:59)    CMVPCR Log: Det <1.54 Assay Dynamic Range: 34.5 to 1.0E+07 IU/mL (1.54 to 7.00 Log10 IU/mL)  Assay lower limit of quantification (LLOQ) is 34.5 IU/mL (1.54 Log10  IU/mL)  CMV DNA detected below the LLOQ will be reported as Detected < 34.5 IU/mL  (<1.54 Log10 IU/mL)  [facility] Cytomegalovirus (CMV) is an FDA-cleared quantitative test that  enables the detection and quantitation of CMV DNA in EDTA plasma of  infected transplant patients on the [facility] 8800 system. This test was  verified by [employer]. Results should be interpreted  with consideration of all clinical findings and laboratory findings and  should not form the sole basis for a diagnosis or treatment decision. Swx64SR/mL (12-24 @ 20:58)        RADIOLOGY:    <The imaging below has been reviewed and visualized by me independently. Findings as detailed in report below> Follow Up:      Interval History:    REVIEW OF SYSTEMS  [  ] ROS unobtainable because:    [  ] All other systems negative except as noted below    Constitutional:  [ ] fever [ ] chills  [ ] weight loss  [ ] weakness  Skin:  [ ] rash [ ] phlebitis	  Eyes: [ ] icterus [ ] pain  [ ] discharge	  ENMT: [ ] sore throat  [ ] thrush [ ] ulcers [ ] exudates  Respiratory: [ ] dyspnea [ ] hemoptysis [ ] cough [ ] sputum	  Cardiovascular:  [ ] chest pain [ ] palpitations [ ] edema	  Gastrointestinal:  [ ] nausea [ ] vomiting [ ] diarrhea [ ] constipation [ ] pain	  Genitourinary:  [ ] dysuria [ ] frequency [ ] hematuria [ ] discharge [ ] flank pain  [ ] incontinence  Musculoskeletal:  [ ] myalgias [ ] arthralgias [ ] arthritis  [ ] back pain  Neurological:  [ ] headache [ ] seizures  [ ] confusion/altered mental status    Allergies  Macrobid (Rash)        ANTIMICROBIALS:  caspofungin IVPB 50 every 24 hours  caspofungin IVPB    meropenem  IVPB 1000 every 8 hours  rifAXIMin 550 every 12 hours      OTHER MEDS:  MEDICATIONS  (STANDING):  escitalopram 10 daily  influenza   Vaccine 0.5 once  insulin lispro (ADMELOG) corrective regimen sliding scale  every 6 hours  lactulose Syrup 20 every 8 hours  levETIRAcetam  Solution 750 two times a day  levothyroxine 137 daily  midodrine 20 every 8 hours  norepinephrine Infusion 0.05 <Continuous>  ondansetron Injectable 4 every 6 hours PRN  oxyCODONE    Solution 5 every 4 hours PRN  oxyCODONE    Solution 10 every 4 hours PRN  pantoprazole   Suspension 40 every 24 hours  vasopressin Infusion 0.03 <Continuous>      Vital Signs Last 24 Hrs  T(C): 36.6 (27 Dec 2022 11:00), Max: 36.8 (26 Dec 2022 23:00)  T(F): 97.9 (27 Dec 2022 11:00), Max: 98.2 (26 Dec 2022 23:00)  HR: 89 (27 Dec 2022 16:45) (73 - 102)  BP: 126/60 (26 Dec 2022 19:07) (126/60 - 126/60)  BP(mean): 85 (26 Dec 2022 19:07) (85 - 85)  RR: 17 (27 Dec 2022 16:45) (11 - 44)  SpO2: 95% (27 Dec 2022 16:45) (91% - 96%)    Parameters below as of 27 Dec 2022 08:44  Patient On (Oxygen Delivery Method): nasal cannula, high flow  O2 Flow (L/min): 60  O2 Concentration (%): 50    PHYSICAL EXAMINATION:  General: Alert and Awake, NAD  HEENT: PERRL, EOMI  Neck: Supple  Cardiac: RRR, No M/R/G  Resp: CTAB, No Wh/Rh/Ra  Abdomen: NBS, NT/ND, No HSM, No rigidity or guarding  MSK: No LE edema. No Calf tenderness  : No dhillon  Skin: No rashes or lesions. Skin is warm and dry to the touch.   Neuro: Alert and Awake. CN 2-12 Grossly intact. Moves all four extremities spontaneously.  Psych: Calm, Pleasant, Cooperative                          8.1    35.17 )-----------( 45       ( 27 Dec 2022 15:20 )             24.0       12-27    138  |  100  |  9   ----------------------------<  150<H>  3.8   |  21<L>  |  0.70    Ca    8.7      27 Dec 2022 15:20  Phos  4.0     12-27  Mg     2.4     12-27    TPro  6.4  /  Alb  3.9  /  TBili  27.5<H>  /  DBili  x   /  AST  127<H>  /  ALT  52<H>  /  AlkPhos  167<H>  12-27          MICROBIOLOGY:  v  Peritoneal Peritoneal Fluid  12-26-22   Testing in progress  --    polymorphonuclear leukocytes seen  No organisms seen  by cytocentrifuge      .Blood Blood  12-24-22   No growth to date.  --  --      .Blood Blood  12-24-22   No growth to date.  --  --      .Blood Blood  12-23-22   No growth to date.  --  --      .Blood Blood  12-23-22   No growth to date.  --  --      Combi-Cath Combi-Cath  12-22-22   Normal Respiratory Cassandra present  --    Moderate polymorphonuclear leukocytes seen per low power field  No squamous epithelial cells seen per low power field  No organisms seen per oil power field      Catheterized Catheterized  12-21-22   >100,000 CFU/ml Leticia dubliniensis "Susceptibilities not performed"  --  --      Ascites Fl Ascites Fluid  12-21-22   No growth at 5 days  --    polymorphonuclear leukocytes seen  No organisms seen  by cytocentrifuge      Trach Asp Tracheal Aspirate  12-21-22   Normal Respiratory Cassandra present  --    No polymorphonuclear leukocytes per low power field  Numerous Squamous epithelial cells per low power field  Moderate Gram Positive Rods seen per oil power field  Few Yeast like cells seen per oil power field      .Blood Blood-Peripheral  12-20-22   No Growth Final  --  --      .Blood Blood-Peripheral  12-20-22   No Growth Final  --  --        CMV IgG Antibody: 5.10 U/mL (12-24-22 @ 20:59)  Toxoplasma IgG Screen: <3.0 IU/mL (12-24-22 @ 20:59)    CMVPCR Log: Det <1.54 Assay Dynamic Range: 34.5 to 1.0E+07 IU/mL (1.54 to 7.00 Log10 IU/mL)  Assay lower limit of quantification (LLOQ) is 34.5 IU/mL (1.54 Log10  IU/mL)  CMV DNA detected below the LLOQ will be reported as Detected < 34.5 IU/mL  (<1.54 Log10 IU/mL)  [facility] Cytomegalovirus (CMV) is an FDA-cleared quantitative test that  enables the detection and quantitation of CMV DNA in EDTA plasma of  infected transplant patients on the [facility] 8[facility] system. This test was  verified by [employer]. Results should be interpreted  with consideration of all clinical findings and laboratory findings and  should not form the sole basis for a diagnosis or treatment decision. Sqj01JR/mL (12-24 @ 20:58)        RADIOLOGY:    <The imaging below has been reviewed and visualized by me independently. Findings as detailed in report below> Follow Up:      Interval History:    REVIEW OF SYSTEMS  [  ] ROS unobtainable because:    [  ] All other systems negative except as noted below    Constitutional:  [ ] fever [ ] chills  [ ] weight loss  [ ] weakness  Skin:  [ ] rash [ ] phlebitis	  Eyes: [ ] icterus [ ] pain  [ ] discharge	  ENMT: [ ] sore throat  [ ] thrush [ ] ulcers [ ] exudates  Respiratory: [ ] dyspnea [ ] hemoptysis [ ] cough [ ] sputum	  Cardiovascular:  [ ] chest pain [ ] palpitations [ ] edema	  Gastrointestinal:  [ ] nausea [ ] vomiting [ ] diarrhea [ ] constipation [ ] pain	  Genitourinary:  [ ] dysuria [ ] frequency [ ] hematuria [ ] discharge [ ] flank pain  [ ] incontinence  Musculoskeletal:  [ ] myalgias [ ] arthralgias [ ] arthritis  [ ] back pain  Neurological:  [ ] headache [ ] seizures  [ ] confusion/altered mental status    Allergies  Macrobid (Rash)        ANTIMICROBIALS:  caspofungin IVPB 50 every 24 hours  caspofungin IVPB    meropenem  IVPB 1000 every 8 hours  rifAXIMin 550 every 12 hours      OTHER MEDS:  MEDICATIONS  (STANDING):  escitalopram 10 daily  influenza   Vaccine 0.5 once  insulin lispro (ADMELOG) corrective regimen sliding scale  every 6 hours  lactulose Syrup 20 every 8 hours  levETIRAcetam  Solution 750 two times a day  levothyroxine 137 daily  midodrine 20 every 8 hours  norepinephrine Infusion 0.05 <Continuous>  ondansetron Injectable 4 every 6 hours PRN  oxyCODONE    Solution 5 every 4 hours PRN  oxyCODONE    Solution 10 every 4 hours PRN  pantoprazole   Suspension 40 every 24 hours  vasopressin Infusion 0.03 <Continuous>      Vital Signs Last 24 Hrs  T(C): 36.6 (27 Dec 2022 11:00), Max: 36.8 (26 Dec 2022 23:00)  T(F): 97.9 (27 Dec 2022 11:00), Max: 98.2 (26 Dec 2022 23:00)  HR: 89 (27 Dec 2022 16:45) (73 - 102)  BP: 126/60 (26 Dec 2022 19:07) (126/60 - 126/60)  BP(mean): 85 (26 Dec 2022 19:07) (85 - 85)  RR: 17 (27 Dec 2022 16:45) (11 - 44)  SpO2: 95% (27 Dec 2022 16:45) (91% - 96%)    Parameters below as of 27 Dec 2022 08:44  Patient On (Oxygen Delivery Method): nasal cannula, high flow  O2 Flow (L/min): 60  O2 Concentration (%): 50    PHYSICAL EXAMINATION:  General: Alert and Awake, NAD  HEENT: PERRL, EOMI  Neck: Supple  Cardiac: RRR, No M/R/G  Resp: CTAB, No Wh/Rh/Ra  Abdomen: NBS, NT/ND, No HSM, No rigidity or guarding  MSK: No LE edema. No Calf tenderness  : No dhillon  Skin: No rashes or lesions. Skin is warm and dry to the touch.   Neuro: Alert and Awake. CN 2-12 Grossly intact. Moves all four extremities spontaneously.  Psych: Calm, Pleasant, Cooperative                          8.1    35.17 )-----------( 45       ( 27 Dec 2022 15:20 )             24.0       12-27    138  |  100  |  9   ----------------------------<  150<H>  3.8   |  21<L>  |  0.70    Ca    8.7      27 Dec 2022 15:20  Phos  4.0     12-27  Mg     2.4     12-27    TPro  6.4  /  Alb  3.9  /  TBili  27.5<H>  /  DBili  x   /  AST  127<H>  /  ALT  52<H>  /  AlkPhos  167<H>  12-27          MICROBIOLOGY:  v  Peritoneal Peritoneal Fluid  12-26-22   Testing in progress  --    polymorphonuclear leukocytes seen  No organisms seen  by cytocentrifuge      .Blood Blood  12-24-22   No growth to date.  --  --      .Blood Blood  12-24-22   No growth to date.  --  --      .Blood Blood  12-23-22   No growth to date.  --  --      .Blood Blood  12-23-22   No growth to date.  --  --      Combi-Cath Combi-Cath  12-22-22   Normal Respiratory Cassandra present  --    Moderate polymorphonuclear leukocytes seen per low power field  No squamous epithelial cells seen per low power field  No organisms seen per oil power field      Catheterized Catheterized  12-21-22   >100,000 CFU/ml Leticia dubliniensis "Susceptibilities not performed"  --  --      Ascites Fl Ascites Fluid  12-21-22   No growth at 5 days  --    polymorphonuclear leukocytes seen  No organisms seen  by cytocentrifuge      Trach Asp Tracheal Aspirate  12-21-22   Normal Respiratory Cassandra present  --    No polymorphonuclear leukocytes per low power field  Numerous Squamous epithelial cells per low power field  Moderate Gram Positive Rods seen per oil power field  Few Yeast like cells seen per oil power field      .Blood Blood-Peripheral  12-20-22   No Growth Final  --  --      .Blood Blood-Peripheral  12-20-22   No Growth Final  --  --        CMV IgG Antibody: 5.10 U/mL (12-24-22 @ 20:59)  Toxoplasma IgG Screen: <3.0 IU/mL (12-24-22 @ 20:59)    CMVPCR Log: Det <1.54 Assay Dynamic Range: 34.5 to 1.0E+07 IU/mL (1.54 to 7.00 Log10 IU/mL)  Assay lower limit of quantification (LLOQ) is 34.5 IU/mL (1.54 Log10  IU/mL)  CMV DNA detected below the LLOQ will be reported as Detected < 34.5 IU/mL  (<1.54 Log10 IU/mL)  [facility] Cytomegalovirus (CMV) is an FDA-cleared quantitative test that  enables the detection and quantitation of CMV DNA in EDTA plasma of  infected transplant patients on the [facility] 8[facility] system. This test was  verified by [employer]. Results should be interpreted  with consideration of all clinical findings and laboratory findings and  should not form the sole basis for a diagnosis or treatment decision. Iho31SD/mL (12-24 @ 20:58)        RADIOLOGY:    <The imaging below has been reviewed and visualized by me independently. Findings as detailed in report below> Follow Up:  pre-OLT    Interval History: increased pressor requirement today and increased hi-flow requirements.     REVIEW OF SYSTEMS  [ x ] ROS unobtainable because:  encephalopathy  [  ] All other systems negative except as noted below    Constitutional:  [ ] fever [ ] chills  [ ] weight loss  [ ] weakness  Skin:  [ ] rash [ ] phlebitis	  Eyes: [ ] icterus [ ] pain  [ ] discharge	  ENMT: [ ] sore throat  [ ] thrush [ ] ulcers [ ] exudates  Respiratory: [ ] dyspnea [ ] hemoptysis [ ] cough [ ] sputum	  Cardiovascular:  [ ] chest pain [ ] palpitations [ ] edema	  Gastrointestinal:  [ ] nausea [ ] vomiting [ ] diarrhea [ ] constipation [ ] pain	  Genitourinary:  [ ] dysuria [ ] frequency [ ] hematuria [ ] discharge [ ] flank pain  [ ] incontinence  Musculoskeletal:  [ ] myalgias [ ] arthralgias [ ] arthritis  [ ] back pain  Neurological:  [ ] headache [ ] seizures  [ ] confusion/altered mental status    Allergies  Macrobid (Rash)        ANTIMICROBIALS:  caspofungin IVPB 50 every 24 hours  caspofungin IVPB    meropenem  IVPB 1000 every 8 hours  rifAXIMin 550 every 12 hours      OTHER MEDS:  MEDICATIONS  (STANDING):  escitalopram 10 daily  influenza   Vaccine 0.5 once  insulin lispro (ADMELOG) corrective regimen sliding scale  every 6 hours  lactulose Syrup 20 every 8 hours  levETIRAcetam  Solution 750 two times a day  levothyroxine 137 daily  midodrine 20 every 8 hours  norepinephrine Infusion 0.05 <Continuous>  ondansetron Injectable 4 every 6 hours PRN  oxyCODONE    Solution 5 every 4 hours PRN  oxyCODONE    Solution 10 every 4 hours PRN  pantoprazole   Suspension 40 every 24 hours  vasopressin Infusion 0.03 <Continuous>      Vital Signs Last 24 Hrs  T(C): 36.6 (27 Dec 2022 11:00), Max: 36.8 (26 Dec 2022 23:00)  T(F): 97.9 (27 Dec 2022 11:00), Max: 98.2 (26 Dec 2022 23:00)  HR: 89 (27 Dec 2022 16:45) (73 - 102)  BP: 126/60 (26 Dec 2022 19:07) (126/60 - 126/60)  BP(mean): 85 (26 Dec 2022 19:07) (85 - 85)  RR: 17 (27 Dec 2022 16:45) (11 - 44)  SpO2: 95% (27 Dec 2022 16:45) (91% - 96%)    Parameters below as of 27 Dec 2022 08:44  Patient On (Oxygen Delivery Method): nasal cannula, high flow  O2 Flow (L/min): 60  O2 Concentration (%): 50    PHYSICAL EXAMINATION:  General: Alert and Awake, NAD, jaundice, hi flow NC  HEENT: PERRL, EOMI, scleral icterus  Cardiac: RRR, No M/R/G  Resp: CTAB, No Wh/Rh/Ra  Abdomen: NBS, NT/ND, No HSM, No rigidity or guarding  MSK: No LE edema. No Calf tenderness  Skin: No rashes or lesions. Skin is warm and dry to the touch.   Neuro: Alert and Awake. CN 2-12 Grossly intact. Moves all four extremities spontaneously.  Psych: Encephalopathic - unable to assess                          8.1    35.17 )-----------( 45       ( 27 Dec 2022 15:20 )             24.0       12-27    138  |  100  |  9   ----------------------------<  150<H>  3.8   |  21<L>  |  0.70    Ca    8.7      27 Dec 2022 15:20  Phos  4.0     12-27  Mg     2.4     12-27    TPro  6.4  /  Alb  3.9  /  TBili  27.5<H>  /  DBili  x   /  AST  127<H>  /  ALT  52<H>  /  AlkPhos  167<H>  12-27          MICROBIOLOGY:  v  Peritoneal Peritoneal Fluid  12-26-22   Testing in progress  --    polymorphonuclear leukocytes seen  No organisms seen  by cytocentrifuge      .Blood Blood  12-24-22   No growth to date.  --  --      .Blood Blood  12-24-22   No growth to date.  --  --      .Blood Blood  12-23-22   No growth to date.  --  --      .Blood Blood  12-23-22   No growth to date.  --  --      Combi-Cath Combi-Cath  12-22-22   Normal Respiratory Cassandra present  --    Moderate polymorphonuclear leukocytes seen per low power field  No squamous epithelial cells seen per low power field  No organisms seen per oil power field      Catheterized Catheterized  12-21-22   >100,000 CFU/ml Leticia dubliniensis "Susceptibilities not performed"  --  --      Ascites Fl Ascites Fluid  12-21-22   No growth at 5 days  --    polymorphonuclear leukocytes seen  No organisms seen  by cytocentrifuge      Trach Asp Tracheal Aspirate  12-21-22   Normal Respiratory Cassandra present  --    No polymorphonuclear leukocytes per low power field  Numerous Squamous epithelial cells per low power field  Moderate Gram Positive Rods seen per oil power field  Few Yeast like cells seen per oil power field      .Blood Blood-Peripheral  12-20-22   No Growth Final  --  --      .Blood Blood-Peripheral  12-20-22   No Growth Final  --  --        CMV IgG Antibody: 5.10 U/mL (12-24-22 @ 20:59)  Toxoplasma IgG Screen: <3.0 IU/mL (12-24-22 @ 20:59)    CMVPCR Log: Det <1.54 Assay Dynamic Range: 34.5 to 1.0E+07 IU/mL (1.54 to 7.00 Log10 IU/mL)  Assay lower limit of quantification (LLOQ) is 34.5 IU/mL (1.54 Log10  IU/mL)  CMV DNA detected below the LLOQ will be reported as Detected < 34.5 IU/mL  (<1.54 Log10 IU/mL)  [facility] Cytomegalovirus (CMV) is an FDA-cleared quantitative test that  enables the detection and quantitation of CMV DNA in EDTA plasma of  infected transplant patients on the [facility] 8[facility] system. This test was  verified by [facility]. Results should be interpreted  with consideration of all clinical findings and laboratory findings and  should not form the sole basis for a diagnosis or treatment decision. Pjl81CP/mL (12-24 @ 20:58)        RADIOLOGY:    <The imaging below has been reviewed and visualized by me independently. Findings as detailed in report below>    < from: Xray Chest 1 View- PORTABLE-Routine (Xray Chest 1 View- PORTABLE-Routine in AM.) (12.27.22 @ 07:12) >    IMPRESSION:  Diffuse bilateral patchy opacities,mildly decreased from prior.    < end of copied text >   Follow Up:  pre-OLT    Interval History: increased pressor requirement today and increased hi-flow requirements.     REVIEW OF SYSTEMS  [ x ] ROS unobtainable because:  encephalopathy  [  ] All other systems negative except as noted below    Constitutional:  [ ] fever [ ] chills  [ ] weight loss  [ ] weakness  Skin:  [ ] rash [ ] phlebitis	  Eyes: [ ] icterus [ ] pain  [ ] discharge	  ENMT: [ ] sore throat  [ ] thrush [ ] ulcers [ ] exudates  Respiratory: [ ] dyspnea [ ] hemoptysis [ ] cough [ ] sputum	  Cardiovascular:  [ ] chest pain [ ] palpitations [ ] edema	  Gastrointestinal:  [ ] nausea [ ] vomiting [ ] diarrhea [ ] constipation [ ] pain	  Genitourinary:  [ ] dysuria [ ] frequency [ ] hematuria [ ] discharge [ ] flank pain  [ ] incontinence  Musculoskeletal:  [ ] myalgias [ ] arthralgias [ ] arthritis  [ ] back pain  Neurological:  [ ] headache [ ] seizures  [ ] confusion/altered mental status    Allergies  Macrobid (Rash)        ANTIMICROBIALS:  caspofungin IVPB 50 every 24 hours  caspofungin IVPB    meropenem  IVPB 1000 every 8 hours  rifAXIMin 550 every 12 hours      OTHER MEDS:  MEDICATIONS  (STANDING):  escitalopram 10 daily  influenza   Vaccine 0.5 once  insulin lispro (ADMELOG) corrective regimen sliding scale  every 6 hours  lactulose Syrup 20 every 8 hours  levETIRAcetam  Solution 750 two times a day  levothyroxine 137 daily  midodrine 20 every 8 hours  norepinephrine Infusion 0.05 <Continuous>  ondansetron Injectable 4 every 6 hours PRN  oxyCODONE    Solution 5 every 4 hours PRN  oxyCODONE    Solution 10 every 4 hours PRN  pantoprazole   Suspension 40 every 24 hours  vasopressin Infusion 0.03 <Continuous>      Vital Signs Last 24 Hrs  T(C): 36.6 (27 Dec 2022 11:00), Max: 36.8 (26 Dec 2022 23:00)  T(F): 97.9 (27 Dec 2022 11:00), Max: 98.2 (26 Dec 2022 23:00)  HR: 89 (27 Dec 2022 16:45) (73 - 102)  BP: 126/60 (26 Dec 2022 19:07) (126/60 - 126/60)  BP(mean): 85 (26 Dec 2022 19:07) (85 - 85)  RR: 17 (27 Dec 2022 16:45) (11 - 44)  SpO2: 95% (27 Dec 2022 16:45) (91% - 96%)    Parameters below as of 27 Dec 2022 08:44  Patient On (Oxygen Delivery Method): nasal cannula, high flow  O2 Flow (L/min): 60  O2 Concentration (%): 50    PHYSICAL EXAMINATION:  General: Alert and Awake, NAD, jaundice, hi flow NC  HEENT: PERRL, EOMI, scleral icterus  Cardiac: RRR, No M/R/G  Resp: CTAB, No Wh/Rh/Ra  Abdomen: NBS, NT/ND, No HSM, No rigidity or guarding  MSK: No LE edema. No Calf tenderness  Skin: No rashes or lesions. Skin is warm and dry to the touch.   Neuro: Alert and Awake. CN 2-12 Grossly intact. Moves all four extremities spontaneously.  Psych: Encephalopathic - unable to assess                          8.1    35.17 )-----------( 45       ( 27 Dec 2022 15:20 )             24.0       12-27    138  |  100  |  9   ----------------------------<  150<H>  3.8   |  21<L>  |  0.70    Ca    8.7      27 Dec 2022 15:20  Phos  4.0     12-27  Mg     2.4     12-27    TPro  6.4  /  Alb  3.9  /  TBili  27.5<H>  /  DBili  x   /  AST  127<H>  /  ALT  52<H>  /  AlkPhos  167<H>  12-27          MICROBIOLOGY:  v  Peritoneal Peritoneal Fluid  12-26-22   Testing in progress  --    polymorphonuclear leukocytes seen  No organisms seen  by cytocentrifuge      .Blood Blood  12-24-22   No growth to date.  --  --      .Blood Blood  12-24-22   No growth to date.  --  --      .Blood Blood  12-23-22   No growth to date.  --  --      .Blood Blood  12-23-22   No growth to date.  --  --      Combi-Cath Combi-Cath  12-22-22   Normal Respiratory Cassandra present  --    Moderate polymorphonuclear leukocytes seen per low power field  No squamous epithelial cells seen per low power field  No organisms seen per oil power field      Catheterized Catheterized  12-21-22   >100,000 CFU/ml Leticia dubliniensis "Susceptibilities not performed"  --  --      Ascites Fl Ascites Fluid  12-21-22   No growth at 5 days  --    polymorphonuclear leukocytes seen  No organisms seen  by cytocentrifuge      Trach Asp Tracheal Aspirate  12-21-22   Normal Respiratory Cassandra present  --    No polymorphonuclear leukocytes per low power field  Numerous Squamous epithelial cells per low power field  Moderate Gram Positive Rods seen per oil power field  Few Yeast like cells seen per oil power field      .Blood Blood-Peripheral  12-20-22   No Growth Final  --  --      .Blood Blood-Peripheral  12-20-22   No Growth Final  --  --        CMV IgG Antibody: 5.10 U/mL (12-24-22 @ 20:59)  Toxoplasma IgG Screen: <3.0 IU/mL (12-24-22 @ 20:59)    CMVPCR Log: Det <1.54 Assay Dynamic Range: 34.5 to 1.0E+07 IU/mL (1.54 to 7.00 Log10 IU/mL)  Assay lower limit of quantification (LLOQ) is 34.5 IU/mL (1.54 Log10  IU/mL)  CMV DNA detected below the LLOQ will be reported as Detected < 34.5 IU/mL  (<1.54 Log10 IU/mL)  Kadeem Cytomegalovirus (CMV) is an FDA-cleared quantitative test that  enables the detection and quantitation of CMV DNA in EDTA plasma of  infected transplant patients on the kadeem 8800 system. This test was  verified by [employer]. Results should be interpreted  with consideration of all clinical findings and laboratory findings and  should not form the sole basis for a diagnosis or treatment decision. Xuh23VR/mL (12-24 @ 20:58)        RADIOLOGY:    <The imaging below has been reviewed and visualized by me independently. Findings as detailed in report below>    < from: Xray Chest 1 View- PORTABLE-Routine (Xray Chest 1 View- PORTABLE-Routine in AM.) (12.27.22 @ 07:12) >    IMPRESSION:  Diffuse bilateral patchy opacities,mildly decreased from prior.    < end of copied text >   Follow Up:  pre-OLT    Interval History: increased pressor requirement today and increased hi-flow requirements.     REVIEW OF SYSTEMS  [ x ] ROS unobtainable because:  encephalopathy  [  ] All other systems negative except as noted below    Constitutional:  [ ] fever [ ] chills  [ ] weight loss  [ ] weakness  Skin:  [ ] rash [ ] phlebitis	  Eyes: [ ] icterus [ ] pain  [ ] discharge	  ENMT: [ ] sore throat  [ ] thrush [ ] ulcers [ ] exudates  Respiratory: [ ] dyspnea [ ] hemoptysis [ ] cough [ ] sputum	  Cardiovascular:  [ ] chest pain [ ] palpitations [ ] edema	  Gastrointestinal:  [ ] nausea [ ] vomiting [ ] diarrhea [ ] constipation [ ] pain	  Genitourinary:  [ ] dysuria [ ] frequency [ ] hematuria [ ] discharge [ ] flank pain  [ ] incontinence  Musculoskeletal:  [ ] myalgias [ ] arthralgias [ ] arthritis  [ ] back pain  Neurological:  [ ] headache [ ] seizures  [ ] confusion/altered mental status    Allergies  Macrobid (Rash)        ANTIMICROBIALS:  caspofungin IVPB 50 every 24 hours  caspofungin IVPB    meropenem  IVPB 1000 every 8 hours  rifAXIMin 550 every 12 hours      OTHER MEDS:  MEDICATIONS  (STANDING):  escitalopram 10 daily  influenza   Vaccine 0.5 once  insulin lispro (ADMELOG) corrective regimen sliding scale  every 6 hours  lactulose Syrup 20 every 8 hours  levETIRAcetam  Solution 750 two times a day  levothyroxine 137 daily  midodrine 20 every 8 hours  norepinephrine Infusion 0.05 <Continuous>  ondansetron Injectable 4 every 6 hours PRN  oxyCODONE    Solution 5 every 4 hours PRN  oxyCODONE    Solution 10 every 4 hours PRN  pantoprazole   Suspension 40 every 24 hours  vasopressin Infusion 0.03 <Continuous>      Vital Signs Last 24 Hrs  T(C): 36.6 (27 Dec 2022 11:00), Max: 36.8 (26 Dec 2022 23:00)  T(F): 97.9 (27 Dec 2022 11:00), Max: 98.2 (26 Dec 2022 23:00)  HR: 89 (27 Dec 2022 16:45) (73 - 102)  BP: 126/60 (26 Dec 2022 19:07) (126/60 - 126/60)  BP(mean): 85 (26 Dec 2022 19:07) (85 - 85)  RR: 17 (27 Dec 2022 16:45) (11 - 44)  SpO2: 95% (27 Dec 2022 16:45) (91% - 96%)    Parameters below as of 27 Dec 2022 08:44  Patient On (Oxygen Delivery Method): nasal cannula, high flow  O2 Flow (L/min): 60  O2 Concentration (%): 50    PHYSICAL EXAMINATION:  General: Alert and Awake, NAD, jaundice, hi flow NC  HEENT: PERRL, EOMI, scleral icterus  Cardiac: RRR, No M/R/G  Resp: CTAB, No Wh/Rh/Ra  Abdomen: NBS, NT/ND, No HSM, No rigidity or guarding  MSK: No LE edema. No Calf tenderness  Skin: No rashes or lesions. Skin is warm and dry to the touch.   Neuro: Alert and Awake. CN 2-12 Grossly intact. Moves all four extremities spontaneously.  Psych: Encephalopathic - unable to assess                          8.1    35.17 )-----------( 45       ( 27 Dec 2022 15:20 )             24.0       12-27    138  |  100  |  9   ----------------------------<  150<H>  3.8   |  21<L>  |  0.70    Ca    8.7      27 Dec 2022 15:20  Phos  4.0     12-27  Mg     2.4     12-27    TPro  6.4  /  Alb  3.9  /  TBili  27.5<H>  /  DBili  x   /  AST  127<H>  /  ALT  52<H>  /  AlkPhos  167<H>  12-27          MICROBIOLOGY:  v  Peritoneal Peritoneal Fluid  12-26-22   Testing in progress  --    polymorphonuclear leukocytes seen  No organisms seen  by cytocentrifuge      .Blood Blood  12-24-22   No growth to date.  --  --      .Blood Blood  12-24-22   No growth to date.  --  --      .Blood Blood  12-23-22   No growth to date.  --  --      .Blood Blood  12-23-22   No growth to date.  --  --      Combi-Cath Combi-Cath  12-22-22   Normal Respiratory Cassandra present  --    Moderate polymorphonuclear leukocytes seen per low power field  No squamous epithelial cells seen per low power field  No organisms seen per oil power field      Catheterized Catheterized  12-21-22   >100,000 CFU/ml Leticia dubliniensis "Susceptibilities not performed"  --  --      Ascites Fl Ascites Fluid  12-21-22   No growth at 5 days  --    polymorphonuclear leukocytes seen  No organisms seen  by cytocentrifuge      Trach Asp Tracheal Aspirate  12-21-22   Normal Respiratory Cassandra present  --    No polymorphonuclear leukocytes per low power field  Numerous Squamous epithelial cells per low power field  Moderate Gram Positive Rods seen per oil power field  Few Yeast like cells seen per oil power field      .Blood Blood-Peripheral  12-20-22   No Growth Final  --  --      .Blood Blood-Peripheral  12-20-22   No Growth Final  --  --        CMV IgG Antibody: 5.10 U/mL (12-24-22 @ 20:59)  Toxoplasma IgG Screen: <3.0 IU/mL (12-24-22 @ 20:59)    CMVPCR Log: Det <1.54 Assay Dynamic Range: 34.5 to 1.0E+07 IU/mL (1.54 to 7.00 Log10 IU/mL)  Assay lower limit of quantification (LLOQ) is 34.5 IU/mL (1.54 Log10  IU/mL)  CMV DNA detected below the LLOQ will be reported as Detected < 34.5 IU/mL  (<1.54 Log10 IU/mL)  [facility] Cytomegalovirus (CMV) is an FDA-cleared quantitative test that  enables the detection and quantitation of CMV DNA in EDTA plasma of  infected transplant patients on the kadeem 8800 system. This test was  verified by [employer]. Results should be interpreted  with consideration of all clinical findings and laboratory findings and  should not form the sole basis for a diagnosis or treatment decision. [id number]/mL (12-24 @ 20:58)        RADIOLOGY:    <The imaging below has been reviewed and visualized by me independently. Findings as detailed in report below>    < from: Xray Chest 1 View- PORTABLE-Routine (Xray Chest 1 View- PORTABLE-Routine in AM.) (12.27.22 @ 07:12) >    IMPRESSION:  Diffuse bilateral patchy opacities,mildly decreased from prior.    < end of copied text >

## 2022-12-27 NOTE — PROGRESS NOTE ADULT - ASSESSMENT
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   Transferred from [facility] 12/20 for further management of acute liver failure and liver transplant evaluation 2/2 known ETOH Cirrhosis.     [] Decompensated ETOH Cirrhosis  - wean off pressors as able, continue Midodrine  - remains on CVVH. 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   Transferred from [facility] 12/20 for further management of acute liver failure and liver transplant evaluation 2/2 known ETOH Cirrhosis.     [] Decompensated ETOH Cirrhosis  - wean off pressors as able, continue Midodrine  - remains on CVVH. 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   Transferred from [facility] 12/20 for further management of acute liver failure and liver transplant evaluation 2/2 known ETOH Cirrhosis.     [] Decompensated ETOH Cirrhosis  - wean off pressors as able, continue Midodrine  - remains on CVVH. continued pulmonary edema on CXR  - Bedside POCUS today per SICU  - ID: Leukocytosis, change fluc to caspo; cont zonia; f/u blood cx,  f/u further ID recs today  - HE: lactulose/rifaximin   - on Keppra for seizures, (no activity since admission)  - continue liver transplant evaluation w/u per protocol

## 2022-12-27 NOTE — PROGRESS NOTE ADULT - ATTENDING COMMENTS
62yFemale presents with hx thyroid cancer s/p thyroidectomy, craniotomy with resection of ventricular mass, with acute liver failure, new seizures, transfer from [facility] for transplant evaluation.     Fluctuating mental status, continue Lactulose Rifaximin  Titrate Levo, continue Vaso, on high dose Midodrine  Dev acute hypoxemic resp failure on HFNC, CXR worsening of infiltrates possible superimposed pul vascular congestion  Will start CRRT with neg fluid balance as tolerated, pt on vasopressor support.   NGT feed Nepro to 40 cc at goal, NPO for now with associated high risks of aspiration  Empiric Abx Meropenem and Caspo, monitor leucocytosis  Peritoneal fluid transudate  ISS  Mechanical DVT ppx    Daughter kept updated

## 2022-12-27 NOTE — PROGRESS NOTE ADULT - SUBJECTIVE AND OBJECTIVE BOX
Jana Morales is a 61 yo female being evaluated for a liver transplant. PMH: alcohol use disorder, HTN, GERD, remote history of thyroid cancer (thyroidectomy).    Allergies  Macrobid (Rash)    Intolerances  Nexium (Stomach Upset)    Home Medications:  cholecalciferol 25 mcg (1000 intl units) oral tablet: 1 tab(s) orally once a day (21 Dec 2022 12:56)  folic acid 1 mg oral tablet: 1 tab(s) orally once a day (21 Dec 2022 12:57)  levothyroxine 137 mcg (0.137 mg) oral tablet: 1 tab(s) orally once a day (15 Mar 2022 07:06)  Lexapro 10 mg oral tablet: 1 tab(s) orally once a day (16 Mar 2022 10:52)  nystatin 100,000 units/mL oral suspension: 5 milliliter(s) orally 4 times a day (21 Dec 2022 12:58)  pantoprazole 40 mg oral delayed release tablet: 1 tab(s) orally once a day (at bedtime) (15 Mar 2022 07:06)  Sodium Chloride 1 g oral tablet: 1 gram(s) orally once a day (21 Dec 2022 13:00)  thiamine 100 mg oral tablet: 1 tab(s) orally once a day (21 Dec 2022 13:00)  Vitamin B-100 oral tablet: 1 tab(s) orally once a day (21 Dec 2022 13:02)    MEDICATIONS  (STANDING):  caspofungin IVPB 50 milliGRAM(s) IV Intermittent every 24 hours  caspofungin IVPB      chlorhexidine 2% Cloths 1 Application(s) Topical <User Schedule>  CRRT Treatment    <Continuous>  escitalopram 10 milliGRAM(s) Oral daily  folic acid 1 milliGRAM(s) Oral daily  influenza   Vaccine 0.5 milliLiter(s) IntraMuscular once  insulin lispro (ADMELOG) corrective regimen sliding scale   SubCutaneous every 6 hours  lactulose Syrup 20 Gram(s) Oral every 8 hours  levETIRAcetam  Solution 750 milliGRAM(s) Enteral Tube two times a day  levothyroxine 137 MICROGram(s) Oral daily  meropenem  IVPB 1000 milliGRAM(s) IV Intermittent every 8 hours  midodrine 20 milliGRAM(s) Oral every 8 hours  multivitamin/minerals/iron Oral Solution (CENTRUM) 15 milliLiter(s) Oral daily  norepinephrine Infusion 0.05 MICROgram(s)/kG/Min (6.21 mL/Hr) IV Continuous <Continuous>  pantoprazole   Suspension 40 milliGRAM(s) Oral every 24 hours  PrismaSATE Dialysate BGK 4 / 2.5 5000 milliLiter(s) (1500 mL/Hr) CRRT <Continuous>  PrismaSOL Filtration BGK 4 / 2.5 5000 milliLiter(s) (800 mL/Hr) CRRT <Continuous>  PrismaSOL Filtration BGK 4 / 2.5 5000 milliLiter(s) (200 mL/Hr) CRRT <Continuous>  rifAXIMin 550 milliGRAM(s) Oral every 12 hours  sodium phosphate 30 milliMole(s)/500 mL IVPB 30 milliMole(s) IV Intermittent once  thiamine 100 milliGRAM(s) Enteral Tube daily  vasopressin Infusion 0.03 Unit(s)/Min (4.5 mL/Hr) IV Continuous <Continuous>    The patient has no pharmacologic contraindication to transplant and is cleared by pharmacy. Jana Morales is a 63 yo female being evaluated for a liver transplant. PMH: alcohol use disorder, HTN, GERD, remote history of thyroid cancer (thyroidectomy).    Allergies  Macrobid (Rash)    Intolerances  Nexium (Stomach Upset)    Home Medications:  cholecalciferol 25 mcg (1000 intl units) oral tablet: 1 tab(s) orally once a day (21 Dec 2022 12:56)  folic acid 1 mg oral tablet: 1 tab(s) orally once a day (21 Dec 2022 12:57)  levothyroxine 137 mcg (0.137 mg) oral tablet: 1 tab(s) orally once a day (15 Mar 2022 07:06)  Lexapro 10 mg oral tablet: 1 tab(s) orally once a day (16 Mar 2022 10:52)  nystatin 100,000 units/mL oral suspension: 5 milliliter(s) orally 4 times a day (21 Dec 2022 12:58)  pantoprazole 40 mg oral delayed release tablet: 1 tab(s) orally once a day (at bedtime) (15 Mar 2022 07:06)  Sodium Chloride 1 g oral tablet: 1 gram(s) orally once a day (21 Dec 2022 13:00)  thiamine 100 mg oral tablet: 1 tab(s) orally once a day (21 Dec 2022 13:00)  Vitamin B-100 oral tablet: 1 tab(s) orally once a day (21 Dec 2022 13:02)    MEDICATIONS  (STANDING):  caspofungin IVPB 50 milliGRAM(s) IV Intermittent every 24 hours  caspofungin IVPB      chlorhexidine 2% Cloths 1 Application(s) Topical <User Schedule>  CRRT Treatment    <Continuous>  escitalopram 10 milliGRAM(s) Oral daily  folic acid 1 milliGRAM(s) Oral daily  influenza   Vaccine 0.5 milliLiter(s) IntraMuscular once  insulin lispro (ADMELOG) corrective regimen sliding scale   SubCutaneous every 6 hours  lactulose Syrup 20 Gram(s) Oral every 8 hours  levETIRAcetam  Solution 750 milliGRAM(s) Enteral Tube two times a day  levothyroxine 137 MICROGram(s) Oral daily  meropenem  IVPB 1000 milliGRAM(s) IV Intermittent every 8 hours  midodrine 20 milliGRAM(s) Oral every 8 hours  multivitamin/minerals/iron Oral Solution (CENTRUM) 15 milliLiter(s) Oral daily  norepinephrine Infusion 0.05 MICROgram(s)/kG/Min (6.21 mL/Hr) IV Continuous <Continuous>  pantoprazole   Suspension 40 milliGRAM(s) Oral every 24 hours  PrismaSATE Dialysate BGK 4 / 2.5 5000 milliLiter(s) (1500 mL/Hr) CRRT <Continuous>  PrismaSOL Filtration BGK 4 / 2.5 5000 milliLiter(s) (800 mL/Hr) CRRT <Continuous>  PrismaSOL Filtration BGK 4 / 2.5 5000 milliLiter(s) (200 mL/Hr) CRRT <Continuous>  rifAXIMin 550 milliGRAM(s) Oral every 12 hours  sodium phosphate 30 milliMole(s)/500 mL IVPB 30 milliMole(s) IV Intermittent once  thiamine 100 milliGRAM(s) Enteral Tube daily  vasopressin Infusion 0.03 Unit(s)/Min (4.5 mL/Hr) IV Continuous <Continuous>    The patient has no pharmacologic contraindication to transplant and is cleared by pharmacy.

## 2022-12-27 NOTE — PROGRESS NOTE ADULT - ASSESSMENT
PLAN:     Neurologic: hepatic encephalopathy - improving  - AAOx4 but now inc more lethargic   - lactulose and rifaximin; trend ammonia  - c/w keppra 750 BID ( new onset of seizure )    Respiratory: acute hypoxic resp failure   - CXR worsening b/l infiltrates, appears to be fluid OL  - combicath cx no significant growth to date  - inc fluid removal with CVVHD  - check ABG  - ventimask    Cardiovascular:   - inc levo, vaso 0.03  - midodrine 20 q8h    Gastrointestinal/Nutrition:  - Acute decompensated liver failure, s/p para 2.9L in ED  - hold regular diet given aspiration concerns  - c/w tube feeds  - Workup per transplant recs pending  - Monitor BM, rectal tube, on lactulose & rifaximin and watching ammonia  - Protonix for stress ulcer prophylaxis as per transplant surgery  - para 12/26 - transudative, not consistent with SBP    Genitourinary/Renal: RED 2/2 ATN vs HRS  - CRRT net -50cc/hr  - HD access L IJ   - monitor electrolytes  - nephro following    Hematologic:  - Chem VTE ppx: hold   - Thrombocytopenic, monitor plts  - trend Hbg, transfuse <7  - INR elevated likely hepatic dysfunction    Infectious Disease:  - Empiric abx meropenem, caspofungin  - ascites, blood, urine & fungal Cx so far NG  - repeat BCx 12/23 neg  - CXR w/ bilateral infiltrates so will continue to monitor    Endocrine:  - Hypothyroidism c/w synthroid IV 70  - No steroids  - ISS, adjust as appropriate    Ethics: FULL CODE    Dispo/Lines:  R IJ TLC  L IJ shiley  A line L Radial  [place]  SICU

## 2022-12-28 LAB
ALBUMIN SERPL ELPH-MCNC: 4 G/DL — SIGNIFICANT CHANGE UP (ref 3.3–5)
ALBUMIN SERPL ELPH-MCNC: 4.1 G/DL — SIGNIFICANT CHANGE UP (ref 3.3–5)
ALBUMIN SERPL ELPH-MCNC: 4.2 G/DL — SIGNIFICANT CHANGE UP (ref 3.3–5)
ALP SERPL-CCNC: 137 U/L — HIGH (ref 40–120)
ALP SERPL-CCNC: 147 U/L — HIGH (ref 40–120)
ALP SERPL-CCNC: 154 U/L — HIGH (ref 40–120)
ALT FLD-CCNC: 41 U/L — SIGNIFICANT CHANGE UP (ref 10–45)
ALT FLD-CCNC: 45 U/L — SIGNIFICANT CHANGE UP (ref 10–45)
ALT FLD-CCNC: 47 U/L — HIGH (ref 10–45)
ALT FLD-CCNC: 50 U/L — HIGH (ref 10–45)
AMMONIA BLD-MCNC: 63 UMOL/L — HIGH (ref 11–55)
ANA TITR SER: NEGATIVE — SIGNIFICANT CHANGE UP
ANION GAP SERPL CALC-SCNC: 14 MMOL/L — SIGNIFICANT CHANGE UP (ref 5–17)
ANION GAP SERPL CALC-SCNC: 15 MMOL/L — SIGNIFICANT CHANGE UP (ref 5–17)
APTT BLD: 54.4 SEC — HIGH (ref 27.5–35.5)
AST SERPL-CCNC: 113 U/L — HIGH (ref 10–40)
AST SERPL-CCNC: 132 U/L — HIGH (ref 10–40)
AST SERPL-CCNC: 149 U/L — HIGH (ref 10–40)
AST SERPL-CCNC: 156 U/L — HIGH (ref 10–40)
BILIRUB SERPL-MCNC: 27 MG/DL — HIGH (ref 0.2–1.2)
BILIRUB SERPL-MCNC: 28.4 MG/DL — HIGH (ref 0.2–1.2)
BILIRUB SERPL-MCNC: 28.9 MG/DL — HIGH (ref 0.2–1.2)
BUN SERPL-MCNC: 10 MG/DL — SIGNIFICANT CHANGE UP (ref 7–23)
BUN SERPL-MCNC: 11 MG/DL — SIGNIFICANT CHANGE UP (ref 7–23)
BUN SERPL-MCNC: 12 MG/DL — SIGNIFICANT CHANGE UP (ref 7–23)
CALCIUM SERPL-MCNC: 8.7 MG/DL — SIGNIFICANT CHANGE UP (ref 8.4–10.5)
CALCIUM SERPL-MCNC: 8.8 MG/DL — SIGNIFICANT CHANGE UP (ref 8.4–10.5)
CALCIUM SERPL-MCNC: 9.3 MG/DL — SIGNIFICANT CHANGE UP (ref 8.4–10.5)
CHLORIDE SERPL-SCNC: 102 MMOL/L — SIGNIFICANT CHANGE UP (ref 96–108)
CHLORIDE SERPL-SCNC: 103 MMOL/L — SIGNIFICANT CHANGE UP (ref 96–108)
CHLORIDE SERPL-SCNC: 104 MMOL/L — SIGNIFICANT CHANGE UP (ref 96–108)
CO2 SERPL-SCNC: 20 MMOL/L — LOW (ref 22–31)
CO2 SERPL-SCNC: 21 MMOL/L — LOW (ref 22–31)
CO2 SERPL-SCNC: 22 MMOL/L — SIGNIFICANT CHANGE UP (ref 22–31)
CREAT SERPL-MCNC: 0.67 MG/DL — SIGNIFICANT CHANGE UP (ref 0.5–1.3)
CREAT SERPL-MCNC: 0.71 MG/DL — SIGNIFICANT CHANGE UP (ref 0.5–1.3)
CREAT SERPL-MCNC: 0.73 MG/DL — SIGNIFICANT CHANGE UP (ref 0.5–1.3)
CULTURE RESULTS: SIGNIFICANT CHANGE UP
EGFR: 94 ML/MIN/1.73M2 — SIGNIFICANT CHANGE UP
EGFR: 97 ML/MIN/1.73M2 — SIGNIFICANT CHANGE UP
EGFR: 99 ML/MIN/1.73M2 — SIGNIFICANT CHANGE UP
GAMMA INTERFERON BACKGROUND BLD IA-ACNC: 0.02 IU/ML — SIGNIFICANT CHANGE UP
GAS PNL BLDA: SIGNIFICANT CHANGE UP
GAS PNL BLDV: SIGNIFICANT CHANGE UP
GLUCOSE BLDC GLUCOMTR-MCNC: 103 MG/DL — HIGH (ref 70–99)
GLUCOSE BLDC GLUCOMTR-MCNC: 84 MG/DL — SIGNIFICANT CHANGE UP (ref 70–99)
GLUCOSE BLDC GLUCOMTR-MCNC: 95 MG/DL — SIGNIFICANT CHANGE UP (ref 70–99)
GLUCOSE SERPL-MCNC: 86 MG/DL — SIGNIFICANT CHANGE UP (ref 70–99)
GLUCOSE SERPL-MCNC: 87 MG/DL — SIGNIFICANT CHANGE UP (ref 70–99)
GLUCOSE SERPL-MCNC: 96 MG/DL — SIGNIFICANT CHANGE UP (ref 70–99)
GLUCOSE SERPL-MCNC: 97 MG/DL — SIGNIFICANT CHANGE UP (ref 70–99)
HCT VFR BLD CALC: 21.5 % — LOW (ref 34.5–45)
HCT VFR BLD CALC: 23.2 % — LOW (ref 34.5–45)
HCT VFR BLD CALC: 23.6 % — LOW (ref 34.5–45)
HCT VFR BLD CALC: 24.2 % — LOW (ref 34.5–45)
HGB BLD-MCNC: 7.1 G/DL — LOW (ref 11.5–15.5)
HGB BLD-MCNC: 7.9 G/DL — LOW (ref 11.5–15.5)
HGB BLD-MCNC: 8 G/DL — LOW (ref 11.5–15.5)
HGB BLD-MCNC: 8.2 G/DL — LOW (ref 11.5–15.5)
INR BLD: 2.21 RATIO — HIGH (ref 0.88–1.16)
M TB IFN-G BLD-IMP: ABNORMAL
M TB IFN-G CD4+ BCKGRND COR BLD-ACNC: 0 IU/ML — SIGNIFICANT CHANGE UP
M TB IFN-G CD4+CD8+ BCKGRND COR BLD-ACNC: 0 IU/ML — SIGNIFICANT CHANGE UP
MAGNESIUM SERPL-MCNC: 2.4 MG/DL — SIGNIFICANT CHANGE UP (ref 1.6–2.6)
MAGNESIUM SERPL-MCNC: 2.5 MG/DL — SIGNIFICANT CHANGE UP (ref 1.6–2.6)
MCHC RBC-ENTMCNC: 30.9 PG — SIGNIFICANT CHANGE UP (ref 27–34)
MCHC RBC-ENTMCNC: 31.1 PG — SIGNIFICANT CHANGE UP (ref 27–34)
MCHC RBC-ENTMCNC: 31.2 PG — SIGNIFICANT CHANGE UP (ref 27–34)
MCHC RBC-ENTMCNC: 31.5 PG — SIGNIFICANT CHANGE UP (ref 27–34)
MCHC RBC-ENTMCNC: 33 GM/DL — SIGNIFICANT CHANGE UP (ref 32–36)
MCHC RBC-ENTMCNC: 33.9 GM/DL — SIGNIFICANT CHANGE UP (ref 32–36)
MCHC RBC-ENTMCNC: 34.1 GM/DL — SIGNIFICANT CHANGE UP (ref 32–36)
MCV RBC AUTO: 91.7 FL — SIGNIFICANT CHANGE UP (ref 80–100)
MCV RBC AUTO: 91.8 FL — SIGNIFICANT CHANGE UP (ref 80–100)
MCV RBC AUTO: 93.1 FL — SIGNIFICANT CHANGE UP (ref 80–100)
MCV RBC AUTO: 93.5 FL — SIGNIFICANT CHANGE UP (ref 80–100)
NON-GYNECOLOGICAL CYTOLOGY STUDY: SIGNIFICANT CHANGE UP
NRBC # BLD: 0 /100 WBCS — SIGNIFICANT CHANGE UP (ref 0–0)
PHOSPHATE SERPL-MCNC: 3.9 MG/DL — SIGNIFICANT CHANGE UP (ref 2.5–4.5)
PHOSPHATE SERPL-MCNC: 4 MG/DL — SIGNIFICANT CHANGE UP (ref 2.5–4.5)
PHOSPHATIDYLETHANOL (PETH) - RESULT: 27 NG/ML — HIGH
PLATELET # BLD AUTO: 37 K/UL — LOW (ref 150–400)
PLATELET # BLD AUTO: 39 K/UL — LOW (ref 150–400)
POTASSIUM SERPL-MCNC: 4.2 MMOL/L — SIGNIFICANT CHANGE UP (ref 3.5–5.3)
POTASSIUM SERPL-MCNC: 4.3 MMOL/L — SIGNIFICANT CHANGE UP (ref 3.5–5.3)
POTASSIUM SERPL-MCNC: 4.4 MMOL/L — SIGNIFICANT CHANGE UP (ref 3.5–5.3)
POTASSIUM SERPL-SCNC: 4.2 MMOL/L — SIGNIFICANT CHANGE UP (ref 3.5–5.3)
POTASSIUM SERPL-SCNC: 4.3 MMOL/L — SIGNIFICANT CHANGE UP (ref 3.5–5.3)
POTASSIUM SERPL-SCNC: 4.4 MMOL/L — SIGNIFICANT CHANGE UP (ref 3.5–5.3)
PROT SERPL-MCNC: 6.1 G/DL — SIGNIFICANT CHANGE UP (ref 6–8.3)
PROT SERPL-MCNC: 6.3 G/DL — SIGNIFICANT CHANGE UP (ref 6–8.3)
PROT SERPL-MCNC: 6.4 G/DL — SIGNIFICANT CHANGE UP (ref 6–8.3)
PROTHROM AB SERPL-ACNC: 25.9 SEC — HIGH (ref 10.5–13.4)
QUANT TB PLUS MITOGEN MINUS NIL: 0.01 IU/ML — SIGNIFICANT CHANGE UP
RAPID RVP RESULT: SIGNIFICANT CHANGE UP
RBC # BLD: 2.3 M/UL — LOW (ref 3.8–5.2)
RBC # BLD: 2.53 M/UL — LOW (ref 3.8–5.2)
RBC # BLD: 2.57 M/UL — LOW (ref 3.8–5.2)
RBC # BLD: 2.6 M/UL — LOW (ref 3.8–5.2)
RBC # FLD: 17.3 % — HIGH (ref 10.3–14.5)
RBC # FLD: 17.7 % — HIGH (ref 10.3–14.5)
RBC # FLD: 17.8 % — HIGH (ref 10.3–14.5)
SARS-COV-2 RNA SPEC QL NAA+PROBE: SIGNIFICANT CHANGE UP
SODIUM SERPL-SCNC: 138 MMOL/L — SIGNIFICANT CHANGE UP (ref 135–145)
SODIUM SERPL-SCNC: 139 MMOL/L — SIGNIFICANT CHANGE UP (ref 135–145)
SPECIMEN SOURCE: SIGNIFICANT CHANGE UP
STRONGYLOIDES AB SER-ACNC: NEGATIVE — SIGNIFICANT CHANGE UP
WBC # BLD: 31.21 K/UL — HIGH (ref 3.8–10.5)
WBC # BLD: 32.51 K/UL — HIGH (ref 3.8–10.5)
WBC # BLD: 32.81 K/UL — HIGH (ref 3.8–10.5)
WBC # BLD: 34.11 K/UL — HIGH (ref 3.8–10.5)
WBC # FLD AUTO: 31.21 K/UL — HIGH (ref 3.8–10.5)
WBC # FLD AUTO: 32.51 K/UL — HIGH (ref 3.8–10.5)
WBC # FLD AUTO: 32.81 K/UL — HIGH (ref 3.8–10.5)
WBC # FLD AUTO: 34.11 K/UL — HIGH (ref 3.8–10.5)

## 2022-12-28 PROCEDURE — 99232 SBSQ HOSP IP/OBS MODERATE 35: CPT

## 2022-12-28 PROCEDURE — 90945 DIALYSIS ONE EVALUATION: CPT | Mod: GC

## 2022-12-28 PROCEDURE — 99233 SBSQ HOSP IP/OBS HIGH 50: CPT

## 2022-12-28 PROCEDURE — 71045 X-RAY EXAM CHEST 1 VIEW: CPT | Mod: 26

## 2022-12-28 RX ORDER — METOCLOPRAMIDE HCL 10 MG
10 TABLET ORAL EVERY 8 HOURS
Refills: 0 | Status: COMPLETED | OUTPATIENT
Start: 2022-12-28 | End: 2022-12-31

## 2022-12-28 RX ORDER — SODIUM CHLORIDE 9 MG/ML
1000 INJECTION, SOLUTION INTRAVENOUS
Refills: 0 | Status: DISCONTINUED | OUTPATIENT
Start: 2022-12-28 | End: 2022-12-29

## 2022-12-28 RX ORDER — HYDROMORPHONE HYDROCHLORIDE 2 MG/ML
0.25 INJECTION INTRAMUSCULAR; INTRAVENOUS; SUBCUTANEOUS
Refills: 0 | Status: DISCONTINUED | OUTPATIENT
Start: 2022-12-28 | End: 2023-01-04

## 2022-12-28 RX ADMIN — SODIUM CHLORIDE 30 MILLILITER(S): 9 INJECTION, SOLUTION INTRAVENOUS at 11:03

## 2022-12-28 RX ADMIN — MEROPENEM 100 MILLIGRAM(S): 1 INJECTION INTRAVENOUS at 21:01

## 2022-12-28 RX ADMIN — Medication 1 MILLIGRAM(S): at 11:02

## 2022-12-28 RX ADMIN — Medication 6.21 MICROGRAM(S)/KG/MIN: at 21:01

## 2022-12-28 RX ADMIN — Medication 15 MILLILITER(S): at 11:02

## 2022-12-28 RX ADMIN — MIDODRINE HYDROCHLORIDE 20 MILLIGRAM(S): 2.5 TABLET ORAL at 05:08

## 2022-12-28 RX ADMIN — LACTULOSE 20 GRAM(S): 10 SOLUTION ORAL at 21:00

## 2022-12-28 RX ADMIN — HYDROMORPHONE HYDROCHLORIDE 0.25 MILLIGRAM(S): 2 INJECTION INTRAMUSCULAR; INTRAVENOUS; SUBCUTANEOUS at 07:20

## 2022-12-28 RX ADMIN — MEROPENEM 100 MILLIGRAM(S): 1 INJECTION INTRAVENOUS at 05:08

## 2022-12-28 RX ADMIN — HYDROMORPHONE HYDROCHLORIDE 0.25 MILLIGRAM(S): 2 INJECTION INTRAMUSCULAR; INTRAVENOUS; SUBCUTANEOUS at 21:59

## 2022-12-28 RX ADMIN — HYDROMORPHONE HYDROCHLORIDE 0.25 MILLIGRAM(S): 2 INJECTION INTRAMUSCULAR; INTRAVENOUS; SUBCUTANEOUS at 11:20

## 2022-12-28 RX ADMIN — HYDROMORPHONE HYDROCHLORIDE 0.25 MILLIGRAM(S): 2 INJECTION INTRAMUSCULAR; INTRAVENOUS; SUBCUTANEOUS at 03:57

## 2022-12-28 RX ADMIN — CHLORHEXIDINE GLUCONATE 1 APPLICATION(S): 213 SOLUTION TOPICAL at 05:15

## 2022-12-28 RX ADMIN — HYDROMORPHONE HYDROCHLORIDE 0.25 MILLIGRAM(S): 2 INJECTION INTRAMUSCULAR; INTRAVENOUS; SUBCUTANEOUS at 04:12

## 2022-12-28 RX ADMIN — CASPOFUNGIN ACETATE 260 MILLIGRAM(S): 7 INJECTION, POWDER, LYOPHILIZED, FOR SOLUTION INTRAVENOUS at 09:10

## 2022-12-28 RX ADMIN — Medication 10 MILLIGRAM(S): at 13:50

## 2022-12-28 RX ADMIN — MEROPENEM 100 MILLIGRAM(S): 1 INJECTION INTRAVENOUS at 13:50

## 2022-12-28 RX ADMIN — ESCITALOPRAM OXALATE 10 MILLIGRAM(S): 10 TABLET, FILM COATED ORAL at 11:02

## 2022-12-28 RX ADMIN — LEVETIRACETAM 750 MILLIGRAM(S): 250 TABLET, FILM COATED ORAL at 05:08

## 2022-12-28 RX ADMIN — LACTULOSE 20 GRAM(S): 10 SOLUTION ORAL at 05:08

## 2022-12-28 RX ADMIN — PANTOPRAZOLE SODIUM 40 MILLIGRAM(S): 20 TABLET, DELAYED RELEASE ORAL at 11:52

## 2022-12-28 RX ADMIN — Medication 10 MILLIGRAM(S): at 21:01

## 2022-12-28 RX ADMIN — LACTULOSE 20 GRAM(S): 10 SOLUTION ORAL at 13:50

## 2022-12-28 RX ADMIN — LEVETIRACETAM 750 MILLIGRAM(S): 250 TABLET, FILM COATED ORAL at 17:17

## 2022-12-28 RX ADMIN — SODIUM CHLORIDE 30 MILLILITER(S): 9 INJECTION, SOLUTION INTRAVENOUS at 21:01

## 2022-12-28 RX ADMIN — Medication 137 MICROGRAM(S): at 05:08

## 2022-12-28 RX ADMIN — HYDROMORPHONE HYDROCHLORIDE 0.25 MILLIGRAM(S): 2 INJECTION INTRAMUSCULAR; INTRAVENOUS; SUBCUTANEOUS at 07:40

## 2022-12-28 RX ADMIN — HYDROMORPHONE HYDROCHLORIDE 0.25 MILLIGRAM(S): 2 INJECTION INTRAMUSCULAR; INTRAVENOUS; SUBCUTANEOUS at 11:02

## 2022-12-28 RX ADMIN — VASOPRESSIN 4.5 UNIT(S)/MIN: 20 INJECTION INTRAVENOUS at 17:18

## 2022-12-28 RX ADMIN — Medication 6.21 MICROGRAM(S)/KG/MIN: at 17:18

## 2022-12-28 RX ADMIN — MIDODRINE HYDROCHLORIDE 20 MILLIGRAM(S): 2.5 TABLET ORAL at 13:50

## 2022-12-28 RX ADMIN — Medication 100 MILLIGRAM(S): at 11:02

## 2022-12-28 RX ADMIN — VASOPRESSIN 4.5 UNIT(S)/MIN: 20 INJECTION INTRAVENOUS at 07:21

## 2022-12-28 RX ADMIN — MIDODRINE HYDROCHLORIDE 20 MILLIGRAM(S): 2.5 TABLET ORAL at 21:00

## 2022-12-28 RX ADMIN — Medication 6.21 MICROGRAM(S)/KG/MIN: at 07:22

## 2022-12-28 RX ADMIN — VASOPRESSIN 4.5 UNIT(S)/MIN: 20 INJECTION INTRAVENOUS at 21:01

## 2022-12-28 RX ADMIN — HYDROMORPHONE HYDROCHLORIDE 0.25 MILLIGRAM(S): 2 INJECTION INTRAMUSCULAR; INTRAVENOUS; SUBCUTANEOUS at 21:44

## 2022-12-28 NOTE — BH CONSULTATION LIAISON ASSESSMENT NOTE - NSBHATTESTCOMMENTATTENDFT_PSY_A_CORE
Patient unable to partake in formal transplant evaluation, SIPAT pending. Collateral appreciated, will continue to follow and reassess as mental status improves.

## 2022-12-28 NOTE — PROGRESS NOTE ADULT - SUBJECTIVE AND OBJECTIVE BOX
24 HOUR EVENTS:  - worsening mental status and hypoxia, inc to Ventimask then to HFNC 50%/60L  - inc CVVHD to -50cc/hr  - inc levo needs  - made NPO, coughing with feeds   - s/p NGT with ~700cc out with suctioning    SUBJECTIVE/ROS:  [ ] A ten-point review of systems was otherwise negative except as noted.  [x] Due to altered mental status/intubation, subjective information were not able to be obtained from the patient. History was obtained, to the extent possible, from review of the chart and collateral sources of information.      NEURO  Exam: inc lethargy, awake and follows some commands, confusion  Meds: escitalopram 10 milliGRAM(s) Oral daily  levETIRAcetam  Solution 750 milliGRAM(s) Enteral Tube two times a day  ondansetron Injectable 4 milliGRAM(s) IV Push every 6 hours PRN Nausea and/or Vomiting  oxyCODONE    Solution 5 milliGRAM(s) Oral every 4 hours PRN Moderate Pain (4 - 6)  oxyCODONE    Solution 10 milliGRAM(s) Oral every 4 hours PRN Severe Pain (7 - 10)    [x] Adequacy of sedation and pain control has been assessed and adjusted      RESPIRATORY  RR: 19 (12-28-22 @ 00:15) (10 - 40)  SpO2: 96% (12-28-22 @ 00:15) (91% - 97%)  Exam: unlabored, HFNC  Mechanical Ventilation:   ABG - ( 27 Dec 2022 21:04 )  pH: 7.38  /  pCO2: 40    /  pO2: 120   / HCO3: 24    / Base Excess: -1.3  /  SaO2: 99.9    Lactate: x          CARDIOVASCULAR  HR: 79 (12-28-22 @ 00:15) (73 - 102)  BP: 103/63 (12-27-22 @ 19:00) (103/63 - 103/63)  BP(mean): 77 (12-27-22 @ 19:00) (77 - 77)  ABP: 101/49 (12-28-22 @ 00:15) (89/40 - 128/68)  ABP(mean): 69 (12-28-22 @ 00:15) (53 - 92)  VBG - ( 27 Dec 2022 02:21 )  pH: 7.42  /  pCO2: 42    /  pO2: 45    / HCO3: 27    / Base Excess: 2.5   /  SaO2: 78.5   Lactate: 1.9      Exam:  Cardiac Rhythm: S1S2  Volume Status [x]Hypervolemic [ ]Euvolemic [ ]Hypovolemic  Meds: midodrine 20 milliGRAM(s) Oral every 8 hours  norepinephrine Infusion 0.05 MICROgram(s)/kG/Min IV Continuous <Continuous>    GI/NUTRITION  Exam: very distended, non-tender, severe jaundiced  Diet: NPO  Meds: lactulose Syrup 20 Gram(s) Oral every 8 hours  pantoprazole   Suspension 40 milliGRAM(s) Oral every 24 hours      GENITOURINARY  I&O's Detail    12-26 @ 07:01 - 12-27 @ 07:00  --------------------------------------------------------  IN:    Enteral Tube Flush: 265 mL    IV PiggyBack: 250 mL    IV PiggyBack: 900 mL    Nepro with Carb Steady: 1110 mL    Norepinephrine: 343.7 mL    Norepinephrine: 6.2 mL    Oral Fluid: 30 mL    Vasopressin: 108 mL  Total IN: 3012.9 mL    OUT:    Other (mL): 2654 mL    Rectal Tube (mL): 700 mL  Total OUT: 3354 mL    Total NET: -341.1 mL      12-27 @ 07:01 - 12-28 @ 00:28  --------------------------------------------------------  IN:    Albumin 25%  -  50 mL: 200 mL    Enteral Tube Flush: 90 mL    IV PiggyBack: 550 mL    IV PiggyBack: 500 mL    Lactated Ringers Bolus: 1000 mL    Nepro with Carb Steady: 200 mL    Norepinephrine: 266.1 mL    Vasopressin: 76.5 mL  Total IN: 2882.6 mL    OUT:    Nasogastric/Oral tube (mL): 1100 mL    Other (mL): 1885 mL    Rectal Tube (mL): 400 mL  Total OUT: 3385 mL    Total NET: -502.4 mL          12-27    139  |  101  |  10  ----------------------------<  130<H>  4.0   |  22  |  0.70    Ca    8.8      27 Dec 2022 21:14  Phos  3.8     12-27  Mg     2.4     12-27    TPro  6.6  /  Alb  3.9  /  TBili  29.0<H>  /  DBili  x   /  AST  119<H>  /  ALT  49<H>  /  AlkPhos  166<H>  12-27    [ ] Monroe catheter, indication: N/A  Meds: folic acid 1 milliGRAM(s) Oral daily  multivitamin/minerals/iron Oral Solution (CENTRUM) 15 milliLiter(s) Oral daily  thiamine 100 milliGRAM(s) Enteral Tube daily        HEMATOLOGIC  Meds:   [x] VTE Prophylaxis                        7.9    36.32 )-----------( 40       ( 27 Dec 2022 21:14 )             23.5     PT/INR - ( 27 Dec 2022 02:28 )   PT: 27.8 sec;   INR: 2.40 ratio         PTT - ( 27 Dec 2022 02:28 )  PTT:51.4 sec  Transfusion     [ ] PRBC   [ ] Platelets   [ ] FFP   [ ] Cryoprecipitate      INFECTIOUS DISEASES  T(C): 36.5 (12-27-22 @ 23:00), Max: 36.8 (12-27-22 @ 03:00)  Wt(kg): --  WBC Count: 36.32 K/uL (12-27 @ 21:14)  WBC Count: 35.17 K/uL (12-27 @ 15:20)  WBC Count: 36.85 K/uL (12-27 @ 08:41)  WBC Count: 36.51 K/uL (12-27 @ 02:28)    Recent Cultures:  Specimen Source: Peritoneal Peritoneal Fluid, 12-26 @ 15:00; Results   No growth; Gram Stain:   polymorphonuclear leukocytes seen  No organisms seen  by cytocentrifuge; Organism: --  Specimen Source: .Blood Blood-Peripheral, 12-26 @ 10:45; Results   No growth to date.; Gram Stain: --; Organism: --  Specimen Source: .Blood Blood-Peripheral, 12-26 @ 09:30; Results   No growth to date.; Gram Stain: --; Organism: --  Specimen Source: .Blood Blood, 12-24 @ 20:55; Results   No growth to date.; Gram Stain: --; Organism: --  Specimen Source: .Blood Blood, 12-24 @ 20:20; Results   No growth to date.; Gram Stain: --; Organism: --  Specimen Source: .Blood Blood, 12-23 @ 13:35; Results   No growth to date.; Gram Stain: --; Organism: --  Specimen Source: .Blood Blood, 12-23 @ 13:25; Results   No growth to date.; Gram Stain: --; Organism: --  Specimen Source: Combi-Cath Combi-Cath, 12-22 @ 17:02; Results   Normal Respiratory Cassandra present; Gram Stain:   Moderate polymorphonuclear leukocytes seen per low power field  No squamous epithelial cells seen per low power field  No organisms seen per oil power field; Organism: --  Specimen Source: Catheterized Catheterized, 12-21 @ 22:55; Results   >100,000 CFU/ml Leticia dubliniensis "Susceptibilities not performed"; Gram Stain: --; Organism: --  Specimen Source: Ascites Fl Ascites Fluid, 12-21 @ 19:53; Results   No growth at 5 days; Gram Stain:   polymorphonuclear leukocytes seen  No organisms seen  by cytocentrifuge; Organism: --  Specimen Source: Trach Asp Tracheal Aspirate, 12-21 @ 01:19; Results   Normal Respiratory Cassandra present; Gram Stain:   No polymorphonuclear leukocytes per low power field  Numerous Squamous epithelial cells per low power field  Moderate Gram Positive Rods seen per oil power field  Few Yeast like cells seen per oil power field; Organism: --    Meds: caspofungin IVPB 50 milliGRAM(s) IV Intermittent every 24 hours  caspofungin IVPB      influenza   Vaccine 0.5 milliLiter(s) IntraMuscular once  meropenem  IVPB 1000 milliGRAM(s) IV Intermittent every 8 hours  rifAXIMin 550 milliGRAM(s) Oral every 12 hours        ENDOCRINE  Capillary Blood Glucose    Meds: insulin lispro (ADMELOG) corrective regimen sliding scale   SubCutaneous every 6 hours  levothyroxine 137 MICROGram(s) Oral daily  vasopressin Infusion 0.03 Unit(s)/Min IV Continuous <Continuous>        ACCESS DEVICES:  [ ] Peripheral IV  [x] Central Venous Line	[ ] R	[ ] L	[ ] IJ	[ ] Fem	[ ] SC	Placed:   [x] Arterial Line		[ ] R	[ ] L	[ ] Fem	[ ] Rad	[ ] Ax	Placed:   [ ] PICC:					[ ] Mediport  [x] Urinary Catheter, Date Placed:   [ ] Necessity of urinary, arterial, and venous catheters discussed    OTHER MEDICATIONS:  chlorhexidine 2% Cloths 1 Application(s) Topical <User Schedule>  CRRT Treatment    <Continuous>  Phoxillum Filtration BK 4 / 2.5 5000 milliLiter(s) CRRT <Continuous>  Phoxillum Filtration BK 4 / 2.5 5000 milliLiter(s) CRRT <Continuous>  Phoxillum Filtration BK 4 / 2.5 5000 milliLiter(s) CRRT <Continuous>  sodium chloride 0.65% Nasal 1 Spray(s) Both Nostrils every 3 hours PRN  tetracaine/benzocaine/butamben Spray 1 Spray(s) Topical every 3 hours PRN

## 2022-12-28 NOTE — PROGRESS NOTE ADULT - SUBJECTIVE AND OBJECTIVE BOX
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  Arthur [phone]) and Daughter Taylor at [facility] provided additional history. Of note was recently admitted to  11/2022 for workup and eval of jaundice- during that hospitalization was found to have a UTI and dx with alc hep and started on steroids (was deemed a non-responder and steroids were subsequently stopped); s/p LVP at [place] 11/2022. Previously hospitalized in 2021 at [facility] for ETOH detox. Went to ETOH rehab 08/2022 and was asked to leave the facility when she was COVID+; was sober for 1 week until she started drinking again. Had also attended a few AA meetings in the past. Transferred from [facility] 12/20 for further management of acute liver failure and liver transplant evaluation.       Interval events:  - decreasing pressor requirements  - unable to wean from HFNC   - continues CVVH     Potential Discharge date: pending clinical stability  Education:  Medications  Plan of care:  See Below      MEDICATIONS  (STANDING):  caspofungin IVPB 50 milliGRAM(s) IV Intermittent every 24 hours  caspofungin IVPB      chlorhexidine 2% Cloths 1 Application(s) Topical <User Schedule>  CRRT Treatment    <Continuous>  dextrose 5% + sodium chloride 0.9%. 1000 milliLiter(s) (30 mL/Hr) IV Continuous <Continuous>  escitalopram 10 milliGRAM(s) Oral daily  folic acid 1 milliGRAM(s) Oral daily  influenza   Vaccine 0.5 milliLiter(s) IntraMuscular once  insulin lispro (ADMELOG) corrective regimen sliding scale   SubCutaneous every 6 hours  lactulose Syrup 20 Gram(s) Oral every 8 hours  levETIRAcetam  Solution 750 milliGRAM(s) Enteral Tube two times a day  levothyroxine 137 MICROGram(s) Oral daily  meropenem  IVPB 1000 milliGRAM(s) IV Intermittent every 8 hours  metoclopramide Injectable 10 milliGRAM(s) IV Push every 8 hours  midodrine 20 milliGRAM(s) Oral every 8 hours  multivitamin/minerals/iron Oral Solution (CENTRUM) 15 milliLiter(s) Oral daily  norepinephrine Infusion 0.05 MICROgram(s)/kG/Min (6.21 mL/Hr) IV Continuous <Continuous>  pantoprazole   Suspension 40 milliGRAM(s) Oral every 24 hours  Phoxillum Filtration BK 4 / 2.5 5000 milliLiter(s) (800 mL/Hr) CRRT <Continuous>  Phoxillum Filtration BK 4 / 2.5 5000 milliLiter(s) (200 mL/Hr) CRRT <Continuous>  Phoxillum Filtration BK 4 / 2.5 5000 milliLiter(s) (1000 mL/Hr) CRRT <Continuous>  rifAXIMin 550 milliGRAM(s) Oral every 12 hours  thiamine 100 milliGRAM(s) Enteral Tube daily  vasopressin Infusion 0.03 Unit(s)/Min (4.5 mL/Hr) IV Continuous <Continuous>    MEDICATIONS  (PRN):  HYDROmorphone  Injectable 0.25 milliGRAM(s) IV Push every 3 hours PRN Severe Pain (7 - 10)  ondansetron Injectable 4 milliGRAM(s) IV Push every 6 hours PRN Nausea and/or Vomiting  sodium chloride 0.65% Nasal 1 Spray(s) Both Nostrils every 3 hours PRN Nasal Congestion  tetracaine/benzocaine/butamben Spray 1 Spray(s) Topical every 3 hours PRN for ngt discomfort      PAST MEDICAL & SURGICAL HISTORY:  HTN (hypertension)  off medication for over one year--states BP has been normal      UTI (urinary tract infection)  currently being treated--will complete antibiotics 3/8/2022      Intracranial tumor      GERD (gastroesophageal reflux disease)      Eczema      Thyroid cancer  surgery. radiation, Radioactive iodine      COVID-19 virus infection  11/2021--recovered at home      H/O total thyroidectomy  age 21&#x27;s for Thyroid cancer, postop complication arterial bleed, second surgery      History of tonsillectomy  age 5      History of appendectomy  age 5          Vital Signs Last 24 Hrs  T(C): 36.6 (28 Dec 2022 11:00), Max: 37 (28 Dec 2022 07:00)  T(F): 97.9 (28 Dec 2022 11:00), Max: 98.6 (28 Dec 2022 07:00)  HR: 89 (28 Dec 2022 12:00) (73 - 99)  BP: 103/63 (27 Dec 2022 19:00) (103/63 - 103/63)  BP(mean): 77 (27 Dec 2022 19:00) (77 - 77)  RR: 19 (28 Dec 2022 12:00) (10 - 40)  SpO2: 97% (28 Dec 2022 12:00) (56% - 97%)    Parameters below as of 28 Dec 2022 08:31  Patient On (Oxygen Delivery Method): nasal cannula, high flow,@31C  O2 Flow (L/min): 50  O2 Concentration (%): 40    I&O's Summary    27 Dec 2022 07:01  -  28 Dec 2022 07:00  --------------------------------------------------------  IN: 3487 mL / OUT: 3970 mL / NET: -483 mL    28 Dec 2022 07:01  -  28 Dec 2022 12:17  --------------------------------------------------------  IN: 477 mL / OUT: 451 mL / NET: 26 mL                              8.0    32.51 )-----------( 37       ( 28 Dec 2022 09:34 )             23.6     12-28    138  |  102  |  11  ----------------------------<  87  4.3   |  22  |  0.73    Ca    9.3      28 Dec 2022 09:32  Phos  4.0     12-28  Mg     2.4     12-28    TPro  6.1  /  Alb  4.1  /  TBili  27.0<H>  /  DBili  x   /  AST  132<H>  /  ALT  45  /  AlkPhos  154<H>  12-28          Culture - Fungal, Body Fluid (collected 12-26-22 @ 15:00)  Source: Peritoneal Peritoneal Fluid  Preliminary Report (12-27-22 @ 07:22):    Testing in progress    Culture - Body Fluid with Gram Stain (collected 12-26-22 @ 15:00)  Source: Peritoneal Peritoneal Fluid  Gram Stain (12-26-22 @ 23:25):    polymorphonuclear leukocytes seen    No organisms seen    by cytocentrifuge  Preliminary Report (12-27-22 @ 17:03):    No growth    Culture - Blood (collected 12-26-22 @ 10:45)  Source: .Blood Blood-Peripheral  Preliminary Report (12-27-22 @ 17:01):    No growth to date.    Culture - Blood (collected 12-26-22 @ 09:30)  Source: .Blood Blood-Peripheral  Preliminary Report (12-27-22 @ 17:01):    No growth to date.    Culture - Blood (collected 12-24-22 @ 20:55)  Source: .Blood Blood  Preliminary Report (12-26-22 @ 01:03):    No growth to date.    Culture - Blood (collected 12-24-22 @ 20:20)  Source: .Blood Blood  Preliminary Report (12-26-22 @ 01:03):    No growth to date.    Culture - Blood (collected 12-23-22 @ 13:35)  Source: .Blood Blood  Preliminary Report (12-24-22 @ 18:01):    No growth to date.    Culture - Blood (collected 12-23-22 @ 13:25)  Source: .Blood Blood  Preliminary Report (12-24-22 @ 18:01):    No growth to date.    Culture - Bronchial (collected 12-22-22 @ 17:02)  Source: Combi-Cath Combi-Cath  Gram Stain (12-23-22 @ 06:59):    Moderate polymorphonuclear leukocytes seen per low power field    No squamous epithelial cells seen per low power field    No organisms seen per oil power field  Final Report (12-24-22 @ 15:11):    Normal Respiratory Cassandra present    Culture - Urine (collected 12-21-22 @ 22:55)  Source: Catheterized Catheterized  Final Report (12-24-22 @ 00:55):    >100,000 CFU/ml Leticia dubliniensis "Susceptibilities not performed"    Culture - Fungal, Body Fluid (collected 12-21-22 @ 19:53)  Source: Peritoneal Peritoneal Fluid  Preliminary Report (12-24-22 @ 15:02):    Culture is being performed. Fungal cultures are held for 4 weeks.    Culture - Body Fluid with Gram Stain (collected 12-21-22 @ 19:53)  Source: Ascites Fl Ascites Fluid  Gram Stain (12-22-22 @ 03:04):    polymorphonuclear leukocytes seen    No organisms seen    by cytocentrifuge  Final Report (12-27-22 @ 10:27):    No growth at 5 days        Physical Exam:  Constitutional: NAD   Eyes: icteric, PERRLA  ENMT: nc/at, no thrush  Neck: supple, central line   Cardiovascular: RRR  Gastrointestinal: Soft abdomen, distended  Genitourinary: Urinary catheter in place, anuric  Extremities: SCD's in place and working bilaterally, no edema  Vascular: Palpable dp pulses bilaterally.   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  Arthur ([phone]) and Daughter Taylor at [facility] provided additional history. Of note was recently admitted to  11/2022 for workup and eval of jaundice- during that hospitalization was found to have a UTI and dx with alc hep and started on steroids (was deemed a non-responder and steroids were subsequently stopped); s/p LVP at [place] 11/2022. Previously hospitalized in 2021 at [place] for ETOH detox. Went to ETOH rehab 08/2022 and was asked to leave the facility when she was COVID+; was sober for 1 week until she started drinking again. Had also attended a few AA meetings in the past. Transferred from [facility] 12/20 for further management of acute liver failure and liver transplant evaluation.       Interval events:  - decreasing pressor requirements  - unable to wean from HFNC   - continues CVVH     Potential Discharge date: pending clinical stability  Education:  Medications  Plan of care:  See Below      MEDICATIONS  (STANDING):  caspofungin IVPB 50 milliGRAM(s) IV Intermittent every 24 hours  caspofungin IVPB      chlorhexidine 2% Cloths 1 Application(s) Topical <User Schedule>  CRRT Treatment    <Continuous>  dextrose 5% + sodium chloride 0.9%. 1000 milliLiter(s) (30 mL/Hr) IV Continuous <Continuous>  escitalopram 10 milliGRAM(s) Oral daily  folic acid 1 milliGRAM(s) Oral daily  influenza   Vaccine 0.5 milliLiter(s) IntraMuscular once  insulin lispro (ADMELOG) corrective regimen sliding scale   SubCutaneous every 6 hours  lactulose Syrup 20 Gram(s) Oral every 8 hours  levETIRAcetam  Solution 750 milliGRAM(s) Enteral Tube two times a day  levothyroxine 137 MICROGram(s) Oral daily  meropenem  IVPB 1000 milliGRAM(s) IV Intermittent every 8 hours  metoclopramide Injectable 10 milliGRAM(s) IV Push every 8 hours  midodrine 20 milliGRAM(s) Oral every 8 hours  multivitamin/minerals/iron Oral Solution (CENTRUM) 15 milliLiter(s) Oral daily  norepinephrine Infusion 0.05 MICROgram(s)/kG/Min (6.21 mL/Hr) IV Continuous <Continuous>  pantoprazole   Suspension 40 milliGRAM(s) Oral every 24 hours  Phoxillum Filtration BK 4 / 2.5 5000 milliLiter(s) (800 mL/Hr) CRRT <Continuous>  Phoxillum Filtration BK 4 / 2.5 5000 milliLiter(s) (200 mL/Hr) CRRT <Continuous>  Phoxillum Filtration BK 4 / 2.5 5000 milliLiter(s) (1000 mL/Hr) CRRT <Continuous>  rifAXIMin 550 milliGRAM(s) Oral every 12 hours  thiamine 100 milliGRAM(s) Enteral Tube daily  vasopressin Infusion 0.03 Unit(s)/Min (4.5 mL/Hr) IV Continuous <Continuous>    MEDICATIONS  (PRN):  HYDROmorphone  Injectable 0.25 milliGRAM(s) IV Push every 3 hours PRN Severe Pain (7 - 10)  ondansetron Injectable 4 milliGRAM(s) IV Push every 6 hours PRN Nausea and/or Vomiting  sodium chloride 0.65% Nasal 1 Spray(s) Both Nostrils every 3 hours PRN Nasal Congestion  tetracaine/benzocaine/butamben Spray 1 Spray(s) Topical every 3 hours PRN for ngt discomfort      PAST MEDICAL & SURGICAL HISTORY:  HTN (hypertension)  off medication for over one year--states BP has been normal      UTI (urinary tract infection)  currently being treated--will complete antibiotics 3/8/2022      Intracranial tumor      GERD (gastroesophageal reflux disease)      Eczema      Thyroid cancer  surgery. radiation, Radioactive iodine      COVID-19 virus infection  11/2021--recovered at home      H/O total thyroidectomy  age 21&#x27;s for Thyroid cancer, postop complication arterial bleed, second surgery      History of tonsillectomy  age 5      History of appendectomy  age 5          Vital Signs Last 24 Hrs  T(C): 36.6 (28 Dec 2022 11:00), Max: 37 (28 Dec 2022 07:00)  T(F): 97.9 (28 Dec 2022 11:00), Max: 98.6 (28 Dec 2022 07:00)  HR: 89 (28 Dec 2022 12:00) (73 - 99)  BP: 103/63 (27 Dec 2022 19:00) (103/63 - 103/63)  BP(mean): 77 (27 Dec 2022 19:00) (77 - 77)  RR: 19 (28 Dec 2022 12:00) (10 - 40)  SpO2: 97% (28 Dec 2022 12:00) (56% - 97%)    Parameters below as of 28 Dec 2022 08:31  Patient On (Oxygen Delivery Method): nasal cannula, high flow,@31C  O2 Flow (L/min): 50  O2 Concentration (%): 40    I&O's Summary    27 Dec 2022 07:01  -  28 Dec 2022 07:00  --------------------------------------------------------  IN: 3487 mL / OUT: 3970 mL / NET: -483 mL    28 Dec 2022 07:01  -  28 Dec 2022 12:17  --------------------------------------------------------  IN: 477 mL / OUT: 451 mL / NET: 26 mL                              8.0    32.51 )-----------( 37       ( 28 Dec 2022 09:34 )             23.6     12-28    138  |  102  |  11  ----------------------------<  87  4.3   |  22  |  0.73    Ca    9.3      28 Dec 2022 09:32  Phos  4.0     12-28  Mg     2.4     12-28    TPro  6.1  /  Alb  4.1  /  TBili  27.0<H>  /  DBili  x   /  AST  132<H>  /  ALT  45  /  AlkPhos  154<H>  12-28          Culture - Fungal, Body Fluid (collected 12-26-22 @ 15:00)  Source: Peritoneal Peritoneal Fluid  Preliminary Report (12-27-22 @ 07:22):    Testing in progress    Culture - Body Fluid with Gram Stain (collected 12-26-22 @ 15:00)  Source: Peritoneal Peritoneal Fluid  Gram Stain (12-26-22 @ 23:25):    polymorphonuclear leukocytes seen    No organisms seen    by cytocentrifuge  Preliminary Report (12-27-22 @ 17:03):    No growth    Culture - Blood (collected 12-26-22 @ 10:45)  Source: .Blood Blood-Peripheral  Preliminary Report (12-27-22 @ 17:01):    No growth to date.    Culture - Blood (collected 12-26-22 @ 09:30)  Source: .Blood Blood-Peripheral  Preliminary Report (12-27-22 @ 17:01):    No growth to date.    Culture - Blood (collected 12-24-22 @ 20:55)  Source: .Blood Blood  Preliminary Report (12-26-22 @ 01:03):    No growth to date.    Culture - Blood (collected 12-24-22 @ 20:20)  Source: .Blood Blood  Preliminary Report (12-26-22 @ 01:03):    No growth to date.    Culture - Blood (collected 12-23-22 @ 13:35)  Source: .Blood Blood  Preliminary Report (12-24-22 @ 18:01):    No growth to date.    Culture - Blood (collected 12-23-22 @ 13:25)  Source: .Blood Blood  Preliminary Report (12-24-22 @ 18:01):    No growth to date.    Culture - Bronchial (collected 12-22-22 @ 17:02)  Source: Combi-Cath Combi-Cath  Gram Stain (12-23-22 @ 06:59):    Moderate polymorphonuclear leukocytes seen per low power field    No squamous epithelial cells seen per low power field    No organisms seen per oil power field  Final Report (12-24-22 @ 15:11):    Normal Respiratory Cassandra present    Culture - Urine (collected 12-21-22 @ 22:55)  Source: Catheterized Catheterized  Final Report (12-24-22 @ 00:55):    >100,000 CFU/ml Leticia dubliniensis "Susceptibilities not performed"    Culture - Fungal, Body Fluid (collected 12-21-22 @ 19:53)  Source: Peritoneal Peritoneal Fluid  Preliminary Report (12-24-22 @ 15:02):    Culture is being performed. Fungal cultures are held for 4 weeks.    Culture - Body Fluid with Gram Stain (collected 12-21-22 @ 19:53)  Source: Ascites Fl Ascites Fluid  Gram Stain (12-22-22 @ 03:04):    polymorphonuclear leukocytes seen    No organisms seen    by cytocentrifuge  Final Report (12-27-22 @ 10:27):    No growth at 5 days        Physical Exam:  Constitutional: NAD   Eyes: icteric, PERRLA  ENMT: nc/at, no thrush  Neck: supple, central line   Cardiovascular: RRR  Gastrointestinal: Soft abdomen, distended  Genitourinary: Urinary catheter in place, anuric  Extremities: SCD's in place and working bilaterally, no edema  Vascular: Palpable dp pulses bilaterally.   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  Arthur ([phone]) and Daughter Taylor at [facility] provided additional history. Of note was recently admitted to  11/2022 for workup and eval of jaundice- during that hospitalization was found to have a UTI and dx with alc hep and started on steroids (was deemed a non-responder and steroids were subsequently stopped); s/p LVP at [place] 11/2022. Previously hospitalized in 2021 at [place] for ETOH detox. Went to ETOH rehab 08/2022 and was asked to leave the facility when she was COVID+; was sober for 1 week until she started drinking again. Had also attended a few AA meetings in the past. Transferred from [facility] 12/20 for further management of acute liver failure and liver transplant evaluation.       Interval events:  - decreasing pressor requirements  - unable to wean from HFNC   - continues CVVH     Potential Discharge date: pending clinical stability  Education:  Medications  Plan of care:  See Below      MEDICATIONS  (STANDING):  caspofungin IVPB 50 milliGRAM(s) IV Intermittent every 24 hours  caspofungin IVPB      chlorhexidine 2% Cloths 1 Application(s) Topical <User Schedule>  CRRT Treatment    <Continuous>  dextrose 5% + sodium chloride 0.9%. 1000 milliLiter(s) (30 mL/Hr) IV Continuous <Continuous>  escitalopram 10 milliGRAM(s) Oral daily  folic acid 1 milliGRAM(s) Oral daily  influenza   Vaccine 0.5 milliLiter(s) IntraMuscular once  insulin lispro (ADMELOG) corrective regimen sliding scale   SubCutaneous every 6 hours  lactulose Syrup 20 Gram(s) Oral every 8 hours  levETIRAcetam  Solution 750 milliGRAM(s) Enteral Tube two times a day  levothyroxine 137 MICROGram(s) Oral daily  meropenem  IVPB 1000 milliGRAM(s) IV Intermittent every 8 hours  metoclopramide Injectable 10 milliGRAM(s) IV Push every 8 hours  midodrine 20 milliGRAM(s) Oral every 8 hours  multivitamin/minerals/iron Oral Solution (CENTRUM) 15 milliLiter(s) Oral daily  norepinephrine Infusion 0.05 MICROgram(s)/kG/Min (6.21 mL/Hr) IV Continuous <Continuous>  pantoprazole   Suspension 40 milliGRAM(s) Oral every 24 hours  Phoxillum Filtration BK 4 / 2.5 5000 milliLiter(s) (800 mL/Hr) CRRT <Continuous>  Phoxillum Filtration BK 4 / 2.5 5000 milliLiter(s) (200 mL/Hr) CRRT <Continuous>  Phoxillum Filtration BK 4 / 2.5 5000 milliLiter(s) (1000 mL/Hr) CRRT <Continuous>  rifAXIMin 550 milliGRAM(s) Oral every 12 hours  thiamine 100 milliGRAM(s) Enteral Tube daily  vasopressin Infusion 0.03 Unit(s)/Min (4.5 mL/Hr) IV Continuous <Continuous>    MEDICATIONS  (PRN):  HYDROmorphone  Injectable 0.25 milliGRAM(s) IV Push every 3 hours PRN Severe Pain (7 - 10)  ondansetron Injectable 4 milliGRAM(s) IV Push every 6 hours PRN Nausea and/or Vomiting  sodium chloride 0.65% Nasal 1 Spray(s) Both Nostrils every 3 hours PRN Nasal Congestion  tetracaine/benzocaine/butamben Spray 1 Spray(s) Topical every 3 hours PRN for ngt discomfort      PAST MEDICAL & SURGICAL HISTORY:  HTN (hypertension)  off medication for over one year--states BP has been normal      UTI (urinary tract infection)  currently being treated--will complete antibiotics 3/8/2022      Intracranial tumor      GERD (gastroesophageal reflux disease)      Eczema      Thyroid cancer  surgery. radiation, Radioactive iodine      COVID-19 virus infection  11/2021--recovered at home      H/O total thyroidectomy  age 21&#x27;s for Thyroid cancer, postop complication arterial bleed, second surgery      History of tonsillectomy  age 5      History of appendectomy  age 5          Vital Signs Last 24 Hrs  T(C): 36.6 (28 Dec 2022 11:00), Max: 37 (28 Dec 2022 07:00)  T(F): 97.9 (28 Dec 2022 11:00), Max: 98.6 (28 Dec 2022 07:00)  HR: 89 (28 Dec 2022 12:00) (73 - 99)  BP: 103/63 (27 Dec 2022 19:00) (103/63 - 103/63)  BP(mean): 77 (27 Dec 2022 19:00) (77 - 77)  RR: 19 (28 Dec 2022 12:00) (10 - 40)  SpO2: 97% (28 Dec 2022 12:00) (56% - 97%)    Parameters below as of 28 Dec 2022 08:31  Patient On (Oxygen Delivery Method): nasal cannula, high flow,@31C  O2 Flow (L/min): 50  O2 Concentration (%): 40    I&O's Summary    27 Dec 2022 07:01  -  28 Dec 2022 07:00  --------------------------------------------------------  IN: 3487 mL / OUT: 3970 mL / NET: -483 mL    28 Dec 2022 07:01  -  28 Dec 2022 12:17  --------------------------------------------------------  IN: 477 mL / OUT: 451 mL / NET: 26 mL                              8.0    32.51 )-----------( 37       ( 28 Dec 2022 09:34 )             23.6     12-28    138  |  102  |  11  ----------------------------<  87  4.3   |  22  |  0.73    Ca    9.3      28 Dec 2022 09:32  Phos  4.0     12-28  Mg     2.4     12-28    TPro  6.1  /  Alb  4.1  /  TBili  27.0<H>  /  DBili  x   /  AST  132<H>  /  ALT  45  /  AlkPhos  154<H>  12-28          Culture - Fungal, Body Fluid (collected 12-26-22 @ 15:00)  Source: Peritoneal Peritoneal Fluid  Preliminary Report (12-27-22 @ 07:22):    Testing in progress    Culture - Body Fluid with Gram Stain (collected 12-26-22 @ 15:00)  Source: Peritoneal Peritoneal Fluid  Gram Stain (12-26-22 @ 23:25):    polymorphonuclear leukocytes seen    No organisms seen    by cytocentrifuge  Preliminary Report (12-27-22 @ 17:03):    No growth    Culture - Blood (collected 12-26-22 @ 10:45)  Source: .Blood Blood-Peripheral  Preliminary Report (12-27-22 @ 17:01):    No growth to date.    Culture - Blood (collected 12-26-22 @ 09:30)  Source: .Blood Blood-Peripheral  Preliminary Report (12-27-22 @ 17:01):    No growth to date.    Culture - Blood (collected 12-24-22 @ 20:55)  Source: .Blood Blood  Preliminary Report (12-26-22 @ 01:03):    No growth to date.    Culture - Blood (collected 12-24-22 @ 20:20)  Source: .Blood Blood  Preliminary Report (12-26-22 @ 01:03):    No growth to date.    Culture - Blood (collected 12-23-22 @ 13:35)  Source: .Blood Blood  Preliminary Report (12-24-22 @ 18:01):    No growth to date.    Culture - Blood (collected 12-23-22 @ 13:25)  Source: .Blood Blood  Preliminary Report (12-24-22 @ 18:01):    No growth to date.    Culture - Bronchial (collected 12-22-22 @ 17:02)  Source: Combi-Cath Combi-Cath  Gram Stain (12-23-22 @ 06:59):    Moderate polymorphonuclear leukocytes seen per low power field    No squamous epithelial cells seen per low power field    No organisms seen per oil power field  Final Report (12-24-22 @ 15:11):    Normal Respiratory Cassandra present    Culture - Urine (collected 12-21-22 @ 22:55)  Source: Catheterized Catheterized  Final Report (12-24-22 @ 00:55):    >100,000 CFU/ml Leticia dubliniensis "Susceptibilities not performed"    Culture - Fungal, Body Fluid (collected 12-21-22 @ 19:53)  Source: Peritoneal Peritoneal Fluid  Preliminary Report (12-24-22 @ 15:02):    Culture is being performed. Fungal cultures are held for 4 weeks.    Culture - Body Fluid with Gram Stain (collected 12-21-22 @ 19:53)  Source: Ascites Fl Ascites Fluid  Gram Stain (12-22-22 @ 03:04):    polymorphonuclear leukocytes seen    No organisms seen    by cytocentrifuge  Final Report (12-27-22 @ 10:27):    No growth at 5 days        Physical Exam:  Constitutional: NAD   Eyes: icteric, PERRLA  ENMT: nc/at, no thrush  Neck: supple, central line   Cardiovascular: RRR  Gastrointestinal: Soft abdomen, distended  Genitourinary: Urinary catheter in place, anuric  Extremities: SCD's in place and working bilaterally, no edema  Vascular: Palpable dp pulses bilaterally.   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  Arthur ([phone]) and Daughter Taylor at [facility] provided additional history. Of note was recently admitted to  11/2022 for workup and eval of jaundice- during that hospitalization was found to have a UTI and dx with alc hep and started on steroids (was deemed a non-responder and steroids were subsequently stopped); s/p LVP at [place] 11/2022. Previously hospitalized in 2021 at [place] for ETOH detox. Went to ETOH rehab 08/2022 and was asked to leave the facility when she was COVID+; was sober for 1 week until she started drinking again. Had also attended a few AA meetings in the past. Transferred from [facility] 12/20 for further management of acute liver failure and liver transplant evaluation.       Interval events:  - decreasing pressor requirements  - unable to wean from HFNC   - continues CVVH   - emesis, TF stopped, NGT placed to LIWS with bilious output     Potential Discharge date: pending clinical stability  Education:  Medications  Plan of care:  See Below      MEDICATIONS  (STANDING):  caspofungin IVPB 50 milliGRAM(s) IV Intermittent every 24 hours  caspofungin IVPB      chlorhexidine 2% Cloths 1 Application(s) Topical <User Schedule>  CRRT Treatment    <Continuous>  dextrose 5% + sodium chloride 0.9%. 1000 milliLiter(s) (30 mL/Hr) IV Continuous <Continuous>  escitalopram 10 milliGRAM(s) Oral daily  folic acid 1 milliGRAM(s) Oral daily  influenza   Vaccine 0.5 milliLiter(s) IntraMuscular once  insulin lispro (ADMELOG) corrective regimen sliding scale   SubCutaneous every 6 hours  lactulose Syrup 20 Gram(s) Oral every 8 hours  levETIRAcetam  Solution 750 milliGRAM(s) Enteral Tube two times a day  levothyroxine 137 MICROGram(s) Oral daily  meropenem  IVPB 1000 milliGRAM(s) IV Intermittent every 8 hours  metoclopramide Injectable 10 milliGRAM(s) IV Push every 8 hours  midodrine 20 milliGRAM(s) Oral every 8 hours  multivitamin/minerals/iron Oral Solution (CENTRUM) 15 milliLiter(s) Oral daily  norepinephrine Infusion 0.05 MICROgram(s)/kG/Min (6.21 mL/Hr) IV Continuous <Continuous>  pantoprazole   Suspension 40 milliGRAM(s) Oral every 24 hours  Phoxillum Filtration BK 4 / 2.5 5000 milliLiter(s) (800 mL/Hr) CRRT <Continuous>  Phoxillum Filtration BK 4 / 2.5 5000 milliLiter(s) (200 mL/Hr) CRRT <Continuous>  Phoxillum Filtration BK 4 / 2.5 5000 milliLiter(s) (1000 mL/Hr) CRRT <Continuous>  rifAXIMin 550 milliGRAM(s) Oral every 12 hours  thiamine 100 milliGRAM(s) Enteral Tube daily  vasopressin Infusion 0.03 Unit(s)/Min (4.5 mL/Hr) IV Continuous <Continuous>    MEDICATIONS  (PRN):  HYDROmorphone  Injectable 0.25 milliGRAM(s) IV Push every 3 hours PRN Severe Pain (7 - 10)  ondansetron Injectable 4 milliGRAM(s) IV Push every 6 hours PRN Nausea and/or Vomiting  sodium chloride 0.65% Nasal 1 Spray(s) Both Nostrils every 3 hours PRN Nasal Congestion  tetracaine/benzocaine/butamben Spray 1 Spray(s) Topical every 3 hours PRN for ngt discomfort      PAST MEDICAL & SURGICAL HISTORY:  HTN (hypertension)  off medication for over one year--states BP has been normal      UTI (urinary tract infection)  currently being treated--will complete antibiotics 3/8/2022      Intracranial tumor      GERD (gastroesophageal reflux disease)      Eczema      Thyroid cancer  surgery. radiation, Radioactive iodine      COVID-19 virus infection  11/2021--recovered at home      H/O total thyroidectomy  age 21&#x27;s for Thyroid cancer, postop complication arterial bleed, second surgery      History of tonsillectomy  age 5      History of appendectomy  age 5          Vital Signs Last 24 Hrs  T(C): 36.6 (28 Dec 2022 11:00), Max: 37 (28 Dec 2022 07:00)  T(F): 97.9 (28 Dec 2022 11:00), Max: 98.6 (28 Dec 2022 07:00)  HR: 89 (28 Dec 2022 12:00) (73 - 99)  BP: 103/63 (27 Dec 2022 19:00) (103/63 - 103/63)  BP(mean): 77 (27 Dec 2022 19:00) (77 - 77)  RR: 19 (28 Dec 2022 12:00) (10 - 40)  SpO2: 97% (28 Dec 2022 12:00) (56% - 97%)    Parameters below as of 28 Dec 2022 08:31  Patient On (Oxygen Delivery Method): nasal cannula, high flow,@31C  O2 Flow (L/min): 50  O2 Concentration (%): 40    I&O's Summary    27 Dec 2022 07:01  -  28 Dec 2022 07:00  --------------------------------------------------------  IN: 3487 mL / OUT: 3970 mL / NET: -483 mL    28 Dec 2022 07:01  -  28 Dec 2022 12:17  --------------------------------------------------------  IN: 477 mL / OUT: 451 mL / NET: 26 mL                              8.0    32.51 )-----------( 37       ( 28 Dec 2022 09:34 )             23.6     12-28    138  |  102  |  11  ----------------------------<  87  4.3   |  22  |  0.73    Ca    9.3      28 Dec 2022 09:32  Phos  4.0     12-28  Mg     2.4     12-28    TPro  6.1  /  Alb  4.1  /  TBili  27.0<H>  /  DBili  x   /  AST  132<H>  /  ALT  45  /  AlkPhos  154<H>  12-28          Culture - Fungal, Body Fluid (collected 12-26-22 @ 15:00)  Source: Peritoneal Peritoneal Fluid  Preliminary Report (12-27-22 @ 07:22):    Testing in progress    Culture - Body Fluid with Gram Stain (collected 12-26-22 @ 15:00)  Source: Peritoneal Peritoneal Fluid  Gram Stain (12-26-22 @ 23:25):    polymorphonuclear leukocytes seen    No organisms seen    by cytocentrifuge  Preliminary Report (12-27-22 @ 17:03):    No growth    Culture - Blood (collected 12-26-22 @ 10:45)  Source: .Blood Blood-Peripheral  Preliminary Report (12-27-22 @ 17:01):    No growth to date.    Culture - Blood (collected 12-26-22 @ 09:30)  Source: .Blood Blood-Peripheral  Preliminary Report (12-27-22 @ 17:01):    No growth to date.    Culture - Blood (collected 12-24-22 @ 20:55)  Source: .Blood Blood  Preliminary Report (12-26-22 @ 01:03):    No growth to date.    Culture - Blood (collected 12-24-22 @ 20:20)  Source: .Blood Blood  Preliminary Report (12-26-22 @ 01:03):    No growth to date.    Culture - Blood (collected 12-23-22 @ 13:35)  Source: .Blood Blood  Preliminary Report (12-24-22 @ 18:01):    No growth to date.    Culture - Blood (collected 12-23-22 @ 13:25)  Source: .Blood Blood  Preliminary Report (12-24-22 @ 18:01):    No growth to date.    Culture - Bronchial (collected 12-22-22 @ 17:02)  Source: Combi-Cath Combi-Cath  Gram Stain (12-23-22 @ 06:59):    Moderate polymorphonuclear leukocytes seen per low power field    No squamous epithelial cells seen per low power field    No organisms seen per oil power field  Final Report (12-24-22 @ 15:11):    Normal Respiratory Cassandra present    Culture - Urine (collected 12-21-22 @ 22:55)  Source: Catheterized Catheterized  Final Report (12-24-22 @ 00:55):    >100,000 CFU/ml Leticia dubliniensis "Susceptibilities not performed"    Culture - Fungal, Body Fluid (collected 12-21-22 @ 19:53)  Source: Peritoneal Peritoneal Fluid  Preliminary Report (12-24-22 @ 15:02):    Culture is being performed. Fungal cultures are held for 4 weeks.    Culture - Body Fluid with Gram Stain (collected 12-21-22 @ 19:53)  Source: Ascites Fl Ascites Fluid  Gram Stain (12-22-22 @ 03:04):    polymorphonuclear leukocytes seen    No organisms seen    by cytocentrifuge  Final Report (12-27-22 @ 10:27):    No growth at 5 days        Physical Exam:  Constitutional: NAD   Eyes: icteric, PERRLA  ENMT: nc/at, no thrush  Neck: supple, central line   Cardiovascular: RRR  Gastrointestinal: Soft abdomen, distended  Genitourinary: Urinary catheter in place, anuric  Extremities: SCD's in place and working bilaterally, no edema  Vascular: Palpable dp pulses bilaterally.   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  Arthur ([phone]) and Daughter Taylor at [facility] provided additional history. Of note was recently admitted to  11/2022 for workup and eval of jaundice- during that hospitalization was found to have a UTI and dx with alc hep and started on steroids (was deemed a non-responder and steroids were subsequently stopped); s/p LVP at [place] 11/2022. Previously hospitalized in 2021 at [place] for ETOH detox. Went to ETOH rehab 08/2022 and was asked to leave the facility when she was COVID+; was sober for 1 week until she started drinking again. Had also attended a few AA meetings in the past. Transferred from [facility] 12/20 for further management of acute liver failure and liver transplant evaluation.       Interval events:  - decreasing pressor requirements  - unable to wean from HFNC   - continues CVVH   - emesis, TF stopped, NGT placed to LIWS with bilious output     Potential Discharge date: pending clinical stability  Education:  Medications  Plan of care:  See Below      MEDICATIONS  (STANDING):  caspofungin IVPB 50 milliGRAM(s) IV Intermittent every 24 hours  caspofungin IVPB      chlorhexidine 2% Cloths 1 Application(s) Topical <User Schedule>  CRRT Treatment    <Continuous>  dextrose 5% + sodium chloride 0.9%. 1000 milliLiter(s) (30 mL/Hr) IV Continuous <Continuous>  escitalopram 10 milliGRAM(s) Oral daily  folic acid 1 milliGRAM(s) Oral daily  influenza   Vaccine 0.5 milliLiter(s) IntraMuscular once  insulin lispro (ADMELOG) corrective regimen sliding scale   SubCutaneous every 6 hours  lactulose Syrup 20 Gram(s) Oral every 8 hours  levETIRAcetam  Solution 750 milliGRAM(s) Enteral Tube two times a day  levothyroxine 137 MICROGram(s) Oral daily  meropenem  IVPB 1000 milliGRAM(s) IV Intermittent every 8 hours  metoclopramide Injectable 10 milliGRAM(s) IV Push every 8 hours  midodrine 20 milliGRAM(s) Oral every 8 hours  multivitamin/minerals/iron Oral Solution (CENTRUM) 15 milliLiter(s) Oral daily  norepinephrine Infusion 0.05 MICROgram(s)/kG/Min (6.21 mL/Hr) IV Continuous <Continuous>  pantoprazole   Suspension 40 milliGRAM(s) Oral every 24 hours  Phoxillum Filtration BK 4 / 2.5 5000 milliLiter(s) (800 mL/Hr) CRRT <Continuous>  Phoxillum Filtration BK 4 / 2.5 5000 milliLiter(s) (200 mL/Hr) CRRT <Continuous>  Phoxillum Filtration BK 4 / 2.5 5000 milliLiter(s) (1000 mL/Hr) CRRT <Continuous>  rifAXIMin 550 milliGRAM(s) Oral every 12 hours  thiamine 100 milliGRAM(s) Enteral Tube daily  vasopressin Infusion 0.03 Unit(s)/Min (4.5 mL/Hr) IV Continuous <Continuous>    MEDICATIONS  (PRN):  HYDROmorphone  Injectable 0.25 milliGRAM(s) IV Push every 3 hours PRN Severe Pain (7 - 10)  ondansetron Injectable 4 milliGRAM(s) IV Push every 6 hours PRN Nausea and/or Vomiting  sodium chloride 0.65% Nasal 1 Spray(s) Both Nostrils every 3 hours PRN Nasal Congestion  tetracaine/benzocaine/butamben Spray 1 Spray(s) Topical every 3 hours PRN for ngt discomfort      PAST MEDICAL & SURGICAL HISTORY:  HTN (hypertension)  off medication for over one year--states BP has been normal      UTI (urinary tract infection)  currently being treated--will complete antibiotics 3/8/2022      Intracranial tumor      GERD (gastroesophageal reflux disease)      Eczema      Thyroid cancer  surgery. radiation, Radioactive iodine      COVID-19 virus infection  11/2021--recovered at home      H/O total thyroidectomy  age 21&#x27;s for Thyroid cancer, postop complication arterial bleed, second surgery      History of tonsillectomy  age 5      History of appendectomy  age 5          Vital Signs Last 24 Hrs  T(C): 36.6 (28 Dec 2022 11:00), Max: 37 (28 Dec 2022 07:00)  T(F): 97.9 (28 Dec 2022 11:00), Max: 98.6 (28 Dec 2022 07:00)  HR: 89 (28 Dec 2022 12:00) (73 - 99)  BP: 103/63 (27 Dec 2022 19:00) (103/63 - 103/63)  BP(mean): 77 (27 Dec 2022 19:00) (77 - 77)  RR: 19 (28 Dec 2022 12:00) (10 - 40)  SpO2: 97% (28 Dec 2022 12:00) (56% - 97%)    Parameters below as of 28 Dec 2022 08:31  Patient On (Oxygen Delivery Method): nasal cannula, high flow,@31C  O2 Flow (L/min): 50  O2 Concentration (%): 40    I&O's Summary    27 Dec 2022 07:01  -  28 Dec 2022 07:00  --------------------------------------------------------  IN: 3487 mL / OUT: 3970 mL / NET: -483 mL    28 Dec 2022 07:01  -  28 Dec 2022 12:17  --------------------------------------------------------  IN: 477 mL / OUT: 451 mL / NET: 26 mL                              8.0    32.51 )-----------( 37       ( 28 Dec 2022 09:34 )             23.6     12-28    138  |  102  |  11  ----------------------------<  87  4.3   |  22  |  0.73    Ca    9.3      28 Dec 2022 09:32  Phos  4.0     12-28  Mg     2.4     12-28    TPro  6.1  /  Alb  4.1  /  TBili  27.0<H>  /  DBili  x   /  AST  132<H>  /  ALT  45  /  AlkPhos  154<H>  12-28          Culture - Fungal, Body Fluid (collected 12-26-22 @ 15:00)  Source: Peritoneal Peritoneal Fluid  Preliminary Report (12-27-22 @ 07:22):    Testing in progress    Culture - Body Fluid with Gram Stain (collected 12-26-22 @ 15:00)  Source: Peritoneal Peritoneal Fluid  Gram Stain (12-26-22 @ 23:25):    polymorphonuclear leukocytes seen    No organisms seen    by cytocentrifuge  Preliminary Report (12-27-22 @ 17:03):    No growth    Culture - Blood (collected 12-26-22 @ 10:45)  Source: .Blood Blood-Peripheral  Preliminary Report (12-27-22 @ 17:01):    No growth to date.    Culture - Blood (collected 12-26-22 @ 09:30)  Source: .Blood Blood-Peripheral  Preliminary Report (12-27-22 @ 17:01):    No growth to date.    Culture - Blood (collected 12-24-22 @ 20:55)  Source: .Blood Blood  Preliminary Report (12-26-22 @ 01:03):    No growth to date.    Culture - Blood (collected 12-24-22 @ 20:20)  Source: .Blood Blood  Preliminary Report (12-26-22 @ 01:03):    No growth to date.    Culture - Blood (collected 12-23-22 @ 13:35)  Source: .Blood Blood  Preliminary Report (12-24-22 @ 18:01):    No growth to date.    Culture - Blood (collected 12-23-22 @ 13:25)  Source: .Blood Blood  Preliminary Report (12-24-22 @ 18:01):    No growth to date.    Culture - Bronchial (collected 12-22-22 @ 17:02)  Source: Combi-Cath Combi-Cath  Gram Stain (12-23-22 @ 06:59):    Moderate polymorphonuclear leukocytes seen per low power field    No squamous epithelial cells seen per low power field    No organisms seen per oil power field  Final Report (12-24-22 @ 15:11):    Normal Respiratory Cassandra present    Culture - Urine (collected 12-21-22 @ 22:55)  Source: Catheterized Catheterized  Final Report (12-24-22 @ 00:55):    >100,000 CFU/ml Leticia dubliniensis "Susceptibilities not performed"    Culture - Fungal, Body Fluid (collected 12-21-22 @ 19:53)  Source: Peritoneal Peritoneal Fluid  Preliminary Report (12-24-22 @ 15:02):    Culture is being performed. Fungal cultures are held for 4 weeks.    Culture - Body Fluid with Gram Stain (collected 12-21-22 @ 19:53)  Source: Ascites Fl Ascites Fluid  Gram Stain (12-22-22 @ 03:04):    polymorphonuclear leukocytes seen    No organisms seen    by cytocentrifuge  Final Report (12-27-22 @ 10:27):    No growth at 5 days        Physical Exam:  Constitutional: NAD   Eyes: icteric, PERRLA  ENMT: nc/at, no thrush  Neck: supple, central line   Cardiovascular: RRR  Gastrointestinal: Soft abdomen, distended  Genitourinary: Urinary catheter in place, anuric  Extremities: SCD's in place and working bilaterally, no edema  Vascular: Palpable dp pulses bilaterally.   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  Arthur ([phone]) and Daughter Taylor at [facility] provided additional history. Of note was recently admitted to  11/2022 for workup and eval of jaundice- during that hospitalization was found to have a UTI and dx with alc hep and started on steroids (was deemed a non-responder and steroids were subsequently stopped); s/p LVP at [place] 11/2022. Previously hospitalized in 2021 at [place] for ETOH detox. Went to ETOH rehab 08/2022 and was asked to leave the facility when she was COVID+; was sober for 1 week until she started drinking again. Had also attended a few AA meetings in the past. Transferred from [facility] 12/20 for further management of acute liver failure and liver transplant evaluation.       Interval events:  - decreasing pressor requirements  - unable to wean from HFNC   - continues CVVH   - emesis, TF stopped, NGT placed to LIWS with bilious output     Potential Discharge date: pending clinical stability  Education:  Medications  Plan of care:  See Below      MEDICATIONS  (STANDING):  caspofungin IVPB 50 milliGRAM(s) IV Intermittent every 24 hours  caspofungin IVPB      chlorhexidine 2% Cloths 1 Application(s) Topical <User Schedule>  CRRT Treatment    <Continuous>  dextrose 5% + sodium chloride 0.9%. 1000 milliLiter(s) (30 mL/Hr) IV Continuous <Continuous>  escitalopram 10 milliGRAM(s) Oral daily  folic acid 1 milliGRAM(s) Oral daily  influenza   Vaccine 0.5 milliLiter(s) IntraMuscular once  insulin lispro (ADMELOG) corrective regimen sliding scale   SubCutaneous every 6 hours  lactulose Syrup 20 Gram(s) Oral every 8 hours  levETIRAcetam  Solution 750 milliGRAM(s) Enteral Tube two times a day  levothyroxine 137 MICROGram(s) Oral daily  meropenem  IVPB 1000 milliGRAM(s) IV Intermittent every 8 hours  metoclopramide Injectable 10 milliGRAM(s) IV Push every 8 hours  midodrine 20 milliGRAM(s) Oral every 8 hours  multivitamin/minerals/iron Oral Solution (CENTRUM) 15 milliLiter(s) Oral daily  norepinephrine Infusion 0.05 MICROgram(s)/kG/Min (6.21 mL/Hr) IV Continuous <Continuous>  pantoprazole   Suspension 40 milliGRAM(s) Oral every 24 hours  Phoxillum Filtration BK 4 / 2.5 5000 milliLiter(s) (800 mL/Hr) CRRT <Continuous>  Phoxillum Filtration BK 4 / 2.5 5000 milliLiter(s) (200 mL/Hr) CRRT <Continuous>  Phoxillum Filtration BK 4 / 2.5 5000 milliLiter(s) (1000 mL/Hr) CRRT <Continuous>  rifAXIMin 550 milliGRAM(s) Oral every 12 hours  thiamine 100 milliGRAM(s) Enteral Tube daily  vasopressin Infusion 0.03 Unit(s)/Min (4.5 mL/Hr) IV Continuous <Continuous>    MEDICATIONS  (PRN):  HYDROmorphone  Injectable 0.25 milliGRAM(s) IV Push every 3 hours PRN Severe Pain (7 - 10)  ondansetron Injectable 4 milliGRAM(s) IV Push every 6 hours PRN Nausea and/or Vomiting  sodium chloride 0.65% Nasal 1 Spray(s) Both Nostrils every 3 hours PRN Nasal Congestion  tetracaine/benzocaine/butamben Spray 1 Spray(s) Topical every 3 hours PRN for ngt discomfort      PAST MEDICAL & SURGICAL HISTORY:  HTN (hypertension)  off medication for over one year--states BP has been normal      UTI (urinary tract infection)  currently being treated--will complete antibiotics 3/8/2022      Intracranial tumor      GERD (gastroesophageal reflux disease)      Eczema      Thyroid cancer  surgery. radiation, Radioactive iodine      COVID-19 virus infection  11/2021--recovered at home      H/O total thyroidectomy  age 21&#x27;s for Thyroid cancer, postop complication arterial bleed, second surgery      History of tonsillectomy  age 5      History of appendectomy  age 5          Vital Signs Last 24 Hrs  T(C): 36.6 (28 Dec 2022 11:00), Max: 37 (28 Dec 2022 07:00)  T(F): 97.9 (28 Dec 2022 11:00), Max: 98.6 (28 Dec 2022 07:00)  HR: 89 (28 Dec 2022 12:00) (73 - 99)  BP: 103/63 (27 Dec 2022 19:00) (103/63 - 103/63)  BP(mean): 77 (27 Dec 2022 19:00) (77 - 77)  RR: 19 (28 Dec 2022 12:00) (10 - 40)  SpO2: 97% (28 Dec 2022 12:00) (56% - 97%)    Parameters below as of 28 Dec 2022 08:31  Patient On (Oxygen Delivery Method): nasal cannula, high flow,@31C  O2 Flow (L/min): 50  O2 Concentration (%): 40    I&O's Summary    27 Dec 2022 07:01  -  28 Dec 2022 07:00  --------------------------------------------------------  IN: 3487 mL / OUT: 3970 mL / NET: -483 mL    28 Dec 2022 07:01  -  28 Dec 2022 12:17  --------------------------------------------------------  IN: 477 mL / OUT: 451 mL / NET: 26 mL                              8.0    32.51 )-----------( 37       ( 28 Dec 2022 09:34 )             23.6     12-28    138  |  102  |  11  ----------------------------<  87  4.3   |  22  |  0.73    Ca    9.3      28 Dec 2022 09:32  Phos  4.0     12-28  Mg     2.4     12-28    TPro  6.1  /  Alb  4.1  /  TBili  27.0<H>  /  DBili  x   /  AST  132<H>  /  ALT  45  /  AlkPhos  154<H>  12-28          Culture - Fungal, Body Fluid (collected 12-26-22 @ 15:00)  Source: Peritoneal Peritoneal Fluid  Preliminary Report (12-27-22 @ 07:22):    Testing in progress    Culture - Body Fluid with Gram Stain (collected 12-26-22 @ 15:00)  Source: Peritoneal Peritoneal Fluid  Gram Stain (12-26-22 @ 23:25):    polymorphonuclear leukocytes seen    No organisms seen    by cytocentrifuge  Preliminary Report (12-27-22 @ 17:03):    No growth    Culture - Blood (collected 12-26-22 @ 10:45)  Source: .Blood Blood-Peripheral  Preliminary Report (12-27-22 @ 17:01):    No growth to date.    Culture - Blood (collected 12-26-22 @ 09:30)  Source: .Blood Blood-Peripheral  Preliminary Report (12-27-22 @ 17:01):    No growth to date.    Culture - Blood (collected 12-24-22 @ 20:55)  Source: .Blood Blood  Preliminary Report (12-26-22 @ 01:03):    No growth to date.    Culture - Blood (collected 12-24-22 @ 20:20)  Source: .Blood Blood  Preliminary Report (12-26-22 @ 01:03):    No growth to date.    Culture - Blood (collected 12-23-22 @ 13:35)  Source: .Blood Blood  Preliminary Report (12-24-22 @ 18:01):    No growth to date.    Culture - Blood (collected 12-23-22 @ 13:25)  Source: .Blood Blood  Preliminary Report (12-24-22 @ 18:01):    No growth to date.    Culture - Bronchial (collected 12-22-22 @ 17:02)  Source: Combi-Cath Combi-Cath  Gram Stain (12-23-22 @ 06:59):    Moderate polymorphonuclear leukocytes seen per low power field    No squamous epithelial cells seen per low power field    No organisms seen per oil power field  Final Report (12-24-22 @ 15:11):    Normal Respiratory Cassandra present    Culture - Urine (collected 12-21-22 @ 22:55)  Source: Catheterized Catheterized  Final Report (12-24-22 @ 00:55):    >100,000 CFU/ml Leticia dubliniensis "Susceptibilities not performed"    Culture - Fungal, Body Fluid (collected 12-21-22 @ 19:53)  Source: Peritoneal Peritoneal Fluid  Preliminary Report (12-24-22 @ 15:02):    Culture is being performed. Fungal cultures are held for 4 weeks.    Culture - Body Fluid with Gram Stain (collected 12-21-22 @ 19:53)  Source: Ascites Fl Ascites Fluid  Gram Stain (12-22-22 @ 03:04):    polymorphonuclear leukocytes seen    No organisms seen    by cytocentrifuge  Final Report (12-27-22 @ 10:27):    No growth at 5 days        Physical Exam:  Constitutional: NAD   Eyes: icteric, PERRLA  ENMT: nc/at, no thrush  Neck: supple, central line   Cardiovascular: RRR  Gastrointestinal: Soft abdomen, distended  Genitourinary: Urinary catheter in place, anuric  Extremities: SCD's in place and working bilaterally, no edema  Vascular: Palpable dp pulses bilaterally.   Neurological: following commands, mentation improving  Skin: no rashes, ulcerations, lesions  Musculoskeletal: moving extremities  Psychiatric: calm

## 2022-12-28 NOTE — BH CONSULTATION LIAISON ASSESSMENT NOTE - NSBHCHARTREVIEWVS_PSY_A_CORE FT
Vital Signs Last 24 Hrs  T(C): 36.6 (28 Dec 2022 11:00), Max: 37 (28 Dec 2022 07:00)  T(F): 97.9 (28 Dec 2022 11:00), Max: 98.6 (28 Dec 2022 07:00)  HR: 85 (28 Dec 2022 14:00) (74 - 99)  BP: 103/63 (27 Dec 2022 19:00) (103/63 - 103/63)  BP(mean): 77 (27 Dec 2022 19:00) (77 - 77)  RR: 17 (28 Dec 2022 14:00) (10 - 40)  SpO2: 97% (28 Dec 2022 14:00) (56% - 98%)    Parameters below as of 28 Dec 2022 08:31  Patient On (Oxygen Delivery Method): nasal cannula, high flow,@31C  O2 Flow (L/min): 50  O2 Concentration (%): 40

## 2022-12-28 NOTE — PROGRESS NOTE ADULT - ASSESSMENT
63 yo F with h/o decompensated ETOH cirrhosis with ascites, thyroid cancer (s/p total thyroidectomy, RTX, and radioactive iodide), HTN, ventrical neoplasm who was initially admitted to  12/19 with new onset seizures and transferred to [facility] 12/20 for liver transplant evaluation. Pt being seen for RED requiring CRRT. 61 yo F with h/o decompensated ETOH cirrhosis with ascites, thyroid cancer (s/p total thyroidectomy, RTX, and radioactive iodide), HTN, ventrical neoplasm who was initially admitted to  12/19 with new onset seizures and transferred to [facility] 12/20 for liver transplant evaluation. Pt being seen for RED requiring CRRT. 63 yo F with h/o decompensated ETOH cirrhosis with ascites, thyroid cancer (s/p total thyroidectomy, RTX, and radioactive iodide), HTN, ventrical neoplasm who was initially admitted to  12/19 with new onset seizures and transferred to [facility] 12/20 for liver transplant evaluation. Pt being seen for RED requiring CRRT.

## 2022-12-28 NOTE — PROGRESS NOTE ADULT - SUBJECTIVE AND OBJECTIVE BOX
Follow Up:      Interval History:    REVIEW OF SYSTEMS  [  ] ROS unobtainable because:    [  ] All other systems negative except as noted below    Constitutional:  [ ] fever [ ] chills  [ ] weight loss  [ ] weakness  Skin:  [ ] rash [ ] phlebitis	  Eyes: [ ] icterus [ ] pain  [ ] discharge	  ENMT: [ ] sore throat  [ ] thrush [ ] ulcers [ ] exudates  Respiratory: [ ] dyspnea [ ] hemoptysis [ ] cough [ ] sputum	  Cardiovascular:  [ ] chest pain [ ] palpitations [ ] edema	  Gastrointestinal:  [ ] nausea [ ] vomiting [ ] diarrhea [ ] constipation [ ] pain	  Genitourinary:  [ ] dysuria [ ] frequency [ ] hematuria [ ] discharge [ ] flank pain  [ ] incontinence  Musculoskeletal:  [ ] myalgias [ ] arthralgias [ ] arthritis  [ ] back pain  Neurological:  [ ] headache [ ] seizures  [ ] confusion/altered mental status    Allergies  Macrobid (Rash)        ANTIMICROBIALS:  caspofungin IVPB    caspofungin IVPB 50 every 24 hours  meropenem  IVPB 1000 every 8 hours  rifAXIMin 550 every 12 hours      OTHER MEDS:  MEDICATIONS  (STANDING):  escitalopram 10 daily  HYDROmorphone  Injectable 0.25 every 3 hours PRN  influenza   Vaccine 0.5 once  insulin lispro (ADMELOG) corrective regimen sliding scale  every 6 hours  lactulose Syrup 20 every 8 hours  levETIRAcetam  Solution 750 two times a day  levothyroxine 137 daily  metoclopramide Injectable 10 every 8 hours  midodrine 20 every 8 hours  norepinephrine Infusion 0.05 <Continuous>  ondansetron Injectable 4 every 6 hours PRN  pantoprazole   Suspension 40 every 24 hours  vasopressin Infusion 0.03 <Continuous>      Vital Signs Last 24 Hrs  T(C): 36.6 (28 Dec 2022 15:00), Max: 37 (28 Dec 2022 07:00)  T(F): 97.9 (28 Dec 2022 15:00), Max: 98.6 (28 Dec 2022 07:00)  HR: 81 (28 Dec 2022 17:45) (74 - 93)  BP: 103/63 (27 Dec 2022 19:00) (103/63 - 103/63)  BP(mean): 77 (27 Dec 2022 19:00) (77 - 77)  RR: 13 (28 Dec 2022 17:45) (10 - 40)  SpO2: 99% (28 Dec 2022 17:45) (56% - 100%)    Parameters below as of 28 Dec 2022 14:33  Patient On (Oxygen Delivery Method): nasal cannula, high flow,@31C  O2 Flow (L/min): 50  O2 Concentration (%): 40    PHYSICAL EXAMINATION:  General: Alert and Awake, NAD  HEENT: PERRL, EOMI  Neck: Supple  Cardiac: RRR, No M/R/G  Resp: CTAB, No Wh/Rh/Ra  Abdomen: NBS, NT/ND, No HSM, No rigidity or guarding  MSK: No LE edema. No Calf tenderness  : No dhillon  Skin: No rashes or lesions. Skin is warm and dry to the touch.   Neuro: Alert and Awake. CN 2-12 Grossly intact. Moves all four extremities spontaneously.  Psych: Calm, Pleasant, Cooperative                          8.2    32.81 )-----------( 39       ( 28 Dec 2022 15:51 )             24.2       12-28    138  |  103  |  11  ----------------------------<  86  4.4   |  20<L>  |  0.67    Ca    8.7      28 Dec 2022 15:51  Phos  3.9     12-28  Mg     2.4     12-28    TPro  6.3  /  Alb  4.1  /  TBili  28.9<H>  /  DBili  x   /  AST  149<H>  /  ALT  47<H>  /  AlkPhos  147<H>  12-28          MICROBIOLOGY:  v  Peritoneal Peritoneal Fluid  12-26-22   No growth  --    polymorphonuclear leukocytes seen  No organisms seen  by cytocentrifuge      .Blood Blood-Peripheral  12-26-22   No growth to date.  --  --      .Blood Blood-Peripheral  12-26-22   No growth to date.  --  --      .Blood Blood  12-24-22   No growth to date.  --  --      .Blood Blood  12-24-22   No growth to date.  --  --      .Blood Blood  12-23-22   No growth to date.  --  --      .Blood Blood  12-23-22   No growth to date.  --  --      Combi-Cath Combi-Cath  12-22-22   Normal Respiratory Cassandra present  --    Moderate polymorphonuclear leukocytes seen per low power field  No squamous epithelial cells seen per low power field  No organisms seen per oil power field      Catheterized Catheterized  12-21-22   >100,000 CFU/ml Leticia dubliniensis "Susceptibilities not performed"  --  --      Ascites Fl Ascites Fluid  12-21-22   No growth at 5 days  --    polymorphonuclear leukocytes seen  No organisms seen  by cytocentrifuge      Trach Asp Tracheal Aspirate  12-21-22   Normal Respiratory Cassandra present  --    No polymorphonuclear leukocytes per low power field  Numerous Squamous epithelial cells per low power field  Moderate Gram Positive Rods seen per oil power field  Few Yeast like cells seen per oil power field      .Blood Blood-Peripheral  12-20-22   No Growth Final  --  --      .Blood Blood-Peripheral  12-20-22   No Growth Final  --  --        CMV IgG Antibody: 5.10 U/mL (12-24-22 @ 20:59)  Toxoplasma IgG Screen: <3.0 IU/mL (12-24-22 @ 20:59)    Rapid RVP Result: NotDetec (12-27 @ 22:45)  CMVPCR Log: Det <1.54 Assay Dynamic Range: 34.5 to 1.0E+07 IU/mL (1.54 to 7.00 Log10 IU/mL)  Assay lower limit of quantification (LLOQ) is 34.5 IU/mL (1.54 Log10  IU/mL)  CMV DNA detected below the LLOQ will be reported as Detected < 34.5 IU/mL  (<1.54 Log10 IU/mL)  [facility] Cytomegalovirus (CMV) is an FDA-cleared quantitative test that  enables the detection and quantitation of CMV DNA in EDTA plasma of  infected transplant patients on the [facility] 8800 system. This test was  verified by [employer]. Results should be interpreted  with consideration of all clinical findings and laboratory findings and  should not form the sole basis for a diagnosis or treatment decision. Doe96KG/mL (12-24 @ 20:58)        RADIOLOGY:    <The imaging below has been reviewed and visualized by me independently. Findings as detailed in report below> Follow Up:      Interval History:    REVIEW OF SYSTEMS  [  ] ROS unobtainable because:    [  ] All other systems negative except as noted below    Constitutional:  [ ] fever [ ] chills  [ ] weight loss  [ ] weakness  Skin:  [ ] rash [ ] phlebitis	  Eyes: [ ] icterus [ ] pain  [ ] discharge	  ENMT: [ ] sore throat  [ ] thrush [ ] ulcers [ ] exudates  Respiratory: [ ] dyspnea [ ] hemoptysis [ ] cough [ ] sputum	  Cardiovascular:  [ ] chest pain [ ] palpitations [ ] edema	  Gastrointestinal:  [ ] nausea [ ] vomiting [ ] diarrhea [ ] constipation [ ] pain	  Genitourinary:  [ ] dysuria [ ] frequency [ ] hematuria [ ] discharge [ ] flank pain  [ ] incontinence  Musculoskeletal:  [ ] myalgias [ ] arthralgias [ ] arthritis  [ ] back pain  Neurological:  [ ] headache [ ] seizures  [ ] confusion/altered mental status    Allergies  Macrobid (Rash)        ANTIMICROBIALS:  caspofungin IVPB    caspofungin IVPB 50 every 24 hours  meropenem  IVPB 1000 every 8 hours  rifAXIMin 550 every 12 hours      OTHER MEDS:  MEDICATIONS  (STANDING):  escitalopram 10 daily  HYDROmorphone  Injectable 0.25 every 3 hours PRN  influenza   Vaccine 0.5 once  insulin lispro (ADMELOG) corrective regimen sliding scale  every 6 hours  lactulose Syrup 20 every 8 hours  levETIRAcetam  Solution 750 two times a day  levothyroxine 137 daily  metoclopramide Injectable 10 every 8 hours  midodrine 20 every 8 hours  norepinephrine Infusion 0.05 <Continuous>  ondansetron Injectable 4 every 6 hours PRN  pantoprazole   Suspension 40 every 24 hours  vasopressin Infusion 0.03 <Continuous>      Vital Signs Last 24 Hrs  T(C): 36.6 (28 Dec 2022 15:00), Max: 37 (28 Dec 2022 07:00)  T(F): 97.9 (28 Dec 2022 15:00), Max: 98.6 (28 Dec 2022 07:00)  HR: 81 (28 Dec 2022 17:45) (74 - 93)  BP: 103/63 (27 Dec 2022 19:00) (103/63 - 103/63)  BP(mean): 77 (27 Dec 2022 19:00) (77 - 77)  RR: 13 (28 Dec 2022 17:45) (10 - 40)  SpO2: 99% (28 Dec 2022 17:45) (56% - 100%)    Parameters below as of 28 Dec 2022 14:33  Patient On (Oxygen Delivery Method): nasal cannula, high flow,@31C  O2 Flow (L/min): 50  O2 Concentration (%): 40    PHYSICAL EXAMINATION:  General: Alert and Awake, NAD  HEENT: PERRL, EOMI  Neck: Supple  Cardiac: RRR, No M/R/G  Resp: CTAB, No Wh/Rh/Ra  Abdomen: NBS, NT/ND, No HSM, No rigidity or guarding  MSK: No LE edema. No Calf tenderness  : No dhillon  Skin: No rashes or lesions. Skin is warm and dry to the touch.   Neuro: Alert and Awake. CN 2-12 Grossly intact. Moves all four extremities spontaneously.  Psych: Calm, Pleasant, Cooperative                          8.2    32.81 )-----------( 39       ( 28 Dec 2022 15:51 )             24.2       12-28    138  |  103  |  11  ----------------------------<  86  4.4   |  20<L>  |  0.67    Ca    8.7      28 Dec 2022 15:51  Phos  3.9     12-28  Mg     2.4     12-28    TPro  6.3  /  Alb  4.1  /  TBili  28.9<H>  /  DBili  x   /  AST  149<H>  /  ALT  47<H>  /  AlkPhos  147<H>  12-28          MICROBIOLOGY:  v  Peritoneal Peritoneal Fluid  12-26-22   No growth  --    polymorphonuclear leukocytes seen  No organisms seen  by cytocentrifuge      .Blood Blood-Peripheral  12-26-22   No growth to date.  --  --      .Blood Blood-Peripheral  12-26-22   No growth to date.  --  --      .Blood Blood  12-24-22   No growth to date.  --  --      .Blood Blood  12-24-22   No growth to date.  --  --      .Blood Blood  12-23-22   No growth to date.  --  --      .Blood Blood  12-23-22   No growth to date.  --  --      Combi-Cath Combi-Cath  12-22-22   Normal Respiratory Cassandra present  --    Moderate polymorphonuclear leukocytes seen per low power field  No squamous epithelial cells seen per low power field  No organisms seen per oil power field      Catheterized Catheterized  12-21-22   >100,000 CFU/ml Leticia dubliniensis "Susceptibilities not performed"  --  --      Ascites Fl Ascites Fluid  12-21-22   No growth at 5 days  --    polymorphonuclear leukocytes seen  No organisms seen  by cytocentrifuge      Trach Asp Tracheal Aspirate  12-21-22   Normal Respiratory Cassandra present  --    No polymorphonuclear leukocytes per low power field  Numerous Squamous epithelial cells per low power field  Moderate Gram Positive Rods seen per oil power field  Few Yeast like cells seen per oil power field      .Blood Blood-Peripheral  12-20-22   No Growth Final  --  --      .Blood Blood-Peripheral  12-20-22   No Growth Final  --  --        CMV IgG Antibody: 5.10 U/mL (12-24-22 @ 20:59)  Toxoplasma IgG Screen: <3.0 IU/mL (12-24-22 @ 20:59)    Rapid RVP Result: NotDetec (12-27 @ 22:45)  CMVPCR Log: Det <1.54 Assay Dynamic Range: 34.5 to 1.0E+07 IU/mL (1.54 to 7.00 Log10 IU/mL)  Assay lower limit of quantification (LLOQ) is 34.5 IU/mL (1.54 Log10  IU/mL)  CMV DNA detected below the LLOQ will be reported as Detected < 34.5 IU/mL  (<1.54 Log10 IU/mL)  [facility] Cytomegalovirus (CMV) is an FDA-cleared quantitative test that  enables the detection and quantitation of CMV DNA in EDTA plasma of  infected transplant patients on the [facility] 8800 system. This test was  verified by [employer]. Results should be interpreted  with consideration of all clinical findings and laboratory findings and  should not form the sole basis for a diagnosis or treatment decision. Kln18ZG/mL (12-24 @ 20:58)        RADIOLOGY:    <The imaging below has been reviewed and visualized by me independently. Findings as detailed in report below> Follow Up:      Interval History:    REVIEW OF SYSTEMS  [  ] ROS unobtainable because:    [  ] All other systems negative except as noted below    Constitutional:  [ ] fever [ ] chills  [ ] weight loss  [ ] weakness  Skin:  [ ] rash [ ] phlebitis	  Eyes: [ ] icterus [ ] pain  [ ] discharge	  ENMT: [ ] sore throat  [ ] thrush [ ] ulcers [ ] exudates  Respiratory: [ ] dyspnea [ ] hemoptysis [ ] cough [ ] sputum	  Cardiovascular:  [ ] chest pain [ ] palpitations [ ] edema	  Gastrointestinal:  [ ] nausea [ ] vomiting [ ] diarrhea [ ] constipation [ ] pain	  Genitourinary:  [ ] dysuria [ ] frequency [ ] hematuria [ ] discharge [ ] flank pain  [ ] incontinence  Musculoskeletal:  [ ] myalgias [ ] arthralgias [ ] arthritis  [ ] back pain  Neurological:  [ ] headache [ ] seizures  [ ] confusion/altered mental status    Allergies  Macrobid (Rash)        ANTIMICROBIALS:  caspofungin IVPB    caspofungin IVPB 50 every 24 hours  meropenem  IVPB 1000 every 8 hours  rifAXIMin 550 every 12 hours      OTHER MEDS:  MEDICATIONS  (STANDING):  escitalopram 10 daily  HYDROmorphone  Injectable 0.25 every 3 hours PRN  influenza   Vaccine 0.5 once  insulin lispro (ADMELOG) corrective regimen sliding scale  every 6 hours  lactulose Syrup 20 every 8 hours  levETIRAcetam  Solution 750 two times a day  levothyroxine 137 daily  metoclopramide Injectable 10 every 8 hours  midodrine 20 every 8 hours  norepinephrine Infusion 0.05 <Continuous>  ondansetron Injectable 4 every 6 hours PRN  pantoprazole   Suspension 40 every 24 hours  vasopressin Infusion 0.03 <Continuous>      Vital Signs Last 24 Hrs  T(C): 36.6 (28 Dec 2022 15:00), Max: 37 (28 Dec 2022 07:00)  T(F): 97.9 (28 Dec 2022 15:00), Max: 98.6 (28 Dec 2022 07:00)  HR: 81 (28 Dec 2022 17:45) (74 - 93)  BP: 103/63 (27 Dec 2022 19:00) (103/63 - 103/63)  BP(mean): 77 (27 Dec 2022 19:00) (77 - 77)  RR: 13 (28 Dec 2022 17:45) (10 - 40)  SpO2: 99% (28 Dec 2022 17:45) (56% - 100%)    Parameters below as of 28 Dec 2022 14:33  Patient On (Oxygen Delivery Method): nasal cannula, high flow,@31C  O2 Flow (L/min): 50  O2 Concentration (%): 40    PHYSICAL EXAMINATION:  General: Alert and Awake, NAD  HEENT: PERRL, EOMI  Neck: Supple  Cardiac: RRR, No M/R/G  Resp: CTAB, No Wh/Rh/Ra  Abdomen: NBS, NT/ND, No HSM, No rigidity or guarding  MSK: No LE edema. No Calf tenderness  : No dhillon  Skin: No rashes or lesions. Skin is warm and dry to the touch.   Neuro: Alert and Awake. CN 2-12 Grossly intact. Moves all four extremities spontaneously.  Psych: Calm, Pleasant, Cooperative                          8.2    32.81 )-----------( 39       ( 28 Dec 2022 15:51 )             24.2       12-28    138  |  103  |  11  ----------------------------<  86  4.4   |  20<L>  |  0.67    Ca    8.7      28 Dec 2022 15:51  Phos  3.9     12-28  Mg     2.4     12-28    TPro  6.3  /  Alb  4.1  /  TBili  28.9<H>  /  DBili  x   /  AST  149<H>  /  ALT  47<H>  /  AlkPhos  147<H>  12-28          MICROBIOLOGY:  v  Peritoneal Peritoneal Fluid  12-26-22   No growth  --    polymorphonuclear leukocytes seen  No organisms seen  by cytocentrifuge      .Blood Blood-Peripheral  12-26-22   No growth to date.  --  --      .Blood Blood-Peripheral  12-26-22   No growth to date.  --  --      .Blood Blood  12-24-22   No growth to date.  --  --      .Blood Blood  12-24-22   No growth to date.  --  --      .Blood Blood  12-23-22   No growth to date.  --  --      .Blood Blood  12-23-22   No growth to date.  --  --      Combi-Cath Combi-Cath  12-22-22   Normal Respiratory Cassandra present  --    Moderate polymorphonuclear leukocytes seen per low power field  No squamous epithelial cells seen per low power field  No organisms seen per oil power field      Catheterized Catheterized  12-21-22   >100,000 CFU/ml Leticia dubliniensis "Susceptibilities not performed"  --  --      Ascites Fl Ascites Fluid  12-21-22   No growth at 5 days  --    polymorphonuclear leukocytes seen  No organisms seen  by cytocentrifuge      Trach Asp Tracheal Aspirate  12-21-22   Normal Respiratory Cassandra present  --    No polymorphonuclear leukocytes per low power field  Numerous Squamous epithelial cells per low power field  Moderate Gram Positive Rods seen per oil power field  Few Yeast like cells seen per oil power field      .Blood Blood-Peripheral  12-20-22   No Growth Final  --  --      .Blood Blood-Peripheral  12-20-22   No Growth Final  --  --        CMV IgG Antibody: 5.10 U/mL (12-24-22 @ 20:59)  Toxoplasma IgG Screen: <3.0 IU/mL (12-24-22 @ 20:59)    Rapid RVP Result: NotDetec (12-27 @ 22:45)  CMVPCR Log: Det <1.54 Assay Dynamic Range: 34.5 to 1.0E+07 IU/mL (1.54 to 7.00 Log10 IU/mL)  Assay lower limit of quantification (LLOQ) is 34.5 IU/mL (1.54 Log10  IU/mL)  CMV DNA detected below the LLOQ will be reported as Detected < 34.5 IU/mL  (<1.54 Log10 IU/mL)  [facility] Cytomegalovirus (CMV) is an FDA-cleared quantitative test that  enables the detection and quantitation of CMV DNA in EDTA plasma of  infected transplant patients on the [facility] 8800 system. This test was  verified by [employer]. Results should be interpreted  with consideration of all clinical findings and laboratory findings and  should not form the sole basis for a diagnosis or treatment decision. Spj48HQ/mL (12-24 @ 20:58)        RADIOLOGY:    <The imaging below has been reviewed and visualized by me independently. Findings as detailed in report below> Follow Up:  shock    Interval History: continued increase in pressor requirement. remains on hi-flow. agitated    REVIEW OF SYSTEMS  [ x ] ROS unobtainable because:  encephalopathy  [  ] All other systems negative except as noted below    Constitutional:  [ ] fever [ ] chills  [ ] weight loss  [ ] weakness  Skin:  [ ] rash [ ] phlebitis	  Eyes: [ ] icterus [ ] pain  [ ] discharge	  ENMT: [ ] sore throat  [ ] thrush [ ] ulcers [ ] exudates  Respiratory: [ ] dyspnea [ ] hemoptysis [ ] cough [ ] sputum	  Cardiovascular:  [ ] chest pain [ ] palpitations [ ] edema	  Gastrointestinal:  [ ] nausea [ ] vomiting [ ] diarrhea [ ] constipation [ ] pain	  Genitourinary:  [ ] dysuria [ ] frequency [ ] hematuria [ ] discharge [ ] flank pain  [ ] incontinence  Musculoskeletal:  [ ] myalgias [ ] arthralgias [ ] arthritis  [ ] back pain  Neurological:  [ ] headache [ ] seizures  [ ] confusion/altered mental status    Allergies  Macrobid (Rash)        ANTIMICROBIALS:  caspofungin IVPB    caspofungin IVPB 50 every 24 hours  meropenem  IVPB 1000 every 8 hours  rifAXIMin 550 every 12 hours      OTHER MEDS:  MEDICATIONS  (STANDING):  escitalopram 10 daily  HYDROmorphone  Injectable 0.25 every 3 hours PRN  influenza   Vaccine 0.5 once  insulin lispro (ADMELOG) corrective regimen sliding scale  every 6 hours  lactulose Syrup 20 every 8 hours  levETIRAcetam  Solution 750 two times a day  levothyroxine 137 daily  metoclopramide Injectable 10 every 8 hours  midodrine 20 every 8 hours  norepinephrine Infusion 0.05 <Continuous>  ondansetron Injectable 4 every 6 hours PRN  pantoprazole   Suspension 40 every 24 hours  vasopressin Infusion 0.03 <Continuous>      Vital Signs Last 24 Hrs  T(C): 36.6 (28 Dec 2022 15:00), Max: 37 (28 Dec 2022 07:00)  T(F): 97.9 (28 Dec 2022 15:00), Max: 98.6 (28 Dec 2022 07:00)  HR: 81 (28 Dec 2022 17:45) (74 - 93)  BP: 103/63 (27 Dec 2022 19:00) (103/63 - 103/63)  BP(mean): 77 (27 Dec 2022 19:00) (77 - 77)  RR: 13 (28 Dec 2022 17:45) (10 - 40)  SpO2: 99% (28 Dec 2022 17:45) (56% - 100%)    Parameters below as of 28 Dec 2022 14:33  Patient On (Oxygen Delivery Method): nasal cannula, high flow,@31C  O2 Flow (L/min): 50  O2 Concentration (%): 40    PHYSICAL EXAMINATION:  General: Alert and Awake, NAD, jaundice, hi flow NC  HEENT: PERRL, EOMI, scleral icterus  Cardiac: RRR, No M/R/G  Resp: CTAB, No Wh/Rh/Ra  Abdomen: NBS, NT/ND, No HSM, No rigidity or guarding  MSK: No LE edema. No Calf tenderness  Skin: No rashes or lesions. Skin is warm and dry to the touch.   Neuro: Alert and Awake. CN 2-12 Grossly intact. Moves all four extremities spontaneously.  Psych: Encephalopathic - unable to assess                          8.2    32.81 )-----------( 39       ( 28 Dec 2022 15:51 )             24.2       12-28    138  |  103  |  11  ----------------------------<  86  4.4   |  20<L>  |  0.67    Ca    8.7      28 Dec 2022 15:51  Phos  3.9     12-28  Mg     2.4     12-28    TPro  6.3  /  Alb  4.1  /  TBili  28.9<H>  /  DBili  x   /  AST  149<H>  /  ALT  47<H>  /  AlkPhos  147<H>  12-28          MICROBIOLOGY:  v  Peritoneal Peritoneal Fluid  12-26-22   No growth  --    polymorphonuclear leukocytes seen  No organisms seen  by cytocentrifuge      .Blood Blood-Peripheral  12-26-22   No growth to date.  --  --      .Blood Blood-Peripheral  12-26-22   No growth to date.  --  --      .Blood Blood  12-24-22   No growth to date.  --  --      .Blood Blood  12-24-22   No growth to date.  --  --      .Blood Blood  12-23-22   No growth to date.  --  --      .Blood Blood  12-23-22   No growth to date.  --  --      Combi-Cath Combi-Cath  12-22-22   Normal Respiratory Cassandra present  --    Moderate polymorphonuclear leukocytes seen per low power field  No squamous epithelial cells seen per low power field  No organisms seen per oil power field      Catheterized Catheterized  12-21-22   >100,000 CFU/ml Leticia dubliniensis "Susceptibilities not performed"  --  --      Ascites Fl Ascites Fluid  12-21-22   No growth at 5 days  --    polymorphonuclear leukocytes seen  No organisms seen  by cytocentrifuge      Trach Asp Tracheal Aspirate  12-21-22   Normal Respiratory Cassandra present  --    No polymorphonuclear leukocytes per low power field  Numerous Squamous epithelial cells per low power field  Moderate Gram Positive Rods seen per oil power field  Few Yeast like cells seen per oil power field      .Blood Blood-Peripheral  12-20-22   No Growth Final  --  --      .Blood Blood-Peripheral  12-20-22   No Growth Final  --  --        CMV IgG Antibody: 5.10 U/mL (12-24-22 @ 20:59)  Toxoplasma IgG Screen: <3.0 IU/mL (12-24-22 @ 20:59)    Rapid RVP Result: NotDetec (12-27 @ 22:45)  CMVPCR Log: Det <1.54 Assay Dynamic Range: 34.5 to 1.0E+07 IU/mL (1.54 to 7.00 Log10 IU/mL)  Assay lower limit of quantification (LLOQ) is 34.5 IU/mL (1.54 Log10  IU/mL)  CMV DNA detected below the LLOQ will be reported as Detected < 34.5 IU/mL  (<1.54 Log10 IU/mL)  [facility] Cytomegalovirus (CMV) is an FDA-cleared quantitative test that  enables the detection and quantitation of CMV DNA in EDTA plasma of  infected transplant patients on the [facility] 8800 system. This test was  verified by [employer]. Results should be interpreted  with consideration of all clinical findings and laboratory findings and  should not form the sole basis for a diagnosis or treatment decision. [id number]/mL (12-24 @ 20:58)        RADIOLOGY:    <The imaging below has been reviewed and visualized by me independently. Findings as detailed in report below>    < from: Xray Chest 1 View- PORTABLE-Routine (Xray Chest 1 View- PORTABLE-Routine in AM.) (12.28.22 @ 07:28) >  IMPRESSION:  Similar diffuse bilateral patchy opacities, similar to prior, may   represent pulmonary edema.    < end of copied text >   Follow Up:  shock    Interval History: continued increase in pressor requirement. remains on hi-flow. agitated    REVIEW OF SYSTEMS  [ x ] ROS unobtainable because:  encephalopathy  [  ] All other systems negative except as noted below    Constitutional:  [ ] fever [ ] chills  [ ] weight loss  [ ] weakness  Skin:  [ ] rash [ ] phlebitis	  Eyes: [ ] icterus [ ] pain  [ ] discharge	  ENMT: [ ] sore throat  [ ] thrush [ ] ulcers [ ] exudates  Respiratory: [ ] dyspnea [ ] hemoptysis [ ] cough [ ] sputum	  Cardiovascular:  [ ] chest pain [ ] palpitations [ ] edema	  Gastrointestinal:  [ ] nausea [ ] vomiting [ ] diarrhea [ ] constipation [ ] pain	  Genitourinary:  [ ] dysuria [ ] frequency [ ] hematuria [ ] discharge [ ] flank pain  [ ] incontinence  Musculoskeletal:  [ ] myalgias [ ] arthralgias [ ] arthritis  [ ] back pain  Neurological:  [ ] headache [ ] seizures  [ ] confusion/altered mental status    Allergies  Macrobid (Rash)        ANTIMICROBIALS:  caspofungin IVPB    caspofungin IVPB 50 every 24 hours  meropenem  IVPB 1000 every 8 hours  rifAXIMin 550 every 12 hours      OTHER MEDS:  MEDICATIONS  (STANDING):  escitalopram 10 daily  HYDROmorphone  Injectable 0.25 every 3 hours PRN  influenza   Vaccine 0.5 once  insulin lispro (ADMELOG) corrective regimen sliding scale  every 6 hours  lactulose Syrup 20 every 8 hours  levETIRAcetam  Solution 750 two times a day  levothyroxine 137 daily  metoclopramide Injectable 10 every 8 hours  midodrine 20 every 8 hours  norepinephrine Infusion 0.05 <Continuous>  ondansetron Injectable 4 every 6 hours PRN  pantoprazole   Suspension 40 every 24 hours  vasopressin Infusion 0.03 <Continuous>      Vital Signs Last 24 Hrs  T(C): 36.6 (28 Dec 2022 15:00), Max: 37 (28 Dec 2022 07:00)  T(F): 97.9 (28 Dec 2022 15:00), Max: 98.6 (28 Dec 2022 07:00)  HR: 81 (28 Dec 2022 17:45) (74 - 93)  BP: 103/63 (27 Dec 2022 19:00) (103/63 - 103/63)  BP(mean): 77 (27 Dec 2022 19:00) (77 - 77)  RR: 13 (28 Dec 2022 17:45) (10 - 40)  SpO2: 99% (28 Dec 2022 17:45) (56% - 100%)    Parameters below as of 28 Dec 2022 14:33  Patient On (Oxygen Delivery Method): nasal cannula, high flow,@31C  O2 Flow (L/min): 50  O2 Concentration (%): 40    PHYSICAL EXAMINATION:  General: Alert and Awake, NAD, jaundice, hi flow NC  HEENT: PERRL, EOMI, scleral icterus  Cardiac: RRR, No M/R/G  Resp: CTAB, No Wh/Rh/Ra  Abdomen: NBS, NT/ND, No HSM, No rigidity or guarding  MSK: No LE edema. No Calf tenderness  Skin: No rashes or lesions. Skin is warm and dry to the touch.   Neuro: Alert and Awake. CN 2-12 Grossly intact. Moves all four extremities spontaneously.  Psych: Encephalopathic - unable to assess                          8.2    32.81 )-----------( 39       ( 28 Dec 2022 15:51 )             24.2       12-28    138  |  103  |  11  ----------------------------<  86  4.4   |  20<L>  |  0.67    Ca    8.7      28 Dec 2022 15:51  Phos  3.9     12-28  Mg     2.4     12-28    TPro  6.3  /  Alb  4.1  /  TBili  28.9<H>  /  DBili  x   /  AST  149<H>  /  ALT  47<H>  /  AlkPhos  147<H>  12-28          MICROBIOLOGY:  v  Peritoneal Peritoneal Fluid  12-26-22   No growth  --    polymorphonuclear leukocytes seen  No organisms seen  by cytocentrifuge      .Blood Blood-Peripheral  12-26-22   No growth to date.  --  --      .Blood Blood-Peripheral  12-26-22   No growth to date.  --  --      .Blood Blood  12-24-22   No growth to date.  --  --      .Blood Blood  12-24-22   No growth to date.  --  --      .Blood Blood  12-23-22   No growth to date.  --  --      .Blood Blood  12-23-22   No growth to date.  --  --      Combi-Cath Combi-Cath  12-22-22   Normal Respiratory Cassandra present  --    Moderate polymorphonuclear leukocytes seen per low power field  No squamous epithelial cells seen per low power field  No organisms seen per oil power field      Catheterized Catheterized  12-21-22   >100,000 CFU/ml Leticia dubliniensis "Susceptibilities not performed"  --  --      Ascites Fl Ascites Fluid  12-21-22   No growth at 5 days  --    polymorphonuclear leukocytes seen  No organisms seen  by cytocentrifuge      Trach Asp Tracheal Aspirate  12-21-22   Normal Respiratory Cassandra present  --    No polymorphonuclear leukocytes per low power field  Numerous Squamous epithelial cells per low power field  Moderate Gram Positive Rods seen per oil power field  Few Yeast like cells seen per oil power field      .Blood Blood-Peripheral  12-20-22   No Growth Final  --  --      .Blood Blood-Peripheral  12-20-22   No Growth Final  --  --        CMV IgG Antibody: 5.10 U/mL (12-24-22 @ 20:59)  Toxoplasma IgG Screen: <3.0 IU/mL (12-24-22 @ 20:59)    Rapid RVP Result: NotDetec (12-27 @ 22:45)  CMVPCR Log: Det <1.54 Assay Dynamic Range: 34.5 to 1.0E+07 IU/mL (1.54 to 7.00 Log10 IU/mL)  Assay lower limit of quantification (LLOQ) is 34.5 IU/mL (1.54 Log10  IU/mL)  CMV DNA detected below the LLOQ will be reported as Detected < 34.5 IU/mL  (<1.54 Log10 IU/mL)  [facility] Cytomegalovirus (CMV) is an FDA-cleared quantitative test that  enables the detection and quantitation of CMV DNA in EDTA plasma of  infected transplant patients on the kadeem 8800 system. This test was  verified by [employer]. Results should be interpreted  with consideration of all clinical findings and laboratory findings and  should not form the sole basis for a diagnosis or treatment decision. [id number]/mL (12-24 @ 20:58)        RADIOLOGY:    <The imaging below has been reviewed and visualized by me independently. Findings as detailed in report below>    < from: Xray Chest 1 View- PORTABLE-Routine (Xray Chest 1 View- PORTABLE-Routine in AM.) (12.28.22 @ 07:28) >  IMPRESSION:  Similar diffuse bilateral patchy opacities, similar to prior, may   represent pulmonary edema.    < end of copied text >   Follow Up:  shock    Interval History: continued increase in pressor requirement. remains on hi-flow. agitated    REVIEW OF SYSTEMS  [ x ] ROS unobtainable because:  encephalopathy  [  ] All other systems negative except as noted below    Constitutional:  [ ] fever [ ] chills  [ ] weight loss  [ ] weakness  Skin:  [ ] rash [ ] phlebitis	  Eyes: [ ] icterus [ ] pain  [ ] discharge	  ENMT: [ ] sore throat  [ ] thrush [ ] ulcers [ ] exudates  Respiratory: [ ] dyspnea [ ] hemoptysis [ ] cough [ ] sputum	  Cardiovascular:  [ ] chest pain [ ] palpitations [ ] edema	  Gastrointestinal:  [ ] nausea [ ] vomiting [ ] diarrhea [ ] constipation [ ] pain	  Genitourinary:  [ ] dysuria [ ] frequency [ ] hematuria [ ] discharge [ ] flank pain  [ ] incontinence  Musculoskeletal:  [ ] myalgias [ ] arthralgias [ ] arthritis  [ ] back pain  Neurological:  [ ] headache [ ] seizures  [ ] confusion/altered mental status    Allergies  Macrobid (Rash)        ANTIMICROBIALS:  caspofungin IVPB    caspofungin IVPB 50 every 24 hours  meropenem  IVPB 1000 every 8 hours  rifAXIMin 550 every 12 hours      OTHER MEDS:  MEDICATIONS  (STANDING):  escitalopram 10 daily  HYDROmorphone  Injectable 0.25 every 3 hours PRN  influenza   Vaccine 0.5 once  insulin lispro (ADMELOG) corrective regimen sliding scale  every 6 hours  lactulose Syrup 20 every 8 hours  levETIRAcetam  Solution 750 two times a day  levothyroxine 137 daily  metoclopramide Injectable 10 every 8 hours  midodrine 20 every 8 hours  norepinephrine Infusion 0.05 <Continuous>  ondansetron Injectable 4 every 6 hours PRN  pantoprazole   Suspension 40 every 24 hours  vasopressin Infusion 0.03 <Continuous>      Vital Signs Last 24 Hrs  T(C): 36.6 (28 Dec 2022 15:00), Max: 37 (28 Dec 2022 07:00)  T(F): 97.9 (28 Dec 2022 15:00), Max: 98.6 (28 Dec 2022 07:00)  HR: 81 (28 Dec 2022 17:45) (74 - 93)  BP: 103/63 (27 Dec 2022 19:00) (103/63 - 103/63)  BP(mean): 77 (27 Dec 2022 19:00) (77 - 77)  RR: 13 (28 Dec 2022 17:45) (10 - 40)  SpO2: 99% (28 Dec 2022 17:45) (56% - 100%)    Parameters below as of 28 Dec 2022 14:33  Patient On (Oxygen Delivery Method): nasal cannula, high flow,@31C  O2 Flow (L/min): 50  O2 Concentration (%): 40    PHYSICAL EXAMINATION:  General: Alert and Awake, NAD, jaundice, hi flow NC  HEENT: PERRL, EOMI, scleral icterus  Cardiac: RRR, No M/R/G  Resp: CTAB, No Wh/Rh/Ra  Abdomen: NBS, NT/ND, No HSM, No rigidity or guarding  MSK: No LE edema. No Calf tenderness  Skin: No rashes or lesions. Skin is warm and dry to the touch.   Neuro: Alert and Awake. CN 2-12 Grossly intact. Moves all four extremities spontaneously.  Psych: Encephalopathic - unable to assess                          8.2    32.81 )-----------( 39       ( 28 Dec 2022 15:51 )             24.2       12-28    138  |  103  |  11  ----------------------------<  86  4.4   |  20<L>  |  0.67    Ca    8.7      28 Dec 2022 15:51  Phos  3.9     12-28  Mg     2.4     12-28    TPro  6.3  /  Alb  4.1  /  TBili  28.9<H>  /  DBili  x   /  AST  149<H>  /  ALT  47<H>  /  AlkPhos  147<H>  12-28          MICROBIOLOGY:  v  Peritoneal Peritoneal Fluid  12-26-22   No growth  --    polymorphonuclear leukocytes seen  No organisms seen  by cytocentrifuge      .Blood Blood-Peripheral  12-26-22   No growth to date.  --  --      .Blood Blood-Peripheral  12-26-22   No growth to date.  --  --      .Blood Blood  12-24-22   No growth to date.  --  --      .Blood Blood  12-24-22   No growth to date.  --  --      .Blood Blood  12-23-22   No growth to date.  --  --      .Blood Blood  12-23-22   No growth to date.  --  --      Combi-Cath Combi-Cath  12-22-22   Normal Respiratory Cassandra present  --    Moderate polymorphonuclear leukocytes seen per low power field  No squamous epithelial cells seen per low power field  No organisms seen per oil power field      Catheterized Catheterized  12-21-22   >100,000 CFU/ml Leticia dubliniensis "Susceptibilities not performed"  --  --      Ascites Fl Ascites Fluid  12-21-22   No growth at 5 days  --    polymorphonuclear leukocytes seen  No organisms seen  by cytocentrifuge      Trach Asp Tracheal Aspirate  12-21-22   Normal Respiratory Cassandra present  --    No polymorphonuclear leukocytes per low power field  Numerous Squamous epithelial cells per low power field  Moderate Gram Positive Rods seen per oil power field  Few Yeast like cells seen per oil power field      .Blood Blood-Peripheral  12-20-22   No Growth Final  --  --      .Blood Blood-Peripheral  12-20-22   No Growth Final  --  --        CMV IgG Antibody: 5.10 U/mL (12-24-22 @ 20:59)  Toxoplasma IgG Screen: <3.0 IU/mL (12-24-22 @ 20:59)    Rapid RVP Result: NotDetec (12-27 @ 22:45)  CMVPCR Log: Det <1.54 Assay Dynamic Range: 34.5 to 1.0E+07 IU/mL (1.54 to 7.00 Log10 IU/mL)  Assay lower limit of quantification (LLOQ) is 34.5 IU/mL (1.54 Log10  IU/mL)  CMV DNA detected below the LLOQ will be reported as Detected < 34.5 IU/mL  (<1.54 Log10 IU/mL)  [facility] Cytomegalovirus (CMV) is an FDA-cleared quantitative test that  enables the detection and quantitation of CMV DNA in EDTA plasma of  infected transplant patients on the kadeem 8800 system. This test was  verified by [employer]. Results should be interpreted  with consideration of all clinical findings and laboratory findings and  should not form the sole basis for a diagnosis or treatment decision. [id number]/mL (12-24 @ 20:58)        RADIOLOGY:    <The imaging below has been reviewed and visualized by me independently. Findings as detailed in report below>    < from: Xray Chest 1 View- PORTABLE-Routine (Xray Chest 1 View- PORTABLE-Routine in AM.) (12.28.22 @ 07:28) >  IMPRESSION:  Similar diffuse bilateral patchy opacities, similar to prior, may   represent pulmonary edema.    < end of copied text >

## 2022-12-28 NOTE — PROGRESS NOTE ADULT - ASSESSMENT
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    CT Chest with possible pneumonia versus atelectasis    UA (12/19) Moderate Leukocyte Esterase  UCx (12/19) No Growth  BCx (12/19) Corynebacterium (1/4 Bottles)  MRSA Nasal PCR (12/19) Negative  Paracentesis (12/19, 12/26) Cell counts not suggestive of SBP    Extubated but continued shock and hypoxic respiratory failure  Suspect noninfectious etiology of current clinical status    #Leukocytosis, Positive Blood Culture, Transaminitis, Cirrhosis  --Can continue empiric Meropenem for now (12/21 -->)  --Continue to follow CBC with diff  --Continue to follow temperature curve  --Follow up on preliminary blood cultures    #Positive UCx (Candida dublinensis)  Unclear Significance  s/p Fluconazole 12/20 --> 12/25  Caspofungin gets poor urinary penetration but Candida dublinensis is intermittently azole resistant  Not making urine now  --Can continue empiric Caspofungin for now    #Pre-Transplant Evaluation  --Recommend COVID19 Nucleocapsid Antibody  --Follow up on QuantiFeron Gold    #Encounter to Vaccinate Patient  Will address vaccination outside of the critical illness period  COVID19: Has had COVID19 in 2020 and again in ~8/2022. Denies prior COVID19 Vaccination  Influenza: Will require  Pneumococcal: Would benefit from PCV20  HAV: Will require Hepatitis A Vaccine  HBV: Will require Heplisav  MMR: Immune  Varicella: Immune  Shingles: Will require Shingrix  Tdap: Will require Tdap    I will continue to follow. Please feel free to contact me with any further questions.    Jonah Mendoza M.D.  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    CT Chest with possible pneumonia versus atelectasis    UA (12/19) Moderate Leukocyte Esterase  UCx (12/19) No Growth  BCx (12/19) Corynebacterium (1/4 Bottles)  MRSA Nasal PCR (12/19) Negative  Paracentesis (12/19, 12/26) Cell counts not suggestive of SBP    Extubated but continued shock and hypoxic respiratory failure  Suspect noninfectious etiology of current clinical status    #Leukocytosis, Positive Blood Culture, Transaminitis, Cirrhosis  --Can continue empiric Meropenem for now (12/21 -->)  --Continue to follow CBC with diff  --Continue to follow temperature curve  --Follow up on preliminary blood cultures    #Positive UCx (Candida dublinensis)  Unclear Significance  s/p Fluconazole 12/20 --> 12/25  Caspofungin gets poor urinary penetration but Candida dublinensis is intermittently azole resistant  Not making urine now  --Can continue empiric Caspofungin for now    #Pre-Transplant Evaluation  --Recommend COVID19 Nucleocapsid Antibody  --Follow up on QuantiFeron Gold    #Encounter to Vaccinate Patient  Will address vaccination outside of the critical illness period  COVID19: Has had COVID19 in 2020 and again in ~8/2022. Denies prior COVID19 Vaccination  Influenza: Will require  Pneumococcal: Would benefit from PCV20  HAV: Will require Hepatitis A Vaccine  HBV: Will require Heplisav  MMR: Immune  Varicella: Immune  Shingles: Will require Shingrix  Tdap: Will require Tdap    I will continue to follow. Please feel free to contact me with any further questions.    Jonah Mendoza M.D.  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    CT Chest with possible pneumonia versus atelectasis    UA (12/19) Moderate Leukocyte Esterase  UCx (12/19) No Growth  BCx (12/19) Corynebacterium (1/4 Bottles)  MRSA Nasal PCR (12/19) Negative  Paracentesis (12/19, 12/26) Cell counts not suggestive of SBP    Extubated but continued shock and hypoxic respiratory failure  Suspect noninfectious etiology of current clinical status    #Leukocytosis, Positive Blood Culture, Transaminitis, Cirrhosis  --Can continue empiric Meropenem for now (12/21 -->)  --Continue to follow CBC with diff  --Continue to follow temperature curve  --Follow up on preliminary blood cultures    #Positive UCx (Candida dublinensis)  Unclear Significance  s/p Fluconazole 12/20 --> 12/25  Caspofungin gets poor urinary penetration but Candida dublinensis is intermittently azole resistant  Not making urine now  --Can continue empiric Caspofungin for now    #Pre-Transplant Evaluation  --Recommend COVID19 Nucleocapsid Antibody  --Follow up on QuantiFeron Gold    #Encounter to Vaccinate Patient  Will address vaccination outside of the critical illness period  COVID19: Has had COVID19 in 2020 and again in ~8/2022. Denies prior COVID19 Vaccination  Influenza: Will require  Pneumococcal: Would benefit from PCV20  HAV: Will require Hepatitis A Vaccine  HBV: Will require Heplisav  MMR: Immune  Varicella: Immune  Shingles: Will require Shingrix  Tdap: Will require Tdap    I will continue to follow. Please feel free to contact me with any further questions.    Jonah Mendoza M.D.  [facility] Division of Infectious Disease  8AM-5PM Monday - Friday: Available on [employer] Teams  After Hours and Holidays (or if no response on [employer] Teams): Please contact the Infectious Diseases Office at [phone]    The above assessment and plan were discussed with 8ICU Team

## 2022-12-28 NOTE — BH CONSULTATION LIAISON ASSESSMENT NOTE - SUMMARY
Patient is a 62 year-old female, , domiciled with spouse, PMH of thyroid cancer s/p thyroidectomy, craniotomy with resection of ventricular mass, admitted to [facility] for acute liver failure and new seizures, transferred to [facility] for liver transplant evaluation, with psychiatry consulted for psychiatric transplant assessment. On exam, patient is obtunded and unable to participate in interview. Per collateral information from her daughter, pt's mom has a history of EtOH use, 2 bottles of wine per day. Hx of seizures (unclear if due to withdrawal or sequelae of craniotomy/ventricular mass). Has attended one inpatient rehabilitation program. Pt will need to be re-evaluated when patient's mental status improves.     Plan:  - Re-evaluate when patient's mentation improves Patient is a 62 year-old female, , domiciled with spouse, PMH of thyroid cancer s/p thyroidectomy, craniotomy with resection of ventricular mass, admitted to [facility] for acute liver failure and new seizures, transferred to [facility] for liver transplant evaluation, with psychiatry consulted for psychiatric transplant assessment. On exam, patient is obtunded and unable to participate in interview. Per collateral information from her daughter, pt's mom has a history of EtOH use, 2 bottles of wine per day. Hx of seizures (unclear if due to withdrawal or sequelae of craniotomy/ventricular mass). Has attended one inpatient rehabilitation program. Pt will need to be re-evaluated when patient's mental status improves.     Plan:  - Re-evaluate when patient's mentation improves Patient is a 62 year-old female, , domiciled with spouse, PMH of thyroid cancer s/p thyroidectomy, craniotomy with resection of ventricular mass, admitted to [facility] for acute liver failure and new seizures, transferred to [facility] for liver transplant evaluation, with psychiatry consulted for psychiatric transplant assessment. On exam, patient is obtunded and unable to participate in interview. Per collateral information from her daughter, pt's mom has a history of EtOH use, 2 bottles of wine per day. Hx of seizures (unclear if due to withdrawal or sequelae of craniotomy/ventricular mass). Has attended one inpatient rehabilitation program. Pt will need to be re-evaluated when patient's mental status improves.     Plan:  - Re-evaluate when patient's mentation improves

## 2022-12-28 NOTE — BH CONSULTATION LIAISON ASSESSMENT NOTE - RISK ASSESSMENT
Patient is a low risk of harm to self or others. Protective factors: residential stability, relationship stability, social support. Risk factors: substance abuse, altered mental status

## 2022-12-28 NOTE — PROGRESS NOTE ADULT - ATTENDING COMMENTS
62yFemale presents with hx thyroid cancer s/p thyroidectomy, craniotomy with resection of ventricular mass, with acute liver failure, new seizures, transfer from [facility] for transplant evaluation.     Fluctuating mental status, continue Lactulose Rifaximin  Titrate Levo, continue Vaso, on high dose Midodrine  Remains in acute hypoxemic resp failure on HFNC, CXR unchanged b/l infiltrates possible superimposed pul vascular congestion  Continue  CRRT with net even fluid balance.    NGT feed on hold sec to multiple episodes of vomiting. Standing short course prokinetic ordered  Empiric Abx Meropenem and Caspo, monitor leucocytosis  Peritoneal fluid transudate  ISS  Mechanical DVT ppx    Daughter and  kept updated

## 2022-12-28 NOTE — PROGRESS NOTE ADULT - SUBJECTIVE AND OBJECTIVE BOX
[facility] DIVISION OF KIDNEY DISEASES AND HYPERTENSION -- FOLLOW UP NOTE  --------------------------------------------------------------------------------  HPI: 63 yo F with h/o decompensated ETOH cirrhosis with ascites, thyroid cancer (s/p total thyroidectomy, RTX, and radioactive iodide), HTN, ventrical neoplasm who was initially admitted to  12/19 with new onset seizures and transferred to [facility] 12/20 for liver transplant evaluation. Pt being seen for RED requiring CRRT.    24 hour events/subjective: Pt was seen and evaluated in the ICU this morning with family present. Pt on high flow, poor historian. Unable to obtain ROS.        PAST HISTORY  --------------------------------------------------------------------------------  No significant changes to PMH, PSH, FHx, SHx, unless otherwise noted    ALLERGIES & MEDICATIONS  --------------------------------------------------------------------------------  Allergies    Macrobid (Rash)    Intolerances    Nexium (Stomach Upset)    Standing Inpatient Medications  caspofungin IVPB      caspofungin IVPB 50 milliGRAM(s) IV Intermittent every 24 hours  chlorhexidine 2% Cloths 1 Application(s) Topical <User Schedule>  CRRT Treatment    <Continuous>  dextrose 5% + sodium chloride 0.9%. 1000 milliLiter(s) IV Continuous <Continuous>  escitalopram 10 milliGRAM(s) Oral daily  folic acid 1 milliGRAM(s) Oral daily  influenza   Vaccine 0.5 milliLiter(s) IntraMuscular once  insulin lispro (ADMELOG) corrective regimen sliding scale   SubCutaneous every 6 hours  lactulose Syrup 20 Gram(s) Oral every 8 hours  levETIRAcetam  Solution 750 milliGRAM(s) Enteral Tube two times a day  levothyroxine 137 MICROGram(s) Oral daily  meropenem  IVPB 1000 milliGRAM(s) IV Intermittent every 8 hours  metoclopramide Injectable 10 milliGRAM(s) IV Push every 8 hours  midodrine 20 milliGRAM(s) Oral every 8 hours  multivitamin/minerals/iron Oral Solution (CENTRUM) 15 milliLiter(s) Oral daily  norepinephrine Infusion 0.05 MICROgram(s)/kG/Min IV Continuous <Continuous>  pantoprazole   Suspension 40 milliGRAM(s) Oral every 24 hours  Phoxillum Filtration BK 4 / 2.5 5000 milliLiter(s) CRRT <Continuous>  Phoxillum Filtration BK 4 / 2.5 5000 milliLiter(s) CRRT <Continuous>  Phoxillum Filtration BK 4 / 2.5 5000 milliLiter(s) CRRT <Continuous>  rifAXIMin 550 milliGRAM(s) Oral every 12 hours  thiamine 100 milliGRAM(s) Enteral Tube daily  vasopressin Infusion 0.03 Unit(s)/Min IV Continuous <Continuous>    PRN Inpatient Medications  HYDROmorphone  Injectable 0.25 milliGRAM(s) IV Push every 3 hours PRN  ondansetron Injectable 4 milliGRAM(s) IV Push every 6 hours PRN  sodium chloride 0.65% Nasal 1 Spray(s) Both Nostrils every 3 hours PRN  tetracaine/benzocaine/butamben Spray 1 Spray(s) Topical every 3 hours PRN      REVIEW OF SYSTEMS  --------------------------------------------------------------------------------  Unable to obtain ROS    VITALS/PHYSICAL EXAM  --------------------------------------------------------------------------------  T(C): 36.6 (12-28-22 @ 11:00), Max: 37 (12-28-22 @ 07:00)  HR: 83 (12-28-22 @ 11:45) (73 - 99)  BP: 103/63 (12-27-22 @ 19:00) (103/63 - 103/63)  RR: 16 (12-28-22 @ 11:45) (10 - 40)  SpO2: 97% (12-28-22 @ 11:45) (56% - 97%)  Wt(kg): --      12-27-22 @ 07:01  -  12-28-22 @ 07:00  --------------------------------------------------------  IN: 3487 mL / OUT: 3970 mL / NET: -483 mL    12-28-22 @ 07:01  -  12-28-22 @ 12:02  --------------------------------------------------------  IN: 177.6 mL / OUT: 264 mL / NET: -86.4 mL      Physical Exam:  Gen: NAD  HEENT: On NC   Pulm: CTA B/L, no crackles   CV: RRR, S1S2+  Abd: +BS, soft, distended  : +urinary catheter  MSK: +BL LE edema  Psych: Awake  Skin: Warm  Access: +Non-tunneled HD catheter    LABS/STUDIES  --------------------------------------------------------------------------------              8.0    32.51 >-----------<  37       [12-28-22 @ 09:34]              23.6     138  |  102  |  11  ----------------------------<  87      [12-28-22 @ 09:32]  4.3   |  22  |  0.73        Ca     9.3     [12-28-22 @ 09:32]      Mg     2.4     [12-28-22 @ 09:32]      Phos  4.0     [12-28-22 @ 09:32]    TPro  6.1  /  Alb  4.1  /  TBili  27.0  /  DBili  x   /  AST  132  /  ALT  45  /  AlkPhos  154  [12-28-22 @ 09:32]    PT/INR: PT 25.9 , INR 2.21       [12-28-22 @ 03:19]  PTT: 54.4       [12-28-22 @ 03:19]      Creatinine Trend:  SCr 0.73 [12-28 @ 09:32]  SCr 0.71 [12-28 @ 03:19]  SCr 0.70 [12-27 @ 21:14]  SCr 0.70 [12-27 @ 15:20]  SCr 0.72 [12-27 @ 08:41]      Urinalysis - [12-20-22 @ 23:55]      Color Dark Yellow / Appearance Turbid / SG 1.029 / pH 6.0      Gluc 100 mg/dL / Ketone Small  / Bili Large / Urobili Negative       Blood Moderate / Protein 300 mg/dL / Leuk Est Large / Nitrite Negative      RBC 5-10 / WBC 11-25 / Hyaline 0-2 / Gran  / Sq Epi  / Non Sq Epi Moderate / Bacteria Negative      Iron 51, TIBC Unable to calculate Test Repeated, %sat Unable to calculate Test Repeated      [12-24-22 @ 20:58]  Ferritin 5747      [12-24-22 @ 20:58]  TSH 0.05      [12-24-22 @ 20:58]  Lipid: chol 100, , HDL 9, LDL --      [12-24-22 @ 20:58]    HBsAb <3.0      [12-24-22 @ 20:59]  HBsAb Nonreact      [12-24-22 @ 20:59]  HBsAg Nonreact      [12-24-22 @ 20:59]  HBcAb Nonreact      [12-24-22 @ 20:59]  HCV 0.12, Nonreact      [12-24-22 @ 20:59]  HIV Nonreact      [12-24-22 @ 20:58]  HIV Nonreact      [12-24-22 @ 20:58]    MIRNA: titer Negative, pattern --      [12-24-22 @ 20:59]  Syphilis Screen (Treponema Pallidum Ab) Negative      [12-24-22 @ 20:59] [facility] DIVISION OF KIDNEY DISEASES AND HYPERTENSION -- FOLLOW UP NOTE  --------------------------------------------------------------------------------  HPI: 63 yo F with h/o decompensated ETOH cirrhosis with ascites, thyroid cancer (s/p total thyroidectomy, RTX, and radioactive iodide), HTN, ventrical neoplasm who was initially admitted to  12/19 with new onset seizures and transferred to [facility] 12/20 for liver transplant evaluation. Pt being seen for RED requiring CRRT.    24 hour events/subjective: Pt was seen and evaluated in the ICU this morning with family present. Pt on high flow, poor historian. Unable to obtain ROS.        PAST HISTORY  --------------------------------------------------------------------------------  No significant changes to PMH, PSH, FHx, SHx, unless otherwise noted    ALLERGIES & MEDICATIONS  --------------------------------------------------------------------------------  Allergies    Macrobid (Rash)    Intolerances    Nexium (Stomach Upset)    Standing Inpatient Medications  caspofungin IVPB      caspofungin IVPB 50 milliGRAM(s) IV Intermittent every 24 hours  chlorhexidine 2% Cloths 1 Application(s) Topical <User Schedule>  CRRT Treatment    <Continuous>  dextrose 5% + sodium chloride 0.9%. 1000 milliLiter(s) IV Continuous <Continuous>  escitalopram 10 milliGRAM(s) Oral daily  folic acid 1 milliGRAM(s) Oral daily  influenza   Vaccine 0.5 milliLiter(s) IntraMuscular once  insulin lispro (ADMELOG) corrective regimen sliding scale   SubCutaneous every 6 hours  lactulose Syrup 20 Gram(s) Oral every 8 hours  levETIRAcetam  Solution 750 milliGRAM(s) Enteral Tube two times a day  levothyroxine 137 MICROGram(s) Oral daily  meropenem  IVPB 1000 milliGRAM(s) IV Intermittent every 8 hours  metoclopramide Injectable 10 milliGRAM(s) IV Push every 8 hours  midodrine 20 milliGRAM(s) Oral every 8 hours  multivitamin/minerals/iron Oral Solution (CENTRUM) 15 milliLiter(s) Oral daily  norepinephrine Infusion 0.05 MICROgram(s)/kG/Min IV Continuous <Continuous>  pantoprazole   Suspension 40 milliGRAM(s) Oral every 24 hours  Phoxillum Filtration BK 4 / 2.5 5000 milliLiter(s) CRRT <Continuous>  Phoxillum Filtration BK 4 / 2.5 5000 milliLiter(s) CRRT <Continuous>  Phoxillum Filtration BK 4 / 2.5 5000 milliLiter(s) CRRT <Continuous>  rifAXIMin 550 milliGRAM(s) Oral every 12 hours  thiamine 100 milliGRAM(s) Enteral Tube daily  vasopressin Infusion 0.03 Unit(s)/Min IV Continuous <Continuous>    PRN Inpatient Medications  HYDROmorphone  Injectable 0.25 milliGRAM(s) IV Push every 3 hours PRN  ondansetron Injectable 4 milliGRAM(s) IV Push every 6 hours PRN  sodium chloride 0.65% Nasal 1 Spray(s) Both Nostrils every 3 hours PRN  tetracaine/benzocaine/butamben Spray 1 Spray(s) Topical every 3 hours PRN      REVIEW OF SYSTEMS  --------------------------------------------------------------------------------  Unable to obtain ROS    VITALS/PHYSICAL EXAM  --------------------------------------------------------------------------------  T(C): 36.6 (12-28-22 @ 11:00), Max: 37 (12-28-22 @ 07:00)  HR: 83 (12-28-22 @ 11:45) (73 - 99)  BP: 103/63 (12-27-22 @ 19:00) (103/63 - 103/63)  RR: 16 (12-28-22 @ 11:45) (10 - 40)  SpO2: 97% (12-28-22 @ 11:45) (56% - 97%)  Wt(kg): --      12-27-22 @ 07:01  -  12-28-22 @ 07:00  --------------------------------------------------------  IN: 3487 mL / OUT: 3970 mL / NET: -483 mL    12-28-22 @ 07:01  -  12-28-22 @ 12:02  --------------------------------------------------------  IN: 177.6 mL / OUT: 264 mL / NET: -86.4 mL      Physical Exam:  Gen: NAD  HEENT: On NC   Pulm: CTA B/L, no crackles   CV: RRR, S1S2+  Abd: +BS, soft, distended  : +urinary catheter  MSK: +BL LE edema  Psych: Awake  Skin: Warm  Access: +Non-tunneled HD catheter    LABS/STUDIES  --------------------------------------------------------------------------------              8.0    32.51 >-----------<  37       [12-28-22 @ 09:34]              23.6     138  |  102  |  11  ----------------------------<  87      [12-28-22 @ 09:32]  4.3   |  22  |  0.73        Ca     9.3     [12-28-22 @ 09:32]      Mg     2.4     [12-28-22 @ 09:32]      Phos  4.0     [12-28-22 @ 09:32]    TPro  6.1  /  Alb  4.1  /  TBili  27.0  /  DBili  x   /  AST  132  /  ALT  45  /  AlkPhos  154  [12-28-22 @ 09:32]    PT/INR: PT 25.9 , INR 2.21       [12-28-22 @ 03:19]  PTT: 54.4       [12-28-22 @ 03:19]      Creatinine Trend:  SCr 0.73 [12-28 @ 09:32]  SCr 0.71 [12-28 @ 03:19]  SCr 0.70 [12-27 @ 21:14]  SCr 0.70 [12-27 @ 15:20]  SCr 0.72 [12-27 @ 08:41]      Urinalysis - [12-20-22 @ 23:55]      Color Dark Yellow / Appearance Turbid / SG 1.029 / pH 6.0      Gluc 100 mg/dL / Ketone Small  / Bili Large / Urobili Negative       Blood Moderate / Protein 300 mg/dL / Leuk Est Large / Nitrite Negative      RBC 5-10 / WBC 11-25 / Hyaline 0-2 / Gran  / Sq Epi  / Non Sq Epi Moderate / Bacteria Negative      Iron 51, TIBC Unable to calculate Test Repeated, %sat Unable to calculate Test Repeated      [12-24-22 @ 20:58]  Ferritin 5747      [12-24-22 @ 20:58]  TSH 0.05      [12-24-22 @ 20:58]  Lipid: chol 100, , HDL 9, LDL --      [12-24-22 @ 20:58]    HBsAb <3.0      [12-24-22 @ 20:59]  HBsAb Nonreact      [12-24-22 @ 20:59]  HBsAg Nonreact      [12-24-22 @ 20:59]  HBcAb Nonreact      [12-24-22 @ 20:59]  HCV 0.12, Nonreact      [12-24-22 @ 20:59]  HIV Nonreact      [12-24-22 @ 20:58]  HIV Nonreact      [12-24-22 @ 20:58]    MIRNA: titer Negative, pattern --      [12-24-22 @ 20:59]  Syphilis Screen (Treponema Pallidum Ab) Negative      [12-24-22 @ 20:59] [facility] DIVISION OF KIDNEY DISEASES AND HYPERTENSION -- FOLLOW UP NOTE  --------------------------------------------------------------------------------  HPI: 63 yo F with h/o decompensated ETOH cirrhosis with ascites, thyroid cancer (s/p total thyroidectomy, RTX, and radioactive iodide), HTN, ventrical neoplasm who was initially admitted to  12/19 with new onset seizures and transferred to [facility] 12/20 for liver transplant evaluation. Pt being seen for RED requiring CRRT.    24 hour events/subjective: Pt was seen and evaluated in the ICU this morning with family present. Pt on high flow, poor historian. Unable to obtain ROS.        PAST HISTORY  --------------------------------------------------------------------------------  No significant changes to PMH, PSH, FHx, SHx, unless otherwise noted    ALLERGIES & MEDICATIONS  --------------------------------------------------------------------------------  Allergies    Macrobid (Rash)    Intolerances    Nexium (Stomach Upset)    Standing Inpatient Medications  caspofungin IVPB      caspofungin IVPB 50 milliGRAM(s) IV Intermittent every 24 hours  chlorhexidine 2% Cloths 1 Application(s) Topical <User Schedule>  CRRT Treatment    <Continuous>  dextrose 5% + sodium chloride 0.9%. 1000 milliLiter(s) IV Continuous <Continuous>  escitalopram 10 milliGRAM(s) Oral daily  folic acid 1 milliGRAM(s) Oral daily  influenza   Vaccine 0.5 milliLiter(s) IntraMuscular once  insulin lispro (ADMELOG) corrective regimen sliding scale   SubCutaneous every 6 hours  lactulose Syrup 20 Gram(s) Oral every 8 hours  levETIRAcetam  Solution 750 milliGRAM(s) Enteral Tube two times a day  levothyroxine 137 MICROGram(s) Oral daily  meropenem  IVPB 1000 milliGRAM(s) IV Intermittent every 8 hours  metoclopramide Injectable 10 milliGRAM(s) IV Push every 8 hours  midodrine 20 milliGRAM(s) Oral every 8 hours  multivitamin/minerals/iron Oral Solution (CENTRUM) 15 milliLiter(s) Oral daily  norepinephrine Infusion 0.05 MICROgram(s)/kG/Min IV Continuous <Continuous>  pantoprazole   Suspension 40 milliGRAM(s) Oral every 24 hours  Phoxillum Filtration BK 4 / 2.5 5000 milliLiter(s) CRRT <Continuous>  Phoxillum Filtration BK 4 / 2.5 5000 milliLiter(s) CRRT <Continuous>  Phoxillum Filtration BK 4 / 2.5 5000 milliLiter(s) CRRT <Continuous>  rifAXIMin 550 milliGRAM(s) Oral every 12 hours  thiamine 100 milliGRAM(s) Enteral Tube daily  vasopressin Infusion 0.03 Unit(s)/Min IV Continuous <Continuous>    PRN Inpatient Medications  HYDROmorphone  Injectable 0.25 milliGRAM(s) IV Push every 3 hours PRN  ondansetron Injectable 4 milliGRAM(s) IV Push every 6 hours PRN  sodium chloride 0.65% Nasal 1 Spray(s) Both Nostrils every 3 hours PRN  tetracaine/benzocaine/butamben Spray 1 Spray(s) Topical every 3 hours PRN      REVIEW OF SYSTEMS  --------------------------------------------------------------------------------  Unable to obtain ROS    VITALS/PHYSICAL EXAM  --------------------------------------------------------------------------------  T(C): 36.6 (12-28-22 @ 11:00), Max: 37 (12-28-22 @ 07:00)  HR: 83 (12-28-22 @ 11:45) (73 - 99)  BP: 103/63 (12-27-22 @ 19:00) (103/63 - 103/63)  RR: 16 (12-28-22 @ 11:45) (10 - 40)  SpO2: 97% (12-28-22 @ 11:45) (56% - 97%)  Wt(kg): --      12-27-22 @ 07:01  -  12-28-22 @ 07:00  --------------------------------------------------------  IN: 3487 mL / OUT: 3970 mL / NET: -483 mL    12-28-22 @ 07:01  -  12-28-22 @ 12:02  --------------------------------------------------------  IN: 177.6 mL / OUT: 264 mL / NET: -86.4 mL      Physical Exam:  Gen: NAD  HEENT: On NC   Pulm: CTA B/L, no crackles   CV: RRR, S1S2+  Abd: +BS, soft, distended  : +urinary catheter  MSK: +BL LE edema  Psych: Awake  Skin: Warm  Access: +Non-tunneled HD catheter    LABS/STUDIES  --------------------------------------------------------------------------------              8.0    32.51 >-----------<  37       [12-28-22 @ 09:34]              23.6     138  |  102  |  11  ----------------------------<  87      [12-28-22 @ 09:32]  4.3   |  22  |  0.73        Ca     9.3     [12-28-22 @ 09:32]      Mg     2.4     [12-28-22 @ 09:32]      Phos  4.0     [12-28-22 @ 09:32]    TPro  6.1  /  Alb  4.1  /  TBili  27.0  /  DBili  x   /  AST  132  /  ALT  45  /  AlkPhos  154  [12-28-22 @ 09:32]    PT/INR: PT 25.9 , INR 2.21       [12-28-22 @ 03:19]  PTT: 54.4       [12-28-22 @ 03:19]      Creatinine Trend:  SCr 0.73 [12-28 @ 09:32]  SCr 0.71 [12-28 @ 03:19]  SCr 0.70 [12-27 @ 21:14]  SCr 0.70 [12-27 @ 15:20]  SCr 0.72 [12-27 @ 08:41]      Urinalysis - [12-20-22 @ 23:55]      Color Dark Yellow / Appearance Turbid / SG 1.029 / pH 6.0      Gluc 100 mg/dL / Ketone Small  / Bili Large / Urobili Negative       Blood Moderate / Protein 300 mg/dL / Leuk Est Large / Nitrite Negative      RBC 5-10 / WBC 11-25 / Hyaline 0-2 / Gran  / Sq Epi  / Non Sq Epi Moderate / Bacteria Negative      Iron 51, TIBC Unable to calculate Test Repeated, %sat Unable to calculate Test Repeated      [12-24-22 @ 20:58]  Ferritin 5747      [12-24-22 @ 20:58]  TSH 0.05      [12-24-22 @ 20:58]  Lipid: chol 100, , HDL 9, LDL --      [12-24-22 @ 20:58]    HBsAb <3.0      [12-24-22 @ 20:59]  HBsAb Nonreact      [12-24-22 @ 20:59]  HBsAg Nonreact      [12-24-22 @ 20:59]  HBcAb Nonreact      [12-24-22 @ 20:59]  HCV 0.12, Nonreact      [12-24-22 @ 20:59]  HIV Nonreact      [12-24-22 @ 20:58]  HIV Nonreact      [12-24-22 @ 20:58]    MIRNA: titer Negative, pattern --      [12-24-22 @ 20:59]  Syphilis Screen (Treponema Pallidum Ab) Negative      [12-24-22 @ 20:59]

## 2022-12-28 NOTE — BH CONSULTATION LIAISON ASSESSMENT NOTE - HPI (INCLUDE ILLNESS QUALITY, SEVERITY, DURATION, TIMING, CONTEXT, MODIFYING FACTORS, ASSOCIATED SIGNS AND SYMPTOMS)
Patient is a 62 year-old female, , domiciled with spouse, PMH of thyroid cancer s/p thyroidectomy, craniotomy with resection of ventricular mass, admitted to [facility] for acute liver failure and new seizures, transferred to [facility] for liver transplant evaluation, with psychiatry consulted for psychiatric transplant assessment.    Patient is seen at the bedside by the resident. Patient is not arousable to verbal stimuli. Opens her eyes intermittently with tactile stimulus but remains non-verbal.    Spoke with patient's daughter, Taylor, who was also at bedside. Pt's daughter states that her mother has had a very stressful year -- her ex- (and father of her children) committed suicide a few months ago and the patient underwent a craniotomy for resection of a ventricular mass. The patient usually drinks 1-2 bottles of white wine a day and her last drink was Thanksgiving. Remote hx of MDD diagnosis by PCP, started on antidepressants, which were discontinued 2 weeks later per pt request. No Hx of EtOH cessation tx. No hx of suicide attempts or psychiatric hospitalizations. Patient attended an inpatient rehabilitation program for 2 weeks in August but left because she did not find it helpful. Has tried virtual AA meetings but feels alienated around people with addiction. Pt's daughter denies other substance use. Pt's daughter states that after Thanksgiving, she began to notice a decompensation in her mother (more confusion and increased fatigue). The patient was brought to [facility] and treated for acute liver failure. Pt's daughter states that after initial stabilization, pt was going to be discharged with outpatient follow up. However, pt continued to decline at [facility], experiencing fluctuating episodes of delirium and autonomic instability, and was transferred to [facility].    Pt's daughter states that her mother has a strong support system: her  and two daughters, all of whom have agreed to help in the post-transplant recovery process.   Patient is a 62 year-old female, , domiciled with spouse, PMH of thyroid cancer s/p thyroidectomy, craniotomy with resection of ventricular mass, admitted to [facility] for acute liver failure and new seizures, transferred to [facility] for liver transplant evaluation, with psychiatry consulted for psychiatric transplant assessment.    Patient is seen at the bedside by the resident. Patient is not arousable to verbal stimuli. Opens her eyes intermittently with tactile stimulus but remains non-verbal.    Spoke with patient's daughter, Taylor, who was also at bedside. Pt's daughter states that her mother has had a very stressful year -- her ex- (and father of her children) committed suicide a few months ago and the patient underwent a craniotomy for resection of a ventricular mass. The patient usually drinks 1-2 bottles of white wine a day and her last drink was Thanksgiving. Remote hx of MDD diagnosis by PCP, started on antidepressants, which were discontinued 2 weeks later per pt request. No Hx of EtOH cessation tx. No hx of suicide attempts or psychiatric hospitalizations. Patient attended an inpatient rehabilitation program for 2 weeks in August but left because she did not find it helpful. Has tried virtual AA meetings but feels alienated around people with addiction. Pt's daughter denies other substance use. Pt's daughter states that after Thanksgiving, she began to notice a decompensation in her mother (more confusion and increased fatigue). The patient was brought to [facility] and treated for acute liver failure. Pt's daughter states that after initial stabilization, pt was going to be discharged with outpatient follow up. However, pt continued to decline at [facility], experiencing fluctuating episodes of delirium and autonomic instability, and was transferred to [facility].    Pt's daughter states that her mother has a strong support system: her  and two daughters, all of whom have agreed to help in the post-transplant recovery process.   Patient is a 62 year-old female, , domiciled with spouse, PMH of thyroid cancer s/p thyroidectomy, craniotomy with resection of ventricular mass, admitted to [facility] for acute liver failure and new seizures, transferred to [facility] for liver transplant evaluation, with psychiatry consulted for psychiatric transplant assessment.    Patient is seen at the bedside by the resident. Patient is not arousable to verbal stimuli. Opens her eyes intermittently with tactile stimulus but remains non-verbal.    Spoke with patient's daughter, Taylor, who was also at bedside. Pt's daughter states that her mother has had a very stressful year -- her ex- (and father of her children) committed suicide a few months ago and the patient underwent a craniotomy for resection of a ventricular mass. The patient usually drinks 1-2 bottles of white wine a day and her last drink was Thanksgiving. Remote hx of MDD diagnosis by PCP, started on antidepressants, which were discontinued 2 weeks later per pt request. No Hx of EtOH cessation tx. No hx of suicide attempts or psychiatric hospitalizations. Patient attended an inpatient rehabilitation program for 2 weeks in August but left because she did not find it helpful. Has tried virtual AA meetings but feels alienated around people with addiction. Pt's daughter denies other substance use. Pt's daughter states that after Thanksgiving, she began to notice a decompensation in her mother (more confusion and increased fatigue). The patient was brought to [facility] and treated for acute liver failure. Pt's daughter states that after initial stabilization, pt was going to be discharged with outpatient follow up. However, pt continued to decline at [facility], experiencing fluctuating episodes of delirium and autonomic instability, and was transferred to [facility].    Pt's daughter states that her mother has a strong support system: her  and two daughters, all of whom have agreed to help in the post-transplant recovery process.

## 2022-12-28 NOTE — PROGRESS NOTE ADULT - TIME BILLING
Advanced liver failure with RED on CRRT  Reviewed CRRT prescription and records  Clinical, lab, data reviewed  Reviewed comorbidities and current medication regimen  Suggestions:  Continue CRRT  I have seen the patient and reviewed CRRT prescription and flow sheet. Vascular access is functioning well. Patient is tolerating CRRT. Prescription has been adjusted for optimized control of volume status, uremia and electrolytes. Management of additional metabolic abnormalities/anemia will continue to be addressed on follow up.  I was present during and reviewed clinical and lab data as well as assessment and plan as documented by the housestaff as noted. Please contact if any additional questions with any change in clinical condition or on availability of any additional information or reports.  D/w transplant hepatology and SICU attendings

## 2022-12-28 NOTE — PROGRESS NOTE ADULT - ASSESSMENT
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   Transferred from [facility] 12/20 for further management of acute liver failure and liver transplant evaluation 2/2 known ETOH Cirrhosis.     [] Decompensated ETOH Cirrhosis  - wean off pressors as able, continue Midodrine  - remains on CVVH. continued pulmonary edema vs. 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   Transferred from [facility] 12/20 for further management of acute liver failure and liver transplant evaluation 2/2 known ETOH Cirrhosis.     [] Decompensated ETOH Cirrhosis  - wean off pressors as able, continue Midodrine  - remains on CVVH. continued pulmonary edema vs. 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   Transferred from [facility] 12/20 for further management of acute liver failure and liver transplant evaluation 2/2 known ETOH Cirrhosis.     [] Decompensated ETOH Cirrhosis  - wean off pressors as able, continue Midodrine  - remains on CVVH. continued pulmonary edema vs. 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   Transferred from [facility] 12/20 for further management of acute liver failure and liver transplant evaluation 2/2 known ETOH Cirrhosis.     [] Decompensated ETOH Cirrhosis  - wean off pressors as able, continue Midodrine  - remains on CVVH. continued pulmonary edema vs. 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 12/19 with new onset seizures.   Transferred from [facility] 12/20 for further management of acute liver failure and liver transplant evaluation 2/2 known ETOH Cirrhosis.     [] Decompensated ETOH Cirrhosis  - wean off pressors as able, continue Midodrine  - remains on CVVH. continued pulmonary edema vs. 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   Transferred from [facility] 12/20 for further management of acute liver failure and liver transplant evaluation 2/2 known ETOH Cirrhosis.     [] Decompensated ETOH Cirrhosis  - wean off pressors as able, continue Midodrine  - remains on CVVH. continued pulmonary edema vs. infiltrate on CXR   - ID: Leukocytosis,continue caspo; cont zonia; f/u blood cx,  f/u further ID recs, send fungitell   - HE: lactulose/rifaximin   - trend NGT output  - on Keppra for seizures, (no activity since admission)  - continue liver transplant evaluation w/u per protocol

## 2022-12-28 NOTE — PROGRESS NOTE ADULT - PROBLEM SELECTOR PLAN 3
Pt with hypophosphatemia while receiving CRRT. Serum phos improved to 4.0 today with Phoxilium. Plan to continue CRRT today with phoxilium. Monitor serum phos level.    If you have any questions, please feel free to contact me  Christin Emerson  Nephrology Fellow  [phone] / [employer] Teams(Preferred)  (After 5pm or on weekends please page the on-call fellow) Pt with hypophosphatemia while receiving CRRT. Serum phos improved to 4.0 today with Phoxilium. Plan to continue CRRT today with phoxilium. Monitor serum phos level.    If you have any questions, please feel free to contact me  Christin Emerson  Nephrology Fellow  [phone] / [employer] Teams(Preferred)  (After 5pm or on weekends please page the on-call fellow) Pt with hypophosphatemia while receiving CRRT. Serum phos improved to 4.0 today with Phoxilium. Plan to continue CRRT today with phoxilium. Monitor serum phos level.    If you have any questions, please feel free to contact me  Christin Emerson  Nephrology Fellow  [phone] / [employer] Teams(Preferred)  (After 5pm or on weekends please page the on-call fellow)

## 2022-12-28 NOTE — BH CONSULTATION LIAISON ASSESSMENT NOTE - CURRENT MEDICATION
MEDICATIONS  (STANDING):  caspofungin IVPB 50 milliGRAM(s) IV Intermittent every 24 hours  caspofungin IVPB      chlorhexidine 2% Cloths 1 Application(s) Topical <User Schedule>  CRRT Treatment    <Continuous>  dextrose 5% + sodium chloride 0.9%. 1000 milliLiter(s) (30 mL/Hr) IV Continuous <Continuous>  escitalopram 10 milliGRAM(s) Oral daily  folic acid 1 milliGRAM(s) Oral daily  influenza   Vaccine 0.5 milliLiter(s) IntraMuscular once  insulin lispro (ADMELOG) corrective regimen sliding scale   SubCutaneous every 6 hours  lactulose Syrup 20 Gram(s) Oral every 8 hours  levETIRAcetam  Solution 750 milliGRAM(s) Enteral Tube two times a day  levothyroxine 137 MICROGram(s) Oral daily  meropenem  IVPB 1000 milliGRAM(s) IV Intermittent every 8 hours  metoclopramide Injectable 10 milliGRAM(s) IV Push every 8 hours  midodrine 20 milliGRAM(s) Oral every 8 hours  multivitamin/minerals/iron Oral Solution (CENTRUM) 15 milliLiter(s) Oral daily  norepinephrine Infusion 0.05 MICROgram(s)/kG/Min (6.21 mL/Hr) IV Continuous <Continuous>  pantoprazole   Suspension 40 milliGRAM(s) Oral every 24 hours  Phoxillum Filtration BK 4 / 2.5 5000 milliLiter(s) (800 mL/Hr) CRRT <Continuous>  Phoxillum Filtration BK 4 / 2.5 5000 milliLiter(s) (200 mL/Hr) CRRT <Continuous>  Phoxillum Filtration BK 4 / 2.5 5000 milliLiter(s) (1000 mL/Hr) CRRT <Continuous>  rifAXIMin 550 milliGRAM(s) Oral every 12 hours  thiamine 100 milliGRAM(s) Enteral Tube daily  vasopressin Infusion 0.03 Unit(s)/Min (4.5 mL/Hr) IV Continuous <Continuous>    MEDICATIONS  (PRN):  HYDROmorphone  Injectable 0.25 milliGRAM(s) IV Push every 3 hours PRN Severe Pain (7 - 10)  ondansetron Injectable 4 milliGRAM(s) IV Push every 6 hours PRN Nausea and/or Vomiting  sodium chloride 0.65% Nasal 1 Spray(s) Both Nostrils every 3 hours PRN Nasal Congestion  tetracaine/benzocaine/butamben Spray 1 Spray(s) Topical every 3 hours PRN for ngt discomfort

## 2022-12-28 NOTE — PROGRESS NOTE ADULT - ASSESSMENT
Neurologic: hepatic encephalopathy   - inc lethargy & confusion, improved during day  - lactulose and rifaximin; trend ammonia  - c/w keppra 750 BID ( new onset of seizure )    Respiratory: acute hypoxic resp failure   - CXR continues to inc b/l infiltrates, likely aspiration   - combicath cx no significant growth to date  - CVVHD net even  - recheck RVP  - HFNC    Cardiovascular:   -  levo, vaso   - wean as tolerated, goal MAP >65  - midodrine 20 q8h    Gastrointestinal/Nutrition:  - Acute decompensated liver failure, s/p para 2.9L in ED  - Workup per transplant recs pending   - Monitor BM, rectal tube, on lactulose & rifaximin and watching ammonia  - Protonix for stress ulcer prophylaxis as per transplant surgery  - para 12/26 - transudative, not consistent with SBP  - likely ileus, NPO, NGT to suction    Genitourinary/Renal: RED 2/2 ATN vs HRS  - CRRT net even now  - given IVF 500cc & 25% albumin x2   - HD access L IJ   - monitor electrolytes  - nephro following    Hematologic:  - Chem VTE ppx: hold   - Continue SCD  - Thrombocytopenic, monitor plts  - trend Hbg, transfuse <7  - INR elevated likely hepatic dysfunction    Infectious Disease:  - Empiric abx meropenem, caspofungin  - ascites, blood, urine & fungal Cx so far NG  - repeat BCx 12/23 neg. resent 12/27  - Vanc 1gram 12/27  - CXR w/ bilateral infiltrates so will continue to monitor    Endocrine:  - Hypothyroidism c/w synthroid IV 70  - No steroids  - ISS, adjust as appropriate    Ethics: FULL CODE    Dispo/Lines:  R IJ TLC  L IJ shiley  A line L Radial  [place]  SICU 62yFemale presents with hx thyroid cancer s/p thyroidectomy, craniotomy with resection of ventricular mass, with acute liver failure, new seizures, transfer from [facility] for transplant evaluation.     Neurologic: hepatic encephalopathy   - inc lethargy & confusion, improved during day  - lactulose and rifaximin; trend ammonia  - c/w keppra 750 BID ( new onset of seizure )    Respiratory: acute hypoxic resp failure   - CXR continues to inc b/l infiltrates, likely aspiration   - combicath cx no significant growth to date  - CVVHD net even  - recheck RVP  - HFNC    Cardiovascular:   -  levo, vaso   - wean as tolerated, goal MAP >65  - midodrine 20 q8h    Gastrointestinal/Nutrition:  - Acute decompensated liver failure, s/p para 2.9L in ED  - Workup per transplant recs pending   - Monitor BM, rectal tube, on lactulose & rifaximin and watching ammonia  - Protonix for stress ulcer prophylaxis as per transplant surgery  - para 12/26 - transudative, not consistent with SBP  - likely ileus, NPO, NGT to suction  - Reglan    Genitourinary/Renal: RED 2/2 ATN vs HRS  - CRRT net even now  - given IVF 500cc & 25% albumin x2   - HD access L IJ   - monitor electrolytes  - nephro following  - NS @ 30    Hematologic:  - Chem VTE ppx: hold   - Continue SCD  - Thrombocytopenic, monitor plts  - trend Hbg, transfuse <7  - INR elevated likely hepatic dysfunction    Infectious Disease:  - Empiric abx meropenem, caspofungin  - ascites, blood, urine & fungal Cx so far NG  - repeat BCx 12/23 neg. resent 12/27  - Vanc 1gram 12/27  - CXR w/ bilateral infiltrates so will continue to monitor    Endocrine:  - Hypothyroidism c/w synthroid IV 70  - No steroids  - ISS, adjust as appropriate    Ethics: FULL CODE    Dispo/Lines:  R IJ TLC  L IJ shiley  A line L Radial  [place]  SICU

## 2022-12-28 NOTE — PROGRESS NOTE ADULT - PROBLEM SELECTOR PLAN 1
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 Upon review of [place], SCr was 0.78 on 12/6/22, increased to 5.31 on admission (12/20), and improved to 0.73 today with CRRT. Pt remains anuric. Pt with likely HRS vs ATN vs bile cast nephropathy. Plan to continue with CRRT today as urine output remains low. Monitor labs and urine output. Avoid nephrotoxins. Dose medications as per eGFR. 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 Upon review of [place], SCr was 0.78 on 12/6/22, increased to 5.31 on admission (12/20), and improved to 0.73 today with CRRT. Pt remains anuric. Pt with likely HRS vs ATN vs bile cast nephropathy. Plan to continue with CRRT today as urine output remains low. Monitor labs and urine output. Avoid nephrotoxins. Dose medications as per eGFR. 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 Upon review of [place], SCr was 0.78 on 12/6/22, increased to 5.31 on admission (12/20), and improved to 0.73 today with CRRT. Pt remains anuric. Pt with likely HRS vs ATN vs bile cast nephropathy. Plan to continue with CRRT today as urine output remains low. Monitor labs and urine output. Avoid nephrotoxins. Dose medications as per eGFR.

## 2022-12-29 LAB
ALBUMIN SERPL ELPH-MCNC: 3.6 G/DL — SIGNIFICANT CHANGE UP (ref 3.3–5)
ALBUMIN SERPL ELPH-MCNC: 3.7 G/DL — SIGNIFICANT CHANGE UP (ref 3.3–5)
ALBUMIN SERPL ELPH-MCNC: 3.8 G/DL — SIGNIFICANT CHANGE UP (ref 3.3–5)
ALP SERPL-CCNC: 155 U/L — HIGH (ref 40–120)
ALP SERPL-CCNC: 160 U/L — HIGH (ref 40–120)
ALP SERPL-CCNC: 175 U/L — HIGH (ref 40–120)
ALT FLD-CCNC: 50 U/L — HIGH (ref 10–45)
ALT FLD-CCNC: 51 U/L — HIGH (ref 10–45)
ALT FLD-CCNC: 55 U/L — HIGH (ref 10–45)
AMMONIA BLD-MCNC: 76 UMOL/L — HIGH (ref 11–55)
ANION GAP SERPL CALC-SCNC: 14 MMOL/L — SIGNIFICANT CHANGE UP (ref 5–17)
ANION GAP SERPL CALC-SCNC: 15 MMOL/L — SIGNIFICANT CHANGE UP (ref 5–17)
ANION GAP SERPL CALC-SCNC: 16 MMOL/L — SIGNIFICANT CHANGE UP (ref 5–17)
APTT BLD: 47.4 SEC — HIGH (ref 27.5–35.5)
AST SERPL-CCNC: 154 U/L — HIGH (ref 10–40)
AST SERPL-CCNC: 156 U/L — HIGH (ref 10–40)
AST SERPL-CCNC: 165 U/L — HIGH (ref 10–40)
BILIRUB SERPL-MCNC: 28 MG/DL — HIGH (ref 0.2–1.2)
BILIRUB SERPL-MCNC: 28.1 MG/DL — HIGH (ref 0.2–1.2)
BILIRUB SERPL-MCNC: 28.8 MG/DL — HIGH (ref 0.2–1.2)
BLD GP AB SCN SERPL QL: NEGATIVE — SIGNIFICANT CHANGE UP
BUN SERPL-MCNC: 12 MG/DL — SIGNIFICANT CHANGE UP (ref 7–23)
CALCIUM SERPL-MCNC: 8.7 MG/DL — SIGNIFICANT CHANGE UP (ref 8.4–10.5)
CALCIUM SERPL-MCNC: 8.9 MG/DL — SIGNIFICANT CHANGE UP (ref 8.4–10.5)
CHLORIDE SERPL-SCNC: 102 MMOL/L — SIGNIFICANT CHANGE UP (ref 96–108)
CHLORIDE SERPL-SCNC: 104 MMOL/L — SIGNIFICANT CHANGE UP (ref 96–108)
CHLORIDE SERPL-SCNC: 105 MMOL/L — SIGNIFICANT CHANGE UP (ref 96–108)
CO2 SERPL-SCNC: 20 MMOL/L — LOW (ref 22–31)
CREAT SERPL-MCNC: 0.7 MG/DL — SIGNIFICANT CHANGE UP (ref 0.5–1.3)
CREAT SERPL-MCNC: 0.71 MG/DL — SIGNIFICANT CHANGE UP (ref 0.5–1.3)
CREAT SERPL-MCNC: 0.73 MG/DL — SIGNIFICANT CHANGE UP (ref 0.5–1.3)
EGFR: 94 ML/MIN/1.73M2 — SIGNIFICANT CHANGE UP
EGFR: 97 ML/MIN/1.73M2 — SIGNIFICANT CHANGE UP
EGFR: 98 ML/MIN/1.73M2 — SIGNIFICANT CHANGE UP
FUNGITELL: 42 PG/ML — SIGNIFICANT CHANGE UP
FUNGITELL: 56 PG/ML — SIGNIFICANT CHANGE UP
GAS PNL BLDA: SIGNIFICANT CHANGE UP
GLUCOSE BLDC GLUCOMTR-MCNC: 100 MG/DL — HIGH (ref 70–99)
GLUCOSE BLDC GLUCOMTR-MCNC: 95 MG/DL — SIGNIFICANT CHANGE UP (ref 70–99)
GLUCOSE SERPL-MCNC: 100 MG/DL — HIGH (ref 70–99)
GLUCOSE SERPL-MCNC: 101 MG/DL — HIGH (ref 70–99)
GLUCOSE SERPL-MCNC: 92 MG/DL — SIGNIFICANT CHANGE UP (ref 70–99)
HCT VFR BLD CALC: 22.6 % — LOW (ref 34.5–45)
HCT VFR BLD CALC: 23.5 % — LOW (ref 34.5–45)
HGB BLD-MCNC: 7.7 G/DL — LOW (ref 11.5–15.5)
HGB BLD-MCNC: 7.9 G/DL — LOW (ref 11.5–15.5)
INR BLD: 2.26 RATIO — HIGH (ref 0.88–1.16)
MAGNESIUM SERPL-MCNC: 2.5 MG/DL — SIGNIFICANT CHANGE UP (ref 1.6–2.6)
MCHC RBC-ENTMCNC: 31 PG — SIGNIFICANT CHANGE UP (ref 27–34)
MCHC RBC-ENTMCNC: 31.2 PG — SIGNIFICANT CHANGE UP (ref 27–34)
MCHC RBC-ENTMCNC: 33.6 GM/DL — SIGNIFICANT CHANGE UP (ref 32–36)
MCHC RBC-ENTMCNC: 34.1 GM/DL — SIGNIFICANT CHANGE UP (ref 32–36)
MCV RBC AUTO: 91.5 FL — SIGNIFICANT CHANGE UP (ref 80–100)
MCV RBC AUTO: 92.2 FL — SIGNIFICANT CHANGE UP (ref 80–100)
NRBC # BLD: 0 /100 WBCS — SIGNIFICANT CHANGE UP (ref 0–0)
PHOSPHATE SERPL-MCNC: 3.7 MG/DL — SIGNIFICANT CHANGE UP (ref 2.5–4.5)
PHOSPHATE SERPL-MCNC: 3.8 MG/DL — SIGNIFICANT CHANGE UP (ref 2.5–4.5)
PHOSPHATE SERPL-MCNC: 4.1 MG/DL — SIGNIFICANT CHANGE UP (ref 2.5–4.5)
PLATELET # BLD AUTO: 42 K/UL — LOW (ref 150–400)
PLATELET # BLD AUTO: 45 K/UL — LOW (ref 150–400)
PLATELET # BLD AUTO: 48 K/UL — LOW (ref 150–400)
POTASSIUM SERPL-MCNC: 4.2 MMOL/L — SIGNIFICANT CHANGE UP (ref 3.5–5.3)
POTASSIUM SERPL-MCNC: 4.3 MMOL/L — SIGNIFICANT CHANGE UP (ref 3.5–5.3)
POTASSIUM SERPL-SCNC: 4.2 MMOL/L — SIGNIFICANT CHANGE UP (ref 3.5–5.3)
POTASSIUM SERPL-SCNC: 4.3 MMOL/L — SIGNIFICANT CHANGE UP (ref 3.5–5.3)
PROT SERPL-MCNC: 5.9 G/DL — LOW (ref 6–8.3)
PROT SERPL-MCNC: 6.2 G/DL — SIGNIFICANT CHANGE UP (ref 6–8.3)
PROTHROM AB SERPL-ACNC: 26.2 SEC — HIGH (ref 10.5–13.4)
RBC # BLD: 2.47 M/UL — LOW (ref 3.8–5.2)
RBC # BLD: 2.55 M/UL — LOW (ref 3.8–5.2)
RBC # FLD: 17.4 % — HIGH (ref 10.3–14.5)
RBC # FLD: 17.6 % — HIGH (ref 10.3–14.5)
RH IG SCN BLD-IMP: NEGATIVE — SIGNIFICANT CHANGE UP
SODIUM SERPL-SCNC: 138 MMOL/L — SIGNIFICANT CHANGE UP (ref 135–145)
SODIUM SERPL-SCNC: 139 MMOL/L — SIGNIFICANT CHANGE UP (ref 135–145)
WBC # BLD: 30.31 K/UL — HIGH (ref 3.8–10.5)
WBC # BLD: 30.48 K/UL — HIGH (ref 3.8–10.5)
WBC # BLD: 30.63 K/UL — HIGH (ref 3.8–10.5)
WBC # FLD AUTO: 30.31 K/UL — HIGH (ref 3.8–10.5)
WBC # FLD AUTO: 30.48 K/UL — HIGH (ref 3.8–10.5)
WBC # FLD AUTO: 30.63 K/UL — HIGH (ref 3.8–10.5)

## 2022-12-29 PROCEDURE — 99233 SBSQ HOSP IP/OBS HIGH 50: CPT

## 2022-12-29 PROCEDURE — 71045 X-RAY EXAM CHEST 1 VIEW: CPT | Mod: 26

## 2022-12-29 PROCEDURE — 74018 RADEX ABDOMEN 1 VIEW: CPT | Mod: 26

## 2022-12-29 PROCEDURE — 90945 DIALYSIS ONE EVALUATION: CPT | Mod: GC

## 2022-12-29 RX ADMIN — CHLORHEXIDINE GLUCONATE 1 APPLICATION(S): 213 SOLUTION TOPICAL at 05:12

## 2022-12-29 RX ADMIN — Medication 137 MICROGRAM(S): at 05:10

## 2022-12-29 RX ADMIN — Medication 10 MILLIGRAM(S): at 13:02

## 2022-12-29 RX ADMIN — MEROPENEM 100 MILLIGRAM(S): 1 INJECTION INTRAVENOUS at 05:11

## 2022-12-29 RX ADMIN — VASOPRESSIN 4.5 UNIT(S)/MIN: 20 INJECTION INTRAVENOUS at 21:04

## 2022-12-29 RX ADMIN — LACTULOSE 20 GRAM(S): 10 SOLUTION ORAL at 13:02

## 2022-12-29 RX ADMIN — HYDROMORPHONE HYDROCHLORIDE 0.25 MILLIGRAM(S): 2 INJECTION INTRAMUSCULAR; INTRAVENOUS; SUBCUTANEOUS at 00:21

## 2022-12-29 RX ADMIN — VASOPRESSIN 4.5 UNIT(S)/MIN: 20 INJECTION INTRAVENOUS at 05:13

## 2022-12-29 RX ADMIN — LEVETIRACETAM 750 MILLIGRAM(S): 250 TABLET, FILM COATED ORAL at 17:15

## 2022-12-29 RX ADMIN — MIDODRINE HYDROCHLORIDE 20 MILLIGRAM(S): 2.5 TABLET ORAL at 13:02

## 2022-12-29 RX ADMIN — Medication 6.21 MICROGRAM(S)/KG/MIN: at 21:05

## 2022-12-29 RX ADMIN — Medication 6.21 MICROGRAM(S)/KG/MIN: at 09:12

## 2022-12-29 RX ADMIN — SODIUM CHLORIDE 30 MILLILITER(S): 9 INJECTION, SOLUTION INTRAVENOUS at 05:13

## 2022-12-29 RX ADMIN — Medication 10 MILLIGRAM(S): at 21:05

## 2022-12-29 RX ADMIN — Medication 1 MILLIGRAM(S): at 11:10

## 2022-12-29 RX ADMIN — LEVETIRACETAM 750 MILLIGRAM(S): 250 TABLET, FILM COATED ORAL at 05:11

## 2022-12-29 RX ADMIN — LACTULOSE 20 GRAM(S): 10 SOLUTION ORAL at 21:05

## 2022-12-29 RX ADMIN — MEROPENEM 100 MILLIGRAM(S): 1 INJECTION INTRAVENOUS at 21:04

## 2022-12-29 RX ADMIN — PANTOPRAZOLE SODIUM 40 MILLIGRAM(S): 20 TABLET, DELAYED RELEASE ORAL at 12:58

## 2022-12-29 RX ADMIN — Medication 100 MILLIGRAM(S): at 11:10

## 2022-12-29 RX ADMIN — Medication 15 MILLILITER(S): at 11:10

## 2022-12-29 RX ADMIN — MEROPENEM 100 MILLIGRAM(S): 1 INJECTION INTRAVENOUS at 13:01

## 2022-12-29 RX ADMIN — SODIUM CHLORIDE 30 MILLILITER(S): 9 INJECTION, SOLUTION INTRAVENOUS at 09:13

## 2022-12-29 RX ADMIN — ESCITALOPRAM OXALATE 10 MILLIGRAM(S): 10 TABLET, FILM COATED ORAL at 11:09

## 2022-12-29 RX ADMIN — VASOPRESSIN 4.5 UNIT(S)/MIN: 20 INJECTION INTRAVENOUS at 09:13

## 2022-12-29 RX ADMIN — CASPOFUNGIN ACETATE 260 MILLIGRAM(S): 7 INJECTION, POWDER, LYOPHILIZED, FOR SOLUTION INTRAVENOUS at 09:12

## 2022-12-29 RX ADMIN — Medication 6.21 MICROGRAM(S)/KG/MIN: at 05:13

## 2022-12-29 RX ADMIN — LACTULOSE 20 GRAM(S): 10 SOLUTION ORAL at 05:11

## 2022-12-29 RX ADMIN — HYDROMORPHONE HYDROCHLORIDE 0.25 MILLIGRAM(S): 2 INJECTION INTRAMUSCULAR; INTRAVENOUS; SUBCUTANEOUS at 00:06

## 2022-12-29 RX ADMIN — MIDODRINE HYDROCHLORIDE 20 MILLIGRAM(S): 2.5 TABLET ORAL at 05:12

## 2022-12-29 RX ADMIN — MIDODRINE HYDROCHLORIDE 20 MILLIGRAM(S): 2.5 TABLET ORAL at 21:04

## 2022-12-29 RX ADMIN — Medication 10 MILLIGRAM(S): at 05:11

## 2022-12-29 NOTE — PROGRESS NOTE ADULT - TIME BILLING
Advanced liver failure with RED on CRRT  Clinical, lab, data reviewed  Reviewed comorbidities and  medication regimen  Suggestions:  Continue CRRT  I have seen the patient and reviewed CRRT prescription and flow sheet. Vascular access is functioning well. Patient is tolerating CRRT. Prescription has been adjusted for optimized control of volume status, uremia and electrolytes. Management of additional metabolic abnormalities/anemia will continue to be addressed on follow up.  I was present during and reviewed clinical and lab data as well as assessment and plan as documented by the house staff as noted. Please contact if any additional questions with any change in clinical condition or on availability of any additional information or reports.  D/w house staff.

## 2022-12-29 NOTE — PROGRESS NOTE ADULT - ASSESSMENT
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 12/19 with new onset seizures.   Transferred from [facility] 12/20 for further management of acute liver failure and liver transplant evaluation 2/2 known ETOH Cirrhosis.     [] Decompensated ETOH Cirrhosis  - wean off pressors as able, continue Midodrine  - remains on CVVH. continued pulmonary edema vs. 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   Transferred from [facility] 12/20 for further management of acute liver failure and liver transplant evaluation 2/2 known ETOH Cirrhosis.     [] Decompensated ETOH Cirrhosis  - wean off pressors as able, continue Midodrine  - remains on CVVH. continued pulmonary edema vs. 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   Transferred from [facility] 12/20 for further management of acute liver failure and liver transplant evaluation 2/2 known ETOH Cirrhosis.     [] Decompensated ETOH Cirrhosis  - wean off pressors as able, continue Midodrine  - remains on CVVH. continued pulmonary edema vs. 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   Transferred from [facility] 12/20 for further management of acute liver failure and liver transplant evaluation 2/2 known ETOH Cirrhosis.     [] Decompensated ETOH Cirrhosis  - wean off pressors as able, continue Midodrine  - remains on CVVH. continued pulmonary edema vs. 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   Transferred from [facility] 12/20 for further management of acute liver failure and liver transplant evaluation 2/2 known ETOH Cirrhosis.     [] Decompensated ETOH Cirrhosis  - wean off pressors as able, continue Midodrine  - remains on CVVH. continued pulmonary edema vs. 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   Transferred from [facility] 12/20 for further management of acute liver failure and liver transplant evaluation 2/2 known ETOH Cirrhosis.     [] Decompensated ETOH Cirrhosis  - wean off pressors as able, continue Midodrine  - remains on CVVH. continued pulmonary edema vs. infiltrate on CXR   - ID: Leukocytosis,continue caspo; cont zonia; f/u blood cx,  f/u further ID recs  - HE: lactulose/rifaximin   - trend NGT output - TF currently paused due to emesis; reglan PRN, recommend abd x-ray  - on Keppra for seizures, (no activity since admission)  - continue liver transplant evaluation w/u per protocol

## 2022-12-29 NOTE — PROGRESS NOTE ADULT - NS ATTEND AMEND GEN_ALL_CORE FT
Slow gradual progress. CXR improving, wean 02 as tolerated. continue abx. check AXR.  restart tube feeds. continues on CRRT.

## 2022-12-29 NOTE — PROGRESS NOTE ADULT - PROBLEM SELECTOR PLAN 1
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 Upon review of [place], SCr was 0.78 on 12/6/22, increased to 5.31 on admission (12/20), and improved to 0.70 today with CRRT. Pt remains anuric. Pt with likely HRS vs ATN vs bile cast nephropathy. Plan to continue with CRRT today as urine output remains low. Monitor labs and urine output. Avoid nephrotoxins. Dose medications as per eGFR. 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 Upon review of [place], SCr was 0.78 on 12/6/22, increased to 5.31 on admission (12/20), and improved to 0.70 today with CRRT. Pt remains anuric. Pt with likely HRS vs ATN vs bile cast nephropathy. Plan to continue with CRRT today as urine output remains low. Monitor labs and urine output. Avoid nephrotoxins. Dose medications as per eGFR. 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 Upon review of [place], SCr was 0.78 on 12/6/22, increased to 5.31 on admission (12/20), and improved to 0.70 today with CRRT. Pt remains anuric. Pt with likely HRS vs ATN vs bile cast nephropathy. Plan to continue with CRRT today as urine output remains low. Monitor labs and urine output. Avoid nephrotoxins. Dose medications as per eGFR.

## 2022-12-29 NOTE — PROGRESS NOTE ADULT - SUBJECTIVE AND OBJECTIVE BOX
[facility] DIVISION OF KIDNEY DISEASES AND HYPERTENSION -- FOLLOW UP NOTE  --------------------------------------------------------------------------------  HPI: 61 yo F with h/o decompensated ETOH cirrhosis with ascites, thyroid cancer (s/p total thyroidectomy, RTX, and radioactive iodide), HTN, ventrical neoplasm who was initially admitted to  12/19 with new onset seizures and transferred to [facility] 12/20 for liver transplant evaluation. Pt being seen for RED requiring CRRT.    24 hour events/subjective: Pt was seen and evaluated in the ICU this morning with family present. Pt on high flow, poor historian. Unable to obtain ROS.        PAST HISTORY  --------------------------------------------------------------------------------  No significant changes to PMH, PSH, FHx, SHx, unless otherwise noted    ALLERGIES & MEDICATIONS  --------------------------------------------------------------------------------  Allergies    Macrobid (Rash)    Intolerances    Nexium (Stomach Upset)    Standing Inpatient Medications  caspofungin IVPB      caspofungin IVPB 50 milliGRAM(s) IV Intermittent every 24 hours  chlorhexidine 2% Cloths 1 Application(s) Topical <User Schedule>  CRRT Treatment    <Continuous>  dextrose 5% + sodium chloride 0.9%. 1000 milliLiter(s) IV Continuous <Continuous>  escitalopram 10 milliGRAM(s) Oral daily  folic acid 1 milliGRAM(s) Oral daily  influenza   Vaccine 0.5 milliLiter(s) IntraMuscular once  insulin lispro (ADMELOG) corrective regimen sliding scale   SubCutaneous every 6 hours  lactulose Syrup 20 Gram(s) Oral every 8 hours  levETIRAcetam  Solution 750 milliGRAM(s) Enteral Tube two times a day  levothyroxine 137 MICROGram(s) Oral daily  meropenem  IVPB 1000 milliGRAM(s) IV Intermittent every 8 hours  metoclopramide Injectable 10 milliGRAM(s) IV Push every 8 hours  midodrine 20 milliGRAM(s) Oral every 8 hours  multivitamin/minerals/iron Oral Solution (CENTRUM) 15 milliLiter(s) Oral daily  norepinephrine Infusion 0.05 MICROgram(s)/kG/Min IV Continuous <Continuous>  pantoprazole   Suspension 40 milliGRAM(s) Oral every 24 hours  Phoxillum Filtration BK 4 / 2.5 5000 milliLiter(s) CRRT <Continuous>  Phoxillum Filtration BK 4 / 2.5 5000 milliLiter(s) CRRT <Continuous>  Phoxillum Filtration BK 4 / 2.5 5000 milliLiter(s) CRRT <Continuous>  rifAXIMin 550 milliGRAM(s) Oral every 12 hours  thiamine 100 milliGRAM(s) Enteral Tube daily  vasopressin Infusion 0.03 Unit(s)/Min IV Continuous <Continuous>    PRN Inpatient Medications  HYDROmorphone  Injectable 0.25 milliGRAM(s) IV Push every 3 hours PRN  ondansetron Injectable 4 milliGRAM(s) IV Push every 6 hours PRN  sodium chloride 0.65% Nasal 1 Spray(s) Both Nostrils every 3 hours PRN  tetracaine/benzocaine/butamben Spray 1 Spray(s) Topical every 3 hours PRN      REVIEW OF SYSTEMS  --------------------------------------------------------------------------------  Unable to obtain ROS    VITALS/PHYSICAL EXAM  --------------------------------------------------------------------------------  T(C): 36.7 (12-29-22 @ 11:00), Max: 37 (12-28-22 @ 23:00)  HR: 87 (12-29-22 @ 11:15) (74 - 105)  BP: --  RR: 17 (12-29-22 @ 11:15) (9 - 30)  SpO2: 96% (12-29-22 @ 11:15) (85% - 100%)  Wt(kg): --        12-28-22 @ 07:01  -  12-29-22 @ 07:00  --------------------------------------------------------  IN: 1640.7 mL / OUT: 1656 mL / NET: -15.3 mL    12-29-22 @ 07:01  -  12-29-22 @ 11:40  --------------------------------------------------------  IN: 482.8 mL / OUT: 499 mL / NET: -16.2 mL      Physical Exam:  Gen: NAD  HEENT: On HFNC   Pulm: CTA B/L, no crackles   CV: RRR, S1S2+  Abd: +BS, soft, distended  : +urinary catheter  MSK: +BL LE edema  Psych: Awake  Skin: Warm  Access: +Non-tunneled HD catheter    LABS/STUDIES  --------------------------------------------------------------------------------              7.7    30.31 >-----------<  45       [12-29-22 @ 10:23]              22.6     139  |  104  |  12  ----------------------------<  100      [12-29-22 @ 10:23]  4.3   |  20  |  0.70        Ca     8.9     [12-29-22 @ 10:23]      Mg     2.5     [12-29-22 @ 10:23]      Phos  3.8     [12-29-22 @ 10:23]    TPro  5.9  /  Alb  3.7  /  TBili  28.8  /  DBili  x   /  AST  154  /  ALT  51  /  AlkPhos  160  [12-29-22 @ 10:23]    PT/INR: PT 26.2 , INR 2.26       [12-29-22 @ 04:21]  PTT: 47.4       [12-29-22 @ 04:21]      Creatinine Trend:  SCr 0.70 [12-29 @ 10:23]  SCr 0.73 [12-29 @ 04:21]  SCr 0.73 [12-28 @ 21:56]  SCr 0.67 [12-28 @ 15:51]  SCr 0.73 [12-28 @ 09:32]    Urinalysis - [12-20-22 @ 23:55]      Color Dark Yellow / Appearance Turbid / SG 1.029 / pH 6.0      Gluc 100 mg/dL / Ketone Small  / Bili Large / Urobili Negative       Blood Moderate / Protein 300 mg/dL / Leuk Est Large / Nitrite Negative      RBC 5-10 / WBC 11-25 / Hyaline 0-2 / Gran  / Sq Epi  / Non Sq Epi Moderate / Bacteria Negative      Iron 51, TIBC Unable to calculate Test Repeated, %sat Unable to calculate Test Repeated      [12-24-22 @ 20:58]  Ferritin 5747      [12-24-22 @ 20:58]  TSH 0.05      [12-24-22 @ 20:58]  Lipid: chol 100, , HDL 9, LDL --      [12-24-22 @ 20:58]    HBsAb <3.0      [12-24-22 @ 20:59]  HBsAb Nonreact      [12-24-22 @ 20:59]  HBsAg Nonreact      [12-24-22 @ 20:59]  HBcAb Nonreact      [12-24-22 @ 20:59]  HCV 0.12, Nonreact      [12-24-22 @ 20:59]  HIV Nonreact      [12-24-22 @ 20:58]  HIV Nonreact      [12-24-22 @ 20:58]    MIRNA: titer Negative, pattern --      [12-24-22 @ 20:59]  Syphilis Screen (Treponema Pallidum Ab) Negative      [12-24-22 @ 20:59] [facility] DIVISION OF KIDNEY DISEASES AND HYPERTENSION -- FOLLOW UP NOTE  --------------------------------------------------------------------------------  HPI: 61 yo F with h/o decompensated ETOH cirrhosis with ascites, thyroid cancer (s/p total thyroidectomy, RTX, and radioactive iodide), HTN, ventrical neoplasm who was initially admitted to  12/19 with new onset seizures and transferred to [facility] 12/20 for liver transplant evaluation. Pt being seen for RED requiring CRRT.    24 hour events/subjective: Pt was seen and evaluated in the ICU this morning with family present. Pt on high flow, poor historian. Unable to obtain ROS.        PAST HISTORY  --------------------------------------------------------------------------------  No significant changes to PMH, PSH, FHx, SHx, unless otherwise noted    ALLERGIES & MEDICATIONS  --------------------------------------------------------------------------------  Allergies    Macrobid (Rash)    Intolerances    Nexium (Stomach Upset)    Standing Inpatient Medications  caspofungin IVPB      caspofungin IVPB 50 milliGRAM(s) IV Intermittent every 24 hours  chlorhexidine 2% Cloths 1 Application(s) Topical <User Schedule>  CRRT Treatment    <Continuous>  dextrose 5% + sodium chloride 0.9%. 1000 milliLiter(s) IV Continuous <Continuous>  escitalopram 10 milliGRAM(s) Oral daily  folic acid 1 milliGRAM(s) Oral daily  influenza   Vaccine 0.5 milliLiter(s) IntraMuscular once  insulin lispro (ADMELOG) corrective regimen sliding scale   SubCutaneous every 6 hours  lactulose Syrup 20 Gram(s) Oral every 8 hours  levETIRAcetam  Solution 750 milliGRAM(s) Enteral Tube two times a day  levothyroxine 137 MICROGram(s) Oral daily  meropenem  IVPB 1000 milliGRAM(s) IV Intermittent every 8 hours  metoclopramide Injectable 10 milliGRAM(s) IV Push every 8 hours  midodrine 20 milliGRAM(s) Oral every 8 hours  multivitamin/minerals/iron Oral Solution (CENTRUM) 15 milliLiter(s) Oral daily  norepinephrine Infusion 0.05 MICROgram(s)/kG/Min IV Continuous <Continuous>  pantoprazole   Suspension 40 milliGRAM(s) Oral every 24 hours  Phoxillum Filtration BK 4 / 2.5 5000 milliLiter(s) CRRT <Continuous>  Phoxillum Filtration BK 4 / 2.5 5000 milliLiter(s) CRRT <Continuous>  Phoxillum Filtration BK 4 / 2.5 5000 milliLiter(s) CRRT <Continuous>  rifAXIMin 550 milliGRAM(s) Oral every 12 hours  thiamine 100 milliGRAM(s) Enteral Tube daily  vasopressin Infusion 0.03 Unit(s)/Min IV Continuous <Continuous>    PRN Inpatient Medications  HYDROmorphone  Injectable 0.25 milliGRAM(s) IV Push every 3 hours PRN  ondansetron Injectable 4 milliGRAM(s) IV Push every 6 hours PRN  sodium chloride 0.65% Nasal 1 Spray(s) Both Nostrils every 3 hours PRN  tetracaine/benzocaine/butamben Spray 1 Spray(s) Topical every 3 hours PRN      REVIEW OF SYSTEMS  --------------------------------------------------------------------------------  Unable to obtain ROS    VITALS/PHYSICAL EXAM  --------------------------------------------------------------------------------  T(C): 36.7 (12-29-22 @ 11:00), Max: 37 (12-28-22 @ 23:00)  HR: 87 (12-29-22 @ 11:15) (74 - 105)  BP: --  RR: 17 (12-29-22 @ 11:15) (9 - 30)  SpO2: 96% (12-29-22 @ 11:15) (85% - 100%)  Wt(kg): --        12-28-22 @ 07:01  -  12-29-22 @ 07:00  --------------------------------------------------------  IN: 1640.7 mL / OUT: 1656 mL / NET: -15.3 mL    12-29-22 @ 07:01  -  12-29-22 @ 11:40  --------------------------------------------------------  IN: 482.8 mL / OUT: 499 mL / NET: -16.2 mL      Physical Exam:  Gen: NAD  HEENT: On HFNC   Pulm: CTA B/L, no crackles   CV: RRR, S1S2+  Abd: +BS, soft, distended  : +urinary catheter  MSK: +BL LE edema  Psych: Awake  Skin: Warm  Access: +Non-tunneled HD catheter    LABS/STUDIES  --------------------------------------------------------------------------------              7.7    30.31 >-----------<  45       [12-29-22 @ 10:23]              22.6     139  |  104  |  12  ----------------------------<  100      [12-29-22 @ 10:23]  4.3   |  20  |  0.70        Ca     8.9     [12-29-22 @ 10:23]      Mg     2.5     [12-29-22 @ 10:23]      Phos  3.8     [12-29-22 @ 10:23]    TPro  5.9  /  Alb  3.7  /  TBili  28.8  /  DBili  x   /  AST  154  /  ALT  51  /  AlkPhos  160  [12-29-22 @ 10:23]    PT/INR: PT 26.2 , INR 2.26       [12-29-22 @ 04:21]  PTT: 47.4       [12-29-22 @ 04:21]      Creatinine Trend:  SCr 0.70 [12-29 @ 10:23]  SCr 0.73 [12-29 @ 04:21]  SCr 0.73 [12-28 @ 21:56]  SCr 0.67 [12-28 @ 15:51]  SCr 0.73 [12-28 @ 09:32]    Urinalysis - [12-20-22 @ 23:55]      Color Dark Yellow / Appearance Turbid / SG 1.029 / pH 6.0      Gluc 100 mg/dL / Ketone Small  / Bili Large / Urobili Negative       Blood Moderate / Protein 300 mg/dL / Leuk Est Large / Nitrite Negative      RBC 5-10 / WBC 11-25 / Hyaline 0-2 / Gran  / Sq Epi  / Non Sq Epi Moderate / Bacteria Negative      Iron 51, TIBC Unable to calculate Test Repeated, %sat Unable to calculate Test Repeated      [12-24-22 @ 20:58]  Ferritin 5747      [12-24-22 @ 20:58]  TSH 0.05      [12-24-22 @ 20:58]  Lipid: chol 100, , HDL 9, LDL --      [12-24-22 @ 20:58]    HBsAb <3.0      [12-24-22 @ 20:59]  HBsAb Nonreact      [12-24-22 @ 20:59]  HBsAg Nonreact      [12-24-22 @ 20:59]  HBcAb Nonreact      [12-24-22 @ 20:59]  HCV 0.12, Nonreact      [12-24-22 @ 20:59]  HIV Nonreact      [12-24-22 @ 20:58]  HIV Nonreact      [12-24-22 @ 20:58]    MIRNA: titer Negative, pattern --      [12-24-22 @ 20:59]  Syphilis Screen (Treponema Pallidum Ab) Negative      [12-24-22 @ 20:59] [facility] DIVISION OF KIDNEY DISEASES AND HYPERTENSION -- FOLLOW UP NOTE  --------------------------------------------------------------------------------  HPI: 61 yo F with h/o decompensated ETOH cirrhosis with ascites, thyroid cancer (s/p total thyroidectomy, RTX, and radioactive iodide), HTN, ventrical neoplasm who was initially admitted to  12/19 with new onset seizures and transferred to [facility] 12/20 for liver transplant evaluation. Pt being seen for RED requiring CRRT.    24 hour events/subjective: Pt was seen and evaluated in the ICU this morning with family present. Pt on high flow, poor historian. Unable to obtain ROS.        PAST HISTORY  --------------------------------------------------------------------------------  No significant changes to PMH, PSH, FHx, SHx, unless otherwise noted    ALLERGIES & MEDICATIONS  --------------------------------------------------------------------------------  Allergies    Macrobid (Rash)    Intolerances    Nexium (Stomach Upset)    Standing Inpatient Medications  caspofungin IVPB      caspofungin IVPB 50 milliGRAM(s) IV Intermittent every 24 hours  chlorhexidine 2% Cloths 1 Application(s) Topical <User Schedule>  CRRT Treatment    <Continuous>  dextrose 5% + sodium chloride 0.9%. 1000 milliLiter(s) IV Continuous <Continuous>  escitalopram 10 milliGRAM(s) Oral daily  folic acid 1 milliGRAM(s) Oral daily  influenza   Vaccine 0.5 milliLiter(s) IntraMuscular once  insulin lispro (ADMELOG) corrective regimen sliding scale   SubCutaneous every 6 hours  lactulose Syrup 20 Gram(s) Oral every 8 hours  levETIRAcetam  Solution 750 milliGRAM(s) Enteral Tube two times a day  levothyroxine 137 MICROGram(s) Oral daily  meropenem  IVPB 1000 milliGRAM(s) IV Intermittent every 8 hours  metoclopramide Injectable 10 milliGRAM(s) IV Push every 8 hours  midodrine 20 milliGRAM(s) Oral every 8 hours  multivitamin/minerals/iron Oral Solution (CENTRUM) 15 milliLiter(s) Oral daily  norepinephrine Infusion 0.05 MICROgram(s)/kG/Min IV Continuous <Continuous>  pantoprazole   Suspension 40 milliGRAM(s) Oral every 24 hours  Phoxillum Filtration BK 4 / 2.5 5000 milliLiter(s) CRRT <Continuous>  Phoxillum Filtration BK 4 / 2.5 5000 milliLiter(s) CRRT <Continuous>  Phoxillum Filtration BK 4 / 2.5 5000 milliLiter(s) CRRT <Continuous>  rifAXIMin 550 milliGRAM(s) Oral every 12 hours  thiamine 100 milliGRAM(s) Enteral Tube daily  vasopressin Infusion 0.03 Unit(s)/Min IV Continuous <Continuous>    PRN Inpatient Medications  HYDROmorphone  Injectable 0.25 milliGRAM(s) IV Push every 3 hours PRN  ondansetron Injectable 4 milliGRAM(s) IV Push every 6 hours PRN  sodium chloride 0.65% Nasal 1 Spray(s) Both Nostrils every 3 hours PRN  tetracaine/benzocaine/butamben Spray 1 Spray(s) Topical every 3 hours PRN      REVIEW OF SYSTEMS  --------------------------------------------------------------------------------  Unable to obtain ROS    VITALS/PHYSICAL EXAM  --------------------------------------------------------------------------------  T(C): 36.7 (12-29-22 @ 11:00), Max: 37 (12-28-22 @ 23:00)  HR: 87 (12-29-22 @ 11:15) (74 - 105)  BP: --  RR: 17 (12-29-22 @ 11:15) (9 - 30)  SpO2: 96% (12-29-22 @ 11:15) (85% - 100%)  Wt(kg): --        12-28-22 @ 07:01  -  12-29-22 @ 07:00  --------------------------------------------------------  IN: 1640.7 mL / OUT: 1656 mL / NET: -15.3 mL    12-29-22 @ 07:01  -  12-29-22 @ 11:40  --------------------------------------------------------  IN: 482.8 mL / OUT: 499 mL / NET: -16.2 mL      Physical Exam:  Gen: NAD  HEENT: On HFNC   Pulm: CTA B/L, no crackles   CV: RRR, S1S2+  Abd: +BS, soft, distended  : +urinary catheter  MSK: +BL LE edema  Psych: Awake  Skin: Warm  Access: +Non-tunneled HD catheter    LABS/STUDIES  --------------------------------------------------------------------------------              7.7    30.31 >-----------<  45       [12-29-22 @ 10:23]              22.6     139  |  104  |  12  ----------------------------<  100      [12-29-22 @ 10:23]  4.3   |  20  |  0.70        Ca     8.9     [12-29-22 @ 10:23]      Mg     2.5     [12-29-22 @ 10:23]      Phos  3.8     [12-29-22 @ 10:23]    TPro  5.9  /  Alb  3.7  /  TBili  28.8  /  DBili  x   /  AST  154  /  ALT  51  /  AlkPhos  160  [12-29-22 @ 10:23]    PT/INR: PT 26.2 , INR 2.26       [12-29-22 @ 04:21]  PTT: 47.4       [12-29-22 @ 04:21]      Creatinine Trend:  SCr 0.70 [12-29 @ 10:23]  SCr 0.73 [12-29 @ 04:21]  SCr 0.73 [12-28 @ 21:56]  SCr 0.67 [12-28 @ 15:51]  SCr 0.73 [12-28 @ 09:32]    Urinalysis - [12-20-22 @ 23:55]      Color Dark Yellow / Appearance Turbid / SG 1.029 / pH 6.0      Gluc 100 mg/dL / Ketone Small  / Bili Large / Urobili Negative       Blood Moderate / Protein 300 mg/dL / Leuk Est Large / Nitrite Negative      RBC 5-10 / WBC 11-25 / Hyaline 0-2 / Gran  / Sq Epi  / Non Sq Epi Moderate / Bacteria Negative      Iron 51, TIBC Unable to calculate Test Repeated, %sat Unable to calculate Test Repeated      [12-24-22 @ 20:58]  Ferritin 5747      [12-24-22 @ 20:58]  TSH 0.05      [12-24-22 @ 20:58]  Lipid: chol 100, , HDL 9, LDL --      [12-24-22 @ 20:58]    HBsAb <3.0      [12-24-22 @ 20:59]  HBsAb Nonreact      [12-24-22 @ 20:59]  HBsAg Nonreact      [12-24-22 @ 20:59]  HBcAb Nonreact      [12-24-22 @ 20:59]  HCV 0.12, Nonreact      [12-24-22 @ 20:59]  HIV Nonreact      [12-24-22 @ 20:58]  HIV Nonreact      [12-24-22 @ 20:58]    MIRNA: titer Negative, pattern --      [12-24-22 @ 20:59]  Syphilis Screen (Treponema Pallidum Ab) Negative      [12-24-22 @ 20:59]

## 2022-12-29 NOTE — PROGRESS NOTE ADULT - ATTENDING COMMENTS
62yFemale presents with hx thyroid cancer s/p thyroidectomy, craniotomy with resection of ventricular mass, with acute liver failure, new seizures, transfer from OSH for transplant evaluation.     Fluctuating mental status, more awake today. Increase Lactulose dose for increase in Ammonia and decreased rectal tube output. Continue Rifaximin  Titrate Levo, continue Vaso, on high dose Midodrine  Remains in acute hypoxemic resp failure on HFNC, CXR mild improvement in infiltrate on Lt/ possible superimposed pul vascular congestion.  Continue  CRRT with fluid balance goal net  neg 25 as tolerated  NGT output not high, will do clamping trial to assess for NGT feed. Standing short course prokinetic   Empiric Abx Meropenem and Caspo, monitor leucocytosis  Peritoneal fluid transudate  ISS  Mechanical DVT ppx    Daughter and  kept updated

## 2022-12-29 NOTE — PROGRESS NOTE ADULT - SUBJECTIVE AND OBJECTIVE BOX
24 HOUR EVENTS:  -Reglan 10q8 x 3 days  -NS @ 30cc  -CRRT net even    SUBJECTIVE/ROS:  [x] A ten-point review of systems was otherwise negative except as noted.  [ ] Due to altered mental status/intubation, subjective information were not able to be obtained from the patient. History was obtained, to the extent possible, from review of the chart and collateral sources of information.      NEURO  Exam: awake, alert, interactive, mildly disoriented but improved  Meds: escitalopram 10 milliGRAM(s) Oral daily  HYDROmorphone  Injectable 0.25 milliGRAM(s) IV Push every 3 hours PRN Severe Pain (7 - 10)  levETIRAcetam  Solution 750 milliGRAM(s) Enteral Tube two times a day  metoclopramide Injectable 10 milliGRAM(s) IV Push every 8 hours  ondansetron Injectable 4 milliGRAM(s) IV Push every 6 hours PRN Nausea and/or Vomiting    [x] Adequacy of sedation and pain control has been assessed and adjusted      RESPIRATORY  RR: 12 (12-29-22 @ 00:30) (9 - 30)  SpO2: 96% (12-29-22 @ 00:30) (56% - 100%)  Exam: coarse rhonchi, HFNC  Mechanical Ventilation:   ABG - ( 28 Dec 2022 03:08 )  pH: 7.42  /  pCO2: 41    /  pO2: 119   / HCO3: 27    / Base Excess: 1.9   /  SaO2: 98.9    Lactate: x          CARDIOVASCULAR  HR: 88 (12-29-22 @ 00:30) (74 - 105)  ABP: 96/48 (12-29-22 @ 00:30) (86/42 - 125/69)  ABP(mean): 67 (12-29-22 @ 00:30) (59 - 109)  VBG - ( 28 Dec 2022 03:08 )  pH: 7.37  /  pCO2: 46    /  pO2: 46    / HCO3: 27    / Base Excess: 1.1   /  SaO2: 75.2   Lactate: 1.1     Exam:  Cardiac Rhythm: S1S2  Meds: midodrine 20 milliGRAM(s) Oral every 8 hours  norepinephrine Infusion 0.05 MICROgram(s)/kG/Min IV Continuous <Continuous>        GI/NUTRITION  Exam: distended, NT, NGT in place  Diet: NPO  Meds: lactulose Syrup 20 Gram(s) Oral every 8 hours  pantoprazole   Suspension 40 milliGRAM(s) Oral every 24 hours      GENITOURINARY  I&O's Detail    12-27 @ 07:01 - 12-28 @ 07:00  --------------------------------------------------------  IN:    Albumin 25%  -  50 mL: 200 mL    Enteral Tube Flush: 140 mL    IV PiggyBack: 650 mL    IV PiggyBack: 500 mL    Lactated Ringers Bolus: 1000 mL    Nepro with Carb Steady: 200 mL    Norepinephrine: 364 mL    PRBCs (Packed Red Blood Cells): 325 mL    Vasopressin: 108 mL  Total IN: 3487 mL    OUT:    Nasogastric/Oral tube (mL): 1450 mL    Other (mL): 2120 mL    Rectal Tube (mL): 400 mL  Total OUT: 3970 mL    Total NET: -483 mL      12-28 @ 07:01 - 12-29 @ 00:46  --------------------------------------------------------  IN:    dextrose 5% + sodium chloride 0.9%: 420 mL    Enteral Tube Flush: 180 mL    IV PiggyBack: 350 mL    Norepinephrine: 225.6 mL    Vasopressin: 76.5 mL  Total IN: 1252.1 mL    OUT:    Nasogastric/Oral tube (mL): 250 mL    Other (mL): 891 mL    Rectal Tube (mL): 50 mL  Total OUT: 1191 mL    Total NET: 61.1 mL          12-28    139  |  104  |  12  ----------------------------<  96  4.3   |  21<L>  |  0.73    Ca    8.8      28 Dec 2022 21:56  Phos  4.0     12-28  Mg     2.5     12-28    TPro  6.3  /  Alb  4.0  /  TBili  28.4<H>  /  DBili  x   /  AST  156<H>  /  ALT  50<H>  /  AlkPhos  154<H>  12-28    [ ] Monroe catheter, indication: N/A  Meds: dextrose 5% + sodium chloride 0.9%. 1000 milliLiter(s) IV Continuous <Continuous>  folic acid 1 milliGRAM(s) Oral daily  multivitamin/minerals/iron Oral Solution (CENTRUM) 15 milliLiter(s) Oral daily  thiamine 100 milliGRAM(s) Enteral Tube daily        HEMATOLOGIC  Meds:   [x] VTE Prophylaxis                        7.9    31.21 )-----------( 39       ( 28 Dec 2022 21:56 )             23.2     PT/INR - ( 28 Dec 2022 03:19 )   PT: 25.9 sec;   INR: 2.21 ratio         PTT - ( 28 Dec 2022 03:19 )  PTT:54.4 sec  Transfusion     [ ] PRBC   [ ] Platelets   [ ] FFP   [ ] Cryoprecipitate      INFECTIOUS DISEASES  T(C): 37 (12-28-22 @ 23:00), Max: 37 (12-28-22 @ 07:00)  Wt(kg): --  WBC Count: 31.21 K/uL (12-28 @ 21:56)  WBC Count: 32.81 K/uL (12-28 @ 15:51)  WBC Count: 32.51 K/uL (12-28 @ 09:34)  WBC Count: 34.11 K/uL (12-28 @ 03:19)    Recent Cultures:  Specimen Source: Peritoneal Peritoneal Fluid, 12-26 @ 15:00; Results   No growth; Gram Stain:   polymorphonuclear leukocytes seen  No organisms seen  by cytocentrifuge; Organism: --  Specimen Source: .Blood Blood-Peripheral, 12-26 @ 10:45; Results   No growth to date.; Gram Stain: --; Organism: --  Specimen Source: .Blood Blood-Peripheral, 12-26 @ 09:30; Results   No growth to date.; Gram Stain: --; Organism: --  Specimen Source: .Blood Blood, 12-24 @ 20:55; Results   No growth to date.; Gram Stain: --; Organism: --  Specimen Source: .Blood Blood, 12-24 @ 20:20; Results   No growth to date.; Gram Stain: --; Organism: --  Specimen Source: .Blood Blood, 12-23 @ 13:35; Results   No Growth Final; Gram Stain: --; Organism: --  Specimen Source: .Blood Blood, 12-23 @ 13:25; Results   No Growth Final; Gram Stain: --; Organism: --  Specimen Source: Combi-Cath Combi-Cath, 12-22 @ 17:02; Results   Normal Respiratory Cassandra present; Gram Stain:   Moderate polymorphonuclear leukocytes seen per low power field  No squamous epithelial cells seen per low power field  No organisms seen per oil power field; Organism: --    Meds: caspofungin IVPB      caspofungin IVPB 50 milliGRAM(s) IV Intermittent every 24 hours  influenza   Vaccine 0.5 milliLiter(s) IntraMuscular once  meropenem  IVPB 1000 milliGRAM(s) IV Intermittent every 8 hours  rifAXIMin 550 milliGRAM(s) Oral every 12 hours        ENDOCRINE  Capillary Blood Glucose    Meds: insulin lispro (ADMELOG) corrective regimen sliding scale   SubCutaneous every 6 hours  levothyroxine 137 MICROGram(s) Oral daily  vasopressin Infusion 0.03 Unit(s)/Min IV Continuous <Continuous>        ACCESS DEVICES:  [x] Peripheral IV  [x] Central Venous Line	[ ] R	[ ] L	[ ] IJ	[ ] Fem	[ ] SC	Placed:   [ ] Arterial Line		[ ] R	[ ] L	[ ] Fem	[ ] Rad	[ ] Ax	Placed:   [ ] PICC:					[ ] Mediport  [x] Urinary Catheter, Date Placed:   [x] Necessity of urinary, arterial, and venous catheters discussed    OTHER MEDICATIONS:  chlorhexidine 2% Cloths 1 Application(s) Topical <User Schedule>  CRRT Treatment    <Continuous>  Phoxillum Filtration BK 4 / 2.5 5000 milliLiter(s) CRRT <Continuous>  Phoxillum Filtration BK 4 / 2.5 5000 milliLiter(s) CRRT <Continuous>  Phoxillum Filtration BK 4 / 2.5 5000 milliLiter(s) CRRT <Continuous>  sodium chloride 0.65% Nasal 1 Spray(s) Both Nostrils every 3 hours PRN  tetracaine/benzocaine/butamben Spray 1 Spray(s) Topical every 3 hours PRN      CODE STATUS:     IMAGING:

## 2022-12-29 NOTE — PROGRESS NOTE ADULT - PROBLEM SELECTOR PLAN 3
Pt with hypophosphatemia while receiving CRRT. Serum phos improved to 3.8 today with Phoxilium. Plan to continue CRRT today with phoxilium. Monitor serum phos level.    If you have any questions, please feel free to contact me  Christin Emerson  Nephrology Fellow  [phone] / [employer] Teams(Preferred)  (After 5pm or on weekends please page the on-call fellow) Pt with hypophosphatemia while receiving CRRT. Serum phos improved to 3.8 today with Phoxilium. Plan to continue CRRT today with phoxilium. Monitor serum phos level.    If you have any questions, please feel free to contact me  Christin Emerson  Nephrology Fellow  [phone] / [employer] Teams(Preferred)  (After 5pm or on weekends please page the on-call fellow) Pt with hypophosphatemia while receiving CRRT. Serum phos improved to 3.8 today with Phoxilium. Plan to continue CRRT today with phoxilium. Monitor serum phos level.    If you have any questions, please feel free to contact me  Christin Emerson  Nephrology Fellow  [phone] / [employer] Teams(Preferred)  (After 5pm or on weekends please page the on-call fellow)

## 2022-12-29 NOTE — PROGRESS NOTE ADULT - ASSESSMENT
62yFemale presents with hx thyroid cancer s/p thyroidectomy, craniotomy with resection of ventricular mass, with acute liver failure, new seizures, transfer from [facility] for transplant evaluation.     Neurologic: hepatic encephalopathy   - waxing-waning mental status  - lactulose and rifaximin; trend ammonia  - c/w keppra 750 BID ( new onset of seizure )    Respiratory: acute hypoxic resp failure   - CXR b/l infiltrates, possibly aspiration vs fluid  - combicath cx no significant growth to date  - CVVHD net even  - RVP neg  - HFNC    Cardiovascular:   -  levo, vaso   - wean as tolerated, goal MAP >65  - midodrine 20 q8h    Gastrointestinal/Nutrition:  - Acute decompensated liver failure, s/p para 2.9L in ED  - Workup per transplant recs pending   - Monitor BM, rectal tube, on lactulose & rifaximin and watching ammonia  - Protonix for stress ulcer prophylaxis as per transplant surgery  - para 12/26 - transudative, not consistent with SBP  - likely ileus, NPO, NGT to suction  - Reglan, daily EKG    Genitourinary/Renal: RED 2/2 ATN vs HRS  - CRRT net even   - given IVF 500cc & 25% albumin x2   - HD access L IJ   - monitor electrolytes  - nephro following  - NS @ 30    Hematologic:  - Chem VTE ppx: hold   - Continue SCD  - Thrombocytopenic, stable  - trend Hbg, transfuse <7  - INR elevated likely hepatic dysfunction    Infectious Disease:  - Empiric abx meropenem, caspofungin  - ascites, blood, urine & fungal Cx so far NG  - repeat BCx 12/23 neg. resent 12/27  - Vanc 1gram 12/27  - CXR w/ bilateral infiltrates so will continue to monitor    Endocrine:  - Hypothyroidism c/w synthroid IV 70  - No steroids  - ISS, adjust as appropriate    Ethics: FULL CODE    Dispo/Lines:  R IJ TLC  L IJ shiley  A line L Radial  [place]  SICU

## 2022-12-29 NOTE — PROGRESS NOTE ADULT - SUBJECTIVE AND OBJECTIVE BOX
HPI:  Patient seen and examined at bedside on 8 ICU.  No cardiac events overnight.  Mental status somewhat improved compared with prior.    Review Of Systems:           Respiratory: No shortness of breath, cough, or wheezing  Cardiovascular: No chest pain or palpitations  10 point review of systems is otherwise negative except as mentioned above        Medications:  caspofungin IVPB      caspofungin IVPB 50 milliGRAM(s) IV Intermittent every 24 hours  chlorhexidine 2% Cloths 1 Application(s) Topical <User Schedule>  CRRT Treatment    <Continuous>  dextrose 5% + sodium chloride 0.9%. 1000 milliLiter(s) IV Continuous <Continuous>  escitalopram 10 milliGRAM(s) Oral daily  folic acid 1 milliGRAM(s) Oral daily  HYDROmorphone  Injectable 0.25 milliGRAM(s) IV Push every 3 hours PRN  influenza   Vaccine 0.5 milliLiter(s) IntraMuscular once  insulin lispro (ADMELOG) corrective regimen sliding scale   SubCutaneous every 6 hours  lactulose Syrup 20 Gram(s) Oral every 8 hours  levETIRAcetam  Solution 750 milliGRAM(s) Enteral Tube two times a day  levothyroxine 137 MICROGram(s) Oral daily  meropenem  IVPB 1000 milliGRAM(s) IV Intermittent every 8 hours  metoclopramide Injectable 10 milliGRAM(s) IV Push every 8 hours  midodrine 20 milliGRAM(s) Oral every 8 hours  multivitamin/minerals/iron Oral Solution (CENTRUM) 15 milliLiter(s) Oral daily  norepinephrine Infusion 0.05 MICROgram(s)/kG/Min IV Continuous <Continuous>  ondansetron Injectable 4 milliGRAM(s) IV Push every 6 hours PRN  pantoprazole   Suspension 40 milliGRAM(s) Oral every 24 hours  Phoxillum Filtration BK 4 / 2.5 5000 milliLiter(s) CRRT <Continuous>  Phoxillum Filtration BK 4 / 2.5 5000 milliLiter(s) CRRT <Continuous>  Phoxillum Filtration BK 4 / 2.5 5000 milliLiter(s) CRRT <Continuous>  rifAXIMin 550 milliGRAM(s) Oral every 12 hours  sodium chloride 0.65% Nasal 1 Spray(s) Both Nostrils every 3 hours PRN  tetracaine/benzocaine/butamben Spray 1 Spray(s) Topical every 3 hours PRN  thiamine 100 milliGRAM(s) Enteral Tube daily  vasopressin Infusion 0.03 Unit(s)/Min IV Continuous <Continuous>    PAST MEDICAL & SURGICAL HISTORY:  HTN (hypertension)  off medication for over one year--states BP has been normal      UTI (urinary tract infection)  currently being treated--will complete antibiotics 3/8/2022      Intracranial tumor      GERD (gastroesophageal reflux disease)      Eczema      Thyroid cancer  surgery. radiation, Radioactive iodine      COVID-19 virus infection  2021--recovered at home      H/O total thyroidectomy  age 21&#x27;s for Thyroid cancer, postop complication arterial bleed, second surgery      History of tonsillectomy  age 5      History of appendectomy  age 5        Vitals:  T(C): 36.6 (22 @ 15:00), Max: 37 (22 @ 23:00)  HR: 82 (22 @ 18:45) (77 - 105)  BP: --  BP(mean): --  RR: 32 (22 @ 18:45) (9 - 32)  SpO2: 92% (22 @ 18:45) (85% - 100%)  Wt(kg): --  Daily     Daily Weight in k.8 (29 Dec 2022 01:00)  I&O's Summary    28 Dec 2022 07:  -  29 Dec 2022 07:00  --------------------------------------------------------  IN: 1640.7 mL / OUT: 1656 mL / NET: -15.3 mL    29 Dec 2022 07:01  -  29 Dec 2022 18:58  --------------------------------------------------------  IN: 924.5 mL / OUT: 1173 mL / NET: -248.5 mL        Physical Exam:  Appearance: Jaundice, encephalopathic   Eyes: PERRLA, EOMI, pink conjunctiva, no scleral icterus   HENT: Normal oral mucosa  Cardiovascular: RRR, S1, S2, no murmur, rub, or gallop; no edema; no JVD  Procedural Access Site: Clean, dry, intact, without hematoma  Respiratory: Clear to auscultation bilaterally  Gastrointestinal: Soft, non-tender, non-distended, BS+  Musculoskeletal: Mitten restraints   Lymphatic: No lymphadenopathy  Skin: No rashes, ecchymoses, or cyanosis                          7.9    30.48 )-----------( 48       ( 29 Dec 2022 17:21 )             23.5         139  |  105  |  12  ----------------------------<  101<H>  4.2   |  20<L>  |  0.71    Ca    8.7      29 Dec 2022 17:21  Phos  3.7       Mg     2.5         TPro  6.2  /  Alb  3.6  /  TBili  28.0<H>  /  DBili  x   /  AST  165<H>  /  ALT  55<H>  /  AlkPhos  175<H>      PT/INR - ( 29 Dec 2022 04:21 )   PT: 26.2 sec;   INR: 2.26 ratio         PTT - ( 29 Dec 2022 04:21 )  PTT:47.4 sec      Cardiovascular Diagnostic Testing:  ECG:     Echo:   < from: TTE with Doppler (w/Cont) (22 @ 10:49) >  ------------------------------------------------------------------------  Dimensions:    Normal Values:  LA:     3.5    2.0 - 4.0 cm  Ao:     3.1    2.0 - 3.8 cm  SEPTUM: 0.7    0.6 - 1.2 cm  PWT:    0.6    0.6 - 1.1 cm  LVIDd: 5.4    3.0 - 5.6 cm  LVIDs:  2.9    1.8 - 4.0 cm  Derived variables:  LVMI: 69 g/m2  RWT: 0.22  Fractional short: 46 %  EF (Visual Estimate):  %  EF ([place] Rule): 66 %Doppler Peak Velocity (m/sec):  AoV=1.4  ------------------------------------------------------------------------  Observations:  Mitral Valve: Normal mitral valve. Minimal mitral  regurgitation.  Aortic Valve/Aorta: Calcified trileaflet aortic valve with  normal opening. Peak transaortic valve gradient equals 8 mm  Hg. No aorticvalve regurgitation seen. Peak left  ventricular outflow tract gradient equals 4 mm Hg, mean  gradient is equal to 2 mm Hg, LVOT velocity time integral  equals 19 cm.  Aortic Root: 3.1 cm.  Ascending Aorta: 3.3 cm.  LVOT diameter: 2 cm.  Left Atrium:Normal left atrium.  LA volume index = 33  cc/m2.  Left Ventricle: Endocardial visualization enhanced with  intravenous injection of Ultrasonic Enhancing Agent  (Definity). Left ventricle suboptimally visualized despite  intravenous injection of ultrasonic enhancing agent; normal  left ventricular systolic function. No segmental wall  motion abnormalities. Normal left ventricular internal  dimensions and wall thicknesses. Normal diastolic function.  Right Heart: Normal right atrium. Normal right ventricular  size and function. Normal tricuspid valve. Mild-moderate  tricuspid regurgitation. Normal pulmonic valve. Minimal  pulmonic regurgitation.  Pericardium/Pleura: Normal pericardium with no pericardial  effusion.  Hemodynamic: Estimated right atrial pressure is 8 mm Hg.  Estimated right ventricular systolic pressure equals 29 mm  Hg, assuming right atrial pressure equals 8 mm Hg,  consistent with normal pulmonary pressures.  ------------------------------------------------------------------------  Conclusions:  1. Normal mitral valve. Minimal mitral regurgitation.  2. Calcified trileaflet aortic valve with normal opening.  No aortic valve regurgitation seen.  3. Endocardial visualization enhanced with intravenous  injection of Ultrasonic Enhancing Agent (Definity). Left  ventricle suboptimally visualized despite intravenous  injection of ultrasonic enhancing agent; normal left  ventricular systolic function. No segmental wall motion  abnormalities.  4. Normal right ventricular size and function.  *** No previous Echo exam.  ------------------------------------------------------------------------  Confirmed on  2022 - 13:40:44 by Rosalinda Ingram M.D.  ------------------------------------------------------------------------    < end of copied text >

## 2022-12-29 NOTE — PROGRESS NOTE ADULT - ASSESSMENT
61 yo F with h/o decompensated ETOH cirrhosis with ascites, thyroid cancer (s/p total thyroidectomy, RTX, and radioactive iodide), HTN, ventrical neoplasm who was initially admitted to  12/19 with new onset seizures and transferred to [facility] 12/20 for liver transplant evaluation. Pt being seen for RED requiring CRRT. 63 yo F with h/o decompensated ETOH cirrhosis with ascites, thyroid cancer (s/p total thyroidectomy, RTX, and radioactive iodide), HTN, ventrical neoplasm who was initially admitted to  12/19 with new onset seizures and transferred to [facility] 12/20 for liver transplant evaluation. Pt being seen for RED requiring CRRT. 63 yo F with h/o decompensated ETOH cirrhosis with ascites, thyroid cancer (s/p total thyroidectomy, RTX, and radioactive iodide), HTN, ventrical neoplasm who was initially admitted to  12/19 with new onset seizures and transferred to [facility] 12/20 for liver transplant evaluation. Pt being seen for RED requiring CRRT.

## 2022-12-29 NOTE — PROGRESS NOTE ADULT - SUBJECTIVE AND OBJECTIVE BOX
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  Arthur ([phone]) and Daughter Taylor at [facility] provided additional history. Of note was recently admitted to  11/2022 for workup and eval of jaundice- during that hospitalization was found to have a UTI and dx with alc hep and started on steroids (was deemed a non-responder and steroids were subsequently stopped); s/p LVP at [place] 11/2022. Previously hospitalized in 2021 at [place] for ETOH detox. Went to ETOH rehab 08/2022 and was asked to leave the facility when she was COVID+; was sober for 1 week until she started drinking again. Had also attended a few AA meetings in the past. Transferred from [facility] 12/20 for further management of acute liver failure and liver transplant evaluation.       Interval events:  - decreasing pressor requirements  - unable to wean from HFNC   - continues CVVH   - emesis, TF stopped, NGT placed to LIWS with bilious output     Potential Discharge date: pending clinical stability  Education:  Medications  Plan of care:  See Below      MEDICATIONS  (STANDING):  caspofungin IVPB 50 milliGRAM(s) IV Intermittent every 24 hours  caspofungin IVPB      chlorhexidine 2% Cloths 1 Application(s) Topical <User Schedule>  CRRT Treatment    <Continuous>  dextrose 5% + sodium chloride 0.9%. 1000 milliLiter(s) (30 mL/Hr) IV Continuous <Continuous>  escitalopram 10 milliGRAM(s) Oral daily  folic acid 1 milliGRAM(s) Oral daily  influenza   Vaccine 0.5 milliLiter(s) IntraMuscular once  insulin lispro (ADMELOG) corrective regimen sliding scale   SubCutaneous every 6 hours  lactulose Syrup 20 Gram(s) Oral every 8 hours  levETIRAcetam  Solution 750 milliGRAM(s) Enteral Tube two times a day  levothyroxine 137 MICROGram(s) Oral daily  meropenem  IVPB 1000 milliGRAM(s) IV Intermittent every 8 hours  metoclopramide Injectable 10 milliGRAM(s) IV Push every 8 hours  midodrine 20 milliGRAM(s) Oral every 8 hours  multivitamin/minerals/iron Oral Solution (CENTRUM) 15 milliLiter(s) Oral daily  norepinephrine Infusion 0.05 MICROgram(s)/kG/Min (6.21 mL/Hr) IV Continuous <Continuous>  pantoprazole   Suspension 40 milliGRAM(s) Oral every 24 hours  Phoxillum Filtration BK 4 / 2.5 5000 milliLiter(s) (800 mL/Hr) CRRT <Continuous>  Phoxillum Filtration BK 4 / 2.5 5000 milliLiter(s) (200 mL/Hr) CRRT <Continuous>  Phoxillum Filtration BK 4 / 2.5 5000 milliLiter(s) (1000 mL/Hr) CRRT <Continuous>  rifAXIMin 550 milliGRAM(s) Oral every 12 hours  thiamine 100 milliGRAM(s) Enteral Tube daily  vasopressin Infusion 0.03 Unit(s)/Min (4.5 mL/Hr) IV Continuous <Continuous>    MEDICATIONS  (PRN):  HYDROmorphone  Injectable 0.25 milliGRAM(s) IV Push every 3 hours PRN Severe Pain (7 - 10)  ondansetron Injectable 4 milliGRAM(s) IV Push every 6 hours PRN Nausea and/or Vomiting  sodium chloride 0.65% Nasal 1 Spray(s) Both Nostrils every 3 hours PRN Nasal Congestion  tetracaine/benzocaine/butamben Spray 1 Spray(s) Topical every 3 hours PRN for ngt discomfort      PAST MEDICAL & SURGICAL HISTORY:  HTN (hypertension)  off medication for over one year--states BP has been normal      UTI (urinary tract infection)  currently being treated--will complete antibiotics 3/8/2022      Intracranial tumor      GERD (gastroesophageal reflux disease)      Eczema      Thyroid cancer  surgery. radiation, Radioactive iodine      COVID-19 virus infection  11/2021--recovered at home      H/O total thyroidectomy  age 21&#x27;s for Thyroid cancer, postop complication arterial bleed, second surgery      History of tonsillectomy  age 5      History of appendectomy  age 5          Physical Exam:  Constitutional: NAD   Eyes: icteric, PERRLA  ENMT: nc/at, no thrush  Neck: supple, central line   Cardiovascular: RRR  Gastrointestinal: Soft abdomen, distended  Genitourinary: Urinary catheter in place, anuric  Extremities: SCD's in place and working bilaterally, no edema  Vascular: Palpable dp pulses bilaterally.   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  Arthur ([phone]) and Daughter Taylro at [facility] provided additional history. Of note was recently admitted to  11/2022 for workup and eval of jaundice- during that hospitalization was found to have a UTI and dx with alc hep and started on steroids (was deemed a non-responder and steroids were subsequently stopped); s/p LVP at [place] 11/2022. Previously hospitalized in 2021 at [place] for ETOH detox. Went to ETOH rehab 08/2022 and was asked to leave the facility when she was COVID+; was sober for 1 week until she started drinking again. Had also attended a few AA meetings in the past. Transferred from [facility] 12/20 for further management of acute liver failure and liver transplant evaluation.       Interval events:  - decreasing pressor requirements  - unable to wean from HFNC   - continues CVVH   - emesis, TF stopped, NGT placed to LIWS with bilious output     Potential Discharge date: pending clinical stability  Education:  Medications  Plan of care:  See Below      MEDICATIONS  (STANDING):  caspofungin IVPB 50 milliGRAM(s) IV Intermittent every 24 hours  caspofungin IVPB      chlorhexidine 2% Cloths 1 Application(s) Topical <User Schedule>  CRRT Treatment    <Continuous>  dextrose 5% + sodium chloride 0.9%. 1000 milliLiter(s) (30 mL/Hr) IV Continuous <Continuous>  escitalopram 10 milliGRAM(s) Oral daily  folic acid 1 milliGRAM(s) Oral daily  influenza   Vaccine 0.5 milliLiter(s) IntraMuscular once  insulin lispro (ADMELOG) corrective regimen sliding scale   SubCutaneous every 6 hours  lactulose Syrup 20 Gram(s) Oral every 8 hours  levETIRAcetam  Solution 750 milliGRAM(s) Enteral Tube two times a day  levothyroxine 137 MICROGram(s) Oral daily  meropenem  IVPB 1000 milliGRAM(s) IV Intermittent every 8 hours  metoclopramide Injectable 10 milliGRAM(s) IV Push every 8 hours  midodrine 20 milliGRAM(s) Oral every 8 hours  multivitamin/minerals/iron Oral Solution (CENTRUM) 15 milliLiter(s) Oral daily  norepinephrine Infusion 0.05 MICROgram(s)/kG/Min (6.21 mL/Hr) IV Continuous <Continuous>  pantoprazole   Suspension 40 milliGRAM(s) Oral every 24 hours  Phoxillum Filtration BK 4 / 2.5 5000 milliLiter(s) (800 mL/Hr) CRRT <Continuous>  Phoxillum Filtration BK 4 / 2.5 5000 milliLiter(s) (200 mL/Hr) CRRT <Continuous>  Phoxillum Filtration BK 4 / 2.5 5000 milliLiter(s) (1000 mL/Hr) CRRT <Continuous>  rifAXIMin 550 milliGRAM(s) Oral every 12 hours  thiamine 100 milliGRAM(s) Enteral Tube daily  vasopressin Infusion 0.03 Unit(s)/Min (4.5 mL/Hr) IV Continuous <Continuous>    MEDICATIONS  (PRN):  HYDROmorphone  Injectable 0.25 milliGRAM(s) IV Push every 3 hours PRN Severe Pain (7 - 10)  ondansetron Injectable 4 milliGRAM(s) IV Push every 6 hours PRN Nausea and/or Vomiting  sodium chloride 0.65% Nasal 1 Spray(s) Both Nostrils every 3 hours PRN Nasal Congestion  tetracaine/benzocaine/butamben Spray 1 Spray(s) Topical every 3 hours PRN for ngt discomfort      PAST MEDICAL & SURGICAL HISTORY:  HTN (hypertension)  off medication for over one year--states BP has been normal      UTI (urinary tract infection)  currently being treated--will complete antibiotics 3/8/2022      Intracranial tumor      GERD (gastroesophageal reflux disease)      Eczema      Thyroid cancer  surgery. radiation, Radioactive iodine      COVID-19 virus infection  11/2021--recovered at home      H/O total thyroidectomy  age 21&#x27;s for Thyroid cancer, postop complication arterial bleed, second surgery      History of tonsillectomy  age 5      History of appendectomy  age 5          Physical Exam:  Constitutional: NAD   Eyes: icteric, PERRLA  ENMT: nc/at, no thrush  Neck: supple, central line   Cardiovascular: RRR  Gastrointestinal: Soft abdomen, distended  Genitourinary: Urinary catheter in place, anuric  Extremities: SCD's in place and working bilaterally, no edema  Vascular: Palpable dp pulses bilaterally.   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  Arthur ([phone]) and Daughter Taylor at [facility] provided additional history. Of note was recently admitted to  11/2022 for workup and eval of jaundice- during that hospitalization was found to have a UTI and dx with alc hep and started on steroids (was deemed a non-responder and steroids were subsequently stopped); s/p LVP at [place] 11/2022. Previously hospitalized in 2021 at [place] for ETOH detox. Went to ETOH rehab 08/2022 and was asked to leave the facility when she was COVID+; was sober for 1 week until she started drinking again. Had also attended a few AA meetings in the past. Transferred from [facility] 12/20 for further management of acute liver failure and liver transplant evaluation.       Interval events:  - decreasing pressor requirements  - unable to wean from HFNC   - continues CVVH   - emesis, TF stopped, NGT placed to LIWS with bilious output     Potential Discharge date: pending clinical stability  Education:  Medications  Plan of care:  See Below      MEDICATIONS  (STANDING):  caspofungin IVPB 50 milliGRAM(s) IV Intermittent every 24 hours  caspofungin IVPB      chlorhexidine 2% Cloths 1 Application(s) Topical <User Schedule>  CRRT Treatment    <Continuous>  dextrose 5% + sodium chloride 0.9%. 1000 milliLiter(s) (30 mL/Hr) IV Continuous <Continuous>  escitalopram 10 milliGRAM(s) Oral daily  folic acid 1 milliGRAM(s) Oral daily  influenza   Vaccine 0.5 milliLiter(s) IntraMuscular once  insulin lispro (ADMELOG) corrective regimen sliding scale   SubCutaneous every 6 hours  lactulose Syrup 20 Gram(s) Oral every 8 hours  levETIRAcetam  Solution 750 milliGRAM(s) Enteral Tube two times a day  levothyroxine 137 MICROGram(s) Oral daily  meropenem  IVPB 1000 milliGRAM(s) IV Intermittent every 8 hours  metoclopramide Injectable 10 milliGRAM(s) IV Push every 8 hours  midodrine 20 milliGRAM(s) Oral every 8 hours  multivitamin/minerals/iron Oral Solution (CENTRUM) 15 milliLiter(s) Oral daily  norepinephrine Infusion 0.05 MICROgram(s)/kG/Min (6.21 mL/Hr) IV Continuous <Continuous>  pantoprazole   Suspension 40 milliGRAM(s) Oral every 24 hours  Phoxillum Filtration BK 4 / 2.5 5000 milliLiter(s) (800 mL/Hr) CRRT <Continuous>  Phoxillum Filtration BK 4 / 2.5 5000 milliLiter(s) (200 mL/Hr) CRRT <Continuous>  Phoxillum Filtration BK 4 / 2.5 5000 milliLiter(s) (1000 mL/Hr) CRRT <Continuous>  rifAXIMin 550 milliGRAM(s) Oral every 12 hours  thiamine 100 milliGRAM(s) Enteral Tube daily  vasopressin Infusion 0.03 Unit(s)/Min (4.5 mL/Hr) IV Continuous <Continuous>    MEDICATIONS  (PRN):  HYDROmorphone  Injectable 0.25 milliGRAM(s) IV Push every 3 hours PRN Severe Pain (7 - 10)  ondansetron Injectable 4 milliGRAM(s) IV Push every 6 hours PRN Nausea and/or Vomiting  sodium chloride 0.65% Nasal 1 Spray(s) Both Nostrils every 3 hours PRN Nasal Congestion  tetracaine/benzocaine/butamben Spray 1 Spray(s) Topical every 3 hours PRN for ngt discomfort      PAST MEDICAL & SURGICAL HISTORY:  HTN (hypertension)  off medication for over one year--states BP has been normal      UTI (urinary tract infection)  currently being treated--will complete antibiotics 3/8/2022      Intracranial tumor      GERD (gastroesophageal reflux disease)      Eczema      Thyroid cancer  surgery. radiation, Radioactive iodine      COVID-19 virus infection  11/2021--recovered at home      H/O total thyroidectomy  age 21&#x27;s for Thyroid cancer, postop complication arterial bleed, second surgery      History of tonsillectomy  age 5      History of appendectomy  age 5          Physical Exam:  Constitutional: NAD   Eyes: icteric, PERRLA  ENMT: nc/at, no thrush  Neck: supple, central line   Cardiovascular: RRR  Gastrointestinal: Soft abdomen, distended  Genitourinary: Urinary catheter in place, anuric  Extremities: SCD's in place and working bilaterally, no edema  Vascular: Palpable dp pulses bilaterally.   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  Arthur ([phone]) and Daughter Taylor at [facility] provided additional history. Of note was recently admitted to  11/2022 for workup and eval of jaundice- during that hospitalization was found to have a UTI and dx with alc hep and started on steroids (was deemed a non-responder and steroids were subsequently stopped); s/p LVP at [place] 11/2022. Previously hospitalized in 2021 at [place] for ETOH detox. Went to ETOH rehab 08/2022 and was asked to leave the facility when she was COVID+; was sober for 1 week until she started drinking again. Had also attended a few AA meetings in the past. Transferred from [facility] 12/20 for further management of acute liver failure and liver transplant evaluation.       Interval events:  - decreasing pressor requirements  - unable to wean from HFNC   - continues CVVH   - emesis, TF stopped, NGT placed to LIWS with bilious output     Potential Discharge date: pending clinical stability  Education:  Medications  Plan of care:  See Below        MEDICATIONS  (STANDING):  caspofungin IVPB      caspofungin IVPB 50 milliGRAM(s) IV Intermittent every 24 hours  chlorhexidine 2% Cloths 1 Application(s) Topical <User Schedule>  CRRT Treatment    <Continuous>  dextrose 5% + sodium chloride 0.9%. 1000 milliLiter(s) (30 mL/Hr) IV Continuous <Continuous>  escitalopram 10 milliGRAM(s) Oral daily  folic acid 1 milliGRAM(s) Oral daily  influenza   Vaccine 0.5 milliLiter(s) IntraMuscular once  insulin lispro (ADMELOG) corrective regimen sliding scale   SubCutaneous every 6 hours  lactulose Syrup 20 Gram(s) Oral every 8 hours  levETIRAcetam  Solution 750 milliGRAM(s) Enteral Tube two times a day  levothyroxine 137 MICROGram(s) Oral daily  meropenem  IVPB 1000 milliGRAM(s) IV Intermittent every 8 hours  metoclopramide Injectable 10 milliGRAM(s) IV Push every 8 hours  midodrine 20 milliGRAM(s) Oral every 8 hours  multivitamin/minerals/iron Oral Solution (CENTRUM) 15 milliLiter(s) Oral daily  norepinephrine Infusion 0.05 MICROgram(s)/kG/Min (6.21 mL/Hr) IV Continuous <Continuous>  pantoprazole   Suspension 40 milliGRAM(s) Oral every 24 hours  Phoxillum Filtration BK 4 / 2.5 5000 milliLiter(s) (800 mL/Hr) CRRT <Continuous>  Phoxillum Filtration BK 4 / 2.5 5000 milliLiter(s) (200 mL/Hr) CRRT <Continuous>  Phoxillum Filtration BK 4 / 2.5 5000 milliLiter(s) (1000 mL/Hr) CRRT <Continuous>  rifAXIMin 550 milliGRAM(s) Oral every 12 hours  thiamine 100 milliGRAM(s) Enteral Tube daily  vasopressin Infusion 0.03 Unit(s)/Min (4.5 mL/Hr) IV Continuous <Continuous>    MEDICATIONS  (PRN):  HYDROmorphone  Injectable 0.25 milliGRAM(s) IV Push every 3 hours PRN Severe Pain (7 - 10)  ondansetron Injectable 4 milliGRAM(s) IV Push every 6 hours PRN Nausea and/or Vomiting  sodium chloride 0.65% Nasal 1 Spray(s) Both Nostrils every 3 hours PRN Nasal Congestion  tetracaine/benzocaine/butamben Spray 1 Spray(s) Topical every 3 hours PRN for ngt discomfort      PAST MEDICAL & SURGICAL HISTORY:  HTN (hypertension)  off medication for over one year--states BP has been normal      UTI (urinary tract infection)  currently being treated--will complete antibiotics 3/8/2022      Intracranial tumor      GERD (gastroesophageal reflux disease)      Eczema      Thyroid cancer  surgery. radiation, Radioactive iodine      COVID-19 virus infection  11/2021--recovered at home      H/O total thyroidectomy  age 21&#x27;s for Thyroid cancer, postop complication arterial bleed, second surgery      History of tonsillectomy  age 5      History of appendectomy  age 5          Vital Signs Last 24 Hrs  T(C): 36.6 (29 Dec 2022 07:00), Max: 37 (28 Dec 2022 23:00)  T(F): 97.9 (29 Dec 2022 07:00), Max: 98.6 (28 Dec 2022 23:00)  HR: 82 (29 Dec 2022 11:00) (74 - 105)  BP: --  BP(mean): --  RR: 14 (29 Dec 2022 11:00) (9 - 30)  SpO2: 95% (29 Dec 2022 11:00) (85% - 100%)    Parameters below as of 29 Dec 2022 08:34  Patient On (Oxygen Delivery Method): nasal cannula, high flow  O2 Flow (L/min): 50  O2 Concentration (%): 40    I&O's Summary    28 Dec 2022 07:01  -  29 Dec 2022 07:00  --------------------------------------------------------  IN: 1640.7 mL / OUT: 1656 mL / NET: -15.3 mL    29 Dec 2022 07:01  -  29 Dec 2022 11:02  --------------------------------------------------------  IN: 379.6 mL / OUT: 245 mL / NET: 134.6 mL                              7.7    30.31 )-----------( 45       ( 29 Dec 2022 10:23 )             22.6     12-29    139  |  104  |  12  ----------------------------<  100<H>  4.3   |  20<L>  |  0.70    Ca    8.9      29 Dec 2022 10:23  Phos  3.8     12-29  Mg     2.5     12-29    TPro  5.9<L>  /  Alb  3.7  /  TBili  28.8<H>  /  DBili  x   /  AST  154<H>  /  ALT  51<H>  /  AlkPhos  160<H>  12-29          Culture - Fungal, Body Fluid (collected 12-26-22 @ 15:00)  Source: Peritoneal Peritoneal Fluid  Preliminary Report (12-28-22 @ 15:03):    Culture is being performed. Fungal cultures are held for 4 weeks.    Culture - Body Fluid with Gram Stain (collected 12-26-22 @ 15:00)  Source: Peritoneal Peritoneal Fluid  Gram Stain (12-26-22 @ 23:25):    polymorphonuclear leukocytes seen    No organisms seen    by cytocentrifuge  Preliminary Report (12-27-22 @ 17:03):    No growth    Culture - Blood (collected 12-26-22 @ 10:45)  Source: .Blood Blood-Peripheral  Preliminary Report (12-27-22 @ 17:01):    No growth to date.    Culture - Blood (collected 12-26-22 @ 09:30)  Source: .Blood Blood-Peripheral  Preliminary Report (12-27-22 @ 17:01):    No growth to date.    Culture - Blood (collected 12-24-22 @ 20:55)  Source: .Blood Blood  Preliminary Report (12-26-22 @ 01:03):    No growth to date.    Culture - Blood (collected 12-24-22 @ 20:20)  Source: .Blood Blood  Preliminary Report (12-26-22 @ 01:03):    No growth to date.    Culture - Blood (collected 12-23-22 @ 13:35)  Source: .Blood Blood  Final Report (12-28-22 @ 18:01):    No Growth Final    Culture - Blood (collected 12-23-22 @ 13:25)  Source: .Blood Blood  Final Report (12-28-22 @ 18:01):    No Growth Final    Culture - Bronchial (collected 12-22-22 @ 17:02)  Source: Combi-Cath Combi-Cath  Gram Stain (12-23-22 @ 06:59):    Moderate polymorphonuclear leukocytes seen per low power field    No squamous epithelial cells seen per low power field    No organisms seen per oil power field  Final Report (12-24-22 @ 15:11):    Normal Respiratory Cassandra present                Physical Exam:  Constitutional: NAD   Eyes: icteric, PERRLA  ENMT: nc/at, no thrush  Neck: supple, central line   Cardiovascular: RRR  Gastrointestinal: Soft abdomen, distended  Genitourinary: Urinary catheter in place, anuric  Extremities: SCD's in place and working bilaterally, no edema  Vascular: Palpable dp pulses bilaterally.   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  Arthur ([phone]) and Daughter Taylor at [facility] provided additional history. Of note was recently admitted to  11/2022 for workup and eval of jaundice- during that hospitalization was found to have a UTI and dx with alc hep and started on steroids (was deemed a non-responder and steroids were subsequently stopped); s/p LVP at [place] 11/2022. Previously hospitalized in 2021 at [place] for ETOH detox. Went to ETOH rehab 08/2022 and was asked to leave the facility when she was COVID+; was sober for 1 week until she started drinking again. Had also attended a few AA meetings in the past. Transferred from [facility] 12/20 for further management of acute liver failure and liver transplant evaluation.       Interval events:  - decreasing pressor requirements  - unable to wean from HFNC   - continues CVVH   - emesis, TF stopped, NGT placed to LIWS with bilious output     Potential Discharge date: pending clinical stability  Education:  Medications  Plan of care:  See Below        MEDICATIONS  (STANDING):  caspofungin IVPB      caspofungin IVPB 50 milliGRAM(s) IV Intermittent every 24 hours  chlorhexidine 2% Cloths 1 Application(s) Topical <User Schedule>  CRRT Treatment    <Continuous>  dextrose 5% + sodium chloride 0.9%. 1000 milliLiter(s) (30 mL/Hr) IV Continuous <Continuous>  escitalopram 10 milliGRAM(s) Oral daily  folic acid 1 milliGRAM(s) Oral daily  influenza   Vaccine 0.5 milliLiter(s) IntraMuscular once  insulin lispro (ADMELOG) corrective regimen sliding scale   SubCutaneous every 6 hours  lactulose Syrup 20 Gram(s) Oral every 8 hours  levETIRAcetam  Solution 750 milliGRAM(s) Enteral Tube two times a day  levothyroxine 137 MICROGram(s) Oral daily  meropenem  IVPB 1000 milliGRAM(s) IV Intermittent every 8 hours  metoclopramide Injectable 10 milliGRAM(s) IV Push every 8 hours  midodrine 20 milliGRAM(s) Oral every 8 hours  multivitamin/minerals/iron Oral Solution (CENTRUM) 15 milliLiter(s) Oral daily  norepinephrine Infusion 0.05 MICROgram(s)/kG/Min (6.21 mL/Hr) IV Continuous <Continuous>  pantoprazole   Suspension 40 milliGRAM(s) Oral every 24 hours  Phoxillum Filtration BK 4 / 2.5 5000 milliLiter(s) (800 mL/Hr) CRRT <Continuous>  Phoxillum Filtration BK 4 / 2.5 5000 milliLiter(s) (200 mL/Hr) CRRT <Continuous>  Phoxillum Filtration BK 4 / 2.5 5000 milliLiter(s) (1000 mL/Hr) CRRT <Continuous>  rifAXIMin 550 milliGRAM(s) Oral every 12 hours  thiamine 100 milliGRAM(s) Enteral Tube daily  vasopressin Infusion 0.03 Unit(s)/Min (4.5 mL/Hr) IV Continuous <Continuous>    MEDICATIONS  (PRN):  HYDROmorphone  Injectable 0.25 milliGRAM(s) IV Push every 3 hours PRN Severe Pain (7 - 10)  ondansetron Injectable 4 milliGRAM(s) IV Push every 6 hours PRN Nausea and/or Vomiting  sodium chloride 0.65% Nasal 1 Spray(s) Both Nostrils every 3 hours PRN Nasal Congestion  tetracaine/benzocaine/butamben Spray 1 Spray(s) Topical every 3 hours PRN for ngt discomfort      PAST MEDICAL & SURGICAL HISTORY:  HTN (hypertension)  off medication for over one year--states BP has been normal      UTI (urinary tract infection)  currently being treated--will complete antibiotics 3/8/2022      Intracranial tumor      GERD (gastroesophageal reflux disease)      Eczema      Thyroid cancer  surgery. radiation, Radioactive iodine      COVID-19 virus infection  11/2021--recovered at home      H/O total thyroidectomy  age 21&#x27;s for Thyroid cancer, postop complication arterial bleed, second surgery      History of tonsillectomy  age 5      History of appendectomy  age 5          Vital Signs Last 24 Hrs  T(C): 36.6 (29 Dec 2022 07:00), Max: 37 (28 Dec 2022 23:00)  T(F): 97.9 (29 Dec 2022 07:00), Max: 98.6 (28 Dec 2022 23:00)  HR: 82 (29 Dec 2022 11:00) (74 - 105)  BP: --  BP(mean): --  RR: 14 (29 Dec 2022 11:00) (9 - 30)  SpO2: 95% (29 Dec 2022 11:00) (85% - 100%)    Parameters below as of 29 Dec 2022 08:34  Patient On (Oxygen Delivery Method): nasal cannula, high flow  O2 Flow (L/min): 50  O2 Concentration (%): 40    I&O's Summary    28 Dec 2022 07:01  -  29 Dec 2022 07:00  --------------------------------------------------------  IN: 1640.7 mL / OUT: 1656 mL / NET: -15.3 mL    29 Dec 2022 07:01  -  29 Dec 2022 11:02  --------------------------------------------------------  IN: 379.6 mL / OUT: 245 mL / NET: 134.6 mL                              7.7    30.31 )-----------( 45       ( 29 Dec 2022 10:23 )             22.6     12-29    139  |  104  |  12  ----------------------------<  100<H>  4.3   |  20<L>  |  0.70    Ca    8.9      29 Dec 2022 10:23  Phos  3.8     12-29  Mg     2.5     12-29    TPro  5.9<L>  /  Alb  3.7  /  TBili  28.8<H>  /  DBili  x   /  AST  154<H>  /  ALT  51<H>  /  AlkPhos  160<H>  12-29          Culture - Fungal, Body Fluid (collected 12-26-22 @ 15:00)  Source: Peritoneal Peritoneal Fluid  Preliminary Report (12-28-22 @ 15:03):    Culture is being performed. Fungal cultures are held for 4 weeks.    Culture - Body Fluid with Gram Stain (collected 12-26-22 @ 15:00)  Source: Peritoneal Peritoneal Fluid  Gram Stain (12-26-22 @ 23:25):    polymorphonuclear leukocytes seen    No organisms seen    by cytocentrifuge  Preliminary Report (12-27-22 @ 17:03):    No growth    Culture - Blood (collected 12-26-22 @ 10:45)  Source: .Blood Blood-Peripheral  Preliminary Report (12-27-22 @ 17:01):    No growth to date.    Culture - Blood (collected 12-26-22 @ 09:30)  Source: .Blood Blood-Peripheral  Preliminary Report (12-27-22 @ 17:01):    No growth to date.    Culture - Blood (collected 12-24-22 @ 20:55)  Source: .Blood Blood  Preliminary Report (12-26-22 @ 01:03):    No growth to date.    Culture - Blood (collected 12-24-22 @ 20:20)  Source: .Blood Blood  Preliminary Report (12-26-22 @ 01:03):    No growth to date.    Culture - Blood (collected 12-23-22 @ 13:35)  Source: .Blood Blood  Final Report (12-28-22 @ 18:01):    No Growth Final    Culture - Blood (collected 12-23-22 @ 13:25)  Source: .Blood Blood  Final Report (12-28-22 @ 18:01):    No Growth Final    Culture - Bronchial (collected 12-22-22 @ 17:02)  Source: Combi-Cath Combi-Cath  Gram Stain (12-23-22 @ 06:59):    Moderate polymorphonuclear leukocytes seen per low power field    No squamous epithelial cells seen per low power field    No organisms seen per oil power field  Final Report (12-24-22 @ 15:11):    Normal Respiratory Cassandra present                Physical Exam:  Constitutional: NAD   Eyes: icteric, PERRLA  ENMT: nc/at, no thrush  Neck: supple, central line   Cardiovascular: RRR  Gastrointestinal: Soft abdomen, distended  Genitourinary: Urinary catheter in place, anuric  Extremities: SCD's in place and working bilaterally, no edema  Vascular: Palpable dp pulses bilaterally.   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  Arthur ([phone]) and Daughter Taylor at [facility] provided additional history. Of note was recently admitted to  11/2022 for workup and eval of jaundice- during that hospitalization was found to have a UTI and dx with alc hep and started on steroids (was deemed a non-responder and steroids were subsequently stopped); s/p LVP at [place] 11/2022. Previously hospitalized in 2021 at [place] for ETOH detox. Went to ETOH rehab 08/2022 and was asked to leave the facility when she was COVID+; was sober for 1 week until she started drinking again. Had also attended a few AA meetings in the past. Transferred from [facility] 12/20 for further management of acute liver failure and liver transplant evaluation.       Interval events:  - decreasing pressor requirements  - unable to wean from HFNC   - continues CVVH   - emesis, TF stopped, NGT placed to LIWS with bilious output     Potential Discharge date: pending clinical stability  Education:  Medications  Plan of care:  See Below        MEDICATIONS  (STANDING):  caspofungin IVPB      caspofungin IVPB 50 milliGRAM(s) IV Intermittent every 24 hours  chlorhexidine 2% Cloths 1 Application(s) Topical <User Schedule>  CRRT Treatment    <Continuous>  dextrose 5% + sodium chloride 0.9%. 1000 milliLiter(s) (30 mL/Hr) IV Continuous <Continuous>  escitalopram 10 milliGRAM(s) Oral daily  folic acid 1 milliGRAM(s) Oral daily  influenza   Vaccine 0.5 milliLiter(s) IntraMuscular once  insulin lispro (ADMELOG) corrective regimen sliding scale   SubCutaneous every 6 hours  lactulose Syrup 20 Gram(s) Oral every 8 hours  levETIRAcetam  Solution 750 milliGRAM(s) Enteral Tube two times a day  levothyroxine 137 MICROGram(s) Oral daily  meropenem  IVPB 1000 milliGRAM(s) IV Intermittent every 8 hours  metoclopramide Injectable 10 milliGRAM(s) IV Push every 8 hours  midodrine 20 milliGRAM(s) Oral every 8 hours  multivitamin/minerals/iron Oral Solution (CENTRUM) 15 milliLiter(s) Oral daily  norepinephrine Infusion 0.05 MICROgram(s)/kG/Min (6.21 mL/Hr) IV Continuous <Continuous>  pantoprazole   Suspension 40 milliGRAM(s) Oral every 24 hours  Phoxillum Filtration BK 4 / 2.5 5000 milliLiter(s) (800 mL/Hr) CRRT <Continuous>  Phoxillum Filtration BK 4 / 2.5 5000 milliLiter(s) (200 mL/Hr) CRRT <Continuous>  Phoxillum Filtration BK 4 / 2.5 5000 milliLiter(s) (1000 mL/Hr) CRRT <Continuous>  rifAXIMin 550 milliGRAM(s) Oral every 12 hours  thiamine 100 milliGRAM(s) Enteral Tube daily  vasopressin Infusion 0.03 Unit(s)/Min (4.5 mL/Hr) IV Continuous <Continuous>    MEDICATIONS  (PRN):  HYDROmorphone  Injectable 0.25 milliGRAM(s) IV Push every 3 hours PRN Severe Pain (7 - 10)  ondansetron Injectable 4 milliGRAM(s) IV Push every 6 hours PRN Nausea and/or Vomiting  sodium chloride 0.65% Nasal 1 Spray(s) Both Nostrils every 3 hours PRN Nasal Congestion  tetracaine/benzocaine/butamben Spray 1 Spray(s) Topical every 3 hours PRN for ngt discomfort      PAST MEDICAL & SURGICAL HISTORY:  HTN (hypertension)  off medication for over one year--states BP has been normal      UTI (urinary tract infection)  currently being treated--will complete antibiotics 3/8/2022      Intracranial tumor      GERD (gastroesophageal reflux disease)      Eczema      Thyroid cancer  surgery. radiation, Radioactive iodine      COVID-19 virus infection  11/2021--recovered at home      H/O total thyroidectomy  age 21&#x27;s for Thyroid cancer, postop complication arterial bleed, second surgery      History of tonsillectomy  age 5      History of appendectomy  age 5          Vital Signs Last 24 Hrs  T(C): 36.6 (29 Dec 2022 07:00), Max: 37 (28 Dec 2022 23:00)  T(F): 97.9 (29 Dec 2022 07:00), Max: 98.6 (28 Dec 2022 23:00)  HR: 82 (29 Dec 2022 11:00) (74 - 105)  BP: --  BP(mean): --  RR: 14 (29 Dec 2022 11:00) (9 - 30)  SpO2: 95% (29 Dec 2022 11:00) (85% - 100%)    Parameters below as of 29 Dec 2022 08:34  Patient On (Oxygen Delivery Method): nasal cannula, high flow  O2 Flow (L/min): 50  O2 Concentration (%): 40    I&O's Summary    28 Dec 2022 07:01  -  29 Dec 2022 07:00  --------------------------------------------------------  IN: 1640.7 mL / OUT: 1656 mL / NET: -15.3 mL    29 Dec 2022 07:01  -  29 Dec 2022 11:02  --------------------------------------------------------  IN: 379.6 mL / OUT: 245 mL / NET: 134.6 mL                              7.7    30.31 )-----------( 45       ( 29 Dec 2022 10:23 )             22.6     12-29    139  |  104  |  12  ----------------------------<  100<H>  4.3   |  20<L>  |  0.70    Ca    8.9      29 Dec 2022 10:23  Phos  3.8     12-29  Mg     2.5     12-29    TPro  5.9<L>  /  Alb  3.7  /  TBili  28.8<H>  /  DBili  x   /  AST  154<H>  /  ALT  51<H>  /  AlkPhos  160<H>  12-29          Culture - Fungal, Body Fluid (collected 12-26-22 @ 15:00)  Source: Peritoneal Peritoneal Fluid  Preliminary Report (12-28-22 @ 15:03):    Culture is being performed. Fungal cultures are held for 4 weeks.    Culture - Body Fluid with Gram Stain (collected 12-26-22 @ 15:00)  Source: Peritoneal Peritoneal Fluid  Gram Stain (12-26-22 @ 23:25):    polymorphonuclear leukocytes seen    No organisms seen    by cytocentrifuge  Preliminary Report (12-27-22 @ 17:03):    No growth    Culture - Blood (collected 12-26-22 @ 10:45)  Source: .Blood Blood-Peripheral  Preliminary Report (12-27-22 @ 17:01):    No growth to date.    Culture - Blood (collected 12-26-22 @ 09:30)  Source: .Blood Blood-Peripheral  Preliminary Report (12-27-22 @ 17:01):    No growth to date.    Culture - Blood (collected 12-24-22 @ 20:55)  Source: .Blood Blood  Preliminary Report (12-26-22 @ 01:03):    No growth to date.    Culture - Blood (collected 12-24-22 @ 20:20)  Source: .Blood Blood  Preliminary Report (12-26-22 @ 01:03):    No growth to date.    Culture - Blood (collected 12-23-22 @ 13:35)  Source: .Blood Blood  Final Report (12-28-22 @ 18:01):    No Growth Final    Culture - Blood (collected 12-23-22 @ 13:25)  Source: .Blood Blood  Final Report (12-28-22 @ 18:01):    No Growth Final    Culture - Bronchial (collected 12-22-22 @ 17:02)  Source: Combi-Cath Combi-Cath  Gram Stain (12-23-22 @ 06:59):    Moderate polymorphonuclear leukocytes seen per low power field    No squamous epithelial cells seen per low power field    No organisms seen per oil power field  Final Report (12-24-22 @ 15:11):    Normal Respiratory Cassandra present                Physical Exam:  Constitutional: NAD   Eyes: icteric, PERRLA  ENMT: nc/at, no thrush  Neck: supple, central line   Cardiovascular: RRR  Gastrointestinal: Soft abdomen, distended  Genitourinary: Urinary catheter in place, anuric  Extremities: SCD's in place and working bilaterally, no edema  Vascular: Palpable dp pulses bilaterally.   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  Arthur ([phone]) and Daughter Taylor at [facility] provided additional history. Of note was recently admitted to  11/2022 for workup and eval of jaundice- during that hospitalization was found to have a UTI and dx with alc hep and started on steroids (was deemed a non-responder and steroids were subsequently stopped); s/p LVP at [place] 11/2022. Previously hospitalized in 2021 at [place] for ETOH detox. Went to ETOH rehab 08/2022 and was asked to leave the facility when she was COVID+; was sober for 1 week until she started drinking again. Had also attended a few AA meetings in the past. Transferred from [facility] 12/20 for further management of acute liver failure and liver transplant evaluation.       Interval events:  - continues CVVH   - emesis, TF stopped, NGT placed to LIWS with bilious output     Potential Discharge date: pending clinical stability  Education:  Medications  Plan of care:  See Below        MEDICATIONS  (STANDING):  caspofungin IVPB      caspofungin IVPB 50 milliGRAM(s) IV Intermittent every 24 hours  chlorhexidine 2% Cloths 1 Application(s) Topical <User Schedule>  CRRT Treatment    <Continuous>  dextrose 5% + sodium chloride 0.9%. 1000 milliLiter(s) (30 mL/Hr) IV Continuous <Continuous>  escitalopram 10 milliGRAM(s) Oral daily  folic acid 1 milliGRAM(s) Oral daily  influenza   Vaccine 0.5 milliLiter(s) IntraMuscular once  insulin lispro (ADMELOG) corrective regimen sliding scale   SubCutaneous every 6 hours  lactulose Syrup 20 Gram(s) Oral every 8 hours  levETIRAcetam  Solution 750 milliGRAM(s) Enteral Tube two times a day  levothyroxine 137 MICROGram(s) Oral daily  meropenem  IVPB 1000 milliGRAM(s) IV Intermittent every 8 hours  metoclopramide Injectable 10 milliGRAM(s) IV Push every 8 hours  midodrine 20 milliGRAM(s) Oral every 8 hours  multivitamin/minerals/iron Oral Solution (CENTRUM) 15 milliLiter(s) Oral daily  norepinephrine Infusion 0.05 MICROgram(s)/kG/Min (6.21 mL/Hr) IV Continuous <Continuous>  pantoprazole   Suspension 40 milliGRAM(s) Oral every 24 hours  Phoxillum Filtration BK 4 / 2.5 5000 milliLiter(s) (800 mL/Hr) CRRT <Continuous>  Phoxillum Filtration BK 4 / 2.5 5000 milliLiter(s) (200 mL/Hr) CRRT <Continuous>  Phoxillum Filtration BK 4 / 2.5 5000 milliLiter(s) (1000 mL/Hr) CRRT <Continuous>  rifAXIMin 550 milliGRAM(s) Oral every 12 hours  thiamine 100 milliGRAM(s) Enteral Tube daily  vasopressin Infusion 0.03 Unit(s)/Min (4.5 mL/Hr) IV Continuous <Continuous>    MEDICATIONS  (PRN):  HYDROmorphone  Injectable 0.25 milliGRAM(s) IV Push every 3 hours PRN Severe Pain (7 - 10)  ondansetron Injectable 4 milliGRAM(s) IV Push every 6 hours PRN Nausea and/or Vomiting  sodium chloride 0.65% Nasal 1 Spray(s) Both Nostrils every 3 hours PRN Nasal Congestion  tetracaine/benzocaine/butamben Spray 1 Spray(s) Topical every 3 hours PRN for ngt discomfort      PAST MEDICAL & SURGICAL HISTORY:  HTN (hypertension)  off medication for over one year--states BP has been normal      UTI (urinary tract infection)  currently being treated--will complete antibiotics 3/8/2022      Intracranial tumor      GERD (gastroesophageal reflux disease)      Eczema      Thyroid cancer  surgery. radiation, Radioactive iodine      COVID-19 virus infection  11/2021--recovered at home      H/O total thyroidectomy  age 21&#x27;s for Thyroid cancer, postop complication arterial bleed, second surgery      History of tonsillectomy  age 5      History of appendectomy  age 5          Vital Signs Last 24 Hrs  T(C): 36.6 (29 Dec 2022 07:00), Max: 37 (28 Dec 2022 23:00)  T(F): 97.9 (29 Dec 2022 07:00), Max: 98.6 (28 Dec 2022 23:00)  HR: 82 (29 Dec 2022 11:00) (74 - 105)  BP: --  BP(mean): --  RR: 14 (29 Dec 2022 11:00) (9 - 30)  SpO2: 95% (29 Dec 2022 11:00) (85% - 100%)    Parameters below as of 29 Dec 2022 08:34  Patient On (Oxygen Delivery Method): nasal cannula, high flow  O2 Flow (L/min): 50  O2 Concentration (%): 40    I&O's Summary    28 Dec 2022 07:01  -  29 Dec 2022 07:00  --------------------------------------------------------  IN: 1640.7 mL / OUT: 1656 mL / NET: -15.3 mL    29 Dec 2022 07:01  -  29 Dec 2022 11:02  --------------------------------------------------------  IN: 379.6 mL / OUT: 245 mL / NET: 134.6 mL                              7.7    30.31 )-----------( 45       ( 29 Dec 2022 10:23 )             22.6     12-29    139  |  104  |  12  ----------------------------<  100<H>  4.3   |  20<L>  |  0.70    Ca    8.9      29 Dec 2022 10:23  Phos  3.8     12-29  Mg     2.5     12-29    TPro  5.9<L>  /  Alb  3.7  /  TBili  28.8<H>  /  DBili  x   /  AST  154<H>  /  ALT  51<H>  /  AlkPhos  160<H>  12-29          Culture - Fungal, Body Fluid (collected 12-26-22 @ 15:00)  Source: Peritoneal Peritoneal Fluid  Preliminary Report (12-28-22 @ 15:03):    Culture is being performed. Fungal cultures are held for 4 weeks.    Culture - Body Fluid with Gram Stain (collected 12-26-22 @ 15:00)  Source: Peritoneal Peritoneal Fluid  Gram Stain (12-26-22 @ 23:25):    polymorphonuclear leukocytes seen    No organisms seen    by cytocentrifuge  Preliminary Report (12-27-22 @ 17:03):    No growth    Culture - Blood (collected 12-26-22 @ 10:45)  Source: .Blood Blood-Peripheral  Preliminary Report (12-27-22 @ 17:01):    No growth to date.    Culture - Blood (collected 12-26-22 @ 09:30)  Source: .Blood Blood-Peripheral  Preliminary Report (12-27-22 @ 17:01):    No growth to date.    Culture - Blood (collected 12-24-22 @ 20:55)  Source: .Blood Blood  Preliminary Report (12-26-22 @ 01:03):    No growth to date.    Culture - Blood (collected 12-24-22 @ 20:20)  Source: .Blood Blood  Preliminary Report (12-26-22 @ 01:03):    No growth to date.    Culture - Blood (collected 12-23-22 @ 13:35)  Source: .Blood Blood  Final Report (12-28-22 @ 18:01):    No Growth Final    Culture - Blood (collected 12-23-22 @ 13:25)  Source: .Blood Blood  Final Report (12-28-22 @ 18:01):    No Growth Final    Culture - Bronchial (collected 12-22-22 @ 17:02)  Source: Combi-Cath Combi-Cath  Gram Stain (12-23-22 @ 06:59):    Moderate polymorphonuclear leukocytes seen per low power field    No squamous epithelial cells seen per low power field    No organisms seen per oil power field  Final Report (12-24-22 @ 15:11):    Normal Respiratory Cassandra present                Physical Exam:  Constitutional: NAD   Eyes: icteric, PERRLA  ENMT: nc/at, no thrush  Neck: supple, central line   Cardiovascular: RRR  Gastrointestinal: Soft abdomen, distended  Genitourinary: Urinary catheter in place, anuric  Extremities: SCD's in place and working bilaterally, no edema  Vascular: Palpable dp pulses bilaterally.   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  Arthur ([phone]) and Daughter Taylor at [facility] provided additional history. Of note was recently admitted to  11/2022 for workup and eval of jaundice- during that hospitalization was found to have a UTI and dx with alc hep and started on steroids (was deemed a non-responder and steroids were subsequently stopped); s/p LVP at [place] 11/2022. Previously hospitalized in 2021 at [place] for ETOH detox. Went to ETOH rehab 08/2022 and was asked to leave the facility when she was COVID+; was sober for 1 week until she started drinking again. Had also attended a few AA meetings in the past. Transferred from [facility] 12/20 for further management of acute liver failure and liver transplant evaluation.       Interval events:  - continues CVVH   - emesis, TF stopped, NGT placed to LIWS with bilious output     Potential Discharge date: pending clinical stability  Education:  Medications  Plan of care:  See Below        MEDICATIONS  (STANDING):  caspofungin IVPB      caspofungin IVPB 50 milliGRAM(s) IV Intermittent every 24 hours  chlorhexidine 2% Cloths 1 Application(s) Topical <User Schedule>  CRRT Treatment    <Continuous>  dextrose 5% + sodium chloride 0.9%. 1000 milliLiter(s) (30 mL/Hr) IV Continuous <Continuous>  escitalopram 10 milliGRAM(s) Oral daily  folic acid 1 milliGRAM(s) Oral daily  influenza   Vaccine 0.5 milliLiter(s) IntraMuscular once  insulin lispro (ADMELOG) corrective regimen sliding scale   SubCutaneous every 6 hours  lactulose Syrup 20 Gram(s) Oral every 8 hours  levETIRAcetam  Solution 750 milliGRAM(s) Enteral Tube two times a day  levothyroxine 137 MICROGram(s) Oral daily  meropenem  IVPB 1000 milliGRAM(s) IV Intermittent every 8 hours  metoclopramide Injectable 10 milliGRAM(s) IV Push every 8 hours  midodrine 20 milliGRAM(s) Oral every 8 hours  multivitamin/minerals/iron Oral Solution (CENTRUM) 15 milliLiter(s) Oral daily  norepinephrine Infusion 0.05 MICROgram(s)/kG/Min (6.21 mL/Hr) IV Continuous <Continuous>  pantoprazole   Suspension 40 milliGRAM(s) Oral every 24 hours  Phoxillum Filtration BK 4 / 2.5 5000 milliLiter(s) (800 mL/Hr) CRRT <Continuous>  Phoxillum Filtration BK 4 / 2.5 5000 milliLiter(s) (200 mL/Hr) CRRT <Continuous>  Phoxillum Filtration BK 4 / 2.5 5000 milliLiter(s) (1000 mL/Hr) CRRT <Continuous>  rifAXIMin 550 milliGRAM(s) Oral every 12 hours  thiamine 100 milliGRAM(s) Enteral Tube daily  vasopressin Infusion 0.03 Unit(s)/Min (4.5 mL/Hr) IV Continuous <Continuous>    MEDICATIONS  (PRN):  HYDROmorphone  Injectable 0.25 milliGRAM(s) IV Push every 3 hours PRN Severe Pain (7 - 10)  ondansetron Injectable 4 milliGRAM(s) IV Push every 6 hours PRN Nausea and/or Vomiting  sodium chloride 0.65% Nasal 1 Spray(s) Both Nostrils every 3 hours PRN Nasal Congestion  tetracaine/benzocaine/butamben Spray 1 Spray(s) Topical every 3 hours PRN for ngt discomfort      PAST MEDICAL & SURGICAL HISTORY:  HTN (hypertension)  off medication for over one year--states BP has been normal      UTI (urinary tract infection)  currently being treated--will complete antibiotics 3/8/2022      Intracranial tumor      GERD (gastroesophageal reflux disease)      Eczema      Thyroid cancer  surgery. radiation, Radioactive iodine      COVID-19 virus infection  11/2021--recovered at home      H/O total thyroidectomy  age 21&#x27;s for Thyroid cancer, postop complication arterial bleed, second surgery      History of tonsillectomy  age 5      History of appendectomy  age 5          Vital Signs Last 24 Hrs  T(C): 36.6 (29 Dec 2022 07:00), Max: 37 (28 Dec 2022 23:00)  T(F): 97.9 (29 Dec 2022 07:00), Max: 98.6 (28 Dec 2022 23:00)  HR: 82 (29 Dec 2022 11:00) (74 - 105)  BP: --  BP(mean): --  RR: 14 (29 Dec 2022 11:00) (9 - 30)  SpO2: 95% (29 Dec 2022 11:00) (85% - 100%)    Parameters below as of 29 Dec 2022 08:34  Patient On (Oxygen Delivery Method): nasal cannula, high flow  O2 Flow (L/min): 50  O2 Concentration (%): 40    I&O's Summary    28 Dec 2022 07:01  -  29 Dec 2022 07:00  --------------------------------------------------------  IN: 1640.7 mL / OUT: 1656 mL / NET: -15.3 mL    29 Dec 2022 07:01  -  29 Dec 2022 11:02  --------------------------------------------------------  IN: 379.6 mL / OUT: 245 mL / NET: 134.6 mL                              7.7    30.31 )-----------( 45       ( 29 Dec 2022 10:23 )             22.6     12-29    139  |  104  |  12  ----------------------------<  100<H>  4.3   |  20<L>  |  0.70    Ca    8.9      29 Dec 2022 10:23  Phos  3.8     12-29  Mg     2.5     12-29    TPro  5.9<L>  /  Alb  3.7  /  TBili  28.8<H>  /  DBili  x   /  AST  154<H>  /  ALT  51<H>  /  AlkPhos  160<H>  12-29          Culture - Fungal, Body Fluid (collected 12-26-22 @ 15:00)  Source: Peritoneal Peritoneal Fluid  Preliminary Report (12-28-22 @ 15:03):    Culture is being performed. Fungal cultures are held for 4 weeks.    Culture - Body Fluid with Gram Stain (collected 12-26-22 @ 15:00)  Source: Peritoneal Peritoneal Fluid  Gram Stain (12-26-22 @ 23:25):    polymorphonuclear leukocytes seen    No organisms seen    by cytocentrifuge  Preliminary Report (12-27-22 @ 17:03):    No growth    Culture - Blood (collected 12-26-22 @ 10:45)  Source: .Blood Blood-Peripheral  Preliminary Report (12-27-22 @ 17:01):    No growth to date.    Culture - Blood (collected 12-26-22 @ 09:30)  Source: .Blood Blood-Peripheral  Preliminary Report (12-27-22 @ 17:01):    No growth to date.    Culture - Blood (collected 12-24-22 @ 20:55)  Source: .Blood Blood  Preliminary Report (12-26-22 @ 01:03):    No growth to date.    Culture - Blood (collected 12-24-22 @ 20:20)  Source: .Blood Blood  Preliminary Report (12-26-22 @ 01:03):    No growth to date.    Culture - Blood (collected 12-23-22 @ 13:35)  Source: .Blood Blood  Final Report (12-28-22 @ 18:01):    No Growth Final    Culture - Blood (collected 12-23-22 @ 13:25)  Source: .Blood Blood  Final Report (12-28-22 @ 18:01):    No Growth Final    Culture - Bronchial (collected 12-22-22 @ 17:02)  Source: Combi-Cath Combi-Cath  Gram Stain (12-23-22 @ 06:59):    Moderate polymorphonuclear leukocytes seen per low power field    No squamous epithelial cells seen per low power field    No organisms seen per oil power field  Final Report (12-24-22 @ 15:11):    Normal Respiratory Cassandra present                Physical Exam:  Constitutional: NAD   Eyes: icteric, PERRLA  ENMT: nc/at, no thrush  Neck: supple, central line   Cardiovascular: RRR  Gastrointestinal: Soft abdomen, distended  Genitourinary: Urinary catheter in place, anuric  Extremities: SCD's in place and working bilaterally, no edema  Vascular: Palpable dp pulses bilaterally.   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  Arthur ([phone]) and Daughter Taylor at [facility] provided additional history. Of note was recently admitted to  11/2022 for workup and eval of jaundice- during that hospitalization was found to have a UTI and dx with alc hep and started on steroids (was deemed a non-responder and steroids were subsequently stopped); s/p LVP at [place] 11/2022. Previously hospitalized in 2021 at [place] for ETOH detox. Went to ETOH rehab 08/2022 and was asked to leave the facility when she was COVID+; was sober for 1 week until she started drinking again. Had also attended a few AA meetings in the past. Transferred from [facility] 12/20 for further management of acute liver failure and liver transplant evaluation.       Interval events:  - continues CVVH   - emesis, TF stopped, NGT placed to LIWS with bilious output     Potential Discharge date: pending clinical stability  Education:  Medications  Plan of care:  See Below        MEDICATIONS  (STANDING):  caspofungin IVPB      caspofungin IVPB 50 milliGRAM(s) IV Intermittent every 24 hours  chlorhexidine 2% Cloths 1 Application(s) Topical <User Schedule>  CRRT Treatment    <Continuous>  dextrose 5% + sodium chloride 0.9%. 1000 milliLiter(s) (30 mL/Hr) IV Continuous <Continuous>  escitalopram 10 milliGRAM(s) Oral daily  folic acid 1 milliGRAM(s) Oral daily  influenza   Vaccine 0.5 milliLiter(s) IntraMuscular once  insulin lispro (ADMELOG) corrective regimen sliding scale   SubCutaneous every 6 hours  lactulose Syrup 20 Gram(s) Oral every 8 hours  levETIRAcetam  Solution 750 milliGRAM(s) Enteral Tube two times a day  levothyroxine 137 MICROGram(s) Oral daily  meropenem  IVPB 1000 milliGRAM(s) IV Intermittent every 8 hours  metoclopramide Injectable 10 milliGRAM(s) IV Push every 8 hours  midodrine 20 milliGRAM(s) Oral every 8 hours  multivitamin/minerals/iron Oral Solution (CENTRUM) 15 milliLiter(s) Oral daily  norepinephrine Infusion 0.05 MICROgram(s)/kG/Min (6.21 mL/Hr) IV Continuous <Continuous>  pantoprazole   Suspension 40 milliGRAM(s) Oral every 24 hours  Phoxillum Filtration BK 4 / 2.5 5000 milliLiter(s) (800 mL/Hr) CRRT <Continuous>  Phoxillum Filtration BK 4 / 2.5 5000 milliLiter(s) (200 mL/Hr) CRRT <Continuous>  Phoxillum Filtration BK 4 / 2.5 5000 milliLiter(s) (1000 mL/Hr) CRRT <Continuous>  rifAXIMin 550 milliGRAM(s) Oral every 12 hours  thiamine 100 milliGRAM(s) Enteral Tube daily  vasopressin Infusion 0.03 Unit(s)/Min (4.5 mL/Hr) IV Continuous <Continuous>    MEDICATIONS  (PRN):  HYDROmorphone  Injectable 0.25 milliGRAM(s) IV Push every 3 hours PRN Severe Pain (7 - 10)  ondansetron Injectable 4 milliGRAM(s) IV Push every 6 hours PRN Nausea and/or Vomiting  sodium chloride 0.65% Nasal 1 Spray(s) Both Nostrils every 3 hours PRN Nasal Congestion  tetracaine/benzocaine/butamben Spray 1 Spray(s) Topical every 3 hours PRN for ngt discomfort      PAST MEDICAL & SURGICAL HISTORY:  HTN (hypertension)  off medication for over one year--states BP has been normal      UTI (urinary tract infection)  currently being treated--will complete antibiotics 3/8/2022      Intracranial tumor      GERD (gastroesophageal reflux disease)      Eczema      Thyroid cancer  surgery. radiation, Radioactive iodine      COVID-19 virus infection  11/2021--recovered at home      H/O total thyroidectomy  age 21&#x27;s for Thyroid cancer, postop complication arterial bleed, second surgery      History of tonsillectomy  age 5      History of appendectomy  age 5          Vital Signs Last 24 Hrs  T(C): 36.6 (29 Dec 2022 07:00), Max: 37 (28 Dec 2022 23:00)  T(F): 97.9 (29 Dec 2022 07:00), Max: 98.6 (28 Dec 2022 23:00)  HR: 82 (29 Dec 2022 11:00) (74 - 105)  BP: --  BP(mean): --  RR: 14 (29 Dec 2022 11:00) (9 - 30)  SpO2: 95% (29 Dec 2022 11:00) (85% - 100%)    Parameters below as of 29 Dec 2022 08:34  Patient On (Oxygen Delivery Method): nasal cannula, high flow  O2 Flow (L/min): 50  O2 Concentration (%): 40    I&O's Summary    28 Dec 2022 07:01  -  29 Dec 2022 07:00  --------------------------------------------------------  IN: 1640.7 mL / OUT: 1656 mL / NET: -15.3 mL    29 Dec 2022 07:01  -  29 Dec 2022 11:02  --------------------------------------------------------  IN: 379.6 mL / OUT: 245 mL / NET: 134.6 mL                              7.7    30.31 )-----------( 45       ( 29 Dec 2022 10:23 )             22.6     12-29    139  |  104  |  12  ----------------------------<  100<H>  4.3   |  20<L>  |  0.70    Ca    8.9      29 Dec 2022 10:23  Phos  3.8     12-29  Mg     2.5     12-29    TPro  5.9<L>  /  Alb  3.7  /  TBili  28.8<H>  /  DBili  x   /  AST  154<H>  /  ALT  51<H>  /  AlkPhos  160<H>  12-29          Culture - Fungal, Body Fluid (collected 12-26-22 @ 15:00)  Source: Peritoneal Peritoneal Fluid  Preliminary Report (12-28-22 @ 15:03):    Culture is being performed. Fungal cultures are held for 4 weeks.    Culture - Body Fluid with Gram Stain (collected 12-26-22 @ 15:00)  Source: Peritoneal Peritoneal Fluid  Gram Stain (12-26-22 @ 23:25):    polymorphonuclear leukocytes seen    No organisms seen    by cytocentrifuge  Preliminary Report (12-27-22 @ 17:03):    No growth    Culture - Blood (collected 12-26-22 @ 10:45)  Source: .Blood Blood-Peripheral  Preliminary Report (12-27-22 @ 17:01):    No growth to date.    Culture - Blood (collected 12-26-22 @ 09:30)  Source: .Blood Blood-Peripheral  Preliminary Report (12-27-22 @ 17:01):    No growth to date.    Culture - Blood (collected 12-24-22 @ 20:55)  Source: .Blood Blood  Preliminary Report (12-26-22 @ 01:03):    No growth to date.    Culture - Blood (collected 12-24-22 @ 20:20)  Source: .Blood Blood  Preliminary Report (12-26-22 @ 01:03):    No growth to date.    Culture - Blood (collected 12-23-22 @ 13:35)  Source: .Blood Blood  Final Report (12-28-22 @ 18:01):    No Growth Final    Culture - Blood (collected 12-23-22 @ 13:25)  Source: .Blood Blood  Final Report (12-28-22 @ 18:01):    No Growth Final    Culture - Bronchial (collected 12-22-22 @ 17:02)  Source: Combi-Cath Combi-Cath  Gram Stain (12-23-22 @ 06:59):    Moderate polymorphonuclear leukocytes seen per low power field    No squamous epithelial cells seen per low power field    No organisms seen per oil power field  Final Report (12-24-22 @ 15:11):    Normal Respiratory Cassandra present                Physical Exam:  Constitutional: NAD   Eyes: icteric, PERRLA  ENMT: nc/at, no thrush  Neck: supple, central line   Cardiovascular: RRR  Gastrointestinal: Soft abdomen, distended  Genitourinary: Urinary catheter in place, anuric  Extremities: SCD's in place and working bilaterally, no edema  Vascular: Palpable dp pulses bilaterally.   Neurological: following commands, mentation improving  Skin: no rashes, ulcerations, lesions  Musculoskeletal: moving extremities  Psychiatric: calm

## 2022-12-29 NOTE — CHART NOTE - NSCHARTNOTEFT_GEN_A_CORE
Nutrition Follow Up Note  Patient seen for: SICU/malnutrition follow up     Chart reviewed, events noted.    Source: EMR, Team, RN, Daughter at bedside, Patient      Per chart, "62yFemale presents with hx thyroid cancer s/p thyroidectomy, craniotomy with resection of ventricular mass, with acute liver failure, new seizures, transfer from OSH for transplant evaluation."    Diet Order:   Diet, NPO:   Except Medications (22)    Nutrition-related events:  -EN: previously received Nepro @ 50mL/hr; held on  2/2 episode of emesis   -Intake: previously ordered for supplemental PO regular diet; Team noted weak cough on  - PO diet held   -GI: made NPO in setting of possible ileus; NGT to suction, now undergoing clamping trial at time of assessment; Reglan and Lactulose ordered; decompensated liver failure with elevated LFTs  -Endo: sliding scale insulin ordered for coverage   -Renal: D5+NaCl while NPO; CRRT running; lytes WDL   -CV: levo and vaso pressor support   -Resp: acute hypoxic respiratory failure on HFNC for supplemental O2  -Neuro: hepatic encephelopathy; mentation waxing and waning     Outputs:   -NGT to suction: 250cc thus far ()   -Rectal tube: 100cc thus far ()   -Paracentesis: 100cc out ()       Weights:   Daily Weight in k.8 (-), Weight in k.7 (-), Weight in k.7 (-), Weight in k.3 (-), Weight in k.6 ()  -Weight fluctuation likely 2/2 fluid shifts; continue to monitor and trend weights     Nutritionally Pertinent MEDICATIONS  (STANDING):  caspofungin IVPB  caspofungin IVPB  dextrose 5% + sodium chloride 0.9%.  folic acid  insulin lispro (ADMELOG) corrective regimen sliding scale  lactulose Syrup  levothyroxine  meropenem  IVPB  midodrine  multivitamin/minerals/iron Oral Solution (CENTRUM)  norepinephrine Infusion  pantoprazole   Suspension  rifAXIMin  thiamine  vasopressin Infusion    Pertinent Labs:  @ 10:23: Na 139, BUN 12, Cr 0.70, <H>, K+ 4.3, Phos 3.8, Mg 2.5, Alk Phos 160<H>, ALT/SGPT 51<H>, AST/SGOT 154<H>, HbA1c --   @ 04:21: Na 138, BUN 12, Cr 0.73, BG 92, K+ 4.3, Phos 4.1, Mg 2.5, Alk Phos 155<H>, ALT/SGPT 50<H>, AST/SGOT 156<H>, HbA1c --   @ 21:56: Na 139, BUN 12, Cr 0.73, BG 96, K+ 4.3, Phos 4.0, Mg 2.5, Alk Phos 154<H>, ALT/SGPT 50<H>, AST/SGOT 156<H>, HbA1c --   @ 15:51: Na 138, BUN 11, Cr 0.67, BG 86, K+ 4.4, Phos 3.9, Mg 2.4, Alk Phos 147<H>, ALT/SGPT 47<H>, AST/SGOT 149<H>, HbA1c --    A1C with Estimated Average Glucose Result: 4.9 % (22 @ 20:59)    Finger Sticks:  POCT Blood Glucose.: 95 mg/dL ( @ 11:08)  POCT Blood Glucose.: 100 mg/dL ( @ 06:09)  POCT Blood Glucose.: 95 mg/dL ( @ 17:16)  POCT Blood Glucose.: 84 mg/dL ( @ 11:17)      (Per flowsheet)  Pressure Injuries: none noted     Edema:   -1+ generalized     Estimated Needs: based on 61.3 - dry weight   [x] continued   -Energy: 32-37kcal/kg (1961-2268kcal/day)   -Protein: 1.4-1.8g/kg (85-110g/day)   -Defer fluid to Team     Previous Nutrition Diagnosis: severe malnutrition; increased protein-energy needs   Nutrition Diagnosis is: ongoing     New Nutrition Diagnosis: [x] Not applicable    Nutrition Care Plan:  [x] In Progress  [] Achieved  [] Not applicable    Nutrition Interventions:     Education Provided:       [] Yes:  [x] No:     Recommendations:      1) Defer advancement of diet to Team; remain available for diet recommendations   2) Continue multivitamin, folic acid, thiamine daily   3) Monitor GI function closely        Monitoring and Evaluation:   Continue to monitor nutritional intake, tolerance to diet prescription, weights, labs, skin integrity      RD remains available upon request and will follow up per protocol    Sarika Vanegas MS, RDN, CDN (Pager #[phone]) Nutrition Follow Up Note  Patient seen for: SICU/malnutrition follow up     Chart reviewed, events noted.    Source: EMR, Team, RN, Daughter at bedside, Patient      Per chart, "62yFemale presents with hx thyroid cancer s/p thyroidectomy, craniotomy with resection of ventricular mass, with acute liver failure, new seizures, transfer from OSH for transplant evaluation."    Diet Order:   Diet, NPO:   Except Medications (22)    Nutrition-related events:  -EN: previously received Nepro @ 50mL/hr; held on  2/2 episode of emesis   -Intake: previously ordered for supplemental PO regular diet; Team noted weak cough on  - PO diet held   -GI: made NPO in setting of possible ileus; NGT to suction, now undergoing clamping trial at time of assessment; Reglan and Lactulose ordered; decompensated liver failure with elevated LFTs  -Endo: sliding scale insulin ordered for coverage   -Renal: D5+NaCl while NPO; CRRT running; lytes WDL   -CV: levo and vaso pressor support   -Resp: acute hypoxic respiratory failure on HFNC for supplemental O2  -Neuro: hepatic encephelopathy; mentation waxing and waning     Outputs:   -NGT to suction: 250cc thus far ()   -Rectal tube: 100cc thus far ()   -Paracentesis: 100cc out ()       Weights:   Daily Weight in k.8 (-), Weight in k.7 (-), Weight in k.7 (-), Weight in k.3 (-), Weight in k.6 ()  -Weight fluctuation likely 2/2 fluid shifts; continue to monitor and trend weights     Nutritionally Pertinent MEDICATIONS  (STANDING):  caspofungin IVPB  caspofungin IVPB  dextrose 5% + sodium chloride 0.9%.  folic acid  insulin lispro (ADMELOG) corrective regimen sliding scale  lactulose Syrup  levothyroxine  meropenem  IVPB  midodrine  multivitamin/minerals/iron Oral Solution (CENTRUM)  norepinephrine Infusion  pantoprazole   Suspension  rifAXIMin  thiamine  vasopressin Infusion    Pertinent Labs:  @ 10:23: Na 139, BUN 12, Cr 0.70, <H>, K+ 4.3, Phos 3.8, Mg 2.5, Alk Phos 160<H>, ALT/SGPT 51<H>, AST/SGOT 154<H>, HbA1c --   @ 04:21: Na 138, BUN 12, Cr 0.73, BG 92, K+ 4.3, Phos 4.1, Mg 2.5, Alk Phos 155<H>, ALT/SGPT 50<H>, AST/SGOT 156<H>, HbA1c --   @ 21:56: Na 139, BUN 12, Cr 0.73, BG 96, K+ 4.3, Phos 4.0, Mg 2.5, Alk Phos 154<H>, ALT/SGPT 50<H>, AST/SGOT 156<H>, HbA1c --   @ 15:51: Na 138, BUN 11, Cr 0.67, BG 86, K+ 4.4, Phos 3.9, Mg 2.4, Alk Phos 147<H>, ALT/SGPT 47<H>, AST/SGOT 149<H>, HbA1c --    A1C with Estimated Average Glucose Result: 4.9 % (22 @ 20:59)    Finger Sticks:  POCT Blood Glucose.: 95 mg/dL ( @ 11:08)  POCT Blood Glucose.: 100 mg/dL ( @ 06:09)  POCT Blood Glucose.: 95 mg/dL ( @ 17:16)  POCT Blood Glucose.: 84 mg/dL ( @ 11:17)      (Per flowsheet)  Pressure Injuries: none noted     Edema:   -1+ generalized     Estimated Needs: based on 61.3 - dry weight   [x] continued   -Energy: 32-37kcal/kg (1961-2268kcal/day)   -Protein: 1.4-1.8g/kg (85-110g/day)   -Defer fluid to Team     Previous Nutrition Diagnosis: severe malnutrition; increased protein-energy needs   Nutrition Diagnosis is: ongoing     New Nutrition Diagnosis: [x] Not applicable    Nutrition Care Plan:  [x] In Progress  [] Achieved  [] Not applicable    Nutrition Interventions:     Education Provided:       [] Yes:  [x] No:     Recommendations:      1) Defer advancement of diet to Team; remain available for diet recommendations   2) Continue multivitamin, folic acid, thiamine daily   3) Monitor GI function closely        Monitoring and Evaluation:   Continue to monitor nutritional intake, tolerance to diet prescription, weights, labs, skin integrity      RD remains available upon request and will follow up per protocol    Sarika Vanegas MS, RDN, CDN (Pager #[phone]) Nutrition Follow Up Note  Patient seen for: SICU/malnutrition follow up     Chart reviewed, events noted.    Source: EMR, Team, RN, Daughter at bedside, Patient      Per chart, "62yFemale presents with hx thyroid cancer s/p thyroidectomy, craniotomy with resection of ventricular mass, with acute liver failure, new seizures, transfer from OSH for transplant evaluation."    Diet Order:   Diet, NPO:   Except Medications (22)    Nutrition-related events:  -EN: previously received Nepro @ 50mL/hr; held on  2/2 episode of emesis   -Intake: previously ordered for supplemental PO regular diet; Team noted weak cough on  - PO diet held   -GI: made NPO in setting of possible ileus; NGT to suction, now undergoing clamping trial at time of assessment; Reglan and Lactulose ordered; decompensated liver failure with elevated LFTs  -Endo: sliding scale insulin ordered for coverage   -Renal: D5+NaCl while NPO; CRRT running; lytes WDL   -CV: levo and vaso pressor support   -Resp: acute hypoxic respiratory failure on HFNC for supplemental O2  -Neuro: hepatic encephelopathy; mentation waxing and waning     Outputs:   -NGT to suction: 250cc thus far ()   -Rectal tube: 100cc thus far ()   -Paracentesis: 100cc out ()       Weights:   Daily Weight in k.8 (-), Weight in k.7 (-), Weight in k.7 (-), Weight in k.3 (-), Weight in k.6 ()  -Weight fluctuation likely 2/2 fluid shifts; continue to monitor and trend weights     Nutritionally Pertinent MEDICATIONS  (STANDING):  caspofungin IVPB  caspofungin IVPB  dextrose 5% + sodium chloride 0.9%.  folic acid  insulin lispro (ADMELOG) corrective regimen sliding scale  lactulose Syrup  levothyroxine  meropenem  IVPB  midodrine  multivitamin/minerals/iron Oral Solution (CENTRUM)  norepinephrine Infusion  pantoprazole   Suspension  rifAXIMin  thiamine  vasopressin Infusion    Pertinent Labs:  @ 10:23: Na 139, BUN 12, Cr 0.70, <H>, K+ 4.3, Phos 3.8, Mg 2.5, Alk Phos 160<H>, ALT/SGPT 51<H>, AST/SGOT 154<H>, HbA1c --   @ 04:21: Na 138, BUN 12, Cr 0.73, BG 92, K+ 4.3, Phos 4.1, Mg 2.5, Alk Phos 155<H>, ALT/SGPT 50<H>, AST/SGOT 156<H>, HbA1c --   @ 21:56: Na 139, BUN 12, Cr 0.73, BG 96, K+ 4.3, Phos 4.0, Mg 2.5, Alk Phos 154<H>, ALT/SGPT 50<H>, AST/SGOT 156<H>, HbA1c --   @ 15:51: Na 138, BUN 11, Cr 0.67, BG 86, K+ 4.4, Phos 3.9, Mg 2.4, Alk Phos 147<H>, ALT/SGPT 47<H>, AST/SGOT 149<H>, HbA1c --    A1C with Estimated Average Glucose Result: 4.9 % (22 @ 20:59)    Finger Sticks:  POCT Blood Glucose.: 95 mg/dL ( @ 11:08)  POCT Blood Glucose.: 100 mg/dL ( @ 06:09)  POCT Blood Glucose.: 95 mg/dL ( @ 17:16)  POCT Blood Glucose.: 84 mg/dL ( @ 11:17)      (Per flowsheet)  Pressure Injuries: none noted     Edema:   -1+ generalized     Estimated Needs: based on 61.3 - dry weight   [x] continued   -Energy: 32-37kcal/kg (1961-2268kcal/day)   -Protein: 1.4-1.8g/kg (85-110g/day)   -Defer fluid to Team     Previous Nutrition Diagnosis: severe malnutrition; increased protein-energy needs   Nutrition Diagnosis is: ongoing     New Nutrition Diagnosis: [x] Not applicable    Nutrition Care Plan:  [x] In Progress  [] Achieved  [] Not applicable    Nutrition Interventions:     Education Provided:       [] Yes:  [x] No:     Recommendations:      1) Defer advancement of diet to Team; remain available for diet recommendations   2) Continue multivitamin, folic acid, thiamine daily   3) Monitor GI function closely        Monitoring and Evaluation:   Continue to monitor nutritional intake, tolerance to diet prescription, weights, labs, skin integrity      RD remains available upon request and will follow up per protocol    Sarika Vanegas MS, RDN, CDN (Pager #[phone])

## 2022-12-30 LAB
ALBUMIN SERPL ELPH-MCNC: 3.5 G/DL — SIGNIFICANT CHANGE UP (ref 3.3–5)
ALP SERPL-CCNC: 174 U/L — HIGH (ref 40–120)
ALP SERPL-CCNC: 186 U/L — HIGH (ref 40–120)
ALP SERPL-CCNC: 188 U/L — HIGH (ref 40–120)
ALP SERPL-CCNC: 199 U/L — HIGH (ref 40–120)
ALT FLD-CCNC: 54 U/L — HIGH (ref 10–45)
ALT FLD-CCNC: 59 U/L — HIGH (ref 10–45)
ALT FLD-CCNC: 60 U/L — HIGH (ref 10–45)
ALT FLD-CCNC: 62 U/L — HIGH (ref 10–45)
AMMONIA BLD-MCNC: 73 UMOL/L — HIGH (ref 11–55)
ANION GAP SERPL CALC-SCNC: 14 MMOL/L — SIGNIFICANT CHANGE UP (ref 5–17)
ANION GAP SERPL CALC-SCNC: 15 MMOL/L — SIGNIFICANT CHANGE UP (ref 5–17)
APTT BLD: 47.3 SEC — HIGH (ref 27.5–35.5)
AST SERPL-CCNC: 159 U/L — HIGH (ref 10–40)
AST SERPL-CCNC: 162 U/L — HIGH (ref 10–40)
AST SERPL-CCNC: 172 U/L — HIGH (ref 10–40)
BILIRUB SERPL-MCNC: 26.3 MG/DL — HIGH (ref 0.2–1.2)
BILIRUB SERPL-MCNC: 26.8 MG/DL — HIGH (ref 0.2–1.2)
BILIRUB SERPL-MCNC: 27.3 MG/DL — HIGH (ref 0.2–1.2)
BILIRUB SERPL-MCNC: 28.2 MG/DL — HIGH (ref 0.2–1.2)
BUN SERPL-MCNC: 12 MG/DL — SIGNIFICANT CHANGE UP (ref 7–23)
BUN SERPL-MCNC: 13 MG/DL — SIGNIFICANT CHANGE UP (ref 7–23)
CALCIUM SERPL-MCNC: 8.8 MG/DL — SIGNIFICANT CHANGE UP (ref 8.4–10.5)
CALCIUM SERPL-MCNC: 9 MG/DL — SIGNIFICANT CHANGE UP (ref 8.4–10.5)
CHLORIDE SERPL-SCNC: 104 MMOL/L — SIGNIFICANT CHANGE UP (ref 96–108)
CHLORIDE SERPL-SCNC: 105 MMOL/L — SIGNIFICANT CHANGE UP (ref 96–108)
CO2 SERPL-SCNC: 20 MMOL/L — LOW (ref 22–31)
CO2 SERPL-SCNC: 21 MMOL/L — LOW (ref 22–31)
CREAT SERPL-MCNC: 0.81 MG/DL — SIGNIFICANT CHANGE UP (ref 0.5–1.3)
CREAT SERPL-MCNC: 0.82 MG/DL — SIGNIFICANT CHANGE UP (ref 0.5–1.3)
CREAT SERPL-MCNC: 0.86 MG/DL — SIGNIFICANT CHANGE UP (ref 0.5–1.3)
CULTURE RESULTS: SIGNIFICANT CHANGE UP
EGFR: 77 ML/MIN/1.73M2 — SIGNIFICANT CHANGE UP
EGFR: 81 ML/MIN/1.73M2 — SIGNIFICANT CHANGE UP
EGFR: 83 ML/MIN/1.73M2 — SIGNIFICANT CHANGE UP
GAS PNL BLDA: SIGNIFICANT CHANGE UP
GLUCOSE SERPL-MCNC: 122 MG/DL — HIGH (ref 70–99)
GLUCOSE SERPL-MCNC: 127 MG/DL — HIGH (ref 70–99)
GLUCOSE SERPL-MCNC: 143 MG/DL — HIGH (ref 70–99)
GLUCOSE SERPL-MCNC: 146 MG/DL — HIGH (ref 70–99)
HCT VFR BLD CALC: 21.3 % — LOW (ref 34.5–45)
HCT VFR BLD CALC: 21.6 % — LOW (ref 34.5–45)
HCT VFR BLD CALC: 22.5 % — LOW (ref 34.5–45)
HCT VFR BLD CALC: 22.7 % — LOW (ref 34.5–45)
HEV IGM SER QL: SIGNIFICANT CHANGE UP
HGB BLD-MCNC: 7.3 G/DL — LOW (ref 11.5–15.5)
HGB BLD-MCNC: 7.4 G/DL — LOW (ref 11.5–15.5)
HGB BLD-MCNC: 7.6 G/DL — LOW (ref 11.5–15.5)
HGB BLD-MCNC: 7.7 G/DL — LOW (ref 11.5–15.5)
INR BLD: 2.27 RATIO — HIGH (ref 0.88–1.16)
LYSOSOMAL ACID LIPASE INTERPRETATION: SIGNIFICANT CHANGE UP
LYSOSOMAL ACID LIPASE, RESULT: 100 CD:384473389 — SIGNIFICANT CHANGE UP (ref 69–203)
MAGNESIUM SERPL-MCNC: 2.5 MG/DL — SIGNIFICANT CHANGE UP (ref 1.6–2.6)
MCHC RBC-ENTMCNC: 30.7 PG — SIGNIFICANT CHANGE UP (ref 27–34)
MCHC RBC-ENTMCNC: 31 PG — SIGNIFICANT CHANGE UP (ref 27–34)
MCHC RBC-ENTMCNC: 31.8 PG — SIGNIFICANT CHANGE UP (ref 27–34)
MCHC RBC-ENTMCNC: 33.8 GM/DL — SIGNIFICANT CHANGE UP (ref 32–36)
MCHC RBC-ENTMCNC: 33.9 GM/DL — SIGNIFICANT CHANGE UP (ref 32–36)
MCHC RBC-ENTMCNC: 34.7 GM/DL — SIGNIFICANT CHANGE UP (ref 32–36)
MCV RBC AUTO: 90.8 FL — SIGNIFICANT CHANGE UP (ref 80–100)
MCV RBC AUTO: 91.4 FL — SIGNIFICANT CHANGE UP (ref 80–100)
MCV RBC AUTO: 91.5 FL — SIGNIFICANT CHANGE UP (ref 80–100)
MCV RBC AUTO: 91.8 FL — SIGNIFICANT CHANGE UP (ref 80–100)
NRBC # BLD: 0 /100 WBCS — SIGNIFICANT CHANGE UP (ref 0–0)
PHOSPHATE SERPL-MCNC: 3 MG/DL — SIGNIFICANT CHANGE UP (ref 2.5–4.5)
PHOSPHATE SERPL-MCNC: 3.3 MG/DL — SIGNIFICANT CHANGE UP (ref 2.5–4.5)
PHOSPHATE SERPL-MCNC: 3.5 MG/DL — SIGNIFICANT CHANGE UP (ref 2.5–4.5)
PHOSPHATE SERPL-MCNC: 3.6 MG/DL — SIGNIFICANT CHANGE UP (ref 2.5–4.5)
PHOSPHATIDYLETHANOL (PETH) - RESULT: NEGATIVE NG/ML — SIGNIFICANT CHANGE UP
PLATELET # BLD AUTO: 40 K/UL — LOW (ref 150–400)
PLATELET # BLD AUTO: 42 K/UL — LOW (ref 150–400)
PLATELET # BLD AUTO: 51 K/UL — LOW (ref 150–400)
PLATELET # BLD AUTO: 54 K/UL — LOW (ref 150–400)
POTASSIUM SERPL-MCNC: 3.8 MMOL/L — SIGNIFICANT CHANGE UP (ref 3.5–5.3)
POTASSIUM SERPL-MCNC: 4 MMOL/L — SIGNIFICANT CHANGE UP (ref 3.5–5.3)
POTASSIUM SERPL-MCNC: 4.1 MMOL/L — SIGNIFICANT CHANGE UP (ref 3.5–5.3)
POTASSIUM SERPL-MCNC: 4.5 MMOL/L — SIGNIFICANT CHANGE UP (ref 3.5–5.3)
POTASSIUM SERPL-SCNC: 3.8 MMOL/L — SIGNIFICANT CHANGE UP (ref 3.5–5.3)
POTASSIUM SERPL-SCNC: 4 MMOL/L — SIGNIFICANT CHANGE UP (ref 3.5–5.3)
POTASSIUM SERPL-SCNC: 4.1 MMOL/L — SIGNIFICANT CHANGE UP (ref 3.5–5.3)
POTASSIUM SERPL-SCNC: 4.5 MMOL/L — SIGNIFICANT CHANGE UP (ref 3.5–5.3)
PROT SERPL-MCNC: 5.9 G/DL — LOW (ref 6–8.3)
PROT SERPL-MCNC: 6 G/DL — SIGNIFICANT CHANGE UP (ref 6–8.3)
PROT SERPL-MCNC: 6.2 G/DL — SIGNIFICANT CHANGE UP (ref 6–8.3)
PROTHROM AB SERPL-ACNC: 26.6 SEC — HIGH (ref 10.5–13.4)
RBC # BLD: 2.33 M/UL — LOW (ref 3.8–5.2)
RBC # BLD: 2.38 M/UL — LOW (ref 3.8–5.2)
RBC # BLD: 2.45 M/UL — LOW (ref 3.8–5.2)
RBC # BLD: 2.48 M/UL — LOW (ref 3.8–5.2)
RBC # FLD: 17.4 % — HIGH (ref 10.3–14.5)
RBC # FLD: 17.6 % — HIGH (ref 10.3–14.5)
SODIUM SERPL-SCNC: 138 MMOL/L — SIGNIFICANT CHANGE UP (ref 135–145)
SODIUM SERPL-SCNC: 139 MMOL/L — SIGNIFICANT CHANGE UP (ref 135–145)
SODIUM SERPL-SCNC: 140 MMOL/L — SIGNIFICANT CHANGE UP (ref 135–145)
SPECIMEN SOURCE: SIGNIFICANT CHANGE UP
WBC # BLD: 30.29 K/UL — HIGH (ref 3.8–10.5)
WBC # BLD: 31.45 K/UL — HIGH (ref 3.8–10.5)
WBC # BLD: 31.87 K/UL — HIGH (ref 3.8–10.5)
WBC # BLD: 33.02 K/UL — HIGH (ref 3.8–10.5)
WBC # FLD AUTO: 30.29 K/UL — HIGH (ref 3.8–10.5)
WBC # FLD AUTO: 31.45 K/UL — HIGH (ref 3.8–10.5)
WBC # FLD AUTO: 31.87 K/UL — HIGH (ref 3.8–10.5)
WBC # FLD AUTO: 33.02 K/UL — HIGH (ref 3.8–10.5)

## 2022-12-30 PROCEDURE — 99233 SBSQ HOSP IP/OBS HIGH 50: CPT

## 2022-12-30 PROCEDURE — 99232 SBSQ HOSP IP/OBS MODERATE 35: CPT

## 2022-12-30 PROCEDURE — 90945 DIALYSIS ONE EVALUATION: CPT | Mod: GC

## 2022-12-30 PROCEDURE — 71045 X-RAY EXAM CHEST 1 VIEW: CPT | Mod: 26

## 2022-12-30 RX ORDER — LACTULOSE 10 G/15ML
20 SOLUTION ORAL EVERY 12 HOURS
Refills: 0 | Status: DISCONTINUED | OUTPATIENT
Start: 2022-12-30 | End: 2023-01-02

## 2022-12-30 RX ORDER — POLYETHYLENE GLYCOL 3350 17 G/17G
17 POWDER, FOR SOLUTION ORAL DAILY
Refills: 0 | Status: DISCONTINUED | OUTPATIENT
Start: 2022-12-30 | End: 2023-01-02

## 2022-12-30 RX ORDER — POTASSIUM CHLORIDE 20 MEQ
40 PACKET (EA) ORAL ONCE
Refills: 0 | Status: COMPLETED | OUTPATIENT
Start: 2022-12-30 | End: 2022-12-30

## 2022-12-30 RX ADMIN — Medication 137 MICROGRAM(S): at 05:14

## 2022-12-30 RX ADMIN — MIDODRINE HYDROCHLORIDE 20 MILLIGRAM(S): 2.5 TABLET ORAL at 21:23

## 2022-12-30 RX ADMIN — Medication 10 MILLIGRAM(S): at 05:13

## 2022-12-30 RX ADMIN — Medication 6.21 MICROGRAM(S)/KG/MIN: at 08:04

## 2022-12-30 RX ADMIN — ESCITALOPRAM OXALATE 10 MILLIGRAM(S): 10 TABLET, FILM COATED ORAL at 11:13

## 2022-12-30 RX ADMIN — Medication 40 MILLIEQUIVALENT(S): at 14:09

## 2022-12-30 RX ADMIN — POLYETHYLENE GLYCOL 3350 17 GRAM(S): 17 POWDER, FOR SOLUTION ORAL at 11:43

## 2022-12-30 RX ADMIN — Medication 6.21 MICROGRAM(S)/KG/MIN: at 19:32

## 2022-12-30 RX ADMIN — Medication 10 MILLIGRAM(S): at 13:14

## 2022-12-30 RX ADMIN — LEVETIRACETAM 750 MILLIGRAM(S): 250 TABLET, FILM COATED ORAL at 05:12

## 2022-12-30 RX ADMIN — HYDROMORPHONE HYDROCHLORIDE 0.25 MILLIGRAM(S): 2 INJECTION INTRAMUSCULAR; INTRAVENOUS; SUBCUTANEOUS at 01:00

## 2022-12-30 RX ADMIN — HYDROMORPHONE HYDROCHLORIDE 0.25 MILLIGRAM(S): 2 INJECTION INTRAMUSCULAR; INTRAVENOUS; SUBCUTANEOUS at 00:45

## 2022-12-30 RX ADMIN — Medication 1 MILLIGRAM(S): at 11:13

## 2022-12-30 RX ADMIN — MIDODRINE HYDROCHLORIDE 20 MILLIGRAM(S): 2.5 TABLET ORAL at 05:12

## 2022-12-30 RX ADMIN — VASOPRESSIN 4.5 UNIT(S)/MIN: 20 INJECTION INTRAVENOUS at 19:32

## 2022-12-30 RX ADMIN — VASOPRESSIN 4.5 UNIT(S)/MIN: 20 INJECTION INTRAVENOUS at 05:11

## 2022-12-30 RX ADMIN — MEROPENEM 100 MILLIGRAM(S): 1 INJECTION INTRAVENOUS at 05:12

## 2022-12-30 RX ADMIN — LACTULOSE 20 GRAM(S): 10 SOLUTION ORAL at 17:06

## 2022-12-30 RX ADMIN — LEVETIRACETAM 750 MILLIGRAM(S): 250 TABLET, FILM COATED ORAL at 17:06

## 2022-12-30 RX ADMIN — MIDODRINE HYDROCHLORIDE 20 MILLIGRAM(S): 2.5 TABLET ORAL at 13:14

## 2022-12-30 RX ADMIN — CHLORHEXIDINE GLUCONATE 1 APPLICATION(S): 213 SOLUTION TOPICAL at 05:12

## 2022-12-30 RX ADMIN — VASOPRESSIN 4.5 UNIT(S)/MIN: 20 INJECTION INTRAVENOUS at 08:04

## 2022-12-30 RX ADMIN — MEROPENEM 100 MILLIGRAM(S): 1 INJECTION INTRAVENOUS at 21:24

## 2022-12-30 RX ADMIN — CASPOFUNGIN ACETATE 260 MILLIGRAM(S): 7 INJECTION, POWDER, LYOPHILIZED, FOR SOLUTION INTRAVENOUS at 10:12

## 2022-12-30 RX ADMIN — Medication 6.21 MICROGRAM(S)/KG/MIN: at 05:11

## 2022-12-30 RX ADMIN — PANTOPRAZOLE SODIUM 40 MILLIGRAM(S): 20 TABLET, DELAYED RELEASE ORAL at 11:43

## 2022-12-30 RX ADMIN — Medication 15 MILLILITER(S): at 11:13

## 2022-12-30 RX ADMIN — Medication 10 MILLIGRAM(S): at 21:24

## 2022-12-30 RX ADMIN — MEROPENEM 100 MILLIGRAM(S): 1 INJECTION INTRAVENOUS at 13:14

## 2022-12-30 RX ADMIN — Medication 100 MILLIGRAM(S): at 11:13

## 2022-12-30 RX ADMIN — LACTULOSE 20 GRAM(S): 10 SOLUTION ORAL at 05:12

## 2022-12-30 NOTE — PROGRESS NOTE ADULT - ASSESSMENT
61 yo F with h/o decompensated ETOH cirrhosis with ascites, thyroid cancer (s/p total thyroidectomy, RTX, and radioactive iodide), HTN, ventrical neoplasm who was initially admitted to  12/19 with new onset seizures and transferred to [facility] 12/20 for liver transplant evaluation. Pt being seen for RED requiring CRRT. 63 yo F with h/o decompensated ETOH cirrhosis with ascites, thyroid cancer (s/p total thyroidectomy, RTX, and radioactive iodide), HTN, ventrical neoplasm who was initially admitted to  12/19 with new onset seizures and transferred to [facility] 12/20 for liver transplant evaluation. Pt being seen for RED requiring CRRT. 61 yo F with h/o decompensated ETOH cirrhosis with ascites, thyroid cancer (s/p total thyroidectomy, RTX, and radioactive iodide), HTN, ventrical neoplasm who was initially admitted to  12/19 with new onset seizures and transferred to [facility] 12/20 for liver transplant evaluation. Pt being seen for RED requiring CRRT.

## 2022-12-30 NOTE — PROGRESS NOTE ADULT - SUBJECTIVE AND OBJECTIVE BOX
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  Arthur ([phone]) and Daughter Taylor at [facility] provided additional history. Of note was recently admitted to  11/2022 for workup and eval of jaundice- during that hospitalization was found to have a UTI and dx with alc hep and started on steroids (was deemed a non-responder and steroids were subsequently stopped); s/p LVP at [place] 11/2022. Previously hospitalized in 2021 at [place] for ETOH detox. Went to ETOH rehab 08/2022 and was asked to leave the facility when she was COVID+; was sober for 1 week until she started drinking again. Had also attended a few AA meetings in the past. Transferred from [facility] 12/20 for further management of acute liver failure and liver transplant evaluation.       Interval events:  - continues CVVH   - emesis, TF stopped, NGT placed to LIWS with bilious output     Potential Discharge date: pending clinical stability  Education:  Medications  Plan of care:  See Below        MEDICATIONS  (STANDING):  caspofungin IVPB      caspofungin IVPB 50 milliGRAM(s) IV Intermittent every 24 hours  chlorhexidine 2% Cloths 1 Application(s) Topical <User Schedule>  CRRT Treatment    <Continuous>  dextrose 5% + sodium chloride 0.9%. 1000 milliLiter(s) (30 mL/Hr) IV Continuous <Continuous>  escitalopram 10 milliGRAM(s) Oral daily  folic acid 1 milliGRAM(s) Oral daily  influenza   Vaccine 0.5 milliLiter(s) IntraMuscular once  insulin lispro (ADMELOG) corrective regimen sliding scale   SubCutaneous every 6 hours  lactulose Syrup 20 Gram(s) Oral every 8 hours  levETIRAcetam  Solution 750 milliGRAM(s) Enteral Tube two times a day  levothyroxine 137 MICROGram(s) Oral daily  meropenem  IVPB 1000 milliGRAM(s) IV Intermittent every 8 hours  metoclopramide Injectable 10 milliGRAM(s) IV Push every 8 hours  midodrine 20 milliGRAM(s) Oral every 8 hours  multivitamin/minerals/iron Oral Solution (CENTRUM) 15 milliLiter(s) Oral daily  norepinephrine Infusion 0.05 MICROgram(s)/kG/Min (6.21 mL/Hr) IV Continuous <Continuous>  pantoprazole   Suspension 40 milliGRAM(s) Oral every 24 hours  Phoxillum Filtration BK 4 / 2.5 5000 milliLiter(s) (800 mL/Hr) CRRT <Continuous>  Phoxillum Filtration BK 4 / 2.5 5000 milliLiter(s) (200 mL/Hr) CRRT <Continuous>  Phoxillum Filtration BK 4 / 2.5 5000 milliLiter(s) (1000 mL/Hr) CRRT <Continuous>  rifAXIMin 550 milliGRAM(s) Oral every 12 hours  thiamine 100 milliGRAM(s) Enteral Tube daily  vasopressin Infusion 0.03 Unit(s)/Min (4.5 mL/Hr) IV Continuous <Continuous>    MEDICATIONS  (PRN):  HYDROmorphone  Injectable 0.25 milliGRAM(s) IV Push every 3 hours PRN Severe Pain (7 - 10)  ondansetron Injectable 4 milliGRAM(s) IV Push every 6 hours PRN Nausea and/or Vomiting  sodium chloride 0.65% Nasal 1 Spray(s) Both Nostrils every 3 hours PRN Nasal Congestion  tetracaine/benzocaine/butamben Spray 1 Spray(s) Topical every 3 hours PRN for ngt discomfort      PAST MEDICAL & SURGICAL HISTORY:  HTN (hypertension)  off medication for over one year--states BP has been normal      UTI (urinary tract infection)  currently being treated--will complete antibiotics 3/8/2022      Intracranial tumor      GERD (gastroesophageal reflux disease)      Eczema      Thyroid cancer  surgery. radiation, Radioactive iodine      COVID-19 virus infection  11/2021--recovered at home      H/O total thyroidectomy  age 21&#x27;s for Thyroid cancer, postop complication arterial bleed, second surgery      History of tonsillectomy  age 5      History of appendectomy  age 5              Physical Exam:  Constitutional: NAD   Eyes: icteric, PERRLA  ENMT: nc/at, no thrush  Neck: supple, central line   Cardiovascular: RRR  Gastrointestinal: Soft abdomen, distended  Genitourinary: Urinary catheter in place, anuric  Extremities: SCD's in place and working bilaterally, no edema  Vascular: Palpable dp pulses bilaterally.   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  Arthur ([phone]) and Daughter Taylor at [facility] provided additional history. Of note was recently admitted to  11/2022 for workup and eval of jaundice- during that hospitalization was found to have a UTI and dx with alc hep and started on steroids (was deemed a non-responder and steroids were subsequently stopped); s/p LVP at [place] 11/2022. Previously hospitalized in 2021 at [place] for ETOH detox. Went to ETOH rehab 08/2022 and was asked to leave the facility when she was COVID+; was sober for 1 week until she started drinking again. Had also attended a few AA meetings in the past. Transferred from [facility] 12/20 for further management of acute liver failure and liver transplant evaluation.       Interval events:  - continues CVVH   - emesis, TF stopped, NGT placed to LIWS with bilious output     Potential Discharge date: pending clinical stability  Education:  Medications  Plan of care:  See Below        MEDICATIONS  (STANDING):  caspofungin IVPB      caspofungin IVPB 50 milliGRAM(s) IV Intermittent every 24 hours  chlorhexidine 2% Cloths 1 Application(s) Topical <User Schedule>  CRRT Treatment    <Continuous>  dextrose 5% + sodium chloride 0.9%. 1000 milliLiter(s) (30 mL/Hr) IV Continuous <Continuous>  escitalopram 10 milliGRAM(s) Oral daily  folic acid 1 milliGRAM(s) Oral daily  influenza   Vaccine 0.5 milliLiter(s) IntraMuscular once  insulin lispro (ADMELOG) corrective regimen sliding scale   SubCutaneous every 6 hours  lactulose Syrup 20 Gram(s) Oral every 8 hours  levETIRAcetam  Solution 750 milliGRAM(s) Enteral Tube two times a day  levothyroxine 137 MICROGram(s) Oral daily  meropenem  IVPB 1000 milliGRAM(s) IV Intermittent every 8 hours  metoclopramide Injectable 10 milliGRAM(s) IV Push every 8 hours  midodrine 20 milliGRAM(s) Oral every 8 hours  multivitamin/minerals/iron Oral Solution (CENTRUM) 15 milliLiter(s) Oral daily  norepinephrine Infusion 0.05 MICROgram(s)/kG/Min (6.21 mL/Hr) IV Continuous <Continuous>  pantoprazole   Suspension 40 milliGRAM(s) Oral every 24 hours  Phoxillum Filtration BK 4 / 2.5 5000 milliLiter(s) (800 mL/Hr) CRRT <Continuous>  Phoxillum Filtration BK 4 / 2.5 5000 milliLiter(s) (200 mL/Hr) CRRT <Continuous>  Phoxillum Filtration BK 4 / 2.5 5000 milliLiter(s) (1000 mL/Hr) CRRT <Continuous>  rifAXIMin 550 milliGRAM(s) Oral every 12 hours  thiamine 100 milliGRAM(s) Enteral Tube daily  vasopressin Infusion 0.03 Unit(s)/Min (4.5 mL/Hr) IV Continuous <Continuous>    MEDICATIONS  (PRN):  HYDROmorphone  Injectable 0.25 milliGRAM(s) IV Push every 3 hours PRN Severe Pain (7 - 10)  ondansetron Injectable 4 milliGRAM(s) IV Push every 6 hours PRN Nausea and/or Vomiting  sodium chloride 0.65% Nasal 1 Spray(s) Both Nostrils every 3 hours PRN Nasal Congestion  tetracaine/benzocaine/butamben Spray 1 Spray(s) Topical every 3 hours PRN for ngt discomfort      PAST MEDICAL & SURGICAL HISTORY:  HTN (hypertension)  off medication for over one year--states BP has been normal      UTI (urinary tract infection)  currently being treated--will complete antibiotics 3/8/2022      Intracranial tumor      GERD (gastroesophageal reflux disease)      Eczema      Thyroid cancer  surgery. radiation, Radioactive iodine      COVID-19 virus infection  11/2021--recovered at home      H/O total thyroidectomy  age 21&#x27;s for Thyroid cancer, postop complication arterial bleed, second surgery      History of tonsillectomy  age 5      History of appendectomy  age 5              Physical Exam:  Constitutional: NAD   Eyes: icteric, PERRLA  ENMT: nc/at, no thrush  Neck: supple, central line   Cardiovascular: RRR  Gastrointestinal: Soft abdomen, distended  Genitourinary: Urinary catheter in place, anuric  Extremities: SCD's in place and working bilaterally, no edema  Vascular: Palpable dp pulses bilaterally.   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  Arthur ([phone]) and Daughter Taylor at [facility] provided additional history. Of note was recently admitted to  11/2022 for workup and eval of jaundice- during that hospitalization was found to have a UTI and dx with alc hep and started on steroids (was deemed a non-responder and steroids were subsequently stopped); s/p LVP at [place] 11/2022. Previously hospitalized in 2021 at [place] for ETOH detox. Went to ETOH rehab 08/2022 and was asked to leave the facility when she was COVID+; was sober for 1 week until she started drinking again. Had also attended a few AA meetings in the past. Transferred from [facility] 12/20 for further management of acute liver failure and liver transplant evaluation.       Interval events:  - continues CVVH   - emesis, TF stopped, NGT placed to LIWS with bilious output     Potential Discharge date: pending clinical stability  Education:  Medications  Plan of care:  See Below        MEDICATIONS  (STANDING):  caspofungin IVPB      caspofungin IVPB 50 milliGRAM(s) IV Intermittent every 24 hours  chlorhexidine 2% Cloths 1 Application(s) Topical <User Schedule>  CRRT Treatment    <Continuous>  dextrose 5% + sodium chloride 0.9%. 1000 milliLiter(s) (30 mL/Hr) IV Continuous <Continuous>  escitalopram 10 milliGRAM(s) Oral daily  folic acid 1 milliGRAM(s) Oral daily  influenza   Vaccine 0.5 milliLiter(s) IntraMuscular once  insulin lispro (ADMELOG) corrective regimen sliding scale   SubCutaneous every 6 hours  lactulose Syrup 20 Gram(s) Oral every 8 hours  levETIRAcetam  Solution 750 milliGRAM(s) Enteral Tube two times a day  levothyroxine 137 MICROGram(s) Oral daily  meropenem  IVPB 1000 milliGRAM(s) IV Intermittent every 8 hours  metoclopramide Injectable 10 milliGRAM(s) IV Push every 8 hours  midodrine 20 milliGRAM(s) Oral every 8 hours  multivitamin/minerals/iron Oral Solution (CENTRUM) 15 milliLiter(s) Oral daily  norepinephrine Infusion 0.05 MICROgram(s)/kG/Min (6.21 mL/Hr) IV Continuous <Continuous>  pantoprazole   Suspension 40 milliGRAM(s) Oral every 24 hours  Phoxillum Filtration BK 4 / 2.5 5000 milliLiter(s) (800 mL/Hr) CRRT <Continuous>  Phoxillum Filtration BK 4 / 2.5 5000 milliLiter(s) (200 mL/Hr) CRRT <Continuous>  Phoxillum Filtration BK 4 / 2.5 5000 milliLiter(s) (1000 mL/Hr) CRRT <Continuous>  rifAXIMin 550 milliGRAM(s) Oral every 12 hours  thiamine 100 milliGRAM(s) Enteral Tube daily  vasopressin Infusion 0.03 Unit(s)/Min (4.5 mL/Hr) IV Continuous <Continuous>    MEDICATIONS  (PRN):  HYDROmorphone  Injectable 0.25 milliGRAM(s) IV Push every 3 hours PRN Severe Pain (7 - 10)  ondansetron Injectable 4 milliGRAM(s) IV Push every 6 hours PRN Nausea and/or Vomiting  sodium chloride 0.65% Nasal 1 Spray(s) Both Nostrils every 3 hours PRN Nasal Congestion  tetracaine/benzocaine/butamben Spray 1 Spray(s) Topical every 3 hours PRN for ngt discomfort      PAST MEDICAL & SURGICAL HISTORY:  HTN (hypertension)  off medication for over one year--states BP has been normal      UTI (urinary tract infection)  currently being treated--will complete antibiotics 3/8/2022      Intracranial tumor      GERD (gastroesophageal reflux disease)      Eczema      Thyroid cancer  surgery. radiation, Radioactive iodine      COVID-19 virus infection  11/2021--recovered at home      H/O total thyroidectomy  age 21&#x27;s for Thyroid cancer, postop complication arterial bleed, second surgery      History of tonsillectomy  age 5      History of appendectomy  age 5              Physical Exam:  Constitutional: NAD   Eyes: icteric, PERRLA  ENMT: nc/at, no thrush  Neck: supple, central line   Cardiovascular: RRR  Gastrointestinal: Soft abdomen, distended  Genitourinary: Urinary catheter in place, anuric  Extremities: SCD's in place and working bilaterally, no edema  Vascular: Palpable dp pulses bilaterally.   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  Arthur ([phone]) and Daughter Taylor at [facility] provided additional history. Of note was recently admitted to  11/2022 for workup and eval of jaundice- during that hospitalization was found to have a UTI and dx with alc hep and started on steroids (was deemed a non-responder and steroids were subsequently stopped); s/p LVP at [place] 11/2022. Previously hospitalized in 2021 at [place] for ETOH detox. Went to ETOH rehab 08/2022 and was asked to leave the facility when she was COVID+; was sober for 1 week until she started drinking again. Had also attended a few AA meetings in the past. Transferred from [facility] 12/20 for further management of acute liver failure and liver transplant evaluation.       Interval events:  - remains on pressors, CVVH   - TF resumed  - On 50% HiFlo NC O2  - Awake and alert, following simple commands    Potential Discharge date: pending clinical stability  Education:  Medications  Plan of care:  See Below        MEDICATIONS  (STANDING):  caspofungin IVPB      caspofungin IVPB 50 milliGRAM(s) IV Intermittent every 24 hours  chlorhexidine 2% Cloths 1 Application(s) Topical <User Schedule>  CRRT Treatment    <Continuous>  escitalopram 10 milliGRAM(s) Oral daily  folic acid 1 milliGRAM(s) Oral daily  influenza   Vaccine 0.5 milliLiter(s) IntraMuscular once  insulin lispro (ADMELOG) corrective regimen sliding scale   SubCutaneous every 6 hours  lactulose Syrup 20 Gram(s) Oral every 12 hours  levETIRAcetam  Solution 750 milliGRAM(s) Enteral Tube two times a day  levothyroxine 137 MICROGram(s) Oral daily  meropenem  IVPB 1000 milliGRAM(s) IV Intermittent every 8 hours  metoclopramide Injectable 10 milliGRAM(s) IV Push every 8 hours  midodrine 20 milliGRAM(s) Oral every 8 hours  multivitamin/minerals/iron Oral Solution (CENTRUM) 15 milliLiter(s) Oral daily  norepinephrine Infusion 0.05 MICROgram(s)/kG/Min (6.21 mL/Hr) IV Continuous <Continuous>  pantoprazole   Suspension 40 milliGRAM(s) Oral every 24 hours  Phoxillum Filtration BK 4 / 2.5 5000 milliLiter(s) (800 mL/Hr) CRRT <Continuous>  Phoxillum Filtration BK 4 / 2.5 5000 milliLiter(s) (200 mL/Hr) CRRT <Continuous>  Phoxillum Filtration BK 4 / 2.5 5000 milliLiter(s) (1000 mL/Hr) CRRT <Continuous>  polyethylene glycol 3350 17 Gram(s) Oral daily  potassium chloride   Solution 40 milliEquivalent(s) Enteral Tube once  rifAXIMin 550 milliGRAM(s) Oral every 12 hours  thiamine 100 milliGRAM(s) Enteral Tube daily  vasopressin Infusion 0.03 Unit(s)/Min (4.5 mL/Hr) IV Continuous <Continuous>    MEDICATIONS  (PRN):  HYDROmorphone  Injectable 0.25 milliGRAM(s) IV Push every 3 hours PRN Severe Pain (7 - 10)  ondansetron Injectable 4 milliGRAM(s) IV Push every 6 hours PRN Nausea and/or Vomiting  sodium chloride 0.65% Nasal 1 Spray(s) Both Nostrils every 3 hours PRN Nasal Congestion  tetracaine/benzocaine/butamben Spray 1 Spray(s) Topical every 3 hours PRN for ngt discomfort      PAST MEDICAL & SURGICAL HISTORY:  HTN (hypertension)  off medication for over one year--states BP has been normal      UTI (urinary tract infection)  currently being treated--will complete antibiotics 3/8/2022      Intracranial tumor      GERD (gastroesophageal reflux disease)      Eczema      Thyroid cancer  surgery. radiation, Radioactive iodine      COVID-19 virus infection  11/2021--recovered at home      H/O total thyroidectomy  age 21&#x27;s for Thyroid cancer, postop complication arterial bleed, second surgery      History of tonsillectomy  age 5      History of appendectomy  age 5          Vital Signs Last 24 Hrs  T(C): 36.8 (30 Dec 2022 07:00), Max: 36.8 (29 Dec 2022 19:00)  T(F): 98.2 (30 Dec 2022 07:00), Max: 98.2 (29 Dec 2022 19:00)  HR: 90 (30 Dec 2022 12:45) (79 - 104)  BP: 99/58 (30 Dec 2022 07:45) (77/50 - 99/58)  BP(mean): 74 (30 Dec 2022 07:45) (59 - 74)  RR: 26 (30 Dec 2022 12:45) (11 - 32)  SpO2: 95% (30 Dec 2022 12:45) (91% - 96%)    Parameters below as of 30 Dec 2022 08:40  Patient On (Oxygen Delivery Method): nasal cannula, high flow,31C  O2 Flow (L/min): 40  O2 Concentration (%): 30    I&O's Summary    29 Dec 2022 07:01  -  30 Dec 2022 07:00  --------------------------------------------------------  IN: 1540.4 mL / OUT: 1884 mL / NET: -343.6 mL    30 Dec 2022 07:01  -  30 Dec 2022 12:49  --------------------------------------------------------  IN: 457.6 mL / OUT: 799 mL / NET: -341.4 mL                              7.3    31.45 )-----------( 42       ( 30 Dec 2022 11:32 )             21.6     12-30    140  |  105  |  12  ----------------------------<  146<H>  3.8   |  21<L>  |  0.82    Ca    9.0      30 Dec 2022 11:32  Phos  3.3     12-30  Mg     2.5     12-30    TPro  6.0  /  Alb  3.5  /  TBili  26.3<H>  /  DBili  x   /  AST  162<H>  /  ALT  60<H>  /  AlkPhos  188<H>  12-30          Culture - Fungal, Body Fluid (collected 12-26-22 @ 15:00)  Source: Peritoneal Peritoneal Fluid  Preliminary Report (12-28-22 @ 15:03):    Culture is being performed. Fungal cultures are held for 4 weeks.    Culture - Body Fluid with Gram Stain (collected 12-26-22 @ 15:00)  Source: Peritoneal Peritoneal Fluid  Gram Stain (12-26-22 @ 23:25):    polymorphonuclear leukocytes seen    No organisms seen    by cytocentrifuge  Preliminary Report (12-27-22 @ 17:03):    No growth    Culture - Blood (collected 12-26-22 @ 10:45)  Source: .Blood Blood-Peripheral  Preliminary Report (12-27-22 @ 17:01):    No growth to date.    Culture - Blood (collected 12-26-22 @ 09:30)  Source: .Blood Blood-Peripheral  Preliminary Report (12-27-22 @ 17:01):    No growth to date.    Culture - Blood (collected 12-24-22 @ 20:55)  Source: .Blood Blood  Final Report (12-30-22 @ 01:01):    No Growth Final    Culture - Blood (collected 12-24-22 @ 20:20)  Source: .Blood Blood  Final Report (12-30-22 @ 01:01):    No Growth Final    Culture - Blood (collected 12-23-22 @ 13:35)  Source: .Blood Blood  Final Report (12-28-22 @ 18:01):    No Growth Final    Culture - Blood (collected 12-23-22 @ 13:25)  Source: .Blood Blood  Final Report (12-28-22 @ 18:01):    No Growth Final        Physical Exam:  Constitutional: NAD   Eyes: icteric, PERRLA  ENMT: nc/at, no thrush  Neck: supple, central line   Cardiovascular: RRR  Gastrointestinal: Soft abdomen, distended  Genitourinary: Urinary catheter in place, anuric  Extremities: SCD's in place and working bilaterally, no edema  Vascular: Palpable dp pulses bilaterally.   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  Arthur ([phone]) and Daughter Taylor at [facility] provided additional history. Of note was recently admitted to  11/2022 for workup and eval of jaundice- during that hospitalization was found to have a UTI and dx with alc hep and started on steroids (was deemed a non-responder and steroids were subsequently stopped); s/p LVP at [place] 11/2022. Previously hospitalized in 2021 at [place] for ETOH detox. Went to ETOH rehab 08/2022 and was asked to leave the facility when she was COVID+; was sober for 1 week until she started drinking again. Had also attended a few AA meetings in the past. Transferred from [facility] 12/20 for further management of acute liver failure and liver transplant evaluation.       Interval events:  - remains on pressors, CVVH   - TF resumed  - On 50% HiFlo NC O2  - Awake and alert, following simple commands    Potential Discharge date: pending clinical stability  Education:  Medications  Plan of care:  See Below        MEDICATIONS  (STANDING):  caspofungin IVPB      caspofungin IVPB 50 milliGRAM(s) IV Intermittent every 24 hours  chlorhexidine 2% Cloths 1 Application(s) Topical <User Schedule>  CRRT Treatment    <Continuous>  escitalopram 10 milliGRAM(s) Oral daily  folic acid 1 milliGRAM(s) Oral daily  influenza   Vaccine 0.5 milliLiter(s) IntraMuscular once  insulin lispro (ADMELOG) corrective regimen sliding scale   SubCutaneous every 6 hours  lactulose Syrup 20 Gram(s) Oral every 12 hours  levETIRAcetam  Solution 750 milliGRAM(s) Enteral Tube two times a day  levothyroxine 137 MICROGram(s) Oral daily  meropenem  IVPB 1000 milliGRAM(s) IV Intermittent every 8 hours  metoclopramide Injectable 10 milliGRAM(s) IV Push every 8 hours  midodrine 20 milliGRAM(s) Oral every 8 hours  multivitamin/minerals/iron Oral Solution (CENTRUM) 15 milliLiter(s) Oral daily  norepinephrine Infusion 0.05 MICROgram(s)/kG/Min (6.21 mL/Hr) IV Continuous <Continuous>  pantoprazole   Suspension 40 milliGRAM(s) Oral every 24 hours  Phoxillum Filtration BK 4 / 2.5 5000 milliLiter(s) (800 mL/Hr) CRRT <Continuous>  Phoxillum Filtration BK 4 / 2.5 5000 milliLiter(s) (200 mL/Hr) CRRT <Continuous>  Phoxillum Filtration BK 4 / 2.5 5000 milliLiter(s) (1000 mL/Hr) CRRT <Continuous>  polyethylene glycol 3350 17 Gram(s) Oral daily  potassium chloride   Solution 40 milliEquivalent(s) Enteral Tube once  rifAXIMin 550 milliGRAM(s) Oral every 12 hours  thiamine 100 milliGRAM(s) Enteral Tube daily  vasopressin Infusion 0.03 Unit(s)/Min (4.5 mL/Hr) IV Continuous <Continuous>    MEDICATIONS  (PRN):  HYDROmorphone  Injectable 0.25 milliGRAM(s) IV Push every 3 hours PRN Severe Pain (7 - 10)  ondansetron Injectable 4 milliGRAM(s) IV Push every 6 hours PRN Nausea and/or Vomiting  sodium chloride 0.65% Nasal 1 Spray(s) Both Nostrils every 3 hours PRN Nasal Congestion  tetracaine/benzocaine/butamben Spray 1 Spray(s) Topical every 3 hours PRN for ngt discomfort      PAST MEDICAL & SURGICAL HISTORY:  HTN (hypertension)  off medication for over one year--states BP has been normal      UTI (urinary tract infection)  currently being treated--will complete antibiotics 3/8/2022      Intracranial tumor      GERD (gastroesophageal reflux disease)      Eczema      Thyroid cancer  surgery. radiation, Radioactive iodine      COVID-19 virus infection  11/2021--recovered at home      H/O total thyroidectomy  age 21&#x27;s for Thyroid cancer, postop complication arterial bleed, second surgery      History of tonsillectomy  age 5      History of appendectomy  age 5          Vital Signs Last 24 Hrs  T(C): 36.8 (30 Dec 2022 07:00), Max: 36.8 (29 Dec 2022 19:00)  T(F): 98.2 (30 Dec 2022 07:00), Max: 98.2 (29 Dec 2022 19:00)  HR: 90 (30 Dec 2022 12:45) (79 - 104)  BP: 99/58 (30 Dec 2022 07:45) (77/50 - 99/58)  BP(mean): 74 (30 Dec 2022 07:45) (59 - 74)  RR: 26 (30 Dec 2022 12:45) (11 - 32)  SpO2: 95% (30 Dec 2022 12:45) (91% - 96%)    Parameters below as of 30 Dec 2022 08:40  Patient On (Oxygen Delivery Method): nasal cannula, high flow,31C  O2 Flow (L/min): 40  O2 Concentration (%): 30    I&O's Summary    29 Dec 2022 07:01  -  30 Dec 2022 07:00  --------------------------------------------------------  IN: 1540.4 mL / OUT: 1884 mL / NET: -343.6 mL    30 Dec 2022 07:01  -  30 Dec 2022 12:49  --------------------------------------------------------  IN: 457.6 mL / OUT: 799 mL / NET: -341.4 mL                              7.3    31.45 )-----------( 42       ( 30 Dec 2022 11:32 )             21.6     12-30    140  |  105  |  12  ----------------------------<  146<H>  3.8   |  21<L>  |  0.82    Ca    9.0      30 Dec 2022 11:32  Phos  3.3     12-30  Mg     2.5     12-30    TPro  6.0  /  Alb  3.5  /  TBili  26.3<H>  /  DBili  x   /  AST  162<H>  /  ALT  60<H>  /  AlkPhos  188<H>  12-30          Culture - Fungal, Body Fluid (collected 12-26-22 @ 15:00)  Source: Peritoneal Peritoneal Fluid  Preliminary Report (12-28-22 @ 15:03):    Culture is being performed. Fungal cultures are held for 4 weeks.    Culture - Body Fluid with Gram Stain (collected 12-26-22 @ 15:00)  Source: Peritoneal Peritoneal Fluid  Gram Stain (12-26-22 @ 23:25):    polymorphonuclear leukocytes seen    No organisms seen    by cytocentrifuge  Preliminary Report (12-27-22 @ 17:03):    No growth    Culture - Blood (collected 12-26-22 @ 10:45)  Source: .Blood Blood-Peripheral  Preliminary Report (12-27-22 @ 17:01):    No growth to date.    Culture - Blood (collected 12-26-22 @ 09:30)  Source: .Blood Blood-Peripheral  Preliminary Report (12-27-22 @ 17:01):    No growth to date.    Culture - Blood (collected 12-24-22 @ 20:55)  Source: .Blood Blood  Final Report (12-30-22 @ 01:01):    No Growth Final    Culture - Blood (collected 12-24-22 @ 20:20)  Source: .Blood Blood  Final Report (12-30-22 @ 01:01):    No Growth Final    Culture - Blood (collected 12-23-22 @ 13:35)  Source: .Blood Blood  Final Report (12-28-22 @ 18:01):    No Growth Final    Culture - Blood (collected 12-23-22 @ 13:25)  Source: .Blood Blood  Final Report (12-28-22 @ 18:01):    No Growth Final        Physical Exam:  Constitutional: NAD   Eyes: icteric, PERRLA  ENMT: nc/at, no thrush  Neck: supple, central line   Cardiovascular: RRR  Gastrointestinal: Soft abdomen, distended  Genitourinary: Urinary catheter in place, anuric  Extremities: SCD's in place and working bilaterally, no edema  Vascular: Palpable dp pulses bilaterally.   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  Arthur ([phone]) and Daughter Taylor at [facility] provided additional history. Of note was recently admitted to  11/2022 for workup and eval of jaundice- during that hospitalization was found to have a UTI and dx with alc hep and started on steroids (was deemed a non-responder and steroids were subsequently stopped); s/p LVP at [facility] 11/2022. Previously hospitalized in 2021 at [facility] for ETOH detox. Went to ETOH rehab 08/2022 and was asked to leave the facility when she was COVID+; was sober for 1 week until she started drinking again. Had also attended a few AA meetings in the past. Transferred from [facility] 12/20 for further management of acute liver failure and liver transplant evaluation.       Interval events:  - remains on pressors, CVVH   - TF resumed  - On 50% HiFlo NC O2  - Awake and alert, following simple commands    Potential Discharge date: pending clinical stability  Education:  Medications  Plan of care:  See Below        MEDICATIONS  (STANDING):  caspofungin IVPB      caspofungin IVPB 50 milliGRAM(s) IV Intermittent every 24 hours  chlorhexidine 2% Cloths 1 Application(s) Topical <User Schedule>  CRRT Treatment    <Continuous>  escitalopram 10 milliGRAM(s) Oral daily  folic acid 1 milliGRAM(s) Oral daily  influenza   Vaccine 0.5 milliLiter(s) IntraMuscular once  insulin lispro (ADMELOG) corrective regimen sliding scale   SubCutaneous every 6 hours  lactulose Syrup 20 Gram(s) Oral every 12 hours  levETIRAcetam  Solution 750 milliGRAM(s) Enteral Tube two times a day  levothyroxine 137 MICROGram(s) Oral daily  meropenem  IVPB 1000 milliGRAM(s) IV Intermittent every 8 hours  metoclopramide Injectable 10 milliGRAM(s) IV Push every 8 hours  midodrine 20 milliGRAM(s) Oral every 8 hours  multivitamin/minerals/iron Oral Solution (CENTRUM) 15 milliLiter(s) Oral daily  norepinephrine Infusion 0.05 MICROgram(s)/kG/Min (6.21 mL/Hr) IV Continuous <Continuous>  pantoprazole   Suspension 40 milliGRAM(s) Oral every 24 hours  Phoxillum Filtration BK 4 / 2.5 5000 milliLiter(s) (800 mL/Hr) CRRT <Continuous>  Phoxillum Filtration BK 4 / 2.5 5000 milliLiter(s) (200 mL/Hr) CRRT <Continuous>  Phoxillum Filtration BK 4 / 2.5 5000 milliLiter(s) (1000 mL/Hr) CRRT <Continuous>  polyethylene glycol 3350 17 Gram(s) Oral daily  potassium chloride   Solution 40 milliEquivalent(s) Enteral Tube once  rifAXIMin 550 milliGRAM(s) Oral every 12 hours  thiamine 100 milliGRAM(s) Enteral Tube daily  vasopressin Infusion 0.03 Unit(s)/Min (4.5 mL/Hr) IV Continuous <Continuous>    MEDICATIONS  (PRN):  HYDROmorphone  Injectable 0.25 milliGRAM(s) IV Push every 3 hours PRN Severe Pain (7 - 10)  ondansetron Injectable 4 milliGRAM(s) IV Push every 6 hours PRN Nausea and/or Vomiting  sodium chloride 0.65% Nasal 1 Spray(s) Both Nostrils every 3 hours PRN Nasal Congestion  tetracaine/benzocaine/butamben Spray 1 Spray(s) Topical every 3 hours PRN for ngt discomfort      PAST MEDICAL & SURGICAL HISTORY:  HTN (hypertension)  off medication for over one year--states BP has been normal      UTI (urinary tract infection)  currently being treated--will complete antibiotics 3/8/2022      Intracranial tumor      GERD (gastroesophageal reflux disease)      Eczema      Thyroid cancer  surgery. radiation, Radioactive iodine      COVID-19 virus infection  11/2021--recovered at home      H/O total thyroidectomy  age 21&#x27;s for Thyroid cancer, postop complication arterial bleed, second surgery      History of tonsillectomy  age 5      History of appendectomy  age 5          Vital Signs Last 24 Hrs  T(C): 36.8 (30 Dec 2022 07:00), Max: 36.8 (29 Dec 2022 19:00)  T(F): 98.2 (30 Dec 2022 07:00), Max: 98.2 (29 Dec 2022 19:00)  HR: 90 (30 Dec 2022 12:45) (79 - 104)  BP: 99/58 (30 Dec 2022 07:45) (77/50 - 99/58)  BP(mean): 74 (30 Dec 2022 07:45) (59 - 74)  RR: 26 (30 Dec 2022 12:45) (11 - 32)  SpO2: 95% (30 Dec 2022 12:45) (91% - 96%)    Parameters below as of 30 Dec 2022 08:40  Patient On (Oxygen Delivery Method): nasal cannula, high flow,31C  O2 Flow (L/min): 40  O2 Concentration (%): 30    I&O's Summary    29 Dec 2022 07:01  -  30 Dec 2022 07:00  --------------------------------------------------------  IN: 1540.4 mL / OUT: 1884 mL / NET: -343.6 mL    30 Dec 2022 07:01  -  30 Dec 2022 12:49  --------------------------------------------------------  IN: 457.6 mL / OUT: 799 mL / NET: -341.4 mL                              7.3    31.45 )-----------( 42       ( 30 Dec 2022 11:32 )             21.6     12-30    140  |  105  |  12  ----------------------------<  146<H>  3.8   |  21<L>  |  0.82    Ca    9.0      30 Dec 2022 11:32  Phos  3.3     12-30  Mg     2.5     12-30    TPro  6.0  /  Alb  3.5  /  TBili  26.3<H>  /  DBili  x   /  AST  162<H>  /  ALT  60<H>  /  AlkPhos  188<H>  12-30          Culture - Fungal, Body Fluid (collected 12-26-22 @ 15:00)  Source: Peritoneal Peritoneal Fluid  Preliminary Report (12-28-22 @ 15:03):    Culture is being performed. Fungal cultures are held for 4 weeks.    Culture - Body Fluid with Gram Stain (collected 12-26-22 @ 15:00)  Source: Peritoneal Peritoneal Fluid  Gram Stain (12-26-22 @ 23:25):    polymorphonuclear leukocytes seen    No organisms seen    by cytocentrifuge  Preliminary Report (12-27-22 @ 17:03):    No growth    Culture - Blood (collected 12-26-22 @ 10:45)  Source: .Blood Blood-Peripheral  Preliminary Report (12-27-22 @ 17:01):    No growth to date.    Culture - Blood (collected 12-26-22 @ 09:30)  Source: .Blood Blood-Peripheral  Preliminary Report (12-27-22 @ 17:01):    No growth to date.    Culture - Blood (collected 12-24-22 @ 20:55)  Source: .Blood Blood  Final Report (12-30-22 @ 01:01):    No Growth Final    Culture - Blood (collected 12-24-22 @ 20:20)  Source: .Blood Blood  Final Report (12-30-22 @ 01:01):    No Growth Final    Culture - Blood (collected 12-23-22 @ 13:35)  Source: .Blood Blood  Final Report (12-28-22 @ 18:01):    No Growth Final    Culture - Blood (collected 12-23-22 @ 13:25)  Source: .Blood Blood  Final Report (12-28-22 @ 18:01):    No Growth Final        Physical Exam:  Constitutional: NAD   Eyes: icteric, PERRLA  ENMT: nc/at, no thrush  Neck: supple, central line   Cardiovascular: RRR  Gastrointestinal: Soft abdomen, distended  Genitourinary: Urinary catheter in place, anuric  Extremities: SCD's in place and working bilaterally, no edema  Vascular: Palpable dp pulses bilaterally.   Neurological: following commands, mentation improving  Skin: no rashes, ulcerations, lesions  Musculoskeletal: moving extremities  Psychiatric: calm

## 2022-12-30 NOTE — PROGRESS NOTE ADULT - TIME BILLING
Advanced liver failure with RED on CRRT  Clinical, lab, data reviewed  Reviewed comorbidities and  medication regimen  Suggestions:  Continue CRRT, d/w SICU and liver transplant team  I have seen the patient and reviewed CRRT prescription and flow sheet. Vascular access is functioning well. Patient is tolerating CRRT. Prescription has been adjusted for optimized control of volume status, uremia and electrolytes. Management of additional metabolic abnormalities/anemia will continue to be addressed on follow up.  I was present during and reviewed clinical and lab data as well as assessment and plan as documented by the house staff as noted. Please contact if any additional questions with any change in clinical condition or on availability of any additional information or reports.  D/w house staff.

## 2022-12-30 NOTE — PROGRESS NOTE ADULT - SUBJECTIVE AND OBJECTIVE BOX
Follow Up:      Interval History:    REVIEW OF SYSTEMS  [  ] ROS unobtainable because:    [  ] All other systems negative except as noted below    Constitutional:  [ ] fever [ ] chills  [ ] weight loss  [ ] weakness  Skin:  [ ] rash [ ] phlebitis	  Eyes: [ ] icterus [ ] pain  [ ] discharge	  ENMT: [ ] sore throat  [ ] thrush [ ] ulcers [ ] exudates  Respiratory: [ ] dyspnea [ ] hemoptysis [ ] cough [ ] sputum	  Cardiovascular:  [ ] chest pain [ ] palpitations [ ] edema	  Gastrointestinal:  [ ] nausea [ ] vomiting [ ] diarrhea [ ] constipation [ ] pain	  Genitourinary:  [ ] dysuria [ ] frequency [ ] hematuria [ ] discharge [ ] flank pain  [ ] incontinence  Musculoskeletal:  [ ] myalgias [ ] arthralgias [ ] arthritis  [ ] back pain  Neurological:  [ ] headache [ ] seizures  [ ] confusion/altered mental status    Allergies  Macrobid (Rash)        ANTIMICROBIALS:  caspofungin IVPB 50 every 24 hours  caspofungin IVPB    meropenem  IVPB 1000 every 8 hours  rifAXIMin 550 every 12 hours      OTHER MEDS:  MEDICATIONS  (STANDING):  escitalopram 10 daily  HYDROmorphone  Injectable 0.25 every 3 hours PRN  influenza   Vaccine 0.5 once  insulin lispro (ADMELOG) corrective regimen sliding scale  every 6 hours  lactulose Syrup 20 every 12 hours  levETIRAcetam  Solution 750 two times a day  levothyroxine 137 daily  metoclopramide Injectable 10 every 8 hours  midodrine 20 every 8 hours  norepinephrine Infusion 0.05 <Continuous>  ondansetron Injectable 4 every 6 hours PRN  pantoprazole   Suspension 40 every 24 hours  polyethylene glycol 3350 17 daily  vasopressin Infusion 0.03 <Continuous>      Vital Signs Last 24 Hrs  T(C): 36.8 (30 Dec 2022 15:00), Max: 36.9 (30 Dec 2022 11:00)  T(F): 98.2 (30 Dec 2022 15:00), Max: 98.4 (30 Dec 2022 11:00)  HR: 88 (30 Dec 2022 18:15) (74 - 104)  BP: 99/58 (30 Dec 2022 07:45) (77/50 - 99/58)  BP(mean): 74 (30 Dec 2022 07:45) (59 - 74)  RR: 16 (30 Dec 2022 18:15) (11 - 32)  SpO2: 93% (30 Dec 2022 18:15) (91% - 96%)    Parameters below as of 30 Dec 2022 14:46  Patient On (Oxygen Delivery Method): nasal cannula, high flow,31C  O2 Flow (L/min): 40  O2 Concentration (%): 30    PHYSICAL EXAMINATION:  General: Alert and Awake, NAD  HEENT: PERRL, EOMI  Neck: Supple  Cardiac: RRR, No M/R/G  Resp: CTAB, No Wh/Rh/Ra  Abdomen: NBS, NT/ND, No HSM, No rigidity or guarding  MSK: No LE edema. No Calf tenderness  : No dhillon  Skin: No rashes or lesions. Skin is warm and dry to the touch.   Neuro: Alert and Awake. CN 2-12 Grossly intact. Moves all four extremities spontaneously.  Psych: Calm, Pleasant, Cooperative                          7.4    31.87 )-----------( 40       ( 30 Dec 2022 18:09 )             21.3       12-30    140  |  105  |  12  ----------------------------<  146<H>  3.8   |  21<L>  |  0.82    Ca    9.0      30 Dec 2022 11:32  Phos  3.3     12-30  Mg     2.5     12-30    TPro  6.0  /  Alb  3.5  /  TBili  26.3<H>  /  DBili  x   /  AST  162<H>  /  ALT  60<H>  /  AlkPhos  188<H>  12-30          MICROBIOLOGY:  v  Peritoneal Peritoneal Fluid  12-26-22   No growth  --    polymorphonuclear leukocytes seen  No organisms seen  by cytocentrifuge      .Blood Blood-Peripheral  12-26-22   No growth to date.  --  --      .Blood Blood-Peripheral  12-26-22   No growth to date.  --  --      .Blood Blood  12-24-22   No Growth Final  --  --      .Blood Blood  12-24-22   No Growth Final  --  --      .Blood Blood  12-23-22   No Growth Final  --  --      .Blood Blood  12-23-22   No Growth Final  --  --      Combi-Cath Combi-Cath  12-22-22   Normal Respiratory Cassandra present  --    Moderate polymorphonuclear leukocytes seen per low power field  No squamous epithelial cells seen per low power field  No organisms seen per oil power field      Catheterized Catheterized  12-21-22   >100,000 CFU/ml Leticia dubliniensis "Susceptibilities not performed"  --  --      Ascites Fl Ascites Fluid  12-21-22   No growth at 5 days  --    polymorphonuclear leukocytes seen  No organisms seen  by cytocentrifuge      Trach Asp Tracheal Aspirate  12-21-22   Normal Respiratory Cassandra present  --    No polymorphonuclear leukocytes per low power field  Numerous Squamous epithelial cells per low power field  Moderate Gram Positive Rods seen per oil power field  Few Yeast like cells seen per oil power field      .Blood Blood-Peripheral  12-20-22   No Growth Final  --  --      .Blood Blood-Peripheral  12-20-22   No Growth Final  --  --        CMV IgG Antibody: 5.10 U/mL (12-24-22 @ 20:59)  Toxoplasma IgG Screen: <3.0 IU/mL (12-24-22 @ 20:59)    Rapid RVP Result: NotDetec (12-27 @ 22:45)  CMVPCR Log: Det <1.54 Assay Dynamic Range: 34.5 to 1.0E+07 IU/mL (1.54 to 7.00 Log10 IU/mL)  Assay lower limit of quantification (LLOQ) is 34.5 IU/mL (1.54 Log10  IU/mL)  CMV DNA detected below the LLOQ will be reported as Detected < 34.5 IU/mL  (<1.54 Log10 IU/mL)  [facility] Cytomegalovirus (CMV) is an FDA-cleared quantitative test that  enables the detection and quantitation of CMV DNA in EDTA plasma of  infected transplant patients on the [facility] 8800 system. This test was  verified by [employer]. Results should be interpreted  with consideration of all clinical findings and laboratory findings and  should not form the sole basis for a diagnosis or treatment decision. Esp56MI/mL (12-24 @ 20:58)        RADIOLOGY:    <The imaging below has been reviewed and visualized by me independently. Findings as detailed in report below> Follow Up:      Interval History:    REVIEW OF SYSTEMS  [  ] ROS unobtainable because:    [  ] All other systems negative except as noted below    Constitutional:  [ ] fever [ ] chills  [ ] weight loss  [ ] weakness  Skin:  [ ] rash [ ] phlebitis	  Eyes: [ ] icterus [ ] pain  [ ] discharge	  ENMT: [ ] sore throat  [ ] thrush [ ] ulcers [ ] exudates  Respiratory: [ ] dyspnea [ ] hemoptysis [ ] cough [ ] sputum	  Cardiovascular:  [ ] chest pain [ ] palpitations [ ] edema	  Gastrointestinal:  [ ] nausea [ ] vomiting [ ] diarrhea [ ] constipation [ ] pain	  Genitourinary:  [ ] dysuria [ ] frequency [ ] hematuria [ ] discharge [ ] flank pain  [ ] incontinence  Musculoskeletal:  [ ] myalgias [ ] arthralgias [ ] arthritis  [ ] back pain  Neurological:  [ ] headache [ ] seizures  [ ] confusion/altered mental status    Allergies  Macrobid (Rash)        ANTIMICROBIALS:  caspofungin IVPB 50 every 24 hours  caspofungin IVPB    meropenem  IVPB 1000 every 8 hours  rifAXIMin 550 every 12 hours      OTHER MEDS:  MEDICATIONS  (STANDING):  escitalopram 10 daily  HYDROmorphone  Injectable 0.25 every 3 hours PRN  influenza   Vaccine 0.5 once  insulin lispro (ADMELOG) corrective regimen sliding scale  every 6 hours  lactulose Syrup 20 every 12 hours  levETIRAcetam  Solution 750 two times a day  levothyroxine 137 daily  metoclopramide Injectable 10 every 8 hours  midodrine 20 every 8 hours  norepinephrine Infusion 0.05 <Continuous>  ondansetron Injectable 4 every 6 hours PRN  pantoprazole   Suspension 40 every 24 hours  polyethylene glycol 3350 17 daily  vasopressin Infusion 0.03 <Continuous>      Vital Signs Last 24 Hrs  T(C): 36.8 (30 Dec 2022 15:00), Max: 36.9 (30 Dec 2022 11:00)  T(F): 98.2 (30 Dec 2022 15:00), Max: 98.4 (30 Dec 2022 11:00)  HR: 88 (30 Dec 2022 18:15) (74 - 104)  BP: 99/58 (30 Dec 2022 07:45) (77/50 - 99/58)  BP(mean): 74 (30 Dec 2022 07:45) (59 - 74)  RR: 16 (30 Dec 2022 18:15) (11 - 32)  SpO2: 93% (30 Dec 2022 18:15) (91% - 96%)    Parameters below as of 30 Dec 2022 14:46  Patient On (Oxygen Delivery Method): nasal cannula, high flow,31C  O2 Flow (L/min): 40  O2 Concentration (%): 30    PHYSICAL EXAMINATION:  General: Alert and Awake, NAD  HEENT: PERRL, EOMI  Neck: Supple  Cardiac: RRR, No M/R/G  Resp: CTAB, No Wh/Rh/Ra  Abdomen: NBS, NT/ND, No HSM, No rigidity or guarding  MSK: No LE edema. No Calf tenderness  : No dhillon  Skin: No rashes or lesions. Skin is warm and dry to the touch.   Neuro: Alert and Awake. CN 2-12 Grossly intact. Moves all four extremities spontaneously.  Psych: Calm, Pleasant, Cooperative                          7.4    31.87 )-----------( 40       ( 30 Dec 2022 18:09 )             21.3       12-30    140  |  105  |  12  ----------------------------<  146<H>  3.8   |  21<L>  |  0.82    Ca    9.0      30 Dec 2022 11:32  Phos  3.3     12-30  Mg     2.5     12-30    TPro  6.0  /  Alb  3.5  /  TBili  26.3<H>  /  DBili  x   /  AST  162<H>  /  ALT  60<H>  /  AlkPhos  188<H>  12-30          MICROBIOLOGY:  v  Peritoneal Peritoneal Fluid  12-26-22   No growth  --    polymorphonuclear leukocytes seen  No organisms seen  by cytocentrifuge      .Blood Blood-Peripheral  12-26-22   No growth to date.  --  --      .Blood Blood-Peripheral  12-26-22   No growth to date.  --  --      .Blood Blood  12-24-22   No Growth Final  --  --      .Blood Blood  12-24-22   No Growth Final  --  --      .Blood Blood  12-23-22   No Growth Final  --  --      .Blood Blood  12-23-22   No Growth Final  --  --      Combi-Cath Combi-Cath  12-22-22   Normal Respiratory Cassandra present  --    Moderate polymorphonuclear leukocytes seen per low power field  No squamous epithelial cells seen per low power field  No organisms seen per oil power field      Catheterized Catheterized  12-21-22   >100,000 CFU/ml Leticia dubliniensis "Susceptibilities not performed"  --  --      Ascites Fl Ascites Fluid  12-21-22   No growth at 5 days  --    polymorphonuclear leukocytes seen  No organisms seen  by cytocentrifuge      Trach Asp Tracheal Aspirate  12-21-22   Normal Respiratory Cassandra present  --    No polymorphonuclear leukocytes per low power field  Numerous Squamous epithelial cells per low power field  Moderate Gram Positive Rods seen per oil power field  Few Yeast like cells seen per oil power field      .Blood Blood-Peripheral  12-20-22   No Growth Final  --  --      .Blood Blood-Peripheral  12-20-22   No Growth Final  --  --        CMV IgG Antibody: 5.10 U/mL (12-24-22 @ 20:59)  Toxoplasma IgG Screen: <3.0 IU/mL (12-24-22 @ 20:59)    Rapid RVP Result: NotDetec (12-27 @ 22:45)  CMVPCR Log: Det <1.54 Assay Dynamic Range: 34.5 to 1.0E+07 IU/mL (1.54 to 7.00 Log10 IU/mL)  Assay lower limit of quantification (LLOQ) is 34.5 IU/mL (1.54 Log10  IU/mL)  CMV DNA detected below the LLOQ will be reported as Detected < 34.5 IU/mL  (<1.54 Log10 IU/mL)  [facility] Cytomegalovirus (CMV) is an FDA-cleared quantitative test that  enables the detection and quantitation of CMV DNA in EDTA plasma of  infected transplant patients on the [facility] 8800 system. This test was  verified by [employer]. Results should be interpreted  with consideration of all clinical findings and laboratory findings and  should not form the sole basis for a diagnosis or treatment decision. Kdd05KR/mL (12-24 @ 20:58)        RADIOLOGY:    <The imaging below has been reviewed and visualized by me independently. Findings as detailed in report below> Follow Up:      Interval History:    REVIEW OF SYSTEMS  [  ] ROS unobtainable because:    [  ] All other systems negative except as noted below    Constitutional:  [ ] fever [ ] chills  [ ] weight loss  [ ] weakness  Skin:  [ ] rash [ ] phlebitis	  Eyes: [ ] icterus [ ] pain  [ ] discharge	  ENMT: [ ] sore throat  [ ] thrush [ ] ulcers [ ] exudates  Respiratory: [ ] dyspnea [ ] hemoptysis [ ] cough [ ] sputum	  Cardiovascular:  [ ] chest pain [ ] palpitations [ ] edema	  Gastrointestinal:  [ ] nausea [ ] vomiting [ ] diarrhea [ ] constipation [ ] pain	  Genitourinary:  [ ] dysuria [ ] frequency [ ] hematuria [ ] discharge [ ] flank pain  [ ] incontinence  Musculoskeletal:  [ ] myalgias [ ] arthralgias [ ] arthritis  [ ] back pain  Neurological:  [ ] headache [ ] seizures  [ ] confusion/altered mental status    Allergies  Macrobid (Rash)        ANTIMICROBIALS:  caspofungin IVPB 50 every 24 hours  caspofungin IVPB    meropenem  IVPB 1000 every 8 hours  rifAXIMin 550 every 12 hours      OTHER MEDS:  MEDICATIONS  (STANDING):  escitalopram 10 daily  HYDROmorphone  Injectable 0.25 every 3 hours PRN  influenza   Vaccine 0.5 once  insulin lispro (ADMELOG) corrective regimen sliding scale  every 6 hours  lactulose Syrup 20 every 12 hours  levETIRAcetam  Solution 750 two times a day  levothyroxine 137 daily  metoclopramide Injectable 10 every 8 hours  midodrine 20 every 8 hours  norepinephrine Infusion 0.05 <Continuous>  ondansetron Injectable 4 every 6 hours PRN  pantoprazole   Suspension 40 every 24 hours  polyethylene glycol 3350 17 daily  vasopressin Infusion 0.03 <Continuous>      Vital Signs Last 24 Hrs  T(C): 36.8 (30 Dec 2022 15:00), Max: 36.9 (30 Dec 2022 11:00)  T(F): 98.2 (30 Dec 2022 15:00), Max: 98.4 (30 Dec 2022 11:00)  HR: 88 (30 Dec 2022 18:15) (74 - 104)  BP: 99/58 (30 Dec 2022 07:45) (77/50 - 99/58)  BP(mean): 74 (30 Dec 2022 07:45) (59 - 74)  RR: 16 (30 Dec 2022 18:15) (11 - 32)  SpO2: 93% (30 Dec 2022 18:15) (91% - 96%)    Parameters below as of 30 Dec 2022 14:46  Patient On (Oxygen Delivery Method): nasal cannula, high flow,31C  O2 Flow (L/min): 40  O2 Concentration (%): 30    PHYSICAL EXAMINATION:  General: Alert and Awake, NAD  HEENT: PERRL, EOMI  Neck: Supple  Cardiac: RRR, No M/R/G  Resp: CTAB, No Wh/Rh/Ra  Abdomen: NBS, NT/ND, No HSM, No rigidity or guarding  MSK: No LE edema. No Calf tenderness  : No dhillon  Skin: No rashes or lesions. Skin is warm and dry to the touch.   Neuro: Alert and Awake. CN 2-12 Grossly intact. Moves all four extremities spontaneously.  Psych: Calm, Pleasant, Cooperative                          7.4    31.87 )-----------( 40       ( 30 Dec 2022 18:09 )             21.3       12-30    140  |  105  |  12  ----------------------------<  146<H>  3.8   |  21<L>  |  0.82    Ca    9.0      30 Dec 2022 11:32  Phos  3.3     12-30  Mg     2.5     12-30    TPro  6.0  /  Alb  3.5  /  TBili  26.3<H>  /  DBili  x   /  AST  162<H>  /  ALT  60<H>  /  AlkPhos  188<H>  12-30          MICROBIOLOGY:  v  Peritoneal Peritoneal Fluid  12-26-22   No growth  --    polymorphonuclear leukocytes seen  No organisms seen  by cytocentrifuge      .Blood Blood-Peripheral  12-26-22   No growth to date.  --  --      .Blood Blood-Peripheral  12-26-22   No growth to date.  --  --      .Blood Blood  12-24-22   No Growth Final  --  --      .Blood Blood  12-24-22   No Growth Final  --  --      .Blood Blood  12-23-22   No Growth Final  --  --      .Blood Blood  12-23-22   No Growth Final  --  --      Combi-Cath Combi-Cath  12-22-22   Normal Respiratory Cassandra present  --    Moderate polymorphonuclear leukocytes seen per low power field  No squamous epithelial cells seen per low power field  No organisms seen per oil power field      Catheterized Catheterized  12-21-22   >100,000 CFU/ml Leticia dubliniensis "Susceptibilities not performed"  --  --      Ascites Fl Ascites Fluid  12-21-22   No growth at 5 days  --    polymorphonuclear leukocytes seen  No organisms seen  by cytocentrifuge      Trach Asp Tracheal Aspirate  12-21-22   Normal Respiratory Cassandra present  --    No polymorphonuclear leukocytes per low power field  Numerous Squamous epithelial cells per low power field  Moderate Gram Positive Rods seen per oil power field  Few Yeast like cells seen per oil power field      .Blood Blood-Peripheral  12-20-22   No Growth Final  --  --      .Blood Blood-Peripheral  12-20-22   No Growth Final  --  --        CMV IgG Antibody: 5.10 U/mL (12-24-22 @ 20:59)  Toxoplasma IgG Screen: <3.0 IU/mL (12-24-22 @ 20:59)    Rapid RVP Result: NotDetec (12-27 @ 22:45)  CMVPCR Log: Det <1.54 Assay Dynamic Range: 34.5 to 1.0E+07 IU/mL (1.54 to 7.00 Log10 IU/mL)  Assay lower limit of quantification (LLOQ) is 34.5 IU/mL (1.54 Log10  IU/mL)  CMV DNA detected below the LLOQ will be reported as Detected < 34.5 IU/mL  (<1.54 Log10 IU/mL)  [facility] Cytomegalovirus (CMV) is an FDA-cleared quantitative test that  enables the detection and quantitation of CMV DNA in EDTA plasma of  infected transplant patients on the [facility] 8[facility] system. This test was  verified by [employer]. Results should be interpreted  with consideration of all clinical findings and laboratory findings and  should not form the sole basis for a diagnosis or treatment decision. Cbf08UD/mL (12-24 @ 20:58)        RADIOLOGY:    <The imaging below has been reviewed and visualized by me independently. Findings as detailed in report below> Follow Up:  shock    Interval History: Remains on vasopressors, CRRT and Hi-Flow. Afebrile.     REVIEW OF SYSTEMS  [ x ] ROS unobtainable because:  encephalopathy  [  ] All other systems negative except as noted below    Constitutional:  [ ] fever [ ] chills  [ ] weight loss  [ ] weakness  Skin:  [ ] rash [ ] phlebitis	  Eyes: [ ] icterus [ ] pain  [ ] discharge	  ENMT: [ ] sore throat  [ ] thrush [ ] ulcers [ ] exudates  Respiratory: [ ] dyspnea [ ] hemoptysis [ ] cough [ ] sputum	  Cardiovascular:  [ ] chest pain [ ] palpitations [ ] edema	  Gastrointestinal:  [ ] nausea [ ] vomiting [ ] diarrhea [ ] constipation [ ] pain	  Genitourinary:  [ ] dysuria [ ] frequency [ ] hematuria [ ] discharge [ ] flank pain  [ ] incontinence  Musculoskeletal:  [ ] myalgias [ ] arthralgias [ ] arthritis  [ ] back pain  Neurological:  [ ] headache [ ] seizures  [ ] confusion/altered mental status    Allergies  Macrobid (Rash)        ANTIMICROBIALS:  caspofungin IVPB 50 every 24 hours  caspofungin IVPB    meropenem  IVPB 1000 every 8 hours  rifAXIMin 550 every 12 hours      OTHER MEDS:  MEDICATIONS  (STANDING):  escitalopram 10 daily  HYDROmorphone  Injectable 0.25 every 3 hours PRN  influenza   Vaccine 0.5 once  insulin lispro (ADMELOG) corrective regimen sliding scale  every 6 hours  lactulose Syrup 20 every 12 hours  levETIRAcetam  Solution 750 two times a day  levothyroxine 137 daily  metoclopramide Injectable 10 every 8 hours  midodrine 20 every 8 hours  norepinephrine Infusion 0.05 <Continuous>  ondansetron Injectable 4 every 6 hours PRN  pantoprazole   Suspension 40 every 24 hours  polyethylene glycol 3350 17 daily  vasopressin Infusion 0.03 <Continuous>      Vital Signs Last 24 Hrs  T(C): 36.8 (30 Dec 2022 15:00), Max: 36.9 (30 Dec 2022 11:00)  T(F): 98.2 (30 Dec 2022 15:00), Max: 98.4 (30 Dec 2022 11:00)  HR: 88 (30 Dec 2022 18:15) (74 - 104)  BP: 99/58 (30 Dec 2022 07:45) (77/50 - 99/58)  BP(mean): 74 (30 Dec 2022 07:45) (59 - 74)  RR: 16 (30 Dec 2022 18:15) (11 - 32)  SpO2: 93% (30 Dec 2022 18:15) (91% - 96%)    Parameters below as of 30 Dec 2022 14:46  Patient On (Oxygen Delivery Method): nasal cannula, high flow,31C  O2 Flow (L/min): 40  O2 Concentration (%): 30    PHYSICAL EXAMINATION:  General: Alert and Awake, NAD, jaundice, hi flow NC  HEENT: PERRL, EOMI, scleral icterus  Cardiac: RRR, No M/R/G  Resp: CTAB, No Wh/Rh/Ra  Abdomen: NBS, NT/ND, No HSM, No rigidity or guarding  MSK: No LE edema. No Calf tenderness  Skin: No rashes or lesions. Skin is warm and dry to the touch.   Neuro: Alert and Awake. CN 2-12 Grossly intact. Moves all four extremities spontaneously.  Psych: Encephalopathic - unable to assess                          7.4    31.87 )-----------( 40       ( 30 Dec 2022 18:09 )             21.3       12-30    140  |  105  |  12  ----------------------------<  146<H>  3.8   |  21<L>  |  0.82    Ca    9.0      30 Dec 2022 11:32  Phos  3.3     12-30  Mg     2.5     12-30    TPro  6.0  /  Alb  3.5  /  TBili  26.3<H>  /  DBili  x   /  AST  162<H>  /  ALT  60<H>  /  AlkPhos  188<H>  12-30          MICROBIOLOGY:  v  Peritoneal Peritoneal Fluid  12-26-22   No growth  --    polymorphonuclear leukocytes seen  No organisms seen  by cytocentrifuge      .Blood Blood-Peripheral  12-26-22   No growth to date.  --  --      .Blood Blood-Peripheral  12-26-22   No growth to date.  --  --      .Blood Blood  12-24-22   No Growth Final  --  --      .Blood Blood  12-24-22   No Growth Final  --  --      .Blood Blood  12-23-22   No Growth Final  --  --      .Blood Blood  12-23-22   No Growth Final  --  --      Combi-Cath Combi-Cath  12-22-22   Normal Respiratory Cassandra present  --    Moderate polymorphonuclear leukocytes seen per low power field  No squamous epithelial cells seen per low power field  No organisms seen per oil power field      Catheterized Catheterized  12-21-22   >100,000 CFU/ml Leticia dubliniensis "Susceptibilities not performed"  --  --      Ascites Fl Ascites Fluid  12-21-22   No growth at 5 days  --    polymorphonuclear leukocytes seen  No organisms seen  by cytocentrifuge      Trach Asp Tracheal Aspirate  12-21-22   Normal Respiratory Cassandra present  --    No polymorphonuclear leukocytes per low power field  Numerous Squamous epithelial cells per low power field  Moderate Gram Positive Rods seen per oil power field  Few Yeast like cells seen per oil power field      .Blood Blood-Peripheral  12-20-22   No Growth Final  --  --      .Blood Blood-Peripheral  12-20-22   No Growth Final  --  --        CMV IgG Antibody: 5.10 U/mL (12-24-22 @ 20:59)  Toxoplasma IgG Screen: <3.0 IU/mL (12-24-22 @ 20:59)    Rapid RVP Result: NotDetec (12-27 @ 22:45)  CMVPCR Log: Det <1.54 Assay Dynamic Range: 34.5 to 1.0E+07 IU/mL (1.54 to 7.00 Log10 IU/mL)  Assay lower limit of quantification (LLOQ) is 34.5 IU/mL (1.54 Log10  IU/mL)  CMV DNA detected below the LLOQ will be reported as Detected < 34.5 IU/mL  (<1.54 Log10 IU/mL)  [facility] Cytomegalovirus (CMV) is an FDA-cleared quantitative test that  enables the detection and quantitation of CMV DNA in EDTA plasma of  infected transplant patients on the [facility] 8800 system. This test was  verified by [employer]. Results should be interpreted  with consideration of all clinical findings and laboratory findings and  should not form the sole basis for a diagnosis or treatment decision. Kzw07UV/mL (12-24 @ 20:58)        RADIOLOGY:    <The imaging below has been reviewed and visualized by me independently. Findings as detailed in report below>    < from: Xray Chest 1 View- PORTABLE-Routine (Xray Chest 1 View- PORTABLE-Routine in AM.) (12.30.22 @ 07:55) >  IMPRESSION:  Interval increase of diffuse bilateral patchy opacities, suggestive of   worsening pulmonary vascular congestion. Multifocal pneumonia would also   be a diagnostic consideration.    < end of copied text >   Follow Up:  shock    Interval History: Remains on vasopressors, CRRT and Hi-Flow. Afebrile.     REVIEW OF SYSTEMS  [ x ] ROS unobtainable because:  encephalopathy  [  ] All other systems negative except as noted below    Constitutional:  [ ] fever [ ] chills  [ ] weight loss  [ ] weakness  Skin:  [ ] rash [ ] phlebitis	  Eyes: [ ] icterus [ ] pain  [ ] discharge	  ENMT: [ ] sore throat  [ ] thrush [ ] ulcers [ ] exudates  Respiratory: [ ] dyspnea [ ] hemoptysis [ ] cough [ ] sputum	  Cardiovascular:  [ ] chest pain [ ] palpitations [ ] edema	  Gastrointestinal:  [ ] nausea [ ] vomiting [ ] diarrhea [ ] constipation [ ] pain	  Genitourinary:  [ ] dysuria [ ] frequency [ ] hematuria [ ] discharge [ ] flank pain  [ ] incontinence  Musculoskeletal:  [ ] myalgias [ ] arthralgias [ ] arthritis  [ ] back pain  Neurological:  [ ] headache [ ] seizures  [ ] confusion/altered mental status    Allergies  Macrobid (Rash)        ANTIMICROBIALS:  caspofungin IVPB 50 every 24 hours  caspofungin IVPB    meropenem  IVPB 1000 every 8 hours  rifAXIMin 550 every 12 hours      OTHER MEDS:  MEDICATIONS  (STANDING):  escitalopram 10 daily  HYDROmorphone  Injectable 0.25 every 3 hours PRN  influenza   Vaccine 0.5 once  insulin lispro (ADMELOG) corrective regimen sliding scale  every 6 hours  lactulose Syrup 20 every 12 hours  levETIRAcetam  Solution 750 two times a day  levothyroxine 137 daily  metoclopramide Injectable 10 every 8 hours  midodrine 20 every 8 hours  norepinephrine Infusion 0.05 <Continuous>  ondansetron Injectable 4 every 6 hours PRN  pantoprazole   Suspension 40 every 24 hours  polyethylene glycol 3350 17 daily  vasopressin Infusion 0.03 <Continuous>      Vital Signs Last 24 Hrs  T(C): 36.8 (30 Dec 2022 15:00), Max: 36.9 (30 Dec 2022 11:00)  T(F): 98.2 (30 Dec 2022 15:00), Max: 98.4 (30 Dec 2022 11:00)  HR: 88 (30 Dec 2022 18:15) (74 - 104)  BP: 99/58 (30 Dec 2022 07:45) (77/50 - 99/58)  BP(mean): 74 (30 Dec 2022 07:45) (59 - 74)  RR: 16 (30 Dec 2022 18:15) (11 - 32)  SpO2: 93% (30 Dec 2022 18:15) (91% - 96%)    Parameters below as of 30 Dec 2022 14:46  Patient On (Oxygen Delivery Method): nasal cannula, high flow,31C  O2 Flow (L/min): 40  O2 Concentration (%): 30    PHYSICAL EXAMINATION:  General: Alert and Awake, NAD, jaundice, hi flow NC  HEENT: PERRL, EOMI, scleral icterus  Cardiac: RRR, No M/R/G  Resp: CTAB, No Wh/Rh/Ra  Abdomen: NBS, NT/ND, No HSM, No rigidity or guarding  MSK: No LE edema. No Calf tenderness  Skin: No rashes or lesions. Skin is warm and dry to the touch.   Neuro: Alert and Awake. CN 2-12 Grossly intact. Moves all four extremities spontaneously.  Psych: Encephalopathic - unable to assess                          7.4    31.87 )-----------( 40       ( 30 Dec 2022 18:09 )             21.3       12-30    140  |  105  |  12  ----------------------------<  146<H>  3.8   |  21<L>  |  0.82    Ca    9.0      30 Dec 2022 11:32  Phos  3.3     12-30  Mg     2.5     12-30    TPro  6.0  /  Alb  3.5  /  TBili  26.3<H>  /  DBili  x   /  AST  162<H>  /  ALT  60<H>  /  AlkPhos  188<H>  12-30          MICROBIOLOGY:  v  Peritoneal Peritoneal Fluid  12-26-22   No growth  --    polymorphonuclear leukocytes seen  No organisms seen  by cytocentrifuge      .Blood Blood-Peripheral  12-26-22   No growth to date.  --  --      .Blood Blood-Peripheral  12-26-22   No growth to date.  --  --      .Blood Blood  12-24-22   No Growth Final  --  --      .Blood Blood  12-24-22   No Growth Final  --  --      .Blood Blood  12-23-22   No Growth Final  --  --      .Blood Blood  12-23-22   No Growth Final  --  --      Combi-Cath Combi-Cath  12-22-22   Normal Respiratory Cassandra present  --    Moderate polymorphonuclear leukocytes seen per low power field  No squamous epithelial cells seen per low power field  No organisms seen per oil power field      Catheterized Catheterized  12-21-22   >100,000 CFU/ml Leticia dubliniensis "Susceptibilities not performed"  --  --      Ascites Fl Ascites Fluid  12-21-22   No growth at 5 days  --    polymorphonuclear leukocytes seen  No organisms seen  by cytocentrifuge      Trach Asp Tracheal Aspirate  12-21-22   Normal Respiratory Cassandra present  --    No polymorphonuclear leukocytes per low power field  Numerous Squamous epithelial cells per low power field  Moderate Gram Positive Rods seen per oil power field  Few Yeast like cells seen per oil power field      .Blood Blood-Peripheral  12-20-22   No Growth Final  --  --      .Blood Blood-Peripheral  12-20-22   No Growth Final  --  --        CMV IgG Antibody: 5.10 U/mL (12-24-22 @ 20:59)  Toxoplasma IgG Screen: <3.0 IU/mL (12-24-22 @ 20:59)    Rapid RVP Result: NotDetec (12-27 @ 22:45)  CMVPCR Log: Det <1.54 Assay Dynamic Range: 34.5 to 1.0E+07 IU/mL (1.54 to 7.00 Log10 IU/mL)  Assay lower limit of quantification (LLOQ) is 34.5 IU/mL (1.54 Log10  IU/mL)  CMV DNA detected below the LLOQ will be reported as Detected < 34.5 IU/mL  (<1.54 Log10 IU/mL)  [facility] Cytomegalovirus (CMV) is an FDA-cleared quantitative test that  enables the detection and quantitation of CMV DNA in EDTA plasma of  infected transplant patients on the kade[facility] 8800 system. This test was  verified by [employer]. Results should be interpreted  with consideration of all clinical findings and laboratory findings and  should not form the sole basis for a diagnosis or treatment decision. Pey05LE/mL (12-24 @ 20:58)        RADIOLOGY:    <The imaging below has been reviewed and visualized by me independently. Findings as detailed in report below>    < from: Xray Chest 1 View- PORTABLE-Routine (Xray Chest 1 View- PORTABLE-Routine in AM.) (12.30.22 @ 07:55) >  IMPRESSION:  Interval increase of diffuse bilateral patchy opacities, suggestive of   worsening pulmonary vascular congestion. Multifocal pneumonia would also   be a diagnostic consideration.    < end of copied text >   Follow Up:  shock    Interval History: Remains on vasopressors, CRRT and Hi-Flow. Afebrile.     REVIEW OF SYSTEMS  [ x ] ROS unobtainable because:  encephalopathy  [  ] All other systems negative except as noted below    Constitutional:  [ ] fever [ ] chills  [ ] weight loss  [ ] weakness  Skin:  [ ] rash [ ] phlebitis	  Eyes: [ ] icterus [ ] pain  [ ] discharge	  ENMT: [ ] sore throat  [ ] thrush [ ] ulcers [ ] exudates  Respiratory: [ ] dyspnea [ ] hemoptysis [ ] cough [ ] sputum	  Cardiovascular:  [ ] chest pain [ ] palpitations [ ] edema	  Gastrointestinal:  [ ] nausea [ ] vomiting [ ] diarrhea [ ] constipation [ ] pain	  Genitourinary:  [ ] dysuria [ ] frequency [ ] hematuria [ ] discharge [ ] flank pain  [ ] incontinence  Musculoskeletal:  [ ] myalgias [ ] arthralgias [ ] arthritis  [ ] back pain  Neurological:  [ ] headache [ ] seizures  [ ] confusion/altered mental status    Allergies  Macrobid (Rash)        ANTIMICROBIALS:  caspofungin IVPB 50 every 24 hours  caspofungin IVPB    meropenem  IVPB 1000 every 8 hours  rifAXIMin 550 every 12 hours      OTHER MEDS:  MEDICATIONS  (STANDING):  escitalopram 10 daily  HYDROmorphone  Injectable 0.25 every 3 hours PRN  influenza   Vaccine 0.5 once  insulin lispro (ADMELOG) corrective regimen sliding scale  every 6 hours  lactulose Syrup 20 every 12 hours  levETIRAcetam  Solution 750 two times a day  levothyroxine 137 daily  metoclopramide Injectable 10 every 8 hours  midodrine 20 every 8 hours  norepinephrine Infusion 0.05 <Continuous>  ondansetron Injectable 4 every 6 hours PRN  pantoprazole   Suspension 40 every 24 hours  polyethylene glycol 3350 17 daily  vasopressin Infusion 0.03 <Continuous>      Vital Signs Last 24 Hrs  T(C): 36.8 (30 Dec 2022 15:00), Max: 36.9 (30 Dec 2022 11:00)  T(F): 98.2 (30 Dec 2022 15:00), Max: 98.4 (30 Dec 2022 11:00)  HR: 88 (30 Dec 2022 18:15) (74 - 104)  BP: 99/58 (30 Dec 2022 07:45) (77/50 - 99/58)  BP(mean): 74 (30 Dec 2022 07:45) (59 - 74)  RR: 16 (30 Dec 2022 18:15) (11 - 32)  SpO2: 93% (30 Dec 2022 18:15) (91% - 96%)    Parameters below as of 30 Dec 2022 14:46  Patient On (Oxygen Delivery Method): nasal cannula, high flow,31C  O2 Flow (L/min): 40  O2 Concentration (%): 30    PHYSICAL EXAMINATION:  General: Alert and Awake, NAD, jaundice, hi flow NC  HEENT: PERRL, EOMI, scleral icterus  Cardiac: RRR, No M/R/G  Resp: CTAB, No Wh/Rh/Ra  Abdomen: NBS, NT/ND, No HSM, No rigidity or guarding  MSK: No LE edema. No Calf tenderness  Skin: No rashes or lesions. Skin is warm and dry to the touch.   Neuro: Alert and Awake. CN 2-12 Grossly intact. Moves all four extremities spontaneously.  Psych: Encephalopathic - unable to assess                          7.4    31.87 )-----------( 40       ( 30 Dec 2022 18:09 )             21.3       12-30    140  |  105  |  12  ----------------------------<  146<H>  3.8   |  21<L>  |  0.82    Ca    9.0      30 Dec 2022 11:32  Phos  3.3     12-30  Mg     2.5     12-30    TPro  6.0  /  Alb  3.5  /  TBili  26.3<H>  /  DBili  x   /  AST  162<H>  /  ALT  60<H>  /  AlkPhos  188<H>  12-30          MICROBIOLOGY:  v  Peritoneal Peritoneal Fluid  12-26-22   No growth  --    polymorphonuclear leukocytes seen  No organisms seen  by cytocentrifuge      .Blood Blood-Peripheral  12-26-22   No growth to date.  --  --      .Blood Blood-Peripheral  12-26-22   No growth to date.  --  --      .Blood Blood  12-24-22   No Growth Final  --  --      .Blood Blood  12-24-22   No Growth Final  --  --      .Blood Blood  12-23-22   No Growth Final  --  --      .Blood Blood  12-23-22   No Growth Final  --  --      Combi-Cath Combi-Cath  12-22-22   Normal Respiratory Cassandra present  --    Moderate polymorphonuclear leukocytes seen per low power field  No squamous epithelial cells seen per low power field  No organisms seen per oil power field      Catheterized Catheterized  12-21-22   >100,000 CFU/ml Leticia dubliniensis "Susceptibilities not performed"  --  --      Ascites Fl Ascites Fluid  12-21-22   No growth at 5 days  --    polymorphonuclear leukocytes seen  No organisms seen  by cytocentrifuge      Trach Asp Tracheal Aspirate  12-21-22   Normal Respiratory Cassandra present  --    No polymorphonuclear leukocytes per low power field  Numerous Squamous epithelial cells per low power field  Moderate Gram Positive Rods seen per oil power field  Few Yeast like cells seen per oil power field      .Blood Blood-Peripheral  12-20-22   No Growth Final  --  --      .Blood Blood-Peripheral  12-20-22   No Growth Final  --  --        CMV IgG Antibody: 5.10 U/mL (12-24-22 @ 20:59)  Toxoplasma IgG Screen: <3.0 IU/mL (12-24-22 @ 20:59)    Rapid RVP Result: NotDetec (12-27 @ 22:45)  CMVPCR Log: Det <1.54 Assay Dynamic Range: 34.5 to 1.0E+07 IU/mL (1.54 to 7.00 Log10 IU/mL)  Assay lower limit of quantification (LLOQ) is 34.5 IU/mL (1.54 Log10  IU/mL)  CMV DNA detected below the LLOQ will be reported as Detected < 34.5 IU/mL  (<1.54 Log10 IU/mL)  [facility] Cytomegalovirus (CMV) is an FDA-cleared quantitative test that  enables the detection and quantitation of CMV DNA in EDTA plasma of  infected transplant patients on the kadeem 8800 system. This test was  verified by [employer]. Results should be interpreted  with consideration of all clinical findings and laboratory findings and  should not form the sole basis for a diagnosis or treatment decision. Fgo42IR/mL (12-24 @ 20:58)        RADIOLOGY:    <The imaging below has been reviewed and visualized by me independently. Findings as detailed in report below>    < from: Xray Chest 1 View- PORTABLE-Routine (Xray Chest 1 View- PORTABLE-Routine in AM.) (12.30.22 @ 07:55) >  IMPRESSION:  Interval increase of diffuse bilateral patchy opacities, suggestive of   worsening pulmonary vascular congestion. Multifocal pneumonia would also   be a diagnostic consideration.    < end of copied text >

## 2022-12-30 NOTE — PROGRESS NOTE ADULT - ASSESSMENT
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   Transferred from [facility] 12/20 for further management of acute liver failure and liver transplant evaluation 2/2 known ETOH Cirrhosis.     [] Decompensated ETOH Cirrhosis  - wean off pressors as able, continue Midodrine  - remains on CVVH. continued pulmonary edema vs. 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   Transferred from [facility] 12/20 for further management of acute liver failure and liver transplant evaluation 2/2 known ETOH Cirrhosis.     [] Decompensated ETOH Cirrhosis  - wean off pressors as able, continue Midodrine  - remains on CVVH. continued pulmonary edema vs. 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   Transferred from [facility] 12/20 for further management of acute liver failure and liver transplant evaluation 2/2 known ETOH Cirrhosis.     [] Decompensated ETOH Cirrhosis  - wean off pressors as able, continue Midodrine  - remains on CVVH. continued pulmonary edema vs. 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   Transferred from [facility] 12/20 for further management of acute liver failure and liver transplant evaluation 2/2 known ETOH Cirrhosis.     [] Decompensated ETOH Cirrhosis  - wean off pressors as able, continue Midodrine  - remains on CVVH. continued pulmonary edema vs. infiltrate on CXR   - ID: Leukocytosis. Continue empiric caspo and zonia; Recent blood cx NGTD.  FU ID  - HE: Decrease lactulose to BID.  Add Miralax.  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   Transferred from [facility] 12/20 for further management of acute liver failure and liver transplant evaluation 2/2 known ETOH Cirrhosis.     [] Decompensated ETOH Cirrhosis  - wean off pressors as able, continue Midodrine  - remains on CVVH. continued pulmonary edema vs. infiltrate on CXR   - ID: Leukocytosis. Continue empiric caspo and zonia; Recent blood cx NGTD.  FU ID  - HE: Decrease lactulose to BID.  Add Miralax.  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   Transferred from [facility] 12/20 for further management of acute liver failure and liver transplant evaluation 2/2 known ETOH Cirrhosis.     [] Decompensated ETOH Cirrhosis  - wean off pressors as able, continue Midodrine  - remains on CVVH. continued pulmonary edema vs. infiltrate on CXR   - ID: Leukocytosis. Continue empiric caspo and zonia; Recent blood cx NGTD.  FU ID  - HE: Decrease lactulose to BID.  Add Miralax.  Continue Rifaximin   - Continue  TF as tolerated  - on Keppra for seizures, (no activity since admission)  - continue liver transplant evaluation w/u per protocol

## 2022-12-30 NOTE — PROGRESS NOTE ADULT - ASSESSMENT
62yFemale presents with hx thyroid cancer s/p thyroidectomy, craniotomy with resection of ventricular mass, with acute liver failure, new seizures, transfer from [facility] for transplant evaluation.     Neurologic: hepatic encephalopathy   - waxing-waning mental status  - lactulose and rifaximin; trend ammonia  - c/w keppra 750 BID ( new onset of seizure )    Respiratory: acute hypoxic resp failure   - CXR b/l infiltrates, possibly aspiration vs fluid  - combicath cx no significant growth to date  - CVVHD net -25  - RVP neg  - HFNC improving O2 needs, now 40L/40%    Cardiovascular:   -  levo, vaso   - wean as tolerated, goal MAP >65  - midodrine 20 q8h    Gastrointestinal/Nutrition:  - Acute decompensated liver failure, s/p para 2.9L in ED  - Workup per transplant recs pending   - Monitor BM, rectal tube, on lactulose & rifaximin and watching ammonia  - Protonix for stress ulcer prophylaxis as per transplant surgery  - para 12/26 - transudative, not consistent with SBP  - restarted tube feeds at low rate  - Reglan for 3d    Genitourinary/Renal: RED 2/2 ATN vs HRS  - CRRT net -25  - HD access L IJ   - monitor electrolytes  - nephro following  - stop IVF    Hematologic:  - Chem VTE ppx: hold   - Continue SCD  - Thrombocytopenic, stable  - trend Hbg, transfuse <7  - INR elevated likely hepatic dysfunction    Infectious Disease:  - Empiric abx meropenem, caspofungin  - ascites, blood, urine & fungal Cx so far NG  - repeat BCx 12/23 neg. resent 12/27  - Vanc 1gram 12/27  - CXR w/ bilateral infiltrates so will continue to monitor    Endocrine:  - Hypothyroidism c/w synthroid IV 70  - No steroids  - ISS, adjust as appropriate    Ethics: FULL CODE    Dispo/Lines:  R IJ TLC  L IJ shiley  A line L Radial  [place]  SICU 62yFemale presents with hx thyroid cancer s/p thyroidectomy, craniotomy with resection of ventricular mass, with acute liver failure, new seizures, transfer from OSH for transplant evaluation.     Neurologic: hepatic encephalopathy   - waxing-waning mental status  - lactulose and rifaximin; trend ammonia  - c/w keppra 750 BID ( new onset of seizure )    Respiratory: acute hypoxic resp failure   - CXR b/l infiltrates, possibly aspiration vs fluid  - combicath cx no significant growth to date  - CVVHD net -25  - RVP neg  - HFNC improving O2 needs, now 40L/40%    Cardiovascular:   -  levo, vaso   - wean as tolerated, goal MAP >65  - midodrine 20 q8h    Gastrointestinal/Nutrition:  - Acute decompensated liver failure, s/p para 2.9L in ED  - Workup per transplant recs pending   - Monitor BM, rectal tube, on lactulose & rifaximin and watching ammonia  - Protonix for stress ulcer prophylaxis as per transplant surgery  - para 12/26 - transudative, not consistent with SBP  - TF @ goal   - Reglan for 3d    Genitourinary/Renal: RED 2/2 ATN vs HRS  - CRRT net -25  - HD access L IJ   - monitor electrolytes  - nephro following  - stop IVF    Hematologic:  - Chem VTE ppx: hold   - Continue SCD  - Thrombocytopenic, stable  - trend Hbg, transfuse <7  - INR elevated likely hepatic dysfunction    Infectious Disease:  - Empiric abx meropenem, caspofungin  - ascites, blood, urine & fungal Cx so far NG  - repeat BCx 12/23 neg. resent 12/27  - Vanc 1gram 12/27  - CXR w/ bilateral infiltrates so will continue to monitor  - Rpt MRSA swab    Endocrine:  - Hypothyroidism c/w synthroid IV 70  - No steroids  - ISS, adjust as appropriate    Ethics: FULL CODE    Dispo/Lines:  R IJ TLC  L IJ shiley  A line L Radial  [place]  SICU

## 2022-12-30 NOTE — PROGRESS NOTE ADULT - ASSESSMENT
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    CT Chest with possible pneumonia versus atelectasis    UA (12/19) Moderate Leukocyte Esterase  UCx (12/19) No Growth  BCx (12/19) Corynebacterium (1/4 Bottles)  MRSA Nasal PCR (12/19) Negative  Paracentesis (12/19, 12/26) Cell counts not suggestive of SBP    Extubated but continued shock and hypoxic respiratory failure  Suspect noninfectious etiology of current clinical status (liver failure)  Central lines exchanged within the last week    #Leukocytosis, Positive Blood Culture, Transaminitis, Cirrhosis  --Would try to limit antibiotic duration of Meropenem (12/21 -->) as ID workup has been thus far unrevealing and patient completed an extended antibiotic course  --Would pursue CT C/A/P when / if able  --Continue to follow CBC with diff  --Continue to follow temperature curve  --Follow up on preliminary blood cultures    #Positive UCx (Candida dublinensis)  Unclear Significance  s/p Fluconazole 12/20 --> 12/25  Caspofungin gets poor urinary penetration but Candida dublinensis is intermittently azole resistant  Not making urine now  --Can continue empiric Caspofungin for now    #Pre-Transplant Evaluation  --ID Clearance for OLT pending clinical status improvement  --Initial QuantiFeron Indeterminate; would repeat QuantiFeron     #Encounter to Vaccinate Patient  Will address vaccination outside of the critical illness period  COVID19: Has had COVID19 in 2020 and again in ~8/2022. Denies prior COVID19 Vaccination  Influenza: Will require  Pneumococcal: Would benefit from PCV20  HAV: Will require Hepatitis A Vaccine  HBV: Will require Heplisav  MMR: Immune  Varicella: Immune  Shingles: Will require Shingrix  Tdap: Will require Tdap    I will be away over this upcoming weekend. Please contact the Infectious Diseases Office with any further questions or concerns.     Jonah Mendoza M.D.  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    CT Chest with possible pneumonia versus atelectasis    UA (12/19) Moderate Leukocyte Esterase  UCx (12/19) No Growth  BCx (12/19) Corynebacterium (1/4 Bottles)  MRSA Nasal PCR (12/19) Negative  Paracentesis (12/19, 12/26) Cell counts not suggestive of SBP    Extubated but continued shock and hypoxic respiratory failure  Suspect noninfectious etiology of current clinical status (liver failure)  Central lines exchanged within the last week    #Leukocytosis, Positive Blood Culture, Transaminitis, Cirrhosis  --Would try to limit antibiotic duration of Meropenem (12/21 -->) as ID workup has been thus far unrevealing and patient completed an extended antibiotic course  --Would pursue CT C/A/P when / if able  --Continue to follow CBC with diff  --Continue to follow temperature curve  --Follow up on preliminary blood cultures    #Positive UCx (Candida dublinensis)  Unclear Significance  s/p Fluconazole 12/20 --> 12/25  Caspofungin gets poor urinary penetration but Candida dublinensis is intermittently azole resistant  Not making urine now  --Can continue empiric Caspofungin for now    #Pre-Transplant Evaluation  --ID Clearance for OLT pending clinical status improvement  --Initial QuantiFeron Indeterminate; would repeat QuantiFeron     #Encounter to Vaccinate Patient  Will address vaccination outside of the critical illness period  COVID19: Has had COVID19 in 2020 and again in ~8/2022. Denies prior COVID19 Vaccination  Influenza: Will require  Pneumococcal: Would benefit from PCV20  HAV: Will require Hepatitis A Vaccine  HBV: Will require Heplisav  MMR: Immune  Varicella: Immune  Shingles: Will require Shingrix  Tdap: Will require Tdap    I will be away over this upcoming weekend. Please contact the Infectious Diseases Office with any further questions or concerns.     Jonah Mendoza M.D.  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 12/20 for LT eval for [facility].    CT Chest with possible pneumonia versus atelectasis    UA (12/19) Moderate Leukocyte Esterase  UCx (12/19) No Growth  BCx (12/19) Corynebacterium (1/4 Bottles)  MRSA Nasal PCR (12/19) Negative  Paracentesis (12/19, 12/26) Cell counts not suggestive of SBP    Extubated but continued shock and hypoxic respiratory failure  Suspect noninfectious etiology of current clinical status (liver failure)  Central lines exchanged within the last week    #Leukocytosis, Positive Blood Culture, Transaminitis, Cirrhosis  --Would try to limit antibiotic duration of Meropenem (12/21 -->) as ID workup has been thus far unrevealing and patient completed an extended antibiotic course  --Would pursue CT C/A/P when / if able  --Continue to follow CBC with diff  --Continue to follow temperature curve  --Follow up on preliminary blood cultures    #Positive UCx (Candida dublinensis)  Unclear Significance  s/p Fluconazole 12/20 --> 12/25  Caspofungin gets poor urinary penetration but Candida dublinensis is intermittently azole resistant  Not making urine now  --Can continue empiric Caspofungin for now    #Pre-Transplant Evaluation  --ID Clearance for OLT pending clinical status improvement  --Initial QuantiFeron Indeterminate; would repeat QuantiFeron     #Encounter to Vaccinate Patient  Will address vaccination outside of the critical illness period  COVID19: Has had COVID19 in 2020 and again in ~8/2022. Denies prior COVID19 Vaccination  Influenza: Will require  Pneumococcal: Would benefit from PCV20  HAV: Will require Hepatitis A Vaccine  HBV: Will require Heplisav  MMR: Immune  Varicella: Immune  Shingles: Will require Shingrix  Tdap: Will require Tdap    I will be away over this upcoming weekend. Please contact the Infectious Diseases Office with any further questions or concerns.     Jonah Mendoza M.D.  [facility] Division of Infectious Disease  8AM-5PM Monday - Friday: Available on [employer] Teams  After Hours and Holidays (or if no response on [employer] Teams): Please contact the Infectious Diseases Office at [phone]      The above assessment and plan were discussed with Lilia, Transplant Surgery NP

## 2022-12-30 NOTE — PROGRESS NOTE ADULT - SUBJECTIVE AND OBJECTIVE BOX
SUBJECTIVE/ROS:  [x] A ten-point review of systems was otherwise negative except as noted.  [ ] Due to altered mental status/intubation, subjective information were not able to be obtained from the patient. History was obtained, to the extent possible, from review of the chart and collateral sources of information.    NEURO  Exam: AAOx3, still mildly confused to details of situation but mentating and interacting better  Meds: escitalopram 10 milliGRAM(s) Oral daily  HYDROmorphone  Injectable 0.25 milliGRAM(s) IV Push every 3 hours PRN Severe Pain (7 - 10)  levETIRAcetam  Solution 750 milliGRAM(s) Enteral Tube two times a day  metoclopramide Injectable 10 milliGRAM(s) IV Push every 8 hours  ondansetron Injectable 4 milliGRAM(s) IV Push every 6 hours PRN Nausea and/or Vomiting    [x] Adequacy of sedation and pain control has been assessed and adjusted      RESPIRATORY  RR: 22 (12-29-22 @ 23:45) (10 - 32)  SpO2: 94% (12-29-22 @ 23:45) (92% - 100%)  Wt(kg): --  Exam: unlabored, b/l coarse breath sounds, HFNC  Mechanical Ventilation:   ABG - ( 30 Dec 2022 00:00 )  pH: 7.42  /  pCO2: 34    /  pO2: 88    / HCO3: 22    / Base Excess: -2.1  /  SaO2: 98.6    Lactate: x          CARDIOVASCULAR  HR: 90 (12-29-22 @ 23:45) (79 - 103)  ABP: 100/45 (12-29-22 @ 23:45) (87/40 - 124/66)  ABP(mean): 66 (12-29-22 @ 23:45) (58 - 89)  VBG - ( 28 Dec 2022 03:08 )  pH: 7.37  /  pCO2: 46    /  pO2: 46    / HCO3: 27    / Base Excess: 1.1   /  SaO2: 75.2   Lactate: 1.1        Exam:  Cardiac Rhythm: S1S2  Volume Status [x]Hypervolemic [ ]Euvolemic [ ]Hypovolemic  Meds: midodrine 20 milliGRAM(s) Oral every 8 hours  norepinephrine Infusion 0.05 MICROgram(s)/kG/Min IV Continuous <Continuous>    GI/NUTRITION  Exam: mildly distended, NT  Diet: tube feeds  Meds: lactulose Syrup 20 Gram(s) Oral every 8 hours  pantoprazole   Suspension 40 milliGRAM(s) Oral every 24 hours      GENITOURINARY  I&O's Detail    12-28 @ 07:01 - 12-29 @ 07:00  --------------------------------------------------------  IN:    dextrose 5% + sodium chloride 0.9%: 630 mL    Enteral Tube Flush: 200 mL    IV PiggyBack: 400 mL    Norepinephrine: 302.7 mL    Vasopressin: 108 mL  Total IN: 1640.7 mL    OUT:    Nasogastric/Oral tube (mL): 300 mL    Other (mL): 1206 mL    Rectal Tube (mL): 150 mL  Total OUT: 1656 mL    Total NET: -15.3 mL      12-29 @ 07:01 - 12-30 @ 00:21  --------------------------------------------------------  IN:    dextrose 5% + sodium chloride 0.9%: 270 mL    Enteral Tube Flush: 140 mL    IV PiggyBack: 350 mL    Nepro with Carb Steady: 160 mL    Norepinephrine: 145.1 mL    Vasopressin: 76.5 mL  Total IN: 1141.6 mL    OUT:    Nasogastric/Oral tube (mL): 300 mL    Other (mL): 955 mL    Rectal Tube (mL): 50 mL  Total OUT: 1305 mL    Total NET: -163.4 mL          12-29    139  |  105  |  12  ----------------------------<  101<H>  4.2   |  20<L>  |  0.71    Ca    8.7      29 Dec 2022 17:21  Phos  3.7     12-29  Mg     2.5     12-29    TPro  6.2  /  Alb  3.6  /  TBili  28.0<H>  /  DBili  x   /  AST  165<H>  /  ALT  55<H>  /  AlkPhos  175<H>  12-29    [ ] Monore catheter, indication: N/A  Meds: folic acid 1 milliGRAM(s) Oral daily  multivitamin/minerals/iron Oral Solution (CENTRUM) 15 milliLiter(s) Oral daily  thiamine 100 milliGRAM(s) Enteral Tube daily        HEMATOLOGIC  Meds:   [x] VTE Prophylaxis                        7.9    30.48 )-----------( 48       ( 29 Dec 2022 17:21 )             23.5     PT/INR - ( 29 Dec 2022 04:21 )   PT: 26.2 sec;   INR: 2.26 ratio         PTT - ( 29 Dec 2022 04:21 )  PTT:47.4 sec  Transfusion     [ ] PRBC   [ ] Platelets   [ ] FFP   [ ] Cryoprecipitate      INFECTIOUS DISEASES  T(C): 36.7 (12-29-22 @ 23:00), Max: 36.8 (12-29-22 @ 19:00)  Wt(kg): --  WBC Count: 30.48 K/uL (12-29 @ 17:21)  WBC Count: 30.31 K/uL (12-29 @ 10:23)  WBC Count: 30.63 K/uL (12-29 @ 04:21)    Recent Cultures:  Specimen Source: Peritoneal Peritoneal Fluid, 12-26 @ 15:00; Results   No growth; Gram Stain:   polymorphonuclear leukocytes seen  No organisms seen  by cytocentrifuge; Organism: --  Specimen Source: .Blood Blood-Peripheral, 12-26 @ 10:45; Results   No growth to date.; Gram Stain: --; Organism: --  Specimen Source: .Blood Blood-Peripheral, 12-26 @ 09:30; Results   No growth to date.; Gram Stain: --; Organism: --  Specimen Source: .Blood Blood, 12-24 @ 20:55; Results   No growth to date.; Gram Stain: --; Organism: --  Specimen Source: .Blood Blood, 12-24 @ 20:20; Results   No growth to date.; Gram Stain: --; Organism: --  Specimen Source: .Blood Blood, 12-23 @ 13:35; Results   No Growth Final; Gram Stain: --; Organism: --  Specimen Source: .Blood Blood, 12-23 @ 13:25; Results   No Growth Final; Gram Stain: --; Organism: --    Meds: caspofungin IVPB      caspofungin IVPB 50 milliGRAM(s) IV Intermittent every 24 hours  influenza   Vaccine 0.5 milliLiter(s) IntraMuscular once  meropenem  IVPB 1000 milliGRAM(s) IV Intermittent every 8 hours  rifAXIMin 550 milliGRAM(s) Oral every 12 hours        ENDOCRINE  Capillary Blood Glucose    Meds: insulin lispro (ADMELOG) corrective regimen sliding scale   SubCutaneous every 6 hours  levothyroxine 137 MICROGram(s) Oral daily  vasopressin Infusion 0.03 Unit(s)/Min IV Continuous <Continuous>        ACCESS DEVICES:  [ ] Peripheral IV  [x] Central Venous Line	[ ] R	[ ] L	[ ] IJ	[ ] Fem	[ ] SC	Placed:   [ ] Arterial Line		[ ] R	[ ] L	[ ] Fem	[ ] Rad	[ ] Ax	Placed:   [ ] PICC:					[ ] Mediport  [x] Urinary Catheter, Date Placed:   [ ] Necessity of urinary, arterial, and venous catheters discussed    OTHER MEDICATIONS:  chlorhexidine 2% Cloths 1 Application(s) Topical <User Schedule>  CRRT Treatment    <Continuous>  Phoxillum Filtration BK 4 / 2.5 5000 milliLiter(s) CRRT <Continuous>  Phoxillum Filtration BK 4 / 2.5 5000 milliLiter(s) CRRT <Continuous>  Phoxillum Filtration BK 4 / 2.5 5000 milliLiter(s) CRRT <Continuous>  sodium chloride 0.65% Nasal 1 Spray(s) Both Nostrils every 3 hours PRN  tetracaine/benzocaine/butamben Spray 1 Spray(s) Topical every 3 hours PRN      CODE STATUS:     IMAGING: SUBJECTIVE/ROS:  [x] A ten-point review of systems was otherwise negative except as noted.  [ ] Due to altered mental status/intubation, subjective information were not able to be obtained from the patient. History was obtained, to the extent possible, from review of the chart and collateral sources of information.    NEURO  Exam: AAOx3, still mildly confused to details of situation but mentating and interacting better  Meds: escitalopram 10 milliGRAM(s) Oral daily  HYDROmorphone  Injectable 0.25 milliGRAM(s) IV Push every 3 hours PRN Severe Pain (7 - 10)  levETIRAcetam  Solution 750 milliGRAM(s) Enteral Tube two times a day  metoclopramide Injectable 10 milliGRAM(s) IV Push every 8 hours  ondansetron Injectable 4 milliGRAM(s) IV Push every 6 hours PRN Nausea and/or Vomiting    [x] Adequacy of sedation and pain control has been assessed and adjusted      RESPIRATORY  RR: 22 (12-29-22 @ 23:45) (10 - 32)  SpO2: 94% (12-29-22 @ 23:45) (92% - 100%)  Wt(kg): --  Exam: unlabored, b/l coarse breath sounds, HFNC  Mechanical Ventilation:   ABG - ( 30 Dec 2022 00:00 )  pH: 7.42  /  pCO2: 34    /  pO2: 88    / HCO3: 22    / Base Excess: -2.1  /  SaO2: 98.6    Lactate: x          CARDIOVASCULAR  HR: 90 (12-29-22 @ 23:45) (79 - 103)  ABP: 100/45 (12-29-22 @ 23:45) (87/40 - 124/66)  ABP(mean): 66 (12-29-22 @ 23:45) (58 - 89)  VBG - ( 28 Dec 2022 03:08 )  pH: 7.37  /  pCO2: 46    /  pO2: 46    / HCO3: 27    / Base Excess: 1.1   /  SaO2: 75.2   Lactate: 1.1        Exam:  Cardiac Rhythm: S1S2  Volume Status [x]Hypervolemic [ ]Euvolemic [ ]Hypovolemic  Meds: midodrine 20 milliGRAM(s) Oral every 8 hours  norepinephrine Infusion 0.05 MICROgram(s)/kG/Min IV Continuous <Continuous>    GI/NUTRITION  Exam: mildly distended, NT  Diet: tube feeds  Meds: lactulose Syrup 20 Gram(s) Oral every 8 hours  pantoprazole   Suspension 40 milliGRAM(s) Oral every 24 hours      GENITOURINARY  I&O's Detail    12-28 @ 07:01 - 12-29 @ 07:00  --------------------------------------------------------  IN:    dextrose 5% + sodium chloride 0.9%: 630 mL    Enteral Tube Flush: 200 mL    IV PiggyBack: 400 mL    Norepinephrine: 302.7 mL    Vasopressin: 108 mL  Total IN: 1640.7 mL    OUT:    Nasogastric/Oral tube (mL): 300 mL    Other (mL): 1206 mL    Rectal Tube (mL): 150 mL  Total OUT: 1656 mL    Total NET: -15.3 mL      12-29 @ 07:01 - 12-30 @ 00:21  --------------------------------------------------------  IN:    dextrose 5% + sodium chloride 0.9%: 270 mL    Enteral Tube Flush: 140 mL    IV PiggyBack: 350 mL    Nepro with Carb Steady: 160 mL    Norepinephrine: 145.1 mL    Vasopressin: 76.5 mL  Total IN: 1141.6 mL    OUT:    Nasogastric/Oral tube (mL): 300 mL    Other (mL): 955 mL    Rectal Tube (mL): 50 mL  Total OUT: 1305 mL    Total NET: -163.4 mL          12-29    139  |  105  |  12  ----------------------------<  101<H>  4.2   |  20<L>  |  0.71    Ca    8.7      29 Dec 2022 17:21  Phos  3.7     12-29  Mg     2.5     12-29    TPro  6.2  /  Alb  3.6  /  TBili  28.0<H>  /  DBili  x   /  AST  165<H>  /  ALT  55<H>  /  AlkPhos  175<H>  12-29    [ ] Monroe catheter, indication: N/A  Meds: folic acid 1 milliGRAM(s) Oral daily  multivitamin/minerals/iron Oral Solution (CENTRUM) 15 milliLiter(s) Oral daily  thiamine 100 milliGRAM(s) Enteral Tube daily        HEMATOLOGIC  Meds:   [x] VTE Prophylaxis                        7.9    30.48 )-----------( 48       ( 29 Dec 2022 17:21 )             23.5     PT/INR - ( 29 Dec 2022 04:21 )   PT: 26.2 sec;   INR: 2.26 ratio         PTT - ( 29 Dec 2022 04:21 )  PTT:47.4 sec  Transfusion     [ ] PRBC   [ ] Platelets   [ ] FFP   [ ] Cryoprecipitate      INFECTIOUS DISEASES  T(C): 36.7 (12-29-22 @ 23:00), Max: 36.8 (12-29-22 @ 19:00)  Wt(kg): --  WBC Count: 30.48 K/uL (12-29 @ 17:21)  WBC Count: 30.31 K/uL (12-29 @ 10:23)  WBC Count: 30.63 K/uL (12-29 @ 04:21)    Recent Cultures:  Specimen Source: Peritoneal Peritoneal Fluid, 12-26 @ 15:00; Results   No growth; Gram Stain:   polymorphonuclear leukocytes seen  No organisms seen  by cytocentrifuge; Organism: --  Specimen Source: .Blood Blood-Peripheral, 12-26 @ 10:45; Results   No growth to date.; Gram Stain: --; Organism: --  Specimen Source: .Blood Blood-Peripheral, 12-26 @ 09:30; Results   No growth to date.; Gram Stain: --; Organism: --  Specimen Source: .Blood Blood, 12-24 @ 20:55; Results   No growth to date.; Gram Stain: --; Organism: --  Specimen Source: .Blood Blood, 12-24 @ 20:20; Results   No growth to date.; Gram Stain: --; Organism: --  Specimen Source: .Blood Blood, 12-23 @ 13:35; Results   No Growth Final; Gram Stain: --; Organism: --  Specimen Source: .Blood Blood, 12-23 @ 13:25; Results   No Growth Final; Gram Stain: --; Organism: --    Meds: caspofungin IVPB      caspofungin IVPB 50 milliGRAM(s) IV Intermittent every 24 hours  influenza   Vaccine 0.5 milliLiter(s) IntraMuscular once  meropenem  IVPB 1000 milliGRAM(s) IV Intermittent every 8 hours  rifAXIMin 550 milliGRAM(s) Oral every 12 hours        ENDOCRINE  Capillary Blood Glucose    Meds: insulin lispro (ADMELOG) corrective regimen sliding scale   SubCutaneous every 6 hours  levothyroxine 137 MICROGram(s) Oral daily  vasopressin Infusion 0.03 Unit(s)/Min IV Continuous <Continuous>        ACCESS DEVICES:  [ ] Peripheral IV  [x] Central Venous Line	[ ] R	[ ] L	[ ] IJ	[ ] Fem	[ ] SC	Placed:   [ ] Arterial Line		[ ] R	[ ] L	[ ] Fem	[ ] Rad	[ ] Ax	Placed:   [ ] PICC:					[ ] Mediport  [x] Urinary Catheter, Date Placed:   [ ] Necessity of urinary, arterial, and venous catheters discussed    OTHER MEDICATIONS:  chlorhexidine 2% Cloths 1 Application(s) Topical <User Schedule>  CRRT Treatment    <Continuous>  Phoxillum Filtration BK 4 / 2.5 5000 milliLiter(s) CRRT <Continuous>  Phoxillum Filtration BK 4 / 2.5 5000 milliLiter(s) CRRT <Continuous>  Phoxillum Filtration BK 4 / 2.5 5000 milliLiter(s) CRRT <Continuous>  sodium chloride 0.65% Nasal 1 Spray(s) Both Nostrils every 3 hours PRN  tetracaine/benzocaine/butamben Spray 1 Spray(s) Topical every 3 hours PRN      CODE STATUS:     IMAGING:

## 2022-12-30 NOTE — PROGRESS NOTE ADULT - SUBJECTIVE AND OBJECTIVE BOX
[facility] DIVISION OF KIDNEY DISEASES AND HYPERTENSION -- FOLLOW UP NOTE  --------------------------------------------------------------------------------  HPI: 61 yo F with h/o decompensated ETOH cirrhosis with ascites, thyroid cancer (s/p total thyroidectomy, RTX, and radioactive iodide), HTN, ventrical neoplasm who was initially admitted to  12/19 with new onset seizures and transferred to [facility] 12/20 for liver transplant evaluation. Pt being seen for RED requiring CRRT.    24 hour events/subjective: Pt was seen and evaluated in the ICU this morning with family present. Pt on high flow, poor historian. Unable to obtain ROS.      PAST HISTORY  --------------------------------------------------------------------------------  No significant changes to PMH, PSH, FHx, SHx, unless otherwise noted    ALLERGIES & MEDICATIONS  --------------------------------------------------------------------------------  Allergies    Macrobid (Rash)    Intolerances    Nexium (Stomach Upset)    Standing Inpatient Medications  caspofungin IVPB      caspofungin IVPB 50 milliGRAM(s) IV Intermittent every 24 hours  chlorhexidine 2% Cloths 1 Application(s) Topical <User Schedule>  CRRT Treatment    <Continuous>  escitalopram 10 milliGRAM(s) Oral daily  folic acid 1 milliGRAM(s) Oral daily  influenza   Vaccine 0.5 milliLiter(s) IntraMuscular once  insulin lispro (ADMELOG) corrective regimen sliding scale   SubCutaneous every 6 hours  lactulose Syrup 20 Gram(s) Oral every 12 hours  levETIRAcetam  Solution 750 milliGRAM(s) Enteral Tube two times a day  levothyroxine 137 MICROGram(s) Oral daily  meropenem  IVPB 1000 milliGRAM(s) IV Intermittent every 8 hours  metoclopramide Injectable 10 milliGRAM(s) IV Push every 8 hours  midodrine 20 milliGRAM(s) Oral every 8 hours  multivitamin/minerals/iron Oral Solution (CENTRUM) 15 milliLiter(s) Oral daily  norepinephrine Infusion 0.05 MICROgram(s)/kG/Min IV Continuous <Continuous>  pantoprazole   Suspension 40 milliGRAM(s) Oral every 24 hours  Phoxillum Filtration BK 4 / 2.5 5000 milliLiter(s) CRRT <Continuous>  Phoxillum Filtration BK 4 / 2.5 5000 milliLiter(s) CRRT <Continuous>  Phoxillum Filtration BK 4 / 2.5 5000 milliLiter(s) CRRT <Continuous>  polyethylene glycol 3350 17 Gram(s) Oral daily  potassium chloride   Solution 40 milliEquivalent(s) Enteral Tube once  rifAXIMin 550 milliGRAM(s) Oral every 12 hours  thiamine 100 milliGRAM(s) Enteral Tube daily  vasopressin Infusion 0.03 Unit(s)/Min IV Continuous <Continuous>    PRN Inpatient Medications  HYDROmorphone  Injectable 0.25 milliGRAM(s) IV Push every 3 hours PRN  ondansetron Injectable 4 milliGRAM(s) IV Push every 6 hours PRN  sodium chloride 0.65% Nasal 1 Spray(s) Both Nostrils every 3 hours PRN  tetracaine/benzocaine/butamben Spray 1 Spray(s) Topical every 3 hours PRN      REVIEW OF SYSTEMS  --------------------------------------------------------------------------------  Unable to obtain ROS    VITALS/PHYSICAL EXAM  --------------------------------------------------------------------------------  T(C): 36.8 (12-30-22 @ 07:00), Max: 36.8 (12-29-22 @ 19:00)  HR: 94 (12-30-22 @ 13:30) (79 - 104)  BP: 99/58 (12-30-22 @ 07:45) (77/50 - 99/58)  RR: 29 (12-30-22 @ 13:30) (11 - 32)  SpO2: 94% (12-30-22 @ 13:30) (91% - 96%)  Wt(kg): --    Weight (kg): 57 (12-30-22 @ 07:00)      12-29-22 @ 07:01  -  12-30-22 @ 07:00  --------------------------------------------------------  IN: 1540.4 mL / OUT: 1884 mL / NET: -343.6 mL    12-30-22 @ 07:01  -  12-30-22 @ 13:40  --------------------------------------------------------  IN: 506 mL / OUT: 799 mL / NET: -293 mL      Physical Exam:  Gen: NAD  HEENT: On HFNC   Pulm: CTA B/L, no crackles   CV: RRR, S1S2+  Abd: +BS, soft, distended  : +urinary catheter  MSK: +BL LE edema  Psych: Awake  Skin: +Jaundice  Access: +Non-tunneled HD catheter    LABS/STUDIES  --------------------------------------------------------------------------------              7.3    31.45 >-----------<  42       [12-30-22 @ 11:32]              21.6     140  |  105  |  12  ----------------------------<  146      [12-30-22 @ 11:32]  3.8   |  21  |  0.82        Ca     9.0     [12-30-22 @ 11:32]      Mg     2.5     [12-30-22 @ 11:32]      Phos  3.3     [12-30-22 @ 11:32]    TPro  6.0  /  Alb  3.5  /  TBili  26.3  /  DBili  x   /  AST  162  /  ALT  60  /  AlkPhos  188  [12-30-22 @ 11:32]    PT/INR: PT 26.6 , INR 2.27       [12-30-22 @ 00:16]  PTT: 47.3       [12-30-22 @ 00:16]      Creatinine Trend:  SCr 0.82 [12-30 @ 11:32]  SCr 0.82 [12-30 @ 05:11]  SCr 0.86 [12-30 @ 00:17]  SCr 0.71 [12-29 @ 17:21]  SCr 0.70 [12-29 @ 10:23]    Urinalysis - [12-20-22 @ 23:55]      Color Dark Yellow / Appearance Turbid / SG 1.029 / pH 6.0      Gluc 100 mg/dL / Ketone Small  / Bili Large / Urobili Negative       Blood Moderate / Protein 300 mg/dL / Leuk Est Large / Nitrite Negative      RBC 5-10 / WBC 11-25 / Hyaline 0-2 / Gran  / Sq Epi  / Non Sq Epi Moderate / Bacteria Negative      Iron 51, TIBC Unable to calculate Test Repeated, %sat Unable to calculate Test Repeated      [12-24-22 @ 20:58]  Ferritin 5747      [12-24-22 @ 20:58]  TSH 0.05      [12-24-22 @ 20:58]  Lipid: chol 100, , HDL 9, LDL --      [12-24-22 @ 20:58]    HBsAb <3.0      [12-24-22 @ 20:59]  HBsAb Nonreact      [12-24-22 @ 20:59]  HBsAg Nonreact      [12-24-22 @ 20:59]  HBcAb Nonreact      [12-24-22 @ 20:59]  HCV 0.12, Nonreact      [12-24-22 @ 20:59]  HIV Nonreact      [12-24-22 @ 20:58]  HIV Nonreact      [12-24-22 @ 20:58]    MIRNA: titer Negative, pattern --      [12-24-22 @ 20:59]  Syphilis Screen (Treponema Pallidum Ab) Negative      [12-24-22 @ 20:59] [facility] DIVISION OF KIDNEY DISEASES AND HYPERTENSION -- FOLLOW UP NOTE  --------------------------------------------------------------------------------  HPI: 61 yo F with h/o decompensated ETOH cirrhosis with ascites, thyroid cancer (s/p total thyroidectomy, RTX, and radioactive iodide), HTN, ventrical neoplasm who was initially admitted to  12/19 with new onset seizures and transferred to [facility] 12/20 for liver transplant evaluation. Pt being seen for RED requiring CRRT.    24 hour events/subjective: Pt was seen and evaluated in the ICU this morning with family present. Pt on high flow, poor historian. Unable to obtain ROS.      PAST HISTORY  --------------------------------------------------------------------------------  No significant changes to PMH, PSH, FHx, SHx, unless otherwise noted    ALLERGIES & MEDICATIONS  --------------------------------------------------------------------------------  Allergies    Macrobid (Rash)    Intolerances    Nexium (Stomach Upset)    Standing Inpatient Medications  caspofungin IVPB      caspofungin IVPB 50 milliGRAM(s) IV Intermittent every 24 hours  chlorhexidine 2% Cloths 1 Application(s) Topical <User Schedule>  CRRT Treatment    <Continuous>  escitalopram 10 milliGRAM(s) Oral daily  folic acid 1 milliGRAM(s) Oral daily  influenza   Vaccine 0.5 milliLiter(s) IntraMuscular once  insulin lispro (ADMELOG) corrective regimen sliding scale   SubCutaneous every 6 hours  lactulose Syrup 20 Gram(s) Oral every 12 hours  levETIRAcetam  Solution 750 milliGRAM(s) Enteral Tube two times a day  levothyroxine 137 MICROGram(s) Oral daily  meropenem  IVPB 1000 milliGRAM(s) IV Intermittent every 8 hours  metoclopramide Injectable 10 milliGRAM(s) IV Push every 8 hours  midodrine 20 milliGRAM(s) Oral every 8 hours  multivitamin/minerals/iron Oral Solution (CENTRUM) 15 milliLiter(s) Oral daily  norepinephrine Infusion 0.05 MICROgram(s)/kG/Min IV Continuous <Continuous>  pantoprazole   Suspension 40 milliGRAM(s) Oral every 24 hours  Phoxillum Filtration BK 4 / 2.5 5000 milliLiter(s) CRRT <Continuous>  Phoxillum Filtration BK 4 / 2.5 5000 milliLiter(s) CRRT <Continuous>  Phoxillum Filtration BK 4 / 2.5 5000 milliLiter(s) CRRT <Continuous>  polyethylene glycol 3350 17 Gram(s) Oral daily  potassium chloride   Solution 40 milliEquivalent(s) Enteral Tube once  rifAXIMin 550 milliGRAM(s) Oral every 12 hours  thiamine 100 milliGRAM(s) Enteral Tube daily  vasopressin Infusion 0.03 Unit(s)/Min IV Continuous <Continuous>    PRN Inpatient Medications  HYDROmorphone  Injectable 0.25 milliGRAM(s) IV Push every 3 hours PRN  ondansetron Injectable 4 milliGRAM(s) IV Push every 6 hours PRN  sodium chloride 0.65% Nasal 1 Spray(s) Both Nostrils every 3 hours PRN  tetracaine/benzocaine/butamben Spray 1 Spray(s) Topical every 3 hours PRN      REVIEW OF SYSTEMS  --------------------------------------------------------------------------------  Unable to obtain ROS    VITALS/PHYSICAL EXAM  --------------------------------------------------------------------------------  T(C): 36.8 (12-30-22 @ 07:00), Max: 36.8 (12-29-22 @ 19:00)  HR: 94 (12-30-22 @ 13:30) (79 - 104)  BP: 99/58 (12-30-22 @ 07:45) (77/50 - 99/58)  RR: 29 (12-30-22 @ 13:30) (11 - 32)  SpO2: 94% (12-30-22 @ 13:30) (91% - 96%)  Wt(kg): --    Weight (kg): 57 (12-30-22 @ 07:00)      12-29-22 @ 07:01  -  12-30-22 @ 07:00  --------------------------------------------------------  IN: 1540.4 mL / OUT: 1884 mL / NET: -343.6 mL    12-30-22 @ 07:01  -  12-30-22 @ 13:40  --------------------------------------------------------  IN: 506 mL / OUT: 799 mL / NET: -293 mL      Physical Exam:  Gen: NAD  HEENT: On HFNC   Pulm: CTA B/L, no crackles   CV: RRR, S1S2+  Abd: +BS, soft, distended  : +urinary catheter  MSK: +BL LE edema  Psych: Awake  Skin: +Jaundice  Access: +Non-tunneled HD catheter    LABS/STUDIES  --------------------------------------------------------------------------------              7.3    31.45 >-----------<  42       [12-30-22 @ 11:32]              21.6     140  |  105  |  12  ----------------------------<  146      [12-30-22 @ 11:32]  3.8   |  21  |  0.82        Ca     9.0     [12-30-22 @ 11:32]      Mg     2.5     [12-30-22 @ 11:32]      Phos  3.3     [12-30-22 @ 11:32]    TPro  6.0  /  Alb  3.5  /  TBili  26.3  /  DBili  x   /  AST  162  /  ALT  60  /  AlkPhos  188  [12-30-22 @ 11:32]    PT/INR: PT 26.6 , INR 2.27       [12-30-22 @ 00:16]  PTT: 47.3       [12-30-22 @ 00:16]      Creatinine Trend:  SCr 0.82 [12-30 @ 11:32]  SCr 0.82 [12-30 @ 05:11]  SCr 0.86 [12-30 @ 00:17]  SCr 0.71 [12-29 @ 17:21]  SCr 0.70 [12-29 @ 10:23]    Urinalysis - [12-20-22 @ 23:55]      Color Dark Yellow / Appearance Turbid / SG 1.029 / pH 6.0      Gluc 100 mg/dL / Ketone Small  / Bili Large / Urobili Negative       Blood Moderate / Protein 300 mg/dL / Leuk Est Large / Nitrite Negative      RBC 5-10 / WBC 11-25 / Hyaline 0-2 / Gran  / Sq Epi  / Non Sq Epi Moderate / Bacteria Negative      Iron 51, TIBC Unable to calculate Test Repeated, %sat Unable to calculate Test Repeated      [12-24-22 @ 20:58]  Ferritin 5747      [12-24-22 @ 20:58]  TSH 0.05      [12-24-22 @ 20:58]  Lipid: chol 100, , HDL 9, LDL --      [12-24-22 @ 20:58]    HBsAb <3.0      [12-24-22 @ 20:59]  HBsAb Nonreact      [12-24-22 @ 20:59]  HBsAg Nonreact      [12-24-22 @ 20:59]  HBcAb Nonreact      [12-24-22 @ 20:59]  HCV 0.12, Nonreact      [12-24-22 @ 20:59]  HIV Nonreact      [12-24-22 @ 20:58]  HIV Nonreact      [12-24-22 @ 20:58]    MIRNA: titer Negative, pattern --      [12-24-22 @ 20:59]  Syphilis Screen (Treponema Pallidum Ab) Negative      [12-24-22 @ 20:59] [facility] DIVISION OF KIDNEY DISEASES AND HYPERTENSION -- FOLLOW UP NOTE  --------------------------------------------------------------------------------  HPI: 61 yo F with h/o decompensated ETOH cirrhosis with ascites, thyroid cancer (s/p total thyroidectomy, RTX, and radioactive iodide), HTN, ventrical neoplasm who was initially admitted to  12/19 with new onset seizures and transferred to [facility] 12/20 for liver transplant evaluation. Pt being seen for RED requiring CRRT.    24 hour events/subjective: Pt was seen and evaluated in the ICU this morning with family present. Pt on high flow, poor historian. Unable to obtain ROS.      PAST HISTORY  --------------------------------------------------------------------------------  No significant changes to PMH, PSH, FHx, SHx, unless otherwise noted    ALLERGIES & MEDICATIONS  --------------------------------------------------------------------------------  Allergies    Macrobid (Rash)    Intolerances    Nexium (Stomach Upset)    Standing Inpatient Medications  caspofungin IVPB      caspofungin IVPB 50 milliGRAM(s) IV Intermittent every 24 hours  chlorhexidine 2% Cloths 1 Application(s) Topical <User Schedule>  CRRT Treatment    <Continuous>  escitalopram 10 milliGRAM(s) Oral daily  folic acid 1 milliGRAM(s) Oral daily  influenza   Vaccine 0.5 milliLiter(s) IntraMuscular once  insulin lispro (ADMELOG) corrective regimen sliding scale   SubCutaneous every 6 hours  lactulose Syrup 20 Gram(s) Oral every 12 hours  levETIRAcetam  Solution 750 milliGRAM(s) Enteral Tube two times a day  levothyroxine 137 MICROGram(s) Oral daily  meropenem  IVPB 1000 milliGRAM(s) IV Intermittent every 8 hours  metoclopramide Injectable 10 milliGRAM(s) IV Push every 8 hours  midodrine 20 milliGRAM(s) Oral every 8 hours  multivitamin/minerals/iron Oral Solution (CENTRUM) 15 milliLiter(s) Oral daily  norepinephrine Infusion 0.05 MICROgram(s)/kG/Min IV Continuous <Continuous>  pantoprazole   Suspension 40 milliGRAM(s) Oral every 24 hours  Phoxillum Filtration BK 4 / 2.5 5000 milliLiter(s) CRRT <Continuous>  Phoxillum Filtration BK 4 / 2.5 5000 milliLiter(s) CRRT <Continuous>  Phoxillum Filtration BK 4 / 2.5 5000 milliLiter(s) CRRT <Continuous>  polyethylene glycol 3350 17 Gram(s) Oral daily  potassium chloride   Solution 40 milliEquivalent(s) Enteral Tube once  rifAXIMin 550 milliGRAM(s) Oral every 12 hours  thiamine 100 milliGRAM(s) Enteral Tube daily  vasopressin Infusion 0.03 Unit(s)/Min IV Continuous <Continuous>    PRN Inpatient Medications  HYDROmorphone  Injectable 0.25 milliGRAM(s) IV Push every 3 hours PRN  ondansetron Injectable 4 milliGRAM(s) IV Push every 6 hours PRN  sodium chloride 0.65% Nasal 1 Spray(s) Both Nostrils every 3 hours PRN  tetracaine/benzocaine/butamben Spray 1 Spray(s) Topical every 3 hours PRN      REVIEW OF SYSTEMS  --------------------------------------------------------------------------------  Unable to obtain ROS    VITALS/PHYSICAL EXAM  --------------------------------------------------------------------------------  T(C): 36.8 (12-30-22 @ 07:00), Max: 36.8 (12-29-22 @ 19:00)  HR: 94 (12-30-22 @ 13:30) (79 - 104)  BP: 99/58 (12-30-22 @ 07:45) (77/50 - 99/58)  RR: 29 (12-30-22 @ 13:30) (11 - 32)  SpO2: 94% (12-30-22 @ 13:30) (91% - 96%)  Wt(kg): --    Weight (kg): 57 (12-30-22 @ 07:00)      12-29-22 @ 07:01  -  12-30-22 @ 07:00  --------------------------------------------------------  IN: 1540.4 mL / OUT: 1884 mL / NET: -343.6 mL    12-30-22 @ 07:01  -  12-30-22 @ 13:40  --------------------------------------------------------  IN: 506 mL / OUT: 799 mL / NET: -293 mL      Physical Exam:  Gen: NAD  HEENT: On HFNC   Pulm: CTA B/L, no crackles   CV: RRR, S1S2+  Abd: +BS, soft, distended  : +urinary catheter  MSK: +BL LE edema  Psych: Awake  Skin: +Jaundice  Access: +Non-tunneled HD catheter    LABS/STUDIES  --------------------------------------------------------------------------------              7.3    31.45 >-----------<  42       [12-30-22 @ 11:32]              21.6     140  |  105  |  12  ----------------------------<  146      [12-30-22 @ 11:32]  3.8   |  21  |  0.82        Ca     9.0     [12-30-22 @ 11:32]      Mg     2.5     [12-30-22 @ 11:32]      Phos  3.3     [12-30-22 @ 11:32]    TPro  6.0  /  Alb  3.5  /  TBili  26.3  /  DBili  x   /  AST  162  /  ALT  60  /  AlkPhos  188  [12-30-22 @ 11:32]    PT/INR: PT 26.6 , INR 2.27       [12-30-22 @ 00:16]  PTT: 47.3       [12-30-22 @ 00:16]      Creatinine Trend:  SCr 0.82 [12-30 @ 11:32]  SCr 0.82 [12-30 @ 05:11]  SCr 0.86 [12-30 @ 00:17]  SCr 0.71 [12-29 @ 17:21]  SCr 0.70 [12-29 @ 10:23]    Urinalysis - [12-20-22 @ 23:55]      Color Dark Yellow / Appearance Turbid / SG 1.029 / pH 6.0      Gluc 100 mg/dL / Ketone Small  / Bili Large / Urobili Negative       Blood Moderate / Protein 300 mg/dL / Leuk Est Large / Nitrite Negative      RBC 5-10 / WBC 11-25 / Hyaline 0-2 / Gran  / Sq Epi  / Non Sq Epi Moderate / Bacteria Negative      Iron 51, TIBC Unable to calculate Test Repeated, %sat Unable to calculate Test Repeated      [12-24-22 @ 20:58]  Ferritin 5747      [12-24-22 @ 20:58]  TSH 0.05      [12-24-22 @ 20:58]  Lipid: chol 100, , HDL 9, LDL --      [12-24-22 @ 20:58]    HBsAb <3.0      [12-24-22 @ 20:59]  HBsAb Nonreact      [12-24-22 @ 20:59]  HBsAg Nonreact      [12-24-22 @ 20:59]  HBcAb Nonreact      [12-24-22 @ 20:59]  HCV 0.12, Nonreact      [12-24-22 @ 20:59]  HIV Nonreact      [12-24-22 @ 20:58]  HIV Nonreact      [12-24-22 @ 20:58]    MIRNA: titer Negative, pattern --      [12-24-22 @ 20:59]  Syphilis Screen (Treponema Pallidum Ab) Negative      [12-24-22 @ 20:59]

## 2022-12-30 NOTE — PROGRESS NOTE ADULT - PROBLEM SELECTOR PLAN 3
Pt with hypophosphatemia while receiving CRRT. Serum phos improved to 3.3 today with Phoxilium. Plan to continue CRRT today with phoxilium. Monitor serum phos level.    If you have any questions, please feel free to contact me  Christin Emerson  Nephrology Fellow  [phone] / [employer] Teams(Preferred)  (After 5pm or on weekends please page the on-call fellow) Pt with hypophosphatemia while receiving CRRT. Serum phos improved to 3.3 today with Phoxilium. Plan to continue CRRT today with phoxilium. Monitor serum phos level.    If you have any questions, please feel free to contact me  Christin Emerson  Nephrology Fellow  [phone] / [employer] Teams(Preferred)  (After 5pm or on weekends please page the on-call fellow) Pt with hypophosphatemia while receiving CRRT. Serum phos improved to 3.3 today with Phoxilium. Plan to continue CRRT today with phoxilium. Monitor serum phos level.    If you have any questions, please feel free to contact me  Christin Emerson  Nephrology Fellow  [phone] / [employer] Teams(Preferred)  (After 5pm or on weekends please page the on-call fellow)

## 2022-12-30 NOTE — PROGRESS NOTE ADULT - PROBLEM SELECTOR PLAN 1
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 Upon review of [place], SCr was 0.78 on 12/6/22, increased to 5.31 on admission (12/20), and improved to 0.82 today with CRRT. Pt remains anuric. Pt with likely HRS vs ATN vs bile cast nephropathy. Plan to continue with CRRT today as urine output remains low. Monitor labs and urine output. Avoid nephrotoxins. Dose medications as per eGFR. 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 Upon review of [place], SCr was 0.78 on 12/6/22, increased to 5.31 on admission (12/20), and improved to 0.82 today with CRRT. Pt remains anuric. Pt with likely HRS vs ATN vs bile cast nephropathy. Plan to continue with CRRT today as urine output remains low. Monitor labs and urine output. Avoid nephrotoxins. Dose medications as per eGFR. 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 Upon review of [place], SCr was 0.78 on 12/6/22, increased to 5.31 on admission (12/20), and improved to 0.82 today with CRRT. Pt remains anuric. Pt with likely HRS vs ATN vs bile cast nephropathy. Plan to continue with CRRT today as urine output remains low. Monitor labs and urine output. Avoid nephrotoxins. Dose medications as per eGFR.

## 2022-12-30 NOTE — PROGRESS NOTE ADULT - ATTENDING COMMENTS
62yFemale presents with hx thyroid cancer s/p thyroidectomy, craniotomy with resection of ventricular mass, with acute liver failure, new seizures, transfer from OSH for transplant evaluation.     Fluctuating mental status, more awake today. Increase Lactulose dose for increase in Ammonia and decreased rectal tube output. Continue Rifaximin  Titrate Levo, continue Vaso, on high dose Midodrine  Remains in acute hypoxemic resp failure on HFNC, CXR mild improvement in infiltrate on Lt/ possible superimposed pul vascular congestion.  Continue  CRRT with fluid balance goal net  neg 25 as tolerated  NGT output not high, will do clamping trial to assess for NGT feed. Standing short course prokinetic   Empiric Abx Meropenem and Caspo, monitor leucocytosis  Peritoneal fluid transudate  ISS  Mechanical DVT ppx    Daughter kept updated

## 2022-12-30 NOTE — PROGRESS NOTE ADULT - SUBJECTIVE AND OBJECTIVE BOX
HPI:  Patient seen and examined at bedside in 8 ICU.  No cardiac events overnight.    Review Of Systems:           Respiratory: No shortness of breath, cough, or wheezing  Cardiovascular: No chest pain or palpitations  10 point review of systems is otherwise negative except as mentioned above        Medications:  caspofungin IVPB 50 milliGRAM(s) IV Intermittent every 24 hours  caspofungin IVPB      chlorhexidine 2% Cloths 1 Application(s) Topical <User Schedule>  CRRT Treatment    <Continuous>  escitalopram 10 milliGRAM(s) Oral daily  folic acid 1 milliGRAM(s) Oral daily  HYDROmorphone  Injectable 0.25 milliGRAM(s) IV Push every 3 hours PRN  influenza   Vaccine 0.5 milliLiter(s) IntraMuscular once  insulin lispro (ADMELOG) corrective regimen sliding scale   SubCutaneous every 6 hours  lactulose Syrup 20 Gram(s) Oral every 12 hours  levETIRAcetam  Solution 750 milliGRAM(s) Enteral Tube two times a day  levothyroxine 137 MICROGram(s) Oral daily  meropenem  IVPB 1000 milliGRAM(s) IV Intermittent every 8 hours  metoclopramide Injectable 10 milliGRAM(s) IV Push every 8 hours  midodrine 20 milliGRAM(s) Oral every 8 hours  multivitamin/minerals/iron Oral Solution (CENTRUM) 15 milliLiter(s) Oral daily  norepinephrine Infusion 0.05 MICROgram(s)/kG/Min IV Continuous <Continuous>  ondansetron Injectable 4 milliGRAM(s) IV Push every 6 hours PRN  pantoprazole   Suspension 40 milliGRAM(s) Oral every 24 hours  Phoxillum Filtration BK 4 / 2.5 5000 milliLiter(s) CRRT <Continuous>  Phoxillum Filtration BK 4 / 2.5 5000 milliLiter(s) CRRT <Continuous>  Phoxillum Filtration BK 4 / 2.5 5000 milliLiter(s) CRRT <Continuous>  polyethylene glycol 3350 17 Gram(s) Oral daily  rifAXIMin 550 milliGRAM(s) Oral every 12 hours  sodium chloride 0.65% Nasal 1 Spray(s) Both Nostrils every 3 hours PRN  tetracaine/benzocaine/butamben Spray 1 Spray(s) Topical every 3 hours PRN  thiamine 100 milliGRAM(s) Enteral Tube daily  vasopressin Infusion 0.03 Unit(s)/Min IV Continuous <Continuous>    PAST MEDICAL & SURGICAL HISTORY:  HTN (hypertension)  off medication for over one year--states BP has been normal      UTI (urinary tract infection)  currently being treated--will complete antibiotics 3/8/2022      Intracranial tumor      GERD (gastroesophageal reflux disease)      Eczema      Thyroid cancer  surgery. radiation, Radioactive iodine      COVID-19 virus infection  11/2021--recovered at home      H/O total thyroidectomy  age 21&#x27;s for Thyroid cancer, postop complication arterial bleed, second surgery      History of tonsillectomy  age 5      History of appendectomy  age 5        Vitals:  T(C): 36.8 (12-30-22 @ 15:00), Max: 36.9 (12-30-22 @ 11:00)  HR: 98 (12-30-22 @ 18:45) (74 - 104)  BP: 99/58 (12-30-22 @ 07:45) (77/50 - 99/58)  BP(mean): 74 (12-30-22 @ 07:45) (59 - 74)  RR: 19 (12-30-22 @ 18:45) (11 - 32)  SpO2: 94% (12-30-22 @ 18:45) (91% - 96%)  Wt(kg): --  Daily     Daily   I&O's Summary    29 Dec 2022 07:01  -  30 Dec 2022 07:00  --------------------------------------------------------  IN: 1540.4 mL / OUT: 1884 mL / NET: -343.6 mL    30 Dec 2022 07:01  -  30 Dec 2022 19:16  --------------------------------------------------------  IN: 1133.5 mL / OUT: 1423 mL / NET: -289.5 mL        Physical Exam:  Appearance: Jaundice, encephalopathic   Eyes: PERRLA, EOMI, pink conjunctiva, no scleral icterus   HENT: Normal oral mucosa  Cardiovascular: RRR, S1, S2, no murmur, rub, or gallop; no edema; no JVD  Procedural Access Site: Clean, dry, intact, without hematoma  Respiratory: Clear to auscultation bilaterally  Gastrointestinal: Soft, non-tender, non-distended, BS+  Musculoskeletal: Mitten restraints   Lymphatic: No lymphadenopathy  Skin: No rashes, ecchymoses, or cyanosis                          7.4    31.87 )-----------( 40       ( 30 Dec 2022 18:09 )             21.3     12-30    140  |  105  |  12  ----------------------------<  143<H>  4.5   |  21<L>  |  0.81    Ca    8.8      30 Dec 2022 18:09  Phos  3.0     12-30  Mg     2.5     12-30    TPro  6.2  /  Alb  3.5  /  TBili  27.3<H>  /  DBili  x   /  AST  172<H>  /  ALT  62<H>  /  AlkPhos  199<H>  12-30    PT/INR - ( 30 Dec 2022 00:16 )   PT: 26.6 sec;   INR: 2.27 ratio         PTT - ( 30 Dec 2022 00:16 )  PTT:47.3 sec        Cardiovascular Diagnostic Testing:  ECG:     Echo:   < from: TTE with Doppler (w/Cont) (12.21.22 @ 10:49) >  ------------------------------------------------------------------------  Dimensions:    Normal Values:  LA:     3.5    2.0 - 4.0 cm  Ao:     3.1    2.0 - 3.8 cm  SEPTUM: 0.7    0.6 - 1.2 cm  PWT:    0.6    0.6 - 1.1 cm  LVIDd: 5.4    3.0 - 5.6 cm  LVIDs:  2.9    1.8 - 4.0 cm  Derived variables:  LVMI: 69 g/m2  RWT: 0.22  Fractional short: 46 %  EF (Visual Estimate):  %  EF (Monroy Rule): 66 %Doppler Peak Velocity (m/sec):  AoV=1.4  ------------------------------------------------------------------------  Observations:  Mitral Valve: Normal mitral valve. Minimal mitral  regurgitation.  Aortic Valve/Aorta: Calcified trileaflet aortic valve with  normal opening. Peak transaortic valve gradient equals 8 mm  Hg. No aorticvalve regurgitation seen. Peak left  ventricular outflow tract gradient equals 4 mm Hg, mean  gradient is equal to 2 mm Hg, LVOT velocity time integral  equals 19 cm.  Aortic Root: 3.1 cm.  Ascending Aorta: 3.3 cm.  LVOT diameter: 2 cm.  Left Atrium:Normal left atrium.  LA volume index = 33  cc/m2.  Left Ventricle: Endocardial visualization enhanced with  intravenous injection of Ultrasonic Enhancing Agent  (Definity). Left ventricle suboptimally visualized despite  intravenous injection of ultrasonic enhancing agent; normal  left ventricular systolic function. No segmental wall  motion abnormalities. Normal left ventricular internal  dimensions and wall thicknesses. Normal diastolic function.  Right Heart: Normal right atrium. Normal right ventricular  size and function. Normal tricuspid valve. Mild-moderate  tricuspid regurgitation. Normal pulmonic valve. Minimal  pulmonic regurgitation.  Pericardium/Pleura: Normal pericardium with no pericardial  effusion.  Hemodynamic: Estimated right atrial pressure is 8 mm Hg.  Estimated right ventricular systolic pressure equals 29 mm  Hg, assuming right atrial pressure equals 8 mm Hg,  consistent with normal pulmonary pressures.  ------------------------------------------------------------------------  Conclusions:  1. Normal mitral valve. Minimal mitral regurgitation.  2. Calcified trileaflet aortic valve with normal opening.  No aortic valve regurgitation seen.  3. Endocardial visualization enhanced with intravenous  injection of Ultrasonic Enhancing Agent (Definity). Left  ventricle suboptimally visualized despite intravenous  injection of ultrasonic enhancing agent; normal left  ventricular systolic function. No segmental wall motion  abnormalities.  4. Normal right ventricular size and function.  *** No previous Echo exam.  ------------------------------------------------------------------------  Confirmed on  12/21/2022 - 13:40:44 by Rosalinda Ingram M.D.  ------------------------------------------------------------------------    < end of copied text >

## 2022-12-31 LAB
ALBUMIN SERPL ELPH-MCNC: 3.5 G/DL — SIGNIFICANT CHANGE UP (ref 3.3–5)
ALBUMIN SERPL ELPH-MCNC: 3.7 G/DL — SIGNIFICANT CHANGE UP (ref 3.3–5)
ALP SERPL-CCNC: 200 U/L — HIGH (ref 40–120)
ALP SERPL-CCNC: 201 U/L — HIGH (ref 40–120)
ALP SERPL-CCNC: 205 U/L — HIGH (ref 40–120)
ALP SERPL-CCNC: 213 U/L — HIGH (ref 40–120)
ALT FLD-CCNC: 63 U/L — HIGH (ref 10–45)
ALT FLD-CCNC: 68 U/L — HIGH (ref 10–45)
AMMONIA BLD-MCNC: 51 UMOL/L — SIGNIFICANT CHANGE UP (ref 11–55)
ANION GAP SERPL CALC-SCNC: 13 MMOL/L — SIGNIFICANT CHANGE UP (ref 5–17)
ANION GAP SERPL CALC-SCNC: 14 MMOL/L — SIGNIFICANT CHANGE UP (ref 5–17)
ANION GAP SERPL CALC-SCNC: 16 MMOL/L — SIGNIFICANT CHANGE UP (ref 5–17)
APTT BLD: 52.1 SEC — HIGH (ref 27.5–35.5)
AST SERPL-CCNC: 166 U/L — HIGH (ref 10–40)
AST SERPL-CCNC: 167 U/L — HIGH (ref 10–40)
AST SERPL-CCNC: 185 U/L — HIGH (ref 10–40)
BILIRUB SERPL-MCNC: 26.4 MG/DL — HIGH (ref 0.2–1.2)
BILIRUB SERPL-MCNC: 26.5 MG/DL — HIGH (ref 0.2–1.2)
BILIRUB SERPL-MCNC: 27.8 MG/DL — HIGH (ref 0.2–1.2)
BILIRUB SERPL-MCNC: 28.3 MG/DL — HIGH (ref 0.2–1.2)
BUN SERPL-MCNC: 12 MG/DL — SIGNIFICANT CHANGE UP (ref 7–23)
CALCIUM SERPL-MCNC: 8.8 MG/DL — SIGNIFICANT CHANGE UP (ref 8.4–10.5)
CALCIUM SERPL-MCNC: 9 MG/DL — SIGNIFICANT CHANGE UP (ref 8.4–10.5)
CALCIUM SERPL-MCNC: 9.1 MG/DL — SIGNIFICANT CHANGE UP (ref 8.4–10.5)
CHLORIDE SERPL-SCNC: 104 MMOL/L — SIGNIFICANT CHANGE UP (ref 96–108)
CHLORIDE SERPL-SCNC: 105 MMOL/L — SIGNIFICANT CHANGE UP (ref 96–108)
CO2 SERPL-SCNC: 20 MMOL/L — LOW (ref 22–31)
CO2 SERPL-SCNC: 21 MMOL/L — LOW (ref 22–31)
CREAT SERPL-MCNC: 0.78 MG/DL — SIGNIFICANT CHANGE UP (ref 0.5–1.3)
CREAT SERPL-MCNC: 0.84 MG/DL — SIGNIFICANT CHANGE UP (ref 0.5–1.3)
CULTURE RESULTS: NO GROWTH — SIGNIFICANT CHANGE UP
CULTURE RESULTS: SIGNIFICANT CHANGE UP
EGFR: 79 ML/MIN/1.73M2 — SIGNIFICANT CHANGE UP
EGFR: 86 ML/MIN/1.73M2 — SIGNIFICANT CHANGE UP
GAS PNL BLDA: SIGNIFICANT CHANGE UP
GLUCOSE SERPL-MCNC: 125 MG/DL — HIGH (ref 70–99)
GLUCOSE SERPL-MCNC: 128 MG/DL — HIGH (ref 70–99)
GLUCOSE SERPL-MCNC: 130 MG/DL — HIGH (ref 70–99)
GLUCOSE SERPL-MCNC: 138 MG/DL — HIGH (ref 70–99)
HCT VFR BLD CALC: 21.1 % — LOW (ref 34.5–45)
HCT VFR BLD CALC: 24.5 % — LOW (ref 34.5–45)
HCT VFR BLD CALC: 26.5 % — LOW (ref 34.5–45)
HDV IGM SER QL IA: SIGNIFICANT CHANGE UP
HGB BLD-MCNC: 7.2 G/DL — LOW (ref 11.5–15.5)
HGB BLD-MCNC: 7.3 G/DL — LOW (ref 11.5–15.5)
HGB BLD-MCNC: 8.4 G/DL — LOW (ref 11.5–15.5)
HGB BLD-MCNC: 8.9 G/DL — LOW (ref 11.5–15.5)
INR BLD: 2.11 RATIO — HIGH (ref 0.88–1.16)
MAGNESIUM SERPL-MCNC: 2.4 MG/DL — SIGNIFICANT CHANGE UP (ref 1.6–2.6)
MAGNESIUM SERPL-MCNC: 2.5 MG/DL — SIGNIFICANT CHANGE UP (ref 1.6–2.6)
MCHC RBC-ENTMCNC: 30.6 PG — SIGNIFICANT CHANGE UP (ref 27–34)
MCHC RBC-ENTMCNC: 30.7 PG — SIGNIFICANT CHANGE UP (ref 27–34)
MCHC RBC-ENTMCNC: 31.2 PG — SIGNIFICANT CHANGE UP (ref 27–34)
MCHC RBC-ENTMCNC: 31.4 PG — SIGNIFICANT CHANGE UP (ref 27–34)
MCHC RBC-ENTMCNC: 33.6 GM/DL — SIGNIFICANT CHANGE UP (ref 32–36)
MCHC RBC-ENTMCNC: 34.3 GM/DL — SIGNIFICANT CHANGE UP (ref 32–36)
MCHC RBC-ENTMCNC: 34.6 GM/DL — SIGNIFICANT CHANGE UP (ref 32–36)
MCV RBC AUTO: 89.4 FL — SIGNIFICANT CHANGE UP (ref 80–100)
MCV RBC AUTO: 90.2 FL — SIGNIFICANT CHANGE UP (ref 80–100)
MCV RBC AUTO: 91.1 FL — SIGNIFICANT CHANGE UP (ref 80–100)
MCV RBC AUTO: 91.7 FL — SIGNIFICANT CHANGE UP (ref 80–100)
MRSA PCR RESULT.: SIGNIFICANT CHANGE UP
NRBC # BLD: 0 /100 WBCS — SIGNIFICANT CHANGE UP (ref 0–0)
PHOSPHATE SERPL-MCNC: 2.7 MG/DL — SIGNIFICANT CHANGE UP (ref 2.5–4.5)
PHOSPHATE SERPL-MCNC: 2.8 MG/DL — SIGNIFICANT CHANGE UP (ref 2.5–4.5)
PHOSPHATE SERPL-MCNC: 2.9 MG/DL — SIGNIFICANT CHANGE UP (ref 2.5–4.5)
PLATELET # BLD AUTO: 40 K/UL — LOW (ref 150–400)
PLATELET # BLD AUTO: 41 K/UL — LOW (ref 150–400)
POTASSIUM SERPL-MCNC: 3.8 MMOL/L — SIGNIFICANT CHANGE UP (ref 3.5–5.3)
POTASSIUM SERPL-MCNC: 4.1 MMOL/L — SIGNIFICANT CHANGE UP (ref 3.5–5.3)
POTASSIUM SERPL-MCNC: 4.3 MMOL/L — SIGNIFICANT CHANGE UP (ref 3.5–5.3)
POTASSIUM SERPL-SCNC: 3.8 MMOL/L — SIGNIFICANT CHANGE UP (ref 3.5–5.3)
POTASSIUM SERPL-SCNC: 4.1 MMOL/L — SIGNIFICANT CHANGE UP (ref 3.5–5.3)
POTASSIUM SERPL-SCNC: 4.3 MMOL/L — SIGNIFICANT CHANGE UP (ref 3.5–5.3)
PROT SERPL-MCNC: 5.9 G/DL — LOW (ref 6–8.3)
PROT SERPL-MCNC: 6 G/DL — SIGNIFICANT CHANGE UP (ref 6–8.3)
PROT SERPL-MCNC: 6.1 G/DL — SIGNIFICANT CHANGE UP (ref 6–8.3)
PROT SERPL-MCNC: 6.5 G/DL — SIGNIFICANT CHANGE UP (ref 6–8.3)
PROTHROM AB SERPL-ACNC: 24.4 SEC — HIGH (ref 10.5–13.4)
RBC # BLD: 2.29 M/UL — LOW (ref 3.8–5.2)
RBC # BLD: 2.34 M/UL — LOW (ref 3.8–5.2)
RBC # BLD: 2.74 M/UL — LOW (ref 3.8–5.2)
RBC # BLD: 2.91 M/UL — LOW (ref 3.8–5.2)
RBC # FLD: 17.2 % — HIGH (ref 10.3–14.5)
RBC # FLD: 17.6 % — HIGH (ref 10.3–14.5)
RBC # FLD: 17.8 % — HIGH (ref 10.3–14.5)
S AUREUS DNA NOSE QL NAA+PROBE: SIGNIFICANT CHANGE UP
SODIUM SERPL-SCNC: 139 MMOL/L — SIGNIFICANT CHANGE UP (ref 135–145)
SODIUM SERPL-SCNC: 140 MMOL/L — SIGNIFICANT CHANGE UP (ref 135–145)
SODIUM SERPL-SCNC: 141 MMOL/L — SIGNIFICANT CHANGE UP (ref 135–145)
SPECIMEN SOURCE: SIGNIFICANT CHANGE UP
WBC # BLD: 29.22 K/UL — HIGH (ref 3.8–10.5)
WBC # BLD: 30.56 K/UL — HIGH (ref 3.8–10.5)
WBC # BLD: 30.79 K/UL — HIGH (ref 3.8–10.5)
WBC # BLD: 31.17 K/UL — HIGH (ref 3.8–10.5)
WBC # FLD AUTO: 29.22 K/UL — HIGH (ref 3.8–10.5)
WBC # FLD AUTO: 30.56 K/UL — HIGH (ref 3.8–10.5)
WBC # FLD AUTO: 30.79 K/UL — HIGH (ref 3.8–10.5)
WBC # FLD AUTO: 31.17 K/UL — HIGH (ref 3.8–10.5)

## 2022-12-31 PROCEDURE — 99232 SBSQ HOSP IP/OBS MODERATE 35: CPT

## 2022-12-31 PROCEDURE — 71045 X-RAY EXAM CHEST 1 VIEW: CPT | Mod: 26

## 2022-12-31 PROCEDURE — 90945 DIALYSIS ONE EVALUATION: CPT

## 2022-12-31 PROCEDURE — 74018 RADEX ABDOMEN 1 VIEW: CPT | Mod: 26

## 2022-12-31 PROCEDURE — 99233 SBSQ HOSP IP/OBS HIGH 50: CPT

## 2022-12-31 RX ORDER — LIDOCAINE 4 G/100G
1 CREAM TOPICAL EVERY 24 HOURS
Refills: 0 | Status: DISCONTINUED | OUTPATIENT
Start: 2022-12-31 | End: 2023-01-01

## 2022-12-31 RX ADMIN — HYDROMORPHONE HYDROCHLORIDE 0.25 MILLIGRAM(S): 2 INJECTION INTRAMUSCULAR; INTRAVENOUS; SUBCUTANEOUS at 22:14

## 2022-12-31 RX ADMIN — CHLORHEXIDINE GLUCONATE 1 APPLICATION(S): 213 SOLUTION TOPICAL at 05:14

## 2022-12-31 RX ADMIN — CASPOFUNGIN ACETATE 260 MILLIGRAM(S): 7 INJECTION, POWDER, LYOPHILIZED, FOR SOLUTION INTRAVENOUS at 09:21

## 2022-12-31 RX ADMIN — VASOPRESSIN 4.5 UNIT(S)/MIN: 20 INJECTION INTRAVENOUS at 19:21

## 2022-12-31 RX ADMIN — Medication 10 MILLIGRAM(S): at 05:13

## 2022-12-31 RX ADMIN — MIDODRINE HYDROCHLORIDE 20 MILLIGRAM(S): 2.5 TABLET ORAL at 21:32

## 2022-12-31 RX ADMIN — LEVETIRACETAM 750 MILLIGRAM(S): 250 TABLET, FILM COATED ORAL at 05:12

## 2022-12-31 RX ADMIN — LIDOCAINE 1 PATCH: 4 CREAM TOPICAL at 17:21

## 2022-12-31 RX ADMIN — Medication 137 MICROGRAM(S): at 05:15

## 2022-12-31 RX ADMIN — Medication 6.21 MICROGRAM(S)/KG/MIN: at 07:06

## 2022-12-31 RX ADMIN — Medication 15 MILLILITER(S): at 11:10

## 2022-12-31 RX ADMIN — ESCITALOPRAM OXALATE 10 MILLIGRAM(S): 10 TABLET, FILM COATED ORAL at 11:10

## 2022-12-31 RX ADMIN — LEVETIRACETAM 750 MILLIGRAM(S): 250 TABLET, FILM COATED ORAL at 17:15

## 2022-12-31 RX ADMIN — MEROPENEM 100 MILLIGRAM(S): 1 INJECTION INTRAVENOUS at 21:32

## 2022-12-31 RX ADMIN — LIDOCAINE 1 PATCH: 4 CREAM TOPICAL at 18:34

## 2022-12-31 RX ADMIN — VASOPRESSIN 4.5 UNIT(S)/MIN: 20 INJECTION INTRAVENOUS at 13:50

## 2022-12-31 RX ADMIN — POLYETHYLENE GLYCOL 3350 17 GRAM(S): 17 POWDER, FOR SOLUTION ORAL at 11:10

## 2022-12-31 RX ADMIN — Medication 1 MILLIGRAM(S): at 11:10

## 2022-12-31 RX ADMIN — VASOPRESSIN 4.5 UNIT(S)/MIN: 20 INJECTION INTRAVENOUS at 07:06

## 2022-12-31 RX ADMIN — PANTOPRAZOLE SODIUM 40 MILLIGRAM(S): 20 TABLET, DELAYED RELEASE ORAL at 11:34

## 2022-12-31 RX ADMIN — HYDROMORPHONE HYDROCHLORIDE 0.25 MILLIGRAM(S): 2 INJECTION INTRAMUSCULAR; INTRAVENOUS; SUBCUTANEOUS at 22:40

## 2022-12-31 RX ADMIN — Medication 6.21 MICROGRAM(S)/KG/MIN: at 19:20

## 2022-12-31 RX ADMIN — MIDODRINE HYDROCHLORIDE 20 MILLIGRAM(S): 2.5 TABLET ORAL at 13:50

## 2022-12-31 RX ADMIN — MIDODRINE HYDROCHLORIDE 20 MILLIGRAM(S): 2.5 TABLET ORAL at 05:13

## 2022-12-31 RX ADMIN — LACTULOSE 20 GRAM(S): 10 SOLUTION ORAL at 17:16

## 2022-12-31 RX ADMIN — LACTULOSE 20 GRAM(S): 10 SOLUTION ORAL at 05:12

## 2022-12-31 RX ADMIN — MEROPENEM 100 MILLIGRAM(S): 1 INJECTION INTRAVENOUS at 05:13

## 2022-12-31 RX ADMIN — MEROPENEM 100 MILLIGRAM(S): 1 INJECTION INTRAVENOUS at 13:51

## 2022-12-31 RX ADMIN — Medication 100 MILLIGRAM(S): at 11:10

## 2022-12-31 NOTE — PROGRESS NOTE ADULT - ASSESSMENT
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   Transferred from [facility] 12/20 for further management of acute liver failure and liver transplant evaluation 2/2 known ETOH Cirrhosis.     [] Decompensated ETOH Cirrhosis  - wean off pressors as able, continue Midodrine  - remains on CVVH. continued pulmonary edema vs. infiltrate on CXR   - ID: Leukocytosis. Continue empiric caspo and zonia; Recent blood cx NGTD.  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   Transferred from [facility] 12/20 for further management of acute liver failure and liver transplant evaluation 2/2 known ETOH Cirrhosis.     [] Decompensated ETOH Cirrhosis  - wean off pressors as able, continue Midodrine  - remains on CVVH. continued pulmonary edema vs. infiltrate on CXR   - ID: Leukocytosis. Continue empiric caspo and zonia; Recent blood cx NGTD.  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   Transferred from [facility] 12/20 for further management of acute liver failure and liver transplant evaluation 2/2 known ETOH Cirrhosis.     [] Decompensated ETOH Cirrhosis  - wean off pressors as able, continue Midodrine  - remains on CVVH. continued pulmonary edema vs. infiltrate on CXR   - ID: Leukocytosis. Continue empiric caspo and zonia; Recent blood cx NGTD.  FU ID  - HE: Continue lactulose BID, Miralax, Rifaximin   - Continue  TF at goal  - on Keppra for seizures, (no activity since admission)  - Liver transplant evaluation deferred due to illness at present

## 2022-12-31 NOTE — PROGRESS NOTE ADULT - NS ATTEND AMEND GEN_ALL_CORE FT
Slow improvements.  improving 02 requirements.  tolerating tube feeds. mental status stable.  continues on CRRT.  wean pressor as tolerated.  needs aggressive PT as tolerated.  family updated.

## 2022-12-31 NOTE — CHART NOTE - NSCHARTNOTEFT_GEN_A_CORE
I have seen the patient and reviewed CRRT prescription and flow sheet. Vascular access is functioning well. Patient is tolerating CRRT well with no acute symptoms or distress. The prescription has been adjusted for optimized control of volume status, uremia and electrolytes. Management of additional metabolic abnormalities/anemia will continue to be addressed on follow up.  Plan of care d/w Hepatology / SICU Team

## 2022-12-31 NOTE — PROGRESS NOTE ADULT - SUBJECTIVE AND OBJECTIVE BOX
24 HOURS EVENTS:   - no acute changes  - weaning HF as tolerated    NEURO  Exam: AAOx3 with some wax-wane delirium  Meds: escitalopram 10 milliGRAM(s) Oral daily  HYDROmorphone  Injectable 0.25 milliGRAM(s) IV Push every 3 hours PRN Severe Pain (7 - 10)  levETIRAcetam  Solution 750 milliGRAM(s) Enteral Tube two times a day  metoclopramide Injectable 10 milliGRAM(s) IV Push every 8 hours  ondansetron Injectable 4 milliGRAM(s) IV Push every 6 hours PRN Nausea and/or Vomiting    [x] Adequacy of sedation and pain control has been assessed and adjusted      RESPIRATORY  RR: 24 (12-31-22 @ 00:00) (11 - 42)  SpO2: 93% (12-31-22 @ 00:00) (91% - 96%)  Exam: unlabored, HFNC in place  ABG - ( 30 Dec 2022 00:00 )  pH: 7.42  /  pCO2: 34    /  pO2: 88    / HCO3: 22    / Base Excess: -2.1  /  SaO2: 98.6    Lactate: x          CARDIOVASCULAR  HR: 87 (12-31-22 @ 00:00) (74 - 104)  BP: 99/58 (12-30-22 @ 07:45) (77/50 - 99/58)  BP(mean): 74 (12-30-22 @ 07:45) (59 - 74)  ABP: 100/44 (12-31-22 @ 00:00) (87/37 - 125/63)  ABP(mean): 66 (12-31-22 @ 00:00) (57 - 90)  Wt(kg): --  CVP(cm H2O): --      Exam:  Cardiac Rhythm: S1S2 regular  Meds: midodrine 20 milliGRAM(s) Oral every 8 hours  norepinephrine Infusion 0.05 MICROgram(s)/kG/Min IV Continuous <Continuous>    GI/NUTRITION  Exam: distended, mildly discomforted  Diet: tube feed   Meds: lactulose Syrup 20 Gram(s) Oral every 12 hours  pantoprazole   Suspension 40 milliGRAM(s) Oral every 24 hours  polyethylene glycol 3350 17 Gram(s) Oral daily      GENITOURINARY  I&O's Detail    12-29 @ 07:01  -  12-30 @ 07:00  --------------------------------------------------------  IN:    dextrose 5% + sodium chloride 0.9%: 270 mL    Enteral Tube Flush: 170 mL    IV PiggyBack: 400 mL    Nepro with Carb Steady: 300 mL    Norepinephrine: 292.4 mL    Vasopressin: 108 mL  Total IN: 1540.4 mL    OUT:    Nasogastric/Oral tube (mL): 300 mL    Other (mL): 1084 mL    Rectal Tube (mL): 500 mL  Total OUT: 1884 mL    Total NET: -343.6 mL      12-30 @ 07:01  -  12-31 @ 00:21  --------------------------------------------------------  IN:    Enteral Tube Flush: 320 mL    IV PiggyBack: 350 mL    Nepro with Carb Steady: 530 mL    Norepinephrine: 235.1 mL    Vasopressin: 76.5 mL  Total IN: 1511.6 mL    OUT:    Other (mL): 1138 mL    Rectal Tube (mL): 900 mL  Total OUT: 2038 mL    Total NET: -526.4 mL        Weight (kg): 57 (12-30 @ 07:00)  12-30    140  |  105  |  12  ----------------------------<  143<H>  4.5   |  21<L>  |  0.81    Ca    8.8      30 Dec 2022 18:09  Phos  3.0     12-30  Mg     2.5     12-30    TPro  6.2  /  Alb  3.5  /  TBili  27.3<H>  /  DBili  x   /  AST  172<H>  /  ALT  62<H>  /  AlkPhos  199<H>  12-30    [ ] Monroe catheter, indication: N/A  Meds: folic acid 1 milliGRAM(s) Oral daily  multivitamin/minerals/iron Oral Solution (CENTRUM) 15 milliLiter(s) Oral daily  thiamine 100 milliGRAM(s) Enteral Tube daily        HEMATOLOGIC  Meds:   [x] VTE Prophylaxis                        7.4    31.87 )-----------( 40       ( 30 Dec 2022 18:09 )             21.3     PT/INR - ( 30 Dec 2022 00:16 )   PT: 26.6 sec;   INR: 2.27 ratio         PTT - ( 30 Dec 2022 00:16 )  PTT:47.3 sec  Transfusion     [ ] PRBC   [ ] Platelets   [ ] FFP   [ ] Cryoprecipitate      INFECTIOUS DISEASES  T(C): 36.2 (12-30-22 @ 23:00), Max: 37.1 (12-30-22 @ 19:00)  Wt(kg): --  WBC Count: 31.87 K/uL (12-30 @ 18:09)  WBC Count: 31.45 K/uL (12-30 @ 11:32)  WBC Count: 33.02 K/uL (12-30 @ 05:11)    Recent Cultures:  Specimen Source: Peritoneal Peritoneal Fluid, 12-26 @ 15:00; Results   No growth; Gram Stain:   polymorphonuclear leukocytes seen  No organisms seen  by cytocentrifuge; Organism: --  Specimen Source: .Blood Blood-Peripheral, 12-26 @ 10:45; Results   No growth to date.; Gram Stain: --; Organism: --  Specimen Source: .Blood Blood-Peripheral, 12-26 @ 09:30; Results   No growth to date.; Gram Stain: --; Organism: --  Specimen Source: .Blood Blood, 12-24 @ 20:55; Results   No Growth Final; Gram Stain: --; Organism: --  Specimen Source: .Blood Blood, 12-24 @ 20:20; Results   No Growth Final; Gram Stain: --; Organism: --    Meds: caspofungin IVPB 50 milliGRAM(s) IV Intermittent every 24 hours  caspofungin IVPB      influenza   Vaccine 0.5 milliLiter(s) IntraMuscular once  meropenem  IVPB 1000 milliGRAM(s) IV Intermittent every 8 hours  rifAXIMin 550 milliGRAM(s) Oral every 12 hours        ENDOCRINE  Capillary Blood Glucose    Meds: insulin lispro (ADMELOG) corrective regimen sliding scale   SubCutaneous every 6 hours  levothyroxine 137 MICROGram(s) Oral daily  vasopressin Infusion 0.03 Unit(s)/Min IV Continuous <Continuous>        ACCESS DEVICES:  [x] Peripheral IV  [x] Central Venous Line	[ ] R	[ ] L	[ ] IJ	[ ] Fem	[ ] SC	Placed:   [ ] Arterial Line		[ ] R	[ ] L	[ ] Fem	[ ] Rad	[ ] Ax	Placed:   [ ] PICC:					[ ] Mediport  [x] Urinary Catheter, Date Placed:   [ ] Necessity of urinary, arterial, and venous catheters discussed    OTHER MEDICATIONS:  chlorhexidine 2% Cloths 1 Application(s) Topical <User Schedule>  CRRT Treatment    <Continuous>  Phoxillum Filtration BK 4 / 2.5 5000 milliLiter(s) CRRT <Continuous>  Phoxillum Filtration BK 4 / 2.5 5000 milliLiter(s) CRRT <Continuous>  Phoxillum Filtration BK 4 / 2.5 5000 milliLiter(s) CRRT <Continuous>  sodium chloride 0.65% Nasal 1 Spray(s) Both Nostrils every 3 hours PRN  tetracaine/benzocaine/butamben Spray 1 Spray(s) Topical every 3 hours PRN      CODE STATUS:     IMAGING:

## 2022-12-31 NOTE — PROGRESS NOTE ADULT - SUBJECTIVE AND OBJECTIVE BOX
HPI:  Patient seen and examined at bedside in 8 ICU.  Remains on low dose Levo and vasopressin.  No cardiac events overnight.    Review Of Systems:           Respiratory: No shortness of breath, cough, or wheezing  Cardiovascular: No chest pain or palpitations  10 point review of systems is otherwise negative except as mentioned above        Medications:  caspofungin IVPB 50 milliGRAM(s) IV Intermittent every 24 hours  caspofungin IVPB      chlorhexidine 2% Cloths 1 Application(s) Topical <User Schedule>  CRRT Treatment    <Continuous>  escitalopram 10 milliGRAM(s) Oral daily  folic acid 1 milliGRAM(s) Oral daily  HYDROmorphone  Injectable 0.25 milliGRAM(s) IV Push every 3 hours PRN  influenza   Vaccine 0.5 milliLiter(s) IntraMuscular once  insulin lispro (ADMELOG) corrective regimen sliding scale   SubCutaneous every 6 hours  lactulose Syrup 20 Gram(s) Oral every 12 hours  levETIRAcetam  Solution 750 milliGRAM(s) Enteral Tube two times a day  levothyroxine 137 MICROGram(s) Oral daily  lidocaine   4% Patch 1 Patch Transdermal every 24 hours  meropenem  IVPB 1000 milliGRAM(s) IV Intermittent every 8 hours  midodrine 20 milliGRAM(s) Oral every 8 hours  multivitamin/minerals/iron Oral Solution (CENTRUM) 15 milliLiter(s) Oral daily  norepinephrine Infusion 0.05 MICROgram(s)/kG/Min IV Continuous <Continuous>  ondansetron Injectable 4 milliGRAM(s) IV Push every 6 hours PRN  pantoprazole   Suspension 40 milliGRAM(s) Oral every 24 hours  Phoxillum Filtration BK 4 / 2.5 5000 milliLiter(s) CRRT <Continuous>  Phoxillum Filtration BK 4 / 2.5 5000 milliLiter(s) CRRT <Continuous>  Phoxillum Filtration BK 4 / 2.5 5000 milliLiter(s) CRRT <Continuous>  polyethylene glycol 3350 17 Gram(s) Oral daily  rifAXIMin 550 milliGRAM(s) Oral every 12 hours  sodium chloride 0.65% Nasal 1 Spray(s) Both Nostrils every 3 hours PRN  tetracaine/benzocaine/butamben Spray 1 Spray(s) Topical every 3 hours PRN  thiamine 100 milliGRAM(s) Enteral Tube daily  vasopressin Infusion 0.03 Unit(s)/Min IV Continuous <Continuous>    PAST MEDICAL & SURGICAL HISTORY:  HTN (hypertension)  off medication for over one year--states BP has been normal      UTI (urinary tract infection)  currently being treated--will complete antibiotics 3/8/2022      Intracranial tumor      GERD (gastroesophageal reflux disease)      Eczema      Thyroid cancer  surgery. radiation, Radioactive iodine      COVID-19 virus infection  2021--recovered at home      H/O total thyroidectomy  age 21&#x27;s for Thyroid cancer, postop complication arterial bleed, second surgery      History of tonsillectomy  age 5      History of appendectomy  age 5        Vitals:  T(C): 37 (22 @ 11:00), Max: 37.1 (22 @ 19:00)  HR: 85 (22 @ 17:45) (79 - 102)  BP: 109/67 (22 @ 10:15) (87/53 - 109/67)  BP(mean): 84 (22 @ 10:15) (65 - 84)  RR: 32 (22 @ 17:45) (16 - 42)  SpO2: 95% (22 @ 17:45) (91% - 100%)  Wt(kg): --  Daily     Daily Weight in k.1 (31 Dec 2022 01:45)  I&O's Summary    30 Dec 2022 07:  -  31 Dec 2022 07:00  --------------------------------------------------------  IN: 1932.1 mL / OUT: 2565 mL / NET: -632.9 mL    31 Dec 2022 07:01  -  31 Dec 2022 18:12  --------------------------------------------------------  IN: 960.2 mL / OUT: 1495 mL / NET: -534.8 mL        Physical Exam:  Appearance: Jaundice, encephalopathic   Eyes: PERRLA, EOMI, pink conjunctiva, no scleral icterus   HENT: Normal oral mucosa  Cardiovascular: RRR, S1, S2, no murmur, rub, or gallop; no edema; no JVD  Procedural Access Site: Clean, dry, intact, without hematoma  Respiratory: Clear to auscultation bilaterally  Gastrointestinal: Soft, non-tender, non-distended, BS+  Musculoskeletal: Mitten restraints   Lymphatic: No lymphadenopathy  Skin: No rashes, ecchymoses, or cyanosis                          8.9    30.56 )-----------( 40       ( 31 Dec 2022 13:15 )             26.5         141  |  105  |  12  ----------------------------<  130<H>  4.1   |  20<L>  |  0.78    Ca    8.8      31 Dec 2022 13:15  Phos  2.9       Mg     2.5         TPro  6.5  /  Alb  3.7  /  TBili  27.8<H>  /  DBili  x   /  AST  185<H>  /  ALT  68<H>  /  AlkPhos  213<H>      PT/INR - ( 31 Dec 2022 00:20 )   PT: 24.4 sec;   INR: 2.11 ratio         PTT - ( 31 Dec 2022 00:20 )  PTT:52.1 sec      Cardiovascular Diagnostic Testing:  ECG:     Echo:   < from: TTE with Doppler (w/Cont) (22 @ 10:49) >  ------------------------------------------------------------------------  Dimensions:    Normal Values:  LA:     3.5    2.0 - 4.0 cm  Ao:     3.1    2.0 - 3.8 cm  SEPTUM: 0.7    0.6 - 1.2 cm  PWT:    0.6    0.6 - 1.1 cm  LVIDd: 5.4    3.0 - 5.6 cm  LVIDs:  2.9    1.8 - 4.0 cm  Derived variables:  LVMI: 69 g/m2  RWT: 0.22  Fractional short: 46 %  EF (Visual Estimate):  %  EF (Monroy Rule): 66 %Doppler Peak Velocity (m/sec):  AoV=1.4  ------------------------------------------------------------------------  Observations:  Mitral Valve: Normal mitral valve. Minimal mitral  regurgitation.  Aortic Valve/Aorta: Calcified trileaflet aortic valve with  normal opening. Peak transaortic valve gradient equals 8 mm  Hg. No aorticvalve regurgitation seen. Peak left  ventricular outflow tract gradient equals 4 mm Hg, mean  gradient is equal to 2 mm Hg, LVOT velocity time integral  equals 19 cm.  Aortic Root: 3.1 cm.  Ascending Aorta: 3.3 cm.  LVOT diameter: 2 cm.  Left Atrium:Normal left atrium.  LA volume index = 33  cc/m2.  Left Ventricle: Endocardial visualization enhanced with  intravenous injection of Ultrasonic Enhancing Agent  (Definity). Left ventricle suboptimally visualized despite  intravenous injection of ultrasonic enhancing agent; normal  left ventricular systolic function. No segmental wall  motion abnormalities. Normal left ventricular internal  dimensions and wall thicknesses. Normal diastolic function.  Right Heart: Normal right atrium. Normal right ventricular  size and function. Normal tricuspid valve. Mild-moderate  tricuspid regurgitation. Normal pulmonic valve. Minimal  pulmonic regurgitation.  Pericardium/Pleura: Normal pericardium with no pericardial  effusion.  Hemodynamic: Estimated right atrial pressure is 8 mm Hg.  Estimated right ventricular systolic pressure equals 29 mm  Hg, assuming right atrial pressure equals 8 mm Hg,  consistent with normal pulmonary pressures.  ------------------------------------------------------------------------  Conclusions:  1. Normal mitral valve. Minimal mitral regurgitation.  2. Calcified trileaflet aortic valve with normal opening.  No aortic valve regurgitation seen.  3. Endocardial visualization enhanced with intravenous  injection of Ultrasonic Enhancing Agent (Definity). Left  ventricle suboptimally visualized despite intravenous  injection of ultrasonic enhancing agent; normal left  ventricular systolic function. No segmental wall motion  abnormalities.  4. Normal right ventricular size and function.  *** No previous Echo exam.  ------------------------------------------------------------------------  Confirmed on  2022 - 13:40:44 by Rosalinda Ingram M.D.  ------------------------------------------------------------------------    < end of copied text >

## 2022-12-31 NOTE — PROGRESS NOTE ADULT - ASSESSMENT
Neurologic: hepatic encephalopathy   - waxing-waning mental status  - lactulose and rifaximin; trend ammonia (stable)  - c/w keppra 750 BID ( new onset of seizure )    Respiratory: acute hypoxic resp failure   - CXR b/l infiltrates, possibly aspiration vs fluid  - combicath cx no significant growth to date  - CVVHD net -25  - RVP neg  - wean HFNC as tolerated    Cardiovascular:   -  levo, vaso   - wean as tolerated, goal MAP >65  - midodrine 20 q8h    Gastrointestinal/Nutrition:  - Acute decompensated liver failure, s/p para 2.9L in ED  - Workup per transplant recs pending   - Monitor BM, rectal tube, on lactulose & rifaximin and watching ammonia  - Protonix for stress ulcer prophylaxis as per transplant surgery  - para 12/26 - transudative, not consistent with SBP  - TF @ goal     Genitourinary/Renal: RED 2/2 ATN vs HRS  - CRRT net -25  - HD access L IJ   - monitor electrolytes  - nephro following    Hematologic:  - Chem VTE ppx: hold   - Continue SCD  - Thrombocytopenic, stable  - trend Hbg, transfuse <7  - INR elevated likely hepatic dysfunction    Infectious Disease:  - Empiric abx meropenem, caspofungin  - ascites, blood, urine & fungal Cx so far NG  - repeat BCx 12/23 neg. resent 12/27  - Vanc 1gram 12/27  - CXR w/ bilateral infiltrates so will continue to monitor  - Rpt MRSA swab pending    Endocrine:  - Hypothyroidism c/w synthroid IV 70  - off steroids  - ISS, adjust as appropriate    Ethics: FULL CODE    Dispo/Lines:  R IJ TLC  L IJ shiley  A line L Radial  [place]  SICU

## 2022-12-31 NOTE — PROVIDER CONTACT NOTE (OTHER) - BACKGROUND
Patient is admitted as a liver workup who is currently on CVV. Pt has a history of eczema on her back

## 2022-12-31 NOTE — PROGRESS NOTE ADULT - ASSESSMENT
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  Of note was recently admitted to  11/2022 for workup and eval of new onset jaundice- during that hospitalization was found to have a UTI and dx with alc hep was treated for UTI w/ abx and started on steroids (was deemed a non-responder and steroids were subsequently stopped); s/p LVP at [place] 11/2022. Previously hospitalized in 2021 at [place] for ETOH detox but pt reportedly unaware of any liver dysfunction at that time. Went to ETOH rehab 08/2022 and was asked to leave the facility when she was COVID+; was sober for 1 week until she started drinking again. Had also attended a few AA meetings in the past.    #ACLF- -MELD-Na = 40 on 12/21/2022  #Worsening bilateral airspace opacities: likely ARDS vs PNA  #AUD  #New onset seizure- unknown trigger/etiology- currently on Keppra  #Decompensated alcohol-associated cirrhosis c/b prior ascites req LVP       -Ascites: large       -SBP: negative on 12/26/2022       -Varices: unknown       -Hepatic encephalopathy: yes, Ammonia, Serum: 67 umol/L *H* [11 - 55] (12-27-22 @ 02:26), currently on:  lactulose Syrup 20 Gram(s) Oral every 8 hours, 12-26-22 @ 09:31,   rifAXIMin 550 milliGRAM(s) Oral every 12 hours, 12-25-22 @ 09:07       -HCC: Alpha Fetoprotein - Tumor Marker: 4.2 ng/mL (12-24-22 @ 20:58)    Recommendations:  -trend clinical symptoms, exam findings, vital signs, CBC, CMP, INR  -continue CVVHD  -continue broad spectrum antibiotic coverage, caspofungin, appreciate assistance from ID  -wean pressors as able  -consult PT  -wean HF NC    Note incomplete until finalized by attending signature/attestation.    Arleen Rawls MD  GI Fellow, PGY-5  Available via Microsoft Teams    NON-URGENT CONSULTS:  Please email [email or web address]  [email or web address]  AT NIGHT AND ON WEEKENDS:  Contact on-call GI fellow via answering service ([phone]) from 5pm-8am and on weekends/holidays  MONDAY-FRIDAY 8AM-5PM:  Pager# [phone] ([facility]) or [phone] ([facility])  GI Phone# [phone] ([facility])   62 y.o Hx significant for decompensated ETOH cirrhosis c/b ascites (dx 11/2022), alc hep (dx 11/2022; non-responsive to steroids), remote h/o thyroid cancer in her 20s s/p total thyroidectomy + RTX + radioactive iodide, HTN, 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  Of note was recently admitted to  11/2022 for workup and eval of new onset jaundice- during that hospitalization was found to have a UTI and dx with alc hep was treated for UTI w/ abx and started on steroids (was deemed a non-responder and steroids were subsequently stopped); s/p LVP at [place] 11/2022. Previously hospitalized in 2021 at [place] for ETOH detox but pt reportedly unaware of any liver dysfunction at that time. Went to ETOH rehab 08/2022 and was asked to leave the facility when she was COVID+; was sober for 1 week until she started drinking again. Had also attended a few AA meetings in the past.    #ACLF- -MELD-Na = 40 on 12/21/2022  #Worsening bilateral airspace opacities: likely ARDS vs PNA  #AUD  #New onset seizure- unknown trigger/etiology- currently on Keppra  #Decompensated alcohol-associated cirrhosis c/b prior ascites req LVP       -Ascites: large       -SBP: negative on 12/26/2022       -Varices: unknown       -Hepatic encephalopathy: yes, Ammonia, Serum: 67 umol/L *H* [11 - 55] (12-27-22 @ 02:26), currently on:  lactulose Syrup 20 Gram(s) Oral every 8 hours, 12-26-22 @ 09:31,   rifAXIMin 550 milliGRAM(s) Oral every 12 hours, 12-25-22 @ 09:07       -HCC: Alpha Fetoprotein - Tumor Marker: 4.2 ng/mL (12-24-22 @ 20:58)    Recommendations:  -trend clinical symptoms, exam findings, vital signs, CBC, CMP, INR  -continue CVVHD  -continue broad spectrum antibiotic coverage, caspofungin, appreciate assistance from ID  -wean pressors as able  -consult PT  -wean HF NC    Note incomplete until finalized by attending signature/attestation.    Arleen Rawls MD  GI Fellow, PGY-5  Available via Microsoft Teams    NON-URGENT CONSULTS:  Please email 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  Of note was recently admitted to  11/2022 for workup and eval of new onset jaundice- during that hospitalization was found to have a UTI and dx with alc hep was treated for UTI w/ abx and started on steroids (was deemed a non-responder and steroids were subsequently stopped); s/p LVP at [place] 11/2022. Previously hospitalized in 2021 at [place] for ETOH detox but pt reportedly unaware of any liver dysfunction at that time. Went to ETOH rehab 08/2022 and was asked to leave the facility when she was COVID+; was sober for 1 week until she started drinking again. Had also attended a few AA meetings in the past.    #ACLF- -MELD-Na = 40 on 12/21/2022  #Worsening bilateral airspace opacities: likely ARDS vs PNA  #AUD  #New onset seizure- unknown trigger/etiology- currently on Keppra  #Decompensated alcohol-associated cirrhosis c/b prior ascites req LVP       -Ascites: large       -SBP: negative on 12/26/2022       -Varices: unknown       -Hepatic encephalopathy: yes, Ammonia, Serum: 67 umol/L *H* [11 - 55] (12-27-22 @ 02:26), currently on:  lactulose Syrup 20 Gram(s) Oral every 8 hours, 12-26-22 @ 09:31,   rifAXIMin 550 milliGRAM(s) Oral every 12 hours, 12-25-22 @ 09:07       -HCC: Alpha Fetoprotein - Tumor Marker: 4.2 ng/mL (12-24-22 @ 20:58)    Recommendations:  -trend clinical symptoms, exam findings, vital signs, CBC, CMP, INR  -continue CVVHD  -continue broad spectrum antibiotic coverage, caspofungin, appreciate assistance from ID  -wean pressors as able  -consult PT  -wean HF NC    Note incomplete until finalized by attending signature/attestation.    Arleen Rawls MD  GI Fellow, PGY-5  Available via Microsoft Teams    NON-URGENT CONSULTS:  Please email [email or web address]  [email or web address]  AT NIGHT AND ON WEEKENDS:  Contact on-call GI fellow via answering service ([phone]) from 5pm-8am and on weekends/holidays  MONDAY-FRIDAY 8AM-5PM:  Pager# [phone] ([facility]) or [phone] ([facility])  GI Phone# [phone] ([facility])   62 y.o Hx significant for decompensated ETOH cirrhosis c/b ascites (dx 11/2022), alc hep (dx 11/2022; non-responsive to steroids), remote h/o thyroid cancer in her 20s s/p total thyroidectomy + RTX + radioactive iodide, HTN, 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        Impression:  #ACLF with underlying cirrhosis  #AUD  #Decompensated ETOH cirrhosis c/b prior ascites req LVP- UNOS/OPTN MELD-Na 40 (12/31); blood group O neg  Of note was recently admitted to  11/2022 for workup and eval of new onset jaundice- during that hospitalization was found to have a UTI and dx with alc hep was treated for UTI w/ abx and started on steroids (was deemed a non-responder and steroids were subsequently stopped); s/p LVP at [place] 11/2022. Previously hospitalized in 2021 at [place] for ETOH detox but pt reportedly unaware of any liver dysfunction at that time. Went to ETOH rehab 08/2022 and was asked to leave the facility when she was COVID+; was sober for 1 week until she started drinking again. Had also attended a few AA meetings in the past. ;  s/p vanco (12/21 -12/22), fluconazole (12/20 -12/26)       -Ascites: large volume on CT A/P       -HCC: pending contrasted imaging study       -SBP: negative on para 12/21, neg 12/26       -Varices: no h/o EGD       -Hepatic encephalopathy: unable to assess; intubated; on lactulose/rifaximin    #LT eval  - hepatitis B surface <3, hepatitis A IgM, B core, B surface, A IgG, B surface nonreactive  - EBV IgM and early antigen negative, CMV IgG positive   - acetaminophen negative, TSH 0.05, RPR negative         -LT candidacy and evaluation:            [x] Blood type: ABO O neg            [ ] Psychosocial            [ ] Infectious            [ ] Cardiology:                    Cardiac cath:                    Stress test:             [ ] Colonoscopy:             [ ] Mammography:             [ ] Pap smear:             [ ] Dental            [ ] PT/Frailty    #new onset seizure- unknown trigger/etiology- currently on Keppra  #Worsening bilateral airspace opacities: likely ARDS vs PNA- weaning HFNC; on empiric antimicrobials (See below)      Recommendations:  -trend clinical symptoms, exam findings, vital signs, CBC, CMP, INR  -continue CVVHD  -c/w broad spectrum antibiotics- fluconazole (12/26 ->) meropenem (12/20 ->)  -wean pressors as able  -c/w lactulose 20 gm q8h + miralax 17 gm daily, rifaximin  -consult PT  -wean HF NC    **pt's /JOSUE Gibson ([phone])    Note incomplete until finalized by attending signature/attestation.    Arleen Rawls MD  GI Fellow, PGY-5  Available via Microsoft Teams    NON-URGENT CONSULTS:  Please email [email or web address]  [email or web address]  AT NIGHT AND ON WEEKENDS:  Contact on-call GI fellow via answering service ([phone]) from 5pm-8am and on weekends/holidays  MONDAY-FRIDAY 8AM-5PM:  Pager# [phone] ([facility]) or [phone] ([facility])  GI Phone# [phone] ([facility])   62 y.o Hx significant for decompensated ETOH cirrhosis c/b ascites (dx 11/2022), alc hep (dx 11/2022; non-responsive to steroids), remote h/o thyroid cancer in her 20s s/p total thyroidectomy + RTX + radioactive iodide, HTN, 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        Impression:  #ACLF with underlying cirrhosis  #AUD  #Decompensated ETOH cirrhosis c/b prior ascites req LVP- UNOS/OPTN MELD-Na 40 (12/31); blood group O neg  Of note was recently admitted to  11/2022 for workup and eval of new onset jaundice- during that hospitalization was found to have a UTI and dx with alc hep was treated for UTI w/ abx and started on steroids (was deemed a non-responder and steroids were subsequently stopped); s/p LVP at [place] 11/2022. Previously hospitalized in 2021 at [place] for ETOH detox but pt reportedly unaware of any liver dysfunction at that time. Went to ETOH rehab 08/2022 and was asked to leave the facility when she was COVID+; was sober for 1 week until she started drinking again. Had also attended a few AA meetings in the past. ;  s/p vanco (12/21 -12/22), fluconazole (12/20 -12/26)       -Ascites: large volume on CT A/P       -HCC: pending contrasted imaging study       -SBP: negative on para 12/21, neg 12/26       -Varices: no h/o EGD       -Hepatic encephalopathy: unable to assess; intubated; on lactulose/rifaximin    #LT eval  - hepatitis B surface <3, hepatitis A IgM, B core, B surface, A IgG, B surface nonreactive  - EBV IgM and early antigen negative, CMV IgG positive   - acetaminophen negative, TSH 0.05, RPR negative         -LT candidacy and evaluation:            [x] Blood type: ABO O neg            [ ] Psychosocial            [ ] Infectious            [ ] Cardiology:                    Cardiac cath:                    Stress test:             [ ] Colonoscopy:             [ ] Mammography:             [ ] Pap smear:             [ ] Dental            [ ] PT/Frailty    #new onset seizure- unknown trigger/etiology- currently on Keppra  #Worsening bilateral airspace opacities: likely ARDS vs PNA- weaning HFNC; on empiric antimicrobials (See below)      Recommendations:  -trend clinical symptoms, exam findings, vital signs, CBC, CMP, INR  -continue CVVHD  -c/w broad spectrum antibiotics- fluconazole (12/26 ->) meropenem (12/20 ->)  -wean pressors as able  -c/w lactulose 20 gm q8h + miralax 17 gm daily, rifaximin  -consult PT  -wean HF NC    **pt's /JOSUE Gibson ([phone])    Note incomplete until finalized by attending signature/attestation.    Arleen Rawls MD  GI Fellow, PGY-5  Available via Microsoft Teams    NON-URGENT CONSULTS:  Please email [email or web address]  [email or web address]  AT NIGHT AND ON WEEKENDS:  Contact on-call GI fellow via answering service ([phone]) from 5pm-8am and on weekends/holidays  MONDAY-FRIDAY 8AM-5PM:  Pager# [phone] ([facility]) or [phone] ([facility])  GI Phone# [phone] ([facility])   62 y.o Hx significant for decompensated ETOH cirrhosis c/b ascites (dx 11/2022), alc hep (dx 11/2022; non-responsive to steroids), remote h/o thyroid cancer in her 20s s/p total thyroidectomy + RTX + radioactive iodide, HTN, 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        Impression:  #ACLF with underlying cirrhosis  #AUD  #Decompensated ETOH cirrhosis c/b prior ascites req LVP- UNOS/OPTN MELD-Na 40 (12/31); blood group O neg  Of note was recently admitted to  11/2022 for workup and eval of new onset jaundice- during that hospitalization was found to have a UTI and dx with alc hep was treated for UTI w/ abx and started on steroids (was deemed a non-responder and steroids were subsequently stopped); s/p LVP at [place] 11/2022. Previously hospitalized in 2021 at [place] for ETOH detox but pt reportedly unaware of any liver dysfunction at that time. Went to ETOH rehab 08/2022 and was asked to leave the facility when she was COVID+; was sober for 1 week until she started drinking again. Had also attended a few AA meetings in the past. ;  s/p vanco (12/21 -12/22), fluconazole (12/20 -12/26)       -Ascites: large volume on CT A/P       -HCC: pending contrasted imaging study       -SBP: negative on para 12/21, neg 12/26       -Varices: no h/o EGD       -Hepatic encephalopathy: unable to assess; intubated; on lactulose/rifaximin    #LT eval  - hepatitis B surface <3, hepatitis A IgM, B core, B surface, A IgG, B surface nonreactive  - EBV IgM and early antigen negative, CMV IgG positive   - acetaminophen negative, TSH 0.05, RPR negative         -LT candidacy and evaluation:            [x] Blood type: ABO O neg            [ ] Psychosocial            [ ] Infectious            [ ] Cardiology:                    Cardiac cath:                    Stress test:             [ ] Colonoscopy:             [ ] Mammography:             [ ] Pap smear:             [ ] Dental            [ ] PT/Frailty    #new onset seizure- unknown trigger/etiology- currently on Keppra  #Worsening bilateral airspace opacities: likely ARDS vs PNA- weaning HFNC; on empiric antimicrobials (See below)      Recommendations:  -trend clinical symptoms, exam findings, vital signs, CBC, CMP, INR  -continue CVVHD  -c/w broad spectrum antibiotics- fluconazole (12/26 ->) meropenem (12/20 ->)  -wean pressors as able  -c/w lactulose 20 gm q8h + miralax 17 gm daily, rifaximin  -consult PT  -wean HF NC    **pt's /JOSUE Gibson ([phone])    Note incomplete until finalized by attending signature/attestation.    Arleen Rawls MD  GI Fellow, PGY-5  Available via Microsoft Teams    NON-URGENT CONSULTS:  Please email [email or web address]  [email or web address]  AT NIGHT AND ON WEEKENDS:  Contact on-call GI fellow via answering service ([phone]) from 5pm-8am and on weekends/holidays  MONDAY-FRIDAY 8AM-5PM:  Pager# [phone] ([facility]) or [phone] ([facility])  GI Phone# [phone] ([facility])   62 y.o Hx significant for decompensated ETOH cirrhosis c/b ascites (dx 11/2022), alc hep (dx 11/2022; non-responsive to steroids), remote h/o thyroid cancer in her 20s s/p total thyroidectomy + RTX + radioactive iodide, HTN, 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        Impression:  #ACLF with underlying cirrhosis  #AUD  #Decompensated ETOH cirrhosis c/b prior ascites req LVP- UNOS/OPTN MELD-Na 40 (12/31); blood group O neg  Of note was recently admitted to  11/2022 for workup and eval of new onset jaundice- during that hospitalization was found to have a UTI and dx with alc hep was treated for UTI w/ abx and started on steroids (was deemed a non-responder and steroids were subsequently stopped); s/p LVP at [place] 11/2022. Previously hospitalized in 2021 at [place] for ETOH detox but pt reportedly unaware of any liver dysfunction at that time. Went to ETOH rehab 08/2022 and was asked to leave the facility when she was COVID+; was sober for 1 week until she started drinking again. Had also attended a few AA meetings in the past. ;  s/p vanco (12/21 -12/22), fluconazole (12/20 -12/26)       -Ascites: large volume on CT A/P       -HCC: pending contrasted imaging study       -SBP: negative on para 12/21, neg 12/26       -Varices: no h/o EGD       -Hepatic encephalopathy: unable to assess; intubated; on lactulose/rifaximin    #LT eval  - hepatitis B surface <3, hepatitis A IgM, B core, B surface, A IgG, B surface nonreactive  - EBV IgM and early antigen negative, CMV IgG positive   - acetaminophen negative, TSH 0.05, RPR negative         -LT candidacy and evaluation:            [x] Blood type: ABO O neg            [ ] Psychosocial            [ ] Infectious            [ ] Cardiology:                    Cardiac cath:                    Stress test:             [ ] Colonoscopy:             [ ] Mammography:             [ ] Pap smear:             [ ] Dental            [ ] PT/Frailty    #new onset seizure- unknown trigger/etiology- currently on Keppra  #Worsening bilateral airspace opacities: likely ARDS vs PNA- weaning HFNC; on empiric antimicrobials (See below)      Recommendations:  -trend clinical symptoms, exam findings, vital signs, CBC, CMP, INR  -continue CVVHD  -c/w broad spectrum antibiotics- fluconazole (12/26 ->) meropenem (12/20 ->)  -wean pressors as able  -c/w lactulose 20 gm q8h + miralax 17 gm daily, rifaximin  -c/w midodrine 20 q8h  -c/w thiamine 100 mg daily  -consult PT  -wean HF NC    **pt's /JOSUE Gibson ([phone])    Note incomplete until finalized by attending signature/attestation.    Arleen Rawls MD  GI Fellow, PGY-5  Available via Microsoft Teams    NON-URGENT CONSULTS:  Please email [email or web address]  [email or web address]  AT NIGHT AND ON WEEKENDS:  Contact on-call GI fellow via answering service ([phone]) from 5pm-8am and on weekends/holidays  MONDAY-FRIDAY 8AM-5PM:  Pager# [phone] ([facility]) or [phone] ([facility])  GI Phone# [phone] ([facility])   62 y.o Hx significant for decompensated ETOH cirrhosis c/b ascites (dx 11/2022), alc hep (dx 11/2022; non-responsive to steroids), remote h/o thyroid cancer in her 20s s/p total thyroidectomy + RTX + radioactive iodide, HTN, ventrical neoplasm (dx 2021) s/p right frontal craniotomy (03/2022) for resection post operative course c/b hemorrhage right lateral ventricle (managed non-operatively) who was initially admitted to  12/19 with new onset seizures and transferred to [facility] 12/20 for LT eval for ACLF.        Impression:  #ACLF with underlying cirrhosis  #AUD  #Decompensated ETOH cirrhosis c/b prior ascites req LVP- UNOS/OPTN MELD-Na 40 (12/31); blood group O neg  Of note was recently admitted to  11/2022 for workup and eval of new onset jaundice- during that hospitalization was found to have a UTI and dx with alc hep was treated for UTI w/ abx and started on steroids (was deemed a non-responder and steroids were subsequently stopped); s/p LVP at [place] 11/2022. Previously hospitalized in 2021 at [place] for ETOH detox but pt reportedly unaware of any liver dysfunction at that time. Went to ETOH rehab 08/2022 and was asked to leave the facility when she was COVID+; was sober for 1 week until she started drinking again. Had also attended a few AA meetings in the past. ;  s/p vanco (12/21 -12/22), fluconazole (12/20 -12/26)       -Ascites: large volume on CT A/P       -HCC: pending contrasted imaging study       -SBP: negative on para 12/21, neg 12/26       -Varices: no h/o EGD       -Hepatic encephalopathy: unable to assess; intubated; on lactulose/rifaximin    #LT eval  - hepatitis B surface <3, hepatitis A IgM, B core, B surface, A IgG, B surface nonreactive  - EBV IgM and early antigen negative, CMV IgG positive   - acetaminophen negative, TSH 0.05, RPR negative         -LT candidacy and evaluation:            [x] Blood type: ABO O neg            [ ] Psychosocial            [ ] Infectious            [ ] Cardiology:                    Cardiac cath:                    Stress test:             [ ] Colonoscopy:             [ ] Mammography:             [ ] Pap smear:             [ ] Dental            [ ] PT/Frailty    #new onset seizure- unknown trigger/etiology- currently on Keppra  #Worsening bilateral airspace opacities: likely ARDS vs PNA- weaning HFNC; on empiric antimicrobials (See below)      Recommendations:  -trend clinical symptoms, exam findings, vital signs, CBC, CMP, INR  -continue CVVHD  -c/w broad spectrum antibiotics- fluconazole (12/26 ->) meropenem (12/20 ->)  -wean pressors as able  -c/w lactulose 20 gm q8h + miralax 17 gm daily, rifaximin  -c/w midodrine 20 q8h  -c/w thiamine 100 mg daily  -consult PT  -wean HF NC    **pt's /JOSUE Gibson ([phone])    Note incomplete until finalized by attending signature/attestation.    Arleen Rawls MD  GI Fellow, PGY-5  Available via Microsoft Teams    NON-URGENT CONSULTS:  Please email [email or web address]  AT NIGHT AND ON WEEKENDS:  Contact on-call GI fellow via answering service ([phone]) from 5pm-8am and on weekends/holidays  MONDAY-FRIDAY 8AM-5PM:  Pager# [phone] ([facility]) or [phone] ([facility])  GI Phone# [phone] ([facility])   62 y.o Hx significant for decompensated ETOH cirrhosis c/b ascites (dx 11/2022), alc hep (dx 11/2022; non-responsive to steroids), remote h/o thyroid cancer in her 20s s/p total thyroidectomy + RTX + radioactive iodide, HTN, ventrical neoplasm (dx 2021) s/p right frontal craniotomy (03/2022) for resection post operative course c/b hemorrhage right lateral ventricle (managed non-operatively) who was initially admitted to  12/19 with new onset seizures and transferred to [facility] 12/20 for LT eval for ACLF.        Impression:  #ACLF with underlying cirrhosis  #AUD  #Decompensated ETOH cirrhosis c/b prior ascites req LVP- UNOS/OPTN MELD-Na 40 (12/31); blood group O neg  Of note was recently admitted to  11/2022 for workup and eval of new onset jaundice- during that hospitalization was found to have a UTI and dx with alc hep was treated for UTI w/ abx and started on steroids (was deemed a non-responder and steroids were subsequently stopped); s/p LVP at [place] 11/2022. Previously hospitalized in 2021 at [place] for ETOH detox but pt reportedly unaware of any liver dysfunction at that time. Went to ETOH rehab 08/2022 and was asked to leave the facility when she was COVID+; was sober for 1 week until she started drinking again. Had also attended a few AA meetings in the past. ;  s/p vanco (12/21 -12/22), fluconazole (12/20 -12/26)       -Ascites: large volume on CT A/P       -HCC: pending contrasted imaging study       -SBP: negative on para 12/21, neg 12/26       -Varices: no h/o EGD       -Hepatic encephalopathy: unable to assess; intubated; on lactulose/rifaximin    #LT eval  - hepatitis B surface <3, hepatitis A IgM, B core, B surface, A IgG, B surface nonreactive  - EBV IgM and early antigen negative, CMV IgG positive   - acetaminophen negative, TSH 0.05, RPR negative         -LT candidacy and evaluation:            [x] Blood type: ABO O neg            [ ] Psychosocial            [ ] Infectious            [ ] Cardiology:                    Cardiac cath:                    Stress test:             [ ] Colonoscopy:             [ ] Mammography:             [ ] Pap smear:             [ ] Dental            [ ] PT/Frailty    #new onset seizure- unknown trigger/etiology- currently on Keppra  #Worsening bilateral airspace opacities: likely ARDS vs PNA- weaning HFNC; on empiric antimicrobials (See below)      Recommendations:  -trend clinical symptoms, exam findings, vital signs, CBC, CMP, INR  -continue CVVHD  -c/w broad spectrum antibiotics- fluconazole (12/26 ->) meropenem (12/20 ->)  -wean pressors as able  -c/w lactulose 20 gm q8h + miralax 17 gm daily, rifaximin  -c/w midodrine 20 q8h  -c/w thiamine 100 mg daily  -consult PT  -wean HF NC    **pt's /JOSUE Gibson ([phone])    Note incomplete until finalized by attending signature/attestation.    Arleen Rawls MD  GI Fellow, PGY-5  Available via Microsoft Teams    NON-URGENT CONSULTS:  Please email [email or web address]  AT NIGHT AND ON WEEKENDS:  Contact on-call GI fellow via answering service ([phone]) from 5pm-8am and on weekends/holidays  MONDAY-FRIDAY 8AM-5PM:  Pager# [phone] ([facility]) or [phone] ([facility])  GI Phone# [phone] ([facility])

## 2022-12-31 NOTE — PROGRESS NOTE ADULT - SUBJECTIVE AND OBJECTIVE BOX
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  Arthur ([phone]) and Daughter Taylor at [facility] provided additional history. Of note was recently admitted to  11/2022 for workup and eval of jaundice- during that hospitalization was found to have a UTI and dx with alc hep and started on steroids (was deemed a non-responder and steroids were subsequently stopped); s/p LVP at [place] 11/2022. Previously hospitalized in 2021 at [place] for ETOH detox. Went to ETOH rehab 08/2022 and was asked to leave the facility when she was COVID+; was sober for 1 week until she started drinking again. Had also attended a few AA meetings in the past. Transferred from [facility] 12/20 for further management of acute liver failure and liver transplant evaluation.       Interval events:  - remains on pressors, CVVH   - TF at goal  - On 30% HiFlo NC O2  - following simple commands    Potential Discharge date: pending clinical stability  Education:  Medications  Plan of care:  See Below        MEDICATIONS  (STANDING):  caspofungin IVPB      caspofungin IVPB 50 milliGRAM(s) IV Intermittent every 24 hours  chlorhexidine 2% Cloths 1 Application(s) Topical <User Schedule>  CRRT Treatment    <Continuous>  escitalopram 10 milliGRAM(s) Oral daily  folic acid 1 milliGRAM(s) Oral daily  influenza   Vaccine 0.5 milliLiter(s) IntraMuscular once  insulin lispro (ADMELOG) corrective regimen sliding scale   SubCutaneous every 6 hours  lactulose Syrup 20 Gram(s) Oral every 12 hours  levETIRAcetam  Solution 750 milliGRAM(s) Enteral Tube two times a day  levothyroxine 137 MICROGram(s) Oral daily  meropenem  IVPB 1000 milliGRAM(s) IV Intermittent every 8 hours  midodrine 20 milliGRAM(s) Oral every 8 hours  multivitamin/minerals/iron Oral Solution (CENTRUM) 15 milliLiter(s) Oral daily  norepinephrine Infusion 0.05 MICROgram(s)/kG/Min (6.21 mL/Hr) IV Continuous <Continuous>  pantoprazole   Suspension 40 milliGRAM(s) Oral every 24 hours  Phoxillum Filtration BK 4 / 2.5 5000 milliLiter(s) (800 mL/Hr) CRRT <Continuous>  Phoxillum Filtration BK 4 / 2.5 5000 milliLiter(s) (200 mL/Hr) CRRT <Continuous>  Phoxillum Filtration BK 4 / 2.5 5000 milliLiter(s) (1000 mL/Hr) CRRT <Continuous>  polyethylene glycol 3350 17 Gram(s) Oral daily  rifAXIMin 550 milliGRAM(s) Oral every 12 hours  thiamine 100 milliGRAM(s) Enteral Tube daily  vasopressin Infusion 0.03 Unit(s)/Min (4.5 mL/Hr) IV Continuous <Continuous>    MEDICATIONS  (PRN):  HYDROmorphone  Injectable 0.25 milliGRAM(s) IV Push every 3 hours PRN Severe Pain (7 - 10)  ondansetron Injectable 4 milliGRAM(s) IV Push every 6 hours PRN Nausea and/or Vomiting  sodium chloride 0.65% Nasal 1 Spray(s) Both Nostrils every 3 hours PRN Nasal Congestion  tetracaine/benzocaine/butamben Spray 1 Spray(s) Topical every 3 hours PRN for ngt discomfort      PAST MEDICAL & SURGICAL HISTORY:  HTN (hypertension)  off medication for over one year--states BP has been normal      UTI (urinary tract infection)  currently being treated--will complete antibiotics 3/8/2022      Intracranial tumor      GERD (gastroesophageal reflux disease)      Eczema      Thyroid cancer  surgery. radiation, Radioactive iodine      COVID-19 virus infection  11/2021--recovered at home      H/O total thyroidectomy  age 21&#x27;s for Thyroid cancer, postop complication arterial bleed, second surgery      History of tonsillectomy  age 5      History of appendectomy  age 5          Vital Signs Last 24 Hrs  T(C): 37 (31 Dec 2022 11:00), Max: 37.1 (30 Dec 2022 19:00)  T(F): 98.6 (31 Dec 2022 11:00), Max: 98.8 (30 Dec 2022 19:00)  HR: 96 (31 Dec 2022 16:15) (74 - 102)  BP: 109/67 (31 Dec 2022 10:15) (87/53 - 109/67)  BP(mean): 84 (31 Dec 2022 10:15) (65 - 84)  RR: 28 (31 Dec 2022 16:15) (16 - 42)  SpO2: 96% (31 Dec 2022 16:15) (91% - 100%)    Parameters below as of 31 Dec 2022 15:40  Patient On (Oxygen Delivery Method): nasal cannula, high flow,31C  O2 Flow (L/min): 30  O2 Concentration (%): 30    I&O's Summary    30 Dec 2022 07:01  -  31 Dec 2022 07:00  --------------------------------------------------------  IN: 1932.1 mL / OUT: 2565 mL / NET: -632.9 mL    31 Dec 2022 07:01  -  31 Dec 2022 16:30  --------------------------------------------------------  IN: 812.6 mL / OUT: 1190 mL / NET: -377.4 mL                              8.9    30.56 )-----------( 40       ( 31 Dec 2022 13:15 )             26.5     12-31    141  |  105  |  12  ----------------------------<  130<H>  4.1   |  20<L>  |  0.78    Ca    8.8      31 Dec 2022 13:15  Phos  2.9     12-31  Mg     2.5     12-31    TPro  6.5  /  Alb  3.7  /  TBili  27.8<H>  /  DBili  x   /  AST  185<H>  /  ALT  68<H>  /  AlkPhos  213<H>  12-31          Culture - Fungal, Body Fluid (collected 12-26-22 @ 15:00)  Source: Peritoneal Peritoneal Fluid  Preliminary Report (12-28-22 @ 15:03):    Culture is being performed. Fungal cultures are held for 4 weeks.    Culture - Body Fluid with Gram Stain (collected 12-26-22 @ 15:00)  Source: Peritoneal Peritoneal Fluid  Gram Stain (12-26-22 @ 23:25):    polymorphonuclear leukocytes seen    No organisms seen    by cytocentrifuge  Final Report (12-31-22 @ 15:38):    No growth    Culture - Blood (collected 12-26-22 @ 10:45)  Source: .Blood Blood-Peripheral  Preliminary Report (12-27-22 @ 17:01):    No growth to date.    Culture - Blood (collected 12-26-22 @ 09:30)  Source: .Blood Blood-Peripheral  Preliminary Report (12-27-22 @ 17:01):    No growth to date.    Culture - Blood (collected 12-24-22 @ 20:55)  Source: .Blood Blood  Final Report (12-30-22 @ 01:01):    No Growth Final    Culture - Blood (collected 12-24-22 @ 20:20)  Source: .Blood Blood  Final Report (12-30-22 @ 01:01):    No Growth Final        Review of systems  AMS      Physical Exam:  Constitutional: NAD   Eyes: icteric, PERRLA  ENMT: nc/at, no thrush  Neck: supple, central line   Cardiovascular: RRR  Gastrointestinal: Soft abdomen, distended  Genitourinary: Urinary catheter in place, anuric  Extremities: SCD's in place and working bilaterally, no edema  Vascular: Palpable dp pulses bilaterally.   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  Arthur ([phone]) and Daughter Taylor at [facility] provided additional history. Of note was recently admitted to  11/2022 for workup and eval of jaundice- during that hospitalization was found to have a UTI and dx with alc hep and started on steroids (was deemed a non-responder and steroids were subsequently stopped); s/p LVP at [place] 11/2022. Previously hospitalized in 2021 at [place] for ETOH detox. Went to ETOH rehab 08/2022 and was asked to leave the facility when she was COVID+; was sober for 1 week until she started drinking again. Had also attended a few AA meetings in the past. Transferred from [facility] 12/20 for further management of acute liver failure and liver transplant evaluation.       Interval events:  - remains on pressors, CVVH   - TF at goal  - On 30% HiFlo NC O2  - following simple commands    Potential Discharge date: pending clinical stability  Education:  Medications  Plan of care:  See Below        MEDICATIONS  (STANDING):  caspofungin IVPB      caspofungin IVPB 50 milliGRAM(s) IV Intermittent every 24 hours  chlorhexidine 2% Cloths 1 Application(s) Topical <User Schedule>  CRRT Treatment    <Continuous>  escitalopram 10 milliGRAM(s) Oral daily  folic acid 1 milliGRAM(s) Oral daily  influenza   Vaccine 0.5 milliLiter(s) IntraMuscular once  insulin lispro (ADMELOG) corrective regimen sliding scale   SubCutaneous every 6 hours  lactulose Syrup 20 Gram(s) Oral every 12 hours  levETIRAcetam  Solution 750 milliGRAM(s) Enteral Tube two times a day  levothyroxine 137 MICROGram(s) Oral daily  meropenem  IVPB 1000 milliGRAM(s) IV Intermittent every 8 hours  midodrine 20 milliGRAM(s) Oral every 8 hours  multivitamin/minerals/iron Oral Solution (CENTRUM) 15 milliLiter(s) Oral daily  norepinephrine Infusion 0.05 MICROgram(s)/kG/Min (6.21 mL/Hr) IV Continuous <Continuous>  pantoprazole   Suspension 40 milliGRAM(s) Oral every 24 hours  Phoxillum Filtration BK 4 / 2.5 5000 milliLiter(s) (800 mL/Hr) CRRT <Continuous>  Phoxillum Filtration BK 4 / 2.5 5000 milliLiter(s) (200 mL/Hr) CRRT <Continuous>  Phoxillum Filtration BK 4 / 2.5 5000 milliLiter(s) (1000 mL/Hr) CRRT <Continuous>  polyethylene glycol 3350 17 Gram(s) Oral daily  rifAXIMin 550 milliGRAM(s) Oral every 12 hours  thiamine 100 milliGRAM(s) Enteral Tube daily  vasopressin Infusion 0.03 Unit(s)/Min (4.5 mL/Hr) IV Continuous <Continuous>    MEDICATIONS  (PRN):  HYDROmorphone  Injectable 0.25 milliGRAM(s) IV Push every 3 hours PRN Severe Pain (7 - 10)  ondansetron Injectable 4 milliGRAM(s) IV Push every 6 hours PRN Nausea and/or Vomiting  sodium chloride 0.65% Nasal 1 Spray(s) Both Nostrils every 3 hours PRN Nasal Congestion  tetracaine/benzocaine/butamben Spray 1 Spray(s) Topical every 3 hours PRN for ngt discomfort      PAST MEDICAL & SURGICAL HISTORY:  HTN (hypertension)  off medication for over one year--states BP has been normal      UTI (urinary tract infection)  currently being treated--will complete antibiotics 3/8/2022      Intracranial tumor      GERD (gastroesophageal reflux disease)      Eczema      Thyroid cancer  surgery. radiation, Radioactive iodine      COVID-19 virus infection  11/2021--recovered at home      H/O total thyroidectomy  age 21&#x27;s for Thyroid cancer, postop complication arterial bleed, second surgery      History of tonsillectomy  age 5      History of appendectomy  age 5          Vital Signs Last 24 Hrs  T(C): 37 (31 Dec 2022 11:00), Max: 37.1 (30 Dec 2022 19:00)  T(F): 98.6 (31 Dec 2022 11:00), Max: 98.8 (30 Dec 2022 19:00)  HR: 96 (31 Dec 2022 16:15) (74 - 102)  BP: 109/67 (31 Dec 2022 10:15) (87/53 - 109/67)  BP(mean): 84 (31 Dec 2022 10:15) (65 - 84)  RR: 28 (31 Dec 2022 16:15) (16 - 42)  SpO2: 96% (31 Dec 2022 16:15) (91% - 100%)    Parameters below as of 31 Dec 2022 15:40  Patient On (Oxygen Delivery Method): nasal cannula, high flow,31C  O2 Flow (L/min): 30  O2 Concentration (%): 30    I&O's Summary    30 Dec 2022 07:01  -  31 Dec 2022 07:00  --------------------------------------------------------  IN: 1932.1 mL / OUT: 2565 mL / NET: -632.9 mL    31 Dec 2022 07:01  -  31 Dec 2022 16:30  --------------------------------------------------------  IN: 812.6 mL / OUT: 1190 mL / NET: -377.4 mL                              8.9    30.56 )-----------( 40       ( 31 Dec 2022 13:15 )             26.5     12-31    141  |  105  |  12  ----------------------------<  130<H>  4.1   |  20<L>  |  0.78    Ca    8.8      31 Dec 2022 13:15  Phos  2.9     12-31  Mg     2.5     12-31    TPro  6.5  /  Alb  3.7  /  TBili  27.8<H>  /  DBili  x   /  AST  185<H>  /  ALT  68<H>  /  AlkPhos  213<H>  12-31          Culture - Fungal, Body Fluid (collected 12-26-22 @ 15:00)  Source: Peritoneal Peritoneal Fluid  Preliminary Report (12-28-22 @ 15:03):    Culture is being performed. Fungal cultures are held for 4 weeks.    Culture - Body Fluid with Gram Stain (collected 12-26-22 @ 15:00)  Source: Peritoneal Peritoneal Fluid  Gram Stain (12-26-22 @ 23:25):    polymorphonuclear leukocytes seen    No organisms seen    by cytocentrifuge  Final Report (12-31-22 @ 15:38):    No growth    Culture - Blood (collected 12-26-22 @ 10:45)  Source: .Blood Blood-Peripheral  Preliminary Report (12-27-22 @ 17:01):    No growth to date.    Culture - Blood (collected 12-26-22 @ 09:30)  Source: .Blood Blood-Peripheral  Preliminary Report (12-27-22 @ 17:01):    No growth to date.    Culture - Blood (collected 12-24-22 @ 20:55)  Source: .Blood Blood  Final Report (12-30-22 @ 01:01):    No Growth Final    Culture - Blood (collected 12-24-22 @ 20:20)  Source: .Blood Blood  Final Report (12-30-22 @ 01:01):    No Growth Final        Review of systems  AMS      Physical Exam:  Constitutional: NAD   Eyes: icteric, PERRLA  ENMT: nc/at, no thrush  Neck: supple, central line   Cardiovascular: RRR  Gastrointestinal: Soft abdomen, distended  Genitourinary: Urinary catheter in place, anuric  Extremities: SCD's in place and working bilaterally, no edema  Vascular: Palpable dp pulses bilaterally.   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  Arthur ([phone]) and Daughter Taylor at [facility] provided additional history. Of note was recently admitted to  11/2022 for workup and eval of jaundice- during that hospitalization was found to have a UTI and dx with alc hep and started on steroids (was deemed a non-responder and steroids were subsequently stopped); s/p LVP at [place] 11/2022. Previously hospitalized in 2021 at [place] for ETOH detox. Went to ETOH rehab 08/2022 and was asked to leave the facility when she was COVID+; was sober for 1 week until she started drinking again. Had also attended a few AA meetings in the past. Transferred from [facility] 12/20 for further management of acute liver failure and liver transplant evaluation.       Interval events:  - remains on pressors, CVVH   - TF at goal  - On 30% HiFlo NC O2  - following simple commands    Potential Discharge date: pending clinical stability  Education:  Medications  Plan of care:  See Below        MEDICATIONS  (STANDING):  caspofungin IVPB      caspofungin IVPB 50 milliGRAM(s) IV Intermittent every 24 hours  chlorhexidine 2% Cloths 1 Application(s) Topical <User Schedule>  CRRT Treatment    <Continuous>  escitalopram 10 milliGRAM(s) Oral daily  folic acid 1 milliGRAM(s) Oral daily  influenza   Vaccine 0.5 milliLiter(s) IntraMuscular once  insulin lispro (ADMELOG) corrective regimen sliding scale   SubCutaneous every 6 hours  lactulose Syrup 20 Gram(s) Oral every 12 hours  levETIRAcetam  Solution 750 milliGRAM(s) Enteral Tube two times a day  levothyroxine 137 MICROGram(s) Oral daily  meropenem  IVPB 1000 milliGRAM(s) IV Intermittent every 8 hours  midodrine 20 milliGRAM(s) Oral every 8 hours  multivitamin/minerals/iron Oral Solution (CENTRUM) 15 milliLiter(s) Oral daily  norepinephrine Infusion 0.05 MICROgram(s)/kG/Min (6.21 mL/Hr) IV Continuous <Continuous>  pantoprazole   Suspension 40 milliGRAM(s) Oral every 24 hours  Phoxillum Filtration BK 4 / 2.5 5000 milliLiter(s) (800 mL/Hr) CRRT <Continuous>  Phoxillum Filtration BK 4 / 2.5 5000 milliLiter(s) (200 mL/Hr) CRRT <Continuous>  Phoxillum Filtration BK 4 / 2.5 5000 milliLiter(s) (1000 mL/Hr) CRRT <Continuous>  polyethylene glycol 3350 17 Gram(s) Oral daily  rifAXIMin 550 milliGRAM(s) Oral every 12 hours  thiamine 100 milliGRAM(s) Enteral Tube daily  vasopressin Infusion 0.03 Unit(s)/Min (4.5 mL/Hr) IV Continuous <Continuous>    MEDICATIONS  (PRN):  HYDROmorphone  Injectable 0.25 milliGRAM(s) IV Push every 3 hours PRN Severe Pain (7 - 10)  ondansetron Injectable 4 milliGRAM(s) IV Push every 6 hours PRN Nausea and/or Vomiting  sodium chloride 0.65% Nasal 1 Spray(s) Both Nostrils every 3 hours PRN Nasal Congestion  tetracaine/benzocaine/butamben Spray 1 Spray(s) Topical every 3 hours PRN for ngt discomfort      PAST MEDICAL & SURGICAL HISTORY:  HTN (hypertension)  off medication for over one year--states BP has been normal      UTI (urinary tract infection)  currently being treated--will complete antibiotics 3/8/2022      Intracranial tumor      GERD (gastroesophageal reflux disease)      Eczema      Thyroid cancer  surgery. radiation, Radioactive iodine      COVID-19 virus infection  11/2021--recovered at home      H/O total thyroidectomy  age 21&#x27;s for Thyroid cancer, postop complication arterial bleed, second surgery      History of tonsillectomy  age 5      History of appendectomy  age 5          Vital Signs Last 24 Hrs  T(C): 37 (31 Dec 2022 11:00), Max: 37.1 (30 Dec 2022 19:00)  T(F): 98.6 (31 Dec 2022 11:00), Max: 98.8 (30 Dec 2022 19:00)  HR: 96 (31 Dec 2022 16:15) (74 - 102)  BP: 109/67 (31 Dec 2022 10:15) (87/53 - 109/67)  BP(mean): 84 (31 Dec 2022 10:15) (65 - 84)  RR: 28 (31 Dec 2022 16:15) (16 - 42)  SpO2: 96% (31 Dec 2022 16:15) (91% - 100%)    Parameters below as of 31 Dec 2022 15:40  Patient On (Oxygen Delivery Method): nasal cannula, high flow,31C  O2 Flow (L/min): 30  O2 Concentration (%): 30    I&O's Summary    30 Dec 2022 07:01  -  31 Dec 2022 07:00  --------------------------------------------------------  IN: 1932.1 mL / OUT: 2565 mL / NET: -632.9 mL    31 Dec 2022 07:01  -  31 Dec 2022 16:30  --------------------------------------------------------  IN: 812.6 mL / OUT: 1190 mL / NET: -377.4 mL                              8.9    30.56 )-----------( 40       ( 31 Dec 2022 13:15 )             26.5     12-31    141  |  105  |  12  ----------------------------<  130<H>  4.1   |  20<L>  |  0.78    Ca    8.8      31 Dec 2022 13:15  Phos  2.9     12-31  Mg     2.5     12-31    TPro  6.5  /  Alb  3.7  /  TBili  27.8<H>  /  DBili  x   /  AST  185<H>  /  ALT  68<H>  /  AlkPhos  213<H>  12-31          Culture - Fungal, Body Fluid (collected 12-26-22 @ 15:00)  Source: Peritoneal Peritoneal Fluid  Preliminary Report (12-28-22 @ 15:03):    Culture is being performed. Fungal cultures are held for 4 weeks.    Culture - Body Fluid with Gram Stain (collected 12-26-22 @ 15:00)  Source: Peritoneal Peritoneal Fluid  Gram Stain (12-26-22 @ 23:25):    polymorphonuclear leukocytes seen    No organisms seen    by cytocentrifuge  Final Report (12-31-22 @ 15:38):    No growth    Culture - Blood (collected 12-26-22 @ 10:45)  Source: .Blood Blood-Peripheral  Preliminary Report (12-27-22 @ 17:01):    No growth to date.    Culture - Blood (collected 12-26-22 @ 09:30)  Source: .Blood Blood-Peripheral  Preliminary Report (12-27-22 @ 17:01):    No growth to date.    Culture - Blood (collected 12-24-22 @ 20:55)  Source: .Blood Blood  Final Report (12-30-22 @ 01:01):    No Growth Final    Culture - Blood (collected 12-24-22 @ 20:20)  Source: .Blood Blood  Final Report (12-30-22 @ 01:01):    No Growth Final        Review of systems  AMS      Physical Exam:  Constitutional: NAD   Eyes: icteric, PERRLA  ENMT: nc/at, no thrush  Neck: supple, central line   Cardiovascular: RRR  Gastrointestinal: Soft abdomen, distended  Genitourinary: Urinary catheter in place, anuric  Extremities: SCD's in place and working bilaterally, no edema  Vascular: Palpable dp pulses bilaterally.   Neurological: following commands, mentation improving  Skin: no rashes, ulcerations, lesions  Musculoskeletal: moving extremities  Psychiatric: calm

## 2022-12-31 NOTE — PROGRESS NOTE ADULT - ATTENDING COMMENTS
62yFemale presents with hx thyroid cancer s/p thyroidectomy, craniotomy with resection of ventricular mass, with acute liver failure, new seizures, transfer from OSH for transplant evaluation.     Fluctuating mental status. On Lactulose with decreasing Ammonia level Continue Rifaximin  Titrate Levo, continue Vaso, on high dose Midodrine  Remains in acute hypoxemic resp failure, titrate O2 on  HFNC, CXR mild improvement in infiltrate on Rt  Continue  CRRT with fluid balance goal net  neg 25 as tolerated  Tolerating tube feed  Empiric Abx Meropenem and Caspo, monitor leucocytosis  Peritoneal fluid transudate  ISS  Mechanical DVT ppx    Daughter and  kept updated  Limited bed side PT considering pt on CRRT and HFNC

## 2022-12-31 NOTE — PROGRESS NOTE ADULT - ATTENDING COMMENTS
She appears to be less encephalopathic   VS stable   On CVVH  Lung exam is unchanged   No worsening of respiratory status   Her condition was discussed with ICU team and family

## 2022-12-31 NOTE — PROGRESS NOTE ADULT - SUBJECTIVE AND OBJECTIVE BOX
Chief Complaint:  Patient is a 62y old  Female who presents with a chief complaint of Acute liver failure (31 Dec 2022 00:21)      Interval Events:   - weaning HF NC; may trial NC today  - tolerating TFs  - still on vaso + levophed  - mental status improving    Hospital Medications:  caspofungin IVPB 50 milliGRAM(s) IV Intermittent every 24 hours  caspofungin IVPB      chlorhexidine 2% Cloths 1 Application(s) Topical <User Schedule>  CRRT Treatment    <Continuous>  escitalopram 10 milliGRAM(s) Oral daily  folic acid 1 milliGRAM(s) Oral daily  HYDROmorphone  Injectable 0.25 milliGRAM(s) IV Push every 3 hours PRN  influenza   Vaccine 0.5 milliLiter(s) IntraMuscular once  insulin lispro (ADMELOG) corrective regimen sliding scale   SubCutaneous every 6 hours  lactulose Syrup 20 Gram(s) Oral every 12 hours  levETIRAcetam  Solution 750 milliGRAM(s) Enteral Tube two times a day  levothyroxine 137 MICROGram(s) Oral daily  meropenem  IVPB 1000 milliGRAM(s) IV Intermittent every 8 hours  midodrine 20 milliGRAM(s) Oral every 8 hours  multivitamin/minerals/iron Oral Solution (CENTRUM) 15 milliLiter(s) Oral daily  norepinephrine Infusion 0.05 MICROgram(s)/kG/Min IV Continuous <Continuous>  ondansetron Injectable 4 milliGRAM(s) IV Push every 6 hours PRN  pantoprazole   Suspension 40 milliGRAM(s) Oral every 24 hours  Phoxillum Filtration BK 4 / 2.5 5000 milliLiter(s) CRRT <Continuous>  Phoxillum Filtration BK 4 / 2.5 5000 milliLiter(s) CRRT <Continuous>  Phoxillum Filtration BK 4 / 2.5 5000 milliLiter(s) CRRT <Continuous>  polyethylene glycol 3350 17 Gram(s) Oral daily  rifAXIMin 550 milliGRAM(s) Oral every 12 hours  sodium chloride 0.65% Nasal 1 Spray(s) Both Nostrils every 3 hours PRN  tetracaine/benzocaine/butamben Spray 1 Spray(s) Topical every 3 hours PRN  thiamine 100 milliGRAM(s) Enteral Tube daily  vasopressin Infusion 0.03 Unit(s)/Min IV Continuous <Continuous>      PMHX/PSHX:  HTN (hypertension)    UTI (urinary tract infection)    Intracranial tumor    GERD (gastroesophageal reflux disease)    Eczema    Thyroid cancer    COVID-19 virus infection    History of thyroid surgery    H/O total thyroidectomy    History of tonsillectomy    History of appendectomy            ROS: 14 point ROS negative unless otherwise stated in subjective      PHYSICAL EXAM:     GENERAL:  Well developed, no distress  HEENT:  NC/AT,  conjunctivae clear, +sclera icteric; +HF NC  CHEST:  Full & symmetric excursion, no increased effort w/ respirations  HEART:  Regular rhythm & rate  ABDOMEN:  Soft, non-tender, +moderately distended  EXTREMITIES:  no LE edema  SKIN: +jaundice  NEURO:  Alert, orientedx1-2    Vital Signs:  Vital Signs Last 24 Hrs  T(C): 36.7 (31 Dec 2022 07:00), Max: 37.1 (30 Dec 2022 19:00)  T(F): 98.1 (31 Dec 2022 07:00), Max: 98.8 (30 Dec 2022 19:00)  HR: 97 (31 Dec 2022 08:15) (74 - 102)  BP: 101/57 (31 Dec 2022 07:15) (87/53 - 101/57)  BP(mean): 75 (31 Dec 2022 07:15) (65 - 75)  RR: 21 (31 Dec 2022 08:15) (16 - 42)  SpO2: 93% (31 Dec 2022 08:15) (91% - 100%)    Parameters below as of 31 Dec 2022 07:00  Patient On (Oxygen Delivery Method): nasal cannula, high flow  O2 Flow (L/min): 30  O2 Concentration (%): 40  Daily     Daily Weight in k.1 (31 Dec 2022 01:45)    LABS:                        7.2    31.17 )-----------( 41       ( 31 Dec 2022 05:49 )             21.1     12-    140  |  105  |  12  ----------------------------<  125<H>  4.1   |  21<L>  |  0.84    Ca    9.0      31 Dec 2022 05:49  Phos  2.7       Mg     2.5         TPro  5.9<L>  /  Alb  3.5  /  TBili  26.5<H>  /  DBili  x   /  AST  167<H>  /  ALT  63<H>  /  AlkPhos  201<H>  12-    LIVER FUNCTIONS - ( 31 Dec 2022 05:49 )  Alb: 3.5 g/dL / Pro: 5.9 g/dL / ALK PHOS: 201 U/L / ALT: 63 U/L / AST: 167 U/L / GGT: x           PT/INR - ( 31 Dec 2022 00:20 )   PT: 24.4 sec;   INR: 2.11 ratio         PTT - ( 31 Dec 2022 00:20 )  PTT:52.1 sec    Amylase Serum--      Lipase serum--       [id number]      Imaging: No new abdominal imaging               Chief Complaint:  Patient is a 62y old  Female who presents with a chief complaint of Acute liver failure (31 Dec 2022 00:21)      Interval Events:   - weaning HF NC; may trial NC today  - tolerating TFs  - still on vaso + levophed  - mental status improving    Hospital Medications:  caspofungin IVPB 50 milliGRAM(s) IV Intermittent every 24 hours  caspofungin IVPB      chlorhexidine 2% Cloths 1 Application(s) Topical <User Schedule>  CRRT Treatment    <Continuous>  escitalopram 10 milliGRAM(s) Oral daily  folic acid 1 milliGRAM(s) Oral daily  HYDROmorphone  Injectable 0.25 milliGRAM(s) IV Push every 3 hours PRN  influenza   Vaccine 0.5 milliLiter(s) IntraMuscular once  insulin lispro (ADMELOG) corrective regimen sliding scale   SubCutaneous every 6 hours  lactulose Syrup 20 Gram(s) Oral every 12 hours  levETIRAcetam  Solution 750 milliGRAM(s) Enteral Tube two times a day  levothyroxine 137 MICROGram(s) Oral daily  meropenem  IVPB 1000 milliGRAM(s) IV Intermittent every 8 hours  midodrine 20 milliGRAM(s) Oral every 8 hours  multivitamin/minerals/iron Oral Solution (CENTRUM) 15 milliLiter(s) Oral daily  norepinephrine Infusion 0.05 MICROgram(s)/kG/Min IV Continuous <Continuous>  ondansetron Injectable 4 milliGRAM(s) IV Push every 6 hours PRN  pantoprazole   Suspension 40 milliGRAM(s) Oral every 24 hours  Phoxillum Filtration BK 4 / 2.5 5000 milliLiter(s) CRRT <Continuous>  Phoxillum Filtration BK 4 / 2.5 5000 milliLiter(s) CRRT <Continuous>  Phoxillum Filtration BK 4 / 2.5 5000 milliLiter(s) CRRT <Continuous>  polyethylene glycol 3350 17 Gram(s) Oral daily  rifAXIMin 550 milliGRAM(s) Oral every 12 hours  sodium chloride 0.65% Nasal 1 Spray(s) Both Nostrils every 3 hours PRN  tetracaine/benzocaine/butamben Spray 1 Spray(s) Topical every 3 hours PRN  thiamine 100 milliGRAM(s) Enteral Tube daily  vasopressin Infusion 0.03 Unit(s)/Min IV Continuous <Continuous>      PMHX/PSHX:  HTN (hypertension)    UTI (urinary tract infection)    Intracranial tumor    GERD (gastroesophageal reflux disease)    Eczema    Thyroid cancer    COVID-19 virus infection    History of thyroid surgery    H/O total thyroidectomy    History of tonsillectomy    History of appendectomy            ROS: 14 point ROS negative unless otherwise stated in subjective      PHYSICAL EXAM:     GENERAL:  Well developed, no distress  HEENT:  NC/AT,  conjunctivae clear, +sclera icteric; +HF NC  CHEST:  Full & symmetric excursion, no increased effort w/ respirations  HEART:  Regular rhythm & rate  ABDOMEN:  Soft, non-tender, +moderately distended  EXTREMITIES:  no LE edema  SKIN: +jaundice  NEURO:  Alert, orientedx1-2    Vital Signs:  Vital Signs Last 24 Hrs  T(C): 36.7 (31 Dec 2022 07:00), Max: 37.1 (30 Dec 2022 19:00)  T(F): 98.1 (31 Dec 2022 07:00), Max: 98.8 (30 Dec 2022 19:00)  HR: 97 (31 Dec 2022 08:15) (74 - 102)  BP: 101/57 (31 Dec 2022 07:15) (87/53 - 101/57)  BP(mean): 75 (31 Dec 2022 07:15) (65 - 75)  RR: 21 (31 Dec 2022 08:15) (16 - 42)  SpO2: 93% (31 Dec 2022 08:15) (91% - 100%)    Parameters below as of 31 Dec 2022 07:00  Patient On (Oxygen Delivery Method): nasal cannula, high flow  O2 Flow (L/min): 30  O2 Concentration (%): 40  Daily     Daily Weight in k.1 (31 Dec 2022 01:45)    LABS:                        7.2    31.17 )-----------( 41       ( 31 Dec 2022 05:49 )             21.1     12-    140  |  105  |  12  ----------------------------<  125<H>  4.1   |  21<L>  |  0.84    Ca    9.0      31 Dec 2022 05:49  Phos  2.7       Mg     2.5         TPro  5.9<L>  /  Alb  3.5  /  TBili  26.5<H>  /  DBili  x   /  AST  167<H>  /  ALT  63<H>  /  AlkPhos  201<H>  12-    LIVER FUNCTIONS - ( 31 Dec 2022 05:49 )  Alb: 3.5 g/dL / Pro: 5.9 g/dL / ALK PHOS: 201 U/L / ALT: 63 U/L / AST: 167 U/L / GGT: x           PT/INR - ( 31 Dec 2022 00:20 )   PT: 24.4 sec;   INR: 2.11 ratio         PTT - ( 31 Dec 2022 00:20 )  PTT:52.1 sec    Amylase Serum--      Lipase serum--       [id number]      Imaging: No new abdominal imaging               Chief Complaint:  Patient is a 62y old  Female who presents with a chief complaint of Acute liver failure (31 Dec 2022 00:21)      Interval Events:   - weaning HF NC; may trial NC today  - tolerating TFs  - still on vaso + levophed  - mental status improving    Hospital Medications:  caspofungin IVPB 50 milliGRAM(s) IV Intermittent every 24 hours  caspofungin IVPB      chlorhexidine 2% Cloths 1 Application(s) Topical <User Schedule>  CRRT Treatment    <Continuous>  escitalopram 10 milliGRAM(s) Oral daily  folic acid 1 milliGRAM(s) Oral daily  HYDROmorphone  Injectable 0.25 milliGRAM(s) IV Push every 3 hours PRN  influenza   Vaccine 0.5 milliLiter(s) IntraMuscular once  insulin lispro (ADMELOG) corrective regimen sliding scale   SubCutaneous every 6 hours  lactulose Syrup 20 Gram(s) Oral every 12 hours  levETIRAcetam  Solution 750 milliGRAM(s) Enteral Tube two times a day  levothyroxine 137 MICROGram(s) Oral daily  meropenem  IVPB 1000 milliGRAM(s) IV Intermittent every 8 hours  midodrine 20 milliGRAM(s) Oral every 8 hours  multivitamin/minerals/iron Oral Solution (CENTRUM) 15 milliLiter(s) Oral daily  norepinephrine Infusion 0.05 MICROgram(s)/kG/Min IV Continuous <Continuous>  ondansetron Injectable 4 milliGRAM(s) IV Push every 6 hours PRN  pantoprazole   Suspension 40 milliGRAM(s) Oral every 24 hours  Phoxillum Filtration BK 4 / 2.5 5000 milliLiter(s) CRRT <Continuous>  Phoxillum Filtration BK 4 / 2.5 5000 milliLiter(s) CRRT <Continuous>  Phoxillum Filtration BK 4 / 2.5 5000 milliLiter(s) CRRT <Continuous>  polyethylene glycol 3350 17 Gram(s) Oral daily  rifAXIMin 550 milliGRAM(s) Oral every 12 hours  sodium chloride 0.65% Nasal 1 Spray(s) Both Nostrils every 3 hours PRN  tetracaine/benzocaine/butamben Spray 1 Spray(s) Topical every 3 hours PRN  thiamine 100 milliGRAM(s) Enteral Tube daily  vasopressin Infusion 0.03 Unit(s)/Min IV Continuous <Continuous>      PMHX/PSHX:  HTN (hypertension)    UTI (urinary tract infection)    Intracranial tumor    GERD (gastroesophageal reflux disease)    Eczema    Thyroid cancer    COVID-19 virus infection    History of thyroid surgery    H/O total thyroidectomy    History of tonsillectomy    History of appendectomy            ROS: 14 point ROS negative unless otherwise stated in subjective      PHYSICAL EXAM:     GENERAL:  Well developed, no distress  HEENT:  NC/AT,  conjunctivae clear, +sclera icteric; +HF NC  CHEST:  Full & symmetric excursion, no increased effort w/ respirations  HEART:  Regular rhythm & rate  ABDOMEN:  Soft, non-tender, +moderately distended  EXTREMITIES:  no LE edema  SKIN: +jaundice  NEURO:  Alert, orientedx1-2    Vital Signs:  Vital Signs Last 24 Hrs  T(C): 36.7 (31 Dec 2022 07:00), Max: 37.1 (30 Dec 2022 19:00)  T(F): 98.1 (31 Dec 2022 07:00), Max: 98.8 (30 Dec 2022 19:00)  HR: 97 (31 Dec 2022 08:15) (74 - 102)  BP: 101/57 (31 Dec 2022 07:15) (87/53 - 101/57)  BP(mean): 75 (31 Dec 2022 07:15) (65 - 75)  RR: 21 (31 Dec 2022 08:15) (16 - 42)  SpO2: 93% (31 Dec 2022 08:15) (91% - 100%)    Parameters below as of 31 Dec 2022 07:00  Patient On (Oxygen Delivery Method): nasal cannula, high flow  O2 Flow (L/min): 30  O2 Concentration (%): 40  Daily     Daily Weight in k.1 (31 Dec 2022 01:45)    LABS:                        7.2    31.17 )-----------( 41       ( 31 Dec 2022 05:49 )             21.1     12-    140  |  105  |  12  ----------------------------<  125<H>  4.1   |  21<L>  |  0.84    Ca    9.0      31 Dec 2022 05:49  Phos  2.7       Mg     2.5         TPro  5.9<L>  /  Alb  3.5  /  TBili  26.5<H>  /  DBili  x   /  AST  167<H>  /  ALT  63<H>  /  AlkPhos  201<H>  12-    LIVER FUNCTIONS - ( 31 Dec 2022 05:49 )  Alb: 3.5 g/dL / Pro: 5.9 g/dL / ALK PHOS: 201 U/L / ALT: 63 U/L / AST: 167 U/L / GGT: x           PT/INR - ( 31 Dec 2022 00:20 )   PT: 24.4 sec;   INR: 2.11 ratio         PTT - ( 31 Dec 2022 00:20 )  PTT:52.1 sec    Amylase Serum--      Lipase serum--       [id number]      Imaging: No new abdominal imaging

## 2023-01-01 LAB
ALBUMIN SERPL ELPH-MCNC: 3.3 G/DL — SIGNIFICANT CHANGE UP (ref 3.3–5)
ALBUMIN SERPL ELPH-MCNC: 3.5 G/DL — SIGNIFICANT CHANGE UP (ref 3.3–5)
ALP SERPL-CCNC: 175 U/L — HIGH (ref 40–120)
ALP SERPL-CCNC: 183 U/L — HIGH (ref 40–120)
ALP SERPL-CCNC: 196 U/L — HIGH (ref 40–120)
ALP SERPL-CCNC: 199 U/L — HIGH (ref 40–120)
ALT FLD-CCNC: 54 U/L — HIGH (ref 10–45)
ALT FLD-CCNC: 61 U/L — HIGH (ref 10–45)
ALT FLD-CCNC: 62 U/L — HIGH (ref 10–45)
AMMONIA BLD-MCNC: 53 UMOL/L — SIGNIFICANT CHANGE UP (ref 11–55)
ANION GAP SERPL CALC-SCNC: 13 MMOL/L — SIGNIFICANT CHANGE UP (ref 5–17)
ANION GAP SERPL CALC-SCNC: 14 MMOL/L — SIGNIFICANT CHANGE UP (ref 5–17)
APTT BLD: 48.1 SEC — HIGH (ref 27.5–35.5)
AST SERPL-CCNC: 145 U/L — HIGH (ref 10–40)
AST SERPL-CCNC: 148 U/L — HIGH (ref 10–40)
AST SERPL-CCNC: 162 U/L — HIGH (ref 10–40)
BILIRUB SERPL-MCNC: 26 MG/DL — HIGH (ref 0.2–1.2)
BILIRUB SERPL-MCNC: 27.6 MG/DL — HIGH (ref 0.2–1.2)
BILIRUB SERPL-MCNC: 28 MG/DL — HIGH (ref 0.2–1.2)
BILIRUB SERPL-MCNC: 28.1 MG/DL — HIGH (ref 0.2–1.2)
BUN SERPL-MCNC: 11 MG/DL — SIGNIFICANT CHANGE UP (ref 7–23)
BUN SERPL-MCNC: 12 MG/DL — SIGNIFICANT CHANGE UP (ref 7–23)
CALCIUM SERPL-MCNC: 8.3 MG/DL — LOW (ref 8.4–10.5)
CALCIUM SERPL-MCNC: 8.5 MG/DL — SIGNIFICANT CHANGE UP (ref 8.4–10.5)
CALCIUM SERPL-MCNC: 8.6 MG/DL — SIGNIFICANT CHANGE UP (ref 8.4–10.5)
CHLORIDE SERPL-SCNC: 104 MMOL/L — SIGNIFICANT CHANGE UP (ref 96–108)
CO2 SERPL-SCNC: 21 MMOL/L — LOW (ref 22–31)
CO2 SERPL-SCNC: 22 MMOL/L — SIGNIFICANT CHANGE UP (ref 22–31)
CREAT SERPL-MCNC: 0.75 MG/DL — SIGNIFICANT CHANGE UP (ref 0.5–1.3)
CREAT SERPL-MCNC: 0.76 MG/DL — SIGNIFICANT CHANGE UP (ref 0.5–1.3)
CREAT SERPL-MCNC: 0.78 MG/DL — SIGNIFICANT CHANGE UP (ref 0.5–1.3)
EGFR: 86 ML/MIN/1.73M2 — SIGNIFICANT CHANGE UP
EGFR: 89 ML/MIN/1.73M2 — SIGNIFICANT CHANGE UP
EGFR: 91 ML/MIN/1.73M2 — SIGNIFICANT CHANGE UP
GAS PNL BLDA: SIGNIFICANT CHANGE UP
GLUCOSE SERPL-MCNC: 111 MG/DL — HIGH (ref 70–99)
GLUCOSE SERPL-MCNC: 124 MG/DL — HIGH (ref 70–99)
GLUCOSE SERPL-MCNC: 125 MG/DL — HIGH (ref 70–99)
GLUCOSE SERPL-MCNC: 129 MG/DL — HIGH (ref 70–99)
HCT VFR BLD CALC: 23.2 % — LOW (ref 34.5–45)
HCT VFR BLD CALC: 24 % — LOW (ref 34.5–45)
HCT VFR BLD CALC: 24.1 % — LOW (ref 34.5–45)
HCT VFR BLD CALC: 25.3 % — LOW (ref 34.5–45)
HGB BLD-MCNC: 8 G/DL — LOW (ref 11.5–15.5)
HGB BLD-MCNC: 8.3 G/DL — LOW (ref 11.5–15.5)
HGB BLD-MCNC: 8.5 G/DL — LOW (ref 11.5–15.5)
HGB BLD-MCNC: 8.8 G/DL — LOW (ref 11.5–15.5)
INR BLD: 2.39 RATIO — HIGH (ref 0.88–1.16)
MAGNESIUM SERPL-MCNC: 2.3 MG/DL — SIGNIFICANT CHANGE UP (ref 1.6–2.6)
MAGNESIUM SERPL-MCNC: 2.4 MG/DL — SIGNIFICANT CHANGE UP (ref 1.6–2.6)
MCHC RBC-ENTMCNC: 31 PG — SIGNIFICANT CHANGE UP (ref 27–34)
MCHC RBC-ENTMCNC: 31.1 PG — SIGNIFICANT CHANGE UP (ref 27–34)
MCHC RBC-ENTMCNC: 31.2 PG — SIGNIFICANT CHANGE UP (ref 27–34)
MCHC RBC-ENTMCNC: 31.3 PG — SIGNIFICANT CHANGE UP (ref 27–34)
MCHC RBC-ENTMCNC: 34.5 GM/DL — SIGNIFICANT CHANGE UP (ref 32–36)
MCHC RBC-ENTMCNC: 34.6 GM/DL — SIGNIFICANT CHANGE UP (ref 32–36)
MCHC RBC-ENTMCNC: 34.8 GM/DL — SIGNIFICANT CHANGE UP (ref 32–36)
MCHC RBC-ENTMCNC: 35.3 GM/DL — SIGNIFICANT CHANGE UP (ref 32–36)
MCV RBC AUTO: 88.6 FL — SIGNIFICANT CHANGE UP (ref 80–100)
MCV RBC AUTO: 89.4 FL — SIGNIFICANT CHANGE UP (ref 80–100)
MCV RBC AUTO: 89.9 FL — SIGNIFICANT CHANGE UP (ref 80–100)
MCV RBC AUTO: 90.2 FL — SIGNIFICANT CHANGE UP (ref 80–100)
NRBC # BLD: 0 /100 WBCS — SIGNIFICANT CHANGE UP (ref 0–0)
PHOSPHATE SERPL-MCNC: 3.2 MG/DL — SIGNIFICANT CHANGE UP (ref 2.5–4.5)
PHOSPHATE SERPL-MCNC: 3.3 MG/DL — SIGNIFICANT CHANGE UP (ref 2.5–4.5)
PLATELET # BLD AUTO: 39 K/UL — LOW (ref 150–400)
PLATELET # BLD AUTO: 41 K/UL — LOW (ref 150–400)
POTASSIUM SERPL-MCNC: 3.9 MMOL/L — SIGNIFICANT CHANGE UP (ref 3.5–5.3)
POTASSIUM SERPL-MCNC: 4 MMOL/L — SIGNIFICANT CHANGE UP (ref 3.5–5.3)
POTASSIUM SERPL-SCNC: 3.9 MMOL/L — SIGNIFICANT CHANGE UP (ref 3.5–5.3)
POTASSIUM SERPL-SCNC: 4 MMOL/L — SIGNIFICANT CHANGE UP (ref 3.5–5.3)
PROT SERPL-MCNC: 6 G/DL — SIGNIFICANT CHANGE UP (ref 6–8.3)
PROT SERPL-MCNC: 6.1 G/DL — SIGNIFICANT CHANGE UP (ref 6–8.3)
PROT SERPL-MCNC: 6.2 G/DL — SIGNIFICANT CHANGE UP (ref 6–8.3)
PROTHROM AB SERPL-ACNC: 28 SEC — HIGH (ref 10.5–13.4)
RBC # BLD: 2.58 M/UL — LOW (ref 3.8–5.2)
RBC # BLD: 2.66 M/UL — LOW (ref 3.8–5.2)
RBC # BLD: 2.72 M/UL — LOW (ref 3.8–5.2)
RBC # BLD: 2.83 M/UL — LOW (ref 3.8–5.2)
RBC # FLD: 18 % — HIGH (ref 10.3–14.5)
RBC # FLD: 18.1 % — HIGH (ref 10.3–14.5)
RBC # FLD: 18.3 % — HIGH (ref 10.3–14.5)
RBC # FLD: 18.5 % — HIGH (ref 10.3–14.5)
SODIUM SERPL-SCNC: 138 MMOL/L — SIGNIFICANT CHANGE UP (ref 135–145)
SODIUM SERPL-SCNC: 139 MMOL/L — SIGNIFICANT CHANGE UP (ref 135–145)
WBC # BLD: 28.81 K/UL — HIGH (ref 3.8–10.5)
WBC # BLD: 30.02 K/UL — HIGH (ref 3.8–10.5)
WBC # BLD: 31.22 K/UL — HIGH (ref 3.8–10.5)
WBC # BLD: 31.53 K/UL — HIGH (ref 3.8–10.5)
WBC # FLD AUTO: 28.81 K/UL — HIGH (ref 3.8–10.5)
WBC # FLD AUTO: 30.02 K/UL — HIGH (ref 3.8–10.5)
WBC # FLD AUTO: 31.22 K/UL — HIGH (ref 3.8–10.5)
WBC # FLD AUTO: 31.53 K/UL — HIGH (ref 3.8–10.5)

## 2023-01-01 PROCEDURE — 90945 DIALYSIS ONE EVALUATION: CPT

## 2023-01-01 PROCEDURE — 71045 X-RAY EXAM CHEST 1 VIEW: CPT | Mod: 26

## 2023-01-01 PROCEDURE — 99232 SBSQ HOSP IP/OBS MODERATE 35: CPT

## 2023-01-01 PROCEDURE — 99233 SBSQ HOSP IP/OBS HIGH 50: CPT

## 2023-01-01 RX ORDER — HYDROXYZINE HCL 10 MG
25 TABLET ORAL ONCE
Refills: 0 | Status: COMPLETED | OUTPATIENT
Start: 2023-01-01 | End: 2023-01-01

## 2023-01-01 RX ORDER — ALBUMIN HUMAN 25 %
250 VIAL (ML) INTRAVENOUS
Refills: 0 | Status: COMPLETED | OUTPATIENT
Start: 2023-01-01 | End: 2023-01-01

## 2023-01-01 RX ORDER — MEROPENEM 1 G/30ML
1000 INJECTION INTRAVENOUS EVERY 8 HOURS
Refills: 0 | Status: DISCONTINUED | OUTPATIENT
Start: 2023-01-01 | End: 2023-01-05

## 2023-01-01 RX ORDER — ONDANSETRON 8 MG/1
4 TABLET, FILM COATED ORAL ONCE
Refills: 0 | Status: COMPLETED | OUTPATIENT
Start: 2023-01-01 | End: 2023-01-01

## 2023-01-01 RX ORDER — LEVOTHYROXINE SODIUM 125 MCG
103 TABLET ORAL AT BEDTIME
Refills: 0 | Status: DISCONTINUED | OUTPATIENT
Start: 2023-01-01 | End: 2023-01-09

## 2023-01-01 RX ORDER — LACTULOSE 10 G/15ML
20 SOLUTION ORAL ONCE
Refills: 0 | Status: DISCONTINUED | OUTPATIENT
Start: 2023-01-01 | End: 2023-01-01

## 2023-01-01 RX ADMIN — LIDOCAINE 1 PATCH: 4 CREAM TOPICAL at 04:19

## 2023-01-01 RX ADMIN — MIDODRINE HYDROCHLORIDE 20 MILLIGRAM(S): 2.5 TABLET ORAL at 05:10

## 2023-01-01 RX ADMIN — ONDANSETRON 4 MILLIGRAM(S): 8 TABLET, FILM COATED ORAL at 23:35

## 2023-01-01 RX ADMIN — MEROPENEM 100 MILLIGRAM(S): 1 INJECTION INTRAVENOUS at 21:06

## 2023-01-01 RX ADMIN — CASPOFUNGIN ACETATE 260 MILLIGRAM(S): 7 INJECTION, POWDER, LYOPHILIZED, FOR SOLUTION INTRAVENOUS at 09:35

## 2023-01-01 RX ADMIN — VASOPRESSIN 4.5 UNIT(S)/MIN: 20 INJECTION INTRAVENOUS at 08:12

## 2023-01-01 RX ADMIN — Medication 103 MICROGRAM(S): at 23:35

## 2023-01-01 RX ADMIN — CHLORHEXIDINE GLUCONATE 1 APPLICATION(S): 213 SOLUTION TOPICAL at 05:10

## 2023-01-01 RX ADMIN — Medication 1 MILLIGRAM(S): at 11:38

## 2023-01-01 RX ADMIN — Medication 15 MILLILITER(S): at 11:38

## 2023-01-01 RX ADMIN — PANTOPRAZOLE SODIUM 40 MILLIGRAM(S): 20 TABLET, DELAYED RELEASE ORAL at 11:38

## 2023-01-01 RX ADMIN — Medication 125 MILLILITER(S): at 21:46

## 2023-01-01 RX ADMIN — Medication 25 MILLIGRAM(S): at 05:11

## 2023-01-01 RX ADMIN — Medication 250 MILLILITER(S): at 10:19

## 2023-01-01 RX ADMIN — Medication 250 MILLILITER(S): at 09:12

## 2023-01-01 RX ADMIN — LACTULOSE 20 GRAM(S): 10 SOLUTION ORAL at 05:09

## 2023-01-01 RX ADMIN — ESCITALOPRAM OXALATE 10 MILLIGRAM(S): 10 TABLET, FILM COATED ORAL at 11:38

## 2023-01-01 RX ADMIN — POLYETHYLENE GLYCOL 3350 17 GRAM(S): 17 POWDER, FOR SOLUTION ORAL at 11:38

## 2023-01-01 RX ADMIN — HYDROMORPHONE HYDROCHLORIDE 0.25 MILLIGRAM(S): 2 INJECTION INTRAMUSCULAR; INTRAVENOUS; SUBCUTANEOUS at 23:20

## 2023-01-01 RX ADMIN — LEVETIRACETAM 750 MILLIGRAM(S): 250 TABLET, FILM COATED ORAL at 17:11

## 2023-01-01 RX ADMIN — Medication 6.21 MICROGRAM(S)/KG/MIN: at 20:20

## 2023-01-01 RX ADMIN — LEVETIRACETAM 750 MILLIGRAM(S): 250 TABLET, FILM COATED ORAL at 05:09

## 2023-01-01 RX ADMIN — HYDROMORPHONE HYDROCHLORIDE 0.25 MILLIGRAM(S): 2 INJECTION INTRAMUSCULAR; INTRAVENOUS; SUBCUTANEOUS at 23:00

## 2023-01-01 RX ADMIN — Medication 100 MILLIGRAM(S): at 11:38

## 2023-01-01 RX ADMIN — Medication 6.21 MICROGRAM(S)/KG/MIN: at 08:12

## 2023-01-01 RX ADMIN — Medication 125 MILLILITER(S): at 22:14

## 2023-01-01 RX ADMIN — MEROPENEM 100 MILLIGRAM(S): 1 INJECTION INTRAVENOUS at 05:09

## 2023-01-01 RX ADMIN — MIDODRINE HYDROCHLORIDE 20 MILLIGRAM(S): 2.5 TABLET ORAL at 14:59

## 2023-01-01 RX ADMIN — MIDODRINE HYDROCHLORIDE 20 MILLIGRAM(S): 2.5 TABLET ORAL at 21:06

## 2023-01-01 RX ADMIN — VASOPRESSIN 4.5 UNIT(S)/MIN: 20 INJECTION INTRAVENOUS at 20:20

## 2023-01-01 NOTE — PROGRESS NOTE ADULT - NS ATTEND AMEND GEN_ALL_CORE FT
Stable all in all.  tolerating tube feeds at goal. MS stable.  continues on CRRT.  wean O2 as tolerated.  some fluid back to help wean pressors.  PT/OT

## 2023-01-01 NOTE — PROGRESS NOTE ADULT - SUBJECTIVE AND OBJECTIVE BOX
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  Arthur ([phone]) and Daughter Taylor at [facility] provided additional history. Of note was recently admitted to  11/2022 for workup and eval of jaundice- during that hospitalization was found to have a UTI and dx with alc hep and started on steroids (was deemed a non-responder and steroids were subsequently stopped); s/p LVP at [place] 11/2022. Previously hospitalized in 2021 at [place] for ETOH detox. Went to ETOH rehab 08/2022 and was asked to leave the facility when she was COVID+; was sober for 1 week until she started drinking again. Had also attended a few AA meetings in the past. Transferred from [facility] 12/20 for further management of acute liver failure and liver transplant evaluation.       Interval events:  - remains on pressors, CVVH   - TF at goal  - On 40% HiFlo NC O2  - mental status waxing and waning  - diffuse rash across abdomen and flank. Received Atarax x1 for itching    Potential Discharge date: pending clinical stability  Education:  Medications  Plan of care:  See Below      MEDICATIONS  (STANDING):  caspofungin IVPB      caspofungin IVPB 50 milliGRAM(s) IV Intermittent every 24 hours  chlorhexidine 2% Cloths 1 Application(s) Topical <User Schedule>  CRRT Treatment    <Continuous>  escitalopram 10 milliGRAM(s) Oral daily  folic acid 1 milliGRAM(s) Oral daily  influenza   Vaccine 0.5 milliLiter(s) IntraMuscular once  lactulose Syrup 20 Gram(s) Oral every 12 hours  levETIRAcetam  Solution 750 milliGRAM(s) Enteral Tube two times a day  levothyroxine Injectable 103 MICROGram(s) IV Push at bedtime  lidocaine   4% Patch 1 Patch Transdermal every 24 hours  midodrine 20 milliGRAM(s) Oral every 8 hours  multivitamin/minerals/iron Oral Solution (CENTRUM) 15 milliLiter(s) Oral daily  norepinephrine Infusion 0.05 MICROgram(s)/kG/Min (6.21 mL/Hr) IV Continuous <Continuous>  pantoprazole   Suspension 40 milliGRAM(s) Oral every 24 hours  Phoxillum Filtration BK 4 / 2.5 5000 milliLiter(s) (800 mL/Hr) CRRT <Continuous>  Phoxillum Filtration BK 4 / 2.5 5000 milliLiter(s) (200 mL/Hr) CRRT <Continuous>  Phoxillum Filtration BK 4 / 2.5 5000 milliLiter(s) (1000 mL/Hr) CRRT <Continuous>  polyethylene glycol 3350 17 Gram(s) Oral daily  rifAXIMin 550 milliGRAM(s) Oral every 12 hours  thiamine 100 milliGRAM(s) Enteral Tube daily  vasopressin Infusion 0.03 Unit(s)/Min (4.5 mL/Hr) IV Continuous <Continuous>    MEDICATIONS  (PRN):  HYDROmorphone  Injectable 0.25 milliGRAM(s) IV Push every 3 hours PRN Severe Pain (7 - 10)  ondansetron Injectable 4 milliGRAM(s) IV Push every 6 hours PRN Nausea and/or Vomiting  sodium chloride 0.65% Nasal 1 Spray(s) Both Nostrils every 3 hours PRN Nasal Congestion  tetracaine/benzocaine/butamben Spray 1 Spray(s) Topical every 3 hours PRN for ngt discomfort      PAST MEDICAL & SURGICAL HISTORY:  HTN (hypertension)  off medication for over one year--states BP has been normal      UTI (urinary tract infection)  currently being treated--will complete antibiotics 3/8/2022      Intracranial tumor      GERD (gastroesophageal reflux disease)      Eczema      Thyroid cancer  surgery. radiation, Radioactive iodine      COVID-19 virus infection  11/2021--recovered at home      H/O total thyroidectomy  age 21&#x27;s for Thyroid cancer, postop complication arterial bleed, second surgery      History of tonsillectomy  age 5      History of appendectomy  age 5          Vital Signs Last 24 Hrs  T(C): 37 (01 Jan 2023 11:00), Max: 37.6 (31 Dec 2022 23:00)  T(F): 98.6 (01 Jan 2023 11:00), Max: 99.7 (31 Dec 2022 23:00)  HR: 86 (01 Jan 2023 13:00) (73 - 96)  BP: 102/58 (01 Jan 2023 13:00) (89/50 - 117/60)  BP(mean): 77 (01 Jan 2023 13:00) (65 - 83)  RR: 28 (01 Jan 2023 13:00) (15 - 35)  SpO2: 97% (01 Jan 2023 13:00) (90% - 99%)    Parameters below as of 01 Jan 2023 08:50  Patient On (Oxygen Delivery Method): nasal cannula, high flow,31C  O2 Flow (L/min): 40  O2 Concentration (%): 45    I&O's Summary    31 Dec 2022 07:01  -  01 Jan 2023 07:00  --------------------------------------------------------  IN: 1781.4 mL / OUT: 2790 mL / NET: -1008.6 mL    01 Jan 2023 07:01  -  01 Jan 2023 13:11  --------------------------------------------------------  IN: 1071.4 mL / OUT: 779 mL / NET: 292.4 mL                              8.0    28.81 )-----------( 39       ( 01 Jan 2023 12:06 )             23.2     01-01    139  |  104  |  12  ----------------------------<  111<H>  3.9   |  22  |  0.75    Ca    8.3<L>      01 Jan 2023 12:06  Phos  3.2     01-01  Mg     2.3     01-01    TPro  6.1  /  Alb  3.5  /  TBili  26.0<H>  /  DBili  x   /  AST  145<H>  /  ALT  54<H>  /  AlkPhos  175<H>  01-01          Culture - Fungal, Body Fluid (collected 12-26-22 @ 15:00)  Source: Peritoneal Peritoneal Fluid  Preliminary Report (12-28-22 @ 15:03):    Culture is being performed. Fungal cultures are held for 4 weeks.    Culture - Body Fluid with Gram Stain (collected 12-26-22 @ 15:00)  Source: Peritoneal Peritoneal Fluid  Gram Stain (12-26-22 @ 23:25):    polymorphonuclear leukocytes seen    No organisms seen    by cytocentrifuge  Final Report (12-31-22 @ 15:38):    No growth    Culture - Blood (collected 12-26-22 @ 10:45)  Source: .Blood Blood-Peripheral  Final Report (12-31-22 @ 17:00):    No Growth Final    Culture - Blood (collected 12-26-22 @ 09:30)  Source: .Blood Blood-Peripheral  Final Report (12-31-22 @ 17:00):    No Growth Final      Review of systems  AMS      Physical Exam:  Constitutional: NAD   Eyes: icteric, PERRLA  ENMT: nc/at, no thrush  Neck: supple, central line   Cardiovascular: RRR  Gastrointestinal: Soft abdomen, distended  Genitourinary: Urinary catheter in place, anuric  Extremities: SCD's in place and working bilaterally, no edema  Vascular: Palpable dp pulses bilaterally.   Neurological: following commands, mentation improving  Skin:  diffuse rash across abdomen and flank. 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  Arthur ([phone]) and Daughter Taylor at [facility] provided additional history. Of note was recently admitted to  11/2022 for workup and eval of jaundice- during that hospitalization was found to have a UTI and dx with alc hep and started on steroids (was deemed a non-responder and steroids were subsequently stopped); s/p LVP at [facility] 11/2022. Previously hospitalized in 2021 at [facility] for ETOH detox. Went to ETOH rehab 08/2022 and was asked to leave the facility when she was COVID+; was sober for 1 week until she started drinking again. Had also attended a few AA meetings in the past. Transferred from [facility] 12/20 for further management of acute liver failure and liver transplant evaluation.       Interval events:  - remains on pressors, CVVH   - TF at goal  - On 40% HiFlo NC O2  - mental status waxing and waning  - diffuse rash across abdomen and flank. Received Atarax x1 for itching    Potential Discharge date: pending clinical stability  Education:  Medications  Plan of care:  See Below      MEDICATIONS  (STANDING):  caspofungin IVPB      caspofungin IVPB 50 milliGRAM(s) IV Intermittent every 24 hours  chlorhexidine 2% Cloths 1 Application(s) Topical <User Schedule>  CRRT Treatment    <Continuous>  escitalopram 10 milliGRAM(s) Oral daily  folic acid 1 milliGRAM(s) Oral daily  influenza   Vaccine 0.5 milliLiter(s) IntraMuscular once  lactulose Syrup 20 Gram(s) Oral every 12 hours  levETIRAcetam  Solution 750 milliGRAM(s) Enteral Tube two times a day  levothyroxine Injectable 103 MICROGram(s) IV Push at bedtime  lidocaine   4% Patch 1 Patch Transdermal every 24 hours  midodrine 20 milliGRAM(s) Oral every 8 hours  multivitamin/minerals/iron Oral Solution (CENTRUM) 15 milliLiter(s) Oral daily  norepinephrine Infusion 0.05 MICROgram(s)/kG/Min (6.21 mL/Hr) IV Continuous <Continuous>  pantoprazole   Suspension 40 milliGRAM(s) Oral every 24 hours  Phoxillum Filtration BK 4 / 2.5 5000 milliLiter(s) (800 mL/Hr) CRRT <Continuous>  Phoxillum Filtration BK 4 / 2.5 5000 milliLiter(s) (200 mL/Hr) CRRT <Continuous>  Phoxillum Filtration BK 4 / 2.5 5000 milliLiter(s) (1000 mL/Hr) CRRT <Continuous>  polyethylene glycol 3350 17 Gram(s) Oral daily  rifAXIMin 550 milliGRAM(s) Oral every 12 hours  thiamine 100 milliGRAM(s) Enteral Tube daily  vasopressin Infusion 0.03 Unit(s)/Min (4.5 mL/Hr) IV Continuous <Continuous>    MEDICATIONS  (PRN):  HYDROmorphone  Injectable 0.25 milliGRAM(s) IV Push every 3 hours PRN Severe Pain (7 - 10)  ondansetron Injectable 4 milliGRAM(s) IV Push every 6 hours PRN Nausea and/or Vomiting  sodium chloride 0.65% Nasal 1 Spray(s) Both Nostrils every 3 hours PRN Nasal Congestion  tetracaine/benzocaine/butamben Spray 1 Spray(s) Topical every 3 hours PRN for ngt discomfort      PAST MEDICAL & SURGICAL HISTORY:  HTN (hypertension)  off medication for over one year--states BP has been normal      UTI (urinary tract infection)  currently being treated--will complete antibiotics 3/8/2022      Intracranial tumor      GERD (gastroesophageal reflux disease)      Eczema      Thyroid cancer  surgery. radiation, Radioactive iodine      COVID-19 virus infection  11/2021--recovered at home      H/O total thyroidectomy  age 21&#x27;s for Thyroid cancer, postop complication arterial bleed, second surgery      History of tonsillectomy  age 5      History of appendectomy  age 5          Vital Signs Last 24 Hrs  T(C): 37 (01 Jan 2023 11:00), Max: 37.6 (31 Dec 2022 23:00)  T(F): 98.6 (01 Jan 2023 11:00), Max: 99.7 (31 Dec 2022 23:00)  HR: 86 (01 Jan 2023 13:00) (73 - 96)  BP: 102/58 (01 Jan 2023 13:00) (89/50 - 117/60)  BP(mean): 77 (01 Jan 2023 13:00) (65 - 83)  RR: 28 (01 Jan 2023 13:00) (15 - 35)  SpO2: 97% (01 Jan 2023 13:00) (90% - 99%)    Parameters below as of 01 Jan 2023 08:50  Patient On (Oxygen Delivery Method): nasal cannula, high flow,31C  O2 Flow (L/min): 40  O2 Concentration (%): 45    I&O's Summary    31 Dec 2022 07:01  -  01 Jan 2023 07:00  --------------------------------------------------------  IN: 1781.4 mL / OUT: 2790 mL / NET: -1008.6 mL    01 Jan 2023 07:01  -  01 Jan 2023 13:11  --------------------------------------------------------  IN: 1071.4 mL / OUT: 779 mL / NET: 292.4 mL                              8.0    28.81 )-----------( 39       ( 01 Jan 2023 12:06 )             23.2     01-01    139  |  104  |  12  ----------------------------<  111<H>  3.9   |  22  |  0.75    Ca    8.3<L>      01 Jan 2023 12:06  Phos  3.2     01-01  Mg     2.3     01-01    TPro  6.1  /  Alb  3.5  /  TBili  26.0<H>  /  DBili  x   /  AST  145<H>  /  ALT  54<H>  /  AlkPhos  175<H>  01-01          Culture - Fungal, Body Fluid (collected 12-26-22 @ 15:00)  Source: Peritoneal Peritoneal Fluid  Preliminary Report (12-28-22 @ 15:03):    Culture is being performed. Fungal cultures are held for 4 weeks.    Culture - Body Fluid with Gram Stain (collected 12-26-22 @ 15:00)  Source: Peritoneal Peritoneal Fluid  Gram Stain (12-26-22 @ 23:25):    polymorphonuclear leukocytes seen    No organisms seen    by cytocentrifuge  Final Report (12-31-22 @ 15:38):    No growth    Culture - Blood (collected 12-26-22 @ 10:45)  Source: .Blood Blood-Peripheral  Final Report (12-31-22 @ 17:00):    No Growth Final    Culture - Blood (collected 12-26-22 @ 09:30)  Source: .Blood Blood-Peripheral  Final Report (12-31-22 @ 17:00):    No Growth Final      Review of systems  AMS      Physical Exam:  Constitutional: NAD   Eyes: icteric, PERRLA  ENMT: nc/at, no thrush  Neck: supple, central line   Cardiovascular: RRR  Gastrointestinal: Soft abdomen, distended  Genitourinary: Urinary catheter in place, anuric  Extremities: SCD's in place and working bilaterally, no edema  Vascular: Palpable dp pulses bilaterally.   Neurological: following commands, mentation improving  Skin:  diffuse rash across abdomen and flank. 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  Arthur ([phone]) and Daughter Taylor at [facility] provided additional history. Of note was recently admitted to  11/2022 for workup and eval of jaundice- during that hospitalization was found to have a UTI and dx with alc hep and started on steroids (was deemed a non-responder and steroids were subsequently stopped); s/p LVP at [place] 11/2022. Previously hospitalized in 2021 at [place] for ETOH detox. Went to ETOH rehab 08/2022 and was asked to leave the facility when she was COVID+; was sober for 1 week until she started drinking again. Had also attended a few AA meetings in the past. Transferred from [facility] 12/20 for further management of acute liver failure and liver transplant evaluation.       Interval events:  - remains on pressors, CVVH   - TF at goal  - On 40% HiFlo NC O2  - mental status waxing and waning  - diffuse rash across abdomen and flank. Received Atarax x1 for itching    Potential Discharge date: pending clinical stability  Education:  Medications  Plan of care:  See Below      MEDICATIONS  (STANDING):  caspofungin IVPB      caspofungin IVPB 50 milliGRAM(s) IV Intermittent every 24 hours  chlorhexidine 2% Cloths 1 Application(s) Topical <User Schedule>  CRRT Treatment    <Continuous>  escitalopram 10 milliGRAM(s) Oral daily  folic acid 1 milliGRAM(s) Oral daily  influenza   Vaccine 0.5 milliLiter(s) IntraMuscular once  lactulose Syrup 20 Gram(s) Oral every 12 hours  levETIRAcetam  Solution 750 milliGRAM(s) Enteral Tube two times a day  levothyroxine Injectable 103 MICROGram(s) IV Push at bedtime  lidocaine   4% Patch 1 Patch Transdermal every 24 hours  midodrine 20 milliGRAM(s) Oral every 8 hours  multivitamin/minerals/iron Oral Solution (CENTRUM) 15 milliLiter(s) Oral daily  norepinephrine Infusion 0.05 MICROgram(s)/kG/Min (6.21 mL/Hr) IV Continuous <Continuous>  pantoprazole   Suspension 40 milliGRAM(s) Oral every 24 hours  Phoxillum Filtration BK 4 / 2.5 5000 milliLiter(s) (800 mL/Hr) CRRT <Continuous>  Phoxillum Filtration BK 4 / 2.5 5000 milliLiter(s) (200 mL/Hr) CRRT <Continuous>  Phoxillum Filtration BK 4 / 2.5 5000 milliLiter(s) (1000 mL/Hr) CRRT <Continuous>  polyethylene glycol 3350 17 Gram(s) Oral daily  rifAXIMin 550 milliGRAM(s) Oral every 12 hours  thiamine 100 milliGRAM(s) Enteral Tube daily  vasopressin Infusion 0.03 Unit(s)/Min (4.5 mL/Hr) IV Continuous <Continuous>    MEDICATIONS  (PRN):  HYDROmorphone  Injectable 0.25 milliGRAM(s) IV Push every 3 hours PRN Severe Pain (7 - 10)  ondansetron Injectable 4 milliGRAM(s) IV Push every 6 hours PRN Nausea and/or Vomiting  sodium chloride 0.65% Nasal 1 Spray(s) Both Nostrils every 3 hours PRN Nasal Congestion  tetracaine/benzocaine/butamben Spray 1 Spray(s) Topical every 3 hours PRN for ngt discomfort      PAST MEDICAL & SURGICAL HISTORY:  HTN (hypertension)  off medication for over one year--states BP has been normal      UTI (urinary tract infection)  currently being treated--will complete antibiotics 3/8/2022      Intracranial tumor      GERD (gastroesophageal reflux disease)      Eczema      Thyroid cancer  surgery. radiation, Radioactive iodine      COVID-19 virus infection  11/2021--recovered at home      H/O total thyroidectomy  age 21&#x27;s for Thyroid cancer, postop complication arterial bleed, second surgery      History of tonsillectomy  age 5      History of appendectomy  age 5          Vital Signs Last 24 Hrs  T(C): 37 (01 Jan 2023 11:00), Max: 37.6 (31 Dec 2022 23:00)  T(F): 98.6 (01 Jan 2023 11:00), Max: 99.7 (31 Dec 2022 23:00)  HR: 86 (01 Jan 2023 13:00) (73 - 96)  BP: 102/58 (01 Jan 2023 13:00) (89/50 - 117/60)  BP(mean): 77 (01 Jan 2023 13:00) (65 - 83)  RR: 28 (01 Jan 2023 13:00) (15 - 35)  SpO2: 97% (01 Jan 2023 13:00) (90% - 99%)    Parameters below as of 01 Jan 2023 08:50  Patient On (Oxygen Delivery Method): nasal cannula, high flow,31C  O2 Flow (L/min): 40  O2 Concentration (%): 45    I&O's Summary    31 Dec 2022 07:01  -  01 Jan 2023 07:00  --------------------------------------------------------  IN: 1781.4 mL / OUT: 2790 mL / NET: -1008.6 mL    01 Jan 2023 07:01  -  01 Jan 2023 13:11  --------------------------------------------------------  IN: 1071.4 mL / OUT: 779 mL / NET: 292.4 mL                              8.0    28.81 )-----------( 39       ( 01 Jan 2023 12:06 )             23.2     01-01    139  |  104  |  12  ----------------------------<  111<H>  3.9   |  22  |  0.75    Ca    8.3<L>      01 Jan 2023 12:06  Phos  3.2     01-01  Mg     2.3     01-01    TPro  6.1  /  Alb  3.5  /  TBili  26.0<H>  /  DBili  x   /  AST  145<H>  /  ALT  54<H>  /  AlkPhos  175<H>  01-01          Culture - Fungal, Body Fluid (collected 12-26-22 @ 15:00)  Source: Peritoneal Peritoneal Fluid  Preliminary Report (12-28-22 @ 15:03):    Culture is being performed. Fungal cultures are held for 4 weeks.    Culture - Body Fluid with Gram Stain (collected 12-26-22 @ 15:00)  Source: Peritoneal Peritoneal Fluid  Gram Stain (12-26-22 @ 23:25):    polymorphonuclear leukocytes seen    No organisms seen    by cytocentrifuge  Final Report (12-31-22 @ 15:38):    No growth    Culture - Blood (collected 12-26-22 @ 10:45)  Source: .Blood Blood-Peripheral  Final Report (12-31-22 @ 17:00):    No Growth Final    Culture - Blood (collected 12-26-22 @ 09:30)  Source: .Blood Blood-Peripheral  Final Report (12-31-22 @ 17:00):    No Growth Final      Review of systems  AMS      Physical Exam:  Constitutional: NAD   Eyes: icteric, PERRLA  ENMT: nc/at, no thrush  Neck: supple, central line   Cardiovascular: RRR  Gastrointestinal: Soft abdomen, distended  Genitourinary: Urinary catheter in place, anuric  Extremities: SCD's in place and working bilaterally, no edema  Vascular: Palpable dp pulses bilaterally.   Neurological: following commands, mentation improving  Skin:  diffuse rash across abdomen and flank. No ulcerations, lesions  Musculoskeletal: moving extremities  Psychiatric: calm

## 2023-01-01 NOTE — PROGRESS NOTE ADULT - ASSESSMENT
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 Patient has Alcohol Use Disorder ( recent rehab with relapse and in remission since 11/2022), decompensated ALD Cirrhosis c/b ascites. Patient admitted to [facility] on 12/19/2022 after a witnessed seizure; course was c/b septic shock with associated HRF ( intubated 12/19) and an RED ( started HD 12/20). Patient was transferred to [facility] on 12/20 for a liver transplant evaluation. Patient was started on CRRT 12/21 and was extubated 12/23/22. Patient remains in SICU for hemodynamic monitoring and management while liver transplant evaluation is in progress.       PLAN:  Neuro:  - Multimodal pain control w/ Oxycodone and Lidocaine Patch   - Monitor Mental Status for Encephalopathy and trend Ammonia Level   - Continue Home Lexapro   - Continue Keppra  - Continue Folic Acid, Thiamine, and MV     Resp:  - HFNC 30/30%; will wean as tolerated to maintain SpO2 > 92%   - Out of bed to chair, ambulate as tolerated, and incentive spirometry to prevent atelectasis    CV:  - Will wean vasopressor support  of Levophed and Vaso to maintain goal MAP > 65 mmHg  - Continue Midodrine 20mg q8hr     GI: Acute Decompensated Liver Failure 2/2 ETOH Use   - NPO; TF via NGT for goal 35cc/hr   - Bowel regimen with Miralax  - Protonix for stress ulcer prophylaxis  - Continue Lactulose 20mg q12 and Rifaximin   - s/p Para (12/26) transudative, negative for SBP     Renal:  - Continue CRRT, remove 25cc/hr   - Monitor I&Os and electrolytes w/ repletions as necessary    Heme:  - Monitor CBC and coags  - Hold Chemical VTE prophylaxis  - SCDs for Mechanical VTE ppx     ID:   - Monitor for clinical evidence of active infection  - Combi-Cath ( 12/22) Negative, Blood Cx ( 12/26) Negative,  MRSA (12/30) Negative   - s/p Meropenem (12/25 - 1/01)  - Continue Empiric Caspofungin ( 12/26 - ?)     Endo:   - Monitor glucose ; HgbA1C 4.9% ( 12/24)   - mISS q6hr  - Continue Synthroid for hypothyroidism    Code Status: Full Code   Disposition: SICU ASSESSMENT:  Patient is a 61 y/o Female with PMH Thyroid Cancer ( s/p total thyroidectomy in her 20s, radiation, and BARONE), HTN, GERD, 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 Patient has Alcohol Use Disorder ( recent rehab with relapse and in remission since 11/2022), decompensated ALD Cirrhosis c/b ascites. Patient admitted to [facility] on 12/19/2022 after a witnessed seizure; course was c/b septic shock with associated HRF ( intubated 12/19) and an RED ( started HD 12/20). Patient was transferred to [facility] on 12/20 for a liver transplant evaluation. Patient was started on CRRT 12/21 and was extubated 12/23/22. Patient remains in SICU for hemodynamic monitoring and management while liver transplant evaluation is in progress.       PLAN:  Neuro:  - Multimodal pain control w/ Oxycodone and Lidocaine Patch   - Monitor Mental Status for Encephalopathy and trend Ammonia Level   - Continue Home Lexapro   - Continue Keppra  - Continue Folic Acid, Thiamine, and MV     Resp:  - HFNC 30/30%; will wean as tolerated to maintain SpO2 > 92%   - Out of bed to chair, ambulate as tolerated, and incentive spirometry to prevent atelectasis    CV:  - Will wean vasopressor support  of Levophed and Vaso to maintain goal MAP > 65 mmHg  - Continue Midodrine 20mg q8hr     GI: Acute Decompensated Liver Failure 2/2 ETOH Use   - NPO; TF via NGT for goal 35cc/hr   - Bowel regimen with Miralax  - Protonix for stress ulcer prophylaxis  - Continue Lactulose 20mg q12 and Rifaximin   - s/p Para (12/26) transudative, negative for SBP     Renal:  - Continue CRRT, remove 25cc/hr   - Monitor I&Os and electrolytes w/ repletions as necessary    Heme:  - Monitor CBC and coags  - Hold Chemical VTE prophylaxis  - SCDs for Mechanical VTE ppx     ID:   - Monitor for clinical evidence of active infection  - Combi-Cath ( 12/22) Negative, Blood Cx ( 12/26) Negative,  MRSA (12/30) Negative   - s/p Meropenem (12/25 - 1/01)  - Continue Empiric Caspofungin ( 12/26 - ?)     Endo:   - Monitor glucose ; HgbA1C 4.9% ( 12/24)   - mISS q6hr  - Continue Synthroid for hypothyroidism    Code Status: Full Code   Disposition: SICU ASSESSMENT:  Patient is a 63 y/o Female with PMH Thyroid Cancer ( s/p total thyroidectomy in her 20s, radiation, and BARONE), HTN, GERD, 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 Patient has Alcohol Use Disorder ( recent rehab with relapse and in remission since 11/2022), decompensated ALD Cirrhosis c/b ascites. Patient admitted to [facility] on 12/19/2022 after a witnessed seizure; course was c/b septic shock with associated HRF ( intubated 12/19) and an RED ( started HD 12/20). Patient was transferred to [facility] on 12/20 for a liver transplant evaluation. Patient was started on CRRT 12/21 and was extubated 12/23/22. Patient remains in SICU for hemodynamic monitoring and management while liver transplant evaluation is in progress.       PLAN:  Neuro:  - Multimodal pain control w/ Oxycodone and Lidocaine Patch   - Monitor Mental Status for Encephalopathy and trend Ammonia Level   - Continue Home Lexapro   - Continue Keppra  - Continue Folic Acid, Thiamine, and MV     Resp:  - HFNC 30/30%; will wean as tolerated to maintain SpO2 > 92%   - Out of bed to chair, ambulate as tolerated, and incentive spirometry to prevent atelectasis    CV:  - Will wean vasopressor support  of Levophed and Vaso to maintain goal MAP > 65 mmHg  - Continue Midodrine 20mg q8hr     GI: Acute Decompensated Liver Failure 2/2 ETOH Use   - NPO; TF via NGT for goal 35cc/hr   - Bowel regimen with Miralax  - Protonix for stress ulcer prophylaxis  - Continue Lactulose 20mg q12 and Rifaximin   - s/p Para (12/26) transudative, negative for SBP     Renal:  - Continue CRRT, remove 25cc/hr   - Monitor I&Os and electrolytes w/ repletions as necessary    Heme:  - Monitor CBC and coags  - Hold Chemical VTE prophylaxis  - SCDs for Mechanical VTE ppx     ID:   - Monitor for clinical evidence of active infection  - Combi-Cath ( 12/22) Negative, Blood Cx ( 12/26) Negative,  MRSA (12/30) Negative   - s/p Meropenem (12/25 - 1/01)  - Continue Empiric Caspofungin ( 12/26 - ?)     Endo:   - Monitor glucose ; HgbA1C 4.9% ( 12/24)   - mISS q6hr  - Continue Synthroid for hypothyroidism    Code Status: Full Code   Disposition: SICU ASSESSMENT:  Patient is a 63 y/o Female with PMH Thyroid Cancer ( s/p total thyroidectomy in her 20s, radiation, and BARONE), HTN, GERD, 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 Patient has Alcohol Use Disorder ( recent rehab with relapse and in remission since 11/2022), decompensated ALD Cirrhosis c/b ascites. Patient admitted to [facility] on 12/19/2022 after a witnessed seizure; course was c/b septic shock with associated HRF ( intubated 12/19) and an RED ( started HD 12/20). Patient was transferred to [facility] on 12/20 for a liver transplant evaluation. Patient was started on CRRT 12/21 and was extubated 12/23/22. Patient remains in SICU for hemodynamic monitoring and management while liver transplant evaluation is in progress.       PLAN:  Neuro:  - Multimodal pain control w/ Oxycodone and Lidocaine Patch   - Monitor Mental Status for Encephalopathy and trend Ammonia Level   - Continue Home Lexapro   - Continue Keppra  - Continue Folic Acid, Thiamine, and MV     Resp:  - HFNC 30/30%; will wean as tolerated to maintain SpO2 > 92%   - Out of bed to chair, ambulate as tolerated, and incentive spirometry to prevent atelectasis    CV:  - Will wean vasopressor support  of Levophed and Vaso to maintain goal MAP > 65 mmHg  - Continue Midodrine 20mg q8hr     GI: Acute Decompensated Liver Failure 2/2 ETOH Use   - NPO; TF via NGT for goal 35cc/hr   - Bowel regimen with Miralax  - Protonix for stress ulcer prophylaxis  - Continue Lactulose 20mg q12 and Rifaximin   - s/p Para (12/26) transudative, negative for SBP     Renal:  - Continue CRRT, net even  - CVP low, give albumin 500cc  - Monitor I&Os and electrolytes w/ repletions as necessary    Heme:  - Monitor CBC and coags  - Hold Chemical VTE prophylaxis  - SCDs for Mechanical VTE ppx     ID:   - Monitor for clinical evidence of active infection  - Combi-Cath ( 12/22) Negative, Blood Cx ( 12/26) Negative,  MRSA (12/30) Negative   - s/p Meropenem (12/25 - 1/01)  - Continue Empiric Caspofungin ( 12/26 - ?)     Endo:   - Monitor glucose ; HgbA1C 4.9% ( 12/24)   - can hold sliding scale, monitor blood glucose on BMP  - Continue Synthroid for hypothyroidism    Code Status: Full Code   Disposition: SICU ASSESSMENT:  Patient is a 63 y/o Female with PMH Thyroid Cancer ( s/p total thyroidectomy in her 20s, radiation, and BARONE), HTN, GERD, 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 Patient has Alcohol Use Disorder ( recent rehab with relapse and in remission since 11/2022), decompensated ALD Cirrhosis c/b ascites. Patient admitted to [facility] on 12/19/2022 after a witnessed seizure; course was c/b septic shock with associated HRF ( intubated 12/19) and an RED ( started HD 12/20). Patient was transferred to [facility] on 12/20 for a liver transplant evaluation. Patient was started on CRRT 12/21 and was extubated 12/23/22. Patient remains in SICU for hemodynamic monitoring and management while liver transplant evaluation is in progress.       PLAN:  Neuro:  - Multimodal pain control w/ Oxycodone and Lidocaine Patch   - Monitor Mental Status for Encephalopathy and trend Ammonia Level   - Continue Home Lexapro   - Continue Keppra  - Continue Folic Acid, Thiamine, and MV     Resp:  - HFNC 30/30%; will wean as tolerated to maintain SpO2 > 92%   - Out of bed to chair, ambulate as tolerated, and incentive spirometry to prevent atelectasis    CV:  - Will wean vasopressor support  of Levophed and Vaso to maintain goal MAP > 65 mmHg  - Continue Midodrine 20mg q8hr     GI: Acute Decompensated Liver Failure 2/2 ETOH Use   - NPO; TF via NGT for goal 35cc/hr   - Bowel regimen with Miralax  - Protonix for stress ulcer prophylaxis  - Continue Lactulose 20mg q12 and Rifaximin   - s/p Para (12/26) transudative, negative for SBP     Renal:  - Continue CRRT, net even  - CVP low, give albumin 500cc  - Monitor I&Os and electrolytes w/ repletions as necessary    Heme:  - Monitor CBC and coags  - Hold Chemical VTE prophylaxis  - SCDs for Mechanical VTE ppx     ID:   - Monitor for clinical evidence of active infection  - Combi-Cath ( 12/22) Negative, Blood Cx ( 12/26) Negative,  MRSA (12/30) Negative   - s/p Meropenem (12/25 - 1/01)  - Continue Empiric Caspofungin ( 12/26 - ?)     Endo:   - Monitor glucose ; HgbA1C 4.9% ( 12/24)   - can hold sliding scale, monitor blood glucose on BMP  - Continue Synthroid for hypothyroidism    Code Status: Full Code   Disposition: SICU ASSESSMENT:  Patient is a 61 y/o Female with PMH Thyroid Cancer ( s/p total thyroidectomy in her 20s, radiation, and BARONE), HTN, GERD, 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 Patient has Alcohol Use Disorder ( recent rehab with relapse and in remission since 11/2022), decompensated ALD Cirrhosis c/b ascites. Patient admitted to [facility] on 12/19/2022 after a witnessed seizure; course was c/b septic shock with associated HRF ( intubated 12/19) and an RED ( started HD 12/20). Patient was transferred to [facility] on 12/20 for a liver transplant evaluation. Patient was started on CRRT 12/21 and was extubated 12/23/22. Patient remains in SICU for hemodynamic monitoring and management while liver transplant evaluation is in progress.       PLAN:  Neuro:  - Multimodal pain control w/ Oxycodone and Lidocaine Patch   - Monitor Mental Status for Encephalopathy and trend Ammonia Level   - Continue Home Lexapro   - Continue Keppra  - Continue Folic Acid, Thiamine, and MV     Resp:  - HFNC 30/30%; will wean as tolerated to maintain SpO2 > 92%   - Out of bed to chair, ambulate as tolerated, and incentive spirometry to prevent atelectasis    CV:  - Will wean vasopressor support  of Levophed and Vaso to maintain goal MAP > 65 mmHg  - Continue Midodrine 20mg q8hr     GI: Acute Decompensated Liver Failure 2/2 ETOH Use   - NPO; TF via NGT for goal 35cc/hr   - Bowel regimen with Miralax  - Protonix for stress ulcer prophylaxis  - Continue Lactulose 20mg q12 and Rifaximin   - s/p Para (12/26) transudative, negative for SBP     Renal:  - Continue CRRT, net even  - CVP low, give albumin 500cc  - Monitor I&Os and electrolytes w/ repletions as necessary    Heme:  - Monitor CBC and coags  - Hold Chemical VTE prophylaxis  - SCDs for Mechanical VTE ppx     ID:   - Monitor for clinical evidence of active infection  - Combi-Cath ( 12/22) Negative, Blood Cx ( 12/26) Negative,  MRSA (12/30) Negative   - s/p Meropenem (12/25 - 1/01)  - Continue Empiric Caspofungin ( 12/26 - ?)     Endo:   - Monitor glucose ; HgbA1C 4.9% ( 12/24)   - can hold sliding scale, monitor blood glucose on BMP  - Continue Synthroid for hypothyroidism    Code Status: Full Code   Disposition: SICU

## 2023-01-01 NOTE — PROGRESS NOTE ADULT - SUBJECTIVE AND OBJECTIVE BOX
Chief Complaint:  Patient is a 62y old  Female who presents with a chief complaint of Acute liver failure (31 Dec 2022 16:22)      Interval Events:   -      Hospital Medications:  caspofungin IVPB      caspofungin IVPB 50 milliGRAM(s) IV Intermittent every 24 hours  chlorhexidine 2% Cloths 1 Application(s) Topical <User Schedule>  CRRT Treatment    <Continuous>  escitalopram 10 milliGRAM(s) Oral daily  folic acid 1 milliGRAM(s) Oral daily  HYDROmorphone  Injectable 0.25 milliGRAM(s) IV Push every 3 hours PRN  influenza   Vaccine 0.5 milliLiter(s) IntraMuscular once  insulin lispro (ADMELOG) corrective regimen sliding scale   SubCutaneous every 6 hours  lactulose Syrup 20 Gram(s) Oral every 12 hours  levETIRAcetam  Solution 750 milliGRAM(s) Enteral Tube two times a day  levothyroxine Injectable 103 MICROGram(s) IV Push at bedtime  lidocaine   4% Patch 1 Patch Transdermal every 24 hours  midodrine 20 milliGRAM(s) Oral every 8 hours  multivitamin/minerals/iron Oral Solution (CENTRUM) 15 milliLiter(s) Oral daily  norepinephrine Infusion 0.05 MICROgram(s)/kG/Min IV Continuous <Continuous>  ondansetron Injectable 4 milliGRAM(s) IV Push every 6 hours PRN  pantoprazole   Suspension 40 milliGRAM(s) Oral every 24 hours  Phoxillum Filtration BK 4 / 2.5 5000 milliLiter(s) CRRT <Continuous>  Phoxillum Filtration BK 4 / 2.5 5000 milliLiter(s) CRRT <Continuous>  Phoxillum Filtration BK 4 / 2.5 5000 milliLiter(s) CRRT <Continuous>  polyethylene glycol 3350 17 Gram(s) Oral daily  rifAXIMin 550 milliGRAM(s) Oral every 12 hours  sodium chloride 0.65% Nasal 1 Spray(s) Both Nostrils every 3 hours PRN  tetracaine/benzocaine/butamben Spray 1 Spray(s) Topical every 3 hours PRN  thiamine 100 milliGRAM(s) Enteral Tube daily  vasopressin Infusion 0.03 Unit(s)/Min IV Continuous <Continuous>      PMHX/PSHX:  HTN (hypertension)    UTI (urinary tract infection)    Intracranial tumor    GERD (gastroesophageal reflux disease)    Eczema    Thyroid cancer    COVID-19 virus infection    History of thyroid surgery    H/O total thyroidectomy    History of tonsillectomy    History of appendectomy      ROS: 14 point ROS negative unless otherwise stated in subjective      PHYSICAL EXAM:     GENERAL:  Well developed, no distress  HEENT:  NC/AT,  conjunctivae clear, +sclera icteric; +HF NC  CHEST:  Full & symmetric excursion, no increased effort w/ respirations  HEART:  Regular rhythm & rate  ABDOMEN:  Soft, non-tender, +moderately distended  EXTREMITIES:  no LE edema  SKIN: +jaundice  NEURO:  Alert, orientedx1-2    Vital Signs:  Vital Signs Last 24 Hrs  T(C): 36.1 (2023 03:00), Max: 37.6 (31 Dec 2022 23:00)  T(F): 97 (2023 03:00), Max: 99.7 (31 Dec 2022 23:00)  HR: 89 (2023 05:00) (73 - 100)  BP: 109/67 (31 Dec 2022 10:15) (101/57 - 109/67)  BP(mean): 84 (31 Dec 2022 10:15) (75 - 84)  RR: 24 (2023 05:00) (15 - 34)  SpO2: 93% (2023 05:00) (91% - 100%)    Parameters below as of 2023 03:21  Patient On (Oxygen Delivery Method): nasal cannula, high flow,@31 degrees C  O2 Flow (L/min): 30  O2 Concentration (%): 30  Daily     Daily Weight in k.9 (2023 00:00)    LABS:                        8.5    30.02 )-----------( 39       ( 2023 00:30 )             24.1         138  |  104  |  11  ----------------------------<  124<H>  3.9   |  21<L>  |  0.78    Ca    8.6      2023 00:29  Phos  3.3       Mg     2.4         TPro  6.0  /  Alb  3.3  /  TBili  28.0<H>  /  DBili  x   /  AST  162<H>  /  ALT  61<H>  /  AlkPhos  196<H>      LIVER FUNCTIONS - ( 2023 00:29 )  Alb: 3.3 g/dL / Pro: 6.0 g/dL / ALK PHOS: 196 U/L / ALT: 61 U/L / AST: 162 U/L / GGT: x           PT/INR - ( 2023 00:29 )   PT: 28.0 sec;   INR: 2.39 ratio         PTT - ( 2023 00:29 )  PTT:48.1 sec    Amylase Serum--      Lipase serum--       [id number]      Imaging: No new abdominal imaging               Chief Complaint:  Patient is a 62y old  Female who presents with a chief complaint of Acute liver failure (31 Dec 2022 16:22)      Interval Events:   -      Hospital Medications:  caspofungin IVPB      caspofungin IVPB 50 milliGRAM(s) IV Intermittent every 24 hours  chlorhexidine 2% Cloths 1 Application(s) Topical <User Schedule>  CRRT Treatment    <Continuous>  escitalopram 10 milliGRAM(s) Oral daily  folic acid 1 milliGRAM(s) Oral daily  HYDROmorphone  Injectable 0.25 milliGRAM(s) IV Push every 3 hours PRN  influenza   Vaccine 0.5 milliLiter(s) IntraMuscular once  insulin lispro (ADMELOG) corrective regimen sliding scale   SubCutaneous every 6 hours  lactulose Syrup 20 Gram(s) Oral every 12 hours  levETIRAcetam  Solution 750 milliGRAM(s) Enteral Tube two times a day  levothyroxine Injectable 103 MICROGram(s) IV Push at bedtime  lidocaine   4% Patch 1 Patch Transdermal every 24 hours  midodrine 20 milliGRAM(s) Oral every 8 hours  multivitamin/minerals/iron Oral Solution (CENTRUM) 15 milliLiter(s) Oral daily  norepinephrine Infusion 0.05 MICROgram(s)/kG/Min IV Continuous <Continuous>  ondansetron Injectable 4 milliGRAM(s) IV Push every 6 hours PRN  pantoprazole   Suspension 40 milliGRAM(s) Oral every 24 hours  Phoxillum Filtration BK 4 / 2.5 5000 milliLiter(s) CRRT <Continuous>  Phoxillum Filtration BK 4 / 2.5 5000 milliLiter(s) CRRT <Continuous>  Phoxillum Filtration BK 4 / 2.5 5000 milliLiter(s) CRRT <Continuous>  polyethylene glycol 3350 17 Gram(s) Oral daily  rifAXIMin 550 milliGRAM(s) Oral every 12 hours  sodium chloride 0.65% Nasal 1 Spray(s) Both Nostrils every 3 hours PRN  tetracaine/benzocaine/butamben Spray 1 Spray(s) Topical every 3 hours PRN  thiamine 100 milliGRAM(s) Enteral Tube daily  vasopressin Infusion 0.03 Unit(s)/Min IV Continuous <Continuous>      PMHX/PSHX:  HTN (hypertension)    UTI (urinary tract infection)    Intracranial tumor    GERD (gastroesophageal reflux disease)    Eczema    Thyroid cancer    COVID-19 virus infection    History of thyroid surgery    H/O total thyroidectomy    History of tonsillectomy    History of appendectomy      ROS: 14 point ROS negative unless otherwise stated in subjective      PHYSICAL EXAM:     GENERAL:  Well developed, no distress  HEENT:  NC/AT,  conjunctivae clear, +sclera icteric; +HF NC  CHEST:  Full & symmetric excursion, no increased effort w/ respirations  HEART:  Regular rhythm & rate  ABDOMEN:  Soft, non-tender, +moderately distended  EXTREMITIES:  no LE edema  SKIN: +jaundice  NEURO:  Alert, orientedx1-2    Vital Signs:  Vital Signs Last 24 Hrs  T(C): 36.1 (2023 03:00), Max: 37.6 (31 Dec 2022 23:00)  T(F): 97 (2023 03:00), Max: 99.7 (31 Dec 2022 23:00)  HR: 89 (2023 05:00) (73 - 100)  BP: 109/67 (31 Dec 2022 10:15) (101/57 - 109/67)  BP(mean): 84 (31 Dec 2022 10:15) (75 - 84)  RR: 24 (2023 05:00) (15 - 34)  SpO2: 93% (2023 05:00) (91% - 100%)    Parameters below as of 2023 03:21  Patient On (Oxygen Delivery Method): nasal cannula, high flow,@31 degrees C  O2 Flow (L/min): 30  O2 Concentration (%): 30  Daily     Daily Weight in k.9 (2023 00:00)    LABS:                        8.5    30.02 )-----------( 39       ( 2023 00:30 )             24.1         138  |  104  |  11  ----------------------------<  124<H>  3.9   |  21<L>  |  0.78    Ca    8.6      2023 00:29  Phos  3.3       Mg     2.4         TPro  6.0  /  Alb  3.3  /  TBili  28.0<H>  /  DBili  x   /  AST  162<H>  /  ALT  61<H>  /  AlkPhos  196<H>      LIVER FUNCTIONS - ( 2023 00:29 )  Alb: 3.3 g/dL / Pro: 6.0 g/dL / ALK PHOS: 196 U/L / ALT: 61 U/L / AST: 162 U/L / GGT: x           PT/INR - ( 2023 00:29 )   PT: 28.0 sec;   INR: 2.39 ratio         PTT - ( 2023 00:29 )  PTT:48.1 sec    Amylase Serum--      Lipase serum--       [id number]      Imaging: No new abdominal imaging               Chief Complaint:  Patient is a 62y old  Female who presents with a chief complaint of Acute liver failure (31 Dec 2022 16:22)      Interval Events:   -      Hospital Medications:  caspofungin IVPB      caspofungin IVPB 50 milliGRAM(s) IV Intermittent every 24 hours  chlorhexidine 2% Cloths 1 Application(s) Topical <User Schedule>  CRRT Treatment    <Continuous>  escitalopram 10 milliGRAM(s) Oral daily  folic acid 1 milliGRAM(s) Oral daily  HYDROmorphone  Injectable 0.25 milliGRAM(s) IV Push every 3 hours PRN  influenza   Vaccine 0.5 milliLiter(s) IntraMuscular once  insulin lispro (ADMELOG) corrective regimen sliding scale   SubCutaneous every 6 hours  lactulose Syrup 20 Gram(s) Oral every 12 hours  levETIRAcetam  Solution 750 milliGRAM(s) Enteral Tube two times a day  levothyroxine Injectable 103 MICROGram(s) IV Push at bedtime  lidocaine   4% Patch 1 Patch Transdermal every 24 hours  midodrine 20 milliGRAM(s) Oral every 8 hours  multivitamin/minerals/iron Oral Solution (CENTRUM) 15 milliLiter(s) Oral daily  norepinephrine Infusion 0.05 MICROgram(s)/kG/Min IV Continuous <Continuous>  ondansetron Injectable 4 milliGRAM(s) IV Push every 6 hours PRN  pantoprazole   Suspension 40 milliGRAM(s) Oral every 24 hours  Phoxillum Filtration BK 4 / 2.5 5000 milliLiter(s) CRRT <Continuous>  Phoxillum Filtration BK 4 / 2.5 5000 milliLiter(s) CRRT <Continuous>  Phoxillum Filtration BK 4 / 2.5 5000 milliLiter(s) CRRT <Continuous>  polyethylene glycol 3350 17 Gram(s) Oral daily  rifAXIMin 550 milliGRAM(s) Oral every 12 hours  sodium chloride 0.65% Nasal 1 Spray(s) Both Nostrils every 3 hours PRN  tetracaine/benzocaine/butamben Spray 1 Spray(s) Topical every 3 hours PRN  thiamine 100 milliGRAM(s) Enteral Tube daily  vasopressin Infusion 0.03 Unit(s)/Min IV Continuous <Continuous>      PMHX/PSHX:  HTN (hypertension)    UTI (urinary tract infection)    Intracranial tumor    GERD (gastroesophageal reflux disease)    Eczema    Thyroid cancer    COVID-19 virus infection    History of thyroid surgery    H/O total thyroidectomy    History of tonsillectomy    History of appendectomy      ROS: 14 point ROS negative unless otherwise stated in subjective      PHYSICAL EXAM:     GENERAL:  Well developed, no distress  HEENT:  NC/AT,  conjunctivae clear, +sclera icteric; +HF NC  CHEST:  Full & symmetric excursion, no increased effort w/ respirations  HEART:  Regular rhythm & rate  ABDOMEN:  Soft, non-tender, +moderately distended  EXTREMITIES:  no LE edema  SKIN: +jaundice  NEURO:  Alert, orientedx1-2    Vital Signs:  Vital Signs Last 24 Hrs  T(C): 36.1 (2023 03:00), Max: 37.6 (31 Dec 2022 23:00)  T(F): 97 (2023 03:00), Max: 99.7 (31 Dec 2022 23:00)  HR: 89 (2023 05:00) (73 - 100)  BP: 109/67 (31 Dec 2022 10:15) (101/57 - 109/67)  BP(mean): 84 (31 Dec 2022 10:15) (75 - 84)  RR: 24 (2023 05:00) (15 - 34)  SpO2: 93% (2023 05:00) (91% - 100%)    Parameters below as of 2023 03:21  Patient On (Oxygen Delivery Method): nasal cannula, high flow,@31 degrees C  O2 Flow (L/min): 30  O2 Concentration (%): 30  Daily     Daily Weight in k.9 (2023 00:00)    LABS:                        8.5    30.02 )-----------( 39       ( 2023 00:30 )             24.1         138  |  104  |  11  ----------------------------<  124<H>  3.9   |  21<L>  |  0.78    Ca    8.6      2023 00:29  Phos  3.3       Mg     2.4         TPro  6.0  /  Alb  3.3  /  TBili  28.0<H>  /  DBili  x   /  AST  162<H>  /  ALT  61<H>  /  AlkPhos  196<H>      LIVER FUNCTIONS - ( 2023 00:29 )  Alb: 3.3 g/dL / Pro: 6.0 g/dL / ALK PHOS: 196 U/L / ALT: 61 U/L / AST: 162 U/L / GGT: x           PT/INR - ( 2023 00:29 )   PT: 28.0 sec;   INR: 2.39 ratio         PTT - ( 2023 00:29 )  PTT:48.1 sec    Amylase Serum--      Lipase serum--       [id number]      Imaging: No new abdominal imaging               Chief Complaint:  Patient is a 62y old  Female who presents with a chief complaint of Acute liver failure (31 Dec 2022 16:22)      Interval Events:   -developed rash over abdomen  -stable pressors reqs- still on levophed and vaso  -unable to wean off HFNC  -improved interval CXR  -meropenem finished yesterday  -CRRT running net negative    Hospital Medications:  caspofungin IVPB      caspofungin IVPB 50 milliGRAM(s) IV Intermittent every 24 hours  chlorhexidine 2% Cloths 1 Application(s) Topical <User Schedule>  CRRT Treatment    <Continuous>  escitalopram 10 milliGRAM(s) Oral daily  folic acid 1 milliGRAM(s) Oral daily  HYDROmorphone  Injectable 0.25 milliGRAM(s) IV Push every 3 hours PRN  influenza   Vaccine 0.5 milliLiter(s) IntraMuscular once  insulin lispro (ADMELOG) corrective regimen sliding scale   SubCutaneous every 6 hours  lactulose Syrup 20 Gram(s) Oral every 12 hours  levETIRAcetam  Solution 750 milliGRAM(s) Enteral Tube two times a day  levothyroxine Injectable 103 MICROGram(s) IV Push at bedtime  lidocaine   4% Patch 1 Patch Transdermal every 24 hours  midodrine 20 milliGRAM(s) Oral every 8 hours  multivitamin/minerals/iron Oral Solution (CENTRUM) 15 milliLiter(s) Oral daily  norepinephrine Infusion 0.05 MICROgram(s)/kG/Min IV Continuous <Continuous>  ondansetron Injectable 4 milliGRAM(s) IV Push every 6 hours PRN  pantoprazole   Suspension 40 milliGRAM(s) Oral every 24 hours  Phoxillum Filtration BK 4 / 2.5 5000 milliLiter(s) CRRT <Continuous>  Phoxillum Filtration BK 4 / 2.5 5000 milliLiter(s) CRRT <Continuous>  Phoxillum Filtration BK 4 / 2.5 5000 milliLiter(s) CRRT <Continuous>  polyethylene glycol 3350 17 Gram(s) Oral daily  rifAXIMin 550 milliGRAM(s) Oral every 12 hours  sodium chloride 0.65% Nasal 1 Spray(s) Both Nostrils every 3 hours PRN  tetracaine/benzocaine/butamben Spray 1 Spray(s) Topical every 3 hours PRN  thiamine 100 milliGRAM(s) Enteral Tube daily  vasopressin Infusion 0.03 Unit(s)/Min IV Continuous <Continuous>      PMHX/PSHX:  HTN (hypertension)    UTI (urinary tract infection)    Intracranial tumor    GERD (gastroesophageal reflux disease)    Eczema    Thyroid cancer    COVID-19 virus infection    History of thyroid surgery    H/O total thyroidectomy    History of tonsillectomy    History of appendectomy      ROS: 14 point ROS negative unless otherwise stated in subjective      PHYSICAL EXAM:     GENERAL:  Well developed, no distress  HEENT:  NC/AT,  conjunctivae clear, +sclera icteric; +HF NC  CHEST:  Full & symmetric excursion, no increased effort w/ respirations  HEART:  Regular rhythm & rate  ABDOMEN:  Soft, non-tender, +moderately distended  EXTREMITIES:  no LE edema  SKIN: +jaundice; +erythematous maculopapular rash overlying abdomen extending beneath breast to pelvis  NEURO:  sleepy but arouseable, orientedx1-2    Vital Signs:  Vital Signs Last 24 Hrs  T(C): 36.1 (2023 03:00), Max: 37.6 (31 Dec 2022 23:00)  T(F): 97 (2023 03:00), Max: 99.7 (31 Dec 2022 23:00)  HR: 89 (2023 05:00) (73 - 100)  BP: 109/67 (31 Dec 2022 10:15) (101/57 - 109/67)  BP(mean): 84 (31 Dec 2022 10:15) (75 - 84)  RR: 24 (2023 05:00) (15 - 34)  SpO2: 93% (2023 05:00) (91% - 100%)    Parameters below as of 2023 03:21  Patient On (Oxygen Delivery Method): nasal cannula, high flow,@31 degrees C  O2 Flow (L/min): 30  O2 Concentration (%): 30  Daily     Daily Weight in k.9 (2023 00:00)    LABS:                        8.5    30.02 )-----------( 39       ( 2023 00:30 )             24.1         138  |  104  |  11  ----------------------------<  124<H>  3.9   |  21<L>  |  0.78    Ca    8.6      2023 00:29  Phos  3.3       Mg     2.4         TPro  6.0  /  Alb  3.3  /  TBili  28.0<H>  /  DBili  x   /  AST  162<H>  /  ALT  61<H>  /  AlkPhos  196<H>      LIVER FUNCTIONS - ( 2023 00:29 )  Alb: 3.3 g/dL / Pro: 6.0 g/dL / ALK PHOS: 196 U/L / ALT: 61 U/L / AST: 162 U/L / GGT: x           PT/INR - ( 2023 00:29 )   PT: 28.0 sec;   INR: 2.39 ratio         PTT - ( 2023 00:29 )  PTT:48.1 sec    Amylase Serum--      Lipase serum--       [id number]      Imaging: No new abdominal imaging               Chief Complaint:  Patient is a 62y old  Female who presents with a chief complaint of Acute liver failure (31 Dec 2022 16:22)      Interval Events:   -developed rash over abdomen  -stable pressors reqs- still on levophed and vaso  -unable to wean off HFNC  -improved interval CXR  -meropenem finished yesterday  -CRRT running net negative    Hospital Medications:  caspofungin IVPB      caspofungin IVPB 50 milliGRAM(s) IV Intermittent every 24 hours  chlorhexidine 2% Cloths 1 Application(s) Topical <User Schedule>  CRRT Treatment    <Continuous>  escitalopram 10 milliGRAM(s) Oral daily  folic acid 1 milliGRAM(s) Oral daily  HYDROmorphone  Injectable 0.25 milliGRAM(s) IV Push every 3 hours PRN  influenza   Vaccine 0.5 milliLiter(s) IntraMuscular once  insulin lispro (ADMELOG) corrective regimen sliding scale   SubCutaneous every 6 hours  lactulose Syrup 20 Gram(s) Oral every 12 hours  levETIRAcetam  Solution 750 milliGRAM(s) Enteral Tube two times a day  levothyroxine Injectable 103 MICROGram(s) IV Push at bedtime  lidocaine   4% Patch 1 Patch Transdermal every 24 hours  midodrine 20 milliGRAM(s) Oral every 8 hours  multivitamin/minerals/iron Oral Solution (CENTRUM) 15 milliLiter(s) Oral daily  norepinephrine Infusion 0.05 MICROgram(s)/kG/Min IV Continuous <Continuous>  ondansetron Injectable 4 milliGRAM(s) IV Push every 6 hours PRN  pantoprazole   Suspension 40 milliGRAM(s) Oral every 24 hours  Phoxillum Filtration BK 4 / 2.5 5000 milliLiter(s) CRRT <Continuous>  Phoxillum Filtration BK 4 / 2.5 5000 milliLiter(s) CRRT <Continuous>  Phoxillum Filtration BK 4 / 2.5 5000 milliLiter(s) CRRT <Continuous>  polyethylene glycol 3350 17 Gram(s) Oral daily  rifAXIMin 550 milliGRAM(s) Oral every 12 hours  sodium chloride 0.65% Nasal 1 Spray(s) Both Nostrils every 3 hours PRN  tetracaine/benzocaine/butamben Spray 1 Spray(s) Topical every 3 hours PRN  thiamine 100 milliGRAM(s) Enteral Tube daily  vasopressin Infusion 0.03 Unit(s)/Min IV Continuous <Continuous>      PMHX/PSHX:  HTN (hypertension)    UTI (urinary tract infection)    Intracranial tumor    GERD (gastroesophageal reflux disease)    Eczema    Thyroid cancer    COVID-19 virus infection    History of thyroid surgery    H/O total thyroidectomy    History of tonsillectomy    History of appendectomy      ROS: 14 point ROS negative unless otherwise stated in subjective      PHYSICAL EXAM:     GENERAL:  Well developed, no distress  HEENT:  NC/AT,  conjunctivae clear, +sclera icteric; +HF NC  CHEST:  Full & symmetric excursion, no increased effort w/ respirations  HEART:  Regular rhythm & rate  ABDOMEN:  Soft, non-tender, +moderately distended  EXTREMITIES:  no LE edema  SKIN: +jaundice; +erythematous maculopapular rash overlying abdomen extending beneath breast to pelvis  NEURO:  sleepy but arouseable, orientedx1-2    Vital Signs:  Vital Signs Last 24 Hrs  T(C): 36.1 (2023 03:00), Max: 37.6 (31 Dec 2022 23:00)  T(F): 97 (2023 03:00), Max: 99.7 (31 Dec 2022 23:00)  HR: 89 (2023 05:00) (73 - 100)  BP: 109/67 (31 Dec 2022 10:15) (101/57 - 109/67)  BP(mean): 84 (31 Dec 2022 10:15) (75 - 84)  RR: 24 (2023 05:00) (15 - 34)  SpO2: 93% (2023 05:00) (91% - 100%)    Parameters below as of 2023 03:21  Patient On (Oxygen Delivery Method): nasal cannula, high flow,@31 degrees C  O2 Flow (L/min): 30  O2 Concentration (%): 30  Daily     Daily Weight in k.9 (2023 00:00)    LABS:                        8.5    30.02 )-----------( 39       ( 2023 00:30 )             24.1         138  |  104  |  11  ----------------------------<  124<H>  3.9   |  21<L>  |  0.78    Ca    8.6      2023 00:29  Phos  3.3       Mg     2.4         TPro  6.0  /  Alb  3.3  /  TBili  28.0<H>  /  DBili  x   /  AST  162<H>  /  ALT  61<H>  /  AlkPhos  196<H>      LIVER FUNCTIONS - ( 2023 00:29 )  Alb: 3.3 g/dL / Pro: 6.0 g/dL / ALK PHOS: 196 U/L / ALT: 61 U/L / AST: 162 U/L / GGT: x           PT/INR - ( 2023 00:29 )   PT: 28.0 sec;   INR: 2.39 ratio         PTT - ( 2023 00:29 )  PTT:48.1 sec    Amylase Serum--      Lipase serum--       [id number]      Imaging: No new abdominal imaging               Chief Complaint:  Patient is a 62y old  Female who presents with a chief complaint of Acute liver failure (31 Dec 2022 16:22)      Interval Events:   -developed rash over abdomen  -stable pressors reqs- still on levophed and vaso  -unable to wean off HFNC  -improved interval CXR  -meropenem finished yesterday  -CRRT running net negative    Hospital Medications:  caspofungin IVPB      caspofungin IVPB 50 milliGRAM(s) IV Intermittent every 24 hours  chlorhexidine 2% Cloths 1 Application(s) Topical <User Schedule>  CRRT Treatment    <Continuous>  escitalopram 10 milliGRAM(s) Oral daily  folic acid 1 milliGRAM(s) Oral daily  HYDROmorphone  Injectable 0.25 milliGRAM(s) IV Push every 3 hours PRN  influenza   Vaccine 0.5 milliLiter(s) IntraMuscular once  insulin lispro (ADMELOG) corrective regimen sliding scale   SubCutaneous every 6 hours  lactulose Syrup 20 Gram(s) Oral every 12 hours  levETIRAcetam  Solution 750 milliGRAM(s) Enteral Tube two times a day  levothyroxine Injectable 103 MICROGram(s) IV Push at bedtime  lidocaine   4% Patch 1 Patch Transdermal every 24 hours  midodrine 20 milliGRAM(s) Oral every 8 hours  multivitamin/minerals/iron Oral Solution (CENTRUM) 15 milliLiter(s) Oral daily  norepinephrine Infusion 0.05 MICROgram(s)/kG/Min IV Continuous <Continuous>  ondansetron Injectable 4 milliGRAM(s) IV Push every 6 hours PRN  pantoprazole   Suspension 40 milliGRAM(s) Oral every 24 hours  Phoxillum Filtration BK 4 / 2.5 5000 milliLiter(s) CRRT <Continuous>  Phoxillum Filtration BK 4 / 2.5 5000 milliLiter(s) CRRT <Continuous>  Phoxillum Filtration BK 4 / 2.5 5000 milliLiter(s) CRRT <Continuous>  polyethylene glycol 3350 17 Gram(s) Oral daily  rifAXIMin 550 milliGRAM(s) Oral every 12 hours  sodium chloride 0.65% Nasal 1 Spray(s) Both Nostrils every 3 hours PRN  tetracaine/benzocaine/butamben Spray 1 Spray(s) Topical every 3 hours PRN  thiamine 100 milliGRAM(s) Enteral Tube daily  vasopressin Infusion 0.03 Unit(s)/Min IV Continuous <Continuous>      PMHX/PSHX:  HTN (hypertension)    UTI (urinary tract infection)    Intracranial tumor    GERD (gastroesophageal reflux disease)    Eczema    Thyroid cancer    COVID-19 virus infection    History of thyroid surgery    H/O total thyroidectomy    History of tonsillectomy    History of appendectomy      ROS: 14 point ROS negative unless otherwise stated in subjective      PHYSICAL EXAM:     GENERAL:  Well developed, no distress  HEENT:  NC/AT,  conjunctivae clear, +sclera icteric; +HF NC  CHEST:  Full & symmetric excursion, no increased effort w/ respirations  HEART:  Regular rhythm & rate  ABDOMEN:  Soft, non-tender, +moderately distended  EXTREMITIES:  no LE edema  SKIN: +jaundice; +erythematous maculopapular rash overlying abdomen extending beneath breast to pelvis  NEURO:  sleepy but arouseable, orientedx1-2    Vital Signs:  Vital Signs Last 24 Hrs  T(C): 36.1 (2023 03:00), Max: 37.6 (31 Dec 2022 23:00)  T(F): 97 (2023 03:00), Max: 99.7 (31 Dec 2022 23:00)  HR: 89 (2023 05:00) (73 - 100)  BP: 109/67 (31 Dec 2022 10:15) (101/57 - 109/67)  BP(mean): 84 (31 Dec 2022 10:15) (75 - 84)  RR: 24 (2023 05:00) (15 - 34)  SpO2: 93% (2023 05:00) (91% - 100%)    Parameters below as of 2023 03:21  Patient On (Oxygen Delivery Method): nasal cannula, high flow,@31 degrees C  O2 Flow (L/min): 30  O2 Concentration (%): 30  Daily     Daily Weight in k.9 (2023 00:00)    LABS:                        8.5    30.02 )-----------( 39       ( 2023 00:30 )             24.1         138  |  104  |  11  ----------------------------<  124<H>  3.9   |  21<L>  |  0.78    Ca    8.6      2023 00:29  Phos  3.3       Mg     2.4         TPro  6.0  /  Alb  3.3  /  TBili  28.0<H>  /  DBili  x   /  AST  162<H>  /  ALT  61<H>  /  AlkPhos  196<H>      LIVER FUNCTIONS - ( 2023 00:29 )  Alb: 3.3 g/dL / Pro: 6.0 g/dL / ALK PHOS: 196 U/L / ALT: 61 U/L / AST: 162 U/L / GGT: x           PT/INR - ( 2023 00:29 )   PT: 28.0 sec;   INR: 2.39 ratio         PTT - ( 2023 00:29 )  PTT:48.1 sec    Amylase Serum--      Lipase serum--       [id number]      Imaging: No new abdominal imaging

## 2023-01-01 NOTE — PROGRESS NOTE ADULT - ASSESSMENT
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 12/20 for LT eval for ACLF.        Impression:  #ACLF with underlying cirrhosis  #AUD  #Decompensated ETOH cirrhosis c/b prior ascites req LVP- UNOS/OPTN MELD-Na 40 (12/31); blood group O neg  Of note was recently admitted to  11/2022 for workup and eval of new onset jaundice- during that hospitalization was found to have a UTI and dx with alc hep was treated for UTI w/ abx and started on steroids (was deemed a non-responder and steroids were subsequently stopped); s/p LVP at [place] 11/2022. Previously hospitalized in 2021 at [place] for ETOH detox but pt reportedly unaware of any liver dysfunction at that time. Went to ETOH rehab 08/2022 and was asked to leave the facility when she was COVID+; was sober for 1 week until she started drinking again. Had also attended a few AA meetings in the past. ;  s/p vanco (12/21 -12/22), fluconazole (12/20 -12/26)       -Ascites: large volume on CT A/P       -HCC: pending contrasted imaging study       -SBP: negative on para 12/21, neg 12/26       -Varices: no h/o EGD       -Hepatic encephalopathy: unable to assess; intubated; on lactulose/rifaximin    #LT eval  - hepatitis B surface <3, hepatitis A IgM, B core, B surface, A IgG, B surface nonreactive  - EBV IgM and early antigen negative, CMV IgG positive   - acetaminophen negative, TSH 0.05, RPR negative         -LT candidacy and evaluation:            [x] Blood type: ABO O neg            [ ] Psychosocial            [ ] Infectious            [ ] Cardiology:                    Cardiac cath:                    Stress test:             [ ] Colonoscopy:             [ ] Mammography:             [ ] Pap smear:             [ ] Dental            [ ] PT/Frailty    #new onset seizure- unknown trigger/etiology- currently on Keppra  #Worsening bilateral airspace opacities: likely ARDS vs PNA- weaning HFNC; on empiric antimicrobials (See below)      Recommendations:  -trend clinical symptoms, exam findings, vital signs, CBC, CMP, INR  -continue CVVHD  -c/w broad spectrum antibiotics- fluconazole (12/26 ->) meropenem (12/20 ->)  -wean pressors as able  -c/w lactulose 20 gm q8h + miralax 17 gm daily, rifaximin  -c/w midodrine 20 q8h  -c/w thiamine 100 mg daily  -consult PT  -wean HF NC    **pt's /JOSUE Gibson ([phone])    Note incomplete until finalized by attending signature/attestation.    Arleen Rawls MD  GI Fellow, PGY-5  Available via Microsoft Teams    NON-URGENT CONSULTS:  Please email [email or web address]  [email or web address]  AT NIGHT AND ON WEEKENDS:  Contact on-call GI fellow via answering service ([phone]) from 5pm-8am and on weekends/holidays  MONDAY-FRIDAY 8AM-5PM:  Pager# [phone] ([facility]) or [phone] ([facility])  GI Phone# [phone] (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        Impression:  #ACLF with underlying cirrhosis  #AUD  #Decompensated ETOH cirrhosis c/b prior ascites req LVP- UNOS/OPTN MELD-Na 40 (12/31); blood group O neg  Of note was recently admitted to  11/2022 for workup and eval of new onset jaundice- during that hospitalization was found to have a UTI and dx with alc hep was treated for UTI w/ abx and started on steroids (was deemed a non-responder and steroids were subsequently stopped); s/p LVP at [place] 11/2022. Previously hospitalized in 2021 at [place] for ETOH detox but pt reportedly unaware of any liver dysfunction at that time. Went to ETOH rehab 08/2022 and was asked to leave the facility when she was COVID+; was sober for 1 week until she started drinking again. Had also attended a few AA meetings in the past. ;  s/p vanco (12/21 -12/22), fluconazole (12/20 -12/26)       -Ascites: large volume on CT A/P       -HCC: pending contrasted imaging study       -SBP: negative on para 12/21, neg 12/26       -Varices: no h/o EGD       -Hepatic encephalopathy: unable to assess; intubated; on lactulose/rifaximin    #LT eval  - hepatitis B surface <3, hepatitis A IgM, B core, B surface, A IgG, B surface nonreactive  - EBV IgM and early antigen negative, CMV IgG positive   - acetaminophen negative, TSH 0.05, RPR negative         -LT candidacy and evaluation:            [x] Blood type: ABO O neg            [ ] Psychosocial            [ ] Infectious            [ ] Cardiology:                    Cardiac cath:                    Stress test:             [ ] Colonoscopy:             [ ] Mammography:             [ ] Pap smear:             [ ] Dental            [ ] PT/Frailty    #new onset seizure- unknown trigger/etiology- currently on Keppra  #Worsening bilateral airspace opacities: likely ARDS vs PNA- weaning HFNC; on empiric antimicrobials (See below)      Recommendations:  -trend clinical symptoms, exam findings, vital signs, CBC, CMP, INR  -continue CVVHD  -c/w broad spectrum antibiotics- fluconazole (12/26 ->) meropenem (12/20 ->)  -wean pressors as able  -c/w lactulose 20 gm q8h + miralax 17 gm daily, rifaximin  -c/w midodrine 20 q8h  -c/w thiamine 100 mg daily  -consult PT  -wean HF NC    **pt's /JOSUE Gibson ([phone])    Note incomplete until finalized by attending signature/attestation.    Arleen Rawls MD  GI Fellow, PGY-5  Available via Microsoft Teams    NON-URGENT CONSULTS:  Please email [email or web address]  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        Impression:  #ACLF with underlying cirrhosis  #AUD  #Decompensated ETOH cirrhosis c/b prior ascites req LVP- UNOS/OPTN MELD-Na 40 (12/31); blood group O neg  Of note was recently admitted to  11/2022 for workup and eval of new onset jaundice- during that hospitalization was found to have a UTI and dx with alc hep was treated for UTI w/ abx and started on steroids (was deemed a non-responder and steroids were subsequently stopped); s/p LVP at [place] 11/2022. Previously hospitalized in 2021 at [place] for ETOH detox but pt reportedly unaware of any liver dysfunction at that time. Went to ETOH rehab 08/2022 and was asked to leave the facility when she was COVID+; was sober for 1 week until she started drinking again. Had also attended a few AA meetings in the past. ;  s/p vanco (12/21 -12/22), fluconazole (12/20 -12/26)       -Ascites: large volume on CT A/P       -HCC: pending contrasted imaging study       -SBP: negative on para 12/21, neg 12/26       -Varices: no h/o EGD       -Hepatic encephalopathy: unable to assess; intubated; on lactulose/rifaximin    #LT eval  - hepatitis B surface <3, hepatitis A IgM, B core, B surface, A IgG, B surface nonreactive  - EBV IgM and early antigen negative, CMV IgG positive   - acetaminophen negative, TSH 0.05, RPR negative         -LT candidacy and evaluation:            [x] Blood type: ABO O neg            [ ] Psychosocial            [ ] Infectious            [ ] Cardiology:                    Cardiac cath:                    Stress test:             [ ] Colonoscopy:             [ ] Mammography:             [ ] Pap smear:             [ ] Dental            [ ] PT/Frailty    #new onset seizure- unknown trigger/etiology- currently on Keppra  #Worsening bilateral airspace opacities: likely ARDS vs PNA- weaning HFNC; on empiric antimicrobials (See below)      Recommendations:  -trend clinical symptoms, exam findings, vital signs, CBC, CMP, INR  -continue CVVHD  -c/w broad spectrum antibiotics- fluconazole (12/26 ->) meropenem (12/20 ->)  -wean pressors as able  -c/w lactulose 20 gm q8h + miralax 17 gm daily, rifaximin  -c/w midodrine 20 q8h  -c/w thiamine 100 mg daily  -consult PT  -wean HF NC    **pt's /JOSUE Gibson ([phone])    Note incomplete until finalized by attending signature/attestation.    Arleen Rawls MD  GI Fellow, PGY-5  Available via Microsoft Teams    NON-URGENT CONSULTS:  Please email [email or web address]  [email or web address]  AT NIGHT AND ON WEEKENDS:  Contact on-call GI fellow via answering service ([phone]) from 5pm-8am and on weekends/holidays  MONDAY-FRIDAY 8AM-5PM:  Pager# [phone] ([facility]) or [phone] ([facility])  GI Phone# [phone] ([facility])   62 y.o Hx significant for decompensated ETOH cirrhosis c/b ascites (dx 11/2022), alc hep (dx 11/2022; non-responsive to steroids), remote h/o thyroid cancer in her 20s s/p total thyroidectomy + RTX + radioactive iodide, HTN, 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        Impression:  #ACLF with underlying cirrhosis  #AUD  #Decompensated ETOH cirrhosis c/b prior ascites req LVP- UNOS/OPTN MELD-Na 42 (1/1); blood group O neg  Of note was recently admitted to  11/2022 for workup and eval of new onset jaundice- during that hospitalization was found to have a UTI and dx with alc hep was treated for UTI w/ abx and started on steroids (was deemed a non-responder and steroids were subsequently stopped); s/p LVP at [place] 11/2022. Previously hospitalized in 2021 at [place] for ETOH detox but pt reportedly unaware of any liver dysfunction at that time. Went to ETOH rehab 08/2022 and was asked to leave the facility when she was COVID+; was sober for 1 week until she started drinking again. Had also attended a few AA meetings in the past. ;  s/p vanco (12/21 -12/22), fluconazole (12/20 -12/26), meropenem (12/20 -12/31)       -Ascites: large volume on CT A/P       -HCC: pending contrasted imaging study       -SBP: negative on para 12/21, neg 12/26       -Varices: no h/o EGD       -Hepatic encephalopathy: unable to assess; intubated; on lactulose/rifaximin    #LT eval  - hepatitis B surface <3, hepatitis A IgM, B core, B surface, A IgG, B surface nonreactive  - EBV IgM and early antigen negative, CMV IgG positive   - acetaminophen negative, TSH 0.05, RPR negative         -LT candidacy and evaluation:            [x] Blood type: ABO O neg            [ ] Psychosocial            [ ] Infectious            [ ] Cardiology:                    Cardiac cath:                    Stress test:             [ ] Colonoscopy:             [ ] Mammography:             [ ] Pap smear:             [ ] Dental            [ ] PT/Frailty    #new onset seizure- unknown trigger/etiology- currently on Keppra  #Worsening bilateral airspace opacities: likely ARDS vs PNA- weaning HFNC; on empiric antimicrobials (See below)  #new rash over anterior abdomen- c/f possible drug rash; per family has reported h/o skin sensitivity    Recommendations:  -trend clinical symptoms, exam findings, vital signs, CBC, CMP, INR  -continue CRRT; plan to change to net even  -c/w broad spectrum antimicrobials- caspofungin (12/26 ->)   -wean pressors as able; will give IV albumin today  -c/w lactulose 20 gm q12h + miralax 17 gm daily, rifaximin  -c/w midodrine 20 q8h  -c/w thiamine 100 mg daily  -consult PT  -wean HF NC  -if rash does not improve, will consider derm consult    **pt's /JOSUE Gibson ([phone])    Note incomplete until finalized by attending signature/attestation.    Arleen Rawls MD  GI Fellow, PGY-5  Available via Microsoft Teams    NON-URGENT CONSULTS:  Please email [email or web address]  [email or web address]  AT NIGHT AND ON WEEKENDS:  Contact on-call GI fellow via answering service ([phone]) from 5pm-8am and on weekends/holidays  MONDAY-FRIDAY 8AM-5PM:  Pager# [phone] ([facility]) or [phone] ([facility])  GI Phone# [phone] ([facility])   62 y.o Hx significant for decompensated ETOH cirrhosis c/b ascites (dx 11/2022), alc hep (dx 11/2022; non-responsive to steroids), remote h/o thyroid cancer in her 20s s/p total thyroidectomy + RTX + radioactive iodide, HTN, 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        Impression:  #ACLF with underlying cirrhosis  #AUD  #Decompensated ETOH cirrhosis c/b prior ascites req LVP- UNOS/OPTN MELD-Na 42 (1/1); blood group O neg  Of note was recently admitted to  11/2022 for workup and eval of new onset jaundice- during that hospitalization was found to have a UTI and dx with alc hep was treated for UTI w/ abx and started on steroids (was deemed a non-responder and steroids were subsequently stopped); s/p LVP at [place] 11/2022. Previously hospitalized in 2021 at [place] for ETOH detox but pt reportedly unaware of any liver dysfunction at that time. Went to ETOH rehab 08/2022 and was asked to leave the facility when she was COVID+; was sober for 1 week until she started drinking again. Had also attended a few AA meetings in the past. ;  s/p vanco (12/21 -12/22), fluconazole (12/20 -12/26), meropenem (12/20 -12/31)       -Ascites: large volume on CT A/P       -HCC: pending contrasted imaging study       -SBP: negative on para 12/21, neg 12/26       -Varices: no h/o EGD       -Hepatic encephalopathy: unable to assess; intubated; on lactulose/rifaximin    #LT eval  - hepatitis B surface <3, hepatitis A IgM, B core, B surface, A IgG, B surface nonreactive  - EBV IgM and early antigen negative, CMV IgG positive   - acetaminophen negative, TSH 0.05, RPR negative         -LT candidacy and evaluation:            [x] Blood type: ABO O neg            [ ] Psychosocial            [ ] Infectious            [ ] Cardiology:                    Cardiac cath:                    Stress test:             [ ] Colonoscopy:             [ ] Mammography:             [ ] Pap smear:             [ ] Dental            [ ] PT/Frailty    #new onset seizure- unknown trigger/etiology- currently on Keppra  #Worsening bilateral airspace opacities: likely ARDS vs PNA- weaning HFNC; on empiric antimicrobials (See below)  #new rash over anterior abdomen- c/f possible drug rash; per family has reported h/o skin sensitivity    Recommendations:  -trend clinical symptoms, exam findings, vital signs, CBC, CMP, INR  -continue CRRT; plan to change to net even  -c/w broad spectrum antimicrobials- caspofungin (12/26 ->)   -wean pressors as able; will give IV albumin today  -c/w lactulose 20 gm q12h + miralax 17 gm daily, rifaximin  -c/w midodrine 20 q8h  -c/w thiamine 100 mg daily  -consult PT  -wean HF NC  -if rash does not improve, will consider derm consult    **pt's /JOSUE Gibson ([phone])    Note incomplete until finalized by attending signature/attestation.    Arleen Rawls MD  GI Fellow, PGY-5  Available via [employer] Teams    NON-URGENT CONSULTS:  Please email [email or web address]  [email or web address]  AT NIGHT AND ON WEEKENDS:  Contact on-call GI fellow via answering service ([phone]) from 5pm-8am and on weekends/holidays  MONDAY-FRIDAY 8AM-5PM:  Pager# [phone] ([facility]) or [phone] ([facility])  GI Phone# [phone] ([facility])   62 y.o Hx significant for decompensated ETOH cirrhosis c/b ascites (dx 11/2022), alc hep (dx 11/2022; non-responsive to steroids), remote h/o thyroid cancer in her 20s s/p total thyroidectomy + RTX + radioactive iodide, HTN, 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        Impression:  #ACLF with underlying cirrhosis  #AUD  #Decompensated ETOH cirrhosis c/b prior ascites req LVP- UNOS/OPTN MELD-Na 42 (1/1); blood group O neg  Of note was recently admitted to  11/2022 for workup and eval of new onset jaundice- during that hospitalization was found to have a UTI and dx with alc hep was treated for UTI w/ abx and started on steroids (was deemed a non-responder and steroids were subsequently stopped); s/p LVP at [place] 11/2022. Previously hospitalized in 2021 at [place] for ETOH detox but pt reportedly unaware of any liver dysfunction at that time. Went to ETOH rehab 08/2022 and was asked to leave the facility when she was COVID+; was sober for 1 week until she started drinking again. Had also attended a few AA meetings in the past. ;  s/p vanco (12/21 -12/22), fluconazole (12/20 -12/26), meropenem (12/20 -12/31)       -Ascites: large volume on CT A/P       -HCC: pending contrasted imaging study       -SBP: negative on para 12/21, neg 12/26       -Varices: no h/o EGD       -Hepatic encephalopathy: unable to assess; intubated; on lactulose/rifaximin    #LT eval  - hepatitis B surface <3, hepatitis A IgM, B core, B surface, A IgG, B surface nonreactive  - EBV IgM and early antigen negative, CMV IgG positive   - acetaminophen negative, TSH 0.05, RPR negative         -LT candidacy and evaluation:            [x] Blood type: ABO O neg            [ ] Psychosocial            [ ] Infectious            [ ] Cardiology:                    Cardiac cath:                    Stress test:             [ ] Colonoscopy:             [ ] Mammography:             [ ] Pap smear:             [ ] Dental            [ ] PT/Frailty    #new onset seizure- unknown trigger/etiology- currently on Keppra  #Worsening bilateral airspace opacities: likely ARDS vs PNA- weaning HFNC; on empiric antimicrobials (See below)  #new rash over anterior abdomen- c/f possible drug rash; per family has reported h/o skin sensitivity    Recommendations:  -trend clinical symptoms, exam findings, vital signs, CBC, CMP, INR  -continue CRRT; plan to change to net even  -c/w broad spectrum antimicrobials- caspofungin (12/26 ->)   -wean pressors as able; will give IV albumin today  -c/w lactulose 20 gm q12h + miralax 17 gm daily, rifaximin  -c/w midodrine 20 q8h  -c/w thiamine 100 mg daily  -consult PT  -wean HF NC  -if rash does not improve, will consider derm consult    **pt's /JOSUE Gibson ([phone])    Note incomplete until finalized by attending signature/attestation.    Arleen Rawls MD  GI Fellow, PGY-5  Available via [employer] Teams    NON-URGENT CONSULTS:  Please email [email or web address]  AT NIGHT AND ON WEEKENDS:  Contact on-call GI fellow via answering service ([phone]) from 5pm-8am and on weekends/holidays  MONDAY-FRIDAY 8AM-5PM:  Pager# [phone] ([facility]) or [phone] ([facility])  GI Phone# [phone] ([facility])

## 2023-01-01 NOTE — PROGRESS NOTE ADULT - ASSESSMENT
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   Transferred from [facility] 12/20 for further management of acute liver failure and liver transplant evaluation 2/2 known ETOH Cirrhosis.     [] Decompensated ETOH Cirrhosis  - Albumin for volume replacement  - Wean off pressors as able, continue Midodrine  - remains on CVVH. CXR improving    - ID: Leukocytosis. Continue empiric caspo and Meropenem.  Will DW with ID  - HE: Continue lactulose BID, Miralax, Rifaximin   - Continue  TF at goal  - on Keppra for seizures, (no activity since admission)  - New abdominal rash. 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   Transferred from [facility] 12/20 for further management of acute liver failure and liver transplant evaluation 2/2 known ETOH Cirrhosis.     [] Decompensated ETOH Cirrhosis  - Albumin for volume replacement  - Wean off pressors as able, continue Midodrine  - remains on CVVH. CXR improving    - ID: Leukocytosis. Continue empiric caspo and Meropenem.  Will DW with ID  - HE: Continue lactulose BID, Miralax, Rifaximin   - Continue  TF at goal  - on Keppra for seizures, (no activity since admission)  - New abdominal rash. 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   Transferred from [facility] 12/20 for further management of acute liver failure and liver transplant evaluation 2/2 known ETOH Cirrhosis.     [] Decompensated ETOH Cirrhosis  - Albumin for volume replacement  - Wean off pressors as able, continue Midodrine  - remains on CVVH. CXR improving    - ID: Leukocytosis. Continue empiric caspo and Meropenem.  Will DW with ID  - HE: Continue lactulose BID, Miralax, Rifaximin   - Continue  TF at goal  - on Keppra for seizures, (no activity since admission)  - New abdominal rash. Will consult Derm if persists/worsens  - Albumin  - Liver transplant evaluation deferred due to illness at present

## 2023-01-01 NOTE — CHART NOTE - NSCHARTNOTEFT_GEN_A_CORE
Patient seen in SICU. Oliguric, on CRRT. Reviewed clinical, lab data, medications, CRRT prescription.  I have seen the patient and reviewed CRRT prescription and flowsheet. Vascular access is functioning well. Patient is tolerating dialysis well with no acute symptoms or distress. CRRT prescription has been adjusted for optimized control of volume status, uremia and electrolytes. Management of additional metabolic abnormalities/anemia will continue to be addressed on follow up.  D/w SICU team. Will follow  Please call if any questions/reports.

## 2023-01-01 NOTE — PROGRESS NOTE ADULT - NS ATTEND AMEND GEN_ALL_CORE FT
62yFemale presents with hx thyroid cancer s/p thyroidectomy, craniotomy with resection of ventricular mass, with acute liver failure, new seizures, transfer from OSH for transplant evaluation.     Fluctuating mental status. On Lactulose with stable Ammonia level Continue Rifaximin  Levo requirement decreasing, continue Vaso, on high dose Midodrine  CXR improvement in b/l infiltrates, will try to wean off HF to regular NC  Volume status assessed again, appears intravascular depleted with CVP of 1 and resolution of leg edema in context of profuse diarrhea. Will resuscitate with colloid   Continue  CRRT with fluid balance goal net even for now  Tolerating NGT  feed  Empiric Abx Meropenem and Caspo, stable leucocytosis  Peritoneal fluid transudate  ISS  Mechanical DVT ppx    Daughter and  kept updated  Limited bed side PT considering pt on CRRT and HFNC

## 2023-01-01 NOTE — PROGRESS NOTE ADULT - SUBJECTIVE AND OBJECTIVE BOX
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 Patient has Alcohol Use Disorder ( recent rehab with relapse and in remission since 11/2022), decompensated ALD Cirrhosis c/b ascites. Patient admitted to [facility] on 12/19/2022 after a witnessed seizure; course was c/b septic shock with associated HRF ( intubated 12/19) and an RED ( started HD 12/20). Patient was transferred to [facility] on 12/20 for a liver transplant evaluation. Patient was started on CRRT 12/21 and was extubated 12/23/22. Patient remains in SICU for hemodynamic monitoring and management while liver transplant evaluation is in progress.       Allergies: Macrobid (Rash)  Nexium (Stomach Upset)      MEDICATIONS:   --------------------------------------------------------------------------------------  Neurologic Medications  escitalopram 10 milliGRAM(s) Oral daily  HYDROmorphone  Injectable 0.25 milliGRAM(s) IV Push every 3 hours PRN Severe Pain (7 - 10)  hydrOXYzine hydrochloride Syrup 25 milliGRAM(s) Oral once  levETIRAcetam  Solution 750 milliGRAM(s) Enteral Tube two times a day  ondansetron Injectable 4 milliGRAM(s) IV Push every 6 hours PRN Nausea and/or Vomiting    Respiratory Medications    Cardiovascular Medications  midodrine 20 milliGRAM(s) Oral every 8 hours  norepinephrine Infusion 0.05 MICROgram(s)/kG/Min IV Continuous <Continuous>    Gastrointestinal Medications  folic acid 1 milliGRAM(s) Oral daily  lactulose Syrup 20 Gram(s) Oral every 12 hours  multivitamin/minerals/iron Oral Solution (CENTRUM) 15 milliLiter(s) Oral daily  pantoprazole   Suspension 40 milliGRAM(s) Oral every 24 hours  polyethylene glycol 3350 17 Gram(s) Oral daily  thiamine 100 milliGRAM(s) Enteral Tube daily    Genitourinary Medications    Hematologic/Oncologic Medications  influenza   Vaccine 0.5 milliLiter(s) IntraMuscular once    Antimicrobial/Immunologic Medications  caspofungin IVPB      caspofungin IVPB 50 milliGRAM(s) IV Intermittent every 24 hours  meropenem  IVPB 1000 milliGRAM(s) IV Intermittent every 8 hours  rifAXIMin 550 milliGRAM(s) Oral every 12 hours    Endocrine/Metabolic Medications  insulin lispro (ADMELOG) corrective regimen sliding scale   SubCutaneous every 6 hours  levothyroxine Injectable 103 MICROGram(s) IV Push at bedtime  vasopressin Infusion 0.03 Unit(s)/Min IV Continuous <Continuous>    Topical/Other Medications  chlorhexidine 2% Cloths 1 Application(s) Topical <User Schedule>  CRRT Treatment    <Continuous>  lidocaine   4% Patch 1 Patch Transdermal every 24 hours  Phoxillum Filtration BK 4 / 2.5 5000 milliLiter(s) CRRT <Continuous>  Phoxillum Filtration BK 4 / 2.5 5000 milliLiter(s) CRRT <Continuous>  Phoxillum Filtration BK 4 / 2.5 5000 milliLiter(s) CRRT <Continuous>  sodium chloride 0.65% Nasal 1 Spray(s) Both Nostrils every 3 hours PRN Nasal Congestion  tetracaine/benzocaine/butamben Spray 1 Spray(s) Topical every 3 hours PRN for ngt discomfort    --------------------------------------------------------------------------------------    VITAL SIGNS, INS/OUTS (last 24 hours):  --------------------------------------------------------------------------------------  T(C): 36.1 (01-01-23 @ 03:00), Max: 37.6 (12-31-22 @ 23:00)  HR: 85 (01-01-23 @ 04:45) (73 - 100)  BP: 109/67 (12-31-22 @ 10:15) (98/60 - 109/67)  RR: 31 (01-01-23 @ 04:45) (15 - 35)  SpO2: 97% (01-01-23 @ 04:45) (91% - 100%)    12-30-22 @ 07:01  -  12-31-22 @ 07:00  --------------------------------------------------------  IN: 1932.1 mL / OUT: 2565 mL / NET: -632.9 mL    12-31-22 @ 07:01  -  01-01-23 @ 04:59  --------------------------------------------------------  IN: 1539.3 mL / OUT: 2382 mL / NET: -842.7 mL      --------------------------------------------------------------------------------------    EXAM  GENERAL:   NAD, well-groomed, well-developed  HEAD:    Atraumatic, Normocephalic  EYES:   EOMI, 2mm PERRLA, conjunctiva and scleral icterus   ENMT:   NGT in place. No oropharyngeal exudates, erythema or lesions,  Moist mucous membranes  NECK:   Supple, no cervical lymphadenopathy, no JVD  NERVOUS SYSTEM:   Alert & Oriented X2, CN II-XII intact, Moves all extremities  ; Upper extremities 5 /5; Lower extremities 5/5, full sensation to light touch   CHEST/LUNG:    HFNC 30L/ 30%. Breath sounds coarse bilaterally.   HEART:   cardiac monitor  NSR  ; S1/S2 without murmurs, without rubs, or gallops.  ABDOMEN:   Distended, taut, nontender; Diffuse abdominal rash blanching with urticaria.  Bowel sounds present, Bladder non distended, non palpable  EXTREMITIES:   Pulses palpable radial pulses 2+ bilat, DP/PT 1+/1+ bilat, without clubbing, cyanosis. Digits warm to touch with good cap refill < 3 secs  SKIN:   warm, dry, intact, jaundice, abdominal rash that blanches and extends to back, no abnormal lesions, without palpable nodes    LABS  --------------------------------------------------------------------------------------                        8.5    30.02 )-----------( 39       ( 01 Jan 2023 00:30 )             24.1   01-01    138  |  104  |  11  ----------------------------<  124<H>  3.9   |  21<L>  |  0.78    Ca    8.6      01 Jan 2023 00:29  Phos  3.3     01-01  Mg     2.4     01-01    TPro  6.0  /  Alb  3.3  /  TBili  28.0<H>  /  DBili  x   /  AST  162<H>  /  ALT  61<H>  /  AlkPhos  196<H>  01-01  ABG - ( 01 Jan 2023 00:07 )  pH, Arterial: 7.45  pH, Blood: x     /  pCO2: 36    /  pO2: 91    / HCO3: 25    / Base Excess: 1.1   /  SaO2: 98.6            PT/INR - ( 01 Jan 2023 00:29 )   PT: 28.0 sec;   INR: 2.39 ratio         PTT - ( 01 Jan 2023 00:29 )  PTT:48.1 sec  --------------------------------------------------------------------------------------     SICU Daily Progress Note  =====================================================  Interval/Overnight Events:     - Abdominal Rash present diffusely with blanching and urticaria, now extending to patient's back s/p Atarax 25mg x1  - Started TF with Goal 35cc/hr    HPI:  Patient is a 63 y/o Female with PMH Thyroid Cancer ( s/p total thyroidectomy in her 20s, radiation, and BARONE), HTN, GERD, 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 Patient has Alcohol Use Disorder ( recent rehab with relapse and in remission since 11/2022), decompensated ALD Cirrhosis c/b ascites. Patient admitted to [facility] on 12/19/2022 after a witnessed seizure; course was c/b septic shock with associated HRF ( intubated 12/19) and an RED ( started HD 12/20). Patient was transferred to [facility] on 12/20 for a liver transplant evaluation. Patient was started on CRRT 12/21 and was extubated 12/23/22. Patient remains in SICU for hemodynamic monitoring and management while liver transplant evaluation is in progress.       Allergies: Macrobid (Rash)  Nexium (Stomach Upset)      MEDICATIONS:   --------------------------------------------------------------------------------------  Neurologic Medications  escitalopram 10 milliGRAM(s) Oral daily  HYDROmorphone  Injectable 0.25 milliGRAM(s) IV Push every 3 hours PRN Severe Pain (7 - 10)  hydrOXYzine hydrochloride Syrup 25 milliGRAM(s) Oral once  levETIRAcetam  Solution 750 milliGRAM(s) Enteral Tube two times a day  ondansetron Injectable 4 milliGRAM(s) IV Push every 6 hours PRN Nausea and/or Vomiting    Respiratory Medications    Cardiovascular Medications  midodrine 20 milliGRAM(s) Oral every 8 hours  norepinephrine Infusion 0.05 MICROgram(s)/kG/Min IV Continuous <Continuous>    Gastrointestinal Medications  folic acid 1 milliGRAM(s) Oral daily  lactulose Syrup 20 Gram(s) Oral every 12 hours  multivitamin/minerals/iron Oral Solution (CENTRUM) 15 milliLiter(s) Oral daily  pantoprazole   Suspension 40 milliGRAM(s) Oral every 24 hours  polyethylene glycol 3350 17 Gram(s) Oral daily  thiamine 100 milliGRAM(s) Enteral Tube daily    Genitourinary Medications    Hematologic/Oncologic Medications  influenza   Vaccine 0.5 milliLiter(s) IntraMuscular once    Antimicrobial/Immunologic Medications  caspofungin IVPB      caspofungin IVPB 50 milliGRAM(s) IV Intermittent every 24 hours  meropenem  IVPB 1000 milliGRAM(s) IV Intermittent every 8 hours  rifAXIMin 550 milliGRAM(s) Oral every 12 hours    Endocrine/Metabolic Medications  insulin lispro (ADMELOG) corrective regimen sliding scale   SubCutaneous every 6 hours  levothyroxine Injectable 103 MICROGram(s) IV Push at bedtime  vasopressin Infusion 0.03 Unit(s)/Min IV Continuous <Continuous>    Topical/Other Medications  chlorhexidine 2% Cloths 1 Application(s) Topical <User Schedule>  CRRT Treatment    <Continuous>  lidocaine   4% Patch 1 Patch Transdermal every 24 hours  Phoxillum Filtration BK 4 / 2.5 5000 milliLiter(s) CRRT <Continuous>  Phoxillum Filtration BK 4 / 2.5 5000 milliLiter(s) CRRT <Continuous>  Phoxillum Filtration BK 4 / 2.5 5000 milliLiter(s) CRRT <Continuous>  sodium chloride 0.65% Nasal 1 Spray(s) Both Nostrils every 3 hours PRN Nasal Congestion  tetracaine/benzocaine/butamben Spray 1 Spray(s) Topical every 3 hours PRN for ngt discomfort    --------------------------------------------------------------------------------------    VITAL SIGNS, INS/OUTS (last 24 hours):  --------------------------------------------------------------------------------------  T(C): 36.1 (01-01-23 @ 03:00), Max: 37.6 (12-31-22 @ 23:00)  HR: 85 (01-01-23 @ 04:45) (73 - 100)  BP: 109/67 (12-31-22 @ 10:15) (98/60 - 109/67)  RR: 31 (01-01-23 @ 04:45) (15 - 35)  SpO2: 97% (01-01-23 @ 04:45) (91% - 100%)    12-30-22 @ 07:01  -  12-31-22 @ 07:00  --------------------------------------------------------  IN: 1932.1 mL / OUT: 2565 mL / NET: -632.9 mL    12-31-22 @ 07:01  -  01-01-23 @ 04:59  --------------------------------------------------------  IN: 1539.3 mL / OUT: 2382 mL / NET: -842.7 mL      --------------------------------------------------------------------------------------    EXAM  GENERAL:   NAD, well-groomed, well-developed  HEAD:    Atraumatic, Normocephalic  EYES:   EOMI, 2mm PERRLA, conjunctiva and scleral icterus   ENMT:   NGT in place. No oropharyngeal exudates, erythema or lesions,  Moist mucous membranes  NECK:   Supple, no cervical lymphadenopathy, no JVD  NERVOUS SYSTEM:   Alert & Oriented X2, CN II-XII intact, Moves all extremities  ; Upper extremities 5 /5; Lower extremities 5/5, full sensation to light touch   CHEST/LUNG:    HFNC 30L/ 30%. Breath sounds coarse bilaterally.   HEART:   cardiac monitor  NSR  ; S1/S2 without murmurs, without rubs, or gallops.  ABDOMEN:   Distended, taut, nontender; Diffuse abdominal rash blanching with urticaria.  Bowel sounds present, Bladder non distended, non palpable  EXTREMITIES:   Pulses palpable radial pulses 2+ bilat, DP/PT 1+/1+ bilat, without clubbing, cyanosis. Digits warm to touch with good cap refill < 3 secs  SKIN:   warm, dry, intact, jaundice, abdominal rash that blanches and extends to back, no abnormal lesions, without palpable nodes    LABS  --------------------------------------------------------------------------------------                        8.5    30.02 )-----------( 39       ( 01 Jan 2023 00:30 )             24.1   01-01    138  |  104  |  11  ----------------------------<  124<H>  3.9   |  21<L>  |  0.78    Ca    8.6      01 Jan 2023 00:29  Phos  3.3     01-01  Mg     2.4     01-01    TPro  6.0  /  Alb  3.3  /  TBili  28.0<H>  /  DBili  x   /  AST  162<H>  /  ALT  61<H>  /  AlkPhos  196<H>  01-01  ABG - ( 01 Jan 2023 00:07 )  pH, Arterial: 7.45  pH, Blood: x     /  pCO2: 36    /  pO2: 91    / HCO3: 25    / Base Excess: 1.1   /  SaO2: 98.6            PT/INR - ( 01 Jan 2023 00:29 )   PT: 28.0 sec;   INR: 2.39 ratio         PTT - ( 01 Jan 2023 00:29 )  PTT:48.1 sec  --------------------------------------------------------------------------------------     SICU Daily Progress Note  =====================================================  Interval/Overnight Events:     - Abdominal Rash present diffusely with blanching and urticaria, now extending to patient's back s/p Atarax 25mg x1  - Started TF with Goal 35cc/hr    HPI:  Patient is a 61 y/o Female with PMH Thyroid Cancer ( s/p total thyroidectomy in her 20s, radiation, and BARONE), HTN, GERD, 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 Patient has Alcohol Use Disorder ( recent rehab with relapse and in remission since 11/2022), decompensated ALD Cirrhosis c/b ascites. Patient admitted to [facility] on 12/19/2022 after a witnessed seizure; course was c/b septic shock with associated HRF ( intubated 12/19) and an RED ( started HD 12/20). Patient was [facility] 12/20 for a liver transplant evaluation. Patient was started on CRRT 12/21 and was extubated 12/23/22. Patient remains in SICU for hemodynamic monitoring and management while liver transplant evaluation is in progress.       Allergies: Macrobid (Rash)  Nexium (Stomach Upset)      MEDICATIONS:   --------------------------------------------------------------------------------------  Neurologic Medications  escitalopram 10 milliGRAM(s) Oral daily  HYDROmorphone  Injectable 0.25 milliGRAM(s) IV Push every 3 hours PRN Severe Pain (7 - 10)  hydrOXYzine hydrochloride Syrup 25 milliGRAM(s) Oral once  levETIRAcetam  Solution 750 milliGRAM(s) Enteral Tube two times a day  ondansetron Injectable 4 milliGRAM(s) IV Push every 6 hours PRN Nausea and/or Vomiting    Respiratory Medications    Cardiovascular Medications  midodrine 20 milliGRAM(s) Oral every 8 hours  norepinephrine Infusion 0.05 MICROgram(s)/kG/Min IV Continuous <Continuous>    Gastrointestinal Medications  folic acid 1 milliGRAM(s) Oral daily  lactulose Syrup 20 Gram(s) Oral every 12 hours  multivitamin/minerals/iron Oral Solution (CENTRUM) 15 milliLiter(s) Oral daily  pantoprazole   Suspension 40 milliGRAM(s) Oral every 24 hours  polyethylene glycol 3350 17 Gram(s) Oral daily  thiamine 100 milliGRAM(s) Enteral Tube daily    Genitourinary Medications    Hematologic/Oncologic Medications  influenza   Vaccine 0.5 milliLiter(s) IntraMuscular once    Antimicrobial/Immunologic Medications  caspofungin IVPB      caspofungin IVPB 50 milliGRAM(s) IV Intermittent every 24 hours  meropenem  IVPB 1000 milliGRAM(s) IV Intermittent every 8 hours  rifAXIMin 550 milliGRAM(s) Oral every 12 hours    Endocrine/Metabolic Medications  insulin lispro (ADMELOG) corrective regimen sliding scale   SubCutaneous every 6 hours  levothyroxine Injectable 103 MICROGram(s) IV Push at bedtime  vasopressin Infusion 0.03 Unit(s)/Min IV Continuous <Continuous>    Topical/Other Medications  chlorhexidine 2% Cloths 1 Application(s) Topical <User Schedule>  CRRT Treatment    <Continuous>  lidocaine   4% Patch 1 Patch Transdermal every 24 hours  Phoxillum Filtration BK 4 / 2.5 5000 milliLiter(s) CRRT <Continuous>  Phoxillum Filtration BK 4 / 2.5 5000 milliLiter(s) CRRT <Continuous>  Phoxillum Filtration BK 4 / 2.5 5000 milliLiter(s) CRRT <Continuous>  sodium chloride 0.65% Nasal 1 Spray(s) Both Nostrils every 3 hours PRN Nasal Congestion  tetracaine/benzocaine/butamben Spray 1 Spray(s) Topical every 3 hours PRN for ngt discomfort    --------------------------------------------------------------------------------------    VITAL SIGNS, INS/OUTS (last 24 hours):  --------------------------------------------------------------------------------------  T(C): 36.1 (01-01-23 @ 03:00), Max: 37.6 (12-31-22 @ 23:00)  HR: 85 (01-01-23 @ 04:45) (73 - 100)  BP: 109/67 (12-31-22 @ 10:15) (98/60 - 109/67)  RR: 31 (01-01-23 @ 04:45) (15 - 35)  SpO2: 97% (01-01-23 @ 04:45) (91% - 100%)    12-30-22 @ 07:01  -  12-31-22 @ 07:00  --------------------------------------------------------  IN: 1932.1 mL / OUT: 2565 mL / NET: -632.9 mL    12-31-22 @ 07:01  -  01-01-23 @ 04:59  --------------------------------------------------------  IN: 1539.3 mL / OUT: 2382 mL / NET: -842.7 mL      --------------------------------------------------------------------------------------    EXAM  GENERAL:   NAD, well-groomed, well-developed  HEAD:    Atraumatic, Normocephalic  EYES:   EOMI, 2mm PERRLA, conjunctiva and scleral icterus   ENMT:   NGT in place. No oropharyngeal exudates, erythema or lesions,  Moist mucous membranes  NECK:   Supple, no cervical lymphadenopathy, no JVD  NERVOUS SYSTEM:   Alert & Oriented X2, CN II-XII intact, Moves all extremities  ; Upper extremities 5 /5; Lower extremities 5/5, full sensation to light touch   CHEST/LUNG:    HFNC 30L/ 30%. Breath sounds coarse bilaterally.   HEART:   cardiac monitor  NSR  ; S1/S2 without murmurs, without rubs, or gallops.  ABDOMEN:   Distended, taut, nontender; Diffuse abdominal rash blanching with urticaria.  Bowel sounds present, Bladder non distended, non palpable  EXTREMITIES:   Pulses palpable radial pulses 2+ bilat, DP/PT 1+/1+ bilat, without clubbing, cyanosis. Digits warm to touch with good cap refill < 3 secs  SKIN:   warm, dry, intact, jaundice, abdominal rash that blanches and extends to back, no abnormal lesions, without palpable nodes    LABS  --------------------------------------------------------------------------------------                        8.5    30.02 )-----------( 39       ( 01 Jan 2023 00:30 )             24.1   01-01    138  |  104  |  11  ----------------------------<  124<H>  3.9   |  21<L>  |  0.78    Ca    8.6      01 Jan 2023 00:29  Phos  3.3     01-01  Mg     2.4     01-01    TPro  6.0  /  Alb  3.3  /  TBili  28.0<H>  /  DBili  x   /  AST  162<H>  /  ALT  61<H>  /  AlkPhos  196<H>  01-01  ABG - ( 01 Jan 2023 00:07 )  pH, Arterial: 7.45  pH, Blood: x     /  pCO2: 36    /  pO2: 91    / HCO3: 25    / Base Excess: 1.1   /  SaO2: 98.6            PT/INR - ( 01 Jan 2023 00:29 )   PT: 28.0 sec;   INR: 2.39 ratio         PTT - ( 01 Jan 2023 00:29 )  PTT:48.1 sec  --------------------------------------------------------------------------------------

## 2023-01-01 NOTE — PROGRESS NOTE ADULT - SUBJECTIVE AND OBJECTIVE BOX
HPI:  Patient seen and examined at bedside on 8 ICU.  No cardiac events overnight.    Review Of Systems:           Respiratory: No shortness of breath, cough, or wheezing  Cardiovascular: No chest pain or palpitations  10 point review of systems is otherwise negative except as mentioned above        Medications:  caspofungin IVPB 50 milliGRAM(s) IV Intermittent every 24 hours  caspofungin IVPB      chlorhexidine 2% Cloths 1 Application(s) Topical <User Schedule>  CRRT Treatment    <Continuous>  escitalopram 10 milliGRAM(s) Oral daily  folic acid 1 milliGRAM(s) Oral daily  HYDROmorphone  Injectable 0.25 milliGRAM(s) IV Push every 3 hours PRN  influenza   Vaccine 0.5 milliLiter(s) IntraMuscular once  lactulose Syrup 20 Gram(s) Oral every 12 hours  levETIRAcetam  Solution 750 milliGRAM(s) Enteral Tube two times a day  levothyroxine Injectable 103 MICROGram(s) IV Push at bedtime  midodrine 20 milliGRAM(s) Oral every 8 hours  multivitamin/minerals/iron Oral Solution (CENTRUM) 15 milliLiter(s) Oral daily  norepinephrine Infusion 0.05 MICROgram(s)/kG/Min IV Continuous <Continuous>  ondansetron Injectable 4 milliGRAM(s) IV Push every 6 hours PRN  pantoprazole   Suspension 40 milliGRAM(s) Oral every 24 hours  Phoxillum Filtration BK 4 / 2.5 5000 milliLiter(s) CRRT <Continuous>  Phoxillum Filtration BK 4 / 2.5 5000 milliLiter(s) CRRT <Continuous>  Phoxillum Filtration BK 4 / 2.5 5000 milliLiter(s) CRRT <Continuous>  polyethylene glycol 3350 17 Gram(s) Oral daily  rifAXIMin 550 milliGRAM(s) Oral every 12 hours  sodium chloride 0.65% Nasal 1 Spray(s) Both Nostrils every 3 hours PRN  tetracaine/benzocaine/butamben Spray 1 Spray(s) Topical every 3 hours PRN  thiamine 100 milliGRAM(s) Enteral Tube daily  vasopressin Infusion 0.03 Unit(s)/Min IV Continuous <Continuous>    PAST MEDICAL & SURGICAL HISTORY:  HTN (hypertension)  off medication for over one year--states BP has been normal      UTI (urinary tract infection)  currently being treated--will complete antibiotics 3/8/2022      Intracranial tumor      GERD (gastroesophageal reflux disease)      Eczema      Thyroid cancer  surgery. radiation, Radioactive iodine      COVID-19 virus infection  2021--recovered at home      H/O total thyroidectomy  age 21&#x27;s for Thyroid cancer, postop complication arterial bleed, second surgery      History of tonsillectomy  age 5      History of appendectomy  age 5        Vitals:  T(C): 36.8 (23 @ 15:00), Max: 37.6 (22 @ 23:00)  HR: 76 (23 @ 17:00) (73 - 93)  BP: 96/56 (23 @ 17:00) (89/50 - 117/60)  BP(mean): 71 (23 @ 17:00) (65 - 83)  RR: 18 (23 @ 17:00) (15 - 35)  SpO2: 98% (23 @ 17:00) (90% - 99%)  Wt(kg): --  Daily     Daily Weight in k.9 (2023 00:00)  I&O's Summary    31 Dec 2022 07:01  -  2023 07:00  --------------------------------------------------------  IN: 1781.4 mL / OUT: 2790 mL / NET: -1008.6 mL    2023 07:01  -  2023 17:22  --------------------------------------------------------  IN: 1326.4 mL / OUT: 1170 mL / NET: 156.4 mL        Physical Exam:  Appearance: Jaundice, encephalopathic   Eyes: PERRLA, EOMI, pink conjunctiva, no scleral icterus   HENT: Normal oral mucosa  Cardiovascular: RRR, S1, S2, no murmur, rub, or gallop; no edema; no JVD  Procedural Access Site: Clean, dry, intact, without hematoma  Respiratory: Clear to auscultation bilaterally  Gastrointestinal: Soft, non-tender, non-distended, BS+  Musculoskeletal: Mitten restraints   Lymphatic: No lymphadenopathy  Skin: No rashes, ecchymoses, or cyanosis                          8.0    28.81 )-----------( 39       ( 2023 12:06 )             23.2     01    139  |  104  |  12  ----------------------------<  111<H>  3.9   |  22  |  0.75    Ca    8.3<L>      2023 12:06  Phos  3.2       Mg     2.3         TPro  6.1  /  Alb  3.5  /  TBili  26.0<H>  /  DBili  x   /  AST  145<H>  /  ALT  54<H>  /  AlkPhos  175<H>      PT/INR - ( 2023 00:29 )   PT: 28.0 sec;   INR: 2.39 ratio         PTT - ( 2023 00:29 )  PTT:48.1 sec      Cardiovascular Diagnostic Testing:  ECG:     Echo:   < from: TTE with Doppler (w/Cont) (12.21.22 @ 10:49) >  ------------------------------------------------------------------------  Dimensions:    Normal Values:  LA:     3.5    2.0 - 4.0 cm  Ao:     3.1    2.0 - 3.8 cm  SEPTUM: 0.7    0.6 - 1.2 cm  PWT:    0.6    0.6 - 1.1 cm  LVIDd: 5.4    3.0 - 5.6 cm  LVIDs:  2.9    1.8 - 4.0 cm  Derived variables:  LVMI: 69 g/m2  RWT: 0.22  Fractional short: 46 %  EF (Visual Estimate):  %  EF (Monroy Rule): 66 %Doppler Peak Velocity (m/sec):  AoV=1.4  ------------------------------------------------------------------------  Observations:  Mitral Valve: Normal mitral valve. Minimal mitral  regurgitation.  Aortic Valve/Aorta: Calcified trileaflet aortic valve with  normal opening. Peak transaortic valve gradient equals 8 mm  Hg. No aorticvalve regurgitation seen. Peak left  ventricular outflow tract gradient equals 4 mm Hg, mean  gradient is equal to 2 mm Hg, LVOT velocity time integral  equals 19 cm.  Aortic Root: 3.1 cm.  Ascending Aorta: 3.3 cm.  LVOT diameter: 2 cm.  Left Atrium:Normal left atrium.  LA volume index = 33  cc/m2.  Left Ventricle: Endocardial visualization enhanced with  intravenous injection of Ultrasonic Enhancing Agent  (Definity). Left ventricle suboptimally visualized despite  intravenous injection of ultrasonic enhancing agent; normal  left ventricular systolic function. No segmental wall  motion abnormalities. Normal left ventricular internal  dimensions and wall thicknesses. Normal diastolic function.  Right Heart: Normal right atrium. Normal right ventricular  size and function. Normal tricuspid valve. Mild-moderate  tricuspid regurgitation. Normal pulmonic valve. Minimal  pulmonic regurgitation.  Pericardium/Pleura: Normal pericardium with no pericardial  effusion.  Hemodynamic: Estimated right atrial pressure is 8 mm Hg.  Estimated right ventricular systolic pressure equals 29 mm  Hg, assuming right atrial pressure equals 8 mm Hg,  consistent with normal pulmonary pressures.  ------------------------------------------------------------------------  Conclusions:  1. Normal mitral valve. Minimal mitral regurgitation.  2. Calcified trileaflet aortic valve with normal opening.  No aortic valve regurgitation seen.  3. Endocardial visualization enhanced with intravenous  injection of Ultrasonic Enhancing Agent (Definity). Left  ventricle suboptimally visualized despite intravenous  injection of ultrasonic enhancing agent; normal left  ventricular systolic function. No segmental wall motion  abnormalities.  4. Normal right ventricular size and function.  *** No previous Echo exam.  ------------------------------------------------------------------------  Confirmed on  2022 - 13:40:44 by Rosalinda Ingram M.D.  ------------------------------------------------------------------------    < end of copied text >

## 2023-01-02 DIAGNOSIS — R34 ANURIA AND OLIGURIA: ICD-10-CM

## 2023-01-02 DIAGNOSIS — E87.8 OTHER DISORDERS OF ELECTROLYTE AND FLUID BALANCE, NOT ELSEWHERE CLASSIFIED: ICD-10-CM

## 2023-01-02 DIAGNOSIS — K74.60 UNSPECIFIED CIRRHOSIS OF LIVER: ICD-10-CM

## 2023-01-02 LAB
ALBUMIN SERPL ELPH-MCNC: 3.5 G/DL — SIGNIFICANT CHANGE UP (ref 3.3–5)
ALBUMIN SERPL ELPH-MCNC: 3.6 G/DL — SIGNIFICANT CHANGE UP (ref 3.3–5)
ALP SERPL-CCNC: 170 U/L — HIGH (ref 40–120)
ALP SERPL-CCNC: 178 U/L — HIGH (ref 40–120)
ALP SERPL-CCNC: 180 U/L — HIGH (ref 40–120)
ALP SERPL-CCNC: 181 U/L — HIGH (ref 40–120)
ALT FLD-CCNC: 48 U/L — HIGH (ref 10–45)
ALT FLD-CCNC: 49 U/L — HIGH (ref 10–45)
ALT FLD-CCNC: 50 U/L — HIGH (ref 10–45)
ALT FLD-CCNC: 51 U/L — HIGH (ref 10–45)
AMMONIA BLD-MCNC: 52 UMOL/L — SIGNIFICANT CHANGE UP (ref 11–55)
ANION GAP SERPL CALC-SCNC: 13 MMOL/L — SIGNIFICANT CHANGE UP (ref 5–17)
ANION GAP SERPL CALC-SCNC: 14 MMOL/L — SIGNIFICANT CHANGE UP (ref 5–17)
APTT BLD: 53.4 SEC — HIGH (ref 27.5–35.5)
AST SERPL-CCNC: 125 U/L — HIGH (ref 10–40)
AST SERPL-CCNC: 137 U/L — HIGH (ref 10–40)
AST SERPL-CCNC: 151 U/L — HIGH (ref 10–40)
AST SERPL-CCNC: 153 U/L — HIGH (ref 10–40)
BASOPHILS # BLD AUTO: 0 K/UL — SIGNIFICANT CHANGE UP (ref 0–0.2)
BASOPHILS NFR BLD AUTO: 0 % — SIGNIFICANT CHANGE UP (ref 0–2)
BILIRUB SERPL-MCNC: 25.5 MG/DL — HIGH (ref 0.2–1.2)
BILIRUB SERPL-MCNC: 26.9 MG/DL — HIGH (ref 0.2–1.2)
BILIRUB SERPL-MCNC: 27.2 MG/DL — HIGH (ref 0.2–1.2)
BILIRUB SERPL-MCNC: 28.3 MG/DL — HIGH (ref 0.2–1.2)
BLD GP AB SCN SERPL QL: NEGATIVE — SIGNIFICANT CHANGE UP
BUN SERPL-MCNC: 12 MG/DL — SIGNIFICANT CHANGE UP (ref 7–23)
BUN SERPL-MCNC: 13 MG/DL — SIGNIFICANT CHANGE UP (ref 7–23)
CALCIUM SERPL-MCNC: 8.4 MG/DL — SIGNIFICANT CHANGE UP (ref 8.4–10.5)
CALCIUM SERPL-MCNC: 8.5 MG/DL — SIGNIFICANT CHANGE UP (ref 8.4–10.5)
CALCIUM SERPL-MCNC: 8.6 MG/DL — SIGNIFICANT CHANGE UP (ref 8.4–10.5)
CALCIUM SERPL-MCNC: 9.1 MG/DL — SIGNIFICANT CHANGE UP (ref 8.4–10.5)
CHLORIDE SERPL-SCNC: 102 MMOL/L — SIGNIFICANT CHANGE UP (ref 96–108)
CHLORIDE SERPL-SCNC: 103 MMOL/L — SIGNIFICANT CHANGE UP (ref 96–108)
CHLORIDE SERPL-SCNC: 105 MMOL/L — SIGNIFICANT CHANGE UP (ref 96–108)
CO2 SERPL-SCNC: 20 MMOL/L — LOW (ref 22–31)
CO2 SERPL-SCNC: 21 MMOL/L — LOW (ref 22–31)
CREAT SERPL-MCNC: 0.76 MG/DL — SIGNIFICANT CHANGE UP (ref 0.5–1.3)
CREAT SERPL-MCNC: 0.78 MG/DL — SIGNIFICANT CHANGE UP (ref 0.5–1.3)
CREAT SERPL-MCNC: 0.79 MG/DL — SIGNIFICANT CHANGE UP (ref 0.5–1.3)
CREAT SERPL-MCNC: 0.89 MG/DL — SIGNIFICANT CHANGE UP (ref 0.5–1.3)
EGFR: 74 ML/MIN/1.73M2 — SIGNIFICANT CHANGE UP
EGFR: 85 ML/MIN/1.73M2 — SIGNIFICANT CHANGE UP
EGFR: 86 ML/MIN/1.73M2 — SIGNIFICANT CHANGE UP
EGFR: 89 ML/MIN/1.73M2 — SIGNIFICANT CHANGE UP
EOSINOPHIL # BLD AUTO: 1.41 K/UL — HIGH (ref 0–0.5)
EOSINOPHIL NFR BLD AUTO: 4.2 % — SIGNIFICANT CHANGE UP (ref 0–6)
GAS PNL BLDA: SIGNIFICANT CHANGE UP
GLUCOSE SERPL-MCNC: 112 MG/DL — HIGH (ref 70–99)
GLUCOSE SERPL-MCNC: 119 MG/DL — HIGH (ref 70–99)
GLUCOSE SERPL-MCNC: 94 MG/DL — SIGNIFICANT CHANGE UP (ref 70–99)
GLUCOSE SERPL-MCNC: 98 MG/DL — SIGNIFICANT CHANGE UP (ref 70–99)
HCT VFR BLD CALC: 26.7 % — LOW (ref 34.5–45)
HCT VFR BLD CALC: 27 % — LOW (ref 34.5–45)
HGB BLD-MCNC: 9.2 G/DL — LOW (ref 11.5–15.5)
INR BLD: 2.39 RATIO — HIGH (ref 0.88–1.16)
LYMPHOCYTES # BLD AUTO: 0.87 K/UL — LOW (ref 1–3.3)
LYMPHOCYTES # BLD AUTO: 2.6 % — LOW (ref 13–44)
MAGNESIUM SERPL-MCNC: 2.1 MG/DL — SIGNIFICANT CHANGE UP (ref 1.6–2.6)
MAGNESIUM SERPL-MCNC: 2.3 MG/DL — SIGNIFICANT CHANGE UP (ref 1.6–2.6)
MCHC RBC-ENTMCNC: 30.5 PG — SIGNIFICANT CHANGE UP (ref 27–34)
MCHC RBC-ENTMCNC: 34.1 GM/DL — SIGNIFICANT CHANGE UP (ref 32–36)
MCHC RBC-ENTMCNC: 34.5 GM/DL — SIGNIFICANT CHANGE UP (ref 32–36)
MCV RBC AUTO: 88.4 FL — SIGNIFICANT CHANGE UP (ref 80–100)
MCV RBC AUTO: 89.4 FL — SIGNIFICANT CHANGE UP (ref 80–100)
MONOCYTES # BLD AUTO: 1.41 K/UL — HIGH (ref 0–0.9)
MONOCYTES NFR BLD AUTO: 4.2 % — SIGNIFICANT CHANGE UP (ref 2–14)
NEUTROPHILS # BLD AUTO: 29.63 K/UL — HIGH (ref 1.8–7.4)
NEUTROPHILS NFR BLD AUTO: 82.2 % — HIGH (ref 43–77)
NRBC # BLD: 0 /100 WBCS — SIGNIFICANT CHANGE UP (ref 0–0)
PHOSPHATE SERPL-MCNC: 3.3 MG/DL — SIGNIFICANT CHANGE UP (ref 2.5–4.5)
PHOSPHATE SERPL-MCNC: 3.8 MG/DL — SIGNIFICANT CHANGE UP (ref 2.5–4.5)
PLATELET # BLD AUTO: 26 K/UL — LOW (ref 150–400)
PLATELET # BLD AUTO: 32 K/UL — LOW (ref 150–400)
POTASSIUM SERPL-MCNC: 3.8 MMOL/L — SIGNIFICANT CHANGE UP (ref 3.5–5.3)
POTASSIUM SERPL-MCNC: 3.9 MMOL/L — SIGNIFICANT CHANGE UP (ref 3.5–5.3)
POTASSIUM SERPL-MCNC: 4 MMOL/L — SIGNIFICANT CHANGE UP (ref 3.5–5.3)
POTASSIUM SERPL-MCNC: 4.1 MMOL/L — SIGNIFICANT CHANGE UP (ref 3.5–5.3)
POTASSIUM SERPL-SCNC: 3.8 MMOL/L — SIGNIFICANT CHANGE UP (ref 3.5–5.3)
POTASSIUM SERPL-SCNC: 3.9 MMOL/L — SIGNIFICANT CHANGE UP (ref 3.5–5.3)
POTASSIUM SERPL-SCNC: 4 MMOL/L — SIGNIFICANT CHANGE UP (ref 3.5–5.3)
POTASSIUM SERPL-SCNC: 4.1 MMOL/L — SIGNIFICANT CHANGE UP (ref 3.5–5.3)
PROT SERPL-MCNC: 5.9 G/DL — LOW (ref 6–8.3)
PROT SERPL-MCNC: 6.1 G/DL — SIGNIFICANT CHANGE UP (ref 6–8.3)
PROT SERPL-MCNC: 6.3 G/DL — SIGNIFICANT CHANGE UP (ref 6–8.3)
PROTHROM AB SERPL-ACNC: 28 SEC — HIGH (ref 10.5–13.4)
RBC # BLD: 3.02 M/UL — LOW (ref 3.8–5.2)
RBC # FLD: 17.7 % — HIGH (ref 10.3–14.5)
RBC # FLD: 18.1 % — HIGH (ref 10.3–14.5)
RH IG SCN BLD-IMP: NEGATIVE — SIGNIFICANT CHANGE UP
SODIUM SERPL-SCNC: 136 MMOL/L — SIGNIFICANT CHANGE UP (ref 135–145)
SODIUM SERPL-SCNC: 137 MMOL/L — SIGNIFICANT CHANGE UP (ref 135–145)
SODIUM SERPL-SCNC: 139 MMOL/L — SIGNIFICANT CHANGE UP (ref 135–145)
WBC # BLD: 27.93 K/UL — HIGH (ref 3.8–10.5)
WBC # BLD: 33.63 K/UL — HIGH (ref 3.8–10.5)
WBC # FLD AUTO: 27.93 K/UL — HIGH (ref 3.8–10.5)
WBC # FLD AUTO: 33.63 K/UL — HIGH (ref 3.8–10.5)

## 2023-01-02 PROCEDURE — 90945 DIALYSIS ONE EVALUATION: CPT

## 2023-01-02 PROCEDURE — 71045 X-RAY EXAM CHEST 1 VIEW: CPT | Mod: 26

## 2023-01-02 PROCEDURE — 99232 SBSQ HOSP IP/OBS MODERATE 35: CPT

## 2023-01-02 PROCEDURE — 99233 SBSQ HOSP IP/OBS HIGH 50: CPT

## 2023-01-02 RX ORDER — POLYETHYLENE GLYCOL 3350 17 G/17G
17 POWDER, FOR SOLUTION ORAL EVERY 12 HOURS
Refills: 0 | Status: DISCONTINUED | OUTPATIENT
Start: 2023-01-02 | End: 2023-01-05

## 2023-01-02 RX ORDER — POLYETHYLENE GLYCOL 3350 17 G/17G
17 POWDER, FOR SOLUTION ORAL EVERY 12 HOURS
Refills: 0 | Status: DISCONTINUED | OUTPATIENT
Start: 2023-01-02 | End: 2023-01-02

## 2023-01-02 RX ADMIN — Medication 15 MILLILITER(S): at 11:11

## 2023-01-02 RX ADMIN — PANTOPRAZOLE SODIUM 40 MILLIGRAM(S): 20 TABLET, DELAYED RELEASE ORAL at 12:01

## 2023-01-02 RX ADMIN — Medication 100 MILLIGRAM(S): at 11:11

## 2023-01-02 RX ADMIN — POLYETHYLENE GLYCOL 3350 17 GRAM(S): 17 POWDER, FOR SOLUTION ORAL at 05:17

## 2023-01-02 RX ADMIN — MEROPENEM 100 MILLIGRAM(S): 1 INJECTION INTRAVENOUS at 22:08

## 2023-01-02 RX ADMIN — CHLORHEXIDINE GLUCONATE 1 APPLICATION(S): 213 SOLUTION TOPICAL at 05:17

## 2023-01-02 RX ADMIN — Medication 6.21 MICROGRAM(S)/KG/MIN: at 07:09

## 2023-01-02 RX ADMIN — ESCITALOPRAM OXALATE 10 MILLIGRAM(S): 10 TABLET, FILM COATED ORAL at 11:11

## 2023-01-02 RX ADMIN — MEROPENEM 100 MILLIGRAM(S): 1 INJECTION INTRAVENOUS at 13:53

## 2023-01-02 RX ADMIN — MIDODRINE HYDROCHLORIDE 20 MILLIGRAM(S): 2.5 TABLET ORAL at 13:54

## 2023-01-02 RX ADMIN — MEROPENEM 100 MILLIGRAM(S): 1 INJECTION INTRAVENOUS at 05:17

## 2023-01-02 RX ADMIN — Medication 1 MILLIGRAM(S): at 11:11

## 2023-01-02 RX ADMIN — CASPOFUNGIN ACETATE 260 MILLIGRAM(S): 7 INJECTION, POWDER, LYOPHILIZED, FOR SOLUTION INTRAVENOUS at 09:33

## 2023-01-02 RX ADMIN — MIDODRINE HYDROCHLORIDE 20 MILLIGRAM(S): 2.5 TABLET ORAL at 22:08

## 2023-01-02 RX ADMIN — LEVETIRACETAM 750 MILLIGRAM(S): 250 TABLET, FILM COATED ORAL at 05:17

## 2023-01-02 RX ADMIN — LEVETIRACETAM 750 MILLIGRAM(S): 250 TABLET, FILM COATED ORAL at 17:07

## 2023-01-02 RX ADMIN — POLYETHYLENE GLYCOL 3350 17 GRAM(S): 17 POWDER, FOR SOLUTION ORAL at 17:08

## 2023-01-02 RX ADMIN — VASOPRESSIN 4.5 UNIT(S)/MIN: 20 INJECTION INTRAVENOUS at 07:09

## 2023-01-02 RX ADMIN — Medication 103 MICROGRAM(S): at 22:08

## 2023-01-02 RX ADMIN — MIDODRINE HYDROCHLORIDE 20 MILLIGRAM(S): 2.5 TABLET ORAL at 05:17

## 2023-01-02 NOTE — PROGRESS NOTE ADULT - ASSESSMENT
62 y.o Hx significant for decompensated ETOH cirrhosis c/b ascites (dx 11/2022), alc hep (dx 11/2022; non-responsive to steroids), remote h/o thyroid cancer in her 20s s/p total thyroidectomy + RTX + radioactive iodide, HTN, ventrical neoplasm (dx 2021) s/p right frontal craniotomy (03/2022) for resection post operative course c/b hemorrhage right lateral ventricle (managed non-operatively) who was initially admitted to  12/19 with new onset seizures and transferred to [facility] 12/20 for LT eval for ACLF.        Impression:  #ACLF with underlying cirrhosis  #AUD  #Decompensated ETOH cirrhosis c/b prior ascites req LVP- UNOS/OPTN MELD-Na 42 (1/1); blood group O neg  Of note was recently admitted to  11/2022 for workup and eval of new onset jaundice- during that hospitalization was found to have a UTI and dx with alc hep was treated for UTI w/ abx and started on steroids (was deemed a non-responder and steroids were subsequently stopped); s/p LVP at [place] 11/2022. Previously hospitalized in 2021 at [place] for ETOH detox but pt reportedly unaware of any liver dysfunction at that time. Went to ETOH rehab 08/2022 and was asked to leave the facility when she was COVID+; was sober for 1 week until she started drinking again. Had also attended a few AA meetings in the past. ;  s/p vanco (12/21 -12/22), fluconazole (12/20 -12/26), meropenem (12/20 -12/31)       -Ascites: large volume on CT A/P       -HCC: pending contrasted imaging study       -SBP: negative on para 12/21, neg 12/26       -Varices: no h/o EGD       -Hepatic encephalopathy: unable to assess; intubated; on lactulose/rifaximin    #LT eval  - hepatitis B surface <3, hepatitis A IgM, B core, B surface, A IgG, B surface nonreactive  - EBV IgM and early antigen negative, CMV IgG positive   - acetaminophen negative, TSH 0.05, RPR negative         -LT candidacy and evaluation:            [x] Blood type: ABO O neg            [ ] Psychosocial            [ ] Infectious            [ ] Cardiology:                    Cardiac cath:                    Stress test:             [ ] Colonoscopy:             [ ] Mammography:             [ ] Pap smear:             [ ] Dental            [ ] PT/Frailty    #new onset seizure- unknown trigger/etiology- currently on Keppra  #Worsening bilateral airspace opacities: likely ARDS vs PNA- weaned HFNC to NC; on empiric antimicrobials (See below)  #new rash over anterior abdomen- c/f possible drug rash; per family has reported h/o skin sensitivity    Recommendations:  -trend clinical symptoms, exam findings, vital signs, CBC, CMP, INR  -continue CRRT, net even  -c/w broad spectrum antimicrobials- caspofungin (12/26 ->)   -wean pressors as able  -c/w stopped lactulose; on miralax 17 gm q12h, rifaximin  -c/w midodrine 20 q8h  -c/w thiamine 100 mg daily  -PT  -check XR abd  -if rash does not improve, will consider derm consult    **pt's /NOLAURIE Gibson ([phone])    Note incomplete until finalized by attending signature/attestation.    Arleen Rawls MD  GI Fellow, PGY-5  Available via [employer] Teams    NON-URGENT CONSULTS:  Please email [email or web address]  AT NIGHT AND ON WEEKENDS:  Contact on-call GI fellow via answering service ([phone]) from 5pm-8am and on weekends/holidays  MONDAY-FRIDAY 8AM-5PM:  Pager# [phone] ([facility]) or [phone] ([facility])  GI Phone# [phone] ([facility])     62 y.o Hx significant for decompensated ETOH cirrhosis c/b ascites (dx 11/2022), alc hep (dx 11/2022; non-responsive to steroids), remote h/o thyroid cancer in her 20s s/p total thyroidectomy + RTX + radioactive iodide, HTN, 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        Impression:  #ACLF with underlying cirrhosis  #AUD  #Decompensated ETOH cirrhosis c/b prior ascites req LVP- UNOS/OPTN MELD-Na 42 (1/1); blood group O neg  Of note was recently admitted to  11/2022 for workup and eval of new onset jaundice- during that hospitalization was found to have a UTI and dx with alc hep was treated for UTI w/ abx and started on steroids (was deemed a non-responder and steroids were subsequently stopped); s/p LVP at [place] 11/2022. Previously hospitalized in 2021 at [place] for ETOH detox but pt reportedly unaware of any liver dysfunction at that time. Went to ETOH rehab 08/2022 and was asked to leave the facility when she was COVID+; was sober for 1 week until she started drinking again. Had also attended a few AA meetings in the past. ;  s/p vanco (12/21 -12/22), fluconazole (12/20 -12/26), meropenem (12/20 -12/31)       -Ascites: large volume on CT A/P       -HCC: pending contrasted imaging study       -SBP: negative on para 12/21, neg 12/26       -Varices: no h/o EGD       -Hepatic encephalopathy: unable to assess; intubated; on lactulose/rifaximin    #LT eval  - hepatitis B surface <3, hepatitis A IgM, B core, B surface, A IgG, B surface nonreactive  - EBV IgM and early antigen negative, CMV IgG positive   - acetaminophen negative, TSH 0.05, RPR negative         -LT candidacy and evaluation:            [x] Blood type: ABO O neg            [ ] Psychosocial            [ ] Infectious            [ ] Cardiology:                    Cardiac cath:                    Stress test:             [ ] Colonoscopy:             [ ] Mammography:             [ ] Pap smear:             [ ] Dental            [ ] PT/Frailty    #new onset seizure- unknown trigger/etiology- currently on Keppra  #Worsening bilateral airspace opacities: likely ARDS vs PNA- weaned HFNC to NC; on empiric antimicrobials (See below)  #new rash over anterior abdomen- c/f possible drug rash; per family has reported h/o skin sensitivity    Recommendations:  -trend clinical symptoms, exam findings, vital signs, CBC, CMP, INR  -continue CRRT, net even  -c/w broad spectrum antimicrobials- caspofungin (12/26 ->)   -wean pressors as able  -c/w stopped lactulose; on miralax 17 gm q12h, rifaximin  -c/w midodrine 20 q8h  -c/w thiamine 100 mg daily  -PT  -check XR abd  -if rash does not improve, will consider derm consult    **pt's /NOLAURIE Gibson ([phone])    Note incomplete until finalized by attending signature/attestation.    Arleen Rawls MD  GI Fellow, PGY-5  Available via [employer] Teams    NON-URGENT CONSULTS:  Please email [email or web address]  [email or web address]  AT NIGHT AND ON WEEKENDS:  Contact on-call GI fellow via answering service ([phone]) from 5pm-8am and on weekends/holidays  MONDAY-FRIDAY 8AM-5PM:  Pager# [phone] ([facility]) or [phone] ([facility])  GI Phone# [phone] ([facility])     62 y.o Hx significant for decompensated ETOH cirrhosis c/b ascites (dx 11/2022), alc hep (dx 11/2022; non-responsive to steroids), remote h/o thyroid cancer in her 20s s/p total thyroidectomy + RTX + radioactive iodide, HTN, ventrical neoplasm (dx 2021) s/p right frontal craniotomy (03/2022) for resection post operative course c/b hemorrhage right lateral ventricle (managed non-operatively) who was initially admitted to  12/19 with new onset seizures and transferred to [facility] 12/20 for LT eval for ACLF.        Impression:  #ACLF with underlying cirrhosis  #AUD  #Decompensated ETOH cirrhosis c/b prior ascites req LVP- UNOS/OPTN MELD-Na 42 (1/1); blood group O neg  Of note was recently admitted to  11/2022 for workup and eval of new onset jaundice- during that hospitalization was found to have a UTI and dx with alc hep was treated for UTI w/ abx and started on steroids (was deemed a non-responder and steroids were subsequently stopped); s/p LVP at [place] 11/2022. Previously hospitalized in 2021 at [place] for ETOH detox but pt reportedly unaware of any liver dysfunction at that time. Went to ETOH rehab 08/2022 and was asked to leave the facility when she was COVID+; was sober for 1 week until she started drinking again. Had also attended a few AA meetings in the past. ;  s/p vanco (12/21 -12/22), fluconazole (12/20 -12/26), meropenem (12/20 -12/31)       -Ascites: large volume on CT A/P       -HCC: pending contrasted imaging study       -SBP: negative on para 12/21, neg 12/26       -Varices: no h/o EGD       -Hepatic encephalopathy: unable to assess; intubated; on lactulose/rifaximin    #LT eval  - hepatitis B surface <3, hepatitis A IgM, B core, B surface, A IgG, B surface nonreactive  - EBV IgM and early antigen negative, CMV IgG positive   - acetaminophen negative, TSH 0.05, RPR negative         -LT candidacy and evaluation:            [x] Blood type: ABO O neg            [ ] Psychosocial            [ ] Infectious            [ ] Cardiology:                    Cardiac cath:                    Stress test:             [ ] Colonoscopy:             [ ] Mammography:             [ ] Pap smear:             [ ] Dental            [ ] PT/Frailty    #new onset seizure- unknown trigger/etiology- currently on Keppra  #Worsening bilateral airspace opacities: likely ARDS vs PNA- weaned HFNC to NC; on empiric antimicrobials (See below)  #new rash over anterior abdomen- c/f possible drug rash; per family has reported h/o skin sensitivity    Recommendations:  -trend clinical symptoms, exam findings, vital signs, CBC, CMP, INR  -continue CRRT, net even  -c/w broad spectrum antimicrobials- caspofungin (12/26 ->)   -wean pressors as able  -c/w stopped lactulose; on miralax 17 gm q12h, rifaximin  -c/w midodrine 20 q8h  -c/w thiamine 100 mg daily  -PT  -check XR abd  -if rash does not improve, will consider derm consult    **pt's /NOLAURIE Gibson ([phone])    Note incomplete until finalized by attending signature/attestation.    Arleen Rawls MD  GI Fellow, PGY-5  Available via [employer] Teams    NON-URGENT CONSULTS:  Please email [email or web address]  AT NIGHT AND ON WEEKENDS:  Contact on-call GI fellow via answering service ([phone]) from 5pm-8am and on weekends/holidays  MONDAY-FRIDAY 8AM-5PM:  Pager# [phone] ([facility]) or [phone] ([facility])  GI Phone# [phone] ([facility])

## 2023-01-02 NOTE — PROGRESS NOTE ADULT - NS ATTEND AMEND GEN_ALL_CORE FT
aaox2  ammonia 52 downtrending  rifaximin, miralax  keppra for recent seizure, hx of craniotomy   4lit NC, wean as tolerated  AM CXR grossly unchanged reticular pattern  levo 0.11, vaso 0.03 MAP goal of 65  midodrine [id number]  lact 1.5  TEN 20/hr via NGT  rectal tube: output slowed down  MELD Na >40, t bili 27  abd distention likely ascites  protonix. will discuss w transplant to possibly stop  CRRT w net even  anuric , likely hepatorenal syndrome  s/p albumin boluses, octreotide  stable Hb  thrombocytopenia, stable  INR 3.9  no chem VTE PPX   Bcx, paracentesis no growth  empiric caspo, merop  ID following, concern for persistent infection   good glycemic control    full code  HD rt IJ, CVC IJ left , left radial a line aaox2  ammonia 52 downtrending  rifaximin, miralax  keppra for recent seizure, hx of craniotomy   4lit NC, wean as tolerated  AM CXR grossly unchanged reticular pattern  levo 0.11, vaso 0.03 MAP goal of 65  midodrine [id number]  lact 1.5  TEN 20/hr via NGT  rectal tube: output slowed down  MELD Na >40, t bili 27  abd distention likely ascites  protonix. will discuss w transplant to possibly stop  CRRT w net even  anuric , likely hepatorenal syndrome  s/p albumin boluses, octreotide  stable Hb  thrombocytopenia, stable  INR 3.9  no chem VTE PPX   Bcx, paracentesis no growth  empiric caspo, merop  ID following, concern for persistent infection   good glycemic control    full code  HD rt IJ, CVC IJ left , left radial a line aaox2  ammonia 52 downtrending  rifaximin, miralax  keppra for recent seizure, hx of craniotomy   4lit NC, wean as tolerated  AM CXR grossly unchanged reticular pattern  levo 0.11, vaso 0.03 MAP goal of 65  midodrine [id number]  lact 1.5  TEN 20/hr via NGT  rectal tube: output slowed down  MELD Na >40, t bili 27  abd distention likely ascites  protonix. will discuss w transplant to possibly stop  CRRT w net even  anuric , likely hepatorenal syndrome  s/p albumin boluses, octreotide  stable Hb  thrombocytopenia, stable  INR 3.9  no chem VTE PPX   Bcx, paracentesis no growth  empiric caspo, merop  ID following, concern for persistent infection   good glycemic control    full code  HD rt IJ, CVC IJ left , left radial a line

## 2023-01-02 NOTE — PROGRESS NOTE ADULT - ATTENDING COMMENTS
Her respiratory status has improved   Less O2 requirement   Could not tolerate tube feeding. CT scan of abdomen, pelvis was recommended by ID. I have added CT of chest for better assessment of lung parenchyma   PT   Critically ill but stable   Rash is likely drug related. The newest addition was lidocaine patch

## 2023-01-02 NOTE — PROGRESS NOTE ADULT - SUBJECTIVE AND OBJECTIVE BOX
--------------------------------------------------------------------------------  Chief Complaint: Tired    24 hour events/subjective:  Reviewed      PAST HISTORY  --------------------------------------------------------------------------------  No significant changes to PMH, PSH, FHx, SHx, unless otherwise noted    ALLERGIES & MEDICATIONS  --------------------------------------------------------------------------------  Allergies    Macrobid (Rash)    Intolerances    Nexium (Stomach Upset)    Standing Inpatient Medications  caspofungin IVPB      caspofungin IVPB 50 milliGRAM(s) IV Intermittent every 24 hours  chlorhexidine 2% Cloths 1 Application(s) Topical <User Schedule>  CRRT Treatment    <Continuous>  escitalopram 10 milliGRAM(s) Oral daily  folic acid 1 milliGRAM(s) Oral daily  influenza   Vaccine 0.5 milliLiter(s) IntraMuscular once  levETIRAcetam  Solution 750 milliGRAM(s) Enteral Tube two times a day  levothyroxine Injectable 103 MICROGram(s) IV Push at bedtime  meropenem  IVPB 1000 milliGRAM(s) IV Intermittent every 8 hours  midodrine 20 milliGRAM(s) Oral every 8 hours  multivitamin/minerals/iron Oral Solution (CENTRUM) 15 milliLiter(s) Oral daily  norepinephrine Infusion 0.05 MICROgram(s)/kG/Min IV Continuous <Continuous>  pantoprazole   Suspension 40 milliGRAM(s) Oral every 24 hours  Phoxillum Filtration BK 4 / 2.5 5000 milliLiter(s) CRRT <Continuous>  Phoxillum Filtration BK 4 / 2.5 5000 milliLiter(s) CRRT <Continuous>  Phoxillum Filtration BK 4 / 2.5 5000 milliLiter(s) CRRT <Continuous>  polyethylene glycol 3350 17 Gram(s) Oral every 12 hours  rifAXIMin 550 milliGRAM(s) Oral every 12 hours  thiamine 100 milliGRAM(s) Enteral Tube daily  vasopressin Infusion 0.03 Unit(s)/Min IV Continuous <Continuous>    PRN Inpatient Medications  HYDROmorphone  Injectable 0.25 milliGRAM(s) IV Push every 3 hours PRN  sodium chloride 0.65% Nasal 1 Spray(s) Both Nostrils every 3 hours PRN  tetracaine/benzocaine/butamben Spray 1 Spray(s) Topical every 3 hours PRN      REVIEW OF SYSTEMS  --------------------------------------------------------------------------------  No acute pain/SOB  All other systems were reviewed and are negative, except as noted.    VITALS/PHYSICAL EXAM  --------------------------------------------------------------------------------  T(C): 36.7 (01-02-23 @ 07:00), Max: 36.9 (01-01-23 @ 19:00)  HR: 87 (01-02-23 @ 11:30) (75 - 101)  BP: 99/50 (01-02-23 @ 11:00) (75/50 - 124/55)  RR: 23 (01-02-23 @ 11:30) (13 - 52)  SpO2: 97% (01-02-23 @ 11:30) (91% - 100%)           01-01-23 @ 07:01  -  01-02-23 @ 07:00  --------------------------------------------------------  IN: 2828.2 mL / OUT: 2076 mL / NET: 752.2 mL    01-02-23 @ 07:01  -  01-02-23 @ 11:52  --------------------------------------------------------  IN: 415.2 mL / OUT: 380 mL / NET: 35.2 mL      Physical Exam:  	Gen: NAD 	              HEENT: Icterus  	Pulm: CTA B/L  	CV:   no rub  	Abd:  soft, nontender/nondistended             	UE: No acute signs  	LE: no acute signs  	Neuro: Conscious, drowsy              Vascular access- double lumen catheter noted, no acute signs        LABS/STUDIES  --------------------------------------------------------------------------------              9.2    33.63 >-----------<  32       [01-02-23 @ 05:44]              26.7     139  |  105  |  12  ----------------------------<  94      [01-02-23 @ 05:46]  4.0   |  21  |  0.79        Ca     8.4     [01-02-23 @ 05:46]      Mg     2.3     [01-02-23 @ 05:46]      Phos  3.3     [01-02-23 @ 05:46]    TPro  6.1  /  Alb  3.6  /  TBili  26.9  /  DBili  x   /  AST  137  /  ALT  49  /  AlkPhos  170  [01-02-23 @ 05:46]    PT/INR: PT 28.0 , INR 2.39       [01-02-23 @ 00:28]  PTT: 53.4       [01-02-23 @ 00:28]      Creatinine Trend:  SCr 0.79 [01-02 @ 05:46]  SCr 0.89 [01-02 @ 00:28]  SCr 0.78 [01-01 @ 17:20]  SCr 0.75 [01-01 @ 12:06]  SCr 0.76 [01-01 @ 05:25]    Urinalysis - [12-20-22 @ 23:55]      Color Dark Yellow / Appearance Turbid / SG 1.029 / pH 6.0      Gluc 100 mg/dL / Ketone Small  / Bili Large / Urobili Negative       Blood Moderate / Protein 300 mg/dL / Leuk Est Large / Nitrite Negative      RBC 5-10 / WBC 11-25 / Hyaline 0-2 / Gran  / Sq Epi  / Non Sq Epi Moderate / Bacteria Negative      Iron 51, TIBC Unable to calculate Test Repeated, %sat Unable to calculate Test Repeated      [12-24-22 @ 20:58]  Ferritin 5747      [12-24-22 @ 20:58]  TSH 0.05      [12-24-22 @ 20:58]  Lipid: chol 100, , HDL 9, LDL --      [12-24-22 @ 20:58]    HBsAb <3.0      [12-24-22 @ 20:59]  HBsAb Nonreact      [12-24-22 @ 20:59]  HBsAg Nonreact      [12-24-22 @ 20:59]  HBcAb Nonreact      [12-24-22 @ 20:59]  HCV 0.12, Nonreact      [12-24-22 @ 20:59]  HIV Nonreact      [12-24-22 @ 20:58]  HIV Nonreact      [12-24-22 @ 20:58]    MIRNA: titer Negative, pattern --      [12-24-22 @ 20:59]  Syphilis Screen (Treponema Pallidum Ab) Negative      [12-24-22 @ 20:59]    Culture - Fungal, Body Fluid (12.26.22 @ 15:00)    Specimen Source: Peritoneal Peritoneal Fluid    Culture Results:   Culture is being performed. Fungal cultures are held for 4 weeks.    Culture - Blood (12.26.22 @ 10:45)    Specimen Source: .Blood Blood-Peripheral    Culture Results:   No Growth Final

## 2023-01-02 NOTE — PROGRESS NOTE ADULT - SUBJECTIVE AND OBJECTIVE BOX
Chief Complaint:  Patient is a 62y old  Female who presents with a chief complaint of Acute liver failure (02 Jan 2023 02:30)      Interval Events:   - Maintain CRRT Net Even, CVP ~3-7  - stable pressor reqs- still on vaso + levo  - HFNC weaned down to NC   - D/c Lidocaine Patch in case source of abdominal rash   - CRRT malfunctioned, patient hypotensive and recieved 500c cAlbumin 5% with 1U Prbc   - Increasing Abdominal Distention, d/c'ed lactulose and started Miralax BID   - Nauseous s/p Zofran x1, NGT to LIWS with 300cc output, Held TF for 3 hours now TFs running at 20 cc/hr      Hospital Medications:  caspofungin IVPB      caspofungin IVPB 50 milliGRAM(s) IV Intermittent every 24 hours  chlorhexidine 2% Cloths 1 Application(s) Topical <User Schedule>  CRRT Treatment    <Continuous>  escitalopram 10 milliGRAM(s) Oral daily  folic acid 1 milliGRAM(s) Oral daily  HYDROmorphone  Injectable 0.25 milliGRAM(s) IV Push every 3 hours PRN  influenza   Vaccine 0.5 milliLiter(s) IntraMuscular once  levETIRAcetam  Solution 750 milliGRAM(s) Enteral Tube two times a day  levothyroxine Injectable 103 MICROGram(s) IV Push at bedtime  meropenem  IVPB 1000 milliGRAM(s) IV Intermittent every 8 hours  midodrine 20 milliGRAM(s) Oral every 8 hours  multivitamin/minerals/iron Oral Solution (CENTRUM) 15 milliLiter(s) Oral daily  norepinephrine Infusion 0.05 MICROgram(s)/kG/Min IV Continuous <Continuous>  ondansetron Injectable 4 milliGRAM(s) IV Push every 6 hours PRN  pantoprazole   Suspension 40 milliGRAM(s) Oral every 24 hours  Phoxillum Filtration BK 4 / 2.5 5000 milliLiter(s) CRRT <Continuous>  Phoxillum Filtration BK 4 / 2.5 5000 milliLiter(s) CRRT <Continuous>  Phoxillum Filtration BK 4 / 2.5 5000 milliLiter(s) CRRT <Continuous>  polyethylene glycol 3350 17 Gram(s) Oral every 12 hours  rifAXIMin 550 milliGRAM(s) Oral every 12 hours  sodium chloride 0.65% Nasal 1 Spray(s) Both Nostrils every 3 hours PRN  tetracaine/benzocaine/butamben Spray 1 Spray(s) Topical every 3 hours PRN  thiamine 100 milliGRAM(s) Enteral Tube daily  vasopressin Infusion 0.03 Unit(s)/Min IV Continuous <Continuous>      PMHX/PSHX:  HTN (hypertension)    UTI (urinary tract infection)    Intracranial tumor    GERD (gastroesophageal reflux disease)    Eczema    Thyroid cancer    COVID-19 virus infection    History of thyroid surgery    H/O total thyroidectomy    History of tonsillectomy    History of appendectomy            ROS: unable to obtain as pt is sleepy        PHYSICAL EXAM:     GENERAL:  Well developed, no distress  HEENT:  NC/AT,  conjunctivae clear, +sclera icteric; +HF NC  CHEST:  Full & symmetric excursion, no increased effort w/ respirations  HEART:  Regular rhythm & rate  ABDOMEN:  Soft, non-tender, +moderately distended; +BS  EXTREMITIES:  no LE edema  SKIN: +jaundice; +improving erythematous maculopapular rash overlying abdomen extending beneath breast to pelvis  NEURO:  Awake, alert, orientedx1-2    Vital Signs:  Vital Signs Last 24 Hrs  T(C): 36.7 (02 Jan 2023 07:00), Max: 37 (01 Jan 2023 11:00)  T(F): 98.1 (02 Jan 2023 07:00), Max: 98.6 (01 Jan 2023 11:00)  HR: 86 (02 Jan 2023 08:00) (75 - 101)  BP: 97/58 (02 Jan 2023 08:00) (75/50 - 124/55)  BP(mean): 72 (02 Jan 2023 08:00) (56 - 83)  RR: 39 (02 Jan 2023 08:00) (13 - 39)  SpO2: 97% (02 Jan 2023 08:00) (91% - 100%)    Parameters below as of 02 Jan 2023 07:00  Patient On (Oxygen Delivery Method): nasal cannula  O2 Flow (L/min): 5    Daily     Daily     LABS:                        9.2    33.63 )-----------( 32       ( 02 Jan 2023 05:44 )             26.7     01-02    139  |  105  |  12  ----------------------------<  94  4.0   |  21<L>  |  0.79    Ca    8.4      02 Jan 2023 05:46  Phos  3.3     01-02  Mg     2.3     01-02    TPro  6.1  /  Alb  3.6  /  TBili  26.9<H>  /  DBili  x   /  AST  137<H>  /  ALT  49<H>  /  AlkPhos  170<H>  01-02    LIVER FUNCTIONS - ( 02 Jan 2023 05:46 )  Alb: 3.6 g/dL / Pro: 6.1 g/dL / ALK PHOS: 170 U/L / ALT: 49 U/L / AST: 137 U/L / GGT: x           PT/INR - ( 02 Jan 2023 00:28 )   PT: 28.0 sec;   INR: 2.39 ratio         PTT - ( 02 Jan 2023 00:28 )  PTT:53.4 sec    Amylase Serum--      Lipase serum--       [id number]      Imaging: No new abdominal imaging               Chief Complaint:  Patient is a 62y old  Female who presents with a chief complaint of Acute liver failure (02 Jan 2023 02:30)      Interval Events:   - Maintain CRRT Net Even, CVP ~3-7  - stable pressor reqs- still on vaso + levo  - HFNC weaned down to NC   - D/c Lidocaine Patch in case source of abdominal rash   - CRRT malfunctioned, patient hypotensive and recieved 500c cAlbumin 5% with 1U Prbc   - Increasing Abdominal Distention, d/c'ed lactulose and started Miralax BID   - Nauseous s/p Zofran x1, NGT to LIWS with 300cc output, Held TF for 3 hours now TFs running at 20 cc/hr      Hospital Medications:  caspofungin IVPB      caspofungin IVPB 50 milliGRAM(s) IV Intermittent every 24 hours  chlorhexidine 2% Cloths 1 Application(s) Topical <User Schedule>  CRRT Treatment    <Continuous>  escitalopram 10 milliGRAM(s) Oral daily  folic acid 1 milliGRAM(s) Oral daily  HYDROmorphone  Injectable 0.25 milliGRAM(s) IV Push every 3 hours PRN  influenza   Vaccine 0.5 milliLiter(s) IntraMuscular once  levETIRAcetam  Solution 750 milliGRAM(s) Enteral Tube two times a day  levothyroxine Injectable 103 MICROGram(s) IV Push at bedtime  meropenem  IVPB 1000 milliGRAM(s) IV Intermittent every 8 hours  midodrine 20 milliGRAM(s) Oral every 8 hours  multivitamin/minerals/iron Oral Solution (CENTRUM) 15 milliLiter(s) Oral daily  norepinephrine Infusion 0.05 MICROgram(s)/kG/Min IV Continuous <Continuous>  ondansetron Injectable 4 milliGRAM(s) IV Push every 6 hours PRN  pantoprazole   Suspension 40 milliGRAM(s) Oral every 24 hours  Phoxillum Filtration BK 4 / 2.5 5000 milliLiter(s) CRRT <Continuous>  Phoxillum Filtration BK 4 / 2.5 5000 milliLiter(s) CRRT <Continuous>  Phoxillum Filtration BK 4 / 2.5 5000 milliLiter(s) CRRT <Continuous>  polyethylene glycol 3350 17 Gram(s) Oral every 12 hours  rifAXIMin 550 milliGRAM(s) Oral every 12 hours  sodium chloride 0.65% Nasal 1 Spray(s) Both Nostrils every 3 hours PRN  tetracaine/benzocaine/butamben Spray 1 Spray(s) Topical every 3 hours PRN  thiamine 100 milliGRAM(s) Enteral Tube daily  vasopressin Infusion 0.03 Unit(s)/Min IV Continuous <Continuous>      PMHX/PSHX:  HTN (hypertension)    UTI (urinary tract infection)    Intracranial tumor    GERD (gastroesophageal reflux disease)    Eczema    Thyroid cancer    COVID-19 virus infection    History of thyroid surgery    H/O total thyroidectomy    History of tonsillectomy    History of appendectomy            ROS: unable to obtain as pt is sleepy        PHYSICAL EXAM:     GENERAL:  Well developed, no distress  HEENT:  NC/AT,  conjunctivae clear, +sclera icteric; +HF NC  CHEST:  Full & symmetric excursion, no increased effort w/ respirations  HEART:  Regular rhythm & rate  ABDOMEN:  Soft, non-tender, +moderately distended; +BS  EXTREMITIES:  no LE edema  SKIN: +jaundice; +improving erythematous maculopapular rash overlying abdomen extending beneath breast to pelvis  NEURO:  Awake, alert, orientedx1-2    Vital Signs:  Vital Signs Last 24 Hrs  T(C): 36.7 (02 Jan 2023 07:00), Max: 37 (01 Jan 2023 11:00)  T(F): 98.1 (02 Jan 2023 07:00), Max: 98.6 (01 Jan 2023 11:00)  HR: 86 (02 Jan 2023 08:00) (75 - 101)  BP: 97/58 (02 Jan 2023 08:00) (75/50 - 124/55)  BP(mean): 72 (02 Jan 2023 08:00) (56 - 83)  RR: 39 (02 Jan 2023 08:00) (13 - 39)  SpO2: 97% (02 Jan 2023 08:00) (91% - 100%)    Parameters below as of 02 Jan 2023 07:00  Patient On (Oxygen Delivery Method): nasal cannula  O2 Flow (L/min): 5    Daily     Daily     LABS:                        9.2    33.63 )-----------( 32       ( 02 Jan 2023 05:44 )             26.7     01-02    139  |  105  |  12  ----------------------------<  94  4.0   |  21<L>  |  0.79    Ca    8.4      02 Jan 2023 05:46  Phos  3.3     01-02  Mg     2.3     01-02    TPro  6.1  /  Alb  3.6  /  TBili  26.9<H>  /  DBili  x   /  AST  137<H>  /  ALT  49<H>  /  AlkPhos  170<H>  01-02    LIVER FUNCTIONS - ( 02 Jan 2023 05:46 )  Alb: 3.6 g/dL / Pro: 6.1 g/dL / ALK PHOS: 170 U/L / ALT: 49 U/L / AST: 137 U/L / GGT: x           PT/INR - ( 02 Jan 2023 00:28 )   PT: 28.0 sec;   INR: 2.39 ratio         PTT - ( 02 Jan 2023 00:28 )  PTT:53.4 sec    Amylase Serum--      Lipase serum--       [id number]      Imaging: No new abdominal imaging               Chief Complaint:  Patient is a 62y old  Female who presents with a chief complaint of Acute liver failure (02 Jan 2023 02:30)      Interval Events:   - Maintain CRRT Net Even, CVP ~3-7  - stable pressor reqs- still on vaso + levo  - HFNC weaned down to NC   - D/c Lidocaine Patch in case source of abdominal rash   - CRRT malfunctioned, patient hypotensive and recieved 500c cAlbumin 5% with 1U Prbc   - Increasing Abdominal Distention, d/c'ed lactulose and started Miralax BID   - Nauseous s/p Zofran x1, NGT to LIWS with 300cc output, Held TF for 3 hours now TFs running at 20 cc/hr      Hospital Medications:  caspofungin IVPB      caspofungin IVPB 50 milliGRAM(s) IV Intermittent every 24 hours  chlorhexidine 2% Cloths 1 Application(s) Topical <User Schedule>  CRRT Treatment    <Continuous>  escitalopram 10 milliGRAM(s) Oral daily  folic acid 1 milliGRAM(s) Oral daily  HYDROmorphone  Injectable 0.25 milliGRAM(s) IV Push every 3 hours PRN  influenza   Vaccine 0.5 milliLiter(s) IntraMuscular once  levETIRAcetam  Solution 750 milliGRAM(s) Enteral Tube two times a day  levothyroxine Injectable 103 MICROGram(s) IV Push at bedtime  meropenem  IVPB 1000 milliGRAM(s) IV Intermittent every 8 hours  midodrine 20 milliGRAM(s) Oral every 8 hours  multivitamin/minerals/iron Oral Solution (CENTRUM) 15 milliLiter(s) Oral daily  norepinephrine Infusion 0.05 MICROgram(s)/kG/Min IV Continuous <Continuous>  ondansetron Injectable 4 milliGRAM(s) IV Push every 6 hours PRN  pantoprazole   Suspension 40 milliGRAM(s) Oral every 24 hours  Phoxillum Filtration BK 4 / 2.5 5000 milliLiter(s) CRRT <Continuous>  Phoxillum Filtration BK 4 / 2.5 5000 milliLiter(s) CRRT <Continuous>  Phoxillum Filtration BK 4 / 2.5 5000 milliLiter(s) CRRT <Continuous>  polyethylene glycol 3350 17 Gram(s) Oral every 12 hours  rifAXIMin 550 milliGRAM(s) Oral every 12 hours  sodium chloride 0.65% Nasal 1 Spray(s) Both Nostrils every 3 hours PRN  tetracaine/benzocaine/butamben Spray 1 Spray(s) Topical every 3 hours PRN  thiamine 100 milliGRAM(s) Enteral Tube daily  vasopressin Infusion 0.03 Unit(s)/Min IV Continuous <Continuous>      PMHX/PSHX:  HTN (hypertension)    UTI (urinary tract infection)    Intracranial tumor    GERD (gastroesophageal reflux disease)    Eczema    Thyroid cancer    COVID-19 virus infection    History of thyroid surgery    H/O total thyroidectomy    History of tonsillectomy    History of appendectomy            ROS: unable to obtain as pt is sleepy        PHYSICAL EXAM:     GENERAL:  Well developed, no distress  HEENT:  NC/AT,  conjunctivae clear, +sclera icteric; +HF NC  CHEST:  Full & symmetric excursion, no increased effort w/ respirations  HEART:  Regular rhythm & rate  ABDOMEN:  Soft, non-tender, +moderately distended; +BS  EXTREMITIES:  no LE edema  SKIN: +jaundice; +improving erythematous maculopapular rash overlying abdomen extending beneath breast to pelvis  NEURO:  Awake, alert, orientedx1-2    Vital Signs:  Vital Signs Last 24 Hrs  T(C): 36.7 (02 Jan 2023 07:00), Max: 37 (01 Jan 2023 11:00)  T(F): 98.1 (02 Jan 2023 07:00), Max: 98.6 (01 Jan 2023 11:00)  HR: 86 (02 Jan 2023 08:00) (75 - 101)  BP: 97/58 (02 Jan 2023 08:00) (75/50 - 124/55)  BP(mean): 72 (02 Jan 2023 08:00) (56 - 83)  RR: 39 (02 Jan 2023 08:00) (13 - 39)  SpO2: 97% (02 Jan 2023 08:00) (91% - 100%)    Parameters below as of 02 Jan 2023 07:00  Patient On (Oxygen Delivery Method): nasal cannula  O2 Flow (L/min): 5    Daily     Daily     LABS:                        9.2    33.63 )-----------( 32       ( 02 Jan 2023 05:44 )             26.7     01-02    139  |  105  |  12  ----------------------------<  94  4.0   |  21<L>  |  0.79    Ca    8.4      02 Jan 2023 05:46  Phos  3.3     01-02  Mg     2.3     01-02    TPro  6.1  /  Alb  3.6  /  TBili  26.9<H>  /  DBili  x   /  AST  137<H>  /  ALT  49<H>  /  AlkPhos  170<H>  01-02    LIVER FUNCTIONS - ( 02 Jan 2023 05:46 )  Alb: 3.6 g/dL / Pro: 6.1 g/dL / ALK PHOS: 170 U/L / ALT: 49 U/L / AST: 137 U/L / GGT: x           PT/INR - ( 02 Jan 2023 00:28 )   PT: 28.0 sec;   INR: 2.39 ratio         PTT - ( 02 Jan 2023 00:28 )  PTT:53.4 sec    Amylase Serum--      Lipase serum--       [id number]      Imaging: No new abdominal imaging               Chief Complaint:  Patient is a 62y old  Female who presents with a chief complaint of liver failure (02 Jan 2023 02:30)      Interval Events:   - Maintain CRRT Net Even, CVP ~3-7  - stable pressor reqs- still on vaso + levo  - HFNC weaned down to NC   - D/c Lidocaine Patch in case source of abdominal rash   - CRRT malfunctioned, patient hypotensive and recieved 500c cAlbumin 5% with 1U Prbc   - Increasing Abdominal Distention, d/c'ed lactulose and started Miralax BID   - Nauseous s/p Zofran x1, NGT to LIWS with 300cc output, Held TF for 3 hours now TFs running at 20 cc/hr      Hospital Medications:  caspofungin IVPB      caspofungin IVPB 50 milliGRAM(s) IV Intermittent every 24 hours  chlorhexidine 2% Cloths 1 Application(s) Topical <User Schedule>  CRRT Treatment    <Continuous>  escitalopram 10 milliGRAM(s) Oral daily  folic acid 1 milliGRAM(s) Oral daily  HYDROmorphone  Injectable 0.25 milliGRAM(s) IV Push every 3 hours PRN  influenza   Vaccine 0.5 milliLiter(s) IntraMuscular once  levETIRAcetam  Solution 750 milliGRAM(s) Enteral Tube two times a day  levothyroxine Injectable 103 MICROGram(s) IV Push at bedtime  meropenem  IVPB 1000 milliGRAM(s) IV Intermittent every 8 hours  midodrine 20 milliGRAM(s) Oral every 8 hours  multivitamin/minerals/iron Oral Solution (CENTRUM) 15 milliLiter(s) Oral daily  norepinephrine Infusion 0.05 MICROgram(s)/kG/Min IV Continuous <Continuous>  ondansetron Injectable 4 milliGRAM(s) IV Push every 6 hours PRN  pantoprazole   Suspension 40 milliGRAM(s) Oral every 24 hours  Phoxillum Filtration BK 4 / 2.5 5000 milliLiter(s) CRRT <Continuous>  Phoxillum Filtration BK 4 / 2.5 5000 milliLiter(s) CRRT <Continuous>  Phoxillum Filtration BK 4 / 2.5 5000 milliLiter(s) CRRT <Continuous>  polyethylene glycol 3350 17 Gram(s) Oral every 12 hours  rifAXIMin 550 milliGRAM(s) Oral every 12 hours  sodium chloride 0.65% Nasal 1 Spray(s) Both Nostrils every 3 hours PRN  tetracaine/benzocaine/butamben Spray 1 Spray(s) Topical every 3 hours PRN  thiamine 100 milliGRAM(s) Enteral Tube daily  vasopressin Infusion 0.03 Unit(s)/Min IV Continuous <Continuous>      PMHX/PSHX:  HTN (hypertension)    UTI (urinary tract infection)    Intracranial tumor    GERD (gastroesophageal reflux disease)    Eczema    Thyroid cancer    COVID-19 virus infection    History of thyroid surgery    H/O total thyroidectomy    History of tonsillectomy    History of appendectomy            ROS: unable to obtain as pt is sleepy        PHYSICAL EXAM:     GENERAL:  Well developed, no distress  HEENT:  NC/AT,  conjunctivae clear, +sclera icteric; +HF NC  CHEST:  Full & symmetric excursion, no increased effort w/ respirations  HEART:  Regular rhythm & rate  ABDOMEN:  Soft, non-tender, +moderately distended; +BS  EXTREMITIES:  no LE edema  SKIN: +jaundice; +improving erythematous maculopapular rash overlying abdomen extending beneath breast to pelvis  NEURO:  Awake, alert, orientedx1-2    Vital Signs:  Vital Signs Last 24 Hrs  T(C): 36.7 (02 Jan 2023 07:00), Max: 37 (01 Jan 2023 11:00)  T(F): 98.1 (02 Jan 2023 07:00), Max: 98.6 (01 Jan 2023 11:00)  HR: 86 (02 Jan 2023 08:00) (75 - 101)  BP: 97/58 (02 Jan 2023 08:00) (75/50 - 124/55)  BP(mean): 72 (02 Jan 2023 08:00) (56 - 83)  RR: 39 (02 Jan 2023 08:00) (13 - 39)  SpO2: 97% (02 Jan 2023 08:00) (91% - 100%)    Parameters below as of 02 Jan 2023 07:00  Patient On (Oxygen Delivery Method): nasal cannula  O2 Flow (L/min): 5    Daily     Daily     LABS:                        9.2    33.63 )-----------( 32       ( 02 Jan 2023 05:44 )             26.7     01-02    139  |  105  |  12  ----------------------------<  94  4.0   |  21<L>  |  0.79    Ca    8.4      02 Jan 2023 05:46  Phos  3.3     01-02  Mg     2.3     01-02    TPro  6.1  /  Alb  3.6  /  TBili  26.9<H>  /  DBili  x   /  AST  137<H>  /  ALT  49<H>  /  AlkPhos  170<H>  01-02    LIVER FUNCTIONS - ( 02 Jan 2023 05:46 )  Alb: 3.6 g/dL / Pro: 6.1 g/dL / ALK PHOS: 170 U/L / ALT: 49 U/L / AST: 137 U/L / GGT: x           PT/INR - ( 02 Jan 2023 00:28 )   PT: 28.0 sec;   INR: 2.39 ratio         PTT - ( 02 Jan 2023 00:28 )  PTT:53.4 sec    Amylase Serum--      Lipase serum--       [id number]      Imaging: No new abdominal imaging               Chief Complaint:  Patient is a 62y old  Female who presents with a chief complaint of liver failure (02 Jan 2023 02:30)      Interval Events:   - Maintain CRRT Net Even, CVP ~3-7  - stable pressor reqs- still on vaso + levo  - HFNC weaned down to NC   - D/c Lidocaine Patch in case source of abdominal rash   - CRRT malfunctioned, patient hypotensive and recieved 500c cAlbumin 5% with 1U Prbc   - Increasing Abdominal Distention, d/c'ed lactulose and started Miralax BID   - Nauseous s/p Zofran x1, NGT to LIWS with 300cc output, Held TF for 3 hours now TFs running at 20 cc/hr      Hospital Medications:  caspofungin IVPB      caspofungin IVPB 50 milliGRAM(s) IV Intermittent every 24 hours  chlorhexidine 2% Cloths 1 Application(s) Topical <User Schedule>  CRRT Treatment    <Continuous>  escitalopram 10 milliGRAM(s) Oral daily  folic acid 1 milliGRAM(s) Oral daily  HYDROmorphone  Injectable 0.25 milliGRAM(s) IV Push every 3 hours PRN  influenza   Vaccine 0.5 milliLiter(s) IntraMuscular once  levETIRAcetam  Solution 750 milliGRAM(s) Enteral Tube two times a day  levothyroxine Injectable 103 MICROGram(s) IV Push at bedtime  meropenem  IVPB 1000 milliGRAM(s) IV Intermittent every 8 hours  midodrine 20 milliGRAM(s) Oral every 8 hours  multivitamin/minerals/iron Oral Solution (CENTRUM) 15 milliLiter(s) Oral daily  norepinephrine Infusion 0.05 MICROgram(s)/kG/Min IV Continuous <Continuous>  ondansetron Injectable 4 milliGRAM(s) IV Push every 6 hours PRN  pantoprazole   Suspension 40 milliGRAM(s) Oral every 24 hours  Phoxillum Filtration BK 4 / 2.5 5000 milliLiter(s) CRRT <Continuous>  Phoxillum Filtration BK 4 / 2.5 5000 milliLiter(s) CRRT <Continuous>  Phoxillum Filtration BK 4 / 2.5 5000 milliLiter(s) CRRT <Continuous>  polyethylene glycol 3350 17 Gram(s) Oral every 12 hours  rifAXIMin 550 milliGRAM(s) Oral every 12 hours  sodium chloride 0.65% Nasal 1 Spray(s) Both Nostrils every 3 hours PRN  tetracaine/benzocaine/butamben Spray 1 Spray(s) Topical every 3 hours PRN  thiamine 100 milliGRAM(s) Enteral Tube daily  vasopressin Infusion 0.03 Unit(s)/Min IV Continuous <Continuous>      PMHX/PSHX:  HTN (hypertension)    UTI (urinary tract infection)    Intracranial tumor    GERD (gastroesophageal reflux disease)    Eczema    Thyroid cancer    COVID-19 virus infection    History of thyroid surgery    H/O total thyroidectomy    History of tonsillectomy    History of appendectomy            ROS: unable to obtain as pt is sleepy        PHYSICAL EXAM:     GENERAL:  Well developed, no distress  HEENT:  NC/AT,  conjunctivae clear, +sclera icteric; +HF NC  CHEST:  Full & symmetric excursion, no increased effort w/ respirations  HEART:  Regular rhythm & rate  ABDOMEN:  Soft, non-tender, +moderately distended; +BS  EXTREMITIES:  no LE edema  SKIN: +jaundice; +improving erythematous maculopapular rash overlying abdomen extending beneath breast to pelvis  NEURO:  Awake, alert, orientedx1-2    Vital Signs:  Vital Signs Last 24 Hrs  T(C): 36.7 (02 Jan 2023 07:00), Max: 37 (01 Jan 2023 11:00)  T(F): 98.1 (02 Jan 2023 07:00), Max: 98.6 (01 Jan 2023 11:00)  HR: 86 (02 Jan 2023 08:00) (75 - 101)  BP: 97/58 (02 Jan 2023 08:00) (75/50 - 124/55)  BP(mean): 72 (02 Jan 2023 08:00) (56 - 83)  RR: 39 (02 Jan 2023 08:00) (13 - 39)  SpO2: 97% (02 Jan 2023 08:00) (91% - 100%)    Parameters below as of 02 Jan 2023 07:00  Patient On (Oxygen Delivery Method): nasal cannula  O2 Flow (L/min): 5    Daily     Daily     LABS:                        9.2    33.63 )-----------( 32       ( 02 Jan 2023 05:44 )             26.7     01-02    139  |  105  |  12  ----------------------------<  94  4.0   |  21<L>  |  0.79    Ca    8.4      02 Jan 2023 05:46  Phos  3.3     01-02  Mg     2.3     01-02    TPro  6.1  /  Alb  3.6  /  TBili  26.9<H>  /  DBili  x   /  AST  137<H>  /  ALT  49<H>  /  AlkPhos  170<H>  01-02    LIVER FUNCTIONS - ( 02 Jan 2023 05:46 )  Alb: 3.6 g/dL / Pro: 6.1 g/dL / ALK PHOS: 170 U/L / ALT: 49 U/L / AST: 137 U/L / GGT: x           PT/INR - ( 02 Jan 2023 00:28 )   PT: 28.0 sec;   INR: 2.39 ratio         PTT - ( 02 Jan 2023 00:28 )  PTT:53.4 sec    Amylase Serum--      Lipase serum--       [id number]      Imaging: No new abdominal imaging               Chief Complaint:  Patient is a 62y old  Female who presents with a chief complaint of liver failure (02 Jan 2023 02:30)      Interval Events:   - Maintain CRRT Net Even, CVP ~3-7  - stable pressor reqs- still on vaso + levo  - HFNC weaned down to NC   - D/c Lidocaine Patch in case source of abdominal rash   - CRRT malfunctioned, patient hypotensive and recieved 500c cAlbumin 5% with 1U Prbc   - Increasing Abdominal Distention, d/c'ed lactulose and started Miralax BID   - Nauseous s/p Zofran x1, NGT to LIWS with 300cc output, Held TF for 3 hours now TFs running at 20 cc/hr      Hospital Medications:  caspofungin IVPB      caspofungin IVPB 50 milliGRAM(s) IV Intermittent every 24 hours  chlorhexidine 2% Cloths 1 Application(s) Topical <User Schedule>  CRRT Treatment    <Continuous>  escitalopram 10 milliGRAM(s) Oral daily  folic acid 1 milliGRAM(s) Oral daily  HYDROmorphone  Injectable 0.25 milliGRAM(s) IV Push every 3 hours PRN  influenza   Vaccine 0.5 milliLiter(s) IntraMuscular once  levETIRAcetam  Solution 750 milliGRAM(s) Enteral Tube two times a day  levothyroxine Injectable 103 MICROGram(s) IV Push at bedtime  meropenem  IVPB 1000 milliGRAM(s) IV Intermittent every 8 hours  midodrine 20 milliGRAM(s) Oral every 8 hours  multivitamin/minerals/iron Oral Solution (CENTRUM) 15 milliLiter(s) Oral daily  norepinephrine Infusion 0.05 MICROgram(s)/kG/Min IV Continuous <Continuous>  ondansetron Injectable 4 milliGRAM(s) IV Push every 6 hours PRN  pantoprazole   Suspension 40 milliGRAM(s) Oral every 24 hours  Phoxillum Filtration BK 4 / 2.5 5000 milliLiter(s) CRRT <Continuous>  Phoxillum Filtration BK 4 / 2.5 5000 milliLiter(s) CRRT <Continuous>  Phoxillum Filtration BK 4 / 2.5 5000 milliLiter(s) CRRT <Continuous>  polyethylene glycol 3350 17 Gram(s) Oral every 12 hours  rifAXIMin 550 milliGRAM(s) Oral every 12 hours  sodium chloride 0.65% Nasal 1 Spray(s) Both Nostrils every 3 hours PRN  tetracaine/benzocaine/butamben Spray 1 Spray(s) Topical every 3 hours PRN  thiamine 100 milliGRAM(s) Enteral Tube daily  vasopressin Infusion 0.03 Unit(s)/Min IV Continuous <Continuous>      PMHX/PSHX:  HTN (hypertension)    UTI (urinary tract infection)    Intracranial tumor    GERD (gastroesophageal reflux disease)    Eczema    Thyroid cancer    COVID-19 virus infection    History of thyroid surgery    H/O total thyroidectomy    History of tonsillectomy    History of appendectomy            ROS: unable to obtain as pt is sleepy        PHYSICAL EXAM:     GENERAL:  Well developed, no distress  HEENT:  NC/AT,  conjunctivae clear, +sclera icteric; +HF NC  CHEST:  Full & symmetric excursion, no increased effort w/ respirations  HEART:  Regular rhythm & rate  ABDOMEN:  Soft, non-tender, +moderately distended; +BS  EXTREMITIES:  no LE edema  SKIN: +jaundice; +improving erythematous maculopapular rash overlying abdomen extending beneath breast to pelvis  NEURO:  Awake, alert, orientedx1-2    Vital Signs:  Vital Signs Last 24 Hrs  T(C): 36.7 (02 Jan 2023 07:00), Max: 37 (01 Jan 2023 11:00)  T(F): 98.1 (02 Jan 2023 07:00), Max: 98.6 (01 Jan 2023 11:00)  HR: 86 (02 Jan 2023 08:00) (75 - 101)  BP: 97/58 (02 Jan 2023 08:00) (75/50 - 124/55)  BP(mean): 72 (02 Jan 2023 08:00) (56 - 83)  RR: 39 (02 Jan 2023 08:00) (13 - 39)  SpO2: 97% (02 Jan 2023 08:00) (91% - 100%)    Parameters below as of 02 Jan 2023 07:00  Patient On (Oxygen Delivery Method): nasal cannula  O2 Flow (L/min): 5    Daily     Daily     LABS:                        9.2    33.63 )-----------( 32       ( 02 Jan 2023 05:44 )             26.7     01-02    139  |  105  |  12  ----------------------------<  94  4.0   |  21<L>  |  0.79    Ca    8.4      02 Jan 2023 05:46  Phos  3.3     01-02  Mg     2.3     01-02    TPro  6.1  /  Alb  3.6  /  TBili  26.9<H>  /  DBili  x   /  AST  137<H>  /  ALT  49<H>  /  AlkPhos  170<H>  01-02    LIVER FUNCTIONS - ( 02 Jan 2023 05:46 )  Alb: 3.6 g/dL / Pro: 6.1 g/dL / ALK PHOS: 170 U/L / ALT: 49 U/L / AST: 137 U/L / GGT: x           PT/INR - ( 02 Jan 2023 00:28 )   PT: 28.0 sec;   INR: 2.39 ratio         PTT - ( 02 Jan 2023 00:28 )  PTT:53.4 sec    Amylase Serum--      Lipase serum--       [id number]      Imaging: No new abdominal imaging

## 2023-01-02 NOTE — PROGRESS NOTE ADULT - ASSESSMENT
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 Patient has Alcohol Use Disorder ( recent rehab with relapse and in remission since 11/2022), decompensated ALD Cirrhosis c/b ascites. Patient admitted to [facility] on 12/19/2022 after a witnessed seizure; course was c/b septic shock with associated HRF ( intubated 12/19) and an RED ( started HD 12/20). Patient was transferred to [facility] on 12/20 for a liver transplant evaluation. Patient was started on CRRT 12/21 and was extubated 12/23/22. Patient remains in SICU for hemodynamic monitoring and management while liver transplant evaluation is in progress.       PLAN:  Neuro:  - Multimodal pain control w/ Oxycodone   - Monitor Mental Status for Encephalopathy and trend Ammonia Level   - Continue Home Lexapro   - Continue Keppra  - Continue Folic Acid, Thiamine, and MV     Resp:  - 4L NC; will wean as tolerated to maintain SpO2 > 92%   - Out of bed to chair, ambulate as tolerated, and incentive spirometry to prevent atelectasis    CV:  - Will wean vasopressor support  of Levophed and Vaso to maintain goal MAP > 65 mmHg  - Continue Midodrine 20mg q8hr     GI: Acute Decompensated Liver Failure 2/2 ETOH Use   - NPO; TF via NGT for goal 35cc/hr   - Increased Bowel regimen Miralax BID  - Protonix for stress ulcer prophylaxis  - Continue Rifaximin, Hold Lactulose for increasing abdominal distention   - s/p Paracentesis (12/26) transudative, negative for SBP     Renal:  - Continue CRRT to maintain net even  - CVP <4, s/p 5% Albumin 500cc x2  - Monitor I&Os and electrolytes w/ repletions as necessary    Heme:  - Monitor CBC and coags  - Hold Chemical VTE prophylaxis  - SCDs for Mechanical VTE ppx   - s/p 1U PRBC for hypotension after CRRT Machine malfunctioned     ID:   - Monitor for clinical evidence of active infection  - Combi-Cath ( 12/22) Negative, Blood Cx ( 12/26) Negative,  MRSA (12/30) Negative   - Continue Empiric Caspofungin ( 12/26 - ?) and Meropenem (12/25 - 1/08)    Endo:   - Monitor glucose ; HgbA1C 4.9% ( 12/24)   - D/C ISS, monitor blood glucose on BMP  - Continue Synthroid for hypothyroidism    Code Status: Full Code   Disposition: SICU   Patient is a 63 y/o Female with PMH Thyroid Cancer ( s/p total thyroidectomy in her 20s, radiation, and BARONE), HTN, GERD, 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 Patient has Alcohol Use Disorder ( recent rehab with relapse and in remission since 11/2022), decompensated ALD Cirrhosis c/b ascites. Patient admitted to [facility] on 12/19/2022 after a witnessed seizure; course was c/b septic shock with associated HRF ( intubated 12/19) and an RED ( started HD 12/20). Patient was transferred to [facility] on 12/20 for a liver transplant evaluation. Patient was started on CRRT 12/21 and was extubated 12/23/22. Patient remains in SICU for hemodynamic monitoring and management while liver transplant evaluation is in progress.       PLAN:  Neuro:  - Multimodal pain control w/ Oxycodone   - Monitor Mental Status for Encephalopathy and trend Ammonia Level   - Continue Home Lexapro   - Continue Keppra  - Continue Folic Acid, Thiamine, and MV     Resp:  - 4L NC; will wean as tolerated to maintain SpO2 > 92%   - Out of bed to chair, ambulate as tolerated, and incentive spirometry to prevent atelectasis    CV:  - Will wean vasopressor support  of Levophed and Vaso to maintain goal MAP > 65 mmHg  - Continue Midodrine 20mg q8hr     GI: Acute Decompensated Liver Failure 2/2 ETOH Use   - NPO; TF via NGT for goal 35cc/hr   - Increased Bowel regimen Miralax BID  - Protonix for stress ulcer prophylaxis  - Continue Rifaximin, Hold Lactulose for increasing abdominal distention   - s/p Paracentesis (12/26) transudative, negative for SBP     Renal:  - Continue CRRT to maintain net even  - CVP <4, s/p 5% Albumin 500cc x2  - Monitor I&Os and electrolytes w/ repletions as necessary    Heme:  - Monitor CBC and coags  - Hold Chemical VTE prophylaxis  - SCDs for Mechanical VTE ppx   - s/p 1U PRBC for hypotension after CRRT Machine malfunctioned     ID:   - Monitor for clinical evidence of active infection  - Combi-Cath ( 12/22) Negative, Blood Cx ( 12/26) Negative,  MRSA (12/30) Negative   - Continue Empiric Caspofungin ( 12/26 - ?) and Meropenem (12/25 - 1/08)    Endo:   - Monitor glucose ; HgbA1C 4.9% ( 12/24)   - D/C ISS, monitor blood glucose on BMP  - Continue Synthroid for hypothyroidism    Code Status: Full Code   Disposition: SICU   Patient is a 63 y/o Female with PMH Thyroid Cancer ( s/p total thyroidectomy in her 20s, radiation, and BARONE), HTN, GERD, 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 Patient has Alcohol Use Disorder ( recent rehab with relapse and in remission since 11/2022), decompensated ALD Cirrhosis c/b ascites. Patient admitted to [facility] on 12/19/2022 after a witnessed seizure; course was c/b septic shock with associated HRF ( intubated 12/19) and an RED ( started HD 12/20). Patient was transferred to [facility] on 12/20 for a liver transplant evaluation. Patient was started on CRRT 12/21 and was extubated 12/23/22. Patient remains in SICU for hemodynamic monitoring and management while liver transplant evaluation is in progress.       PLAN:  Neuro:  - Multimodal pain control w/ Oxycodone   - Monitor Mental Status for Encephalopathy and trend Ammonia Level   - Continue Home Lexapro   - Continue Keppra  - Continue Folic Acid, Thiamine, and MV     Resp:  - 4L NC; will wean as tolerated to maintain SpO2 > 92%   - Out of bed to chair, ambulate as tolerated, and incentive spirometry to prevent atelectasis    CV:  - Will wean vasopressor support  of Levophed and Vaso to maintain goal MAP > 65 mmHg  - Continue Midodrine 20mg q8hr     GI: Acute Decompensated Liver Failure 2/2 ETOH Use   - NPO; TF via NGT for goal 35cc/hr   - Increased Bowel regimen Miralax BID  - Protonix for stress ulcer prophylaxis  - Continue Rifaximin, Hold Lactulose for increasing abdominal distention   - s/p Paracentesis (12/26) transudative, negative for SBP     Renal:  - Continue CRRT to maintain net even  - CVP <4, s/p 5% Albumin 500cc x2  - Monitor I&Os and electrolytes w/ repletions as necessary    Heme:  - Monitor CBC and coags  - Hold Chemical VTE prophylaxis  - SCDs for Mechanical VTE ppx   - s/p 1U PRBC for hypotension after CRRT Machine malfunctioned     ID:   - Monitor for clinical evidence of active infection  - Combi-Cath ( 12/22) Negative, Blood Cx ( 12/26) Negative,  MRSA (12/30) Negative   - Continue Empiric Caspofungin ( 12/26 - ?) and Meropenem (12/25 - 1/08)    Endo:   - Monitor glucose ; HgbA1C 4.9% ( 12/24)   - D/C ISS, monitor blood glucose on BMP  - Continue Synthroid for hypothyroidism    Code Status: Full Code   Disposition: SICU

## 2023-01-02 NOTE — PROGRESS NOTE ADULT - SUBJECTIVE AND OBJECTIVE BOX
LAURA MOSCOSO 62y [id number]    Patient is a 62y old  Female who presents with a chief complaint of Acute liver failure (02 Jan 2023 02:30)      Follow Up/CC:  ID following for pna    Interval History/ROS: no fever, in 8ICU, answers some ?s, no issues o/n     Allergies    Macrobid (Rash)    Intolerances    Nexium (Stomach Upset)      ANTIMICROBIALS:  caspofungin IVPB    caspofungin IVPB 50 every 24 hours  meropenem  IVPB 1000 every 8 hours  rifAXIMin 550 every 12 hours      MEDICATIONS  (STANDING):  caspofungin IVPB      caspofungin IVPB 50 milliGRAM(s) IV Intermittent every 24 hours  chlorhexidine 2% Cloths 1 Application(s) Topical <User Schedule>  CRRT Treatment    <Continuous>  escitalopram 10 milliGRAM(s) Oral daily  folic acid 1 milliGRAM(s) Oral daily  influenza   Vaccine 0.5 milliLiter(s) IntraMuscular once  levETIRAcetam  Solution 750 milliGRAM(s) Enteral Tube two times a day  levothyroxine Injectable 103 MICROGram(s) IV Push at bedtime  meropenem  IVPB 1000 milliGRAM(s) IV Intermittent every 8 hours  midodrine 20 milliGRAM(s) Oral every 8 hours  multivitamin/minerals/iron Oral Solution (CENTRUM) 15 milliLiter(s) Oral daily  norepinephrine Infusion 0.05 MICROgram(s)/kG/Min (6.21 mL/Hr) IV Continuous <Continuous>  pantoprazole   Suspension 40 milliGRAM(s) Oral every 24 hours  Phoxillum Filtration BK 4 / 2.5 5000 milliLiter(s) (800 mL/Hr) CRRT <Continuous>  Phoxillum Filtration BK 4 / 2.5 5000 milliLiter(s) (200 mL/Hr) CRRT <Continuous>  Phoxillum Filtration BK 4 / 2.5 5000 milliLiter(s) (1000 mL/Hr) CRRT <Continuous>  polyethylene glycol 3350 17 Gram(s) Oral every 12 hours  rifAXIMin 550 milliGRAM(s) Oral every 12 hours  thiamine 100 milliGRAM(s) Enteral Tube daily  vasopressin Infusion 0.03 Unit(s)/Min (4.5 mL/Hr) IV Continuous <Continuous>    MEDICATIONS  (PRN):  HYDROmorphone  Injectable 0.25 milliGRAM(s) IV Push every 3 hours PRN Severe Pain (7 - 10)  ondansetron Injectable 4 milliGRAM(s) IV Push every 6 hours PRN Nausea and/or Vomiting  sodium chloride 0.65% Nasal 1 Spray(s) Both Nostrils every 3 hours PRN Nasal Congestion  tetracaine/benzocaine/butamben Spray 1 Spray(s) Topical every 3 hours PRN for ngt discomfort        Vital Signs Last 24 Hrs  T(C): 36.7 (02 Jan 2023 07:00), Max: 37 (01 Jan 2023 11:00)  T(F): 98.1 (02 Jan 2023 07:00), Max: 98.6 (01 Jan 2023 11:00)  HR: 86 (02 Jan 2023 08:00) (75 - 101)  BP: 97/58 (02 Jan 2023 08:00) (75/50 - 124/55)  BP(mean): 72 (02 Jan 2023 08:00) (56 - 83)  RR: 39 (02 Jan 2023 08:00) (13 - 39)  SpO2: 97% (02 Jan 2023 08:00) (91% - 100%)    Parameters below as of 02 Jan 2023 07:00  Patient On (Oxygen Delivery Method): nasal cannula  O2 Flow (L/min): 5      CBC Full  -  ( 02 Jan 2023 05:44 )  WBC Count : 33.63 K/uL  RBC Count : 3.02 M/uL  Hemoglobin : 9.2 g/dL  Hematocrit : 26.7 %  Platelet Count - Automated : 32 K/uL  Mean Cell Volume : 88.4 fl  Mean Cell Hemoglobin : 30.5 pg  Mean Cell Hemoglobin Concentration : 34.5 gm/dL  Auto Neutrophil # : x  Auto Lymphocyte # : x  Auto Monocyte # : x  Auto Eosinophil # : x  Auto Basophil # : x  Auto Neutrophil % : x  Auto Lymphocyte % : x  Auto Monocyte % : x  Auto Eosinophil % : x  Auto Basophil % : x    01-02    139  |  105  |  12  ----------------------------<  94  4.0   |  21<L>  |  0.79    Ca    8.4      02 Jan 2023 05:46  Phos  3.3     01-02  Mg     2.3     01-02    TPro  6.1  /  Alb  3.6  /  TBili  26.9<H>  /  DBili  x   /  AST  137<H>  /  ALT  49<H>  /  AlkPhos  170<H>  01-02    LIVER FUNCTIONS - ( 02 Jan 2023 05:46 )  Alb: 3.6 g/dL / Pro: 6.1 g/dL / ALK PHOS: 170 U/L / ALT: 49 U/L / AST: 137 U/L / GGT: x               MICROBIOLOGY:  Peritoneal Peritoneal Fluid  12-26-22   No growth  --    polymorphonuclear leukocytes seen  No organisms seen  by cytocentrifuge      .Blood Blood-Peripheral  12-26-22   No Growth Final  --  --      .Blood Blood-Peripheral  12-26-22   No Growth Final  --  --      .Blood Blood  12-24-22   No Growth Final  --  --      .Blood Blood  12-24-22   No Growth Final  --  --      .Blood Blood  12-23-22   No Growth Final  --  --      .Blood Blood  12-23-22   No Growth Final  --  --      Combi-Cath Combi-Cath  12-22-22   Normal Respiratory Cassandra present  --    Moderate polymorphonuclear leukocytes seen per low power field  No squamous epithelial cells seen per low power field  No organisms seen per oil power field      Catheterized Catheterized  12-21-22   >100,000 CFU/ml Leticia dubliniensis "Susceptibilities not performed"  --  --      Ascites Fl Ascites Fluid  12-21-22   No growth at 5 days  --    polymorphonuclear leukocytes seen  No organisms seen  by cytocentrifuge      Trach Asp Tracheal Aspirate  12-21-22   Normal Respiratory Cassandra present  --    No polymorphonuclear leukocytes per low power field  Numerous Squamous epithelial cells per low power field  Moderate Gram Positive Rods seen per oil power field  Few Yeast like cells seen per oil power field      .Blood Blood-Peripheral  12-20-22   No Growth Final  --  --      .Blood Blood-Peripheral  12-20-22   No Growth Final  --  --          CMV IgG Antibody: 5.10 U/mL (12-24-22 @ 20:59)  Toxoplasma IgG Screen: <3.0 IU/mL (12-24-22 @ 20:59)      v    Rapid RVP Result: NotDetec (12-27 @ 22:45)          RADIOLOGY  < from: Xray Chest 1 View- PORTABLE-Routine (Xray Chest 1 View- PORTABLE-Routine in AM.) (01.01.23 @ 07:32) >  Lines and tubes as above.  Increasing patchy opacities, right greater than left -likely due to   progression of pulmonary vascular congestion.  But underlying right basilar pneumonia not excluded in the right clinical   setting.    < end of copied text >     LAURA MOSCOSO 62y [id number]    Patient is a 62y old  Female who presents with a chief complaint of Acute liver failure (02 Jan 2023 02:30)      Follow Up/CC:  ID following for pna    Interval History/ROS: no fever, in 8ICU, answers some ?s, no issues o/n     Allergies    Macrobid (Rash)    Intolerances    Nexium (Stomach Upset)      ANTIMICROBIALS:  caspofungin IVPB    caspofungin IVPB 50 every 24 hours  meropenem  IVPB 1000 every 8 hours  rifAXIMin 550 every 12 hours      MEDICATIONS  (STANDING):  caspofungin IVPB      caspofungin IVPB 50 milliGRAM(s) IV Intermittent every 24 hours  chlorhexidine 2% Cloths 1 Application(s) Topical <User Schedule>  CRRT Treatment    <Continuous>  escitalopram 10 milliGRAM(s) Oral daily  folic acid 1 milliGRAM(s) Oral daily  influenza   Vaccine 0.5 milliLiter(s) IntraMuscular once  levETIRAcetam  Solution 750 milliGRAM(s) Enteral Tube two times a day  levothyroxine Injectable 103 MICROGram(s) IV Push at bedtime  meropenem  IVPB 1000 milliGRAM(s) IV Intermittent every 8 hours  midodrine 20 milliGRAM(s) Oral every 8 hours  multivitamin/minerals/iron Oral Solution (CENTRUM) 15 milliLiter(s) Oral daily  norepinephrine Infusion 0.05 MICROgram(s)/kG/Min (6.21 mL/Hr) IV Continuous <Continuous>  pantoprazole   Suspension 40 milliGRAM(s) Oral every 24 hours  Phoxillum Filtration BK 4 / 2.5 5000 milliLiter(s) (800 mL/Hr) CRRT <Continuous>  Phoxillum Filtration BK 4 / 2.5 5000 milliLiter(s) (200 mL/Hr) CRRT <Continuous>  Phoxillum Filtration BK 4 / 2.5 5000 milliLiter(s) (1000 mL/Hr) CRRT <Continuous>  polyethylene glycol 3350 17 Gram(s) Oral every 12 hours  rifAXIMin 550 milliGRAM(s) Oral every 12 hours  thiamine 100 milliGRAM(s) Enteral Tube daily  vasopressin Infusion 0.03 Unit(s)/Min (4.5 mL/Hr) IV Continuous <Continuous>    MEDICATIONS  (PRN):  HYDROmorphone  Injectable 0.25 milliGRAM(s) IV Push every 3 hours PRN Severe Pain (7 - 10)  ondansetron Injectable 4 milliGRAM(s) IV Push every 6 hours PRN Nausea and/or Vomiting  sodium chloride 0.65% Nasal 1 Spray(s) Both Nostrils every 3 hours PRN Nasal Congestion  tetracaine/benzocaine/butamben Spray 1 Spray(s) Topical every 3 hours PRN for ngt discomfort        Vital Signs Last 24 Hrs  T(C): 36.7 (02 Jan 2023 07:00), Max: 37 (01 Jan 2023 11:00)  T(F): 98.1 (02 Jan 2023 07:00), Max: 98.6 (01 Jan 2023 11:00)  HR: 86 (02 Jan 2023 08:00) (75 - 101)  BP: 97/58 (02 Jan 2023 08:00) (75/50 - 124/55)  BP(mean): 72 (02 Jan 2023 08:00) (56 - 83)  RR: 39 (02 Jan 2023 08:00) (13 - 39)  SpO2: 97% (02 Jan 2023 08:00) (91% - 100%)    Parameters below as of 02 Jan 2023 07:00  Patient On (Oxygen Delivery Method): nasal cannula  O2 Flow (L/min): 5      CBC Full  -  ( 02 Jan 2023 05:44 )  WBC Count : 33.63 K/uL  RBC Count : 3.02 M/uL  Hemoglobin : 9.2 g/dL  Hematocrit : 26.7 %  Platelet Count - Automated : 32 K/uL  Mean Cell Volume : 88.4 fl  Mean Cell Hemoglobin : 30.5 pg  Mean Cell Hemoglobin Concentration : 34.5 gm/dL  Auto Neutrophil # : x  Auto Lymphocyte # : x  Auto Monocyte # : x  Auto Eosinophil # : x  Auto Basophil # : x  Auto Neutrophil % : x  Auto Lymphocyte % : x  Auto Monocyte % : x  Auto Eosinophil % : x  Auto Basophil % : x    01-02    139  |  105  |  12  ----------------------------<  94  4.0   |  21<L>  |  0.79    Ca    8.4      02 Jan 2023 05:46  Phos  3.3     01-02  Mg     2.3     01-02    TPro  6.1  /  Alb  3.6  /  TBili  26.9<H>  /  DBili  x   /  AST  137<H>  /  ALT  49<H>  /  AlkPhos  170<H>  01-02    LIVER FUNCTIONS - ( 02 Jan 2023 05:46 )  Alb: 3.6 g/dL / Pro: 6.1 g/dL / ALK PHOS: 170 U/L / ALT: 49 U/L / AST: 137 U/L / GGT: x               MICROBIOLOGY:  Peritoneal Peritoneal Fluid  12-26-22   No growth  --    polymorphonuclear leukocytes seen  No organisms seen  by cytocentrifuge      .Blood Blood-Peripheral  12-26-22   No Growth Final  --  --      .Blood Blood-Peripheral  12-26-22   No Growth Final  --  --      .Blood Blood  12-24-22   No Growth Final  --  --      .Blood Blood  12-24-22   No Growth Final  --  --      .Blood Blood  12-23-22   No Growth Final  --  --      .Blood Blood  12-23-22   No Growth Final  --  --      Combi-Cath Combi-Cath  12-22-22   Normal Respiratory Cassandra present  --    Moderate polymorphonuclear leukocytes seen per low power field  No squamous epithelial cells seen per low power field  No organisms seen per oil power field      Catheterized Catheterized  12-21-22   >100,000 CFU/ml Leticia dubliniensis "Susceptibilities not performed"  --  --      Ascites Fl Ascites Fluid  12-21-22   No growth at 5 days  --    polymorphonuclear leukocytes seen  No organisms seen  by cytocentrifuge      Trach Asp Tracheal Aspirate  12-21-22   Normal Respiratory Cassandra present  --    No polymorphonuclear leukocytes per low power field  Numerous Squamous epithelial cells per low power field  Moderate Gram Positive Rods seen per oil power field  Few Yeast like cells seen per oil power field      .Blood Blood-Peripheral  12-20-22   No Growth Final  --  --      .Blood Blood-Peripheral  12-20-22   No Growth Final  --  --          CMV IgG Antibody: 5.10 U/mL (12-24-22 @ 20:59)  Toxoplasma IgG Screen: <3.0 IU/mL (12-24-22 @ 20:59)      v    Rapid RVP Result: NotDetec (12-27 @ 22:45)          RADIOLOGY  < from: Xray Chest 1 View- PORTABLE-Routine (Xray Chest 1 View- PORTABLE-Routine in AM.) (01.01.23 @ 07:32) >  Lines and tubes as above.  Increasing patchy opacities, right greater than left -likely due to   progression of pulmonary vascular congestion.  But underlying right basilar pneumonia not excluded in the right clinical   setting.    < end of copied text >     LAURA MOSCOSO 62y [id number]    Patient is a 62y old  Female who presents with a chief complaint of Acute liver failure (02 Jan 2023 02:30)      Follow Up/CC:  ID following for pna    Interval History/ROS: no fever, in 8ICU, answers some ?s, no issues o/n     Allergies    Macrobid (Rash)    Intolerances    Nexium (Stomach Upset)      ANTIMICROBIALS:  caspofungin IVPB    caspofungin IVPB 50 every 24 hours  meropenem  IVPB 1000 every 8 hours  rifAXIMin 550 every 12 hours      MEDICATIONS  (STANDING):  caspofungin IVPB      caspofungin IVPB 50 milliGRAM(s) IV Intermittent every 24 hours  chlorhexidine 2% Cloths 1 Application(s) Topical <User Schedule>  CRRT Treatment    <Continuous>  escitalopram 10 milliGRAM(s) Oral daily  folic acid 1 milliGRAM(s) Oral daily  influenza   Vaccine 0.5 milliLiter(s) IntraMuscular once  levETIRAcetam  Solution 750 milliGRAM(s) Enteral Tube two times a day  levothyroxine Injectable 103 MICROGram(s) IV Push at bedtime  meropenem  IVPB 1000 milliGRAM(s) IV Intermittent every 8 hours  midodrine 20 milliGRAM(s) Oral every 8 hours  multivitamin/minerals/iron Oral Solution (CENTRUM) 15 milliLiter(s) Oral daily  norepinephrine Infusion 0.05 MICROgram(s)/kG/Min (6.21 mL/Hr) IV Continuous <Continuous>  pantoprazole   Suspension 40 milliGRAM(s) Oral every 24 hours  Phoxillum Filtration BK 4 / 2.5 5000 milliLiter(s) (800 mL/Hr) CRRT <Continuous>  Phoxillum Filtration BK 4 / 2.5 5000 milliLiter(s) (200 mL/Hr) CRRT <Continuous>  Phoxillum Filtration BK 4 / 2.5 5000 milliLiter(s) (1000 mL/Hr) CRRT <Continuous>  polyethylene glycol 3350 17 Gram(s) Oral every 12 hours  rifAXIMin 550 milliGRAM(s) Oral every 12 hours  thiamine 100 milliGRAM(s) Enteral Tube daily  vasopressin Infusion 0.03 Unit(s)/Min (4.5 mL/Hr) IV Continuous <Continuous>    MEDICATIONS  (PRN):  HYDROmorphone  Injectable 0.25 milliGRAM(s) IV Push every 3 hours PRN Severe Pain (7 - 10)  ondansetron Injectable 4 milliGRAM(s) IV Push every 6 hours PRN Nausea and/or Vomiting  sodium chloride 0.65% Nasal 1 Spray(s) Both Nostrils every 3 hours PRN Nasal Congestion  tetracaine/benzocaine/butamben Spray 1 Spray(s) Topical every 3 hours PRN for ngt discomfort        Vital Signs Last 24 Hrs  T(C): 36.7 (02 Jan 2023 07:00), Max: 37 (01 Jan 2023 11:00)  T(F): 98.1 (02 Jan 2023 07:00), Max: 98.6 (01 Jan 2023 11:00)  HR: 86 (02 Jan 2023 08:00) (75 - 101)  BP: 97/58 (02 Jan 2023 08:00) (75/50 - 124/55)  BP(mean): 72 (02 Jan 2023 08:00) (56 - 83)  RR: 39 (02 Jan 2023 08:00) (13 - 39)  SpO2: 97% (02 Jan 2023 08:00) (91% - 100%)    Parameters below as of 02 Jan 2023 07:00  Patient On (Oxygen Delivery Method): nasal cannula  O2 Flow (L/min): 5      CBC Full  -  ( 02 Jan 2023 05:44 )  WBC Count : 33.63 K/uL  RBC Count : 3.02 M/uL  Hemoglobin : 9.2 g/dL  Hematocrit : 26.7 %  Platelet Count - Automated : 32 K/uL  Mean Cell Volume : 88.4 fl  Mean Cell Hemoglobin : 30.5 pg  Mean Cell Hemoglobin Concentration : 34.5 gm/dL  Auto Neutrophil # : x  Auto Lymphocyte # : x  Auto Monocyte # : x  Auto Eosinophil # : x  Auto Basophil # : x  Auto Neutrophil % : x  Auto Lymphocyte % : x  Auto Monocyte % : x  Auto Eosinophil % : x  Auto Basophil % : x    01-02    139  |  105  |  12  ----------------------------<  94  4.0   |  21<L>  |  0.79    Ca    8.4      02 Jan 2023 05:46  Phos  3.3     01-02  Mg     2.3     01-02    TPro  6.1  /  Alb  3.6  /  TBili  26.9<H>  /  DBili  x   /  AST  137<H>  /  ALT  49<H>  /  AlkPhos  170<H>  01-02    LIVER FUNCTIONS - ( 02 Jan 2023 05:46 )  Alb: 3.6 g/dL / Pro: 6.1 g/dL / ALK PHOS: 170 U/L / ALT: 49 U/L / AST: 137 U/L / GGT: x               MICROBIOLOGY:  Peritoneal Peritoneal Fluid  12-26-22   No growth  --    polymorphonuclear leukocytes seen  No organisms seen  by cytocentrifuge      .Blood Blood-Peripheral  12-26-22   No Growth Final  --  --      .Blood Blood-Peripheral  12-26-22   No Growth Final  --  --      .Blood Blood  12-24-22   No Growth Final  --  --      .Blood Blood  12-24-22   No Growth Final  --  --      .Blood Blood  12-23-22   No Growth Final  --  --      .Blood Blood  12-23-22   No Growth Final  --  --      Combi-Cath Combi-Cath  12-22-22   Normal Respiratory Cassandra present  --    Moderate polymorphonuclear leukocytes seen per low power field  No squamous epithelial cells seen per low power field  No organisms seen per oil power field      Catheterized Catheterized  12-21-22   >100,000 CFU/ml Leticia dubliniensis "Susceptibilities not performed"  --  --      Ascites Fl Ascites Fluid  12-21-22   No growth at 5 days  --    polymorphonuclear leukocytes seen  No organisms seen  by cytocentrifuge      Trach Asp Tracheal Aspirate  12-21-22   Normal Respiratory Cassandra present  --    No polymorphonuclear leukocytes per low power field  Numerous Squamous epithelial cells per low power field  Moderate Gram Positive Rods seen per oil power field  Few Yeast like cells seen per oil power field      .Blood Blood-Peripheral  12-20-22   No Growth Final  --  --      .Blood Blood-Peripheral  12-20-22   No Growth Final  --  --          CMV IgG Antibody: 5.10 U/mL (12-24-22 @ 20:59)  Toxoplasma IgG Screen: <3.0 IU/mL (12-24-22 @ 20:59)      v    Rapid RVP Result: NotDetec (12-27 @ 22:45)          RADIOLOGY  < from: Xray Chest 1 View- PORTABLE-Routine (Xray Chest 1 View- PORTABLE-Routine in AM.) (01.01.23 @ 07:32) >  Lines and tubes as above.  Increasing patchy opacities, right greater than left -likely due to   progression of pulmonary vascular congestion.  But underlying right basilar pneumonia not excluded in the right clinical   setting.    < end of copied text >

## 2023-01-02 NOTE — PROGRESS NOTE ADULT - NSPROGADDITIONALINFOA_GEN_ALL_CORE
ID coverage available over New Year's holiday weekend (12/31-1/2) if needed. Call #[phone] for questions/concerns.     D/w daughter at bedside ID coverage available over New Year's holiday weekend (12/31-1/2) if needed. Call #[phone] for questions/concerns.     D/w daughter at bedside ID coverage available over New Year's holiday weekend (12/31-1/2) if needed. Call #[phone] for questions/concerns.     D/w daughter at bedside

## 2023-01-02 NOTE — PROGRESS NOTE ADULT - SUBJECTIVE AND OBJECTIVE BOX
Transplant Surgery Progress Note    Present:   Patient seen and examined with multidisciplinary Transplant team including  Surgeon: Dr. Oconnor,. Hepatologist: Dr. Bush,  NP:James and RNs in AM rounds.   Disciplines not in attendance will be notified of the plan.     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  Arthur ([phone]) and Daughter Taylor at [facility] provided additional history. Of note was recently admitted to  11/2022 for workup and eval of jaundice- during that hospitalization was found to have a UTI and dx with alc hep and started on steroids (was deemed a non-responder and steroids were subsequently stopped); s/p LVP at [place] 11/2022. Previously hospitalized in 2021 at [place] for ETOH detox. Went to ETOH rehab 08/2022 and was asked to leave the facility when she was COVID+; was sober for 1 week until she started drinking again. Had also attended a few AA meetings in the past. Transferred from [facility] 12/20 for further management of acute liver failure and liver transplant evaluation.       Interval events:  - remains on pressors  - CVVH clotted yesterday received one u PRBC and restarted  - TF resumed   - [facility]  - mental status waxing and waning  - diffuse rash across abdomen and flank. Received Atarax x1 for itching    Potential Discharge date: pending clinical stability  Education:  Medications  Plan of care:  See Below      MEDICATIONS  (STANDING):  caspofungin IVPB      caspofungin IVPB 50 milliGRAM(s) IV Intermittent every 24 hours  chlorhexidine 2% Cloths 1 Application(s) Topical <User Schedule>  CRRT Treatment    <Continuous>  escitalopram 10 milliGRAM(s) Oral daily  folic acid 1 milliGRAM(s) Oral daily  influenza   Vaccine 0.5 milliLiter(s) IntraMuscular once  levETIRAcetam  Solution 750 milliGRAM(s) Enteral Tube two times a day  levothyroxine Injectable 103 MICROGram(s) IV Push at bedtime  meropenem  IVPB 1000 milliGRAM(s) IV Intermittent every 8 hours  midodrine 20 milliGRAM(s) Oral every 8 hours  multivitamin/minerals/iron Oral Solution (CENTRUM) 15 milliLiter(s) Oral daily  norepinephrine Infusion 0.05 MICROgram(s)/kG/Min (6.21 mL/Hr) IV Continuous <Continuous>  pantoprazole   Suspension 40 milliGRAM(s) Oral every 24 hours  Phoxillum Filtration BK 4 / 2.5 5000 milliLiter(s) (800 mL/Hr) CRRT <Continuous>  Phoxillum Filtration BK 4 / 2.5 5000 milliLiter(s) (200 mL/Hr) CRRT <Continuous>  Phoxillum Filtration BK 4 / 2.5 5000 milliLiter(s) (1000 mL/Hr) CRRT <Continuous>  polyethylene glycol 3350 17 Gram(s) Oral every 12 hours  rifAXIMin 550 milliGRAM(s) Oral every 12 hours  thiamine 100 milliGRAM(s) Enteral Tube daily  vasopressin Infusion 0.03 Unit(s)/Min (4.5 mL/Hr) IV Continuous <Continuous>    MEDICATIONS  (PRN):  HYDROmorphone  Injectable 0.25 milliGRAM(s) IV Push every 3 hours PRN Severe Pain (7 - 10)  sodium chloride 0.65% Nasal 1 Spray(s) Both Nostrils every 3 hours PRN Nasal Congestion  tetracaine/benzocaine/butamben Spray 1 Spray(s) Topical every 3 hours PRN for ngt discomfort      PAST MEDICAL & SURGICAL HISTORY:  HTN (hypertension)  off medication for over one year--states BP has been normal      UTI (urinary tract infection)  currently being treated--will complete antibiotics 3/8/2022      Intracranial tumor      GERD (gastroesophageal reflux disease)      Eczema      Thyroid cancer  surgery. radiation, Radioactive iodine      COVID-19 virus infection  11/2021--recovered at home      H/O total thyroidectomy  age 21&#x27;s for Thyroid cancer, postop complication arterial bleed, second surgery      History of tonsillectomy  age 5      History of appendectomy  age 5          Vital Signs Last 24 Hrs  T(C): 36.7 (02 Jan 2023 07:00), Max: 36.9 (01 Jan 2023 19:00)  T(F): 98.1 (02 Jan 2023 07:00), Max: 98.5 (01 Jan 2023 19:00)  HR: 83 (02 Jan 2023 14:00) (75 - 101)  BP: 98/53 (02 Jan 2023 14:00) (75/50 - 124/55)  BP(mean): 71 (02 Jan 2023 14:00) (56 - 84)  RR: 27 (02 Jan 2023 14:00) (13 - 52)  SpO2: 97% (02 Jan 2023 14:00) (91% - 100%)    Parameters below as of 02 Jan 2023 12:15  Patient On (Oxygen Delivery Method): nasal cannula  O2 Flow (L/min): 3      I&O's Summary    01 Jan 2023 07:01  -  02 Jan 2023 07:00  --------------------------------------------------------  IN: 2828.2 mL / OUT: 2076 mL / NET: 752.2 mL    02 Jan 2023 07:01  -  02 Jan 2023 14:22  --------------------------------------------------------  IN: 739.1 mL / OUT: 615 mL / NET: 124.1 mL                              9.2    33.63 )-----------( 32       ( 02 Jan 2023 05:44 )             26.7     01-02    137  |  103  |  12  ----------------------------<  98  4.1   |  20<L>  |  0.78    Ca    9.1      02 Jan 2023 11:33  Phos  3.8     01-02  Mg     2.3     01-02    TPro  6.1  /  Alb  3.6  /  TBili  28.3<H>  /  DBili  x   /  AST  153<H>  /  ALT  51<H>  /  AlkPhos  178<H>  01-02          Culture - Fungal, Body Fluid (collected 12-26-22 @ 15:00)  Source: Peritoneal Peritoneal Fluid  Preliminary Report (12-28-22 @ 15:03):    Culture is being performed. Fungal cultures are held for 4 weeks.    Culture - Body Fluid with Gram Stain (collected 12-26-22 @ 15:00)  Source: Peritoneal Peritoneal Fluid  Gram Stain (12-26-22 @ 23:25):    polymorphonuclear leukocytes seen    No organisms seen    by cytocentrifuge  Final Report (12-31-22 @ 15:38):    No growth                 Review of systems  AMS      Physical Exam:  Constitutional: NAD   Eyes: icteric, PERRLA  ENMT: nc/at, no thrush  Neck: supple, central line   Cardiovascular: RRR  Gastrointestinal: Soft abdomen, distended  Genitourinary: Urinary catheter in place, anuric  Extremities: SCD's in place and working bilaterally, no edema  Vascular: Palpable dp pulses bilaterally.   Neurological: following commands, mentation improving  Skin:  diffuse rash across abdomen and flank. No ulcerations, lesions  Musculoskeletal: moving extremities  Psychiatric: calm   Transplant Surgery Progress Note    Present:   Patient seen and examined with multidisciplinary Transplant team including  Surgeon: Dr. Oconnor,. Hepatologist: Dr. Bush,  NP:James and RNs in AM rounds.   Disciplines not in attendance will be notified of the plan.     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  Arthur ([phone]) and Daughter Taylor at [facility] provided additional history. Of note was recently admitted to  11/2022 for workup and eval of jaundice- during that hospitalization was found to have a UTI and dx with alc hep and started on steroids (was deemed a non-responder and steroids were subsequently stopped); s/p LVP at [place] 11/2022. Previously hospitalized in 2021 at [place] for ETOH detox. Went to ETOH rehab 08/2022 and was asked to leave the facility when she was COVID+; was sober for 1 week until she started drinking again. Had also attended a few AA meetings in the past. Transferred from [facility] 12/20 for further management of acute liver failure and liver transplant evaluation.       Interval events:  - remains on pressors  - CVVH clotted yesterday received one u PRBC and restarted  - TF resumed   - [facility]  - mental status waxing and waning  - diffuse rash across abdomen and flank. Received Atarax x1 for itching    Potential Discharge date: pending clinical stability  Education:  Medications  Plan of care:  See Below      MEDICATIONS  (STANDING):  caspofungin IVPB      caspofungin IVPB 50 milliGRAM(s) IV Intermittent every 24 hours  chlorhexidine 2% Cloths 1 Application(s) Topical <User Schedule>  CRRT Treatment    <Continuous>  escitalopram 10 milliGRAM(s) Oral daily  folic acid 1 milliGRAM(s) Oral daily  influenza   Vaccine 0.5 milliLiter(s) IntraMuscular once  levETIRAcetam  Solution 750 milliGRAM(s) Enteral Tube two times a day  levothyroxine Injectable 103 MICROGram(s) IV Push at bedtime  meropenem  IVPB 1000 milliGRAM(s) IV Intermittent every 8 hours  midodrine 20 milliGRAM(s) Oral every 8 hours  multivitamin/minerals/iron Oral Solution (CENTRUM) 15 milliLiter(s) Oral daily  norepinephrine Infusion 0.05 MICROgram(s)/kG/Min (6.21 mL/Hr) IV Continuous <Continuous>  pantoprazole   Suspension 40 milliGRAM(s) Oral every 24 hours  Phoxillum Filtration BK 4 / 2.5 5000 milliLiter(s) (800 mL/Hr) CRRT <Continuous>  Phoxillum Filtration BK 4 / 2.5 5000 milliLiter(s) (200 mL/Hr) CRRT <Continuous>  Phoxillum Filtration BK 4 / 2.5 5000 milliLiter(s) (1000 mL/Hr) CRRT <Continuous>  polyethylene glycol 3350 17 Gram(s) Oral every 12 hours  rifAXIMin 550 milliGRAM(s) Oral every 12 hours  thiamine 100 milliGRAM(s) Enteral Tube daily  vasopressin Infusion 0.03 Unit(s)/Min (4.5 mL/Hr) IV Continuous <Continuous>    MEDICATIONS  (PRN):  HYDROmorphone  Injectable 0.25 milliGRAM(s) IV Push every 3 hours PRN Severe Pain (7 - 10)  sodium chloride 0.65% Nasal 1 Spray(s) Both Nostrils every 3 hours PRN Nasal Congestion  tetracaine/benzocaine/butamben Spray 1 Spray(s) Topical every 3 hours PRN for ngt discomfort      PAST MEDICAL & SURGICAL HISTORY:  HTN (hypertension)  off medication for over one year--states BP has been normal      UTI (urinary tract infection)  currently being treated--will complete antibiotics 3/8/2022      Intracranial tumor      GERD (gastroesophageal reflux disease)      Eczema      Thyroid cancer  surgery. radiation, Radioactive iodine      COVID-19 virus infection  11/2021--recovered at home      H/O total thyroidectomy  age 21&#x27;s for Thyroid cancer, postop complication arterial bleed, second surgery      History of tonsillectomy  age 5      History of appendectomy  age 5          Vital Signs Last 24 Hrs  T(C): 36.7 (02 Jan 2023 07:00), Max: 36.9 (01 Jan 2023 19:00)  T(F): 98.1 (02 Jan 2023 07:00), Max: 98.5 (01 Jan 2023 19:00)  HR: 83 (02 Jan 2023 14:00) (75 - 101)  BP: 98/53 (02 Jan 2023 14:00) (75/50 - 124/55)  BP(mean): 71 (02 Jan 2023 14:00) (56 - 84)  RR: 27 (02 Jan 2023 14:00) (13 - 52)  SpO2: 97% (02 Jan 2023 14:00) (91% - 100%)    Parameters below as of 02 Jan 2023 12:15  Patient On (Oxygen Delivery Method): nasal cannula  O2 Flow (L/min): 3      I&O's Summary    01 Jan 2023 07:01  -  02 Jan 2023 07:00  --------------------------------------------------------  IN: 2828.2 mL / OUT: 2076 mL / NET: 752.2 mL    02 Jan 2023 07:01  -  02 Jan 2023 14:22  --------------------------------------------------------  IN: 739.1 mL / OUT: 615 mL / NET: 124.1 mL                              9.2    33.63 )-----------( 32       ( 02 Jan 2023 05:44 )             26.7     01-02    137  |  103  |  12  ----------------------------<  98  4.1   |  20<L>  |  0.78    Ca    9.1      02 Jan 2023 11:33  Phos  3.8     01-02  Mg     2.3     01-02    TPro  6.1  /  Alb  3.6  /  TBili  28.3<H>  /  DBili  x   /  AST  153<H>  /  ALT  51<H>  /  AlkPhos  178<H>  01-02          Culture - Fungal, Body Fluid (collected 12-26-22 @ 15:00)  Source: Peritoneal Peritoneal Fluid  Preliminary Report (12-28-22 @ 15:03):    Culture is being performed. Fungal cultures are held for 4 weeks.    Culture - Body Fluid with Gram Stain (collected 12-26-22 @ 15:00)  Source: Peritoneal Peritoneal Fluid  Gram Stain (12-26-22 @ 23:25):    polymorphonuclear leukocytes seen    No organisms seen    by cytocentrifuge  Final Report (12-31-22 @ 15:38):    No growth                 Review of systems  AMS      Physical Exam:  Constitutional: NAD   Eyes: icteric, PERRLA  ENMT: nc/at, no thrush  Neck: supple, central line   Cardiovascular: RRR  Gastrointestinal: Soft abdomen, distended  Genitourinary: Urinary catheter in place, anuric  Extremities: SCD's in place and working bilaterally, no edema  Vascular: Palpable dp pulses bilaterally.   Neurological: following commands, mentation improving  Skin:  diffuse rash across abdomen and flank. No ulcerations, lesions  Musculoskeletal: moving extremities  Psychiatric: calm   Transplant Surgery Progress Note    Present:   Patient seen and examined with multidisciplinary Transplant team including  Surgeon: Dr. Oconnor,. Hepatologist: Dr. Bush,  NP:James and RNs in AM rounds.   Disciplines not in attendance will be notified of the plan.     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  Arthur ([phone]) and Daughter Taylor at [facility] provided additional history. Of note was recently admitted to  11/2022 for workup and eval of jaundice- during that hospitalization was found to have a UTI and dx with alc hep and started on steroids (was deemed a non-responder and steroids were subsequently stopped); s/p LVP at [place] 11/2022. Previously hospitalized in 2021 at [place] for ETOH detox. Went to ETOH rehab 08/2022 and was asked to leave the facility when she was COVID+; was sober for 1 week until she started drinking again. Had also attended a few AA meetings in the past. Transferred from [facility] 12/20 for further management of acute liver failure and liver transplant evaluation.       Interval events:  - remains on pressors  - CVVH clotted yesterday received one u PRBC and restarted  - TF resumed   - [facility]  - mental status waxing and waning  - diffuse rash across abdomen and flank. Received Atarax x1 for itching    Potential Discharge date: pending clinical stability  Education:  Medications  Plan of care:  See Below      MEDICATIONS  (STANDING):  caspofungin IVPB      caspofungin IVPB 50 milliGRAM(s) IV Intermittent every 24 hours  chlorhexidine 2% Cloths 1 Application(s) Topical <User Schedule>  CRRT Treatment    <Continuous>  escitalopram 10 milliGRAM(s) Oral daily  folic acid 1 milliGRAM(s) Oral daily  influenza   Vaccine 0.5 milliLiter(s) IntraMuscular once  levETIRAcetam  Solution 750 milliGRAM(s) Enteral Tube two times a day  levothyroxine Injectable 103 MICROGram(s) IV Push at bedtime  meropenem  IVPB 1000 milliGRAM(s) IV Intermittent every 8 hours  midodrine 20 milliGRAM(s) Oral every 8 hours  multivitamin/minerals/iron Oral Solution (CENTRUM) 15 milliLiter(s) Oral daily  norepinephrine Infusion 0.05 MICROgram(s)/kG/Min (6.21 mL/Hr) IV Continuous <Continuous>  pantoprazole   Suspension 40 milliGRAM(s) Oral every 24 hours  Phoxillum Filtration BK 4 / 2.5 5000 milliLiter(s) (800 mL/Hr) CRRT <Continuous>  Phoxillum Filtration BK 4 / 2.5 5000 milliLiter(s) (200 mL/Hr) CRRT <Continuous>  Phoxillum Filtration BK 4 / 2.5 5000 milliLiter(s) (1000 mL/Hr) CRRT <Continuous>  polyethylene glycol 3350 17 Gram(s) Oral every 12 hours  rifAXIMin 550 milliGRAM(s) Oral every 12 hours  thiamine 100 milliGRAM(s) Enteral Tube daily  vasopressin Infusion 0.03 Unit(s)/Min (4.5 mL/Hr) IV Continuous <Continuous>    MEDICATIONS  (PRN):  HYDROmorphone  Injectable 0.25 milliGRAM(s) IV Push every 3 hours PRN Severe Pain (7 - 10)  sodium chloride 0.65% Nasal 1 Spray(s) Both Nostrils every 3 hours PRN Nasal Congestion  tetracaine/benzocaine/butamben Spray 1 Spray(s) Topical every 3 hours PRN for ngt discomfort      PAST MEDICAL & SURGICAL HISTORY:  HTN (hypertension)  off medication for over one year--states BP has been normal      UTI (urinary tract infection)  currently being treated--will complete antibiotics 3/8/2022      Intracranial tumor      GERD (gastroesophageal reflux disease)      Eczema      Thyroid cancer  surgery. radiation, Radioactive iodine      COVID-19 virus infection  11/2021--recovered at home      H/O total thyroidectomy  age 21&#x27;s for Thyroid cancer, postop complication arterial bleed, second surgery      History of tonsillectomy  age 5      History of appendectomy  age 5          Vital Signs Last 24 Hrs  T(C): 36.7 (02 Jan 2023 07:00), Max: 36.9 (01 Jan 2023 19:00)  T(F): 98.1 (02 Jan 2023 07:00), Max: 98.5 (01 Jan 2023 19:00)  HR: 83 (02 Jan 2023 14:00) (75 - 101)  BP: 98/53 (02 Jan 2023 14:00) (75/50 - 124/55)  BP(mean): 71 (02 Jan 2023 14:00) (56 - 84)  RR: 27 (02 Jan 2023 14:00) (13 - 52)  SpO2: 97% (02 Jan 2023 14:00) (91% - 100%)    Parameters below as of 02 Jan 2023 12:15  Patient On (Oxygen Delivery Method): nasal cannula  O2 Flow (L/min): 3      I&O's Summary    01 Jan 2023 07:01  -  02 Jan 2023 07:00  --------------------------------------------------------  IN: 2828.2 mL / OUT: 2076 mL / NET: 752.2 mL    02 Jan 2023 07:01  -  02 Jan 2023 14:22  --------------------------------------------------------  IN: 739.1 mL / OUT: 615 mL / NET: 124.1 mL                              9.2    33.63 )-----------( 32       ( 02 Jan 2023 05:44 )             26.7     01-02    137  |  103  |  12  ----------------------------<  98  4.1   |  20<L>  |  0.78    Ca    9.1      02 Jan 2023 11:33  Phos  3.8     01-02  Mg     2.3     01-02    TPro  6.1  /  Alb  3.6  /  TBili  28.3<H>  /  DBili  x   /  AST  153<H>  /  ALT  51<H>  /  AlkPhos  178<H>  01-02          Culture - Fungal, Body Fluid (collected 12-26-22 @ 15:00)  Source: Peritoneal Peritoneal Fluid  Preliminary Report (12-28-22 @ 15:03):    Culture is being performed. Fungal cultures are held for 4 weeks.    Culture - Body Fluid with Gram Stain (collected 12-26-22 @ 15:00)  Source: Peritoneal Peritoneal Fluid  Gram Stain (12-26-22 @ 23:25):    polymorphonuclear leukocytes seen    No organisms seen    by cytocentrifuge  Final Report (12-31-22 @ 15:38):    No growth                 Review of systems  AMS      Physical Exam:  Constitutional: NAD   Eyes: icteric, PERRLA  ENMT: nc/at, no thrush  Neck: supple, central line   Cardiovascular: RRR  Gastrointestinal: Soft abdomen, distended  Genitourinary: Urinary catheter in place, anuric  Extremities: SCD's in place and working bilaterally, no edema  Vascular: Palpable dp pulses bilaterally.   Neurological: following commands, mentation improving  Skin:  diffuse rash across abdomen and flank. No ulcerations, lesions  Musculoskeletal: moving extremities  Psychiatric: calm

## 2023-01-02 NOTE — PROGRESS NOTE ADULT - ASSESSMENT
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    CT Chest with possible pneumonia versus atelectasis    UA (12/19) Moderate Leukocyte Esterase  UCx (12/19) No Growth  BCx (12/19) Corynebacterium (1/4 Bottles)  MRSA Nasal PCR (12/19) Negative  Paracentesis (12/19, 12/26) Cell counts not suggestive of SBP    Extubated but continued shock and hypoxic respiratory failure  Suspect noninfectious etiology of current clinical status (liver failure)  Central lines exchanged within the last week    #Leukocytosis, Positive Blood Culture, Transaminitis, Cirrhosis  --favor DC abx at this point -- as ID workup has been thus far unrevealing and patient completed an extended antibiotic course  --Would pursue CT C/A/P when / if able  --Continue to follow CBC with diff  --Continue to follow temperature curve  --cultures negative    #Positive UCx (Candida dublinensis)  Unclear Significance  s/p Fluconazole 12/20 --> 12/25  Caspofungin gets poor urinary penetration but Candida dublinensis is intermittently azole resistant  Not making urine now  --Can continue empiric Caspofungin for now    #Pre-Transplant Evaluation  --ID Clearance for OLT pending clinical status improvement  --Initial QuantiFeron Indeterminate; would repeat QuantiFeron     #Encounter to Vaccinate Patient  Will address vaccination outside of the critical illness period  COVID19: Has had COVID19 in 2020 and again in ~8/2022. Denies prior COVID19 Vaccination  Influenza: Will require  Pneumococcal: Would benefit from PCV20  HAV: Will require Hepatitis A Vaccine  HBV: Will require Heplisav  MMR: Immune  Varicella: Immune  Shingles: Will require Shingrix  Tdap: Will require Tdap    Solomon José  Attending Physician   Division of Infectious Disease  Office #[phone]  Available on [employer] Teams also  After 5pm/weekend or no response, call #[phone] 62 year old female PMH decompensated ETOH cirrhosis c/b ascites (dx 11/2022), alc hep (dx 11/2022; non-responsive to steroids), remote h/o thyroid cancer in her 20s s/p total thyroidectomy + RTX + radioactive iodide, HTN, 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    CT Chest with possible pneumonia versus atelectasis    UA (12/19) Moderate Leukocyte Esterase  UCx (12/19) No Growth  BCx (12/19) Corynebacterium (1/4 Bottles)  MRSA Nasal PCR (12/19) Negative  Paracentesis (12/19, 12/26) Cell counts not suggestive of SBP    Extubated but continued shock and hypoxic respiratory failure  Suspect noninfectious etiology of current clinical status (liver failure)  Central lines exchanged within the last week    #Leukocytosis, Positive Blood Culture, Transaminitis, Cirrhosis  --favor DC abx at this point -- as ID workup has been thus far unrevealing and patient completed an extended antibiotic course  --Would pursue CT C/A/P when / if able  --Continue to follow CBC with diff  --Continue to follow temperature curve  --cultures negative    #Positive UCx (Candida dublinensis)  Unclear Significance  s/p Fluconazole 12/20 --> 12/25  Caspofungin gets poor urinary penetration but Candida dublinensis is intermittently azole resistant  Not making urine now  --Can continue empiric Caspofungin for now    #Pre-Transplant Evaluation  --ID Clearance for OLT pending clinical status improvement  --Initial QuantiFeron Indeterminate; would repeat QuantiFeron     #Encounter to Vaccinate Patient  Will address vaccination outside of the critical illness period  COVID19: Has had COVID19 in 2020 and again in ~8/2022. Denies prior COVID19 Vaccination  Influenza: Will require  Pneumococcal: Would benefit from PCV20  HAV: Will require Hepatitis A Vaccine  HBV: Will require Heplisav  MMR: Immune  Varicella: Immune  Shingles: Will require Shingrix  Tdap: Will require Tdap    Solomon José  Attending Physician   Division of Infectious Disease  Office #[phone]  Available on [employer] Teams also  After 5pm/weekend or no response, call #[phone] 62 year old female PMH decompensated ETOH cirrhosis c/b ascites (dx 11/2022), alc hep (dx 11/2022; non-responsive to steroids), remote h/o thyroid cancer in her 20s s/p total thyroidectomy + RTX + radioactive iodide, HTN, 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 12/20 for LT eval for halfway.    CT Chest with possible pneumonia versus atelectasis    UA (12/19) Moderate Leukocyte Esterase  UCx (12/19) No Growth  BCx (12/19) Corynebacterium (1/4 Bottles)  MRSA Nasal PCR (12/19) Negative  Paracentesis (12/19, 12/26) Cell counts not suggestive of SBP    Extubated but continued shock and hypoxic respiratory failure  Suspect noninfectious etiology of current clinical status (liver failure)  Central lines exchanged within the last week    #Leukocytosis, Positive Blood Culture, Transaminitis, Cirrhosis  --favor DC abx at this point -- as ID workup has been thus far unrevealing and patient completed an extended antibiotic course  --Would pursue CT C/A/P when / if able  --Continue to follow CBC with diff  --Continue to follow temperature curve  --cultures negative    #Positive UCx (Candida dublinensis)  Unclear Significance  s/p Fluconazole 12/20 --> 12/25  Caspofungin gets poor urinary penetration but Candida dublinensis is intermittently azole resistant  Not making urine now  --Can continue empiric Caspofungin for now    #Pre-Transplant Evaluation  --ID Clearance for OLT pending clinical status improvement  --Initial QuantiFeron Indeterminate; would repeat QuantiFeron     #Encounter to Vaccinate Patient  Will address vaccination outside of the critical illness period  COVID19: Has had COVID19 in 2020 and again in ~8/2022. Denies prior COVID19 Vaccination  Influenza: Will require  Pneumococcal: Would benefit from PCV20  HAV: Will require Hepatitis A Vaccine  HBV: Will require Heplisav  MMR: Immune  Varicella: Immune  Shingles: Will require Shingrix  Tdap: Will require Tdap    Solomon José  Attending Physician   Division of Infectious Disease  Office #[phone]  Available on [employer] Teams also  After 5pm/weekend or no response, call #[phone]

## 2023-01-02 NOTE — PROGRESS NOTE ADULT - SUBJECTIVE AND OBJECTIVE BOX
HPI:  Patient seen and examined at bedside on 8 ICU.  No cardiac events overnight.    Review Of Systems:           Respiratory: No shortness of breath, cough, or wheezing  Cardiovascular: No chest pain or palpitations  10 point review of systems is otherwise negative except as mentioned above        Medications:  caspofungin IVPB      caspofungin IVPB 50 milliGRAM(s) IV Intermittent every 24 hours  chlorhexidine 2% Cloths 1 Application(s) Topical <User Schedule>  CRRT Treatment    <Continuous>  escitalopram 10 milliGRAM(s) Oral daily  folic acid 1 milliGRAM(s) Oral daily  HYDROmorphone  Injectable 0.25 milliGRAM(s) IV Push every 3 hours PRN  influenza   Vaccine 0.5 milliLiter(s) IntraMuscular once  levETIRAcetam  Solution 750 milliGRAM(s) Enteral Tube two times a day  levothyroxine Injectable 103 MICROGram(s) IV Push at bedtime  meropenem  IVPB 1000 milliGRAM(s) IV Intermittent every 8 hours  midodrine 20 milliGRAM(s) Oral every 8 hours  multivitamin/minerals/iron Oral Solution (CENTRUM) 15 milliLiter(s) Oral daily  norepinephrine Infusion 0.05 MICROgram(s)/kG/Min IV Continuous <Continuous>  pantoprazole   Suspension 40 milliGRAM(s) Oral every 24 hours  Phoxillum Filtration BK 4 / 2.5 5000 milliLiter(s) CRRT <Continuous>  Phoxillum Filtration BK 4 / 2.5 5000 milliLiter(s) CRRT <Continuous>  Phoxillum Filtration BK 4 / 2.5 5000 milliLiter(s) CRRT <Continuous>  polyethylene glycol 3350 17 Gram(s) Oral every 12 hours  rifAXIMin 550 milliGRAM(s) Oral every 12 hours  sodium chloride 0.65% Nasal 1 Spray(s) Both Nostrils every 3 hours PRN  tetracaine/benzocaine/butamben Spray 1 Spray(s) Topical every 3 hours PRN  thiamine 100 milliGRAM(s) Enteral Tube daily  vasopressin Infusion 0.03 Unit(s)/Min IV Continuous <Continuous>    PAST MEDICAL & SURGICAL HISTORY:  HTN (hypertension)  off medication for over one year--states BP has been normal      UTI (urinary tract infection)  currently being treated--will complete antibiotics 3/8/2022      Intracranial tumor      GERD (gastroesophageal reflux disease)      Eczema      Thyroid cancer  surgery. radiation, Radioactive iodine      COVID-19 virus infection  11/2021--recovered at home      H/O total thyroidectomy  age 21&#x27;s for Thyroid cancer, postop complication arterial bleed, second surgery      History of tonsillectomy  age 5      History of appendectomy  age 5        Vitals:  T(C): 36.7 (01-02-23 @ 07:00), Max: 36.9 (01-01-23 @ 19:00)  HR: 89 (01-02-23 @ 13:30) (75 - 101)  BP: 88/55 (01-02-23 @ 13:00) (75/50 - 124/55)  BP(mean): 66 (01-02-23 @ 13:00) (56 - 84)  RR: 25 (01-02-23 @ 13:30) (13 - 52)  SpO2: 97% (01-02-23 @ 13:30) (91% - 100%)  Wt(kg): --  Daily     Daily   I&O's Summary    01 Jan 2023 07:01  -  02 Jan 2023 07:00  --------------------------------------------------------  IN: 2828.2 mL / OUT: 2076 mL / NET: 752.2 mL    02 Jan 2023 07:01  -  02 Jan 2023 13:51  --------------------------------------------------------  IN: 587.8 mL / OUT: 562 mL / NET: 25.8 mL        Physical Exam:  Appearance: Jaundice, encephalopathic   Eyes: PERRLA, EOMI, pink conjunctiva, no scleral icterus   HENT: Normal oral mucosa  Cardiovascular: RRR, S1, S2, no murmur, rub, or gallop; no edema; no JVD  Procedural Access Site: Clean, dry, intact, without hematoma  Respiratory: Clear to auscultation bilaterally  Gastrointestinal: Soft, non-tender, non-distended, BS+  Musculoskeletal: Mitten restraints   Lymphatic: No lymphadenopathy  Skin: No rashes, ecchymoses, or cyanosis                        9.2    33.63 )-----------( 32       ( 02 Jan 2023 05:44 )             26.7     01-02    137  |  103  |  12  ----------------------------<  98  4.1   |  20<L>  |  0.78    Ca    9.1      02 Jan 2023 11:33  Phos  3.8     01-02  Mg     2.3     01-02    TPro  6.1  /  Alb  3.6  /  TBili  28.3<H>  /  DBili  x   /  AST  153<H>  /  ALT  51<H>  /  AlkPhos  178<H>  01-02    PT/INR - ( 02 Jan 2023 00:28 )   PT: 28.0 sec;   INR: 2.39 ratio         PTT - ( 02 Jan 2023 00:28 )  PTT:53.4 sec          Cardiovascular Diagnostic Testing:  ECG:     Echo:   < from: TTE with Doppler (w/Cont) (12.21.22 @ 10:49) >  ------------------------------------------------------------------------  Dimensions:    Normal Values:  LA:     3.5    2.0 - 4.0 cm  Ao:     3.1    2.0 - 3.8 cm  SEPTUM: 0.7    0.6 - 1.2 cm  PWT:    0.6    0.6 - 1.1 cm  LVIDd: 5.4    3.0 - 5.6 cm  LVIDs:  2.9    1.8 - 4.0 cm  Derived variables:  LVMI: 69 g/m2  RWT: 0.22  Fractional short: 46 %  EF (Visual Estimate):  %  EF (Monroy Rule): 66 %Doppler Peak Velocity (m/sec):  AoV=1.4  ------------------------------------------------------------------------  Observations:  Mitral Valve: Normal mitral valve. Minimal mitral  regurgitation.  Aortic Valve/Aorta: Calcified trileaflet aortic valve with  normal opening. Peak transaortic valve gradient equals 8 mm  Hg. No aorticvalve regurgitation seen. Peak left  ventricular outflow tract gradient equals 4 mm Hg, mean  gradient is equal to 2 mm Hg, LVOT velocity time integral  equals 19 cm.  Aortic Root: 3.1 cm.  Ascending Aorta: 3.3 cm.  LVOT diameter: 2 cm.  Left Atrium:Normal left atrium.  LA volume index = 33  cc/m2.  Left Ventricle: Endocardial visualization enhanced with  intravenous injection of Ultrasonic Enhancing Agent  (Definity). Left ventricle suboptimally visualized despite  intravenous injection of ultrasonic enhancing agent; normal  left ventricular systolic function. No segmental wall  motion abnormalities. Normal left ventricular internal  dimensions and wall thicknesses. Normal diastolic function.  Right Heart: Normal right atrium. Normal right ventricular  size and function. Normal tricuspid valve. Mild-moderate  tricuspid regurgitation. Normal pulmonic valve. Minimal  pulmonic regurgitation.  Pericardium/Pleura: Normal pericardium with no pericardial  effusion.  Hemodynamic: Estimated right atrial pressure is 8 mm Hg.  Estimated right ventricular systolic pressure equals 29 mm  Hg, assuming right atrial pressure equals 8 mm Hg,  consistent with normal pulmonary pressures.  ------------------------------------------------------------------------  Conclusions:  1. Normal mitral valve. Minimal mitral regurgitation.  2. Calcified trileaflet aortic valve with normal opening.  No aortic valve regurgitation seen.  3. Endocardial visualization enhanced with intravenous  injection of Ultrasonic Enhancing Agent (Definity). Left  ventricle suboptimally visualized despite intravenous  injection of ultrasonic enhancing agent; normal left  ventricular systolic function. No segmental wall motion  abnormalities.  4. Normal right ventricular size and function.  *** No previous Echo exam.  ------------------------------------------------------------------------  Confirmed on  12/21/2022 - 13:40:44 by Rosalinda Ingram M.D.  ------------------------------------------------------------------------    < end of copied text >

## 2023-01-02 NOTE — PROGRESS NOTE ADULT - ASSESSMENT
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   Transferred from [facility] 12/20 for further management of acute liver failure and liver transplant evaluation 2/2 known ETOH Cirrhosis.     [] Decompensated ETOH Cirrhosis  - Wean off pressors as able, continue Midodrine  - remains on CVVH. CXR improving    - ID: Leukocytosis. Continue empiric caspo and Meropenem.  ID Rec;s CT C/A/P  - HE: Continue lactulose BID, Miralax, Rifaximin   - Continue  TF at goal  - on Keppra for seizures, (no activity since admission)  - Abdominal rash improving. 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   Transferred from [facility] 12/20 for further management of acute liver failure and liver transplant evaluation 2/2 known ETOH Cirrhosis.     [] Decompensated ETOH Cirrhosis  - Wean off pressors as able, continue Midodrine  - remains on CVVH. CXR improving    - ID: Leukocytosis. Continue empiric caspo and Meropenem.  ID Rec;s CT C/A/P  - HE: Continue lactulose BID, Miralax, Rifaximin   - Continue  TF at goal  - on Keppra for seizures, (no activity since admission)  - Abdominal rash improving. Will consult Derm if persists/worsens  - Albumin  - Liver transplant evaluation deferred due to illness at present  - Continue excellent ICU care 62 y.o Hx significant for remote AUD, h/o thyroid cancer in her 20s s/p total thyroidectomy + RTX + radioactive iodide, HTN, ventricle neoplasm (dx 2021) s/p right frontal craniotomy (03/2022) for resection, post operative course c/b hemorrhage right lateral ventricle (managed non-operatively) who was initially admitted to [facility] 12/19 with new onset seizures.   Transferred from [facility] 12/20 for further management of acute liver failure and liver transplant evaluation 2/2 known ETOH Cirrhosis.     [] Decompensated ETOH Cirrhosis  - Wean off pressors as able, continue Midodrine  - remains on CVVH. CXR improving    - ID: Leukocytosis. Continue empiric caspo and Meropenem.  ID Rec;s CT C/A/P  - HE: Continue lactulose BID, Miralax, Rifaximin   - Continue  TF at goal  - on Keppra for seizures, (no activity since admission)  - Abdominal rash improving. Will consult Derm if persists/worsens  - Albumin  - Liver transplant evaluation deferred due to illness at present  - Continue excellent ICU care

## 2023-01-02 NOTE — PROGRESS NOTE ADULT - SUBJECTIVE AND OBJECTIVE BOX
SICU Daily Progress Note  =====================================================  Interval/Overnight Events:     - Maintain CRRT Net Even, goal CVP ~4-5  - s/p Albumin 500cc 5%   - HFNC weaned down to NC   - d/c ISS   - D/c Lidocaine Patch in case source of abdominal rash   - CRRT malfunctioned, patient hypotensive and recieved 500ccAlbumin 5% with 1U Prbc   - Increasing Abdominal Distention, d/c lactulose and start Miralax BID   - Nauseous s/p Zofran x1, NGT to LIWS with 300cc output, Held TF for 3 hours now restarted at Trickle Tube Feed    HPI:  Patient is a 61 y/o Female with PMH Thyroid Cancer ( s/p total thyroidectomy in her 20s, radiation, and BARONE), HTN, GERD, 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 Patient has Alcohol Use Disorder ( recent rehab with relapse and in remission since 2022), decompensated ALD Cirrhosis c/b ascites. Patient admitted to [facility] on 2022 after a witnessed seizure; course was c/b septic shock with associated HRF ( intubated ) and an RED ( started HD ). Patient was transferred to [facility] on  for a liver transplant evaluation. Patient was started on CRRT  and was extubated 22. Patient remains in SICU for hemodynamic monitoring and management while liver transplant evaluation is in progress.     Allergies: Macrobid (Rash)  Nexium (Stomach Upset)      MEDICATIONS:   --------------------------------------------------------------------------------------  Neurologic Medications  escitalopram 10 milliGRAM(s) Oral daily  HYDROmorphone  Injectable 0.25 milliGRAM(s) IV Push every 3 hours PRN Severe Pain (7 - 10)  levETIRAcetam  Solution 750 milliGRAM(s) Enteral Tube two times a day  ondansetron Injectable 4 milliGRAM(s) IV Push every 6 hours PRN Nausea and/or Vomiting    Respiratory Medications    Cardiovascular Medications  midodrine 20 milliGRAM(s) Oral every 8 hours  norepinephrine Infusion 0.05 MICROgram(s)/kG/Min IV Continuous <Continuous>    Gastrointestinal Medications  folic acid 1 milliGRAM(s) Oral daily  multivitamin/minerals/iron Oral Solution (CENTRUM) 15 milliLiter(s) Oral daily  pantoprazole   Suspension 40 milliGRAM(s) Oral every 24 hours  polyethylene glycol 3350 17 Gram(s) Oral every 12 hours  thiamine 100 milliGRAM(s) Enteral Tube daily    Genitourinary Medications    Hematologic/Oncologic Medications  influenza   Vaccine 0.5 milliLiter(s) IntraMuscular once    Antimicrobial/Immunologic Medications  caspofungin IVPB      caspofungin IVPB 50 milliGRAM(s) IV Intermittent every 24 hours  meropenem  IVPB 1000 milliGRAM(s) IV Intermittent every 8 hours  rifAXIMin 550 milliGRAM(s) Oral every 12 hours    Endocrine/Metabolic Medications  levothyroxine Injectable 103 MICROGram(s) IV Push at bedtime  vasopressin Infusion 0.03 Unit(s)/Min IV Continuous <Continuous>    Topical/Other Medications  chlorhexidine 2% Cloths 1 Application(s) Topical <User Schedule>  CRRT Treatment    <Continuous>  Phoxillum Filtration BK 4 / 2.5 5000 milliLiter(s) CRRT <Continuous>  Phoxillum Filtration BK 4 / 2.5 5000 milliLiter(s) CRRT <Continuous>  Phoxillum Filtration BK 4 / 2.5 5000 milliLiter(s) CRRT <Continuous>  sodium chloride 0.65% Nasal 1 Spray(s) Both Nostrils every 3 hours PRN Nasal Congestion  tetracaine/benzocaine/butamben Spray 1 Spray(s) Topical every 3 hours PRN for ngt discomfort    --------------------------------------------------------------------------------------    VITAL SIGNS, INS/OUTS (last 24 hours):  --------------------------------------------------------------------------------------  T(C): 36.8 (23 23:00), Max: 37 (23 07:00)  HR: 84 (23:15) (73 - 101)  BP: 95/57 (23 02:00) (75/50 - 124/55)  RR: 17 (23 02:15) (14 - 35)  SpO2: 97% (23 02:15) (90% - 100%)    22 @ 07:  -  23 @ 07:00  --------------------------------------------------------  IN: 1781.4 mL / OUT: 2790 mL / NET: -1008.6 mL    23 @ 07:  -  23 @ 02:30  --------------------------------------------------------  IN: 2565.1 mL / OUT: 1782 mL / NET: 783.1 mL      --------------------------------------------------------------------------------------    EXAM  GENERAL:   NAD, well-groomed, well-developed  HEAD:    Atraumatic, Normocephalic  EYES:   EOMI, 2mm PERRLA, conjunctiva and scleral icterus   ENMT:   NGT in place. No oropharyngeal exudates, erythema or lesions,  Moist mucous membranes  NECK:   Supple, no cervical lymphadenopathy, no JVD  NERVOUS SYSTEM:   Alert & Oriented X2, CN II-XII intact, Moves all extremities  ; Upper extremities 5 /5; Lower extremities 5/5, full sensation to light touch   CHEST/LUNL NC. Breath sounds coarse bilaterally.   HEART:   cardiac monito NSR  ; S1/S2 without murmurs, without rubs, or gallops.  ABDOMEN:   Distended, taut, nontender; Improving Diffuse abdominal rash blanching with urticaria.  Bowel sounds present, Bladder non distended, non palpable. Rectal Tube in place  EXTREMITIES:   Pulses palpable radial pulses 2+ bilat, DP/PT 1+/1+ bilat, without clubbing, cyanosis. Digits warm to touch with good cap refill < 3 secs  SKIN:   warm, dry, intact, jaundice, abdominal rash that blanches and extends to back, no abnormal lesions, without palpable nodes        LABS  --------------------------------------------------------------------------------------                        9.2    27.93 )-----------(        ( 2023 00:28 )             27.0   01-02    139  |  105  |  13  ----------------------------<  112<H>  3.8   |  21<L>  |  0.89    Ca    8.5      2023 00:28  Phos  3.3     01-02  Mg     2.1     01-02    TPro  5.9<L>  /  Alb  3.5  /  TBili  25.5<H>  /  DBili  x   /  AST  125<H>  /  ALT  48<H>  /  AlkPhos  180<H>    ABG - ( 2023 00:06 )  pH, Arterial: 7.37  pH, Blood: x     /  pCO2: 38    /  pO2: 76    / HCO3: 22    / Base Excess: -3.0  /  SaO2: 97.6            PT/INR - ( 2023 00:28 )   PT: 28.0 sec;   INR: 2.39 ratio         PTT - ( 2023 00:28 )  PTT:53.4 sec  --------------------------------------------------------------------------------------     SICU Daily Progress Note  =====================================================  Interval/Overnight Events:     - Maintain CRRT Net Even, goal CVP ~4-5  - s/p Albumin 500cc 5%   - HFNC weaned down to NC   - d/c ISS   - D/c Lidocaine Patch in case source of abdominal rash   - CRRT malfunctioned, patient hypotensive and recieved 500ccAlbumin 5% with 1U Prbc   - Increasing Abdominal Distention, d/c lactulose and start Miralax BID   - Nauseous s/p Zofran x1, NGT to LIWS with 300cc output, Held TF for 3 hours now restarted at Trickle Tube Feed    HPI:  Patient is a 63 y/o Female with PMH Thyroid Cancer ( s/p total thyroidectomy in her 20s, radiation, and BARONE), HTN, GERD, 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 Patient has Alcohol Use Disorder ( recent rehab with relapse and in remission since 2022), decompensated ALD Cirrhosis c/b ascites. Patient admitted to [facility] on 2022 after a witnessed seizure; course was c/b septic shock with associated HRF ( intubated ) and an RED ( started HD ). Patient was transferred to [facility] on  for a liver transplant evaluation. Patient was started on CRRT  and was extubated 22. Patient remains in SICU for hemodynamic monitoring and management while liver transplant evaluation is in progress.     Allergies: Macrobid (Rash)  Nexium (Stomach Upset)      MEDICATIONS:   --------------------------------------------------------------------------------------  Neurologic Medications  escitalopram 10 milliGRAM(s) Oral daily  HYDROmorphone  Injectable 0.25 milliGRAM(s) IV Push every 3 hours PRN Severe Pain (7 - 10)  levETIRAcetam  Solution 750 milliGRAM(s) Enteral Tube two times a day  ondansetron Injectable 4 milliGRAM(s) IV Push every 6 hours PRN Nausea and/or Vomiting    Respiratory Medications    Cardiovascular Medications  midodrine 20 milliGRAM(s) Oral every 8 hours  norepinephrine Infusion 0.05 MICROgram(s)/kG/Min IV Continuous <Continuous>    Gastrointestinal Medications  folic acid 1 milliGRAM(s) Oral daily  multivitamin/minerals/iron Oral Solution (CENTRUM) 15 milliLiter(s) Oral daily  pantoprazole   Suspension 40 milliGRAM(s) Oral every 24 hours  polyethylene glycol 3350 17 Gram(s) Oral every 12 hours  thiamine 100 milliGRAM(s) Enteral Tube daily    Genitourinary Medications    Hematologic/Oncologic Medications  influenza   Vaccine 0.5 milliLiter(s) IntraMuscular once    Antimicrobial/Immunologic Medications  caspofungin IVPB      caspofungin IVPB 50 milliGRAM(s) IV Intermittent every 24 hours  meropenem  IVPB 1000 milliGRAM(s) IV Intermittent every 8 hours  rifAXIMin 550 milliGRAM(s) Oral every 12 hours    Endocrine/Metabolic Medications  levothyroxine Injectable 103 MICROGram(s) IV Push at bedtime  vasopressin Infusion 0.03 Unit(s)/Min IV Continuous <Continuous>    Topical/Other Medications  chlorhexidine 2% Cloths 1 Application(s) Topical <User Schedule>  CRRT Treatment    <Continuous>  Phoxillum Filtration BK 4 / 2.5 5000 milliLiter(s) CRRT <Continuous>  Phoxillum Filtration BK 4 / 2.5 5000 milliLiter(s) CRRT <Continuous>  Phoxillum Filtration BK 4 / 2.5 5000 milliLiter(s) CRRT <Continuous>  sodium chloride 0.65% Nasal 1 Spray(s) Both Nostrils every 3 hours PRN Nasal Congestion  tetracaine/benzocaine/butamben Spray 1 Spray(s) Topical every 3 hours PRN for ngt discomfort    --------------------------------------------------------------------------------------    VITAL SIGNS, INS/OUTS (last 24 hours):  --------------------------------------------------------------------------------------  T(C): 36.8 (23 23:00), Max: 37 (23 07:00)  HR: 84 (23:15) (73 - 101)  BP: 95/57 (23 02:00) (75/50 - 124/55)  RR: 17 (23 02:15) (14 - 35)  SpO2: 97% (23 02:15) (90% - 100%)    22 @ 07:  -  23 @ 07:00  --------------------------------------------------------  IN: 1781.4 mL / OUT: 2790 mL / NET: -1008.6 mL    23 @ 07:  -  23 @ 02:30  --------------------------------------------------------  IN: 2565.1 mL / OUT: 1782 mL / NET: 783.1 mL      --------------------------------------------------------------------------------------    EXAM  GENERAL:   NAD, well-groomed, well-developed  HEAD:    Atraumatic, Normocephalic  EYES:   EOMI, 2mm PERRLA, conjunctiva and scleral icterus   ENMT:   NGT in place. No oropharyngeal exudates, erythema or lesions,  Moist mucous membranes  NECK:   Supple, no cervical lymphadenopathy, no JVD  NERVOUS SYSTEM:   Alert & Oriented X2, CN II-XII intact, Moves all extremities  ; Upper extremities 5 /5; Lower extremities 5/5, full sensation to light touch   CHEST/LUNL NC. Breath sounds coarse bilaterally.   HEART:   cardiac monito NSR  ; S1/S2 without murmurs, without rubs, or gallops.  ABDOMEN:   Distended, taut, nontender; Improving Diffuse abdominal rash blanching with urticaria.  Bowel sounds present, Bladder non distended, non palpable. Rectal Tube in place  EXTREMITIES:   Pulses palpable radial pulses 2+ bilat, DP/PT 1+/1+ bilat, without clubbing, cyanosis. Digits warm to touch with good cap refill < 3 secs  SKIN:   warm, dry, intact, jaundice, abdominal rash that blanches and extends to back, no abnormal lesions, without palpable nodes        LABS  --------------------------------------------------------------------------------------                        9.2    27.93 )-----------(        ( 2023 00:28 )             27.0   01-02    139  |  105  |  13  ----------------------------<  112<H>  3.8   |  21<L>  |  0.89    Ca    8.5      2023 00:28  Phos  3.3     01-02  Mg     2.1     01-02    TPro  5.9<L>  /  Alb  3.5  /  TBili  25.5<H>  /  DBili  x   /  AST  125<H>  /  ALT  48<H>  /  AlkPhos  180<H>    ABG - ( 2023 00:06 )  pH, Arterial: 7.37  pH, Blood: x     /  pCO2: 38    /  pO2: 76    / HCO3: 22    / Base Excess: -3.0  /  SaO2: 97.6            PT/INR - ( 2023 00:28 )   PT: 28.0 sec;   INR: 2.39 ratio         PTT - ( 2023 00:28 )  PTT:53.4 sec  --------------------------------------------------------------------------------------     SICU Daily Progress Note  =====================================================  Interval/Overnight Events:     - Maintain CRRT Net Even, goal CVP ~4-5  - s/p Albumin 500cc 5%   - HFNC weaned down to NC   - d/c ISS   - D/c Lidocaine Patch in case source of abdominal rash   - CRRT malfunctioned, patient hypotensive and recieved 500ccAlbumin 5% with 1U Prbc   - Increasing Abdominal Distention, d/c lactulose and start Miralax BID   - Nauseous s/p Zofran x1, NGT to LIWS with 300cc output, Held TF for 3 hours now restarted at Trickle Tube Feed    HPI:  Patient is a 61 y/o Female with PMH Thyroid Cancer ( s/p total thyroidectomy in her 20s, radiation, and BARONE), HTN, GERD, 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 Patient has Alcohol Use Disorder ( recent rehab with relapse and in remission since 2022), decompensated ALD Cirrhosis c/b ascites. Patient admitted to [facility] on 2022 after a witnessed seizure; course was c/b septic shock with associated HRF ( intubated ) and an RED ( started HD ). Patient was transferred to [facility] on  for a liver transplant evaluation. Patient was started on CRRT  and was extubated 22. Patient remains in SICU for hemodynamic monitoring and management while liver transplant evaluation is in progress.     Allergies: Macrobid (Rash)  Nexium (Stomach Upset)      MEDICATIONS:   --------------------------------------------------------------------------------------  Neurologic Medications  escitalopram 10 milliGRAM(s) Oral daily  HYDROmorphone  Injectable 0.25 milliGRAM(s) IV Push every 3 hours PRN Severe Pain (7 - 10)  levETIRAcetam  Solution 750 milliGRAM(s) Enteral Tube two times a day  ondansetron Injectable 4 milliGRAM(s) IV Push every 6 hours PRN Nausea and/or Vomiting    Respiratory Medications    Cardiovascular Medications  midodrine 20 milliGRAM(s) Oral every 8 hours  norepinephrine Infusion 0.05 MICROgram(s)/kG/Min IV Continuous <Continuous>    Gastrointestinal Medications  folic acid 1 milliGRAM(s) Oral daily  multivitamin/minerals/iron Oral Solution (CENTRUM) 15 milliLiter(s) Oral daily  pantoprazole   Suspension 40 milliGRAM(s) Oral every 24 hours  polyethylene glycol 3350 17 Gram(s) Oral every 12 hours  thiamine 100 milliGRAM(s) Enteral Tube daily    Genitourinary Medications    Hematologic/Oncologic Medications  influenza   Vaccine 0.5 milliLiter(s) IntraMuscular once    Antimicrobial/Immunologic Medications  caspofungin IVPB      caspofungin IVPB 50 milliGRAM(s) IV Intermittent every 24 hours  meropenem  IVPB 1000 milliGRAM(s) IV Intermittent every 8 hours  rifAXIMin 550 milliGRAM(s) Oral every 12 hours    Endocrine/Metabolic Medications  levothyroxine Injectable 103 MICROGram(s) IV Push at bedtime  vasopressin Infusion 0.03 Unit(s)/Min IV Continuous <Continuous>    Topical/Other Medications  chlorhexidine 2% Cloths 1 Application(s) Topical <User Schedule>  CRRT Treatment    <Continuous>  Phoxillum Filtration BK 4 / 2.5 5000 milliLiter(s) CRRT <Continuous>  Phoxillum Filtration BK 4 / 2.5 5000 milliLiter(s) CRRT <Continuous>  Phoxillum Filtration BK 4 / 2.5 5000 milliLiter(s) CRRT <Continuous>  sodium chloride 0.65% Nasal 1 Spray(s) Both Nostrils every 3 hours PRN Nasal Congestion  tetracaine/benzocaine/butamben Spray 1 Spray(s) Topical every 3 hours PRN for ngt discomfort    --------------------------------------------------------------------------------------    VITAL SIGNS, INS/OUTS (last 24 hours):  --------------------------------------------------------------------------------------  T(C): 36.8 (23 23:00), Max: 37 (23 07:00)  HR: 84 (23:15) (73 - 101)  BP: 95/57 (23 02:00) (75/50 - 124/55)  RR: 17 (23 02:15) (14 - 35)  SpO2: 97% (23 02:15) (90% - 100%)    22 @ 07:  -  23 @ 07:00  --------------------------------------------------------  IN: 1781.4 mL / OUT: 2790 mL / NET: -1008.6 mL    23 @ 07:  -  23 @ 02:30  --------------------------------------------------------  IN: 2565.1 mL / OUT: 1782 mL / NET: 783.1 mL      --------------------------------------------------------------------------------------    EXAM  GENERAL:   NAD, well-groomed, well-developed  HEAD:    Atraumatic, Normocephalic  EYES:   EOMI, 2mm PERRLA, conjunctiva and scleral icterus   ENMT:   NGT in place. No oropharyngeal exudates, erythema or lesions,  Moist mucous membranes  NECK:   Supple, no cervical lymphadenopathy, no JVD  NERVOUS SYSTEM:   Alert & Oriented X2, CN II-XII intact, Moves all extremities  ; Upper extremities 5 /5; Lower extremities 5/5, full sensation to light touch   CHEST/LUNL NC. Breath sounds coarse bilaterally.   HEART:   cardiac monito NSR  ; S1/S2 without murmurs, without rubs, or gallops.  ABDOMEN:   Distended, taut, nontender; Improving Diffuse abdominal rash blanching with urticaria.  Bowel sounds present, Bladder non distended, non palpable. Rectal Tube in place  EXTREMITIES:   Pulses palpable radial pulses 2+ bilat, DP/PT 1+/1+ bilat, without clubbing, cyanosis. Digits warm to touch with good cap refill < 3 secs  SKIN:   warm, dry, intact, jaundice, abdominal rash that blanches and extends to back, no abnormal lesions, without palpable nodes        LABS  --------------------------------------------------------------------------------------                        9.2    27.93 )-----------(        ( 2023 00:28 )             27.0   01-02    139  |  105  |  13  ----------------------------<  112<H>  3.8   |  21<L>  |  0.89    Ca    8.5      2023 00:28  Phos  3.3     01-02  Mg     2.1     01-02    TPro  5.9<L>  /  Alb  3.5  /  TBili  25.5<H>  /  DBili  x   /  AST  125<H>  /  ALT  48<H>  /  AlkPhos  180<H>    ABG - ( 2023 00:06 )  pH, Arterial: 7.37  pH, Blood: x     /  pCO2: 38    /  pO2: 76    / HCO3: 22    / Base Excess: -3.0  /  SaO2: 97.6            PT/INR - ( 2023 00:28 )   PT: 28.0 sec;   INR: 2.39 ratio         PTT - ( 2023 00:28 )  PTT:53.4 sec  --------------------------------------------------------------------------------------

## 2023-01-02 NOTE — PROGRESS NOTE ADULT - TIME BILLING
Patient with liver failure, underlying chronic liver disease, other comorbidities reviewed  RED, oliguric, hemodynamic instability, on Midodrine  On antibiotic and antifungal coverage  Reviewed clinical, lab, imaging data and microbiology data  Suggestions  Continue CRRT, events over last 24 h noted and d/w SICU team.  I have seen the patient and reviewed CRRT prescription and flow sheet. Vascular access is functioning well. Patient is tolerating dialysis well with no acute symptoms or distress. CRRT prescription has been adjusted for optimized control of volume status, uremia and electrolytes. Management of additional metabolic abnormalities/anemia will continue to be addressed on follow up.  I was present during and reviewed clinical and lab data as well as assessment and plan as documented . Please contact if any additional questions with any change in clinical condition or on availability of any additional information or reports.

## 2023-01-03 LAB
ALBUMIN SERPL ELPH-MCNC: 3.3 G/DL — SIGNIFICANT CHANGE UP (ref 3.3–5)
ALBUMIN SERPL ELPH-MCNC: 3.5 G/DL — SIGNIFICANT CHANGE UP (ref 3.3–5)
ALP SERPL-CCNC: 192 U/L — HIGH (ref 40–120)
ALP SERPL-CCNC: 194 U/L — HIGH (ref 40–120)
ALP SERPL-CCNC: 197 U/L — HIGH (ref 40–120)
ALP SERPL-CCNC: 198 U/L — HIGH (ref 40–120)
ALT FLD-CCNC: 48 U/L — HIGH (ref 10–45)
ALT FLD-CCNC: 51 U/L — HIGH (ref 10–45)
AMMONIA BLD-MCNC: 54 UMOL/L — SIGNIFICANT CHANGE UP (ref 11–55)
ANION GAP SERPL CALC-SCNC: 13 MMOL/L — SIGNIFICANT CHANGE UP (ref 5–17)
ANION GAP SERPL CALC-SCNC: 14 MMOL/L — SIGNIFICANT CHANGE UP (ref 5–17)
ANION GAP SERPL CALC-SCNC: 15 MMOL/L — SIGNIFICANT CHANGE UP (ref 5–17)
APPEARANCE UR: ABNORMAL
APTT BLD: 58.3 SEC — HIGH (ref 27.5–35.5)
AST SERPL-CCNC: 136 U/L — HIGH (ref 10–40)
AST SERPL-CCNC: 145 U/L — HIGH (ref 10–40)
AST SERPL-CCNC: 147 U/L — HIGH (ref 10–40)
AST SERPL-CCNC: 153 U/L — HIGH (ref 10–40)
BILIRUB SERPL-MCNC: 26.6 MG/DL — HIGH (ref 0.2–1.2)
BILIRUB SERPL-MCNC: 26.8 MG/DL — HIGH (ref 0.2–1.2)
BILIRUB SERPL-MCNC: 27.5 MG/DL — HIGH (ref 0.2–1.2)
BILIRUB UR-MCNC: SIGNIFICANT CHANGE UP
BUN SERPL-MCNC: 12 MG/DL — SIGNIFICANT CHANGE UP (ref 7–23)
BUN SERPL-MCNC: 16 MG/DL — SIGNIFICANT CHANGE UP (ref 7–23)
CALCIUM SERPL-MCNC: 8.6 MG/DL — SIGNIFICANT CHANGE UP (ref 8.4–10.5)
CALCIUM SERPL-MCNC: 8.8 MG/DL — SIGNIFICANT CHANGE UP (ref 8.4–10.5)
CALCIUM SERPL-MCNC: 9 MG/DL — SIGNIFICANT CHANGE UP (ref 8.4–10.5)
CALCIUM SERPL-MCNC: 9.1 MG/DL — SIGNIFICANT CHANGE UP (ref 8.4–10.5)
CHLORIDE SERPL-SCNC: 101 MMOL/L — SIGNIFICANT CHANGE UP (ref 96–108)
CHLORIDE SERPL-SCNC: 103 MMOL/L — SIGNIFICANT CHANGE UP (ref 96–108)
CO2 SERPL-SCNC: 20 MMOL/L — LOW (ref 22–31)
CO2 SERPL-SCNC: 21 MMOL/L — LOW (ref 22–31)
CO2 SERPL-SCNC: 22 MMOL/L — SIGNIFICANT CHANGE UP (ref 22–31)
COLOR SPEC: ABNORMAL
CREAT SERPL-MCNC: 0.76 MG/DL — SIGNIFICANT CHANGE UP (ref 0.5–1.3)
CREAT SERPL-MCNC: 0.77 MG/DL — SIGNIFICANT CHANGE UP (ref 0.5–1.3)
CREAT SERPL-MCNC: 1.01 MG/DL — SIGNIFICANT CHANGE UP (ref 0.5–1.3)
DIFF PNL FLD: SIGNIFICANT CHANGE UP
EGFR: 63 ML/MIN/1.73M2 — SIGNIFICANT CHANGE UP
EGFR: 88 ML/MIN/1.73M2 — SIGNIFICANT CHANGE UP
EGFR: 89 ML/MIN/1.73M2 — SIGNIFICANT CHANGE UP
GAS PNL BLDA: SIGNIFICANT CHANGE UP
GLUCOSE SERPL-MCNC: 119 MG/DL — HIGH (ref 70–99)
GLUCOSE SERPL-MCNC: 130 MG/DL — HIGH (ref 70–99)
GLUCOSE SERPL-MCNC: 132 MG/DL — HIGH (ref 70–99)
GLUCOSE SERPL-MCNC: 156 MG/DL — HIGH (ref 70–99)
GLUCOSE UR QL: SIGNIFICANT CHANGE UP
HCT VFR BLD CALC: 26.4 % — LOW (ref 34.5–45)
HCT VFR BLD CALC: 26.9 % — LOW (ref 34.5–45)
HGB BLD-MCNC: 9.2 G/DL — LOW (ref 11.5–15.5)
INR BLD: 2.39 RATIO — HIGH (ref 0.88–1.16)
KETONES UR-MCNC: SIGNIFICANT CHANGE UP
LEUKOCYTE ESTERASE UR-ACNC: SIGNIFICANT CHANGE UP
MAGNESIUM SERPL-MCNC: 2.3 MG/DL — SIGNIFICANT CHANGE UP (ref 1.6–2.6)
MAGNESIUM SERPL-MCNC: 2.4 MG/DL — SIGNIFICANT CHANGE UP (ref 1.6–2.6)
MCHC RBC-ENTMCNC: 30.5 PG — SIGNIFICANT CHANGE UP (ref 27–34)
MCHC RBC-ENTMCNC: 30.6 PG — SIGNIFICANT CHANGE UP (ref 27–34)
MCHC RBC-ENTMCNC: 34.2 GM/DL — SIGNIFICANT CHANGE UP (ref 32–36)
MCHC RBC-ENTMCNC: 34.8 GM/DL — SIGNIFICANT CHANGE UP (ref 32–36)
MCV RBC AUTO: 87.7 FL — SIGNIFICANT CHANGE UP (ref 80–100)
MCV RBC AUTO: 89.1 FL — SIGNIFICANT CHANGE UP (ref 80–100)
NITRITE UR-MCNC: SIGNIFICANT CHANGE UP
NRBC # BLD: 0 /100 WBCS — SIGNIFICANT CHANGE UP (ref 0–0)
PH UR: SIGNIFICANT CHANGE UP (ref 5–8)
PHOSPHATE SERPL-MCNC: 3.5 MG/DL — SIGNIFICANT CHANGE UP (ref 2.5–4.5)
PHOSPHATE SERPL-MCNC: 3.6 MG/DL — SIGNIFICANT CHANGE UP (ref 2.5–4.5)
PHOSPHATE SERPL-MCNC: 3.7 MG/DL — SIGNIFICANT CHANGE UP (ref 2.5–4.5)
PLATELET # BLD AUTO: 28 K/UL — LOW (ref 150–400)
PLATELET # BLD AUTO: 34 K/UL — LOW (ref 150–400)
POTASSIUM SERPL-MCNC: 3.8 MMOL/L — SIGNIFICANT CHANGE UP (ref 3.5–5.3)
POTASSIUM SERPL-MCNC: 3.9 MMOL/L — SIGNIFICANT CHANGE UP (ref 3.5–5.3)
POTASSIUM SERPL-MCNC: 4 MMOL/L — SIGNIFICANT CHANGE UP (ref 3.5–5.3)
POTASSIUM SERPL-SCNC: 3.8 MMOL/L — SIGNIFICANT CHANGE UP (ref 3.5–5.3)
POTASSIUM SERPL-SCNC: 3.9 MMOL/L — SIGNIFICANT CHANGE UP (ref 3.5–5.3)
POTASSIUM SERPL-SCNC: 4 MMOL/L — SIGNIFICANT CHANGE UP (ref 3.5–5.3)
PROCALCITONIN SERPL-MCNC: 2.04 NG/ML — HIGH (ref 0.02–0.1)
PROT SERPL-MCNC: 6.2 G/DL — SIGNIFICANT CHANGE UP (ref 6–8.3)
PROT SERPL-MCNC: 6.3 G/DL — SIGNIFICANT CHANGE UP (ref 6–8.3)
PROT SERPL-MCNC: 6.4 G/DL — SIGNIFICANT CHANGE UP (ref 6–8.3)
PROT UR-MCNC: SIGNIFICANT CHANGE UP
PROTHROM AB SERPL-ACNC: 28 SEC — HIGH (ref 10.5–13.4)
RBC # BLD: 3.01 M/UL — LOW (ref 3.8–5.2)
RBC # BLD: 3.02 M/UL — LOW (ref 3.8–5.2)
RBC # FLD: 18.6 % — HIGH (ref 10.3–14.5)
RBC # FLD: 18.9 % — HIGH (ref 10.3–14.5)
SODIUM SERPL-SCNC: 135 MMOL/L — SIGNIFICANT CHANGE UP (ref 135–145)
SODIUM SERPL-SCNC: 136 MMOL/L — SIGNIFICANT CHANGE UP (ref 135–145)
SODIUM SERPL-SCNC: 137 MMOL/L — SIGNIFICANT CHANGE UP (ref 135–145)
SODIUM SERPL-SCNC: 138 MMOL/L — SIGNIFICANT CHANGE UP (ref 135–145)
SP GR SPEC: SIGNIFICANT CHANGE UP (ref 1.01–1.02)
UROBILINOGEN FLD QL: SIGNIFICANT CHANGE UP
WBC # BLD: 36.18 K/UL — HIGH (ref 3.8–10.5)
WBC # BLD: 36.66 K/UL — HIGH (ref 3.8–10.5)
WBC # FLD AUTO: 36.18 K/UL — HIGH (ref 3.8–10.5)
WBC # FLD AUTO: 36.66 K/UL — HIGH (ref 3.8–10.5)

## 2023-01-03 PROCEDURE — 99232 SBSQ HOSP IP/OBS MODERATE 35: CPT

## 2023-01-03 PROCEDURE — 99291 CRITICAL CARE FIRST HOUR: CPT

## 2023-01-03 PROCEDURE — 71045 X-RAY EXAM CHEST 1 VIEW: CPT | Mod: 26

## 2023-01-03 PROCEDURE — 74177 CT ABD & PELVIS W/CONTRAST: CPT | Mod: 26

## 2023-01-03 PROCEDURE — 99233 SBSQ HOSP IP/OBS HIGH 50: CPT

## 2023-01-03 PROCEDURE — 99232 SBSQ HOSP IP/OBS MODERATE 35: CPT | Mod: GC

## 2023-01-03 PROCEDURE — 71260 CT THORAX DX C+: CPT | Mod: 26

## 2023-01-03 RX ORDER — INSULIN LISPRO 100/ML
VIAL (ML) SUBCUTANEOUS
Refills: 0 | Status: DISCONTINUED | OUTPATIENT
Start: 2023-01-03 | End: 2023-01-03

## 2023-01-03 RX ADMIN — LEVETIRACETAM 750 MILLIGRAM(S): 250 TABLET, FILM COATED ORAL at 05:08

## 2023-01-03 RX ADMIN — ESCITALOPRAM OXALATE 10 MILLIGRAM(S): 10 TABLET, FILM COATED ORAL at 11:41

## 2023-01-03 RX ADMIN — Medication 103 MICROGRAM(S): at 21:47

## 2023-01-03 RX ADMIN — POLYETHYLENE GLYCOL 3350 17 GRAM(S): 17 POWDER, FOR SOLUTION ORAL at 18:20

## 2023-01-03 RX ADMIN — POLYETHYLENE GLYCOL 3350 17 GRAM(S): 17 POWDER, FOR SOLUTION ORAL at 05:07

## 2023-01-03 RX ADMIN — Medication 6.21 MICROGRAM(S)/KG/MIN: at 07:12

## 2023-01-03 RX ADMIN — LEVETIRACETAM 750 MILLIGRAM(S): 250 TABLET, FILM COATED ORAL at 17:58

## 2023-01-03 RX ADMIN — Medication 15 MILLILITER(S): at 11:41

## 2023-01-03 RX ADMIN — MEROPENEM 100 MILLIGRAM(S): 1 INJECTION INTRAVENOUS at 05:07

## 2023-01-03 RX ADMIN — CASPOFUNGIN ACETATE 260 MILLIGRAM(S): 7 INJECTION, POWDER, LYOPHILIZED, FOR SOLUTION INTRAVENOUS at 09:02

## 2023-01-03 RX ADMIN — Medication 1 MILLIGRAM(S): at 11:42

## 2023-01-03 RX ADMIN — MIDODRINE HYDROCHLORIDE 20 MILLIGRAM(S): 2.5 TABLET ORAL at 05:08

## 2023-01-03 RX ADMIN — MEROPENEM 100 MILLIGRAM(S): 1 INJECTION INTRAVENOUS at 21:48

## 2023-01-03 RX ADMIN — MIDODRINE HYDROCHLORIDE 20 MILLIGRAM(S): 2.5 TABLET ORAL at 14:32

## 2023-01-03 RX ADMIN — PANTOPRAZOLE SODIUM 40 MILLIGRAM(S): 20 TABLET, DELAYED RELEASE ORAL at 11:41

## 2023-01-03 RX ADMIN — Medication 100 MILLIGRAM(S): at 11:41

## 2023-01-03 RX ADMIN — VASOPRESSIN 4.5 UNIT(S)/MIN: 20 INJECTION INTRAVENOUS at 07:12

## 2023-01-03 RX ADMIN — MIDODRINE HYDROCHLORIDE 20 MILLIGRAM(S): 2.5 TABLET ORAL at 21:47

## 2023-01-03 RX ADMIN — CHLORHEXIDINE GLUCONATE 1 APPLICATION(S): 213 SOLUTION TOPICAL at 05:18

## 2023-01-03 RX ADMIN — MEROPENEM 100 MILLIGRAM(S): 1 INJECTION INTRAVENOUS at 14:31

## 2023-01-03 NOTE — PROGRESS NOTE ADULT - ASSESSMENT
62 year old female PMH decompensated ETOH cirrhosis c/b ascites (dx 11/2022), alc hep (dx 11/2022; non-responsive to steroids), remote h/o thyroid cancer in her 20s s/p total thyroidectomy + RTX + radioactive iodide, HTN, ventrical neoplasm (dx 2021) s/p right frontal craniotomy (03/2022) for resection post operative course c/b hemorrhage right lateral ventricle (managed non-operatively) who was initially admitted to  12/19 with new onset seizures and transferred to [facility] 12/20 for LT eval for skilled nursing.    CT Chest with possible pneumonia versus atelectasis    UA (12/19) Moderate Leukocyte Esterase  UCx (12/19) No Growth  BCx (12/19) Corynebacterium (1/4 Bottles)  MRSA Nasal PCR (12/19) Negative  Paracentesis (12/19, 12/26) Cell counts not suggestive of SBP  Combicath Sputum Culture (12/21) Normal Respiratory Cassandra  Combicath Sputum Culture (12/22) Normal Respiratory Cassandra    Extubated but continued shock  Suspect noninfectious etiology of current clinical status (liver failure)  Central lines exchanged within the last week    CT Chest (1/3/23) Scattered nodular opacities as well as right lower lobe consolidation. Secretions in the trachea.  CT A/P (1/3/23) Widespread small bowel thickening suggesting enteritis.    MRSA/MSSA PCR (12/30)     #Leukocytosis, Positive Blood Culture, Transaminitis, Cirrhosis  --Doubt that CT Chest or Abdomen findings can explain degree of leukocytosis or hypotension. In any case patient is already s/p 2 weeks of Meropenem (12/21 -->) and ID workup has been otherwise unrevealing. Favor discontinuing Meropenem  --Will review CT Chest with radiology   --Continue to follow CBC with diff  --Continue to follow temperature curve  --Follow up on preliminary blood cultures    #Positive UCx (Candida dublinensis)  Unclear Significance  s/p Fluconazole 12/20 --> 12/25  Caspofungin gets poor urinary penetration but Candida dublinensis is intermittently azole resistant  Not making urine now  --Favor stopping Caspofungin (12/26 -->)    #Pre-Transplant Evaluation  --ID Clearance for OLT pending clinical status improvement  --Initial QuantiFeron Indeterminate; would repeat QuantiFeron     #Encounter to Vaccinate Patient  Will address vaccination outside of the critical illness period  COVID19: Has had COVID19 in 2020 and again in ~8/2022. Denies prior COVID19 Vaccination  Influenza: Will require  Pneumococcal: Would benefit from PCV20  HAV: Will require Hepatitis A Vaccine  HBV: Will require Heplisav  MMR: Immune  Varicella: Immune  Shingles: Will require Shingrix  Tdap: Will require Tdap    I will continue to follow. Please feel free to contact me with any further questions.    Jonah Mendoza M.D.  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    CT Chest with possible pneumonia versus atelectasis    UA (12/19) Moderate Leukocyte Esterase  UCx (12/19) No Growth  BCx (12/19) Corynebacterium (1/4 Bottles)  MRSA Nasal PCR (12/19) Negative  Paracentesis (12/19, 12/26) Cell counts not suggestive of SBP  Combicath Sputum Culture (12/21) Normal Respiratory Cassandra  Combicath Sputum Culture (12/22) Normal Respiratory Cassandra    Extubated but continued shock  Suspect noninfectious etiology of current clinical status (liver failure)  Central lines exchanged within the last week    CT Chest (1/3/23) Scattered nodular opacities as well as right lower lobe consolidation. Secretions in the trachea.  CT A/P (1/3/23) Widespread small bowel thickening suggesting enteritis.    MRSA/MSSA PCR (12/30)     #Leukocytosis, Positive Blood Culture, Transaminitis, Cirrhosis  --Doubt that CT Chest or Abdomen findings can explain degree of leukocytosis or hypotension. In any case patient is already s/p 2 weeks of Meropenem (12/21 -->) and ID workup has been otherwise unrevealing. Favor discontinuing Meropenem  --Will review CT Chest with radiology   --Continue to follow CBC with diff  --Continue to follow temperature curve  --Follow up on preliminary blood cultures    #Positive UCx (Candida dublinensis)  Unclear Significance  s/p Fluconazole 12/20 --> 12/25  Caspofungin gets poor urinary penetration but Candida dublinensis is intermittently azole resistant  Not making urine now  --Favor stopping Caspofungin (12/26 -->)    #Pre-Transplant Evaluation  --ID Clearance for OLT pending clinical status improvement  --Initial QuantiFeron Indeterminate; would repeat QuantiFeron     #Encounter to Vaccinate Patient  Will address vaccination outside of the critical illness period  COVID19: Has had COVID19 in 2020 and again in ~8/2022. Denies prior COVID19 Vaccination  Influenza: Will require  Pneumococcal: Would benefit from PCV20  HAV: Will require Hepatitis A Vaccine  HBV: Will require Heplisav  MMR: Immune  Varicella: Immune  Shingles: Will require Shingrix  Tdap: Will require Tdap    I will continue to follow. Please feel free to contact me with any further questions.    Jonah Mendoza M.D.  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    CT Chest with possible pneumonia versus atelectasis    UA (12/19) Moderate Leukocyte Esterase  UCx (12/19) No Growth  BCx (12/19) Corynebacterium (1/4 Bottles)  MRSA Nasal PCR (12/19) Negative  Paracentesis (12/19, 12/26) Cell counts not suggestive of SBP  Combicath Sputum Culture (12/21) Normal Respiratory Cassandra  Combicath Sputum Culture (12/22) Normal Respiratory Cassandra    Extubated but continued shock  Suspect noninfectious etiology of current clinical status (liver failure)  Central lines exchanged within the last week    CT Chest (1/3/23) Scattered nodular opacities as well as right lower lobe consolidation. Secretions in the trachea.  CT A/P (1/3/23) Widespread small bowel thickening suggesting enteritis.    MRSA/MSSA PCR (12/30)     #Leukocytosis, Positive Blood Culture, Transaminitis, Cirrhosis  --Doubt that CT Chest or Abdomen findings can explain degree of leukocytosis or hypotension. In any case patient is already s/p 2 weeks of Meropenem (12/21 -->) and ID workup has been otherwise unrevealing. Favor discontinuing Meropenem  --Will review CT Chest with radiology   --Continue to follow CBC with diff  --Continue to follow temperature curve  --Follow up on preliminary blood cultures    #Positive UCx (Candida dublinensis)  Unclear Significance  s/p Fluconazole 12/20 --> 12/25  Caspofungin gets poor urinary penetration but Candida dublinensis is intermittently azole resistant  Not making urine now  --Favor stopping Caspofungin (12/26 -->)    #Pre-Transplant Evaluation  --ID Clearance for OLT pending clinical status improvement  --Initial QuantiFeron Indeterminate; would repeat QuantiFeron     #Encounter to Vaccinate Patient  Will address vaccination outside of the critical illness period  COVID19: Has had COVID19 in 2020 and again in ~8/2022. Denies prior COVID19 Vaccination  Influenza: Will require  Pneumococcal: Would benefit from PCV20  HAV: Will require Hepatitis A Vaccine  HBV: Will require Heplisav  MMR: Immune  Varicella: Immune  Shingles: Will require Shingrix  Tdap: Will require Tdap    I will continue to follow. Please feel free to contact me with any further questions.    Jonah Mendoza M.D.  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    CT Chest with possible pneumonia versus atelectasis    UA (12/19) Moderate Leukocyte Esterase  UCx (12/19) No Growth  BCx (12/19) Corynebacterium (1/4 Bottles)  MRSA Nasal PCR (12/19) Negative  Paracentesis (12/19, 12/26) Cell counts not suggestive of SBP  Combicath Sputum Culture (12/21) Normal Respiratory Cassandra  Combicath Sputum Culture (12/22) Normal Respiratory Cassandra    Extubated but continued shock  Suspect noninfectious etiology of current clinical status (liver failure)  Central lines exchanged within the last week    CT Chest (1/3/23) Scattered nodular opacities as well as right lower lobe consolidation. Secretions in the trachea.  CT A/P (1/3/23) Widespread small bowel thickening suggesting enteritis.    MRSA/MSSA PCR (12/30) Negative    #Leukocytosis, Positive Blood Culture, Transaminitis, Cirrhosis  --Doubt that CT Chest or Abdomen findings can explain degree of leukocytosis or hypotension especially as patient is already s/p 2 weeks of Meropenem (12/21 -->) and ID workup has been otherwise unrevealing. Favor discontinuing Meropenem  --Will review CT Chest with radiology   --Continue to follow CBC with diff  --Continue to follow temperature curve  --Follow up on preliminary blood cultures    #Positive UCx (Candida dublinensis)  Unclear Significance  s/p Fluconazole 12/20 --> 12/25  Caspofungin gets poor urinary penetration but Candida dublinensis is intermittently azole resistant  Not making urine now  --Favor stopping Caspofungin (12/26 -->)    #Pre-Transplant Evaluation  --ID Clearance for OLT pending clinical status improvement  --Initial QuantiFeron Indeterminate; would repeat QuantiFeron     #Encounter to Vaccinate Patient  Will address vaccination outside of the critical illness period  COVID19: Has had COVID19 in 2020 and again in ~8/2022. Denies prior COVID19 Vaccination  Influenza: Will require  Pneumococcal: Would benefit from PCV20  HAV: Will require Hepatitis A Vaccine  HBV: Will require Heplisav  MMR: Immune  Varicella: Immune  Shingles: Will require Shingrix  Tdap: Will require Tdap    I will continue to follow. Please feel free to contact me with any further questions.    Jonah Mendoza M.D.  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    CT Chest with possible pneumonia versus atelectasis    UA (12/19) Moderate Leukocyte Esterase  UCx (12/19) No Growth  BCx (12/19) Corynebacterium (1/4 Bottles)  MRSA Nasal PCR (12/19) Negative  Paracentesis (12/19, 12/26) Cell counts not suggestive of SBP  Combicath Sputum Culture (12/21) Normal Respiratory Cassandra  Combicath Sputum Culture (12/22) Normal Respiratory Cassandra    Extubated but continued shock  Suspect noninfectious etiology of current clinical status (liver failure)  Central lines exchanged within the last week    CT Chest (1/3/23) Scattered nodular opacities as well as right lower lobe consolidation. Secretions in the trachea.  CT A/P (1/3/23) Widespread small bowel thickening suggesting enteritis.    MRSA/MSSA PCR (12/30) Negative    #Leukocytosis, Positive Blood Culture, Transaminitis, Cirrhosis  --Doubt that CT Chest or Abdomen findings can explain degree of leukocytosis or hypotension especially as patient is already s/p 2 weeks of Meropenem (12/21 -->) and ID workup has been otherwise unrevealing. Favor discontinuing Meropenem  --Will review CT Chest with radiology   --Continue to follow CBC with diff  --Continue to follow temperature curve  --Follow up on preliminary blood cultures    #Positive UCx (Candida dublinensis)  Unclear Significance  s/p Fluconazole 12/20 --> 12/25  Caspofungin gets poor urinary penetration but Candida dublinensis is intermittently azole resistant  Not making urine now  --Favor stopping Caspofungin (12/26 -->)    #Pre-Transplant Evaluation  --ID Clearance for OLT pending clinical status improvement  --Initial QuantiFeron Indeterminate; would repeat QuantiFeron     #Encounter to Vaccinate Patient  Will address vaccination outside of the critical illness period  COVID19: Has had COVID19 in 2020 and again in ~8/2022. Denies prior COVID19 Vaccination  Influenza: Will require  Pneumococcal: Would benefit from PCV20  HAV: Will require Hepatitis A Vaccine  HBV: Will require Heplisav  MMR: Immune  Varicella: Immune  Shingles: Will require Shingrix  Tdap: Will require Tdap    I will continue to follow. Please feel free to contact me with any further questions.    Jonah Mendoza M.D.  [facility] Division of Infectious Disease  8AM-5PM Monday - Friday: Available on [employer] Teams  After Hours and Holidays (or if no response on [employer] Teams): Please contact the Infectious Diseases Office at [phone]    The above assessment and plan were discussed with 8ICU Team  62 year old female PMH decompensated ETOH cirrhosis c/b ascites (dx 11/2022), alc hep (dx 11/2022; non-responsive to steroids), remote h/o thyroid cancer in her 20s s/p total thyroidectomy + RTX + radioactive iodide, HTN, ventrical neoplasm (dx 2021) s/p right frontal craniotomy (03/2022) for resection post operative course c/b hemorrhage right lateral ventricle (managed non-operatively) who was initially admitted to  12/19 with new onset seizures and transferred to [facility] 12/20 for LT eval for senior living.    CT Chest with possible pneumonia versus atelectasis    UA (12/19) Moderate Leukocyte Esterase  UCx (12/19) No Growth  BCx (12/19) Corynebacterium (1/4 Bottles)  MRSA Nasal PCR (12/19) Negative  Paracentesis (12/19, 12/26) Cell counts not suggestive of SBP  Combicath Sputum Culture (12/21) Normal Respiratory Cassandra  Combicath Sputum Culture (12/22) Normal Respiratory Cassandra    Extubated but continued shock  Suspect noninfectious etiology of current clinical status (liver failure)  Central lines exchanged within the last week    CT Chest (1/3/23) Scattered nodular opacities as well as right lower lobe consolidation. Secretions in the trachea.  CT A/P (1/3/23) Widespread small bowel thickening suggesting enteritis.    MRSA/MSSA PCR (12/30) Negative    #Leukocytosis, Positive Blood Culture, Transaminitis, Cirrhosis  --Doubt that CT Chest or Abdomen findings can explain degree of leukocytosis or hypotension especially as patient is already s/p 2 weeks of Meropenem (12/21 -->) and ID workup has been otherwise unrevealing. Favor discontinuing Meropenem  --Will review CT Chest with radiology   --Continue to follow CBC with diff  --Continue to follow temperature curve  --Follow up on preliminary blood cultures    #Positive UCx (Candida dublinensis)  Unclear Significance  s/p Fluconazole 12/20 --> 12/25  Caspofungin gets poor urinary penetration but Candida dublinensis is intermittently azole resistant  Not making urine now  --Favor stopping Caspofungin (12/26 -->)    #Pre-Transplant Evaluation  --ID Clearance for OLT pending clinical status improvement  --Initial QuantiFeron Indeterminate; would repeat QuantiFeron     #Encounter to Vaccinate Patient  Will address vaccination outside of the critical illness period  COVID19: Has had COVID19 in 2020 and again in ~8/2022. Denies prior COVID19 Vaccination  Influenza: Will require  Pneumococcal: Would benefit from PCV20  HAV: Will require Hepatitis A Vaccine  HBV: Will require Heplisav  MMR: Immune  Varicella: Immune  Shingles: Will require Shingrix  Tdap: Will require Tdap    I will continue to follow. Please feel free to contact me with any further questions.    Jonah Mendoza M.D.  [facility] Division of Infectious Disease  8AM-5PM Monday - Friday: Available on [employer] Teams  After Hours and Holidays (or if no response on [employer] Teams): Please contact the Infectious Diseases Office at [phone]    The above assessment and plan were discussed with 8ICU Team

## 2023-01-03 NOTE — PROGRESS NOTE ADULT - ATTENDING COMMENTS
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  See initially admitted at [facility] on December 19 with new onset seizures and was transferred on December 28 for potential evaluation for liver transplantation secondary to acute on chronic liver failure.  Patient recently was in a rehab facility in August 2022 and was asked to the facility when she was found to be COVID-positive.  She was sober for about a week after discharge again resumed drinking.   I agree with the above outlined history, physical examination, assessment and plan.  Patient with multifocal pneumonia, status postextubation now on nasal oxygen, remains on CVVH.  Keeping on broad-spectrum antibiotics including antifungal coverage with caspofungin.  Checking a random cortisol to rule out adrenal insufficiency.  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  See initially admitted at [facility] on December 19 with new onset seizures and was transferred on December 28 for potential evaluation for liver transplantation secondary to acute on chronic liver failure.  Patient recently was in a rehab facility in August 2022 and was asked to the facility when she was found to be COVID-positive.  She was sober for about a week after discharge again resumed drinking.   I agree with the above outlined history, physical examination, assessment and plan.  Patient with multifocal pneumonia, status postextubation now on nasal oxygen, remains on CVVH.  Keeping on broad-spectrum antibiotics including antifungal coverage with caspofungin.  Checking a random cortisol to rule out adrenal insufficiency.  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  See initially admitted at [facility] on December 19 with new onset seizures and was transferred on December 28 for potential evaluation for liver transplantation secondary to acute on chronic liver failure.  Patient recently was in a rehab facility in August 2022 and was asked to the facility when she was found to be COVID-positive.  She was sober for about a week after discharge again resumed drinking.   I agree with the above outlined history, physical examination, assessment and plan.  Patient with multifocal pneumonia, status postextubation now on nasal oxygen, remains on CVVH.  Keeping on broad-spectrum antibiotics including antifungal coverage with caspofungin.  Checking a random cortisol to rule out adrenal insufficiency.  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  She was initially admitted at [facility] on December 19 with new onset seizures and was transferred on December 28 for potential evaluation for liver transplantation secondary to acute on chronic liver failure.  Patient recently was in a rehab facility in August 2022 and was asked to the facility when she was found to be COVID-positive.  She was sober for about a week after discharge again resumed drinking.   I agree with the above outlined history, physical examination, assessment and plan.  Patient with multifocal pneumonia, status postextubation now on nasal oxygen, remains on CVVH.  Keeping on broad-spectrum antibiotics including antifungal coverage with caspofungin.  Checking a random cortisol to rule out adrenal insufficiency.  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  She was initially admitted at [facility] on December 19 with new onset seizures and was transferred on December 28 for potential evaluation for liver transplantation secondary to acute on chronic liver failure.  Patient recently was in a rehab facility in August 2022 and was asked to the facility when she was found to be COVID-positive.  She was sober for about a week after discharge again resumed drinking.   I agree with the above outlined history, physical examination, assessment and plan.  Patient with multifocal pneumonia, status postextubation now on nasal oxygen, remains on CVVH.  Keeping on broad-spectrum antibiotics including antifungal coverage with caspofungin.  Checking a random cortisol to rule out adrenal insufficiency.  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  She was initially admitted at [facility] on December 19 with new onset seizures and was transferred on December 28 for potential evaluation for liver transplantation secondary to acute on chronic liver failure.  Patient recently was in a rehab facility in August 2022 and was asked to the facility when she was found to be COVID-positive.  She was sober for about a week after discharge again resumed drinking.   I agree with the above outlined history, physical examination, assessment and plan.  Patient with multifocal pneumonia, status postextubation now on nasal oxygen, remains on CVVH.  Keeping on broad-spectrum antibiotics including antifungal coverage with caspofungin.  Checking a random cortisol to rule out adrenal insufficiency.  We will review transplant candidacy once all infectious issues are sorted out and psychosocial evaluation completed    MELD Na 40   Blood type O-

## 2023-01-03 NOTE — PROGRESS NOTE ADULT - ASSESSMENT
63 yo F with h/o decompensated ETOH cirrhosis with ascites, thyroid cancer (s/p total thyroidectomy, RTX, and radioactive iodide), HTN, ventrical neoplasm who was initially admitted to  12/19 with new onset seizures and transferred to [facility] 12/20 for liver transplant evaluation. Pt being seen for RED requiring CRRT. 61 yo F with h/o decompensated ETOH cirrhosis with ascites, thyroid cancer (s/p total thyroidectomy, RTX, and radioactive iodide), HTN, ventrical neoplasm who was initially admitted to  12/19 with new onset seizures and transferred to [facility] 12/20 for liver transplant evaluation. Pt being seen for RED requiring CRRT. 63 yo F with h/o decompensated ETOH cirrhosis with ascites, thyroid cancer (s/p total thyroidectomy, RTX, and radioactive iodide), HTN, ventrical neoplasm who was initially admitted to  12/19 with new onset seizures and transferred to [facility] 12/20 for liver transplant evaluation. Pt being seen for RED requiring CRRT.

## 2023-01-03 NOTE — CHART NOTE - NSCHARTNOTEFT_GEN_A_CORE
Nutrition Follow Up Note  Patient seen for: SICU/malnutrition follow up     Chart reviewed, events noted.    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 Patient has Alcohol Use Disorder ( recent rehab with relapse and in remission since 2022), decompensated ALD Cirrhosis c/b ascites. Patient admitted to [facility] on 2022 after a witnessed seizure; course was c/b septic shock with associated HRF ( intubated ) and an RED ( started HD ). Patient was transferred to [facility] on  for a liver transplant evaluation. Patient was started on CRRT  and was extubated 22. Patient remains in SICU for hemodynamic monitoring and management while liver transplant evaluation is in progress."    Diet Order:   Diet, NPO with Tube Feed:   Tube Feeding Modality: Nasogastric  Nepro with Carb Steady (NEPRORTH)  Total Volume for 24 Hours (mL): 1200  Continuous  Starting Tube Feed Rate {mL per Hour}: 50  Until Goal Tube Feed Rate (mL per Hour): 50  Tube Feed Duration (in Hours): 24  Tube Feed Start Time: 13:00 (23)    (Current EN Provision ProvidinmL of formula, 2160kcal/day, 97g protein)    Nutrition-related events:  -Transplant team following for eval; MELD 42 ()   -EN: Pt previously met goal of Nepro at 50mL/hr; d/c'ed on  in setting of emesis with possible ileus; TF resumed on 10/29 with new goal rate of Nepro at  35mL/hr; Pt tolerated and advanced back to true goal rate of 50mL/hr today   -GI: NGT and rectal tube in place - 300cc outs thus far (1/3) , 200cc total (); bowel regimen ordered (Miralax, Senna); Protonix ordered; Decompensated ETOH cirrhosis c/b prior ascites  -Endo: weaned off insulin; no hx of DM   -Renal: CRRT; lytes WDL  -Resp: 4L O2 via NC   -CV: low dose pressors ordered (Vaso, Levo)       Weights:   Daily Weight in k.9 (), Weight in k.1 (12-31), Weight in k.8 (12-29)  -Weight fluctuation likely in the setting of fluid shifts 2/2 CRRT    Nutritionally Pertinent MEDICATIONS  (STANDING):  caspofungin IVPB  caspofungin IVPB  folic acid  levothyroxine Injectable  meropenem  IVPB  midodrine  multivitamin/minerals/iron Oral Solution (CENTRUM)  norepinephrine Infusion  pantoprazole   Suspension  polyethylene glycol 3350  rifAXIMin  thiamine  vasopressin Infusion    Pertinent Labs:  @ 11:55: Na 138, BUN 12, Cr 0.77, <H>, K+ 3.9, Phos 3.7, Mg 2.4, Alk Phos 197<H>, ALT/SGPT 51<H>, AST/SGOT 145<H>, HbA1c --   @ 06:17: Na 137, BUN 12, Cr 0.76, <H>, K+ 3.8, Phos 3.5, Mg 2.4, Alk Phos 194<H>, ALT/SGPT 51<H>, AST/SGOT 147<H>, HbA1c --   @ 00:30: Na 135, BUN 12, Cr 0.77, <H>, K+ 4.0, Phos 3.6, Mg 2.4, Alk Phos 192<H>, ALT/SGPT 51<H>, AST/SGOT 153<H>, HbA1c --   @ 17:18: Na 136, BUN 12, Cr 0.76, <H>, K+ 3.9, Phos 3.8, Mg 2.3, Alk Phos 181<H>, ALT/SGPT 50<H>, AST/SGOT 151<H>, HbA1c --    A1C with Estimated Average Glucose Result: 4.9 % (22 @ 20:59)    Finger Sticks:      (Per flowsheet)  Pressure Injuries: none noted     Edema: none noted     Estimated Needs: based on 61.3 - dry weight   [x] no change since previous assessment  -Energy: 32-37kcal/kg (1961-2268kcal/day)   -Protein: 1.4-1.8g/kg (85-110g/day)   -Defer fluid to Team     Previous Nutrition Diagnosis: severe malnutrition; increased protein-energy needs   Nutrition Diagnosis is: ongoing     New Nutrition Diagnosis: [x] Not applicable    Nutrition Care Plan:  [x] In Progress  [] Achieved  [] Not applicable    Nutrition Interventions:     Education Provided:       [] Yes:  [x] No:     Recommendations:      1) Continue re-advanced Nepro goal rate of 50mL x 24hrs providing 1200mL of formula, 2160kcal/day, 97g protein/day, 872mL free water   -Goal provides: 35.2kcal/kg, 1.58g protein/kg based on 61.3   -Monitor tolerance and adjust PRN  -Defer fluids to Team   2) Continue multivitamin, folic acid, thiamine daily   3) Monitor GI function closely  4) Follow aspiration precautions closely        Monitoring and Evaluation:   Continue to monitor nutritional intake, tolerance to diet prescription, weights, labs, skin integrity      RD remains available upon request and will follow up per protocol    Sarika Vanegas MS, RDN, CDN (Pager #[phone]) Nutrition Follow Up Note  Patient seen for: SICU/malnutrition follow up     Chart reviewed, events noted.    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 Patient has Alcohol Use Disorder ( recent rehab with relapse and in remission since 2022), decompensated ALD Cirrhosis c/b ascites. Patient admitted to [facility] on 2022 after a witnessed seizure; course was c/b septic shock with associated HRF ( intubated ) and an RED ( started HD ). Patient was transferred to [facility] on  for a liver transplant evaluation. Patient was started on CRRT  and was extubated 22. Patient remains in SICU for hemodynamic monitoring and management while liver transplant evaluation is in progress."    Diet Order:   Diet, NPO with Tube Feed:   Tube Feeding Modality: Nasogastric  Nepro with Carb Steady (NEPRORTH)  Total Volume for 24 Hours (mL): 1200  Continuous  Starting Tube Feed Rate {mL per Hour}: 50  Until Goal Tube Feed Rate (mL per Hour): 50  Tube Feed Duration (in Hours): 24  Tube Feed Start Time: 13:00 (23)    (Current EN Provision ProvidinmL of formula, 2160kcal/day, 97g protein)    Nutrition-related events:  -Transplant team following for eval; MELD 42 ()   -EN: Pt previously met goal of Nepro at 50mL/hr; d/c'ed on  in setting of emesis with possible ileus; TF resumed on 10/29 with new goal rate of Nepro at  35mL/hr; Pt tolerated and advanced back to true goal rate of 50mL/hr today   -GI: NGT and rectal tube in place - 300cc outs thus far (1/3) , 200cc total (); bowel regimen ordered (Miralax, Senna); Protonix ordered; Decompensated ETOH cirrhosis c/b prior ascites  -Endo: weaned off insulin; no hx of DM   -Renal: CRRT; lytes WDL  -Resp: 4L O2 via NC   -CV: low dose pressors ordered (Vaso, Levo)       Weights:   Daily Weight in k.9 (), Weight in k.1 (12-31), Weight in k.8 (12-29)  -Weight fluctuation likely in the setting of fluid shifts 2/2 CRRT    Nutritionally Pertinent MEDICATIONS  (STANDING):  caspofungin IVPB  caspofungin IVPB  folic acid  levothyroxine Injectable  meropenem  IVPB  midodrine  multivitamin/minerals/iron Oral Solution (CENTRUM)  norepinephrine Infusion  pantoprazole   Suspension  polyethylene glycol 3350  rifAXIMin  thiamine  vasopressin Infusion    Pertinent Labs:  @ 11:55: Na 138, BUN 12, Cr 0.77, <H>, K+ 3.9, Phos 3.7, Mg 2.4, Alk Phos 197<H>, ALT/SGPT 51<H>, AST/SGOT 145<H>, HbA1c --   @ 06:17: Na 137, BUN 12, Cr 0.76, <H>, K+ 3.8, Phos 3.5, Mg 2.4, Alk Phos 194<H>, ALT/SGPT 51<H>, AST/SGOT 147<H>, HbA1c --   @ 00:30: Na 135, BUN 12, Cr 0.77, <H>, K+ 4.0, Phos 3.6, Mg 2.4, Alk Phos 192<H>, ALT/SGPT 51<H>, AST/SGOT 153<H>, HbA1c --   @ 17:18: Na 136, BUN 12, Cr 0.76, <H>, K+ 3.9, Phos 3.8, Mg 2.3, Alk Phos 181<H>, ALT/SGPT 50<H>, AST/SGOT 151<H>, HbA1c --    A1C with Estimated Average Glucose Result: 4.9 % (22 @ 20:59)    Finger Sticks:      (Per flowsheet)  Pressure Injuries: none noted     Edema: none noted     Estimated Needs: based on 61.3 - dry weight   [x] no change since previous assessment  -Energy: 32-37kcal/kg (1961-2268kcal/day)   -Protein: 1.4-1.8g/kg (85-110g/day)   -Defer fluid to Team     Previous Nutrition Diagnosis: severe malnutrition; increased protein-energy needs   Nutrition Diagnosis is: ongoing     New Nutrition Diagnosis: [x] Not applicable    Nutrition Care Plan:  [x] In Progress  [] Achieved  [] Not applicable    Nutrition Interventions:     Education Provided:       [] Yes:  [x] No:     Recommendations:      1) Continue re-advanced Nepro goal rate of 50mL x 24hrs providing 1200mL of formula, 2160kcal/day, 97g protein/day, 872mL free water   -Goal provides: 35.2kcal/kg, 1.58g protein/kg based on 61.3   -Monitor tolerance and adjust PRN  -Defer fluids to Team   2) Continue multivitamin, folic acid, thiamine daily   3) Monitor GI function closely  4) Follow aspiration precautions closely        Monitoring and Evaluation:   Continue to monitor nutritional intake, tolerance to diet prescription, weights, labs, skin integrity      RD remains available upon request and will follow up per protocol    Sarika Vanegas MS, RDN, CDN (Pager #[phone]) Nutrition Follow Up Note  Patient seen for: SICU/malnutrition follow up     Chart reviewed, events noted.    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 Patient has Alcohol Use Disorder ( recent rehab with relapse and in remission since 2022), decompensated ALD Cirrhosis c/b ascites. Patient admitted to [facility] on 2022 after a witnessed seizure; course was c/b septic shock with associated HRF ( intubated ) and an RED ( started HD ). Patient was transferred to [facility] on  for a liver transplant evaluation. Patient was started on CRRT  and was extubated 22. Patient remains in SICU for hemodynamic monitoring and management while liver transplant evaluation is in progress."    Diet Order:   Diet, NPO with Tube Feed:   Tube Feeding Modality: Nasogastric  Nepro with Carb Steady (NEPRORTH)  Total Volume for 24 Hours (mL): 1200  Continuous  Starting Tube Feed Rate {mL per Hour}: 50  Until Goal Tube Feed Rate (mL per Hour): 50  Tube Feed Duration (in Hours): 24  Tube Feed Start Time: 13:00 (23)    (Current EN Provision ProvidinmL of formula, 2160kcal/day, 97g protein)    Nutrition-related events:  -Transplant team following for eval; MELD 42 ()   -EN: Pt previously met goal of Nepro at 50mL/hr; d/c'ed on  in setting of emesis with possible ileus; TF resumed on 10/29 with new goal rate of Nepro at  35mL/hr; Pt tolerated and advanced back to true goal rate of 50mL/hr today   -GI: NGT and rectal tube in place - 300cc outs thus far (1/3) , 200cc total (); bowel regimen ordered (Miralax, Senna); Protonix ordered; Decompensated ETOH cirrhosis c/b prior ascites  -Endo: weaned off insulin; no hx of DM   -Renal: CRRT; lytes WDL  -Resp: 4L O2 via NC   -CV: low dose pressors ordered (Vaso, Levo)       Weights:   Daily Weight in k.9 (), Weight in k.1 (12-31), Weight in k.8 (12-29)  -Weight fluctuation likely in the setting of fluid shifts 2/2 CRRT    Nutritionally Pertinent MEDICATIONS  (STANDING):  caspofungin IVPB  caspofungin IVPB  folic acid  levothyroxine Injectable  meropenem  IVPB  midodrine  multivitamin/minerals/iron Oral Solution (CENTRUM)  norepinephrine Infusion  pantoprazole   Suspension  polyethylene glycol 3350  rifAXIMin  thiamine  vasopressin Infusion    Pertinent Labs:  @ 11:55: Na 138, BUN 12, Cr 0.77, <H>, K+ 3.9, Phos 3.7, Mg 2.4, Alk Phos 197<H>, ALT/SGPT 51<H>, AST/SGOT 145<H>, HbA1c --   @ 06:17: Na 137, BUN 12, Cr 0.76, <H>, K+ 3.8, Phos 3.5, Mg 2.4, Alk Phos 194<H>, ALT/SGPT 51<H>, AST/SGOT 147<H>, HbA1c --   @ 00:30: Na 135, BUN 12, Cr 0.77, <H>, K+ 4.0, Phos 3.6, Mg 2.4, Alk Phos 192<H>, ALT/SGPT 51<H>, AST/SGOT 153<H>, HbA1c --   @ 17:18: Na 136, BUN 12, Cr 0.76, <H>, K+ 3.9, Phos 3.8, Mg 2.3, Alk Phos 181<H>, ALT/SGPT 50<H>, AST/SGOT 151<H>, HbA1c --    A1C with Estimated Average Glucose Result: 4.9 % (22 @ 20:59)    Finger Sticks:      (Per flowsheet)  Pressure Injuries: none noted     Edema: none noted     Estimated Needs: based on 61.3 - dry weight   [x] no change since previous assessment  -Energy: 32-37kcal/kg (1961-2268kcal/day)   -Protein: 1.4-1.8g/kg (85-110g/day)   -Defer fluid to Team     Previous Nutrition Diagnosis: severe malnutrition; increased protein-energy needs   Nutrition Diagnosis is: ongoing     New Nutrition Diagnosis: [x] Not applicable    Nutrition Care Plan:  [x] In Progress  [] Achieved  [] Not applicable    Nutrition Interventions:     Education Provided:       [] Yes:  [x] No:     Recommendations:      1) Continue re-advanced Nepro goal rate of 50mL x 24hrs providing 1200mL of formula, 2160kcal/day, 97g protein/day, 872mL free water   -Goal provides: 35.2kcal/kg, 1.58g protein/kg based on 61.3   -Monitor tolerance and adjust PRN  -Defer fluids to Team   2) Continue multivitamin, folic acid, thiamine daily   3) Monitor GI function closely  4) Follow aspiration precautions closely        Monitoring and Evaluation:   Continue to monitor nutritional intake, tolerance to diet prescription, weights, labs, skin integrity      RD remains available upon request and will follow up per protocol    Sarika Vanegas MS, RDN, CDN (Pager #[phone])

## 2023-01-03 NOTE — PROGRESS NOTE ADULT - SUBJECTIVE AND OBJECTIVE BOX
Follow Up:      Interval History:    REVIEW OF SYSTEMS  [  ] ROS unobtainable because:    [  ] All other systems negative except as noted below    Constitutional:  [ ] fever [ ] chills  [ ] weight loss  [ ] weakness  Skin:  [ ] rash [ ] phlebitis	  Eyes: [ ] icterus [ ] pain  [ ] discharge	  ENMT: [ ] sore throat  [ ] thrush [ ] ulcers [ ] exudates  Respiratory: [ ] dyspnea [ ] hemoptysis [ ] cough [ ] sputum	  Cardiovascular:  [ ] chest pain [ ] palpitations [ ] edema	  Gastrointestinal:  [ ] nausea [ ] vomiting [ ] diarrhea [ ] constipation [ ] pain	  Genitourinary:  [ ] dysuria [ ] frequency [ ] hematuria [ ] discharge [ ] flank pain  [ ] incontinence  Musculoskeletal:  [ ] myalgias [ ] arthralgias [ ] arthritis  [ ] back pain  Neurological:  [ ] headache [ ] seizures  [ ] confusion/altered mental status    Allergies  Macrobid (Rash)        ANTIMICROBIALS:  caspofungin IVPB    caspofungin IVPB 50 every 24 hours  meropenem  IVPB 1000 every 8 hours  rifAXIMin 550 every 12 hours      OTHER MEDS:  MEDICATIONS  (STANDING):  escitalopram 10 daily  HYDROmorphone  Injectable 0.25 every 3 hours PRN  influenza   Vaccine 0.5 once  levETIRAcetam  Solution 750 two times a day  levothyroxine Injectable 103 at bedtime  midodrine 20 every 8 hours  norepinephrine Infusion 0.05 <Continuous>  pantoprazole   Suspension 40 every 24 hours  polyethylene glycol 3350 17 every 12 hours  vasopressin Infusion 0.03 <Continuous>      Vital Signs Last 24 Hrs  T(C): 37.5 (03 Jan 2023 18:00), Max: 37.5 (03 Jan 2023 18:00)  T(F): 99.5 (03 Jan 2023 18:00), Max: 99.5 (03 Jan 2023 18:00)  HR: 87 (03 Jan 2023 18:00) (77 - 102)  BP: 64/43 (03 Jan 2023 14:15) (64/43 - 102/57)  BP(mean): 49 (03 Jan 2023 14:15) (49 - 74)  RR: 20 (03 Jan 2023 18:00) (12 - 36)  SpO2: 100% (03 Jan 2023 18:00) (84% - 100%)    Parameters below as of 03 Jan 2023 07:00  Patient On (Oxygen Delivery Method): room air        PHYSICAL EXAMINATION:  General: Alert and Awake, NAD  HEENT: PERRL, EOMI  Neck: Supple  Cardiac: RRR, No M/R/G  Resp: CTAB, No Wh/Rh/Ra  Abdomen: NBS, NT/ND, No HSM, No rigidity or guarding  MSK: No LE edema. No Calf tenderness  : No dhillon  Skin: No rashes or lesions. Skin is warm and dry to the touch.   Neuro: Alert and Awake. CN 2-12 Grossly intact. Moves all four extremities spontaneously.  Psych: Calm, Pleasant, Cooperative                          9.2    36.66 )-----------( x        ( 03 Jan 2023 18:43 )             26.9       01-03    136  |  101  |  16  ----------------------------<  156<H>  3.8   |  20<L>  |  1.01    Ca    9.1      03 Jan 2023 17:49  Phos  3.7     01-03  Mg     2.3     01-03    TPro  6.2  /  Alb  3.3  /  TBili  26.8<H>  /  DBili  x   /  AST  136<H>  /  ALT  48<H>  /  AlkPhos  198<H>  01-03      Urinalysis Basic - ( 03 Jan 2023 18:02 )    Color: Other / Appearance: Turbid / SG: SEE NOTE / pH: x  Gluc: x / Ketone: SEE NOTE  / Bili: SEE NOTE / Urobili: SEE NOTE   Blood: x / Protein: SEE NOTE / Nitrite: SEE NOTE   Leuk Esterase: SEE NOTE / RBC: x / WBC x   Sq Epi: x / Non Sq Epi: x / Bacteria: x        MICROBIOLOGY:  v  Peritoneal Peritoneal Fluid  12-26-22   No growth  --    polymorphonuclear leukocytes seen  No organisms seen  by cytocentrifuge      .Blood Blood-Peripheral  12-26-22   No Growth Final  --  --      .Blood Blood-Peripheral  12-26-22   No Growth Final  --  --      .Blood Blood  12-24-22   No Growth Final  --  --      .Blood Blood  12-24-22   No Growth Final  --  --      .Blood Blood  12-23-22   No Growth Final  --  --      .Blood Blood  12-23-22   No Growth Final  --  --      Combi-Cath Combi-Cath  12-22-22   Normal Respiratory Cassandra present  --    Moderate polymorphonuclear leukocytes seen per low power field  No squamous epithelial cells seen per low power field  No organisms seen per oil power field      Catheterized Catheterized  12-21-22   >100,000 CFU/ml Leticia dubliniensis "Susceptibilities not performed"  --  --      Ascites Fl Ascites Fluid  12-21-22   No growth at 5 days  --    polymorphonuclear leukocytes seen  No organisms seen  by cytocentrifuge      Trach Asp Tracheal Aspirate  12-21-22   Normal Respiratory Cassandra present  --    No polymorphonuclear leukocytes per low power field  Numerous Squamous epithelial cells per low power field  Moderate Gram Positive Rods seen per oil power field  Few Yeast like cells seen per oil power field      .Blood Blood-Peripheral  12-20-22   No Growth Final  --  --      .Blood Blood-Peripheral  12-20-22   No Growth Final  --  --        CMV IgG Antibody: 5.10 U/mL (12-24-22 @ 20:59)  Toxoplasma IgG Screen: <3.0 IU/mL (12-24-22 @ 20:59)    Rapid RVP Result: NotDetec (12-27 @ 22:45)        RADIOLOGY:    <The imaging below has been reviewed and visualized by me independently. Findings as detailed in report below> Follow Up:  Pneumonia    Interval History: afebrile. no acute events. off of supplemental O2.     REVIEW OF SYSTEMS  [ x ] ROS unobtainable because:  encephalopathy  [  ] All other systems negative except as noted below    Constitutional:  [ ] fever [ ] chills  [ ] weight loss  [ ] weakness  Skin:  [ ] rash [ ] phlebitis	  Eyes: [ ] icterus [ ] pain  [ ] discharge	  ENMT: [ ] sore throat  [ ] thrush [ ] ulcers [ ] exudates  Respiratory: [ ] dyspnea [ ] hemoptysis [ ] cough [ ] sputum	  Cardiovascular:  [ ] chest pain [ ] palpitations [ ] edema	  Gastrointestinal:  [ ] nausea [ ] vomiting [ ] diarrhea [ ] constipation [ ] pain	  Genitourinary:  [ ] dysuria [ ] frequency [ ] hematuria [ ] discharge [ ] flank pain  [ ] incontinence  Musculoskeletal:  [ ] myalgias [ ] arthralgias [ ] arthritis  [ ] back pain  Neurological:  [ ] headache [ ] seizures  [ ] confusion/altered mental status    Allergies  Macrobid (Rash)        ANTIMICROBIALS:  caspofungin IVPB    caspofungin IVPB 50 every 24 hours  meropenem  IVPB 1000 every 8 hours  rifAXIMin 550 every 12 hours      OTHER MEDS:  MEDICATIONS  (STANDING):  escitalopram 10 daily  HYDROmorphone  Injectable 0.25 every 3 hours PRN  influenza   Vaccine 0.5 once  levETIRAcetam  Solution 750 two times a day  levothyroxine Injectable 103 at bedtime  midodrine 20 every 8 hours  norepinephrine Infusion 0.05 <Continuous>  pantoprazole   Suspension 40 every 24 hours  polyethylene glycol 3350 17 every 12 hours  vasopressin Infusion 0.03 <Continuous>      Vital Signs Last 24 Hrs  T(C): 37.5 (03 Jan 2023 18:00), Max: 37.5 (03 Jan 2023 18:00)  T(F): 99.5 (03 Jan 2023 18:00), Max: 99.5 (03 Jan 2023 18:00)  HR: 87 (03 Jan 2023 18:00) (77 - 102)  BP: 64/43 (03 Jan 2023 14:15) (64/43 - 102/57)  BP(mean): 49 (03 Jan 2023 14:15) (49 - 74)  RR: 20 (03 Jan 2023 18:00) (12 - 36)  SpO2: 100% (03 Jan 2023 18:00) (84% - 100%)    Parameters below as of 03 Jan 2023 07:00  Patient On (Oxygen Delivery Method): room air    PHYSICAL EXAMINATION:  General: Alert and Awake, NAD, jaundice  HEENT: PERRL, EOMI, scleral icterus  Cardiac: RRR, No M/R/G  Resp: CTAB, No Wh/Rh/Ra  Abdomen: NBS, NT/ND, No HSM, No rigidity or guarding  MSK: No LE edema. No Calf tenderness  Skin: No rashes or lesions. Skin is warm and dry to the touch.   Neuro: Alert and Awake. CN 2-12 Grossly intact. Moves all four extremities spontaneously.  Psych: Encephalopathic - unable to assess                          9.2    36.66 )-----------( x        ( 03 Jan 2023 18:43 )             26.9       01-03    136  |  101  |  16  ----------------------------<  156<H>  3.8   |  20<L>  |  1.01    Ca    9.1      03 Jan 2023 17:49  Phos  3.7     01-03  Mg     2.3     01-03    TPro  6.2  /  Alb  3.3  /  TBili  26.8<H>  /  DBili  x   /  AST  136<H>  /  ALT  48<H>  /  AlkPhos  198<H>  01-03      Urinalysis Basic - ( 03 Jan 2023 18:02 )    Color: Other / Appearance: Turbid / SG: SEE NOTE / pH: x  Gluc: x / Ketone: SEE NOTE  / Bili: SEE NOTE / Urobili: SEE NOTE   Blood: x / Protein: SEE NOTE / Nitrite: SEE NOTE   Leuk Esterase: SEE NOTE / RBC: x / WBC x   Sq Epi: x / Non Sq Epi: x / Bacteria: x        MICROBIOLOGY:  v  Peritoneal Peritoneal Fluid  12-26-22   No growth  --    polymorphonuclear leukocytes seen  No organisms seen  by cytocentrifuge      .Blood Blood-Peripheral  12-26-22   No Growth Final  --  --      .Blood Blood-Peripheral  12-26-22   No Growth Final  --  --      .Blood Blood  12-24-22   No Growth Final  --  --      .Blood Blood  12-24-22   No Growth Final  --  --      .Blood Blood  12-23-22   No Growth Final  --  --      .Blood Blood  12-23-22   No Growth Final  --  --      Combi-Cath Combi-Cath  12-22-22   Normal Respiratory Cassandra present  --    Moderate polymorphonuclear leukocytes seen per low power field  No squamous epithelial cells seen per low power field  No organisms seen per oil power field      Catheterized Catheterized  12-21-22   >100,000 CFU/ml Leticia dubliniensis "Susceptibilities not performed"  --  --      Ascites Fl Ascites Fluid  12-21-22   No growth at 5 days  --    polymorphonuclear leukocytes seen  No organisms seen  by cytocentrifuge      Trach Asp Tracheal Aspirate  12-21-22   Normal Respiratory Cassandra present  --    No polymorphonuclear leukocytes per low power field  Numerous Squamous epithelial cells per low power field  Moderate Gram Positive Rods seen per oil power field  Few Yeast like cells seen per oil power field      .Blood Blood-Peripheral  12-20-22   No Growth Final  --  --      .Blood Blood-Peripheral  12-20-22   No Growth Final  --  --        CMV IgG Antibody: 5.10 U/mL (12-24-22 @ 20:59)  Toxoplasma IgG Screen: <3.0 IU/mL (12-24-22 @ 20:59)    Rapid RVP Result: NotDetec (12-27 @ 22:45)        RADIOLOGY:    <The imaging below has been reviewed and visualized by me independently. Findings as detailed in report below>    < from: CT Abdomen and Pelvis w/ IV Cont (01.03.23 @ 12:30) >  IMPRESSION: Suspicious for multifocal pneumonia; recommend one-month   follow-up.    < end of copied text >

## 2023-01-03 NOTE — PROGRESS NOTE ADULT - ASSESSMENT
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   Transferred from [facility] 12/20 for further management of acute liver failure and liver transplant evaluation 2/2 known ETOH Cirrhosis.     Decompensated ETOH Cirrhosis  - Wean off pressors as able, continue Midodrine  - remains on CVVH  - repeat CT chest/abdomen/pelvis today showed multifocal pneumonia  - ID: Leukocytosis. Continue empiric caspo and Meropenem  - HE: Continue lactulose BID, Miralax, Rifaximin   - procalcitonin level and random cortisol level  - Continue TF at goal  - on Keppra for seizures, (no activity since admission)  - Abdominal rash improving. 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   Transferred from [facility] 12/20 for further management of acute liver failure and liver transplant evaluation 2/2 known ETOH Cirrhosis.     Decompensated ETOH Cirrhosis  - Wean off pressors as able, continue Midodrine  - remains on CVVH  - repeat CT chest/abdomen/pelvis today showed multifocal pneumonia  - ID: Leukocytosis. Continue empiric caspo and Meropenem  - HE: Continue lactulose BID, Miralax, Rifaximin   - procalcitonin level and random cortisol level  - Continue TF at goal  - on Keppra for seizures, (no activity since admission)  - Abdominal rash improving. 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   Transferred from [facility] 12/20 for further management of acute liver failure and liver transplant evaluation 2/2 known ETOH Cirrhosis.     Decompensated ETOH Cirrhosis  - Wean off pressors as able, continue Midodrine  - remains on CVVH  - repeat CT chest/abdomen/pelvis today showed multifocal pneumonia  - ID: Leukocytosis. Continue empiric caspo and Meropenem  - HE: Continue lactulose BID, Miralax, Rifaximin   - procalcitonin level and random cortisol level  - Continue TF at goal  - on Keppra for seizures, (no activity since admission)  - Abdominal rash improving. Will consult Derm if persists/worsens  - Albumin  - Liver transplant evaluation deferred due to illness at present  - Continue excellent ICU care

## 2023-01-03 NOTE — PROGRESS NOTE ADULT - PROBLEM SELECTOR PLAN 3
Pt with hypophosphatemia while receiving CRRT. Serum phos improved to 3.3 today with Phoxilium. Plan to continue CRRT today with phoxilium. Monitor serum phos level.      If you have any questions, please feel free to contact me  Arnold Charles  Nephrology Fellow  [phone]; Prefer [employer] TEAMS  (After 5pm or on weekends please page the on-call fellow).    Patient was discussed with Dr. Marrero who agrees with my A/P. Addendum to follow Pt with hypophosphatemia while receiving CRRT. Serum phos improved to 3.3 today with Phoxilium. Plan to continue CRRT today with phoxilium. Monitor serum phos level.      If you have any questions, please feel free to contact me  Arnold Charles  Nephrology Fellow  [phone]; Prefer [employer] TEAMS  (After 5pm or on weekends please page the on-call fellow).    Patient was discussed with Dr. Marrero who agrees with my A/P. Addendum to follow Pt with hypophosphatemia while receiving CRRT. Serum phos improved to 3.3 today with Phoxilium. Plan to continue CRRT today with phoxilium. Monitor serum phos level.      If you have any questions, please feel free to contact me  Arnold Charles  Nephrology Fellow  [phone]; Prefer [employer] TEAMS  (After 5pm or on weekends please page the on-call fellow).    Patient was discussed with Dr. Marrero who agrees with my A/P. Addendum to follow

## 2023-01-03 NOTE — PROGRESS NOTE ADULT - SUBJECTIVE AND OBJECTIVE BOX
HPI:  Patient seen and examined at bedside on 8 ICU.  Remains on low doses of dual pressor support with Levophed and vasopressin.    Review Of Systems:           Respiratory: No shortness of breath, cough, or wheezing  Cardiovascular: No chest pain or palpitations  10 point review of systems is otherwise negative except as mentioned above        Medications:  caspofungin IVPB      caspofungin IVPB 50 milliGRAM(s) IV Intermittent every 24 hours  chlorhexidine 2% Cloths 1 Application(s) Topical <User Schedule>  CRRT Treatment    <Continuous>  escitalopram 10 milliGRAM(s) Oral daily  folic acid 1 milliGRAM(s) Oral daily  HYDROmorphone  Injectable 0.25 milliGRAM(s) IV Push every 3 hours PRN  influenza   Vaccine 0.5 milliLiter(s) IntraMuscular once  levETIRAcetam  Solution 750 milliGRAM(s) Enteral Tube two times a day  levothyroxine Injectable 103 MICROGram(s) IV Push at bedtime  meropenem  IVPB 1000 milliGRAM(s) IV Intermittent every 8 hours  midodrine 20 milliGRAM(s) Oral every 8 hours  multivitamin/minerals/iron Oral Solution (CENTRUM) 15 milliLiter(s) Oral daily  norepinephrine Infusion 0.05 MICROgram(s)/kG/Min IV Continuous <Continuous>  pantoprazole   Suspension 40 milliGRAM(s) Oral every 24 hours  Phoxillum Filtration BK 4 / 2.5 5000 milliLiter(s) CRRT <Continuous>  Phoxillum Filtration BK 4 / 2.5 5000 milliLiter(s) CRRT <Continuous>  Phoxillum Filtration BK 4 / 2.5 5000 milliLiter(s) CRRT <Continuous>  polyethylene glycol 3350 17 Gram(s) Oral every 12 hours  rifAXIMin 550 milliGRAM(s) Oral every 12 hours  sodium chloride 0.65% Nasal 1 Spray(s) Both Nostrils every 3 hours PRN  tetracaine/benzocaine/butamben Spray 1 Spray(s) Topical every 3 hours PRN  thiamine 100 milliGRAM(s) Enteral Tube daily  vasopressin Infusion 0.03 Unit(s)/Min IV Continuous <Continuous>    PAST MEDICAL & SURGICAL HISTORY:  HTN (hypertension)  off medication for over one year--states BP has been normal      UTI (urinary tract infection)  currently being treated--will complete antibiotics 3/8/2022      Intracranial tumor      GERD (gastroesophageal reflux disease)      Eczema      Thyroid cancer  surgery. radiation, Radioactive iodine      COVID-19 virus infection  11/2021--recovered at home      H/O total thyroidectomy  age 21&#x27;s for Thyroid cancer, postop complication arterial bleed, second surgery      History of tonsillectomy  age 5      History of appendectomy  age 5        Vitals:  T(C): 36.7 (01-03-23 @ 19:00), Max: 37.5 (01-03-23 @ 18:00)  HR: 86 (01-03-23 @ 22:00) (80 - 102)  BP: 106/60 (01-03-23 @ 19:00) (64/43 - 106/60)  BP(mean): 49 (01-03-23 @ 14:15) (49 - 74)  RR: 21 (01-03-23 @ 22:00) (12 - 36)  SpO2: 96% (01-03-23 @ 22:00) (84% - 100%)  Wt(kg): --  Daily     Daily   I&O's Summary    02 Jan 2023 07:01  -  03 Jan 2023 07:00  --------------------------------------------------------  IN: 2074.8 mL / OUT: 2309 mL / NET: -234.2 mL    03 Jan 2023 07:01  -  03 Jan 2023 22:29  --------------------------------------------------------  IN: 1780.7 mL / OUT: 571 mL / NET: 1209.7 mL        Physical Exam:  Appearance: Jaundice, encephalopathic   Eyes: PERRLA, EOMI, pink conjunctiva, no scleral icterus   HENT: Normal oral mucosa  Cardiovascular: RRR, S1, S2, no murmur, rub, or gallop; no edema; no JVD  Procedural Access Site: Clean, dry, intact, without hematoma  Respiratory: Clear to auscultation bilaterally  Gastrointestinal: Soft, non-tender, non-distended, BS+  Musculoskeletal: Mitten restraints   Lymphatic: No lymphadenopathy  Skin: No rashes, ecchymoses, or cyanosis                          9.2    36.66 )-----------( 34       ( 03 Jan 2023 18:43 )             26.9     01-03    136  |  101  |  16  ----------------------------<  156<H>  3.8   |  20<L>  |  1.01    Ca    9.1      03 Jan 2023 17:49  Phos  3.7     01-03  Mg     2.3     01-03    TPro  6.2  /  Alb  3.3  /  TBili  26.8<H>  /  DBili  x   /  AST  136<H>  /  ALT  48<H>  /  AlkPhos  198<H>  01-03    PT/INR - ( 03 Jan 2023 00:30 )   PT: 28.0 sec;   INR: 2.39 ratio         PTT - ( 03 Jan 2023 00:30 )  PTT:58.3 sec      Cardiovascular Diagnostic Testing:  ECG:     Echo:   < from: TTE with Doppler (w/Cont) (12.21.22 @ 10:49) >  ------------------------------------------------------------------------  Dimensions:    Normal Values:  LA:     3.5    2.0 - 4.0 cm  Ao:     3.1    2.0 - 3.8 cm  SEPTUM: 0.7    0.6 - 1.2 cm  PWT:    0.6    0.6 - 1.1 cm  LVIDd: 5.4    3.0 - 5.6 cm  LVIDs:  2.9    1.8 - 4.0 cm  Derived variables:  LVMI: 69 g/m2  RWT: 0.22  Fractional short: 46 %  EF (Visual Estimate):  %  EF (Monroy Rule): 66 %Doppler Peak Velocity (m/sec):  AoV=1.4  ------------------------------------------------------------------------  Observations:  Mitral Valve: Normal mitral valve. Minimal mitral  regurgitation.  Aortic Valve/Aorta: Calcified trileaflet aortic valve with  normal opening. Peak transaortic valve gradient equals 8 mm  Hg. No aorticvalve regurgitation seen. Peak left  ventricular outflow tract gradient equals 4 mm Hg, mean  gradient is equal to 2 mm Hg, LVOT velocity time integral  equals 19 cm.  Aortic Root: 3.1 cm.  Ascending Aorta: 3.3 cm.  LVOT diameter: 2 cm.  Left Atrium:Normal left atrium.  LA volume index = 33  cc/m2.  Left Ventricle: Endocardial visualization enhanced with  intravenous injection of Ultrasonic Enhancing Agent  (Definity). Left ventricle suboptimally visualized despite  intravenous injection of ultrasonic enhancing agent; normal  left ventricular systolic function. No segmental wall  motion abnormalities. Normal left ventricular internal  dimensions and wall thicknesses. Normal diastolic function.  Right Heart: Normal right atrium. Normal right ventricular  size and function. Normal tricuspid valve. Mild-moderate  tricuspid regurgitation. Normal pulmonic valve. Minimal  pulmonic regurgitation.  Pericardium/Pleura: Normal pericardium with no pericardial  effusion.  Hemodynamic: Estimated right atrial pressure is 8 mm Hg.  Estimated right ventricular systolic pressure equals 29 mm  Hg, assuming right atrial pressure equals 8 mm Hg,  consistent with normal pulmonary pressures.  ------------------------------------------------------------------------  Conclusions:  1. Normal mitral valve. Minimal mitral regurgitation.  2. Calcified trileaflet aortic valve with normal opening.  No aortic valve regurgitation seen.  3. Endocardial visualization enhanced with intravenous  injection of Ultrasonic Enhancing Agent (Definity). Left  ventricle suboptimally visualized despite intravenous  injection of ultrasonic enhancing agent; normal left  ventricular systolic function. No segmental wall motion  abnormalities.  4. Normal right ventricular size and function.  *** No previous Echo exam.  ------------------------------------------------------------------------  Confirmed on  12/21/2022 - 13:40:44 by Rosalinda Ingram M.D.  ------------------------------------------------------------------------    < end of copied text >

## 2023-01-03 NOTE — PROGRESS NOTE ADULT - ASSESSMENT
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 Patient has Alcohol Use Disorder ( recent rehab with relapse and in remission since 11/2022), decompensated ALD Cirrhosis c/b ascites. Patient admitted to [facility] on 12/19/2022 after a witnessed seizure; course was c/b septic shock with associated HRF ( intubated 12/19) and an RED ( started HD 12/20). Patient was transferred to [facility] on 12/20 for a liver transplant evaluation. Patient was started on CRRT 12/21 and was extubated 12/23/22. Patient remains in SICU for hemodynamic monitoring and management while liver transplant evaluation is in progress.       PLAN:  Neuro:  - Monitor Mental Status for Encephalopathy and trend Ammonia Level - has remained negative  - Continue Home Lexapro   - Continue Keppra  - Continue Folic Acid, Thiamine, and MV     Resp:  - 4L NC; will wean as tolerated to maintain SpO2 > 92%   - Out of bed to chair, ambulate as tolerated, and incentive spirometry to prevent atelectasis    CV:  - Will wean vasopressor support  of Levophed and Vaso to maintain goal MAP > 65 mmHg  - Remains on Vaso gtt at 0.03, Levo gtt at 0.12  - Continue Midodrine 20mg q8hr     GI: Acute Decompensated Liver Failure 2/2 ETOH Use   - NPO; TF via NGT for goal 35cc/hr   - Increased Bowel regimen Miralax BID  - Protonix for stress ulcer prophylaxis  - Continue Rifaximin, Hold Lactulose for increasing abdominal distention   - s/p Paracentesis (12/26) transudative, negative for SBP     Renal:  - Continue CRRT to maintain net even  - CVP <4, s/p 5% Albumin 500cc x2  - Monitor I&Os and electrolytes w/ repletions as necessary    Heme:  - Monitor CBC and coags  - Hold Chemical VTE prophylaxis  - SCDs for Mechanical VTE ppx   - s/p 1U PRBC for hypotension after CRRT Machine malfunctioned     ID:   - WBC increasing, most recently 36.18  - Monitor for clinical evidence of active infection  - Combi-Cath ( 12/22) Negative, Blood Cx ( 12/26) Negative,  MRSA (12/30) Negative   - Continue Empiric Caspofungin ( 12/26 - ?) and Meropenem (12/25 - 1/08)    Endo:   - Monitor glucose ; HgbA1C 4.9% ( 12/24)   - D/C ISS, monitor blood glucose on BMP  - Continue Synthroid for hypothyroidism    Code Status: Full Code   Disposition: SICU   Patient is a 63 y/o Female with PMH Thyroid Cancer ( s/p total thyroidectomy in her 20s, radiation, and BARONE), HTN, GERD, 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 Patient has Alcohol Use Disorder ( recent rehab with relapse and in remission since 11/2022), decompensated ALD Cirrhosis c/b ascites. Patient admitted to [facility] on 12/19/2022 after a witnessed seizure; course was c/b septic shock with associated HRF ( intubated 12/19) and an RED ( started HD 12/20). Patient was transferred to [facility] on 12/20 for a liver transplant evaluation. Patient was started on CRRT 12/21 and was extubated 12/23/22. Patient remains in SICU for hemodynamic monitoring and management while liver transplant evaluation is in progress.       PLAN:  Neuro:  - Monitor Mental Status for Encephalopathy and trend Ammonia Level - has remained negative  - Continue Home Lexapro   - Continue Keppra  - Continue Folic Acid, Thiamine, and MV     Resp:  - 4L NC; will wean as tolerated to maintain SpO2 > 92%   - Out of bed to chair, ambulate as tolerated, and incentive spirometry to prevent atelectasis    CV:  - Will wean vasopressor support  of Levophed and Vaso to maintain goal MAP > 65 mmHg  - Remains on Vaso gtt at 0.03, Levo gtt at 0.12  - Continue Midodrine 20mg q8hr     GI: Acute Decompensated Liver Failure 2/2 ETOH Use   - NPO; TF via NGT for goal 35cc/hr   - Increased Bowel regimen Miralax BID  - Protonix for stress ulcer prophylaxis  - Continue Rifaximin, Hold Lactulose for increasing abdominal distention   - s/p Paracentesis (12/26) transudative, negative for SBP     Renal:  - Continue CRRT to maintain net even  - CVP <4, s/p 5% Albumin 500cc x2  - Monitor I&Os and electrolytes w/ repletions as necessary    Heme:  - Monitor CBC and coags  - Hold Chemical VTE prophylaxis  - SCDs for Mechanical VTE ppx   - s/p 1U PRBC for hypotension after CRRT Machine malfunctioned     ID:   - WBC increasing, most recently 36.18  - Monitor for clinical evidence of active infection  - Combi-Cath ( 12/22) Negative, Blood Cx ( 12/26) Negative,  MRSA (12/30) Negative   - Continue Empiric Caspofungin ( 12/26 - ?) and Meropenem (12/25 - 1/08)    Endo:   - Monitor glucose ; HgbA1C 4.9% ( 12/24)   - D/C ISS, monitor blood glucose on BMP  - Continue Synthroid for hypothyroidism    Code Status: Full Code   Disposition: SICU   Patient is a 61 y/o Female with PMH Thyroid Cancer ( s/p total thyroidectomy in her 20s, radiation, and BARONE), HTN, GERD, 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 Patient has Alcohol Use Disorder ( recent rehab with relapse and in remission since 11/2022), decompensated ALD Cirrhosis c/b ascites. Patient admitted to [facility] on 12/19/2022 after a witnessed seizure; course was c/b septic shock with associated HRF ( intubated 12/19) and an RED ( started HD 12/20). Patient was transferred to [facility] on 12/20 for a liver transplant evaluation. Patient was started on CRRT 12/21 and was extubated 12/23/22. Patient remains in SICU for hemodynamic monitoring and management while liver transplant evaluation is in progress.       PLAN:  Neuro:  - Monitor Mental Status for Encephalopathy and trend Ammonia Level - has remained negative  - Continue Home Lexapro   - Continue Keppra  - Continue Folic Acid, Thiamine, and MV     Resp:  - 4L NC; will wean as tolerated to maintain SpO2 > 92%   - Out of bed to chair, ambulate as tolerated, and incentive spirometry to prevent atelectasis    CV:  - Will wean vasopressor support  of Levophed and Vaso to maintain goal MAP > 65 mmHg  - Remains on Vaso gtt at 0.03, Levo gtt at 0.12  - Continue Midodrine 20mg q8hr     GI: Acute Decompensated Liver Failure 2/2 ETOH Use   - NPO; TF via NGT for goal 35cc/hr   - Increased Bowel regimen Miralax BID  - Protonix for stress ulcer prophylaxis  - Continue Rifaximin, Hold Lactulose for increasing abdominal distention   - s/p Paracentesis (12/26) transudative, negative for SBP     Renal:  - Continue CRRT to maintain net even  - CVP <4, s/p 5% Albumin 500cc x2  - Monitor I&Os and electrolytes w/ repletions as necessary    Heme:  - Monitor CBC and coags  - Hold Chemical VTE prophylaxis  - SCDs for Mechanical VTE ppx   - s/p 1U PRBC for hypotension after CRRT Machine malfunctioned     ID:   - WBC increasing, most recently 36.18  - Monitor for clinical evidence of active infection  - Combi-Cath ( 12/22) Negative, Blood Cx ( 12/26) Negative,  MRSA (12/30) Negative   - Continue Empiric Caspofungin ( 12/26 - ?) and Meropenem (12/25 - 1/08)    Endo:   - Monitor glucose ; HgbA1C 4.9% ( 12/24)   - D/C ISS, monitor blood glucose on BMP  - Continue Synthroid for hypothyroidism    Code Status: Full Code   Disposition: SICU

## 2023-01-03 NOTE — PROGRESS NOTE ADULT - SUBJECTIVE AND OBJECTIVE BOX
Interval Events:   No acute events noted, however unable to obtain full HPI due to underlying mental status.    ROS:   Unable to fully obtain ROS.    Hospital Medications:  caspofungin IVPB      caspofungin IVPB 50 milliGRAM(s) IV Intermittent every 24 hours  chlorhexidine 2% Cloths 1 Application(s) Topical <User Schedule>  CRRT Treatment    <Continuous>  escitalopram 10 milliGRAM(s) Oral daily  folic acid 1 milliGRAM(s) Oral daily  HYDROmorphone  Injectable 0.25 milliGRAM(s) IV Push every 3 hours PRN  influenza   Vaccine 0.5 milliLiter(s) IntraMuscular once  levETIRAcetam  Solution 750 milliGRAM(s) Enteral Tube two times a day  levothyroxine Injectable 103 MICROGram(s) IV Push at bedtime  meropenem  IVPB 1000 milliGRAM(s) IV Intermittent every 8 hours  midodrine 20 milliGRAM(s) Oral every 8 hours  multivitamin/minerals/iron Oral Solution (CENTRUM) 15 milliLiter(s) Oral daily  norepinephrine Infusion 0.05 MICROgram(s)/kG/Min (6.21 mL/Hr) IV Continuous <Continuous>  pantoprazole   Suspension 40 milliGRAM(s) Oral every 24 hours  Phoxillum Filtration BK 4 / 2.5 5000 milliLiter(s) (800 mL/Hr) CRRT <Continuous>  Phoxillum Filtration BK 4 / 2.5 5000 milliLiter(s) (200 mL/Hr) CRRT <Continuous>  Phoxillum Filtration BK 4 / 2.5 5000 milliLiter(s) (1000 mL/Hr) CRRT <Continuous>  polyethylene glycol 3350 17 Gram(s) Oral every 12 hours  rifAXIMin 550 milliGRAM(s) Oral every 12 hours  sodium chloride 0.65% Nasal 1 Spray(s) Both Nostrils every 3 hours PRN  tetracaine/benzocaine/butamben Spray 1 Spray(s) Topical every 3 hours PRN  thiamine 100 milliGRAM(s) Enteral Tube daily  vasopressin Infusion 0.03 Unit(s)/Min (4.5 mL/Hr) IV Continuous <Continuous>    MAR over past 24 hours:    caspofungin IVPB   260 mL/Hr IV Intermittent (01-03-23 @ 09:02)    chlorhexidine 2% Cloths   1 Application(s) Topical (01-03-23 @ 05:18)    escitalopram   10 milliGRAM(s) Oral (01-03-23 @ 11:41)    folic acid   1 milliGRAM(s) Oral (01-03-23 @ 11:42)    levETIRAcetam  Solution   750 milliGRAM(s) Enteral Tube (01-03-23 @ 05:08)   750 milliGRAM(s) Enteral Tube (01-02-23 @ 17:07)    levothyroxine Injectable   103 MICROGram(s) IV Push (01-02-23 @ 22:08)    meropenem  IVPB   100 mL/Hr IV Intermittent (01-03-23 @ 05:07)   100 mL/Hr IV Intermittent (01-02-23 @ 22:08)   100 mL/Hr IV Intermittent (01-02-23 @ 13:53)    midodrine   20 milliGRAM(s) Oral (01-03-23 @ 05:08)   20 milliGRAM(s) Oral (01-02-23 @ 22:08)   20 milliGRAM(s) Oral (01-02-23 @ 13:54)    multivitamin/minerals/iron Oral Solution (CENTRUM)   15 milliLiter(s) Oral (01-03-23 @ 11:41)    pantoprazole   Suspension   40 milliGRAM(s) Oral (01-03-23 @ 11:41)    polyethylene glycol 3350   17 Gram(s) Oral (01-03-23 @ 05:07)   17 Gram(s) Oral (01-02-23 @ 17:08)    rifAXIMin   550 milliGRAM(s) Oral (01-03-23 @ 05:08)   550 milliGRAM(s) Oral (01-02-23 @ 17:08)    thiamine   100 milliGRAM(s) Enteral Tube (01-03-23 @ 11:41)      Home Medications:  Last Order Reconciliation Date: 12-21-22 @ 13:03 (Admission Reconciliation)  cholecalciferol 25 mcg (1000 intl units) oral tablet: 1 tab(s) orally once a day  folic acid 1 mg oral tablet: 1 tab(s) orally once a day  levothyroxine 137 mcg (0.137 mg) oral tablet: 1 tab(s) orally once a day  Lexapro 10 mg oral tablet: 1 tab(s) orally once a day  nystatin 100,000 units/mL oral suspension: 5 milliliter(s) orally 4 times a day  pantoprazole 40 mg oral delayed release tablet: 1 tab(s) orally once a day (at bedtime)  senna oral tablet: 2 tab(s) orally once a day (at bedtime), As Needed -for constipation   Sodium Chloride 1 g oral tablet: 1 gram(s) orally once a day  thiamine 100 mg oral tablet: 1 tab(s) orally once a day  Vitamin B-100 oral tablet: 1 tab(s) orally once a day    PHYSICAL EXAM:   Vital Signs last 24 hours:  T(F): 97 (01-03-23 @ 11:00), Max: 98.4 (01-03-23 @ 03:00)  HR: 82 (01-03-23 @ 13:15) (77 - 102)  BP: 98/61 (01-03-23 @ 02:00) (94/53 - 118/56)  BP(mean): 74 (01-03-23 @ 02:00) (68 - 78)  ABP: 121/59 (01-03-23 @ 13:15) (92/42 - 124/65)  ABP(mean): 84 (01-03-23 @ 13:15) (61 - 90)  RR: 21 (01-03-23 @ 13:15) (13 - 43)  SpO2: 100% (01-03-23 @ 13:15) (90% - 100%)    I&Os:    01-02-23 @ 07:01  -  01-03-23 @ 07:00  --------------------------------------------------------  IN:    Enteral Tube Flush: 490 mL    IV PiggyBack: 400 mL    Nepro with Carb Steady: 790 mL    Norepinephrine: 286.8 mL    Vasopressin: 108 mL  Total IN: 2074.8 mL    OUT:    Other (mL): 1809 mL    Rectal Tube (mL): 500 mL  Total OUT: 2309 mL    Total NET: -234.2 mL      01-03-23 @ 07:01  -  01-03-23 @ 13:35  --------------------------------------------------------  IN:    Enteral Tube Flush: 150 mL    IV PiggyBack: 250 mL    Nepro with Carb Steady: 225 mL    Norepinephrine: 70.8 mL    Vasopressin: 27 mL  Total IN: 722.8 mL    OUT:    Other (mL): 356 mL  Total OUT: 356 mL    Total NET: 366.8 mL        BMI (kg/m2): 20.9 (12-30-22 @ 07:00)  GENERAL: no acute distress  NEURO: not fully alert/oriented  HEENT: NCAT, no conjunctival pallor appreciated  CHEST: no respiratory distress, no accessory muscle use  CARDIAC: regular rate, +S1/S2  ABDOMEN: soft, nontender, no rebound or guarding  EXTREMITIES: warm, well perfused  SKIN: no lesions noted    DIAGNOSTICS:  WBC      Hg       PLT      Na       K        CO2     BUN      Cr       ALT      AST      TB       ALP  ------   ------   ------   138      3.9      20       12       0.77     51       145      26.6     197      01-03-23 @ 11:55  ------   ------   ------   137      3.8      22       12       0.76     51       147      26.8     194      01-03-23 @ 06:17  36.18    9.2      28       135      4.0      21       12       0.77     51       153      27.5     192      01-03-23 @ 00:30  ------   ------   ------   136      3.9      21       12       0.76     50       151      27.2     181      01-02-23 @ 17:18  ------   ------   ------   137      4.1      20       12       0.78     51       153      28.3     178      01-02-23 @ 11:33    PT             INR            MELDwith  MELDw/o  28.0           2.39           --             --             01-03-23 @ 00:30  28.0           2.39           --             --             01-02-23 @ 00:28  28.0           2.39           --             --             01-01-23 @ 00:29  24.4           2.11           --             --             12-31-22 @ 00:20  26.6           2.27           --             --             12-30-22 @ 00:16

## 2023-01-03 NOTE — PROGRESS NOTE ADULT - SUBJECTIVE AND OBJECTIVE BOX
Transplant Surgery Progress Note    Present:   Patient seen and examined with multidisciplinary Transplant team including  Surgeon: Dr. Oconnor,. Hepatologist: Dr. Bush,  NP:James and RNs in AM rounds.   Disciplines not in attendance will be notified of the plan.     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  Arthur ([phone]) and Daughter Taylor at [facility] provided additional history. Of note was recently admitted to  11/2022 for workup and eval of jaundice- during that hospitalization was found to have a UTI and dx with alc hep and started on steroids (was deemed a non-responder and steroids were subsequently stopped); s/p LVP at [place] 11/2022. Previously hospitalized in 2021 at [place] for ETOH detox. Went to ETOH rehab 08/2022 and was asked to leave the facility when she was COVID+; was sober for 1 week until she started drinking again. Had also attended a few AA meetings in the past. Transferred from [facility] 12/20 for further management of acute liver failure and liver transplant evaluation.       Interval events:  - remains on pressors  - TF resumed   - mental status waxing and waning, but overall improved  - diffuse rash across abdomen and flank, improving    Potential Discharge date: pending clinical stability  Education:  Medications  Plan of care:  See Below      MEDICATIONS  (STANDING):  caspofungin IVPB      caspofungin IVPB 50 milliGRAM(s) IV Intermittent every 24 hours  chlorhexidine 2% Cloths 1 Application(s) Topical <User Schedule>  CRRT Treatment    <Continuous>  escitalopram 10 milliGRAM(s) Oral daily  folic acid 1 milliGRAM(s) Oral daily  influenza   Vaccine 0.5 milliLiter(s) IntraMuscular once  levETIRAcetam  Solution 750 milliGRAM(s) Enteral Tube two times a day  levothyroxine Injectable 103 MICROGram(s) IV Push at bedtime  meropenem  IVPB 1000 milliGRAM(s) IV Intermittent every 8 hours  midodrine 20 milliGRAM(s) Oral every 8 hours  multivitamin/minerals/iron Oral Solution (CENTRUM) 15 milliLiter(s) Oral daily  norepinephrine Infusion 0.05 MICROgram(s)/kG/Min (6.21 mL/Hr) IV Continuous <Continuous>  pantoprazole   Suspension 40 milliGRAM(s) Oral every 24 hours  Phoxillum Filtration BK 4 / 2.5 5000 milliLiter(s) (1000 mL/Hr) CRRT <Continuous>  Phoxillum Filtration BK 4 / 2.5 5000 milliLiter(s) (800 mL/Hr) CRRT <Continuous>  Phoxillum Filtration BK 4 / 2.5 5000 milliLiter(s) (200 mL/Hr) CRRT <Continuous>  polyethylene glycol 3350 17 Gram(s) Oral every 12 hours  rifAXIMin 550 milliGRAM(s) Oral every 12 hours  thiamine 100 milliGRAM(s) Enteral Tube daily  vasopressin Infusion 0.03 Unit(s)/Min (4.5 mL/Hr) IV Continuous <Continuous>    MEDICATIONS  (PRN):  HYDROmorphone  Injectable 0.25 milliGRAM(s) IV Push every 3 hours PRN Severe Pain (7 - 10)  sodium chloride 0.65% Nasal 1 Spray(s) Both Nostrils every 3 hours PRN Nasal Congestion  tetracaine/benzocaine/butamben Spray 1 Spray(s) Topical every 3 hours PRN for ngt discomfort      PAST MEDICAL & SURGICAL HISTORY:  HTN (hypertension)  off medication for over one year--states BP has been normal      UTI (urinary tract infection)  currently being treated--will complete antibiotics 3/8/2022      Intracranial tumor      GERD (gastroesophageal reflux disease)      Eczema      Thyroid cancer  surgery. radiation, Radioactive iodine      COVID-19 virus infection  11/2021--recovered at home      H/O total thyroidectomy  age 21&#x27;s for Thyroid cancer, postop complication arterial bleed, second surgery      History of tonsillectomy  age 5      History of appendectomy  age 5          Vital Signs Last 24 Hrs  T(C): 36.1 (03 Jan 2023 11:00), Max: 36.9 (03 Jan 2023 03:00)  T(F): 97 (03 Jan 2023 11:00), Max: 98.4 (03 Jan 2023 03:00)  HR: 95 (03 Jan 2023 14:30) (77 - 102)  BP: 64/43 (03 Jan 2023 14:15) (64/43 - 118/56)  BP(mean): 49 (03 Jan 2023 14:15) (49 - 78)  RR: 16 (03 Jan 2023 14:30) (13 - 43)  SpO2: 98% (03 Jan 2023 14:30) (84% - 100%)    Parameters below as of 03 Jan 2023 07:00  Patient On (Oxygen Delivery Method): room air        I&O's Summary    02 Jan 2023 07:01  -  03 Jan 2023 07:00  --------------------------------------------------------  IN: 2074.8 mL / OUT: 2309 mL / NET: -234.2 mL    03 Jan 2023 07:01  -  03 Jan 2023 14:49  --------------------------------------------------------  IN: 948.7 mL / OUT: 356 mL / NET: 592.7 mL                              9.2    36.18 )-----------( 28       ( 03 Jan 2023 00:30 )             26.4     01-03    138  |  103  |  12  ----------------------------<  132<H>  3.9   |  20<L>  |  0.77    Ca    8.6      03 Jan 2023 11:55  Phos  3.7     01-03  Mg     2.4     01-03    TPro  6.3  /  Alb  3.5  /  TBili  26.6<H>  /  DBili  x   /  AST  145<H>  /  ALT  51<H>  /  AlkPhos  197<H>  01-03        Physical Exam:  Constitutional: NAD   Eyes: icteric, PERRLA  ENMT: nc/at, no thrush  Neck: supple, central line   Cardiovascular: RRR  Gastrointestinal: Soft abdomen, distended, rectal tube in place, tube feeds in place  Genitourinary: Urinary catheter in place, anuric  Extremities: SCD's in place and working bilaterally, no edema  Vascular: Palpable dp pulses bilaterally.   Neurological: following commands, mentation improving  Skin:  diffuse rash across abdomen and flank. No ulcerations, lesions  Musculoskeletal: moving extremities  Psychiatric: calm   Transplant Surgery Progress Note    Present:   Patient seen and examined with multidisciplinary Transplant team including  Surgeon: Dr. Oconnor,. Hepatologist: Dr. Bush,  NP:James and RNs in AM rounds.   Disciplines not in attendance will be notified of the plan.     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  Arthur ([phone]) and Daughter Taylor at [facility] provided additional history. Of note was recently admitted to  11/2022 for workup and eval of jaundice- during that hospitalization was found to have a UTI and dx with alc hep and started on steroids (was deemed a non-responder and steroids were subsequently stopped); s/p LVP at [place] 11/2022. Previously hospitalized in 2021 at [place] for ETOH detox. Went to ETOH rehab 08/2022 and was asked to leave the facility when she was COVID+; was sober for 1 week until she started drinking again. Had also attended a few AA meetings in the past. Transferred from [facility] 12/20 for further management of acute liver failure and liver transplant evaluation.       Interval events:  - remains on pressors  - TF resumed   - mental status waxing and waning, but overall improved  - diffuse rash across abdomen and flank, improving    Potential Discharge date: pending clinical stability  Education:  Medications  Plan of care:  See Below      MEDICATIONS  (STANDING):  caspofungin IVPB      caspofungin IVPB 50 milliGRAM(s) IV Intermittent every 24 hours  chlorhexidine 2% Cloths 1 Application(s) Topical <User Schedule>  CRRT Treatment    <Continuous>  escitalopram 10 milliGRAM(s) Oral daily  folic acid 1 milliGRAM(s) Oral daily  influenza   Vaccine 0.5 milliLiter(s) IntraMuscular once  levETIRAcetam  Solution 750 milliGRAM(s) Enteral Tube two times a day  levothyroxine Injectable 103 MICROGram(s) IV Push at bedtime  meropenem  IVPB 1000 milliGRAM(s) IV Intermittent every 8 hours  midodrine 20 milliGRAM(s) Oral every 8 hours  multivitamin/minerals/iron Oral Solution (CENTRUM) 15 milliLiter(s) Oral daily  norepinephrine Infusion 0.05 MICROgram(s)/kG/Min (6.21 mL/Hr) IV Continuous <Continuous>  pantoprazole   Suspension 40 milliGRAM(s) Oral every 24 hours  Phoxillum Filtration BK 4 / 2.5 5000 milliLiter(s) (1000 mL/Hr) CRRT <Continuous>  Phoxillum Filtration BK 4 / 2.5 5000 milliLiter(s) (800 mL/Hr) CRRT <Continuous>  Phoxillum Filtration BK 4 / 2.5 5000 milliLiter(s) (200 mL/Hr) CRRT <Continuous>  polyethylene glycol 3350 17 Gram(s) Oral every 12 hours  rifAXIMin 550 milliGRAM(s) Oral every 12 hours  thiamine 100 milliGRAM(s) Enteral Tube daily  vasopressin Infusion 0.03 Unit(s)/Min (4.5 mL/Hr) IV Continuous <Continuous>    MEDICATIONS  (PRN):  HYDROmorphone  Injectable 0.25 milliGRAM(s) IV Push every 3 hours PRN Severe Pain (7 - 10)  sodium chloride 0.65% Nasal 1 Spray(s) Both Nostrils every 3 hours PRN Nasal Congestion  tetracaine/benzocaine/butamben Spray 1 Spray(s) Topical every 3 hours PRN for ngt discomfort      PAST MEDICAL & SURGICAL HISTORY:  HTN (hypertension)  off medication for over one year--states BP has been normal      UTI (urinary tract infection)  currently being treated--will complete antibiotics 3/8/2022      Intracranial tumor      GERD (gastroesophageal reflux disease)      Eczema      Thyroid cancer  surgery. radiation, Radioactive iodine      COVID-19 virus infection  11/2021--recovered at home      H/O total thyroidectomy  age 21&#x27;s for Thyroid cancer, postop complication arterial bleed, second surgery      History of tonsillectomy  age 5      History of appendectomy  age 5          Vital Signs Last 24 Hrs  T(C): 36.1 (03 Jan 2023 11:00), Max: 36.9 (03 Jan 2023 03:00)  T(F): 97 (03 Jan 2023 11:00), Max: 98.4 (03 Jan 2023 03:00)  HR: 95 (03 Jan 2023 14:30) (77 - 102)  BP: 64/43 (03 Jan 2023 14:15) (64/43 - 118/56)  BP(mean): 49 (03 Jan 2023 14:15) (49 - 78)  RR: 16 (03 Jan 2023 14:30) (13 - 43)  SpO2: 98% (03 Jan 2023 14:30) (84% - 100%)    Parameters below as of 03 Jan 2023 07:00  Patient On (Oxygen Delivery Method): room air        I&O's Summary    02 Jan 2023 07:01  -  03 Jan 2023 07:00  --------------------------------------------------------  IN: 2074.8 mL / OUT: 2309 mL / NET: -234.2 mL    03 Jan 2023 07:01  -  03 Jan 2023 14:49  --------------------------------------------------------  IN: 948.7 mL / OUT: 356 mL / NET: 592.7 mL                              9.2    36.18 )-----------( 28       ( 03 Jan 2023 00:30 )             26.4     01-03    138  |  103  |  12  ----------------------------<  132<H>  3.9   |  20<L>  |  0.77    Ca    8.6      03 Jan 2023 11:55  Phos  3.7     01-03  Mg     2.4     01-03    TPro  6.3  /  Alb  3.5  /  TBili  26.6<H>  /  DBili  x   /  AST  145<H>  /  ALT  51<H>  /  AlkPhos  197<H>  01-03        Physical Exam:  Constitutional: NAD   Eyes: icteric, PERRLA  ENMT: nc/at, no thrush  Neck: supple, central line   Cardiovascular: RRR  Gastrointestinal: Soft abdomen, distended, rectal tube in place, tube feeds in place  Genitourinary: Urinary catheter in place, anuric  Extremities: SCD's in place and working bilaterally, no edema  Vascular: Palpable dp pulses bilaterally.   Neurological: following commands, mentation improving  Skin:  diffuse rash across abdomen and flank. No ulcerations, lesions  Musculoskeletal: moving extremities  Psychiatric: calm   Transplant Surgery Progress Note    Present:   Patient seen and examined with multidisciplinary Transplant team including  Surgeon: Dr. Oconnor,. Hepatologist: Dr. Bush,  NP:James and RNs in AM rounds.   Disciplines not in attendance will be notified of the plan.     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  Arthur ([phone]) and Daughter Taylor at [facility] provided additional history. Of note was recently admitted to  11/2022 for workup and eval of jaundice- during that hospitalization was found to have a UTI and dx with alc hep and started on steroids (was deemed a non-responder and steroids were subsequently stopped); s/p LVP at [place] 11/2022. Previously hospitalized in 2021 at [place] for ETOH detox. Went to ETOH rehab 08/2022 and was asked to leave the facility when she was COVID+; was sober for 1 week until she started drinking again. Had also attended a few AA meetings in the past. Transferred from [facility] 12/20 for further management of acute liver failure and liver transplant evaluation.       Interval events:  - remains on pressors  - TF resumed   - mental status waxing and waning, but overall improved  - diffuse rash across abdomen and flank, improving    Potential Discharge date: pending clinical stability  Education:  Medications  Plan of care:  See Below      MEDICATIONS  (STANDING):  caspofungin IVPB      caspofungin IVPB 50 milliGRAM(s) IV Intermittent every 24 hours  chlorhexidine 2% Cloths 1 Application(s) Topical <User Schedule>  CRRT Treatment    <Continuous>  escitalopram 10 milliGRAM(s) Oral daily  folic acid 1 milliGRAM(s) Oral daily  influenza   Vaccine 0.5 milliLiter(s) IntraMuscular once  levETIRAcetam  Solution 750 milliGRAM(s) Enteral Tube two times a day  levothyroxine Injectable 103 MICROGram(s) IV Push at bedtime  meropenem  IVPB 1000 milliGRAM(s) IV Intermittent every 8 hours  midodrine 20 milliGRAM(s) Oral every 8 hours  multivitamin/minerals/iron Oral Solution (CENTRUM) 15 milliLiter(s) Oral daily  norepinephrine Infusion 0.05 MICROgram(s)/kG/Min (6.21 mL/Hr) IV Continuous <Continuous>  pantoprazole   Suspension 40 milliGRAM(s) Oral every 24 hours  Phoxillum Filtration BK 4 / 2.5 5000 milliLiter(s) (1000 mL/Hr) CRRT <Continuous>  Phoxillum Filtration BK 4 / 2.5 5000 milliLiter(s) (800 mL/Hr) CRRT <Continuous>  Phoxillum Filtration BK 4 / 2.5 5000 milliLiter(s) (200 mL/Hr) CRRT <Continuous>  polyethylene glycol 3350 17 Gram(s) Oral every 12 hours  rifAXIMin 550 milliGRAM(s) Oral every 12 hours  thiamine 100 milliGRAM(s) Enteral Tube daily  vasopressin Infusion 0.03 Unit(s)/Min (4.5 mL/Hr) IV Continuous <Continuous>    MEDICATIONS  (PRN):  HYDROmorphone  Injectable 0.25 milliGRAM(s) IV Push every 3 hours PRN Severe Pain (7 - 10)  sodium chloride 0.65% Nasal 1 Spray(s) Both Nostrils every 3 hours PRN Nasal Congestion  tetracaine/benzocaine/butamben Spray 1 Spray(s) Topical every 3 hours PRN for ngt discomfort      PAST MEDICAL & SURGICAL HISTORY:  HTN (hypertension)  off medication for over one year--states BP has been normal      UTI (urinary tract infection)  currently being treated--will complete antibiotics 3/8/2022      Intracranial tumor      GERD (gastroesophageal reflux disease)      Eczema      Thyroid cancer  surgery. radiation, Radioactive iodine      COVID-19 virus infection  11/2021--recovered at home      H/O total thyroidectomy  age 21&#x27;s for Thyroid cancer, postop complication arterial bleed, second surgery      History of tonsillectomy  age 5      History of appendectomy  age 5          Vital Signs Last 24 Hrs  T(C): 36.1 (03 Jan 2023 11:00), Max: 36.9 (03 Jan 2023 03:00)  T(F): 97 (03 Jan 2023 11:00), Max: 98.4 (03 Jan 2023 03:00)  HR: 95 (03 Jan 2023 14:30) (77 - 102)  BP: 64/43 (03 Jan 2023 14:15) (64/43 - 118/56)  BP(mean): 49 (03 Jan 2023 14:15) (49 - 78)  RR: 16 (03 Jan 2023 14:30) (13 - 43)  SpO2: 98% (03 Jan 2023 14:30) (84% - 100%)    Parameters below as of 03 Jan 2023 07:00  Patient On (Oxygen Delivery Method): room air        I&O's Summary    02 Jan 2023 07:01  -  03 Jan 2023 07:00  --------------------------------------------------------  IN: 2074.8 mL / OUT: 2309 mL / NET: -234.2 mL    03 Jan 2023 07:01  -  03 Jan 2023 14:49  --------------------------------------------------------  IN: 948.7 mL / OUT: 356 mL / NET: 592.7 mL                              9.2    36.18 )-----------( 28       ( 03 Jan 2023 00:30 )             26.4     01-03    138  |  103  |  12  ----------------------------<  132<H>  3.9   |  20<L>  |  0.77    Ca    8.6      03 Jan 2023 11:55  Phos  3.7     01-03  Mg     2.4     01-03    TPro  6.3  /  Alb  3.5  /  TBili  26.6<H>  /  DBili  x   /  AST  145<H>  /  ALT  51<H>  /  AlkPhos  197<H>  01-03        Physical Exam:  Constitutional: NAD   Eyes: icteric, PERRLA  ENMT: nc/at, no thrush  Neck: supple, central line   Cardiovascular: RRR  Gastrointestinal: Soft abdomen, distended, rectal tube in place, tube feeds in place  Genitourinary: Urinary catheter in place, anuric  Extremities: SCD's in place and working bilaterally, no edema  Vascular: Palpable dp pulses bilaterally.   Neurological: following commands, mentation improving  Skin:  diffuse rash across abdomen and flank. No ulcerations, lesions  Musculoskeletal: moving extremities  Psychiatric: calm

## 2023-01-03 NOTE — PROGRESS NOTE ADULT - PROBLEM SELECTOR PLAN 1
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 Upon review of [place], SCr was 0.78 on 12/6/22, increased to 5.31 on admission (12/20), and improved with CRRT. Pt remains anuric. Pt with likely HRS vs ATN vs bile cast nephropathy. Plan to continue with CRRT today as urine output remains low. Monitor labs and urine output. Avoid nephrotoxins. Dose medications as per eGFR.     Liver transplant on hold given bacteremia and UTI, ID following. 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 Upon review of [place], SCr was 0.78 on 12/6/22, increased to 5.31 on admission (12/20), and improved with CRRT. Pt remains anuric. Pt with likely HRS vs ATN vs bile cast nephropathy. Plan to continue with CRRT today as urine output remains low. Monitor labs and urine output. Avoid nephrotoxins. Dose medications as per eGFR.     Liver transplant on hold given bacteremia and UTI, ID following. 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 Upon review of [place], SCr was 0.78 on 12/6/22, increased to 5.31 on admission (12/20), and improved with CRRT. Pt remains anuric. Pt with likely HRS vs ATN vs bile cast nephropathy. Plan to continue with CRRT today as urine output remains low. Monitor labs and urine output. Avoid nephrotoxins. Dose medications as per eGFR.     Liver transplant on hold given bacteremia and UTI, ID following.

## 2023-01-03 NOTE — PROGRESS NOTE ADULT - SUBJECTIVE AND OBJECTIVE BOX
24 HOUR EVENTS:  - Remains on Vaso gtt at 0.03, Levo gtt at 0.12  - WBC increasing, most recently 36.18    SUBJECTIVE/ROS:  [ ] A ten-point review of systems was otherwise negative except as noted.  [ ] Due to altered mental status/intubation, subjective information were not able to be obtained from the patient. History was obtained, to the extent possible, from review of the chart and collateral sources of information.      NEURO  Exam: AOx2 with bouts of confusion  Meds: escitalopram 10 milliGRAM(s) Oral daily  HYDROmorphone  Injectable 0.25 milliGRAM(s) IV Push every 3 hours PRN Severe Pain (7 - 10)  levETIRAcetam  Solution 750 milliGRAM(s) Enteral Tube two times a day  [x] Adequacy of sedation and pain control has been assessed and adjusted      RESPIRATORY  RR: 24 (01-03-23 @ 03:30) (13 - 52)  SpO2: 97% (01-03-23 @ 03:30) (93% - 99%)  Wt(kg): --  Exam: CTA b/l. No murmurs, rubs, gallops appreciated.   Mechanical Ventilation:   ABG - ( 03 Jan 2023 00:15 )  pH: 7.40  /  pCO2: 36    /  pO2: 90    / HCO3: 22    / Base Excess: -2.2  /  SaO2: 99.6    Lactate: x      [ ] Extubation Readiness Assessed  Meds:     CARDIOVASCULAR  HR: 88 (01-03-23 @ 03:30) (77 - 96)  BP: 98/61 (01-03-23 @ 02:00) (88/55 - 118/56)  BP(mean): 74 (01-03-23 @ 02:00) (66 - 84)  ABP: 111/58 (01-03-23 @ 03:30) (95/45 - 125/58)  ABP(mean): 80 (01-03-23 @ 03:30) (64 - 89)  Wt(kg): --  CVP(cm H2O): --      Exam: S1S2. No murmurs, rubs, gallops appreciated.  Cardiac Rhythm: NSR rate 84  Meds: midodrine 20 milliGRAM(s) Oral every 8 hours  norepinephrine Infusion 0.05 MICROgram(s)/kG/Min IV Continuous <Continuous>      GI/NUTRITION  Exam: Soft, non-distended, non-tender.   Diet: Nepro @35  Meds: pantoprazole   Suspension 40 milliGRAM(s) Oral every 24 hours  polyethylene glycol 3350 17 Gram(s) Oral every 12 hours      GENITOURINARY  I&O's Detail    01-01 @ 07:01  -  01-02 @ 07:00  --------------------------------------------------------  IN:    Albumin 25%  -  50 mL: 1000 mL    Enteral Tube Flush: 240 mL    IV PiggyBack: 350 mL    Nepro with Carb Steady: 595 mL    Norepinephrine: 235.2 mL    PRBCs (Packed Red Blood Cells): 300 mL    Vasopressin: 108 mL  Total IN: 2828.2 mL    OUT:    Nasogastric/Oral tube (mL): 300 mL    Other (mL): 476 mL    Rectal Tube (mL): 1300 mL  Total OUT: 2076 mL    Total NET: 752.2 mL      01-02 @ 07:01 - 01-03 @ 03:43  --------------------------------------------------------  IN:    Enteral Tube Flush: 300 mL    IV PiggyBack: 350 mL    Nepro with Carb Steady: 650 mL    Norepinephrine: 230.2 mL    Vasopressin: 90 mL  Total IN: 1620.2 mL    OUT:    Other (mL): 1380 mL    Rectal Tube (mL): 500 mL  Total OUT: 1880 mL    Total NET: -259.8 mL          01-03    135  |  101  |  12  ----------------------------<  119<H>  4.0   |  21<L>  |  0.77    Ca    8.8      03 Jan 2023 00:30  Phos  3.6     01-03  Mg     2.4     01-03    TPro  6.4  /  Alb  3.5  /  TBili  27.5<H>  /  DBili  x   /  AST  153<H>  /  ALT  51<H>  /  AlkPhos  192<H>  01-03    [ ] [place] catheter, indication: N/A  Meds: folic acid 1 milliGRAM(s) Oral daily  multivitamin/minerals/iron Oral Solution (CENTRUM) 15 milliLiter(s) Oral daily  thiamine 100 milliGRAM(s) Enteral Tube daily        HEMATOLOGIC  Meds:   [x] VTE Prophylaxis                        9.2    36.18 )-----------( 28       ( 03 Jan 2023 00:30 )             26.4     PT/INR - ( 03 Jan 2023 00:30 )   PT: 28.0 sec;   INR: 2.39 ratio         PTT - ( 03 Jan 2023 00:30 )  PTT:58.3 sec  Transfusion     [ ] PRBC   [ ] Platelets   [ ] FFP   [ ] Cryoprecipitate      INFECTIOUS DISEASES  T(C): 36.9 (01-03-23 @ 03:00), Max: 36.9 (01-03-23 @ 03:00)  Wt(kg): --  WBC Count: 36.18 K/uL (01-03 @ 00:30)  WBC Count: 33.63 K/uL (01-02 @ 05:44)    Recent Cultures:    Meds: caspofungin IVPB      caspofungin IVPB 50 milliGRAM(s) IV Intermittent every 24 hours  influenza   Vaccine 0.5 milliLiter(s) IntraMuscular once  meropenem  IVPB 1000 milliGRAM(s) IV Intermittent every 8 hours  rifAXIMin 550 milliGRAM(s) Oral every 12 hours        ENDOCRINE  Capillary Blood Glucose    Meds: levothyroxine Injectable 103 MICROGram(s) IV Push at bedtime  vasopressin Infusion 0.03 Unit(s)/Min IV Continuous <Continuous>        ACCESS DEVICES:  [ X ] Peripheral IV  [ X ] Central Venous Line	[ X ] R	[ X ] L	[ X ] IJ	[ ] Fem	[ ] SC	Placed: 12/20, 12/25  [ X ] Arterial Line		[ ] R	[ X ] L	[ ] Fem	[ X ] Rad	[ ] Ax	Placed:   [ ] PICC:					[ ] Mediport  [ ] Urinary Catheter, Date Placed:   [ ] Necessity of urinary, arterial, and venous catheters discussed    OTHER MEDICATIONS:  chlorhexidine 2% Cloths 1 Application(s) Topical <User Schedule>  CRRT Treatment    <Continuous>  Phoxillum Filtration BK 4 / 2.5 5000 milliLiter(s) CRRT <Continuous>  Phoxillum Filtration BK 4 / 2.5 5000 milliLiter(s) CRRT <Continuous>  Phoxillum Filtration BK 4 / 2.5 5000 milliLiter(s) CRRT <Continuous>  sodium chloride 0.65% Nasal 1 Spray(s) Both Nostrils every 3 hours PRN  tetracaine/benzocaine/butamben Spray 1 Spray(s) Topical every 3 hours PRN      CODE STATUS:     IMAGING:

## 2023-01-03 NOTE — PROGRESS NOTE ADULT - NS ATTEND AMEND GEN_ALL_CORE FT
ON: no major events    aaox2, mild encephalopathy  ammonia downtrending  rifaximin, miralax  keppra for recent seizure, hx of craniotomy   4lit NC, wean as tolerated  AM CXR reticular pattern, rt atelectasis   encourage IS  levo 0.11, vaso 0.03 MAP goal of 65  midodrine [id number]  lact nl  TEN 35/hr via NGT  rectal tube: output slowed down  MELD Na >40, t bili 27  abd distention likely ascites, non tender  protonix. will discuss w transplant to possibly stop  CRRT w net even  anuric , likely hepatorenal syndrome  s/p albumin boluses, octreotide  stable Hb  thrombocytopenia, slight downtrend  INR 2.3  no chem VTE PPX   Bcx, paracentesis no growth  empiric caspo, merop  afebrile, leukocytosis, no bandemia  ID following, concern for persistent infection  chest a/p CT today w IV contrast  good glycemic control    full code  HD rt IJ, CVC IJ left , left radial a line. noted to have been placed 13 days ago ON: no major events    aaox2, mild encephalopathy  ammonia downtrending  rifaximin, miralax  keppra for recent seizure, hx of craniotomy   4lit NC, wean as tolerated  AM CXR reticular pattern, rt atelectasis   encourage IS  levo 0.11, vaso 0.03 MAP goal of 65  midodrine [id number]  lact nl  TEN 35/hr via NGT  rectal tube: output slowed down  MELD Na >40, t bili 27  abd distention likely ascites, non tender  protonix. will discuss w transplant to possibly stop  CRRT w net even  anuric , likely hepatorenal syndrome  s/p albumin boluses, octreotide  stable Hb  thrombocytopenia, slight downtrend  INR 2.3  no chem VTE PPX   Bcx, paracentesis no growth  empiric caspo, merop  afebrile, leukocytosis, no bandemia  ID following, concern for persistent infection  chest a/p CT today w IV contrast  good glycemic control    full code  HD rt IJ, CVC IJ left , left radial a line. noted to have been placed 13 days ago ON: no major events    aaox2, mild encephalopathy  ammonia downtrending  rifaximin, miralax  keppra for recent seizure, hx of craniotomy   4lit NC, wean as tolerated  AM CXR reticular pattern, rt atelectasis   encourage IS  levo 0.11, vaso 0.03 MAP goal of 65  midodrine [id number]  lact nl  TEN 35/hr via NGT  rectal tube: output slowed down  MELD Na >40, t bili 27  abd distention likely ascites, non tender  protonix. will discuss w transplant to possibly stop  CRRT w net even  anuric , likely hepatorenal syndrome  s/p albumin boluses, octreotide  stable Hb  thrombocytopenia, slight downtrend  INR 2.3  no chem VTE PPX   Bcx, paracentesis no growth  empiric caspo, merop  afebrile, leukocytosis, no bandemia  ID following, concern for persistent infection  chest a/p CT today w IV contrast  good glycemic control    full code  HD rt IJ, CVC IJ left , left radial a line. noted to have been placed 13 days ago

## 2023-01-03 NOTE — PROGRESS NOTE ADULT - SUBJECTIVE AND OBJECTIVE BOX
[facility] Division of Kidney Diseases & Hypertension  FOLLOW UP NOTE  [phone]--------------------------------------------------------------------------------  Chief Complaint:Disorder of liver    63 yo F with h/o decompensated ETOH cirrhosis with ascites, thyroid cancer (s/p total thyroidectomy, RTX, and radioactive iodide), HTN, ventrical neoplasm who was initially admitted to  12/19 with new onset seizures and transferred to [facility] 12/20 for liver transplant evaluation. Pt being seen for RED requiring CRRT.    24 hour events/subjective: Pt was seen and evaluated in the ICU this morning, offers no acute complaints. Remains on pressors. No issues with CRRT.         PAST HISTORY  --------------------------------------------------------------------------------  No significant changes to PMH, PSH, FHx, SHx, unless otherwise noted    ALLERGIES & MEDICATIONS  --------------------------------------------------------------------------------  Allergies    Macrobid (Rash)    Intolerances    Nexium (Stomach Upset)    Standing Inpatient Medications  caspofungin IVPB      caspofungin IVPB 50 milliGRAM(s) IV Intermittent every 24 hours  chlorhexidine 2% Cloths 1 Application(s) Topical <User Schedule>  CRRT Treatment    <Continuous>  escitalopram 10 milliGRAM(s) Oral daily  folic acid 1 milliGRAM(s) Oral daily  influenza   Vaccine 0.5 milliLiter(s) IntraMuscular once  levETIRAcetam  Solution 750 milliGRAM(s) Enteral Tube two times a day  levothyroxine Injectable 103 MICROGram(s) IV Push at bedtime  meropenem  IVPB 1000 milliGRAM(s) IV Intermittent every 8 hours  midodrine 20 milliGRAM(s) Oral every 8 hours  multivitamin/minerals/iron Oral Solution (CENTRUM) 15 milliLiter(s) Oral daily  norepinephrine Infusion 0.05 MICROgram(s)/kG/Min IV Continuous <Continuous>  pantoprazole   Suspension 40 milliGRAM(s) Oral every 24 hours  Phoxillum Filtration BK 4 / 2.5 5000 milliLiter(s) CRRT <Continuous>  Phoxillum Filtration BK 4 / 2.5 5000 milliLiter(s) CRRT <Continuous>  Phoxillum Filtration BK 4 / 2.5 5000 milliLiter(s) CRRT <Continuous>  polyethylene glycol 3350 17 Gram(s) Oral every 12 hours  rifAXIMin 550 milliGRAM(s) Oral every 12 hours  thiamine 100 milliGRAM(s) Enteral Tube daily  vasopressin Infusion 0.03 Unit(s)/Min IV Continuous <Continuous>    PRN Inpatient Medications  HYDROmorphone  Injectable 0.25 milliGRAM(s) IV Push every 3 hours PRN  sodium chloride 0.65% Nasal 1 Spray(s) Both Nostrils every 3 hours PRN  tetracaine/benzocaine/butamben Spray 1 Spray(s) Topical every 3 hours PRN      REVIEW OF SYSTEMS  --------------------------------------------------------------------------------  Gen: No  fevers/chills  Skin: No rashes  Head/Eyes/Ears/Mouth: No headache; Normal hearing; Normal vision w/o blurriness  Respiratory: No dyspnea, cough, wheezing, hemoptysis  CV: No chest pain, PND, orthopnea  GI: No abdominal pain, diarrhea, constipation, nausea, vomiting  : No increased frequency, dysuria, hematuria, nocturia  MSK: No joint pain/swelling; no back pain; no edema  Neuro: No dizziness/lightheadedness, weakness, seizures, numbness, tingling      All other systems were reviewed and are negative, except as noted.    VITALS/PHYSICAL EXAM  --------------------------------------------------------------------------------  T(C): 36.1 (01-03-23 @ 07:00), Max: 36.9 (01-03-23 @ 03:00)  HR: 89 (01-03-23 @ 10:30) (77 - 102)  BP: 98/61 (01-03-23 @ 02:00) (88/55 - 118/56)  RR: 19 (01-03-23 @ 10:30) (13 - 43)  SpO2: 97% (01-03-23 @ 10:30) (93% - 99%)  Wt(kg): --        01-02-23 @ 07:01  -  01-03-23 @ 07:00  --------------------------------------------------------  IN: 2074.8 mL / OUT: 2309 mL / NET: -234.2 mL    01-03-23 @ 07:01  -  01-03-23 @ 10:45  --------------------------------------------------------  IN: 403.9 mL / OUT: 356 mL / NET: 47.9 mL      Physical Exam:  Gen: NAD  HEENT: on RA now  Pulm: CTA B/L, no crackles   CV: RRR, S1S2+  Abd: +BS, soft, distended  : +urinary catheter  MSK: No LE edema  Psych: Awake  Skin: +Jaundice  Access: +Non-tunneled HD catheter        LABS/STUDIES  --------------------------------------------------------------------------------              9.2    36.18 >-----------<  28       [01-03-23 @ 00:30]              26.4     137  |  101  |  12  ----------------------------<  130      [01-03-23 @ 06:17]  3.8   |  22  |  0.76        Ca     9.0     [01-03-23 @ 06:17]      Mg     2.4     [01-03-23 @ 06:17]      Phos  3.5     [01-03-23 @ 06:17]    TPro  6.2  /  Alb  3.5  /  TBili  26.8  /  DBili  x   /  AST  147  /  ALT  51  /  AlkPhos  194  [01-03-23 @ 06:17]    PT/INR: PT 28.0 , INR 2.39       [01-03-23 @ 00:30]  PTT: 58.3       [01-03-23 @ 00:30]      Creatinine Trend:  SCr 0.76 [01-03 @ 06:17]  SCr 0.77 [01-03 @ 00:30]  SCr 0.76 [01-02 @ 17:18]  SCr 0.78 [01-02 @ 11:33]  SCr 0.79 [01-02 @ 05:46]             [facility] Division of Kidney Diseases & Hypertension  FOLLOW UP NOTE  [phone]--------------------------------------------------------------------------------  Chief Complaint:Disorder of liver    61 yo F with h/o decompensated ETOH cirrhosis with ascites, thyroid cancer (s/p total thyroidectomy, RTX, and radioactive iodide), HTN, ventrical neoplasm who was initially admitted to  12/19 with new onset seizures and transferred to [facility] 12/20 for liver transplant evaluation. Pt being seen for RED requiring CRRT.    24 hour events/subjective: Pt was seen and evaluated in the ICU this morning, offers no acute complaints. Remains on pressors. No issues with CRRT.         PAST HISTORY  --------------------------------------------------------------------------------  No significant changes to PMH, PSH, FHx, SHx, unless otherwise noted    ALLERGIES & MEDICATIONS  --------------------------------------------------------------------------------  Allergies    Macrobid (Rash)    Intolerances    Nexium (Stomach Upset)    Standing Inpatient Medications  caspofungin IVPB      caspofungin IVPB 50 milliGRAM(s) IV Intermittent every 24 hours  chlorhexidine 2% Cloths 1 Application(s) Topical <User Schedule>  CRRT Treatment    <Continuous>  escitalopram 10 milliGRAM(s) Oral daily  folic acid 1 milliGRAM(s) Oral daily  influenza   Vaccine 0.5 milliLiter(s) IntraMuscular once  levETIRAcetam  Solution 750 milliGRAM(s) Enteral Tube two times a day  levothyroxine Injectable 103 MICROGram(s) IV Push at bedtime  meropenem  IVPB 1000 milliGRAM(s) IV Intermittent every 8 hours  midodrine 20 milliGRAM(s) Oral every 8 hours  multivitamin/minerals/iron Oral Solution (CENTRUM) 15 milliLiter(s) Oral daily  norepinephrine Infusion 0.05 MICROgram(s)/kG/Min IV Continuous <Continuous>  pantoprazole   Suspension 40 milliGRAM(s) Oral every 24 hours  Phoxillum Filtration BK 4 / 2.5 5000 milliLiter(s) CRRT <Continuous>  Phoxillum Filtration BK 4 / 2.5 5000 milliLiter(s) CRRT <Continuous>  Phoxillum Filtration BK 4 / 2.5 5000 milliLiter(s) CRRT <Continuous>  polyethylene glycol 3350 17 Gram(s) Oral every 12 hours  rifAXIMin 550 milliGRAM(s) Oral every 12 hours  thiamine 100 milliGRAM(s) Enteral Tube daily  vasopressin Infusion 0.03 Unit(s)/Min IV Continuous <Continuous>    PRN Inpatient Medications  HYDROmorphone  Injectable 0.25 milliGRAM(s) IV Push every 3 hours PRN  sodium chloride 0.65% Nasal 1 Spray(s) Both Nostrils every 3 hours PRN  tetracaine/benzocaine/butamben Spray 1 Spray(s) Topical every 3 hours PRN      REVIEW OF SYSTEMS  --------------------------------------------------------------------------------  Gen: No  fevers/chills  Skin: No rashes  Head/Eyes/Ears/Mouth: No headache; Normal hearing; Normal vision w/o blurriness  Respiratory: No dyspnea, cough, wheezing, hemoptysis  CV: No chest pain, PND, orthopnea  GI: No abdominal pain, diarrhea, constipation, nausea, vomiting  : No increased frequency, dysuria, hematuria, nocturia  MSK: No joint pain/swelling; no back pain; no edema  Neuro: No dizziness/lightheadedness, weakness, seizures, numbness, tingling      All other systems were reviewed and are negative, except as noted.    VITALS/PHYSICAL EXAM  --------------------------------------------------------------------------------  T(C): 36.1 (01-03-23 @ 07:00), Max: 36.9 (01-03-23 @ 03:00)  HR: 89 (01-03-23 @ 10:30) (77 - 102)  BP: 98/61 (01-03-23 @ 02:00) (88/55 - 118/56)  RR: 19 (01-03-23 @ 10:30) (13 - 43)  SpO2: 97% (01-03-23 @ 10:30) (93% - 99%)  Wt(kg): --        01-02-23 @ 07:01  -  01-03-23 @ 07:00  --------------------------------------------------------  IN: 2074.8 mL / OUT: 2309 mL / NET: -234.2 mL    01-03-23 @ 07:01  -  01-03-23 @ 10:45  --------------------------------------------------------  IN: 403.9 mL / OUT: 356 mL / NET: 47.9 mL      Physical Exam:  Gen: NAD  HEENT: on RA now  Pulm: CTA B/L, no crackles   CV: RRR, S1S2+  Abd: +BS, soft, distended  : +urinary catheter  MSK: No LE edema  Psych: Awake  Skin: +Jaundice  Access: +Non-tunneled HD catheter        LABS/STUDIES  --------------------------------------------------------------------------------              9.2    36.18 >-----------<  28       [01-03-23 @ 00:30]              26.4     137  |  101  |  12  ----------------------------<  130      [01-03-23 @ 06:17]  3.8   |  22  |  0.76        Ca     9.0     [01-03-23 @ 06:17]      Mg     2.4     [01-03-23 @ 06:17]      Phos  3.5     [01-03-23 @ 06:17]    TPro  6.2  /  Alb  3.5  /  TBili  26.8  /  DBili  x   /  AST  147  /  ALT  51  /  AlkPhos  194  [01-03-23 @ 06:17]    PT/INR: PT 28.0 , INR 2.39       [01-03-23 @ 00:30]  PTT: 58.3       [01-03-23 @ 00:30]      Creatinine Trend:  SCr 0.76 [01-03 @ 06:17]  SCr 0.77 [01-03 @ 00:30]  SCr 0.76 [01-02 @ 17:18]  SCr 0.78 [01-02 @ 11:33]  SCr 0.79 [01-02 @ 05:46]             [facility] Division of Kidney Diseases & Hypertension  FOLLOW UP NOTE  [phone]--------------------------------------------------------------------------------  Chief Complaint:Disorder of liver    63 yo F with h/o decompensated ETOH cirrhosis with ascites, thyroid cancer (s/p total thyroidectomy, RTX, and radioactive iodide), HTN, ventrical neoplasm who was initially admitted to  12/19 with new onset seizures and transferred to [facility] 12/20 for liver transplant evaluation. Pt being seen for RED requiring CRRT.    24 hour events/subjective: Pt was seen and evaluated in the ICU this morning, offers no acute complaints. Remains on pressors. No issues with CRRT.         PAST HISTORY  --------------------------------------------------------------------------------  No significant changes to PMH, PSH, FHx, SHx, unless otherwise noted    ALLERGIES & MEDICATIONS  --------------------------------------------------------------------------------  Allergies    Macrobid (Rash)    Intolerances    Nexium (Stomach Upset)    Standing Inpatient Medications  caspofungin IVPB      caspofungin IVPB 50 milliGRAM(s) IV Intermittent every 24 hours  chlorhexidine 2% Cloths 1 Application(s) Topical <User Schedule>  CRRT Treatment    <Continuous>  escitalopram 10 milliGRAM(s) Oral daily  folic acid 1 milliGRAM(s) Oral daily  influenza   Vaccine 0.5 milliLiter(s) IntraMuscular once  levETIRAcetam  Solution 750 milliGRAM(s) Enteral Tube two times a day  levothyroxine Injectable 103 MICROGram(s) IV Push at bedtime  meropenem  IVPB 1000 milliGRAM(s) IV Intermittent every 8 hours  midodrine 20 milliGRAM(s) Oral every 8 hours  multivitamin/minerals/iron Oral Solution (CENTRUM) 15 milliLiter(s) Oral daily  norepinephrine Infusion 0.05 MICROgram(s)/kG/Min IV Continuous <Continuous>  pantoprazole   Suspension 40 milliGRAM(s) Oral every 24 hours  Phoxillum Filtration BK 4 / 2.5 5000 milliLiter(s) CRRT <Continuous>  Phoxillum Filtration BK 4 / 2.5 5000 milliLiter(s) CRRT <Continuous>  Phoxillum Filtration BK 4 / 2.5 5000 milliLiter(s) CRRT <Continuous>  polyethylene glycol 3350 17 Gram(s) Oral every 12 hours  rifAXIMin 550 milliGRAM(s) Oral every 12 hours  thiamine 100 milliGRAM(s) Enteral Tube daily  vasopressin Infusion 0.03 Unit(s)/Min IV Continuous <Continuous>    PRN Inpatient Medications  HYDROmorphone  Injectable 0.25 milliGRAM(s) IV Push every 3 hours PRN  sodium chloride 0.65% Nasal 1 Spray(s) Both Nostrils every 3 hours PRN  tetracaine/benzocaine/butamben Spray 1 Spray(s) Topical every 3 hours PRN      REVIEW OF SYSTEMS  --------------------------------------------------------------------------------  Gen: No  fevers/chills  Skin: No rashes  Head/Eyes/Ears/Mouth: No headache; Normal hearing; Normal vision w/o blurriness  Respiratory: No dyspnea, cough, wheezing, hemoptysis  CV: No chest pain, PND, orthopnea  GI: No abdominal pain, diarrhea, constipation, nausea, vomiting  : No increased frequency, dysuria, hematuria, nocturia  MSK: No joint pain/swelling; no back pain; no edema  Neuro: No dizziness/lightheadedness, weakness, seizures, numbness, tingling      All other systems were reviewed and are negative, except as noted.    VITALS/PHYSICAL EXAM  --------------------------------------------------------------------------------  T(C): 36.1 (01-03-23 @ 07:00), Max: 36.9 (01-03-23 @ 03:00)  HR: 89 (01-03-23 @ 10:30) (77 - 102)  BP: 98/61 (01-03-23 @ 02:00) (88/55 - 118/56)  RR: 19 (01-03-23 @ 10:30) (13 - 43)  SpO2: 97% (01-03-23 @ 10:30) (93% - 99%)  Wt(kg): --        01-02-23 @ 07:01  -  01-03-23 @ 07:00  --------------------------------------------------------  IN: 2074.8 mL / OUT: 2309 mL / NET: -234.2 mL    01-03-23 @ 07:01  -  01-03-23 @ 10:45  --------------------------------------------------------  IN: 403.9 mL / OUT: 356 mL / NET: 47.9 mL      Physical Exam:  Gen: NAD  HEENT: on RA now  Pulm: CTA B/L, no crackles   CV: RRR, S1S2+  Abd: +BS, soft, distended  : +urinary catheter  MSK: No LE edema  Psych: Awake  Skin: +Jaundice  Access: +Non-tunneled HD catheter        LABS/STUDIES  --------------------------------------------------------------------------------              9.2    36.18 >-----------<  28       [01-03-23 @ 00:30]              26.4     137  |  101  |  12  ----------------------------<  130      [01-03-23 @ 06:17]  3.8   |  22  |  0.76        Ca     9.0     [01-03-23 @ 06:17]      Mg     2.4     [01-03-23 @ 06:17]      Phos  3.5     [01-03-23 @ 06:17]    TPro  6.2  /  Alb  3.5  /  TBili  26.8  /  DBili  x   /  AST  147  /  ALT  51  /  AlkPhos  194  [01-03-23 @ 06:17]    PT/INR: PT 28.0 , INR 2.39       [01-03-23 @ 00:30]  PTT: 58.3       [01-03-23 @ 00:30]      Creatinine Trend:  SCr 0.76 [01-03 @ 06:17]  SCr 0.77 [01-03 @ 00:30]  SCr 0.76 [01-02 @ 17:18]  SCr 0.78 [01-02 @ 11:33]  SCr 0.79 [01-02 @ 05:46]

## 2023-01-03 NOTE — PROGRESS NOTE ADULT - ASSESSMENT
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  Of note was recently admitted to  11/2022 for workup and eval of new onset jaundice- during that hospitalization was found to have a UTI and dx with alc hep was treated for UTI w/ abx and started on steroids (was deemed a non-responder and steroids were subsequently stopped); s/p LVP at [place] 11/2022. Previously hospitalized in 2021 at [place] for ETOH detox but pt reportedly unaware of any liver dysfunction at that time. Went to ETOH rehab 08/2022 and was asked to leave the facility when she was COVID+; was sober for 1 week until she started drinking again. Had also attended a few AA meetings in the past.    #ACLF  #Worsening bilateral airspace opacities: likely ARDS vs PNA  #AUD  #New onset seizure- unknown trigger/etiology- currently on Keppra  #Decompensated alcohol-associated cirrhosis c/b prior ascites req LVP       -MELD-Na = 40 on 12/27/2022       -Ascites: large       -SBP: negative on 12/26/2022       -Varices: unknown       -Hepatic encephalopathy: yes, Ammonia, Serum: 67 umol/L *H* [11 - 55] (12-27-22 @ 02:26), currently on:  lactulose Syrup 20 Gram(s) Oral every 8 hours, 12-26-22 @ 09:31,   rifAXIMin 550 milliGRAM(s) Oral every 12 hours, 12-25-22 @ 09:07       -HCC: Alpha Fetoprotein - Tumor Marker: 4.2 ng/mL (12-24-22 @ 20:58)    Recommendations:  -trend clinical symptoms, exam findings, vital signs, CBC, CMP, INR  -CVVHD per Nephro  -continue broad spectrum antibiotic coverage, broaden antifungal to caspofungin, appreciate assistance from ID; f/u CT chest/abd/pelv  -wean pressors as able  -check random cortisol and procalcitonin    Note incomplete until finalized by attending signature/attestation.    Ronnie Tatum  GI/Hepatology Fellow    MONDAY-FRIDAY 8AM-5PM:  Pager# [phone] ([facility]) or [phone] ([facility])    NON-URGENT CONSULTS:  Please email 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  Of note was recently admitted to  11/2022 for workup and eval of new onset jaundice- during that hospitalization was found to have a UTI and dx with alc hep was treated for UTI w/ abx and started on steroids (was deemed a non-responder and steroids were subsequently stopped); s/p LVP at [place] 11/2022. Previously hospitalized in 2021 at [place] for ETOH detox but pt reportedly unaware of any liver dysfunction at that time. Went to ETOH rehab 08/2022 and was asked to leave the facility when she was COVID+; was sober for 1 week until she started drinking again. Had also attended a few AA meetings in the past.    #ACLF  #Worsening bilateral airspace opacities: likely ARDS vs PNA  #AUD  #New onset seizure- unknown trigger/etiology- currently on Keppra  #Decompensated alcohol-associated cirrhosis c/b prior ascites req LVP       -MELD-Na = 40 on 12/27/2022       -Ascites: large       -SBP: negative on 12/26/2022       -Varices: unknown       -Hepatic encephalopathy: yes, Ammonia, Serum: 67 umol/L *H* [11 - 55] (12-27-22 @ 02:26), currently on:  lactulose Syrup 20 Gram(s) Oral every 8 hours, 12-26-22 @ 09:31,   rifAXIMin 550 milliGRAM(s) Oral every 12 hours, 12-25-22 @ 09:07       -HCC: Alpha Fetoprotein - Tumor Marker: 4.2 ng/mL (12-24-22 @ 20:58)    Recommendations:  -trend clinical symptoms, exam findings, vital signs, CBC, CMP, INR  -CVVHD per Nephro  -continue broad spectrum antibiotic coverage, broaden antifungal to caspofungin, appreciate assistance from ID; f/u CT chest/abd/pelv  -wean pressors as able  -check random cortisol and procalcitonin    Note incomplete until finalized by attending signature/attestation.    Ronnie Tatum  GI/Hepatology Fellow    MONDAY-FRIDAY 8AM-5PM:  Pager# [phone] ([facility] or [phone] ([facility])    NON-URGENT CONSULTS:  Please email [email or web address]  AT NIGHT AND ON WEEKENDS:  Contact on-call GI fellow via answering service ([phone]) from 5pm-8am and on weekends/holidays 62 y.o Hx significant for decompensated ETOH cirrhosis c/b ascites (dx 11/2022), alc hep (dx 11/2022; non-responsive to steroids), remote h/o thyroid cancer in her 20s s/p total thyroidectomy + RTX + radioactive iodide, HTN, 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  Of note was recently admitted to  11/2022 for workup and eval of new onset jaundice- during that hospitalization was found to have a UTI and dx with alc hep was treated for UTI w/ abx and started on steroids (was deemed a non-responder and steroids were subsequently stopped); s/p LVP at [place] 11/2022. Previously hospitalized in 2021 at [place] for ETOH detox but pt reportedly unaware of any liver dysfunction at that time. Went to ETOH rehab 08/2022 and was asked to leave the facility when she was COVID+; was sober for 1 week until she started drinking again. Had also attended a few AA meetings in the past.    #ACLF  #Worsening bilateral airspace opacities: likely ARDS vs PNA  #AUD  #New onset seizure- unknown trigger/etiology- currently on Keppra  #Decompensated alcohol-associated cirrhosis c/b prior ascites req LVP       -MELD-Na = 40 on 12/27/2022       -Ascites: large       -SBP: negative on 12/26/2022       -Varices: unknown       -Hepatic encephalopathy: yes, Ammonia, Serum: 67 umol/L *H* [11 - 55] (12-27-22 @ 02:26), currently on:  lactulose Syrup 20 Gram(s) Oral every 8 hours, 12-26-22 @ 09:31,   rifAXIMin 550 milliGRAM(s) Oral every 12 hours, 12-25-22 @ 09:07       -HCC: Alpha Fetoprotein - Tumor Marker: 4.2 ng/mL (12-24-22 @ 20:58)    Recommendations:  -trend clinical symptoms, exam findings, vital signs, CBC, CMP, INR  -CVVHD per Nephro  -continue broad spectrum antibiotic coverage, broaden antifungal to caspofungin, appreciate assistance from ID; f/u CT chest/abd/pelv  -wean pressors as able  -check random cortisol and procalcitonin    Note incomplete until finalized by attending signature/attestation.    Ronnie Tatum  GI/Hepatology Fellow    MONDAY-FRIDAY 8AM-5PM:  Pager# [phone] ([facility]) or [phone] ([facility])    NON-URGENT CONSULTS:  Please email [email or web address]  AT NIGHT AND ON WEEKENDS:  Contact on-call GI fellow via answering service ([phone]) from 5pm-8am and on weekends/holidays

## 2023-01-04 LAB
ALBUMIN FLD-MCNC: 0.7 G/DL — SIGNIFICANT CHANGE UP
ALBUMIN SERPL ELPH-MCNC: 3.1 G/DL — LOW (ref 3.3–5)
ALBUMIN SERPL ELPH-MCNC: 3.3 G/DL — SIGNIFICANT CHANGE UP (ref 3.3–5)
ALBUMIN SERPL ELPH-MCNC: 4 G/DL — SIGNIFICANT CHANGE UP (ref 3.3–5)
ALP SERPL-CCNC: 175 U/L — HIGH (ref 40–120)
ALP SERPL-CCNC: 195 U/L — HIGH (ref 40–120)
ALP SERPL-CCNC: 196 U/L — HIGH (ref 40–120)
ALP SERPL-CCNC: 213 U/L — HIGH (ref 40–120)
ALT FLD-CCNC: 34 U/L — SIGNIFICANT CHANGE UP (ref 10–45)
ALT FLD-CCNC: 42 U/L — SIGNIFICANT CHANGE UP (ref 10–45)
ALT FLD-CCNC: 46 U/L — HIGH (ref 10–45)
ALT FLD-CCNC: 47 U/L — HIGH (ref 10–45)
AMMONIA BLD-MCNC: 60 UMOL/L — HIGH (ref 11–55)
ANION GAP SERPL CALC-SCNC: 15 MMOL/L — SIGNIFICANT CHANGE UP (ref 5–17)
ANION GAP SERPL CALC-SCNC: 16 MMOL/L — SIGNIFICANT CHANGE UP (ref 5–17)
ANION GAP SERPL CALC-SCNC: 17 MMOL/L — SIGNIFICANT CHANGE UP (ref 5–17)
ANION GAP SERPL CALC-SCNC: 18 MMOL/L — HIGH (ref 5–17)
APTT BLD: 51 SEC — HIGH (ref 27.5–35.5)
AST SERPL-CCNC: 124 U/L — HIGH (ref 10–40)
AST SERPL-CCNC: 129 U/L — HIGH (ref 10–40)
AST SERPL-CCNC: 138 U/L — HIGH (ref 10–40)
B PERT IGG+IGM PNL SER: CLEAR — SIGNIFICANT CHANGE UP
BILIRUB SERPL-MCNC: 25 MG/DL — HIGH (ref 0.2–1.2)
BILIRUB SERPL-MCNC: 25.4 MG/DL — HIGH (ref 0.2–1.2)
BILIRUB SERPL-MCNC: 26.2 MG/DL — HIGH (ref 0.2–1.2)
BILIRUB SERPL-MCNC: 27.6 MG/DL — HIGH (ref 0.2–1.2)
BUN SERPL-MCNC: 19 MG/DL — SIGNIFICANT CHANGE UP (ref 7–23)
BUN SERPL-MCNC: 23 MG/DL — SIGNIFICANT CHANGE UP (ref 7–23)
BUN SERPL-MCNC: 27 MG/DL — HIGH (ref 7–23)
BUN SERPL-MCNC: 29 MG/DL — HIGH (ref 7–23)
CALCIUM SERPL-MCNC: 8.8 MG/DL — SIGNIFICANT CHANGE UP (ref 8.4–10.5)
CALCIUM SERPL-MCNC: 9.2 MG/DL — SIGNIFICANT CHANGE UP (ref 8.4–10.5)
CALCIUM SERPL-MCNC: 9.3 MG/DL — SIGNIFICANT CHANGE UP (ref 8.4–10.5)
CALCIUM SERPL-MCNC: 9.5 MG/DL — SIGNIFICANT CHANGE UP (ref 8.4–10.5)
CHLORIDE SERPL-SCNC: 100 MMOL/L — SIGNIFICANT CHANGE UP (ref 96–108)
CHLORIDE SERPL-SCNC: 101 MMOL/L — SIGNIFICANT CHANGE UP (ref 96–108)
CHLORIDE SERPL-SCNC: 102 MMOL/L — SIGNIFICANT CHANGE UP (ref 96–108)
CO2 SERPL-SCNC: 18 MMOL/L — LOW (ref 22–31)
CO2 SERPL-SCNC: 19 MMOL/L — LOW (ref 22–31)
COLOR FLD: YELLOW — SIGNIFICANT CHANGE UP
CORTIS AM PEAK SERPL-MCNC: 18.5 UG/DL — HIGH (ref 6–18.4)
CREAT SERPL-MCNC: 1.34 MG/DL — HIGH (ref 0.5–1.3)
CREAT SERPL-MCNC: 1.55 MG/DL — HIGH (ref 0.5–1.3)
CREAT SERPL-MCNC: 1.78 MG/DL — HIGH (ref 0.5–1.3)
CREAT SERPL-MCNC: 1.95 MG/DL — HIGH (ref 0.5–1.3)
CULTURE RESULTS: NO GROWTH — SIGNIFICANT CHANGE UP
EGFR: 29 ML/MIN/1.73M2 — LOW
EGFR: 32 ML/MIN/1.73M2 — LOW
EGFR: 38 ML/MIN/1.73M2 — LOW
EGFR: 45 ML/MIN/1.73M2 — LOW
EOSINOPHIL # FLD: 2 % — SIGNIFICANT CHANGE UP
FLUID INTAKE SUBSTANCE CLASS: SIGNIFICANT CHANGE UP
GAS PNL BLDA: SIGNIFICANT CHANGE UP
GLUCOSE BLDC GLUCOMTR-MCNC: 143 MG/DL — HIGH (ref 70–99)
GLUCOSE BLDC GLUCOMTR-MCNC: 148 MG/DL — HIGH (ref 70–99)
GLUCOSE FLD-MCNC: 140 MG/DL — SIGNIFICANT CHANGE UP
GLUCOSE SERPL-MCNC: 116 MG/DL — HIGH (ref 70–99)
GLUCOSE SERPL-MCNC: 129 MG/DL — HIGH (ref 70–99)
GLUCOSE SERPL-MCNC: 140 MG/DL — HIGH (ref 70–99)
GLUCOSE SERPL-MCNC: 150 MG/DL — HIGH (ref 70–99)
GRAM STN FLD: SIGNIFICANT CHANGE UP
HCT VFR BLD CALC: 25.8 % — LOW (ref 34.5–45)
HCT VFR BLD CALC: 26.4 % — LOW (ref 34.5–45)
HDV AB SER-ACNC: NEGATIVE — SIGNIFICANT CHANGE UP
HGB BLD-MCNC: 9 G/DL — LOW (ref 11.5–15.5)
HGB BLD-MCNC: 9.1 G/DL — LOW (ref 11.5–15.5)
INR BLD: 2.17 RATIO — HIGH (ref 0.88–1.16)
LDH SERPL L TO P-CCNC: 86 U/L — SIGNIFICANT CHANGE UP
LYMPHOCYTES # FLD: 46 % — SIGNIFICANT CHANGE UP
MAGNESIUM SERPL-MCNC: 2.1 MG/DL — SIGNIFICANT CHANGE UP (ref 1.6–2.6)
MAGNESIUM SERPL-MCNC: 2.2 MG/DL — SIGNIFICANT CHANGE UP (ref 1.6–2.6)
MAGNESIUM SERPL-MCNC: 2.3 MG/DL — SIGNIFICANT CHANGE UP (ref 1.6–2.6)
MCHC RBC-ENTMCNC: 30.6 PG — SIGNIFICANT CHANGE UP (ref 27–34)
MCHC RBC-ENTMCNC: 30.9 PG — SIGNIFICANT CHANGE UP (ref 27–34)
MCHC RBC-ENTMCNC: 34.5 GM/DL — SIGNIFICANT CHANGE UP (ref 32–36)
MCHC RBC-ENTMCNC: 34.9 GM/DL — SIGNIFICANT CHANGE UP (ref 32–36)
MCV RBC AUTO: 88.7 FL — SIGNIFICANT CHANGE UP (ref 80–100)
MCV RBC AUTO: 88.9 FL — SIGNIFICANT CHANGE UP (ref 80–100)
MONOS+MACROS # FLD: 14 % — SIGNIFICANT CHANGE UP
NEUTROPHILS-BODY FLUID: 38 % — SIGNIFICANT CHANGE UP
NRBC # BLD: 0 /100 WBCS — SIGNIFICANT CHANGE UP (ref 0–0)
PHOSPHATE SERPL-MCNC: 3.7 MG/DL — SIGNIFICANT CHANGE UP (ref 2.5–4.5)
PHOSPHATE SERPL-MCNC: 3.9 MG/DL — SIGNIFICANT CHANGE UP (ref 2.5–4.5)
PHOSPHATE SERPL-MCNC: 4 MG/DL — SIGNIFICANT CHANGE UP (ref 2.5–4.5)
PHOSPHATE SERPL-MCNC: 4.1 MG/DL — SIGNIFICANT CHANGE UP (ref 2.5–4.5)
PLATELET # BLD AUTO: 36 K/UL — LOW (ref 150–400)
PLATELET # BLD AUTO: 41 K/UL — LOW (ref 150–400)
POTASSIUM SERPL-MCNC: 3.6 MMOL/L — SIGNIFICANT CHANGE UP (ref 3.5–5.3)
POTASSIUM SERPL-MCNC: 3.7 MMOL/L — SIGNIFICANT CHANGE UP (ref 3.5–5.3)
POTASSIUM SERPL-MCNC: 3.8 MMOL/L — SIGNIFICANT CHANGE UP (ref 3.5–5.3)
POTASSIUM SERPL-SCNC: 3.6 MMOL/L — SIGNIFICANT CHANGE UP (ref 3.5–5.3)
POTASSIUM SERPL-SCNC: 3.7 MMOL/L — SIGNIFICANT CHANGE UP (ref 3.5–5.3)
POTASSIUM SERPL-SCNC: 3.8 MMOL/L — SIGNIFICANT CHANGE UP (ref 3.5–5.3)
PROT FLD-MCNC: 1.3 G/DL — SIGNIFICANT CHANGE UP
PROT SERPL-MCNC: 6.1 G/DL — SIGNIFICANT CHANGE UP (ref 6–8.3)
PROT SERPL-MCNC: 6.5 G/DL — SIGNIFICANT CHANGE UP (ref 6–8.3)
PROTHROM AB SERPL-ACNC: 25.4 SEC — HIGH (ref 10.5–13.4)
RBC # BLD: 2.91 M/UL — LOW (ref 3.8–5.2)
RBC # BLD: 2.97 M/UL — LOW (ref 3.8–5.2)
RBC # FLD: 18.7 % — HIGH (ref 10.3–14.5)
RBC # FLD: 18.9 % — HIGH (ref 10.3–14.5)
RCV VOL RI: < 2000 /UL — SIGNIFICANT CHANGE UP (ref 0–0)
SODIUM SERPL-SCNC: 136 MMOL/L — SIGNIFICANT CHANGE UP (ref 135–145)
SPECIMEN SOURCE: SIGNIFICANT CHANGE UP
TOTAL NUCLEATED CELL COUNT, BODY FLUID: 96 /UL — SIGNIFICANT CHANGE UP
TUBE TYPE: SIGNIFICANT CHANGE UP
WBC # BLD: 35.59 K/UL — HIGH (ref 3.8–10.5)
WBC # BLD: 37.38 K/UL — HIGH (ref 3.8–10.5)
WBC # FLD AUTO: 35.59 K/UL — HIGH (ref 3.8–10.5)
WBC # FLD AUTO: 37.38 K/UL — HIGH (ref 3.8–10.5)

## 2023-01-04 PROCEDURE — 99232 SBSQ HOSP IP/OBS MODERATE 35: CPT

## 2023-01-04 PROCEDURE — 71045 X-RAY EXAM CHEST 1 VIEW: CPT | Mod: 26,77

## 2023-01-04 PROCEDURE — 99233 SBSQ HOSP IP/OBS HIGH 50: CPT

## 2023-01-04 PROCEDURE — 99291 CRITICAL CARE FIRST HOUR: CPT | Mod: GC

## 2023-01-04 PROCEDURE — 99232 SBSQ HOSP IP/OBS MODERATE 35: CPT | Mod: GC

## 2023-01-04 PROCEDURE — 71045 X-RAY EXAM CHEST 1 VIEW: CPT | Mod: 26

## 2023-01-04 RX ORDER — HYDROMORPHONE HYDROCHLORIDE 2 MG/ML
0.25 INJECTION INTRAMUSCULAR; INTRAVENOUS; SUBCUTANEOUS
Refills: 0 | Status: DISCONTINUED | OUTPATIENT
Start: 2023-01-05 | End: 2023-01-09

## 2023-01-04 RX ORDER — ALBUMIN HUMAN 25 %
250 VIAL (ML) INTRAVENOUS ONCE
Refills: 0 | Status: COMPLETED | OUTPATIENT
Start: 2023-01-04 | End: 2023-01-04

## 2023-01-04 RX ORDER — INSULIN LISPRO 100/ML
VIAL (ML) SUBCUTANEOUS EVERY 6 HOURS
Refills: 0 | Status: DISCONTINUED | OUTPATIENT
Start: 2023-01-04 | End: 2023-01-05

## 2023-01-04 RX ADMIN — HYDROMORPHONE HYDROCHLORIDE 0.25 MILLIGRAM(S): 2 INJECTION INTRAMUSCULAR; INTRAVENOUS; SUBCUTANEOUS at 11:16

## 2023-01-04 RX ADMIN — Medication 100 MILLIGRAM(S): at 11:15

## 2023-01-04 RX ADMIN — MEROPENEM 100 MILLIGRAM(S): 1 INJECTION INTRAVENOUS at 13:28

## 2023-01-04 RX ADMIN — MEROPENEM 100 MILLIGRAM(S): 1 INJECTION INTRAVENOUS at 22:06

## 2023-01-04 RX ADMIN — MIDODRINE HYDROCHLORIDE 20 MILLIGRAM(S): 2.5 TABLET ORAL at 05:20

## 2023-01-04 RX ADMIN — Medication 750 MILLILITER(S): at 17:30

## 2023-01-04 RX ADMIN — Medication 750 MILLILITER(S): at 15:59

## 2023-01-04 RX ADMIN — POLYETHYLENE GLYCOL 3350 17 GRAM(S): 17 POWDER, FOR SOLUTION ORAL at 05:20

## 2023-01-04 RX ADMIN — Medication 750 MILLILITER(S): at 16:28

## 2023-01-04 RX ADMIN — MIDODRINE HYDROCHLORIDE 20 MILLIGRAM(S): 2.5 TABLET ORAL at 13:29

## 2023-01-04 RX ADMIN — Medication 6.21 MICROGRAM(S)/KG/MIN: at 07:24

## 2023-01-04 RX ADMIN — Medication 103 MICROGRAM(S): at 22:06

## 2023-01-04 RX ADMIN — CHLORHEXIDINE GLUCONATE 1 APPLICATION(S): 213 SOLUTION TOPICAL at 05:21

## 2023-01-04 RX ADMIN — VASOPRESSIN 4.5 UNIT(S)/MIN: 20 INJECTION INTRAVENOUS at 07:23

## 2023-01-04 RX ADMIN — LEVETIRACETAM 750 MILLIGRAM(S): 250 TABLET, FILM COATED ORAL at 17:25

## 2023-01-04 RX ADMIN — Medication 750 MILLILITER(S): at 16:58

## 2023-01-04 RX ADMIN — POLYETHYLENE GLYCOL 3350 17 GRAM(S): 17 POWDER, FOR SOLUTION ORAL at 17:25

## 2023-01-04 RX ADMIN — LEVETIRACETAM 750 MILLIGRAM(S): 250 TABLET, FILM COATED ORAL at 05:21

## 2023-01-04 RX ADMIN — Medication 1 MILLIGRAM(S): at 11:15

## 2023-01-04 RX ADMIN — Medication 125 MILLILITER(S): at 21:51

## 2023-01-04 RX ADMIN — Medication 15 MILLILITER(S): at 11:15

## 2023-01-04 RX ADMIN — PANTOPRAZOLE SODIUM 40 MILLIGRAM(S): 20 TABLET, DELAYED RELEASE ORAL at 11:16

## 2023-01-04 RX ADMIN — ESCITALOPRAM OXALATE 10 MILLIGRAM(S): 10 TABLET, FILM COATED ORAL at 11:15

## 2023-01-04 RX ADMIN — HYDROMORPHONE HYDROCHLORIDE 0.25 MILLIGRAM(S): 2 INJECTION INTRAMUSCULAR; INTRAVENOUS; SUBCUTANEOUS at 11:46

## 2023-01-04 RX ADMIN — MIDODRINE HYDROCHLORIDE 20 MILLIGRAM(S): 2.5 TABLET ORAL at 22:06

## 2023-01-04 RX ADMIN — MEROPENEM 100 MILLIGRAM(S): 1 INJECTION INTRAVENOUS at 05:22

## 2023-01-04 RX ADMIN — CASPOFUNGIN ACETATE 260 MILLIGRAM(S): 7 INJECTION, POWDER, LYOPHILIZED, FOR SOLUTION INTRAVENOUS at 09:21

## 2023-01-04 NOTE — PROGRESS NOTE ADULT - SUBJECTIVE AND OBJECTIVE BOX
Interval Events:   No acute events noted, however unable to obtain full HPI due to underlying mental status, however improved from prior.    ROS:   Unable to fully obtain ROS.    Hospital Medications:  caspofungin IVPB      caspofungin IVPB 50 milliGRAM(s) IV Intermittent every 24 hours  chlorhexidine 2% Cloths 1 Application(s) Topical <User Schedule>  CRRT Treatment    <Continuous>  escitalopram 10 milliGRAM(s) Oral daily  folic acid 1 milliGRAM(s) Oral daily  HYDROmorphone  Injectable 0.25 milliGRAM(s) IV Push every 3 hours PRN  influenza   Vaccine 0.5 milliLiter(s) IntraMuscular once  insulin lispro (ADMELOG) corrective regimen sliding scale   SubCutaneous every 6 hours  levETIRAcetam  Solution 750 milliGRAM(s) Enteral Tube two times a day  levothyroxine Injectable 103 MICROGram(s) IV Push at bedtime  meropenem  IVPB 1000 milliGRAM(s) IV Intermittent every 8 hours  midodrine 20 milliGRAM(s) Oral every 8 hours  multivitamin/minerals/iron Oral Solution (CENTRUM) 15 milliLiter(s) Oral daily  norepinephrine Infusion 0.05 MICROgram(s)/kG/Min (6.21 mL/Hr) IV Continuous <Continuous>  pantoprazole   Suspension 40 milliGRAM(s) Oral every 24 hours  Phoxillum Filtration BK 4 / 2.5 5000 milliLiter(s) (800 mL/Hr) CRRT <Continuous>  Phoxillum Filtration BK 4 / 2.5 5000 milliLiter(s) (200 mL/Hr) CRRT <Continuous>  Phoxillum Filtration BK 4 / 2.5 5000 milliLiter(s) (1000 mL/Hr) CRRT <Continuous>  polyethylene glycol 3350 17 Gram(s) Oral every 12 hours  rifAXIMin 550 milliGRAM(s) Oral every 12 hours  sodium chloride 0.65% Nasal 1 Spray(s) Both Nostrils every 3 hours PRN  tetracaine/benzocaine/butamben Spray 1 Spray(s) Topical every 3 hours PRN  thiamine 100 milliGRAM(s) Enteral Tube daily  vasopressin Infusion 0.03 Unit(s)/Min (4.5 mL/Hr) IV Continuous <Continuous>    MAR over past 24 hours:    caspofungin IVPB   260 mL/Hr IV Intermittent (01-04-23 @ 09:21)    chlorhexidine 2% Cloths   1 Application(s) Topical (01-04-23 @ 05:21)    escitalopram   10 milliGRAM(s) Oral (01-04-23 @ 11:15)    folic acid   1 milliGRAM(s) Oral (01-04-23 @ 11:15)    HYDROmorphone  Injectable   0.25 milliGRAM(s) IV Push (01-04-23 @ 11:16)    levETIRAcetam  Solution   750 milliGRAM(s) Enteral Tube (01-04-23 @ 05:21)   750 milliGRAM(s) Enteral Tube (01-03-23 @ 17:58)    levothyroxine Injectable   103 MICROGram(s) IV Push (01-03-23 @ 21:47)    meropenem  IVPB   100 mL/Hr IV Intermittent (01-04-23 @ 13:28)   100 mL/Hr IV Intermittent (01-04-23 @ 05:22)   100 mL/Hr IV Intermittent (01-03-23 @ 21:48)    midodrine   20 milliGRAM(s) Oral (01-04-23 @ 13:29)   20 milliGRAM(s) Oral (01-04-23 @ 05:20)   20 milliGRAM(s) Oral (01-03-23 @ 21:47)    multivitamin/minerals/iron Oral Solution (CENTRUM)   15 milliLiter(s) Oral (01-04-23 @ 11:15)    pantoprazole   Suspension   40 milliGRAM(s) Oral (01-04-23 @ 11:16)    polyethylene glycol 3350   17 Gram(s) Oral (01-04-23 @ 05:20)   17 Gram(s) Oral (01-03-23 @ 18:20)    rifAXIMin   550 milliGRAM(s) Oral (01-04-23 @ 05:20)   550 milliGRAM(s) Oral (01-03-23 @ 17:58)    thiamine   100 milliGRAM(s) Enteral Tube (01-04-23 @ 11:15)      Home Medications:  Last Order Reconciliation Date: 12-21-22 @ 13:03 (Admission Reconciliation)  cholecalciferol 25 mcg (1000 intl units) oral tablet: 1 tab(s) orally once a day  folic acid 1 mg oral tablet: 1 tab(s) orally once a day  levothyroxine 137 mcg (0.137 mg) oral tablet: 1 tab(s) orally once a day  Lexapro 10 mg oral tablet: 1 tab(s) orally once a day  nystatin 100,000 units/mL oral suspension: 5 milliliter(s) orally 4 times a day  pantoprazole 40 mg oral delayed release tablet: 1 tab(s) orally once a day (at bedtime)  senna oral tablet: 2 tab(s) orally once a day (at bedtime), As Needed -for constipation   Sodium Chloride 1 g oral tablet: 1 gram(s) orally once a day  thiamine 100 mg oral tablet: 1 tab(s) orally once a day  Vitamin B-100 oral tablet: 1 tab(s) orally once a day    PHYSICAL EXAM:   Vital Signs last 24 hours:  T(F): 98.8 (01-04-23 @ 11:00), Max: 99.5 (01-03-23 @ 18:00)  HR: 95 (01-04-23 @ 14:30) (81 - 97)  BP: 102/56 (01-04-23 @ 07:30) (102/56 - 106/60)  BP(mean): 74 (01-04-23 @ 07:30) (74 - 74)  ABP: 105/53 (01-04-23 @ 14:30) (95/43 - 122/61)  ABP(mean): 75 (01-04-23 @ 14:30) (61 - 85)  RR: 31 (01-04-23 @ 14:30) (12 - 33)  SpO2: 100% (01-04-23 @ 14:30) (87% - 100%)    I&Os:    01-03-23 @ 07:01  -  01-04-23 @ 07:00  --------------------------------------------------------  IN:    Enteral Tube Flush: 620 mL    IV PiggyBack: 400 mL    Nepro with Carb Steady: 1125 mL    Norepinephrine: 283.6 mL    Vasopressin: 108 mL  Total IN: 2536.6 mL    OUT:    Intermittent Catheterization -  Stomal (mL): 15 mL    Other (mL): 356 mL    Rectal Tube (mL): 500 mL  Total OUT: 871 mL    Total NET: 1665.6 mL      01-04-23 @ 07:01  -  01-04-23 @ 15:29  --------------------------------------------------------  IN:    Enteral Tube Flush: 110 mL    IV PiggyBack: 300 mL    Nepro with Carb Steady: 350 mL    Norepinephrine: 7.4 mL    Vasopressin: 31.5 mL  Total IN: 798.9 mL    OUT:  Total OUT: 0 mL    Total NET: 798.9 mL        BMI (kg/m2): 20.9 (12-30-22 @ 07:00)  GENERAL: no acute distress  NEURO: not fully alert/oriented  HEENT: NCAT, no conjunctival pallor appreciated  CHEST: no respiratory distress, no accessory muscle use  CARDIAC: regular rate, +S1/S2  ABDOMEN: soft, distended, nontender, no rebound or guarding  EXTREMITIES: warm, well perfused  SKIN: no lesions noted    DIAGNOSTICS:  WBC      Hg       PLT      Na       K        CO2     BUN      Cr       ALT      AST      TB       ALP  ------   ------   ------   136      3.7      19       27       1.78     42       138      25.4     195      01-04-23 @ 12:24  37.38    9.1      41       136      3.8      19       23       1.55     47       138      27.6     213      01-04-23 @ 06:10  35.59    9.0      36       ------   ------   ------   ------   ------   ------   ------   ------   ------   01-04-23 @ 00:46  ------   ------   ------   136      3.6      18       19       1.34     46       129      26.2     196      01-04-23 @ 00:42  36.66    9.2      34       ------   ------   ------   ------   ------   ------   ------   ------   ------   01-03-23 @ 18:43    PT             INR            MELDwith  MELDw/o  25.4           2.17           --             --             01-04-23 @ 00:46  28.0           2.39           --             --             01-03-23 @ 00:30  28.0           2.39           --             --             01-02-23 @ 00:28  28.0           2.39           --             --             01-01-23 @ 00:29  24.4           2.11           --             --             12-31-22 @ 00:20

## 2023-01-04 NOTE — PROCEDURE NOTE - PROCEDURE DATE TIME, MLM
21-Dec-2022 18:47
04-Jan-2023 14:20
26-Dec-2022 14:13
25-Dec-2022 18:03
04-Jan-2023 14:00
04-Jan-2023 17:31

## 2023-01-04 NOTE — PROCEDURE NOTE - NSPOSTPRCRAD_GEN_A_CORE
central line located in the superior vena cava/no pneumothorax
16cm/depth of insertion/no pneumothorax

## 2023-01-04 NOTE — PROGRESS NOTE ADULT - SUBJECTIVE AND OBJECTIVE BOX
HPI:  Patient seen and examined at bedside in 8 ICU.  Weaned off levophed. Remains on vaso.    Review Of Systems:           Respiratory: No shortness of breath, cough, or wheezing  Cardiovascular: No chest pain or palpitations  10 point review of systems is otherwise negative except as mentioned above        Medications:  caspofungin IVPB      caspofungin IVPB 50 milliGRAM(s) IV Intermittent every 24 hours  chlorhexidine 2% Cloths 1 Application(s) Topical <User Schedule>  CRRT Treatment    <Continuous>  escitalopram 10 milliGRAM(s) Oral daily  folic acid 1 milliGRAM(s) Oral daily  HYDROmorphone  Injectable 0.25 milliGRAM(s) IV Push every 3 hours PRN  influenza   Vaccine 0.5 milliLiter(s) IntraMuscular once  insulin lispro (ADMELOG) corrective regimen sliding scale   SubCutaneous every 6 hours  levETIRAcetam  Solution 750 milliGRAM(s) Enteral Tube two times a day  levothyroxine Injectable 103 MICROGram(s) IV Push at bedtime  meropenem  IVPB 1000 milliGRAM(s) IV Intermittent every 8 hours  midodrine 20 milliGRAM(s) Oral every 8 hours  multivitamin/minerals/iron Oral Solution (CENTRUM) 15 milliLiter(s) Oral daily  norepinephrine Infusion 0.05 MICROgram(s)/kG/Min IV Continuous <Continuous>  pantoprazole   Suspension 40 milliGRAM(s) Oral every 24 hours  Phoxillum Filtration BK 4 / 2.5 5000 milliLiter(s) CRRT <Continuous>  Phoxillum Filtration BK 4 / 2.5 5000 milliLiter(s) CRRT <Continuous>  Phoxillum Filtration BK 4 / 2.5 5000 milliLiter(s) CRRT <Continuous>  polyethylene glycol 3350 17 Gram(s) Oral every 12 hours  rifAXIMin 550 milliGRAM(s) Oral every 12 hours  sodium chloride 0.65% Nasal 1 Spray(s) Both Nostrils every 3 hours PRN  tetracaine/benzocaine/butamben Spray 1 Spray(s) Topical every 3 hours PRN  thiamine 100 milliGRAM(s) Enteral Tube daily  vasopressin Infusion 0.03 Unit(s)/Min IV Continuous <Continuous>    PAST MEDICAL & SURGICAL HISTORY:  HTN (hypertension)  off medication for over one year--states BP has been normal      UTI (urinary tract infection)  currently being treated--will complete antibiotics 3/8/2022      Intracranial tumor      GERD (gastroesophageal reflux disease)      Eczema      Thyroid cancer  surgery. radiation, Radioactive iodine      COVID-19 virus infection  11/2021--recovered at home      H/O total thyroidectomy  age 21&#x27;s for Thyroid cancer, postop complication arterial bleed, second surgery      History of tonsillectomy  age 5      History of appendectomy  age 5        Vitals:  T(C): 36.7 (01-04-23 @ 19:00), Max: 37.1 (01-04-23 @ 11:00)  HR: 104 (01-04-23 @ 23:00) (81 - 104)  BP: 102/56 (01-04-23 @ 07:30) (102/56 - 102/56)  BP(mean): 74 (01-04-23 @ 07:30) (74 - 74)  RR: 27 (01-04-23 @ 23:00) (12 - 33)  SpO2: 95% (01-04-23 @ 23:00) (87% - 100%)  Wt(kg): --  Daily     Daily   I&O's Summary    03 Jan 2023 07:01  -  04 Jan 2023 07:00  --------------------------------------------------------  IN: 2536.6 mL / OUT: 871 mL / NET: 1665.6 mL    04 Jan 2023 07:01  -  04 Jan 2023 23:23  --------------------------------------------------------  IN: 2729.5 mL / OUT: 250 mL / NET: 2479.5 mL        Physical Exam:  Appearance: Jaundice, encephalopathic   Eyes: PERRLA, EOMI, pink conjunctiva, no scleral icterus   HENT: Normal oral mucosa  Cardiovascular: RRR, S1, S2, no murmur, rub, or gallop; no edema; no JVD  Procedural Access Site: Clean, dry, intact, without hematoma  Respiratory: Clear to auscultation bilaterally  Gastrointestinal: Soft, non-tender, non-distended, BS+  Musculoskeletal: Mitten restraints   Lymphatic: No lymphadenopathy  Skin: No rashes, ecchymoses, or cyanosis                          9.1    37.38 )-----------( 41       ( 04 Jan 2023 06:10 )             26.4     01-04    136  |  100  |  29<H>  ----------------------------<  129<H>  3.8   |  18<L>  |  1.95<H>    Ca    9.3      04 Jan 2023 18:21  Phos  4.0     01-04  Mg     2.1     01-04    TPro  6.5  /  Alb  4.0  /  TBili  25.0<H>  /  DBili  x   /  AST  124<H>  /  ALT  34  /  AlkPhos  175<H>  01-04    PT/INR - ( 04 Jan 2023 00:46 )   PT: 25.4 sec;   INR: 2.17 ratio         PTT - ( 04 Jan 2023 00:46 )  PTT:51.0 sec      Cardiovascular Diagnostic Testing:  ECG:     Echo:   < from: TTE with Doppler (w/Cont) (12.21.22 @ 10:49) >  ------------------------------------------------------------------------  Dimensions:    Normal Values:  LA:     3.5    2.0 - 4.0 cm  Ao:     3.1    2.0 - 3.8 cm  SEPTUM: 0.7    0.6 - 1.2 cm  PWT:    0.6    0.6 - 1.1 cm  LVIDd: 5.4    3.0 - 5.6 cm  LVIDs:  2.9    1.8 - 4.0 cm  Derived variables:  LVMI: 69 g/m2  RWT: 0.22  Fractional short: 46 %  EF (Visual Estimate):  %  EF (Monroy Rule): 66 %Doppler Peak Velocity (m/sec):  AoV=1.4  ------------------------------------------------------------------------  Observations:  Mitral Valve: Normal mitral valve. Minimal mitral  regurgitation.  Aortic Valve/Aorta: Calcified trileaflet aortic valve with  normal opening. Peak transaortic valve gradient equals 8 mm  Hg. No aorticvalve regurgitation seen. Peak left  ventricular outflow tract gradient equals 4 mm Hg, mean  gradient is equal to 2 mm Hg, LVOT velocity time integral  equals 19 cm.  Aortic Root: 3.1 cm.  Ascending Aorta: 3.3 cm.  LVOT diameter: 2 cm.  Left Atrium:Normal left atrium.  LA volume index = 33  cc/m2.  Left Ventricle: Endocardial visualization enhanced with  intravenous injection of Ultrasonic Enhancing Agent  (Definity). Left ventricle suboptimally visualized despite  intravenous injection of ultrasonic enhancing agent; normal  left ventricular systolic function. No segmental wall  motion abnormalities. Normal left ventricular internal  dimensions and wall thicknesses. Normal diastolic function.  Right Heart: Normal right atrium. Normal right ventricular  size and function. Normal tricuspid valve. Mild-moderate  tricuspid regurgitation. Normal pulmonic valve. Minimal  pulmonic regurgitation.  Pericardium/Pleura: Normal pericardium with no pericardial  effusion.  Hemodynamic: Estimated right atrial pressure is 8 mm Hg.  Estimated right ventricular systolic pressure equals 29 mm  Hg, assuming right atrial pressure equals 8 mm Hg,  consistent with normal pulmonary pressures.  ------------------------------------------------------------------------  Conclusions:  1. Normal mitral valve. Minimal mitral regurgitation.  2. Calcified trileaflet aortic valve with normal opening.  No aortic valve regurgitation seen.  3. Endocardial visualization enhanced with intravenous  injection of Ultrasonic Enhancing Agent (Definity). Left  ventricle suboptimally visualized despite intravenous  injection of ultrasonic enhancing agent; normal left  ventricular systolic function. No segmental wall motion  abnormalities.  4. Normal right ventricular size and function.  *** No previous Echo exam.  ------------------------------------------------------------------------  Confirmed on  12/21/2022 - 13:40:44 by Rosalinda Ingram M.D.  ------------------------------------------------------------------------    < end of copied text >

## 2023-01-04 NOTE — SBIRT NOTE ADULT - NSSBIRTUNABLESCROTHER_GEN_A_CORE
This LCSW explored substance use with patient's spouse and Liver Transplant SW explored with daughter. Please see Care Coordination assessment for additional details

## 2023-01-04 NOTE — PROGRESS NOTE ADULT - ASSESSMENT
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  Of note was recently admitted to  11/2022 for workup and eval of new onset jaundice- during that hospitalization was found to have a UTI and dx with alc hep was treated for UTI w/ abx and started on steroids (was deemed a non-responder and steroids were subsequently stopped); s/p LVP at [place] 11/2022. Previously hospitalized in 2021 at [place] for ETOH detox but pt reportedly unaware of any liver dysfunction at that time. Went to ETOH rehab 08/2022 and was asked to leave the facility when she was COVID+; was sober for 1 week until she started drinking again. Had also attended a few AA meetings in the past.    #ACLF  #Multifocal pneumonia  #AUD  #New onset seizure- unknown trigger/etiology- currently on Keppra  #Decompensated alcohol-associated cirrhosis c/b prior ascites req LVP       -MELD-Na = 40 on 12/27/2022       -Ascites: large       -SBP: negative on 12/26/2022       -Varices: unknown       -Hepatic encephalopathy: yes, Ammonia, Serum: 67 umol/L *H* [11 - 55] (12-27-22 @ 02:26), currently on:  lactulose Syrup 20 Gram(s) Oral every 8 hours, 12-26-22 @ 09:31,   rifAXIMin 550 milliGRAM(s) Oral every 12 hours, 12-25-22 @ 09:07       -HCC: Alpha Fetoprotein - Tumor Marker: 4.2 ng/mL (12-24-22 @ 20:58)    Recommendations:  -trend clinical symptoms, exam findings, vital signs, CBC, CMP, INR  -reinsertion of central line, CVVHD per Nephro  -continue broad spectrum antibiotic coverage, broadened antifungal to caspofungin, appreciate assistance from ID  -wean pressors as able  -diagnostic and therapeutic paracentesis    Note incomplete until finalized by attending signature/attestation.    Ronnie Tatum  GI/Hepatology Fellow    MONDAY-FRIDAY 8AM-5PM:  Pager# [phone] ([facility]) or [phone] ([facility])    NON-URGENT CONSULTS:  Please email [email or web address] OR [email or web address]  AT NIGHT AND ON WEEKENDS:  Contact on-call GI fellow via answering service ([phone]) from 5pm-8am and on weekends/holidays 62 y.o Hx significant for decompensated ETOH cirrhosis c/b ascites (dx 11/2022), alc hep (dx 11/2022; non-responsive to steroids), remote h/o thyroid cancer in her 20s s/p total thyroidectomy + RTX + radioactive iodide, HTN, 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  Of note was recently admitted to  11/2022 for workup and eval of new onset jaundice- during that hospitalization was found to have a UTI and dx with alc hep was treated for UTI w/ abx and started on steroids (was deemed a non-responder and steroids were subsequently stopped); s/p LVP at [place] 11/2022. Previously hospitalized in 2021 at [place] for ETOH detox but pt reportedly unaware of any liver dysfunction at that time. Went to ETOH rehab 08/2022 and was asked to leave the facility when she was COVID+; was sober for 1 week until she started drinking again. Had also attended a few AA meetings in the past.    #ACLF  #Multifocal pneumonia  #AUD  #New onset seizure- unknown trigger/etiology- currently on Keppra  #Decompensated alcohol-associated cirrhosis c/b prior ascites req LVP       -MELD-Na = 40 on 12/27/2022       -Ascites: large       -SBP: negative on 12/26/2022       -Varices: unknown       -Hepatic encephalopathy: yes, Ammonia, Serum: 67 umol/L *H* [11 - 55] (12-27-22 @ 02:26), currently on:  lactulose Syrup 20 Gram(s) Oral every 8 hours, 12-26-22 @ 09:31,   rifAXIMin 550 milliGRAM(s) Oral every 12 hours, 12-25-22 @ 09:07       -HCC: Alpha Fetoprotein - Tumor Marker: 4.2 ng/mL (12-24-22 @ 20:58)    Recommendations:  -trend clinical symptoms, exam findings, vital signs, CBC, CMP, INR  -reinsertion of central line, CVVHD per Nephro  -continue broad spectrum antibiotic coverage, broadened antifungal to caspofungin, appreciate assistance from ID  -wean pressors as able  -diagnostic and therapeutic paracentesis    Note incomplete until finalized by attending signature/attestation.    Ronnie Tatum  GI/Hepatology Fellow    MONDAY-FRIDAY 8AM-5PM:  Pager# [phone] ([facility]) or [phone] ([facility])    NON-URGENT CONSULTS:  Please email [email or web address] OR [email or web address]  AT NIGHT AND ON WEEKENDS:  Contact on-call GI fellow via answering service ([phone]) from 5pm-8am and on weekends/holidays 62 y.o Hx significant for decompensated ETOH cirrhosis c/b ascites (dx 11/2022), alc hep (dx 11/2022; non-responsive to steroids), remote h/o thyroid cancer in her 20s s/p total thyroidectomy + RTX + radioactive iodide, HTN, 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  Of note was recently admitted to  11/2022 for workup and eval of new onset jaundice- during that hospitalization was found to have a UTI and dx with alc hep was treated for UTI w/ abx and started on steroids (was deemed a non-responder and steroids were subsequently stopped); s/p LVP at [place] 11/2022. Previously hospitalized in 2021 at [place] for ETOH detox but pt reportedly unaware of any liver dysfunction at that time. Went to ETOH rehab 08/2022 and was asked to leave the facility when she was COVID+; was sober for 1 week until she started drinking again. Had also attended a few AA meetings in the past.    #ACLF  #Multifocal pneumonia  #AUD  #New onset seizure- unknown trigger/etiology- currently on Keppra  #Decompensated alcohol-associated cirrhosis c/b prior ascites req LVP       -MELD-Na = 40 on 12/27/2022       -Ascites: large       -SBP: negative on 12/26/2022       -Varices: unknown       -Hepatic encephalopathy: yes, Ammonia, Serum: 67 umol/L *H* [11 - 55] (12-27-22 @ 02:26), currently on:  lactulose Syrup 20 Gram(s) Oral every 8 hours, 12-26-22 @ 09:31,   rifAXIMin 550 milliGRAM(s) Oral every 12 hours, 12-25-22 @ 09:07       -HCC: Alpha Fetoprotein - Tumor Marker: 4.2 ng/mL (12-24-22 @ 20:58)    Recommendations:  -trend clinical symptoms, exam findings, vital signs, CBC, CMP, INR  -reinsertion of central line, CVVHD per Nephro  -continue broad spectrum antibiotic coverage, broadened antifungal to caspofungin, appreciate assistance from ID  -wean pressors as able  -diagnostic and therapeutic paracentesis    Note incomplete until finalized by attending signature/attestation.    Ronnie Tatum  GI/Hepatology Fellow    MONDAY-FRIDAY 8AM-5PM:  Pager# [phone] ([facility]) or [phone] ([facility])    NON-URGENT CONSULTS:  Please email [email or web address]  AT NIGHT AND ON WEEKENDS:  Contact on-call GI fellow via answering service ([phone]) from 5pm-8am and on weekends/holidays

## 2023-01-04 NOTE — PROCEDURE NOTE - NSINFORMCONSENT_GEN_A_CORE
Benefits, risks, and possible complications of procedure explained to patient/caregiver who verbalized understanding and gave written consent.

## 2023-01-04 NOTE — PROCEDURE NOTE - NSANESTHESIA_GEN_A_CORE
2% lidocaine
2% lidocaine
1% lidocaine/with EPI
1% lidocaine/with EPI
2% lidocaine
1% lidocaine/with EPI
1% lidocaine

## 2023-01-04 NOTE — PROGRESS NOTE ADULT - SUBJECTIVE AND OBJECTIVE BOX
[facility] Division of Kidney Diseases & Hypertension  FOLLOW UP NOTE  [phone]--------------------------------------------------------------------------------  Chief Complaint:Disorder of liver    63 yo F with h/o decompensated ETOH cirrhosis with ascites, thyroid cancer (s/p total thyroidectomy, RTX, and radioactive iodide), HTN, ventrical neoplasm who was initially admitted to  12/19 with new onset seizures and transferred to [facility] 12/20 for liver transplant evaluation. Pt being seen for RED requiring CRRT.    24 hour events/subjective: Pt was seen and evaluated in the ICU this morning, offers no acute complaints. Remains on pressors. Has been off of CRRT since 1/3 due to line removal and holiday for infection concern      PAST HISTORY  --------------------------------------------------------------------------------  No significant changes to PMH, PSH, FHx, SHx, unless otherwise noted    ALLERGIES & MEDICATIONS  --------------------------------------------------------------------------------  Allergies    Macrobid (Rash)    Intolerances    Nexium (Stomach Upset)    Standing Inpatient Medications  caspofungin IVPB      caspofungin IVPB 50 milliGRAM(s) IV Intermittent every 24 hours  chlorhexidine 2% Cloths 1 Application(s) Topical <User Schedule>  CRRT Treatment    <Continuous>  escitalopram 10 milliGRAM(s) Oral daily  folic acid 1 milliGRAM(s) Oral daily  influenza   Vaccine 0.5 milliLiter(s) IntraMuscular once  insulin lispro (ADMELOG) corrective regimen sliding scale   SubCutaneous every 6 hours  levETIRAcetam  Solution 750 milliGRAM(s) Enteral Tube two times a day  levothyroxine Injectable 103 MICROGram(s) IV Push at bedtime  meropenem  IVPB 1000 milliGRAM(s) IV Intermittent every 8 hours  midodrine 20 milliGRAM(s) Oral every 8 hours  multivitamin/minerals/iron Oral Solution (CENTRUM) 15 milliLiter(s) Oral daily  norepinephrine Infusion 0.05 MICROgram(s)/kG/Min IV Continuous <Continuous>  pantoprazole   Suspension 40 milliGRAM(s) Oral every 24 hours  Phoxillum Filtration BK 4 / 2.5 5000 milliLiter(s) CRRT <Continuous>  Phoxillum Filtration BK 4 / 2.5 5000 milliLiter(s) CRRT <Continuous>  Phoxillum Filtration BK 4 / 2.5 5000 milliLiter(s) CRRT <Continuous>  polyethylene glycol 3350 17 Gram(s) Oral every 12 hours  rifAXIMin 550 milliGRAM(s) Oral every 12 hours  thiamine 100 milliGRAM(s) Enteral Tube daily  vasopressin Infusion 0.03 Unit(s)/Min IV Continuous <Continuous>    PRN Inpatient Medications  HYDROmorphone  Injectable 0.25 milliGRAM(s) IV Push every 3 hours PRN  sodium chloride 0.65% Nasal 1 Spray(s) Both Nostrils every 3 hours PRN  tetracaine/benzocaine/butamben Spray 1 Spray(s) Topical every 3 hours PRN      REVIEW OF SYSTEMS  --------------------------------------------------------------------------------  Gen: No  fevers/chills  Skin: No rashes  Head/Eyes/Ears/Mouth: No headache; Normal hearing; Normal vision w/o blurriness  Respiratory: No dyspnea, cough, wheezing, hemoptysis  CV: No chest pain, PND, orthopnea  GI: No abdominal pain, diarrhea, constipation, nausea, vomiting  : No increased frequency, dysuria, hematuria, nocturia  MSK: No joint pain/swelling; no back pain; no edema  Neuro: No dizziness/lightheadedness, weakness, seizures, numbness, tingling        All other systems were reviewed and are negative, except as noted.    VITALS/PHYSICAL EXAM  --------------------------------------------------------------------------------  T(C): 37.1 (01-04-23 @ 11:00), Max: 37.5 (01-03-23 @ 18:00)  HR: 93 (01-04-23 @ 11:45) (81 - 100)  BP: 102/56 (01-04-23 @ 07:30) (64/43 - 106/60)  RR: 21 (01-04-23 @ 11:45) (12 - 36)  SpO2: 94% (01-04-23 @ 11:45) (84% - 100%)  Wt(kg): --        01-03-23 @ 07:01  -  01-04-23 @ 07:00  --------------------------------------------------------  IN: 2536.6 mL / OUT: 871 mL / NET: 1665.6 mL    01-04-23 @ 07:01  -  01-04-23 @ 11:53  --------------------------------------------------------  IN: 533.3 mL / OUT: 0 mL / NET: 533.3 mL      Physical Exam:  Gen: NAD  HEENT: on RA now  Pulm: CTA B/L, no crackles   CV: RRR, S1S2+  Abd: +BS, soft, distended  : +urinary catheter  MSK: No LE edema  Psych: Awake  Skin: +Jaundice  Access: no access    LABS/STUDIES  --------------------------------------------------------------------------------              9.1    37.38 >-----------<  41       [01-04-23 @ 06:10]              26.4     136  |  102  |  23  ----------------------------<  150      [01-04-23 @ 06:10]  3.8   |  19  |  1.55        Ca     9.2     [01-04-23 @ 06:10]      Mg     2.3     [01-04-23 @ 06:10]      Phos  3.9     [01-04-23 @ 06:10]    TPro  6.5  /  Alb  3.3  /  TBili  27.6  /  DBili  x   /  AST  138  /  ALT  47  /  AlkPhos  213  [01-04-23 @ 06:10]    PT/INR: PT 25.4 , INR 2.17       [01-04-23 @ 00:46]  PTT: 51.0       [01-04-23 @ 00:46]      Creatinine Trend:  SCr 1.55 [01-04 @ 06:10]  SCr 1.34 [01-04 @ 00:42]  SCr 1.01 [01-03 @ 17:49]  SCr 0.77 [01-03 @ 11:55]  SCr 0.76 [01-03 @ 06:17]    Urinalysis - [01-03-23 @ 18:02]      Color Other / Appearance Turbid / SG SEE NOTE / pH SEE NOTE      Gluc SEE NOTE / Ketone SEE NOTE  / Bili SEE NOTE / Urobili SEE NOTE       Blood SEE NOTE / Protein SEE NOTE / Leuk Est SEE NOTE / Nitrite SEE NOTE      RBC  / WBC  / Hyaline  / Gran  / Sq Epi  / Non Sq Epi  / Bacteria            [facility] Division of Kidney Diseases & Hypertension  FOLLOW UP NOTE  [phone]--------------------------------------------------------------------------------  Chief Complaint:Disorder of liver    61 yo F with h/o decompensated ETOH cirrhosis with ascites, thyroid cancer (s/p total thyroidectomy, RTX, and radioactive iodide), HTN, ventrical neoplasm who was initially admitted to  12/19 with new onset seizures and transferred to [facility] 12/20 for liver transplant evaluation. Pt being seen for RED requiring CRRT.    24 hour events/subjective: Pt was seen and evaluated in the ICU this morning, offers no acute complaints. Remains on pressors. Has been off of CRRT since 1/3 due to line removal and holiday for infection concern      PAST HISTORY  --------------------------------------------------------------------------------  No significant changes to PMH, PSH, FHx, SHx, unless otherwise noted    ALLERGIES & MEDICATIONS  --------------------------------------------------------------------------------  Allergies    Macrobid (Rash)    Intolerances    Nexium (Stomach Upset)    Standing Inpatient Medications  caspofungin IVPB      caspofungin IVPB 50 milliGRAM(s) IV Intermittent every 24 hours  chlorhexidine 2% Cloths 1 Application(s) Topical <User Schedule>  CRRT Treatment    <Continuous>  escitalopram 10 milliGRAM(s) Oral daily  folic acid 1 milliGRAM(s) Oral daily  influenza   Vaccine 0.5 milliLiter(s) IntraMuscular once  insulin lispro (ADMELOG) corrective regimen sliding scale   SubCutaneous every 6 hours  levETIRAcetam  Solution 750 milliGRAM(s) Enteral Tube two times a day  levothyroxine Injectable 103 MICROGram(s) IV Push at bedtime  meropenem  IVPB 1000 milliGRAM(s) IV Intermittent every 8 hours  midodrine 20 milliGRAM(s) Oral every 8 hours  multivitamin/minerals/iron Oral Solution (CENTRUM) 15 milliLiter(s) Oral daily  norepinephrine Infusion 0.05 MICROgram(s)/kG/Min IV Continuous <Continuous>  pantoprazole   Suspension 40 milliGRAM(s) Oral every 24 hours  Phoxillum Filtration BK 4 / 2.5 5000 milliLiter(s) CRRT <Continuous>  Phoxillum Filtration BK 4 / 2.5 5000 milliLiter(s) CRRT <Continuous>  Phoxillum Filtration BK 4 / 2.5 5000 milliLiter(s) CRRT <Continuous>  polyethylene glycol 3350 17 Gram(s) Oral every 12 hours  rifAXIMin 550 milliGRAM(s) Oral every 12 hours  thiamine 100 milliGRAM(s) Enteral Tube daily  vasopressin Infusion 0.03 Unit(s)/Min IV Continuous <Continuous>    PRN Inpatient Medications  HYDROmorphone  Injectable 0.25 milliGRAM(s) IV Push every 3 hours PRN  sodium chloride 0.65% Nasal 1 Spray(s) Both Nostrils every 3 hours PRN  tetracaine/benzocaine/butamben Spray 1 Spray(s) Topical every 3 hours PRN      REVIEW OF SYSTEMS  --------------------------------------------------------------------------------  Gen: No  fevers/chills  Skin: No rashes  Head/Eyes/Ears/Mouth: No headache; Normal hearing; Normal vision w/o blurriness  Respiratory: No dyspnea, cough, wheezing, hemoptysis  CV: No chest pain, PND, orthopnea  GI: No abdominal pain, diarrhea, constipation, nausea, vomiting  : No increased frequency, dysuria, hematuria, nocturia  MSK: No joint pain/swelling; no back pain; no edema  Neuro: No dizziness/lightheadedness, weakness, seizures, numbness, tingling        All other systems were reviewed and are negative, except as noted.    VITALS/PHYSICAL EXAM  --------------------------------------------------------------------------------  T(C): 37.1 (01-04-23 @ 11:00), Max: 37.5 (01-03-23 @ 18:00)  HR: 93 (01-04-23 @ 11:45) (81 - 100)  BP: 102/56 (01-04-23 @ 07:30) (64/43 - 106/60)  RR: 21 (01-04-23 @ 11:45) (12 - 36)  SpO2: 94% (01-04-23 @ 11:45) (84% - 100%)  Wt(kg): --        01-03-23 @ 07:01  -  01-04-23 @ 07:00  --------------------------------------------------------  IN: 2536.6 mL / OUT: 871 mL / NET: 1665.6 mL    01-04-23 @ 07:01  -  01-04-23 @ 11:53  --------------------------------------------------------  IN: 533.3 mL / OUT: 0 mL / NET: 533.3 mL      Physical Exam:  Gen: NAD  HEENT: on RA now  Pulm: CTA B/L, no crackles   CV: RRR, S1S2+  Abd: +BS, soft, distended  : +urinary catheter  MSK: No LE edema  Psych: Awake  Skin: +Jaundice  Access: no access    LABS/STUDIES  --------------------------------------------------------------------------------              9.1    37.38 >-----------<  41       [01-04-23 @ 06:10]              26.4     136  |  102  |  23  ----------------------------<  150      [01-04-23 @ 06:10]  3.8   |  19  |  1.55        Ca     9.2     [01-04-23 @ 06:10]      Mg     2.3     [01-04-23 @ 06:10]      Phos  3.9     [01-04-23 @ 06:10]    TPro  6.5  /  Alb  3.3  /  TBili  27.6  /  DBili  x   /  AST  138  /  ALT  47  /  AlkPhos  213  [01-04-23 @ 06:10]    PT/INR: PT 25.4 , INR 2.17       [01-04-23 @ 00:46]  PTT: 51.0       [01-04-23 @ 00:46]      Creatinine Trend:  SCr 1.55 [01-04 @ 06:10]  SCr 1.34 [01-04 @ 00:42]  SCr 1.01 [01-03 @ 17:49]  SCr 0.77 [01-03 @ 11:55]  SCr 0.76 [01-03 @ 06:17]    Urinalysis - [01-03-23 @ 18:02]      Color Other / Appearance Turbid / SG SEE NOTE / pH SEE NOTE      Gluc SEE NOTE / Ketone SEE NOTE  / Bili SEE NOTE / Urobili SEE NOTE       Blood SEE NOTE / Protein SEE NOTE / Leuk Est SEE NOTE / Nitrite SEE NOTE      RBC  / WBC  / Hyaline  / Gran  / Sq Epi  / Non Sq Epi  / Bacteria            [facility] Division of Kidney Diseases & Hypertension  FOLLOW UP NOTE  [phone]--------------------------------------------------------------------------------  Chief Complaint:Disorder of liver    63 yo F with h/o decompensated ETOH cirrhosis with ascites, thyroid cancer (s/p total thyroidectomy, RTX, and radioactive iodide), HTN, ventrical neoplasm who was initially admitted to  12/19 with new onset seizures and transferred to [facility] 12/20 for liver transplant evaluation. Pt being seen for RED requiring CRRT.    24 hour events/subjective: Pt was seen and evaluated in the ICU this morning, offers no acute complaints. Remains on pressors. Has been off of CRRT since 1/3 due to line removal and holiday for infection concern      PAST HISTORY  --------------------------------------------------------------------------------  No significant changes to PMH, PSH, FHx, SHx, unless otherwise noted    ALLERGIES & MEDICATIONS  --------------------------------------------------------------------------------  Allergies    Macrobid (Rash)    Intolerances    Nexium (Stomach Upset)    Standing Inpatient Medications  caspofungin IVPB      caspofungin IVPB 50 milliGRAM(s) IV Intermittent every 24 hours  chlorhexidine 2% Cloths 1 Application(s) Topical <User Schedule>  CRRT Treatment    <Continuous>  escitalopram 10 milliGRAM(s) Oral daily  folic acid 1 milliGRAM(s) Oral daily  influenza   Vaccine 0.5 milliLiter(s) IntraMuscular once  insulin lispro (ADMELOG) corrective regimen sliding scale   SubCutaneous every 6 hours  levETIRAcetam  Solution 750 milliGRAM(s) Enteral Tube two times a day  levothyroxine Injectable 103 MICROGram(s) IV Push at bedtime  meropenem  IVPB 1000 milliGRAM(s) IV Intermittent every 8 hours  midodrine 20 milliGRAM(s) Oral every 8 hours  multivitamin/minerals/iron Oral Solution (CENTRUM) 15 milliLiter(s) Oral daily  norepinephrine Infusion 0.05 MICROgram(s)/kG/Min IV Continuous <Continuous>  pantoprazole   Suspension 40 milliGRAM(s) Oral every 24 hours  Phoxillum Filtration BK 4 / 2.5 5000 milliLiter(s) CRRT <Continuous>  Phoxillum Filtration BK 4 / 2.5 5000 milliLiter(s) CRRT <Continuous>  Phoxillum Filtration BK 4 / 2.5 5000 milliLiter(s) CRRT <Continuous>  polyethylene glycol 3350 17 Gram(s) Oral every 12 hours  rifAXIMin 550 milliGRAM(s) Oral every 12 hours  thiamine 100 milliGRAM(s) Enteral Tube daily  vasopressin Infusion 0.03 Unit(s)/Min IV Continuous <Continuous>    PRN Inpatient Medications  HYDROmorphone  Injectable 0.25 milliGRAM(s) IV Push every 3 hours PRN  sodium chloride 0.65% Nasal 1 Spray(s) Both Nostrils every 3 hours PRN  tetracaine/benzocaine/butamben Spray 1 Spray(s) Topical every 3 hours PRN      REVIEW OF SYSTEMS  --------------------------------------------------------------------------------  Gen: No  fevers/chills  Skin: No rashes  Head/Eyes/Ears/Mouth: No headache; Normal hearing; Normal vision w/o blurriness  Respiratory: No dyspnea, cough, wheezing, hemoptysis  CV: No chest pain, PND, orthopnea  GI: No abdominal pain, diarrhea, constipation, nausea, vomiting  : No increased frequency, dysuria, hematuria, nocturia  MSK: No joint pain/swelling; no back pain; no edema  Neuro: No dizziness/lightheadedness, weakness, seizures, numbness, tingling        All other systems were reviewed and are negative, except as noted.    VITALS/PHYSICAL EXAM  --------------------------------------------------------------------------------  T(C): 37.1 (01-04-23 @ 11:00), Max: 37.5 (01-03-23 @ 18:00)  HR: 93 (01-04-23 @ 11:45) (81 - 100)  BP: 102/56 (01-04-23 @ 07:30) (64/43 - 106/60)  RR: 21 (01-04-23 @ 11:45) (12 - 36)  SpO2: 94% (01-04-23 @ 11:45) (84% - 100%)  Wt(kg): --        01-03-23 @ 07:01  -  01-04-23 @ 07:00  --------------------------------------------------------  IN: 2536.6 mL / OUT: 871 mL / NET: 1665.6 mL    01-04-23 @ 07:01  -  01-04-23 @ 11:53  --------------------------------------------------------  IN: 533.3 mL / OUT: 0 mL / NET: 533.3 mL      Physical Exam:  Gen: NAD  HEENT: on RA now  Pulm: CTA B/L, no crackles   CV: RRR, S1S2+  Abd: +BS, soft, distended  : +urinary catheter  MSK: No LE edema  Psych: Awake  Skin: +Jaundice  Access: no access    LABS/STUDIES  --------------------------------------------------------------------------------              9.1    37.38 >-----------<  41       [01-04-23 @ 06:10]              26.4     136  |  102  |  23  ----------------------------<  150      [01-04-23 @ 06:10]  3.8   |  19  |  1.55        Ca     9.2     [01-04-23 @ 06:10]      Mg     2.3     [01-04-23 @ 06:10]      Phos  3.9     [01-04-23 @ 06:10]    TPro  6.5  /  Alb  3.3  /  TBili  27.6  /  DBili  x   /  AST  138  /  ALT  47  /  AlkPhos  213  [01-04-23 @ 06:10]    PT/INR: PT 25.4 , INR 2.17       [01-04-23 @ 00:46]  PTT: 51.0       [01-04-23 @ 00:46]      Creatinine Trend:  SCr 1.55 [01-04 @ 06:10]  SCr 1.34 [01-04 @ 00:42]  SCr 1.01 [01-03 @ 17:49]  SCr 0.77 [01-03 @ 11:55]  SCr 0.76 [01-03 @ 06:17]    Urinalysis - [01-03-23 @ 18:02]      Color Other / Appearance Turbid / SG SEE NOTE / pH SEE NOTE      Gluc SEE NOTE / Ketone SEE NOTE  / Bili SEE NOTE / Urobili SEE NOTE       Blood SEE NOTE / Protein SEE NOTE / Leuk Est SEE NOTE / Nitrite SEE NOTE      RBC  / WBC  / Hyaline  / Gran  / Sq Epi  / Non Sq Epi  / Bacteria

## 2023-01-04 NOTE — PROCEDURE NOTE - NSINDICATIONS_GEN_A_CORE
critical illness/hemodynamic monitoring/venous access
ascites/suspected spontaneous bacterial peritonitis
dialysis/CRRT
ascites
dialysis/CRRT
arterial puncture to obtain ABG's/blood sampling/critical patient/monitoring purposes
ascites

## 2023-01-04 NOTE — PROGRESS NOTE ADULT - NS ATTEND AMEND GEN_ALL_CORE FT
cvvh when shiley replaced  continue ivabx for multifocal pneumonia, leukocytosis  continued sicu care

## 2023-01-04 NOTE — PROCEDURE NOTE - NSPROCNAME_GEN_A_CORE
Paracentesis
Central Line Insertion
Central Line Insertion
Arterial Puncture/Cannulation
Central Line Insertion

## 2023-01-04 NOTE — PROCEDURE NOTE - ADDITIONAL PROCEDURE DETAILS
Addended by: MELISSA BRO on: 4/21/2022 09:51 AM     Modules accepted: Myrna, SmartSet     Sterile ultrasound guided placement of right radial arterial line, good pulsatile blood return, attached to line and monitor with good waveform, sutured in place and dressing applied.

## 2023-01-04 NOTE — PROGRESS NOTE ADULT - SUBJECTIVE AND OBJECTIVE BOX
Transplant Surgery Progress Note    Present:   Patient seen and examined with multidisciplinary Transplant team including  Surgeon: Dr. Oconnor. Hepatologist: Dr. Curtis,  NP: Jacky and RNs in AM rounds.   Disciplines not in attendance will be notified of the plan.     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  Arthur ([phone]) and Daughter Taylor at [facility] provided additional history. Of note was recently admitted to  11/2022 for workup and eval of jaundice- during that hospitalization was found to have a UTI and dx with alc hep and started on steroids (was deemed a non-responder and steroids were subsequently stopped); s/p LVP at [place] 11/2022. Previously hospitalized in 2021 at [place] for ETOH detox. Went to ETOH rehab 08/2022 and was asked to leave the facility when she was COVID+; was sober for 1 week until she started drinking again. Had also attended a few AA meetings in the past. Transferred from [facility] 12/20 for further management of acute liver failure and liver transplant evaluation.       Interval events:  - remains on Vaso and Levo  - on RA  - TF at goal  - Abdomen distended. CT w/ large volume ascites  - mental status waxing and waning, but overall improved  - diffuse rash across abdomen and flank, improving    Potential Discharge date: pending clinical stability  Education:  Medications  Plan of care:  See Below        MEDICATIONS  (STANDING):  caspofungin IVPB      caspofungin IVPB 50 milliGRAM(s) IV Intermittent every 24 hours  chlorhexidine 2% Cloths 1 Application(s) Topical <User Schedule>  CRRT Treatment    <Continuous>  escitalopram 10 milliGRAM(s) Oral daily  folic acid 1 milliGRAM(s) Oral daily  influenza   Vaccine 0.5 milliLiter(s) IntraMuscular once  insulin lispro (ADMELOG) corrective regimen sliding scale   SubCutaneous every 6 hours  levETIRAcetam  Solution 750 milliGRAM(s) Enteral Tube two times a day  levothyroxine Injectable 103 MICROGram(s) IV Push at bedtime  meropenem  IVPB 1000 milliGRAM(s) IV Intermittent every 8 hours  midodrine 20 milliGRAM(s) Oral every 8 hours  multivitamin/minerals/iron Oral Solution (CENTRUM) 15 milliLiter(s) Oral daily  norepinephrine Infusion 0.05 MICROgram(s)/kG/Min (6.21 mL/Hr) IV Continuous <Continuous>  pantoprazole   Suspension 40 milliGRAM(s) Oral every 24 hours  Phoxillum Filtration BK 4 / 2.5 5000 milliLiter(s) (800 mL/Hr) CRRT <Continuous>  Phoxillum Filtration BK 4 / 2.5 5000 milliLiter(s) (200 mL/Hr) CRRT <Continuous>  Phoxillum Filtration BK 4 / 2.5 5000 milliLiter(s) (1000 mL/Hr) CRRT <Continuous>  polyethylene glycol 3350 17 Gram(s) Oral every 12 hours  rifAXIMin 550 milliGRAM(s) Oral every 12 hours  thiamine 100 milliGRAM(s) Enteral Tube daily  vasopressin Infusion 0.03 Unit(s)/Min (4.5 mL/Hr) IV Continuous <Continuous>    MEDICATIONS  (PRN):  HYDROmorphone  Injectable 0.25 milliGRAM(s) IV Push every 3 hours PRN Severe Pain (7 - 10)  sodium chloride 0.65% Nasal 1 Spray(s) Both Nostrils every 3 hours PRN Nasal Congestion  tetracaine/benzocaine/butamben Spray 1 Spray(s) Topical every 3 hours PRN for ngt discomfort      PAST MEDICAL & SURGICAL HISTORY:  HTN (hypertension)  off medication for over one year--states BP has been normal      UTI (urinary tract infection)  currently being treated--will complete antibiotics 3/8/2022      Intracranial tumor      GERD (gastroesophageal reflux disease)      Eczema      Thyroid cancer  surgery. radiation, Radioactive iodine      COVID-19 virus infection  11/2021--recovered at home      H/O total thyroidectomy  age 21&#x27;s for Thyroid cancer, postop complication arterial bleed, second surgery      History of tonsillectomy  age 5      History of appendectomy  age 5          Vital Signs Last 24 Hrs  T(C): 37.1 (04 Jan 2023 11:00), Max: 37.5 (03 Jan 2023 18:00)  T(F): 98.8 (04 Jan 2023 11:00), Max: 99.5 (03 Jan 2023 18:00)  HR: 94 (04 Jan 2023 13:30) (81 - 97)  BP: 102/56 (04 Jan 2023 07:30) (64/43 - 106/60)  BP(mean): 74 (04 Jan 2023 07:30) (49 - 74)  RR: 20 (04 Jan 2023 13:30) (12 - 33)  SpO2: 98% (04 Jan 2023 13:30) (87% - 100%)    Parameters below as of 04 Jan 2023 07:00  Patient On (Oxygen Delivery Method): room air        I&O's Summary    03 Jan 2023 07:01  -  04 Jan 2023 07:00  --------------------------------------------------------  IN: 2536.6 mL / OUT: 871 mL / NET: 1665.6 mL    04 Jan 2023 07:01  -  04 Jan 2023 14:00  --------------------------------------------------------  IN: 742.3 mL / OUT: 0 mL / NET: 742.3 mL                              9.1    37.38 )-----------( 41       ( 04 Jan 2023 06:10 )             26.4     01-04    136  |  101  |  27<H>  ----------------------------<  116<H>  3.7   |  19<L>  |  1.78<H>    Ca    9.5      04 Jan 2023 12:24  Phos  4.1     01-04  Mg     2.2     01-04    TPro  6.1  /  Alb  3.1<L>  /  TBili  25.4<H>  /  DBili  x   /  AST  138<H>  /  ALT  42  /  AlkPhos  195<H>  01-04    Review of systems  AMS    Physical Exam:  Constitutional: NAD   Eyes: icteric, PERRLA  ENMT: nc/at, no thrush  Neck: supple, central line   Cardiovascular: RRR  Gastrointestinal: abdomen distended/tight.  rectal tube in place  Genitourinary: anuric  Extremities: SCD's in place and working bilaterally, no edema  Vascular: Palpable dp pulses bilaterally.   Neurological: following commands, mentation improving  Skin:  diffuse rash across abdomen and flank. No ulcerations, lesions  Musculoskeletal: moving extremities  Psychiatric: calm      Mg     2.2     01-04    TPro  6.1  /  Alb  3.1<L>  /  TBili  25.4<H>  /  DBili  x   /  AST  138<H>  /  ALT  42  /  AlkPhos  195<H>  01-04                   Transplant Surgery Progress Note    Present:   Patient seen and examined with multidisciplinary Transplant team including  Surgeon: Dr. Oconnor. Hepatologist: Dr. Curtis,  NP: Jacky and RNs in AM rounds.   Disciplines not in attendance will be notified of the plan.     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  Arthur ([phone]) and Daughter Taylor at [facility] provided additional history. Of note was recently admitted to  11/2022 for workup and eval of jaundice- during that hospitalization was found to have a UTI and dx with alc hep and started on steroids (was deemed a non-responder and steroids were subsequently stopped); s/p LVP at [place] 11/2022. Previously hospitalized in 2021 at [place] for ETOH detox. Went to ETOH rehab 08/2022 and was asked to leave the facility when she was COVID+; was sober for 1 week until she started drinking again. Had also attended a few AA meetings in the past. Transferred from [facility] 12/20 for further management of acute liver failure and liver transplant evaluation.       Interval events:  - remains on Vaso and Levo  - on RA  - TF at goal  - Abdomen distended. CT w/ large volume ascites  - mental status waxing and waning, but overall improved  - diffuse rash across abdomen and flank, improving    Potential Discharge date: pending clinical stability  Education:  Medications  Plan of care:  See Below        MEDICATIONS  (STANDING):  caspofungin IVPB      caspofungin IVPB 50 milliGRAM(s) IV Intermittent every 24 hours  chlorhexidine 2% Cloths 1 Application(s) Topical <User Schedule>  CRRT Treatment    <Continuous>  escitalopram 10 milliGRAM(s) Oral daily  folic acid 1 milliGRAM(s) Oral daily  influenza   Vaccine 0.5 milliLiter(s) IntraMuscular once  insulin lispro (ADMELOG) corrective regimen sliding scale   SubCutaneous every 6 hours  levETIRAcetam  Solution 750 milliGRAM(s) Enteral Tube two times a day  levothyroxine Injectable 103 MICROGram(s) IV Push at bedtime  meropenem  IVPB 1000 milliGRAM(s) IV Intermittent every 8 hours  midodrine 20 milliGRAM(s) Oral every 8 hours  multivitamin/minerals/iron Oral Solution (CENTRUM) 15 milliLiter(s) Oral daily  norepinephrine Infusion 0.05 MICROgram(s)/kG/Min (6.21 mL/Hr) IV Continuous <Continuous>  pantoprazole   Suspension 40 milliGRAM(s) Oral every 24 hours  Phoxillum Filtration BK 4 / 2.5 5000 milliLiter(s) (800 mL/Hr) CRRT <Continuous>  Phoxillum Filtration BK 4 / 2.5 5000 milliLiter(s) (200 mL/Hr) CRRT <Continuous>  Phoxillum Filtration BK 4 / 2.5 5000 milliLiter(s) (1000 mL/Hr) CRRT <Continuous>  polyethylene glycol 3350 17 Gram(s) Oral every 12 hours  rifAXIMin 550 milliGRAM(s) Oral every 12 hours  thiamine 100 milliGRAM(s) Enteral Tube daily  vasopressin Infusion 0.03 Unit(s)/Min (4.5 mL/Hr) IV Continuous <Continuous>    MEDICATIONS  (PRN):  HYDROmorphone  Injectable 0.25 milliGRAM(s) IV Push every 3 hours PRN Severe Pain (7 - 10)  sodium chloride 0.65% Nasal 1 Spray(s) Both Nostrils every 3 hours PRN Nasal Congestion  tetracaine/benzocaine/butamben Spray 1 Spray(s) Topical every 3 hours PRN for ngt discomfort      PAST MEDICAL & SURGICAL HISTORY:  HTN (hypertension)  off medication for over one year--states BP has been normal      UTI (urinary tract infection)  currently being treated--will complete antibiotics 3/8/2022      Intracranial tumor      GERD (gastroesophageal reflux disease)      Eczema      Thyroid cancer  surgery. radiation, Radioactive iodine      COVID-19 virus infection  11/2021--recovered at home      H/O total thyroidectomy  age 21&#x27;s for Thyroid cancer, postop complication arterial bleed, second surgery      History of tonsillectomy  age 5      History of appendectomy  age 5          Vital Signs Last 24 Hrs  T(C): 37.1 (04 Jan 2023 11:00), Max: 37.5 (03 Jan 2023 18:00)  T(F): 98.8 (04 Jan 2023 11:00), Max: 99.5 (03 Jan 2023 18:00)  HR: 94 (04 Jan 2023 13:30) (81 - 97)  BP: 102/56 (04 Jan 2023 07:30) (64/43 - 106/60)  BP(mean): 74 (04 Jan 2023 07:30) (49 - 74)  RR: 20 (04 Jan 2023 13:30) (12 - 33)  SpO2: 98% (04 Jan 2023 13:30) (87% - 100%)    Parameters below as of 04 Jan 2023 07:00  Patient On (Oxygen Delivery Method): room air        I&O's Summary    03 Jan 2023 07:01  -  04 Jan 2023 07:00  --------------------------------------------------------  IN: 2536.6 mL / OUT: 871 mL / NET: 1665.6 mL    04 Jan 2023 07:01  -  04 Jan 2023 14:00  --------------------------------------------------------  IN: 742.3 mL / OUT: 0 mL / NET: 742.3 mL                              9.1    37.38 )-----------( 41       ( 04 Jan 2023 06:10 )             26.4     01-04    136  |  101  |  27<H>  ----------------------------<  116<H>  3.7   |  19<L>  |  1.78<H>    Ca    9.5      04 Jan 2023 12:24  Phos  4.1     01-04  Mg     2.2     01-04    TPro  6.1  /  Alb  3.1<L>  /  TBili  25.4<H>  /  DBili  x   /  AST  138<H>  /  ALT  42  /  AlkPhos  195<H>  01-04    Review of systems  AMS    Physical Exam:  Constitutional: NAD   Eyes: icteric, PERRLA  ENMT: nc/at, no thrush  Neck: supple, central line   Cardiovascular: RRR  Gastrointestinal: abdomen distended/tight.  rectal tube in place  Genitourinary: anuric  Extremities: SCD's in place and working bilaterally, no edema  Vascular: Palpable dp pulses bilaterally.   Neurological: following commands, mentation improving  Skin:  diffuse rash across abdomen and flank. No ulcerations, lesions  Musculoskeletal: moving extremities  Psychiatric: calm      Mg     2.2     01-04    TPro  6.1  /  Alb  3.1<L>  /  TBili  25.4<H>  /  DBili  x   /  AST  138<H>  /  ALT  42  /  AlkPhos  195<H>  01-04                   Transplant Surgery Progress Note    Present:   Patient seen and examined with multidisciplinary Transplant team including  Surgeon: Dr. Oconnor. Hepatologist: Dr. Curtis,  NP: Jacky and RNs in AM rounds.   Disciplines not in attendance will be notified of the plan.     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  Arthur ([phone]) and Daughter Taylor at [facility] provided additional history. Of note was recently admitted to  11/2022 for workup and eval of jaundice- during that hospitalization was found to have a UTI and dx with alc hep and started on steroids (was deemed a non-responder and steroids were subsequently stopped); s/p LVP at [place] 11/2022. Previously hospitalized in 2021 at [place] for ETOH detox. Went to ETOH rehab 08/2022 and was asked to leave the facility when she was COVID+; was sober for 1 week until she started drinking again. Had also attended a few AA meetings in the past. Transferred from [facility] 12/20 for further management of acute liver failure and liver transplant evaluation.       Interval events:  - remains on Vaso and Levo  - on RA  - TF at goal  - Abdomen distended. CT w/ large volume ascites  - mental status waxing and waning, but overall improved  - diffuse rash across abdomen and flank, improving    Potential Discharge date: pending clinical stability  Education:  Medications  Plan of care:  See Below        MEDICATIONS  (STANDING):  caspofungin IVPB      caspofungin IVPB 50 milliGRAM(s) IV Intermittent every 24 hours  chlorhexidine 2% Cloths 1 Application(s) Topical <User Schedule>  CRRT Treatment    <Continuous>  escitalopram 10 milliGRAM(s) Oral daily  folic acid 1 milliGRAM(s) Oral daily  influenza   Vaccine 0.5 milliLiter(s) IntraMuscular once  insulin lispro (ADMELOG) corrective regimen sliding scale   SubCutaneous every 6 hours  levETIRAcetam  Solution 750 milliGRAM(s) Enteral Tube two times a day  levothyroxine Injectable 103 MICROGram(s) IV Push at bedtime  meropenem  IVPB 1000 milliGRAM(s) IV Intermittent every 8 hours  midodrine 20 milliGRAM(s) Oral every 8 hours  multivitamin/minerals/iron Oral Solution (CENTRUM) 15 milliLiter(s) Oral daily  norepinephrine Infusion 0.05 MICROgram(s)/kG/Min (6.21 mL/Hr) IV Continuous <Continuous>  pantoprazole   Suspension 40 milliGRAM(s) Oral every 24 hours  Phoxillum Filtration BK 4 / 2.5 5000 milliLiter(s) (800 mL/Hr) CRRT <Continuous>  Phoxillum Filtration BK 4 / 2.5 5000 milliLiter(s) (200 mL/Hr) CRRT <Continuous>  Phoxillum Filtration BK 4 / 2.5 5000 milliLiter(s) (1000 mL/Hr) CRRT <Continuous>  polyethylene glycol 3350 17 Gram(s) Oral every 12 hours  rifAXIMin 550 milliGRAM(s) Oral every 12 hours  thiamine 100 milliGRAM(s) Enteral Tube daily  vasopressin Infusion 0.03 Unit(s)/Min (4.5 mL/Hr) IV Continuous <Continuous>    MEDICATIONS  (PRN):  HYDROmorphone  Injectable 0.25 milliGRAM(s) IV Push every 3 hours PRN Severe Pain (7 - 10)  sodium chloride 0.65% Nasal 1 Spray(s) Both Nostrils every 3 hours PRN Nasal Congestion  tetracaine/benzocaine/butamben Spray 1 Spray(s) Topical every 3 hours PRN for ngt discomfort      PAST MEDICAL & SURGICAL HISTORY:  HTN (hypertension)  off medication for over one year--states BP has been normal      UTI (urinary tract infection)  currently being treated--will complete antibiotics 3/8/2022      Intracranial tumor      GERD (gastroesophageal reflux disease)      Eczema      Thyroid cancer  surgery. radiation, Radioactive iodine      COVID-19 virus infection  11/2021--recovered at home      H/O total thyroidectomy  age 21&#x27;s for Thyroid cancer, postop complication arterial bleed, second surgery      History of tonsillectomy  age 5      History of appendectomy  age 5          Vital Signs Last 24 Hrs  T(C): 37.1 (04 Jan 2023 11:00), Max: 37.5 (03 Jan 2023 18:00)  T(F): 98.8 (04 Jan 2023 11:00), Max: 99.5 (03 Jan 2023 18:00)  HR: 94 (04 Jan 2023 13:30) (81 - 97)  BP: 102/56 (04 Jan 2023 07:30) (64/43 - 106/60)  BP(mean): 74 (04 Jan 2023 07:30) (49 - 74)  RR: 20 (04 Jan 2023 13:30) (12 - 33)  SpO2: 98% (04 Jan 2023 13:30) (87% - 100%)    Parameters below as of 04 Jan 2023 07:00  Patient On (Oxygen Delivery Method): room air        I&O's Summary    03 Jan 2023 07:01  -  04 Jan 2023 07:00  --------------------------------------------------------  IN: 2536.6 mL / OUT: 871 mL / NET: 1665.6 mL    04 Jan 2023 07:01  -  04 Jan 2023 14:00  --------------------------------------------------------  IN: 742.3 mL / OUT: 0 mL / NET: 742.3 mL                              9.1    37.38 )-----------( 41       ( 04 Jan 2023 06:10 )             26.4     01-04    136  |  101  |  27<H>  ----------------------------<  116<H>  3.7   |  19<L>  |  1.78<H>    Ca    9.5      04 Jan 2023 12:24  Phos  4.1     01-04  Mg     2.2     01-04    TPro  6.1  /  Alb  3.1<L>  /  TBili  25.4<H>  /  DBili  x   /  AST  138<H>  /  ALT  42  /  AlkPhos  195<H>  01-04    Review of systems  AMS    Physical Exam:  Constitutional: NAD   Eyes: icteric, PERRLA  ENMT: nc/at, no thrush  Neck: supple, central line   Cardiovascular: RRR  Gastrointestinal: abdomen distended/tight.  rectal tube in place  Genitourinary: anuric  Extremities: SCD's in place and working bilaterally, no edema  Vascular: Palpable dp pulses bilaterally.   Neurological: following commands, mentation improving  Skin:  diffuse rash across abdomen and flank. No ulcerations, lesions  Musculoskeletal: moving extremities  Psychiatric: calm      Mg     2.2     01-04    TPro  6.1  /  Alb  3.1<L>  /  TBili  25.4<H>  /  DBili  x   /  AST  138<H>  /  ALT  42  /  AlkPhos  195<H>  01-04

## 2023-01-04 NOTE — PROGRESS NOTE ADULT - ASSESSMENT
61 yo F with h/o decompensated ETOH cirrhosis with ascites, thyroid cancer (s/p total thyroidectomy, RTX, and radioactive iodide), HTN, ventrical neoplasm who was initially admitted to  12/19 with new onset seizures and transferred to [facility] 12/20 for liver transplant evaluation. Pt being seen for RED requiring CRRT since 12/20 61 yo F with h/o decompensated ETOH cirrhosis with ascites, thyroid cancer (s/p total thyroidectomy, RTX, and radioactive iodide), HTN, ventrical neoplasm who was initially admitted to  12/19 with new onset seizures and transferred to [facility] 12/20 for liver transplant evaluation. Pt being seen for RED requiring CRRT since 12/20 61 yo F with h/o decompensated ETOH cirrhosis with ascites, thyroid cancer (s/p total thyroidectomy, RTX, and radioactive iodide), HTN, ventrical neoplasm who was initially admitted to  12/19 with new onset seizures and transferred to [facility] 12/20 for liver transplant evaluation. Pt being seen for RED requiring CRRT since 12/20

## 2023-01-04 NOTE — PROGRESS NOTE ADULT - ASSESSMENT
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 Patient has Alcohol Use Disorder ( recent rehab with relapse and in remission since 11/2022), decompensated ALD Cirrhosis c/b ascites. Patient admitted to [facility] on 12/19/2022 after a witnessed seizure; course was c/b septic shock with associated HRF ( intubated 12/19) and an RED ( started HD 12/20). Patient was transferred to [facility] on 12/20 for a liver transplant evaluation. Patient was started on CRRT 12/21 and was extubated 12/23/22. Patient remains in SICU for hemodynamic monitoring and management while liver transplant evaluation is in progress.       PLAN:  Neuro:  - Monitor Mental Status for Encephalopathy and trend Ammonia Level - has remained negative  - Continue Home Lexapro   - Continue Keppra  - Continue Folic Acid, Thiamine, and MV     Resp:  - RA  - Out of bed to chair, ambulate as tolerated, and incentive spirometry to prevent atelectasis    CV:  - Will wean vasopressor support  of Levophed and Vaso to maintain goal MAP > 65 mmHg  - Continue Midodrine 20mg q8hr     GI: Acute Decompensated Liver Failure 2/2 ETOH Use   - NPO; TF via NGT for goal 50cc/hr   - Increased Bowel regimen Miralax BID  - Protonix for stress ulcer prophylaxis  - Continue Rifaximin, Hold Lactulose for increasing abdominal distention   - s/p Paracentesis (12/26) transudative, negative for SBP   - Rectal tube    Renal:  - Continue CRRT to maintain net even  - CVP <4, s/p 5% Albumin 500cc x2  - Monitor I&Os and electrolytes w/ repletions as necessary    Heme:  - Monitor CBC and coags  - Hold Chemical VTE prophylaxis  - SCDs for Mechanical VTE ppx       ID:   - Persistent leukocytosis  - Monitor for clinical evidence of active infection  - Combi-Cath ( 12/22) Negative, Blood Cx ( 12/26) Negative,  MRSA (12/30) Negative   - Continue Empiric Caspofungin ( 12/26 - ?) and Meropenem (12/25 - 1/08)    Endo:   - Monitor glucose ; HgbA1C 4.9% ( 12/24)   - D/C ISS, monitor blood glucose on BMP  - Continue Synthroid for hypothyroidism    Code Status: Full Code   Disposition: SICU    Lines/Tubes:  -[facility] TLC   -L Radial Arterial Line  -PIV        Patient is a 63 y/o Female with PMH Thyroid Cancer ( s/p total thyroidectomy in her 20s, radiation, and BARONE), HTN, GERD, 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 Patient has Alcohol Use Disorder ( recent rehab with relapse and in remission since 11/2022), decompensated ALD Cirrhosis c/b ascites. Patient admitted to [facility] on 12/19/2022 after a witnessed seizure; course was c/b septic shock with associated HRF ( intubated 12/19) and an RED ( started HD 12/20). Patient was transferred to [facility] on 12/20 for a liver transplant evaluation. Patient was started on CRRT 12/21 and was extubated 12/23/22. Patient remains in SICU for hemodynamic monitoring and management while liver transplant evaluation is in progress.       PLAN:  Neuro:  - Monitor Mental Status for Encephalopathy and trend Ammonia Level - has remained negative  - Continue Home Lexapro   - Continue Keppra  - Continue Folic Acid, Thiamine, and MV     Resp:  - RA  - Out of bed to chair, ambulate as tolerated, and incentive spirometry to prevent atelectasis    CV:  - Will wean vasopressor support  of Levophed and Vaso to maintain goal MAP > 65 mmHg  - Continue Midodrine 20mg q8hr     GI: Acute Decompensated Liver Failure 2/2 ETOH Use   - NPO; TF via NGT for goal 50cc/hr   - Increased Bowel regimen Miralax BID  - Protonix for stress ulcer prophylaxis  - Continue Rifaximin, Hold Lactulose for increasing abdominal distention   - s/p Paracentesis (12/26) transudative, negative for SBP   - Rectal tube    Renal:  - Continue CRRT to maintain net even  - CVP <4, s/p 5% Albumin 500cc x2  - Monitor I&Os and electrolytes w/ repletions as necessary    Heme:  - Monitor CBC and coags  - Hold Chemical VTE prophylaxis  - SCDs for Mechanical VTE ppx       ID:   - Persistent leukocytosis  - Monitor for clinical evidence of active infection  - Combi-Cath ( 12/22) Negative, Blood Cx ( 12/26) Negative,  MRSA (12/30) Negative   - Continue Empiric Caspofungin ( 12/26 - ?) and Meropenem (12/25 - 1/08)    Endo:   - Monitor glucose ; HgbA1C 4.9% ( 12/24)   - D/C ISS, monitor blood glucose on BMP  - Continue Synthroid for hypothyroidism    Code Status: Full Code   Disposition: SICU    Lines/Tubes:  -[facility] TLC   -L Radial Arterial Line  -PIV        Patient is a 63 y/o Female with PMH Thyroid Cancer ( s/p total thyroidectomy in her 20s, radiation, and BARONE), HTN, GERD, 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 Patient has Alcohol Use Disorder ( recent rehab with relapse and in remission since 11/2022), decompensated ALD Cirrhosis c/b ascites. Patient admitted to [facility] on 12/19/2022 after a witnessed seizure; course was c/b septic shock with associated HRF ( intubated 12/19) and an RED ( started HD 12/20). Patient was transferred to [facility] on 12/20 for a liver transplant evaluation. Patient was started on CRRT 12/21 and was extubated 12/23/22. Patient remains in SICU for hemodynamic monitoring and management while liver transplant evaluation is in progress.       PLAN:  Neuro:  - Monitor Mental Status for Encephalopathy and trend Ammonia Level - has remained negative  - Continue Home Lexapro   - Continue Keppra  - Continue Folic Acid, Thiamine, and MV     Resp:  - RA  - Out of bed to chair, ambulate as tolerated, and incentive spirometry to prevent atelectasis    CV:  - Will wean vasopressor support  of Levophed and Vaso to maintain goal MAP > 65 mmHg  - Continue Midodrine 20mg q8hr     GI: Acute Decompensated Liver Failure 2/2 ETOH Use   - NPO; TF via NGT for goal 50cc/hr   - Increased Bowel regimen Miralax BID  - Protonix for stress ulcer prophylaxis  - Continue Rifaximin, Hold Lactulose for increasing abdominal distention   - s/p Paracentesis (12/26) transudative, negative for SBP   - Rectal tube    Renal:  - Continue CRRT to maintain net even  - CVP <4, s/p 5% Albumin 500cc x2  - Monitor I&Os and electrolytes w/ repletions as necessary    Heme:  - Monitor CBC and coags  - Hold Chemical VTE prophylaxis  - SCDs for Mechanical VTE ppx       ID:   - Persistent leukocytosis  - Monitor for clinical evidence of active infection  - Combi-Cath ( 12/22) Negative, Blood Cx ( 12/26) Negative,  MRSA (12/30) Negative   - Continue Empiric Caspofungin ( 12/26 - ?) and Meropenem (12/25 - 1/08)    Endo:   - Monitor glucose ; HgbA1C 4.9% ( 12/24)   - D/C ISS, monitor blood glucose on BMP  - Continue Synthroid for hypothyroidism    Code Status: Full Code   Disposition: SICU    Lines/Tubes:  -[facility] TLC   -L Radial Arterial Line  -PIV

## 2023-01-04 NOTE — PROGRESS NOTE ADULT - PROBLEM SELECTOR PLAN 3
Serum phos improved to 3.9 today. Continue to monitor    If you have any questions, please feel free to contact me  Arnold Charles  Nephrology Fellow  [phone]; Prefer [employer] TEAMS  (After 5pm or on weekends please page the on-call fellow).    Patient was discussed with Dr. Marrero who agrees with my A/P. Addendum to follow Serum phos improved to 3.9 today. Continue to monitor    If you have any questions, please feel free to contact me  Arnold Charles  Nephrology Fellow  [phone]; Prefer [employer] TEAMS  (After 5pm or on weekends please page the on-call fellow).    Patient was discussed with Dr. Marrero who agrees with my A/P. Addendum to follow Serum phos improved to 3.9 today. Continue to monitor    If you have any questions, please feel free to contact me  Arnold Charles  Nephrology Fellow  [phone]; Prefer [employer] TEAMS  (After 5pm or on weekends please page the on-call fellow).    Patient was discussed with Dr. Marrero who agrees with my A/P. Addendum to follow

## 2023-01-04 NOTE — PROGRESS NOTE ADULT - ASSESSMENT
62 y.o Hx significant for remote AUD, h/o thyroid cancer in her 20s s/p total thyroidectomy + RTX + radioactive iodide, HTN, ventricle neoplasm (dx 2021) s/p right frontal craniotomy (03/2022) for resection, post operative course c/b hemorrhage right lateral ventricle (managed non-operatively) who was initially admitted to [facility] 12/19 with new onset seizures.   Transferred from [facility] 12/20 for further management of acute liver failure and liver transplant evaluation 2/2 known ETOH Cirrhosis.     Decompensated ETOH Cirrhosis  - Wean off pressors as able, continue Midodrine  - Had line holiday yesterday. Plan to place new [place] today.  Will resume CVVH  - repeat CT chest/abdomen/pelvis today showed multifocal pneumonia, large volume ascites  - Needs  Dx and  Tx LVP   - ID: Leukocytosis. Continue empiric caspo and Meropenem  - HE: Continue lactulose BID, Miralax, Rifaximin   - procalcitonin level and random cortisol level  - Continue TF at goal  - on Keppra for seizures, (no activity since admission)  - Abdominal rash improving. Will consult Derm if persists/worsens  - Liver transplant evaluation deferred due to illness   - Continue excellent ICU care 62 y.o Hx significant for remote AUD, h/o thyroid cancer in her 20s s/p total thyroidectomy + RTX + radioactive iodide, HTN, ventricle neoplasm (dx 2021) s/p right frontal craniotomy (03/2022) for resection, post operative course c/b hemorrhage right lateral ventricle (managed non-operatively) who was initially admitted to [facility] 12/19 with new onset seizures.   Transferred from [facility] 12/20 for further management of acute liver failure and liver transplant evaluation 2/2 known ETOH Cirrhosis.     Decompensated ETOH Cirrhosis  - Wean off pressors as able, continue Midodrine  - Had line holiday yesterday. Plan to place new [place] today.  Will resume CVVH  - repeat CT chest/abdomen/pelvis today showed multifocal pneumonia, large volume ascites  - Needs  Dx and  Tx LVP   - ID: Leukocytosis. Continue empiric caspo and Meropenem  - HE: Continue lactulose BID, Miralax, Rifaximin   - procalcitonin level and random cortisol level  - Continue TF at goal  - on Keppra for seizures, (no activity since admission)  - Abdominal rash improving. 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   Transferred from [facility] 12/20 for further management of acute liver failure and liver transplant evaluation 2/2 known ETOH Cirrhosis.     Decompensated ETOH Cirrhosis  - Wean off pressors as able, continue Midodrine  - Had line holiday yesterday. Plan to place new [place] today.  Will resume CVVH  - repeat CT chest/abdomen/pelvis today showed multifocal pneumonia, large volume ascites  - Needs  Dx and  Tx LVP   - ID: Leukocytosis. Continue empiric caspo and Meropenem  - HE: Continue lactulose BID, Miralax, Rifaximin   - procalcitonin level and random cortisol level  - Continue TF at goal  - on Keppra for seizures, (no activity since admission)  - Abdominal rash improving. Will consult Derm if persists/worsens  - Liver transplant evaluation deferred due to illness   - Continue excellent ICU care

## 2023-01-04 NOTE — PROGRESS NOTE ADULT - ATTENDING COMMENTS
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    She was initially admitted at [facility] on December 19 with new onset seizures and was transferred on December 28 for potential evaluation for liver transplantation secondary to acute on chronic liver failure.  Patient recently was in a rehab facility in August 2022 and was asked leave the facility when she was found to be COVID-positive.    She was sober for about a week after discharge again resumed drinking.   I agree with the above outlined history, physical examination, assessment and plan.  Patient with multifocal pneumonia, status postextubation now on nasal oxygen, remains on CVVH (off last 24 hr)-> trend Cr and Urine output-> Nephro recommendation follow up  Keeping on broad-spectrum antibiotics including antifungal coverage with caspofungin. Noted ID recommendation favoring discontinuation.   Random cortisol 18.5- not suggestive of adrenal insuff.    We will review transplant candidacy once all infectious issues are sorted out and psychosocial evaluation completed  Indeterminate quant gold- > to be repeated.     MELD Na 40   Blood type O-. 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    She was initially admitted at [facility] on December 19 with new onset seizures and was transferred on December 28 for potential evaluation for liver transplantation secondary to acute on chronic liver failure.  Patient recently was in a rehab facility in August 2022 and was asked leave the facility when she was found to be COVID-positive.    She was sober for about a week after discharge again resumed drinking.   I agree with the above outlined history, physical examination, assessment and plan.  Patient with multifocal pneumonia, status postextubation now on nasal oxygen, remains on CVVH (off last 24 hr)-> trend Cr and Urine output-> Nephro recommendation follow up  Keeping on broad-spectrum antibiotics including antifungal coverage with caspofungin. Noted ID recommendation favoring discontinuation.   Random cortisol 18.5- not suggestive of adrenal insuff.    We will review transplant candidacy once all infectious issues are sorted out and psychosocial evaluation completed  Indeterminate quant gold- > to be repeated.     MELD Na 40   Blood type O-. 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    She was initially admitted at [facility] on December 19 with new onset seizures and was transferred on December 28 for potential evaluation for liver transplantation secondary to acute on chronic liver failure.  Patient recently was in a rehab facility in August 2022 and was asked leave the facility when she was found to be COVID-positive.    She was sober for about a week after discharge again resumed drinking.   I agree with the above outlined history, physical examination, assessment and plan.  Patient with multifocal pneumonia, status postextubation now on nasal oxygen, remains on CVVH (off last 24 hr)-> trend Cr and Urine output-> Nephro recommendation follow up  Keeping on broad-spectrum antibiotics including antifungal coverage with caspofungin. Noted ID recommendation favoring discontinuation.   Random cortisol 18.5- not suggestive of adrenal insuff.    We will review transplant candidacy once all infectious issues are sorted out and psychosocial evaluation completed  Indeterminate quant gold- > to be repeated.     MELD Na 40   Blood type O-.

## 2023-01-04 NOTE — PROGRESS NOTE ADULT - ATTENDING COMMENTS
ON: CT performed    aaox1, mild encephalopathy  ammonia stable  rifaximin, miralax  keppra for recent seizure, hx of craniotomy   on RA, wean as tolerated  AM CXR reticular pattern, rt atelectasis unchanged  encourage IS  levo on pause, vaso 0.03 MAP goal of 65  midodrine [id number]  lact 2.3  TEN 50/hr via NGT  rectal tube: output slowed down  MELD Na >40, t bili 27  abd distention likely ascites, non tender  protonix, per transplant  CRRT on hold for line change  anuric , likely hepatorenal syndrome  s/p albumin boluses, octreotide  stable Hb  thrombocytopenia  INR 2.1  no chem VTE PPX   Bcx, paracentesis no growth  empiric caspo, merop  afebrile, leukocytosis, no bandemia  ID following, now feels no discerinble infection and recommends cessation of antimicrobials. will discuss w transplant  CT chest 1/3: multifocal pneumonia  good glycemic control    full code  HD rt IJ, CVC IJ left , left radial a line. noted to have been placed 13 days ago, will replace today ON: CT performed    aaox1, mild encephalopathy  ammonia stable  rifaximin, miralax  keppra for recent seizure, hx of craniotomy   on RA, wean as tolerated  AM CXR reticular pattern, rt atelectasis unchanged  encourage IS  levo on pause, vaso 0.03 MAP goal of 65  midodrine [id number]  lact 2.3  TEN 50/hr via NGT  rectal tube: output slowed down  MELD Na >40, t bili 27  abd distention likely ascites, non tender  protonix, per transplant  CRRT on hold for line change  anuric , likely hepatorenal syndrome  s/p albumin boluses, octreotide  stable Hb  thrombocytopenia  INR 2.1  no chem VTE PPX   Bcx, paracentesis no growth  empiric caspo, merop  afebrile, leukocytosis, no bandemia  ID following, now feels no discerinble infection and recommends cessation of antimicrobials. will discuss w transplant  CT chest 1/3: multifocal pneumonia  good glycemic control    full code  HD rt IJ, CVC IJ left , left radial a line. noted to have been placed 13 days ago, will replace today ON: CT performed    aaox1, mild encephalopathy  ammonia stable  rifaximin, miralax  keppra for recent seizure, hx of craniotomy   on RA, wean as tolerated  AM CXR reticular pattern, rt atelectasis unchanged  encourage IS  levo on pause, vaso 0.03 MAP goal of 65  midodrine [id number]  lact 2.3  TEN 50/hr via NGT  rectal tube: output slowed down  MELD Na >40, t bili 27  abd distention likely ascites, non tender  protonix, per transplant  CRRT on hold for line change  anuric , likely hepatorenal syndrome  s/p albumin boluses, octreotide  stable Hb  thrombocytopenia  INR 2.1  no chem VTE PPX   Bcx, paracentesis no growth  empiric caspo, merop  afebrile, leukocytosis, no bandemia  ID following, now feels no discerinble infection and recommends cessation of antimicrobials. will discuss w transplant  CT chest 1/3: multifocal pneumonia  good glycemic control    full code  HD rt IJ, CVC IJ left , left radial a line. noted to have been placed 13 days ago, will replace today

## 2023-01-04 NOTE — PROCEDURE NOTE - NSPROCDETAILS_GEN_ALL_CORE
guidewire recovered/lumen(s) aspirated and flushed/sterile dressing applied/sterile technique, catheter placed/ultrasound guidance with use of sterile gel and probe cove
location identified, draped/prepped, sterile technique used, needle inserted/introduced/positive blood return obtained via catheter/connected to a pressurized flush line/sutured in place/hemostasis with direct pressure, dressing applied/Seldinger technique/all materials/supplies accounted for at end of procedure
guidewire recovered/lumen(s) aspirated and flushed/sterile dressing applied/sterile technique, catheter placed/ultrasound guidance with use of sterile gel and probe cove
location identified, sterile technique used, catheter introduced, fluid drained
guidewire recovered/lumen(s) aspirated and flushed/sterile dressing applied/sterile technique, catheter placed/ultrasound guidance with use of sterile gel and probe cove
location identified, sterile technique used, catheter introduced, fluid drained/Seldinger technique/sterile dressing applied

## 2023-01-04 NOTE — PROCEDURE NOTE - NSPOSTCAREGUIDE_GEN_A_CORE
Care for catheter as per unit/ICU protocols
Verbal/written post procedure instructions were given to patient/caregiver/Instructed patient/caregiver regarding signs and symptoms of infection/Keep the cast/splint/dressing clean and dry
Care for catheter as per unit/ICU protocols

## 2023-01-04 NOTE — PROGRESS NOTE ADULT - ASSESSMENT
62 year old female PMH decompensated ETOH cirrhosis c/b ascites (dx 11/2022), alc hep (dx 11/2022; non-responsive to steroids), remote h/o thyroid cancer in her 20s s/p total thyroidectomy + RTX + radioactive iodide, HTN, ventrical neoplasm (dx 2021) s/p right frontal craniotomy (03/2022) for resection post operative course c/b hemorrhage right lateral ventricle (managed non-operatively) who was initially admitted to  12/19 with new onset seizures and transferred to [facility] 12/20 for LT eval for correction.    CT Chest with possible pneumonia versus atelectasis    UA (12/19) Moderate Leukocyte Esterase  UCx (12/19) No Growth  BCx (12/19) Corynebacterium (1/4 Bottles)  MRSA Nasal PCR (12/19) Negative  Paracentesis (12/19, 12/26) Cell counts not suggestive of SBP  Combicath Sputum Culture (12/21) Normal Respiratory Cassandra  Combicath Sputum Culture (12/22) Normal Respiratory Cassandra    Extubated but continued shock  Suspect noninfectious etiology of current clinical status (liver failure)  Central lines exchanged within the last week    CT Chest (1/3/23) Scattered nodular opacities as well as right lower lobe consolidation. Secretions in the trachea.  CT A/P (1/3/23) Widespread small bowel thickening suggesting enteritis.    MRSA/MSSA PCR (12/30) Negative  Fungitell (12/26, 12/28) 56, 42    Reviewed CT Chest with Radiology today (1/4/23); findings could be consistent with infection. Favoring viral infection or PCP (although Fungitell not elevated) if infectious etiology. Could still be representative of atypical pulmonary edema.     #Abnormal CT Chest, Leukocytosis, Positive Blood Culture, Transaminitis, Cirrhosis  --Doubt that CT Chest or Abdomen findings can explain degree of leukocytosis or hypotension especially as patient is already s/p 2 weeks of Meropenem (12/21 -->) and ID workup has been otherwise unrevealing. Favor discontinuing Meropenem  --Can check urine legionella Ag  --Check repeat sputum culture if patient able to coordinate  --Continue to follow CBC with diff  --Continue to follow temperature curve  --Follow up on preliminary blood cultures    #Positive UCx (Candida dublinensis)  Unclear Significance  s/p Fluconazole 12/20 --> 12/25  UCx (1/3) No Growth  Fungitell (12/26, 12/28) 56, 42  --Stop Caspofungin (12/26 -->)    #Pre-Transplant Evaluation  --ID Clearance for OLT pending clinical status improvement  --Initial QuantiFeron Indeterminate; would repeat QuantiFeron     #Encounter to Vaccinate Patient  Will address vaccination outside of the critical illness period  COVID19: Has had COVID19 in 2020 and again in ~8/2022. Denies prior COVID19 Vaccination  Influenza: Will require  Pneumococcal: Would benefit from PCV20  HAV: Will require Hepatitis A Vaccine  HBV: Will require Heplisav  MMR: Immune  Varicella: Immune  Shingles: Will require Shingrix  Tdap: Will require Tdap    I will continue to follow. Please feel free to contact me with any further questions.    Jonah Mendoza M.D.  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    CT Chest with possible pneumonia versus atelectasis    UA (12/19) Moderate Leukocyte Esterase  UCx (12/19) No Growth  BCx (12/19) Corynebacterium (1/4 Bottles)  MRSA Nasal PCR (12/19) Negative  Paracentesis (12/19, 12/26) Cell counts not suggestive of SBP  Combicath Sputum Culture (12/21) Normal Respiratory Cassandra  Combicath Sputum Culture (12/22) Normal Respiratory Cassandra    Extubated but continued shock  Suspect noninfectious etiology of current clinical status (liver failure)  Central lines exchanged within the last week    CT Chest (1/3/23) Scattered nodular opacities as well as right lower lobe consolidation. Secretions in the trachea.  CT A/P (1/3/23) Widespread small bowel thickening suggesting enteritis.    MRSA/MSSA PCR (12/30) Negative  Fungitell (12/26, 12/28) 56, 42    Reviewed CT Chest with Radiology today (1/4/23); findings could be consistent with infection. Favoring viral infection or PCP (although Fungitell not elevated) if infectious etiology. Could still be representative of atypical pulmonary edema.     #Abnormal CT Chest, Leukocytosis, Positive Blood Culture, Transaminitis, Cirrhosis  --Doubt that CT Chest or Abdomen findings can explain degree of leukocytosis or hypotension especially as patient is already s/p 2 weeks of Meropenem (12/21 -->) and ID workup has been otherwise unrevealing. Favor discontinuing Meropenem  --Can check urine legionella Ag  --Check repeat sputum culture if patient able to coordinate  --Continue to follow CBC with diff  --Continue to follow temperature curve  --Follow up on preliminary blood cultures    #Positive UCx (Candida dublinensis)  Unclear Significance  s/p Fluconazole 12/20 --> 12/25  UCx (1/3) No Growth  Fungitell (12/26, 12/28) 56, 42  --Stop Caspofungin (12/26 -->)    #Pre-Transplant Evaluation  --ID Clearance for OLT pending clinical status improvement  --Initial QuantiFeron Indeterminate; would repeat QuantiFeron     #Encounter to Vaccinate Patient  Will address vaccination outside of the critical illness period  COVID19: Has had COVID19 in 2020 and again in ~8/2022. Denies prior COVID19 Vaccination  Influenza: Will require  Pneumococcal: Would benefit from PCV20  HAV: Will require Hepatitis A Vaccine  HBV: Will require Heplisav  MMR: Immune  Varicella: Immune  Shingles: Will require Shingrix  Tdap: Will require Tdap    I will continue to follow. Please feel free to contact me with any further questions.    Jonah Mendoza M.D.  [facility] Division of Infectious Disease  8AM-5PM Monday - Friday: Available on [employer] Teams  After Hours and Holidays (or if no response on [employer] Teams): Please contact the Infectious Diseases Office at [phone]    The above assessment and plan were discussed with 8ICU Team    62 year old female PMH decompensated ETOH cirrhosis c/b ascites (dx 11/2022), alc hep (dx 11/2022; non-responsive to steroids), remote h/o thyroid cancer in her 20s s/p total thyroidectomy + RTX + radioactive iodide, HTN, ventrical neoplasm (dx 2021) s/p right frontal craniotomy (03/2022) for resection post operative course c/b hemorrhage right lateral ventricle (managed non-operatively) who was initially admitted to  12/19 with new onset seizures and transferred to [facility] 12/20 for LT eval for [facility].    CT Chest with possible pneumonia versus atelectasis    UA (12/19) Moderate Leukocyte Esterase  UCx (12/19) No Growth  BCx (12/19) Corynebacterium (1/4 Bottles)  MRSA Nasal PCR (12/19) Negative  Paracentesis (12/19, 12/26) Cell counts not suggestive of SBP  Combicath Sputum Culture (12/21) Normal Respiratory Cassandra  Combicath Sputum Culture (12/22) Normal Respiratory Cassandra    Extubated but continued shock  Suspect noninfectious etiology of current clinical status (liver failure)  Central lines exchanged within the last week    CT Chest (1/3/23) Scattered nodular opacities as well as right lower lobe consolidation. Secretions in the trachea.  CT A/P (1/3/23) Widespread small bowel thickening suggesting enteritis.    MRSA/MSSA PCR (12/30) Negative  Fungitell (12/26, 12/28) 56, 42    Reviewed CT Chest with Radiology today (1/4/23); findings could be consistent with infection. Favoring viral infection or PCP (although Fungitell not elevated) if infectious etiology. Could still be representative of atypical pulmonary edema.     #Abnormal CT Chest, Leukocytosis, Positive Blood Culture, Transaminitis, Cirrhosis  --Doubt that CT Chest or Abdomen findings can explain degree of leukocytosis or hypotension especially as patient is already s/p 2 weeks of Meropenem (12/21 -->) and ID workup has been otherwise unrevealing. Favor discontinuing Meropenem  --Can check urine legionella Ag  --Check repeat sputum culture if patient able to coordinate  --Continue to follow CBC with diff  --Continue to follow temperature curve  --Follow up on preliminary blood cultures    #Positive UCx (Candida dublinensis)  Unclear Significance  s/p Fluconazole 12/20 --> 12/25  UCx (1/3) No Growth  Fungitell (12/26, 12/28) 56, 42  --Stop Caspofungin (12/26 -->)    #Pre-Transplant Evaluation  --ID Clearance for OLT pending clinical status improvement  --Initial QuantiFeron Indeterminate; would repeat QuantiFeron     #Encounter to Vaccinate Patient  Will address vaccination outside of the critical illness period  COVID19: Has had COVID19 in 2020 and again in ~8/2022. Denies prior COVID19 Vaccination  Influenza: Will require  Pneumococcal: Would benefit from PCV20  HAV: Will require Hepatitis A Vaccine  HBV: Will require Heplisav  MMR: Immune  Varicella: Immune  Shingles: Will require Shingrix  Tdap: Will require Tdap    I will continue to follow. Please feel free to contact me with any further questions.    Jonah Mendoza M.D.  [facility] Division of Infectious Disease  8AM-5PM Monday - Friday: Available on [employer] Teams  After Hours and Holidays (or if no response on [employer] Teams): Please contact the Infectious Diseases Office at [phone]    The above assessment and plan were discussed with 8ICU Team

## 2023-01-04 NOTE — PROGRESS NOTE ADULT - ATTENDING COMMENTS
RED in setting of cirrhosis.  HRS vs ATN, currently anuric.  CVVH held for line holiday.  Electrolytes stable.  Can restart CVVH tomorrow.

## 2023-01-04 NOTE — PROGRESS NOTE ADULT - SUBJECTIVE AND OBJECTIVE BOX
Follow Up:      Interval History:    REVIEW OF SYSTEMS  [  ] ROS unobtainable because:    [  ] All other systems negative except as noted below    Constitutional:  [ ] fever [ ] chills  [ ] weight loss  [ ] weakness  Skin:  [ ] rash [ ] phlebitis	  Eyes: [ ] icterus [ ] pain  [ ] discharge	  ENMT: [ ] sore throat  [ ] thrush [ ] ulcers [ ] exudates  Respiratory: [ ] dyspnea [ ] hemoptysis [ ] cough [ ] sputum	  Cardiovascular:  [ ] chest pain [ ] palpitations [ ] edema	  Gastrointestinal:  [ ] nausea [ ] vomiting [ ] diarrhea [ ] constipation [ ] pain	  Genitourinary:  [ ] dysuria [ ] frequency [ ] hematuria [ ] discharge [ ] flank pain  [ ] incontinence  Musculoskeletal:  [ ] myalgias [ ] arthralgias [ ] arthritis  [ ] back pain  Neurological:  [ ] headache [ ] seizures  [ ] confusion/altered mental status    Allergies  Macrobid (Rash)        ANTIMICROBIALS:  caspofungin IVPB    caspofungin IVPB 50 every 24 hours  meropenem  IVPB 1000 every 8 hours  rifAXIMin 550 every 12 hours      OTHER MEDS:  MEDICATIONS  (STANDING):  escitalopram 10 daily  HYDROmorphone  Injectable 0.25 every 3 hours PRN  influenza   Vaccine 0.5 once  insulin lispro (ADMELOG) corrective regimen sliding scale  every 6 hours  levETIRAcetam  Solution 750 two times a day  levothyroxine Injectable 103 at bedtime  midodrine 20 every 8 hours  norepinephrine Infusion 0.05 <Continuous>  pantoprazole   Suspension 40 every 24 hours  polyethylene glycol 3350 17 every 12 hours  vasopressin Infusion 0.03 <Continuous>      Vital Signs Last 24 Hrs  T(C): 36.8 (04 Jan 2023 15:00), Max: 37.5 (03 Jan 2023 18:00)  T(F): 98.3 (04 Jan 2023 15:00), Max: 99.5 (03 Jan 2023 18:00)  HR: 99 (04 Jan 2023 17:15) (81 - 100)  BP: 102/56 (04 Jan 2023 07:30) (102/56 - 106/60)  BP(mean): 74 (04 Jan 2023 07:30) (74 - 74)  RR: 19 (04 Jan 2023 17:15) (12 - 33)  SpO2: 98% (04 Jan 2023 17:15) (87% - 100%)    Parameters below as of 04 Jan 2023 07:00  Patient On (Oxygen Delivery Method): room air        PHYSICAL EXAMINATION:  General: Alert and Awake, NAD  HEENT: PERRL, EOMI  Neck: Supple  Cardiac: RRR, No M/R/G  Resp: CTAB, No Wh/Rh/Ra  Abdomen: NBS, NT/ND, No HSM, No rigidity or guarding  MSK: No LE edema. No Calf tenderness  : No dhillon  Skin: No rashes or lesions. Skin is warm and dry to the touch.   Neuro: Alert and Awake. CN 2-12 Grossly intact. Moves all four extremities spontaneously.  Psych: Calm, Pleasant, Cooperative                          9.1    37.38 )-----------( 41       ( 04 Jan 2023 06:10 )             26.4       01-04    136  |  101  |  27<H>  ----------------------------<  116<H>  3.7   |  19<L>  |  1.78<H>    Ca    9.5      04 Jan 2023 12:24  Phos  4.1     01-04  Mg     2.2     01-04    TPro  6.1  /  Alb  3.1<L>  /  TBili  25.4<H>  /  DBili  x   /  AST  138<H>  /  ALT  42  /  AlkPhos  195<H>  01-04      Urinalysis Basic - ( 03 Jan 2023 18:02 )    Color: Other / Appearance: Turbid / SG: SEE NOTE / pH: x  Gluc: x / Ketone: SEE NOTE  / Bili: SEE NOTE / Urobili: SEE NOTE   Blood: x / Protein: SEE NOTE / Nitrite: SEE NOTE   Leuk Esterase: SEE NOTE / RBC: x / WBC x   Sq Epi: x / Non Sq Epi: x / Bacteria: x        MICROBIOLOGY:  v  Peritoneal Peritoneal Fluid  12-26-22   No growth  --    polymorphonuclear leukocytes seen  No organisms seen  by cytocentrifuge      .Blood Blood-Peripheral  12-26-22   No Growth Final  --  --      .Blood Blood-Peripheral  12-26-22   No Growth Final  --  --      .Blood Blood  12-24-22   No Growth Final  --  --      .Blood Blood  12-24-22   No Growth Final  --  --      .Blood Blood  12-23-22   No Growth Final  --  --      .Blood Blood  12-23-22   No Growth Final  --  --      Combi-Cath Combi-Cath  12-22-22   Normal Respiratory Cassandra present  --    Moderate polymorphonuclear leukocytes seen per low power field  No squamous epithelial cells seen per low power field  No organisms seen per oil power field      Catheterized Catheterized  12-21-22   >100,000 CFU/ml Leticia dubliniensis "Susceptibilities not performed"  --  --      Ascites Fl Ascites Fluid  12-21-22   No growth at 5 days  --    polymorphonuclear leukocytes seen  No organisms seen  by cytocentrifuge      Trach Asp Tracheal Aspirate  12-21-22   Normal Respiratory Cassandra present  --    No polymorphonuclear leukocytes per low power field  Numerous Squamous epithelial cells per low power field  Moderate Gram Positive Rods seen per oil power field  Few Yeast like cells seen per oil power field      .Blood Blood-Peripheral  12-20-22   No Growth Final  --  --      .Blood Blood-Peripheral  12-20-22   No Growth Final  --  --        CMV IgG Antibody: 5.10 U/mL (12-24-22 @ 20:59)  Toxoplasma IgG Screen: <3.0 IU/mL (12-24-22 @ 20:59)          RADIOLOGY:    <The imaging below has been reviewed and visualized by me independently. Findings as detailed in report below> Follow Up:  Pneumonia    Interval History: undergoing paracentesis today. remains on pressors.     REVIEW OF SYSTEMS  [ x ] ROS unobtainable because:  encephalopathy  [  ] All other systems negative except as noted below    Constitutional:  [ ] fever [ ] chills  [ ] weight loss  [ ] weakness  Skin:  [ ] rash [ ] phlebitis	  Eyes: [ ] icterus [ ] pain  [ ] discharge	  ENMT: [ ] sore throat  [ ] thrush [ ] ulcers [ ] exudates  Respiratory: [ ] dyspnea [ ] hemoptysis [ ] cough [ ] sputum	  Cardiovascular:  [ ] chest pain [ ] palpitations [ ] edema	  Gastrointestinal:  [ ] nausea [ ] vomiting [ ] diarrhea [ ] constipation [ ] pain	  Genitourinary:  [ ] dysuria [ ] frequency [ ] hematuria [ ] discharge [ ] flank pain  [ ] incontinence  Musculoskeletal:  [ ] myalgias [ ] arthralgias [ ] arthritis  [ ] back pain  Neurological:  [ ] headache [ ] seizures  [ ] confusion/altered mental status    Allergies  Macrobid (Rash)        ANTIMICROBIALS:  caspofungin IVPB    caspofungin IVPB 50 every 24 hours  meropenem  IVPB 1000 every 8 hours  rifAXIMin 550 every 12 hours      OTHER MEDS:  MEDICATIONS  (STANDING):  escitalopram 10 daily  HYDROmorphone  Injectable 0.25 every 3 hours PRN  influenza   Vaccine 0.5 once  insulin lispro (ADMELOG) corrective regimen sliding scale  every 6 hours  levETIRAcetam  Solution 750 two times a day  levothyroxine Injectable 103 at bedtime  midodrine 20 every 8 hours  norepinephrine Infusion 0.05 <Continuous>  pantoprazole   Suspension 40 every 24 hours  polyethylene glycol 3350 17 every 12 hours  vasopressin Infusion 0.03 <Continuous>      Vital Signs Last 24 Hrs  T(C): 36.8 (04 Jan 2023 15:00), Max: 37.5 (03 Jan 2023 18:00)  T(F): 98.3 (04 Jan 2023 15:00), Max: 99.5 (03 Jan 2023 18:00)  HR: 99 (04 Jan 2023 17:15) (81 - 100)  BP: 102/56 (04 Jan 2023 07:30) (102/56 - 106/60)  BP(mean): 74 (04 Jan 2023 07:30) (74 - 74)  RR: 19 (04 Jan 2023 17:15) (12 - 33)  SpO2: 98% (04 Jan 2023 17:15) (87% - 100%)    Parameters below as of 04 Jan 2023 07:00  Patient On (Oxygen Delivery Method): room air    PHYSICAL EXAMINATION:  General: Alert and Awake, NAD, Jaundice  HEENT: Normocephalic / Atraumatic  Resp: No accessory muscles of respiration utilized  Abdomen: Not distended.  MSK: No LE edema.   Skin: No rashes or lesions.    Neuro: Alert and Awake. CN 2-12 Grossly intact. Moves all four extremities spontaneously.  Psych: Calm, Pleasant, Cooperative                        9.1    37.38 )-----------( 41       ( 04 Jan 2023 06:10 )             26.4       01-04    136  |  101  |  27<H>  ----------------------------<  116<H>  3.7   |  19<L>  |  1.78<H>    Ca    9.5      04 Jan 2023 12:24  Phos  4.1     01-04  Mg     2.2     01-04    TPro  6.1  /  Alb  3.1<L>  /  TBili  25.4<H>  /  DBili  x   /  AST  138<H>  /  ALT  42  /  AlkPhos  195<H>  01-04      Urinalysis Basic - ( 03 Jan 2023 18:02 )    Color: Other / Appearance: Turbid / SG: SEE NOTE / pH: x  Gluc: x / Ketone: SEE NOTE  / Bili: SEE NOTE / Urobili: SEE NOTE   Blood: x / Protein: SEE NOTE / Nitrite: SEE NOTE   Leuk Esterase: SEE NOTE / RBC: x / WBC x   Sq Epi: x / Non Sq Epi: x / Bacteria: x    MICROBIOLOGY:    Peritoneal Peritoneal Fluid  12-26-22   No growth  --    polymorphonuclear leukocytes seen  No organisms seen  by cytocentrifuge      .Blood Blood-Peripheral  12-26-22   No Growth Final  --  --      .Blood Blood-Peripheral  12-26-22   No Growth Final  --  --      .Blood Blood  12-24-22   No Growth Final  --  --      .Blood Blood  12-24-22   No Growth Final  --  --      .Blood Blood  12-23-22   No Growth Final  --  --      .Blood Blood  12-23-22   No Growth Final  --  --      Combi-Cath Combi-Cath  12-22-22   Normal Respiratory Cassandra present  --    Moderate polymorphonuclear leukocytes seen per low power field  No squamous epithelial cells seen per low power field  No organisms seen per oil power field      Catheterized Catheterized  12-21-22   >100,000 CFU/ml Leticia dubliniensis "Susceptibilities not performed"  --  --      Ascites Fl Ascites Fluid  12-21-22   No growth at 5 days  --    polymorphonuclear leukocytes seen  No organisms seen  by cytocentrifuge      Trach Asp Tracheal Aspirate  12-21-22   Normal Respiratory Cassandra present  --    No polymorphonuclear leukocytes per low power field  Numerous Squamous epithelial cells per low power field  Moderate Gram Positive Rods seen per oil power field  Few Yeast like cells seen per oil power field      .Blood Blood-Peripheral  12-20-22   No Growth Final  --  --      .Blood Blood-Peripheral  12-20-22   No Growth Final  --  --        CMV IgG Antibody: 5.10 U/mL (12-24-22 @ 20:59)  Toxoplasma IgG Screen: <3.0 IU/mL (12-24-22 @ 20:59)          RADIOLOGY:    <The imaging below has been reviewed and visualized by me independently. Findings as detailed in report below>    < from: Xray Chest 1 View- PORTABLE-Urgent (Xray Chest 1 View- PORTABLE-Urgent .) (01.04.23 @ 15:37) >  INTERPRETATION:  3 central line tips in SVC. NGT tip in stomach    < end of copied text >   Follow Up:  Pneumonia    Interval History: undergoing paracentesis today. remains on pressors.     REVIEW OF SYSTEMS  [ x ] ROS unobtainable because:  encephalopathy  [  ] All other systems negative except as noted below    Constitutional:  [ ] fever [ ] chills  [ ] weight loss  [ ] weakness  Skin:  [ ] rash [ ] phlebitis	  Eyes: [ ] icterus [ ] pain  [ ] discharge	  ENMT: [ ] sore throat  [ ] thrush [ ] ulcers [ ] exudates  Respiratory: [ ] dyspnea [ ] hemoptysis [ ] cough [ ] sputum	  Cardiovascular:  [ ] chest pain [ ] palpitations [ ] edema	  Gastrointestinal:  [ ] nausea [ ] vomiting [ ] diarrhea [ ] constipation [ ] pain	  Genitourinary:  [ ] dysuria [ ] frequency [ ] hematuria [ ] discharge [ ] flank pain  [ ] incontinence  Musculoskeletal:  [ ] myalgias [ ] arthralgias [ ] arthritis  [ ] back pain  Neurological:  [ ] headache [ ] seizures  [ ] confusion/altered mental status    Allergies  Macrobid (Rash)        ANTIMICROBIALS:  caspofungin IVPB    caspofungin IVPB 50 every 24 hours  meropenem  IVPB 1000 every 8 hours  rifAXIMin 550 every 12 hours      OTHER MEDS:  MEDICATIONS  (STANDING):  escitalopram 10 daily  HYDROmorphone  Injectable 0.25 every 3 hours PRN  influenza   Vaccine 0.5 once  insulin lispro (ADMELOG) corrective regimen sliding scale  every 6 hours  levETIRAcetam  Solution 750 two times a day  levothyroxine Injectable 103 at bedtime  midodrine 20 every 8 hours  norepinephrine Infusion 0.05 <Continuous>  pantoprazole   Suspension 40 every 24 hours  polyethylene glycol 3350 17 every 12 hours  vasopressin Infusion 0.03 <Continuous>      Vital Signs Last 24 Hrs  T(C): 36.8 (04 Jan 2023 15:00), Max: 37.5 (03 Jan 2023 18:00)  T(F): 98.3 (04 Jan 2023 15:00), Max: 99.5 (03 Jan 2023 18:00)  HR: 99 (04 Jan 2023 17:15) (81 - 100)  BP: 102/56 (04 Jan 2023 07:30) (102/56 - 106/60)  BP(mean): 74 (04 Jan 2023 07:30) (74 - 74)  RR: 19 (04 Jan 2023 17:15) (12 - 33)  SpO2: 98% (04 Jan 2023 17:15) (87% - 100%)    Parameters below as of 04 Jan 2023 07:00  Patient On (Oxygen Delivery Method): room air    PHYSICAL EXAMINATION:  General: Alert and Awake, NAD, Jaundice  HEENT: Normocephalic / Atraumatic  Resp: No accessory muscles of respiration utilized  Abdomen: Not distended.  MSK: No LE edema.   Skin: No rashes or lesions.    Neuro: Alert and Awake. CN 2-12 Grossly intact. Moves all four extremities spontaneously.  Psych: Calm, Pleasant, Cooperative                        9.1    37.38 )-----------( 41       ( 04 Jan 2023 06:10 )             26.4       01-04    136  |  101  |  27<H>  ----------------------------<  116<H>  3.7   |  19<L>  |  1.78<H>    Ca    9.5      04 Jan 2023 12:24  Phos  4.1     01-04  Mg     2.2     01-04    TPro  6.1  /  Alb  3.1<L>  /  TBili  25.4<H>  /  DBili  x   /  AST  138<H>  /  ALT  42  /  AlkPhos  195<H>  01-04      Urinalysis Basic - ( 03 Jan 2023 18:02 )    Color: Other / Appearance: Turbid / SG: SEE NOTE / pH: x  Gluc: x / Ketone: SEE NOTE  / Bili: SEE NOTE / Urobili: SEE NOTE   Blood: x / Protein: SEE NOTE / Nitrite: SEE NOTE   Leuk Esterase: SEE NOTE / RBC: x / WBC x   Sq Epi: x / Non Sq Epi: x / Bacteria: x    MICROBIOLOGY:    Peritoneal Peritoneal Fluid  12-26-22   No growth  --    polymorphonuclear leukocytes seen  No organisms seen  by cytocentrifuge      .Blood Blood-Peripheral  12-26-22   No Growth Final  --  --      .Blood Blood-Peripheral  12-26-22   No Growth Final  --  --      .Blood Blood  12-24-22   No Growth Final  --  --      .Blood Blood  12-24-22   No Growth Final  --  --      .Blood Blood  12-23-22   No Growth Final  --  --      .Blood Blood  12-23-22   No Growth Final  --  --      Combi-Cath Combi-Cath  12-22-22   Normal Respiratory Cassandra present  --    Moderate polymorphonuclear leukocytes seen per low power field  No squamous epithelial cells seen per low power field  No organisms seen per oil power field      Catheterized Catheterized  12-21-22   >100,000 CFU/ml Lteicia dubliniensis "Susceptibilities not performed"  --  --      Ascites Fl Ascites Fluid  12-21-22   No growth at 5 days  --    polymorphonuclear leukocytes seen  No organisms seen  by cytocentrifuge      Trach Asp Tracheal Aspirate  12-21-22   Normal Respiratory Cassandra present  --    No polymorphonuclear leukocytes per low power field  Numerous Squamous epithelial cells per low power field  Moderate Gram Positive Rods seen per oil power field  Few Yeast like cells seen per oil power field      .Blood Blood-Peripheral  12-20-22   No Growth Final  --  --      .Blood Blood-Peripheral  12-20-22   No Growth Final  --  --        CMV IgG Antibody: 5.10 U/mL (12-24-22 @ 20:59)  Toxoplasma IgG Screen: <3.0 IU/mL (12-24-22 @ 20:59)          RADIOLOGY:    <The imaging below has been reviewed and visualized by me independently. Findings as detailed in report below>    < from: Xray Chest 1 View- PORTABLE-Urgent (Xray Chest 1 View- PORTABLE-Urgent .) (01.04.23 @ 15:37) >  INTERPRETATION:  3 central line tips in SVC. NGT tip in stomach    < end of copied text >

## 2023-01-04 NOTE — PROCEDURE NOTE - ADDITIONAL PROCEDURE DETAILS
Right flank ascites visualized on ultrasound, safe window identified, sterile paracentesis with return of 4000mL straw colored ascitic fluid. Tolerated well, given 500cc albumin during procedure, clean dressing applied. Right flank ascites visualized on ultrasound, safe window identified, sterile paracentesis with return of 4000mL straw colored ascitic fluid. Tolerated well, given 1000cc albumin during procedure, clean dressing applied.

## 2023-01-05 LAB
ALBUMIN SERPL ELPH-MCNC: 3.6 G/DL — SIGNIFICANT CHANGE UP (ref 3.3–5)
ALBUMIN SERPL ELPH-MCNC: 3.7 G/DL — SIGNIFICANT CHANGE UP (ref 3.3–5)
ALBUMIN SERPL ELPH-MCNC: 3.8 G/DL — SIGNIFICANT CHANGE UP (ref 3.3–5)
ALBUMIN SERPL ELPH-MCNC: 3.9 G/DL — SIGNIFICANT CHANGE UP (ref 3.3–5)
ALP SERPL-CCNC: 170 U/L — HIGH (ref 40–120)
ALP SERPL-CCNC: 172 U/L — HIGH (ref 40–120)
ALP SERPL-CCNC: 185 U/L — HIGH (ref 40–120)
ALT FLD-CCNC: 22 U/L — SIGNIFICANT CHANGE UP (ref 10–45)
ALT FLD-CCNC: 24 U/L — SIGNIFICANT CHANGE UP (ref 10–45)
ALT FLD-CCNC: 26 U/L — SIGNIFICANT CHANGE UP (ref 10–45)
ALT FLD-CCNC: 29 U/L — SIGNIFICANT CHANGE UP (ref 10–45)
AMMONIA BLD-MCNC: 63 UMOL/L — HIGH (ref 11–55)
ANION GAP SERPL CALC-SCNC: 16 MMOL/L — SIGNIFICANT CHANGE UP (ref 5–17)
ANION GAP SERPL CALC-SCNC: 17 MMOL/L — SIGNIFICANT CHANGE UP (ref 5–17)
ANION GAP SERPL CALC-SCNC: 19 MMOL/L — HIGH (ref 5–17)
APTT BLD: 56.9 SEC — HIGH (ref 27.5–35.5)
AST SERPL-CCNC: 120 U/L — HIGH (ref 10–40)
AST SERPL-CCNC: 124 U/L — HIGH (ref 10–40)
AST SERPL-CCNC: 126 U/L — HIGH (ref 10–40)
AST SERPL-CCNC: 131 U/L — HIGH (ref 10–40)
BILIRUB SERPL-MCNC: 22.3 MG/DL — HIGH (ref 0.2–1.2)
BILIRUB SERPL-MCNC: 22.5 MG/DL — HIGH (ref 0.2–1.2)
BILIRUB SERPL-MCNC: 24.2 MG/DL — HIGH (ref 0.2–1.2)
BILIRUB SERPL-MCNC: 24.6 MG/DL — HIGH (ref 0.2–1.2)
BUN SERPL-MCNC: 28 MG/DL — HIGH (ref 7–23)
BUN SERPL-MCNC: 33 MG/DL — HIGH (ref 7–23)
BUN SERPL-MCNC: 36 MG/DL — HIGH (ref 7–23)
BUN SERPL-MCNC: 38 MG/DL — HIGH (ref 7–23)
CALCIUM SERPL-MCNC: 9.2 MG/DL — SIGNIFICANT CHANGE UP (ref 8.4–10.5)
CALCIUM SERPL-MCNC: 9.4 MG/DL — SIGNIFICANT CHANGE UP (ref 8.4–10.5)
CALCIUM SERPL-MCNC: 9.5 MG/DL — SIGNIFICANT CHANGE UP (ref 8.4–10.5)
CALCIUM SERPL-MCNC: 9.9 MG/DL — SIGNIFICANT CHANGE UP (ref 8.4–10.5)
CHLORIDE SERPL-SCNC: 100 MMOL/L — SIGNIFICANT CHANGE UP (ref 96–108)
CHLORIDE SERPL-SCNC: 101 MMOL/L — SIGNIFICANT CHANGE UP (ref 96–108)
CHLORIDE SERPL-SCNC: 104 MMOL/L — SIGNIFICANT CHANGE UP (ref 96–108)
CO2 SERPL-SCNC: 15 MMOL/L — LOW (ref 22–31)
CO2 SERPL-SCNC: 16 MMOL/L — LOW (ref 22–31)
CO2 SERPL-SCNC: 18 MMOL/L — LOW (ref 22–31)
CO2 SERPL-SCNC: 19 MMOL/L — LOW (ref 22–31)
CREAT SERPL-MCNC: 1.79 MG/DL — HIGH (ref 0.5–1.3)
CREAT SERPL-MCNC: 2.2 MG/DL — HIGH (ref 0.5–1.3)
CREAT SERPL-MCNC: 2.22 MG/DL — HIGH (ref 0.5–1.3)
CREAT SERPL-MCNC: 2.41 MG/DL — HIGH (ref 0.5–1.3)
EGFR: 22 ML/MIN/1.73M2 — LOW
EGFR: 25 ML/MIN/1.73M2 — LOW
EGFR: 32 ML/MIN/1.73M2 — LOW
GAS PNL BLDA: SIGNIFICANT CHANGE UP
GI PCR PANEL: SIGNIFICANT CHANGE UP
GLUCOSE SERPL-MCNC: 136 MG/DL — HIGH (ref 70–99)
GLUCOSE SERPL-MCNC: 137 MG/DL — HIGH (ref 70–99)
GLUCOSE SERPL-MCNC: 142 MG/DL — HIGH (ref 70–99)
GLUCOSE SERPL-MCNC: 143 MG/DL — HIGH (ref 70–99)
HCT VFR BLD CALC: 21.5 % — LOW (ref 34.5–45)
HCT VFR BLD CALC: 24.4 % — LOW (ref 34.5–45)
HCT VFR BLD CALC: 24.9 % — LOW (ref 34.5–45)
HCT VFR BLD CALC: 25.6 % — LOW (ref 34.5–45)
HGB BLD-MCNC: 7.5 G/DL — LOW (ref 11.5–15.5)
HGB BLD-MCNC: 8.5 G/DL — LOW (ref 11.5–15.5)
HGB BLD-MCNC: 8.7 G/DL — LOW (ref 11.5–15.5)
HGB BLD-MCNC: 8.9 G/DL — LOW (ref 11.5–15.5)
INR BLD: 2.41 RATIO — HIGH (ref 0.88–1.16)
MAGNESIUM SERPL-MCNC: 2 MG/DL — SIGNIFICANT CHANGE UP (ref 1.6–2.6)
MAGNESIUM SERPL-MCNC: 2.1 MG/DL — SIGNIFICANT CHANGE UP (ref 1.6–2.6)
MAGNESIUM SERPL-MCNC: 2.2 MG/DL — SIGNIFICANT CHANGE UP (ref 1.6–2.6)
MCHC RBC-ENTMCNC: 30.5 PG — SIGNIFICANT CHANGE UP (ref 27–34)
MCHC RBC-ENTMCNC: 30.6 PG — SIGNIFICANT CHANGE UP (ref 27–34)
MCHC RBC-ENTMCNC: 30.7 PG — SIGNIFICANT CHANGE UP (ref 27–34)
MCHC RBC-ENTMCNC: 30.9 PG — SIGNIFICANT CHANGE UP (ref 27–34)
MCHC RBC-ENTMCNC: 34.8 GM/DL — SIGNIFICANT CHANGE UP (ref 32–36)
MCHC RBC-ENTMCNC: 34.9 GM/DL — SIGNIFICANT CHANGE UP (ref 32–36)
MCV RBC AUTO: 87.5 FL — SIGNIFICANT CHANGE UP (ref 80–100)
MCV RBC AUTO: 88 FL — SIGNIFICANT CHANGE UP (ref 80–100)
MCV RBC AUTO: 88.1 FL — SIGNIFICANT CHANGE UP (ref 80–100)
MCV RBC AUTO: 88.3 FL — SIGNIFICANT CHANGE UP (ref 80–100)
NRBC # BLD: 0 /100 WBCS — SIGNIFICANT CHANGE UP (ref 0–0)
PHOSPHATE SERPL-MCNC: 3.7 MG/DL — SIGNIFICANT CHANGE UP (ref 2.5–4.5)
PHOSPHATE SERPL-MCNC: 4.1 MG/DL — SIGNIFICANT CHANGE UP (ref 2.5–4.5)
PHOSPHATE SERPL-MCNC: 4.7 MG/DL — HIGH (ref 2.5–4.5)
PLATELET # BLD AUTO: 42 K/UL — LOW (ref 150–400)
PLATELET # BLD AUTO: 44 K/UL — LOW (ref 150–400)
PLATELET # BLD AUTO: 47 K/UL — LOW (ref 150–400)
POTASSIUM SERPL-MCNC: 3.5 MMOL/L — SIGNIFICANT CHANGE UP (ref 3.5–5.3)
POTASSIUM SERPL-MCNC: 3.6 MMOL/L — SIGNIFICANT CHANGE UP (ref 3.5–5.3)
POTASSIUM SERPL-SCNC: 3.5 MMOL/L — SIGNIFICANT CHANGE UP (ref 3.5–5.3)
POTASSIUM SERPL-SCNC: 3.6 MMOL/L — SIGNIFICANT CHANGE UP (ref 3.5–5.3)
PROT SERPL-MCNC: 6.2 G/DL — SIGNIFICANT CHANGE UP (ref 6–8.3)
PROT SERPL-MCNC: 6.3 G/DL — SIGNIFICANT CHANGE UP (ref 6–8.3)
PROT SERPL-MCNC: 6.4 G/DL — SIGNIFICANT CHANGE UP (ref 6–8.3)
PROTHROM AB SERPL-ACNC: 28 SEC — HIGH (ref 10.5–13.4)
RBC # BLD: 2.44 M/UL — LOW (ref 3.8–5.2)
RBC # BLD: 2.79 M/UL — LOW (ref 3.8–5.2)
RBC # BLD: 2.82 M/UL — LOW (ref 3.8–5.2)
RBC # BLD: 2.91 M/UL — LOW (ref 3.8–5.2)
RBC # FLD: 18.1 % — HIGH (ref 10.3–14.5)
RBC # FLD: 18.5 % — HIGH (ref 10.3–14.5)
RBC # FLD: 18.6 % — HIGH (ref 10.3–14.5)
RBC # FLD: 18.9 % — HIGH (ref 10.3–14.5)
SODIUM SERPL-SCNC: 134 MMOL/L — LOW (ref 135–145)
SODIUM SERPL-SCNC: 135 MMOL/L — SIGNIFICANT CHANGE UP (ref 135–145)
SODIUM SERPL-SCNC: 136 MMOL/L — SIGNIFICANT CHANGE UP (ref 135–145)
SODIUM SERPL-SCNC: 139 MMOL/L — SIGNIFICANT CHANGE UP (ref 135–145)
WBC # BLD: 32.49 K/UL — HIGH (ref 3.8–10.5)
WBC # BLD: 33.73 K/UL — HIGH (ref 3.8–10.5)
WBC # BLD: 34.99 K/UL — HIGH (ref 3.8–10.5)
WBC # BLD: 37.49 K/UL — HIGH (ref 3.8–10.5)
WBC # FLD AUTO: 32.49 K/UL — HIGH (ref 3.8–10.5)
WBC # FLD AUTO: 33.73 K/UL — HIGH (ref 3.8–10.5)
WBC # FLD AUTO: 34.99 K/UL — HIGH (ref 3.8–10.5)
WBC # FLD AUTO: 37.49 K/UL — HIGH (ref 3.8–10.5)

## 2023-01-05 PROCEDURE — 90935 HEMODIALYSIS ONE EVALUATION: CPT | Mod: GC

## 2023-01-05 PROCEDURE — 99291 CRITICAL CARE FIRST HOUR: CPT

## 2023-01-05 PROCEDURE — 99232 SBSQ HOSP IP/OBS MODERATE 35: CPT

## 2023-01-05 PROCEDURE — 99222 1ST HOSP IP/OBS MODERATE 55: CPT

## 2023-01-05 PROCEDURE — 71045 X-RAY EXAM CHEST 1 VIEW: CPT | Mod: 26

## 2023-01-05 PROCEDURE — 99233 SBSQ HOSP IP/OBS HIGH 50: CPT

## 2023-01-05 RX ORDER — POLYETHYLENE GLYCOL 3350 17 G/17G
17 POWDER, FOR SOLUTION ORAL EVERY 12 HOURS
Refills: 0 | Status: DISCONTINUED | OUTPATIENT
Start: 2023-01-05 | End: 2023-01-05

## 2023-01-05 RX ORDER — CEFTRIAXONE 500 MG/1
1000 INJECTION, POWDER, FOR SOLUTION INTRAMUSCULAR; INTRAVENOUS EVERY 24 HOURS
Refills: 0 | Status: DISCONTINUED | OUTPATIENT
Start: 2023-01-05 | End: 2023-01-09

## 2023-01-05 RX ORDER — POLYETHYLENE GLYCOL 3350 17 G/17G
17 POWDER, FOR SOLUTION ORAL
Refills: 0 | Status: DISCONTINUED | OUTPATIENT
Start: 2023-01-05 | End: 2023-01-07

## 2023-01-05 RX ORDER — HEPARIN SODIUM 5000 [USP'U]/ML
5000 INJECTION INTRAVENOUS; SUBCUTANEOUS EVERY 12 HOURS
Refills: 0 | Status: DISCONTINUED | OUTPATIENT
Start: 2023-01-05 | End: 2023-01-05

## 2023-01-05 RX ORDER — LIDOCAINE HCL 20 MG/ML
30 VIAL (ML) INJECTION ONCE
Refills: 0 | Status: COMPLETED | OUTPATIENT
Start: 2023-01-05 | End: 2023-01-05

## 2023-01-05 RX ORDER — LIDOCAINE HCL 20 MG/ML
25 VIAL (ML) INJECTION ONCE
Refills: 0 | Status: DISCONTINUED | OUTPATIENT
Start: 2023-01-05 | End: 2023-01-05

## 2023-01-05 RX ORDER — ALBUMIN HUMAN 25 %
250 VIAL (ML) INTRAVENOUS ONCE
Refills: 0 | Status: COMPLETED | OUTPATIENT
Start: 2023-01-05 | End: 2023-01-05

## 2023-01-05 RX ORDER — LACTULOSE 10 G/15ML
20 SOLUTION ORAL EVERY 6 HOURS
Refills: 0 | Status: DISCONTINUED | OUTPATIENT
Start: 2023-01-05 | End: 2023-01-05

## 2023-01-05 RX ADMIN — LACTULOSE 20 GRAM(S): 10 SOLUTION ORAL at 12:10

## 2023-01-05 RX ADMIN — MIDODRINE HYDROCHLORIDE 20 MILLIGRAM(S): 2.5 TABLET ORAL at 22:48

## 2023-01-05 RX ADMIN — LEVETIRACETAM 750 MILLIGRAM(S): 250 TABLET, FILM COATED ORAL at 17:15

## 2023-01-05 RX ADMIN — MEROPENEM 100 MILLIGRAM(S): 1 INJECTION INTRAVENOUS at 15:00

## 2023-01-05 RX ADMIN — Medication 15 MILLILITER(S): at 12:10

## 2023-01-05 RX ADMIN — ESCITALOPRAM OXALATE 10 MILLIGRAM(S): 10 TABLET, FILM COATED ORAL at 12:10

## 2023-01-05 RX ADMIN — POLYETHYLENE GLYCOL 3350 17 GRAM(S): 17 POWDER, FOR SOLUTION ORAL at 22:48

## 2023-01-05 RX ADMIN — PANTOPRAZOLE SODIUM 40 MILLIGRAM(S): 20 TABLET, DELAYED RELEASE ORAL at 12:10

## 2023-01-05 RX ADMIN — MEROPENEM 100 MILLIGRAM(S): 1 INJECTION INTRAVENOUS at 05:28

## 2023-01-05 RX ADMIN — Medication 30 MILLILITER(S): at 15:00

## 2023-01-05 RX ADMIN — POLYETHYLENE GLYCOL 3350 17 GRAM(S): 17 POWDER, FOR SOLUTION ORAL at 17:16

## 2023-01-05 RX ADMIN — MIDODRINE HYDROCHLORIDE 20 MILLIGRAM(S): 2.5 TABLET ORAL at 14:42

## 2023-01-05 RX ADMIN — CASPOFUNGIN ACETATE 260 MILLIGRAM(S): 7 INJECTION, POWDER, LYOPHILIZED, FOR SOLUTION INTRAVENOUS at 08:42

## 2023-01-05 RX ADMIN — Medication 125 MILLILITER(S): at 02:11

## 2023-01-05 RX ADMIN — MIDODRINE HYDROCHLORIDE 20 MILLIGRAM(S): 2.5 TABLET ORAL at 05:28

## 2023-01-05 RX ADMIN — LEVETIRACETAM 750 MILLIGRAM(S): 250 TABLET, FILM COATED ORAL at 05:28

## 2023-01-05 RX ADMIN — Medication 6.21 MICROGRAM(S)/KG/MIN: at 07:05

## 2023-01-05 RX ADMIN — Medication 100 MILLIGRAM(S): at 12:10

## 2023-01-05 RX ADMIN — Medication 1 MILLIGRAM(S): at 12:10

## 2023-01-05 RX ADMIN — Medication 103 MICROGRAM(S): at 22:48

## 2023-01-05 RX ADMIN — HYDROMORPHONE HYDROCHLORIDE 0.25 MILLIGRAM(S): 2 INJECTION INTRAMUSCULAR; INTRAVENOUS; SUBCUTANEOUS at 02:41

## 2023-01-05 RX ADMIN — VASOPRESSIN 4.5 UNIT(S)/MIN: 20 INJECTION INTRAVENOUS at 07:05

## 2023-01-05 RX ADMIN — HYDROMORPHONE HYDROCHLORIDE 0.25 MILLIGRAM(S): 2 INJECTION INTRAMUSCULAR; INTRAVENOUS; SUBCUTANEOUS at 23:38

## 2023-01-05 RX ADMIN — HYDROMORPHONE HYDROCHLORIDE 0.25 MILLIGRAM(S): 2 INJECTION INTRAMUSCULAR; INTRAVENOUS; SUBCUTANEOUS at 03:11

## 2023-01-05 RX ADMIN — POLYETHYLENE GLYCOL 3350 17 GRAM(S): 17 POWDER, FOR SOLUTION ORAL at 05:28

## 2023-01-05 RX ADMIN — LACTULOSE 20 GRAM(S): 10 SOLUTION ORAL at 17:15

## 2023-01-05 NOTE — PROGRESS NOTE ADULT - ASSESSMENT
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  Of note was recently admitted to  11/2022 for workup and eval of new onset jaundice- during that hospitalization was found to have a UTI and dx with alc hep was treated for UTI w/ abx and started on steroids (was deemed a non-responder and steroids were subsequently stopped); s/p LVP at [place] 11/2022. Previously hospitalized in 2021 at [place] for ETOH detox but pt reportedly unaware of any liver dysfunction at that time. Went to ETOH rehab 08/2022 and was asked to leave the facility when she was COVID+; was sober for 1 week until she started drinking again. Had also attended a few AA meetings in the past.    #ACLF  #Multifocal pneumonia  #AUD  #New onset seizure- unknown trigger/etiology- currently on Keppra  #Decompensated alcohol-associated cirrhosis c/b prior ascites req LVP       -MELD-Na = 40 on 1/5/2023       -Ascites: large, diuretics limited by anuria       -SBP: negative on 12/26/2022 & 1/4/2023       -Varices: unknown       -Hepatic encephalopathy: Ammonia, Serum: 63 umol/L *H* [11 - 55] (01-05-23 @ 05:50)  lactulose Syrup 20 Gram(s) Oral every 6 hours, 01-05-23 @ 09:25, Routine  rifAXIMin 550 milliGRAM(s) Oral every 12 hours, 12-25-22 @ 09:07,        -HCC: Alpha Fetoprotein - Tumor Marker: 4.2 ng/mL (12-24-22 @ 20:58)    Recommendations:  -trend clinical symptoms, exam findings, vital signs, CBC, CMP, INR  -CVVHD per Nephro  -continue broad spectrum antibiotic/antifungal coverage with meropenem and caspofungin, appreciate assistance from ID  -weaned pressors, currently on room air  -increase frequency of miralax to target 3-5 BMs daily  -hold off on full transplant evaluation given clinical course and multifocal pneumonia as detailed above    Note incomplete until finalized by attending signature/attestation.    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  Of note was recently admitted to  11/2022 for workup and eval of new onset jaundice- during that hospitalization was found to have a UTI and dx with alc hep was treated for UTI w/ abx and started on steroids (was deemed a non-responder and steroids were subsequently stopped); s/p LVP at [place] 11/2022. Previously hospitalized in 2021 at [place] for ETOH detox but pt reportedly unaware of any liver dysfunction at that time. Went to ETOH rehab 08/2022 and was asked to leave the facility when she was COVID+; was sober for 1 week until she started drinking again. Had also attended a few AA meetings in the past.    #ACLF  #Multifocal pneumonia  #AUD  #New onset seizure- unknown trigger/etiology- currently on Keppra  #Decompensated alcohol-associated cirrhosis c/b prior ascites req LVP       -MELD-Na = 40 on 1/5/2023       -Ascites: large, diuretics limited by anuria       -SBP: negative on 12/26/2022 & 1/4/2023       -Varices: unknown       -Hepatic encephalopathy: Ammonia, Serum: 63 umol/L *H* [11 - 55] (01-05-23 @ 05:50)  lactulose Syrup 20 Gram(s) Oral every 6 hours, 01-05-23 @ 09:25, Routine  rifAXIMin 550 milliGRAM(s) Oral every 12 hours, 12-25-22 @ 09:07,        -HCC: Alpha Fetoprotein - Tumor Marker: 4.2 ng/mL (12-24-22 @ 20:58)    Recommendations:  -trend clinical symptoms, exam findings, vital signs, CBC, CMP, INR  -CVVHD per Nephro  -continue broad spectrum antibiotic/antifungal coverage with meropenem and caspofungin, appreciate assistance from ID  -weaned pressors, currently on room air  -increase frequency of miralax to target 3-5 BMs daily  -hold off on full transplant evaluation given clinical course and multifocal pneumonia as detailed above    Note incomplete until finalized by attending signature/attestation.    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  Of note was recently admitted to  11/2022 for workup and eval of new onset jaundice- during that hospitalization was found to have a UTI and dx with alc hep was treated for UTI w/ abx and started on steroids (was deemed a non-responder and steroids were subsequently stopped); s/p LVP at [place] 11/2022. Previously hospitalized in 2021 at [place] for ETOH detox but pt reportedly unaware of any liver dysfunction at that time. Went to ETOH rehab 08/2022 and was asked to leave the facility when she was COVID+; was sober for 1 week until she started drinking again. Had also attended a few AA meetings in the past.    #ACLF  #Multifocal pneumonia  #AUD  #New onset seizure- unknown trigger/etiology- currently on Keppra  #Decompensated alcohol-associated cirrhosis c/b prior ascites req LVP       -MELD-Na = 40 on 1/5/2023       -Ascites: large, diuretics limited by anuria       -SBP: negative on 12/26/2022 & 1/4/2023       -Varices: unknown       -Hepatic encephalopathy: Ammonia, Serum: 63 umol/L *H* [11 - 55] (01-05-23 @ 05:50)  lactulose Syrup 20 Gram(s) Oral every 6 hours, 01-05-23 @ 09:25, Routine  rifAXIMin 550 milliGRAM(s) Oral every 12 hours, 12-25-22 @ 09:07,        -HCC: Alpha Fetoprotein - Tumor Marker: 4.2 ng/mL (12-24-22 @ 20:58)    Recommendations:  -trend clinical symptoms, exam findings, vital signs, CBC, CMP, INR  -CVVHD per Nephro  -continue broad spectrum antibiotic/antifungal coverage with meropenem and caspofungin, appreciate assistance from ID  -weaned pressors, currently on room air  -increase frequency of miralax to target 3-5 BMs daily  -hold off on full transplant evaluation given clinical course and multifocal pneumonia as detailed above    Note incomplete until finalized by attending signature/attestation.    Ronnie Tatum  GI/Hepatology Fellow    MONDAY-FRIDAY 8AM-5PM:  Pager# [phone] ([facility]) or [phone] ([facility])    NON-URGENT CONSULTS:  Please email [email or web address]  AT NIGHT AND ON WEEKENDS:  Contact on-call GI fellow via answering service ([phone]) from 5pm-8am and on weekends/holidays

## 2023-01-05 NOTE — PROGRESS NOTE ADULT - SUBJECTIVE AND OBJECTIVE BOX
Transplant Surgery Progress Note    Present:   Patient seen and examined with multidisciplinary Transplant team including  Surgeon: Dr. Oconnor. Hepatologist: Dr. Curtis,  NP: Jacky and RNs in AM rounds.   Disciplines not in attendance will be notified of the plan.     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  Arthur ([phone]) and Daughter Taylor at [facility] provided additional history. Of note was recently admitted to  11/2022 for workup and eval of jaundice- during that hospitalization was found to have a UTI and dx with alc hep and started on steroids (was deemed a non-responder and steroids were subsequently stopped); s/p LVP at [place] 11/2022. Previously hospitalized in 2021 at [place] for ETOH detox. Went to ETOH rehab 08/2022 and was asked to leave the facility when she was COVID+; was sober for 1 week until she started drinking again. Had also attended a few AA meetings in the past. Transferred from [facility] 12/20 for further management of acute liver failure and liver transplant evaluation.       Interval events:  - remains on Vaso and Levo  - on RA  - TF at goal  - Abdomen distended. CT w/ large volume ascites  - mental status waxing and waning, worse this AM  - diffuse rash across abdomen and flank, improving    Potential Discharge date: pending clinical stability  Education:  Medications  Plan of care:  See Below      MEDICATIONS  (STANDING):  caspofungin IVPB      caspofungin IVPB 50 milliGRAM(s) IV Intermittent every 24 hours  chlorhexidine 2% Cloths 1 Application(s) Topical <User Schedule>  CRRT Treatment    <Continuous>  escitalopram 10 milliGRAM(s) Oral daily  folic acid 1 milliGRAM(s) Oral daily  heparin   Injectable 5000 Unit(s) SubCutaneous every 12 hours  influenza   Vaccine 0.5 milliLiter(s) IntraMuscular once  lactulose Syrup 20 Gram(s) Oral every 6 hours  levETIRAcetam  Solution 750 milliGRAM(s) Enteral Tube two times a day  levothyroxine Injectable 103 MICROGram(s) IV Push at bedtime  meropenem  IVPB 1000 milliGRAM(s) IV Intermittent every 8 hours  midodrine 20 milliGRAM(s) Oral every 8 hours  multivitamin/minerals/iron Oral Solution (CENTRUM) 15 milliLiter(s) Oral daily  norepinephrine Infusion 0.05 MICROgram(s)/kG/Min (6.21 mL/Hr) IV Continuous <Continuous>  pantoprazole   Suspension 40 milliGRAM(s) Oral every 24 hours  Phoxillum Filtration BK 4 / 2.5 5000 milliLiter(s) (1000 mL/Hr) CRRT <Continuous>  Phoxillum Filtration BK 4 / 2.5 5000 milliLiter(s) (800 mL/Hr) CRRT <Continuous>  polyethylene glycol 3350 17 Gram(s) Oral every 12 hours  PrismaSOL Filtration BGK 4 / 2.5 5000 milliLiter(s) (200 mL/Hr) CRRT <Continuous>  rifAXIMin 550 milliGRAM(s) Oral every 12 hours  thiamine 100 milliGRAM(s) Enteral Tube daily  vasopressin Infusion 0.03 Unit(s)/Min (4.5 mL/Hr) IV Continuous <Continuous>    MEDICATIONS  (PRN):  HYDROmorphone  Injectable 0.25 milliGRAM(s) IV Push every 3 hours PRN Severe Pain (7 - 10)  sodium chloride 0.65% Nasal 1 Spray(s) Both Nostrils every 3 hours PRN Nasal Congestion  tetracaine/benzocaine/butamben Spray 1 Spray(s) Topical every 3 hours PRN for ngt discomfort      PAST MEDICAL & SURGICAL HISTORY:  HTN (hypertension)  off medication for over one year--states BP has been normal      UTI (urinary tract infection)  currently being treated--will complete antibiotics 3/8/2022      Intracranial tumor      GERD (gastroesophageal reflux disease)      Eczema      Thyroid cancer  surgery. radiation, Radioactive iodine      COVID-19 virus infection  11/2021--recovered at home      H/O total thyroidectomy  age 21&#x27;s for Thyroid cancer, postop complication arterial bleed, second surgery      History of tonsillectomy  age 5      History of appendectomy  age 5          Vital Signs Last 24 Hrs  T(C): 36.8 (05 Jan 2023 11:00), Max: 37.1 (05 Jan 2023 04:00)  T(F): 98.3 (05 Jan 2023 11:00), Max: 98.8 (05 Jan 2023 04:00)  HR: 100 (05 Jan 2023 13:30) (91 - 111)  BP: 109/66 (05 Jan 2023 07:15) (109/66 - 109/66)  BP(mean): 83 (05 Jan 2023 07:15) (83 - 83)  RR: 18 (05 Jan 2023 13:30) (15 - 35)  SpO2: 99% (05 Jan 2023 13:30) (93% - 100%)    Parameters below as of 05 Jan 2023 07:00  Patient On (Oxygen Delivery Method): room air        I&O's Summary    04 Jan 2023 07:01  -  05 Jan 2023 07:00  --------------------------------------------------------  IN: 4080.7 mL / OUT: 450 mL / NET: 3630.7 mL    05 Jan 2023 07:01  -  05 Jan 2023 13:39  --------------------------------------------------------  IN: 637 mL / OUT: 642 mL / NET: -5 mL                              8.9    37.49 )-----------( 47       ( 05 Jan 2023 12:58 )             25.6     01-05    134<L>  |  100  |  38<H>  ----------------------------<  143<H>  3.5   |  15<L>  |  2.41<H>    Ca    9.9      05 Jan 2023 05:58  Phos  4.7     01-05  Mg     2.2     01-05    TPro  6.3  /  Alb  3.8  /  TBili  24.2<H>  /  DBili  x   /  AST  124<H>  /  ALT  26  /  AlkPhos  170<H>  01-05          Culture - Fungal, Body Fluid (collected 01-04-23 @ 16:28)  Source: Peritoneal Peritoneal Fluid  Preliminary Report (01-05-23 @ 11:03):    Testing in progress    Culture - Body Fluid with Gram Stain (collected 01-04-23 @ 16:28)  Source: Peritoneal Peritoneal Fluid  Gram Stain (01-04-23 @ 23:22):    polymorphonuclear leukocytes seen    No organisms seen    by cytocentrifuge    Culture - Urine (collected 01-03-23 @ 18:30)  Source: Catheterized Catheterized  Final Report (01-04-23 @ 19:30):    No growth              Review of systems  AMS    Physical Exam:  Constitutional: NAD   Eyes: icteric, PERRLA  ENMT: nc/at, no thrush  Neck: supple, central line   Cardiovascular: RRR  Gastrointestinal: abdomen distended/tight.  rectal tube in place  Genitourinary: anuric  Extremities: SCD's in place and working bilaterally, no edema  Vascular: Palpable dp pulses bilaterally.   Neurological: following commands, mentation improving  Skin:  jaundiced, diffuse rash across abdomen and flank. No ulcerations, lesions  Musculoskeletal: moving extremities  Psychiatric: calm               Transplant Surgery Progress Note    Present:   Patient seen and examined with multidisciplinary Transplant team including  Surgeon: Dr. Oconnor. Hepatologist: Dr. Curtis,  NP: Jacky and RNs in AM rounds.   Disciplines not in attendance will be notified of the plan.     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  Arthur ([phone]) and Daughter Taylor at [facility] provided additional history. Of note was recently admitted to  11/2022 for workup and eval of jaundice- during that hospitalization was found to have a UTI and dx with alc hep and started on steroids (was deemed a non-responder and steroids were subsequently stopped); s/p LVP at [place] 11/2022. Previously hospitalized in 2021 at [place] for ETOH detox. Went to ETOH rehab 08/2022 and was asked to leave the facility when she was COVID+; was sober for 1 week until she started drinking again. Had also attended a few AA meetings in the past. Transferred from [facility] 12/20 for further management of acute liver failure and liver transplant evaluation.       Interval events:  - remains on Vaso and Levo  - on RA  - TF at goal  - Abdomen distended. CT w/ large volume ascites  - mental status waxing and waning, worse this AM  - diffuse rash across abdomen and flank, improving    Potential Discharge date: pending clinical stability  Education:  Medications  Plan of care:  See Below      MEDICATIONS  (STANDING):  caspofungin IVPB      caspofungin IVPB 50 milliGRAM(s) IV Intermittent every 24 hours  chlorhexidine 2% Cloths 1 Application(s) Topical <User Schedule>  CRRT Treatment    <Continuous>  escitalopram 10 milliGRAM(s) Oral daily  folic acid 1 milliGRAM(s) Oral daily  heparin   Injectable 5000 Unit(s) SubCutaneous every 12 hours  influenza   Vaccine 0.5 milliLiter(s) IntraMuscular once  lactulose Syrup 20 Gram(s) Oral every 6 hours  levETIRAcetam  Solution 750 milliGRAM(s) Enteral Tube two times a day  levothyroxine Injectable 103 MICROGram(s) IV Push at bedtime  meropenem  IVPB 1000 milliGRAM(s) IV Intermittent every 8 hours  midodrine 20 milliGRAM(s) Oral every 8 hours  multivitamin/minerals/iron Oral Solution (CENTRUM) 15 milliLiter(s) Oral daily  norepinephrine Infusion 0.05 MICROgram(s)/kG/Min (6.21 mL/Hr) IV Continuous <Continuous>  pantoprazole   Suspension 40 milliGRAM(s) Oral every 24 hours  Phoxillum Filtration BK 4 / 2.5 5000 milliLiter(s) (1000 mL/Hr) CRRT <Continuous>  Phoxillum Filtration BK 4 / 2.5 5000 milliLiter(s) (800 mL/Hr) CRRT <Continuous>  polyethylene glycol 3350 17 Gram(s) Oral every 12 hours  PrismaSOL Filtration BGK 4 / 2.5 5000 milliLiter(s) (200 mL/Hr) CRRT <Continuous>  rifAXIMin 550 milliGRAM(s) Oral every 12 hours  thiamine 100 milliGRAM(s) Enteral Tube daily  vasopressin Infusion 0.03 Unit(s)/Min (4.5 mL/Hr) IV Continuous <Continuous>    MEDICATIONS  (PRN):  HYDROmorphone  Injectable 0.25 milliGRAM(s) IV Push every 3 hours PRN Severe Pain (7 - 10)  sodium chloride 0.65% Nasal 1 Spray(s) Both Nostrils every 3 hours PRN Nasal Congestion  tetracaine/benzocaine/butamben Spray 1 Spray(s) Topical every 3 hours PRN for ngt discomfort      PAST MEDICAL & SURGICAL HISTORY:  HTN (hypertension)  off medication for over one year--states BP has been normal      UTI (urinary tract infection)  currently being treated--will complete antibiotics 3/8/2022      Intracranial tumor      GERD (gastroesophageal reflux disease)      Eczema      Thyroid cancer  surgery. radiation, Radioactive iodine      COVID-19 virus infection  11/2021--recovered at home      H/O total thyroidectomy  age 21&#x27;s for Thyroid cancer, postop complication arterial bleed, second surgery      History of tonsillectomy  age 5      History of appendectomy  age 5          Vital Signs Last 24 Hrs  T(C): 36.8 (05 Jan 2023 11:00), Max: 37.1 (05 Jan 2023 04:00)  T(F): 98.3 (05 Jan 2023 11:00), Max: 98.8 (05 Jan 2023 04:00)  HR: 100 (05 Jan 2023 13:30) (91 - 111)  BP: 109/66 (05 Jan 2023 07:15) (109/66 - 109/66)  BP(mean): 83 (05 Jan 2023 07:15) (83 - 83)  RR: 18 (05 Jan 2023 13:30) (15 - 35)  SpO2: 99% (05 Jan 2023 13:30) (93% - 100%)    Parameters below as of 05 Jan 2023 07:00  Patient On (Oxygen Delivery Method): room air        I&O's Summary    04 Jan 2023 07:01  -  05 Jan 2023 07:00  --------------------------------------------------------  IN: 4080.7 mL / OUT: 450 mL / NET: 3630.7 mL    05 Jan 2023 07:01  -  05 Jan 2023 13:39  --------------------------------------------------------  IN: 637 mL / OUT: 642 mL / NET: -5 mL                              8.9    37.49 )-----------( 47       ( 05 Jan 2023 12:58 )             25.6     01-05    134<L>  |  100  |  38<H>  ----------------------------<  143<H>  3.5   |  15<L>  |  2.41<H>    Ca    9.9      05 Jan 2023 05:58  Phos  4.7     01-05  Mg     2.2     01-05    TPro  6.3  /  Alb  3.8  /  TBili  24.2<H>  /  DBili  x   /  AST  124<H>  /  ALT  26  /  AlkPhos  170<H>  01-05          Culture - Fungal, Body Fluid (collected 01-04-23 @ 16:28)  Source: Peritoneal Peritoneal Fluid  Preliminary Report (01-05-23 @ 11:03):    Testing in progress    Culture - Body Fluid with Gram Stain (collected 01-04-23 @ 16:28)  Source: Peritoneal Peritoneal Fluid  Gram Stain (01-04-23 @ 23:22):    polymorphonuclear leukocytes seen    No organisms seen    by cytocentrifuge    Culture - Urine (collected 01-03-23 @ 18:30)  Source: Catheterized Catheterized  Final Report (01-04-23 @ 19:30):    No growth              Review of systems  AMS    Physical Exam:  Constitutional: NAD   Eyes: icteric, PERRLA  ENMT: nc/at, no thrush  Neck: supple, central line   Cardiovascular: RRR  Gastrointestinal: abdomen distended/tight.  rectal tube in place  Genitourinary: anuric  Extremities: SCD's in place and working bilaterally, no edema  Vascular: Palpable dp pulses bilaterally.   Neurological: following commands, mentation improving  Skin:  jaundiced, diffuse rash across abdomen and flank. No ulcerations, lesions  Musculoskeletal: moving extremities  Psychiatric: calm               Transplant Surgery Progress Note    Present:   Patient seen and examined with multidisciplinary Transplant team including  Surgeon: Dr. Oconnor. Hepatologist: Dr. Curtis,  NP: Jacky and RNs in AM rounds.   Disciplines not in attendance will be notified of the plan.     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  Arthur ([phone]) and Daughter Taylor at [facility] provided additional history. Of note was recently admitted to  11/2022 for workup and eval of jaundice- during that hospitalization was found to have a UTI and dx with alc hep and started on steroids (was deemed a non-responder and steroids were subsequently stopped); s/p LVP at [place] 11/2022. Previously hospitalized in 2021 at [place] for ETOH detox. Went to ETOH rehab 08/2022 and was asked to leave the facility when she was COVID+; was sober for 1 week until she started drinking again. Had also attended a few AA meetings in the past. Transferred from [facility] 12/20 for further management of acute liver failure and liver transplant evaluation.       Interval events:  - remains on Vaso and Levo  - on RA  - TF at goal  - Abdomen distended. CT w/ large volume ascites  - mental status waxing and waning, worse this AM  - diffuse rash across abdomen and flank, improving    Potential Discharge date: pending clinical stability  Education:  Medications  Plan of care:  See Below      MEDICATIONS  (STANDING):  caspofungin IVPB      caspofungin IVPB 50 milliGRAM(s) IV Intermittent every 24 hours  chlorhexidine 2% Cloths 1 Application(s) Topical <User Schedule>  CRRT Treatment    <Continuous>  escitalopram 10 milliGRAM(s) Oral daily  folic acid 1 milliGRAM(s) Oral daily  heparin   Injectable 5000 Unit(s) SubCutaneous every 12 hours  influenza   Vaccine 0.5 milliLiter(s) IntraMuscular once  lactulose Syrup 20 Gram(s) Oral every 6 hours  levETIRAcetam  Solution 750 milliGRAM(s) Enteral Tube two times a day  levothyroxine Injectable 103 MICROGram(s) IV Push at bedtime  meropenem  IVPB 1000 milliGRAM(s) IV Intermittent every 8 hours  midodrine 20 milliGRAM(s) Oral every 8 hours  multivitamin/minerals/iron Oral Solution (CENTRUM) 15 milliLiter(s) Oral daily  norepinephrine Infusion 0.05 MICROgram(s)/kG/Min (6.21 mL/Hr) IV Continuous <Continuous>  pantoprazole   Suspension 40 milliGRAM(s) Oral every 24 hours  Phoxillum Filtration BK 4 / 2.5 5000 milliLiter(s) (1000 mL/Hr) CRRT <Continuous>  Phoxillum Filtration BK 4 / 2.5 5000 milliLiter(s) (800 mL/Hr) CRRT <Continuous>  polyethylene glycol 3350 17 Gram(s) Oral every 12 hours  PrismaSOL Filtration BGK 4 / 2.5 5000 milliLiter(s) (200 mL/Hr) CRRT <Continuous>  rifAXIMin 550 milliGRAM(s) Oral every 12 hours  thiamine 100 milliGRAM(s) Enteral Tube daily  vasopressin Infusion 0.03 Unit(s)/Min (4.5 mL/Hr) IV Continuous <Continuous>    MEDICATIONS  (PRN):  HYDROmorphone  Injectable 0.25 milliGRAM(s) IV Push every 3 hours PRN Severe Pain (7 - 10)  sodium chloride 0.65% Nasal 1 Spray(s) Both Nostrils every 3 hours PRN Nasal Congestion  tetracaine/benzocaine/butamben Spray 1 Spray(s) Topical every 3 hours PRN for ngt discomfort      PAST MEDICAL & SURGICAL HISTORY:  HTN (hypertension)  off medication for over one year--states BP has been normal      UTI (urinary tract infection)  currently being treated--will complete antibiotics 3/8/2022      Intracranial tumor      GERD (gastroesophageal reflux disease)      Eczema      Thyroid cancer  surgery. radiation, Radioactive iodine      COVID-19 virus infection  11/2021--recovered at home      H/O total thyroidectomy  age 21&#x27;s for Thyroid cancer, postop complication arterial bleed, second surgery      History of tonsillectomy  age 5      History of appendectomy  age 5          Vital Signs Last 24 Hrs  T(C): 36.8 (05 Jan 2023 11:00), Max: 37.1 (05 Jan 2023 04:00)  T(F): 98.3 (05 Jan 2023 11:00), Max: 98.8 (05 Jan 2023 04:00)  HR: 100 (05 Jan 2023 13:30) (91 - 111)  BP: 109/66 (05 Jan 2023 07:15) (109/66 - 109/66)  BP(mean): 83 (05 Jan 2023 07:15) (83 - 83)  RR: 18 (05 Jan 2023 13:30) (15 - 35)  SpO2: 99% (05 Jan 2023 13:30) (93% - 100%)    Parameters below as of 05 Jan 2023 07:00  Patient On (Oxygen Delivery Method): room air        I&O's Summary    04 Jan 2023 07:01  -  05 Jan 2023 07:00  --------------------------------------------------------  IN: 4080.7 mL / OUT: 450 mL / NET: 3630.7 mL    05 Jan 2023 07:01  -  05 Jan 2023 13:39  --------------------------------------------------------  IN: 637 mL / OUT: 642 mL / NET: -5 mL                              8.9    37.49 )-----------( 47       ( 05 Jan 2023 12:58 )             25.6     01-05    134<L>  |  100  |  38<H>  ----------------------------<  143<H>  3.5   |  15<L>  |  2.41<H>    Ca    9.9      05 Jan 2023 05:58  Phos  4.7     01-05  Mg     2.2     01-05    TPro  6.3  /  Alb  3.8  /  TBili  24.2<H>  /  DBili  x   /  AST  124<H>  /  ALT  26  /  AlkPhos  170<H>  01-05          Culture - Fungal, Body Fluid (collected 01-04-23 @ 16:28)  Source: Peritoneal Peritoneal Fluid  Preliminary Report (01-05-23 @ 11:03):    Testing in progress    Culture - Body Fluid with Gram Stain (collected 01-04-23 @ 16:28)  Source: Peritoneal Peritoneal Fluid  Gram Stain (01-04-23 @ 23:22):    polymorphonuclear leukocytes seen    No organisms seen    by cytocentrifuge    Culture - Urine (collected 01-03-23 @ 18:30)  Source: Catheterized Catheterized  Final Report (01-04-23 @ 19:30):    No growth              Review of systems  AMS    Physical Exam:  Constitutional: NAD   Eyes: icteric, PERRLA  ENMT: nc/at, no thrush  Neck: supple, central line   Cardiovascular: RRR  Gastrointestinal: abdomen distended/tight.  rectal tube in place  Genitourinary: anuric  Extremities: SCD's in place and working bilaterally, no edema  Vascular: Palpable dp pulses bilaterally.   Neurological: following commands, mentation improving  Skin:  jaundiced, diffuse rash across abdomen and flank. No ulcerations, lesions  Musculoskeletal: moving extremities  Psychiatric: calm

## 2023-01-05 NOTE — CONSULT NOTE ADULT - SUBJECTIVE AND OBJECTIVE BOX
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   She was admitted to [facility] on 12/19 with seizures no reported hx of seizures.  (20 Dec 2022 21:21)    Interval History:  Patient with complicated course- dx w pneumonia, septic shock, ascites due to liver failure s/p paracentesis.  Evaluation for liver transplant     REVIEW OF SYSTEMS: No chest pain, shortness of breath, nausea, vomiting or diarhea; other ROS neg     PAST MEDICAL & SURGICAL HISTORY  HTN (hypertension)    UTI (urinary tract infection)    Intracranial tumor    GERD (gastroesophageal reflux disease)    Eczema    Thyroid cancer    COVID-19 virus infection    History of thyroid surgery    H/O total thyroidectomy    History of tonsillectomy    History of appendectomy    FUNCTIONAL HISTORY:   Lives in house w    Was Independent w ambulation; recently required assistance w ADLs    CURRENT FUNCTIONAL STATUS:  Min A transfers,  mod A gait    FAMILY HISTORY   FH: heart disease  FH: pancreatic cancer    MEDICATIONS   caspofungin IVPB      caspofungin IVPB 50 milliGRAM(s) IV Intermittent every 24 hours  chlorhexidine 2% Cloths 1 Application(s) Topical <User Schedule>  CRRT Treatment    <Continuous>  escitalopram 10 milliGRAM(s) Oral daily  folic acid 1 milliGRAM(s) Oral daily  heparin   Injectable 5000 Unit(s) SubCutaneous every 12 hours  HYDROmorphone  Injectable 0.25 milliGRAM(s) IV Push every 3 hours PRN  influenza   Vaccine 0.5 milliLiter(s) IntraMuscular once  insulin lispro (ADMELOG) corrective regimen sliding scale   SubCutaneous every 6 hours  lactulose Syrup 20 Gram(s) Oral every 6 hours  levETIRAcetam  Solution 750 milliGRAM(s) Enteral Tube two times a day  levothyroxine Injectable 103 MICROGram(s) IV Push at bedtime  meropenem  IVPB 1000 milliGRAM(s) IV Intermittent every 8 hours  midodrine 20 milliGRAM(s) Oral every 8 hours  multivitamin/minerals/iron Oral Solution (CENTRUM) 15 milliLiter(s) Oral daily  norepinephrine Infusion 0.05 MICROgram(s)/kG/Min IV Continuous <Continuous>  pantoprazole   Suspension 40 milliGRAM(s) Oral every 24 hours  Phoxillum Filtration BK 4 / 2.5 5000 milliLiter(s) CRRT <Continuous>  Phoxillum Filtration BK 4 / 2.5 5000 milliLiter(s) CRRT <Continuous>  polyethylene glycol 3350 17 Gram(s) Oral every 12 hours  PrismaSOL Filtration BGK 4 / 2.5 5000 milliLiter(s) CRRT <Continuous>  rifAXIMin 550 milliGRAM(s) Oral every 12 hours  sodium chloride 0.65% Nasal 1 Spray(s) Both Nostrils every 3 hours PRN  tetracaine/benzocaine/butamben Spray 1 Spray(s) Topical every 3 hours PRN  thiamine 100 milliGRAM(s) Enteral Tube daily  vasopressin Infusion 0.03 Unit(s)/Min IV Continuous <Continuous>      ALLERGIES  Macrobid (Rash)  Nexium (Stomach Upset)      VITALS  T(C): 37 (01-05-23 @ 07:00), Max: 37.1 (01-04-23 @ 11:00)  HR: 99 (01-05-23 @ 09:00) (88 - 107)  BP: 109/66 (01-05-23 @ 07:15) (109/66 - 109/66)  RR: 24 (01-05-23 @ 09:00) (15 - 35)  SpO2: 95% (01-05-23 @ 09:00) (94% - 100%)  Wt(kg): --    PHYSICAL EXAM  Constitutional - NAD, Comfortable  HEENT - NCAT, EOMI  Neck - Supple, No limited ROM  Chest - CTA bilaterally, No wheeze, No rhonchi, No crackles  Cardiovascular - RRR, S1S2, No murmurs  Abdomen - BS+, Soft, NTND  Extremities - No C/C/E, No calf tenderness   Neurologic Exam -                    Cognitive - Awake, Alert, AAO to self, place, date, year, situation     Communication - Fluent, No dysarthria, no aphasia     Cranial Nerves - CN 2-12 intact     Motor - No focal deficits      Sensory - Intact to LT     Reflexes - DTR Intact, No primitive reflexive  Psychiatric - Mood stable, Affect WNL    RECENT LABS/IMAGING  CBC Full  -  ( 05 Jan 2023 05:58 )  WBC Count : 32.49 K/uL  RBC Count : 2.44 M/uL  Hemoglobin : 7.5 g/dL  Hematocrit : 21.5 %  Platelet Count - Automated : 47 K/uL  Mean Cell Volume : 88.1 fl  Mean Cell Hemoglobin : 30.7 pg  Mean Cell Hemoglobin Concentration : 34.9 gm/dL  Auto Neutrophil # : x  Auto Lymphocyte # : x  Auto Monocyte # : x  Auto Eosinophil # : x  Auto Basophil # : x  Auto Neutrophil % : x  Auto Lymphocyte % : x  Auto Monocyte % : x  Auto Eosinophil % : x  Auto Basophil % : x    01-05    134<L>  |  100  |  38<H>  ----------------------------<  143<H>  3.5   |  15<L>  |  2.41<H>    Ca    9.9      05 Jan 2023 05:58  Phos  4.7     01-05  Mg     2.2     01-05    TPro  6.3  /  Alb  3.8  /  TBili  24.2<H>  /  DBili  x   /  AST  124<H>  /  ALT  26  /  AlkPhos  170<H>  01-05    Urinalysis Basic - ( 03 Jan 2023 18:02 )    Color: Other / Appearance: Turbid / SG: SEE NOTE / pH: x  Gluc: x / Ketone: SEE NOTE  / Bili: SEE NOTE / Urobili: SEE NOTE   Blood: x / Protein: SEE NOTE / Nitrite: SEE NOTE   Leuk Esterase: SEE NOTE / RBC: x / WBC x   Sq Epi: x / Non Sq Epi: x / Bacteria: x    Impression:  61 yo with functional deficits secondary to diagnosis of debility due to complicated hospital course.    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   She was admitted to [facility] on 12/19 with seizures no reported hx of seizures.  (20 Dec 2022 21:21)    Interval History:  Patient with complicated course- dx w pneumonia, septic shock, ascites due to liver failure s/p paracentesis.  Evaluation for liver transplant     REVIEW OF SYSTEMS: No chest pain, shortness of breath, nausea, vomiting or diarhea; other ROS neg     PAST MEDICAL & SURGICAL HISTORY  HTN (hypertension)    UTI (urinary tract infection)    Intracranial tumor    GERD (gastroesophageal reflux disease)    Eczema    Thyroid cancer    COVID-19 virus infection    History of thyroid surgery    H/O total thyroidectomy    History of tonsillectomy    History of appendectomy    FUNCTIONAL HISTORY:   Lives in house w    Was Independent w ambulation; recently required assistance w ADLs    CURRENT FUNCTIONAL STATUS:  Min A transfers,  mod A gait    FAMILY HISTORY   FH: heart disease  FH: pancreatic cancer    MEDICATIONS   caspofungin IVPB      caspofungin IVPB 50 milliGRAM(s) IV Intermittent every 24 hours  chlorhexidine 2% Cloths 1 Application(s) Topical <User Schedule>  CRRT Treatment    <Continuous>  escitalopram 10 milliGRAM(s) Oral daily  folic acid 1 milliGRAM(s) Oral daily  heparin   Injectable 5000 Unit(s) SubCutaneous every 12 hours  HYDROmorphone  Injectable 0.25 milliGRAM(s) IV Push every 3 hours PRN  influenza   Vaccine 0.5 milliLiter(s) IntraMuscular once  insulin lispro (ADMELOG) corrective regimen sliding scale   SubCutaneous every 6 hours  lactulose Syrup 20 Gram(s) Oral every 6 hours  levETIRAcetam  Solution 750 milliGRAM(s) Enteral Tube two times a day  levothyroxine Injectable 103 MICROGram(s) IV Push at bedtime  meropenem  IVPB 1000 milliGRAM(s) IV Intermittent every 8 hours  midodrine 20 milliGRAM(s) Oral every 8 hours  multivitamin/minerals/iron Oral Solution (CENTRUM) 15 milliLiter(s) Oral daily  norepinephrine Infusion 0.05 MICROgram(s)/kG/Min IV Continuous <Continuous>  pantoprazole   Suspension 40 milliGRAM(s) Oral every 24 hours  Phoxillum Filtration BK 4 / 2.5 5000 milliLiter(s) CRRT <Continuous>  Phoxillum Filtration BK 4 / 2.5 5000 milliLiter(s) CRRT <Continuous>  polyethylene glycol 3350 17 Gram(s) Oral every 12 hours  PrismaSOL Filtration BGK 4 / 2.5 5000 milliLiter(s) CRRT <Continuous>  rifAXIMin 550 milliGRAM(s) Oral every 12 hours  sodium chloride 0.65% Nasal 1 Spray(s) Both Nostrils every 3 hours PRN  tetracaine/benzocaine/butamben Spray 1 Spray(s) Topical every 3 hours PRN  thiamine 100 milliGRAM(s) Enteral Tube daily  vasopressin Infusion 0.03 Unit(s)/Min IV Continuous <Continuous>      ALLERGIES  Macrobid (Rash)  Nexium (Stomach Upset)      VITALS  T(C): 37 (01-05-23 @ 07:00), Max: 37.1 (01-04-23 @ 11:00)  HR: 99 (01-05-23 @ 09:00) (88 - 107)  BP: 109/66 (01-05-23 @ 07:15) (109/66 - 109/66)  RR: 24 (01-05-23 @ 09:00) (15 - 35)  SpO2: 95% (01-05-23 @ 09:00) (94% - 100%)  Wt(kg): --    PHYSICAL EXAM  Constitutional - NAD, Comfortable  HEENT - NCAT, EOMI  Neck - Supple, No limited ROM  Chest - CTA bilaterally, No wheeze, No rhonchi, No crackles  Cardiovascular - RRR, S1S2, No murmurs  Abdomen - BS+, Soft, NTND  Extremities - No C/C/E, No calf tenderness   Neurologic Exam -                    Cognitive - Awake, Alert, AAO to self, place, date, year, situation     Communication - Fluent, No dysarthria, no aphasia     Cranial Nerves - CN 2-12 intact     Motor - No focal deficits      Sensory - Intact to LT     Reflexes - DTR Intact, No primitive reflexive  Psychiatric - Mood stable, Affect WNL    RECENT LABS/IMAGING  CBC Full  -  ( 05 Jan 2023 05:58 )  WBC Count : 32.49 K/uL  RBC Count : 2.44 M/uL  Hemoglobin : 7.5 g/dL  Hematocrit : 21.5 %  Platelet Count - Automated : 47 K/uL  Mean Cell Volume : 88.1 fl  Mean Cell Hemoglobin : 30.7 pg  Mean Cell Hemoglobin Concentration : 34.9 gm/dL  Auto Neutrophil # : x  Auto Lymphocyte # : x  Auto Monocyte # : x  Auto Eosinophil # : x  Auto Basophil # : x  Auto Neutrophil % : x  Auto Lymphocyte % : x  Auto Monocyte % : x  Auto Eosinophil % : x  Auto Basophil % : x    01-05    134<L>  |  100  |  38<H>  ----------------------------<  143<H>  3.5   |  15<L>  |  2.41<H>    Ca    9.9      05 Jan 2023 05:58  Phos  4.7     01-05  Mg     2.2     01-05    TPro  6.3  /  Alb  3.8  /  TBili  24.2<H>  /  DBili  x   /  AST  124<H>  /  ALT  26  /  AlkPhos  170<H>  01-05    Urinalysis Basic - ( 03 Jan 2023 18:02 )    Color: Other / Appearance: Turbid / SG: SEE NOTE / pH: x  Gluc: x / Ketone: SEE NOTE  / Bili: SEE NOTE / Urobili: SEE NOTE   Blood: x / Protein: SEE NOTE / Nitrite: SEE NOTE   Leuk Esterase: SEE NOTE / RBC: x / WBC x   Sq Epi: x / Non Sq Epi: x / Bacteria: x    Impression:  63 yo with functional deficits secondary to diagnosis of debility due to complicated hospital course.    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   She was admitted to [facility] on 12/19 with seizures no reported hx of seizures.  (20 Dec 2022 21:21)    Interval History:  Patient with complicated course- dx w pneumonia, septic shock, ascites due to liver failure s/p paracentesis.  Evaluation for liver transplant     REVIEW OF SYSTEMS: No chest pain, shortness of breath, nausea, vomiting or diarhea; other ROS neg     PAST MEDICAL & SURGICAL HISTORY  HTN (hypertension)    UTI (urinary tract infection)    Intracranial tumor    GERD (gastroesophageal reflux disease)    Eczema    Thyroid cancer    COVID-19 virus infection    History of thyroid surgery    H/O total thyroidectomy    History of tonsillectomy    History of appendectomy    FUNCTIONAL HISTORY:   Lives in house w    Was Independent w ambulation; recently required assistance w ADLs    CURRENT FUNCTIONAL STATUS:  Min A transfers,  mod A gait    FAMILY HISTORY   FH: heart disease  FH: pancreatic cancer    MEDICATIONS   caspofungin IVPB      caspofungin IVPB 50 milliGRAM(s) IV Intermittent every 24 hours  chlorhexidine 2% Cloths 1 Application(s) Topical <User Schedule>  CRRT Treatment    <Continuous>  escitalopram 10 milliGRAM(s) Oral daily  folic acid 1 milliGRAM(s) Oral daily  heparin   Injectable 5000 Unit(s) SubCutaneous every 12 hours  HYDROmorphone  Injectable 0.25 milliGRAM(s) IV Push every 3 hours PRN  influenza   Vaccine 0.5 milliLiter(s) IntraMuscular once  insulin lispro (ADMELOG) corrective regimen sliding scale   SubCutaneous every 6 hours  lactulose Syrup 20 Gram(s) Oral every 6 hours  levETIRAcetam  Solution 750 milliGRAM(s) Enteral Tube two times a day  levothyroxine Injectable 103 MICROGram(s) IV Push at bedtime  meropenem  IVPB 1000 milliGRAM(s) IV Intermittent every 8 hours  midodrine 20 milliGRAM(s) Oral every 8 hours  multivitamin/minerals/iron Oral Solution (CENTRUM) 15 milliLiter(s) Oral daily  norepinephrine Infusion 0.05 MICROgram(s)/kG/Min IV Continuous <Continuous>  pantoprazole   Suspension 40 milliGRAM(s) Oral every 24 hours  Phoxillum Filtration BK 4 / 2.5 5000 milliLiter(s) CRRT <Continuous>  Phoxillum Filtration BK 4 / 2.5 5000 milliLiter(s) CRRT <Continuous>  polyethylene glycol 3350 17 Gram(s) Oral every 12 hours  PrismaSOL Filtration BGK 4 / 2.5 5000 milliLiter(s) CRRT <Continuous>  rifAXIMin 550 milliGRAM(s) Oral every 12 hours  sodium chloride 0.65% Nasal 1 Spray(s) Both Nostrils every 3 hours PRN  tetracaine/benzocaine/butamben Spray 1 Spray(s) Topical every 3 hours PRN  thiamine 100 milliGRAM(s) Enteral Tube daily  vasopressin Infusion 0.03 Unit(s)/Min IV Continuous <Continuous>      ALLERGIES  Macrobid (Rash)  Nexium (Stomach Upset)      VITALS  T(C): 37 (01-05-23 @ 07:00), Max: 37.1 (01-04-23 @ 11:00)  HR: 99 (01-05-23 @ 09:00) (88 - 107)  BP: 109/66 (01-05-23 @ 07:15) (109/66 - 109/66)  RR: 24 (01-05-23 @ 09:00) (15 - 35)  SpO2: 95% (01-05-23 @ 09:00) (94% - 100%)  Wt(kg): --    PHYSICAL EXAM  Constitutional - NAD, Comfortable  HEENT - NCAT, EOMI  Neck - Supple, No limited ROM  Chest - CTA bilaterally, No wheeze, No rhonchi, No crackles  Cardiovascular - RRR, S1S2, No murmurs  Abdomen - BS+, Soft, NTND  Extremities - No C/C/E, No calf tenderness   Neurologic Exam -                    Cognitive - Awake, Alert, AAO to self, place, date, year, situation     Communication - Fluent, No dysarthria, no aphasia     Cranial Nerves - CN 2-12 intact     Motor - No focal deficits      Sensory - Intact to LT     Reflexes - DTR Intact, No primitive reflexive  Psychiatric - Mood stable, Affect WNL    RECENT LABS/IMAGING  CBC Full  -  ( 05 Jan 2023 05:58 )  WBC Count : 32.49 K/uL  RBC Count : 2.44 M/uL  Hemoglobin : 7.5 g/dL  Hematocrit : 21.5 %  Platelet Count - Automated : 47 K/uL  Mean Cell Volume : 88.1 fl  Mean Cell Hemoglobin : 30.7 pg  Mean Cell Hemoglobin Concentration : 34.9 gm/dL  Auto Neutrophil # : x  Auto Lymphocyte # : x  Auto Monocyte # : x  Auto Eosinophil # : x  Auto Basophil # : x  Auto Neutrophil % : x  Auto Lymphocyte % : x  Auto Monocyte % : x  Auto Eosinophil % : x  Auto Basophil % : x    01-05    134<L>  |  100  |  38<H>  ----------------------------<  143<H>  3.5   |  15<L>  |  2.41<H>    Ca    9.9      05 Jan 2023 05:58  Phos  4.7     01-05  Mg     2.2     01-05    TPro  6.3  /  Alb  3.8  /  TBili  24.2<H>  /  DBili  x   /  AST  124<H>  /  ALT  26  /  AlkPhos  170<H>  01-05    Urinalysis Basic - ( 03 Jan 2023 18:02 )    Color: Other / Appearance: Turbid / SG: SEE NOTE / pH: x  Gluc: x / Ketone: SEE NOTE  / Bili: SEE NOTE / Urobili: SEE NOTE   Blood: x / Protein: SEE NOTE / Nitrite: SEE NOTE   Leuk Esterase: SEE NOTE / RBC: x / WBC x   Sq Epi: x / Non Sq Epi: x / Bacteria: x    Impression:  61 yo with functional deficits secondary to diagnosis of debility due to complicated hospital course.    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   She was admitted to [facility] on 12/19 with seizures no reported hx of seizures.  (20 Dec 2022 21:21)    Interval History:  Patient with complicated course- dx w pneumonia, septic shock, ascites due to liver failure s/p paracentesis.  Evaluation for liver transplant    at bedside.    REVIEW OF SYSTEMS: Lethargic- unable to attain full ROS, denies pain    PAST MEDICAL & SURGICAL HISTORY  HTN (hypertension)    UTI (urinary tract infection)    Intracranial tumor    GERD (gastroesophageal reflux disease)    Eczema    Thyroid cancer    COVID-19 virus infection    History of thyroid surgery    H/O total thyroidectomy    History of tonsillectomy    History of appendectomy    FUNCTIONAL HISTORY:   Lives in house w    Was Independent w ambulation; recently required assistance w ADLs    CURRENT FUNCTIONAL STATUS:  Min A transfers,  mod A gait    FAMILY HISTORY   FH: heart disease  FH: pancreatic cancer    MEDICATIONS   caspofungin IVPB      caspofungin IVPB 50 milliGRAM(s) IV Intermittent every 24 hours  chlorhexidine 2% Cloths 1 Application(s) Topical <User Schedule>  CRRT Treatment    <Continuous>  escitalopram 10 milliGRAM(s) Oral daily  folic acid 1 milliGRAM(s) Oral daily  heparin   Injectable 5000 Unit(s) SubCutaneous every 12 hours  HYDROmorphone  Injectable 0.25 milliGRAM(s) IV Push every 3 hours PRN  influenza   Vaccine 0.5 milliLiter(s) IntraMuscular once  insulin lispro (ADMELOG) corrective regimen sliding scale   SubCutaneous every 6 hours  lactulose Syrup 20 Gram(s) Oral every 6 hours  levETIRAcetam  Solution 750 milliGRAM(s) Enteral Tube two times a day  levothyroxine Injectable 103 MICROGram(s) IV Push at bedtime  meropenem  IVPB 1000 milliGRAM(s) IV Intermittent every 8 hours  midodrine 20 milliGRAM(s) Oral every 8 hours  multivitamin/minerals/iron Oral Solution (CENTRUM) 15 milliLiter(s) Oral daily  norepinephrine Infusion 0.05 MICROgram(s)/kG/Min IV Continuous <Continuous>  pantoprazole   Suspension 40 milliGRAM(s) Oral every 24 hours  Phoxillum Filtration BK 4 / 2.5 5000 milliLiter(s) CRRT <Continuous>  Phoxillum Filtration BK 4 / 2.5 5000 milliLiter(s) CRRT <Continuous>  polyethylene glycol 3350 17 Gram(s) Oral every 12 hours  PrismaSOL Filtration BGK 4 / 2.5 5000 milliLiter(s) CRRT <Continuous>  rifAXIMin 550 milliGRAM(s) Oral every 12 hours  sodium chloride 0.65% Nasal 1 Spray(s) Both Nostrils every 3 hours PRN  tetracaine/benzocaine/butamben Spray 1 Spray(s) Topical every 3 hours PRN  thiamine 100 milliGRAM(s) Enteral Tube daily  vasopressin Infusion 0.03 Unit(s)/Min IV Continuous <Continuous>      ALLERGIES  Macrobid (Rash)  Nexium (Stomach Upset)      VITALS  T(C): 37 (01-05-23 @ 07:00), Max: 37.1 (01-04-23 @ 11:00)  HR: 99 (01-05-23 @ 09:00) (88 - 107)  BP: 109/66 (01-05-23 @ 07:15) (109/66 - 109/66)  RR: 24 (01-05-23 @ 09:00) (15 - 35)  SpO2: 95% (01-05-23 @ 09:00) (94% - 100%)  Wt(kg): --    PHYSICAL EXAM  Constitutional - NAD, Comfortable  HEENT - NCAT, EOMI jaundice  Neck - Supple, No limited ROM  Chest - CTA bilaterally, No wheeze, No rhonchi, No crackles  Cardiovascular - RRR, S1S2, No murmurs  Abdomen - BS+, Soft, slight distension  Extremities - No C/C/E, No calf tenderness   Skin- jaundiced  Neurologic Exam -                 Lethargic but arousable  Follows verbal instruction w slow processing  AAO x 2  Motor fair grade throughout       Psychiatric - Mood stable, Affect WNL    RECENT LABS/IMAGING  CBC Full  -  ( 05 Jan 2023 05:58 )  WBC Count : 32.49 K/uL  RBC Count : 2.44 M/uL  Hemoglobin : 7.5 g/dL  Hematocrit : 21.5 %  Platelet Count - Automated : 47 K/uL  Mean Cell Volume : 88.1 fl  Mean Cell Hemoglobin : 30.7 pg  Mean Cell Hemoglobin Concentration : 34.9 gm/dL  Auto Neutrophil # : x  Auto Lymphocyte # : x  Auto Monocyte # : x  Auto Eosinophil # : x  Auto Basophil # : x  Auto Neutrophil % : x  Auto Lymphocyte % : x  Auto Monocyte % : x  Auto Eosinophil % : x  Auto Basophil % : x    01-05    134<L>  |  100  |  38<H>  ----------------------------<  143<H>  3.5   |  15<L>  |  2.41<H>    Ca    9.9      05 Jan 2023 05:58  Phos  4.7     01-05  Mg     2.2     01-05    TPro  6.3  /  Alb  3.8  /  TBili  24.2<H>  /  DBili  x   /  AST  124<H>  /  ALT  26  /  AlkPhos  170<H>  01-05    Urinalysis Basic - ( 03 Jan 2023 18:02 )    Color: Other / Appearance: Turbid / SG: SEE NOTE / pH: x  Gluc: x / Ketone: SEE NOTE  / Bili: SEE NOTE / Urobili: SEE NOTE   Blood: x / Protein: SEE NOTE / Nitrite: SEE NOTE   Leuk Esterase: SEE NOTE / RBC: x / WBC x   Sq Epi: x / Non Sq Epi: x / Bacteria: x    Impression:  61 yo with functional deficits secondary to diagnosis of debility due to complicated hospital course.    Plan:  Continue w/u and management per primary team  PT- ROM, Bed Mob, Transfers, Amb w AD and bracing as needed  OT- ADLs, bracing  Prec- Falls, Cardiac  Monitor wbc ct, anemia, renal function  DVT Prophylaxis- Heparin  Skin- Turn q2 h  Dispo- When stabl;e will need Acute Rehab- can tolerate 3h/d of therapies and requires daily physician visits  D/W patient and her  Patient is a 62y old  Female who presents with a chief complaint of Acute liver failure (05 Jan 2023 03:03)    Admission HPI:  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   She was admitted to [facility] on 12/19 with seizures no reported hx of seizures.  (20 Dec 2022 21:21)    Interval History:  Patient with complicated course- dx w pneumonia, septic shock, ascites due to liver failure s/p paracentesis.  Evaluation for liver transplant    at bedside.    REVIEW OF SYSTEMS: Lethargic- unable to attain full ROS, denies pain    PAST MEDICAL & SURGICAL HISTORY  HTN (hypertension)    UTI (urinary tract infection)    Intracranial tumor    GERD (gastroesophageal reflux disease)    Eczema    Thyroid cancer    COVID-19 virus infection    History of thyroid surgery    H/O total thyroidectomy    History of tonsillectomy    History of appendectomy    FUNCTIONAL HISTORY:   Lives in house w    Was Independent w ambulation; recently required assistance w ADLs    CURRENT FUNCTIONAL STATUS:  Min A transfers,  mod A gait    FAMILY HISTORY   FH: heart disease  FH: pancreatic cancer    MEDICATIONS   caspofungin IVPB      caspofungin IVPB 50 milliGRAM(s) IV Intermittent every 24 hours  chlorhexidine 2% Cloths 1 Application(s) Topical <User Schedule>  CRRT Treatment    <Continuous>  escitalopram 10 milliGRAM(s) Oral daily  folic acid 1 milliGRAM(s) Oral daily  heparin   Injectable 5000 Unit(s) SubCutaneous every 12 hours  HYDROmorphone  Injectable 0.25 milliGRAM(s) IV Push every 3 hours PRN  influenza   Vaccine 0.5 milliLiter(s) IntraMuscular once  insulin lispro (ADMELOG) corrective regimen sliding scale   SubCutaneous every 6 hours  lactulose Syrup 20 Gram(s) Oral every 6 hours  levETIRAcetam  Solution 750 milliGRAM(s) Enteral Tube two times a day  levothyroxine Injectable 103 MICROGram(s) IV Push at bedtime  meropenem  IVPB 1000 milliGRAM(s) IV Intermittent every 8 hours  midodrine 20 milliGRAM(s) Oral every 8 hours  multivitamin/minerals/iron Oral Solution (CENTRUM) 15 milliLiter(s) Oral daily  norepinephrine Infusion 0.05 MICROgram(s)/kG/Min IV Continuous <Continuous>  pantoprazole   Suspension 40 milliGRAM(s) Oral every 24 hours  Phoxillum Filtration BK 4 / 2.5 5000 milliLiter(s) CRRT <Continuous>  Phoxillum Filtration BK 4 / 2.5 5000 milliLiter(s) CRRT <Continuous>  polyethylene glycol 3350 17 Gram(s) Oral every 12 hours  PrismaSOL Filtration BGK 4 / 2.5 5000 milliLiter(s) CRRT <Continuous>  rifAXIMin 550 milliGRAM(s) Oral every 12 hours  sodium chloride 0.65% Nasal 1 Spray(s) Both Nostrils every 3 hours PRN  tetracaine/benzocaine/butamben Spray 1 Spray(s) Topical every 3 hours PRN  thiamine 100 milliGRAM(s) Enteral Tube daily  vasopressin Infusion 0.03 Unit(s)/Min IV Continuous <Continuous>      ALLERGIES  Macrobid (Rash)  Nexium (Stomach Upset)      VITALS  T(C): 37 (01-05-23 @ 07:00), Max: 37.1 (01-04-23 @ 11:00)  HR: 99 (01-05-23 @ 09:00) (88 - 107)  BP: 109/66 (01-05-23 @ 07:15) (109/66 - 109/66)  RR: 24 (01-05-23 @ 09:00) (15 - 35)  SpO2: 95% (01-05-23 @ 09:00) (94% - 100%)  Wt(kg): --    PHYSICAL EXAM  Constitutional - NAD, Comfortable  HEENT - NCAT, EOMI jaundice  Neck - Supple, No limited ROM  Chest - CTA bilaterally, No wheeze, No rhonchi, No crackles  Cardiovascular - RRR, S1S2, No murmurs  Abdomen - BS+, Soft, slight distension  Extremities - No C/C/E, No calf tenderness   Skin- jaundiced  Neurologic Exam -                 Lethargic but arousable  Follows verbal instruction w slow processing  AAO x 2  Motor fair grade throughout       Psychiatric - Mood stable, Affect WNL    RECENT LABS/IMAGING  CBC Full  -  ( 05 Jan 2023 05:58 )  WBC Count : 32.49 K/uL  RBC Count : 2.44 M/uL  Hemoglobin : 7.5 g/dL  Hematocrit : 21.5 %  Platelet Count - Automated : 47 K/uL  Mean Cell Volume : 88.1 fl  Mean Cell Hemoglobin : 30.7 pg  Mean Cell Hemoglobin Concentration : 34.9 gm/dL  Auto Neutrophil # : x  Auto Lymphocyte # : x  Auto Monocyte # : x  Auto Eosinophil # : x  Auto Basophil # : x  Auto Neutrophil % : x  Auto Lymphocyte % : x  Auto Monocyte % : x  Auto Eosinophil % : x  Auto Basophil % : x    01-05    134<L>  |  100  |  38<H>  ----------------------------<  143<H>  3.5   |  15<L>  |  2.41<H>    Ca    9.9      05 Jan 2023 05:58  Phos  4.7     01-05  Mg     2.2     01-05    TPro  6.3  /  Alb  3.8  /  TBili  24.2<H>  /  DBili  x   /  AST  124<H>  /  ALT  26  /  AlkPhos  170<H>  01-05    Urinalysis Basic - ( 03 Jan 2023 18:02 )    Color: Other / Appearance: Turbid / SG: SEE NOTE / pH: x  Gluc: x / Ketone: SEE NOTE  / Bili: SEE NOTE / Urobili: SEE NOTE   Blood: x / Protein: SEE NOTE / Nitrite: SEE NOTE   Leuk Esterase: SEE NOTE / RBC: x / WBC x   Sq Epi: x / Non Sq Epi: x / Bacteria: x    Impression:  63 yo with functional deficits secondary to diagnosis of debility due to complicated hospital course.    Plan:  Continue w/u and management per primary team  PT- ROM, Bed Mob, Transfers, Amb w AD and bracing as needed  OT- ADLs, bracing  Prec- Falls, Cardiac  Monitor wbc ct, anemia, renal function  DVT Prophylaxis- Heparin  Skin- Turn q2 h  Dispo- When stabl;e will need Acute Rehab- can tolerate 3h/d of therapies and requires daily physician visits  D/W patient and her  Patient is a 62y old  Female who presents with a chief complaint of Acute liver failure (05 Jan 2023 03:03)    Admission HPI:  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   She was admitted to [facility] on 12/19 with seizures no reported hx of seizures.  (20 Dec 2022 21:21)    Interval History:  Patient with complicated course- dx w pneumonia, septic shock, ascites due to liver failure s/p paracentesis.  Evaluation for liver transplant    at bedside.    REVIEW OF SYSTEMS: Lethargic- unable to attain full ROS, denies pain    PAST MEDICAL & SURGICAL HISTORY  HTN (hypertension)    UTI (urinary tract infection)    Intracranial tumor    GERD (gastroesophageal reflux disease)    Eczema    Thyroid cancer    COVID-19 virus infection    History of thyroid surgery    H/O total thyroidectomy    History of tonsillectomy    History of appendectomy    FUNCTIONAL HISTORY:   Lives in house w    Was Independent w ambulation; recently required assistance w ADLs    CURRENT FUNCTIONAL STATUS:  Min A transfers,  mod A gait    FAMILY HISTORY   FH: heart disease  FH: pancreatic cancer    MEDICATIONS   caspofungin IVPB      caspofungin IVPB 50 milliGRAM(s) IV Intermittent every 24 hours  chlorhexidine 2% Cloths 1 Application(s) Topical <User Schedule>  CRRT Treatment    <Continuous>  escitalopram 10 milliGRAM(s) Oral daily  folic acid 1 milliGRAM(s) Oral daily  heparin   Injectable 5000 Unit(s) SubCutaneous every 12 hours  HYDROmorphone  Injectable 0.25 milliGRAM(s) IV Push every 3 hours PRN  influenza   Vaccine 0.5 milliLiter(s) IntraMuscular once  insulin lispro (ADMELOG) corrective regimen sliding scale   SubCutaneous every 6 hours  lactulose Syrup 20 Gram(s) Oral every 6 hours  levETIRAcetam  Solution 750 milliGRAM(s) Enteral Tube two times a day  levothyroxine Injectable 103 MICROGram(s) IV Push at bedtime  meropenem  IVPB 1000 milliGRAM(s) IV Intermittent every 8 hours  midodrine 20 milliGRAM(s) Oral every 8 hours  multivitamin/minerals/iron Oral Solution (CENTRUM) 15 milliLiter(s) Oral daily  norepinephrine Infusion 0.05 MICROgram(s)/kG/Min IV Continuous <Continuous>  pantoprazole   Suspension 40 milliGRAM(s) Oral every 24 hours  Phoxillum Filtration BK 4 / 2.5 5000 milliLiter(s) CRRT <Continuous>  Phoxillum Filtration BK 4 / 2.5 5000 milliLiter(s) CRRT <Continuous>  polyethylene glycol 3350 17 Gram(s) Oral every 12 hours  PrismaSOL Filtration BGK 4 / 2.5 5000 milliLiter(s) CRRT <Continuous>  rifAXIMin 550 milliGRAM(s) Oral every 12 hours  sodium chloride 0.65% Nasal 1 Spray(s) Both Nostrils every 3 hours PRN  tetracaine/benzocaine/butamben Spray 1 Spray(s) Topical every 3 hours PRN  thiamine 100 milliGRAM(s) Enteral Tube daily  vasopressin Infusion 0.03 Unit(s)/Min IV Continuous <Continuous>      ALLERGIES  Macrobid (Rash)  Nexium (Stomach Upset)      VITALS  T(C): 37 (01-05-23 @ 07:00), Max: 37.1 (01-04-23 @ 11:00)  HR: 99 (01-05-23 @ 09:00) (88 - 107)  BP: 109/66 (01-05-23 @ 07:15) (109/66 - 109/66)  RR: 24 (01-05-23 @ 09:00) (15 - 35)  SpO2: 95% (01-05-23 @ 09:00) (94% - 100%)  Wt(kg): --    PHYSICAL EXAM  Constitutional - NAD, Comfortable  HEENT - NCAT, EOMI jaundice  Neck - Supple, No limited ROM  Chest - CTA bilaterally, No wheeze, No rhonchi, No crackles  Cardiovascular - RRR, S1S2, No murmurs  Abdomen - BS+, Soft, slight distension  Extremities - No C/C/E, No calf tenderness   Skin- jaundiced  Neurologic Exam -                 Lethargic but arousable  Follows verbal instruction w slow processing  AAO x 2  Motor fair grade throughout       Psychiatric - Mood stable, Affect WNL    RECENT LABS/IMAGING  CBC Full  -  ( 05 Jan 2023 05:58 )  WBC Count : 32.49 K/uL  RBC Count : 2.44 M/uL  Hemoglobin : 7.5 g/dL  Hematocrit : 21.5 %  Platelet Count - Automated : 47 K/uL  Mean Cell Volume : 88.1 fl  Mean Cell Hemoglobin : 30.7 pg  Mean Cell Hemoglobin Concentration : 34.9 gm/dL  Auto Neutrophil # : x  Auto Lymphocyte # : x  Auto Monocyte # : x  Auto Eosinophil # : x  Auto Basophil # : x  Auto Neutrophil % : x  Auto Lymphocyte % : x  Auto Monocyte % : x  Auto Eosinophil % : x  Auto Basophil % : x    01-05    134<L>  |  100  |  38<H>  ----------------------------<  143<H>  3.5   |  15<L>  |  2.41<H>    Ca    9.9      05 Jan 2023 05:58  Phos  4.7     01-05  Mg     2.2     01-05    TPro  6.3  /  Alb  3.8  /  TBili  24.2<H>  /  DBili  x   /  AST  124<H>  /  ALT  26  /  AlkPhos  170<H>  01-05    Urinalysis Basic - ( 03 Jan 2023 18:02 )    Color: Other / Appearance: Turbid / SG: SEE NOTE / pH: x  Gluc: x / Ketone: SEE NOTE  / Bili: SEE NOTE / Urobili: SEE NOTE   Blood: x / Protein: SEE NOTE / Nitrite: SEE NOTE   Leuk Esterase: SEE NOTE / RBC: x / WBC x   Sq Epi: x / Non Sq Epi: x / Bacteria: x    Impression:  61 yo with functional deficits secondary to diagnosis of debility due to complicated hospital course.    Plan:  Continue w/u and management per primary team  PT- ROM, Bed Mob, Transfers, Amb w AD and bracing as needed  OT- ADLs, bracing  Prec- Falls, Cardiac  Monitor wbc ct, anemia, renal function  DVT Prophylaxis- Heparin  Skin- Turn q2 h  Dispo- When stabl;e will need Acute Rehab- can tolerate 3h/d of therapies and requires daily physician visits  D/W patient and her

## 2023-01-05 NOTE — PROGRESS NOTE ADULT - ASSESSMENT
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 Patient has Alcohol Use Disorder ( recent rehab with relapse and in remission since 11/2022), decompensated ALD Cirrhosis c/b ascites. Patient admitted to [facility] on 12/19/2022 after a witnessed seizure; course was c/b septic shock with associated HRF ( intubated 12/19) and an RED ( started HD 12/20). Patient was transferred to [facility] on 12/20 for a liver transplant evaluation. Patient was started on CRRT 12/21 and was extubated 12/23/22. Patient remains in SICU for hemodynamic monitoring and management while liver transplant evaluation is in progress.     PLAN:  Neuro:  - Monitor Mental Status for Encephalopathy and trend Ammonia Level   - Continue Home Lexapro   - Continue Keppra  - Continue Folic Acid, Thiamine, and MV     Resp:  - Maintain SpO2 > 92%   - Out of bed to chair, ambulate as tolerated, and incentive spirometry to prevent atelectasis    CV:  - Will wean vasopressor support of Levophed and Vaso to maintain goal MAP > 65 mmHg  - Continue Midodrine 20mg q8hr     GI: Acute Decompensated Liver Failure 2/2 ETOH Use   - NPO; TF via NGT for goal 50cc/hr   - Increased Bowel regimen Miralax BID  - Protonix for stress ulcer prophylaxis  - Continue Rifaximin, Hold Lactulose for increasing abdominal distention   - s/p Paracentesis (12/26) transudative, negative for SBP   - s/p Paracentesis ( 1/05) 4L Removed  - Rectal tube in place    Renal:  - Plan to restrat CRRT on 1/05 AM   - s/p 5% Albumin 500cc x2 due to increasing pressor requirements and CVP < 4  - Monitor I&Os and electrolytes w/ repletions as necessary    Heme:  - Monitor CBC and coags  - Hold Chemical VTE prophylaxis  - SCDs for Mechanical VTE ppx     ID:   - Persistent leukocytosis  - Monitor for clinical evidence of active infection  - Combi-Cath ( 12/22) Negative, Blood Cx ( 12/26) Negative,  MRSA (12/30) Negative   - Continue Empiric Caspofungin ( 12/26 - ?) and Meropenem (12/25 - 1/08)    Endo:   - Monitor glucose ; HgbA1C 4.9% ( 12/24)   - low dose ISS q6hr   - Continue Synthroid for hypothyroidism    Code Status: Full Code   Disposition: SICU    Lines/Tubes:  - JOSÉ MIGUEL RAMSEY ( 1/04/23)  - JOSÉ MIGUEL Dobbs ( 1/04/23)  - Right Radial Arterial Line ( 1/04/23) Patient is a 61 y/o Female with PMH Thyroid Cancer ( s/p total thyroidectomy in her 20s, radiation, and BARONE), HTN, GERD, 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 Patient has Alcohol Use Disorder ( recent rehab with relapse and in remission since 11/2022), decompensated ALD Cirrhosis c/b ascites. Patient admitted to [facility] on 12/19/2022 after a witnessed seizure; course was c/b septic shock with associated HRF ( intubated 12/19) and an RED ( started HD 12/20). Patient was transferred to [facility] on 12/20 for a liver transplant evaluation. Patient was started on CRRT 12/21 and was extubated 12/23/22. Patient remains in SICU for hemodynamic monitoring and management while liver transplant evaluation is in progress.     PLAN:  Neuro:  - Monitor Mental Status for Encephalopathy and trend Ammonia Level   - Continue Home Lexapro   - Continue Keppra  - Continue Folic Acid, Thiamine, and MV     Resp:  - Maintain SpO2 > 92%   - Out of bed to chair, ambulate as tolerated, and incentive spirometry to prevent atelectasis    CV:  - Will wean vasopressor support of Levophed and Vaso to maintain goal MAP > 65 mmHg  - Continue Midodrine 20mg q8hr     GI: Acute Decompensated Liver Failure 2/2 ETOH Use   - NPO; TF via NGT for goal 50cc/hr   - Increased Bowel regimen Miralax BID  - Protonix for stress ulcer prophylaxis  - Continue Rifaximin, Hold Lactulose for increasing abdominal distention   - s/p Paracentesis (12/26) transudative, negative for SBP   - s/p Paracentesis ( 1/05) 4L Removed  - Rectal tube in place    Renal:  - Plan to restrat CRRT on 1/05 AM   - s/p 5% Albumin 500cc x2 due to increasing pressor requirements and CVP < 4  - Monitor I&Os and electrolytes w/ repletions as necessary    Heme:  - Monitor CBC and coags  - Hold Chemical VTE prophylaxis  - SCDs for Mechanical VTE ppx     ID:   - Persistent leukocytosis  - Monitor for clinical evidence of active infection  - Combi-Cath ( 12/22) Negative, Blood Cx ( 12/26) Negative,  MRSA (12/30) Negative   - Continue Empiric Caspofungin ( 12/26 - ?) and Meropenem (12/25 - 1/08)    Endo:   - Monitor glucose ; HgbA1C 4.9% ( 12/24)   - low dose ISS q6hr   - Continue Synthroid for hypothyroidism    Code Status: Full Code   Disposition: SICU    Lines/Tubes:  - JOSÉ MIGUEL RAMSEY ( 1/04/23)  - JOSÉ MIGUEL Dobbs ( 1/04/23)  - Right Radial Arterial Line ( 1/04/23) Patient is a 61 y/o Female with PMH Thyroid Cancer ( s/p total thyroidectomy in her 20s, radiation, and BARONE), HTN, GERD, 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 Patient has Alcohol Use Disorder ( recent rehab with relapse and in remission since 11/2022), decompensated ALD Cirrhosis c/b ascites. Patient admitted to [facility] on 12/19/2022 after a witnessed seizure; course was c/b septic shock with associated HRF ( intubated 12/19) and an RED ( started HD 12/20). Patient was transferred to [facility] on 12/20 for a liver transplant evaluation. Patient was started on CRRT 12/21 and was extubated 12/23/22. Patient remains in SICU for hemodynamic monitoring and management while liver transplant evaluation is in progress.     PLAN:  Neuro:  - Monitor Mental Status for Encephalopathy and trend Ammonia Level   - Continue Home Lexapro   - Continue Keppra  - Continue Folic Acid, Thiamine, and MV     Resp:  - Maintain SpO2 > 92%   - Out of bed to chair, ambulate as tolerated, and incentive spirometry to prevent atelectasis    CV:  - Will wean vasopressor support of Levophed and Vaso to maintain goal MAP > 65 mmHg  - Continue Midodrine 20mg q8hr     GI: Acute Decompensated Liver Failure 2/2 ETOH Use   - NPO; TF via NGT for goal 50cc/hr   - Increased Bowel regimen Miralax BID  - Protonix for stress ulcer prophylaxis  - Continue Rifaximin, Hold Lactulose for increasing abdominal distention   - s/p Paracentesis (12/26) transudative, negative for SBP   - s/p Paracentesis ( 1/05) 4L Removed  - Rectal tube in place    Renal:  - Plan to restrat CRRT on 1/05 AM   - s/p 5% Albumin 500cc x2 due to increasing pressor requirements and CVP < 4  - Monitor I&Os and electrolytes w/ repletions as necessary    Heme:  - Monitor CBC and coags  - Hold Chemical VTE prophylaxis  - SCDs for Mechanical VTE ppx     ID:   - Persistent leukocytosis  - Monitor for clinical evidence of active infection  - Combi-Cath ( 12/22) Negative, Blood Cx ( 12/26) Negative,  MRSA (12/30) Negative   - Continue Empiric Caspofungin ( 12/26 - ?) and Meropenem (12/25 - 1/08)    Endo:   - Monitor glucose ; HgbA1C 4.9% ( 12/24)   - low dose ISS q6hr   - Continue Synthroid for hypothyroidism    Code Status: Full Code   Disposition: SICU    Lines/Tubes:  - JOSÉ MIGUEL RAMSEY ( 1/04/23)  - JOSÉ MIGUEL Dobbs ( 1/04/23)  - Right Radial Arterial Line ( 1/04/23)

## 2023-01-05 NOTE — PROGRESS NOTE ADULT - SUBJECTIVE AND OBJECTIVE BOX
Interval Events:   No acute events overnight.  Underwent paracentesis yesterday.  Having BMs.  Family at bedside.    ROS:   12 point review of systems performed and negative except otherwise noted in HPI.    Hospital Medications:  caspofungin IVPB      caspofungin IVPB 50 milliGRAM(s) IV Intermittent every 24 hours  chlorhexidine 2% Cloths 1 Application(s) Topical <User Schedule>  CRRT Treatment    <Continuous>  escitalopram 10 milliGRAM(s) Oral daily  folic acid 1 milliGRAM(s) Oral daily  heparin   Injectable 5000 Unit(s) SubCutaneous every 12 hours  HYDROmorphone  Injectable 0.25 milliGRAM(s) IV Push every 3 hours PRN  influenza   Vaccine 0.5 milliLiter(s) IntraMuscular once  lactulose Syrup 20 Gram(s) Oral every 6 hours  levETIRAcetam  Solution 750 milliGRAM(s) Enteral Tube two times a day  levothyroxine Injectable 103 MICROGram(s) IV Push at bedtime  meropenem  IVPB 1000 milliGRAM(s) IV Intermittent every 8 hours  midodrine 20 milliGRAM(s) Oral every 8 hours  multivitamin/minerals/iron Oral Solution (CENTRUM) 15 milliLiter(s) Oral daily  norepinephrine Infusion 0.05 MICROgram(s)/kG/Min (6.21 mL/Hr) IV Continuous <Continuous>  pantoprazole   Suspension 40 milliGRAM(s) Oral every 24 hours  Phoxillum Filtration BK 4 / 2.5 5000 milliLiter(s) (1000 mL/Hr) CRRT <Continuous>  Phoxillum Filtration BK 4 / 2.5 5000 milliLiter(s) (800 mL/Hr) CRRT <Continuous>  polyethylene glycol 3350 17 Gram(s) Oral every 12 hours  PrismaSOL Filtration BGK 4 / 2.5 5000 milliLiter(s) (200 mL/Hr) CRRT <Continuous>  rifAXIMin 550 milliGRAM(s) Oral every 12 hours  sodium chloride 0.65% Nasal 1 Spray(s) Both Nostrils every 3 hours PRN  tetracaine/benzocaine/butamben Spray 1 Spray(s) Topical every 3 hours PRN  thiamine 100 milliGRAM(s) Enteral Tube daily  vasopressin Infusion 0.03 Unit(s)/Min (4.5 mL/Hr) IV Continuous <Continuous>    MAR over past 24 hours:    albumin human  5% IVPB   750 mL/Hr IV Intermittent (01-04-23 @ 15:59)    albumin human  5% IVPB   750 mL/Hr IV Intermittent (01-04-23 @ 16:28)    albumin human  5% IVPB   750 mL/Hr IV Intermittent (01-04-23 @ 16:58)    albumin human  5% IVPB   750 mL/Hr IV Intermittent (01-04-23 @ 17:30)    albumin human  5% IVPB   125 mL/Hr IV Intermittent (01-04-23 @ 21:51)    albumin human  5% IVPB   125 mL/Hr IV Intermittent (01-05-23 @ 02:11)    caspofungin IVPB   260 mL/Hr IV Intermittent (01-05-23 @ 08:42)    escitalopram   10 milliGRAM(s) Oral (01-05-23 @ 12:10)    folic acid   1 milliGRAM(s) Oral (01-05-23 @ 12:10)    HYDROmorphone  Injectable   0.25 milliGRAM(s) IV Push (01-05-23 @ 02:41)    lactulose Syrup   20 Gram(s) Oral (01-05-23 @ 12:10)    levETIRAcetam  Solution   750 milliGRAM(s) Enteral Tube (01-05-23 @ 05:28)   750 milliGRAM(s) Enteral Tube (01-04-23 @ 17:25)    levothyroxine Injectable   103 MICROGram(s) IV Push (01-04-23 @ 22:06)    meropenem  IVPB   100 mL/Hr IV Intermittent (01-05-23 @ 05:28)   100 mL/Hr IV Intermittent (01-04-23 @ 22:06)   100 mL/Hr IV Intermittent (01-04-23 @ 13:28)    midodrine   20 milliGRAM(s) Oral (01-05-23 @ 05:28)   20 milliGRAM(s) Oral (01-04-23 @ 22:06)   20 milliGRAM(s) Oral (01-04-23 @ 13:29)    multivitamin/minerals/iron Oral Solution (CENTRUM)   15 milliLiter(s) Oral (01-05-23 @ 12:10)    pantoprazole   Suspension   40 milliGRAM(s) Oral (01-05-23 @ 12:10)    Phoxillum Filtration BK 4 / 2.5   1000 mL/Hr CRRT (01-05-23 @ 07:06)    Phoxillum Filtration BK 4 / 2.5   800 mL/Hr CRRT (01-05-23 @ 07:06)    polyethylene glycol 3350   17 Gram(s) Oral (01-05-23 @ 05:28)   17 Gram(s) Oral (01-04-23 @ 17:25)    PrismaSOL Filtration BGK 4 / 2.5   200 mL/Hr CRRT (01-05-23 @ 07:06)    rifAXIMin   550 milliGRAM(s) Oral (01-05-23 @ 05:28)   550 milliGRAM(s) Oral (01-04-23 @ 17:25)    thiamine   100 milliGRAM(s) Enteral Tube (01-05-23 @ 12:10)      Home Medications:  Last Order Reconciliation Date: 12-21-22 @ 13:03 (Admission Reconciliation)  cholecalciferol 25 mcg (1000 intl units) oral tablet: 1 tab(s) orally once a day  folic acid 1 mg oral tablet: 1 tab(s) orally once a day  levothyroxine 137 mcg (0.137 mg) oral tablet: 1 tab(s) orally once a day  Lexapro 10 mg oral tablet: 1 tab(s) orally once a day  nystatin 100,000 units/mL oral suspension: 5 milliliter(s) orally 4 times a day  pantoprazole 40 mg oral delayed release tablet: 1 tab(s) orally once a day (at bedtime)  senna oral tablet: 2 tab(s) orally once a day (at bedtime), As Needed -for constipation   Sodium Chloride 1 g oral tablet: 1 gram(s) orally once a day  thiamine 100 mg oral tablet: 1 tab(s) orally once a day  Vitamin B-100 oral tablet: 1 tab(s) orally once a day    PHYSICAL EXAM:   Vital Signs last 24 hours:  T(F): 98.3 (01-05-23 @ 11:00), Max: 98.8 (01-05-23 @ 04:00)  HR: 100 (01-05-23 @ 13:00) (91 - 111)  BP: 109/66 (01-05-23 @ 07:15) (109/66 - 109/66)  BP(mean): 83 (01-05-23 @ 07:15) (83 - 83)  ABP: 101/51 (01-05-23 @ 13:00) (78/38 - 127/65)  ABP(mean): 73 (01-05-23 @ 13:00) (52 - 90)  RR: 17 (01-05-23 @ 13:00) (15 - 35)  SpO2: 95% (01-05-23 @ 13:00) (93% - 100%)    I&Os:    01-04-23 @ 07:01  -  01-05-23 @ 07:00  --------------------------------------------------------  IN:    Albumin 5%  - 250 mL: 1750 mL    Enteral Tube Flush: 170 mL    IV PiggyBack: 350 mL    Nepro with Carb Steady: 1250 mL    Norepinephrine: 148.2 mL    PRBCs (Packed Red Blood Cells): 300 mL    Vasopressin: 112.5 mL  Total IN: 4080.7 mL    OUT:    Rectal Tube (mL): 450 mL  Total OUT: 450 mL    Total NET: 3630.7 mL      01-05-23 @ 07:01  -  01-05-23 @ 13:10  --------------------------------------------------------  IN:    IV PiggyBack: 250 mL    Nepro with Carb Steady: 300 mL    Norepinephrine: 60 mL    Vasopressin: 27 mL  Total IN: 637 mL    OUT:    Other (mL): 231 mL    Rectal Tube (mL): 400 mL  Total OUT: 631 mL    Total NET: 6 mL        BMI (kg/m2): 20.9 (12-30-22 @ 07:00)  GENERAL: no acute distress  NEURO: alert  HEENT: NCAT, no conjunctival pallor appreciated  CHEST: no respiratory distress, no accessory muscle use  CARDIAC: regular rate, +S1/S2  ABDOMEN: soft, improved abdominal distention, nontender, no rebound or guarding  EXTREMITIES: warm, well perfused  SKIN: no lesions noted    DIAGNOSTICS:  WBC      Hg       PLT      Na       K        CO2     BUN      Cr       ALT      AST      TB       ALP  37.49    8.9      ------   ------   ------   ------   ------   ------   ------   ------   ------   ------   01-05-23 @ 12:58  32.49    7.5      47       134      3.5      15       38       2.41     26       124      24.2     170      01-05-23 @ 05:58  ------   ------   ------   135      3.6      18       33       2.20     29       120      24.6     170      01-05-23 @ 00:12  ------   ------   ------   136      3.8      18       29       1.95     34       124      25.0     175      01-04-23 @ 18:21  ------   ------   ------   136      3.7      19       27       1.78     42       138      25.4     195      01-04-23 @ 12:24    PT             INR            MELDwith  MELDw/o  28.0           2.41           --             --             01-05-23 @ 05:58  25.4           2.17           --             --             01-04-23 @ 00:46  28.0           2.39           --             --             01-03-23 @ 00:30  28.0           2.39           --             --             01-02-23 @ 00:28  28.0           2.39           --             --             01-01-23 @ 00:29

## 2023-01-05 NOTE — PROVIDER CONTACT NOTE (OTHER) - NAME OF MD/NP/PA/DO NOTIFIED:
Jaja , Jose
ANJEL De León
Marcia Bright, PA
ANJEL Garcia
ANJEL Vergara
MD High, ANJEL Torres Jai
Dr. Vela & ANJEL Bright

## 2023-01-05 NOTE — PROGRESS NOTE ADULT - NS ATTEND AMEND GEN_ALL_CORE FT
ON: lines replaced, paracentesis performed     aaox1, mild encephalopathy, more somnolent than her usual  ammonia stable  rifaximin, miralax, add lactulose   keppra for recent seizure, hx of craniotomy   on RA  AM CXR reticular pattern unchanged, improved effusions  unable to do IS  levo 0.05, vaso 0.03 MAP goal of 65  midodrine [id number]  lact mild elevated  TEN 50/hr via NGT  rectal tube: 450cc  t bili 24  s/p paracentesis 1/4 4 liters, albumin 1 liter given  protonix, per transplant  CRRT start w even balance  anuric , likely hepatorenal syndrome  s/p albumin boluses, octreotide  Hb drop overnight  thrombocytopenia  INR 2.1  hep SQ VTE PPX , transplant agrees   Bcx, paracentesis no growth  empiric caspo, merop  afebrile, leukocytosis  ID following, given CT findings, concerned about infection and now recommends continuation   CT chest 1/3: multifocal pneumonia  good glycemic control  PT OT working with her    full code  HD rt IJ, CVC IJ left , left radial a line all placed 1/5 ON: lines replaced, paracentesis performed     aaox1, mild encephalopathy, more somnolent than her usual  ammonia stable  rifaximin, miralax, add lactulose   keppra for recent seizure, hx of craniotomy   on RA  AM CXR reticular pattern unchanged, improved effusions  unable to do IS  levo 0.05, vaso 0.03 MAP goal of 65  midodrine [id number]  lact mild elevated  TEN 50/hr via NGT  rectal tube: 450cc  t bili 24  s/p paracentesis 1/4 4 liters, albumin 1 liter given  protonix, per transplant  CRRT start w even balance  anuric , likely hepatorenal syndrome  s/p albumin boluses, octreotide  Hb drop overnight  thrombocytopenia  INR 2.1  hep SQ VTE PPX , transplant agrees   Bcx, paracentesis no growth  empiric caspo, merop  afebrile, leukocytosis  ID following, given CT findings, concerned about infection and now recommends continuation   CT chest 1/3: multifocal pneumonia  good glycemic control  PT OT working with her    full code  HD rt IJ, CVC IJ left , left radial a line all placed 1/5 ON: lines replaced, paracentesis performed     aaox1, mild encephalopathy, more somnolent than her usual  ammonia stable  rifaximin, miralax, add lactulose   keppra for recent seizure, hx of craniotomy   on RA  AM CXR reticular pattern unchanged, improved effusions  unable to do IS  levo 0.05, vaso 0.03 MAP goal of 65  midodrine [id number]  lact mild elevated  TEN 50/hr via NGT  rectal tube: 450cc  t bili 24  s/p paracentesis 1/4 4 liters, albumin 1 liter given  protonix, per transplant  CRRT start w even balance  anuric , likely hepatorenal syndrome  s/p albumin boluses, octreotide  Hb drop overnight  thrombocytopenia  INR 2.1  hep SQ VTE PPX , transplant agrees   Bcx, paracentesis no growth  empiric caspo, merop  afebrile, leukocytosis  ID following, given CT findings, concerned about infection and now recommends continuation   CT chest 1/3: multifocal pneumonia  good glycemic control  PT OT working with her    full code  HD rt IJ, CVC IJ left , left radial a line all placed 1/5

## 2023-01-05 NOTE — PROGRESS NOTE ADULT - ASSESSMENT
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   Transferred from [facility] 12/20 for further management of acute liver failure and liver transplant evaluation 2/2 known ETOH Cirrhosis.     Decompensated ETOH Cirrhosis  - Wean off pressors as able, continue Midodrine  - on CVVH, new TLC placed yesterday  - repeat CT chest/abdomen/pelvis today showed multifocal pneumonia, large volume ascites  - Needs  Dx and  Tx LVP   - ID: Leukocytosis. Continue empiric caspo and Meropenem  - HE: Continue lactulose BID, Miralax, Rifaximin   - procalcitonin level and random cortisol level  - Continue TF at goal  - can start chemical dvt ppx  - on Keppra for seizures, (no activity since admission)  - Abdominal rash improving. 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   Transferred from [facility] 12/20 for further management of acute liver failure and liver transplant evaluation 2/2 known ETOH Cirrhosis.     Decompensated ETOH Cirrhosis  - Wean off pressors as able, continue Midodrine  - on CVVH, new TLC placed yesterday  - repeat CT chest/abdomen/pelvis today showed multifocal pneumonia, large volume ascites  - Needs  Dx and  Tx LVP   - ID: Leukocytosis. Continue empiric caspo and Meropenem  - HE: Continue lactulose BID, Miralax, Rifaximin   - procalcitonin level and random cortisol level  - Continue TF at goal  - can start chemical dvt ppx  - on Keppra for seizures, (no activity since admission)  - Abdominal rash improving. 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   Transferred from [facility] 12/20 for further management of acute liver failure and liver transplant evaluation 2/2 known ETOH Cirrhosis.     Decompensated ETOH Cirrhosis  - Wean off pressors as able, continue Midodrine  - on CVVH, new TLC placed yesterday  - repeat CT chest/abdomen/pelvis today showed multifocal pneumonia, large volume ascites  - Needs  Dx and  Tx LVP   - ID: Leukocytosis. Continue empiric caspo and Meropenem  - HE: Continue lactulose BID, Miralax, Rifaximin   - procalcitonin level and random cortisol level  - Continue TF at goal  - can start chemical dvt ppx  - on Keppra for seizures, (no activity since admission)  - Abdominal rash improving. Will consult Derm if persists/worsens  - Liver transplant evaluation deferred due to illness   - Continue excellent ICU care

## 2023-01-05 NOTE — PROGRESS NOTE ADULT - SUBJECTIVE AND OBJECTIVE BOX
[facility] Division of Kidney Diseases & Hypertension  FOLLOW UP NOTE  [phone]--------------------------------------------------------------------------------  Chief Complaint:Disorder of liver    63 yo F with h/o decompensated ETOH cirrhosis with ascites, thyroid cancer (s/p total thyroidectomy, RTX, and radioactive iodide), HTN, ventrical neoplasm who was initially admitted to  12/19 with new onset seizures and transferred to [facility] 12/20 for liver transplant evaluation. Pt being seen for RED requiring CRRT.    24 hour events/subjective: Pt was seen and evaluated in the ICU this morning, offers no acute complaints. Remains on pressors. Was off of CRRT for past two days, resumed today.  at bedside      PAST HISTORY  --------------------------------------------------------------------------------  No significant changes to PMH, PSH, FHx, SHx, unless otherwise noted    ALLERGIES & MEDICATIONS  --------------------------------------------------------------------------------  Allergies    Macrobid (Rash)    Intolerances    Nexium (Stomach Upset)    Standing Inpatient Medications  caspofungin IVPB      caspofungin IVPB 50 milliGRAM(s) IV Intermittent every 24 hours  chlorhexidine 2% Cloths 1 Application(s) Topical <User Schedule>  CRRT Treatment    <Continuous>  escitalopram 10 milliGRAM(s) Oral daily  folic acid 1 milliGRAM(s) Oral daily  heparin   Injectable 5000 Unit(s) SubCutaneous every 12 hours  influenza   Vaccine 0.5 milliLiter(s) IntraMuscular once  lactulose Syrup 20 Gram(s) Oral every 6 hours  levETIRAcetam  Solution 750 milliGRAM(s) Enteral Tube two times a day  levothyroxine Injectable 103 MICROGram(s) IV Push at bedtime  meropenem  IVPB 1000 milliGRAM(s) IV Intermittent every 8 hours  midodrine 20 milliGRAM(s) Oral every 8 hours  multivitamin/minerals/iron Oral Solution (CENTRUM) 15 milliLiter(s) Oral daily  norepinephrine Infusion 0.05 MICROgram(s)/kG/Min IV Continuous <Continuous>  pantoprazole   Suspension 40 milliGRAM(s) Oral every 24 hours  Phoxillum Filtration BK 4 / 2.5 5000 milliLiter(s) CRRT <Continuous>  Phoxillum Filtration BK 4 / 2.5 5000 milliLiter(s) CRRT <Continuous>  polyethylene glycol 3350 17 Gram(s) Oral every 12 hours  PrismaSOL Filtration BGK 4 / 2.5 5000 milliLiter(s) CRRT <Continuous>  rifAXIMin 550 milliGRAM(s) Oral every 12 hours  thiamine 100 milliGRAM(s) Enteral Tube daily  vasopressin Infusion 0.03 Unit(s)/Min IV Continuous <Continuous>    PRN Inpatient Medications  HYDROmorphone  Injectable 0.25 milliGRAM(s) IV Push every 3 hours PRN  sodium chloride 0.65% Nasal 1 Spray(s) Both Nostrils every 3 hours PRN  tetracaine/benzocaine/butamben Spray 1 Spray(s) Topical every 3 hours PRN      REVIEW OF SYSTEMS  --------------------------------------------------------------------------------  Gen: No  fevers/chills  Skin: No rashes  Head/Eyes/Ears/Mouth: No headache; Normal hearing; Normal vision w/o blurriness  Respiratory: No dyspnea, cough, wheezing, hemoptysis  CV: No chest pain, PND, orthopnea  GI: No abdominal pain, diarrhea, constipation, nausea, vomiting  : No increased frequency, dysuria, hematuria, nocturia  MSK: No joint pain/swelling; no back pain; no edema  Neuro: No dizziness/lightheadedness, weakness, seizures, numbness, tingling        All other systems were reviewed and are negative, except as noted.    VITALS/PHYSICAL EXAM  --------------------------------------------------------------------------------  T(C): 36.8 (01-05-23 @ 11:00), Max: 37.1 (01-05-23 @ 04:00)  HR: 104 (01-05-23 @ 12:00) (90 - 111)  BP: 109/66 (01-05-23 @ 07:15) (109/66 - 109/66)  RR: 18 (01-05-23 @ 12:00) (15 - 35)  SpO2: 97% (01-05-23 @ 12:00) (93% - 100%)  Wt(kg): --        01-04-23 @ 07:01  -  01-05-23 @ 07:00  --------------------------------------------------------  IN: 4080.7 mL / OUT: 450 mL / NET: 3630.7 mL    01-05-23 @ 07:01  -  01-05-23 @ 12:15  --------------------------------------------------------  IN: 571.8 mL / OUT: 427 mL / NET: 144.8 mL      Physical Exam:  Gen: NAD  HEENT: on RA now  Pulm: CTA B/L, no crackles   CV: RRR, S1S2+  Abd: +BS, soft, distended  : +urinary catheter  MSK: No LE edema  Psych: Awake  Skin: +Jaundice  Access: no access    LABS/STUDIES  --------------------------------------------------------------------------------              7.5    32.49 >-----------<  47       [01-05-23 @ 05:58]              21.5     134  |  100  |  38  ----------------------------<  143      [01-05-23 @ 05:58]  3.5   |  15  |  2.41        Ca     9.9     [01-05-23 @ 05:58]      Mg     2.2     [01-05-23 @ 05:58]      Phos  4.7     [01-05-23 @ 05:58]    TPro  6.3  /  Alb  3.8  /  TBili  24.2  /  DBili  x   /  AST  124  /  ALT  26  /  AlkPhos  170  [01-05-23 @ 05:58]    PT/INR: PT 28.0 , INR 2.41       [01-05-23 @ 05:58]  PTT: 56.9       [01-05-23 @ 05:58]      Creatinine Trend:  SCr 2.41 [01-05 @ 05:58]  SCr 2.20 [01-05 @ 00:12]  SCr 1.95 [01-04 @ 18:21]  SCr 1.78 [01-04 @ 12:24]  SCr 1.55 [01-04 @ 06:10]    Urinalysis - [01-03-23 @ 18:02]      Color Other / Appearance Turbid / SG SEE NOTE / pH SEE NOTE      Gluc SEE NOTE / Ketone SEE NOTE  / Bili SEE NOTE / Urobili SEE NOTE       Blood SEE NOTE / Protein SEE NOTE / Leuk Est SEE NOTE / Nitrite SEE NOTE      RBC  / WBC  / Hyaline  / Gran  / Sq Epi  / Non Sq Epi  / Bacteria            [facility] Division of Kidney Diseases & Hypertension  FOLLOW UP NOTE  [phone]--------------------------------------------------------------------------------  Chief Complaint:Disorder of liver    63 yo F with h/o decompensated ETOH cirrhosis with ascites, thyroid cancer (s/p total thyroidectomy, RTX, and radioactive iodide), HTN, ventrical neoplasm who was initially admitted to  12/19 with new onset seizures and transferred to [facility] 12/20 for liver transplant evaluation. Pt being seen for RED requiring CRRT.    24 hour events/subjective: Pt was seen and evaluated in the ICU this morning, offers no acute complaints. Remains on pressors. Was off of CRRT for past two days, resumed today.  at bedside      PAST HISTORY  --------------------------------------------------------------------------------  No significant changes to PMH, PSH, FHx, SHx, unless otherwise noted    ALLERGIES & MEDICATIONS  --------------------------------------------------------------------------------  Allergies    Macrobid (Rash)    Intolerances    Nexium (Stomach Upset)    Standing Inpatient Medications  caspofungin IVPB      caspofungin IVPB 50 milliGRAM(s) IV Intermittent every 24 hours  chlorhexidine 2% Cloths 1 Application(s) Topical <User Schedule>  CRRT Treatment    <Continuous>  escitalopram 10 milliGRAM(s) Oral daily  folic acid 1 milliGRAM(s) Oral daily  heparin   Injectable 5000 Unit(s) SubCutaneous every 12 hours  influenza   Vaccine 0.5 milliLiter(s) IntraMuscular once  lactulose Syrup 20 Gram(s) Oral every 6 hours  levETIRAcetam  Solution 750 milliGRAM(s) Enteral Tube two times a day  levothyroxine Injectable 103 MICROGram(s) IV Push at bedtime  meropenem  IVPB 1000 milliGRAM(s) IV Intermittent every 8 hours  midodrine 20 milliGRAM(s) Oral every 8 hours  multivitamin/minerals/iron Oral Solution (CENTRUM) 15 milliLiter(s) Oral daily  norepinephrine Infusion 0.05 MICROgram(s)/kG/Min IV Continuous <Continuous>  pantoprazole   Suspension 40 milliGRAM(s) Oral every 24 hours  Phoxillum Filtration BK 4 / 2.5 5000 milliLiter(s) CRRT <Continuous>  Phoxillum Filtration BK 4 / 2.5 5000 milliLiter(s) CRRT <Continuous>  polyethylene glycol 3350 17 Gram(s) Oral every 12 hours  PrismaSOL Filtration BGK 4 / 2.5 5000 milliLiter(s) CRRT <Continuous>  rifAXIMin 550 milliGRAM(s) Oral every 12 hours  thiamine 100 milliGRAM(s) Enteral Tube daily  vasopressin Infusion 0.03 Unit(s)/Min IV Continuous <Continuous>    PRN Inpatient Medications  HYDROmorphone  Injectable 0.25 milliGRAM(s) IV Push every 3 hours PRN  sodium chloride 0.65% Nasal 1 Spray(s) Both Nostrils every 3 hours PRN  tetracaine/benzocaine/butamben Spray 1 Spray(s) Topical every 3 hours PRN      REVIEW OF SYSTEMS  --------------------------------------------------------------------------------  Gen: No  fevers/chills  Skin: No rashes  Head/Eyes/Ears/Mouth: No headache; Normal hearing; Normal vision w/o blurriness  Respiratory: No dyspnea, cough, wheezing, hemoptysis  CV: No chest pain, PND, orthopnea  GI: No abdominal pain, diarrhea, constipation, nausea, vomiting  : No increased frequency, dysuria, hematuria, nocturia  MSK: No joint pain/swelling; no back pain; no edema  Neuro: No dizziness/lightheadedness, weakness, seizures, numbness, tingling        All other systems were reviewed and are negative, except as noted.    VITALS/PHYSICAL EXAM  --------------------------------------------------------------------------------  T(C): 36.8 (01-05-23 @ 11:00), Max: 37.1 (01-05-23 @ 04:00)  HR: 104 (01-05-23 @ 12:00) (90 - 111)  BP: 109/66 (01-05-23 @ 07:15) (109/66 - 109/66)  RR: 18 (01-05-23 @ 12:00) (15 - 35)  SpO2: 97% (01-05-23 @ 12:00) (93% - 100%)  Wt(kg): --        01-04-23 @ 07:01  -  01-05-23 @ 07:00  --------------------------------------------------------  IN: 4080.7 mL / OUT: 450 mL / NET: 3630.7 mL    01-05-23 @ 07:01  -  01-05-23 @ 12:15  --------------------------------------------------------  IN: 571.8 mL / OUT: 427 mL / NET: 144.8 mL      Physical Exam:  Gen: NAD  HEENT: on RA now  Pulm: CTA B/L, no crackles   CV: RRR, S1S2+  Abd: +BS, soft, distended  : +urinary catheter  MSK: No LE edema  Psych: Awake  Skin: +Jaundice  Access: no access    LABS/STUDIES  --------------------------------------------------------------------------------              7.5    32.49 >-----------<  47       [01-05-23 @ 05:58]              21.5     134  |  100  |  38  ----------------------------<  143      [01-05-23 @ 05:58]  3.5   |  15  |  2.41        Ca     9.9     [01-05-23 @ 05:58]      Mg     2.2     [01-05-23 @ 05:58]      Phos  4.7     [01-05-23 @ 05:58]    TPro  6.3  /  Alb  3.8  /  TBili  24.2  /  DBili  x   /  AST  124  /  ALT  26  /  AlkPhos  170  [01-05-23 @ 05:58]    PT/INR: PT 28.0 , INR 2.41       [01-05-23 @ 05:58]  PTT: 56.9       [01-05-23 @ 05:58]      Creatinine Trend:  SCr 2.41 [01-05 @ 05:58]  SCr 2.20 [01-05 @ 00:12]  SCr 1.95 [01-04 @ 18:21]  SCr 1.78 [01-04 @ 12:24]  SCr 1.55 [01-04 @ 06:10]    Urinalysis - [01-03-23 @ 18:02]      Color Other / Appearance Turbid / SG SEE NOTE / pH SEE NOTE      Gluc SEE NOTE / Ketone SEE NOTE  / Bili SEE NOTE / Urobili SEE NOTE       Blood SEE NOTE / Protein SEE NOTE / Leuk Est SEE NOTE / Nitrite SEE NOTE      RBC  / WBC  / Hyaline  / Gran  / Sq Epi  / Non Sq Epi  / Bacteria            [facility] Division of Kidney Diseases & Hypertension  FOLLOW UP NOTE  [phone]--------------------------------------------------------------------------------  Chief Complaint:Disorder of liver    63 yo F with h/o decompensated ETOH cirrhosis with ascites, thyroid cancer (s/p total thyroidectomy, RTX, and radioactive iodide), HTN, ventrical neoplasm who was initially admitted to  12/19 with new onset seizures and transferred to [facility] 12/20 for liver transplant evaluation. Pt being seen for RED requiring CRRT.    24 hour events/subjective: Pt was seen and evaluated in the ICU this morning, offers no acute complaints. Remains on pressors. Was off of CRRT for past two days, resumed today.  at bedside      PAST HISTORY  --------------------------------------------------------------------------------  No significant changes to PMH, PSH, FHx, SHx, unless otherwise noted    ALLERGIES & MEDICATIONS  --------------------------------------------------------------------------------  Allergies    Macrobid (Rash)    Intolerances    Nexium (Stomach Upset)    Standing Inpatient Medications  caspofungin IVPB      caspofungin IVPB 50 milliGRAM(s) IV Intermittent every 24 hours  chlorhexidine 2% Cloths 1 Application(s) Topical <User Schedule>  CRRT Treatment    <Continuous>  escitalopram 10 milliGRAM(s) Oral daily  folic acid 1 milliGRAM(s) Oral daily  heparin   Injectable 5000 Unit(s) SubCutaneous every 12 hours  influenza   Vaccine 0.5 milliLiter(s) IntraMuscular once  lactulose Syrup 20 Gram(s) Oral every 6 hours  levETIRAcetam  Solution 750 milliGRAM(s) Enteral Tube two times a day  levothyroxine Injectable 103 MICROGram(s) IV Push at bedtime  meropenem  IVPB 1000 milliGRAM(s) IV Intermittent every 8 hours  midodrine 20 milliGRAM(s) Oral every 8 hours  multivitamin/minerals/iron Oral Solution (CENTRUM) 15 milliLiter(s) Oral daily  norepinephrine Infusion 0.05 MICROgram(s)/kG/Min IV Continuous <Continuous>  pantoprazole   Suspension 40 milliGRAM(s) Oral every 24 hours  Phoxillum Filtration BK 4 / 2.5 5000 milliLiter(s) CRRT <Continuous>  Phoxillum Filtration BK 4 / 2.5 5000 milliLiter(s) CRRT <Continuous>  polyethylene glycol 3350 17 Gram(s) Oral every 12 hours  PrismaSOL Filtration BGK 4 / 2.5 5000 milliLiter(s) CRRT <Continuous>  rifAXIMin 550 milliGRAM(s) Oral every 12 hours  thiamine 100 milliGRAM(s) Enteral Tube daily  vasopressin Infusion 0.03 Unit(s)/Min IV Continuous <Continuous>    PRN Inpatient Medications  HYDROmorphone  Injectable 0.25 milliGRAM(s) IV Push every 3 hours PRN  sodium chloride 0.65% Nasal 1 Spray(s) Both Nostrils every 3 hours PRN  tetracaine/benzocaine/butamben Spray 1 Spray(s) Topical every 3 hours PRN      REVIEW OF SYSTEMS  --------------------------------------------------------------------------------  Gen: No  fevers/chills  Skin: No rashes  Head/Eyes/Ears/Mouth: No headache; Normal hearing; Normal vision w/o blurriness  Respiratory: No dyspnea, cough, wheezing, hemoptysis  CV: No chest pain, PND, orthopnea  GI: No abdominal pain, diarrhea, constipation, nausea, vomiting  : No increased frequency, dysuria, hematuria, nocturia  MSK: No joint pain/swelling; no back pain; no edema  Neuro: No dizziness/lightheadedness, weakness, seizures, numbness, tingling        All other systems were reviewed and are negative, except as noted.    VITALS/PHYSICAL EXAM  --------------------------------------------------------------------------------  T(C): 36.8 (01-05-23 @ 11:00), Max: 37.1 (01-05-23 @ 04:00)  HR: 104 (01-05-23 @ 12:00) (90 - 111)  BP: 109/66 (01-05-23 @ 07:15) (109/66 - 109/66)  RR: 18 (01-05-23 @ 12:00) (15 - 35)  SpO2: 97% (01-05-23 @ 12:00) (93% - 100%)  Wt(kg): --        01-04-23 @ 07:01  -  01-05-23 @ 07:00  --------------------------------------------------------  IN: 4080.7 mL / OUT: 450 mL / NET: 3630.7 mL    01-05-23 @ 07:01  -  01-05-23 @ 12:15  --------------------------------------------------------  IN: 571.8 mL / OUT: 427 mL / NET: 144.8 mL      Physical Exam:  Gen: NAD  HEENT: on RA now  Pulm: CTA B/L, no crackles   CV: RRR, S1S2+  Abd: +BS, soft, distended  : +urinary catheter  MSK: No LE edema  Psych: Awake  Skin: +Jaundice  Access: no access    LABS/STUDIES  --------------------------------------------------------------------------------              7.5    32.49 >-----------<  47       [01-05-23 @ 05:58]              21.5     134  |  100  |  38  ----------------------------<  143      [01-05-23 @ 05:58]  3.5   |  15  |  2.41        Ca     9.9     [01-05-23 @ 05:58]      Mg     2.2     [01-05-23 @ 05:58]      Phos  4.7     [01-05-23 @ 05:58]    TPro  6.3  /  Alb  3.8  /  TBili  24.2  /  DBili  x   /  AST  124  /  ALT  26  /  AlkPhos  170  [01-05-23 @ 05:58]    PT/INR: PT 28.0 , INR 2.41       [01-05-23 @ 05:58]  PTT: 56.9       [01-05-23 @ 05:58]      Creatinine Trend:  SCr 2.41 [01-05 @ 05:58]  SCr 2.20 [01-05 @ 00:12]  SCr 1.95 [01-04 @ 18:21]  SCr 1.78 [01-04 @ 12:24]  SCr 1.55 [01-04 @ 06:10]    Urinalysis - [01-03-23 @ 18:02]      Color Other / Appearance Turbid / SG SEE NOTE / pH SEE NOTE      Gluc SEE NOTE / Ketone SEE NOTE  / Bili SEE NOTE / Urobili SEE NOTE       Blood SEE NOTE / Protein SEE NOTE / Leuk Est SEE NOTE / Nitrite SEE NOTE      RBC  / WBC  / Hyaline  / Gran  / Sq Epi  / Non Sq Epi  / Bacteria

## 2023-01-05 NOTE — PROVIDER CONTACT NOTE (OTHER) - REASON
pt. with blood tinged stool in rectal tube
Pt noted with moderate amount of dark red blood with clots around rectal tube
Weaker on LUE
Hypotension, Nausea
Pt profile incomplete
Rash on abdomen
bloody rectal tube output

## 2023-01-05 NOTE — PROGRESS NOTE ADULT - ASSESSMENT
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    CT Chest with possible pneumonia versus atelectasis    UA (12/19) Moderate Leukocyte Esterase  UCx (12/19) No Growth  BCx (12/19) Corynebacterium (1/4 Bottles)  MRSA Nasal PCR (12/19) Negative  Paracentesis (12/19, 12/26) Cell counts not suggestive of SBP  Combicath Sputum Culture (12/21) Normal Respiratory Cassandra  Combicath Sputum Culture (12/22) Normal Respiratory Cassandra    Extubated but continued shock  Suspect noninfectious etiology of current clinical status (liver failure)  Central lines exchanged within the last week    CT Chest (1/3/23) Scattered nodular opacities as well as right lower lobe consolidation. Secretions in the trachea.  CT A/P (1/3/23) Widespread small bowel thickening suggesting enteritis.    MRSA/MSSA PCR (12/30) Negative  Fungitell (12/26, 12/28) 56, 42    Reviewed CT Chest with Radiology today (1/4/23); findings could be consistent with infection. Favoring viral infection or PCP (although Fungitell not elevated) if infectious etiology. Could still be representative of atypical pulmonary edema.     #Abnormal CT Chest, Leukocytosis, Positive Blood Culture, Transaminitis, Cirrhosis  --Doubt that CT Chest or Abdomen findings can explain degree of leukocytosis or hypotension especially as patient is already s/p 2 weeks of Meropenem (12/21 -->) and ID workup has been otherwise unrevealing. Favor discontinuing Meropenem  --Check GI PCR given enteritis finding on imaging  --Can check urine legionella Ag  --Check repeat sputum culture if patient able to coordinate  --Continue to follow CBC with diff  --Continue to follow temperature curve  --Follow up on preliminary blood cultures    #Positive UCx (Candida dublinensis)  Unclear Significance  s/p Fluconazole 12/20 --> 12/25  UCx (1/3) No Growth  Fungitell (12/26, 12/28) 56, 42  --Stop Caspofungin (12/26 -->)    #Pre-Transplant Evaluation  --ID Clearance for OLT pending clinical status improvement  --Initial QuantiFeron Indeterminate; follow up on repeat QuantiFeron     #Encounter to Vaccinate Patient  Will address vaccination outside of the critical illness period  COVID19: Has had COVID19 in 2020 and again in ~8/2022. Denies prior COVID19 Vaccination  Influenza: Will require  Pneumococcal: Would benefit from PCV20  HAV: Will require Hepatitis A Vaccine  HBV: Will require Heplisav  MMR: Immune  Varicella: Immune  Shingles: Will require Shingrix  Tdap: Will require Tdap    I will continue to follow. Please feel free to contact me with any further questions.    Jonah Mendoza M.D.  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    CT Chest with possible pneumonia versus atelectasis    UA (12/19) Moderate Leukocyte Esterase  UCx (12/19) No Growth  BCx (12/19) Corynebacterium (1/4 Bottles)  MRSA Nasal PCR (12/19) Negative  Paracentesis (12/19, 12/26) Cell counts not suggestive of SBP  Combicath Sputum Culture (12/21) Normal Respiratory Cassandra  Combicath Sputum Culture (12/22) Normal Respiratory Cassandra    Extubated but continued shock  Suspect noninfectious etiology of current clinical status (liver failure)  Central lines exchanged within the last week    CT Chest (1/3/23) Scattered nodular opacities as well as right lower lobe consolidation. Secretions in the trachea.  CT A/P (1/3/23) Widespread small bowel thickening suggesting enteritis.    MRSA/MSSA PCR (12/30) Negative  Fungitell (12/26, 12/28) 56, 42    Reviewed CT Chest with Radiology today (1/4/23); findings could be consistent with infection. Favoring viral infection or PCP (although Fungitell not elevated) if infectious etiology. Could still be representative of atypical pulmonary edema.     #Abnormal CT Chest, Leukocytosis, Positive Blood Culture, Transaminitis, Cirrhosis  --Doubt that CT Chest or Abdomen findings can explain degree of leukocytosis or hypotension especially as patient is already s/p 2 weeks of Meropenem (12/21 -->) and ID workup has been otherwise unrevealing. Favor discontinuing Meropenem  --Check GI PCR given enteritis finding on imaging  --Can check urine legionella Ag  --Check repeat sputum culture if patient able to coordinate  --Continue to follow CBC with diff  --Continue to follow temperature curve  --Follow up on preliminary blood cultures    #Positive UCx (Candida dublinensis)  Unclear Significance  s/p Fluconazole 12/20 --> 12/25  UCx (1/3) No Growth  Fungitell (12/26, 12/28) 56, 42  --Stop Caspofungin (12/26 -->)    #Pre-Transplant Evaluation  --ID Clearance for OLT pending clinical status improvement  --Initial QuantiFeron Indeterminate; follow up on repeat QuantiFeron     #Encounter to Vaccinate Patient  Will address vaccination outside of the critical illness period  COVID19: Has had COVID19 in 2020 and again in ~8/2022. Denies prior COVID19 Vaccination  Influenza: Will require  Pneumococcal: Would benefit from PCV20  HAV: Will require Hepatitis A Vaccine  HBV: Will require Heplisav  MMR: Immune  Varicella: Immune  Shingles: Will require Shingrix  Tdap: Will require Tdap    I will continue to follow. Please feel free to contact me with any further questions.    Jonah Mendoza M.D.  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    CT Chest with possible pneumonia versus atelectasis    UA (12/19) Moderate Leukocyte Esterase  UCx (12/19) No Growth  BCx (12/19) Corynebacterium (1/4 Bottles)  MRSA Nasal PCR (12/19) Negative  Paracentesis (12/19, 12/26) Cell counts not suggestive of SBP  Combicath Sputum Culture (12/21) Normal Respiratory Cassandra  Combicath Sputum Culture (12/22) Normal Respiratory Cassandra    Extubated but continued shock  Suspect noninfectious etiology of current clinical status (liver failure)  Central lines exchanged within the last week    CT Chest (1/3/23) Scattered nodular opacities as well as right lower lobe consolidation. Secretions in the trachea.  CT A/P (1/3/23) Widespread small bowel thickening suggesting enteritis.    MRSA/MSSA PCR (12/30) Negative  Fungitell (12/26, 12/28) 56, 42    Reviewed CT Chest with Radiology today (1/4/23); findings could be consistent with infection. Favoring viral infection or PCP (although Fungitell not elevated) if infectious etiology. Could still be representative of atypical pulmonary edema.     #Abnormal CT Chest, Leukocytosis, Positive Blood Culture, Transaminitis, Cirrhosis  --Doubt that CT Chest or Abdomen findings can explain degree of leukocytosis or hypotension especially as patient is already s/p 2 weeks of Meropenem (12/21 -->) and ID workup has been otherwise unrevealing. Favor discontinuing Meropenem  --Check GI PCR given enteritis finding on imaging  --Can check urine legionella Ag  --Check repeat sputum culture if patient able to coordinate  --Continue to follow CBC with diff  --Continue to follow temperature curve  --Follow up on preliminary blood cultures    #Positive UCx (Candida dublinensis)  Unclear Significance  s/p Fluconazole 12/20 --> 12/25  UCx (1/3) No Growth  Fungitell (12/26, 12/28) 56, 42  --Stop Caspofungin (12/26 -->)    #Pre-Transplant Evaluation  --ID Clearance for OLT pending clinical status improvement  --Initial QuantiFeron Indeterminate; follow up on repeat QuantiFeron     #Encounter to Vaccinate Patient  Will address vaccination outside of the critical illness period  COVID19: Has had COVID19 in 2020 and again in ~8/2022. Denies prior COVID19 Vaccination  Influenza: Will require  Pneumococcal: Would benefit from PCV20  HAV: Will require Hepatitis A Vaccine  HBV: Will require Heplisav  MMR: Immune  Varicella: Immune  Shingles: Will require Shingrix  Tdap: Will require Tdap    I will continue to follow. Please feel free to contact me with any further questions.    Jonah Mendoza M.D.  [facility] Division of Infectious Disease  8AM-5PM Monday - Friday: Available on [employer] Teams  After Hours and Holidays (or if no response on [employer] Teams): Please contact the Infectious Diseases Office at [phone]    The above assessment and plan were discussed with 8U Team

## 2023-01-05 NOTE — PROGRESS NOTE ADULT - PROBLEM SELECTOR PLAN 3
Serum phos improved to 4.7 today. Continue to monitor    If you have any questions, please feel free to contact me  Arnold Charles  Nephrology Fellow  [phone]; Prefer [employer] TEAMS  (After 5pm or on weekends please page the on-call fellow).    Patient was discussed with Dr. Marrero who agrees with my A/P. Addendum to follow Serum phos improved to 4.7 today. Continue to monitor    If you have any questions, please feel free to contact me  Arnold Charles  Nephrology Fellow  [phone]; Prefer [employer] TEAMS  (After 5pm or on weekends please page the on-call fellow).    Patient was discussed with Dr. Marrero who agrees with my A/P. Addendum to follow Serum phos improved to 4.7 today. Continue to monitor    If you have any questions, please feel free to contact me  Arnold Charles  Nephrology Fellow  [phone]; Prefer [employer] TEAMS  (After 5pm or on weekends please page the on-call fellow).    Patient was discussed with Dr. Marrero who agrees with my A/P. Addendum to follow

## 2023-01-05 NOTE — PROVIDER CONTACT NOTE (OTHER) - SITUATION
Patient has a rash on her abdomen
Pt following on all 4 extremities however weaker on LUE.
unable to complete remainder of patient profile r/t pt intubated.
Pt noted with moderate amount of dark red blood with clots around rectal tube
pt. with bloody stool in rectal tube
Patient become hypotensive and nauseas after restarting CRRT and giving dilaudid
bloody rectal tube output

## 2023-01-05 NOTE — PROGRESS NOTE ADULT - ASSESSMENT
61 yo F with h/o decompensated ETOH cirrhosis with ascites, thyroid cancer (s/p total thyroidectomy, RTX, and radioactive iodide), HTN, ventrical neoplasm who was initially admitted to  12/19 with new onset seizures and transferred to [facility] 12/20 for liver transplant evaluation. Pt being seen for RED requiring CRRT since 12/20 61 yo F with h/o decompensated ETOH cirrhosis with ascites, thyroid cancer (s/p total thyroidectomy, RTX, and radioactive iodide), HTN, ventrical neoplasm who was initially admitted to  12/19 with new onset seizures and transferred to [facility] 12/20 for liver transplant evaluation. Pt being seen for RED requiring CRRT since 12/20 61 yo F with h/o decompensated ETOH cirrhosis with ascites, thyroid cancer (s/p total thyroidectomy, RTX, and radioactive iodide), HTN, ventrical neoplasm who was initially admitted to  12/19 with new onset seizures and transferred to [facility] 12/20 for liver transplant evaluation. Pt being seen for RED requiring CRRT since 12/20

## 2023-01-05 NOTE — PROGRESS NOTE ADULT - SUBJECTIVE AND OBJECTIVE BOX
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 Patient has Alcohol Use Disorder ( recent rehab with relapse and in remission since 11/2022), decompensated ALD Cirrhosis c/b ascites. Patient admitted to [facility] on 12/19/2022 after a witnessed seizure; course was c/b septic shock with associated HRF ( intubated 12/19) and an RED ( started HD 12/20). Patient was transferred to [facility] on 12/20 for a liver transplant evaluation. Patient was started on CRRT 12/21 and was extubated 12/23/22. Patient remains in SICU for hemodynamic monitoring and management while liver transplant evaluation is in progress.       Allergies: Macrobid (Rash)  Nexium (Stomach Upset)      MEDICATIONS:   --------------------------------------------------------------------------------------  Neurologic Medications  escitalopram 10 milliGRAM(s) Oral daily  HYDROmorphone  Injectable 0.25 milliGRAM(s) IV Push every 3 hours PRN Severe Pain (7 - 10)  levETIRAcetam  Solution 750 milliGRAM(s) Enteral Tube two times a day    Respiratory Medications    Cardiovascular Medications  midodrine 20 milliGRAM(s) Oral every 8 hours  norepinephrine Infusion 0.05 MICROgram(s)/kG/Min IV Continuous <Continuous>    Gastrointestinal Medications  folic acid 1 milliGRAM(s) Oral daily  multivitamin/minerals/iron Oral Solution (CENTRUM) 15 milliLiter(s) Oral daily  pantoprazole   Suspension 40 milliGRAM(s) Oral every 24 hours  polyethylene glycol 3350 17 Gram(s) Oral every 12 hours  thiamine 100 milliGRAM(s) Enteral Tube daily    Genitourinary Medications    Hematologic/Oncologic Medications  influenza   Vaccine 0.5 milliLiter(s) IntraMuscular once    Antimicrobial/Immunologic Medications  caspofungin IVPB      caspofungin IVPB 50 milliGRAM(s) IV Intermittent every 24 hours  meropenem  IVPB 1000 milliGRAM(s) IV Intermittent every 8 hours  rifAXIMin 550 milliGRAM(s) Oral every 12 hours    Endocrine/Metabolic Medications  insulin lispro (ADMELOG) corrective regimen sliding scale   SubCutaneous every 6 hours  levothyroxine Injectable 103 MICROGram(s) IV Push at bedtime  vasopressin Infusion 0.03 Unit(s)/Min IV Continuous <Continuous>    Topical/Other Medications  chlorhexidine 2% Cloths 1 Application(s) Topical <User Schedule>  CRRT Treatment    <Continuous>  Phoxillum Filtration BK 4 / 2.5 5000 milliLiter(s) CRRT <Continuous>  Phoxillum Filtration BK 4 / 2.5 5000 milliLiter(s) CRRT <Continuous>  Phoxillum Filtration BK 4 / 2.5 5000 milliLiter(s) CRRT <Continuous>  sodium chloride 0.65% Nasal 1 Spray(s) Both Nostrils every 3 hours PRN Nasal Congestion  tetracaine/benzocaine/butamben Spray 1 Spray(s) Topical every 3 hours PRN for ngt discomfort    --------------------------------------------------------------------------------------    VITAL SIGNS, INS/OUTS (last 24 hours):  --------------------------------------------------------------------------------------  T(C): 36.7 (01-04-23 @ 19:00), Max: 37.1 (01-04-23 @ 11:00)  HR: 106 (01-05-23 @ 02:00) (85 - 106)  BP: 102/56 (01-04-23 @ 07:30) (102/56 - 102/56)  RR: 23 (01-05-23 @ 02:00) (12 - 35)  SpO2: 95% (01-05-23 @ 02:00) (87% - 100%)    01-03-23 @ 07:01  -  01-04-23 @ 07:00  --------------------------------------------------------  IN: 2536.6 mL / OUT: 871 mL / NET: 1665.6 mL    01-04-23 @ 07:01  -  01-05-23 @ 03:03  --------------------------------------------------------  IN: 3244.2 mL / OUT: 250 mL / NET: 2994.2 mL      --------------------------------------------------------------------------------------    EXAM  GENERAL:   NAD, well-groomed, well-developed  HEAD:    Atraumatic, Normocephalic  EYES:   EOMI, 2mm PERRLA, conjunctiva and scleral icterus   ENMT:   NGT in place. No oropharyngeal exudates, erythema or lesions,  Moist mucous membranes  NECK:   Supple, no cervical lymphadenopathy, no JVD  NERVOUS SYSTEM:   Alert & Oriented X2, CN II-XII intact, Moves all extremities  ; Upper extremities 5 /5; Lower extremities 5/5, full sensation to light touch   CHEST/LUNG:    Breath sounds coarse bilaterally   HEART:   cardiac monitor NSR  ; S1/S2 without murmurs, without rubs, or gallops.  ABDOMEN:   Distended, taut, nontender; Improving Diffuse abdominal rash blanching with urticaria.  Bowel sounds present, Bladder non distended, non palpable. Rectal Tube in place  EXTREMITIES:   Pulses palpable radial pulses 2+ bilat, DP/PT 1+/1+ bilat, without clubbing, cyanosis. Digits warm to touch with good cap refill < 3 secs  SKIN:   warm, dry, intact, jaundice, abdominal rash that blanches and extends to back, no abnormal lesions, without palpable nodes        LABS  --------------------------------------------------------------------------------------                        9.1    37.38 )-----------( 41       ( 04 Jan 2023 06:10 )             26.4   01-05    135  |  100  |  33<H>  ----------------------------<  136<H>  3.6   |  18<L>  |  2.20<H>    Ca    9.5      05 Jan 2023 00:12  Phos  4.1     01-05  Mg     2.1     01-05    TPro  6.4  /  Alb  3.9  /  TBili  24.6<H>  /  DBili  x   /  AST  120<H>  /  ALT  29  /  AlkPhos  170<H>  01-05  ABG - ( 05 Jan 2023 00:00 )  pH, Arterial: 7.40  pH, Blood: x     /  pCO2: 32    /  pO2: 73    / HCO3: 20    / Base Excess: -4.4  /  SaO2: 96.4            Urinalysis Basic - ( 03 Jan 2023 18:02 )    Color: Other / Appearance: Turbid / SG: SEE NOTE / pH: x  Gluc: x / Ketone: SEE NOTE  / Bili: SEE NOTE / Urobili: SEE NOTE   Blood: x / Protein: SEE NOTE / Nitrite: SEE NOTE   Leuk Esterase: SEE NOTE / RBC: x / WBC x   Sq Epi: x / Non Sq Epi: x / Bacteria: x    PT/INR - ( 04 Jan 2023 00:46 )   PT: 25.4 sec;   INR: 2.17 ratio         PTT - ( 04 Jan 2023 00:46 )  PTT:51.0 sec  --------------------------------------------------------------------------------------     SICU Daily Progress Note  =====================================================  Interval/Overnight Events:     - Lines replaced: RIJ TLC, RIJ Shiley, and Right Radial Arterial Line place  - Paracentesis performed with 4L Removed, s/p 1L 5% Albumin   - Increasing pressor requirements overnight, s/p 500cc 5% Albumin     HPI:  Patient is a 61 y/o Female with PMH Thyroid Cancer ( s/p total thyroidectomy in her 20s, radiation, and BARONE), HTN, GERD, 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 Patient has Alcohol Use Disorder ( recent rehab with relapse and in remission since 11/2022), decompensated ALD Cirrhosis c/b ascites. Patient admitted to [facility] on 12/19/2022 after a witnessed seizure; course was c/b septic shock with associated HRF ( intubated 12/19) and an RED ( started HD 12/20). Patient was transferred to [facility] on 12/20 for a liver transplant evaluation. Patient was started on CRRT 12/21 and was extubated 12/23/22. Patient remains in SICU for hemodynamic monitoring and management while liver transplant evaluation is in progress.       Allergies: Macrobid (Rash)  Nexium (Stomach Upset)      MEDICATIONS:   --------------------------------------------------------------------------------------  Neurologic Medications  escitalopram 10 milliGRAM(s) Oral daily  HYDROmorphone  Injectable 0.25 milliGRAM(s) IV Push every 3 hours PRN Severe Pain (7 - 10)  levETIRAcetam  Solution 750 milliGRAM(s) Enteral Tube two times a day    Respiratory Medications    Cardiovascular Medications  midodrine 20 milliGRAM(s) Oral every 8 hours  norepinephrine Infusion 0.05 MICROgram(s)/kG/Min IV Continuous <Continuous>    Gastrointestinal Medications  folic acid 1 milliGRAM(s) Oral daily  multivitamin/minerals/iron Oral Solution (CENTRUM) 15 milliLiter(s) Oral daily  pantoprazole   Suspension 40 milliGRAM(s) Oral every 24 hours  polyethylene glycol 3350 17 Gram(s) Oral every 12 hours  thiamine 100 milliGRAM(s) Enteral Tube daily    Genitourinary Medications    Hematologic/Oncologic Medications  influenza   Vaccine 0.5 milliLiter(s) IntraMuscular once    Antimicrobial/Immunologic Medications  caspofungin IVPB      caspofungin IVPB 50 milliGRAM(s) IV Intermittent every 24 hours  meropenem  IVPB 1000 milliGRAM(s) IV Intermittent every 8 hours  rifAXIMin 550 milliGRAM(s) Oral every 12 hours    Endocrine/Metabolic Medications  insulin lispro (ADMELOG) corrective regimen sliding scale   SubCutaneous every 6 hours  levothyroxine Injectable 103 MICROGram(s) IV Push at bedtime  vasopressin Infusion 0.03 Unit(s)/Min IV Continuous <Continuous>    Topical/Other Medications  chlorhexidine 2% Cloths 1 Application(s) Topical <User Schedule>  CRRT Treatment    <Continuous>  Phoxillum Filtration BK 4 / 2.5 5000 milliLiter(s) CRRT <Continuous>  Phoxillum Filtration BK 4 / 2.5 5000 milliLiter(s) CRRT <Continuous>  Phoxillum Filtration BK 4 / 2.5 5000 milliLiter(s) CRRT <Continuous>  sodium chloride 0.65% Nasal 1 Spray(s) Both Nostrils every 3 hours PRN Nasal Congestion  tetracaine/benzocaine/butamben Spray 1 Spray(s) Topical every 3 hours PRN for ngt discomfort    --------------------------------------------------------------------------------------    VITAL SIGNS, INS/OUTS (last 24 hours):  --------------------------------------------------------------------------------------  T(C): 36.7 (01-04-23 @ 19:00), Max: 37.1 (01-04-23 @ 11:00)  HR: 106 (01-05-23 @ 02:00) (85 - 106)  BP: 102/56 (01-04-23 @ 07:30) (102/56 - 102/56)  RR: 23 (01-05-23 @ 02:00) (12 - 35)  SpO2: 95% (01-05-23 @ 02:00) (87% - 100%)    01-03-23 @ 07:01  -  01-04-23 @ 07:00  --------------------------------------------------------  IN: 2536.6 mL / OUT: 871 mL / NET: 1665.6 mL    01-04-23 @ 07:01  -  01-05-23 @ 03:03  --------------------------------------------------------  IN: 3244.2 mL / OUT: 250 mL / NET: 2994.2 mL      --------------------------------------------------------------------------------------    EXAM  GENERAL:   NAD, well-groomed, well-developed  HEAD:    Atraumatic, Normocephalic  EYES:   EOMI, 2mm PERRLA, conjunctiva and scleral icterus   ENMT:   NGT in place. No oropharyngeal exudates, erythema or lesions,  Moist mucous membranes  NECK:   Supple, no cervical lymphadenopathy, no JVD  NERVOUS SYSTEM:   Alert & Oriented X2, CN II-XII intact, Moves all extremities  ; Upper extremities 5 /5; Lower extremities 5/5, full sensation to light touch   CHEST/LUNG:    Breath sounds coarse bilaterally   HEART:   cardiac monitor NSR  ; S1/S2 without murmurs, without rubs, or gallops.  ABDOMEN:   Distended, taut, nontender; Improving Diffuse abdominal rash blanching with urticaria.  Bowel sounds present, Bladder non distended, non palpable. Rectal Tube in place  EXTREMITIES:   Pulses palpable radial pulses 2+ bilat, DP/PT 1+/1+ bilat, without clubbing, cyanosis. Digits warm to touch with good cap refill < 3 secs  SKIN:   warm, dry, intact, jaundice, abdominal rash that blanches and extends to back, no abnormal lesions, without palpable nodes        LABS  --------------------------------------------------------------------------------------                        9.1    37.38 )-----------( 41       ( 04 Jan 2023 06:10 )             26.4   01-05    135  |  100  |  33<H>  ----------------------------<  136<H>  3.6   |  18<L>  |  2.20<H>    Ca    9.5      05 Jan 2023 00:12  Phos  4.1     01-05  Mg     2.1     01-05    TPro  6.4  /  Alb  3.9  /  TBili  24.6<H>  /  DBili  x   /  AST  120<H>  /  ALT  29  /  AlkPhos  170<H>  01-05  ABG - ( 05 Jan 2023 00:00 )  pH, Arterial: 7.40  pH, Blood: x     /  pCO2: 32    /  pO2: 73    / HCO3: 20    / Base Excess: -4.4  /  SaO2: 96.4            Urinalysis Basic - ( 03 Jan 2023 18:02 )    Color: Other / Appearance: Turbid / SG: SEE NOTE / pH: x  Gluc: x / Ketone: SEE NOTE  / Bili: SEE NOTE / Urobili: SEE NOTE   Blood: x / Protein: SEE NOTE / Nitrite: SEE NOTE   Leuk Esterase: SEE NOTE / RBC: x / WBC x   Sq Epi: x / Non Sq Epi: x / Bacteria: x    PT/INR - ( 04 Jan 2023 00:46 )   PT: 25.4 sec;   INR: 2.17 ratio         PTT - ( 04 Jan 2023 00:46 )  PTT:51.0 sec  --------------------------------------------------------------------------------------     SICU Daily Progress Note  =====================================================  Interval/Overnight Events:     - Lines replaced: RIJ TLC, RIJ Shiley, and Right Radial Arterial Line place  - Paracentesis performed with 4L Removed, s/p 1L 5% Albumin   - Increasing pressor requirements overnight, s/p 500cc 5% Albumin     HPI:  Patient is a 61 y/o Female with PMH Thyroid Cancer ( s/p total thyroidectomy in her 20s, radiation, and BARONE), HTN, GERD, 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 Patient has Alcohol Use Disorder ( recent rehab with relapse and in remission since 11/2022), decompensated ALD Cirrhosis c/b ascites. Patient admitted to [facility] on 12/19/2022 after a witnessed seizure; course was c/b septic shock with associated HRF ( intubated 12/19) and an RED ( started HD 12/20). Patient was transferred to [facility] on 12/20 for a liver transplant evaluation. Patient was started on CRRT 12/21 and was extubated 12/23/22. Patient remains in SICU for hemodynamic monitoring and management while liver transplant evaluation is in progress.       Allergies: Macrobid (Rash)  Nexium (Stomach Upset)      MEDICATIONS:   --------------------------------------------------------------------------------------  Neurologic Medications  escitalopram 10 milliGRAM(s) Oral daily  HYDROmorphone  Injectable 0.25 milliGRAM(s) IV Push every 3 hours PRN Severe Pain (7 - 10)  levETIRAcetam  Solution 750 milliGRAM(s) Enteral Tube two times a day    Respiratory Medications    Cardiovascular Medications  midodrine 20 milliGRAM(s) Oral every 8 hours  norepinephrine Infusion 0.05 MICROgram(s)/kG/Min IV Continuous <Continuous>    Gastrointestinal Medications  folic acid 1 milliGRAM(s) Oral daily  multivitamin/minerals/iron Oral Solution (CENTRUM) 15 milliLiter(s) Oral daily  pantoprazole   Suspension 40 milliGRAM(s) Oral every 24 hours  polyethylene glycol 3350 17 Gram(s) Oral every 12 hours  thiamine 100 milliGRAM(s) Enteral Tube daily    Genitourinary Medications    Hematologic/Oncologic Medications  influenza   Vaccine 0.5 milliLiter(s) IntraMuscular once    Antimicrobial/Immunologic Medications  caspofungin IVPB      caspofungin IVPB 50 milliGRAM(s) IV Intermittent every 24 hours  meropenem  IVPB 1000 milliGRAM(s) IV Intermittent every 8 hours  rifAXIMin 550 milliGRAM(s) Oral every 12 hours    Endocrine/Metabolic Medications  insulin lispro (ADMELOG) corrective regimen sliding scale   SubCutaneous every 6 hours  levothyroxine Injectable 103 MICROGram(s) IV Push at bedtime  vasopressin Infusion 0.03 Unit(s)/Min IV Continuous <Continuous>    Topical/Other Medications  chlorhexidine 2% Cloths 1 Application(s) Topical <User Schedule>  CRRT Treatment    <Continuous>  Phoxillum Filtration BK 4 / 2.5 5000 milliLiter(s) CRRT <Continuous>  Phoxillum Filtration BK 4 / 2.5 5000 milliLiter(s) CRRT <Continuous>  Phoxillum Filtration BK 4 / 2.5 5000 milliLiter(s) CRRT <Continuous>  sodium chloride 0.65% Nasal 1 Spray(s) Both Nostrils every 3 hours PRN Nasal Congestion  tetracaine/benzocaine/butamben Spray 1 Spray(s) Topical every 3 hours PRN for ngt discomfort    --------------------------------------------------------------------------------------    VITAL SIGNS, INS/OUTS (last 24 hours):  --------------------------------------------------------------------------------------  T(C): 36.7 (01-04-23 @ 19:00), Max: 37.1 (01-04-23 @ 11:00)  HR: 106 (01-05-23 @ 02:00) (85 - 106)  BP: 102/56 (01-04-23 @ 07:30) (102/56 - 102/56)  RR: 23 (01-05-23 @ 02:00) (12 - 35)  SpO2: 95% (01-05-23 @ 02:00) (87% - 100%)    01-03-23 @ 07:01  -  01-04-23 @ 07:00  --------------------------------------------------------  IN: 2536.6 mL / OUT: 871 mL / NET: 1665.6 mL    01-04-23 @ 07:01  -  01-05-23 @ 03:03  --------------------------------------------------------  IN: 3244.2 mL / OUT: 250 mL / NET: 2994.2 mL      --------------------------------------------------------------------------------------    EXAM  GENERAL:   NAD, well-groomed, well-developed  HEAD:    Atraumatic, Normocephalic  EYES:   EOMI, 2mm PERRLA, conjunctiva and scleral icterus   ENMT:   NGT in place. No oropharyngeal exudates, erythema or lesions,  Moist mucous membranes  NECK:   Supple, no cervical lymphadenopathy, no JVD  NERVOUS SYSTEM:   Alert & Oriented X2, CN II-XII intact, Moves all extremities  ; Upper extremities 5 /5; Lower extremities 5/5, full sensation to light touch   CHEST/LUNG:    Breath sounds coarse bilaterally   HEART:   cardiac monitor NSR  ; S1/S2 without murmurs, without rubs, or gallops.  ABDOMEN:   Distended, taut, nontender; Improving Diffuse abdominal rash blanching with urticaria.  Bowel sounds present, Bladder non distended, non palpable. Rectal Tube in place  EXTREMITIES:   Pulses palpable radial pulses 2+ bilat, DP/PT 1+/1+ bilat, without clubbing, cyanosis. Digits warm to touch with good cap refill < 3 secs  SKIN:   warm, dry, intact, jaundice, abdominal rash that blanches and extends to back, no abnormal lesions, without palpable nodes        LABS  --------------------------------------------------------------------------------------                        9.1    37.38 )-----------( 41       ( 04 Jan 2023 06:10 )             26.4   01-05    135  |  100  |  33<H>  ----------------------------<  136<H>  3.6   |  18<L>  |  2.20<H>    Ca    9.5      05 Jan 2023 00:12  Phos  4.1     01-05  Mg     2.1     01-05    TPro  6.4  /  Alb  3.9  /  TBili  24.6<H>  /  DBili  x   /  AST  120<H>  /  ALT  29  /  AlkPhos  170<H>  01-05  ABG - ( 05 Jan 2023 00:00 )  pH, Arterial: 7.40  pH, Blood: x     /  pCO2: 32    /  pO2: 73    / HCO3: 20    / Base Excess: -4.4  /  SaO2: 96.4            Urinalysis Basic - ( 03 Jan 2023 18:02 )    Color: Other / Appearance: Turbid / SG: SEE NOTE / pH: x  Gluc: x / Ketone: SEE NOTE  / Bili: SEE NOTE / Urobili: SEE NOTE   Blood: x / Protein: SEE NOTE / Nitrite: SEE NOTE   Leuk Esterase: SEE NOTE / RBC: x / WBC x   Sq Epi: x / Non Sq Epi: x / Bacteria: x    PT/INR - ( 04 Jan 2023 00:46 )   PT: 25.4 sec;   INR: 2.17 ratio         PTT - ( 04 Jan 2023 00:46 )  PTT:51.0 sec  --------------------------------------------------------------------------------------

## 2023-01-05 NOTE — PROVIDER CONTACT NOTE (OTHER) - RECOMMENDATIONS
Continue to monitor for consistent/ increased bloody output. Repeat CBC?
Please see patient at bedside. Please order zofran for nausea. Recommend not restarting tube feeds and giving additional albumin for low CVP and increasing pressor requirments
come assess pt.  send CBC?
Please see patient at bedside and see if any new medications have been given
ANJEL De León made aware and ordered STAT repeat CBC

## 2023-01-05 NOTE — PROGRESS NOTE ADULT - ATTENDING COMMENTS
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    She was initially admitted at [facility] on December 19 with new onset seizures and was transferred on December 28 for potential evaluation for liver transplantation secondary to acute on chronic liver failure.  Patient recently was in a rehab facility in August 2022 and was asked leave the facility when she was found to be COVID-positive.    She was sober for about a week after discharge again resumed drinking.   I agree with the above outlined history, physical examination, assessment and plan.  Patient with multifocal pneumonia, status postextubation now on nasal oxygen, remains on CVVH (off last 24 hr)-> trend Cr and Urine output-> Nephro recs reviewed  Keeping on broad-spectrum antibiotics including antifungal coverage with caspofungin. Noted ID recommendation favoring discontinuation.   However, patient is now back on IV pressors. Concern for sepsis.  Would keep antibiotic coverage for now.  Random cortisol 18.5- not suggestive of adrenal insuff.    We will review transplant candidacy once all infectious issues are sorted out and psychosocial evaluation completed.  Indeterminate quant gold- > to be repeated.     MELD Na 40   Blood type O-. 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    She was initially admitted at [facility] on December 19 with new onset seizures and was transferred on December 28 for potential evaluation for liver transplantation secondary to acute on chronic liver failure.  Patient recently was in a rehab facility in August 2022 and was asked leave the facility when she was found to be COVID-positive.    She was sober for about a week after discharge again resumed drinking.   I agree with the above outlined history, physical examination, assessment and plan.  Patient with multifocal pneumonia, status postextubation now on nasal oxygen, remains on CVVH (off last 24 hr)-> trend Cr and Urine output-> Nephro recs reviewed  Keeping on broad-spectrum antibiotics including antifungal coverage with caspofungin. Noted ID recommendation favoring discontinuation.   However, patient is now back on IV pressors. Concern for sepsis.  Would keep antibiotic coverage for now.  Random cortisol 18.5- not suggestive of adrenal insuff.    We will review transplant candidacy once all infectious issues are sorted out and psychosocial evaluation completed.  Indeterminate quant gold- > to be repeated.     MELD Na 40   Blood type O-. 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    She was initially admitted at [facility] on December 19 with new onset seizures and was transferred on December 28 for potential evaluation for liver transplantation secondary to acute on chronic liver failure.  Patient recently was in a rehab facility in August 2022 and was asked leave the facility when she was found to be COVID-positive.    She was sober for about a week after discharge again resumed drinking.   I agree with the above outlined history, physical examination, assessment and plan.  Patient with multifocal pneumonia, status postextubation now on nasal oxygen, remains on CVVH (off last 24 hr)-> trend Cr and Urine output-> Nephro recs reviewed  Keeping on broad-spectrum antibiotics including antifungal coverage with caspofungin. Noted ID recommendation favoring discontinuation.   However, patient is now back on IV pressors. Concern for sepsis.  Would keep antibiotic coverage for now.  Random cortisol 18.5- not suggestive of adrenal insuff.    We will review transplant candidacy once all infectious issues are sorted out and psychosocial evaluation completed.  Indeterminate quant gold- > to be repeated.     MELD Na 40   Blood type O-.

## 2023-01-05 NOTE — PROGRESS NOTE ADULT - PROBLEM SELECTOR PLAN 1
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 Upon review of [place], SCr was 0.78 on 12/6/22, increased to 5.31 on admission (12/20), and improved with CRRT. Pt remains anuric. Pt with likely HRS vs ATN vs bile cast nephropathy. CRRT held on 1/3 for line exchange/holiday. Resumed today. Still anuric. Monitor labs and urine output. Avoid nephrotoxins. Dose medications as per eGFR.     Liver transplant on hold given bacteremia and UTI, ID following. 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 Upon review of [place], SCr was 0.78 on 12/6/22, increased to 5.31 on admission (12/20), and improved with CRRT. Pt remains anuric. Pt with likely HRS vs ATN vs bile cast nephropathy. CRRT held on 1/3 for line exchange/holiday. Resumed today. Still anuric. Monitor labs and urine output. Avoid nephrotoxins. Dose medications as per eGFR.     Liver transplant on hold given bacteremia and UTI, ID following. 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 Upon review of [place], SCr was 0.78 on 12/6/22, increased to 5.31 on admission (12/20), and improved with CRRT. Pt remains anuric. Pt with likely HRS vs ATN vs bile cast nephropathy. CRRT held on 1/3 for line exchange/holiday. Resumed today. Still anuric. Monitor labs and urine output. Avoid nephrotoxins. Dose medications as per eGFR.     Liver transplant on hold given bacteremia and UTI, ID following.

## 2023-01-05 NOTE — PROGRESS NOTE ADULT - SUBJECTIVE AND OBJECTIVE BOX
HPI:  Patient seen and examined at bedside on 8 ICU.  She remains on two low dose pressors.     Review Of Systems:   Unable to assess    Medications:  caspofungin IVPB      caspofungin IVPB 50 milliGRAM(s) IV Intermittent every 24 hours  cefTRIAXone   IVPB 1000 milliGRAM(s) IV Intermittent every 24 hours  chlorhexidine 2% Cloths 1 Application(s) Topical <User Schedule>  CRRT Treatment    <Continuous>  escitalopram 10 milliGRAM(s) Oral daily  folic acid 1 milliGRAM(s) Oral daily  HYDROmorphone  Injectable 0.25 milliGRAM(s) IV Push every 3 hours PRN  influenza   Vaccine 0.5 milliLiter(s) IntraMuscular once  levETIRAcetam  Solution 750 milliGRAM(s) Enteral Tube two times a day  levothyroxine Injectable 103 MICROGram(s) IV Push at bedtime  midodrine 20 milliGRAM(s) Oral every 8 hours  multivitamin/minerals/iron Oral Solution (CENTRUM) 15 milliLiter(s) Oral daily  norepinephrine Infusion 0.05 MICROgram(s)/kG/Min IV Continuous <Continuous>  pantoprazole   Suspension 40 milliGRAM(s) Oral every 24 hours  Phoxillum Filtration BK 4 / 2.5 5000 milliLiter(s) CRRT <Continuous>  Phoxillum Filtration BK 4 / 2.5 5000 milliLiter(s) CRRT <Continuous>  polyethylene glycol 3350 17 Gram(s) Oral <User Schedule>  PrismaSOL Filtration BGK 4 / 2.5 5000 milliLiter(s) CRRT <Continuous>  rifAXIMin 550 milliGRAM(s) Oral every 12 hours  sodium chloride 0.65% Nasal 1 Spray(s) Both Nostrils every 3 hours PRN  tetracaine/benzocaine/butamben Spray 1 Spray(s) Topical every 3 hours PRN  thiamine 100 milliGRAM(s) Enteral Tube daily  vasopressin Infusion 0.03 Unit(s)/Min IV Continuous <Continuous>    PAST MEDICAL & SURGICAL HISTORY:  HTN (hypertension)  off medication for over one year--states BP has been normal      UTI (urinary tract infection)  currently being treated--will complete antibiotics 3/8/2022      Intracranial tumor      GERD (gastroesophageal reflux disease)      Eczema      Thyroid cancer  surgery. radiation, Radioactive iodine      COVID-19 virus infection  2021--recovered at home      H/O total thyroidectomy  age 21&#x27;s for Thyroid cancer, postop complication arterial bleed, second surgery      History of tonsillectomy  age 5      History of appendectomy  age 5        Vitals:  T(C): 36.9 (23 @ 23:00), Max: 37.4 (23 @ 15:00)  HR: 92 (23 @ 23:30) (90 - 111)  BP: 109/66 (23 @ 07:15) (109/66 - 109/66)  BP(mean): 83 (23 @ 07:15) (83 - 83)  RR: 17 (23 @ 23:30) (14 - 35)  SpO2: 95% (23 @ 23:30) (92% - 100%)  Wt(kg): --  Daily     Daily Weight in k.6 (2023 02:30)  I&O's Summary    2023 07:01  -  2023 07:00  --------------------------------------------------------  IN: 4080.7 mL / OUT: 450 mL / NET: 3630.7 mL    2023 07:01  -  2023 00:22  --------------------------------------------------------  IN: 1260.6 mL / OUT: 1693 mL / NET: -432.4 mL        Physical Exam:  Appearance: Jaundice, encephalopathic   Eyes: PERRLA, EOMI, pink conjunctiva, no scleral icterus   HENT: Normal oral mucosa  Cardiovascular: RRR, S1, S2, no murmur, rub, or gallop; no edema; no JVD  Procedural Access Site: Clean, dry, intact, without hematoma  Respiratory: Clear to auscultation bilaterally  Gastrointestinal: Soft, non-tender, non-distended, BS+  Musculoskeletal: Mitten restraints   Lymphatic: No lymphadenopathy  Skin: No rashes, ecchymoses, or cyanosis                          8.5    33.73 )-----------( 42       ( 2023 18:39 )             24.4         139  |  104  |  28<H>  ----------------------------<  137<H>  3.6   |  19<L>  |  1.79<H>    Ca    9.2      2023 18:39  Phos  3.7       Mg     2.1         TPro  6.2  /  Alb  3.6  /  TBili  22.3<H>  /  DBili  x   /  AST  131<H>  /  ALT  22  /  AlkPhos  172<H>      PT/INR - ( 2023 05:58 )   PT: 28.0 sec;   INR: 2.41 ratio         PTT - ( 2023 05:58 )  PTT:56.9 sec        Cardiovascular Diagnostic Testing:  ECG:     Echo:   < from: TTE with Doppler (w/Cont) (.22 @ 10:49) >  ------------------------------------------------------------------------  Dimensions:    Normal Values:  LA:     3.5    2.0 - 4.0 cm  Ao:     3.1    2.0 - 3.8 cm  SEPTUM: 0.7    0.6 - 1.2 cm  PWT:    0.6    0.6 - 1.1 cm  LVIDd: 5.4    3.0 - 5.6 cm  LVIDs:  2.9    1.8 - 4.0 cm  Derived variables:  LVMI: 69 g/m2  RWT: 0.22  Fractional short: 46 %  EF (Visual Estimate):  %  EF (Monroy Rule): 66 %Doppler Peak Velocity (m/sec):  AoV=1.4  ------------------------------------------------------------------------  Observations:  Mitral Valve: Normal mitral valve. Minimal mitral  regurgitation.  Aortic Valve/Aorta: Calcified trileaflet aortic valve with  normal opening. Peak transaortic valve gradient equals 8 mm  Hg. No aorticvalve regurgitation seen. Peak left  ventricular outflow tract gradient equals 4 mm Hg, mean  gradient is equal to 2 mm Hg, LVOT velocity time integral  equals 19 cm.  Aortic Root: 3.1 cm.  Ascending Aorta: 3.3 cm.  LVOT diameter: 2 cm.  Left Atrium:Normal left atrium.  LA volume index = 33  cc/m2.  Left Ventricle: Endocardial visualization enhanced with  intravenous injection of Ultrasonic Enhancing Agent  (Definity). Left ventricle suboptimally visualized despite  intravenous injection of ultrasonic enhancing agent; normal  left ventricular systolic function. No segmental wall  motion abnormalities. Normal left ventricular internal  dimensions and wall thicknesses. Normal diastolic function.  Right Heart: Normal right atrium. Normal right ventricular  size and function. Normal tricuspid valve. Mild-moderate  tricuspid regurgitation. Normal pulmonic valve. Minimal  pulmonic regurgitation.  Pericardium/Pleura: Normal pericardium with no pericardial  effusion.  Hemodynamic: Estimated right atrial pressure is 8 mm Hg.  Estimated right ventricular systolic pressure equals 29 mm  Hg, assuming right atrial pressure equals 8 mm Hg,  consistent with normal pulmonary pressures.  ------------------------------------------------------------------------  Conclusions:  1. Normal mitral valve. Minimal mitral regurgitation.  2. Calcified trileaflet aortic valve with normal opening.  No aortic valve regurgitation seen.  3. Endocardial visualization enhanced with intravenous  injection of Ultrasonic Enhancing Agent (Definity). Left  ventricle suboptimally visualized despite intravenous  injection of ultrasonic enhancing agent; normal left  ventricular systolic function. No segmental wall motion  abnormalities.  4. Normal right ventricular size and function.  *** No previous Echo exam.  ------------------------------------------------------------------------  Confirmed on  2022 - 13:40:44 by Rosalinda Ingram M.D.  ------------------------------------------------------------------------    < end of copied text >

## 2023-01-05 NOTE — PROVIDER CONTACT NOTE (OTHER) - ACTION/TREATMENT ORDERED:
ANJEL De León made aware and ordered STAT repeat CBC
ANJEL reina
ANJEL Garcia at bedside, CBC sent at this time
Provider at bedside
Providers at bedside. Zofran ordered and administered. No albumin at this time but will consider if pressor requirements continue to increase. Keep NG tube to low wall intermittent suction
No intervention at this time, will continue to monitor.
Resend cbc at 6am with rest of lab work.

## 2023-01-05 NOTE — PROVIDER CONTACT NOTE (OTHER) - ASSESSMENT
Pt has new bright red liquid bloody output from rectal tube ~50cc. H&H drawn within last hr & stable
unable to complete remainder of patient profile r/t pt intubated. portion of profile completed with marv Gibson, at bedside, this morning.
Patient CRRT restarted at 2300 and patient given .25 mg Dilaudid for abdominal pain. Pt became hypotensive to MAP in high 40's at 23:20 and was on .2 of levo from .02 by 23:30. Patient started complaining of nausea so tube feds were held and NG tube attached to intermittent low wall suction where 150 cc of tube feed residuals came out. CVP is 2, concern for systemic hypovolemia.
Patients rash is from her perineal area to breast. Pt has a history of eczema on back however this does not resemble eczema. Patient is complaining of itchiness and the rash is pinpoint and red on examination.
Pt following on all 4 extremities however weaker on LUE. L radial arterial line in place -  states no previous known weakness. CT performed 12/21.
pt. noted to have blood tinged-stool in rectal tube
Pt A&Ox1-2 and restless. VS as per flowsheet. Moderate amount of dark red blood with clots around rectal tube. Last H&H drawn at 12:58 and stable.

## 2023-01-05 NOTE — PROVIDER CONTACT NOTE (OTHER) - DATE AND TIME:
05-Jan-2023 14:00
23-Dec-2022 06:00
04-Jan-2023 01:30
01-Jan-2023 23:30
31-Dec-2022 19:00
22-Dec-2022 22:30
03-Jan-2023 18:22

## 2023-01-05 NOTE — PROGRESS NOTE ADULT - SUBJECTIVE AND OBJECTIVE BOX
Follow Up:      Interval History:    REVIEW OF SYSTEMS  [  ] ROS unobtainable because:    [  ] All other systems negative except as noted below    Constitutional:  [ ] fever [ ] chills  [ ] weight loss  [ ] weakness  Skin:  [ ] rash [ ] phlebitis	  Eyes: [ ] icterus [ ] pain  [ ] discharge	  ENMT: [ ] sore throat  [ ] thrush [ ] ulcers [ ] exudates  Respiratory: [ ] dyspnea [ ] hemoptysis [ ] cough [ ] sputum	  Cardiovascular:  [ ] chest pain [ ] palpitations [ ] edema	  Gastrointestinal:  [ ] nausea [ ] vomiting [ ] diarrhea [ ] constipation [ ] pain	  Genitourinary:  [ ] dysuria [ ] frequency [ ] hematuria [ ] discharge [ ] flank pain  [ ] incontinence  Musculoskeletal:  [ ] myalgias [ ] arthralgias [ ] arthritis  [ ] back pain  Neurological:  [ ] headache [ ] seizures  [ ] confusion/altered mental status    Allergies  Macrobid (Rash)        ANTIMICROBIALS:  caspofungin IVPB    caspofungin IVPB 50 every 24 hours  meropenem  IVPB 1000 every 8 hours  rifAXIMin 550 every 12 hours      OTHER MEDS:  MEDICATIONS  (STANDING):  escitalopram 10 daily  HYDROmorphone  Injectable 0.25 every 3 hours PRN  influenza   Vaccine 0.5 once  lactulose Syrup 20 every 6 hours  levETIRAcetam  Solution 750 two times a day  levothyroxine Injectable 103 at bedtime  midodrine 20 every 8 hours  norepinephrine Infusion 0.05 <Continuous>  pantoprazole   Suspension 40 every 24 hours  polyethylene glycol 3350 17 every 12 hours  vasopressin Infusion 0.03 <Continuous>      Vital Signs Last 24 Hrs  T(C): 36.8 (05 Jan 2023 11:00), Max: 37.1 (05 Jan 2023 04:00)  T(F): 98.3 (05 Jan 2023 11:00), Max: 98.8 (05 Jan 2023 04:00)  HR: 97 (05 Jan 2023 14:30) (93 - 111)  BP: 109/66 (05 Jan 2023 07:15) (109/66 - 109/66)  BP(mean): 83 (05 Jan 2023 07:15) (83 - 83)  RR: 20 (05 Jan 2023 14:30) (15 - 35)  SpO2: 95% (05 Jan 2023 14:00) (93% - 100%)    Parameters below as of 05 Jan 2023 07:00  Patient On (Oxygen Delivery Method): room air        PHYSICAL EXAMINATION:  General: Alert and Awake, NAD  HEENT: PERRL, EOMI  Neck: Supple  Cardiac: RRR, No M/R/G  Resp: CTAB, No Wh/Rh/Ra  Abdomen: NBS, NT/ND, No HSM, No rigidity or guarding  MSK: No LE edema. No Calf tenderness  : No dhillon  Skin: No rashes or lesions. Skin is warm and dry to the touch.   Neuro: Alert and Awake. CN 2-12 Grossly intact. Moves all four extremities spontaneously.  Psych: Calm, Pleasant, Cooperative                          8.7    34.99 )-----------( 44       ( 05 Jan 2023 14:43 )             24.9       01-05    136  |  101  |  36<H>  ----------------------------<  142<H>  3.6   |  16<L>  |  2.22<H>    Ca    9.4      05 Jan 2023 12:58  Phos  4.1     01-05  Mg     2.0     01-05    TPro  6.2  /  Alb  3.7  /  TBili  22.5<H>  /  DBili  x   /  AST  126<H>  /  ALT  24  /  AlkPhos  185<H>  01-05      Urinalysis Basic - ( 03 Jan 2023 18:02 )    Color: Other / Appearance: Turbid / SG: SEE NOTE / pH: x  Gluc: x / Ketone: SEE NOTE  / Bili: SEE NOTE / Urobili: SEE NOTE   Blood: x / Protein: SEE NOTE / Nitrite: SEE NOTE   Leuk Esterase: SEE NOTE / RBC: x / WBC x   Sq Epi: x / Non Sq Epi: x / Bacteria: x        MICROBIOLOGY:  v  Peritoneal Peritoneal Fluid  01-04-23   Testing in progress  --    polymorphonuclear leukocytes seen  No organisms seen  by cytocentrifuge      Catheterized Catheterized  01-03-23   No growth  --  --      Peritoneal Peritoneal Fluid  12-26-22   No growth  --    polymorphonuclear leukocytes seen  No organisms seen  by cytocentrifuge      .Blood Blood-Peripheral  12-26-22   No Growth Final  --  --      .Blood Blood-Peripheral  12-26-22   No Growth Final  --  --      .Blood Blood  12-24-22   No Growth Final  --  --      .Blood Blood  12-24-22   No Growth Final  --  --      .Blood Blood  12-23-22   No Growth Final  --  --      .Blood Blood  12-23-22   No Growth Final  --  --      Combi-Cath Combi-Cath  12-22-22   Normal Respiratory Cassandra present  --    Moderate polymorphonuclear leukocytes seen per low power field  No squamous epithelial cells seen per low power field  No organisms seen per oil power field      Catheterized Catheterized  12-21-22   >100,000 CFU/ml Leticia dubliniensis "Susceptibilities not performed"  --  --      Ascites Fl Ascites Fluid  12-21-22   No growth at 5 days  --    polymorphonuclear leukocytes seen  No organisms seen  by cytocentrifuge      Trach Asp Tracheal Aspirate  12-21-22   Normal Respiratory Cassandra present  --    No polymorphonuclear leukocytes per low power field  Numerous Squamous epithelial cells per low power field  Moderate Gram Positive Rods seen per oil power field  Few Yeast like cells seen per oil power field      .Blood Blood-Peripheral  12-20-22   No Growth Final  --  --      .Blood Blood-Peripheral  12-20-22   No Growth Final  --  --        CMV IgG Antibody: 5.10 U/mL (12-24-22 @ 20:59)  Toxoplasma IgG Screen: <3.0 IU/mL (12-24-22 @ 20:59)          RADIOLOGY:    <The imaging below has been reviewed and visualized by me independently. Findings as detailed in report below> Follow Up:      Interval History:    REVIEW OF SYSTEMS  [  ] ROS unobtainable because:    [  ] All other systems negative except as noted below    Constitutional:  [ ] fever [ ] chills  [ ] weight loss  [ ] weakness  Skin:  [ ] rash [ ] phlebitis	  Eyes: [ ] icterus [ ] pain  [ ] discharge	  ENMT: [ ] sore throat  [ ] thrush [ ] ulcers [ ] exudates  Respiratory: [ ] dyspnea [ ] hemoptysis [ ] cough [ ] sputum	  Cardiovascular:  [ ] chest pain [ ] palpitations [ ] edema	  Gastrointestinal:  [ ] nausea [ ] vomiting [ ] diarrhea [ ] constipation [ ] pain	  Genitourinary:  [ ] dysuria [ ] frequency [ ] hematuria [ ] discharge [ ] flank pain  [ ] incontinence  Musculoskeletal:  [ ] myalgias [ ] arthralgias [ ] arthritis  [ ] back pain  Neurological:  [ ] headache [ ] seizures  [ ] confusion/altered mental status    Allergies  Macrobid (Rash)        ANTIMICROBIALS:  caspofungin IVPB    caspofungin IVPB 50 every 24 hours  meropenem  IVPB 1000 every 8 hours  rifAXIMin 550 every 12 hours      OTHER MEDS:  MEDICATIONS  (STANDING):  escitalopram 10 daily  HYDROmorphone  Injectable 0.25 every 3 hours PRN  influenza   Vaccine 0.5 once  lactulose Syrup 20 every 6 hours  levETIRAcetam  Solution 750 two times a day  levothyroxine Injectable 103 at bedtime  midodrine 20 every 8 hours  norepinephrine Infusion 0.05 <Continuous>  pantoprazole   Suspension 40 every 24 hours  polyethylene glycol 3350 17 every 12 hours  vasopressin Infusion 0.03 <Continuous>      Vital Signs Last 24 Hrs  T(C): 36.8 (05 Jan 2023 11:00), Max: 37.1 (05 Jan 2023 04:00)  T(F): 98.3 (05 Jan 2023 11:00), Max: 98.8 (05 Jan 2023 04:00)  HR: 97 (05 Jan 2023 14:30) (93 - 111)  BP: 109/66 (05 Jan 2023 07:15) (109/66 - 109/66)  BP(mean): 83 (05 Jan 2023 07:15) (83 - 83)  RR: 20 (05 Jan 2023 14:30) (15 - 35)  SpO2: 95% (05 Jan 2023 14:00) (93% - 100%)    Parameters below as of 05 Jan 2023 07:00  Patient On (Oxygen Delivery Method): room air        PHYSICAL EXAMINATION:  General: Alert and Awake, NAD  HEENT: PERRL, EOMI  Neck: Supple  Cardiac: RRR, No M/R/G  Resp: CTAB, No Wh/Rh/Ra  Abdomen: NBS, NT/ND, No HSM, No rigidity or guarding  MSK: No LE edema. No Calf tenderness  : No dhillon  Skin: No rashes or lesions. Skin is warm and dry to the touch.   Neuro: Alert and Awake. CN 2-12 Grossly intact. Moves all four extremities spontaneously.  Psych: Calm, Pleasant, Cooperative                          8.7    34.99 )-----------( 44       ( 05 Jan 2023 14:43 )             24.9       01-05    136  |  101  |  36<H>  ----------------------------<  142<H>  3.6   |  16<L>  |  2.22<H>    Ca    9.4      05 Jan 2023 12:58  Phos  4.1     01-05  Mg     2.0     01-05    TPro  6.2  /  Alb  3.7  /  TBili  22.5<H>  /  DBili  x   /  AST  126<H>  /  ALT  24  /  AlkPhos  185<H>  01-05      Urinalysis Basic - ( 03 Jan 2023 18:02 )    Color: Other / Appearance: Turbid / SG: SEE NOTE / pH: x  Gluc: x / Ketone: SEE NOTE  / Bili: SEE NOTE / Urobili: SEE NOTE   Blood: x / Protein: SEE NOTE / Nitrite: SEE NOTE   Leuk Esterase: SEE NOTE / RBC: x / WBC x   Sq Epi: x / Non Sq Epi: x / Bacteria: x        MICROBIOLOGY:  v  Peritoneal Peritoneal Fluid  01-04-23   Testing in progress  --    polymorphonuclear leukocytes seen  No organisms seen  by cytocentrifuge      Catheterized Catheterized  01-03-23   No growth  --  --      Peritoneal Peritoneal Fluid  12-26-22   No growth  --    polymorphonuclear leukocytes seen  No organisms seen  by cytocentrifuge      .Blood Blood-Peripheral  12-26-22   No Growth Final  --  --      .Blood Blood-Peripheral  12-26-22   No Growth Final  --  --      .Blood Blood  12-24-22   No Growth Final  --  --      .Blood Blood  12-24-22   No Growth Final  --  --      .Blood Blood  12-23-22   No Growth Final  --  --      .Blood Blood  12-23-22   No Growth Final  --  --      Combi-Cath Combi-Cath  12-22-22   Normal Respiratory Cassandra present  --    Moderate polymorphonuclear leukocytes seen per low power field  No squamous epithelial cells seen per low power field  No organisms seen per oil power field      Catheterized Catheterized  12-21-22   >100,000 CFU/ml Leticai dubliniensis "Susceptibilities not performed"  --  --      Ascites Fl Ascites Fluid  12-21-22   No growth at 5 days  --    polymorphonuclear leukocytes seen  No organisms seen  by cytocentrifuge      Trach Asp Tracheal Aspirate  12-21-22   Normal Respiratory Cassandra present  --    No polymorphonuclear leukocytes per low power field  Numerous Squamous epithelial cells per low power field  Moderate Gram Positive Rods seen per oil power field  Few Yeast like cells seen per oil power field      .Blood Blood-Peripheral  12-20-22   No Growth Final  --  --      .Blood Blood-Peripheral  12-20-22   No Growth Final  --  --        CMV IgG Antibody: 5.10 U/mL (12-24-22 @ 20:59)  Toxoplasma IgG Screen: <3.0 IU/mL (12-24-22 @ 20:59)          RADIOLOGY:    <The imaging below has been reviewed and visualized by me independently. Findings as detailed in report below> Follow Up:  Shock    Interval History: remains on pressors and CRRT. afebrile     REVIEW OF SYSTEMS  [ x ] ROS unobtainable because:  encephalopathy  [  ] All other systems negative except as noted below    Constitutional:  [ ] fever [ ] chills  [ ] weight loss  [ ] weakness  Skin:  [ ] rash [ ] phlebitis	  Eyes: [ ] icterus [ ] pain  [ ] discharge	  ENMT: [ ] sore throat  [ ] thrush [ ] ulcers [ ] exudates  Respiratory: [ ] dyspnea [ ] hemoptysis [ ] cough [ ] sputum	  Cardiovascular:  [ ] chest pain [ ] palpitations [ ] edema	  Gastrointestinal:  [ ] nausea [ ] vomiting [ ] diarrhea [ ] constipation [ ] pain	  Genitourinary:  [ ] dysuria [ ] frequency [ ] hematuria [ ] discharge [ ] flank pain  [ ] incontinence  Musculoskeletal:  [ ] myalgias [ ] arthralgias [ ] arthritis  [ ] back pain  Neurological:  [ ] headache [ ] seizures  [ ] confusion/altered mental status    Allergies  Macrobid (Rash)        ANTIMICROBIALS:  caspofungin IVPB    caspofungin IVPB 50 every 24 hours  meropenem  IVPB 1000 every 8 hours  rifAXIMin 550 every 12 hours      OTHER MEDS:  MEDICATIONS  (STANDING):  escitalopram 10 daily  HYDROmorphone  Injectable 0.25 every 3 hours PRN  influenza   Vaccine 0.5 once  lactulose Syrup 20 every 6 hours  levETIRAcetam  Solution 750 two times a day  levothyroxine Injectable 103 at bedtime  midodrine 20 every 8 hours  norepinephrine Infusion 0.05 <Continuous>  pantoprazole   Suspension 40 every 24 hours  polyethylene glycol 3350 17 every 12 hours  vasopressin Infusion 0.03 <Continuous>      Vital Signs Last 24 Hrs  T(C): 36.8 (05 Jan 2023 11:00), Max: 37.1 (05 Jan 2023 04:00)  T(F): 98.3 (05 Jan 2023 11:00), Max: 98.8 (05 Jan 2023 04:00)  HR: 97 (05 Jan 2023 14:30) (93 - 111)  BP: 109/66 (05 Jan 2023 07:15) (109/66 - 109/66)  BP(mean): 83 (05 Jan 2023 07:15) (83 - 83)  RR: 20 (05 Jan 2023 14:30) (15 - 35)  SpO2: 95% (05 Jan 2023 14:00) (93% - 100%)    Parameters below as of 05 Jan 2023 07:00  Patient On (Oxygen Delivery Method): room air    PHYSICAL EXAMINATION:  General: Alert and Awake, NAD, Jaundice  HEENT: Normocephalic / Atraumatic  Resp: No accessory muscles of respiration utilized  Abdomen: Not distended.  MSK: No LE edema.   Skin: No rashes or lesions.    Neuro: Alert and Awake. CN 2-12 Grossly intact. Moves all four extremities spontaneously.  Psych: Calm, Pleasant, Cooperative                          8.7    34.99 )-----------( 44       ( 05 Jan 2023 14:43 )             24.9       01-05    136  |  101  |  36<H>  ----------------------------<  142<H>  3.6   |  16<L>  |  2.22<H>    Ca    9.4      05 Jan 2023 12:58  Phos  4.1     01-05  Mg     2.0     01-05    TPro  6.2  /  Alb  3.7  /  TBili  22.5<H>  /  DBili  x   /  AST  126<H>  /  ALT  24  /  AlkPhos  185<H>  01-05      Urinalysis Basic - ( 03 Jan 2023 18:02 )    Color: Other / Appearance: Turbid / SG: SEE NOTE / pH: x  Gluc: x / Ketone: SEE NOTE  / Bili: SEE NOTE / Urobili: SEE NOTE   Blood: x / Protein: SEE NOTE / Nitrite: SEE NOTE   Leuk Esterase: SEE NOTE / RBC: x / WBC x   Sq Epi: x / Non Sq Epi: x / Bacteria: x        MICROBIOLOGY:  v  Peritoneal Peritoneal Fluid  01-04-23   Testing in progress  --    polymorphonuclear leukocytes seen  No organisms seen  by cytocentrifuge      Catheterized Catheterized  01-03-23   No growth  --  --      Peritoneal Peritoneal Fluid  12-26-22   No growth  --    polymorphonuclear leukocytes seen  No organisms seen  by cytocentrifuge      .Blood Blood-Peripheral  12-26-22   No Growth Final  --  --      .Blood Blood-Peripheral  12-26-22   No Growth Final  --  --      .Blood Blood  12-24-22   No Growth Final  --  --      .Blood Blood  12-24-22   No Growth Final  --  --      .Blood Blood  12-23-22   No Growth Final  --  --      .Blood Blood  12-23-22   No Growth Final  --  --      Combi-Cath Combi-Cath  12-22-22   Normal Respiratory Cassandra present  --    Moderate polymorphonuclear leukocytes seen per low power field  No squamous epithelial cells seen per low power field  No organisms seen per oil power field      Catheterized Catheterized  12-21-22   >100,000 CFU/ml Leticia dubliniensis "Susceptibilities not performed"  --  --      Ascites Fl Ascites Fluid  12-21-22   No growth at 5 days  --    polymorphonuclear leukocytes seen  No organisms seen  by cytocentrifuge      Trach Asp Tracheal Aspirate  12-21-22   Normal Respiratory Cassandra present  --    No polymorphonuclear leukocytes per low power field  Numerous Squamous epithelial cells per low power field  Moderate Gram Positive Rods seen per oil power field  Few Yeast like cells seen per oil power field      .Blood Blood-Peripheral  12-20-22   No Growth Final  --  --      .Blood Blood-Peripheral  12-20-22   No Growth Final  --  --        CMV IgG Antibody: 5.10 U/mL (12-24-22 @ 20:59)  Toxoplasma IgG Screen: <3.0 IU/mL (12-24-22 @ 20:59)          RADIOLOGY:    <The imaging below has been reviewed and visualized by me independently. Findings as detailed in report below>    < from: Xray Chest 1 View- PORTABLE-Routine (Xray Chest 1 View- PORTABLE-Routine in AM.) (01.05.23 @ 07:42) >  IMPRESSION:  Tubes and lines as above.  Bilateral reticular opacities with partial clearing of right lower lobe   opacity.    < end of copied text >

## 2023-01-06 LAB
ALBUMIN SERPL ELPH-MCNC: 3.3 G/DL — SIGNIFICANT CHANGE UP (ref 3.3–5)
ALBUMIN SERPL ELPH-MCNC: 3.4 G/DL — SIGNIFICANT CHANGE UP (ref 3.3–5)
ALBUMIN SERPL ELPH-MCNC: 3.5 G/DL — SIGNIFICANT CHANGE UP (ref 3.3–5)
ALP SERPL-CCNC: 174 U/L — HIGH (ref 40–120)
ALP SERPL-CCNC: 188 U/L — HIGH (ref 40–120)
ALP SERPL-CCNC: 204 U/L — HIGH (ref 40–120)
ALP SERPL-CCNC: 208 U/L — HIGH (ref 40–120)
ALT FLD-CCNC: 21 U/L — SIGNIFICANT CHANGE UP (ref 10–45)
ALT FLD-CCNC: 23 U/L — SIGNIFICANT CHANGE UP (ref 10–45)
AMMONIA BLD-MCNC: 63 UMOL/L — HIGH (ref 11–55)
ANION GAP SERPL CALC-SCNC: 14 MMOL/L — SIGNIFICANT CHANGE UP (ref 5–17)
ANION GAP SERPL CALC-SCNC: 15 MMOL/L — SIGNIFICANT CHANGE UP (ref 5–17)
APTT BLD: 59.7 SEC — HIGH (ref 27.5–35.5)
AST SERPL-CCNC: 129 U/L — HIGH (ref 10–40)
AST SERPL-CCNC: 131 U/L — HIGH (ref 10–40)
AST SERPL-CCNC: 133 U/L — HIGH (ref 10–40)
AST SERPL-CCNC: 134 U/L — HIGH (ref 10–40)
BILIRUB SERPL-MCNC: 21.4 MG/DL — HIGH (ref 0.2–1.2)
BILIRUB SERPL-MCNC: 22.1 MG/DL — HIGH (ref 0.2–1.2)
BILIRUB SERPL-MCNC: 22.4 MG/DL — HIGH (ref 0.2–1.2)
BLD GP AB SCN SERPL QL: NEGATIVE — SIGNIFICANT CHANGE UP
BUN SERPL-MCNC: 19 MG/DL — SIGNIFICANT CHANGE UP (ref 7–23)
BUN SERPL-MCNC: 20 MG/DL — SIGNIFICANT CHANGE UP (ref 7–23)
BUN SERPL-MCNC: 21 MG/DL — SIGNIFICANT CHANGE UP (ref 7–23)
BUN SERPL-MCNC: 24 MG/DL — HIGH (ref 7–23)
CALCIUM SERPL-MCNC: 8.9 MG/DL — SIGNIFICANT CHANGE UP (ref 8.4–10.5)
CALCIUM SERPL-MCNC: 9.3 MG/DL — SIGNIFICANT CHANGE UP (ref 8.4–10.5)
CARN ESTERS/C0 SERPL-SRTO: 1.8 RATIO — HIGH (ref 0–0.9)
CARNITINE FREE SERPL-MCNC: 13 UMOL/L — LOW (ref 20–55)
CARNITINE SERPL-MCNC: 36 UMOL/L — SIGNIFICANT CHANGE UP (ref 27–73)
CHLORIDE SERPL-SCNC: 102 MMOL/L — SIGNIFICANT CHANGE UP (ref 96–108)
CHLORIDE SERPL-SCNC: 103 MMOL/L — SIGNIFICANT CHANGE UP (ref 96–108)
CHLORIDE SERPL-SCNC: 104 MMOL/L — SIGNIFICANT CHANGE UP (ref 96–108)
CO2 SERPL-SCNC: 19 MMOL/L — LOW (ref 22–31)
CO2 SERPL-SCNC: 20 MMOL/L — LOW (ref 22–31)
CREAT SERPL-MCNC: 1.04 MG/DL — SIGNIFICANT CHANGE UP (ref 0.5–1.3)
CREAT SERPL-MCNC: 1.15 MG/DL — SIGNIFICANT CHANGE UP (ref 0.5–1.3)
CREAT SERPL-MCNC: 1.26 MG/DL — SIGNIFICANT CHANGE UP (ref 0.5–1.3)
CREAT SERPL-MCNC: 1.53 MG/DL — HIGH (ref 0.5–1.3)
EGFR: 38 ML/MIN/1.73M2 — LOW
EGFR: 49 ML/MIN/1.73M2 — LOW
EGFR: 54 ML/MIN/1.73M2 — LOW
EGFR: 61 ML/MIN/1.73M2 — SIGNIFICANT CHANGE UP
GAS PNL BLDA: SIGNIFICANT CHANGE UP
GLUCOSE SERPL-MCNC: 121 MG/DL — HIGH (ref 70–99)
GLUCOSE SERPL-MCNC: 140 MG/DL — HIGH (ref 70–99)
GLUCOSE SERPL-MCNC: 150 MG/DL — HIGH (ref 70–99)
GLUCOSE SERPL-MCNC: 163 MG/DL — HIGH (ref 70–99)
HCT VFR BLD CALC: 23.5 % — LOW (ref 34.5–45)
HCT VFR BLD CALC: 24.4 % — LOW (ref 34.5–45)
HCT VFR BLD CALC: 25.2 % — LOW (ref 34.5–45)
HCT VFR BLD CALC: 25.4 % — LOW (ref 34.5–45)
HGB BLD-MCNC: 8.2 G/DL — LOW (ref 11.5–15.5)
HGB BLD-MCNC: 8.6 G/DL — LOW (ref 11.5–15.5)
HGB BLD-MCNC: 8.7 G/DL — LOW (ref 11.5–15.5)
HGB BLD-MCNC: 8.9 G/DL — LOW (ref 11.5–15.5)
INR BLD: 2.29 RATIO — HIGH (ref 0.88–1.16)
MAGNESIUM SERPL-MCNC: 2.2 MG/DL — SIGNIFICANT CHANGE UP (ref 1.6–2.6)
MCHC RBC-ENTMCNC: 30.6 PG — SIGNIFICANT CHANGE UP (ref 27–34)
MCHC RBC-ENTMCNC: 30.7 PG — SIGNIFICANT CHANGE UP (ref 27–34)
MCHC RBC-ENTMCNC: 30.8 PG — SIGNIFICANT CHANGE UP (ref 27–34)
MCHC RBC-ENTMCNC: 31 PG — SIGNIFICANT CHANGE UP (ref 27–34)
MCHC RBC-ENTMCNC: 34.5 GM/DL — SIGNIFICANT CHANGE UP (ref 32–36)
MCHC RBC-ENTMCNC: 34.9 GM/DL — SIGNIFICANT CHANGE UP (ref 32–36)
MCHC RBC-ENTMCNC: 35 GM/DL — SIGNIFICANT CHANGE UP (ref 32–36)
MCHC RBC-ENTMCNC: 35.2 GM/DL — SIGNIFICANT CHANGE UP (ref 32–36)
MCV RBC AUTO: 87.5 FL — SIGNIFICANT CHANGE UP (ref 80–100)
MCV RBC AUTO: 88 FL — SIGNIFICANT CHANGE UP (ref 80–100)
MCV RBC AUTO: 88.5 FL — SIGNIFICANT CHANGE UP (ref 80–100)
MCV RBC AUTO: 88.7 FL — SIGNIFICANT CHANGE UP (ref 80–100)
NRBC # BLD: 0 /100 WBCS — SIGNIFICANT CHANGE UP (ref 0–0)
PHOSPHATE SERPL-MCNC: 3.3 MG/DL — SIGNIFICANT CHANGE UP (ref 2.5–4.5)
PHOSPHATE SERPL-MCNC: 3.5 MG/DL — SIGNIFICANT CHANGE UP (ref 2.5–4.5)
PHOSPHATE SERPL-MCNC: 3.6 MG/DL — SIGNIFICANT CHANGE UP (ref 2.5–4.5)
PLATELET # BLD AUTO: 35 K/UL — LOW (ref 150–400)
PLATELET # BLD AUTO: 38 K/UL — LOW (ref 150–400)
PLATELET # BLD AUTO: 40 K/UL — LOW (ref 150–400)
POTASSIUM SERPL-MCNC: 3.6 MMOL/L — SIGNIFICANT CHANGE UP (ref 3.5–5.3)
POTASSIUM SERPL-MCNC: 3.7 MMOL/L — SIGNIFICANT CHANGE UP (ref 3.5–5.3)
POTASSIUM SERPL-MCNC: 3.8 MMOL/L — SIGNIFICANT CHANGE UP (ref 3.5–5.3)
POTASSIUM SERPL-SCNC: 3.6 MMOL/L — SIGNIFICANT CHANGE UP (ref 3.5–5.3)
POTASSIUM SERPL-SCNC: 3.7 MMOL/L — SIGNIFICANT CHANGE UP (ref 3.5–5.3)
POTASSIUM SERPL-SCNC: 3.8 MMOL/L — SIGNIFICANT CHANGE UP (ref 3.5–5.3)
PROT SERPL-MCNC: 6 G/DL — SIGNIFICANT CHANGE UP (ref 6–8.3)
PROT SERPL-MCNC: 6.3 G/DL — SIGNIFICANT CHANGE UP (ref 6–8.3)
PROT SERPL-MCNC: 6.4 G/DL — SIGNIFICANT CHANGE UP (ref 6–8.3)
PROT SERPL-MCNC: 6.6 G/DL — SIGNIFICANT CHANGE UP (ref 6–8.3)
PROTHROM AB SERPL-ACNC: 26.8 SEC — HIGH (ref 10.5–13.4)
RBC # BLD: 2.67 M/UL — LOW (ref 3.8–5.2)
RBC # BLD: 2.79 M/UL — LOW (ref 3.8–5.2)
RBC # BLD: 2.84 M/UL — LOW (ref 3.8–5.2)
RBC # BLD: 2.87 M/UL — LOW (ref 3.8–5.2)
RBC # FLD: 18.5 % — HIGH (ref 10.3–14.5)
RBC # FLD: 18.6 % — HIGH (ref 10.3–14.5)
RBC # FLD: 18.9 % — HIGH (ref 10.3–14.5)
RBC # FLD: 19 % — HIGH (ref 10.3–14.5)
RH IG SCN BLD-IMP: NEGATIVE — SIGNIFICANT CHANGE UP
SODIUM SERPL-SCNC: 137 MMOL/L — SIGNIFICANT CHANGE UP (ref 135–145)
SODIUM SERPL-SCNC: 138 MMOL/L — SIGNIFICANT CHANGE UP (ref 135–145)
WBC # BLD: 33.94 K/UL — HIGH (ref 3.8–10.5)
WBC # BLD: 36.38 K/UL — HIGH (ref 3.8–10.5)
WBC # BLD: 38.77 K/UL — HIGH (ref 3.8–10.5)
WBC # BLD: 38.94 K/UL — HIGH (ref 3.8–10.5)
WBC # FLD AUTO: 33.94 K/UL — HIGH (ref 3.8–10.5)
WBC # FLD AUTO: 36.38 K/UL — HIGH (ref 3.8–10.5)
WBC # FLD AUTO: 38.77 K/UL — HIGH (ref 3.8–10.5)
WBC # FLD AUTO: 38.94 K/UL — HIGH (ref 3.8–10.5)
ZINC SERPL-MCNC: 51 UG/DL — SIGNIFICANT CHANGE UP (ref 44–115)

## 2023-01-06 PROCEDURE — 99232 SBSQ HOSP IP/OBS MODERATE 35: CPT

## 2023-01-06 PROCEDURE — 99233 SBSQ HOSP IP/OBS HIGH 50: CPT

## 2023-01-06 PROCEDURE — 99291 CRITICAL CARE FIRST HOUR: CPT | Mod: GC

## 2023-01-06 PROCEDURE — 90945 DIALYSIS ONE EVALUATION: CPT | Mod: GC

## 2023-01-06 RX ORDER — ALBUMIN HUMAN 25 %
250 VIAL (ML) INTRAVENOUS ONCE
Refills: 0 | Status: COMPLETED | OUTPATIENT
Start: 2023-01-06 | End: 2023-01-06

## 2023-01-06 RX ORDER — DEXMEDETOMIDINE HYDROCHLORIDE IN 0.9% SODIUM CHLORIDE 4 UG/ML
0.2 INJECTION INTRAVENOUS
Qty: 200 | Refills: 0 | Status: DISCONTINUED | OUTPATIENT
Start: 2023-01-06 | End: 2023-01-06

## 2023-01-06 RX ADMIN — ESCITALOPRAM OXALATE 10 MILLIGRAM(S): 10 TABLET, FILM COATED ORAL at 12:06

## 2023-01-06 RX ADMIN — Medication 100 MILLIGRAM(S): at 12:06

## 2023-01-06 RX ADMIN — LEVETIRACETAM 750 MILLIGRAM(S): 250 TABLET, FILM COATED ORAL at 05:29

## 2023-01-06 RX ADMIN — Medication 6.21 MICROGRAM(S)/KG/MIN: at 19:35

## 2023-01-06 RX ADMIN — POLYETHYLENE GLYCOL 3350 17 GRAM(S): 17 POWDER, FOR SOLUTION ORAL at 17:18

## 2023-01-06 RX ADMIN — MIDODRINE HYDROCHLORIDE 20 MILLIGRAM(S): 2.5 TABLET ORAL at 05:25

## 2023-01-06 RX ADMIN — CASPOFUNGIN ACETATE 260 MILLIGRAM(S): 7 INJECTION, POWDER, LYOPHILIZED, FOR SOLUTION INTRAVENOUS at 09:05

## 2023-01-06 RX ADMIN — CEFTRIAXONE 100 MILLIGRAM(S): 500 INJECTION, POWDER, FOR SOLUTION INTRAMUSCULAR; INTRAVENOUS at 05:24

## 2023-01-06 RX ADMIN — LEVETIRACETAM 750 MILLIGRAM(S): 250 TABLET, FILM COATED ORAL at 17:18

## 2023-01-06 RX ADMIN — POLYETHYLENE GLYCOL 3350 17 GRAM(S): 17 POWDER, FOR SOLUTION ORAL at 12:05

## 2023-01-06 RX ADMIN — MIDODRINE HYDROCHLORIDE 20 MILLIGRAM(S): 2.5 TABLET ORAL at 13:10

## 2023-01-06 RX ADMIN — PANTOPRAZOLE SODIUM 40 MILLIGRAM(S): 20 TABLET, DELAYED RELEASE ORAL at 12:05

## 2023-01-06 RX ADMIN — POLYETHYLENE GLYCOL 3350 17 GRAM(S): 17 POWDER, FOR SOLUTION ORAL at 22:30

## 2023-01-06 RX ADMIN — Medication 15 MILLILITER(S): at 12:05

## 2023-01-06 RX ADMIN — VASOPRESSIN 4.5 UNIT(S)/MIN: 20 INJECTION INTRAVENOUS at 07:20

## 2023-01-06 RX ADMIN — HYDROMORPHONE HYDROCHLORIDE 0.25 MILLIGRAM(S): 2 INJECTION INTRAMUSCULAR; INTRAVENOUS; SUBCUTANEOUS at 00:08

## 2023-01-06 RX ADMIN — VASOPRESSIN 4.5 UNIT(S)/MIN: 20 INJECTION INTRAVENOUS at 19:36

## 2023-01-06 RX ADMIN — MIDODRINE HYDROCHLORIDE 20 MILLIGRAM(S): 2.5 TABLET ORAL at 22:32

## 2023-01-06 RX ADMIN — Medication 103 MICROGRAM(S): at 22:31

## 2023-01-06 RX ADMIN — Medication 1 MILLIGRAM(S): at 12:05

## 2023-01-06 RX ADMIN — POLYETHYLENE GLYCOL 3350 17 GRAM(S): 17 POWDER, FOR SOLUTION ORAL at 05:26

## 2023-01-06 RX ADMIN — Medication 125 MILLILITER(S): at 02:42

## 2023-01-06 RX ADMIN — Medication 6.21 MICROGRAM(S)/KG/MIN: at 07:20

## 2023-01-06 NOTE — PROGRESS NOTE ADULT - SUBJECTIVE AND OBJECTIVE BOX
HPI:  Patient seen and examined at bedside on 8 ICU.  Remains on two pressor support.    Review Of Systems:      Unable to assess    Medications:  caspofungin IVPB 50 milliGRAM(s) IV Intermittent every 24 hours  caspofungin IVPB      cefTRIAXone   IVPB 1000 milliGRAM(s) IV Intermittent every 24 hours  chlorhexidine 2% Cloths 1 Application(s) Topical <User Schedule>  CRRT Treatment    <Continuous>  escitalopram 10 milliGRAM(s) Oral daily  folic acid 1 milliGRAM(s) Oral daily  HYDROmorphone  Injectable 0.25 milliGRAM(s) IV Push every 3 hours PRN  influenza   Vaccine 0.5 milliLiter(s) IntraMuscular once  levETIRAcetam  Solution 750 milliGRAM(s) Enteral Tube two times a day  levothyroxine Injectable 103 MICROGram(s) IV Push at bedtime  midodrine 20 milliGRAM(s) Oral every 8 hours  multivitamin/minerals/iron Oral Solution (CENTRUM) 15 milliLiter(s) Oral daily  norepinephrine Infusion 0.05 MICROgram(s)/kG/Min IV Continuous <Continuous>  pantoprazole   Suspension 40 milliGRAM(s) Oral every 24 hours  Phoxillum Filtration BK 4 / 2.5 5000 milliLiter(s) CRRT <Continuous>  Phoxillum Filtration BK 4 / 2.5 5000 milliLiter(s) CRRT <Continuous>  polyethylene glycol 3350 17 Gram(s) Oral <User Schedule>  PrismaSOL Filtration BGK 4 / 2.5 5000 milliLiter(s) CRRT <Continuous>  rifAXIMin 550 milliGRAM(s) Oral every 12 hours  sodium chloride 0.65% Nasal 1 Spray(s) Both Nostrils every 3 hours PRN  tetracaine/benzocaine/butamben Spray 1 Spray(s) Topical every 3 hours PRN  thiamine 100 milliGRAM(s) Enteral Tube daily  vasopressin Infusion 0.03 Unit(s)/Min IV Continuous <Continuous>    PAST MEDICAL & SURGICAL HISTORY:  HTN (hypertension)  off medication for over one year--states BP has been normal      UTI (urinary tract infection)  currently being treated--will complete antibiotics 3/8/2022      Intracranial tumor      GERD (gastroesophageal reflux disease)      Eczema      Thyroid cancer  surgery. radiation, Radioactive iodine      COVID-19 virus infection  11/2021--recovered at home      H/O total thyroidectomy  age 21&#x27;s for Thyroid cancer, postop complication arterial bleed, second surgery      History of tonsillectomy  age 5      History of appendectomy  age 5        Vitals:  T(C): 36.9 (01-06-23 @ 23:00), Max: 37.1 (01-06-23 @ 03:00)  HR: 92 (01-06-23 @ 23:15) (85 - 97)  BP: --  BP(mean): --  RR: 14 (01-06-23 @ 23:15) (12 - 21)  SpO2: 98% (01-06-23 @ 23:15) (94% - 100%)  Wt(kg): --  Daily     Daily   I&O's Summary    05 Jan 2023 07:01  -  06 Jan 2023 07:00  --------------------------------------------------------  IN: 2120.5 mL / OUT: 3060 mL / NET: -939.5 mL    06 Jan 2023 07:01  -  06 Jan 2023 23:35  --------------------------------------------------------  IN: 1953.4 mL / OUT: 1945 mL / NET: 8.4 mL        Physical Exam:  Appearance: Jaundice, encephalopathic   Eyes: PERRLA, EOMI, pink conjunctiva, no scleral icterus   HENT: Normal oral mucosa  Cardiovascular: RRR, S1, S2, no murmur, rub, or gallop; no edema; no JVD  Procedural Access Site: Clean, dry, intact, without hematoma  Respiratory: Clear to auscultation bilaterally  Gastrointestinal: Soft, non-tender, non-distended, BS+  Musculoskeletal: Mitten restraints   Lymphatic: No lymphadenopathy  Skin: No rashes, ecchymoses, or cyanosis                          8.7    38.77 )-----------( 38       ( 06 Jan 2023 18:02 )             25.2     01-06    137  |  103  |  19  ----------------------------<  140<H>  3.8   |  20<L>  |  1.04    Ca    9.3      06 Jan 2023 18:03  Phos  3.3     01-06  Mg     2.2     01-06    TPro  6.4  /  Alb  3.4  /  TBili  22.4<H>  /  DBili  x   /  AST  134<H>  /  ALT  23  /  AlkPhos  208<H>  01-06    PT/INR - ( 06 Jan 2023 06:28 )   PT: 26.8 sec;   INR: 2.29 ratio         PTT - ( 06 Jan 2023 06:28 )  PTT:59.7 sec      Cardiovascular Diagnostic Testing:  ECG:     Echo:   < from: TTE with Doppler (w/Cont) (12.21.22 @ 10:49) >  ------------------------------------------------------------------------  Dimensions:    Normal Values:  LA:     3.5    2.0 - 4.0 cm  Ao:     3.1    2.0 - 3.8 cm  SEPTUM: 0.7    0.6 - 1.2 cm  PWT:    0.6    0.6 - 1.1 cm  LVIDd: 5.4    3.0 - 5.6 cm  LVIDs:  2.9    1.8 - 4.0 cm  Derived variables:  LVMI: 69 g/m2  RWT: 0.22  Fractional short: 46 %  EF (Visual Estimate):  %  EF (Monroy Rule): 66 %Doppler Peak Velocity (m/sec):  AoV=1.4  ------------------------------------------------------------------------  Observations:  Mitral Valve: Normal mitral valve. Minimal mitral  regurgitation.  Aortic Valve/Aorta: Calcified trileaflet aortic valve with  normal opening. Peak transaortic valve gradient equals 8 mm  Hg. No aorticvalve regurgitation seen. Peak left  ventricular outflow tract gradient equals 4 mm Hg, mean  gradient is equal to 2 mm Hg, LVOT velocity time integral  equals 19 cm.  Aortic Root: 3.1 cm.  Ascending Aorta: 3.3 cm.  LVOT diameter: 2 cm.  Left Atrium:Normal left atrium.  LA volume index = 33  cc/m2.  Left Ventricle: Endocardial visualization enhanced with  intravenous injection of Ultrasonic Enhancing Agent  (Definity). Left ventricle suboptimally visualized despite  intravenous injection of ultrasonic enhancing agent; normal  left ventricular systolic function. No segmental wall  motion abnormalities. Normal left ventricular internal  dimensions and wall thicknesses. Normal diastolic function.  Right Heart: Normal right atrium. Normal right ventricular  size and function. Normal tricuspid valve. Mild-moderate  tricuspid regurgitation. Normal pulmonic valve. Minimal  pulmonic regurgitation.  Pericardium/Pleura: Normal pericardium with no pericardial  effusion.  Hemodynamic: Estimated right atrial pressure is 8 mm Hg.  Estimated right ventricular systolic pressure equals 29 mm  Hg, assuming right atrial pressure equals 8 mm Hg,  consistent with normal pulmonary pressures.  ------------------------------------------------------------------------  Conclusions:  1. Normal mitral valve. Minimal mitral regurgitation.  2. Calcified trileaflet aortic valve with normal opening.  No aortic valve regurgitation seen.  3. Endocardial visualization enhanced with intravenous  injection of Ultrasonic Enhancing Agent (Definity). Left  ventricle suboptimally visualized despite intravenous  injection of ultrasonic enhancing agent; normal left  ventricular systolic function. No segmental wall motion  abnormalities.  4. Normal right ventricular size and function.  *** No previous Echo exam.  ------------------------------------------------------------------------  Confirmed on  12/21/2022 - 13:40:44 by Rosalinda Ingram M.D.  ------------------------------------------------------------------------    < end of copied text >

## 2023-01-06 NOTE — PROGRESS NOTE ADULT - ASSESSMENT
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    CT Chest with possible pneumonia versus atelectasis    UA (12/19) Moderate Leukocyte Esterase  UCx (12/19) No Growth  BCx (12/19) Corynebacterium (1/4 Bottles)  MRSA Nasal PCR (12/19) Negative  Paracentesis (12/19, 12/26) Cell counts not suggestive of SBP  Combicath Sputum Culture (12/21) Normal Respiratory Cassandra  Combicath Sputum Culture (12/22) Normal Respiratory Cassandra    Extubated but continued shock  Suspect noninfectious etiology of current clinical status (liver failure)  Central lines exchanged within the last week    CT Chest (1/3/23) Scattered nodular opacities as well as right lower lobe consolidation. Secretions in the trachea.  CT A/P (1/3/23) Widespread small bowel thickening suggesting enteritis.    MRSA/MSSA PCR (12/30) Negative  Fungitell (12/26, 12/28) 56, 42    Reviewed CT Chest with Radiology today (1/4/23); findings could be consistent with infection. Favoring viral infection or PCP (although Fungitell not elevated) if infectious etiology. Could still be representative of atypical pulmonary edema.     #Abnormal CT Chest, Leukocytosis, Positive Blood Culture, Transaminitis, Cirrhosis  GI PCR Negative  --Doubt that CT Chest or Abdomen findings can explain degree of leukocytosis or hypotension especially as patient is already s/p 2 weeks of Meropenem (12/21 -->) and ID workup has been otherwise unrevealing. Favor discontinuing Meropenem  --Can check urine legionella Ag  --Check repeat sputum culture if patient able to coordinate  --Continue to follow CBC with diff  --Continue to follow temperature curve  --Follow up on preliminary blood cultures    #Positive UCx (Candida dublinensis)  Unclear Significance  s/p Fluconazole 12/20 --> 12/25  UCx (1/3) No Growth  Fungitell (12/26, 12/28) 56, 42  --Stop Caspofungin (12/26 -->)    #Pre-Transplant Evaluation  --ID Clearance for OLT pending clinical status improvement  --Initial QuantiFeron Indeterminate; follow up on repeat QuantiFeron     #Encounter to Vaccinate Patient  Will address vaccination outside of the critical illness period  COVID19: Has had COVID19 in 2020 and again in ~8/2022. Denies prior COVID19 Vaccination  Influenza: Will require  Pneumococcal: Would benefit from PCV20  HAV: Will require Hepatitis A Vaccine  HBV: Will require Heplisav  MMR: Immune  Varicella: Immune  Shingles: Will require Shingrix  Tdap: Will require Tdap    I will continue to follow. Please feel free to contact me with any further questions.    Jonah Mendoza M.D.  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    CT Chest with possible pneumonia versus atelectasis    UA (12/19) Moderate Leukocyte Esterase  UCx (12/19) No Growth  BCx (12/19) Corynebacterium (1/4 Bottles)  MRSA Nasal PCR (12/19) Negative  Paracentesis (12/19, 12/26) Cell counts not suggestive of SBP  Combicath Sputum Culture (12/21) Normal Respiratory Cassandra  Combicath Sputum Culture (12/22) Normal Respiratory Cassandra    Extubated but continued shock  Suspect noninfectious etiology of current clinical status (liver failure)  Central lines exchanged within the last week    CT Chest (1/3/23) Scattered nodular opacities as well as right lower lobe consolidation. Secretions in the trachea.  CT A/P (1/3/23) Widespread small bowel thickening suggesting enteritis.    MRSA/MSSA PCR (12/30) Negative  Fungitell (12/26, 12/28) 56, 42    Reviewed CT Chest with Radiology today (1/4/23); findings could be consistent with infection. Favoring viral infection or PCP (although Fungitell not elevated) if infectious etiology. Could still be representative of atypical pulmonary edema.     #Abnormal CT Chest, Leukocytosis, Positive Blood Culture, Transaminitis, Cirrhosis  GI PCR Negative  --Doubt that CT Chest or Abdomen findings can explain degree of leukocytosis or hypotension especially as patient is already s/p 2 weeks of Meropenem (12/21 -->) and ID workup has been otherwise unrevealing. Favor discontinuing Meropenem  --Can check urine legionella Ag  --Check repeat sputum culture if patient able to coordinate  --Continue to follow CBC with diff  --Continue to follow temperature curve  --Follow up on preliminary blood cultures    #Positive UCx (Candida dublinensis)  Unclear Significance  s/p Fluconazole 12/20 --> 12/25  UCx (1/3) No Growth  Fungitell (12/26, 12/28) 56, 42  --Stop Caspofungin (12/26 -->)    #Pre-Transplant Evaluation  --ID Clearance for OLT pending clinical status improvement  --Initial QuantiFeron Indeterminate; follow up on repeat QuantiFeron     #Encounter to Vaccinate Patient  Will address vaccination outside of the critical illness period  COVID19: Has had COVID19 in 2020 and again in ~8/2022. Denies prior COVID19 Vaccination  Influenza: Will require  Pneumococcal: Would benefit from PCV20  HAV: Will require Hepatitis A Vaccine  HBV: Will require Heplisav  MMR: Immune  Varicella: Immune  Shingles: Will require Shingrix  Tdap: Will require Tdap    I will continue to follow. Please feel free to contact me with any further questions.    Jonah Mendoza M.D.  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    CT Chest with possible pneumonia versus atelectasis    UA (12/19) Moderate Leukocyte Esterase  UCx (12/19) No Growth  BCx (12/19) Corynebacterium (1/4 Bottles)  MRSA Nasal PCR (12/19) Negative  Paracentesis (12/19, 12/26) Cell counts not suggestive of SBP  Combicath Sputum Culture (12/21) Normal Respiratory Cassandra  Combicath Sputum Culture (12/22) Normal Respiratory Cassandra    Extubated but continued shock  Suspect noninfectious etiology of current clinical status (liver failure)  Central lines exchanged within the last week    CT Chest (1/3/23) Scattered nodular opacities as well as right lower lobe consolidation. Secretions in the trachea.  CT A/P (1/3/23) Widespread small bowel thickening suggesting enteritis.    MRSA/MSSA PCR (12/30) Negative  Fungitell (12/26, 12/28) 56, 42    Reviewed CT Chest with Radiology today (1/4/23); findings could be consistent with infection. Favoring viral infection or PCP (although Fungitell not elevated) if infectious etiology. Could still be representative of atypical pulmonary edema.     #Abnormal CT Chest, Leukocytosis, Positive Blood Culture, Transaminitis, Cirrhosis  GI PCR Negative  --Doubt that CT Chest or Abdomen findings can explain degree of leukocytosis or hypotension especially as patient is already s/p 2 weeks of Meropenem (12/21 -->) and ID workup has been otherwise unrevealing. Favor discontinuing Meropenem  --Can check urine legionella Ag  --Check repeat sputum culture if patient able to coordinate  --Continue to follow CBC with diff  --Continue to follow temperature curve  --Follow up on preliminary blood cultures    #Positive UCx (Candida dublinensis)  Unclear Significance  s/p Fluconazole 12/20 --> 12/25  UCx (1/3) No Growth  Fungitell (12/26, 12/28) 56, 42  --Stop Caspofungin (12/26 -->)    #Pre-Transplant Evaluation  --ID Clearance for OLT pending clinical status improvement  --Initial QuantiFeron Indeterminate; follow up on repeat QuantiFeron     #Encounter to Vaccinate Patient  Will address vaccination outside of the critical illness period  COVID19: Has had COVID19 in 2020 and again in ~8/2022. Denies prior COVID19 Vaccination  Influenza: Will require  Pneumococcal: Would benefit from PCV20  HAV: Will require Hepatitis A Vaccine  HBV: Will require Heplisav  MMR: Immune  Varicella: Immune  Shingles: Will require Shingrix  Tdap: Will require Tdap    I will continue to follow. Please feel free to contact me with any further questions.    Jonah Mendoza M.D.  [facility] Division of Infectious Disease  8AM-5PM Monday - Friday: Available on [employer] Teams  After Hours and Holidays (or if no response on [employer] Teams): Please contact the Infectious Diseases Office at [phone]    The above assessment and plan were discussed with transplant surgery team

## 2023-01-06 NOTE — PROGRESS NOTE ADULT - ASSESSMENT
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 Patient has Alcohol Use Disorder ( recent rehab with relapse and in remission since 11/2022), decompensated ALD Cirrhosis c/b ascites. Patient admitted to [facility] on 12/19/2022 after a witnessed seizure; course was c/b septic shock with associated HRF ( intubated 12/19) and an RED ( started HD 12/20). Patient was transferred to [facility] on 12/20 for a liver transplant evaluation. Patient was started on CRRT 12/21 and was extubated 12/23/22. Patient remains in SICU for hemodynamic monitoring and management while liver transplant evaluation is in progress.     PLAN:  Neuro:  - Monitor Mental Status for Encephalopathy and trend Ammonia Level   - Continue Home Lexapro   - Continue Keppra  - Continue Folic Acid, Thiamine, and MV     Resp:  - Maintain SpO2 > 92%   - Out of bed to chair, ambulate as tolerated, and incentive spirometry to prevent atelectasis    CV:  - Will wean vasopressor support of Levophed and Vaso to maintain goal MAP > 65 mmHg  - Continue Midodrine 20mg q8hr     GI: Acute Decompensated Liver Failure 2/2 ETOH Use   - NPO; TF via NGT for goal 50cc/hr   - Bowel regimen Miralax BID  - Protonix for stress ulcer prophylaxis  - Continue Rifaximin, Hold Lactulose for increasing abdominal distention   - s/p Paracentesis (12/26) transudative, negative for SBP   - s/p Paracentesis ( 1/05) 4L Removed  - Rectal tube in place-melena 1/5    Renal:  - CRRT restarted 1/5   - s/p Albumin 250cc o/n for HypoT   - Monitor I&Os and electrolytes w/ repletions as necessary    Heme:  - Monitor CBC and coags  - Hold Chemical VTE prophylaxis  - SCDs for Mechanical VTE ppx     ID:   - Persistent leukocytosis  - Monitor for clinical evidence of active infection  - Combi-Cath ( 12/22) Negative, Blood Cx ( 12/26) Negative,  MRSA (12/30) Negative   - Continue Empiric Caspofungin ( 12/26 - ?)   - Meropenem dc'd  - CTX 1/5-->    Endo:   - Monitor glucose ; HgbA1C 4.9% ( 12/24)   - low dose ISS q6hr   - Continue Synthroid for hypothyroidism    Code Status: Full Code   Disposition: SICU    Lines/Tubes:  - RIJ TLC ( 1/04/23)  - RIJ Shiley ( 1/04/23)  - Right Radial Arterial Line ( 1/04/23)   Patient is a 63 y/o Female with PMH Thyroid Cancer ( s/p total thyroidectomy in her 20s, radiation, and BARONE), HTN, GERD, 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 Patient has Alcohol Use Disorder ( recent rehab with relapse and in remission since 11/2022), decompensated ALD Cirrhosis c/b ascites. Patient admitted to [facility] on 12/19/2022 after a witnessed seizure; course was c/b septic shock with associated HRF ( intubated 12/19) and an RED ( started HD 12/20). Patient was transferred to [facility] on 12/20 for a liver transplant evaluation. Patient was started on CRRT 12/21 and was extubated 12/23/22. Patient remains in SICU for hemodynamic monitoring and management while liver transplant evaluation is in progress.     PLAN:  Neuro:  - Monitor Mental Status for Encephalopathy and trend Ammonia Level   - Continue Home Lexapro   - Continue Keppra  - Continue Folic Acid, Thiamine, and MV     Resp:  - Maintain SpO2 > 92%   - Out of bed to chair, ambulate as tolerated, and incentive spirometry to prevent atelectasis    CV:  - Will wean vasopressor support of Levophed and Vaso to maintain goal MAP > 65 mmHg  - Continue Midodrine 20mg q8hr     GI: Acute Decompensated Liver Failure 2/2 ETOH Use   - NPO; TF via NGT for goal 50cc/hr   - Bowel regimen Miralax BID  - Protonix for stress ulcer prophylaxis  - Continue Rifaximin, Hold Lactulose for increasing abdominal distention   - s/p Paracentesis (12/26) transudative, negative for SBP   - s/p Paracentesis ( 1/05) 4L Removed  - Rectal tube in place-melena 1/5    Renal:  - CRRT restarted 1/5   - s/p Albumin 250cc o/n for HypoT   - Monitor I&Os and electrolytes w/ repletions as necessary    Heme:  - Monitor CBC and coags  - Hold Chemical VTE prophylaxis  - SCDs for Mechanical VTE ppx     ID:   - Persistent leukocytosis  - Monitor for clinical evidence of active infection  - Combi-Cath ( 12/22) Negative, Blood Cx ( 12/26) Negative,  MRSA (12/30) Negative   - Continue Empiric Caspofungin ( 12/26 - ?)   - Meropenem dc'd  - CTX 1/5-->    Endo:   - Monitor glucose ; HgbA1C 4.9% ( 12/24)   - low dose ISS q6hr   - Continue Synthroid for hypothyroidism    Code Status: Full Code   Disposition: SICU    Lines/Tubes:  - RIJ TLC ( 1/04/23)  - RIJ Shiley ( 1/04/23)  - Right Radial Arterial Line ( 1/04/23)   Patient is a 61 y/o Female with PMH Thyroid Cancer ( s/p total thyroidectomy in her 20s, radiation, and BARONE), HTN, GERD, 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 Patient has Alcohol Use Disorder ( recent rehab with relapse and in remission since 11/2022), decompensated ALD Cirrhosis c/b ascites. Patient admitted to [facility] on 12/19/2022 after a witnessed seizure; course was c/b septic shock with associated HRF ( intubated 12/19) and an RED ( started HD 12/20). Patient was transferred to [facility] on 12/20 for a liver transplant evaluation. Patient was started on CRRT 12/21 and was extubated 12/23/22. Patient remains in SICU for hemodynamic monitoring and management while liver transplant evaluation is in progress.     PLAN:  Neuro:  - Monitor Mental Status for Encephalopathy and trend Ammonia Level   - Continue Home Lexapro   - Continue Keppra  - Continue Folic Acid, Thiamine, and MV     Resp:  - Maintain SpO2 > 92%   - Out of bed to chair, ambulate as tolerated, and incentive spirometry to prevent atelectasis    CV:  - Will wean vasopressor support of Levophed and Vaso to maintain goal MAP > 65 mmHg  - Continue Midodrine 20mg q8hr     GI: Acute Decompensated Liver Failure 2/2 ETOH Use   - NPO; TF via NGT for goal 50cc/hr   - Bowel regimen Miralax BID  - Protonix for stress ulcer prophylaxis  - Continue Rifaximin, Hold Lactulose for increasing abdominal distention   - s/p Paracentesis (12/26) transudative, negative for SBP   - s/p Paracentesis ( 1/05) 4L Removed  - Rectal tube in place-melena 1/5    Renal:  - CRRT restarted 1/5   - s/p Albumin 250cc o/n for HypoT   - Monitor I&Os and electrolytes w/ repletions as necessary    Heme:  - Monitor CBC and coags  - Hold Chemical VTE prophylaxis  - SCDs for Mechanical VTE ppx     ID:   - Persistent leukocytosis  - Monitor for clinical evidence of active infection  - Combi-Cath ( 12/22) Negative, Blood Cx ( 12/26) Negative,  MRSA (12/30) Negative   - Continue Empiric Caspofungin ( 12/26 - ?)   - Meropenem dc'd  - CTX 1/5-->    Endo:   - Monitor glucose ; HgbA1C 4.9% ( 12/24)   - low dose ISS q6hr   - Continue Synthroid for hypothyroidism    Code Status: Full Code   Disposition: SICU    Lines/Tubes:  - RIJ TLC ( 1/04/23)  - RIJ Shiley ( 1/04/23)  - Right Radial Arterial Line ( 1/04/23)

## 2023-01-06 NOTE — PROGRESS NOTE ADULT - ATTENDING COMMENTS
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    She was initially admitted at [facility] on December 19 with new onset seizures and was transferred on December 28 for potential evaluation for liver transplantation secondary to acute on chronic liver failure.  Patient recently was in a rehab facility in August 2022 and was asked to leave the facility when she was found to be COVID-positive.    She was sober for about a week after discharge again resumed drinking.   I agree with the above outlined history, physical examination, assessment and plan.  Patient with multifocal pneumonia, status postextubation now on nasal oxygen, remains on CVVH (off last 24 hr)-> trend Cr and Urine output-> Nephro recs reviewed  Keeping on broad-spectrum antibiotics including antifungal coverage with caspofungin. Noted ID recommendation favoring discontinuation.   However, patient is now back on IV pressors. Concern for sepsis.  Would keep antibiotic coverage for now.  Random cortisol 18.5- not suggestive of adrenal insuff.    We will review transplant candidacy once all infectious issues are sorted out and psychosocial evaluation completed.  Indeterminate quant gold- > to be repeated.     MELD Na 40   Blood type O-. 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    She was initially admitted at [facility] on December 19 with new onset seizures and was transferred on December 28 for potential evaluation for liver transplantation secondary to acute on chronic liver failure.  Patient recently was in a rehab facility in August 2022 and was asked to leave the facility when she was found to be COVID-positive.    She was sober for about a week after discharge again resumed drinking.   I agree with the above outlined history, physical examination, assessment and plan.  Patient with multifocal pneumonia, status postextubation now on nasal oxygen, remains on CVVH (off last 24 hr)-> trend Cr and Urine output-> Nephro recs reviewed  Keeping on broad-spectrum antibiotics including antifungal coverage with caspofungin. Noted ID recommendation favoring discontinuation.   However, patient is now back on IV pressors. Concern for sepsis.  Would keep antibiotic coverage for now.  Random cortisol 18.5- not suggestive of adrenal insuff.    We will review transplant candidacy once all infectious issues are sorted out and psychosocial evaluation completed.  Indeterminate quant gold- > to be repeated.     MELD Na 40   Blood type O-. 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    She was initially admitted at [facility] on December 19 with new onset seizures and was transferred on December 28 for potential evaluation for liver transplantation secondary to acute on chronic liver failure.  Patient recently was in a rehab facility in August 2022 and was asked to leave the facility when she was found to be COVID-positive.    She was sober for about a week after discharge again resumed drinking.   I agree with the above outlined history, physical examination, assessment and plan.  Patient with multifocal pneumonia, status postextubation now on nasal oxygen, remains on CVVH (off last 24 hr)-> trend Cr and Urine output-> Nephro recs reviewed  Keeping on broad-spectrum antibiotics including antifungal coverage with caspofungin. Noted ID recommendation favoring discontinuation.   However, patient is now back on IV pressors. Concern for sepsis.  Would keep antibiotic coverage for now.  Random cortisol 18.5- not suggestive of adrenal insuff.    We will review transplant candidacy once all infectious issues are sorted out and psychosocial evaluation completed.  Indeterminate quant gold- > to be repeated.     MELD Na 40   Blood type O-. 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    She was initially admitted at [facility] on December 19 with new onset seizures and was transferred on December 28 for potential evaluation for liver transplantation secondary to acute on chronic liver failure.  Patient recently was in a rehab facility in August 2022 and was asked to leave the facility when she was found to be COVID-positive.    She was sober for about a week after discharge again resumed drinking.   I agree with the above outlined history, physical examination, assessment and plan.  Patient with multifocal pneumonia, status postextubation now on nasal oxygen, remains on CVVHD -> increased IV pressors in the last 24hrs-> trend Cr and Urine output-> Nephro recs reviewed  Keeping on broad-spectrum antibiotics including antifungal coverage with caspofungin. Noted ID recommendation favoring discontinuation.   However, patient is now back on IV pressors. Concern for sepsis.  Would keep antibiotic coverage for now.  Random cortisol 18.5- not suggestive of adrenal insuff.    We will review transplant candidacy once all infectious issues are sorted out and psychosocial evaluation completed.  Indeterminate quant gold- > to be repeated.     MELD Na 40   Blood type O-. 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    She was initially admitted at [facility] on December 19 with new onset seizures and was transferred on December 28 for potential evaluation for liver transplantation secondary to acute on chronic liver failure.  Patient recently was in a rehab facility in August 2022 and was asked to leave the facility when she was found to be COVID-positive.    She was sober for about a week after discharge again resumed drinking.   I agree with the above outlined history, physical examination, assessment and plan.  Patient with multifocal pneumonia, status postextubation now on nasal oxygen, remains on CVVHD -> increased IV pressors in the last 24hrs-> trend Cr and Urine output-> Nephro recs reviewed  Keeping on broad-spectrum antibiotics including antifungal coverage with caspofungin. Noted ID recommendation favoring discontinuation.   However, patient is now back on IV pressors. Concern for sepsis.  Would keep antibiotic coverage for now.  Random cortisol 18.5- not suggestive of adrenal insuff.    We will review transplant candidacy once all infectious issues are sorted out and psychosocial evaluation completed.  Indeterminate quant gold- > to be repeated.     MELD Na 40   Blood type O-. 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    She was initially admitted at [facility] on December 19 with new onset seizures and was transferred on December 28 for potential evaluation for liver transplantation secondary to acute on chronic liver failure.  Patient recently was in a rehab facility in August 2022 and was asked to leave the facility when she was found to be COVID-positive.    She was sober for about a week after discharge again resumed drinking.   I agree with the above outlined history, physical examination, assessment and plan.  Patient with multifocal pneumonia, status postextubation now on nasal oxygen, remains on CVVHD -> increased IV pressors in the last 24hrs-> trend Cr and Urine output-> Nephro recs reviewed  Keeping on broad-spectrum antibiotics including antifungal coverage with caspofungin. Noted ID recommendation favoring discontinuation.   However, patient is now back on IV pressors. Concern for sepsis.  Would keep antibiotic coverage for now.  Random cortisol 18.5- not suggestive of adrenal insuff.    We will review transplant candidacy once all infectious issues are sorted out and psychosocial evaluation completed.  Indeterminate quant gold- > to be repeated.     MELD Na 40   Blood type O-.

## 2023-01-06 NOTE — PROGRESS NOTE ADULT - ASSESSMENT
63 yo F with h/o decompensated ETOH cirrhosis with ascites, thyroid cancer (s/p total thyroidectomy, RTX, and radioactive iodide), HTN, ventrical neoplasm who was initially admitted to  12/19 with new onset seizures and transferred to [facility] 12/20 for liver transplant evaluation. Pt being seen for RED requiring CRRT since 12/20 63 yo F with h/o decompensated ETOH cirrhosis with ascites, thyroid cancer (s/p total thyroidectomy, RTX, and radioactive iodide), HTN, ventrical neoplasm who was initially admitted to  12/19 with new onset seizures and transferred to [facility] 12/20 for liver transplant evaluation. Pt being seen for RED requiring CRRT since 12/20 63 yo F with h/o decompensated ETOH cirrhosis with ascites, thyroid cancer (s/p total thyroidectomy, RTX, and radioactive iodide), HTN, ventrical neoplasm who was initially admitted to  12/19 with new onset seizures and transferred to [facility] 12/20 for liver transplant evaluation. Pt being seen for RED requiring CRRT since 12/20

## 2023-01-06 NOTE — PROGRESS NOTE ADULT - SUBJECTIVE AND OBJECTIVE BOX
Follow Up:      Interval History:    REVIEW OF SYSTEMS  [  ] ROS unobtainable because:    [  ] All other systems negative except as noted below    Constitutional:  [ ] fever [ ] chills  [ ] weight loss  [ ] weakness  Skin:  [ ] rash [ ] phlebitis	  Eyes: [ ] icterus [ ] pain  [ ] discharge	  ENMT: [ ] sore throat  [ ] thrush [ ] ulcers [ ] exudates  Respiratory: [ ] dyspnea [ ] hemoptysis [ ] cough [ ] sputum	  Cardiovascular:  [ ] chest pain [ ] palpitations [ ] edema	  Gastrointestinal:  [ ] nausea [ ] vomiting [ ] diarrhea [ ] constipation [ ] pain	  Genitourinary:  [ ] dysuria [ ] frequency [ ] hematuria [ ] discharge [ ] flank pain  [ ] incontinence  Musculoskeletal:  [ ] myalgias [ ] arthralgias [ ] arthritis  [ ] back pain  Neurological:  [ ] headache [ ] seizures  [ ] confusion/altered mental status    Allergies  Macrobid (Rash)        ANTIMICROBIALS:  caspofungin IVPB    caspofungin IVPB 50 every 24 hours  cefTRIAXone   IVPB 1000 every 24 hours  rifAXIMin 550 every 12 hours      OTHER MEDS:  MEDICATIONS  (STANDING):  escitalopram 10 daily  HYDROmorphone  Injectable 0.25 every 3 hours PRN  influenza   Vaccine 0.5 once  levETIRAcetam  Solution 750 two times a day  levothyroxine Injectable 103 at bedtime  midodrine 20 every 8 hours  norepinephrine Infusion 0.05 <Continuous>  pantoprazole   Suspension 40 every 24 hours  polyethylene glycol 3350 17 <User Schedule>  vasopressin Infusion 0.03 <Continuous>      Vital Signs Last 24 Hrs  T(C): 36.6 (06 Jan 2023 07:00), Max: 37.4 (05 Jan 2023 15:00)  T(F): 97.9 (06 Jan 2023 07:00), Max: 99.3 (05 Jan 2023 15:00)  HR: 86 (06 Jan 2023 10:30) (85 - 108)  BP: --  BP(mean): --  RR: 17 (06 Jan 2023 10:30) (14 - 22)  SpO2: 96% (06 Jan 2023 10:30) (92% - 100%)    Parameters below as of 06 Jan 2023 07:00  Patient On (Oxygen Delivery Method): room air        PHYSICAL EXAMINATION:  General: Alert and Awake, NAD  HEENT: PERRL, EOMI  Neck: Supple  Cardiac: RRR, No M/R/G  Resp: CTAB, No Wh/Rh/Ra  Abdomen: NBS, NT/ND, No HSM, No rigidity or guarding  MSK: No LE edema. No Calf tenderness  : No dhillon  Skin: No rashes or lesions. Skin is warm and dry to the touch.   Neuro: Alert and Awake. CN 2-12 Grossly intact. Moves all four extremities spontaneously.  Psych: Calm, Pleasant, Cooperative                          8.6    36.38 )-----------( 38       ( 06 Jan 2023 06:28 )             24.4       01-06    137  |  102  |  21  ----------------------------<  163<H>  3.7   |  20<L>  |  1.26    Ca    8.9      06 Jan 2023 06:28  Phos  3.3     01-06  Mg     2.2     01-06    TPro  6.3  /  Alb  3.5  /  TBili  22.1<H>  /  DBili  x   /  AST  131<H>  /  ALT  21  /  AlkPhos  188<H>  01-06          MICROBIOLOGY:  v  Peritoneal Peritoneal Fluid  01-04-23   No growth  --    polymorphonuclear leukocytes seen  No organisms seen  by cytocentrifuge      Catheterized Catheterized  01-03-23   No growth  --  --      Peritoneal Peritoneal Fluid  12-26-22   No growth  --    polymorphonuclear leukocytes seen  No organisms seen  by cytocentrifuge      .Blood Blood-Peripheral  12-26-22   No Growth Final  --  --      .Blood Blood-Peripheral  12-26-22   No Growth Final  --  --      .Blood Blood  12-24-22   No Growth Final  --  --      .Blood Blood  12-24-22   No Growth Final  --  --      .Blood Blood  12-23-22   No Growth Final  --  --      .Blood Blood  12-23-22   No Growth Final  --  --      Combi-Cath Combi-Cath  12-22-22   Normal Respiratory Cassandra present  --    Moderate polymorphonuclear leukocytes seen per low power field  No squamous epithelial cells seen per low power field  No organisms seen per oil power field      Catheterized Catheterized  12-21-22   >100,000 CFU/ml Leticia dubliniensis "Susceptibilities not performed"  --  --      Ascites Fl Ascites Fluid  12-21-22   No growth at 5 days  --    polymorphonuclear leukocytes seen  No organisms seen  by cytocentrifuge      Trach Asp Tracheal Aspirate  12-21-22   Normal Respiratory Cassandra present  --    No polymorphonuclear leukocytes per low power field  Numerous Squamous epithelial cells per low power field  Moderate Gram Positive Rods seen per oil power field  Few Yeast like cells seen per oil power field      .Blood Blood-Peripheral  12-20-22   No Growth Final  --  --      .Blood Blood-Peripheral  12-20-22   No Growth Final  --  --        CMV IgG Antibody: 5.10 U/mL (12-24-22 @ 20:59)  Toxoplasma IgG Screen: <3.0 IU/mL (12-24-22 @ 20:59)          RADIOLOGY:    <The imaging below has been reviewed and visualized by me independently. Findings as detailed in report below> Follow Up:  Shock    Interval History: remains on CRRT and vasopressors. More alert today.     REVIEW OF SYSTEMS  [ x ] ROS unobtainable because:  encephalopathy  [  ] All other systems negative except as noted below    Constitutional:  [ ] fever [ ] chills  [ ] weight loss  [ ] weakness  Skin:  [ ] rash [ ] phlebitis	  Eyes: [ ] icterus [ ] pain  [ ] discharge	  ENMT: [ ] sore throat  [ ] thrush [ ] ulcers [ ] exudates  Respiratory: [ ] dyspnea [ ] hemoptysis [ ] cough [ ] sputum	  Cardiovascular:  [ ] chest pain [ ] palpitations [ ] edema	  Gastrointestinal:  [ ] nausea [ ] vomiting [ ] diarrhea [ ] constipation [ ] pain	  Genitourinary:  [ ] dysuria [ ] frequency [ ] hematuria [ ] discharge [ ] flank pain  [ ] incontinence  Musculoskeletal:  [ ] myalgias [ ] arthralgias [ ] arthritis  [ ] back pain  Neurological:  [ ] headache [ ] seizures  [ ] confusion/altered mental status    Allergies  Macrobid (Rash)        ANTIMICROBIALS:  caspofungin IVPB    caspofungin IVPB 50 every 24 hours  cefTRIAXone   IVPB 1000 every 24 hours  rifAXIMin 550 every 12 hours      OTHER MEDS:  MEDICATIONS  (STANDING):  escitalopram 10 daily  HYDROmorphone  Injectable 0.25 every 3 hours PRN  influenza   Vaccine 0.5 once  levETIRAcetam  Solution 750 two times a day  levothyroxine Injectable 103 at bedtime  midodrine 20 every 8 hours  norepinephrine Infusion 0.05 <Continuous>  pantoprazole   Suspension 40 every 24 hours  polyethylene glycol 3350 17 <User Schedule>  vasopressin Infusion 0.03 <Continuous>      Vital Signs Last 24 Hrs  T(C): 36.6 (06 Jan 2023 07:00), Max: 37.4 (05 Jan 2023 15:00)  T(F): 97.9 (06 Jan 2023 07:00), Max: 99.3 (05 Jan 2023 15:00)  HR: 86 (06 Jan 2023 10:30) (85 - 108)  BP: --  BP(mean): --  RR: 17 (06 Jan 2023 10:30) (14 - 22)  SpO2: 96% (06 Jan 2023 10:30) (92% - 100%)    Parameters below as of 06 Jan 2023 07:00  Patient On (Oxygen Delivery Method): room air    PHYSICAL EXAMINATION:  General: Alert and Awake, NAD, Jaundice  HEENT: PERRL, EOMI, Scleral Icterus  Neck: Supple  Cardiac: RRR, No M/R/G  Resp: CTAB, No Wh/Rh/Ra  Abdomen: NBS, NT/ND, No HSM, No rigidity or guarding  MSK: No LE edema. No Calf tenderness  Vasc: +Dialysis Catheter (No surrounding erythema, drainage or tenderness to palpation)  Skin: No rashes or lesions. Skin is warm and dry to the touch.   Neuro: Alert and Awake. CN 2-12 Grossly intact. Moves all four extremities spontaneously.  Psych: Calm, Pleasant, Cooperative                          8.6    36.38 )-----------( 38       ( 06 Jan 2023 06:28 )             24.4       01-06    137  |  102  |  21  ----------------------------<  163<H>  3.7   |  20<L>  |  1.26    Ca    8.9      06 Jan 2023 06:28  Phos  3.3     01-06  Mg     2.2     01-06    TPro  6.3  /  Alb  3.5  /  TBili  22.1<H>  /  DBili  x   /  AST  131<H>  /  ALT  21  /  AlkPhos  188<H>  01-06    MICROBIOLOGY:    Peritoneal Peritoneal Fluid  01-04-23   No growth  --    polymorphonuclear leukocytes seen  No organisms seen  by cytocentrifuge      Catheterized Catheterized  01-03-23   No growth  --  --      Peritoneal Peritoneal Fluid  12-26-22   No growth  --    polymorphonuclear leukocytes seen  No organisms seen  by cytocentrifuge      .Blood Blood-Peripheral  12-26-22   No Growth Final  --  --      .Blood Blood-Peripheral  12-26-22   No Growth Final  --  --      .Blood Blood  12-24-22   No Growth Final  --  --      .Blood Blood  12-24-22   No Growth Final  --  --      .Blood Blood  12-23-22   No Growth Final  --  --      .Blood Blood  12-23-22   No Growth Final  --  --      Combi-Cath Combi-Cath  12-22-22   Normal Respiratory Cassandra present  --    Moderate polymorphonuclear leukocytes seen per low power field  No squamous epithelial cells seen per low power field  No organisms seen per oil power field      Catheterized Catheterized  12-21-22   >100,000 CFU/ml Leticia dubliniensis "Susceptibilities not performed"  --  --      Ascites Fl Ascites Fluid  12-21-22   No growth at 5 days  --    polymorphonuclear leukocytes seen  No organisms seen  by cytocentrifuge      Trach Asp Tracheal Aspirate  12-21-22   Normal Respiratory Cassandra present  --    No polymorphonuclear leukocytes per low power field  Numerous Squamous epithelial cells per low power field  Moderate Gram Positive Rods seen per oil power field  Few Yeast like cells seen per oil power field      .Blood Blood-Peripheral  12-20-22   No Growth Final  --  --      .Blood Blood-Peripheral  12-20-22   No Growth Final  --  --        CMV IgG Antibody: 5.10 U/mL (12-24-22 @ 20:59)  Toxoplasma IgG Screen: <3.0 IU/mL (12-24-22 @ 20:59)          RADIOLOGY:    <The imaging below has been reviewed and visualized by me independently. Findings as detailed in report below>    < from: Xray Chest 1 View- PORTABLE-Routine (Xray Chest 1 View- PORTABLE-Routine in AM.) (01.05.23 @ 07:42) >  IMPRESSION:  Tubes and lines as above.  Bilateral reticular opacities with partial clearing of right lower lobe   opacity.    < end of copied text >

## 2023-01-06 NOTE — PROGRESS NOTE ADULT - SUBJECTIVE AND OBJECTIVE BOX
Interval Events:   No acute events noted, however unable to obtain full HPI due to underlying mental status.  Still somewhat sleepy.  Family at bedside.    ROS:   Unable to fully obtain ROS.    Hospital Medications:  caspofungin IVPB      caspofungin IVPB 50 milliGRAM(s) IV Intermittent every 24 hours  cefTRIAXone   IVPB 1000 milliGRAM(s) IV Intermittent every 24 hours  chlorhexidine 2% Cloths 1 Application(s) Topical <User Schedule>  CRRT Treatment    <Continuous>  escitalopram 10 milliGRAM(s) Oral daily  folic acid 1 milliGRAM(s) Oral daily  HYDROmorphone  Injectable 0.25 milliGRAM(s) IV Push every 3 hours PRN  influenza   Vaccine 0.5 milliLiter(s) IntraMuscular once  levETIRAcetam  Solution 750 milliGRAM(s) Enteral Tube two times a day  levothyroxine Injectable 103 MICROGram(s) IV Push at bedtime  midodrine 20 milliGRAM(s) Oral every 8 hours  multivitamin/minerals/iron Oral Solution (CENTRUM) 15 milliLiter(s) Oral daily  norepinephrine Infusion 0.05 MICROgram(s)/kG/Min (6.21 mL/Hr) IV Continuous <Continuous>  pantoprazole   Suspension 40 milliGRAM(s) Oral every 24 hours  Phoxillum Filtration BK 4 / 2.5 5000 milliLiter(s) (1000 mL/Hr) CRRT <Continuous>  Phoxillum Filtration BK 4 / 2.5 5000 milliLiter(s) (800 mL/Hr) CRRT <Continuous>  polyethylene glycol 3350 17 Gram(s) Oral <User Schedule>  PrismaSOL Filtration BGK 4 / 2.5 5000 milliLiter(s) (200 mL/Hr) CRRT <Continuous>  rifAXIMin 550 milliGRAM(s) Oral every 12 hours  sodium chloride 0.65% Nasal 1 Spray(s) Both Nostrils every 3 hours PRN  tetracaine/benzocaine/butamben Spray 1 Spray(s) Topical every 3 hours PRN  thiamine 100 milliGRAM(s) Enteral Tube daily  vasopressin Infusion 0.03 Unit(s)/Min (4.5 mL/Hr) IV Continuous <Continuous>    MAR over past 24 hours:  albumin human  5% IVPB   125 mL/Hr IV Intermittent (01-06-23 @ 02:42)    caspofungin IVPB   260 mL/Hr IV Intermittent (01-06-23 @ 09:05)    cefTRIAXone   IVPB   100 mL/Hr IV Intermittent (01-06-23 @ 05:24)    escitalopram   10 milliGRAM(s) Oral (01-05-23 @ 12:10)    folic acid   1 milliGRAM(s) Oral (01-05-23 @ 12:10)    HYDROmorphone  Injectable   0.25 milliGRAM(s) IV Push (01-05-23 @ 23:38)    lactulose Syrup   20 Gram(s) Oral (01-05-23 @ 17:15)   20 Gram(s) Oral (01-05-23 @ 12:10)    levETIRAcetam  Solution   750 milliGRAM(s) Enteral Tube (01-06-23 @ 05:29)   750 milliGRAM(s) Enteral Tube (01-05-23 @ 17:15)    levothyroxine Injectable   103 MICROGram(s) IV Push (01-05-23 @ 22:48)    lidocaine 1% (Preservative-free) Injectable   30 milliLiter(s) Local Injection (01-05-23 @ 15:00)    meropenem  IVPB   100 mL/Hr IV Intermittent (01-05-23 @ 15:00)    midodrine   20 milliGRAM(s) Oral (01-06-23 @ 05:25)   20 milliGRAM(s) Oral (01-05-23 @ 22:48)   20 milliGRAM(s) Oral (01-05-23 @ 14:42)    multivitamin/minerals/iron Oral Solution (CENTRUM)   15 milliLiter(s) Oral (01-05-23 @ 12:10)    pantoprazole   Suspension   40 milliGRAM(s) Oral (01-05-23 @ 12:10)    polyethylene glycol 3350   17 Gram(s) Oral (01-06-23 @ 05:26)   17 Gram(s) Oral (01-05-23 @ 22:48)    polyethylene glycol 3350   17 Gram(s) Oral (01-05-23 @ 17:16)    rifAXIMin   550 milliGRAM(s) Oral (01-06-23 @ 05:25)   550 milliGRAM(s) Oral (01-05-23 @ 17:15)    thiamine   100 milliGRAM(s) Enteral Tube (01-05-23 @ 12:10)      Home Medications:  Last Order Reconciliation Date: 12-21-22 @ 13:03 (Admission Reconciliation)  cholecalciferol 25 mcg (1000 intl units) oral tablet: 1 tab(s) orally once a day  folic acid 1 mg oral tablet: 1 tab(s) orally once a day  levothyroxine 137 mcg (0.137 mg) oral tablet: 1 tab(s) orally once a day  Lexapro 10 mg oral tablet: 1 tab(s) orally once a day  nystatin 100,000 units/mL oral suspension: 5 milliliter(s) orally 4 times a day  pantoprazole 40 mg oral delayed release tablet: 1 tab(s) orally once a day (at bedtime)  senna oral tablet: 2 tab(s) orally once a day (at bedtime), As Needed -for constipation   Sodium Chloride 1 g oral tablet: 1 gram(s) orally once a day  thiamine 100 mg oral tablet: 1 tab(s) orally once a day  Vitamin B-100 oral tablet: 1 tab(s) orally once a day    PHYSICAL EXAM:   Vital Signs last 24 hours:  T(F): 97.9 (01-06-23 @ 07:00), Max: 99.3 (01-05-23 @ 15:00)  HR: 86 (01-06-23 @ 10:30) (85 - 108)  BP: --  BP(mean): --  ABP: 102/52 (01-06-23 @ 10:30) (82/42 - 119/66)  ABP(mean): 74 (01-06-23 @ 10:30) (59 - 89)  RR: 17 (01-06-23 @ 10:30) (14 - 22)  SpO2: 96% (01-06-23 @ 10:30) (92% - 100%)    I&Os:    01-05-23 @ 07:01  -  01-06-23 @ 07:00  --------------------------------------------------------  IN:    Albumin 5%  - 250 mL: 250 mL    Dexmedetomidine: 20.4 mL    IV PiggyBack: 350 mL    Nepro with Carb Steady: 1200 mL    Norepinephrine: 192.1 mL    Vasopressin: 108 mL  Total IN: 2120.5 mL    OUT:    Other (mL): 1385 mL    Rectal Tube (mL): 1675 mL  Total OUT: 3060 mL    Total NET: -939.5 mL      01-06-23 @ 07:01  -  01-06-23 @ 11:06  --------------------------------------------------------  IN:    IV PiggyBack: 250 mL    Nepro with Carb Steady: 150 mL    Norepinephrine: 57.6 mL    Vasopressin: 13.5 mL  Total IN: 471.1 mL    OUT:    Other (mL): 240 mL  Total OUT: 240 mL    Total NET: 231.1 mL        BMI (kg/m2): 20.9 (12-30-22 @ 07:00)  GENERAL: no acute distress  NEURO: not fully alert/oriented  HEENT: NCAT, no conjunctival pallor appreciated  CHEST: no respiratory distress, no accessory muscle use  CARDIAC: regular rate, +S1/S2  ABDOMEN: soft, nontender, no rebound or guarding  EXTREMITIES: warm, well perfused  SKIN: no lesions noted    DIAGNOSTICS:  WBC      Hg       PLT      Na       K        CO2     BUN      Cr       ALT      AST      TB       ALP  36.38    8.6      38       137      3.7      20       21       1.26     21       131      22.1     188      01-06-23 @ 06:28  33.94    8.2      35       138      3.6      19       24       1.53     21       129      21.4     174      01-06-23 @ 00:40  33.73    8.5      42       139      3.6      19       28       1.79     22       131      22.3     172      01-05-23 @ 18:39  34.99    8.7      44       ------   ------   ------   ------   ------   ------   ------   ------   ------   01-05-23 @ 14:43  37.49    8.9      47       136      3.6      16       36       2.22     24       126      22.5     185      01-05-23 @ 12:58    PT             INR            MELDwith  MELDw/o  26.8           2.29           --             --             01-06-23 @ 06:28  28.0           2.41           --             --             01-05-23 @ 05:58  25.4           2.17           --             --             01-04-23 @ 00:46  28.0           2.39           --             --             01-03-23 @ 00:30  28.0           2.39           --             --             01-02-23 @ 00:28

## 2023-01-06 NOTE — PROGRESS NOTE ADULT - PROBLEM SELECTOR PLAN 1
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 Upon review of [facility], SCr was 0.78 on 12/6/22, increased to 5.31 on admission (12/20), and improved with CRRT. Pt remains anuric. Pt with likely HRS vs ATN vs bile cast nephropathy. CRRT was held 1/3-1/5 for a line holiday, but resumed for anuria and worsening electrolytes. Still anuric. Monitor labs and urine output. Avoid nephrotoxins. Dose medications as per eGFR.     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 Upon review of [facility], SCr was 0.78 on 12/6/22, increased to 5.31 on admission (12/20), and improved with CRRT. Pt remains anuric. Pt with likely HRS vs ATN vs bile cast nephropathy. CRRT was held 1/3-1/5 for a line holiday, but resumed for anuria and worsening electrolytes. Still anuric. Monitor labs and urine output. Avoid nephrotoxins. Dose medications as per eGFR.     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 Upon review of [facility], SCr was 0.78 on 12/6/22, increased to 5.31 on admission (12/20), and improved with CRRT. Pt remains anuric. Pt with likely HRS vs ATN vs bile cast nephropathy. CRRT was held 1/3-1/5 for a line holiday, but resumed for anuria and worsening electrolytes. Still anuric. Monitor labs and urine output. Avoid nephrotoxins. Dose medications as per eGFR.     Liver transplant on hold given infection

## 2023-01-06 NOTE — PROGRESS NOTE ADULT - ASSESSMENT
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   Transferred from [facility] 12/20 for further management of acute liver failure and liver transplant evaluation 2/2 known ETOH Cirrhosis.     Decompensated ETOH Cirrhosis  - Wean off pressors as able, continue Midodrine  - on CVVH, resumed 1/5  - repeat CT chest/abdomen/pelvis today showed multifocal pneumonia, large volume ascites  - ID: Leukocytosis. 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   Transferred from [facility] 12/20 for further management of acute liver failure and liver transplant evaluation 2/2 known ETOH Cirrhosis.     Decompensated ETOH Cirrhosis  - Wean off pressors as able, continue Midodrine  - on CVVH, resumed 1/5  - repeat CT chest/abdomen/pelvis today showed multifocal pneumonia, large volume ascites  - ID: Leukocytosis. 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   Transferred from [facility] 12/20 for further management of acute liver failure and liver transplant evaluation 2/2 known ETOH Cirrhosis.     Decompensated ETOH Cirrhosis  - Wean off pressors as able, continue Midodrine  - on CVVH, resumed 1/5  - repeat CT chest/abdomen/pelvis today showed multifocal pneumonia, large volume ascites  - ID: Leukocytosis. Continue empiric caspo and Meropenem  - HE: resume lactulose given improvement in abdominal distension, continue Miralax, Rifaximin   - Pulm: Chest PT  - Continue TF at goal  - on Keppra for seizures, (no activity since admission)  - Abdominal rash almost resolved  - Liver transplant evaluation deferred due to illness   - Continue excellent ICU care

## 2023-01-06 NOTE — PROGRESS NOTE ADULT - ATTENDING COMMENTS
ON: albumin bolus    aaox1, mild encephalopathy, improved somnolence  ammonia stable  rifaximin, miralax, lactulose   keppra for recent seizure, hx of craniotomy   on RA  AM CXR reticular pattern unchanged, improved effusions  unable to do IS  levo 0.11, vaso 0.03 MAP goal of 65  midodrine [id number]  lact mild elevated  TEN 50/hr via NGT  rectal tube: 850cc  t bili 21  s/p paracentesis 1/4 4 liters, albumin 1 liter given  protonix, per transplant  CRRT start w even balance  anuric , likely hepatorenal syndrome  s/p albumin boluses, octreotide  Hb drop overnight  thrombocytopenia  INR 2.1  hep SQ VTE PPX , transplant agrees   Bcx, paracentesis no growth  s/p merop  caspo, ceftriaxone 1/5-no end date  GI PCR neg 1/5  afebrile, leukocytosis  ID following, given CT findings, concerned about infection and now recommends continuation   CT chest 1/3: multifocal pneumonia  para cultures neg  good glycemic control  synthroid IV   PT OT working with her    full code  HD rt IJ, CVC IJ left , left radial a line all placed 1/5 ON: albumin bolus    aaox1, mild encephalopathy, improved somnolence  ammonia stable  rifaximin, miralax, lactulose   keppra for recent seizure, hx of craniotomy   on RA  AM CXR reticular pattern unchanged, improved effusions  unable to do IS  levo 0.11, vaso 0.03 MAP goal of 65  midodrine [id number]  lact mild elevated  TEN 50/hr via NGT  rectal tube: 850cc  t bili 21  s/p paracentesis 1/4 4 liters, albumin 1 liter given  protonix, per transplant  CRRT start w even balance  anuric , likely hepatorenal syndrome  s/p albumin boluses, octreotide  Hb drop overnight  thrombocytopenia  INR 2.1  hep SQ VTE PPX , transplant agrees   Bcx, paracentesis no growth  s/p merop  caspo, ceftriaxone 1/5-no end date  GI PCR neg 1/5  afebrile, leukocytosis  ID following, given CT findings, concerned about infection and now recommends continuation   CT chest 1/3: multifocal pneumonia  para cultures neg  good glycemic control  synthroid IV   PT OT working with her    full code  HD rt IJ, CVC IJ left , left radial a line all placed 1/5 ON: albumin bolus    aaox1, mild encephalopathy, improved somnolence  ammonia stable  rifaximin, miralax, lactulose   keppra for recent seizure, hx of craniotomy   on RA  AM CXR reticular pattern unchanged, improved effusions  unable to do IS  levo 0.11, vaso 0.03 MAP goal of 65  midodrine [id number]  lact mild elevated  TEN 50/hr via NGT  rectal tube: 850cc  t bili 21  s/p paracentesis 1/4 4 liters, albumin 1 liter given  protonix, per transplant  CRRT start w even balance  anuric , likely hepatorenal syndrome  s/p albumin boluses, octreotide  Hb drop overnight  thrombocytopenia  INR 2.1  hep SQ VTE PPX , transplant agrees   Bcx, paracentesis no growth  s/p merop  caspo, ceftriaxone 1/5-no end date  GI PCR neg 1/5  afebrile, leukocytosis  ID following, given CT findings, concerned about infection and now recommends continuation   CT chest 1/3: multifocal pneumonia  para cultures neg  good glycemic control  synthroid IV   PT OT working with her    full code  HD rt IJ, CVC IJ left , left radial a line all placed 1/5

## 2023-01-06 NOTE — PROGRESS NOTE ADULT - SUBJECTIVE AND OBJECTIVE BOX
24 HOUR EVENTS:  -CVVH restarted   -Melena   -CBC stable   -CVC pulled back 4cm   -Miralax q6   -MS worsened   -Bolused 250cc Albumin o/n    SUBJECTIVE/ROS:  [ ] A ten-point review of systems was otherwise negative except as noted.  [ ] Due to altered mental status/intubation, subjective information were not able to be obtained from the patient. History was obtained, to the extent possible, from review of the chart and collateral sources of information.      NEURO  RASS:  -1   GCS:   15  CAM ICU:  Exam: awake, alert, oriented  Meds: dexMEDEtomidine Infusion 0.2 MICROgram(s)/kG/Hr IV Continuous <Continuous>  escitalopram 10 milliGRAM(s) Oral daily  HYDROmorphone  Injectable 0.25 milliGRAM(s) IV Push every 3 hours PRN Severe Pain (7 - 10)  levETIRAcetam  Solution 750 milliGRAM(s) Enteral Tube two times a day    [x] Adequacy of sedation and pain control has been assessed and adjusted      RESPIRATORY  RR: 16 (01-06-23 @ 02:00) (14 - 27)  SpO2: 95% (01-06-23 @ 02:00) (92% - 100%)  Wt(kg): --  Exam: unlabored, clear to auscultation bilaterally  Mechanical Ventilation:   ABG - ( 06 Jan 2023 00:30 )  pH: 7.40  /  pCO2: 34    /  pO2: 77    / HCO3: 21    / Base Excess: -3.3  /  SaO2: 98.4    Lactate: x                [N/A] Extubation Readiness Assessed  Meds:       CARDIOVASCULAR  HR: 91 (01-06-23 @ 02:00) (87 - 111)  BP: 109/66 (01-05-23 @ 07:15) (109/66 - 109/66)  BP(mean): 83 (01-05-23 @ 07:15) (83 - 83)  ABP: 119/66 (01-06-23 @ 02:00) (78/38 - 119/66)  ABP(mean): 89 (01-06-23 @ 02:00) (52 - 89)  Wt(kg): --  CVP(cm H2O): --      Exam: HypoT requiring dual pressor support   Cardiac Rhythm: sinus  Perfusion     [x]Adequate   [ ]Inadequate  Mentation   [x]Normal       [ ]Reduced  Extremities  [x]Warm         [ ]Cool  Volume Status [ ]Hypervolemic [x]Euvolemic [ ]Hypovolemic  Meds: midodrine 20 milliGRAM(s) Oral every 8 hours  norepinephrine Infusion 0.05 MICROgram(s)/kG/Min IV Continuous <Continuous>        GI/NUTRITION  Exam: soft, nontender, distended  Diet: NPO w TF   Meds: pantoprazole   Suspension 40 milliGRAM(s) Oral every 24 hours  polyethylene glycol 3350 17 Gram(s) Oral <User Schedule>      GENITOURINARY  I&O's Detail    01-04 @ 07:01 - 01-05 @ 07:00  --------------------------------------------------------  IN:    Albumin 5%  - 250 mL: 1750 mL    Enteral Tube Flush: 170 mL    IV PiggyBack: 350 mL    Nepro with Carb Steady: 1250 mL    Norepinephrine: 148.2 mL    PRBCs (Packed Red Blood Cells): 300 mL    Vasopressin: 112.5 mL  Total IN: 4080.7 mL    OUT:    Rectal Tube (mL): 450 mL  Total OUT: 450 mL    Total NET: 3630.7 mL      01-05 @ 07:01 - 01-06 @ 03:40  --------------------------------------------------------  IN:    Dexmedetomidine: 5.8 mL    IV PiggyBack: 300 mL    Nepro with Carb Steady: 950 mL    Norepinephrine: 108.9 mL    Vasopressin: 85.5 mL  Total IN: 1450.2 mL    OUT:    Other (mL): 935 mL    Rectal Tube (mL): 875 mL  Total OUT: 1810 mL    Total NET: -359.8 mL          01-06    138  |  104  |  24<H>  ----------------------------<  121<H>  3.6   |  19<L>  |  1.53<H>    Ca    8.9      06 Jan 2023 00:40  Phos  3.6     01-06  Mg     2.2     01-06    TPro  6.0  /  Alb  3.3  /  TBili  21.4<H>  /  DBili  x   /  AST  129<H>  /  ALT  21  /  AlkPhos  174<H>  01-06    [ ] Monroe catheter, indication: N/A  Meds: folic acid 1 milliGRAM(s) Oral daily  multivitamin/minerals/iron Oral Solution (CENTRUM) 15 milliLiter(s) Oral daily  thiamine 100 milliGRAM(s) Enteral Tube daily        HEMATOLOGIC  Meds:   [x] VTE Prophylaxis                        8.2    33.94 )-----------( 35       ( 06 Jan 2023 00:40 )             23.5     PT/INR - ( 05 Jan 2023 05:58 )   PT: 28.0 sec;   INR: 2.41 ratio         PTT - ( 05 Jan 2023 05:58 )  PTT:56.9 sec  Transfusion     [ ] PRBC   [ ] Platelets   [ ] FFP   [ ] Cryoprecipitate      INFECTIOUS DISEASES  WBC Count: 33.94 K/uL (01-06 @ 00:40)  WBC Count: 33.73 K/uL (01-05 @ 18:39)  WBC Count: 34.99 K/uL (01-05 @ 14:43)  WBC Count: 37.49 K/uL (01-05 @ 12:58)  WBC Count: 32.49 K/uL (01-05 @ 05:58)    RECENT CULTURES:  Specimen Source: Peritoneal Peritoneal Fluid  Date/Time: 01-04 @ 16:28  Culture Results:   No growth  Gram Stain:   polymorphonuclear leukocytes seen  No organisms seen  by cytocentrifuge  Organism: --  Specimen Source: Catheterized Catheterized  Date/Time: 01-03 @ 18:30  Culture Results:   No growth  Gram Stain: --  Organism: --    Meds: caspofungin IVPB      caspofungin IVPB 50 milliGRAM(s) IV Intermittent every 24 hours  cefTRIAXone   IVPB 1000 milliGRAM(s) IV Intermittent every 24 hours  influenza   Vaccine 0.5 milliLiter(s) IntraMuscular once  rifAXIMin 550 milliGRAM(s) Oral every 12 hours        ENDOCRINE  CAPILLARY BLOOD GLUCOSE        Meds: levothyroxine Injectable 103 MICROGram(s) IV Push at bedtime  vasopressin Infusion 0.03 Unit(s)/Min IV Continuous <Continuous>        ACCESS DEVICES:  [ ] Peripheral IV  [x ] Central Venous Line	[ x] R	[ ] L	[ x] IJ	[ ] Fem	[ ] SC	Placed:   [ x] Arterial Line		x[ ] R	[ ] L	[ ] Fem	[ x] Rad	[ ] Ax	Placed:   [ ] PICC:					[ ] Mediport  [ ] Urinary Catheter, Date Placed:   [x] Necessity of urinary, arterial, and venous catheters discussed    OTHER MEDICATIONS:  chlorhexidine 2% Cloths 1 Application(s) Topical <User Schedule>  CRRT Treatment    <Continuous>  Phoxillum Filtration BK 4 / 2.5 5000 milliLiter(s) CRRT <Continuous>  Phoxillum Filtration BK 4 / 2.5 5000 milliLiter(s) CRRT <Continuous>  PrismaSOL Filtration BGK 4 / 2.5 5000 milliLiter(s) CRRT <Continuous>  sodium chloride 0.65% Nasal 1 Spray(s) Both Nostrils every 3 hours PRN  tetracaine/benzocaine/butamben Spray 1 Spray(s) Topical every 3 hours PRN      CODE STATUS:      IMAGING:

## 2023-01-06 NOTE — PROGRESS NOTE ADULT - ATTENDING COMMENTS
Pt seen and examined, now back on CVVH.  Still hypotensive, seems more lethargic today.    Pt anuric, monitor electroytes and continue CVVH.

## 2023-01-06 NOTE — PROGRESS NOTE ADULT - SUBJECTIVE AND OBJECTIVE BOX
Transplant Surgery - Multidisciplinary Rounds  --------------------------------------------------------------    Present:   Patient seen and examined with multidisciplinary Transplant team including  Surgeon: Dr. Oconnor. Hepatologist: Dr. Curtis Pharmacist: Kiara,  NP: Olga and RNs in AM rounds.   Disciplines not in attendance will be notified of the plan.     Present:   Patient seen and examined with multidisciplinary Transplant team including  Surgeon: Dr. Oconnor. Hepatologist: Dr. Curtis,  NP: Jacky and RNs in AM rounds.   Disciplines not in attendance will be notified of the plan.     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  Arthur ([phone]) and Daughter Taylor at [facility] provided additional history. Of note was recently admitted to  11/2022 for workup and eval of jaundice- during that hospitalization was found to have a UTI and dx with alc hep and started on steroids (was deemed a non-responder and steroids were subsequently stopped); s/p LVP at [place] 11/2022. Previously hospitalized in 2021 at [place] for ETOH detox. Went to ETOH rehab 08/2022 and was asked to leave the facility when she was COVID+; was sober for 1 week until she started drinking again. Had also attended a few AA meetings in the past. Transferred from [facility] 12/20 for further management of acute liver failure and liver transplant evaluation.     24 HOUR EVENTS:  -CVVH restarted   -Melena   -CBC stable   -CVC pulled back 4cm   -Miralax q6   -MS worsened   -Bolused 250cc Albumin o/n      Potential Discharge date: pending clinical stability  Education:  Medications  Plan of care:  See Below    MEDICATIONS  (STANDING):  caspofungin IVPB      caspofungin IVPB 50 milliGRAM(s) IV Intermittent every 24 hours  cefTRIAXone   IVPB 1000 milliGRAM(s) IV Intermittent every 24 hours  chlorhexidine 2% Cloths 1 Application(s) Topical <User Schedule>  CRRT Treatment    <Continuous>  escitalopram 10 milliGRAM(s) Oral daily  folic acid 1 milliGRAM(s) Oral daily  influenza   Vaccine 0.5 milliLiter(s) IntraMuscular once  levETIRAcetam  Solution 750 milliGRAM(s) Enteral Tube two times a day  levothyroxine Injectable 103 MICROGram(s) IV Push at bedtime  midodrine 20 milliGRAM(s) Oral every 8 hours  multivitamin/minerals/iron Oral Solution (CENTRUM) 15 milliLiter(s) Oral daily  norepinephrine Infusion 0.05 MICROgram(s)/kG/Min (6.21 mL/Hr) IV Continuous <Continuous>  pantoprazole   Suspension 40 milliGRAM(s) Oral every 24 hours  Phoxillum Filtration BK 4 / 2.5 5000 milliLiter(s) (1000 mL/Hr) CRRT <Continuous>  Phoxillum Filtration BK 4 / 2.5 5000 milliLiter(s) (800 mL/Hr) CRRT <Continuous>  polyethylene glycol 3350 17 Gram(s) Oral <User Schedule>  PrismaSOL Filtration BGK 4 / 2.5 5000 milliLiter(s) (200 mL/Hr) CRRT <Continuous>  rifAXIMin 550 milliGRAM(s) Oral every 12 hours  thiamine 100 milliGRAM(s) Enteral Tube daily  vasopressin Infusion 0.03 Unit(s)/Min (4.5 mL/Hr) IV Continuous <Continuous>    MEDICATIONS  (PRN):  HYDROmorphone  Injectable 0.25 milliGRAM(s) IV Push every 3 hours PRN Severe Pain (7 - 10)  sodium chloride 0.65% Nasal 1 Spray(s) Both Nostrils every 3 hours PRN Nasal Congestion  tetracaine/benzocaine/butamben Spray 1 Spray(s) Topical every 3 hours PRN for ngt discomfort      PAST MEDICAL & SURGICAL HISTORY:  HTN (hypertension)  off medication for over one year--states BP has been normal      UTI (urinary tract infection)  currently being treated--will complete antibiotics 3/8/2022      Intracranial tumor      GERD (gastroesophageal reflux disease)      Eczema      Thyroid cancer  surgery. radiation, Radioactive iodine      COVID-19 virus infection  11/2021--recovered at home      H/O total thyroidectomy  age 21&#x27;s for Thyroid cancer, postop complication arterial bleed, second surgery      History of tonsillectomy  age 5      History of appendectomy  age 5          Vital Signs Last 24 Hrs  T(C): 36.6 (06 Jan 2023 07:00), Max: 37.4 (05 Jan 2023 15:00)  T(F): 97.9 (06 Jan 2023 07:00), Max: 99.3 (05 Jan 2023 15:00)  HR: 91 (06 Jan 2023 09:30) (85 - 111)  BP: --  BP(mean): --  RR: 17 (06 Jan 2023 09:30) (14 - 23)  SpO2: 96% (06 Jan 2023 09:30) (92% - 100%)    Parameters below as of 06 Jan 2023 07:00  Patient On (Oxygen Delivery Method): room air        I&O's Summary    05 Jan 2023 07:01  -  06 Jan 2023 07:00  --------------------------------------------------------  IN: 2120.5 mL / OUT: 3060 mL / NET: -939.5 mL    06 Jan 2023 07:01  -  06 Jan 2023 10:17  --------------------------------------------------------  IN: 471.1 mL / OUT: 160 mL / NET: 311.1 mL                              8.6    36.38 )-----------( 38       ( 06 Jan 2023 06:28 )             24.4     01-06    137  |  102  |  21  ----------------------------<  163<H>  3.7   |  20<L>  |  1.26    Ca    8.9      06 Jan 2023 06:28  Phos  3.3     01-06  Mg     2.2     01-06    TPro  6.3  /  Alb  3.5  /  TBili  22.1<H>  /  DBili  x   /  AST  131<H>  /  ALT  21  /  AlkPhos  188<H>  01-06        Review of systems  AMS, unable to fully assess      PHYSICAL EXAM:  Constitutional: NAD, AMS   Eyes: icteric, PERRLA  ENMT: nc/at, no thrush  Neck: supple, central line R IJ  Cardiovascular: RRR  Gastrointestinal: abdomen distended, improved from yesterday.  rectal tube in place  Genitourinary: anuric  Extremities: SCD's in place and working bilaterally, no edema  Vascular: Palpable dp pulses bilaterally.   Neurological: following commands, mentation improving  Skin:  jaundiced, diffuse rash across abdomen and flank. No ulcerations, lesions  Musculoskeletal: moving extremities  Psychiatric: calm       Transplant Surgery - Multidisciplinary Rounds  --------------------------------------------------------------    Present:   Patient seen and examined with multidisciplinary Transplant team including  Surgeon: Dr. Oconnor. Hepatologist: Dr. Curtis Pharmacist: Kiara,  NP: Olga and RNs in AM rounds.   Disciplines not in attendance will be notified of the plan.     Present:   Patient seen and examined with multidisciplinary Transplant team including  Surgeon: Dr. Oconnor. Hepatologist: Dr. Curtis,  NP: Jacky and RNs in AM rounds.   Disciplines not in attendance will be notified of the plan.     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  Arthur ([phone]) and Daughter Taylor at [facility] provided additional history. Of note was recently admitted to  11/2022 for workup and eval of jaundice- during that hospitalization was found to have a UTI and dx with alc hep and started on steroids (was deemed a non-responder and steroids were subsequently stopped); s/p LVP at [place] 11/2022. Previously hospitalized in 2021 at [place] for ETOH detox. Went to ETOH rehab 08/2022 and was asked to leave the facility when she was COVID+; was sober for 1 week until she started drinking again. Had also attended a few AA meetings in the past. Transferred from [facility] 12/20 for further management of acute liver failure and liver transplant evaluation.     24 HOUR EVENTS:  -CVVH restarted   -Melena   -CBC stable   -CVC pulled back 4cm   -Miralax q6   -MS worsened   -Bolused 250cc Albumin o/n      Potential Discharge date: pending clinical stability  Education:  Medications  Plan of care:  See Below    MEDICATIONS  (STANDING):  caspofungin IVPB      caspofungin IVPB 50 milliGRAM(s) IV Intermittent every 24 hours  cefTRIAXone   IVPB 1000 milliGRAM(s) IV Intermittent every 24 hours  chlorhexidine 2% Cloths 1 Application(s) Topical <User Schedule>  CRRT Treatment    <Continuous>  escitalopram 10 milliGRAM(s) Oral daily  folic acid 1 milliGRAM(s) Oral daily  influenza   Vaccine 0.5 milliLiter(s) IntraMuscular once  levETIRAcetam  Solution 750 milliGRAM(s) Enteral Tube two times a day  levothyroxine Injectable 103 MICROGram(s) IV Push at bedtime  midodrine 20 milliGRAM(s) Oral every 8 hours  multivitamin/minerals/iron Oral Solution (CENTRUM) 15 milliLiter(s) Oral daily  norepinephrine Infusion 0.05 MICROgram(s)/kG/Min (6.21 mL/Hr) IV Continuous <Continuous>  pantoprazole   Suspension 40 milliGRAM(s) Oral every 24 hours  Phoxillum Filtration BK 4 / 2.5 5000 milliLiter(s) (1000 mL/Hr) CRRT <Continuous>  Phoxillum Filtration BK 4 / 2.5 5000 milliLiter(s) (800 mL/Hr) CRRT <Continuous>  polyethylene glycol 3350 17 Gram(s) Oral <User Schedule>  PrismaSOL Filtration BGK 4 / 2.5 5000 milliLiter(s) (200 mL/Hr) CRRT <Continuous>  rifAXIMin 550 milliGRAM(s) Oral every 12 hours  thiamine 100 milliGRAM(s) Enteral Tube daily  vasopressin Infusion 0.03 Unit(s)/Min (4.5 mL/Hr) IV Continuous <Continuous>    MEDICATIONS  (PRN):  HYDROmorphone  Injectable 0.25 milliGRAM(s) IV Push every 3 hours PRN Severe Pain (7 - 10)  sodium chloride 0.65% Nasal 1 Spray(s) Both Nostrils every 3 hours PRN Nasal Congestion  tetracaine/benzocaine/butamben Spray 1 Spray(s) Topical every 3 hours PRN for ngt discomfort      PAST MEDICAL & SURGICAL HISTORY:  HTN (hypertension)  off medication for over one year--states BP has been normal      UTI (urinary tract infection)  currently being treated--will complete antibiotics 3/8/2022      Intracranial tumor      GERD (gastroesophageal reflux disease)      Eczema      Thyroid cancer  surgery. radiation, Radioactive iodine      COVID-19 virus infection  11/2021--recovered at home      H/O total thyroidectomy  age 21&#x27;s for Thyroid cancer, postop complication arterial bleed, second surgery      History of tonsillectomy  age 5      History of appendectomy  age 5          Vital Signs Last 24 Hrs  T(C): 36.6 (06 Jan 2023 07:00), Max: 37.4 (05 Jan 2023 15:00)  T(F): 97.9 (06 Jan 2023 07:00), Max: 99.3 (05 Jan 2023 15:00)  HR: 91 (06 Jan 2023 09:30) (85 - 111)  BP: --  BP(mean): --  RR: 17 (06 Jan 2023 09:30) (14 - 23)  SpO2: 96% (06 Jan 2023 09:30) (92% - 100%)    Parameters below as of 06 Jan 2023 07:00  Patient On (Oxygen Delivery Method): room air        I&O's Summary    05 Jan 2023 07:01  -  06 Jan 2023 07:00  --------------------------------------------------------  IN: 2120.5 mL / OUT: 3060 mL / NET: -939.5 mL    06 Jan 2023 07:01  -  06 Jan 2023 10:17  --------------------------------------------------------  IN: 471.1 mL / OUT: 160 mL / NET: 311.1 mL                              8.6    36.38 )-----------( 38       ( 06 Jan 2023 06:28 )             24.4     01-06    137  |  102  |  21  ----------------------------<  163<H>  3.7   |  20<L>  |  1.26    Ca    8.9      06 Jan 2023 06:28  Phos  3.3     01-06  Mg     2.2     01-06    TPro  6.3  /  Alb  3.5  /  TBili  22.1<H>  /  DBili  x   /  AST  131<H>  /  ALT  21  /  AlkPhos  188<H>  01-06        Review of systems  AMS, unable to fully assess      PHYSICAL EXAM:  Constitutional: NAD, AMS   Eyes: icteric, PERRLA  ENMT: nc/at, no thrush  Neck: supple, central line R IJ  Cardiovascular: RRR  Gastrointestinal: abdomen distended, improved from yesterday.  rectal tube in place  Genitourinary: anuric  Extremities: SCD's in place and working bilaterally, no edema  Vascular: Palpable dp pulses bilaterally.   Neurological: following commands, mentation improving  Skin:  jaundiced, diffuse rash across abdomen and flank. No ulcerations, lesions  Musculoskeletal: moving extremities  Psychiatric: calm       Transplant Surgery - Multidisciplinary Rounds  --------------------------------------------------------------    Present:   Patient seen and examined with multidisciplinary Transplant team including  Surgeon: Dr. Oconnor. Hepatologist: Dr. Curtis Pharmacist: Kiara,  NP: Olga and RNs in AM rounds.   Disciplines not in attendance will be notified of the plan.     Present:   Patient seen and examined with multidisciplinary Transplant team including  Surgeon: Dr. Oconnor. Hepatologist: Dr. Curtis,  NP: Jacky and RNs in AM rounds.   Disciplines not in attendance will be notified of the plan.     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  Arthur ([phone]) and Daughter Taylor at [facility] provided additional history. Of note was recently admitted to  11/2022 for workup and eval of jaundice- during that hospitalization was found to have a UTI and dx with alc hep and started on steroids (was deemed a non-responder and steroids were subsequently stopped); s/p LVP at [place] 11/2022. Previously hospitalized in 2021 at [place] for ETOH detox. Went to ETOH rehab 08/2022 and was asked to leave the facility when she was COVID+; was sober for 1 week until she started drinking again. Had also attended a few AA meetings in the past. Transferred from [facility] 12/20 for further management of acute liver failure and liver transplant evaluation.     24 HOUR EVENTS:  -CVVH restarted   -Melena   -CBC stable   -CVC pulled back 4cm   -Miralax q6   -MS worsened   -Bolused 250cc Albumin o/n      Potential Discharge date: pending clinical stability  Education:  Medications  Plan of care:  See Below    MEDICATIONS  (STANDING):  caspofungin IVPB      caspofungin IVPB 50 milliGRAM(s) IV Intermittent every 24 hours  cefTRIAXone   IVPB 1000 milliGRAM(s) IV Intermittent every 24 hours  chlorhexidine 2% Cloths 1 Application(s) Topical <User Schedule>  CRRT Treatment    <Continuous>  escitalopram 10 milliGRAM(s) Oral daily  folic acid 1 milliGRAM(s) Oral daily  influenza   Vaccine 0.5 milliLiter(s) IntraMuscular once  levETIRAcetam  Solution 750 milliGRAM(s) Enteral Tube two times a day  levothyroxine Injectable 103 MICROGram(s) IV Push at bedtime  midodrine 20 milliGRAM(s) Oral every 8 hours  multivitamin/minerals/iron Oral Solution (CENTRUM) 15 milliLiter(s) Oral daily  norepinephrine Infusion 0.05 MICROgram(s)/kG/Min (6.21 mL/Hr) IV Continuous <Continuous>  pantoprazole   Suspension 40 milliGRAM(s) Oral every 24 hours  Phoxillum Filtration BK 4 / 2.5 5000 milliLiter(s) (1000 mL/Hr) CRRT <Continuous>  Phoxillum Filtration BK 4 / 2.5 5000 milliLiter(s) (800 mL/Hr) CRRT <Continuous>  polyethylene glycol 3350 17 Gram(s) Oral <User Schedule>  PrismaSOL Filtration BGK 4 / 2.5 5000 milliLiter(s) (200 mL/Hr) CRRT <Continuous>  rifAXIMin 550 milliGRAM(s) Oral every 12 hours  thiamine 100 milliGRAM(s) Enteral Tube daily  vasopressin Infusion 0.03 Unit(s)/Min (4.5 mL/Hr) IV Continuous <Continuous>    MEDICATIONS  (PRN):  HYDROmorphone  Injectable 0.25 milliGRAM(s) IV Push every 3 hours PRN Severe Pain (7 - 10)  sodium chloride 0.65% Nasal 1 Spray(s) Both Nostrils every 3 hours PRN Nasal Congestion  tetracaine/benzocaine/butamben Spray 1 Spray(s) Topical every 3 hours PRN for ngt discomfort      PAST MEDICAL & SURGICAL HISTORY:  HTN (hypertension)  off medication for over one year--states BP has been normal      UTI (urinary tract infection)  currently being treated--will complete antibiotics 3/8/2022      Intracranial tumor      GERD (gastroesophageal reflux disease)      Eczema      Thyroid cancer  surgery. radiation, Radioactive iodine      COVID-19 virus infection  11/2021--recovered at home      H/O total thyroidectomy  age 21&#x27;s for Thyroid cancer, postop complication arterial bleed, second surgery      History of tonsillectomy  age 5      History of appendectomy  age 5          Vital Signs Last 24 Hrs  T(C): 36.6 (06 Jan 2023 07:00), Max: 37.4 (05 Jan 2023 15:00)  T(F): 97.9 (06 Jan 2023 07:00), Max: 99.3 (05 Jan 2023 15:00)  HR: 91 (06 Jan 2023 09:30) (85 - 111)  BP: --  BP(mean): --  RR: 17 (06 Jan 2023 09:30) (14 - 23)  SpO2: 96% (06 Jan 2023 09:30) (92% - 100%)    Parameters below as of 06 Jan 2023 07:00  Patient On (Oxygen Delivery Method): room air        I&O's Summary    05 Jan 2023 07:01  -  06 Jan 2023 07:00  --------------------------------------------------------  IN: 2120.5 mL / OUT: 3060 mL / NET: -939.5 mL    06 Jan 2023 07:01  -  06 Jan 2023 10:17  --------------------------------------------------------  IN: 471.1 mL / OUT: 160 mL / NET: 311.1 mL                              8.6    36.38 )-----------( 38       ( 06 Jan 2023 06:28 )             24.4     01-06    137  |  102  |  21  ----------------------------<  163<H>  3.7   |  20<L>  |  1.26    Ca    8.9      06 Jan 2023 06:28  Phos  3.3     01-06  Mg     2.2     01-06    TPro  6.3  /  Alb  3.5  /  TBili  22.1<H>  /  DBili  x   /  AST  131<H>  /  ALT  21  /  AlkPhos  188<H>  01-06        Review of systems  AMS, unable to fully assess      PHYSICAL EXAM:  Constitutional: NAD, AMS   Eyes: icteric, PERRLA  ENMT: nc/at, no thrush  Neck: supple, central line R IJ  Cardiovascular: RRR  Gastrointestinal: abdomen distended, improved from yesterday.  rectal tube in place  Genitourinary: anuric  Extremities: SCD's in place and working bilaterally, no edema  Vascular: Palpable dp pulses bilaterally.   Neurological: following commands, mentation improving  Skin:  jaundiced, diffuse rash across abdomen and flank. No ulcerations, lesions  Musculoskeletal: moving extremities  Psychiatric: calm

## 2023-01-06 NOTE — PROGRESS NOTE ADULT - ASSESSMENT
62 year old female with history of decompensated alcohol-associated cirrhosis c/b ascites (dx 11/2022), alc hep (dx 11/2022; non-responsive to steroids), remote h/o thyroid cancer in her 20s s/p total thyroidectomy + RTX + radioactive iodide, HTN, ventrical neoplasm (dx 2021) s/p right frontal craniotomy (03/2022) for resection post operative course c/b hemorrhage right lateral ventricle (managed non-operatively) who was initially admitted to  12/19 with new onset seizures and transferred to [facility] 12/20 for LT eval for ACLF.  Of note was recently admitted to  11/2022 for workup and eval of new onset jaundice- during that hospitalization was found to have a UTI and dx with alc hep was treated for UTI w/ abx and started on steroids (was deemed a non-responder and steroids were subsequently stopped); s/p LVP at [place] 11/2022. Previously hospitalized in 2021 at [place] for ETOH detox but pt reportedly unaware of any liver dysfunction at that time. Went to ETOH rehab 08/2022 and was asked to leave the facility when she was COVID+; was sober for 1 week until she started drinking again. Had also attended a few AA meetings in the past.    #ACLF  #Multifocal pneumonia  #AUD  #New onset seizure- unknown trigger/etiology- currently on Keppra  #Decompensated alcohol-associated cirrhosis c/b prior ascites req LVP       -MELD-Na = 40 on 1/6/2023       -Ascites: large, diuretics limited by anuria       -SBP: negative on 12/26/2022 & 1/4/2023       -Varices: unknown       -Hepatic encephalopathy: Ammonia, Serum: 63 umol/L *H* [11 - 55] (01-05-23 @ 05:50)  lactulose Syrup 20 Gram(s) Oral every 6 hours, 01-05-23 @ 09:25, Routine  rifAXIMin 550 milliGRAM(s) Oral every 12 hours, 12-25-22 @ 09:07,        -HCC: Alpha Fetoprotein - Tumor Marker: 4.2 ng/mL (12-24-22 @ 20:58)    Recommendations:  -trend clinical symptoms, exam findings, vital signs, CBC, CMP, INR  -CVVHD per Nephro  -continue antibiotic/antifungal coverage with ceftriaxone and caspofungin, appreciate assistance from ID  -remains on pressors with significant leukocytosis, currently on room air  -increase to lactulose 20g q8h in addition to rifaximin to target 3-5 BMs daily  -encourage incentive spirometer, mobilize as much as able, and chest PT given multifocal pneumonia  -hold off on full transplant evaluation given clinical course and multifocal pneumonia as detailed above    Note incomplete until finalized by attending signature/attestation.    Ronnie Tatum  GI/Hepatology Fellow    MONDAY-FRIDAY 8AM-5PM:  Pager# [phone] ([facility]) or [phone] ([facility])    NON-URGENT CONSULTS:  Please email [email or web address] OR [email or web address]  AT NIGHT AND ON WEEKENDS:  Contact on-call GI fellow via answering service ([phone]) from 5pm-8am and on weekends/holidays 62 year old female with history of decompensated alcohol-associated cirrhosis c/b ascites (dx 11/2022), alc hep (dx 11/2022; non-responsive to steroids), remote h/o thyroid cancer in her 20s s/p total thyroidectomy + RTX + radioactive iodide, HTN, ventrical neoplasm (dx 2021) s/p right frontal craniotomy (03/2022) for resection post operative course c/b hemorrhage right lateral ventricle (managed non-operatively) who was initially admitted to  12/19 with new onset seizures and transferred to [facility] 12/20 for LT eval for ACLF.  Of note was recently admitted to  11/2022 for workup and eval of new onset jaundice- during that hospitalization was found to have a UTI and dx with alc hep was treated for UTI w/ abx and started on steroids (was deemed a non-responder and steroids were subsequently stopped); s/p LVP at [place] 11/2022. Previously hospitalized in 2021 at [place] for ETOH detox but pt reportedly unaware of any liver dysfunction at that time. Went to ETOH rehab 08/2022 and was asked to leave the facility when she was COVID+; was sober for 1 week until she started drinking again. Had also attended a few AA meetings in the past.    #ACLF  #Multifocal pneumonia  #AUD  #New onset seizure- unknown trigger/etiology- currently on Keppra  #Decompensated alcohol-associated cirrhosis c/b prior ascites req LVP       -MELD-Na = 40 on 1/6/2023       -Ascites: large, diuretics limited by anuria       -SBP: negative on 12/26/2022 & 1/4/2023       -Varices: unknown       -Hepatic encephalopathy: Ammonia, Serum: 63 umol/L *H* [11 - 55] (01-05-23 @ 05:50)  lactulose Syrup 20 Gram(s) Oral every 6 hours, 01-05-23 @ 09:25, Routine  rifAXIMin 550 milliGRAM(s) Oral every 12 hours, 12-25-22 @ 09:07,        -HCC: Alpha Fetoprotein - Tumor Marker: 4.2 ng/mL (12-24-22 @ 20:58)    Recommendations:  -trend clinical symptoms, exam findings, vital signs, CBC, CMP, INR  -CVVHD per Nephro  -continue antibiotic/antifungal coverage with ceftriaxone and caspofungin, appreciate assistance from ID  -remains on pressors with significant leukocytosis, currently on room air  -increase to lactulose 20g q8h in addition to rifaximin to target 3-5 BMs daily  -encourage incentive spirometer, mobilize as much as able, and chest PT given multifocal pneumonia  -hold off on full transplant evaluation given clinical course and multifocal pneumonia as detailed above    Note incomplete until finalized by attending signature/attestation.    Ronnie Tatum  GI/Hepatology Fellow    MONDAY-FRIDAY 8AM-5PM:  Pager# [phone] ([facility]) or [phone] ([facility])    NON-URGENT CONSULTS:  Please email [email or web address] OR [email or web address]  AT NIGHT AND ON WEEKENDS:  Contact on-call GI fellow via answering service ([phone]) from 5pm-8am and on weekends/holidays 62 year old female with history of decompensated alcohol-associated cirrhosis c/b ascites (dx 11/2022), alc hep (dx 11/2022; non-responsive to steroids), remote h/o thyroid cancer in her 20s s/p total thyroidectomy + RTX + radioactive iodide, HTN, 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  Of note was recently admitted to  11/2022 for workup and eval of new onset jaundice- during that hospitalization was found to have a UTI and dx with alc hep was treated for UTI w/ abx and started on steroids (was deemed a non-responder and steroids were subsequently stopped); s/p LVP at [facility] 11/2022. Previously hospitalized in 2021 at [place] for ETOH detox but pt reportedly unaware of any liver dysfunction at that time. Went to ETOH rehab 08/2022 and was asked to leave the facility when she was COVID+; was sober for 1 week until she started drinking again. Had also attended a few AA meetings in the past.    #ACLF  #Multifocal pneumonia  #AUD  #New onset seizure- unknown trigger/etiology- currently on Keppra  #Decompensated alcohol-associated cirrhosis c/b prior ascites req LVP       -MELD-Na = 40 on 1/6/2023       -Ascites: large, diuretics limited by anuria       -SBP: negative on 12/26/2022 & 1/4/2023       -Varices: unknown       -Hepatic encephalopathy: Ammonia, Serum: 63 umol/L *H* [11 - 55] (01-05-23 @ 05:50)  lactulose Syrup 20 Gram(s) Oral every 6 hours, 01-05-23 @ 09:25, Routine  rifAXIMin 550 milliGRAM(s) Oral every 12 hours, 12-25-22 @ 09:07,        -HCC: Alpha Fetoprotein - Tumor Marker: 4.2 ng/mL (12-24-22 @ 20:58)    Recommendations:  -trend clinical symptoms, exam findings, vital signs, CBC, CMP, INR  -CVVHD per Nephro  -continue antibiotic/antifungal coverage with ceftriaxone and caspofungin, appreciate assistance from ID  -remains on pressors with significant leukocytosis, currently on room air  -increase to lactulose 20g q8h in addition to rifaximin to target 3-5 BMs daily  -encourage incentive spirometer, mobilize as much as able, and chest PT given multifocal pneumonia  -hold off on full transplant evaluation given clinical course and multifocal pneumonia as detailed above    Note incomplete until finalized by attending signature/attestation.    Ronnie Tatum  GI/Hepatology Fellow    MONDAY-FRIDAY 8AM-5PM:  Pager# [phone] ([facility]) or [phone] ([facility])    NON-URGENT CONSULTS:  Please email [email or web address]  AT NIGHT AND ON WEEKENDS:  Contact on-call GI fellow via answering service ([phone]) from 5pm-8am and on weekends/holidays

## 2023-01-06 NOTE — PROGRESS NOTE ADULT - PROBLEM SELECTOR PLAN 3
Serum phos improved to 3.3 today. Continue to monitor    If you have any questions, please feel free to contact me  Arnold Charles  Nephrology Fellow  [phone]; Prefer [employer] TEAMS  (After 5pm or on weekends please page the on-call fellow).    Patient was discussed with Dr. Marrero who agrees with my A/P. Addendum to follow Serum phos improved to 3.3 today. Continue to monitor    If you have any questions, please feel free to contact me  Arnold Charles  Nephrology Fellow  [phone]; Prefer [employer] TEAMS  (After 5pm or on weekends please page the on-call fellow).    Patient was discussed with Dr. Marrero who agrees with my A/P. Addendum to follow Serum phos improved to 3.3 today. Continue to monitor    If you have any questions, please feel free to contact me  Arnold Charles  Nephrology Fellow  [phone]; Prefer [employer] TEAMS  (After 5pm or on weekends please page the on-call fellow).    Patient was discussed with Dr. Marrero who agrees with my A/P. Addendum to follow

## 2023-01-06 NOTE — PROGRESS NOTE ADULT - SUBJECTIVE AND OBJECTIVE BOX
[facility] Division of Kidney Diseases & Hypertension  FOLLOW UP NOTE  [phone]--------------------------------------------------------------------------------  Chief Complaint:Disorder of liver    63 yo F with h/o decompensated ETOH cirrhosis with ascites, thyroid cancer (s/p total thyroidectomy, RTX, and radioactive iodide), HTN, ventrical neoplasm who was initially admitted to  12/19 with new onset seizures and transferred to [facility] 12/20 for liver transplant evaluation. Pt being seen for RED requiring CRRT.    24 hour events/subjective: Pt was seen and evaluated in the ICU this morning, offers no acute complaints. Remains on pressors.         PAST HISTORY  --------------------------------------------------------------------------------  No significant changes to PMH, PSH, FHx, SHx, unless otherwise noted    ALLERGIES & MEDICATIONS  --------------------------------------------------------------------------------  Allergies    Macrobid (Rash)    Intolerances    Nexium (Stomach Upset)    Standing Inpatient Medications  caspofungin IVPB      caspofungin IVPB 50 milliGRAM(s) IV Intermittent every 24 hours  cefTRIAXone   IVPB 1000 milliGRAM(s) IV Intermittent every 24 hours  chlorhexidine 2% Cloths 1 Application(s) Topical <User Schedule>  escitalopram 10 milliGRAM(s) Oral daily  folic acid 1 milliGRAM(s) Oral daily  influenza   Vaccine 0.5 milliLiter(s) IntraMuscular once  levETIRAcetam  Solution 750 milliGRAM(s) Enteral Tube two times a day  levothyroxine Injectable 103 MICROGram(s) IV Push at bedtime  midodrine 20 milliGRAM(s) Oral every 8 hours  multivitamin/minerals/iron Oral Solution (CENTRUM) 15 milliLiter(s) Oral daily  norepinephrine Infusion 0.05 MICROgram(s)/kG/Min IV Continuous <Continuous>  pantoprazole   Suspension 40 milliGRAM(s) Oral every 24 hours  polyethylene glycol 3350 17 Gram(s) Oral <User Schedule>  rifAXIMin 550 milliGRAM(s) Oral every 12 hours  thiamine 100 milliGRAM(s) Enteral Tube daily  vasopressin Infusion 0.03 Unit(s)/Min IV Continuous <Continuous>    PRN Inpatient Medications  HYDROmorphone  Injectable 0.25 milliGRAM(s) IV Push every 3 hours PRN  sodium chloride 0.65% Nasal 1 Spray(s) Both Nostrils every 3 hours PRN  tetracaine/benzocaine/butamben Spray 1 Spray(s) Topical every 3 hours PRN      REVIEW OF SYSTEMS  --------------------------------------------------------------------------------  Gen: No  fevers/chills  Skin: No rashes  Head/Eyes/Ears/Mouth: No headache; Normal hearing; Normal vision w/o blurriness  Respiratory: No dyspnea, cough, wheezing, hemoptysis  CV: No chest pain, PND, orthopnea  GI: No abdominal pain, diarrhea, constipation, nausea, vomiting  : No increased frequency, dysuria, hematuria, nocturia  MSK: No joint pain/swelling; no back pain; no edema  Neuro: No dizziness/lightheadedness, weakness, seizures, numbness, tingling    All other systems were reviewed and are negative, except as noted.    VITALS/PHYSICAL EXAM  --------------------------------------------------------------------------------  T(C): 36.6 (01-06-23 @ 07:00), Max: 37.4 (01-05-23 @ 15:00)  HR: 92 (01-06-23 @ 11:30) (85 - 104)  BP: --  RR: 16 (01-06-23 @ 11:30) (14 - 22)  SpO2: 98% (01-06-23 @ 11:30) (92% - 100%)  Wt(kg): --        01-05-23 @ 07:01  -  01-06-23 @ 07:00  --------------------------------------------------------  IN: 2120.5 mL / OUT: 3060 mL / NET: -939.5 mL    01-06-23 @ 07:01  -  01-06-23 @ 11:56  --------------------------------------------------------  IN: 544.8 mL / OUT: 540 mL / NET: 4.8 mL      Physical Exam:  Gen: NAD  HEENT: on RA now  Pulm: CTA B/L, no crackles   CV: RRR, S1S2+  Abd: +BS, soft, distended  : +urinary catheter  MSK: No LE edema  Psych: Awake  Skin: +Jaundice  Access: right non-tunneled cath    LABS/STUDIES  --------------------------------------------------------------------------------              8.6    36.38 >-----------<  38       [01-06-23 @ 06:28]              24.4     137  |  102  |  21  ----------------------------<  163      [01-06-23 @ 06:28]  3.7   |  20  |  1.26        Ca     8.9     [01-06-23 @ 06:28]      Mg     2.2     [01-06-23 @ 06:28]      Phos  3.3     [01-06-23 @ 06:28]    TPro  6.3  /  Alb  3.5  /  TBili  22.1  /  DBili  x   /  AST  131  /  ALT  21  /  AlkPhos  188  [01-06-23 @ 06:28]    PT/INR: PT 26.8 , INR 2.29       [01-06-23 @ 06:28]  PTT: 59.7       [01-06-23 @ 06:28]      Creatinine Trend:  SCr 1.26 [01-06 @ 06:28]  SCr 1.53 [01-06 @ 00:40]  SCr 1.79 [01-05 @ 18:39]  SCr 2.22 [01-05 @ 12:58]  SCr 2.41 [01-05 @ 05:58]    Urinalysis - [01-03-23 @ 18:02]      Color Other / Appearance Turbid / SG SEE NOTE / pH SEE NOTE      Gluc SEE NOTE / Ketone SEE NOTE  / Bili SEE NOTE / Urobili SEE NOTE       Blood SEE NOTE / Protein SEE NOTE / Leuk Est SEE NOTE / Nitrite SEE NOTE      RBC  / WBC  / Hyaline  / Gran  / Sq Epi  / Non Sq Epi  / Bacteria            [facility] Division of Kidney Diseases & Hypertension  FOLLOW UP NOTE  [phone]--------------------------------------------------------------------------------  Chief Complaint:Disorder of liver    61 yo F with h/o decompensated ETOH cirrhosis with ascites, thyroid cancer (s/p total thyroidectomy, RTX, and radioactive iodide), HTN, ventrical neoplasm who was initially admitted to  12/19 with new onset seizures and transferred to [facility] 12/20 for liver transplant evaluation. Pt being seen for RED requiring CRRT.    24 hour events/subjective: Pt was seen and evaluated in the ICU this morning, offers no acute complaints. Remains on pressors.         PAST HISTORY  --------------------------------------------------------------------------------  No significant changes to PMH, PSH, FHx, SHx, unless otherwise noted    ALLERGIES & MEDICATIONS  --------------------------------------------------------------------------------  Allergies    Macrobid (Rash)    Intolerances    Nexium (Stomach Upset)    Standing Inpatient Medications  caspofungin IVPB      caspofungin IVPB 50 milliGRAM(s) IV Intermittent every 24 hours  cefTRIAXone   IVPB 1000 milliGRAM(s) IV Intermittent every 24 hours  chlorhexidine 2% Cloths 1 Application(s) Topical <User Schedule>  escitalopram 10 milliGRAM(s) Oral daily  folic acid 1 milliGRAM(s) Oral daily  influenza   Vaccine 0.5 milliLiter(s) IntraMuscular once  levETIRAcetam  Solution 750 milliGRAM(s) Enteral Tube two times a day  levothyroxine Injectable 103 MICROGram(s) IV Push at bedtime  midodrine 20 milliGRAM(s) Oral every 8 hours  multivitamin/minerals/iron Oral Solution (CENTRUM) 15 milliLiter(s) Oral daily  norepinephrine Infusion 0.05 MICROgram(s)/kG/Min IV Continuous <Continuous>  pantoprazole   Suspension 40 milliGRAM(s) Oral every 24 hours  polyethylene glycol 3350 17 Gram(s) Oral <User Schedule>  rifAXIMin 550 milliGRAM(s) Oral every 12 hours  thiamine 100 milliGRAM(s) Enteral Tube daily  vasopressin Infusion 0.03 Unit(s)/Min IV Continuous <Continuous>    PRN Inpatient Medications  HYDROmorphone  Injectable 0.25 milliGRAM(s) IV Push every 3 hours PRN  sodium chloride 0.65% Nasal 1 Spray(s) Both Nostrils every 3 hours PRN  tetracaine/benzocaine/butamben Spray 1 Spray(s) Topical every 3 hours PRN      REVIEW OF SYSTEMS  --------------------------------------------------------------------------------  Gen: No  fevers/chills  Skin: No rashes  Head/Eyes/Ears/Mouth: No headache; Normal hearing; Normal vision w/o blurriness  Respiratory: No dyspnea, cough, wheezing, hemoptysis  CV: No chest pain, PND, orthopnea  GI: No abdominal pain, diarrhea, constipation, nausea, vomiting  : No increased frequency, dysuria, hematuria, nocturia  MSK: No joint pain/swelling; no back pain; no edema  Neuro: No dizziness/lightheadedness, weakness, seizures, numbness, tingling    All other systems were reviewed and are negative, except as noted.    VITALS/PHYSICAL EXAM  --------------------------------------------------------------------------------  T(C): 36.6 (01-06-23 @ 07:00), Max: 37.4 (01-05-23 @ 15:00)  HR: 92 (01-06-23 @ 11:30) (85 - 104)  BP: --  RR: 16 (01-06-23 @ 11:30) (14 - 22)  SpO2: 98% (01-06-23 @ 11:30) (92% - 100%)  Wt(kg): --        01-05-23 @ 07:01  -  01-06-23 @ 07:00  --------------------------------------------------------  IN: 2120.5 mL / OUT: 3060 mL / NET: -939.5 mL    01-06-23 @ 07:01  -  01-06-23 @ 11:56  --------------------------------------------------------  IN: 544.8 mL / OUT: 540 mL / NET: 4.8 mL      Physical Exam:  Gen: NAD  HEENT: on RA now  Pulm: CTA B/L, no crackles   CV: RRR, S1S2+  Abd: +BS, soft, distended  : +urinary catheter  MSK: No LE edema  Psych: Awake  Skin: +Jaundice  Access: right non-tunneled cath    LABS/STUDIES  --------------------------------------------------------------------------------              8.6    36.38 >-----------<  38       [01-06-23 @ 06:28]              24.4     137  |  102  |  21  ----------------------------<  163      [01-06-23 @ 06:28]  3.7   |  20  |  1.26        Ca     8.9     [01-06-23 @ 06:28]      Mg     2.2     [01-06-23 @ 06:28]      Phos  3.3     [01-06-23 @ 06:28]    TPro  6.3  /  Alb  3.5  /  TBili  22.1  /  DBili  x   /  AST  131  /  ALT  21  /  AlkPhos  188  [01-06-23 @ 06:28]    PT/INR: PT 26.8 , INR 2.29       [01-06-23 @ 06:28]  PTT: 59.7       [01-06-23 @ 06:28]      Creatinine Trend:  SCr 1.26 [01-06 @ 06:28]  SCr 1.53 [01-06 @ 00:40]  SCr 1.79 [01-05 @ 18:39]  SCr 2.22 [01-05 @ 12:58]  SCr 2.41 [01-05 @ 05:58]    Urinalysis - [01-03-23 @ 18:02]      Color Other / Appearance Turbid / SG SEE NOTE / pH SEE NOTE      Gluc SEE NOTE / Ketone SEE NOTE  / Bili SEE NOTE / Urobili SEE NOTE       Blood SEE NOTE / Protein SEE NOTE / Leuk Est SEE NOTE / Nitrite SEE NOTE      RBC  / WBC  / Hyaline  / Gran  / Sq Epi  / Non Sq Epi  / Bacteria            [facility] Division of Kidney Diseases & Hypertension  FOLLOW UP NOTE  [phone]--------------------------------------------------------------------------------  Chief Complaint:Disorder of liver    63 yo F with h/o decompensated ETOH cirrhosis with ascites, thyroid cancer (s/p total thyroidectomy, RTX, and radioactive iodide), HTN, ventrical neoplasm who was initially admitted to  12/19 with new onset seizures and transferred to [facility] 12/20 for liver transplant evaluation. Pt being seen for RED requiring CRRT.    24 hour events/subjective: Pt was seen and evaluated in the ICU this morning, offers no acute complaints. Remains on pressors.         PAST HISTORY  --------------------------------------------------------------------------------  No significant changes to PMH, PSH, FHx, SHx, unless otherwise noted    ALLERGIES & MEDICATIONS  --------------------------------------------------------------------------------  Allergies    Macrobid (Rash)    Intolerances    Nexium (Stomach Upset)    Standing Inpatient Medications  caspofungin IVPB      caspofungin IVPB 50 milliGRAM(s) IV Intermittent every 24 hours  cefTRIAXone   IVPB 1000 milliGRAM(s) IV Intermittent every 24 hours  chlorhexidine 2% Cloths 1 Application(s) Topical <User Schedule>  escitalopram 10 milliGRAM(s) Oral daily  folic acid 1 milliGRAM(s) Oral daily  influenza   Vaccine 0.5 milliLiter(s) IntraMuscular once  levETIRAcetam  Solution 750 milliGRAM(s) Enteral Tube two times a day  levothyroxine Injectable 103 MICROGram(s) IV Push at bedtime  midodrine 20 milliGRAM(s) Oral every 8 hours  multivitamin/minerals/iron Oral Solution (CENTRUM) 15 milliLiter(s) Oral daily  norepinephrine Infusion 0.05 MICROgram(s)/kG/Min IV Continuous <Continuous>  pantoprazole   Suspension 40 milliGRAM(s) Oral every 24 hours  polyethylene glycol 3350 17 Gram(s) Oral <User Schedule>  rifAXIMin 550 milliGRAM(s) Oral every 12 hours  thiamine 100 milliGRAM(s) Enteral Tube daily  vasopressin Infusion 0.03 Unit(s)/Min IV Continuous <Continuous>    PRN Inpatient Medications  HYDROmorphone  Injectable 0.25 milliGRAM(s) IV Push every 3 hours PRN  sodium chloride 0.65% Nasal 1 Spray(s) Both Nostrils every 3 hours PRN  tetracaine/benzocaine/butamben Spray 1 Spray(s) Topical every 3 hours PRN      REVIEW OF SYSTEMS  --------------------------------------------------------------------------------  Gen: No  fevers/chills  Skin: No rashes  Head/Eyes/Ears/Mouth: No headache; Normal hearing; Normal vision w/o blurriness  Respiratory: No dyspnea, cough, wheezing, hemoptysis  CV: No chest pain, PND, orthopnea  GI: No abdominal pain, diarrhea, constipation, nausea, vomiting  : No increased frequency, dysuria, hematuria, nocturia  MSK: No joint pain/swelling; no back pain; no edema  Neuro: No dizziness/lightheadedness, weakness, seizures, numbness, tingling    All other systems were reviewed and are negative, except as noted.    VITALS/PHYSICAL EXAM  --------------------------------------------------------------------------------  T(C): 36.6 (01-06-23 @ 07:00), Max: 37.4 (01-05-23 @ 15:00)  HR: 92 (01-06-23 @ 11:30) (85 - 104)  BP: --  RR: 16 (01-06-23 @ 11:30) (14 - 22)  SpO2: 98% (01-06-23 @ 11:30) (92% - 100%)  Wt(kg): --        01-05-23 @ 07:01  -  01-06-23 @ 07:00  --------------------------------------------------------  IN: 2120.5 mL / OUT: 3060 mL / NET: -939.5 mL    01-06-23 @ 07:01  -  01-06-23 @ 11:56  --------------------------------------------------------  IN: 544.8 mL / OUT: 540 mL / NET: 4.8 mL      Physical Exam:  Gen: NAD  HEENT: on RA now  Pulm: CTA B/L, no crackles   CV: RRR, S1S2+  Abd: +BS, soft, distended  : +urinary catheter  MSK: No LE edema  Psych: Awake  Skin: +Jaundice  Access: right non-tunneled cath    LABS/STUDIES  --------------------------------------------------------------------------------              8.6    36.38 >-----------<  38       [01-06-23 @ 06:28]              24.4     137  |  102  |  21  ----------------------------<  163      [01-06-23 @ 06:28]  3.7   |  20  |  1.26        Ca     8.9     [01-06-23 @ 06:28]      Mg     2.2     [01-06-23 @ 06:28]      Phos  3.3     [01-06-23 @ 06:28]    TPro  6.3  /  Alb  3.5  /  TBili  22.1  /  DBili  x   /  AST  131  /  ALT  21  /  AlkPhos  188  [01-06-23 @ 06:28]    PT/INR: PT 26.8 , INR 2.29       [01-06-23 @ 06:28]  PTT: 59.7       [01-06-23 @ 06:28]      Creatinine Trend:  SCr 1.26 [01-06 @ 06:28]  SCr 1.53 [01-06 @ 00:40]  SCr 1.79 [01-05 @ 18:39]  SCr 2.22 [01-05 @ 12:58]  SCr 2.41 [01-05 @ 05:58]    Urinalysis - [01-03-23 @ 18:02]      Color Other / Appearance Turbid / SG SEE NOTE / pH SEE NOTE      Gluc SEE NOTE / Ketone SEE NOTE  / Bili SEE NOTE / Urobili SEE NOTE       Blood SEE NOTE / Protein SEE NOTE / Leuk Est SEE NOTE / Nitrite SEE NOTE      RBC  / WBC  / Hyaline  / Gran  / Sq Epi  / Non Sq Epi  / Bacteria

## 2023-01-07 LAB
ALBUMIN SERPL ELPH-MCNC: 3.4 G/DL — SIGNIFICANT CHANGE UP (ref 3.3–5)
ALP SERPL-CCNC: 211 U/L — HIGH (ref 40–120)
ALP SERPL-CCNC: 221 U/L — HIGH (ref 40–120)
ALP SERPL-CCNC: 226 U/L — HIGH (ref 40–120)
ALP SERPL-CCNC: 231 U/L — HIGH (ref 40–120)
ALT FLD-CCNC: 24 U/L — SIGNIFICANT CHANGE UP (ref 10–45)
ALT FLD-CCNC: 27 U/L — SIGNIFICANT CHANGE UP (ref 10–45)
ALT FLD-CCNC: 29 U/L — SIGNIFICANT CHANGE UP (ref 10–45)
ALT FLD-CCNC: 31 U/L — SIGNIFICANT CHANGE UP (ref 10–45)
AMMONIA BLD-MCNC: 54 UMOL/L — SIGNIFICANT CHANGE UP (ref 11–55)
ANION GAP SERPL CALC-SCNC: 14 MMOL/L — SIGNIFICANT CHANGE UP (ref 5–17)
ANION GAP SERPL CALC-SCNC: 15 MMOL/L — SIGNIFICANT CHANGE UP (ref 5–17)
ANION GAP SERPL CALC-SCNC: 16 MMOL/L — SIGNIFICANT CHANGE UP (ref 5–17)
APTT BLD: 49 SEC — HIGH (ref 27.5–35.5)
AST SERPL-CCNC: 133 U/L — HIGH (ref 10–40)
AST SERPL-CCNC: 134 U/L — HIGH (ref 10–40)
AST SERPL-CCNC: 136 U/L — HIGH (ref 10–40)
AST SERPL-CCNC: 139 U/L — HIGH (ref 10–40)
BILIRUB SERPL-MCNC: 22.1 MG/DL — HIGH (ref 0.2–1.2)
BILIRUB SERPL-MCNC: 23.1 MG/DL — HIGH (ref 0.2–1.2)
BILIRUB SERPL-MCNC: 23.3 MG/DL — HIGH (ref 0.2–1.2)
BILIRUB SERPL-MCNC: 23.9 MG/DL — HIGH (ref 0.2–1.2)
BUN SERPL-MCNC: 18 MG/DL — SIGNIFICANT CHANGE UP (ref 7–23)
C DIFF GDH STL QL: NEGATIVE — SIGNIFICANT CHANGE UP
C DIFF GDH STL QL: SIGNIFICANT CHANGE UP
CALCIUM SERPL-MCNC: 8.9 MG/DL — SIGNIFICANT CHANGE UP (ref 8.4–10.5)
CALCIUM SERPL-MCNC: 9.1 MG/DL — SIGNIFICANT CHANGE UP (ref 8.4–10.5)
CALCIUM SERPL-MCNC: 9.3 MG/DL — SIGNIFICANT CHANGE UP (ref 8.4–10.5)
CHLORIDE SERPL-SCNC: 101 MMOL/L — SIGNIFICANT CHANGE UP (ref 96–108)
CHLORIDE SERPL-SCNC: 102 MMOL/L — SIGNIFICANT CHANGE UP (ref 96–108)
CHLORIDE SERPL-SCNC: 103 MMOL/L — SIGNIFICANT CHANGE UP (ref 96–108)
CO2 SERPL-SCNC: 19 MMOL/L — LOW (ref 22–31)
CO2 SERPL-SCNC: 20 MMOL/L — LOW (ref 22–31)
CREAT SERPL-MCNC: 0.86 MG/DL — SIGNIFICANT CHANGE UP (ref 0.5–1.3)
CREAT SERPL-MCNC: 0.87 MG/DL — SIGNIFICANT CHANGE UP (ref 0.5–1.3)
CREAT SERPL-MCNC: 0.92 MG/DL — SIGNIFICANT CHANGE UP (ref 0.5–1.3)
CREAT SERPL-MCNC: 0.99 MG/DL — SIGNIFICANT CHANGE UP (ref 0.5–1.3)
CRP SERPL-MCNC: 52 MG/L — HIGH (ref 0–4)
EGFR: 65 ML/MIN/1.73M2 — SIGNIFICANT CHANGE UP
EGFR: 71 ML/MIN/1.73M2 — SIGNIFICANT CHANGE UP
EGFR: 76 ML/MIN/1.73M2 — SIGNIFICANT CHANGE UP
EGFR: 77 ML/MIN/1.73M2 — SIGNIFICANT CHANGE UP
GAMMA INTERFERON BACKGROUND BLD IA-ACNC: 0.05 IU/ML — SIGNIFICANT CHANGE UP
GAS PNL BLDA: SIGNIFICANT CHANGE UP
GLUCOSE SERPL-MCNC: 137 MG/DL — HIGH (ref 70–99)
GLUCOSE SERPL-MCNC: 138 MG/DL — HIGH (ref 70–99)
GLUCOSE SERPL-MCNC: 142 MG/DL — HIGH (ref 70–99)
GLUCOSE SERPL-MCNC: 153 MG/DL — HIGH (ref 70–99)
HCT VFR BLD CALC: 24.6 % — LOW (ref 34.5–45)
HCT VFR BLD CALC: 24.7 % — LOW (ref 34.5–45)
HCT VFR BLD CALC: 24.9 % — LOW (ref 34.5–45)
HGB BLD-MCNC: 8.4 G/DL — LOW (ref 11.5–15.5)
HGB BLD-MCNC: 8.5 G/DL — LOW (ref 11.5–15.5)
HGB BLD-MCNC: 8.6 G/DL — LOW (ref 11.5–15.5)
HGB BLD-MCNC: 8.7 G/DL — LOW (ref 11.5–15.5)
INR BLD: 2.15 RATIO — HIGH (ref 0.88–1.16)
M TB IFN-G BLD-IMP: ABNORMAL
M TB IFN-G CD4+ BCKGRND COR BLD-ACNC: 0.01 IU/ML — SIGNIFICANT CHANGE UP
M TB IFN-G CD4+CD8+ BCKGRND COR BLD-ACNC: 0 IU/ML — SIGNIFICANT CHANGE UP
MAGNESIUM SERPL-MCNC: 2.2 MG/DL — SIGNIFICANT CHANGE UP (ref 1.6–2.6)
MAGNESIUM SERPL-MCNC: 2.3 MG/DL — SIGNIFICANT CHANGE UP (ref 1.6–2.6)
MCHC RBC-ENTMCNC: 30.1 PG — SIGNIFICANT CHANGE UP (ref 27–34)
MCHC RBC-ENTMCNC: 30.4 PG — SIGNIFICANT CHANGE UP (ref 27–34)
MCHC RBC-ENTMCNC: 30.7 PG — SIGNIFICANT CHANGE UP (ref 27–34)
MCHC RBC-ENTMCNC: 31.1 PG — SIGNIFICANT CHANGE UP (ref 27–34)
MCHC RBC-ENTMCNC: 34.1 GM/DL — SIGNIFICANT CHANGE UP (ref 32–36)
MCHC RBC-ENTMCNC: 34.6 GM/DL — SIGNIFICANT CHANGE UP (ref 32–36)
MCHC RBC-ENTMCNC: 34.8 GM/DL — SIGNIFICANT CHANGE UP (ref 32–36)
MCHC RBC-ENTMCNC: 34.9 GM/DL — SIGNIFICANT CHANGE UP (ref 32–36)
MCV RBC AUTO: 87.9 FL — SIGNIFICANT CHANGE UP (ref 80–100)
MCV RBC AUTO: 88.2 FL — SIGNIFICANT CHANGE UP (ref 80–100)
MCV RBC AUTO: 88.9 FL — SIGNIFICANT CHANGE UP (ref 80–100)
NRBC # BLD: 0 /100 WBCS — SIGNIFICANT CHANGE UP (ref 0–0)
PHOSPHATE SERPL-MCNC: 3.3 MG/DL — SIGNIFICANT CHANGE UP (ref 2.5–4.5)
PHOSPHATE SERPL-MCNC: 3.4 MG/DL — SIGNIFICANT CHANGE UP (ref 2.5–4.5)
PHOSPHATE SERPL-MCNC: 3.5 MG/DL — SIGNIFICANT CHANGE UP (ref 2.5–4.5)
PLATELET # BLD AUTO: 37 K/UL — LOW (ref 150–400)
PLATELET # BLD AUTO: 40 K/UL — LOW (ref 150–400)
PLATELET # BLD AUTO: 43 K/UL — LOW (ref 150–400)
PLATELET # BLD AUTO: 47 K/UL — LOW (ref 150–400)
POTASSIUM SERPL-MCNC: 3.7 MMOL/L — SIGNIFICANT CHANGE UP (ref 3.5–5.3)
POTASSIUM SERPL-MCNC: 3.8 MMOL/L — SIGNIFICANT CHANGE UP (ref 3.5–5.3)
POTASSIUM SERPL-SCNC: 3.7 MMOL/L — SIGNIFICANT CHANGE UP (ref 3.5–5.3)
POTASSIUM SERPL-SCNC: 3.8 MMOL/L — SIGNIFICANT CHANGE UP (ref 3.5–5.3)
PROCALCITONIN SERPL-MCNC: 2.51 NG/ML — HIGH (ref 0.02–0.1)
PROT SERPL-MCNC: 6.4 G/DL — SIGNIFICANT CHANGE UP (ref 6–8.3)
PROT SERPL-MCNC: 6.5 G/DL — SIGNIFICANT CHANGE UP (ref 6–8.3)
PROT SERPL-MCNC: 6.6 G/DL — SIGNIFICANT CHANGE UP (ref 6–8.3)
PROT SERPL-MCNC: 6.7 G/DL — SIGNIFICANT CHANGE UP (ref 6–8.3)
PROTHROM AB SERPL-ACNC: 24.9 SEC — HIGH (ref 10.5–13.4)
QUANT TB PLUS MITOGEN MINUS NIL: 0.06 IU/ML — SIGNIFICANT CHANGE UP
RBC # BLD: 2.79 M/UL — LOW (ref 3.8–5.2)
RBC # BLD: 2.8 M/UL — LOW (ref 3.8–5.2)
RBC # FLD: 18.9 % — HIGH (ref 10.3–14.5)
RBC # FLD: 19 % — HIGH (ref 10.3–14.5)
RBC # FLD: 19.2 % — HIGH (ref 10.3–14.5)
RBC # FLD: 19.4 % — HIGH (ref 10.3–14.5)
SODIUM SERPL-SCNC: 136 MMOL/L — SIGNIFICANT CHANGE UP (ref 135–145)
SODIUM SERPL-SCNC: 137 MMOL/L — SIGNIFICANT CHANGE UP (ref 135–145)
SODIUM SERPL-SCNC: 138 MMOL/L — SIGNIFICANT CHANGE UP (ref 135–145)
WBC # BLD: 40.21 K/UL — CRITICAL HIGH (ref 3.8–10.5)
WBC # BLD: 40.51 K/UL — CRITICAL HIGH (ref 3.8–10.5)
WBC # BLD: 41.4 K/UL — CRITICAL HIGH (ref 3.8–10.5)
WBC # BLD: 41.46 K/UL — CRITICAL HIGH (ref 3.8–10.5)
WBC # FLD AUTO: 40.21 K/UL — CRITICAL HIGH (ref 3.8–10.5)
WBC # FLD AUTO: 40.51 K/UL — CRITICAL HIGH (ref 3.8–10.5)
WBC # FLD AUTO: 41.4 K/UL — CRITICAL HIGH (ref 3.8–10.5)
WBC # FLD AUTO: 41.46 K/UL — CRITICAL HIGH (ref 3.8–10.5)

## 2023-01-07 PROCEDURE — 90945 DIALYSIS ONE EVALUATION: CPT | Mod: GC

## 2023-01-07 PROCEDURE — 71045 X-RAY EXAM CHEST 1 VIEW: CPT | Mod: 26

## 2023-01-07 PROCEDURE — 99232 SBSQ HOSP IP/OBS MODERATE 35: CPT | Mod: GC

## 2023-01-07 PROCEDURE — 99233 SBSQ HOSP IP/OBS HIGH 50: CPT

## 2023-01-07 PROCEDURE — 99291 CRITICAL CARE FIRST HOUR: CPT

## 2023-01-07 RX ORDER — VANCOMYCIN HCL 1 G
125 VIAL (EA) INTRAVENOUS EVERY 6 HOURS
Refills: 0 | Status: DISCONTINUED | OUTPATIENT
Start: 2023-01-07 | End: 2023-01-07

## 2023-01-07 RX ORDER — POLYETHYLENE GLYCOL 3350 17 G/17G
17 POWDER, FOR SOLUTION ORAL EVERY 12 HOURS
Refills: 0 | Status: DISCONTINUED | OUTPATIENT
Start: 2023-01-07 | End: 2023-01-07

## 2023-01-07 RX ORDER — POLYETHYLENE GLYCOL 3350 17 G/17G
17 POWDER, FOR SOLUTION ORAL EVERY 12 HOURS
Refills: 0 | Status: DISCONTINUED | OUTPATIENT
Start: 2023-01-07 | End: 2023-01-08

## 2023-01-07 RX ADMIN — Medication 6.21 MICROGRAM(S)/KG/MIN: at 05:24

## 2023-01-07 RX ADMIN — PANTOPRAZOLE SODIUM 40 MILLIGRAM(S): 20 TABLET, DELAYED RELEASE ORAL at 12:56

## 2023-01-07 RX ADMIN — Medication 103 MICROGRAM(S): at 21:20

## 2023-01-07 RX ADMIN — VASOPRESSIN 4.5 UNIT(S)/MIN: 20 INJECTION INTRAVENOUS at 00:15

## 2023-01-07 RX ADMIN — VASOPRESSIN 4.5 UNIT(S)/MIN: 20 INJECTION INTRAVENOUS at 14:28

## 2023-01-07 RX ADMIN — VASOPRESSIN 4.5 UNIT(S)/MIN: 20 INJECTION INTRAVENOUS at 18:58

## 2023-01-07 RX ADMIN — Medication 125 MILLIGRAM(S): at 12:56

## 2023-01-07 RX ADMIN — Medication 6.21 MICROGRAM(S)/KG/MIN: at 18:58

## 2023-01-07 RX ADMIN — ESCITALOPRAM OXALATE 10 MILLIGRAM(S): 10 TABLET, FILM COATED ORAL at 12:57

## 2023-01-07 RX ADMIN — CASPOFUNGIN ACETATE 260 MILLIGRAM(S): 7 INJECTION, POWDER, LYOPHILIZED, FOR SOLUTION INTRAVENOUS at 08:53

## 2023-01-07 RX ADMIN — MIDODRINE HYDROCHLORIDE 20 MILLIGRAM(S): 2.5 TABLET ORAL at 21:20

## 2023-01-07 RX ADMIN — CEFTRIAXONE 100 MILLIGRAM(S): 500 INJECTION, POWDER, FOR SOLUTION INTRAMUSCULAR; INTRAVENOUS at 05:22

## 2023-01-07 RX ADMIN — VASOPRESSIN 4.5 UNIT(S)/MIN: 20 INJECTION INTRAVENOUS at 08:54

## 2023-01-07 RX ADMIN — VASOPRESSIN 4.5 UNIT(S)/MIN: 20 INJECTION INTRAVENOUS at 05:24

## 2023-01-07 RX ADMIN — CHLORHEXIDINE GLUCONATE 1 APPLICATION(S): 213 SOLUTION TOPICAL at 05:24

## 2023-01-07 RX ADMIN — POLYETHYLENE GLYCOL 3350 17 GRAM(S): 17 POWDER, FOR SOLUTION ORAL at 17:15

## 2023-01-07 RX ADMIN — MIDODRINE HYDROCHLORIDE 20 MILLIGRAM(S): 2.5 TABLET ORAL at 05:23

## 2023-01-07 RX ADMIN — Medication 6.21 MICROGRAM(S)/KG/MIN: at 00:15

## 2023-01-07 RX ADMIN — MIDODRINE HYDROCHLORIDE 20 MILLIGRAM(S): 2.5 TABLET ORAL at 13:00

## 2023-01-07 RX ADMIN — Medication 6.21 MICROGRAM(S)/KG/MIN: at 08:53

## 2023-01-07 RX ADMIN — Medication 15 MILLILITER(S): at 12:56

## 2023-01-07 RX ADMIN — LEVETIRACETAM 750 MILLIGRAM(S): 250 TABLET, FILM COATED ORAL at 05:23

## 2023-01-07 RX ADMIN — HYDROMORPHONE HYDROCHLORIDE 0.25 MILLIGRAM(S): 2 INJECTION INTRAMUSCULAR; INTRAVENOUS; SUBCUTANEOUS at 15:05

## 2023-01-07 RX ADMIN — Medication 100 MILLIGRAM(S): at 12:57

## 2023-01-07 RX ADMIN — LEVETIRACETAM 750 MILLIGRAM(S): 250 TABLET, FILM COATED ORAL at 17:13

## 2023-01-07 RX ADMIN — HYDROMORPHONE HYDROCHLORIDE 0.25 MILLIGRAM(S): 2 INJECTION INTRAMUSCULAR; INTRAVENOUS; SUBCUTANEOUS at 15:20

## 2023-01-07 RX ADMIN — Medication 1 MILLIGRAM(S): at 12:57

## 2023-01-07 RX ADMIN — Medication 6.21 MICROGRAM(S)/KG/MIN: at 14:27

## 2023-01-07 NOTE — PROGRESS NOTE ADULT - ASSESSMENT
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  Of note was recently admitted to  11/2022 for workup and eval of new onset jaundice- during that hospitalization was found to have a UTI and dx with alc hep was treated for UTI w/ abx and started on steroids (was deemed a non-responder and steroids were subsequently stopped); s/p LVP at [place] 11/2022. Previously hospitalized in 2021 at [place] for ETOH detox but pt reportedly unaware of any liver dysfunction at that time. Went to ETOH rehab 08/2022 and was asked to leave the facility when she was COVID+; was sober for 1 week until she started drinking again. Had also attended a few AA meetings in the past.    #ACLF  #Multifocal pneumonia  #AUD  #New onset seizure- unknown trigger/etiology- currently on Keppra  #Decompensated alcohol-associated cirrhosis c/b prior ascites req LVP       -MELD-Na = 40 on 1/6/2023       -Ascites: large, diuretics limited by anuria       -SBP: negative on 12/26/2022 & 1/4/2023       -Varices: unknown       -Hepatic encephalopathy: Ammonia, Serum: 63 umol/L *H* [11 - 55] (01-05-23 @ 05:50)  lactulose Syrup 20 Gram(s) Oral every 6 hours, 01-05-23 @ 09:25, Routine  rifAXIMin 550 milliGRAM(s) Oral every 12 hours, 12-25-22 @ 09:07,        -HCC: Alpha Fetoprotein - Tumor Marker: 4.2 ng/mL (12-24-22 @ 20:58)    Recommendations:  -trend clinical symptoms, exam findings, vital signs, CBC, CMP, INR  -CVVHD per Nephro  -continue antibiotic/antifungal coverage with ceftriaxone and caspofungin, appreciate assistance from ID  -remains on pressors with significant leukocytosis, currently on room air  -increase to lactulose 20g q8h in addition to rifaximin to target 3-5 BMs daily  -encourage incentive spirometer, mobilize as much as able, and chest PT given multifocal pneumonia  -hold off on full transplant evaluation given clinical course and multifocal pneumonia as detailed above    All recommendations are tentative until note is attested by attending.     Netta Garcia, PGY-4   Gastroenterology/Hepatology Fellow  Available on Microsoft Teams  [phone] ([facility] Short Range Pager)  [phone] ([facility] Long Range Pager)    After 5pm, please contact the on-call GI fellow. 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 12/20 for LT eval for ACLF.  Of note was recently admitted to  11/2022 for workup and eval of new onset jaundice- during that hospitalization was found to have a UTI and dx with alc hep was treated for UTI w/ abx and started on steroids (was deemed a non-responder and steroids were subsequently stopped); s/p LVP at [place] 11/2022. Previously hospitalized in 2021 at [place] for ETOH detox but pt reportedly unaware of any liver dysfunction at that time. Went to ETOH rehab 08/2022 and was asked to leave the facility when she was COVID+; was sober for 1 week until she started drinking again. Had also attended a few AA meetings in the past.    #ACLF  #Multifocal pneumonia  #AUD  #New onset seizure- unknown trigger/etiology- currently on Keppra  #Decompensated alcohol-associated cirrhosis c/b prior ascites req LVP       -MELD-Na = 40 on 1/6/2023       -Ascites: large, diuretics limited by anuria       -SBP: negative on 12/26/2022 & 1/4/2023       -Varices: unknown       -Hepatic encephalopathy: Ammonia, Serum: 63 umol/L *H* [11 - 55] (01-05-23 @ 05:50)  lactulose Syrup 20 Gram(s) Oral every 6 hours, 01-05-23 @ 09:25, Routine  rifAXIMin 550 milliGRAM(s) Oral every 12 hours, 12-25-22 @ 09:07,        -HCC: Alpha Fetoprotein - Tumor Marker: 4.2 ng/mL (12-24-22 @ 20:58)    Recommendations:  -trend clinical symptoms, exam findings, vital signs, CBC, CMP, INR  -CVVHD per Nephro  -continue antibiotic/antifungal coverage with ceftriaxone and caspofungin, appreciate assistance from ID  -remains on pressors with significant leukocytosis, currently on room air  -increase to lactulose 20g q8h in addition to rifaximin to target 3-5 BMs daily  -encourage incentive spirometer, mobilize as much as able, and chest PT given multifocal pneumonia  -hold off on full transplant evaluation given clinical course and multifocal pneumonia as detailed above    All recommendations are tentative until note is attested by attending.     Netta Garcia, PGY-4   Gastroenterology/Hepatology Fellow  Available on Microsoft Teams  [phone] ([facility] Short Range Pager)  [phone] ([facility] Long Range Pager)    After 5pm, please contact the on-call GI fellow. 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  Of note was recently admitted to  11/2022 for workup and eval of new onset jaundice- during that hospitalization was found to have a UTI and dx with alc hep was treated for UTI w/ abx and started on steroids (was deemed a non-responder and steroids were subsequently stopped); s/p LVP at [place] 11/2022. Previously hospitalized in 2021 at [place] for ETOH detox but pt reportedly unaware of any liver dysfunction at that time. Went to ETOH rehab 08/2022 and was asked to leave the facility when she was COVID+; was sober for 1 week until she started drinking again. Had also attended a few AA meetings in the past.    #ACLF  #Multifocal pneumonia  #AUD  #New onset seizure- unknown trigger/etiology- currently on Keppra  #Decompensated alcohol-associated cirrhosis c/b prior ascites req LVP       -MELD-Na = 40 on 1/6/2023       -Ascites: large, diuretics limited by anuria       -SBP: negative on 12/26/2022 & 1/4/2023       -Varices: unknown       -Hepatic encephalopathy: Ammonia, Serum: 63 umol/L *H* [11 - 55] (01-05-23 @ 05:50)  lactulose Syrup 20 Gram(s) Oral every 6 hours, 01-05-23 @ 09:25, Routine  rifAXIMin 550 milliGRAM(s) Oral every 12 hours, 12-25-22 @ 09:07,        -HCC: Alpha Fetoprotein - Tumor Marker: 4.2 ng/mL (12-24-22 @ 20:58)    Recommendations:  -trend clinical symptoms, exam findings, vital signs, CBC, CMP, INR  -CVVHD per Nephro  -continue antibiotic/antifungal coverage with ceftriaxone and caspofungin, appreciate assistance from ID  -remains on pressors with significant leukocytosis, currently on room air  -increase to lactulose 20g q8h in addition to rifaximin to target 3-5 BMs daily  -encourage incentive spirometer, mobilize as much as able, and chest PT given multifocal pneumonia  -hold off on full transplant evaluation given clinical course and multifocal pneumonia as detailed above    All recommendations are tentative until note is attested by attending.     Netta Garcia, PGY-4   Gastroenterology/Hepatology Fellow  Available on Microsoft Teams  [phone] ([facility] Short Range Pager)  [phone] ([facility] Long Range Pager)    After 5pm, please contact the on-call GI fellow. 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  Of note was recently admitted to  11/2022 for workup and eval of new onset jaundice- during that hospitalization was found to have a UTI and dx with alc hep was treated for UTI w/ abx and started on steroids (was deemed a non-responder and steroids were subsequently stopped); s/p LVP at [place] 11/2022. Previously hospitalized in 2021 at [place] for ETOH detox but pt reportedly unaware of any liver dysfunction at that time. Went to ETOH rehab 08/2022 and was asked to leave the facility when she was COVID+; was sober for 1 week until she started drinking again. Had also attended a few AA meetings in the past.    #ACLF  #Multifocal pneumonia  #AUD  #New onset seizure- unknown trigger/etiology- currently on Keppra  #Decompensated alcohol-associated cirrhosis c/b prior ascites req LVP       -MELD-Na = 40 on 1/6/2023       -Ascites: large, diuretics limited by anuria       -SBP: negative on 12/26/2022 & 1/4/2023       -Varices: unknown       -Hepatic encephalopathy: Ammonia, Serum: 63 umol/L *H* [11 - 55] (01-05-23 @ 05:50)  lactulose Syrup 20 Gram(s) Oral every 6 hours, 01-05-23 @ 09:25, Routine  rifAXIMin 550 milliGRAM(s) Oral every 12 hours, 12-25-22 @ 09:07,        -HCC: Alpha Fetoprotein - Tumor Marker: 4.2 ng/mL (12-24-22 @ 20:58)    Recommendations:  -f/u CRP, procalcitonin   -trend clinical symptoms, exam findings, vital signs, CBC, CMP, INR  -CVVHD per Nephro  -continue antibiotic/antifungal coverage with ceftriaxone and caspofungin, appreciate assistance from ID  -remains on pressors with significant leukocytosis, currently on room air  -increase to lactulose 20g q8h in addition to rifaximin to target 3-5 BMs daily  -encourage incentive spirometer, mobilize as much as able, and chest PT given multifocal pneumonia  -hold off on full transplant evaluation given clinical course and multifocal pneumonia as detailed above    All recommendations are tentative until note is attested by attending.     Netta Garcia, PGY-4   Gastroenterology/Hepatology Fellow  Available on Microsoft Teams  [phone] ([facility] Short Range Pager)  [phone] ([facility] Long Range Pager)    After 5pm, please contact the on-call GI fellow. [phone] 62 year old female with history of decompensated alcohol-associated cirrhosis c/b ascites (dx 11/2022), alc hep (dx 11/2022; non-responsive to steroids), remote h/o thyroid cancer in her 20s s/p total thyroidectomy + RTX + radioactive iodide, HTN, ventrical neoplasm (dx 2021) s/p right frontal craniotomy (03/2022) for resection post operative course c/b hemorrhage right lateral ventricle (managed non-operatively) who was initially admitted to  12/19 with new onset seizures and transferred to [facility] 12/20 for LT eval for ACLF.  Of note was recently admitted to  11/2022 for workup and eval of new onset jaundice- during that hospitalization was found to have a UTI and dx with alc hep was treated for UTI w/ abx and started on steroids (was deemed a non-responder and steroids were subsequently stopped); s/p LVP at [place] 11/2022. Previously hospitalized in 2021 at [place] for ETOH detox but pt reportedly unaware of any liver dysfunction at that time. Went to ETOH rehab 08/2022 and was asked to leave the facility when she was COVID+; was sober for 1 week until she started drinking again. Had also attended a few AA meetings in the past.    #ACLF  #Multifocal pneumonia  #AUD  #New onset seizure- unknown trigger/etiology- currently on Keppra  #Decompensated alcohol-associated cirrhosis c/b prior ascites req LVP       -MELD-Na = 40 on 1/6/2023       -Ascites: large, diuretics limited by anuria       -SBP: negative on 12/26/2022 & 1/4/2023       -Varices: unknown       -Hepatic encephalopathy: Ammonia, Serum: 63 umol/L *H* [11 - 55] (01-05-23 @ 05:50)  lactulose Syrup 20 Gram(s) Oral every 6 hours, 01-05-23 @ 09:25, Routine  rifAXIMin 550 milliGRAM(s) Oral every 12 hours, 12-25-22 @ 09:07,        -HCC: Alpha Fetoprotein - Tumor Marker: 4.2 ng/mL (12-24-22 @ 20:58)    Recommendations:  -f/u CRP, procalcitonin   -trend clinical symptoms, exam findings, vital signs, CBC, CMP, INR  -CVVHD per Nephro  -continue antibiotic/antifungal coverage with ceftriaxone and caspofungin, appreciate assistance from ID  -remains on pressors with significant leukocytosis, currently on room air  -increase to lactulose 20g q8h in addition to rifaximin to target 3-5 BMs daily  -encourage incentive spirometer, mobilize as much as able, and chest PT given multifocal pneumonia  -hold off on full transplant evaluation given clinical course and multifocal pneumonia as detailed above    All recommendations are tentative until note is attested by attending.     Netta Garcia, PGY-4   Gastroenterology/Hepatology Fellow  Available on Microsoft Teams  [phone] ([facility] Short Range Pager)  [phone] ([facility] Long Range Pager)    After 5pm, please contact the on-call GI fellow. 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  Of note was recently admitted to  11/2022 for workup and eval of new onset jaundice- during that hospitalization was found to have a UTI and dx with alc hep was treated for UTI w/ abx and started on steroids (was deemed a non-responder and steroids were subsequently stopped); s/p LVP at [place] 11/2022. Previously hospitalized in 2021 at [place] for ETOH detox but pt reportedly unaware of any liver dysfunction at that time. Went to ETOH rehab 08/2022 and was asked to leave the facility when she was COVID+; was sober for 1 week until she started drinking again. Had also attended a few AA meetings in the past.    #ACLF  #Multifocal pneumonia  #AUD  #New onset seizure- unknown trigger/etiology- currently on Keppra  #Decompensated alcohol-associated cirrhosis c/b prior ascites req LVP       -MELD-Na = 40 on 1/6/2023       -Ascites: large, diuretics limited by anuria       -SBP: negative on 12/26/2022 & 1/4/2023       -Varices: unknown       -Hepatic encephalopathy: Ammonia, Serum: 63 umol/L *H* [11 - 55] (01-05-23 @ 05:50)  lactulose Syrup 20 Gram(s) Oral every 6 hours, 01-05-23 @ 09:25, Routine  rifAXIMin 550 milliGRAM(s) Oral every 12 hours, 12-25-22 @ 09:07,        -HCC: Alpha Fetoprotein - Tumor Marker: 4.2 ng/mL (12-24-22 @ 20:58)    Recommendations:  -f/u CRP, procalcitonin   -trend clinical symptoms, exam findings, vital signs, CBC, CMP, INR  -CVVHD per Nephro  -continue antibiotic/antifungal coverage with ceftriaxone and caspofungin, appreciate assistance from ID  -remains on pressors with significant leukocytosis, currently on room air  -increase to lactulose 20g q8h in addition to rifaximin to target 3-5 BMs daily  -encourage incentive spirometer, mobilize as much as able, and chest PT given multifocal pneumonia  -hold off on full transplant evaluation given clinical course and multifocal pneumonia as detailed above    All recommendations are tentative until note is attested by attending.     Netta Garcia, PGY-4   Gastroenterology/Hepatology Fellow  Available on Microsoft Teams  [phone] ([facility] Short Range Pager)  [phone] ([facility] Long Range Pager)    After 5pm, please contact the on-call GI fellow. [phone]

## 2023-01-07 NOTE — PROGRESS NOTE ADULT - ASSESSMENT
62 y.o Hx significant for remote AUD, h/o thyroid cancer in her 20s s/p total thyroidectomy + RTX + radioactive iodide, HTN, ventricle neoplasm (dx 2021) s/p right frontal craniotomy (03/2022) for resection, post operative course c/b hemorrhage right lateral ventricle (managed non-operatively) who was initially admitted to [facility] 12/19 with new onset seizures.   Transferred from [facility] 12/20 for further management of acute liver failure and liver transplant evaluation 2/2 known ETOH Cirrhosis.     Decompensated ETOH Cirrhosis  - Wean off pressors as able, continue Midodrine  - on CVVH, resumed 1/5  - repeat CT chest/abdomen/pelvis today showed multifocal pneumonia, large volume ascites  - ID: Leukocytosis. 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   Transferred from [facility] 12/20 for further management of acute liver failure and liver transplant evaluation 2/2 known ETOH Cirrhosis.     Decompensated ETOH Cirrhosis  - Wean off pressors as able, continue Midodrine  - on CVVH, resumed 1/5  - repeat CT chest/abdomen/pelvis today showed multifocal pneumonia, large volume ascites  - ID: Leukocytosis. 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   Transferred from [facility] 12/20 for further management of acute liver failure and liver transplant evaluation 2/2 known ETOH Cirrhosis.     Decompensated ETOH Cirrhosis  - Wean off pressors as able, continue Midodrine  - on CVVH, resumed 1/5  - repeat CT chest/abdomen/pelvis today showed multifocal pneumonia, large volume ascites  - ID: Leukocytosis. 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   Transferred from [facility] 12/20 for further management of acute liver failure and liver transplant evaluation 2/2 known ETOH Cirrhosis.     Decompensated ETOH Cirrhosis  - Wean off pressors as able, continue Midodrine  - on CVVH, resumed 1/5  - ID: Leukocytosis. 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   Transferred from [facility] 12/20 for further management of acute liver failure and liver transplant evaluation 2/2 known ETOH Cirrhosis.     Decompensated ETOH Cirrhosis  - Wean off pressors as able, continue Midodrine  - on CVVH, resumed 1/5  - ID: Leukocytosis. 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 12/19 with new onset seizures.   Transferred from [facility] 12/20 for further management of acute liver failure and liver transplant evaluation 2/2 known ETOH Cirrhosis.     Decompensated ETOH Cirrhosis  - Wean off pressors as able, continue Midodrine  - on CVVH, resumed 1/5  - ID: Leukocytosis. Continue anbx (caspo/CTX)  - HE: continue Miralax, Rifaximin   - Continue TF at goal  - on Keppra for seizures, (no activity since admission)  - Abdominal rash almost resolved  - leukocytosis/diarrhea: check C diff    - Continue excellent ICU care

## 2023-01-07 NOTE — PROGRESS NOTE ADULT - SUBJECTIVE AND OBJECTIVE BOX
Gastroenterology/Hepatology Progress Note    Interval Events:   - no acute ON events     Allergies:  Macrobid (Rash)  Nexium (Stomach Upset)      Hospital Medications:  caspofungin IVPB      caspofungin IVPB 50 milliGRAM(s) IV Intermittent every 24 hours  cefTRIAXone   IVPB 1000 milliGRAM(s) IV Intermittent every 24 hours  chlorhexidine 2% Cloths 1 Application(s) Topical <User Schedule>  CRRT Treatment    <Continuous>  escitalopram 10 milliGRAM(s) Oral daily  folic acid 1 milliGRAM(s) Oral daily  HYDROmorphone  Injectable 0.25 milliGRAM(s) IV Push every 3 hours PRN  influenza   Vaccine 0.5 milliLiter(s) IntraMuscular once  levETIRAcetam  Solution 750 milliGRAM(s) Enteral Tube two times a day  levothyroxine Injectable 103 MICROGram(s) IV Push at bedtime  midodrine 20 milliGRAM(s) Oral every 8 hours  multivitamin/minerals/iron Oral Solution (CENTRUM) 15 milliLiter(s) Oral daily  norepinephrine Infusion 0.05 MICROgram(s)/kG/Min IV Continuous <Continuous>  pantoprazole   Suspension 40 milliGRAM(s) Oral every 24 hours  Phoxillum Filtration BK 4 / 2.5 5000 milliLiter(s) CRRT <Continuous>  Phoxillum Filtration BK 4 / 2.5 5000 milliLiter(s) CRRT <Continuous>  polyethylene glycol 3350 17 Gram(s) Oral every 12 hours  PrismaSOL Filtration BGK 4 / 2.5 5000 milliLiter(s) CRRT <Continuous>  rifAXIMin 550 milliGRAM(s) Oral every 12 hours  sodium chloride 0.65% Nasal 1 Spray(s) Both Nostrils every 3 hours PRN  tetracaine/benzocaine/butamben Spray 1 Spray(s) Topical every 3 hours PRN  thiamine 100 milliGRAM(s) Enteral Tube daily  vasopressin Infusion 0.03 Unit(s)/Min IV Continuous <Continuous>      ROS: 14 point ROS negative unless otherwise state in subjective    PHYSICAL EXAM:   Vital Signs:  Vital Signs Last 24 Hrs  T(C): 36.8 (07 Jan 2023 08:00), Max: 37.1 (06 Jan 2023 19:00)  T(F): 98.2 (07 Jan 2023 08:00), Max: 98.8 (06 Jan 2023 19:00)  HR: 96 (07 Jan 2023 08:00) (85 - 108)  BP: --  BP(mean): --  RR: 19 (07 Jan 2023 08:00) (12 - 24)  SpO2: 97% (07 Jan 2023 08:00) (94% - 100%)    Parameters below as of 07 Jan 2023 08:00  Patient On (Oxygen Delivery Method): room air      Daily     Daily     GENERAL: no acute distress  NEURO: able to tell name, states she is doing well   HEENT: NCAT, no conjunctival pallor appreciated  CHEST: no respiratory distress, no accessory muscle use  CARDIAC: regular rate, +S1/S2  ABDOMEN: soft, nontender, no rebound or guarding  EXTREMITIES: warm, well perfused  SKIN: no lesions noted    LABS:                        8.5    41.46 )-----------( 37       ( 07 Jan 2023 05:54 )             24.6     Mean Cell Volume: 87.9 fl (01-07-23 @ 05:54)    01-07    136  |  101  |  18  ----------------------------<  142<H>  3.7   |  20<L>  |  0.92    Ca    8.9      07 Jan 2023 05:54  Phos  3.5     01-07  Mg     2.2     01-07    TPro  6.5  /  Alb  3.4  /  TBili  23.1<H>  /  DBili  x   /  AST  136<H>  /  ALT  27  /  AlkPhos  221<H>  01-07    LIVER FUNCTIONS - ( 07 Jan 2023 05:54 )  Alb: 3.4 g/dL / Pro: 6.5 g/dL / ALK PHOS: 221 U/L / ALT: 27 U/L / AST: 136 U/L / GGT: x           PT/INR - ( 07 Jan 2023 05:54 )   PT: 24.9 sec;   INR: 2.15 ratio         PTT - ( 07 Jan 2023 05:54 )  PTT:49.0 sec    Amylase Serum--      Lipase serum--       [id number]        Imaging:           Gastroenterology/Hepatology Progress Note    Interval Events:   - no acute ON events     Allergies:  Macrobid (Rash)  Nexium (Stomach Upset)      Hospital Medications:  caspofungin IVPB      caspofungin IVPB 50 milliGRAM(s) IV Intermittent every 24 hours  cefTRIAXone   IVPB 1000 milliGRAM(s) IV Intermittent every 24 hours  chlorhexidine 2% Cloths 1 Application(s) Topical <User Schedule>  CRRT Treatment    <Continuous>  escitalopram 10 milliGRAM(s) Oral daily  folic acid 1 milliGRAM(s) Oral daily  HYDROmorphone  Injectable 0.25 milliGRAM(s) IV Push every 3 hours PRN  influenza   Vaccine 0.5 milliLiter(s) IntraMuscular once  levETIRAcetam  Solution 750 milliGRAM(s) Enteral Tube two times a day  levothyroxine Injectable 103 MICROGram(s) IV Push at bedtime  midodrine 20 milliGRAM(s) Oral every 8 hours  multivitamin/minerals/iron Oral Solution (CENTRUM) 15 milliLiter(s) Oral daily  norepinephrine Infusion 0.05 MICROgram(s)/kG/Min IV Continuous <Continuous>  pantoprazole   Suspension 40 milliGRAM(s) Oral every 24 hours  Phoxillum Filtration BK 4 / 2.5 5000 milliLiter(s) CRRT <Continuous>  Phoxillum Filtration BK 4 / 2.5 5000 milliLiter(s) CRRT <Continuous>  polyethylene glycol 3350 17 Gram(s) Oral every 12 hours  PrismaSOL Filtration BGK 4 / 2.5 5000 milliLiter(s) CRRT <Continuous>  rifAXIMin 550 milliGRAM(s) Oral every 12 hours  sodium chloride 0.65% Nasal 1 Spray(s) Both Nostrils every 3 hours PRN  tetracaine/benzocaine/butamben Spray 1 Spray(s) Topical every 3 hours PRN  thiamine 100 milliGRAM(s) Enteral Tube daily  vasopressin Infusion 0.03 Unit(s)/Min IV Continuous <Continuous>      ROS: 14 point ROS negative unless otherwise state in subjective    PHYSICAL EXAM:   Vital Signs:  Vital Signs Last 24 Hrs  T(C): 36.8 (07 Jan 2023 08:00), Max: 37.1 (06 Jan 2023 19:00)  T(F): 98.2 (07 Jan 2023 08:00), Max: 98.8 (06 Jan 2023 19:00)  HR: 96 (07 Jan 2023 08:00) (85 - 108)  BP: --  BP(mean): --  RR: 19 (07 Jan 2023 08:00) (12 - 24)  SpO2: 97% (07 Jan 2023 08:00) (94% - 100%)    Parameters below as of 07 Jan 2023 08:00  Patient On (Oxygen Delivery Method): room air      Daily     Daily     GENERAL: no acute distress  NEURO: able to tell name, states she is doing well   HEENT: NCAT, no conjunctival pallor appreciated  CHEST: no respiratory distress, no accessory muscle use  CARDIAC: regular rate, +S1/S2  ABDOMEN: soft, nontender, no rebound or guarding  EXTREMITIES: warm, well perfused  SKIN: no lesions noted    LABS:                        8.5    41.46 )-----------( 37       ( 07 Jan 2023 05:54 )             24.6     Mean Cell Volume: 87.9 fl (01-07-23 @ 05:54)    01-07    136  |  101  |  18  ----------------------------<  142<H>  3.7   |  20<L>  |  0.92    Ca    8.9      07 Jan 2023 05:54  Phos  3.5     01-07  Mg     2.2     01-07    TPro  6.5  /  Alb  3.4  /  TBili  23.1<H>  /  DBili  x   /  AST  136<H>  /  ALT  27  /  AlkPhos  221<H>  01-07    LIVER FUNCTIONS - ( 07 Jan 2023 05:54 )  Alb: 3.4 g/dL / Pro: 6.5 g/dL / ALK PHOS: 221 U/L / ALT: 27 U/L / AST: 136 U/L / GGT: x           PT/INR - ( 07 Jan 2023 05:54 )   PT: 24.9 sec;   INR: 2.15 ratio         PTT - ( 07 Jan 2023 05:54 )  PTT:49.0 sec    Amylase Serum--      Lipase serum--       [id number]        Imaging:           Gastroenterology/Hepatology Progress Note    Interval Events:   - no acute ON events     Allergies:  Macrobid (Rash)  Nexium (Stomach Upset)      Hospital Medications:  caspofungin IVPB      caspofungin IVPB 50 milliGRAM(s) IV Intermittent every 24 hours  cefTRIAXone   IVPB 1000 milliGRAM(s) IV Intermittent every 24 hours  chlorhexidine 2% Cloths 1 Application(s) Topical <User Schedule>  CRRT Treatment    <Continuous>  escitalopram 10 milliGRAM(s) Oral daily  folic acid 1 milliGRAM(s) Oral daily  HYDROmorphone  Injectable 0.25 milliGRAM(s) IV Push every 3 hours PRN  influenza   Vaccine 0.5 milliLiter(s) IntraMuscular once  levETIRAcetam  Solution 750 milliGRAM(s) Enteral Tube two times a day  levothyroxine Injectable 103 MICROGram(s) IV Push at bedtime  midodrine 20 milliGRAM(s) Oral every 8 hours  multivitamin/minerals/iron Oral Solution (CENTRUM) 15 milliLiter(s) Oral daily  norepinephrine Infusion 0.05 MICROgram(s)/kG/Min IV Continuous <Continuous>  pantoprazole   Suspension 40 milliGRAM(s) Oral every 24 hours  Phoxillum Filtration BK 4 / 2.5 5000 milliLiter(s) CRRT <Continuous>  Phoxillum Filtration BK 4 / 2.5 5000 milliLiter(s) CRRT <Continuous>  polyethylene glycol 3350 17 Gram(s) Oral every 12 hours  PrismaSOL Filtration BGK 4 / 2.5 5000 milliLiter(s) CRRT <Continuous>  rifAXIMin 550 milliGRAM(s) Oral every 12 hours  sodium chloride 0.65% Nasal 1 Spray(s) Both Nostrils every 3 hours PRN  tetracaine/benzocaine/butamben Spray 1 Spray(s) Topical every 3 hours PRN  thiamine 100 milliGRAM(s) Enteral Tube daily  vasopressin Infusion 0.03 Unit(s)/Min IV Continuous <Continuous>      ROS: 14 point ROS negative unless otherwise state in subjective    PHYSICAL EXAM:   Vital Signs:  Vital Signs Last 24 Hrs  T(C): 36.8 (07 Jan 2023 08:00), Max: 37.1 (06 Jan 2023 19:00)  T(F): 98.2 (07 Jan 2023 08:00), Max: 98.8 (06 Jan 2023 19:00)  HR: 96 (07 Jan 2023 08:00) (85 - 108)  BP: --  BP(mean): --  RR: 19 (07 Jan 2023 08:00) (12 - 24)  SpO2: 97% (07 Jan 2023 08:00) (94% - 100%)    Parameters below as of 07 Jan 2023 08:00  Patient On (Oxygen Delivery Method): room air      Daily     Daily     GENERAL: no acute distress  NEURO: able to tell name, states she is doing well   HEENT: NCAT, no conjunctival pallor appreciated  CHEST: no respiratory distress, no accessory muscle use  CARDIAC: regular rate, +S1/S2  ABDOMEN: soft, nontender, no rebound or guarding  EXTREMITIES: warm, well perfused  SKIN: no lesions noted    LABS:                        8.5    41.46 )-----------( 37       ( 07 Jan 2023 05:54 )             24.6     Mean Cell Volume: 87.9 fl (01-07-23 @ 05:54)    01-07    136  |  101  |  18  ----------------------------<  142<H>  3.7   |  20<L>  |  0.92    Ca    8.9      07 Jan 2023 05:54  Phos  3.5     01-07  Mg     2.2     01-07    TPro  6.5  /  Alb  3.4  /  TBili  23.1<H>  /  DBili  x   /  AST  136<H>  /  ALT  27  /  AlkPhos  221<H>  01-07    LIVER FUNCTIONS - ( 07 Jan 2023 05:54 )  Alb: 3.4 g/dL / Pro: 6.5 g/dL / ALK PHOS: 221 U/L / ALT: 27 U/L / AST: 136 U/L / GGT: x           PT/INR - ( 07 Jan 2023 05:54 )   PT: 24.9 sec;   INR: 2.15 ratio         PTT - ( 07 Jan 2023 05:54 )  PTT:49.0 sec    Amylase Serum--      Lipase serum--       [id number]        Imaging:           Gastroenterology/Hepatology Progress Note    Interval Events:   - no acute ON events   - awake but confused this AM  - having very thin watery brown stools via rectal tube  - complains of abdominal pain/distension    Allergies:  Macrobid (Rash)  Nexium (Stomach Upset)      Hospital Medications:  caspofungin IVPB      caspofungin IVPB 50 milliGRAM(s) IV Intermittent every 24 hours  cefTRIAXone   IVPB 1000 milliGRAM(s) IV Intermittent every 24 hours  chlorhexidine 2% Cloths 1 Application(s) Topical <User Schedule>  CRRT Treatment    <Continuous>  escitalopram 10 milliGRAM(s) Oral daily  folic acid 1 milliGRAM(s) Oral daily  HYDROmorphone  Injectable 0.25 milliGRAM(s) IV Push every 3 hours PRN  influenza   Vaccine 0.5 milliLiter(s) IntraMuscular once  levETIRAcetam  Solution 750 milliGRAM(s) Enteral Tube two times a day  levothyroxine Injectable 103 MICROGram(s) IV Push at bedtime  midodrine 20 milliGRAM(s) Oral every 8 hours  multivitamin/minerals/iron Oral Solution (CENTRUM) 15 milliLiter(s) Oral daily  norepinephrine Infusion 0.05 MICROgram(s)/kG/Min IV Continuous <Continuous>  pantoprazole   Suspension 40 milliGRAM(s) Oral every 24 hours  Phoxillum Filtration BK 4 / 2.5 5000 milliLiter(s) CRRT <Continuous>  Phoxillum Filtration BK 4 / 2.5 5000 milliLiter(s) CRRT <Continuous>  polyethylene glycol 3350 17 Gram(s) Oral every 12 hours  PrismaSOL Filtration BGK 4 / 2.5 5000 milliLiter(s) CRRT <Continuous>  rifAXIMin 550 milliGRAM(s) Oral every 12 hours  sodium chloride 0.65% Nasal 1 Spray(s) Both Nostrils every 3 hours PRN  tetracaine/benzocaine/butamben Spray 1 Spray(s) Topical every 3 hours PRN  thiamine 100 milliGRAM(s) Enteral Tube daily  vasopressin Infusion 0.03 Unit(s)/Min IV Continuous <Continuous>      ROS: 14 point ROS negative unless otherwise state in subjective, +fatigue, +generalized weakness    PHYSICAL EXAM:   Vital Signs:  Vital Signs Last 24 Hrs  T(C): 36.8 (07 Jan 2023 08:00), Max: 37.1 (06 Jan 2023 19:00)  T(F): 98.2 (07 Jan 2023 08:00), Max: 98.8 (06 Jan 2023 19:00)  HR: 96 (07 Jan 2023 08:00) (85 - 108)  BP: --  BP(mean): --  RR: 19 (07 Jan 2023 08:00) (12 - 24)  SpO2: 97% (07 Jan 2023 08:00) (94% - 100%)    Parameters below as of 07 Jan 2023 08:00  Patient On (Oxygen Delivery Method): room air      Daily     Daily     GENERAL: no acute distress  NEURO: able to tell name, states she is doing well, no asterixis  HEENT: NCAT, +scleral icterus  CHEST: no respiratory distress, no accessory muscle use, CTAB  CARDIAC: regular rate, +S1/S2  ABDOMEN: +BS, +distended and tympanic, soft, nontender, no rebound or guarding  EXTREMITIES: warm, well perfused, no edema  SKIN: +jaundice, +small scattered ecchymoses    LABS:                        8.5    41.46 )-----------( 37       ( 07 Jan 2023 05:54 )             24.6     Mean Cell Volume: 87.9 fl (01-07-23 @ 05:54)    01-07    136  |  101  |  18  ----------------------------<  142<H>  3.7   |  20<L>  |  0.92    Ca    8.9      07 Jan 2023 05:54  Phos  3.5     01-07  Mg     2.2     01-07    TPro  6.5  /  Alb  3.4  /  TBili  23.1<H>  /  DBili  x   /  AST  136<H>  /  ALT  27  /  AlkPhos  221<H>  01-07    LIVER FUNCTIONS - ( 07 Jan 2023 05:54 )  Alb: 3.4 g/dL / Pro: 6.5 g/dL / ALK PHOS: 221 U/L / ALT: 27 U/L / AST: 136 U/L / GGT: x           PT/INR - ( 07 Jan 2023 05:54 )   PT: 24.9 sec;   INR: 2.15 ratio         PTT - ( 07 Jan 2023 05:54 )  PTT:49.0 sec    Amylase Serum--      Lipase serum--       [id number]        Imaging:           Gastroenterology/Hepatology Progress Note    Interval Events:   - no acute ON events   - awake but confused this AM  - having very thin watery brown stools via rectal tube  - complains of abdominal pain/distension    Allergies:  Macrobid (Rash)  Nexium (Stomach Upset)      Hospital Medications:  caspofungin IVPB      caspofungin IVPB 50 milliGRAM(s) IV Intermittent every 24 hours  cefTRIAXone   IVPB 1000 milliGRAM(s) IV Intermittent every 24 hours  chlorhexidine 2% Cloths 1 Application(s) Topical <User Schedule>  CRRT Treatment    <Continuous>  escitalopram 10 milliGRAM(s) Oral daily  folic acid 1 milliGRAM(s) Oral daily  HYDROmorphone  Injectable 0.25 milliGRAM(s) IV Push every 3 hours PRN  influenza   Vaccine 0.5 milliLiter(s) IntraMuscular once  levETIRAcetam  Solution 750 milliGRAM(s) Enteral Tube two times a day  levothyroxine Injectable 103 MICROGram(s) IV Push at bedtime  midodrine 20 milliGRAM(s) Oral every 8 hours  multivitamin/minerals/iron Oral Solution (CENTRUM) 15 milliLiter(s) Oral daily  norepinephrine Infusion 0.05 MICROgram(s)/kG/Min IV Continuous <Continuous>  pantoprazole   Suspension 40 milliGRAM(s) Oral every 24 hours  Phoxillum Filtration BK 4 / 2.5 5000 milliLiter(s) CRRT <Continuous>  Phoxillum Filtration BK 4 / 2.5 5000 milliLiter(s) CRRT <Continuous>  polyethylene glycol 3350 17 Gram(s) Oral every 12 hours  PrismaSOL Filtration BGK 4 / 2.5 5000 milliLiter(s) CRRT <Continuous>  rifAXIMin 550 milliGRAM(s) Oral every 12 hours  sodium chloride 0.65% Nasal 1 Spray(s) Both Nostrils every 3 hours PRN  tetracaine/benzocaine/butamben Spray 1 Spray(s) Topical every 3 hours PRN  thiamine 100 milliGRAM(s) Enteral Tube daily  vasopressin Infusion 0.03 Unit(s)/Min IV Continuous <Continuous>      ROS: 14 point ROS negative unless otherwise state in subjective, +fatigue, +generalized weakness    PHYSICAL EXAM:   Vital Signs:  Vital Signs Last 24 Hrs  T(C): 36.8 (07 Jan 2023 08:00), Max: 37.1 (06 Jan 2023 19:00)  T(F): 98.2 (07 Jan 2023 08:00), Max: 98.8 (06 Jan 2023 19:00)  HR: 96 (07 Jan 2023 08:00) (85 - 108)  BP: --  BP(mean): --  RR: 19 (07 Jan 2023 08:00) (12 - 24)  SpO2: 97% (07 Jan 2023 08:00) (94% - 100%)    Parameters below as of 07 Jan 2023 08:00  Patient On (Oxygen Delivery Method): room air      Daily     Daily     GENERAL: no acute distress  NEURO: able to tell name, states she is doing well, no asterixis  HEENT: NCAT, +scleral icterus  CHEST: no respiratory distress, no accessory muscle use, CTAB  CARDIAC: regular rate, +S1/S2  ABDOMEN: +BS, +distended and tympanic, soft, nontender, no rebound or guarding  EXTREMITIES: warm, well perfused, no edema  SKIN: +jaundice, +small scattered ecchymoses    LABS:                        8.5    41.46 )-----------( 37       ( 07 Jan 2023 05:54 )             24.6     Mean Cell Volume: 87.9 fl (01-07-23 @ 05:54)    01-07    136  |  101  |  18  ----------------------------<  142<H>  3.7   |  20<L>  |  0.92    Ca    8.9      07 Jan 2023 05:54  Phos  3.5     01-07  Mg     2.2     01-07    TPro  6.5  /  Alb  3.4  /  TBili  23.1<H>  /  DBili  x   /  AST  136<H>  /  ALT  27  /  AlkPhos  221<H>  01-07    LIVER FUNCTIONS - ( 07 Jan 2023 05:54 )  Alb: 3.4 g/dL / Pro: 6.5 g/dL / ALK PHOS: 221 U/L / ALT: 27 U/L / AST: 136 U/L / GGT: x           PT/INR - ( 07 Jan 2023 05:54 )   PT: 24.9 sec;   INR: 2.15 ratio         PTT - ( 07 Jan 2023 05:54 )  PTT:49.0 sec    Amylase Serum--      Lipase serum--       [id number]        Imaging:           Gastroenterology/Hepatology Progress Note    Interval Events:   - no acute ON events   - awake but confused this AM  - having very thin watery brown stools via rectal tube  - complains of abdominal pain/distension    Allergies:  Macrobid (Rash)  Nexium (Stomach Upset)      Hospital Medications:  caspofungin IVPB      caspofungin IVPB 50 milliGRAM(s) IV Intermittent every 24 hours  cefTRIAXone   IVPB 1000 milliGRAM(s) IV Intermittent every 24 hours  chlorhexidine 2% Cloths 1 Application(s) Topical <User Schedule>  CRRT Treatment    <Continuous>  escitalopram 10 milliGRAM(s) Oral daily  folic acid 1 milliGRAM(s) Oral daily  HYDROmorphone  Injectable 0.25 milliGRAM(s) IV Push every 3 hours PRN  influenza   Vaccine 0.5 milliLiter(s) IntraMuscular once  levETIRAcetam  Solution 750 milliGRAM(s) Enteral Tube two times a day  levothyroxine Injectable 103 MICROGram(s) IV Push at bedtime  midodrine 20 milliGRAM(s) Oral every 8 hours  multivitamin/minerals/iron Oral Solution (CENTRUM) 15 milliLiter(s) Oral daily  norepinephrine Infusion 0.05 MICROgram(s)/kG/Min IV Continuous <Continuous>  pantoprazole   Suspension 40 milliGRAM(s) Oral every 24 hours  Phoxillum Filtration BK 4 / 2.5 5000 milliLiter(s) CRRT <Continuous>  Phoxillum Filtration BK 4 / 2.5 5000 milliLiter(s) CRRT <Continuous>  polyethylene glycol 3350 17 Gram(s) Oral every 12 hours  PrismaSOL Filtration BGK 4 / 2.5 5000 milliLiter(s) CRRT <Continuous>  rifAXIMin 550 milliGRAM(s) Oral every 12 hours  sodium chloride 0.65% Nasal 1 Spray(s) Both Nostrils every 3 hours PRN  tetracaine/benzocaine/butamben Spray 1 Spray(s) Topical every 3 hours PRN  thiamine 100 milliGRAM(s) Enteral Tube daily  vasopressin Infusion 0.03 Unit(s)/Min IV Continuous <Continuous>      ROS: 14 point ROS negative unless otherwise state in subjective, +fatigue, +generalized weakness    PHYSICAL EXAM:   Vital Signs:  Vital Signs Last 24 Hrs  T(C): 36.8 (07 Jan 2023 08:00), Max: 37.1 (06 Jan 2023 19:00)  T(F): 98.2 (07 Jan 2023 08:00), Max: 98.8 (06 Jan 2023 19:00)  HR: 96 (07 Jan 2023 08:00) (85 - 108)  BP: --  BP(mean): --  RR: 19 (07 Jan 2023 08:00) (12 - 24)  SpO2: 97% (07 Jan 2023 08:00) (94% - 100%)    Parameters below as of 07 Jan 2023 08:00  Patient On (Oxygen Delivery Method): room air      Daily     Daily     GENERAL: no acute distress  NEURO: able to tell name, states she is doing well, no asterixis  HEENT: NCAT, +scleral icterus  CHEST: no respiratory distress, no accessory muscle use, CTAB  CARDIAC: regular rate, +S1/S2  ABDOMEN: +BS, +distended and tympanic, soft, nontender, no rebound or guarding  EXTREMITIES: warm, well perfused, no edema  SKIN: +jaundice, +small scattered ecchymoses    LABS:                        8.5    41.46 )-----------( 37       ( 07 Jan 2023 05:54 )             24.6     Mean Cell Volume: 87.9 fl (01-07-23 @ 05:54)    01-07    136  |  101  |  18  ----------------------------<  142<H>  3.7   |  20<L>  |  0.92    Ca    8.9      07 Jan 2023 05:54  Phos  3.5     01-07  Mg     2.2     01-07    TPro  6.5  /  Alb  3.4  /  TBili  23.1<H>  /  DBili  x   /  AST  136<H>  /  ALT  27  /  AlkPhos  221<H>  01-07    LIVER FUNCTIONS - ( 07 Jan 2023 05:54 )  Alb: 3.4 g/dL / Pro: 6.5 g/dL / ALK PHOS: 221 U/L / ALT: 27 U/L / AST: 136 U/L / GGT: x           PT/INR - ( 07 Jan 2023 05:54 )   PT: 24.9 sec;   INR: 2.15 ratio         PTT - ( 07 Jan 2023 05:54 )  PTT:49.0 sec    Amylase Serum--      Lipase serum--       [id number]        Imaging:

## 2023-01-07 NOTE — PROGRESS NOTE ADULT - SUBJECTIVE AND OBJECTIVE BOX
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  Arthur ([phone]) and Daughter Taylor at [facility] provided additional history. Of note was recently admitted to  11/2022 for workup and eval of jaundice- during that hospitalization was found to have a UTI and dx with alc hep and started on steroids (was deemed a non-responder and steroids were subsequently stopped); s/p LVP at [place] 11/2022. Previously hospitalized in 2021 at [place] for ETOH detox. Went to ETOH rehab 08/2022 and was asked to leave the facility when she was COVID+; was sober for 1 week until she started drinking again. Had also attended a few AA meetings in the past. Transferred from [facility] 12/20 for further management of acute liver failure and liver transplant evaluation.     24 HOUR EVENTS:      Potential Discharge date: pending clinical stability  Education:  Medications  Plan of care:  See Below      MEDICATIONS  (STANDING):  caspofungin IVPB      caspofungin IVPB 50 milliGRAM(s) IV Intermittent every 24 hours  cefTRIAXone   IVPB 1000 milliGRAM(s) IV Intermittent every 24 hours  chlorhexidine 2% Cloths 1 Application(s) Topical <User Schedule>  CRRT Treatment    <Continuous>  escitalopram 10 milliGRAM(s) Oral daily  folic acid 1 milliGRAM(s) Oral daily  influenza   Vaccine 0.5 milliLiter(s) IntraMuscular once  levETIRAcetam  Solution 750 milliGRAM(s) Enteral Tube two times a day  levothyroxine Injectable 103 MICROGram(s) IV Push at bedtime  midodrine 20 milliGRAM(s) Oral every 8 hours  multivitamin/minerals/iron Oral Solution (CENTRUM) 15 milliLiter(s) Oral daily  norepinephrine Infusion 0.05 MICROgram(s)/kG/Min (6.21 mL/Hr) IV Continuous <Continuous>  pantoprazole   Suspension 40 milliGRAM(s) Oral every 24 hours  Phoxillum Filtration BK 4 / 2.5 5000 milliLiter(s) (1000 mL/Hr) CRRT <Continuous>  Phoxillum Filtration BK 4 / 2.5 5000 milliLiter(s) (800 mL/Hr) CRRT <Continuous>  polyethylene glycol 3350 17 Gram(s) Oral every 12 hours  PrismaSOL Filtration BGK 4 / 2.5 5000 milliLiter(s) (200 mL/Hr) CRRT <Continuous>  rifAXIMin 550 milliGRAM(s) Oral every 12 hours  thiamine 100 milliGRAM(s) Enteral Tube daily  vancomycin    Solution 125 milliGRAM(s) Oral every 6 hours  vasopressin Infusion 0.03 Unit(s)/Min (4.5 mL/Hr) IV Continuous <Continuous>    MEDICATIONS  (PRN):  HYDROmorphone  Injectable 0.25 milliGRAM(s) IV Push every 3 hours PRN Severe Pain (7 - 10)  sodium chloride 0.65% Nasal 1 Spray(s) Both Nostrils every 3 hours PRN Nasal Congestion  tetracaine/benzocaine/butamben Spray 1 Spray(s) Topical every 3 hours PRN for ngt discomfort      PAST MEDICAL & SURGICAL HISTORY:  HTN (hypertension)  off medication for over one year--states BP has been normal      UTI (urinary tract infection)  currently being treated--will complete antibiotics 3/8/2022      Intracranial tumor      GERD (gastroesophageal reflux disease)      Eczema      Thyroid cancer  surgery. radiation, Radioactive iodine      COVID-19 virus infection  11/2021--recovered at home      H/O total thyroidectomy  age 21&#x27;s for Thyroid cancer, postop complication arterial bleed, second surgery      History of tonsillectomy  age 5      History of appendectomy  age 5          Vital Signs Last 24 Hrs  T(C): 37 (07 Jan 2023 12:00), Max: 37.1 (06 Jan 2023 19:00)  T(F): 98.6 (07 Jan 2023 12:00), Max: 98.8 (06 Jan 2023 19:00)  HR: 98 (07 Jan 2023 15:00) (86 - 108)  BP: --  BP(mean): --  RR: 33 (07 Jan 2023 15:00) (12 - 33)  SpO2: 97% (07 Jan 2023 15:00) (95% - 100%)    Parameters below as of 07 Jan 2023 08:00  Patient On (Oxygen Delivery Method): room air        I&O's Summary    06 Jan 2023 07:01  -  07 Jan 2023 07:00  --------------------------------------------------------  IN: 2623.9 mL / OUT: 2811 mL / NET: -187.1 mL    07 Jan 2023 07:01  -  07 Jan 2023 15:28  --------------------------------------------------------  IN: 712 mL / OUT: 720 mL / NET: -8 mL                              8.6    41.40 )-----------( 47       ( 07 Jan 2023 12:40 )             24.7     01-07    136  |  102  |  18  ----------------------------<  138<H>  3.7   |  19<L>  |  0.87    Ca    9.1      07 Jan 2023 12:40  Phos  3.5     01-07  Mg     2.3     01-07    TPro  6.6  /  Alb  3.4  /  TBili  23.3<H>  /  DBili  x   /  AST  139<H>  /  ALT  29  /  AlkPhos  226<H>  01-07          Culture - Fungal, Body Fluid (collected 01-04-23 @ 16:28)  Source: Peritoneal Peritoneal Fluid  Preliminary Report (01-07-23 @ 15:02):    Culture is being performed. Fungal cultures are held for 4 weeks.    Culture - Body Fluid with Gram Stain (collected 01-04-23 @ 16:28)  Source: Peritoneal Peritoneal Fluid  Gram Stain (01-04-23 @ 23:22):    polymorphonuclear leukocytes seen    No organisms seen    by cytocentrifuge  Preliminary Report (01-05-23 @ 16:14):    No growth    Culture - Urine (collected 01-03-23 @ 18:30)  Source: Catheterized Catheterized  Final Report (01-04-23 @ 19:30):    No growth                        Review of systems  AMS, unable to fully assess      PHYSICAL EXAM:  Constitutional: NAD, AMS   Eyes: icteric, PERRLA  ENMT: nc/at, no thrush  Neck: supple, central line R IJ  Cardiovascular: RRR  Gastrointestinal: abdomen distended, improved from yesterday.  rectal tube in place  Genitourinary: anuric  Extremities: SCD's in place and working bilaterally, no edema  Vascular: Palpable dp pulses bilaterally.   Neurological: following commands, mentation improving  Skin:  jaundiced, diffuse rash across abdomen and flank. 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  Arthur ([phone]) and Daughter Taylor at [facility] provided additional history. Of note was recently admitted to  11/2022 for workup and eval of jaundice- during that hospitalization was found to have a UTI and dx with alc hep and started on steroids (was deemed a non-responder and steroids were subsequently stopped); s/p LVP at [place] 11/2022. Previously hospitalized in 2021 at [place] for ETOH detox. Went to ETOH rehab 08/2022 and was asked to leave the facility when she was COVID+; was sober for 1 week until she started drinking again. Had also attended a few AA meetings in the past. Transferred from [facility] 12/20 for further management of acute liver failure and liver transplant evaluation.     24 HOUR EVENTS:      Potential Discharge date: pending clinical stability  Education:  Medications  Plan of care:  See Below      MEDICATIONS  (STANDING):  caspofungin IVPB      caspofungin IVPB 50 milliGRAM(s) IV Intermittent every 24 hours  cefTRIAXone   IVPB 1000 milliGRAM(s) IV Intermittent every 24 hours  chlorhexidine 2% Cloths 1 Application(s) Topical <User Schedule>  CRRT Treatment    <Continuous>  escitalopram 10 milliGRAM(s) Oral daily  folic acid 1 milliGRAM(s) Oral daily  influenza   Vaccine 0.5 milliLiter(s) IntraMuscular once  levETIRAcetam  Solution 750 milliGRAM(s) Enteral Tube two times a day  levothyroxine Injectable 103 MICROGram(s) IV Push at bedtime  midodrine 20 milliGRAM(s) Oral every 8 hours  multivitamin/minerals/iron Oral Solution (CENTRUM) 15 milliLiter(s) Oral daily  norepinephrine Infusion 0.05 MICROgram(s)/kG/Min (6.21 mL/Hr) IV Continuous <Continuous>  pantoprazole   Suspension 40 milliGRAM(s) Oral every 24 hours  Phoxillum Filtration BK 4 / 2.5 5000 milliLiter(s) (1000 mL/Hr) CRRT <Continuous>  Phoxillum Filtration BK 4 / 2.5 5000 milliLiter(s) (800 mL/Hr) CRRT <Continuous>  polyethylene glycol 3350 17 Gram(s) Oral every 12 hours  PrismaSOL Filtration BGK 4 / 2.5 5000 milliLiter(s) (200 mL/Hr) CRRT <Continuous>  rifAXIMin 550 milliGRAM(s) Oral every 12 hours  thiamine 100 milliGRAM(s) Enteral Tube daily  vancomycin    Solution 125 milliGRAM(s) Oral every 6 hours  vasopressin Infusion 0.03 Unit(s)/Min (4.5 mL/Hr) IV Continuous <Continuous>    MEDICATIONS  (PRN):  HYDROmorphone  Injectable 0.25 milliGRAM(s) IV Push every 3 hours PRN Severe Pain (7 - 10)  sodium chloride 0.65% Nasal 1 Spray(s) Both Nostrils every 3 hours PRN Nasal Congestion  tetracaine/benzocaine/butamben Spray 1 Spray(s) Topical every 3 hours PRN for ngt discomfort      PAST MEDICAL & SURGICAL HISTORY:  HTN (hypertension)  off medication for over one year--states BP has been normal      UTI (urinary tract infection)  currently being treated--will complete antibiotics 3/8/2022      Intracranial tumor      GERD (gastroesophageal reflux disease)      Eczema      Thyroid cancer  surgery. radiation, Radioactive iodine      COVID-19 virus infection  11/2021--recovered at home      H/O total thyroidectomy  age 21&#x27;s for Thyroid cancer, postop complication arterial bleed, second surgery      History of tonsillectomy  age 5      History of appendectomy  age 5          Vital Signs Last 24 Hrs  T(C): 37 (07 Jan 2023 12:00), Max: 37.1 (06 Jan 2023 19:00)  T(F): 98.6 (07 Jan 2023 12:00), Max: 98.8 (06 Jan 2023 19:00)  HR: 98 (07 Jan 2023 15:00) (86 - 108)  BP: --  BP(mean): --  RR: 33 (07 Jan 2023 15:00) (12 - 33)  SpO2: 97% (07 Jan 2023 15:00) (95% - 100%)    Parameters below as of 07 Jan 2023 08:00  Patient On (Oxygen Delivery Method): room air        I&O's Summary    06 Jan 2023 07:01  -  07 Jan 2023 07:00  --------------------------------------------------------  IN: 2623.9 mL / OUT: 2811 mL / NET: -187.1 mL    07 Jan 2023 07:01  -  07 Jan 2023 15:28  --------------------------------------------------------  IN: 712 mL / OUT: 720 mL / NET: -8 mL                              8.6    41.40 )-----------( 47       ( 07 Jan 2023 12:40 )             24.7     01-07    136  |  102  |  18  ----------------------------<  138<H>  3.7   |  19<L>  |  0.87    Ca    9.1      07 Jan 2023 12:40  Phos  3.5     01-07  Mg     2.3     01-07    TPro  6.6  /  Alb  3.4  /  TBili  23.3<H>  /  DBili  x   /  AST  139<H>  /  ALT  29  /  AlkPhos  226<H>  01-07          Culture - Fungal, Body Fluid (collected 01-04-23 @ 16:28)  Source: Peritoneal Peritoneal Fluid  Preliminary Report (01-07-23 @ 15:02):    Culture is being performed. Fungal cultures are held for 4 weeks.    Culture - Body Fluid with Gram Stain (collected 01-04-23 @ 16:28)  Source: Peritoneal Peritoneal Fluid  Gram Stain (01-04-23 @ 23:22):    polymorphonuclear leukocytes seen    No organisms seen    by cytocentrifuge  Preliminary Report (01-05-23 @ 16:14):    No growth    Culture - Urine (collected 01-03-23 @ 18:30)  Source: Catheterized Catheterized  Final Report (01-04-23 @ 19:30):    No growth                        Review of systems  AMS, unable to fully assess      PHYSICAL EXAM:  Constitutional: NAD, AMS   Eyes: icteric, PERRLA  ENMT: nc/at, no thrush  Neck: supple, central line R IJ  Cardiovascular: RRR  Gastrointestinal: abdomen distended, improved from yesterday.  rectal tube in place  Genitourinary: anuric  Extremities: SCD's in place and working bilaterally, no edema  Vascular: Palpable dp pulses bilaterally.   Neurological: following commands, mentation improving  Skin:  jaundiced, diffuse rash across abdomen and flank. 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  Arthur ([phone]) and Daughter Taylor at [facility] provided additional history. Of note was recently admitted to  11/2022 for workup and eval of jaundice- during that hospitalization was found to have a UTI and dx with alc hep and started on steroids (was deemed a non-responder and steroids were subsequently stopped); s/p LVP at [place] 11/2022. Previously hospitalized in 2021 at [place] for ETOH detox. Went to ETOH rehab 08/2022 and was asked to leave the facility when she was COVID+; was sober for 1 week until she started drinking again. Had also attended a few AA meetings in the past. Transferred from [facility] 12/20 for further management of acute liver failure and liver transplant evaluation.     24 HOUR EVENTS:      Potential Discharge date: pending clinical stability  Education:  Medications  Plan of care:  See Below      MEDICATIONS  (STANDING):  caspofungin IVPB      caspofungin IVPB 50 milliGRAM(s) IV Intermittent every 24 hours  cefTRIAXone   IVPB 1000 milliGRAM(s) IV Intermittent every 24 hours  chlorhexidine 2% Cloths 1 Application(s) Topical <User Schedule>  CRRT Treatment    <Continuous>  escitalopram 10 milliGRAM(s) Oral daily  folic acid 1 milliGRAM(s) Oral daily  influenza   Vaccine 0.5 milliLiter(s) IntraMuscular once  levETIRAcetam  Solution 750 milliGRAM(s) Enteral Tube two times a day  levothyroxine Injectable 103 MICROGram(s) IV Push at bedtime  midodrine 20 milliGRAM(s) Oral every 8 hours  multivitamin/minerals/iron Oral Solution (CENTRUM) 15 milliLiter(s) Oral daily  norepinephrine Infusion 0.05 MICROgram(s)/kG/Min (6.21 mL/Hr) IV Continuous <Continuous>  pantoprazole   Suspension 40 milliGRAM(s) Oral every 24 hours  Phoxillum Filtration BK 4 / 2.5 5000 milliLiter(s) (1000 mL/Hr) CRRT <Continuous>  Phoxillum Filtration BK 4 / 2.5 5000 milliLiter(s) (800 mL/Hr) CRRT <Continuous>  polyethylene glycol 3350 17 Gram(s) Oral every 12 hours  PrismaSOL Filtration BGK 4 / 2.5 5000 milliLiter(s) (200 mL/Hr) CRRT <Continuous>  rifAXIMin 550 milliGRAM(s) Oral every 12 hours  thiamine 100 milliGRAM(s) Enteral Tube daily  vancomycin    Solution 125 milliGRAM(s) Oral every 6 hours  vasopressin Infusion 0.03 Unit(s)/Min (4.5 mL/Hr) IV Continuous <Continuous>    MEDICATIONS  (PRN):  HYDROmorphone  Injectable 0.25 milliGRAM(s) IV Push every 3 hours PRN Severe Pain (7 - 10)  sodium chloride 0.65% Nasal 1 Spray(s) Both Nostrils every 3 hours PRN Nasal Congestion  tetracaine/benzocaine/butamben Spray 1 Spray(s) Topical every 3 hours PRN for ngt discomfort      PAST MEDICAL & SURGICAL HISTORY:  HTN (hypertension)  off medication for over one year--states BP has been normal      UTI (urinary tract infection)  currently being treated--will complete antibiotics 3/8/2022      Intracranial tumor      GERD (gastroesophageal reflux disease)      Eczema      Thyroid cancer  surgery. radiation, Radioactive iodine      COVID-19 virus infection  11/2021--recovered at home      H/O total thyroidectomy  age 21&#x27;s for Thyroid cancer, postop complication arterial bleed, second surgery      History of tonsillectomy  age 5      History of appendectomy  age 5          Vital Signs Last 24 Hrs  T(C): 37 (07 Jan 2023 12:00), Max: 37.1 (06 Jan 2023 19:00)  T(F): 98.6 (07 Jan 2023 12:00), Max: 98.8 (06 Jan 2023 19:00)  HR: 98 (07 Jan 2023 15:00) (86 - 108)  BP: --  BP(mean): --  RR: 33 (07 Jan 2023 15:00) (12 - 33)  SpO2: 97% (07 Jan 2023 15:00) (95% - 100%)    Parameters below as of 07 Jan 2023 08:00  Patient On (Oxygen Delivery Method): room air        I&O's Summary    06 Jan 2023 07:01  -  07 Jan 2023 07:00  --------------------------------------------------------  IN: 2623.9 mL / OUT: 2811 mL / NET: -187.1 mL    07 Jan 2023 07:01  -  07 Jan 2023 15:28  --------------------------------------------------------  IN: 712 mL / OUT: 720 mL / NET: -8 mL                              8.6    41.40 )-----------( 47       ( 07 Jan 2023 12:40 )             24.7     01-07    136  |  102  |  18  ----------------------------<  138<H>  3.7   |  19<L>  |  0.87    Ca    9.1      07 Jan 2023 12:40  Phos  3.5     01-07  Mg     2.3     01-07    TPro  6.6  /  Alb  3.4  /  TBili  23.3<H>  /  DBili  x   /  AST  139<H>  /  ALT  29  /  AlkPhos  226<H>  01-07          Culture - Fungal, Body Fluid (collected 01-04-23 @ 16:28)  Source: Peritoneal Peritoneal Fluid  Preliminary Report (01-07-23 @ 15:02):    Culture is being performed. Fungal cultures are held for 4 weeks.    Culture - Body Fluid with Gram Stain (collected 01-04-23 @ 16:28)  Source: Peritoneal Peritoneal Fluid  Gram Stain (01-04-23 @ 23:22):    polymorphonuclear leukocytes seen    No organisms seen    by cytocentrifuge  Preliminary Report (01-05-23 @ 16:14):    No growth    Culture - Urine (collected 01-03-23 @ 18:30)  Source: Catheterized Catheterized  Final Report (01-04-23 @ 19:30):    No growth                        Review of systems  AMS, unable to fully assess      PHYSICAL EXAM:  Constitutional: NAD, AMS   Eyes: icteric, PERRLA  ENMT: nc/at, no thrush  Neck: supple, central line R IJ  Cardiovascular: RRR  Gastrointestinal: abdomen distended, improved from yesterday.  rectal tube in place  Genitourinary: anuric  Extremities: SCD's in place and working bilaterally, no edema  Vascular: Palpable dp pulses bilaterally.   Neurological: following commands, mentation improving  Skin:  jaundiced, diffuse rash across abdomen and flank. No ulcerations, lesions  Musculoskeletal: moving extremities  Psychiatric: calm       Transplant Surgery - Multidisciplinary Progress Note  --------------------------------------------------------------  Present: Seen and examined with surgeon Dr. Oconnor, Hepatologist Dr Goldstein, ANJEL Espinoza and unit RN during am rounds. Disciplines not in attendance will be notified of plan.      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  Arthur ([phone]) and Daughter Taylor at [facility] provided additional history. Of note was recently admitted to  11/2022 for workup and eval of jaundice- during that hospitalization was found to have a UTI and dx with alc hep and started on steroids (was deemed a non-responder and steroids were subsequently stopped); s/p LVP at [place] 11/2022. Previously hospitalized in 2021 at [place] for ETOH detox. Went to ETOH rehab 08/2022 and was asked to leave the facility when she was COVID+; was sober for 1 week until she started drinking again. Had also attended a few AA meetings in the past. Transferred from [facility] 12/20 for further management of acute liver failure and liver transplant evaluation.     24 HOUR EVENTS:  - watery diarrhea; decrease miralax yest  -  remains on pressors vaso/levo  - leukocytosis     Potential Discharge date: pending clinical stability  Education:  Medications  Plan of care:  See Below      MEDICATIONS  (STANDING):  caspofungin IVPB      caspofungin IVPB 50 milliGRAM(s) IV Intermittent every 24 hours  cefTRIAXone   IVPB 1000 milliGRAM(s) IV Intermittent every 24 hours  chlorhexidine 2% Cloths 1 Application(s) Topical <User Schedule>  CRRT Treatment    <Continuous>  escitalopram 10 milliGRAM(s) Oral daily  folic acid 1 milliGRAM(s) Oral daily  influenza   Vaccine 0.5 milliLiter(s) IntraMuscular once  levETIRAcetam  Solution 750 milliGRAM(s) Enteral Tube two times a day  levothyroxine Injectable 103 MICROGram(s) IV Push at bedtime  midodrine 20 milliGRAM(s) Oral every 8 hours  multivitamin/minerals/iron Oral Solution (CENTRUM) 15 milliLiter(s) Oral daily  norepinephrine Infusion 0.05 MICROgram(s)/kG/Min (6.21 mL/Hr) IV Continuous <Continuous>  pantoprazole   Suspension 40 milliGRAM(s) Oral every 24 hours  Phoxillum Filtration BK 4 / 2.5 5000 milliLiter(s) (1000 mL/Hr) CRRT <Continuous>  Phoxillum Filtration BK 4 / 2.5 5000 milliLiter(s) (800 mL/Hr) CRRT <Continuous>  polyethylene glycol 3350 17 Gram(s) Oral every 12 hours  PrismaSOL Filtration BGK 4 / 2.5 5000 milliLiter(s) (200 mL/Hr) CRRT <Continuous>  rifAXIMin 550 milliGRAM(s) Oral every 12 hours  thiamine 100 milliGRAM(s) Enteral Tube daily  vancomycin    Solution 125 milliGRAM(s) Oral every 6 hours  vasopressin Infusion 0.03 Unit(s)/Min (4.5 mL/Hr) IV Continuous <Continuous>    MEDICATIONS  (PRN):  HYDROmorphone  Injectable 0.25 milliGRAM(s) IV Push every 3 hours PRN Severe Pain (7 - 10)  sodium chloride 0.65% Nasal 1 Spray(s) Both Nostrils every 3 hours PRN Nasal Congestion  tetracaine/benzocaine/butamben Spray 1 Spray(s) Topical every 3 hours PRN for ngt discomfort      PAST MEDICAL & SURGICAL HISTORY:  HTN (hypertension)  UTI (urinary tract infection)  Intracranial tumor  GERD (gastroesophageal reflux disease)  Eczema  Thyroid cancer  COVID-19 virus infection  11/2021--recovered at home  H/O total thyroidectomy  History of tonsillectomy age 5  History of appendectomy    Vital Signs Last 24 Hrs  T(C): 37 (07 Jan 2023 12:00), Max: 37.1 (06 Jan 2023 19:00)  T(F): 98.6 (07 Jan 2023 12:00), Max: 98.8 (06 Jan 2023 19:00)  HR: 98 (07 Jan 2023 15:00) (86 - 108)  BP: --  BP(mean): --  RR: 33 (07 Jan 2023 15:00) (12 - 33)  SpO2: 97% (07 Jan 2023 15:00) (95% - 100%)    Parameters below as of 07 Jan 2023 08:00  Patient On (Oxygen Delivery Method): room air    I&O's Summary    06 Jan 2023 07:01  -  07 Jan 2023 07:00  --------------------------------------------------------  IN: 2623.9 mL / OUT: 2811 mL / NET: -187.1 mL    07 Jan 2023 07:01  -  07 Jan 2023 15:28  --------------------------------------------------------  IN: 712 mL / OUT: 720 mL / NET: -8 mL                         8.6    41.40 )-----------( 47       ( 07 Jan 2023 12:40 )             24.7     01-07    136  |  102  |  18  ----------------------------<  138<H>  3.7   |  19<L>  |  0.87    Ca    9.1      07 Jan 2023 12:40  Phos  3.5     01-07  Mg     2.3     01-07    TPro  6.6  /  Alb  3.4  /  TBili  23.3<H>  /  DBili  x   /  AST  139<H>  /  ALT  29  /  AlkPhos  226<H>  01-07    Culture - Fungal, Body Fluid (collected 01-04-23 @ 16:28)  Source: Peritoneal Peritoneal Fluid  Preliminary Report (01-07-23 @ 15:02):    Culture is being performed. Fungal cultures are held for 4 weeks.    Culture - Body Fluid with Gram Stain (collected 01-04-23 @ 16:28)  Source: Peritoneal Peritoneal Fluid  Gram Stain (01-04-23 @ 23:22):    polymorphonuclear leukocytes seen    No organisms seen    by cytocentrifuge  Preliminary Report (01-05-23 @ 16:14):    No growth    Culture - Urine (collected 01-03-23 @ 18:30)  Source: Catheterized Catheterized  Final Report (01-04-23 @ 19:30):    No growth    Review of systems  AMS, unable to fully assess      PHYSICAL EXAM:  Constitutional: NAD, AMS   Eyes: icteric, PERRLA  ENMT: nc/at, no thrush  Neck: supple, central line R IJ  Cardiovascular: RRR  Gastrointestinal: abdomen distended, soft;  rectal tube in place  Genitourinary: anuric  Extremities: SCD's in place and working bilaterally, no edema  Vascular: Palpable dp pulses bilaterally.   Neurological: following commands, mentation improving  Skin:  jaundiced, diffuse rash across abdomen and flank. No ulcerations, lesions  Musculoskeletal: moving extremities  Psychiatric: calm       Transplant Surgery - Multidisciplinary Progress Note  --------------------------------------------------------------  Present: Seen and examined with surgeon Dr. Oconnor, Hepatologist Dr Goldstein, ANJEL Espinoza and unit RN during am rounds. Disciplines not in attendance will be notified of plan.      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  Arthur ([phone]) and Daughter Taylor at [facility] provided additional history. Of note was recently admitted to  11/2022 for workup and eval of jaundice- during that hospitalization was found to have a UTI and dx with alc hep and started on steroids (was deemed a non-responder and steroids were subsequently stopped); s/p LVP at [place] 11/2022. Previously hospitalized in 2021 at [facility] for ETOH detox. Went to ETOH rehab 08/2022 and was asked to leave the facility when she was COVID+; was sober for 1 week until she started drinking again. Had also attended a few AA meetings in the past. Transferred from [facility] 12/20 for further management of acute liver failure and liver transplant evaluation.     24 HOUR EVENTS:  - watery diarrhea; decrease miralax yest  -  remains on pressors vaso/levo  - leukocytosis     Potential Discharge date: pending clinical stability  Education:  Medications  Plan of care:  See Below      MEDICATIONS  (STANDING):  caspofungin IVPB      caspofungin IVPB 50 milliGRAM(s) IV Intermittent every 24 hours  cefTRIAXone   IVPB 1000 milliGRAM(s) IV Intermittent every 24 hours  chlorhexidine 2% Cloths 1 Application(s) Topical <User Schedule>  CRRT Treatment    <Continuous>  escitalopram 10 milliGRAM(s) Oral daily  folic acid 1 milliGRAM(s) Oral daily  influenza   Vaccine 0.5 milliLiter(s) IntraMuscular once  levETIRAcetam  Solution 750 milliGRAM(s) Enteral Tube two times a day  levothyroxine Injectable 103 MICROGram(s) IV Push at bedtime  midodrine 20 milliGRAM(s) Oral every 8 hours  multivitamin/minerals/iron Oral Solution (CENTRUM) 15 milliLiter(s) Oral daily  norepinephrine Infusion 0.05 MICROgram(s)/kG/Min (6.21 mL/Hr) IV Continuous <Continuous>  pantoprazole   Suspension 40 milliGRAM(s) Oral every 24 hours  Phoxillum Filtration BK 4 / 2.5 5000 milliLiter(s) (1000 mL/Hr) CRRT <Continuous>  Phoxillum Filtration BK 4 / 2.5 5000 milliLiter(s) (800 mL/Hr) CRRT <Continuous>  polyethylene glycol 3350 17 Gram(s) Oral every 12 hours  PrismaSOL Filtration BGK 4 / 2.5 5000 milliLiter(s) (200 mL/Hr) CRRT <Continuous>  rifAXIMin 550 milliGRAM(s) Oral every 12 hours  thiamine 100 milliGRAM(s) Enteral Tube daily  vancomycin    Solution 125 milliGRAM(s) Oral every 6 hours  vasopressin Infusion 0.03 Unit(s)/Min (4.5 mL/Hr) IV Continuous <Continuous>    MEDICATIONS  (PRN):  HYDROmorphone  Injectable 0.25 milliGRAM(s) IV Push every 3 hours PRN Severe Pain (7 - 10)  sodium chloride 0.65% Nasal 1 Spray(s) Both Nostrils every 3 hours PRN Nasal Congestion  tetracaine/benzocaine/butamben Spray 1 Spray(s) Topical every 3 hours PRN for ngt discomfort      PAST MEDICAL & SURGICAL HISTORY:  HTN (hypertension)  UTI (urinary tract infection)  Intracranial tumor  GERD (gastroesophageal reflux disease)  Eczema  Thyroid cancer  COVID-19 virus infection  11/2021--recovered at home  H/O total thyroidectomy  History of tonsillectomy age 5  History of appendectomy    Vital Signs Last 24 Hrs  T(C): 37 (07 Jan 2023 12:00), Max: 37.1 (06 Jan 2023 19:00)  T(F): 98.6 (07 Jan 2023 12:00), Max: 98.8 (06 Jan 2023 19:00)  HR: 98 (07 Jan 2023 15:00) (86 - 108)  BP: --  BP(mean): --  RR: 33 (07 Jan 2023 15:00) (12 - 33)  SpO2: 97% (07 Jan 2023 15:00) (95% - 100%)    Parameters below as of 07 Jan 2023 08:00  Patient On (Oxygen Delivery Method): room air    I&O's Summary    06 Jan 2023 07:01  -  07 Jan 2023 07:00  --------------------------------------------------------  IN: 2623.9 mL / OUT: 2811 mL / NET: -187.1 mL    07 Jan 2023 07:01  -  07 Jan 2023 15:28  --------------------------------------------------------  IN: 712 mL / OUT: 720 mL / NET: -8 mL                         8.6    41.40 )-----------( 47       ( 07 Jan 2023 12:40 )             24.7     01-07    136  |  102  |  18  ----------------------------<  138<H>  3.7   |  19<L>  |  0.87    Ca    9.1      07 Jan 2023 12:40  Phos  3.5     01-07  Mg     2.3     01-07    TPro  6.6  /  Alb  3.4  /  TBili  23.3<H>  /  DBili  x   /  AST  139<H>  /  ALT  29  /  AlkPhos  226<H>  01-07    Culture - Fungal, Body Fluid (collected 01-04-23 @ 16:28)  Source: Peritoneal Peritoneal Fluid  Preliminary Report (01-07-23 @ 15:02):    Culture is being performed. Fungal cultures are held for 4 weeks.    Culture - Body Fluid with Gram Stain (collected 01-04-23 @ 16:28)  Source: Peritoneal Peritoneal Fluid  Gram Stain (01-04-23 @ 23:22):    polymorphonuclear leukocytes seen    No organisms seen    by cytocentrifuge  Preliminary Report (01-05-23 @ 16:14):    No growth    Culture - Urine (collected 01-03-23 @ 18:30)  Source: Catheterized Catheterized  Final Report (01-04-23 @ 19:30):    No growth    Review of systems  AMS, unable to fully assess      PHYSICAL EXAM:  Constitutional: NAD, AMS   Eyes: icteric, PERRLA  ENMT: nc/at, no thrush  Neck: supple, central line R IJ  Cardiovascular: RRR  Gastrointestinal: abdomen distended, soft;  rectal tube in place  Genitourinary: anuric  Extremities: SCD's in place and working bilaterally, no edema  Vascular: Palpable dp pulses bilaterally.   Neurological: following commands, mentation improving  Skin:  jaundiced, diffuse rash across abdomen and flank. No ulcerations, lesions  Musculoskeletal: moving extremities  Psychiatric: calm       Transplant Surgery - Multidisciplinary Progress Note  --------------------------------------------------------------  Present: Seen and examined with surgeon Dr. Oconnor, Hepatologist Dr Goldstein, ANJEL Espinoza and unit RN during am rounds. Disciplines not in attendance will be notified of plan.      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  Arthur ([phone]) and Daughter Taylor at [facility] provided additional history. Of note was recently admitted to  11/2022 for workup and eval of jaundice- during that hospitalization was found to have a UTI and dx with alc hep and started on steroids (was deemed a non-responder and steroids were subsequently stopped); s/p LVP at [place] 11/2022. Previously hospitalized in 2021 at [place] for ETOH detox. Went to ETOH rehab 08/2022 and was asked to leave the facility when she was COVID+; was sober for 1 week until she started drinking again. Had also attended a few AA meetings in the past. Transferred from [facility] 12/20 for further management of acute liver failure and liver transplant evaluation.     24 HOUR EVENTS:  - watery diarrhea; decrease miralax yest  -  remains on pressors vaso/levo  - leukocytosis     Potential Discharge date: pending clinical stability  Education:  Medications  Plan of care:  See Below      MEDICATIONS  (STANDING):  caspofungin IVPB      caspofungin IVPB 50 milliGRAM(s) IV Intermittent every 24 hours  cefTRIAXone   IVPB 1000 milliGRAM(s) IV Intermittent every 24 hours  chlorhexidine 2% Cloths 1 Application(s) Topical <User Schedule>  CRRT Treatment    <Continuous>  escitalopram 10 milliGRAM(s) Oral daily  folic acid 1 milliGRAM(s) Oral daily  influenza   Vaccine 0.5 milliLiter(s) IntraMuscular once  levETIRAcetam  Solution 750 milliGRAM(s) Enteral Tube two times a day  levothyroxine Injectable 103 MICROGram(s) IV Push at bedtime  midodrine 20 milliGRAM(s) Oral every 8 hours  multivitamin/minerals/iron Oral Solution (CENTRUM) 15 milliLiter(s) Oral daily  norepinephrine Infusion 0.05 MICROgram(s)/kG/Min (6.21 mL/Hr) IV Continuous <Continuous>  pantoprazole   Suspension 40 milliGRAM(s) Oral every 24 hours  Phoxillum Filtration BK 4 / 2.5 5000 milliLiter(s) (1000 mL/Hr) CRRT <Continuous>  Phoxillum Filtration BK 4 / 2.5 5000 milliLiter(s) (800 mL/Hr) CRRT <Continuous>  polyethylene glycol 3350 17 Gram(s) Oral every 12 hours  PrismaSOL Filtration BGK 4 / 2.5 5000 milliLiter(s) (200 mL/Hr) CRRT <Continuous>  rifAXIMin 550 milliGRAM(s) Oral every 12 hours  thiamine 100 milliGRAM(s) Enteral Tube daily  vancomycin    Solution 125 milliGRAM(s) Oral every 6 hours  vasopressin Infusion 0.03 Unit(s)/Min (4.5 mL/Hr) IV Continuous <Continuous>    MEDICATIONS  (PRN):  HYDROmorphone  Injectable 0.25 milliGRAM(s) IV Push every 3 hours PRN Severe Pain (7 - 10)  sodium chloride 0.65% Nasal 1 Spray(s) Both Nostrils every 3 hours PRN Nasal Congestion  tetracaine/benzocaine/butamben Spray 1 Spray(s) Topical every 3 hours PRN for ngt discomfort      PAST MEDICAL & SURGICAL HISTORY:  HTN (hypertension)  UTI (urinary tract infection)  Intracranial tumor  GERD (gastroesophageal reflux disease)  Eczema  Thyroid cancer  COVID-19 virus infection  11/2021--recovered at home  H/O total thyroidectomy  History of tonsillectomy age 5  History of appendectomy    Vital Signs Last 24 Hrs  T(C): 37 (07 Jan 2023 12:00), Max: 37.1 (06 Jan 2023 19:00)  T(F): 98.6 (07 Jan 2023 12:00), Max: 98.8 (06 Jan 2023 19:00)  HR: 98 (07 Jan 2023 15:00) (86 - 108)  BP: --  BP(mean): --  RR: 33 (07 Jan 2023 15:00) (12 - 33)  SpO2: 97% (07 Jan 2023 15:00) (95% - 100%)    Parameters below as of 07 Jan 2023 08:00  Patient On (Oxygen Delivery Method): room air    I&O's Summary    06 Jan 2023 07:01  -  07 Jan 2023 07:00  --------------------------------------------------------  IN: 2623.9 mL / OUT: 2811 mL / NET: -187.1 mL    07 Jan 2023 07:01  -  07 Jan 2023 15:28  --------------------------------------------------------  IN: 712 mL / OUT: 720 mL / NET: -8 mL                         8.6    41.40 )-----------( 47       ( 07 Jan 2023 12:40 )             24.7     01-07    136  |  102  |  18  ----------------------------<  138<H>  3.7   |  19<L>  |  0.87    Ca    9.1      07 Jan 2023 12:40  Phos  3.5     01-07  Mg     2.3     01-07    TPro  6.6  /  Alb  3.4  /  TBili  23.3<H>  /  DBili  x   /  AST  139<H>  /  ALT  29  /  AlkPhos  226<H>  01-07    Culture - Fungal, Body Fluid (collected 01-04-23 @ 16:28)  Source: Peritoneal Peritoneal Fluid  Preliminary Report (01-07-23 @ 15:02):    Culture is being performed. Fungal cultures are held for 4 weeks.    Culture - Body Fluid with Gram Stain (collected 01-04-23 @ 16:28)  Source: Peritoneal Peritoneal Fluid  Gram Stain (01-04-23 @ 23:22):    polymorphonuclear leukocytes seen    No organisms seen    by cytocentrifuge  Preliminary Report (01-05-23 @ 16:14):    No growth    Culture - Urine (collected 01-03-23 @ 18:30)  Source: Catheterized Catheterized  Final Report (01-04-23 @ 19:30):    No growth    Review of systems  AMS, unable to fully assess      PHYSICAL EXAM:  Constitutional: NAD, AMS   Eyes: icteric, PERRLA  ENMT: nc/at, no thrush  Neck: supple, central line R IJ  Cardiovascular: RRR  Gastrointestinal: abdomen distended, soft;  rectal tube in place  Genitourinary: anuric  Extremities: SCD's in place and working bilaterally, no edema  Vascular: Palpable dp pulses bilaterally.   Neurological: following commands, mentation improving  Skin:  jaundiced, diffuse rash across abdomen and flank. No ulcerations, lesions  Musculoskeletal: moving extremities  Psychiatric: calm

## 2023-01-07 NOTE — PROGRESS NOTE ADULT - PROBLEM SELECTOR PLAN 1
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 Upon review of [facility], SCr was 0.78 on 12/6/22, increased to 5.31 on admission (12/20), and improved with CRRT. Pt remains anuric. Pt with likely HRS vs ATN vs bile cast nephropathy. CRRT was held 1/3-1/5 for a line holiday, but resumed for anuria and worsening electrolytes. Still anuric. Will cont CRRT for now.   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 Upon review of [facility], SCr was 0.78 on 12/6/22, increased to 5.31 on admission (12/20), and improved with CRRT. Pt remains anuric. Pt with likely HRS vs ATN vs bile cast nephropathy. CRRT was held 1/3-1/5 for a line holiday, but resumed for anuria and worsening electrolytes. Still anuric. Will cont CRRT for now.   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 Upon review of [facility], SCr was 0.78 on 12/6/22, increased to 5.31 on admission (12/20), and improved with CRRT. Pt remains anuric. Pt with likely HRS vs ATN vs bile cast nephropathy. CRRT was held 1/3-1/5 for a line holiday, but resumed for anuria and worsening electrolytes. Still anuric. Will cont CRRT for now.   Liver transplant on hold given infection

## 2023-01-07 NOTE — PROGRESS NOTE ADULT - ASSESSMENT
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 Patient has Alcohol Use Disorder ( recent rehab with relapse and in remission since 11/2022), decompensated ALD Cirrhosis c/b ascites. Patient admitted to [facility] on 12/19/2022 after a witnessed seizure; course was c/b septic shock with associated HRF ( intubated 12/19) and an RED ( started HD 12/20). Patient was transferred to [facility] on 12/20 for a liver transplant evaluation. Patient was started on CRRT 12/21 and was extubated 12/23/22. Patient remains in SICU for hemodynamic monitoring and management while liver transplant evaluation is in progress.       PLAN:  Neuro:  - Monitor Mental Status for Encephalopathy and trend Ammonia Level   - Continue Home Lexapro   - Continue Keppra  - Continue Folic Acid, Thiamine, and MV     Resp:  - Maintain SpO2 > 92%   - Out of bed to chair, ambulate as tolerated, and incentive spirometry to prevent atelectasis    CV:  - Will wean vasopressor support of Levophed and Vaso to maintain goal MAP > 65 mmHg  - Continue Midodrine 20mg q8hr     GI: Acute Decompensated Liver Failure 2/2 ETOH Use   - NPO; TF via NGT for goal 50cc/hr   - Bowel regimen decreased to Miralax q12 hr  - Protonix for stress ulcer prophylaxis  - Continue Rifaximin, Hold Lactulose for increasing abdominal distention   - s/p Paracentesis (12/26) transudative, negative for SBP   - s/p Paracentesis ( 1/05) 4L Removed  - Rectal tube in place-melena 1/05    Renal:  - CRRT restarted 1/5 , maintain Net even   - Monitor I&Os and electrolytes w/ repletions as necessary    Heme:  - Monitor CBC and coags  - Hold Chemical VTE prophylaxis  - SCDs for Mechanical VTE ppx     ID:   - Persistent leukocytosis  - Monitor for clinical evidence of active infection  - Combi-Cath ( 12/22) Negative, Blood Cx ( 12/26) Negative,  MRSA (12/30) Negative   - Continue Empiric Caspofungin ( 12/26 - ?)   - s/p Meropenem ( 12/21 - 1/05) , start Ceftriaxone ( 1/05 - 1/12)     Endo:   - Monitor glucose on BMP ; HgbA1C 4.9% ( 12/24)   - Continue Synthroid for hypothyroidism    Code Status: Full Code   Disposition: SICU    Lines/Tubes:  - RICLEMENTINA TLC ( 1/04/23)  - JOSÉ MIGUEL Shiley ( 1/04/23)  - Right Radial Arterial Line ( 1/04/23)     Patient is a 61 y/o Female with PMH Thyroid Cancer ( s/p total thyroidectomy in her 20s, radiation, and BARONE), HTN, GERD, 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 Patient has Alcohol Use Disorder ( recent rehab with relapse and in remission since 11/2022), decompensated ALD Cirrhosis c/b ascites. Patient admitted to [facility] on 12/19/2022 after a witnessed seizure; course was c/b septic shock with associated HRF ( intubated 12/19) and an RED ( started HD 12/20). Patient was transferred to [facility] on 12/20 for a liver transplant evaluation. Patient was started on CRRT 12/21 and was extubated 12/23/22. Patient remains in SICU for hemodynamic monitoring and management while liver transplant evaluation is in progress.       PLAN:  Neuro:  - Monitor Mental Status for Encephalopathy and trend Ammonia Level   - Continue Home Lexapro   - Continue Keppra  - Continue Folic Acid, Thiamine, and MV     Resp:  - Maintain SpO2 > 92%   - Out of bed to chair, ambulate as tolerated, and incentive spirometry to prevent atelectasis    CV:  - Will wean vasopressor support of Levophed and Vaso to maintain goal MAP > 65 mmHg  - Continue Midodrine 20mg q8hr     GI: Acute Decompensated Liver Failure 2/2 ETOH Use   - NPO; TF via NGT for goal 50cc/hr   - Bowel regimen decreased to Miralax q12 hr  - Protonix for stress ulcer prophylaxis  - Continue Rifaximin, Hold Lactulose for increasing abdominal distention   - s/p Paracentesis (12/26) transudative, negative for SBP   - s/p Paracentesis ( 1/05) 4L Removed  - Rectal tube in place-melena 1/05    Renal:  - CRRT restarted 1/5 , maintain Net even   - Monitor I&Os and electrolytes w/ repletions as necessary    Heme:  - Monitor CBC and coags  - Hold Chemical VTE prophylaxis  - SCDs for Mechanical VTE ppx     ID:   - Persistent leukocytosis  - Monitor for clinical evidence of active infection  - Combi-Cath ( 12/22) Negative, Blood Cx ( 12/26) Negative,  MRSA (12/30) Negative   - Continue Empiric Caspofungin ( 12/26 - ?)   - s/p Meropenem ( 12/21 - 1/05) , start Ceftriaxone ( 1/05 - 1/12)     Endo:   - Monitor glucose on BMP ; HgbA1C 4.9% ( 12/24)   - Continue Synthroid for hypothyroidism    Code Status: Full Code   Disposition: SICU    Lines/Tubes:  - RICLEMENTINA TLC ( 1/04/23)  - JOSÉ MIGUEL Shiley ( 1/04/23)  - Right Radial Arterial Line ( 1/04/23)     Patient is a 61 y/o Female with PMH Thyroid Cancer ( s/p total thyroidectomy in her 20s, radiation, and BARONE), HTN, GERD, 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 Patient has Alcohol Use Disorder ( recent rehab with relapse and in remission since 11/2022), decompensated ALD Cirrhosis c/b ascites. Patient admitted to [facility] on 12/19/2022 after a witnessed seizure; course was c/b septic shock with associated HRF ( intubated 12/19) and an RED ( started HD 12/20). Patient was transferred to [facility] on 12/20 for a liver transplant evaluation. Patient was started on CRRT 12/21 and was extubated 12/23/22. Patient remains in SICU for hemodynamic monitoring and management while liver transplant evaluation is in progress.       PLAN:  Neuro:  - Monitor Mental Status for Encephalopathy and trend Ammonia Level   - Continue Home Lexapro   - Continue Keppra  - Continue Folic Acid, Thiamine, and MV     Resp:  - Maintain SpO2 > 92%   - Out of bed to chair, ambulate as tolerated, and incentive spirometry to prevent atelectasis    CV:  - Will wean vasopressor support of Levophed and Vaso to maintain goal MAP > 65 mmHg  - Continue Midodrine 20mg q8hr     GI: Acute Decompensated Liver Failure 2/2 ETOH Use   - NPO; TF via NGT for goal 50cc/hr   - Bowel regimen decreased to Miralax q12 hr  - Protonix for stress ulcer prophylaxis  - Continue Rifaximin, Hold Lactulose for increasing abdominal distention   - s/p Paracentesis (12/26) transudative, negative for SBP   - s/p Paracentesis ( 1/05) 4L Removed  - Rectal tube in place-melena 1/05    Renal:  - CRRT restarted 1/5 , maintain Net even   - Monitor I&Os and electrolytes w/ repletions as necessary    Heme:  - Monitor CBC and coags  - Hold Chemical VTE prophylaxis  - SCDs for Mechanical VTE ppx     ID:   - Persistent leukocytosis  - Monitor for clinical evidence of active infection  - Combi-Cath ( 12/22) Negative, Blood Cx ( 12/26) Negative,  MRSA (12/30) Negative   - Continue Empiric Caspofungin ( 12/26 - ?)   - s/p Meropenem ( 12/21 - 1/05) , start Ceftriaxone ( 1/05 - 1/12)     Endo:   - Monitor glucose on BMP ; HgbA1C 4.9% ( 12/24)   - Continue Synthroid for hypothyroidism    Code Status: Full Code   Disposition: SICU    Lines/Tubes:  - RICLEMENTINA TLC ( 1/04/23)  - JOSÉ MIGUEL Shiley ( 1/04/23)  - Right Radial Arterial Line ( 1/04/23)

## 2023-01-07 NOTE — PROGRESS NOTE ADULT - SUBJECTIVE AND OBJECTIVE BOX
Follow Up:      Interval History:    REVIEW OF SYSTEMS  [  ] ROS unobtainable because:    [  ] All other systems negative except as noted below    Constitutional:  [ ] fever [ ] chills  [ ] weight loss  [ ] weakness  Skin:  [ ] rash [ ] phlebitis	  Eyes: [ ] icterus [ ] pain  [ ] discharge	  ENMT: [ ] sore throat  [ ] thrush [ ] ulcers [ ] exudates  Respiratory: [ ] dyspnea [ ] hemoptysis [ ] cough [ ] sputum	  Cardiovascular:  [ ] chest pain [ ] palpitations [ ] edema	  Gastrointestinal:  [ ] nausea [ ] vomiting [ ] diarrhea [ ] constipation [ ] pain	  Genitourinary:  [ ] dysuria [ ] frequency [ ] hematuria [ ] discharge [ ] flank pain  [ ] incontinence  Musculoskeletal:  [ ] myalgias [ ] arthralgias [ ] arthritis  [ ] back pain  Neurological:  [ ] headache [ ] seizures  [ ] confusion/altered mental status    Allergies  Macrobid (Rash)        ANTIMICROBIALS:  caspofungin IVPB    caspofungin IVPB 50 every 24 hours  cefTRIAXone   IVPB 1000 every 24 hours  rifAXIMin 550 every 12 hours  vancomycin    Solution 125 every 6 hours      OTHER MEDS:  MEDICATIONS  (STANDING):  escitalopram 10 daily  HYDROmorphone  Injectable 0.25 every 3 hours PRN  influenza   Vaccine 0.5 once  levETIRAcetam  Solution 750 two times a day  levothyroxine Injectable 103 at bedtime  midodrine 20 every 8 hours  norepinephrine Infusion 0.05 <Continuous>  pantoprazole   Suspension 40 every 24 hours  polyethylene glycol 3350 17 every 12 hours  vasopressin Infusion 0.03 <Continuous>      Vital Signs Last 24 Hrs  T(C): 37 (07 Jan 2023 12:00), Max: 37.1 (06 Jan 2023 19:00)  T(F): 98.6 (07 Jan 2023 12:00), Max: 98.8 (06 Jan 2023 19:00)  HR: 94 (07 Jan 2023 14:00) (86 - 108)  BP: --  BP(mean): --  RR: 33 (07 Jan 2023 14:00) (12 - 33)  SpO2: 98% (07 Jan 2023 14:00) (95% - 100%)    Parameters below as of 07 Jan 2023 08:00  Patient On (Oxygen Delivery Method): room air        PHYSICAL EXAMINATION:  General: Alert and Awake, NAD  HEENT: PERRL, EOMI  Neck: Supple  Cardiac: RRR, No M/R/G  Resp: CTAB, No Wh/Rh/Ra  Abdomen: NBS, NT/ND, No HSM, No rigidity or guarding  MSK: No LE edema. No Calf tenderness  : No dhillon  Skin: No rashes or lesions. Skin is warm and dry to the touch.   Neuro: Alert and Awake. CN 2-12 Grossly intact. Moves all four extremities spontaneously.  Psych: Calm, Pleasant, Cooperative                          8.6    41.40 )-----------( 47       ( 07 Jan 2023 12:40 )             24.7       01-07    136  |  102  |  18  ----------------------------<  138<H>  3.7   |  19<L>  |  0.87    Ca    9.1      07 Jan 2023 12:40  Phos  3.5     01-07  Mg     2.3     01-07    TPro  6.6  /  Alb  3.4  /  TBili  23.3<H>  /  DBili  x   /  AST  139<H>  /  ALT  29  /  AlkPhos  x   01-07          MICROBIOLOGY:  v  Peritoneal Peritoneal Fluid  01-04-23   No growth  --    polymorphonuclear leukocytes seen  No organisms seen  by cytocentrifuge      Catheterized Catheterized  01-03-23   No growth  --  --      Peritoneal Peritoneal Fluid  12-26-22   No growth  --    polymorphonuclear leukocytes seen  No organisms seen  by cytocentrifuge      .Blood Blood-Peripheral  12-26-22   No Growth Final  --  --      .Blood Blood-Peripheral  12-26-22   No Growth Final  --  --      .Blood Blood  12-24-22   No Growth Final  --  --      .Blood Blood  12-24-22   No Growth Final  --  --      .Blood Blood  12-23-22   No Growth Final  --  --      .Blood Blood  12-23-22   No Growth Final  --  --      Combi-Cath Combi-Cath  12-22-22   Normal Respiratory Cassandra present  --    Moderate polymorphonuclear leukocytes seen per low power field  No squamous epithelial cells seen per low power field  No organisms seen per oil power field      Catheterized Catheterized  12-21-22   >100,000 CFU/ml Leticia dubliniensis "Susceptibilities not performed"  --  --      Ascites Fl Ascites Fluid  12-21-22   No growth at 5 days  --    polymorphonuclear leukocytes seen  No organisms seen  by cytocentrifuge      Trach Asp Tracheal Aspirate  12-21-22   Normal Respiratory Cassandra present  --    No polymorphonuclear leukocytes per low power field  Numerous Squamous epithelial cells per low power field  Moderate Gram Positive Rods seen per oil power field  Few Yeast like cells seen per oil power field      .Blood Blood-Peripheral  12-20-22   No Growth Final  --  --      .Blood Blood-Peripheral  12-20-22   No Growth Final  --  --        CMV IgG Antibody: 5.10 U/mL (12-24-22 @ 20:59)  Toxoplasma IgG Screen: <3.0 IU/mL (12-24-22 @ 20:59)          RADIOLOGY:    <The imaging below has been reviewed and visualized by me independently. Findings as detailed in report below> Follow Up:  leukocytosis    Interval History: remains on CRRT and pressors. afebrile. noted to have ongoing liquid stools.     REVIEW OF SYSTEMS  [ x ] ROS unobtainable because:  encephalopathy  [  ] All other systems negative except as noted below    Constitutional:  [ ] fever [ ] chills  [ ] weight loss  [ ] weakness  Skin:  [ ] rash [ ] phlebitis	  Eyes: [ ] icterus [ ] pain  [ ] discharge	  ENMT: [ ] sore throat  [ ] thrush [ ] ulcers [ ] exudates  Respiratory: [ ] dyspnea [ ] hemoptysis [ ] cough [ ] sputum	  Cardiovascular:  [ ] chest pain [ ] palpitations [ ] edema	  Gastrointestinal:  [ ] nausea [ ] vomiting [ ] diarrhea [ ] constipation [ ] pain	  Genitourinary:  [ ] dysuria [ ] frequency [ ] hematuria [ ] discharge [ ] flank pain  [ ] incontinence  Musculoskeletal:  [ ] myalgias [ ] arthralgias [ ] arthritis  [ ] back pain  Neurological:  [ ] headache [ ] seizures  [ ] confusion/altered mental status      Allergies  Macrobid (Rash)        ANTIMICROBIALS:  caspofungin IVPB    caspofungin IVPB 50 every 24 hours  cefTRIAXone   IVPB 1000 every 24 hours  rifAXIMin 550 every 12 hours  vancomycin    Solution 125 every 6 hours      OTHER MEDS:  MEDICATIONS  (STANDING):  escitalopram 10 daily  HYDROmorphone  Injectable 0.25 every 3 hours PRN  influenza   Vaccine 0.5 once  levETIRAcetam  Solution 750 two times a day  levothyroxine Injectable 103 at bedtime  midodrine 20 every 8 hours  norepinephrine Infusion 0.05 <Continuous>  pantoprazole   Suspension 40 every 24 hours  polyethylene glycol 3350 17 every 12 hours  vasopressin Infusion 0.03 <Continuous>      Vital Signs Last 24 Hrs  T(C): 37 (07 Jan 2023 12:00), Max: 37.1 (06 Jan 2023 19:00)  T(F): 98.6 (07 Jan 2023 12:00), Max: 98.8 (06 Jan 2023 19:00)  HR: 94 (07 Jan 2023 14:00) (86 - 108)  BP: --  BP(mean): --  RR: 33 (07 Jan 2023 14:00) (12 - 33)  SpO2: 98% (07 Jan 2023 14:00) (95% - 100%)    Parameters below as of 07 Jan 2023 08:00  Patient On (Oxygen Delivery Method): room air    PHYSICAL EXAMINATION:  General: Alert and Awake, NAD, jaundice  HEENT: Normocephalic / Atraumatic  Resp: No accessory muscles of respiration utilized  Abdomen: Not distended.  MSK: No LE edema.   Gu: +Liquid stool in stool bag  Skin: No rashes or lesions.    Neuro: Alert and Awake. CN 2-12 Grossly intact. Moves all four extremities spontaneously.  Psych: Calm, Pleasant, Cooperative                          8.6    41.40 )-----------( 47       ( 07 Jan 2023 12:40 )             24.7       01-07    136  |  102  |  18  ----------------------------<  138<H>  3.7   |  19<L>  |  0.87    Ca    9.1      07 Jan 2023 12:40  Phos  3.5     01-07  Mg     2.3     01-07    TPro  6.6  /  Alb  3.4  /  TBili  23.3<H>  /  DBili  x   /  AST  139<H>  /  ALT  29  /  AlkPhos  x   01-07          MICROBIOLOGY:  v  Peritoneal Peritoneal Fluid  01-04-23   No growth  --    polymorphonuclear leukocytes seen  No organisms seen  by cytocentrifuge      Catheterized Catheterized  01-03-23   No growth  --  --      Peritoneal Peritoneal Fluid  12-26-22   No growth  --    polymorphonuclear leukocytes seen  No organisms seen  by cytocentrifuge      .Blood Blood-Peripheral  12-26-22   No Growth Final  --  --      .Blood Blood-Peripheral  12-26-22   No Growth Final  --  --      .Blood Blood  12-24-22   No Growth Final  --  --      .Blood Blood  12-24-22   No Growth Final  --  --      .Blood Blood  12-23-22   No Growth Final  --  --      .Blood Blood  12-23-22   No Growth Final  --  --      Combi-Cath Combi-Cath  12-22-22   Normal Respiratory Cassandra present  --    Moderate polymorphonuclear leukocytes seen per low power field  No squamous epithelial cells seen per low power field  No organisms seen per oil power field      Catheterized Catheterized  12-21-22   >100,000 CFU/ml Leticia dubliniensis "Susceptibilities not performed"  --  --      Ascites Fl Ascites Fluid  12-21-22   No growth at 5 days  --    polymorphonuclear leukocytes seen  No organisms seen  by cytocentrifuge      Trach Asp Tracheal Aspirate  12-21-22   Normal Respiratory Cassandra present  --    No polymorphonuclear leukocytes per low power field  Numerous Squamous epithelial cells per low power field  Moderate Gram Positive Rods seen per oil power field  Few Yeast like cells seen per oil power field      .Blood Blood-Peripheral  12-20-22   No Growth Final  --  --      .Blood Blood-Peripheral  12-20-22   No Growth Final  --  --        CMV IgG Antibody: 5.10 U/mL (12-24-22 @ 20:59)  Toxoplasma IgG Screen: <3.0 IU/mL (12-24-22 @ 20:59)          RADIOLOGY:    <The imaging below has been reviewed and visualized by me independently. Findings as detailed in report below>    < from: Xray Chest 1 View- PORTABLE-Routine (Xray Chest 1 View- PORTABLE-Routine in AM.) (01.07.23 @ 07:26) >  IMPRESSION:  Improving interstitial and patchy opacities.    < end of copied text >

## 2023-01-07 NOTE — PROGRESS NOTE ADULT - NS ATTEND AMEND GEN_ALL_CORE FT
ON: increased stool    aaox1, mild encephalopathy  ammonia stable  rifaximin  miralax, lactulose on hold   keppra for recent seizure, hx of craniotomy   on RA, unable to do IS  AM CXR reticular pattern effusions improved  levo 0.1, vaso 0.03 MAP goal of 65  CVP 1  midodrine [id number]  lact mild elevated  TEN 50/hr via NGT  rectal tube: 1.1 output  will send c diff  t bili 23  s/p paracentesis 1/4 4 liters   protonix, per transplant  CRRT start w even balance  anuric , likely hepatorenal syndrome  s/p albumin boluses, octreotide  stable Hb  thrombocytopenia  INR 2.1  no chem VTE PPX given recent GI bleed  Bcx, paracentesis no growth  s/p merop  caspo, ceftriaxone 1/5-no end date  GI PCR neg 1/5  afebrile, leukocytosis worsening, discussed with transplant, PO vanc empiric until c diff result  ID following, given CT findings, concerned about infection and now recommends continuation   good glycemic control  synthroid IV   PT OT working with her    full code  HD rt IJ, CVC IJ left , left radial a line all placed 1/5 ON: increased stool    aaox1, mild encephalopathy  ammonia stable  rifaximin  miralax, lactulose on hold   keppra for recent seizure, hx of craniotomy   on RA, unable to do IS  AM CXR reticular pattern effusions improved  levo 0.1, vaso 0.03 MAP goal of 65  CVP 1  midodrine [id number]  lact mild elevated  TEN 50/hr via NGT  rectal tube: 1.1 output  will send c diff  t bili 23  s/p paracentesis 1/4 4 liters   protonix, per transplant  CRRT start w even balance  anuric , likely hepatorenal syndrome  s/p albumin boluses, octreotide  stable Hb  thrombocytopenia  INR 2.1  no chem VTE PPX given recent GI bleed  Bcx, paracentesis no growth  s/p merop  caspo, ceftriaxone 1/5-no end date  GI PCR neg 1/5  afebrile, leukocytosis worsening, discussed with transplant, PO vanc empiric until c diff result  ID following, given CT findings, concerned about infection and now recommends continuation   good glycemic control  synthroid IV   PT OT working with her    full code  HD rt IJ, CVC IJ left , left radial a line all placed 1/5 ON: increased stool    aaox1, mild encephalopathy  ammonia stable  rifaximin  miralax, lactulose on hold   keppra for recent seizure, hx of craniotomy   on RA, unable to do IS  AM CXR reticular pattern effusions improved  levo 0.1, vaso 0.03 MAP goal of 65  CVP 1  midodrine [id number]  lact mild elevated  TEN 50/hr via NGT  rectal tube: 1.1 output  will send c diff  t bili 23  s/p paracentesis 1/4 4 liters   protonix, per transplant  CRRT start w even balance  anuric , likely hepatorenal syndrome  s/p albumin boluses, octreotide  stable Hb  thrombocytopenia  INR 2.1  no chem VTE PPX given recent GI bleed  Bcx, paracentesis no growth  s/p merop  caspo, ceftriaxone 1/5-no end date  GI PCR neg 1/5  afebrile, leukocytosis worsening, discussed with transplant, PO vanc empiric until c diff result  ID following, given CT findings, concerned about infection and now recommends continuation   good glycemic control  synthroid IV   PT OT working with her    full code  HD rt IJ, CVC IJ left , left radial a line all placed 1/5

## 2023-01-07 NOTE — PROGRESS NOTE ADULT - SUBJECTIVE AND OBJECTIVE BOX
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 Patient has Alcohol Use Disorder ( recent rehab with relapse and in remission since 11/2022), decompensated ALD Cirrhosis c/b ascites. Patient admitted to [facility] on 12/19/2022 after a witnessed seizure; course was c/b septic shock with associated HRF ( intubated 12/19) and an RED ( started HD 12/20). Patient was transferred to [facility] on 12/20 for a liver transplant evaluation. Patient was started on CRRT 12/21 and was extubated 12/23/22. Patient remains in SICU for hemodynamic monitoring and management while liver transplant evaluation is in progress.       Allergies: Macrobid (Rash)  Nexium (Stomach Upset)      MEDICATIONS:   --------------------------------------------------------------------------------------  Neurologic Medications  escitalopram 10 milliGRAM(s) Oral daily  HYDROmorphone  Injectable 0.25 milliGRAM(s) IV Push every 3 hours PRN Severe Pain (7 - 10)  levETIRAcetam  Solution 750 milliGRAM(s) Enteral Tube two times a day    Respiratory Medications    Cardiovascular Medications  midodrine 20 milliGRAM(s) Oral every 8 hours  norepinephrine Infusion 0.05 MICROgram(s)/kG/Min IV Continuous <Continuous>    Gastrointestinal Medications  folic acid 1 milliGRAM(s) Oral daily  multivitamin/minerals/iron Oral Solution (CENTRUM) 15 milliLiter(s) Oral daily  pantoprazole   Suspension 40 milliGRAM(s) Oral every 24 hours  polyethylene glycol 3350 17 Gram(s) Oral every 12 hours  thiamine 100 milliGRAM(s) Enteral Tube daily    Genitourinary Medications    Hematologic/Oncologic Medications  influenza   Vaccine 0.5 milliLiter(s) IntraMuscular once    Antimicrobial/Immunologic Medications  caspofungin IVPB      caspofungin IVPB 50 milliGRAM(s) IV Intermittent every 24 hours  cefTRIAXone   IVPB 1000 milliGRAM(s) IV Intermittent every 24 hours  rifAXIMin 550 milliGRAM(s) Oral every 12 hours    Endocrine/Metabolic Medications  levothyroxine Injectable 103 MICROGram(s) IV Push at bedtime  vasopressin Infusion 0.03 Unit(s)/Min IV Continuous <Continuous>    Topical/Other Medications  chlorhexidine 2% Cloths 1 Application(s) Topical <User Schedule>  CRRT Treatment    <Continuous>  Phoxillum Filtration BK 4 / 2.5 5000 milliLiter(s) CRRT <Continuous>  Phoxillum Filtration BK 4 / 2.5 5000 milliLiter(s) CRRT <Continuous>  PrismaSOL Filtration BGK 4 / 2.5 5000 milliLiter(s) CRRT <Continuous>  sodium chloride 0.65% Nasal 1 Spray(s) Both Nostrils every 3 hours PRN Nasal Congestion  tetracaine/benzocaine/butamben Spray 1 Spray(s) Topical every 3 hours PRN for ngt discomfort    --------------------------------------------------------------------------------------    VITAL SIGNS, INS/OUTS (last 24 hours):  --------------------------------------------------------------------------------------  T(C): 36.9 (01-06-23 @ 23:00), Max: 37.1 (01-06-23 @ 03:00)  HR: 93 (01-07-23 @ 02:00) (85 - 97)  BP: --  RR: 13 (01-07-23 @ 02:00) (12 - 24)  SpO2: 97% (01-07-23 @ 02:00) (94% - 100%)    01-05-23 @ 07:01  -  01-06-23 @ 07:00  --------------------------------------------------------  IN: 2120.5 mL / OUT: 3060 mL / NET: -939.5 mL    01-06-23 @ 07:01  -  01-07-23 @ 02:07  --------------------------------------------------------  IN: 2146.9 mL / OUT: 2304 mL / NET: -157.1 mL      --------------------------------------------------------------------------------------    EXAM  GENERAL:   NAD, well-groomed, well-developed  HEAD:    Atraumatic, Normocephalic  EYES:   EOMI, 2mm PERRLA, conjunctiva and scleral icterus   ENMT:   NGT in place. No oropharyngeal exudates, erythema or lesions,  Moist mucous membranes  NECK:   Supple, no cervical lymphadenopathy, no JVD  NERVOUS SYSTEM:   Alert & Oriented X2, CN II-XII intact, Moves all extremities  ; Upper extremities 5 /5; Lower extremities 5/5, full sensation to light touch   CHEST/LUNG:    Breath sounds coarse bilaterally   HEART:   cardiac monitor NSR  ; S1/S2 without murmurs, without rubs, or gallops.  ABDOMEN:   Distended, taut, nontender;  Bowel sounds present, Bladder non distended, non palpable. Rectal Tube in place  EXTREMITIES:   Pulses palpable radial pulses 2+ bilat, DP/PT 1+/1+ bilat, without clubbing, cyanosis. Digits warm to touch with good cap refill < 3 secs  SKIN:   warm, dry, intact, jaundice, no abnormal lesions, without palpable nodes      LABS  --------------------------------------------------------------------------------------                        8.4    40.21 )-----------( 40       ( 07 Jan 2023 00:18 )             24.6   01-07    137  |  103  |  18  ----------------------------<  137<H>  3.7   |  20<L>  |  0.99    Ca    9.3      07 Jan 2023 00:17  Phos  3.3     01-07  Mg     2.2     01-07    TPro  6.4  /  Alb  3.4  /  TBili  22.1<H>  /  DBili  x   /  AST  133<H>  /  ALT  24  /  AlkPhos  211<H>  01-07  ABG - ( 06 Jan 2023 23:54 )  pH, Arterial: 7.43  pH, Blood: x     /  pCO2: 35    /  pO2: 84    / HCO3: 23    / Base Excess: -0.9  /  SaO2: 98.0            PT/INR - ( 06 Jan 2023 06:28 )   PT: 26.8 sec;   INR: 2.29 ratio         PTT - ( 06 Jan 2023 06:28 )  PTT:59.7 sec  --------------------------------------------------------------------------------------     SICU Daily Progress Note  =====================================================  Interval/Overnight Events:     - Decreased Miralax q12 for goal of > 1L in Rectal Tube over 24 hours     HPI:  Patient is a 63 y/o Female with PMH Thyroid Cancer ( s/p total thyroidectomy in her 20s, radiation, and BARONE), HTN, GERD, 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 Patient has Alcohol Use Disorder ( recent rehab with relapse and in remission since 11/2022), decompensated ALD Cirrhosis c/b ascites. Patient admitted to [facility] on 12/19/2022 after a witnessed seizure; course was c/b septic shock with associated HRF ( intubated 12/19) and an RED ( started HD 12/20). Patient was transferred to [facility] on 12/20 for a liver transplant evaluation. Patient was started on CRRT 12/21 and was extubated 12/23/22. Patient remains in SICU for hemodynamic monitoring and management while liver transplant evaluation is in progress.       Allergies: Macrobid (Rash)  Nexium (Stomach Upset)      MEDICATIONS:   --------------------------------------------------------------------------------------  Neurologic Medications  escitalopram 10 milliGRAM(s) Oral daily  HYDROmorphone  Injectable 0.25 milliGRAM(s) IV Push every 3 hours PRN Severe Pain (7 - 10)  levETIRAcetam  Solution 750 milliGRAM(s) Enteral Tube two times a day    Respiratory Medications    Cardiovascular Medications  midodrine 20 milliGRAM(s) Oral every 8 hours  norepinephrine Infusion 0.05 MICROgram(s)/kG/Min IV Continuous <Continuous>    Gastrointestinal Medications  folic acid 1 milliGRAM(s) Oral daily  multivitamin/minerals/iron Oral Solution (CENTRUM) 15 milliLiter(s) Oral daily  pantoprazole   Suspension 40 milliGRAM(s) Oral every 24 hours  polyethylene glycol 3350 17 Gram(s) Oral every 12 hours  thiamine 100 milliGRAM(s) Enteral Tube daily    Genitourinary Medications    Hematologic/Oncologic Medications  influenza   Vaccine 0.5 milliLiter(s) IntraMuscular once    Antimicrobial/Immunologic Medications  caspofungin IVPB      caspofungin IVPB 50 milliGRAM(s) IV Intermittent every 24 hours  cefTRIAXone   IVPB 1000 milliGRAM(s) IV Intermittent every 24 hours  rifAXIMin 550 milliGRAM(s) Oral every 12 hours    Endocrine/Metabolic Medications  levothyroxine Injectable 103 MICROGram(s) IV Push at bedtime  vasopressin Infusion 0.03 Unit(s)/Min IV Continuous <Continuous>    Topical/Other Medications  chlorhexidine 2% Cloths 1 Application(s) Topical <User Schedule>  CRRT Treatment    <Continuous>  Phoxillum Filtration BK 4 / 2.5 5000 milliLiter(s) CRRT <Continuous>  Phoxillum Filtration BK 4 / 2.5 5000 milliLiter(s) CRRT <Continuous>  PrismaSOL Filtration BGK 4 / 2.5 5000 milliLiter(s) CRRT <Continuous>  sodium chloride 0.65% Nasal 1 Spray(s) Both Nostrils every 3 hours PRN Nasal Congestion  tetracaine/benzocaine/butamben Spray 1 Spray(s) Topical every 3 hours PRN for ngt discomfort    --------------------------------------------------------------------------------------    VITAL SIGNS, INS/OUTS (last 24 hours):  --------------------------------------------------------------------------------------  T(C): 36.9 (01-06-23 @ 23:00), Max: 37.1 (01-06-23 @ 03:00)  HR: 93 (01-07-23 @ 02:00) (85 - 97)  BP: --  RR: 13 (01-07-23 @ 02:00) (12 - 24)  SpO2: 97% (01-07-23 @ 02:00) (94% - 100%)    01-05-23 @ 07:01  -  01-06-23 @ 07:00  --------------------------------------------------------  IN: 2120.5 mL / OUT: 3060 mL / NET: -939.5 mL    01-06-23 @ 07:01  -  01-07-23 @ 02:07  --------------------------------------------------------  IN: 2146.9 mL / OUT: 2304 mL / NET: -157.1 mL      --------------------------------------------------------------------------------------    EXAM  GENERAL:   NAD, well-groomed, well-developed  HEAD:    Atraumatic, Normocephalic  EYES:   EOMI, 2mm PERRLA, conjunctiva and scleral icterus   ENMT:   NGT in place. No oropharyngeal exudates, erythema or lesions,  Moist mucous membranes  NECK:   Supple, no cervical lymphadenopathy, no JVD  NERVOUS SYSTEM:   Alert & Oriented X2, CN II-XII intact, Moves all extremities  ; Upper extremities 5 /5; Lower extremities 5/5, full sensation to light touch   CHEST/LUNG:    Breath sounds coarse bilaterally   HEART:   cardiac monitor NSR  ; S1/S2 without murmurs, without rubs, or gallops.  ABDOMEN:   Distended, taut, nontender;  Bowel sounds present, Bladder non distended, non palpable. Rectal Tube in place  EXTREMITIES:   Pulses palpable radial pulses 2+ bilat, DP/PT 1+/1+ bilat, without clubbing, cyanosis. Digits warm to touch with good cap refill < 3 secs  SKIN:   warm, dry, intact, jaundice, no abnormal lesions, without palpable nodes      LABS  --------------------------------------------------------------------------------------                        8.4    40.21 )-----------( 40       ( 07 Jan 2023 00:18 )             24.6   01-07    137  |  103  |  18  ----------------------------<  137<H>  3.7   |  20<L>  |  0.99    Ca    9.3      07 Jan 2023 00:17  Phos  3.3     01-07  Mg     2.2     01-07    TPro  6.4  /  Alb  3.4  /  TBili  22.1<H>  /  DBili  x   /  AST  133<H>  /  ALT  24  /  AlkPhos  211<H>  01-07  ABG - ( 06 Jan 2023 23:54 )  pH, Arterial: 7.43  pH, Blood: x     /  pCO2: 35    /  pO2: 84    / HCO3: 23    / Base Excess: -0.9  /  SaO2: 98.0            PT/INR - ( 06 Jan 2023 06:28 )   PT: 26.8 sec;   INR: 2.29 ratio         PTT - ( 06 Jan 2023 06:28 )  PTT:59.7 sec  --------------------------------------------------------------------------------------     SICU Daily Progress Note  =====================================================  Interval/Overnight Events:     - Decreased Miralax q12 for goal of > 1L in Rectal Tube over 24 hours     HPI:  Patient is a 61 y/o Female with PMH Thyroid Cancer ( s/p total thyroidectomy in her 20s, radiation, and BARONE), HTN, GERD, 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 Patient has Alcohol Use Disorder ( recent rehab with relapse and in remission since 11/2022), decompensated ALD Cirrhosis c/b ascites. Patient admitted to [facility] on 12/19/2022 after a witnessed seizure; course was c/b septic shock with associated HRF ( intubated 12/19) and an RED ( started HD 12/20). Patient was transferred to [facility] on 12/20 for a liver transplant evaluation. Patient was started on CRRT 12/21 and was extubated 12/23/22. Patient remains in SICU for hemodynamic monitoring and management while liver transplant evaluation is in progress.       Allergies: Macrobid (Rash)  Nexium (Stomach Upset)      MEDICATIONS:   --------------------------------------------------------------------------------------  Neurologic Medications  escitalopram 10 milliGRAM(s) Oral daily  HYDROmorphone  Injectable 0.25 milliGRAM(s) IV Push every 3 hours PRN Severe Pain (7 - 10)  levETIRAcetam  Solution 750 milliGRAM(s) Enteral Tube two times a day    Respiratory Medications    Cardiovascular Medications  midodrine 20 milliGRAM(s) Oral every 8 hours  norepinephrine Infusion 0.05 MICROgram(s)/kG/Min IV Continuous <Continuous>    Gastrointestinal Medications  folic acid 1 milliGRAM(s) Oral daily  multivitamin/minerals/iron Oral Solution (CENTRUM) 15 milliLiter(s) Oral daily  pantoprazole   Suspension 40 milliGRAM(s) Oral every 24 hours  polyethylene glycol 3350 17 Gram(s) Oral every 12 hours  thiamine 100 milliGRAM(s) Enteral Tube daily    Genitourinary Medications    Hematologic/Oncologic Medications  influenza   Vaccine 0.5 milliLiter(s) IntraMuscular once    Antimicrobial/Immunologic Medications  caspofungin IVPB      caspofungin IVPB 50 milliGRAM(s) IV Intermittent every 24 hours  cefTRIAXone   IVPB 1000 milliGRAM(s) IV Intermittent every 24 hours  rifAXIMin 550 milliGRAM(s) Oral every 12 hours    Endocrine/Metabolic Medications  levothyroxine Injectable 103 MICROGram(s) IV Push at bedtime  vasopressin Infusion 0.03 Unit(s)/Min IV Continuous <Continuous>    Topical/Other Medications  chlorhexidine 2% Cloths 1 Application(s) Topical <User Schedule>  CRRT Treatment    <Continuous>  Phoxillum Filtration BK 4 / 2.5 5000 milliLiter(s) CRRT <Continuous>  Phoxillum Filtration BK 4 / 2.5 5000 milliLiter(s) CRRT <Continuous>  PrismaSOL Filtration BGK 4 / 2.5 5000 milliLiter(s) CRRT <Continuous>  sodium chloride 0.65% Nasal 1 Spray(s) Both Nostrils every 3 hours PRN Nasal Congestion  tetracaine/benzocaine/butamben Spray 1 Spray(s) Topical every 3 hours PRN for ngt discomfort    --------------------------------------------------------------------------------------    VITAL SIGNS, INS/OUTS (last 24 hours):  --------------------------------------------------------------------------------------  T(C): 36.9 (01-06-23 @ 23:00), Max: 37.1 (01-06-23 @ 03:00)  HR: 93 (01-07-23 @ 02:00) (85 - 97)  BP: --  RR: 13 (01-07-23 @ 02:00) (12 - 24)  SpO2: 97% (01-07-23 @ 02:00) (94% - 100%)    01-05-23 @ 07:01  -  01-06-23 @ 07:00  --------------------------------------------------------  IN: 2120.5 mL / OUT: 3060 mL / NET: -939.5 mL    01-06-23 @ 07:01  -  01-07-23 @ 02:07  --------------------------------------------------------  IN: 2146.9 mL / OUT: 2304 mL / NET: -157.1 mL      --------------------------------------------------------------------------------------    EXAM  GENERAL:   NAD, well-groomed, well-developed  HEAD:    Atraumatic, Normocephalic  EYES:   EOMI, 2mm PERRLA, conjunctiva and scleral icterus   ENMT:   NGT in place. No oropharyngeal exudates, erythema or lesions,  Moist mucous membranes  NECK:   Supple, no cervical lymphadenopathy, no JVD  NERVOUS SYSTEM:   Alert & Oriented X2, CN II-XII intact, Moves all extremities  ; Upper extremities 5 /5; Lower extremities 5/5, full sensation to light touch   CHEST/LUNG:    Breath sounds coarse bilaterally   HEART:   cardiac monitor NSR  ; S1/S2 without murmurs, without rubs, or gallops.  ABDOMEN:   Distended, taut, nontender;  Bowel sounds present, Bladder non distended, non palpable. Rectal Tube in place  EXTREMITIES:   Pulses palpable radial pulses 2+ bilat, DP/PT 1+/1+ bilat, without clubbing, cyanosis. Digits warm to touch with good cap refill < 3 secs  SKIN:   warm, dry, intact, jaundice, no abnormal lesions, without palpable nodes      LABS  --------------------------------------------------------------------------------------                        8.4    40.21 )-----------( 40       ( 07 Jan 2023 00:18 )             24.6   01-07    137  |  103  |  18  ----------------------------<  137<H>  3.7   |  20<L>  |  0.99    Ca    9.3      07 Jan 2023 00:17  Phos  3.3     01-07  Mg     2.2     01-07    TPro  6.4  /  Alb  3.4  /  TBili  22.1<H>  /  DBili  x   /  AST  133<H>  /  ALT  24  /  AlkPhos  211<H>  01-07  ABG - ( 06 Jan 2023 23:54 )  pH, Arterial: 7.43  pH, Blood: x     /  pCO2: 35    /  pO2: 84    / HCO3: 23    / Base Excess: -0.9  /  SaO2: 98.0            PT/INR - ( 06 Jan 2023 06:28 )   PT: 26.8 sec;   INR: 2.29 ratio         PTT - ( 06 Jan 2023 06:28 )  PTT:59.7 sec  --------------------------------------------------------------------------------------

## 2023-01-07 NOTE — PROGRESS NOTE ADULT - SUBJECTIVE AND OBJECTIVE BOX
[facility] DIVISION OF KIDNEY DISEASES AND HYPERTENSION -- FOLLOW UP NOTE  --------------------------------------------------------------------------------  Chief Complaint:  RED, cirrhosis    24 hour events/subjective:  Pt on CVVH, tolerating. Still on vasopressors.       PAST HISTORY  --------------------------------------------------------------------------------  No significant changes to PMH, PSH, FHx, SHx, unless otherwise noted    ALLERGIES & MEDICATIONS  --------------------------------------------------------------------------------  Allergies    Macrobid (Rash)    Intolerances    Nexium (Stomach Upset)    Standing Inpatient Medications  caspofungin IVPB 50 milliGRAM(s) IV Intermittent every 24 hours  caspofungin IVPB      cefTRIAXone   IVPB 1000 milliGRAM(s) IV Intermittent every 24 hours  chlorhexidine 2% Cloths 1 Application(s) Topical <User Schedule>  CRRT Treatment    <Continuous>  escitalopram 10 milliGRAM(s) Oral daily  folic acid 1 milliGRAM(s) Oral daily  influenza   Vaccine 0.5 milliLiter(s) IntraMuscular once  levETIRAcetam  Solution 750 milliGRAM(s) Enteral Tube two times a day  levothyroxine Injectable 103 MICROGram(s) IV Push at bedtime  midodrine 20 milliGRAM(s) Oral every 8 hours  multivitamin/minerals/iron Oral Solution (CENTRUM) 15 milliLiter(s) Oral daily  norepinephrine Infusion 0.05 MICROgram(s)/kG/Min IV Continuous <Continuous>  pantoprazole   Suspension 40 milliGRAM(s) Oral every 24 hours  Phoxillum Filtration BK 4 / 2.5 5000 milliLiter(s) CRRT <Continuous>  Phoxillum Filtration BK 4 / 2.5 5000 milliLiter(s) CRRT <Continuous>  polyethylene glycol 3350 17 Gram(s) Oral every 12 hours  PrismaSOL Filtration BGK 4 / 2.5 5000 milliLiter(s) CRRT <Continuous>  rifAXIMin 550 milliGRAM(s) Oral every 12 hours  thiamine 100 milliGRAM(s) Enteral Tube daily  vancomycin    Solution 125 milliGRAM(s) Oral every 6 hours  vasopressin Infusion 0.03 Unit(s)/Min IV Continuous <Continuous>    PRN Inpatient Medications  HYDROmorphone  Injectable 0.25 milliGRAM(s) IV Push every 3 hours PRN  sodium chloride 0.65% Nasal 1 Spray(s) Both Nostrils every 3 hours PRN  tetracaine/benzocaine/butamben Spray 1 Spray(s) Topical every 3 hours PRN      REVIEW OF SYSTEMS  --------------------------------------------------------------------------------  Gen: + weakness and fatigue    All other systems were reviewed and are negative, except as noted.    VITALS/PHYSICAL EXAM  --------------------------------------------------------------------------------  T(C): 37 (01-07-23 @ 12:00), Max: 37.1 (01-06-23 @ 19:00)  HR: 94 (01-07-23 @ 14:00) (86 - 108)  BP: --  RR: 33 (01-07-23 @ 14:00) (12 - 33)  SpO2: 98% (01-07-23 @ 14:00) (95% - 100%)  Wt(kg): --        01-06-23 @ 07:01  -  01-07-23 @ 07:00  --------------------------------------------------------  IN: 2623.9 mL / OUT: 2811 mL / NET: -187.1 mL    01-07-23 @ 07:01  -  01-07-23 @ 14:07  --------------------------------------------------------  IN: 646.8 mL / OUT: 574 mL / NET: 72.8 mL      Physical Exam:  	Gen: appears ill   	Pulm: CTA B/L  	CV: RRR, S1S2; no rub  	Back: No spinal or CVA tenderness; no sacral edema  	Abd: +BS, soft, + ascites  	: No suprapubic tenderness  	UE: Warm, FROM; no edema; no asterixis  	LE: Warm, FROM; no edema      LABS/STUDIES  --------------------------------------------------------------------------------              8.6    41.40 >-----------<  47       [01-07-23 @ 12:40]              24.7     136  |  102  |  18  ----------------------------<  138      [01-07-23 @ 12:40]  3.7   |  19  |  0.87        Ca     9.1     [01-07-23 @ 12:40]      Mg     2.3     [01-07-23 @ 12:40]      Phos  3.5     [01-07-23 @ 12:40]    TPro  6.6  /  Alb  3.4  /  TBili  23.3  /  DBili  x   /  AST  139  /  ALT  29  /  AlkPhos  x   [01-07-23 @ 12:40]    PT/INR: PT 24.9 , INR 2.15       [01-07-23 @ 05:54]  PTT: 49.0       [01-07-23 @ 05:54]      Creatinine Trend:  SCr 0.87 [01-07 @ 12:40]  SCr 0.92 [01-07 @ 05:54]  SCr 0.99 [01-07 @ 00:17]  SCr 1.04 [01-06 @ 18:03]  SCr 1.15 [01-06 @ 12:22]              Urinalysis - [01-03-23 @ 18:02]      Color Other / Appearance Turbid / SG SEE NOTE / pH SEE NOTE      Gluc SEE NOTE / Ketone SEE NOTE  / Bili SEE NOTE / Urobili SEE NOTE       Blood SEE NOTE / Protein SEE NOTE / Leuk Est SEE NOTE / Nitrite SEE NOTE      RBC  / WBC  / Hyaline  / Gran  / Sq Epi  / Non Sq Epi  / Bacteria       Iron 51, TIBC Unable to calculate Test Repeated, %sat Unable to calculate Test Repeated      [12-24-22 @ 20:58]  Ferritin [id number]      [12-24-22 @ 20:58]  TSH 0.05      [12-24-22 @ 20:58]  Lipid: chol 100, , HDL 9, LDL --      [12-24-22 @ 20:58]    HBsAb <3.0      [12-24-22 @ 20:59]  HBsAb Nonreact      [12-24-22 @ 20:59]  HBsAg Nonreact      [12-24-22 @ 20:59]  HBcAb Nonreact      [12-24-22 @ 20:59]  HCV 0.12, Nonreact      [12-24-22 @ 20:59]  HIV Nonreact      [12-24-22 @ 20:58]  HIV Nonreact      [12-24-22 @ 20:58]    MIRNA: titer Negative, pattern --      [12-24-22 @ 20:59]  Syphilis Screen (Treponema Pallidum Ab) Negative      [12-24-22 @ 20:59]     [facility] DIVISION OF KIDNEY DISEASES AND HYPERTENSION -- FOLLOW UP NOTE  --------------------------------------------------------------------------------  Chief Complaint:  RED, cirrhosis    24 hour events/subjective:  Pt on CVVH, tolerating. Still on vasopressors.       PAST HISTORY  --------------------------------------------------------------------------------  No significant changes to PMH, PSH, FHx, SHx, unless otherwise noted    ALLERGIES & MEDICATIONS  --------------------------------------------------------------------------------  Allergies    Macrobid (Rash)    Intolerances    Nexium (Stomach Upset)    Standing Inpatient Medications  caspofungin IVPB 50 milliGRAM(s) IV Intermittent every 24 hours  caspofungin IVPB      cefTRIAXone   IVPB 1000 milliGRAM(s) IV Intermittent every 24 hours  chlorhexidine 2% Cloths 1 Application(s) Topical <User Schedule>  CRRT Treatment    <Continuous>  escitalopram 10 milliGRAM(s) Oral daily  folic acid 1 milliGRAM(s) Oral daily  influenza   Vaccine 0.5 milliLiter(s) IntraMuscular once  levETIRAcetam  Solution 750 milliGRAM(s) Enteral Tube two times a day  levothyroxine Injectable 103 MICROGram(s) IV Push at bedtime  midodrine 20 milliGRAM(s) Oral every 8 hours  multivitamin/minerals/iron Oral Solution (CENTRUM) 15 milliLiter(s) Oral daily  norepinephrine Infusion 0.05 MICROgram(s)/kG/Min IV Continuous <Continuous>  pantoprazole   Suspension 40 milliGRAM(s) Oral every 24 hours  Phoxillum Filtration BK 4 / 2.5 5000 milliLiter(s) CRRT <Continuous>  Phoxillum Filtration BK 4 / 2.5 5000 milliLiter(s) CRRT <Continuous>  polyethylene glycol 3350 17 Gram(s) Oral every 12 hours  PrismaSOL Filtration BGK 4 / 2.5 5000 milliLiter(s) CRRT <Continuous>  rifAXIMin 550 milliGRAM(s) Oral every 12 hours  thiamine 100 milliGRAM(s) Enteral Tube daily  vancomycin    Solution 125 milliGRAM(s) Oral every 6 hours  vasopressin Infusion 0.03 Unit(s)/Min IV Continuous <Continuous>    PRN Inpatient Medications  HYDROmorphone  Injectable 0.25 milliGRAM(s) IV Push every 3 hours PRN  sodium chloride 0.65% Nasal 1 Spray(s) Both Nostrils every 3 hours PRN  tetracaine/benzocaine/butamben Spray 1 Spray(s) Topical every 3 hours PRN      REVIEW OF SYSTEMS  --------------------------------------------------------------------------------  Gen: + weakness and fatigue    All other systems were reviewed and are negative, except as noted.    VITALS/PHYSICAL EXAM  --------------------------------------------------------------------------------  T(C): 37 (01-07-23 @ 12:00), Max: 37.1 (01-06-23 @ 19:00)  HR: 94 (01-07-23 @ 14:00) (86 - 108)  BP: --  RR: 33 (01-07-23 @ 14:00) (12 - 33)  SpO2: 98% (01-07-23 @ 14:00) (95% - 100%)  Wt(kg): --        01-06-23 @ 07:01  -  01-07-23 @ 07:00  --------------------------------------------------------  IN: 2623.9 mL / OUT: 2811 mL / NET: -187.1 mL    01-07-23 @ 07:01  -  01-07-23 @ 14:07  --------------------------------------------------------  IN: 646.8 mL / OUT: 574 mL / NET: 72.8 mL      Physical Exam:  	Gen: appears ill   	Pulm: CTA B/L  	CV: RRR, S1S2; no rub  	Back: No spinal or CVA tenderness; no sacral edema  	Abd: +BS, soft, + ascites  	: No suprapubic tenderness  	UE: Warm, FROM; no edema; no asterixis  	LE: Warm, FROM; no edema      LABS/STUDIES  --------------------------------------------------------------------------------              8.6    41.40 >-----------<  47       [01-07-23 @ 12:40]              24.7     136  |  102  |  18  ----------------------------<  138      [01-07-23 @ 12:40]  3.7   |  19  |  0.87        Ca     9.1     [01-07-23 @ 12:40]      Mg     2.3     [01-07-23 @ 12:40]      Phos  3.5     [01-07-23 @ 12:40]    TPro  6.6  /  Alb  3.4  /  TBili  23.3  /  DBili  x   /  AST  139  /  ALT  29  /  AlkPhos  x   [01-07-23 @ 12:40]    PT/INR: PT 24.9 , INR 2.15       [01-07-23 @ 05:54]  PTT: 49.0       [01-07-23 @ 05:54]      Creatinine Trend:  SCr 0.87 [01-07 @ 12:40]  SCr 0.92 [01-07 @ 05:54]  SCr 0.99 [01-07 @ 00:17]  SCr 1.04 [01-06 @ 18:03]  SCr 1.15 [01-06 @ 12:22]              Urinalysis - [01-03-23 @ 18:02]      Color Other / Appearance Turbid / SG SEE NOTE / pH SEE NOTE      Gluc SEE NOTE / Ketone SEE NOTE  / Bili SEE NOTE / Urobili SEE NOTE       Blood SEE NOTE / Protein SEE NOTE / Leuk Est SEE NOTE / Nitrite SEE NOTE      RBC  / WBC  / Hyaline  / Gran  / Sq Epi  / Non Sq Epi  / Bacteria       Iron 51, TIBC Unable to calculate Test Repeated, %sat Unable to calculate Test Repeated      [12-24-22 @ 20:58]  Ferritin 5747      [12-24-22 @ 20:58]  TSH 0.05      [12-24-22 @ 20:58]  Lipid: chol 100, , HDL 9, LDL --      [12-24-22 @ 20:58]    HBsAb <3.0      [12-24-22 @ 20:59]  HBsAb Nonreact      [12-24-22 @ 20:59]  HBsAg Nonreact      [12-24-22 @ 20:59]  HBcAb Nonreact      [12-24-22 @ 20:59]  HCV 0.12, Nonreact      [12-24-22 @ 20:59]  HIV Nonreact      [12-24-22 @ 20:58]  HIV Nonreact      [12-24-22 @ 20:58]    MIRNA: titer Negative, pattern --      [12-24-22 @ 20:59]  Syphilis Screen (Treponema Pallidum Ab) Negative      [12-24-22 @ 20:59]     [facility] DIVISION OF KIDNEY DISEASES AND HYPERTENSION -- FOLLOW UP NOTE  --------------------------------------------------------------------------------  Chief Complaint:  RED, cirrhosis    24 hour events/subjective:  Pt on CVVH, tolerating. Still on vasopressors.       PAST HISTORY  --------------------------------------------------------------------------------  No significant changes to PMH, PSH, FHx, SHx, unless otherwise noted    ALLERGIES & MEDICATIONS  --------------------------------------------------------------------------------  Allergies    Macrobid (Rash)    Intolerances    Nexium (Stomach Upset)    Standing Inpatient Medications  caspofungin IVPB 50 milliGRAM(s) IV Intermittent every 24 hours  caspofungin IVPB      cefTRIAXone   IVPB 1000 milliGRAM(s) IV Intermittent every 24 hours  chlorhexidine 2% Cloths 1 Application(s) Topical <User Schedule>  CRRT Treatment    <Continuous>  escitalopram 10 milliGRAM(s) Oral daily  folic acid 1 milliGRAM(s) Oral daily  influenza   Vaccine 0.5 milliLiter(s) IntraMuscular once  levETIRAcetam  Solution 750 milliGRAM(s) Enteral Tube two times a day  levothyroxine Injectable 103 MICROGram(s) IV Push at bedtime  midodrine 20 milliGRAM(s) Oral every 8 hours  multivitamin/minerals/iron Oral Solution (CENTRUM) 15 milliLiter(s) Oral daily  norepinephrine Infusion 0.05 MICROgram(s)/kG/Min IV Continuous <Continuous>  pantoprazole   Suspension 40 milliGRAM(s) Oral every 24 hours  Phoxillum Filtration BK 4 / 2.5 5000 milliLiter(s) CRRT <Continuous>  Phoxillum Filtration BK 4 / 2.5 5000 milliLiter(s) CRRT <Continuous>  polyethylene glycol 3350 17 Gram(s) Oral every 12 hours  PrismaSOL Filtration BGK 4 / 2.5 5000 milliLiter(s) CRRT <Continuous>  rifAXIMin 550 milliGRAM(s) Oral every 12 hours  thiamine 100 milliGRAM(s) Enteral Tube daily  vancomycin    Solution 125 milliGRAM(s) Oral every 6 hours  vasopressin Infusion 0.03 Unit(s)/Min IV Continuous <Continuous>    PRN Inpatient Medications  HYDROmorphone  Injectable 0.25 milliGRAM(s) IV Push every 3 hours PRN  sodium chloride 0.65% Nasal 1 Spray(s) Both Nostrils every 3 hours PRN  tetracaine/benzocaine/butamben Spray 1 Spray(s) Topical every 3 hours PRN      REVIEW OF SYSTEMS  --------------------------------------------------------------------------------  Gen: + weakness and fatigue    All other systems were reviewed and are negative, except as noted.    VITALS/PHYSICAL EXAM  --------------------------------------------------------------------------------  T(C): 37 (01-07-23 @ 12:00), Max: 37.1 (01-06-23 @ 19:00)  HR: 94 (01-07-23 @ 14:00) (86 - 108)  BP: --  RR: 33 (01-07-23 @ 14:00) (12 - 33)  SpO2: 98% (01-07-23 @ 14:00) (95% - 100%)  Wt(kg): --        01-06-23 @ 07:01  -  01-07-23 @ 07:00  --------------------------------------------------------  IN: 2623.9 mL / OUT: 2811 mL / NET: -187.1 mL    01-07-23 @ 07:01  -  01-07-23 @ 14:07  --------------------------------------------------------  IN: 646.8 mL / OUT: 574 mL / NET: 72.8 mL      Physical Exam:  	Gen: appears ill   	Pulm: CTA B/L  	CV: RRR, S1S2; no rub  	Back: No spinal or CVA tenderness; no sacral edema  	Abd: +BS, soft, + ascites  	: No suprapubic tenderness  	UE: Warm, FROM; no edema; no asterixis  	LE: Warm, FROM; no edema      LABS/STUDIES  --------------------------------------------------------------------------------              8.6    41.40 >-----------<  47       [01-07-23 @ 12:40]              24.7     136  |  102  |  18  ----------------------------<  138      [01-07-23 @ 12:40]  3.7   |  19  |  0.87        Ca     9.1     [01-07-23 @ 12:40]      Mg     2.3     [01-07-23 @ 12:40]      Phos  3.5     [01-07-23 @ 12:40]    TPro  6.6  /  Alb  3.4  /  TBili  23.3  /  DBili  x   /  AST  139  /  ALT  29  /  AlkPhos  x   [01-07-23 @ 12:40]    PT/INR: PT 24.9 , INR 2.15       [01-07-23 @ 05:54]  PTT: 49.0       [01-07-23 @ 05:54]      Creatinine Trend:  SCr 0.87 [01-07 @ 12:40]  SCr 0.92 [01-07 @ 05:54]  SCr 0.99 [01-07 @ 00:17]  SCr 1.04 [01-06 @ 18:03]  SCr 1.15 [01-06 @ 12:22]              Urinalysis - [01-03-23 @ 18:02]      Color Other / Appearance Turbid / SG SEE NOTE / pH SEE NOTE      Gluc SEE NOTE / Ketone SEE NOTE  / Bili SEE NOTE / Urobili SEE NOTE       Blood SEE NOTE / Protein SEE NOTE / Leuk Est SEE NOTE / Nitrite SEE NOTE      RBC  / WBC  / Hyaline  / Gran  / Sq Epi  / Non Sq Epi  / Bacteria       Iron 51, TIBC Unable to calculate Test Repeated, %sat Unable to calculate Test Repeated      [12-24-22 @ 20:58]  Ferritin 5747      [12-24-22 @ 20:58]  TSH 0.05      [12-24-22 @ 20:58]  Lipid: chol 100, , HDL 9, LDL --      [12-24-22 @ 20:58]    HBsAb <3.0      [12-24-22 @ 20:59]  HBsAb Nonreact      [12-24-22 @ 20:59]  HBsAg Nonreact      [12-24-22 @ 20:59]  HBcAb Nonreact      [12-24-22 @ 20:59]  HCV 0.12, Nonreact      [12-24-22 @ 20:59]  HIV Nonreact      [12-24-22 @ 20:58]  HIV Nonreact      [12-24-22 @ 20:58]    MIRNA: titer Negative, pattern --      [12-24-22 @ 20:59]  Syphilis Screen (Treponema Pallidum Ab) Negative      [12-24-22 @ 20:59]

## 2023-01-07 NOTE — PROGRESS NOTE ADULT - ASSESSMENT
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    CT Chest with possible pneumonia versus atelectasis    UA (12/19) Moderate Leukocyte Esterase  UCx (12/19) No Growth  BCx (12/19) Corynebacterium (1/4 Bottles)  MRSA Nasal PCR (12/19) Negative  Paracentesis (12/19, 12/26) Cell counts not suggestive of SBP  Combicath Sputum Culture (12/21) Normal Respiratory Cassandra  Combicath Sputum Culture (12/22) Normal Respiratory Cassandra    Extubated but continued shock  Suspect noninfectious etiology of current clinical status (liver failure)  Central lines exchanged within the last week    CT Chest (1/3/23) Scattered nodular opacities as well as right lower lobe consolidation. Secretions in the trachea.  CT A/P (1/3/23) Widespread small bowel thickening suggesting enteritis.    MRSA/MSSA PCR (12/30) Negative  Fungitell (12/26, 12/28) 56, 42    Reviewed CT Chest with Radiology today (1/4/23); findings could be consistent with infection. Favoring viral infection or PCP (although Fungitell not elevated) if infectious etiology. Could still be representative of atypical pulmonary edema.     #Abnormal CT Chest, Leukocytosis, Positive Blood Culture, Transaminitis, Cirrhosis  GI PCR Negative  --Doubt that CT Chest or Abdomen findings can explain degree of leukocytosis or hypotension especially as patient is already s/p 2 weeks of Meropenem (12/21 -->) and ID workup has been otherwise unrevealing. Favor discontinuing Meropenem  --Check C diff toxin assay  --Continue to follow CBC with diff  --Continue to follow temperature curve  --Follow up on preliminary blood cultures    #Positive UCx (Candida dublinensis)  Unclear Significance  s/p Fluconazole 12/20 --> 12/25  UCx (1/3) No Growth  Fungitell (12/26, 12/28) 56, 42  --Stop Caspofungin (12/26 -->)    #Pre-Transplant Evaluation  --ID Clearance for OLT pending clinical status improvement  --Initial QuantiFeron Indeterminate; follow up on repeat QuantiFeron     #Encounter to Vaccinate Patient  Will address vaccination outside of the critical illness period  COVID19: Has had COVID19 in 2020 and again in ~8/2022. Denies prior COVID19 Vaccination  Influenza: Will require  Pneumococcal: Would benefit from PCV20  HAV: Will require Hepatitis A Vaccine  HBV: Will require Heplisav  MMR: Immune  Varicella: Immune  Shingles: Will require Shingrix  Tdap: Will require Tdap    I will continue to follow. Please feel free to contact me with any further questions.    Jonah Mendoza M.D.  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    CT Chest with possible pneumonia versus atelectasis    UA (12/19) Moderate Leukocyte Esterase  UCx (12/19) No Growth  BCx (12/19) Corynebacterium (1/4 Bottles)  MRSA Nasal PCR (12/19) Negative  Paracentesis (12/19, 12/26) Cell counts not suggestive of SBP  Combicath Sputum Culture (12/21) Normal Respiratory Cassandra  Combicath Sputum Culture (12/22) Normal Respiratory Cassandra    Extubated but continued shock  Suspect noninfectious etiology of current clinical status (liver failure)  Central lines exchanged within the last week    CT Chest (1/3/23) Scattered nodular opacities as well as right lower lobe consolidation. Secretions in the trachea.  CT A/P (1/3/23) Widespread small bowel thickening suggesting enteritis.    MRSA/MSSA PCR (12/30) Negative  Fungitell (12/26, 12/28) 56, 42    Reviewed CT Chest with Radiology today (1/4/23); findings could be consistent with infection. Favoring viral infection or PCP (although Fungitell not elevated) if infectious etiology. Could still be representative of atypical pulmonary edema.     #Abnormal CT Chest, Leukocytosis, Positive Blood Culture, Transaminitis, Cirrhosis  GI PCR Negative  --Doubt that CT Chest or Abdomen findings can explain degree of leukocytosis or hypotension especially as patient is already s/p 2 weeks of Meropenem (12/21 -->) and ID workup has been otherwise unrevealing. Favor discontinuing Meropenem  --Check C diff toxin assay  --Continue to follow CBC with diff  --Continue to follow temperature curve  --Follow up on preliminary blood cultures    #Positive UCx (Candida dublinensis)  Unclear Significance  s/p Fluconazole 12/20 --> 12/25  UCx (1/3) No Growth  Fungitell (12/26, 12/28) 56, 42  --Stop Caspofungin (12/26 -->)    #Pre-Transplant Evaluation  --ID Clearance for OLT pending clinical status improvement  --Initial QuantiFeron Indeterminate; follow up on repeat QuantiFeron     #Encounter to Vaccinate Patient  Will address vaccination outside of the critical illness period  COVID19: Has had COVID19 in 2020 and again in ~8/2022. Denies prior COVID19 Vaccination  Influenza: Will require  Pneumococcal: Would benefit from PCV20  HAV: Will require Hepatitis A Vaccine  HBV: Will require Heplisav  MMR: Immune  Varicella: Immune  Shingles: Will require Shingrix  Tdap: Will require Tdap    I will continue to follow. Please feel free to contact me with any further questions.    Jonah Mendoza M.D.  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    CT Chest with possible pneumonia versus atelectasis    UA (12/19) Moderate Leukocyte Esterase  UCx (12/19) No Growth  BCx (12/19) Corynebacterium (1/4 Bottles)  MRSA Nasal PCR (12/19) Negative  Paracentesis (12/19, 12/26) Cell counts not suggestive of SBP  Combicath Sputum Culture (12/21) Normal Respiratory Cassandra  Combicath Sputum Culture (12/22) Normal Respiratory Cassandra    Extubated but continued shock  Suspect noninfectious etiology of current clinical status (liver failure)  Central lines exchanged within the last week    CT Chest (1/3/23) Scattered nodular opacities as well as right lower lobe consolidation. Secretions in the trachea.  CT A/P (1/3/23) Widespread small bowel thickening suggesting enteritis.    MRSA/MSSA PCR (12/30) Negative  Fungitell (12/26, 12/28) 56, 42    Reviewed CT Chest with Radiology today (1/4/23); findings could be consistent with infection. Favoring viral infection or PCP (although Fungitell not elevated) if infectious etiology. Could still be representative of atypical pulmonary edema.     #Abnormal CT Chest, Leukocytosis, Positive Blood Culture, Transaminitis, Cirrhosis  GI PCR Negative  --Doubt that CT Chest or Abdomen findings can explain degree of leukocytosis or hypotension especially as patient is already s/p 2 weeks of Meropenem (12/21 -->) and ID workup has been otherwise unrevealing. Favor discontinuing Meropenem  --Check C diff toxin assay  --Continue to follow CBC with diff  --Continue to follow temperature curve  --Follow up on preliminary blood cultures    #Positive UCx (Candida dublinensis)  Unclear Significance  s/p Fluconazole 12/20 --> 12/25  UCx (1/3) No Growth  Fungitell (12/26, 12/28) 56, 42  --Stop Caspofungin (12/26 -->)    #Pre-Transplant Evaluation  --ID Clearance for OLT pending clinical status improvement  --Initial QuantiFeron Indeterminate; follow up on repeat QuantiFeron     #Encounter to Vaccinate Patient  Will address vaccination outside of the critical illness period  COVID19: Has had COVID19 in 2020 and again in ~8/2022. Denies prior COVID19 Vaccination  Influenza: Will require  Pneumococcal: Would benefit from PCV20  HAV: Will require Hepatitis A Vaccine  HBV: Will require Heplisav  MMR: Immune  Varicella: Immune  Shingles: Will require Shingrix  Tdap: Will require Tdap    I will continue to follow. Please feel free to contact me with any further questions.    Jonah Mendoza M.D.  [facility] Division of Infectious Disease  8AM-5PM Monday - Friday: Available on [employer] Teams  After Hours and Holidays (or if no response on [employer] Teams): Please contact the Infectious Diseases Office at [phone]    The above assessment and plan were discussed with transplant surgery team

## 2023-01-07 NOTE — PROGRESS NOTE ADULT - ATTENDING COMMENTS
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 complicated by right lateral ventricular hemorrhage managed non-operatively, with MRI in 5/2022 with residual neoplasm but no ICH), history of mild COVID-19 (11/2021), AUD (with a past history of detoxification at [place] 1 year ago and more recent rehab attempt with relapse, in early remission since 11/2022), and decompensated ALD cirrhosis complicated by ascites and first diagnosed in 11/2022 when she was hospitalized and treated with steroids but non-responder, transferred from [facility] to [facility] and currently remaining in SICU due to septic shock (still requiring norepinephrine and vasopressin support), recent acute hypoxic respiratory failure (intubated 12/19-23, now off oxygen), anuric RED (s/p first HD on 12/20, on CVVHD 12/21- ), and acute on chronic liver failure with hepatic encephalopathy. Labs notable for worsening leukocytosis despite ongoing empiric antimicrobial therapy, most recently with meropenem (12/21-1/5), ceftriaxone (1/5- ), and caspofungin (12/26- ), also still requiring the 2 pressors in addition to midodrine. Given diarrhea and abdominal distension as well as bowel wall thickening on last CT (1/3), sending stool tests today and empirically starting PO vancomycin for possible C difficile as per discussion with SICU and Transplant ID. Continuing rifaximin for HE. ABO O with current MELD-Na 40 and liver transplant evaluation in progress, but needs significant improvements in her hypotension and frailty in order to be able to proceed with transplant. Prognosis guarded. Appreciate continued excellent SICU care. Daughter updated at bedside.    Please don't hesitate to call with any questions or concerns.    Javier Goldstein M.D., Ph.D.  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 with post-resection course complicated by right lateral ventricular hemorrhage managed non-operatively, with MRI in 5/2022 with residual neoplasm but no ICH), history of mild COVID-19 (11/2021), AUD (with a past history of detoxification at [place] 1 year ago and more recent rehab attempt with relapse, in early remission since 11/2022), and decompensated ALD cirrhosis complicated by ascites and first diagnosed in 11/2022 when she was hospitalized and treated with steroids but non-responder, transferred from [facility] to [facility] and currently remaining in SICU due to septic shock (still requiring norepinephrine and vasopressin support), recent acute hypoxic respiratory failure (intubated 12/19-23, now off oxygen), anuric RED (s/p first HD on 12/20, on CVVHD 12/21- ), and acute on chronic liver failure with hepatic encephalopathy. Labs notable for worsening leukocytosis despite ongoing empiric antimicrobial therapy, most recently with meropenem (12/21-1/5), ceftriaxone (1/5- ), and caspofungin (12/26- ), also still requiring the 2 pressors in addition to midodrine. Given diarrhea and abdominal distension as well as bowel wall thickening on last CT (1/3), sending stool tests today and empirically starting PO vancomycin for possible C difficile as per discussion with SICU and Transplant ID. Continuing rifaximin for HE. ABO O with current MELD-Na 40 and liver transplant evaluation in progress, but needs significant improvements in her hypotension and frailty in order to be able to proceed with transplant. Prognosis guarded. Appreciate continued excellent SICU care. Daughter updated at bedside.    Please don't hesitate to call with any questions or concerns.    Javier Goldstein M.D., Ph.D.  Transplant Hepatology 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 complicated by right lateral ventricular hemorrhage managed non-operatively, with MRI in 5/2022 with residual neoplasm but no ICH), history of mild COVID-19 (11/2021), AUD (with a past history of detoxification at [place] 1 year ago and more recent rehab attempt with relapse, in early remission since 11/2022), and decompensated ALD cirrhosis complicated by ascites and first diagnosed in 11/2022 when she was hospitalized and treated with steroids but non-responder, transferred from [facility] to [facility] and currently remaining in SICU due to septic shock (still requiring norepinephrine and vasopressin support), recent acute hypoxic respiratory failure (intubated 12/19-23, now off oxygen), anuric RED (s/p first HD on 12/20, on CVVHD 12/21- ), and acute on chronic liver failure with hepatic encephalopathy. Labs notable for worsening leukocytosis despite ongoing empiric antimicrobial therapy, most recently with meropenem (12/21-1/5), ceftriaxone (1/5- ), and caspofungin (12/26- ), also still requiring the 2 pressors in addition to midodrine. Given diarrhea and abdominal distension as well as bowel wall thickening on last CT (1/3), sending stool tests today and empirically starting PO vancomycin for possible C difficile as per discussion with SICU and Transplant ID. Continuing rifaximin for HE. ABO O with current MELD-Na 40 and liver transplant evaluation in progress, but needs significant improvements in her hypotension and frailty in order to be able to proceed with transplant. Prognosis guarded. Appreciate continued excellent SICU care. Daughter updated at bedside.    Please don't hesitate to call with any questions or concerns.    Javier Goldstein M.D., Ph.D.  Transplant Hepatology

## 2023-01-07 NOTE — PROGRESS NOTE ADULT - ASSESSMENT
61 yo F with h/o decompensated ETOH cirrhosis with ascites, thyroid cancer (s/p total thyroidectomy, RTX, and radioactive iodide), HTN, ventrical neoplasm who was initially admitted to  12/19 with new onset seizures and transferred to [facility] 12/20 for liver transplant evaluation. Pt being seen for RED requiring CRRT since 12/20 61 yo F with h/o decompensated ETOH cirrhosis with ascites, thyroid cancer (s/p total thyroidectomy, RTX, and radioactive iodide), HTN, ventrical neoplasm who was initially admitted to  12/19 with new onset seizures and transferred to [facility] 12/20 for liver transplant evaluation. Pt being seen for RED requiring CRRT since 12/20 63 yo F with h/o decompensated ETOH cirrhosis with ascites, thyroid cancer (s/p total thyroidectomy, RTX, and radioactive iodide), HTN, ventrical neoplasm who was initially admitted to  12/19 with new onset seizures and transferred to [facility] 12/20 for liver transplant evaluation. Pt being seen for RED requiring CRRT since 12/20

## 2023-01-08 LAB
ALBUMIN SERPL ELPH-MCNC: 2.7 G/DL — LOW (ref 3.3–5)
ALBUMIN SERPL ELPH-MCNC: 3.1 G/DL — LOW (ref 3.3–5)
ALBUMIN SERPL ELPH-MCNC: 3.2 G/DL — LOW (ref 3.3–5)
ALBUMIN SERPL ELPH-MCNC: 3.3 G/DL — SIGNIFICANT CHANGE UP (ref 3.3–5)
ALBUMIN SERPL ELPH-MCNC: 3.4 G/DL — SIGNIFICANT CHANGE UP (ref 3.3–5)
ALBUMIN SERPL ELPH-MCNC: 3.5 G/DL — SIGNIFICANT CHANGE UP (ref 3.3–5)
ALP SERPL-CCNC: 101 U/L — SIGNIFICANT CHANGE UP (ref 40–120)
ALP SERPL-CCNC: 183 U/L — HIGH (ref 40–120)
ALP SERPL-CCNC: 204 U/L — HIGH (ref 40–120)
ALP SERPL-CCNC: 216 U/L — HIGH (ref 40–120)
ALP SERPL-CCNC: 219 U/L — HIGH (ref 40–120)
ALP SERPL-CCNC: 227 U/L — HIGH (ref 40–120)
ALT FLD-CCNC: 24 U/L — SIGNIFICANT CHANGE UP (ref 10–45)
ALT FLD-CCNC: 33 U/L — SIGNIFICANT CHANGE UP (ref 10–45)
ALT FLD-CCNC: 34 U/L — SIGNIFICANT CHANGE UP (ref 10–45)
ALT FLD-CCNC: 35 U/L — SIGNIFICANT CHANGE UP (ref 10–45)
AMMONIA BLD-MCNC: 46 UMOL/L — SIGNIFICANT CHANGE UP (ref 11–55)
ANION GAP SERPL CALC-SCNC: 13 MMOL/L — SIGNIFICANT CHANGE UP (ref 5–17)
ANION GAP SERPL CALC-SCNC: 14 MMOL/L — SIGNIFICANT CHANGE UP (ref 5–17)
ANION GAP SERPL CALC-SCNC: 15 MMOL/L — SIGNIFICANT CHANGE UP (ref 5–17)
ANION GAP SERPL CALC-SCNC: 16 MMOL/L — SIGNIFICANT CHANGE UP (ref 5–17)
ANION GAP SERPL CALC-SCNC: 17 MMOL/L — SIGNIFICANT CHANGE UP (ref 5–17)
APTT BLD: 38.4 SEC — HIGH (ref 27.5–35.5)
APTT BLD: 47.3 SEC — HIGH (ref 27.5–35.5)
AST SERPL-CCNC: 105 U/L — HIGH (ref 10–40)
AST SERPL-CCNC: 124 U/L — HIGH (ref 10–40)
AST SERPL-CCNC: 125 U/L — HIGH (ref 10–40)
AST SERPL-CCNC: 135 U/L — HIGH (ref 10–40)
AST SERPL-CCNC: 60 U/L — HIGH (ref 10–40)
BILIRUB SERPL-MCNC: 10.3 MG/DL — HIGH (ref 0.2–1.2)
BILIRUB SERPL-MCNC: 21 MG/DL — HIGH (ref 0.2–1.2)
BILIRUB SERPL-MCNC: 21.3 MG/DL — HIGH (ref 0.2–1.2)
BILIRUB SERPL-MCNC: 22.6 MG/DL — HIGH (ref 0.2–1.2)
BILIRUB SERPL-MCNC: 23.5 MG/DL — HIGH (ref 0.2–1.2)
BILIRUB SERPL-MCNC: 24.5 MG/DL — HIGH (ref 0.2–1.2)
BUN SERPL-MCNC: 18 MG/DL — SIGNIFICANT CHANGE UP (ref 7–23)
BUN SERPL-MCNC: 21 MG/DL — SIGNIFICANT CHANGE UP (ref 7–23)
BUN SERPL-MCNC: 25 MG/DL — HIGH (ref 7–23)
BUN SERPL-MCNC: 28 MG/DL — HIGH (ref 7–23)
BUN SERPL-MCNC: 30 MG/DL — HIGH (ref 7–23)
CALCIUM SERPL-MCNC: 8.9 MG/DL — SIGNIFICANT CHANGE UP (ref 8.4–10.5)
CALCIUM SERPL-MCNC: 9 MG/DL — SIGNIFICANT CHANGE UP (ref 8.4–10.5)
CALCIUM SERPL-MCNC: 9.1 MG/DL — SIGNIFICANT CHANGE UP (ref 8.4–10.5)
CALCIUM SERPL-MCNC: 9.2 MG/DL — SIGNIFICANT CHANGE UP (ref 8.4–10.5)
CALCIUM SERPL-MCNC: 9.9 MG/DL — SIGNIFICANT CHANGE UP (ref 8.4–10.5)
CHLORIDE SERPL-SCNC: 102 MMOL/L — SIGNIFICANT CHANGE UP (ref 96–108)
CHLORIDE SERPL-SCNC: 103 MMOL/L — SIGNIFICANT CHANGE UP (ref 96–108)
CHLORIDE SERPL-SCNC: 104 MMOL/L — SIGNIFICANT CHANGE UP (ref 96–108)
CHLORIDE SERPL-SCNC: 108 MMOL/L — SIGNIFICANT CHANGE UP (ref 96–108)
CO2 SERPL-SCNC: 17 MMOL/L — LOW (ref 22–31)
CO2 SERPL-SCNC: 18 MMOL/L — LOW (ref 22–31)
CO2 SERPL-SCNC: 19 MMOL/L — LOW (ref 22–31)
CO2 SERPL-SCNC: 22 MMOL/L — SIGNIFICANT CHANGE UP (ref 22–31)
CREAT SERPL-MCNC: 0.84 MG/DL — SIGNIFICANT CHANGE UP (ref 0.5–1.3)
CREAT SERPL-MCNC: 0.98 MG/DL — SIGNIFICANT CHANGE UP (ref 0.5–1.3)
CREAT SERPL-MCNC: 1.15 MG/DL — SIGNIFICANT CHANGE UP (ref 0.5–1.3)
CREAT SERPL-MCNC: 1.21 MG/DL — SIGNIFICANT CHANGE UP (ref 0.5–1.3)
CREAT SERPL-MCNC: 1.23 MG/DL — SIGNIFICANT CHANGE UP (ref 0.5–1.3)
CREAT SERPL-MCNC: 1.32 MG/DL — HIGH (ref 0.5–1.3)
EGFR: 46 ML/MIN/1.73M2 — LOW
EGFR: 50 ML/MIN/1.73M2 — LOW
EGFR: 51 ML/MIN/1.73M2 — LOW
EGFR: 54 ML/MIN/1.73M2 — LOW
EGFR: 66 ML/MIN/1.73M2 — SIGNIFICANT CHANGE UP
EGFR: 79 ML/MIN/1.73M2 — SIGNIFICANT CHANGE UP
GAS PNL BLDA: SIGNIFICANT CHANGE UP
GLUCOSE SERPL-MCNC: 121 MG/DL — HIGH (ref 70–99)
GLUCOSE SERPL-MCNC: 138 MG/DL — HIGH (ref 70–99)
GLUCOSE SERPL-MCNC: 139 MG/DL — HIGH (ref 70–99)
GLUCOSE SERPL-MCNC: 157 MG/DL — HIGH (ref 70–99)
GLUCOSE SERPL-MCNC: 163 MG/DL — HIGH (ref 70–99)
GLUCOSE SERPL-MCNC: 180 MG/DL — HIGH (ref 70–99)
HCT VFR BLD CALC: 16.2 % — CRITICAL LOW (ref 34.5–45)
HCT VFR BLD CALC: 20.9 % — CRITICAL LOW (ref 34.5–45)
HCT VFR BLD CALC: 22.4 % — LOW (ref 34.5–45)
HCT VFR BLD CALC: 23.8 % — LOW (ref 34.5–45)
HCT VFR BLD CALC: 25.4 % — LOW (ref 34.5–45)
HCT VFR BLD CALC: 25.6 % — LOW (ref 34.5–45)
HGB BLD-MCNC: 5.5 G/DL — CRITICAL LOW (ref 11.5–15.5)
HGB BLD-MCNC: 7.3 G/DL — LOW (ref 11.5–15.5)
HGB BLD-MCNC: 7.8 G/DL — LOW (ref 11.5–15.5)
HGB BLD-MCNC: 8.3 G/DL — LOW (ref 11.5–15.5)
HGB BLD-MCNC: 8.6 G/DL — LOW (ref 11.5–15.5)
HGB BLD-MCNC: 8.8 G/DL — LOW (ref 11.5–15.5)
INR BLD: 1.59 RATIO — HIGH (ref 0.88–1.16)
INR BLD: 2.12 RATIO — HIGH (ref 0.88–1.16)
MAGNESIUM SERPL-MCNC: 1.8 MG/DL — SIGNIFICANT CHANGE UP (ref 1.6–2.6)
MAGNESIUM SERPL-MCNC: 2.1 MG/DL — SIGNIFICANT CHANGE UP (ref 1.6–2.6)
MAGNESIUM SERPL-MCNC: 2.2 MG/DL — SIGNIFICANT CHANGE UP (ref 1.6–2.6)
MAGNESIUM SERPL-MCNC: 2.3 MG/DL — SIGNIFICANT CHANGE UP (ref 1.6–2.6)
MCHC RBC-ENTMCNC: 30.3 PG — SIGNIFICANT CHANGE UP (ref 27–34)
MCHC RBC-ENTMCNC: 30.9 PG — SIGNIFICANT CHANGE UP (ref 27–34)
MCHC RBC-ENTMCNC: 31 PG — SIGNIFICANT CHANGE UP (ref 27–34)
MCHC RBC-ENTMCNC: 31.1 PG — SIGNIFICANT CHANGE UP (ref 27–34)
MCHC RBC-ENTMCNC: 31.3 PG — SIGNIFICANT CHANGE UP (ref 27–34)
MCHC RBC-ENTMCNC: 33.6 GM/DL — SIGNIFICANT CHANGE UP (ref 32–36)
MCHC RBC-ENTMCNC: 34 GM/DL — SIGNIFICANT CHANGE UP (ref 32–36)
MCHC RBC-ENTMCNC: 34.6 GM/DL — SIGNIFICANT CHANGE UP (ref 32–36)
MCHC RBC-ENTMCNC: 34.8 GM/DL — SIGNIFICANT CHANGE UP (ref 32–36)
MCHC RBC-ENTMCNC: 34.9 GM/DL — SIGNIFICANT CHANGE UP (ref 32–36)
MCV RBC AUTO: 88.6 FL — SIGNIFICANT CHANGE UP (ref 80–100)
MCV RBC AUTO: 89.1 FL — SIGNIFICANT CHANGE UP (ref 80–100)
MCV RBC AUTO: 89.2 FL — SIGNIFICANT CHANGE UP (ref 80–100)
MCV RBC AUTO: 89.4 FL — SIGNIFICANT CHANGE UP (ref 80–100)
MCV RBC AUTO: 90.1 FL — SIGNIFICANT CHANGE UP (ref 80–100)
MCV RBC AUTO: 92 FL — SIGNIFICANT CHANGE UP (ref 80–100)
NRBC # BLD: 0 /100 WBCS — SIGNIFICANT CHANGE UP (ref 0–0)
PHOSPHATE SERPL-MCNC: 3.6 MG/DL — SIGNIFICANT CHANGE UP (ref 2.5–4.5)
PHOSPHATE SERPL-MCNC: 3.9 MG/DL — SIGNIFICANT CHANGE UP (ref 2.5–4.5)
PHOSPHATE SERPL-MCNC: 4 MG/DL — SIGNIFICANT CHANGE UP (ref 2.5–4.5)
PHOSPHATE SERPL-MCNC: 4.2 MG/DL — SIGNIFICANT CHANGE UP (ref 2.5–4.5)
PHOSPHATE SERPL-MCNC: 5 MG/DL — HIGH (ref 2.5–4.5)
PLATELET # BLD AUTO: 33 K/UL — LOW (ref 150–400)
PLATELET # BLD AUTO: 38 K/UL — LOW (ref 150–400)
PLATELET # BLD AUTO: 42 K/UL — LOW (ref 150–400)
PLATELET # BLD AUTO: 45 K/UL — LOW (ref 150–400)
PLATELET # BLD AUTO: 75 K/UL — LOW (ref 150–400)
POTASSIUM SERPL-MCNC: 3.3 MMOL/L — LOW (ref 3.5–5.3)
POTASSIUM SERPL-MCNC: 3.5 MMOL/L — SIGNIFICANT CHANGE UP (ref 3.5–5.3)
POTASSIUM SERPL-MCNC: 3.6 MMOL/L — SIGNIFICANT CHANGE UP (ref 3.5–5.3)
POTASSIUM SERPL-MCNC: 3.7 MMOL/L — SIGNIFICANT CHANGE UP (ref 3.5–5.3)
POTASSIUM SERPL-MCNC: 3.8 MMOL/L — SIGNIFICANT CHANGE UP (ref 3.5–5.3)
POTASSIUM SERPL-MCNC: 4 MMOL/L — SIGNIFICANT CHANGE UP (ref 3.5–5.3)
POTASSIUM SERPL-SCNC: 3.3 MMOL/L — LOW (ref 3.5–5.3)
POTASSIUM SERPL-SCNC: 3.5 MMOL/L — SIGNIFICANT CHANGE UP (ref 3.5–5.3)
POTASSIUM SERPL-SCNC: 3.6 MMOL/L — SIGNIFICANT CHANGE UP (ref 3.5–5.3)
POTASSIUM SERPL-SCNC: 3.7 MMOL/L — SIGNIFICANT CHANGE UP (ref 3.5–5.3)
POTASSIUM SERPL-SCNC: 3.8 MMOL/L — SIGNIFICANT CHANGE UP (ref 3.5–5.3)
POTASSIUM SERPL-SCNC: 4 MMOL/L — SIGNIFICANT CHANGE UP (ref 3.5–5.3)
PROT SERPL-MCNC: 5.1 G/DL — LOW (ref 6–8.3)
PROT SERPL-MCNC: 6.2 G/DL — SIGNIFICANT CHANGE UP (ref 6–8.3)
PROT SERPL-MCNC: 6.4 G/DL — SIGNIFICANT CHANGE UP (ref 6–8.3)
PROT SERPL-MCNC: 6.5 G/DL — SIGNIFICANT CHANGE UP (ref 6–8.3)
PROT SERPL-MCNC: 6.7 G/DL — SIGNIFICANT CHANGE UP (ref 6–8.3)
PROTHROM AB SERPL-ACNC: 18.4 SEC — HIGH (ref 10.5–13.4)
PROTHROM AB SERPL-ACNC: 24.8 SEC — HIGH (ref 10.5–13.4)
RAPIDTEG MAXIMUM AMPLITUDE: 47.3 MM (ref 52–70)
RBC # BLD: 1.76 M/UL — LOW (ref 3.8–5.2)
RBC # BLD: 2.36 M/UL — LOW (ref 3.8–5.2)
RBC # BLD: 2.51 M/UL — LOW (ref 3.8–5.2)
RBC # BLD: 2.67 M/UL — LOW (ref 3.8–5.2)
RBC # BLD: 2.84 M/UL — LOW (ref 3.8–5.2)
RBC # FLD: 14.7 % — HIGH (ref 10.3–14.5)
RBC # FLD: 19.4 % — HIGH (ref 10.3–14.5)
RBC # FLD: 19.5 % — HIGH (ref 10.3–14.5)
RBC # FLD: 19.6 % — HIGH (ref 10.3–14.5)
SODIUM SERPL-SCNC: 136 MMOL/L — SIGNIFICANT CHANGE UP (ref 135–145)
SODIUM SERPL-SCNC: 137 MMOL/L — SIGNIFICANT CHANGE UP (ref 135–145)
SODIUM SERPL-SCNC: 141 MMOL/L — SIGNIFICANT CHANGE UP (ref 135–145)
TEG FUNCTIONAL FIBRINOGEN: 16.2 MM — SIGNIFICANT CHANGE UP (ref 15–32)
TEG LY30 (LYSIS): 0 % — SIGNIFICANT CHANGE UP (ref 0–2.6)
TEG REACTION TIME: 9.2 MIN (ref 4.6–9.1)
WBC # BLD: 20.2 K/UL — HIGH (ref 3.8–10.5)
WBC # BLD: 26.45 K/UL — HIGH (ref 3.8–10.5)
WBC # BLD: 37.4 K/UL — HIGH (ref 3.8–10.5)
WBC # BLD: 39.76 K/UL — HIGH (ref 3.8–10.5)
WBC # BLD: 40.02 K/UL — CRITICAL HIGH (ref 3.8–10.5)
WBC # BLD: 41.22 K/UL — CRITICAL HIGH (ref 3.8–10.5)
WBC # FLD AUTO: 20.2 K/UL — HIGH (ref 3.8–10.5)
WBC # FLD AUTO: 26.45 K/UL — HIGH (ref 3.8–10.5)
WBC # FLD AUTO: 37.4 K/UL — HIGH (ref 3.8–10.5)
WBC # FLD AUTO: 39.76 K/UL — HIGH (ref 3.8–10.5)
WBC # FLD AUTO: 40.02 K/UL — CRITICAL HIGH (ref 3.8–10.5)
WBC # FLD AUTO: 41.22 K/UL — CRITICAL HIGH (ref 3.8–10.5)

## 2023-01-08 PROCEDURE — 45334 SIGMOIDOSCOPY FOR BLEEDING: CPT | Mod: GC,59

## 2023-01-08 PROCEDURE — 90945 DIALYSIS ONE EVALUATION: CPT

## 2023-01-08 PROCEDURE — 71045 X-RAY EXAM CHEST 1 VIEW: CPT | Mod: 26,76

## 2023-01-08 PROCEDURE — 70486 CT MAXILLOFACIAL W/O DYE: CPT | Mod: 26

## 2023-01-08 PROCEDURE — 43244 EGD VARICES LIGATION: CPT | Mod: GC

## 2023-01-08 PROCEDURE — 99291 CRITICAL CARE FIRST HOUR: CPT

## 2023-01-08 DEVICE — SPEEDBAND: Type: IMPLANTABLE DEVICE | Status: FUNCTIONAL

## 2023-01-08 DEVICE — NET RETRV ROT ROTH 2.5MMX230CM: Type: IMPLANTABLE DEVICE | Status: FUNCTIONAL

## 2023-01-08 RX ORDER — OCTREOTIDE ACETATE 200 UG/ML
50 INJECTION, SOLUTION INTRAVENOUS; SUBCUTANEOUS
Qty: 500 | Refills: 0 | Status: DISCONTINUED | OUTPATIENT
Start: 2023-01-08 | End: 2023-01-09

## 2023-01-08 RX ORDER — NOREPINEPHRINE BITARTRATE/D5W 8 MG/250ML
1.9 PLASTIC BAG, INJECTION (ML) INTRAVENOUS
Qty: 16 | Refills: 0 | Status: DISCONTINUED | OUTPATIENT
Start: 2023-01-08 | End: 2023-01-09

## 2023-01-08 RX ORDER — CALCIUM GLUCONATE 100 MG/ML
1 VIAL (ML) INTRAVENOUS ONCE
Refills: 0 | Status: COMPLETED | OUTPATIENT
Start: 2023-01-08 | End: 2023-01-08

## 2023-01-08 RX ORDER — POLYETHYLENE GLYCOL 3350 17 G/17G
17 POWDER, FOR SOLUTION ORAL EVERY 12 HOURS
Refills: 0 | Status: DISCONTINUED | OUTPATIENT
Start: 2023-01-08 | End: 2023-01-09

## 2023-01-08 RX ORDER — SODIUM BICARBONATE 1 MEQ/ML
0.13 SYRINGE (ML) INTRAVENOUS
Qty: 150 | Refills: 0 | Status: DISCONTINUED | OUTPATIENT
Start: 2023-01-08 | End: 2023-01-09

## 2023-01-08 RX ORDER — CHLORHEXIDINE GLUCONATE 213 G/1000ML
15 SOLUTION TOPICAL EVERY 12 HOURS
Refills: 0 | Status: DISCONTINUED | OUTPATIENT
Start: 2023-01-08 | End: 2023-01-09

## 2023-01-08 RX ORDER — ALBUMIN HUMAN 25 %
250 VIAL (ML) INTRAVENOUS
Refills: 0 | Status: COMPLETED | OUTPATIENT
Start: 2023-01-08 | End: 2023-01-08

## 2023-01-08 RX ORDER — CALCIUM CHLORIDE
1000 POWDER (GRAM) MISCELLANEOUS ONCE
Refills: 0 | Status: COMPLETED | OUTPATIENT
Start: 2023-01-08 | End: 2023-01-08

## 2023-01-08 RX ORDER — ONDANSETRON 8 MG/1
4 TABLET, FILM COATED ORAL ONCE
Refills: 0 | Status: COMPLETED | OUTPATIENT
Start: 2023-01-08 | End: 2023-01-08

## 2023-01-08 RX ADMIN — Medication 15 MILLILITER(S): at 11:13

## 2023-01-08 RX ADMIN — FENTANYL CITRATE 50 MICROGRAM(S): 50 INJECTION INTRAVENOUS at 21:55

## 2023-01-08 RX ADMIN — Medication 100 GRAM(S): at 21:20

## 2023-01-08 RX ADMIN — CEFTRIAXONE 100 MILLIGRAM(S): 500 INJECTION, POWDER, FOR SOLUTION INTRAMUSCULAR; INTRAVENOUS at 05:33

## 2023-01-08 RX ADMIN — ESCITALOPRAM OXALATE 10 MILLIGRAM(S): 10 TABLET, FILM COATED ORAL at 11:13

## 2023-01-08 RX ADMIN — OCTREOTIDE ACETATE 10 MICROGRAM(S)/HR: 200 INJECTION, SOLUTION INTRAVENOUS; SUBCUTANEOUS at 23:36

## 2023-01-08 RX ADMIN — VASOPRESSIN 4.5 UNIT(S)/MIN: 20 INJECTION INTRAVENOUS at 01:00

## 2023-01-08 RX ADMIN — FENTANYL CITRATE 50 MICROGRAM(S): 50 INJECTION INTRAVENOUS at 23:05

## 2023-01-08 RX ADMIN — Medication 6.21 MICROGRAM(S)/KG/MIN: at 05:35

## 2023-01-08 RX ADMIN — MIDODRINE HYDROCHLORIDE 20 MILLIGRAM(S): 2.5 TABLET ORAL at 14:34

## 2023-01-08 RX ADMIN — Medication 125 MILLILITER(S): at 15:50

## 2023-01-08 RX ADMIN — LEVETIRACETAM 750 MILLIGRAM(S): 250 TABLET, FILM COATED ORAL at 17:06

## 2023-01-08 RX ADMIN — KETAMINE HYDROCHLORIDE 90 MILLIGRAM(S): 100 INJECTION INTRAMUSCULAR; INTRAVENOUS at 21:47

## 2023-01-08 RX ADMIN — VASOPRESSIN 4.5 UNIT(S)/MIN: 20 INJECTION INTRAVENOUS at 11:12

## 2023-01-08 RX ADMIN — VASOPRESSIN 4.5 UNIT(S)/MIN: 20 INJECTION INTRAVENOUS at 09:17

## 2023-01-08 RX ADMIN — CHLORHEXIDINE GLUCONATE 1 APPLICATION(S): 213 SOLUTION TOPICAL at 05:34

## 2023-01-08 RX ADMIN — Medication 1 DROP(S): at 11:13

## 2023-01-08 RX ADMIN — MIDODRINE HYDROCHLORIDE 20 MILLIGRAM(S): 2.5 TABLET ORAL at 05:34

## 2023-01-08 RX ADMIN — ONDANSETRON 4 MILLIGRAM(S): 8 TABLET, FILM COATED ORAL at 15:20

## 2023-01-08 RX ADMIN — Medication 50 MEQ/KG/HR: at 23:40

## 2023-01-08 RX ADMIN — POLYETHYLENE GLYCOL 3350 17 GRAM(S): 17 POWDER, FOR SOLUTION ORAL at 05:33

## 2023-01-08 RX ADMIN — CASPOFUNGIN ACETATE 260 MILLIGRAM(S): 7 INJECTION, POWDER, LYOPHILIZED, FOR SOLUTION INTRAVENOUS at 09:16

## 2023-01-08 RX ADMIN — Medication 100 MILLIGRAM(S): at 11:14

## 2023-01-08 RX ADMIN — Medication 1000 MILLIGRAM(S): at 21:50

## 2023-01-08 RX ADMIN — Medication 6.21 MICROGRAM(S)/KG/MIN: at 01:00

## 2023-01-08 RX ADMIN — Medication 6.21 MICROGRAM(S)/KG/MIN: at 19:20

## 2023-01-08 RX ADMIN — PANTOPRAZOLE SODIUM 40 MILLIGRAM(S): 20 TABLET, DELAYED RELEASE ORAL at 12:10

## 2023-01-08 RX ADMIN — Medication 125 MILLILITER(S): at 15:40

## 2023-01-08 RX ADMIN — POLYETHYLENE GLYCOL 3350 17 GRAM(S): 17 POWDER, FOR SOLUTION ORAL at 17:06

## 2023-01-08 RX ADMIN — LEVETIRACETAM 750 MILLIGRAM(S): 250 TABLET, FILM COATED ORAL at 05:34

## 2023-01-08 RX ADMIN — VASOPRESSIN 4.5 UNIT(S)/MIN: 20 INJECTION INTRAVENOUS at 19:20

## 2023-01-08 RX ADMIN — Medication 1 MILLIGRAM(S): at 11:14

## 2023-01-08 RX ADMIN — Medication 6.21 MICROGRAM(S)/KG/MIN: at 09:17

## 2023-01-08 NOTE — PROGRESS NOTE ADULT - ATTENDING SUPERVISION STATEMENT
Fellow
Resident
Resident
Fellow
Resident
Fellow
Resident
Fellow
Fellow
Resident
Fellow
Fellow
Resident
Fellow

## 2023-01-08 NOTE — PROGRESS NOTE ADULT - ASSESSMENT
62 year old female with history of decompensated alcohol-associated cirrhosis c/b ascites (dx 11/2022), alc hep (dx 11/2022; non-responsive to steroids), remote h/o thyroid cancer in her 20s s/p total thyroidectomy + RTX + radioactive iodide, HTN, ventrical neoplasm (dx 2021) s/p right frontal craniotomy (03/2022) for resection post operative course c/b hemorrhage right lateral ventricle (managed non-operatively) who was initially admitted to  12/19 with new onset seizures and transferred to [facility] 12/20 for LT eval for ACLF.  Of note was recently admitted to  11/2022 for workup and eval of new onset jaundice- during that hospitalization was found to have a UTI and dx with alc hep was treated for UTI w/ abx and started on steroids (was deemed a non-responder and steroids were subsequently stopped); s/p LVP at [place] 11/2022. Previously hospitalized in 2021 at [place] for ETOH detox but pt reportedly unaware of any liver dysfunction at that time. Went to ETOH rehab 08/2022 and was asked to leave the facility when she was COVID+; was sober for 1 week until she started drinking again. Had also attended a few AA meetings in the past.    #ACLF  #Multifocal pneumonia  #AUD  #New onset seizure- unknown trigger/etiology- currently on Keppra  #Decompensated alcohol-associated cirrhosis c/b prior ascites req LVP       -MELD-Na = 40 on 1/6/2023       -Ascites: large, diuretics limited by anuria       -SBP: negative on 12/26/2022 & 1/4/2023       -Varices: unknown       -Hepatic encephalopathy: Ammonia, Serum: 63 umol/L *H* [11 - 55] (01-05-23 @ 05:50)  lactulose Syrup 20 Gram(s) Oral every 6 hours, 01-05-23 @ 09:25, Routine  rifAXIMin 550 milliGRAM(s) Oral every 12 hours, 12-25-22 @ 09:07,        -HCC: Alpha Fetoprotein - Tumor Marker: 4.2 ng/mL (12-24-22 @ 20:58)    Recommendations:  -trend clinical symptoms, exam findings, vital signs, CBC, CMP, INR  -CVVHD per Nephro  -continue antibiotic/antifungal coverage with ceftriaxone and caspofungin, appreciate assistance from ID  -remains on pressors with significant leukocytosis, currently on room air  -increase to lactulose 20g q8h in addition to rifaximin to target 3-5 BMs daily  -encourage incentive spirometer, mobilize as much as able, and chest PT given multifocal pneumonia  -hold off on full transplant evaluation given clinical course and multifocal pneumonia as detailed above    All recommendations are tentative until note is attested by attending.     Netta Garcia, PGY-4   Gastroenterology/Hepatology Fellow  Available on Microsoft Teams  [phone] ([facility] Short Range Pager)  [phone] ([facility] Long Range Pager)    After 5pm, please contact the on-call GI fellow. [phone]   62 year old female with history of decompensated alcohol-associated cirrhosis c/b ascites (dx 11/2022), alc hep (dx 11/2022; non-responsive to steroids), remote h/o thyroid cancer in her 20s s/p total thyroidectomy + RTX + radioactive iodide, HTN, ventrical neoplasm (dx 2021) s/p right frontal craniotomy (03/2022) for resection post operative course c/b hemorrhage right lateral ventricle (managed non-operatively) who was initially admitted to  12/19 with new onset seizures and transferred to [facility] 12/20 for LT eval for ACLF.  Of note was recently admitted to  11/2022 for workup and eval of new onset jaundice- during that hospitalization was found to have a UTI and dx with alc hep was treated for UTI w/ abx and started on steroids (was deemed a non-responder and steroids were subsequently stopped); s/p LVP at [place] 11/2022. Previously hospitalized in 2021 at [place] for ETOH detox but pt reportedly unaware of any liver dysfunction at that time. Went to ETOH rehab 08/2022 and was asked to leave the facility when she was COVID+; was sober for 1 week until she started drinking again. Had also attended a few AA meetings in the past.    #ACLF  #Multifocal pneumonia  #AUD  #New onset seizure- unknown trigger/etiology- currently on Keppra  #Decompensated alcohol-associated cirrhosis c/b prior ascites req LVP       -MELD-Na = 40 on 1/6/2023       -Ascites: large, diuretics limited by anuria       -SBP: negative on 12/26/2022 & 1/4/2023       -Varices: unknown       -Hepatic encephalopathy: Ammonia, Serum: 63 umol/L *H* [11 - 55] (01-05-23 @ 05:50)  lactulose Syrup 20 Gram(s) Oral every 6 hours, 01-05-23 @ 09:25, Routine  rifAXIMin 550 milliGRAM(s) Oral every 12 hours, 12-25-22 @ 09:07,        -HCC: Alpha Fetoprotein - Tumor Marker: 4.2 ng/mL (12-24-22 @ 20:58)    Recommendations:  -trend clinical symptoms, exam findings, vital signs, CBC, CMP, INR  -CVVHD per Nephro  -continue antibiotic/antifungal coverage with ceftriaxone and caspofungin, appreciate assistance from ID  -remains on pressors with significant leukocytosis, currently on room air  -increase to lactulose 20g q8h in addition to rifaximin to target 3-5 BMs daily  -encourage incentive spirometer, mobilize as much as able, and chest PT given multifocal pneumonia  -hold off on full transplant evaluation given clinical course and multifocal pneumonia as detailed above    All recommendations are tentative until note is attested by attending.     Netta Garcia, PGY-4   Gastroenterology/Hepatology Fellow  Available on Microsoft Teams  [phone] ([facility] Short Range Pager)  [phone] ([facility] Long Range Pager)    After 5pm, please contact the on-call GI fellow. [phone]   62 year old female with history of decompensated alcohol-associated cirrhosis c/b ascites (dx 11/2022), alc hep (dx 11/2022; non-responsive to steroids), remote h/o thyroid cancer in her 20s s/p total thyroidectomy + RTX + radioactive iodide, HTN, ventrical neoplasm (dx 2021) s/p right frontal craniotomy (03/2022) for resection post operative course c/b hemorrhage right lateral ventricle (managed non-operatively) who was initially admitted to  12/19 with new onset seizures and transferred to [facility] 12/20 for LT eval for ACLF.  Of note was recently admitted to  11/2022 for workup and eval of new onset jaundice- during that hospitalization was found to have a UTI and dx with alc hep was treated for UTI w/ abx and started on steroids (was deemed a non-responder and steroids were subsequently stopped); s/p LVP at [place] 11/2022. Previously hospitalized in 2021 at [place] for ETOH detox but pt reportedly unaware of any liver dysfunction at that time. Went to ETOH rehab 08/2022 and was asked to leave the facility when she was COVID+; was sober for 1 week until she started drinking again. Had also attended a few AA meetings in the past.    #ACLF  #Multifocal pneumonia  #AUD  #New onset seizure- unknown trigger/etiology- currently on Keppra  #Decompensated alcohol-associated cirrhosis c/b prior ascites req LVP       -MELD-Na = 40 on 1/6/2023       -Ascites: large, diuretics limited by anuria       -SBP: negative on 12/26/2022 & 1/4/2023       -Varices: unknown       -Hepatic encephalopathy: Ammonia, Serum: 63 umol/L *H* [11 - 55] (01-05-23 @ 05:50)  lactulose Syrup 20 Gram(s) Oral every 6 hours, 01-05-23 @ 09:25, Routine  rifAXIMin 550 milliGRAM(s) Oral every 12 hours, 12-25-22 @ 09:07,        -HCC: Alpha Fetoprotein - Tumor Marker: 4.2 ng/mL (12-24-22 @ 20:58)    Recommendations:  -trend clinical symptoms, exam findings, vital signs, CBC, CMP, INR  -CVVHD per Nephro  -continue antibiotic/antifungal coverage with ceftriaxone and caspofungin, appreciate assistance from ID  -remains on pressors with significant leukocytosis, currently on room air  -increase to lactulose 20g q8h in addition to rifaximin to target 3-5 BMs daily  -encourage incentive spirometer, mobilize as much as able, and chest PT given multifocal pneumonia  -hold off on full transplant evaluation given clinical course and multifocal pneumonia as detailed above    All recommendations are tentative until note is attested by attending.     Netta Garcia, PGY-4   Gastroenterology/Hepatology Fellow  Available on Microsoft Teams  [phone] ([facility] Short Range Pager)  [phone] ([facility] Long Range Pager)    After 5pm, please contact the on-call GI fellow. [phone]   62 year old female with history of decompensated alcohol-associated cirrhosis c/b ascites (dx 11/2022), alc hep (dx 11/2022; non-responsive to steroids), remote h/o thyroid cancer in her 20s s/p total thyroidectomy + RTX + radioactive iodide, HTN, ventrical neoplasm (dx 2021) s/p right frontal craniotomy (03/2022) for resection post operative course c/b hemorrhage right lateral ventricle (managed non-operatively) who was initially admitted to  12/19 with new onset seizures and transferred to [facility] 12/20 for LT eval for ACLF.  Of note was recently admitted to  11/2022 for workup and eval of new onset jaundice- during that hospitalization was found to have a UTI and dx with alc hep was treated for UTI w/ abx and started on steroids (was deemed a non-responder and steroids were subsequently stopped); s/p LVP at [place] 11/2022. Previously hospitalized in 2021 at [place] for ETOH detox but pt reportedly unaware of any liver dysfunction at that time. Went to ETOH rehab 08/2022 and was asked to leave the facility when she was COVID+; was sober for 1 week until she started drinking again. Had also attended a few AA meetings in the past.    #ACLF  #Multifocal pneumonia  #AUD  #New onset seizure- unknown trigger/etiology- currently on Keppra  #Decompensated alcohol-associated cirrhosis c/b prior ascites req LVP       -MELD-Na = 40 on 1/6/2023       -Ascites: large, diuretics limited by anuria       -SBP: negative on 12/26/2022 & 1/4/2023       -Varices: unknown       -Hepatic encephalopathy: Ammonia, Serum: 63 umol/L *H* [11 - 55] (01-05-23 @ 05:50)  lactulose Syrup 20 Gram(s) Oral every 6 hours, 01-05-23 @ 09:25, Routine  rifAXIMin 550 milliGRAM(s) Oral every 12 hours, 12-25-22 @ 09:07,        -HCC: Alpha Fetoprotein - Tumor Marker: 4.2 ng/mL (12-24-22 @ 20:58)    Recommendations:  -trend clinical symptoms, exam findings, vital signs, CBC, CMP, INR  -CVVHD per Nephro  -continue antibiotic/antifungal coverage with ceftriaxone and caspofungin, appreciate assistance from ID  -remains on pressors with significant leukocytosis, currently on NC 2L  - continuing Miralax and rifaximin  -encourage incentive spirometer, mobilize as much as able, and chest PT given multifocal pneumonia  -hold off on full transplant evaluation given clinical course and multifocal pneumonia as detailed above    All recommendations are tentative until note is attested by attending.     Netta Garcia, PGY-4   Gastroenterology/Hepatology Fellow  Available on Microsoft Teams  [phone] ([facility] Short Range Pager)  [phone] ([facility] Long Range Pager)    After 5pm, please contact the on-call GI fellow. [phone]   62 year old female with history of decompensated alcohol-associated cirrhosis c/b ascites (dx 11/2022), alc hep (dx 11/2022; non-responsive to steroids), remote h/o thyroid cancer in her 20s s/p total thyroidectomy + RTX + radioactive iodide, HTN, ventrical neoplasm (dx 2021) s/p right frontal craniotomy (03/2022) for resection post operative course c/b hemorrhage right lateral ventricle (managed non-operatively) who was initially admitted to  12/19 with new onset seizures and transferred to [facility] 12/20 for LT eval for ACLF.  Of note was recently admitted to  11/2022 for workup and eval of new onset jaundice- during that hospitalization was found to have a UTI and dx with alc hep was treated for UTI w/ abx and started on steroids (was deemed a non-responder and steroids were subsequently stopped); s/p LVP at [place] 11/2022. Previously hospitalized in 2021 at [place] for ETOH detox but pt reportedly unaware of any liver dysfunction at that time. Went to ETOH rehab 08/2022 and was asked to leave the facility when she was COVID+; was sober for 1 week until she started drinking again. Had also attended a few AA meetings in the past.    #ACLF  #Multifocal pneumonia  #AUD  #New onset seizure- unknown trigger/etiology- currently on Keppra  #Decompensated alcohol-associated cirrhosis c/b prior ascites req LVP       -MELD-Na = 40 on 1/6/2023       -Ascites: large, diuretics limited by anuria       -SBP: negative on 12/26/2022 & 1/4/2023       -Varices: unknown       -Hepatic encephalopathy: Ammonia, Serum: 63 umol/L *H* [11 - 55] (01-05-23 @ 05:50)  lactulose Syrup 20 Gram(s) Oral every 6 hours, 01-05-23 @ 09:25, Routine  rifAXIMin 550 milliGRAM(s) Oral every 12 hours, 12-25-22 @ 09:07,        -HCC: Alpha Fetoprotein - Tumor Marker: 4.2 ng/mL (12-24-22 @ 20:58)    Recommendations:  -trend clinical symptoms, exam findings, vital signs, CBC, CMP, INR  -CVVHD per Nephro  -continue antibiotic/antifungal coverage with ceftriaxone and caspofungin, appreciate assistance from ID  -remains on pressors with significant leukocytosis, currently on NC 2L  - continuing Miralax and rifaximin  -encourage incentive spirometer, mobilize as much as able, and chest PT given multifocal pneumonia  -hold off on full transplant evaluation given clinical course and multifocal pneumonia as detailed above    All recommendations are tentative until note is attested by attending.     Netta Garcia, PGY-4   Gastroenterology/Hepatology Fellow  Available on Microsoft Teams  [phone] ([facility] Short Range Pager)  [phone] ([facility] Long Range Pager)    After 5pm, please contact the on-call GI fellow. [phone]   62 year old female with history of decompensated alcohol-associated cirrhosis c/b ascites (dx 11/2022), alc hep (dx 11/2022; non-responsive to steroids), remote h/o thyroid cancer in her 20s s/p total thyroidectomy + RTX + radioactive iodide, HTN, ventrical neoplasm (dx 2021) s/p right frontal craniotomy (03/2022) for resection post operative course c/b hemorrhage right lateral ventricle (managed non-operatively) who was initially admitted to  12/19 with new onset seizures and transferred to [facility] 12/20 for LT eval for ACLF.  Of note was recently admitted to  11/2022 for workup and eval of new onset jaundice- during that hospitalization was found to have a UTI and dx with alc hep was treated for UTI w/ abx and started on steroids (was deemed a non-responder and steroids were subsequently stopped); s/p LVP at [place] 11/2022. Previously hospitalized in 2021 at [place] for ETOH detox but pt reportedly unaware of any liver dysfunction at that time. Went to ETOH rehab 08/2022 and was asked to leave the facility when she was COVID+; was sober for 1 week until she started drinking again. Had also attended a few AA meetings in the past.    #ACLF  #Multifocal pneumonia  #AUD  #New onset seizure- unknown trigger/etiology- currently on Keppra  #Decompensated alcohol-associated cirrhosis c/b prior ascites req LVP       -MELD-Na = 40 on 1/6/2023       -Ascites: large, diuretics limited by anuria       -SBP: negative on 12/26/2022 & 1/4/2023       -Varices: unknown       -Hepatic encephalopathy: Ammonia, Serum: 63 umol/L *H* [11 - 55] (01-05-23 @ 05:50)  lactulose Syrup 20 Gram(s) Oral every 6 hours, 01-05-23 @ 09:25, Routine  rifAXIMin 550 milliGRAM(s) Oral every 12 hours, 12-25-22 @ 09:07,        -HCC: Alpha Fetoprotein - Tumor Marker: 4.2 ng/mL (12-24-22 @ 20:58)    Recommendations:  -trend clinical symptoms, exam findings, vital signs, CBC, CMP, INR  -CVVHD per Nephro  -continue antibiotic/antifungal coverage with ceftriaxone and caspofungin, appreciate assistance from ID  -remains on pressors with significant leukocytosis, currently on NC 2L  - continuing Miralax and rifaximin  -encourage incentive spirometer, mobilize as much as able, and chest PT given multifocal pneumonia  -hold off on full transplant evaluation given clinical course and multifocal pneumonia as detailed above    All recommendations are tentative until note is attested by attending.     Netta Garcia, PGY-4   Gastroenterology/Hepatology Fellow  Available on Microsoft Teams  [phone] ([facility] Short Range Pager)  [phone] ([facility] Long Range Pager)    After 5pm, please contact the on-call GI fellow. [phone]

## 2023-01-08 NOTE — PROGRESS NOTE ADULT - NS ATTEND AMEND GEN_ALL_CORE FT
ON: increased vasopressor requirement, CRRT paused     aaox1, mild encephalopathy  ammonia stable  rifaximin  miralax q12h, lactulose on hold   keppra for recent seizure, hx of craniotomy   onNC 1-2/min, unable to do IS  AM CXR reticular pattern effusions improved  levo 0.08, vaso 0.03 MAP goal of 65  CVP 1  midodrine [id number]  lact 1.8  TEN 50/hr via NGT  rectal tube: 400cc output  1/7 c diff neg  t bili 23  s/p paracentesis 1/4 4 liters   protonix, per transplant  CRRT restart in PM  anuric , likely hepatorenal syndrome  s/p albumin boluses, octreotide  stable Hb  thrombocytopenia  INR 2.1  no chem VTE PPX given recent GI bleed  Bcx, paracentesis no growth  s/p merop  caspo, ceftriaxone 1/5-no end date  GI PCR neg 1/5  afebrile, leukocytosis  CT max face and change NG to feeding tube  ID following, given CT findings, concerned about infection and recommends continuation   good glycemic control  synthroid IV   PT OT working with her    full code  HD rt IJ, CVC IJ left , left radial a line all placed 1/5 ON: increased vasopressor requirement, CRRT paused     aaox1, mild encephalopathy  ammonia stable  rifaximin  miralax q12h, lactulose on hold   keppra for recent seizure, hx of craniotomy   onNC 1-2/min, unable to do IS  AM CXR reticular pattern effusions improved  levo 0.08, vaso 0.03 MAP goal of 65  CVP 1  midodrine [id number]  lact 1.8  TEN 50/hr via NGT  rectal tube: 400cc output  1/7 c diff neg  t bili 23  s/p paracentesis 1/4 4 liters   protonix, per transplant  CRRT restart in PM  anuric , likely hepatorenal syndrome  s/p albumin boluses, octreotide  stable Hb  thrombocytopenia  INR 2.1  no chem VTE PPX given recent GI bleed  Bcx, paracentesis no growth  s/p merop  caspo, ceftriaxone 1/5-no end date  GI PCR neg 1/5  afebrile, leukocytosis  CT max face and change NG to feeding tube  ID following, given CT findings, concerned about infection and recommends continuation   good glycemic control  synthroid IV   PT OT working with her    full code  HD rt IJ, CVC IJ left , left radial a line all placed 1/5 ON: increased vasopressor requirement, CRRT paused     aaox1, mild encephalopathy  ammonia stable  rifaximin  miralax q12h, lactulose on hold   keppra for recent seizure, hx of craniotomy   onNC 1-2/min, unable to do IS  AM CXR reticular pattern effusions improved  levo 0.08, vaso 0.03 MAP goal of 65  CVP 1  midodrine [id number]  lact 1.8  TEN 50/hr via NGT  rectal tube: 400cc output  1/7 c diff neg  t bili 23  s/p paracentesis 1/4 4 liters   protonix, per transplant  CRRT restart in PM  anuric , likely hepatorenal syndrome  s/p albumin boluses, octreotide  stable Hb  thrombocytopenia  INR 2.1  no chem VTE PPX given recent GI bleed  Bcx, paracentesis no growth  s/p merop  caspo, ceftriaxone 1/5-no end date  GI PCR neg 1/5  afebrile, leukocytosis  CT max face and change NG to feeding tube  ID following, given CT findings, concerned about infection and recommends continuation   good glycemic control  synthroid IV   PT OT working with her    full code  HD rt IJ, CVC IJ left , left radial a line all placed 1/5

## 2023-01-08 NOTE — PROGRESS NOTE ADULT - PROBLEM SELECTOR PLAN 2
Pt with high anion gap metabolic acidosis in the setting of acute renal failure, improved. Monitor pH and SCO2.
Pt with high anion gap metabolic acidosis in the setting of acute renal failure, improved. Monitor pH and SCO2.
Pt with high anion gap metabolic acidosis in the setting of acute renal failure. Improved with CRRT. Plan to continue with CRRT today, as above. Monitor pH and SCO2.
Pt with high anion gap metabolic acidosis in the setting of acute renal failure, improved. Monitor pH and SCO2.
Pt with high anion gap metabolic acidosis in the setting of acute renal failure. pH is 7.35, and SCO2 is 16 today. Plan to continue with CRRT today, as above. Monitor pH and SCO2.
Pt with high anion gap metabolic acidosis in the setting of acute renal failure. Improved with CRRT. Plan to continue with CRRT today, as above. Monitor pH and SCO2.
Pt with high anion gap metabolic acidosis in the setting of acute renal failure. Improved with CRRT. Plan to continue with CRRT today, as above. Monitor pH and SCO2.
Pt with high anion gap metabolic acidosis in the setting of acute renal failure, improved. Monitor pH and SCO2.
Pt with high anion gap metabolic acidosis in the setting of acute renal failure. Improved with CRRT. pH is 7.41, and SCO2 is 19 today. Plan to continue with CRRT today, as above. Monitor pH and SCO2.
Pt with high anion gap metabolic acidosis in the setting of acute renal failure, improved. Monitor pH and SCO2.
Pt with high anion gap metabolic acidosis in the setting of acute renal failure. Improved with CRRT. Plan to continue with CRRT today, as above. Monitor pH and SCO2.
Pt with high anion gap metabolic acidosis in the setting of acute renal failure. Improved with CRRT. Plan to continue with CRRT today, as above. Monitor pH and SCO2.

## 2023-01-08 NOTE — PROGRESS NOTE ADULT - PROVIDER SPECIALTY LIST ADULT
Cardiology
Cardiology
Infectious Disease
Pharmacy
SICU
Transplant Hepatology
Transplant ID
Transplant Nephrology
Transplant Nephrology
Transplant Surgery
SICU
Transplant Hepatology
Transplant ID
Transplant Nephrology
Cardiology
SICU
SICU
Transplant Nephrology
SICU
SICU
Transplant Hepatology
Transplant Hepatology
Transplant ID
Transplant ID
Transplant Surgery
SICU
Transplant Nephrology
Transplant ID
Transplant Nephrology
Cardiology
Transplant Hepatology
Transplant ID
Transplant ID
Transplant Surgery
Cardiology
SICU
Transplant Hepatology
Transplant Surgery
Transplant Surgery
Cardiology
SICU
Transplant Hepatology
Transplant Surgery
SICU
Transplant Surgery
SICU
SICU
Transplant Surgery
Transplant Surgery
Cardiology
Transplant Nephrology

## 2023-01-08 NOTE — PROGRESS NOTE ADULT - SUBJECTIVE AND OBJECTIVE BOX
Gastroenterology/Hepatology Progress Note    Interval Events:   - c diff negative   - procal 2.5   - continues on vaso and levo     Allergies:  Macrobid (Rash)  Nexium (Stomach Upset)      Hospital Medications:  caspofungin IVPB      caspofungin IVPB 50 milliGRAM(s) IV Intermittent every 24 hours  cefTRIAXone   IVPB 1000 milliGRAM(s) IV Intermittent every 24 hours  chlorhexidine 2% Cloths 1 Application(s) Topical <User Schedule>  CRRT Treatment    <Continuous>  escitalopram 10 milliGRAM(s) Oral daily  folic acid 1 milliGRAM(s) Oral daily  HYDROmorphone  Injectable 0.25 milliGRAM(s) IV Push every 3 hours PRN  influenza   Vaccine 0.5 milliLiter(s) IntraMuscular once  levETIRAcetam  Solution 750 milliGRAM(s) Enteral Tube two times a day  levothyroxine Injectable 103 MICROGram(s) IV Push at bedtime  midodrine 20 milliGRAM(s) Oral every 8 hours  multivitamin/minerals/iron Oral Solution (CENTRUM) 15 milliLiter(s) Oral daily  norepinephrine Infusion 0.05 MICROgram(s)/kG/Min IV Continuous <Continuous>  pantoprazole   Suspension 40 milliGRAM(s) Oral every 24 hours  Phoxillum Filtration BK 4 / 2.5 5000 milliLiter(s) CRRT <Continuous>  Phoxillum Filtration BK 4 / 2.5 5000 milliLiter(s) CRRT <Continuous>  polyethylene glycol 3350 17 Gram(s) Oral every 12 hours  PrismaSOL Filtration BGK 4 / 2.5 5000 milliLiter(s) CRRT <Continuous>  rifAXIMin 550 milliGRAM(s) Oral every 12 hours  sodium chloride 0.65% Nasal 1 Spray(s) Both Nostrils every 3 hours PRN  tetracaine/benzocaine/butamben Spray 1 Spray(s) Topical every 3 hours PRN  thiamine 100 milliGRAM(s) Enteral Tube daily  vasopressin Infusion 0.03 Unit(s)/Min IV Continuous <Continuous>      ROS: 14 point ROS negative unless otherwise state in subjective    PHYSICAL EXAM:   Vital Signs:  Vital Signs Last 24 Hrs  T(C): 36.9 (08 Jan 2023 03:00), Max: 37.4 (07 Jan 2023 16:00)  T(F): 98.5 (08 Jan 2023 03:00), Max: 99.3 (07 Jan 2023 16:00)  HR: 108 (08 Jan 2023 07:00) (87 - 120)  BP: 120/79 (08 Jan 2023 03:15) (84/53 - 120/79)  BP(mean): 93 (08 Jan 2023 03:15) (63 - 93)  RR: 15 (08 Jan 2023 07:00) (13 - 33)  SpO2: 94% (08 Jan 2023 07:00) (89% - 100%)    Parameters below as of 08 Jan 2023 03:00  Patient On (Oxygen Delivery Method): room air      Daily     Daily     GENERAL: no acute distress  NEURO: able to tell name, states she is doing well, no asterixis  HEENT: NCAT, +scleral icterus  CHEST: no respiratory distress, no accessory muscle use, CTAB  CARDIAC: regular rate, +S1/S2  ABDOMEN: +BS, +distended and tympanic, soft, nontender, no rebound or guarding  EXTREMITIES: warm, well perfused, no edema  SKIN: +jaundice, +small scattered ecchymoses    LABS:                        8.6    26.45 )-----------( 33       ( 08 Jan 2023 03:20 )             25.6     Mean Cell Volume: 90.1 fl (01-08-23 @ 03:20)    01-08    136  |  104  |  21  ----------------------------<  157<H>  3.8   |  18<L>  |  0.98    Ca    8.9      08 Jan 2023 03:20  Phos  3.9     01-08  Mg     2.2     01-08    TPro  6.2  /  Alb  3.2<L>  /  TBili  22.6<H>  /  DBili  x   /  AST  124<H>  /  ALT  33  /  AlkPhos  219<H>  01-08    LIVER FUNCTIONS - ( 08 Jan 2023 03:20 )  Alb: 3.2 g/dL / Pro: 6.2 g/dL / ALK PHOS: 219 U/L / ALT: 33 U/L / AST: 124 U/L / GGT: x           PT/INR - ( 08 Jan 2023 03:20 )   PT: 24.8 sec;   INR: 2.12 ratio         PTT - ( 08 Jan 2023 03:20 )  PTT:47.3 sec    Amylase Serum--      Lipase serum--       [id number]        Imaging:           Gastroenterology/Hepatology Progress Note    Interval Events:   - c diff negative   - procal 2.5   - continues on vaso and levo     Allergies:  Macrobid (Rash)  Nexium (Stomach Upset)      Hospital Medications:  caspofungin IVPB      caspofungin IVPB 50 milliGRAM(s) IV Intermittent every 24 hours  cefTRIAXone   IVPB 1000 milliGRAM(s) IV Intermittent every 24 hours  chlorhexidine 2% Cloths 1 Application(s) Topical <User Schedule>  CRRT Treatment    <Continuous>  escitalopram 10 milliGRAM(s) Oral daily  folic acid 1 milliGRAM(s) Oral daily  HYDROmorphone  Injectable 0.25 milliGRAM(s) IV Push every 3 hours PRN  influenza   Vaccine 0.5 milliLiter(s) IntraMuscular once  levETIRAcetam  Solution 750 milliGRAM(s) Enteral Tube two times a day  levothyroxine Injectable 103 MICROGram(s) IV Push at bedtime  midodrine 20 milliGRAM(s) Oral every 8 hours  multivitamin/minerals/iron Oral Solution (CENTRUM) 15 milliLiter(s) Oral daily  norepinephrine Infusion 0.05 MICROgram(s)/kG/Min IV Continuous <Continuous>  pantoprazole   Suspension 40 milliGRAM(s) Oral every 24 hours  Phoxillum Filtration BK 4 / 2.5 5000 milliLiter(s) CRRT <Continuous>  Phoxillum Filtration BK 4 / 2.5 5000 milliLiter(s) CRRT <Continuous>  polyethylene glycol 3350 17 Gram(s) Oral every 12 hours  PrismaSOL Filtration BGK 4 / 2.5 5000 milliLiter(s) CRRT <Continuous>  rifAXIMin 550 milliGRAM(s) Oral every 12 hours  sodium chloride 0.65% Nasal 1 Spray(s) Both Nostrils every 3 hours PRN  tetracaine/benzocaine/butamben Spray 1 Spray(s) Topical every 3 hours PRN  thiamine 100 milliGRAM(s) Enteral Tube daily  vasopressin Infusion 0.03 Unit(s)/Min IV Continuous <Continuous>      ROS: 14 point ROS negative unless otherwise state in subjective    PHYSICAL EXAM:   Vital Signs:  Vital Signs Last 24 Hrs  T(C): 36.9 (08 Jan 2023 03:00), Max: 37.4 (07 Jan 2023 16:00)  T(F): 98.5 (08 Jan 2023 03:00), Max: 99.3 (07 Jan 2023 16:00)  HR: 108 (08 Jan 2023 07:00) (87 - 120)  BP: 120/79 (08 Jan 2023 03:15) (84/53 - 120/79)  BP(mean): 93 (08 Jan 2023 03:15) (63 - 93)  RR: 15 (08 Jan 2023 07:00) (13 - 33)  SpO2: 94% (08 Jan 2023 07:00) (89% - 100%)    Parameters below as of 08 Jan 2023 03:00  Patient On (Oxygen Delivery Method): room air      Daily     Daily     GENERAL: no acute distress  NEURO: able to tell name, states she is doing well, no asterixis  HEENT: NCAT, +scleral icterus  CHEST: no respiratory distress, no accessory muscle use, CTAB  CARDIAC: regular rate, +S1/S2  ABDOMEN: +BS, +distended and tympanic, soft, nontender, no rebound or guarding  EXTREMITIES: warm, well perfused, no edema  SKIN: +jaundice, +small scattered ecchymoses    LABS:                        8.6    26.45 )-----------( 33       ( 08 Jan 2023 03:20 )             25.6     Mean Cell Volume: 90.1 fl (01-08-23 @ 03:20)    01-08    136  |  104  |  21  ----------------------------<  157<H>  3.8   |  18<L>  |  0.98    Ca    8.9      08 Jan 2023 03:20  Phos  3.9     01-08  Mg     2.2     01-08    TPro  6.2  /  Alb  3.2<L>  /  TBili  22.6<H>  /  DBili  x   /  AST  124<H>  /  ALT  33  /  AlkPhos  219<H>  01-08    LIVER FUNCTIONS - ( 08 Jan 2023 03:20 )  Alb: 3.2 g/dL / Pro: 6.2 g/dL / ALK PHOS: 219 U/L / ALT: 33 U/L / AST: 124 U/L / GGT: x           PT/INR - ( 08 Jan 2023 03:20 )   PT: 24.8 sec;   INR: 2.12 ratio         PTT - ( 08 Jan 2023 03:20 )  PTT:47.3 sec    Amylase Serum--      Lipase serum--       [id number]        Imaging:           Gastroenterology/Hepatology Progress Note    Interval Events:   - c diff negative   - procal 2.5   - continues on vaso and levo     Allergies:  Macrobid (Rash)  Nexium (Stomach Upset)      Hospital Medications:  caspofungin IVPB      caspofungin IVPB 50 milliGRAM(s) IV Intermittent every 24 hours  cefTRIAXone   IVPB 1000 milliGRAM(s) IV Intermittent every 24 hours  chlorhexidine 2% Cloths 1 Application(s) Topical <User Schedule>  CRRT Treatment    <Continuous>  escitalopram 10 milliGRAM(s) Oral daily  folic acid 1 milliGRAM(s) Oral daily  HYDROmorphone  Injectable 0.25 milliGRAM(s) IV Push every 3 hours PRN  influenza   Vaccine 0.5 milliLiter(s) IntraMuscular once  levETIRAcetam  Solution 750 milliGRAM(s) Enteral Tube two times a day  levothyroxine Injectable 103 MICROGram(s) IV Push at bedtime  midodrine 20 milliGRAM(s) Oral every 8 hours  multivitamin/minerals/iron Oral Solution (CENTRUM) 15 milliLiter(s) Oral daily  norepinephrine Infusion 0.05 MICROgram(s)/kG/Min IV Continuous <Continuous>  pantoprazole   Suspension 40 milliGRAM(s) Oral every 24 hours  Phoxillum Filtration BK 4 / 2.5 5000 milliLiter(s) CRRT <Continuous>  Phoxillum Filtration BK 4 / 2.5 5000 milliLiter(s) CRRT <Continuous>  polyethylene glycol 3350 17 Gram(s) Oral every 12 hours  PrismaSOL Filtration BGK 4 / 2.5 5000 milliLiter(s) CRRT <Continuous>  rifAXIMin 550 milliGRAM(s) Oral every 12 hours  sodium chloride 0.65% Nasal 1 Spray(s) Both Nostrils every 3 hours PRN  tetracaine/benzocaine/butamben Spray 1 Spray(s) Topical every 3 hours PRN  thiamine 100 milliGRAM(s) Enteral Tube daily  vasopressin Infusion 0.03 Unit(s)/Min IV Continuous <Continuous>      ROS: 14 point ROS negative unless otherwise state in subjective    PHYSICAL EXAM:   Vital Signs:  Vital Signs Last 24 Hrs  T(C): 36.9 (08 Jan 2023 03:00), Max: 37.4 (07 Jan 2023 16:00)  T(F): 98.5 (08 Jan 2023 03:00), Max: 99.3 (07 Jan 2023 16:00)  HR: 108 (08 Jan 2023 07:00) (87 - 120)  BP: 120/79 (08 Jan 2023 03:15) (84/53 - 120/79)  BP(mean): 93 (08 Jan 2023 03:15) (63 - 93)  RR: 15 (08 Jan 2023 07:00) (13 - 33)  SpO2: 94% (08 Jan 2023 07:00) (89% - 100%)    Parameters below as of 08 Jan 2023 03:00  Patient On (Oxygen Delivery Method): room air      Daily     Daily     GENERAL: no acute distress  NEURO: able to tell name, states she is doing well, no asterixis  HEENT: NCAT, +scleral icterus  CHEST: no respiratory distress, no accessory muscle use, CTAB  CARDIAC: regular rate, +S1/S2  ABDOMEN: +BS, +distended and tympanic, soft, nontender, no rebound or guarding  EXTREMITIES: warm, well perfused, no edema  SKIN: +jaundice, +small scattered ecchymoses    LABS:                        8.6    26.45 )-----------( 33       ( 08 Jan 2023 03:20 )             25.6     Mean Cell Volume: 90.1 fl (01-08-23 @ 03:20)    01-08    136  |  104  |  21  ----------------------------<  157<H>  3.8   |  18<L>  |  0.98    Ca    8.9      08 Jan 2023 03:20  Phos  3.9     01-08  Mg     2.2     01-08    TPro  6.2  /  Alb  3.2<L>  /  TBili  22.6<H>  /  DBili  x   /  AST  124<H>  /  ALT  33  /  AlkPhos  219<H>  01-08    LIVER FUNCTIONS - ( 08 Jan 2023 03:20 )  Alb: 3.2 g/dL / Pro: 6.2 g/dL / ALK PHOS: 219 U/L / ALT: 33 U/L / AST: 124 U/L / GGT: x           PT/INR - ( 08 Jan 2023 03:20 )   PT: 24.8 sec;   INR: 2.12 ratio         PTT - ( 08 Jan 2023 03:20 )  PTT:47.3 sec    Amylase Serum--      Lipase serum--       [id number]        Imaging:           Gastroenterology/Hepatology Progress Note    Interval Events:   - c diff negative   - procal 2.5   - continues on vaso and levo   - became hypotensive initially when CVVHD restarted after filter clotted, but subsequently able to be resumed on CVVHD with overall stable pressor later today    Allergies:  Macrobid (Rash)  Nexium (Stomach Upset)      Hospital Medications:  caspofungin IVPB      caspofungin IVPB 50 milliGRAM(s) IV Intermittent every 24 hours  cefTRIAXone   IVPB 1000 milliGRAM(s) IV Intermittent every 24 hours  chlorhexidine 2% Cloths 1 Application(s) Topical <User Schedule>  CRRT Treatment    <Continuous>  escitalopram 10 milliGRAM(s) Oral daily  folic acid 1 milliGRAM(s) Oral daily  HYDROmorphone  Injectable 0.25 milliGRAM(s) IV Push every 3 hours PRN  influenza   Vaccine 0.5 milliLiter(s) IntraMuscular once  levETIRAcetam  Solution 750 milliGRAM(s) Enteral Tube two times a day  levothyroxine Injectable 103 MICROGram(s) IV Push at bedtime  midodrine 20 milliGRAM(s) Oral every 8 hours  multivitamin/minerals/iron Oral Solution (CENTRUM) 15 milliLiter(s) Oral daily  norepinephrine Infusion 0.05 MICROgram(s)/kG/Min IV Continuous <Continuous>  pantoprazole   Suspension 40 milliGRAM(s) Oral every 24 hours  Phoxillum Filtration BK 4 / 2.5 5000 milliLiter(s) CRRT <Continuous>  Phoxillum Filtration BK 4 / 2.5 5000 milliLiter(s) CRRT <Continuous>  polyethylene glycol 3350 17 Gram(s) Oral every 12 hours  PrismaSOL Filtration BGK 4 / 2.5 5000 milliLiter(s) CRRT <Continuous>  rifAXIMin 550 milliGRAM(s) Oral every 12 hours  sodium chloride 0.65% Nasal 1 Spray(s) Both Nostrils every 3 hours PRN  tetracaine/benzocaine/butamben Spray 1 Spray(s) Topical every 3 hours PRN  thiamine 100 milliGRAM(s) Enteral Tube daily  vasopressin Infusion 0.03 Unit(s)/Min IV Continuous <Continuous>      ROS: 14 point ROS negative unless otherwise state in subjective    PHYSICAL EXAM:   Vital Signs:  Vital Signs Last 24 Hrs  T(C): 36.9 (08 Jan 2023 03:00), Max: 37.4 (07 Jan 2023 16:00)  T(F): 98.5 (08 Jan 2023 03:00), Max: 99.3 (07 Jan 2023 16:00)  HR: 108 (08 Jan 2023 07:00) (87 - 120)  BP: 120/79 (08 Jan 2023 03:15) (84/53 - 120/79)  BP(mean): 93 (08 Jan 2023 03:15) (63 - 93)  RR: 15 (08 Jan 2023 07:00) (13 - 33)  SpO2: 94% (08 Jan 2023 07:00) (89% - 100%)    Parameters below as of 08 Jan 2023 03:00  Patient On (Oxygen Delivery Method): room air      Daily     Daily     GENERAL: no acute distress  NEURO: able to tell name, not oriented to month or year, no asterixis  HEENT: NCAT, +scleral icterus  CHEST: no respiratory distress, no accessory muscle use, CTAB  CARDIAC: regular rate, +S1/S2  ABDOMEN: +BS, +distended and tympanic, soft, nontender, no rebound or guarding  EXTREMITIES: warm, well perfused, no edema  SKIN: +jaundice, +small scattered ecchymoses    LABS:                        8.6    26.45 )-----------( 33       ( 08 Jan 2023 03:20 )             25.6     Mean Cell Volume: 90.1 fl (01-08-23 @ 03:20)    01-08    136  |  104  |  21  ----------------------------<  157<H>  3.8   |  18<L>  |  0.98    Ca    8.9      08 Jan 2023 03:20  Phos  3.9     01-08  Mg     2.2     01-08    TPro  6.2  /  Alb  3.2<L>  /  TBili  22.6<H>  /  DBili  x   /  AST  124<H>  /  ALT  33  /  AlkPhos  219<H>  01-08    LIVER FUNCTIONS - ( 08 Jan 2023 03:20 )  Alb: 3.2 g/dL / Pro: 6.2 g/dL / ALK PHOS: 219 U/L / ALT: 33 U/L / AST: 124 U/L / GGT: x           PT/INR - ( 08 Jan 2023 03:20 )   PT: 24.8 sec;   INR: 2.12 ratio         PTT - ( 08 Jan 2023 03:20 )  PTT:47.3 sec    Amylase Serum--      Lipase serum--       [id number]        Imaging:           Gastroenterology/Hepatology Progress Note    Interval Events:   - c diff negative   - procal 2.5   - continues on vaso and levo   - became hypotensive initially when CVVHD restarted after filter clotted, but subsequently able to be resumed on CVVHD with overall stable pressor later today    Allergies:  Macrobid (Rash)  Nexium (Stomach Upset)      Hospital Medications:  caspofungin IVPB      caspofungin IVPB 50 milliGRAM(s) IV Intermittent every 24 hours  cefTRIAXone   IVPB 1000 milliGRAM(s) IV Intermittent every 24 hours  chlorhexidine 2% Cloths 1 Application(s) Topical <User Schedule>  CRRT Treatment    <Continuous>  escitalopram 10 milliGRAM(s) Oral daily  folic acid 1 milliGRAM(s) Oral daily  HYDROmorphone  Injectable 0.25 milliGRAM(s) IV Push every 3 hours PRN  influenza   Vaccine 0.5 milliLiter(s) IntraMuscular once  levETIRAcetam  Solution 750 milliGRAM(s) Enteral Tube two times a day  levothyroxine Injectable 103 MICROGram(s) IV Push at bedtime  midodrine 20 milliGRAM(s) Oral every 8 hours  multivitamin/minerals/iron Oral Solution (CENTRUM) 15 milliLiter(s) Oral daily  norepinephrine Infusion 0.05 MICROgram(s)/kG/Min IV Continuous <Continuous>  pantoprazole   Suspension 40 milliGRAM(s) Oral every 24 hours  Phoxillum Filtration BK 4 / 2.5 5000 milliLiter(s) CRRT <Continuous>  Phoxillum Filtration BK 4 / 2.5 5000 milliLiter(s) CRRT <Continuous>  polyethylene glycol 3350 17 Gram(s) Oral every 12 hours  PrismaSOL Filtration BGK 4 / 2.5 5000 milliLiter(s) CRRT <Continuous>  rifAXIMin 550 milliGRAM(s) Oral every 12 hours  sodium chloride 0.65% Nasal 1 Spray(s) Both Nostrils every 3 hours PRN  tetracaine/benzocaine/butamben Spray 1 Spray(s) Topical every 3 hours PRN  thiamine 100 milliGRAM(s) Enteral Tube daily  vasopressin Infusion 0.03 Unit(s)/Min IV Continuous <Continuous>      ROS: 14 point ROS negative unless otherwise state in subjective    PHYSICAL EXAM:   Vital Signs:  Vital Signs Last 24 Hrs  T(C): 36.9 (08 Jan 2023 03:00), Max: 37.4 (07 Jan 2023 16:00)  T(F): 98.5 (08 Jan 2023 03:00), Max: 99.3 (07 Jan 2023 16:00)  HR: 108 (08 Jan 2023 07:00) (87 - 120)  BP: 120/79 (08 Jan 2023 03:15) (84/53 - 120/79)  BP(mean): 93 (08 Jan 2023 03:15) (63 - 93)  RR: 15 (08 Jan 2023 07:00) (13 - 33)  SpO2: 94% (08 Jan 2023 07:00) (89% - 100%)    Parameters below as of 08 Jan 2023 03:00  Patient On (Oxygen Delivery Method): room air      Daily     Daily     GENERAL: no acute distress  NEURO: able to tell name, not oriented to month or year, no asterixis  HEENT: NCAT, +scleral icterus  CHEST: no respiratory distress, no accessory muscle use, CTAB  CARDIAC: regular rate, +S1/S2  ABDOMEN: +BS, +distended and tympanic, soft, nontender, no rebound or guarding  EXTREMITIES: warm, well perfused, no edema  SKIN: +jaundice, +small scattered ecchymoses    LABS:                        8.6    26.45 )-----------( 33       ( 08 Jan 2023 03:20 )             25.6     Mean Cell Volume: 90.1 fl (01-08-23 @ 03:20)    01-08    136  |  104  |  21  ----------------------------<  157<H>  3.8   |  18<L>  |  0.98    Ca    8.9      08 Jan 2023 03:20  Phos  3.9     01-08  Mg     2.2     01-08    TPro  6.2  /  Alb  3.2<L>  /  TBili  22.6<H>  /  DBili  x   /  AST  124<H>  /  ALT  33  /  AlkPhos  219<H>  01-08    LIVER FUNCTIONS - ( 08 Jan 2023 03:20 )  Alb: 3.2 g/dL / Pro: 6.2 g/dL / ALK PHOS: 219 U/L / ALT: 33 U/L / AST: 124 U/L / GGT: x           PT/INR - ( 08 Jan 2023 03:20 )   PT: 24.8 sec;   INR: 2.12 ratio         PTT - ( 08 Jan 2023 03:20 )  PTT:47.3 sec    Amylase Serum--      Lipase serum--       [id number]        Imaging:           Gastroenterology/Hepatology Progress Note    Interval Events:   - c diff negative   - procal 2.5   - continues on vaso and levo   - became hypotensive initially when CVVHD restarted after filter clotted, but subsequently able to be resumed on CVVHD with overall stable pressor later today    Allergies:  Macrobid (Rash)  Nexium (Stomach Upset)      Hospital Medications:  caspofungin IVPB      caspofungin IVPB 50 milliGRAM(s) IV Intermittent every 24 hours  cefTRIAXone   IVPB 1000 milliGRAM(s) IV Intermittent every 24 hours  chlorhexidine 2% Cloths 1 Application(s) Topical <User Schedule>  CRRT Treatment    <Continuous>  escitalopram 10 milliGRAM(s) Oral daily  folic acid 1 milliGRAM(s) Oral daily  HYDROmorphone  Injectable 0.25 milliGRAM(s) IV Push every 3 hours PRN  influenza   Vaccine 0.5 milliLiter(s) IntraMuscular once  levETIRAcetam  Solution 750 milliGRAM(s) Enteral Tube two times a day  levothyroxine Injectable 103 MICROGram(s) IV Push at bedtime  midodrine 20 milliGRAM(s) Oral every 8 hours  multivitamin/minerals/iron Oral Solution (CENTRUM) 15 milliLiter(s) Oral daily  norepinephrine Infusion 0.05 MICROgram(s)/kG/Min IV Continuous <Continuous>  pantoprazole   Suspension 40 milliGRAM(s) Oral every 24 hours  Phoxillum Filtration BK 4 / 2.5 5000 milliLiter(s) CRRT <Continuous>  Phoxillum Filtration BK 4 / 2.5 5000 milliLiter(s) CRRT <Continuous>  polyethylene glycol 3350 17 Gram(s) Oral every 12 hours  PrismaSOL Filtration BGK 4 / 2.5 5000 milliLiter(s) CRRT <Continuous>  rifAXIMin 550 milliGRAM(s) Oral every 12 hours  sodium chloride 0.65% Nasal 1 Spray(s) Both Nostrils every 3 hours PRN  tetracaine/benzocaine/butamben Spray 1 Spray(s) Topical every 3 hours PRN  thiamine 100 milliGRAM(s) Enteral Tube daily  vasopressin Infusion 0.03 Unit(s)/Min IV Continuous <Continuous>      ROS: 14 point ROS negative unless otherwise state in subjective    PHYSICAL EXAM:   Vital Signs:  Vital Signs Last 24 Hrs  T(C): 36.9 (08 Jan 2023 03:00), Max: 37.4 (07 Jan 2023 16:00)  T(F): 98.5 (08 Jan 2023 03:00), Max: 99.3 (07 Jan 2023 16:00)  HR: 108 (08 Jan 2023 07:00) (87 - 120)  BP: 120/79 (08 Jan 2023 03:15) (84/53 - 120/79)  BP(mean): 93 (08 Jan 2023 03:15) (63 - 93)  RR: 15 (08 Jan 2023 07:00) (13 - 33)  SpO2: 94% (08 Jan 2023 07:00) (89% - 100%)    Parameters below as of 08 Jan 2023 03:00  Patient On (Oxygen Delivery Method): room air      Daily     Daily     GENERAL: no acute distress  NEURO: able to tell name, not oriented to month or year, no asterixis  HEENT: NCAT, +scleral icterus  CHEST: no respiratory distress, no accessory muscle use, CTAB  CARDIAC: regular rate, +S1/S2  ABDOMEN: +BS, +distended and tympanic, soft, nontender, no rebound or guarding  EXTREMITIES: warm, well perfused, no edema  SKIN: +jaundice, +small scattered ecchymoses    LABS:                        8.6    26.45 )-----------( 33       ( 08 Jan 2023 03:20 )             25.6     Mean Cell Volume: 90.1 fl (01-08-23 @ 03:20)    01-08    136  |  104  |  21  ----------------------------<  157<H>  3.8   |  18<L>  |  0.98    Ca    8.9      08 Jan 2023 03:20  Phos  3.9     01-08  Mg     2.2     01-08    TPro  6.2  /  Alb  3.2<L>  /  TBili  22.6<H>  /  DBili  x   /  AST  124<H>  /  ALT  33  /  AlkPhos  219<H>  01-08    LIVER FUNCTIONS - ( 08 Jan 2023 03:20 )  Alb: 3.2 g/dL / Pro: 6.2 g/dL / ALK PHOS: 219 U/L / ALT: 33 U/L / AST: 124 U/L / GGT: x           PT/INR - ( 08 Jan 2023 03:20 )   PT: 24.8 sec;   INR: 2.12 ratio         PTT - ( 08 Jan 2023 03:20 )  PTT:47.3 sec    Amylase Serum--      Lipase serum--       [id number]        Imaging:

## 2023-01-08 NOTE — PROGRESS NOTE ADULT - ASSESSMENT
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 Patient has Alcohol Use Disorder ( recent rehab with relapse and in remission since 11/2022), decompensated ALD Cirrhosis c/b ascites. Patient admitted to [facility] on 12/19/2022 after a witnessed seizure; course was c/b septic shock with associated HRF ( intubated 12/19) and an RED ( started HD 12/20). Patient was transferred to [facility] on 12/20 for a liver transplant evaluation. Patient was started on CRRT 12/21 and was extubated 12/23/22. Patient remains in SICU for hemodynamic monitoring and management while liver transplant evaluation is in progress.       PLAN:  Neuro:  - Monitor Mental Status for Encephalopathy and trend Ammonia Level   - Continue Home Lexapro   - Continue Keppra  - Continue Folic Acid, Thiamine, and MV     Resp:  - Maintain SpO2 > 92%   - Out of bed to chair, ambulate as tolerated, and incentive spirometry to prevent atelectasis    CV:  - Will wean vasopressor support of Levophed and Vaso to maintain goal MAP > 65 mmHg  - Remains on pressors Levo gtt 0.13 and Vaso gtt 0.03   - Continue Midodrine 20mg q8hr     GI: Acute Decompensated Liver Failure 2/2 ETOH Use   - NPO; TF via NGT for goal 50cc/hr   - Bowel regimen decreased to Miralax q12 hr  - Protonix for stress ulcer prophylaxis  - Continue Rifaximin, Hold Lactulose for increasing abdominal distention   - s/p Paracentesis (12/26) transudative, negative for SBP   - s/p Paracentesis ( 1/05) 4L Removed  - Rectal tube in place-melena 1/05    Renal:  - CRRT restarted 1/5 , maintain Net even   - Monitor I&Os and electrolytes w/ repletions as necessary    Heme:  - Monitor CBC and coags  - Hold Chemical VTE prophylaxis  - SCDs for Mechanical VTE ppx     ID:   - WBC remains critically high, stool sent for CDiff however negative  - Monitor for clinical evidence of active infection  - Combi-Cath ( 12/22) Negative, Blood Cx ( 12/26) Negative,  MRSA (12/30) Negative   - Continue Empiric Caspofungin ( 12/26 - ?)   - s/p Meropenem ( 12/21 - 1/05) , start Ceftriaxone ( 1/05 - 1/12)     Endo:   - Monitor glucose on BMP ; HgbA1C 4.9% ( 12/24)   - Continue Synthroid for hypothyroidism    Code Status: Full Code   Disposition: SICU    Lines/Tubes:  - RICLEMENTINA TLC ( 1/04/23)  - RICLEMENTINA Shiley ( 1/04/23)  - Right Radial Arterial Line ( 1/04/23) Patient is a 63 y/o Female with PMH Thyroid Cancer ( s/p total thyroidectomy in her 20s, radiation, and BARONE), HTN, GERD, 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 Patient has Alcohol Use Disorder ( recent rehab with relapse and in remission since 11/2022), decompensated ALD Cirrhosis c/b ascites. Patient admitted to [facility] on 12/19/2022 after a witnessed seizure; course was c/b septic shock with associated HRF ( intubated 12/19) and an RED ( started HD 12/20). Patient was transferred to [facility] on 12/20 for a liver transplant evaluation. Patient was started on CRRT 12/21 and was extubated 12/23/22. Patient remains in SICU for hemodynamic monitoring and management while liver transplant evaluation is in progress.       PLAN:  Neuro:  - Monitor Mental Status for Encephalopathy and trend Ammonia Level   - Continue Home Lexapro   - Continue Keppra  - Continue Folic Acid, Thiamine, and MV     Resp:  - Maintain SpO2 > 92%   - Out of bed to chair, ambulate as tolerated, and incentive spirometry to prevent atelectasis    CV:  - Will wean vasopressor support of Levophed and Vaso to maintain goal MAP > 65 mmHg  - Remains on pressors Levo gtt 0.13 and Vaso gtt 0.03   - Continue Midodrine 20mg q8hr     GI: Acute Decompensated Liver Failure 2/2 ETOH Use   - NPO; TF via NGT for goal 50cc/hr   - Bowel regimen decreased to Miralax q12 hr  - Protonix for stress ulcer prophylaxis  - Continue Rifaximin, Hold Lactulose for increasing abdominal distention   - s/p Paracentesis (12/26) transudative, negative for SBP   - s/p Paracentesis ( 1/05) 4L Removed  - Rectal tube in place-melena 1/05    Renal:  - CRRT restarted 1/5 , maintain Net even   - Monitor I&Os and electrolytes w/ repletions as necessary    Heme:  - Monitor CBC and coags  - Hold Chemical VTE prophylaxis  - SCDs for Mechanical VTE ppx     ID:   - WBC remains critically high, stool sent for CDiff however negative  - Monitor for clinical evidence of active infection  - Combi-Cath ( 12/22) Negative, Blood Cx ( 12/26) Negative,  MRSA (12/30) Negative   - Continue Empiric Caspofungin ( 12/26 - ?)   - s/p Meropenem ( 12/21 - 1/05) , start Ceftriaxone ( 1/05 - 1/12)     Endo:   - Monitor glucose on BMP ; HgbA1C 4.9% ( 12/24)   - Continue Synthroid for hypothyroidism    Code Status: Full Code   Disposition: SICU    Lines/Tubes:  - RICLEMENTINA TLC ( 1/04/23)  - RICLEMENTINA Shiley ( 1/04/23)  - Right Radial Arterial Line ( 1/04/23) Patient is a 63 y/o Female with PMH Thyroid Cancer ( s/p total thyroidectomy in her 20s, radiation, and BARONE), HTN, GERD, 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 Patient has Alcohol Use Disorder ( recent rehab with relapse and in remission since 11/2022), decompensated ALD Cirrhosis c/b ascites. Patient admitted to [facility] on 12/19/2022 after a witnessed seizure; course was c/b septic shock with associated HRF ( intubated 12/19) and an RED ( started HD 12/20). Patient was transferred to [facility] on 12/20 for a liver transplant evaluation. Patient was started on CRRT 12/21 and was extubated 12/23/22. Patient remains in SICU for hemodynamic monitoring and management while liver transplant evaluation is in progress.       PLAN:  Neuro:  - Monitor Mental Status for Encephalopathy and trend Ammonia Level   - Continue Home Lexapro   - Continue Keppra  - Continue Folic Acid, Thiamine, and MV     Resp:  - Maintain SpO2 > 92%   - Out of bed to chair, ambulate as tolerated, and incentive spirometry to prevent atelectasis    CV:  - Will wean vasopressor support of Levophed and Vaso to maintain goal MAP > 65 mmHg  - Remains on pressors Levo gtt 0.13 and Vaso gtt 0.03   - Continue Midodrine 20mg q8hr     GI: Acute Decompensated Liver Failure 2/2 ETOH Use   - NPO; TF via NGT for goal 50cc/hr   - Bowel regimen decreased to Miralax q12 hr  - Protonix for stress ulcer prophylaxis  - Continue Rifaximin, Hold Lactulose for increasing abdominal distention   - s/p Paracentesis (12/26) transudative, negative for SBP   - s/p Paracentesis ( 1/05) 4L Removed  - Rectal tube in place-melena 1/05    Renal:  - CRRT restarted 1/5 , maintain Net even   - Monitor I&Os and electrolytes w/ repletions as necessary    Heme:  - Monitor CBC and coags  - Hold Chemical VTE prophylaxis  - SCDs for Mechanical VTE ppx     ID:   - WBC remains critically high, stool sent for CDiff however negative  - Monitor for clinical evidence of active infection  - Combi-Cath ( 12/22) Negative, Blood Cx ( 12/26) Negative,  MRSA (12/30) Negative   - Continue Empiric Caspofungin ( 12/26 - ?)   - s/p Meropenem ( 12/21 - 1/05) , start Ceftriaxone ( 1/05 - 1/12)     Endo:   - Monitor glucose on BMP ; HgbA1C 4.9% ( 12/24)   - Continue Synthroid for hypothyroidism    Code Status: Full Code   Disposition: SICU    Lines/Tubes:  - RICLEMENTINA TLC ( 1/04/23)  - RICLEMENTINA Shiley ( 1/04/23)  - Right Radial Arterial Line ( 1/04/23)

## 2023-01-08 NOTE — CONSULT NOTE ADULT - CONSULT REASON
FRANTZ
Rectal bleeding
Evaluate Rehabilitation Needs
Acute Liver Failure
paracentesis
Pretransplant cardiac evaluation prior to possible liver transplant
Leukocytosis
CRRT

## 2023-01-08 NOTE — PROGRESS NOTE ADULT - SUBJECTIVE AND OBJECTIVE BOX
Transplant Surgery - Multidisciplinary Progress Note  --------------------------------------------------------------  Present: Seen and examined with surgeon Dr. Oconnor, Hepatologist Dr Goldstein, ANJEL Espinoza and unit RN during am rounds. Disciplines not in attendance will be notified of plan.      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  Arthur [phone]) and Daughter Taylor at [facility] provided additional history. Of note was recently admitted to  11/2022 for workup and eval of jaundice- during that hospitalization was found to have a UTI and dx with alc hep and started on steroids (was deemed a non-responder and steroids were subsequently stopped); s/p LVP at [place] 11/2022. Previously hospitalized in 2021 at [place] for ETOH detox. Went to ETOH rehab 08/2022 and was asked to leave the facility when she was COVID+; was sober for 1 week until she started drinking again. Had also attended a few AA meetings in the past. Transferred from [facility] 12/20 for further management of acute liver failure and liver transplant evaluation.     24 HOUR EVENTS:  - remains on pressors: levo/vaso  - c diff negative    Potential Discharge date: pending clinical stability  Education:  Medications  Plan of care:  See Below    MEDICATIONS  (STANDING):  albumin human  5% IVPB 250 milliLiter(s) IV Intermittent every 10 minutes  caspofungin IVPB      caspofungin IVPB 50 milliGRAM(s) IV Intermittent every 24 hours  cefTRIAXone   IVPB 1000 milliGRAM(s) IV Intermittent every 24 hours  chlorhexidine 2% Cloths 1 Application(s) Topical <User Schedule>  CRRT Treatment    <Continuous>  escitalopram 10 milliGRAM(s) Oral daily  folic acid 1 milliGRAM(s) Oral daily  influenza   Vaccine 0.5 milliLiter(s) IntraMuscular once  levETIRAcetam  Solution 750 milliGRAM(s) Enteral Tube two times a day  levothyroxine Injectable 103 MICROGram(s) IV Push at bedtime  midodrine 20 milliGRAM(s) Oral every 8 hours  multivitamin/minerals/iron Oral Solution (CENTRUM) 15 milliLiter(s) Oral daily  norepinephrine Infusion 0.05 MICROgram(s)/kG/Min (6.21 mL/Hr) IV Continuous <Continuous>  ondansetron Injectable 4 milliGRAM(s) IV Push once  pantoprazole   Suspension 40 milliGRAM(s) Oral every 24 hours  Phoxillum Filtration BK 4 / 2.5 5000 milliLiter(s) (800 mL/Hr) CRRT <Continuous>  Phoxillum Filtration BK 4 / 2.5 5000 milliLiter(s) (800 mL/Hr) CRRT <Continuous>  polyethylene glycol 3350 17 Gram(s) Oral every 12 hours  PrismaSOL Filtration BGK 4 / 2.5 5000 milliLiter(s) (200 mL/Hr) CRRT <Continuous>  rifAXIMin 550 milliGRAM(s) Oral every 12 hours  thiamine 100 milliGRAM(s) Enteral Tube daily  vasopressin Infusion 0.03 Unit(s)/Min (4.5 mL/Hr) IV Continuous <Continuous>    MEDICATIONS  (PRN):  artificial  tears Solution 1 Drop(s) Both EYES four times a day PRN Dry Eyes  HYDROmorphone  Injectable 0.25 milliGRAM(s) IV Push every 3 hours PRN Severe Pain (7 - 10)  sodium chloride 0.65% Nasal 1 Spray(s) Both Nostrils every 3 hours PRN Nasal Congestion  tetracaine/benzocaine/butamben Spray 1 Spray(s) Topical every 3 hours PRN for ngt discomfort      PAST MEDICAL & SURGICAL HISTORY:  HTN (hypertension)  UTI (urinary tract infection)  GERD (gastroesophageal reflux disease)  Eczema  Thyroid cancer  COVID-19 virus infection  H/O total thyroidectomy  History of tonsillectomy  History of appendectomy    Vital Signs Last 24 Hrs  T(C): 37.4 (08 Jan 2023 12:00), Max: 37.4 (07 Jan 2023 16:00)  T(F): 99.3 (08 Jan 2023 12:00), Max: 99.3 (07 Jan 2023 16:00)  HR: 98 (08 Jan 2023 14:15) (87 - 120)  BP: 114/59 (08 Jan 2023 08:15) (84/53 - 120/79)  BP(mean): 79 (08 Jan 2023 08:15) (63 - 93)  RR: 18 (08 Jan 2023 14:15) (13 - 55)  SpO2: 96% (08 Jan 2023 14:15) (89% - 100%)    Parameters below as of 08 Jan 2023 03:00  Patient On (Oxygen Delivery Method): room air    I&O's Summary    07 Jan 2023 07:01  -  08 Jan 2023 07:00  --------------------------------------------------------  IN: 2352.6 mL / OUT: 2044 mL / NET: 308.6 mL    08 Jan 2023 07:01  -  08 Jan 2023 15:06  --------------------------------------------------------  IN: 1070.4 mL / OUT: 600 mL / NET: 470.4 mL                        8.3    39.76 )-----------( 42       ( 08 Jan 2023 09:28 )             23.8     01-08    136  |  102  |  28<H>  ----------------------------<  180<H>  3.7   |  19<L>  |  1.23    Ca    9.1      08 Jan 2023 09:28  Phos  4.2     01-08  Mg     2.2     01-08    TPro  6.5  /  Alb  3.1<L>  /  TBili  23.5<H>  /  DBili  x   /  AST  125<H>  /  ALT  35  /  AlkPhos  216<H>  01-08    Culture - Fungal, Body Fluid (collected 01-04-23 @ 16:28)  Source: Peritoneal Peritoneal Fluid  Preliminary Report (01-07-23 @ 15:02):    Culture is being performed. Fungal cultures are held for 4 weeks.    Culture - Body Fluid with Gram Stain (collected 01-04-23 @ 16:28)  Source: Peritoneal Peritoneal Fluid  Gram Stain (01-04-23 @ 23:22):    polymorphonuclear leukocytes seen    No organisms seen    by cytocentrifuge  Preliminary Report (01-05-23 @ 16:14):    No growth    Culture - Urine (collected 01-03-23 @ 18:30)  Source: Catheterized Catheterized  Final Report (01-04-23 @ 19:30):    No growth    Review of systems  AMS, unable to fully assess      PHYSICAL EXAM:  Constitutional: NAD, AMS   Eyes: icteric, PERRLA  ENMT: nc/at, no thrush  Neck: supple, central line R IJ  Cardiovascular: RRR  Gastrointestinal: abdomen distended, soft;  rectal tube in place  Genitourinary: anuric  Extremities: SCD's in place and working bilaterally, no edema  Vascular: Palpable dp pulses bilaterally.   Neurological: following commands, mentation improving  Skin:  jaundiced, diffuse rash across abdomen and flank. No ulcerations, lesions  Musculoskeletal: moving extremities         Transplant Surgery - Multidisciplinary Progress Note  --------------------------------------------------------------  Present: Seen and examined with surgeon Dr. Oconnor, Hepatologist Dr Goldstein, ANJEL Espinoza and unit RN during am rounds. Disciplines not in attendance will be notified of plan.      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  Arthur ([phone]) and Daughter Taylor at [facility] provided additional history. Of note was recently admitted to  11/2022 for workup and eval of jaundice- during that hospitalization was found to have a UTI and dx with alc hep and started on steroids (was deemed a non-responder and steroids were subsequently stopped); s/p LVP at [place] 11/2022. Previously hospitalized in 2021 at [place] for ETOH detox. Went to ETOH rehab 08/2022 and was asked to leave the facility when she was COVID+; was sober for 1 week until she started drinking again. Had also attended a few AA meetings in the past. Transferred from [facility] 12/20 for further management of acute liver failure and liver transplant evaluation.     24 HOUR EVENTS:  - remains on pressors: levo/vaso  - c diff negative    Potential Discharge date: pending clinical stability  Education:  Medications  Plan of care:  See Below    MEDICATIONS  (STANDING):  albumin human  5% IVPB 250 milliLiter(s) IV Intermittent every 10 minutes  caspofungin IVPB      caspofungin IVPB 50 milliGRAM(s) IV Intermittent every 24 hours  cefTRIAXone   IVPB 1000 milliGRAM(s) IV Intermittent every 24 hours  chlorhexidine 2% Cloths 1 Application(s) Topical <User Schedule>  CRRT Treatment    <Continuous>  escitalopram 10 milliGRAM(s) Oral daily  folic acid 1 milliGRAM(s) Oral daily  influenza   Vaccine 0.5 milliLiter(s) IntraMuscular once  levETIRAcetam  Solution 750 milliGRAM(s) Enteral Tube two times a day  levothyroxine Injectable 103 MICROGram(s) IV Push at bedtime  midodrine 20 milliGRAM(s) Oral every 8 hours  multivitamin/minerals/iron Oral Solution (CENTRUM) 15 milliLiter(s) Oral daily  norepinephrine Infusion 0.05 MICROgram(s)/kG/Min (6.21 mL/Hr) IV Continuous <Continuous>  ondansetron Injectable 4 milliGRAM(s) IV Push once  pantoprazole   Suspension 40 milliGRAM(s) Oral every 24 hours  Phoxillum Filtration BK 4 / 2.5 5000 milliLiter(s) (800 mL/Hr) CRRT <Continuous>  Phoxillum Filtration BK 4 / 2.5 5000 milliLiter(s) (800 mL/Hr) CRRT <Continuous>  polyethylene glycol 3350 17 Gram(s) Oral every 12 hours  PrismaSOL Filtration BGK 4 / 2.5 5000 milliLiter(s) (200 mL/Hr) CRRT <Continuous>  rifAXIMin 550 milliGRAM(s) Oral every 12 hours  thiamine 100 milliGRAM(s) Enteral Tube daily  vasopressin Infusion 0.03 Unit(s)/Min (4.5 mL/Hr) IV Continuous <Continuous>    MEDICATIONS  (PRN):  artificial  tears Solution 1 Drop(s) Both EYES four times a day PRN Dry Eyes  HYDROmorphone  Injectable 0.25 milliGRAM(s) IV Push every 3 hours PRN Severe Pain (7 - 10)  sodium chloride 0.65% Nasal 1 Spray(s) Both Nostrils every 3 hours PRN Nasal Congestion  tetracaine/benzocaine/butamben Spray 1 Spray(s) Topical every 3 hours PRN for ngt discomfort      PAST MEDICAL & SURGICAL HISTORY:  HTN (hypertension)  UTI (urinary tract infection)  GERD (gastroesophageal reflux disease)  Eczema  Thyroid cancer  COVID-19 virus infection  H/O total thyroidectomy  History of tonsillectomy  History of appendectomy    Vital Signs Last 24 Hrs  T(C): 37.4 (08 Jan 2023 12:00), Max: 37.4 (07 Jan 2023 16:00)  T(F): 99.3 (08 Jan 2023 12:00), Max: 99.3 (07 Jan 2023 16:00)  HR: 98 (08 Jan 2023 14:15) (87 - 120)  BP: 114/59 (08 Jan 2023 08:15) (84/53 - 120/79)  BP(mean): 79 (08 Jan 2023 08:15) (63 - 93)  RR: 18 (08 Jan 2023 14:15) (13 - 55)  SpO2: 96% (08 Jan 2023 14:15) (89% - 100%)    Parameters below as of 08 Jan 2023 03:00  Patient On (Oxygen Delivery Method): room air    I&O's Summary    07 Jan 2023 07:01  -  08 Jan 2023 07:00  --------------------------------------------------------  IN: 2352.6 mL / OUT: 2044 mL / NET: 308.6 mL    08 Jan 2023 07:01  -  08 Jan 2023 15:06  --------------------------------------------------------  IN: 1070.4 mL / OUT: 600 mL / NET: 470.4 mL                        8.3    39.76 )-----------( 42       ( 08 Jan 2023 09:28 )             23.8     01-08    136  |  102  |  28<H>  ----------------------------<  180<H>  3.7   |  19<L>  |  1.23    Ca    9.1      08 Jan 2023 09:28  Phos  4.2     01-08  Mg     2.2     01-08    TPro  6.5  /  Alb  3.1<L>  /  TBili  23.5<H>  /  DBili  x   /  AST  125<H>  /  ALT  35  /  AlkPhos  216<H>  01-08    Culture - Fungal, Body Fluid (collected 01-04-23 @ 16:28)  Source: Peritoneal Peritoneal Fluid  Preliminary Report (01-07-23 @ 15:02):    Culture is being performed. Fungal cultures are held for 4 weeks.    Culture - Body Fluid with Gram Stain (collected 01-04-23 @ 16:28)  Source: Peritoneal Peritoneal Fluid  Gram Stain (01-04-23 @ 23:22):    polymorphonuclear leukocytes seen    No organisms seen    by cytocentrifuge  Preliminary Report (01-05-23 @ 16:14):    No growth    Culture - Urine (collected 01-03-23 @ 18:30)  Source: Catheterized Catheterized  Final Report (01-04-23 @ 19:30):    No growth    Review of systems  AMS, unable to fully assess      PHYSICAL EXAM:  Constitutional: NAD, AMS   Eyes: icteric, PERRLA  ENMT: nc/at, no thrush  Neck: supple, central line R IJ  Cardiovascular: RRR  Gastrointestinal: abdomen distended, soft;  rectal tube in place  Genitourinary: anuric  Extremities: SCD's in place and working bilaterally, no edema  Vascular: Palpable dp pulses bilaterally.   Neurological: following commands, mentation improving  Skin:  jaundiced, diffuse rash across abdomen and flank. No ulcerations, lesions  Musculoskeletal: moving extremities         Transplant Surgery - Multidisciplinary Progress Note  --------------------------------------------------------------  Present: Seen and examined with surgeon Dr. Oconnor, Hepatologist Dr Goldstein, ANJEL Espinoza and unit RN during am rounds. Disciplines not in attendance will be notified of plan.      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  Arthur ([phone]) and Daughter Taylor at [facility] provided additional history. Of note was recently admitted to  11/2022 for workup and eval of jaundice- during that hospitalization was found to have a UTI and dx with alc hep and started on steroids (was deemed a non-responder and steroids were subsequently stopped); s/p LVP at [place] 11/2022. Previously hospitalized in 2021 at [place] for ETOH detox. Went to ETOH rehab 08/2022 and was asked to leave the facility when she was COVID+; was sober for 1 week until she started drinking again. Had also attended a few AA meetings in the past. Transferred from [facility] 12/20 for further management of acute liver failure and liver transplant evaluation.     24 HOUR EVENTS:  - remains on pressors: levo/vaso  - c diff negative    Potential Discharge date: pending clinical stability  Education:  Medications  Plan of care:  See Below    MEDICATIONS  (STANDING):  albumin human  5% IVPB 250 milliLiter(s) IV Intermittent every 10 minutes  caspofungin IVPB      caspofungin IVPB 50 milliGRAM(s) IV Intermittent every 24 hours  cefTRIAXone   IVPB 1000 milliGRAM(s) IV Intermittent every 24 hours  chlorhexidine 2% Cloths 1 Application(s) Topical <User Schedule>  CRRT Treatment    <Continuous>  escitalopram 10 milliGRAM(s) Oral daily  folic acid 1 milliGRAM(s) Oral daily  influenza   Vaccine 0.5 milliLiter(s) IntraMuscular once  levETIRAcetam  Solution 750 milliGRAM(s) Enteral Tube two times a day  levothyroxine Injectable 103 MICROGram(s) IV Push at bedtime  midodrine 20 milliGRAM(s) Oral every 8 hours  multivitamin/minerals/iron Oral Solution (CENTRUM) 15 milliLiter(s) Oral daily  norepinephrine Infusion 0.05 MICROgram(s)/kG/Min (6.21 mL/Hr) IV Continuous <Continuous>  ondansetron Injectable 4 milliGRAM(s) IV Push once  pantoprazole   Suspension 40 milliGRAM(s) Oral every 24 hours  Phoxillum Filtration BK 4 / 2.5 5000 milliLiter(s) (800 mL/Hr) CRRT <Continuous>  Phoxillum Filtration BK 4 / 2.5 5000 milliLiter(s) (800 mL/Hr) CRRT <Continuous>  polyethylene glycol 3350 17 Gram(s) Oral every 12 hours  PrismaSOL Filtration BGK 4 / 2.5 5000 milliLiter(s) (200 mL/Hr) CRRT <Continuous>  rifAXIMin 550 milliGRAM(s) Oral every 12 hours  thiamine 100 milliGRAM(s) Enteral Tube daily  vasopressin Infusion 0.03 Unit(s)/Min (4.5 mL/Hr) IV Continuous <Continuous>    MEDICATIONS  (PRN):  artificial  tears Solution 1 Drop(s) Both EYES four times a day PRN Dry Eyes  HYDROmorphone  Injectable 0.25 milliGRAM(s) IV Push every 3 hours PRN Severe Pain (7 - 10)  sodium chloride 0.65% Nasal 1 Spray(s) Both Nostrils every 3 hours PRN Nasal Congestion  tetracaine/benzocaine/butamben Spray 1 Spray(s) Topical every 3 hours PRN for ngt discomfort      PAST MEDICAL & SURGICAL HISTORY:  HTN (hypertension)  UTI (urinary tract infection)  GERD (gastroesophageal reflux disease)  Eczema  Thyroid cancer  COVID-19 virus infection  H/O total thyroidectomy  History of tonsillectomy  History of appendectomy    Vital Signs Last 24 Hrs  T(C): 37.4 (08 Jan 2023 12:00), Max: 37.4 (07 Jan 2023 16:00)  T(F): 99.3 (08 Jan 2023 12:00), Max: 99.3 (07 Jan 2023 16:00)  HR: 98 (08 Jan 2023 14:15) (87 - 120)  BP: 114/59 (08 Jan 2023 08:15) (84/53 - 120/79)  BP(mean): 79 (08 Jan 2023 08:15) (63 - 93)  RR: 18 (08 Jan 2023 14:15) (13 - 55)  SpO2: 96% (08 Jan 2023 14:15) (89% - 100%)    Parameters below as of 08 Jan 2023 03:00  Patient On (Oxygen Delivery Method): room air    I&O's Summary    07 Jan 2023 07:01  -  08 Jan 2023 07:00  --------------------------------------------------------  IN: 2352.6 mL / OUT: 2044 mL / NET: 308.6 mL    08 Jan 2023 07:01  -  08 Jan 2023 15:06  --------------------------------------------------------  IN: 1070.4 mL / OUT: 600 mL / NET: 470.4 mL                        8.3    39.76 )-----------( 42       ( 08 Jan 2023 09:28 )             23.8     01-08    136  |  102  |  28<H>  ----------------------------<  180<H>  3.7   |  19<L>  |  1.23    Ca    9.1      08 Jan 2023 09:28  Phos  4.2     01-08  Mg     2.2     01-08    TPro  6.5  /  Alb  3.1<L>  /  TBili  23.5<H>  /  DBili  x   /  AST  125<H>  /  ALT  35  /  AlkPhos  216<H>  01-08    Culture - Fungal, Body Fluid (collected 01-04-23 @ 16:28)  Source: Peritoneal Peritoneal Fluid  Preliminary Report (01-07-23 @ 15:02):    Culture is being performed. Fungal cultures are held for 4 weeks.    Culture - Body Fluid with Gram Stain (collected 01-04-23 @ 16:28)  Source: Peritoneal Peritoneal Fluid  Gram Stain (01-04-23 @ 23:22):    polymorphonuclear leukocytes seen    No organisms seen    by cytocentrifuge  Preliminary Report (01-05-23 @ 16:14):    No growth    Culture - Urine (collected 01-03-23 @ 18:30)  Source: Catheterized Catheterized  Final Report (01-04-23 @ 19:30):    No growth    Review of systems  AMS, unable to fully assess      PHYSICAL EXAM:  Constitutional: NAD, AMS   Eyes: icteric, PERRLA  ENMT: nc/at, no thrush  Neck: supple, central line R IJ  Cardiovascular: RRR  Gastrointestinal: abdomen distended, soft;  rectal tube in place  Genitourinary: anuric  Extremities: SCD's in place and working bilaterally, no edema  Vascular: Palpable dp pulses bilaterally.   Neurological: following commands, mentation improving  Skin:  jaundiced, diffuse rash across abdomen and flank. No ulcerations, lesions  Musculoskeletal: moving extremities

## 2023-01-08 NOTE — PROGRESS NOTE ADULT - SUBJECTIVE AND OBJECTIVE BOX
[facility] DIVISION OF KIDNEY DISEASES AND HYPERTENSION -- FOLLOW UP NOTE  --------------------------------------------------------------------------------  Chief Complaint:  RED    24 hour events/subjective:  Pt had trouble with CVVH line last night.        PAST HISTORY  --------------------------------------------------------------------------------  No significant changes to PMH, PSH, FHx, SHx, unless otherwise noted    ALLERGIES & MEDICATIONS  --------------------------------------------------------------------------------  Allergies    Macrobid (Rash)    Intolerances    Nexium (Stomach Upset)    Standing Inpatient Medications  caspofungin IVPB      caspofungin IVPB 50 milliGRAM(s) IV Intermittent every 24 hours  cefTRIAXone   IVPB 1000 milliGRAM(s) IV Intermittent every 24 hours  chlorhexidine 2% Cloths 1 Application(s) Topical <User Schedule>  CRRT Treatment    <Continuous>  escitalopram 10 milliGRAM(s) Oral daily  folic acid 1 milliGRAM(s) Oral daily  influenza   Vaccine 0.5 milliLiter(s) IntraMuscular once  levETIRAcetam  Solution 750 milliGRAM(s) Enteral Tube two times a day  levothyroxine Injectable 103 MICROGram(s) IV Push at bedtime  midodrine 20 milliGRAM(s) Oral every 8 hours  multivitamin/minerals/iron Oral Solution (CENTRUM) 15 milliLiter(s) Oral daily  norepinephrine Infusion 0.05 MICROgram(s)/kG/Min IV Continuous <Continuous>  pantoprazole   Suspension 40 milliGRAM(s) Oral every 24 hours  Phoxillum Filtration BK 4 / 2.5 5000 milliLiter(s) CRRT <Continuous>  Phoxillum Filtration BK 4 / 2.5 5000 milliLiter(s) CRRT <Continuous>  polyethylene glycol 3350 17 Gram(s) Oral every 12 hours  PrismaSOL Filtration BGK 4 / 2.5 5000 milliLiter(s) CRRT <Continuous>  rifAXIMin 550 milliGRAM(s) Oral every 12 hours  thiamine 100 milliGRAM(s) Enteral Tube daily  vasopressin Infusion 0.03 Unit(s)/Min IV Continuous <Continuous>    PRN Inpatient Medications  artificial  tears Solution 1 Drop(s) Both EYES four times a day PRN  HYDROmorphone  Injectable 0.25 milliGRAM(s) IV Push every 3 hours PRN  sodium chloride 0.65% Nasal 1 Spray(s) Both Nostrils every 3 hours PRN  tetracaine/benzocaine/butamben Spray 1 Spray(s) Topical every 3 hours PRN      REVIEW OF SYSTEMS  --------------------------------------------------------------------------------  Gen: + weakness    All other systems were reviewed and are negative, except as noted.    VITALS/PHYSICAL EXAM  --------------------------------------------------------------------------------  T(C): 36.4 (01-08-23 @ 08:00), Max: 37.4 (01-07-23 @ 16:00)  HR: 98 (01-08-23 @ 12:00) (87 - 120)  BP: 114/59 (01-08-23 @ 08:15) (84/53 - 120/79)  RR: 34 (01-08-23 @ 12:00) (13 - 43)  SpO2: 97% (01-08-23 @ 12:00) (89% - 100%)  Wt(kg): --        01-07-23 @ 07:01  -  01-08-23 @ 07:00  --------------------------------------------------------  IN: 2352.6 mL / OUT: 2044 mL / NET: 308.6 mL    01-08-23 @ 07:01  -  01-08-23 @ 12:37  --------------------------------------------------------  IN: 998.5 mL / OUT: 600 mL / NET: 398.5 mL      Physical Exam:  	Gen: appear ill, awake  	HEENT: PERRL, supple neck, clear oropharynx  	Pulm: CTA B/L  	CV: RRR, S1S2; no rub  	Back: No spinal or CVA tenderness; no sacral edema  	Abd: +BS, soft, + distension   	: No suprapubic tenderness  	UE: Warm, FROM; no edema; no asterixis  	LE: Warm, FROM; no edema  	    LABS/STUDIES  --------------------------------------------------------------------------------              8.3    39.76 >-----------<  42       [01-08-23 @ 09:28]              23.8     136  |  102  |  28  ----------------------------<  180      [01-08-23 @ 09:28]  3.7   |  19  |  1.23        Ca     9.1     [01-08-23 @ 09:28]      Mg     2.2     [01-08-23 @ 09:28]      Phos  4.2     [01-08-23 @ 09:28]    TPro  6.5  /  Alb  3.1  /  TBili  23.5  /  DBili  x   /  AST  125  /  ALT  35  /  AlkPhos  216  [01-08-23 @ 09:28]    PT/INR: PT 24.8 , INR 2.12       [01-08-23 @ 03:20]  PTT: 47.3       [01-08-23 @ 03:20]      Creatinine Trend:  SCr 1.23 [01-08 @ 09:28]  SCr 0.98 [01-08 @ 03:20]  SCr 0.84 [01-07 @ 23:34]  SCr 0.86 [01-07 @ 18:10]  SCr 0.87 [01-07 @ 12:40]              Urinalysis - [01-03-23 @ 18:02]      Color Other / Appearance Turbid / SG SEE NOTE / pH SEE NOTE      Gluc SEE NOTE / Ketone SEE NOTE  / Bili SEE NOTE / Urobili SEE NOTE       Blood SEE NOTE / Protein SEE NOTE / Leuk Est SEE NOTE / Nitrite SEE NOTE      RBC  / WBC  / Hyaline  / Gran  / Sq Epi  / Non Sq Epi  / Bacteria       Iron 51, TIBC Unable to calculate Test Repeated, %sat Unable to calculate Test Repeated      [12-24-22 @ 20:58]  Ferritin 5747      [12-24-22 @ 20:58]  TSH 0.05      [12-24-22 @ 20:58]  Lipid: chol 100, , HDL 9, LDL --      [12-24-22 @ 20:58]    HBsAb <3.0      [12-24-22 @ 20:59]  HBsAb Nonreact      [12-24-22 @ 20:59]  HBsAg Nonreact      [12-24-22 @ 20:59]  HBcAb Nonreact      [12-24-22 @ 20:59]  HCV 0.12, Nonreact      [12-24-22 @ 20:59]  HIV Nonreact      [12-24-22 @ 20:58]  HIV Nonreact      [12-24-22 @ 20:58]    MIRNA: titer Negative, pattern --      [12-24-22 @ 20:59]  Syphilis Screen (Treponema Pallidum Ab) Negative      [12-24-22 @ 20:59]     [facility] DIVISION OF KIDNEY DISEASES AND HYPERTENSION -- FOLLOW UP NOTE  --------------------------------------------------------------------------------  Chief Complaint:  RED    24 hour events/subjective:  Pt had trouble with CVVH line last night.        PAST HISTORY  --------------------------------------------------------------------------------  No significant changes to PMH, PSH, FHx, SHx, unless otherwise noted    ALLERGIES & MEDICATIONS  --------------------------------------------------------------------------------  Allergies    Macrobid (Rash)    Intolerances    Nexium (Stomach Upset)    Standing Inpatient Medications  caspofungin IVPB      caspofungin IVPB 50 milliGRAM(s) IV Intermittent every 24 hours  cefTRIAXone   IVPB 1000 milliGRAM(s) IV Intermittent every 24 hours  chlorhexidine 2% Cloths 1 Application(s) Topical <User Schedule>  CRRT Treatment    <Continuous>  escitalopram 10 milliGRAM(s) Oral daily  folic acid 1 milliGRAM(s) Oral daily  influenza   Vaccine 0.5 milliLiter(s) IntraMuscular once  levETIRAcetam  Solution 750 milliGRAM(s) Enteral Tube two times a day  levothyroxine Injectable 103 MICROGram(s) IV Push at bedtime  midodrine 20 milliGRAM(s) Oral every 8 hours  multivitamin/minerals/iron Oral Solution (CENTRUM) 15 milliLiter(s) Oral daily  norepinephrine Infusion 0.05 MICROgram(s)/kG/Min IV Continuous <Continuous>  pantoprazole   Suspension 40 milliGRAM(s) Oral every 24 hours  Phoxillum Filtration BK 4 / 2.5 5000 milliLiter(s) CRRT <Continuous>  Phoxillum Filtration BK 4 / 2.5 5000 milliLiter(s) CRRT <Continuous>  polyethylene glycol 3350 17 Gram(s) Oral every 12 hours  PrismaSOL Filtration BGK 4 / 2.5 5000 milliLiter(s) CRRT <Continuous>  rifAXIMin 550 milliGRAM(s) Oral every 12 hours  thiamine 100 milliGRAM(s) Enteral Tube daily  vasopressin Infusion 0.03 Unit(s)/Min IV Continuous <Continuous>    PRN Inpatient Medications  artificial  tears Solution 1 Drop(s) Both EYES four times a day PRN  HYDROmorphone  Injectable 0.25 milliGRAM(s) IV Push every 3 hours PRN  sodium chloride 0.65% Nasal 1 Spray(s) Both Nostrils every 3 hours PRN  tetracaine/benzocaine/butamben Spray 1 Spray(s) Topical every 3 hours PRN      REVIEW OF SYSTEMS  --------------------------------------------------------------------------------  Gen: + weakness    All other systems were reviewed and are negative, except as noted.    VITALS/PHYSICAL EXAM  --------------------------------------------------------------------------------  T(C): 36.4 (01-08-23 @ 08:00), Max: 37.4 (01-07-23 @ 16:00)  HR: 98 (01-08-23 @ 12:00) (87 - 120)  BP: 114/59 (01-08-23 @ 08:15) (84/53 - 120/79)  RR: 34 (01-08-23 @ 12:00) (13 - 43)  SpO2: 97% (01-08-23 @ 12:00) (89% - 100%)  Wt(kg): --        01-07-23 @ 07:01  -  01-08-23 @ 07:00  --------------------------------------------------------  IN: 2352.6 mL / OUT: 2044 mL / NET: 308.6 mL    01-08-23 @ 07:01  -  01-08-23 @ 12:37  --------------------------------------------------------  IN: 998.5 mL / OUT: 600 mL / NET: 398.5 mL      Physical Exam:  	Gen: appear ill, awake  	HEENT: PERRL, supple neck, clear oropharynx  	Pulm: CTA B/L  	CV: RRR, S1S2; no rub  	Back: No spinal or CVA tenderness; no sacral edema  	Abd: +BS, soft, + distension   	: No suprapubic tenderness  	UE: Warm, FROM; no edema; no asterixis  	LE: Warm, FROM; no edema  	    LABS/STUDIES  --------------------------------------------------------------------------------              8.3    39.76 >-----------<  42       [01-08-23 @ 09:28]              23.8     136  |  102  |  28  ----------------------------<  180      [01-08-23 @ 09:28]  3.7   |  19  |  1.23        Ca     9.1     [01-08-23 @ 09:28]      Mg     2.2     [01-08-23 @ 09:28]      Phos  4.2     [01-08-23 @ 09:28]    TPro  6.5  /  Alb  3.1  /  TBili  23.5  /  DBili  x   /  AST  125  /  ALT  35  /  AlkPhos  216  [01-08-23 @ 09:28]    PT/INR: PT 24.8 , INR 2.12       [01-08-23 @ 03:20]  PTT: 47.3       [01-08-23 @ 03:20]      Creatinine Trend:  SCr 1.23 [01-08 @ 09:28]  SCr 0.98 [01-08 @ 03:20]  SCr 0.84 [01-07 @ 23:34]  SCr 0.86 [01-07 @ 18:10]  SCr 0.87 [01-07 @ 12:40]              Urinalysis - [01-03-23 @ 18:02]      Color Other / Appearance Turbid / SG SEE NOTE / pH SEE NOTE      Gluc SEE NOTE / Ketone SEE NOTE  / Bili SEE NOTE / Urobili SEE NOTE       Blood SEE NOTE / Protein SEE NOTE / Leuk Est SEE NOTE / Nitrite SEE NOTE      RBC  / WBC  / Hyaline  / Gran  / Sq Epi  / Non Sq Epi  / Bacteria       Iron 51, TIBC Unable to calculate Test Repeated, %sat Unable to calculate Test Repeated      [12-24-22 @ 20:58]  Ferritin 5747      [12-24-22 @ 20:58]  TSH 0.05      [12-24-22 @ 20:58]  Lipid: chol 100, , HDL 9, LDL --      [12-24-22 @ 20:58]    HBsAb <3.0      [12-24-22 @ 20:59]  HBsAb Nonreact      [12-24-22 @ 20:59]  HBsAg Nonreact      [12-24-22 @ 20:59]  HBcAb Nonreact      [12-24-22 @ 20:59]  HCV 0.12, Nonreact      [12-24-22 @ 20:59]  HIV Nonreact      [12-24-22 @ 20:58]  HIV Nonreact      [12-24-22 @ 20:58]    MIRNA: titer Negative, pattern --      [12-24-22 @ 20:59]  Syphilis Screen (Treponema Pallidum Ab) Negative      [12-24-22 @ 20:59]     [facility] DIVISION OF KIDNEY DISEASES AND HYPERTENSION -- FOLLOW UP NOTE  --------------------------------------------------------------------------------  Chief Complaint:  RED    24 hour events/subjective:  Pt had trouble with CVVH line last night.        PAST HISTORY  --------------------------------------------------------------------------------  No significant changes to PMH, PSH, FHx, SHx, unless otherwise noted    ALLERGIES & MEDICATIONS  --------------------------------------------------------------------------------  Allergies    Macrobid (Rash)    Intolerances    Nexium (Stomach Upset)    Standing Inpatient Medications  caspofungin IVPB      caspofungin IVPB 50 milliGRAM(s) IV Intermittent every 24 hours  cefTRIAXone   IVPB 1000 milliGRAM(s) IV Intermittent every 24 hours  chlorhexidine 2% Cloths 1 Application(s) Topical <User Schedule>  CRRT Treatment    <Continuous>  escitalopram 10 milliGRAM(s) Oral daily  folic acid 1 milliGRAM(s) Oral daily  influenza   Vaccine 0.5 milliLiter(s) IntraMuscular once  levETIRAcetam  Solution 750 milliGRAM(s) Enteral Tube two times a day  levothyroxine Injectable 103 MICROGram(s) IV Push at bedtime  midodrine 20 milliGRAM(s) Oral every 8 hours  multivitamin/minerals/iron Oral Solution (CENTRUM) 15 milliLiter(s) Oral daily  norepinephrine Infusion 0.05 MICROgram(s)/kG/Min IV Continuous <Continuous>  pantoprazole   Suspension 40 milliGRAM(s) Oral every 24 hours  Phoxillum Filtration BK 4 / 2.5 5000 milliLiter(s) CRRT <Continuous>  Phoxillum Filtration BK 4 / 2.5 5000 milliLiter(s) CRRT <Continuous>  polyethylene glycol 3350 17 Gram(s) Oral every 12 hours  PrismaSOL Filtration BGK 4 / 2.5 5000 milliLiter(s) CRRT <Continuous>  rifAXIMin 550 milliGRAM(s) Oral every 12 hours  thiamine 100 milliGRAM(s) Enteral Tube daily  vasopressin Infusion 0.03 Unit(s)/Min IV Continuous <Continuous>    PRN Inpatient Medications  artificial  tears Solution 1 Drop(s) Both EYES four times a day PRN  HYDROmorphone  Injectable 0.25 milliGRAM(s) IV Push every 3 hours PRN  sodium chloride 0.65% Nasal 1 Spray(s) Both Nostrils every 3 hours PRN  tetracaine/benzocaine/butamben Spray 1 Spray(s) Topical every 3 hours PRN      REVIEW OF SYSTEMS  --------------------------------------------------------------------------------  Gen: + weakness    All other systems were reviewed and are negative, except as noted.    VITALS/PHYSICAL EXAM  --------------------------------------------------------------------------------  T(C): 36.4 (01-08-23 @ 08:00), Max: 37.4 (01-07-23 @ 16:00)  HR: 98 (01-08-23 @ 12:00) (87 - 120)  BP: 114/59 (01-08-23 @ 08:15) (84/53 - 120/79)  RR: 34 (01-08-23 @ 12:00) (13 - 43)  SpO2: 97% (01-08-23 @ 12:00) (89% - 100%)  Wt(kg): --        01-07-23 @ 07:01  -  01-08-23 @ 07:00  --------------------------------------------------------  IN: 2352.6 mL / OUT: 2044 mL / NET: 308.6 mL    01-08-23 @ 07:01  -  01-08-23 @ 12:37  --------------------------------------------------------  IN: 998.5 mL / OUT: 600 mL / NET: 398.5 mL      Physical Exam:  	Gen: appear ill, awake  	HEENT: PERRL, supple neck, clear oropharynx  	Pulm: CTA B/L  	CV: RRR, S1S2; no rub  	Back: No spinal or CVA tenderness; no sacral edema  	Abd: +BS, soft, + distension   	: No suprapubic tenderness  	UE: Warm, FROM; no edema; no asterixis  	LE: Warm, FROM; no edema  	    LABS/STUDIES  --------------------------------------------------------------------------------              8.3    39.76 >-----------<  42       [01-08-23 @ 09:28]              23.8     136  |  102  |  28  ----------------------------<  180      [01-08-23 @ 09:28]  3.7   |  19  |  1.23        Ca     9.1     [01-08-23 @ 09:28]      Mg     2.2     [01-08-23 @ 09:28]      Phos  4.2     [01-08-23 @ 09:28]    TPro  6.5  /  Alb  3.1  /  TBili  23.5  /  DBili  x   /  AST  125  /  ALT  35  /  AlkPhos  216  [01-08-23 @ 09:28]    PT/INR: PT 24.8 , INR 2.12       [01-08-23 @ 03:20]  PTT: 47.3       [01-08-23 @ 03:20]      Creatinine Trend:  SCr 1.23 [01-08 @ 09:28]  SCr 0.98 [01-08 @ 03:20]  SCr 0.84 [01-07 @ 23:34]  SCr 0.86 [01-07 @ 18:10]  SCr 0.87 [01-07 @ 12:40]              Urinalysis - [01-03-23 @ 18:02]      Color Other / Appearance Turbid / SG SEE NOTE / pH SEE NOTE      Gluc SEE NOTE / Ketone SEE NOTE  / Bili SEE NOTE / Urobili SEE NOTE       Blood SEE NOTE / Protein SEE NOTE / Leuk Est SEE NOTE / Nitrite SEE NOTE      RBC  / WBC  / Hyaline  / Gran  / Sq Epi  / Non Sq Epi  / Bacteria       Iron 51, TIBC Unable to calculate Test Repeated, %sat Unable to calculate Test Repeated      [12-24-22 @ 20:58]  Ferritin 5747      [12-24-22 @ 20:58]  TSH 0.05      [12-24-22 @ 20:58]  Lipid: chol 100, , HDL 9, LDL --      [12-24-22 @ 20:58]    HBsAb <3.0      [12-24-22 @ 20:59]  HBsAb Nonreact      [12-24-22 @ 20:59]  HBsAg Nonreact      [12-24-22 @ 20:59]  HBcAb Nonreact      [12-24-22 @ 20:59]  HCV 0.12, Nonreact      [12-24-22 @ 20:59]  HIV Nonreact      [12-24-22 @ 20:58]  HIV Nonreact      [12-24-22 @ 20:58]    MIRNA: titer Negative, pattern --      [12-24-22 @ 20:59]  Syphilis Screen (Treponema Pallidum Ab) Negative      [12-24-22 @ 20:59]

## 2023-01-08 NOTE — CHART NOTE - NSCHARTNOTEFT_GEN_A_CORE
Our team was notified by SICU that Ms. Cerda is having massive GI bleeding. Transplant PA Yifan Ervinose at bedside and plan of care discussed with him and SICU attending Dr. Devin Tuttle.    She has had >1L estimated red blood per rectum and associated hemodynamic instability with markedly increased norepinephrine requirement, now only partially improved after 3 units PRBCs. Rectal tube is no longer in place and this may be a rectal ulcer or, alternatively, other lower GI source of bleeding such as ischemic colitis. However, also cannot exclude a brisk upper GI bleeding.    Recommend octreotide 50 mcg iv bolus x1 followed by 50 mcg/hr infusion.  Recommend pantoprazole 80 mg iv bolus x1 followed by 8 mg/hr infusion.  Continue current antimicrobial therapies.  Continue PRBC transfusion guided by hemodynamics and for goal Hb >7.  Continue TEG-based transfusion of other blood products.    Recommend ETT intubation and then we will proceed with urgent bedside EGD and unprepped flexible sigmoidoscopy tonight after appropriate resuscitation by SICU team. Please call GI fellow Dr. Netta Garcia once patient is undergoing ETT intubation so that we can mobilize our team to prepare for her scopes tonight.    Javier Goldstein M.D., Ph.D.  Transplant Hepatology

## 2023-01-08 NOTE — PROGRESS NOTE ADULT - SUBJECTIVE AND OBJECTIVE BOX
24 HOUR EVENTS:  - Remains on pressors Levo gtt 0.13 and Vaso gtt 0.03   - WBC remains critically high, stool sent for CDiff however negative  - Remains net even on CRRT    SUBJECTIVE/ROS:  [ X ] A ten-point review of systems was otherwise negative except as noted.  [ ] Due to altered mental status/intubation, subjective information were not able to be obtained from the patient. History was obtained, to the extent possible, from review of the chart and collateral sources of information.      NEURO  Exam: AOx1, restless. Follows some commands.   Meds: escitalopram 10 milliGRAM(s) Oral daily  HYDROmorphone  Injectable 0.25 milliGRAM(s) IV Push every 3 hours PRN Severe Pain (7 - 10)  levETIRAcetam  Solution 750 milliGRAM(s) Enteral Tube two times a day  [x] Adequacy of sedation and pain control has been assessed and adjusted      RESPIRATORY  RR: 16 (01-08-23 @ 00:15) (12 - 33)  SpO2: 94% (01-08-23 @ 00:15) (93% - 100%)  Wt(kg): --  Exam: CTA b/l. No murmurs, rubs, gallops appreciated.   Mechanical Ventilation:   ABG - ( 07 Jan 2023 23:22 )  pH: 7.41  /  pCO2: 37    /  pO2: 80    / HCO3: 24    / Base Excess: -1.0  /  SaO2: 98.1    Lactate: x      [ ] Extubation Readiness Assessed  Meds:       CARDIOVASCULAR  HR: 97 (01-08-23 @ 00:15) (87 - 108)  BP: 93/51 (01-07-23 @ 19:45) (93/51 - 93/51)  BP(mean): 69 (01-07-23 @ 19:45) (69 - 69)  ABP: 100/63 (01-08-23 @ 00:15) (87/52 - 116/55)  ABP(mean): 78 (01-08-23 @ 00:15) (65 - 94)  Wt(kg): --  CVP(cm H2O): --  Exam: S1S2. No murmurs, rubs, gallops appreciated.  Cardiac Rhythm: NSR rate 96  Meds: midodrine 20 milliGRAM(s) Oral every 8 hours  norepinephrine Infusion 0.05 MICROgram(s)/kG/Min IV Continuous <Continuous>        GI/NUTRITION  Exam: Soft, non-distended, non-tender.   Diet: Tube feeds, Nepro at goal  Meds: pantoprazole   Suspension 40 milliGRAM(s) Oral every 24 hours  polyethylene glycol 3350 17 Gram(s) Oral every 12 hours      GENITOURINARY  I&O's Detail    01-06 @ 07:01  -  01-07 @ 07:00  --------------------------------------------------------  IN:    Enteral Tube Flush: 330 mL    IV PiggyBack: 300 mL    Nepro with Carb Steady: 1200 mL    Norepinephrine: 305.9 mL    Oral Fluid: 380 mL    Vasopressin: 108 mL  Total IN: 2623.9 mL    OUT:    Other (mL): 1711 mL    Rectal Tube (mL): 1100 mL  Total OUT: 2811 mL    Total NET: -187.1 mL      01-07 @ 07:01 - 01-08 @ 01:03  --------------------------------------------------------  IN:    Enteral Tube Flush: 460 mL    Nepro with Carb Steady: 850 mL    Norepinephrine: 223.2 mL    Vasopressin: 76.5 mL  Total IN: 1609.7 mL    OUT:    Other (mL): 1644 mL    Rectal Tube (mL): 250 mL  Total OUT: 1894 mL    Total NET: -284.3 mL          01-07    137  |  102  |  18  ----------------------------<  139<H>  4.0   |  22  |  0.84    Ca    9.2      07 Jan 2023 23:34  Phos  3.6     01-07  Mg     2.3     01-07    TPro  6.7  /  Alb  3.4  /  TBili  24.5<H>  /  DBili  x   /  AST  135<H>  /  ALT  33  /  AlkPhos  227<H>  01-07    [ ] Monroe catheter, indication: N/A  Meds: folic acid 1 milliGRAM(s) Oral daily  multivitamin/minerals/iron Oral Solution (CENTRUM) 15 milliLiter(s) Oral daily  thiamine 100 milliGRAM(s) Enteral Tube daily        HEMATOLOGIC  Meds:   [x] VTE Prophylaxis                        8.8    41.22 )-----------( 45       ( 07 Jan 2023 23:34 )             25.4     PT/INR - ( 07 Jan 2023 05:54 )   PT: 24.9 sec;   INR: 2.15 ratio         PTT - ( 07 Jan 2023 05:54 )  PTT:49.0 sec  Transfusion     [ ] PRBC   [ ] Platelets   [ ] FFP   [ ] Cryoprecipitate      INFECTIOUS DISEASES  T(C): 36.6 (01-07-23 @ 23:00), Max: 37.4 (01-07-23 @ 16:00)  Wt(kg): --  WBC Count: 41.22 K/uL (01-07 @ 23:34)  WBC Count: 40.51 K/uL (01-07 @ 18:10)  WBC Count: 41.40 K/uL (01-07 @ 12:40)  WBC Count: 41.46 K/uL (01-07 @ 05:54)    Recent Cultures:  Specimen Source: Peritoneal Peritoneal Fluid, 01-04 @ 16:28; Results   No growth; Gram Stain:   polymorphonuclear leukocytes seen  No organisms seen  by cytocentrifuge; Organism: --  Specimen Source: Catheterized Catheterized, 01-03 @ 18:30; Results   No growth; Gram Stain: --; Organism: --    Meds: caspofungin IVPB      caspofungin IVPB 50 milliGRAM(s) IV Intermittent every 24 hours  cefTRIAXone   IVPB 1000 milliGRAM(s) IV Intermittent every 24 hours  influenza   Vaccine 0.5 milliLiter(s) IntraMuscular once  rifAXIMin 550 milliGRAM(s) Oral every 12 hours        ENDOCRINE  Capillary Blood Glucose    Meds: levothyroxine Injectable 103 MICROGram(s) IV Push at bedtime  vasopressin Infusion 0.03 Unit(s)/Min IV Continuous <Continuous>        ACCESS DEVICES:  [ X ] Peripheral IV  [ X ] Central Venous Line	[ X ] R	[  ] L	[ X ] IJ	[ ] Fem	[ ] SC	Placed: 1/4  [ X ] Arterial Line		[ X ] R	[ ] L	[ ] Fem	[ ] Rad	[ ] Ax	Placed: 1/4  [ ] PICC:					[ ] Mediport  [ ] Urinary Catheter, Date Placed:   [ ] Necessity of urinary, arterial, and venous catheters discussed    OTHER MEDICATIONS:  chlorhexidine 2% Cloths 1 Application(s) Topical <User Schedule>  CRRT Treatment    <Continuous>  Phoxillum Filtration BK 4 / 2.5 5000 milliLiter(s) CRRT <Continuous>  Phoxillum Filtration BK 4 / 2.5 5000 milliLiter(s) CRRT <Continuous>  PrismaSOL Filtration BGK 4 / 2.5 5000 milliLiter(s) CRRT <Continuous>  sodium chloride 0.65% Nasal 1 Spray(s) Both Nostrils every 3 hours PRN  tetracaine/benzocaine/butamben Spray 1 Spray(s) Topical every 3 hours PRN      CODE STATUS:     IMAGING:

## 2023-01-08 NOTE — PROGRESS NOTE ADULT - NUTRITIONAL ASSESSMENT
This patient has been assessed with a concern for Malnutrition and has been determined to have a diagnosis/diagnoses of Severe protein-calorie malnutrition.    This patient is being managed with:   Diet NPO with Tube Feed-  Tube Feeding Modality: Nasogastric  Nepro with Carb Steady (NEPRORTH)  Total Volume for 24 Hours (mL): 1200  Continuous  Starting Tube Feed Rate {mL per Hour}: 50  Until Goal Tube Feed Rate (mL per Hour): 50  Tube Feed Duration (in Hours): 24  Tube Feed Start Time: 13:00  Entered: Akbar  3 2023 10:40AM    
This patient has been assessed with a concern for Malnutrition and has been determined to have a diagnosis/diagnoses of Severe protein-calorie malnutrition.    This patient is being managed with:   Diet NPO with Tube Feed-  Tube Feeding Modality: Nasogastric  Nepro with Carb Steady (NEPRORTH)  Total Volume for 24 Hours (mL): 1200  Continuous  Starting Tube Feed Rate {mL per Hour}: 50  Until Goal Tube Feed Rate (mL per Hour): 50  Tube Feed Duration (in Hours): 24  Tube Feed Start Time: 13:00  Entered: Akbar  3 2023 10:40AM    
This patient has been assessed with a concern for Malnutrition and has been determined to have a diagnosis/diagnoses of Severe protein-calorie malnutrition.    This patient is being managed with:   Diet NPO with Tube Feed-  Tube Feeding Modality: Nasogastric  Nepro with Carb Steady (NEPRORTH)  Total Volume for 24 Hours (mL): 840  Continuous  Starting Tube Feed Rate {mL per Hour}: 20  Increase Tube Feed Rate by (mL): 10     Every 2 hours  Until Goal Tube Feed Rate (mL per Hour): 35  Tube Feed Duration (in Hours): 24  Tube Feed Start Time: 19:16  Entered: Dec 30 2022 10:28AM    
This patient has been assessed with a concern for Malnutrition and has been determined to have a diagnosis/diagnoses of Severe protein-calorie malnutrition.    This patient is being managed with:   Diet NPO with Tube Feed-  Tube Feeding Modality: Nasogastric  Nepro with Carb Steady (NEPRORTH)  Total Volume for 24 Hours (mL): 840  Continuous  Starting Tube Feed Rate {mL per Hour}: 20  Increase Tube Feed Rate by (mL): 10     Every 2 hours  Until Goal Tube Feed Rate (mL per Hour): 35  Tube Feed Duration (in Hours): 24  Tube Feed Start Time: 19:16  Entered: Dec 30 2022 10:28AM    
This patient has been assessed with a concern for Malnutrition and has been determined to have a diagnosis/diagnoses of Severe protein-calorie malnutrition.    This patient is being managed with:   Diet NPO with Tube Feed-  Tube Feeding Modality: Nasogastric  Nepro with Carb Steady (NEPRORTH)  Total Volume for 24 Hours (mL): 1200  Continuous  Starting Tube Feed Rate {mL per Hour}: 50  Until Goal Tube Feed Rate (mL per Hour): 50  Tube Feed Duration (in Hours): 24  Tube Feed Start Time: 13:00  Entered: Akbar  3 2023 10:40AM    
This patient has been assessed with a concern for Malnutrition and has been determined to have a diagnosis/diagnoses of Severe protein-calorie malnutrition.    This patient is being managed with:   Diet NPO with Tube Feed-  Tube Feeding Modality: Nasogastric  Nepro with Carb Steady (NEPRORTH)  Total Volume for 24 Hours (mL): 840  Continuous  Starting Tube Feed Rate {mL per Hour}: 10  Increase Tube Feed Rate by (mL): 10     Every 4 hours  Until Goal Tube Feed Rate (mL per Hour): 35  Tube Feed Duration (in Hours): 24  Tube Feed Start Time: 02:30  Entered: Jan 2 2023  2:29AM    
This patient has been assessed with a concern for Malnutrition and has been determined to have a diagnosis/diagnoses of Severe protein-calorie malnutrition.    This patient is being managed with:   Diet NPO with Tube Feed-  Tube Feeding Modality: Nasogastric  Nepro with Carb Steady (NEPRORTH)  Total Volume for 24 Hours (mL): 840  Continuous  Starting Tube Feed Rate {mL per Hour}: 10  Increase Tube Feed Rate by (mL): 10     Every 4 hours  Until Goal Tube Feed Rate (mL per Hour): 35  Tube Feed Duration (in Hours): 24  Tube Feed Start Time: 02:30  Entered: Jan 2 2023  2:29AM    
This patient has been assessed with a concern for Malnutrition and has been determined to have a diagnosis/diagnoses of Severe protein-calorie malnutrition.    This patient is being managed with:   Diet NPO with Tube Feed-  Tube Feeding Modality: Nasogastric  Nepro with Carb Steady (NEPRORTH)  Total Volume for 24 Hours (mL): 840  Continuous  Starting Tube Feed Rate {mL per Hour}: 20  Increase Tube Feed Rate by (mL): 10     Every 2 hours  Until Goal Tube Feed Rate (mL per Hour): 35  Tube Feed Duration (in Hours): 24  Tube Feed Start Time: 19:16  Entered: Dec 30 2022 10:28AM    
This patient has been assessed with a concern for Malnutrition and has been determined to have a diagnosis/diagnoses of Severe protein-calorie malnutrition.    This patient is being managed with:   Diet NPO with Tube Feed-  Tube Feeding Modality: Nasogastric  Nepro with Carb Steady (NEPRORTH)  Total Volume for 24 Hours (mL): 840  Continuous  Starting Tube Feed Rate {mL per Hour}: 20  Increase Tube Feed Rate by (mL): 10     Every 2 hours  Until Goal Tube Feed Rate (mL per Hour): 35  Tube Feed Duration (in Hours): 24  Tube Feed Start Time: 19:16  Entered: Dec 30 2022 10:28AM    
This patient has been assessed with a concern for Malnutrition and has been determined to have a diagnosis/diagnoses of Severe protein-calorie malnutrition.    This patient is being managed with:   Diet NPO with Tube Feed-  Tube Feeding Modality: Nasogastric  Nepro with Carb Steady (NEPRORTH)  Continuous  Starting Tube Feed Rate {mL per Hour}: 50  Until Goal Tube Feed Rate (mL per Hour): 50  Tube Feed Duration (in Hours): 24  Tube Feed Start Time: 19:16  Entered: Dec 26 2022  7:16PM    
This patient has been assessed with a concern for Malnutrition and has been determined to have a diagnosis/diagnoses of Severe protein-calorie malnutrition.    This patient is being managed with:   Diet NPO with Tube Feed-  Tube Feeding Modality: Nasogastric  Nepro with Carb Steady (NEPRORTH)  Total Volume for 24 Hours (mL): 1200  Continuous  Starting Tube Feed Rate {mL per Hour}: 50  Until Goal Tube Feed Rate (mL per Hour): 50  Tube Feed Duration (in Hours): 24  Tube Feed Start Time: 13:00  Entered: Akbar  3 2023 10:40AM    
This patient has been assessed with a concern for Malnutrition and has been determined to have a diagnosis/diagnoses of Severe protein-calorie malnutrition.    This patient is being managed with:   Diet NPO with Tube Feed-  Tube Feeding Modality: Nasogastric  Nepro with Carb Steady (NEPRORTH)  Total Volume for 24 Hours (mL): 840  Continuous  Starting Tube Feed Rate {mL per Hour}: 20  Increase Tube Feed Rate by (mL): 10     Every 2 hours  Until Goal Tube Feed Rate (mL per Hour): 35  Tube Feed Duration (in Hours): 24  Tube Feed Start Time: 19:16  Entered: Dec 30 2022 10:28AM    
This patient has been assessed with a concern for Malnutrition and has been determined to have a diagnosis/diagnoses of Severe protein-calorie malnutrition.    This patient is being managed with:   Diet NPO with Tube Feed-  Tube Feeding Modality: Nasogastric  Nepro with Carb Steady (NEPRORTH)  Continuous  Starting Tube Feed Rate {mL per Hour}: 50  Until Goal Tube Feed Rate (mL per Hour): 50  Tube Feed Duration (in Hours): 24  Tube Feed Start Time: 19:16  Entered: Dec 26 2022  7:16PM    
This patient has been assessed with a concern for Malnutrition and has been determined to have a diagnosis/diagnoses of Severe protein-calorie malnutrition.    This patient is being managed with:   Diet NPO-  Except Medications  Entered: Dec 27 2022 12:10PM    
This patient has been assessed with a concern for Malnutrition and has been determined to have a diagnosis/diagnoses of Severe protein-calorie malnutrition.    This patient is being managed with:   Diet NPO with Tube Feed-  Tube Feeding Modality: Nasogastric  Nepro with Carb Steady (NEPRORTH)  Total Volume for 24 Hours (mL): 1200  Continuous  Starting Tube Feed Rate {mL per Hour}: 50  Until Goal Tube Feed Rate (mL per Hour): 50  Tube Feed Duration (in Hours): 24  Tube Feed Start Time: 13:00  Entered: Akbar  3 2023 10:40AM    
This patient has been assessed with a concern for Malnutrition and has been determined to have a diagnosis/diagnoses of Severe protein-calorie malnutrition.    This patient is being managed with:   Diet NPO-  Except Medications  Entered: Dec 27 2022 12:10PM    
This patient has been assessed with a concern for Malnutrition and has been determined to have a diagnosis/diagnoses of Severe protein-calorie malnutrition.    This patient is being managed with:   Diet NPO-  Except Medications  Entered: Dec 27 2022 12:10PM    
This patient has been assessed with a concern for Malnutrition and has been determined to have a diagnosis/diagnoses of Severe protein-calorie malnutrition.    This patient is being managed with:   Diet NPO with Tube Feed-  Tube Feeding Modality: Nasogastric  Nepro with Carb Steady (NEPRORTH)  Total Volume for 24 Hours (mL): 480  Continuous  Starting Tube Feed Rate {mL per Hour}: 20  Until Goal Tube Feed Rate (mL per Hour): 20  Tube Feed Duration (in Hours): 24  Tube Feed Start Time: 19:16  Entered: Dec 29 2022  9:40PM    
This patient has been assessed with a concern for Malnutrition and has been determined to have a diagnosis/diagnoses of Severe protein-calorie malnutrition.    This patient is being managed with:   Diet NPO with Tube Feed-  Tube Feeding Modality: Nasogastric  Nepro with Carb Steady (NEPRORTH)  Total Volume for 24 Hours (mL): 1200  Continuous  Starting Tube Feed Rate {mL per Hour}: 50  Until Goal Tube Feed Rate (mL per Hour): 50  Tube Feed Duration (in Hours): 24  Tube Feed Start Time: 13:00  Entered: Akbar  3 2023 10:40AM    
This patient has been assessed with a concern for Malnutrition and has been determined to have a diagnosis/diagnoses of Severe protein-calorie malnutrition.    This patient is being managed with:   Diet NPO-  Except Medications  Entered: Dec 27 2022 12:10PM

## 2023-01-08 NOTE — CONSULT NOTE ADULT - CONSULT REQUESTED DATE/TIME
27-Dec-2022 06:00
08-Jan-2023 22:31
21-Dec-2022 00:29
20-Dec-2022 22:47
21-Dec-2022 17:01
05-Jan-2023 09:28
21-Dec-2022 13:21
21-Dec-2022 15:16

## 2023-01-08 NOTE — CONSULT NOTE ADULT - PROVIDER SPECIALTY LIST ADULT
Intervent Radiology
Rehab Medicine
SICU
Transplant Hepatology
Transplant ID
Cardiology
Intervent Radiology
Nephrology

## 2023-01-08 NOTE — PROGRESS NOTE ADULT - NS ATTEND OPT1 GEN_ALL_CORE

## 2023-01-08 NOTE — PROGRESS NOTE ADULT - PROBLEM/PLAN-1
DISPLAY PLAN FREE TEXT
redness and swelling noted to right temple
138.2

## 2023-01-08 NOTE — PROGRESS NOTE ADULT - PROBLEM SELECTOR PROBLEM 3
Hypophosphatemia
Hyperphosphatemia
Hyperphosphatemia
Hypophosphatemia
Chronic liver disease and cirrhosis
Hypophosphatemia

## 2023-01-08 NOTE — PROGRESS NOTE ADULT - PROBLEM SELECTOR PROBLEM 2
Metabolic acidosis
Oliguria
Metabolic acidosis

## 2023-01-08 NOTE — PROGRESS NOTE ADULT - PROBLEM SELECTOR PLAN 1
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 Upon review of [place], SCr was 0.78 on 12/6/22, increased to 5.31 on admission (12/20), and improved with CRRT. Pt remains anuric. Pt with likely HRS vs ATN vs bile cast nephropathy. Will cont CRRT for now.   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 Upon review of [place], SCr was 0.78 on 12/6/22, increased to 5.31 on admission (12/20), and improved with CRRT. Pt remains anuric. Pt with likely HRS vs ATN vs bile cast nephropathy. Will cont CRRT for now.   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 Upon review of [place]/[facility], SCr was 0.78 on 12/6/22, increased to 5.31 on admission (12/20), and improved with CRRT. Pt remains anuric. Pt with likely HRS vs ATN vs bile cast nephropathy. Will cont CRRT for now.   Liver transplant on hold given infection

## 2023-01-08 NOTE — PROGRESS NOTE ADULT - ASSESSMENT
63 yo F with h/o decompensated ETOH cirrhosis with ascites, thyroid cancer (s/p total thyroidectomy, RTX, and radioactive iodide), HTN, ventrical neoplasm who was initially admitted to  12/19 with new onset seizures and transferred to [facility] 12/20 for liver transplant evaluation. Pt being seen for RED requiring CRRT since 12/20 63 yo F with h/o decompensated ETOH cirrhosis with ascites, thyroid cancer (s/p total thyroidectomy, RTX, and radioactive iodide), HTN, ventrical neoplasm who was initially admitted to  12/19 with new onset seizures and transferred to [facility] 12/20 for liver transplant evaluation. Pt being seen for RED requiring CRRT since 12/20 61 yo F with h/o decompensated ETOH cirrhosis with ascites, thyroid cancer (s/p total thyroidectomy, RTX, and radioactive iodide), HTN, ventrical neoplasm who was initially admitted to  12/19 with new onset seizures and transferred to [facility] 12/20 for liver transplant evaluation. Pt being seen for RED requiring CRRT since 12/20

## 2023-01-08 NOTE — PROGRESS NOTE ADULT - PROBLEM SELECTOR PROBLEM 1
RED (acute kidney injury)

## 2023-01-08 NOTE — PRE PROCEDURE NOTE - PRE PROCEDURE EVALUATION
Attending Physician: Dr. Goldstein                          Procedure: upper endoscopy, flex sig, colonoscopy     Indication for Procedure: BRBPR  ________________________________________________________  PAST MEDICAL & SURGICAL HISTORY:  HTN (hypertension)  off medication for over one year--states BP has been normal      UTI (urinary tract infection)  currently being treated--will complete antibiotics 3/8/2022      Intracranial tumor      GERD (gastroesophageal reflux disease)      Eczema      Thyroid cancer  surgery. radiation, Radioactive iodine      COVID-19 virus infection  11/2021--recovered at home      H/O total thyroidectomy  age 21&#x27;s for Thyroid cancer, postop complication arterial bleed, second surgery      History of tonsillectomy  age 5      History of appendectomy  age 5        ALLERGIES:  Macrobid (Rash)  Nexium (Stomach Upset)    HOME MEDICATIONS:  cholecalciferol 25 mcg (1000 intl units) oral tablet: 1 tab(s) orally once a day  folic acid 1 mg oral tablet: 1 tab(s) orally once a day  levothyroxine 137 mcg (0.137 mg) oral tablet: 1 tab(s) orally once a day  Lexapro 10 mg oral tablet: 1 tab(s) orally once a day  nystatin 100,000 units/mL oral suspension: 5 milliliter(s) orally 4 times a day  pantoprazole 40 mg oral delayed release tablet: 1 tab(s) orally once a day (at bedtime)  Sodium Chloride 1 g oral tablet: 1 gram(s) orally once a day  thiamine 100 mg oral tablet: 1 tab(s) orally once a day  Vitamin B-100 oral tablet: 1 tab(s) orally once a day    AICD/PPM: [ ] yes   [X] no    PERTINENT LAB DATA:                        5.5    20.20 )-----------( 75       ( 08 Jan 2023 22:23 )             16.2     01-08    141  |  108  |  25<H>  ----------------------------<  163<H>  3.3<L>   |  17<L>  |  1.15    Ca    9.9      08 Jan 2023 22:23  Phos  5.0     01-08  Mg     1.8     01-08    TPro  5.1<L>  /  Alb  2.7<L>  /  TBili  10.3<H>  /  DBili  x   /  AST  60<H>  /  ALT  24  /  AlkPhos  101  01-08    PT/INR - ( 08 Jan 2023 22:23 )   PT: 18.4 sec;   INR: 1.59 ratio         PTT - ( 08 Jan 2023 22:23 )  PTT:38.4 sec            PHYSICAL EXAMINATION:    T(C): 36.5  HR: 120  BP: 81/50  RR: 19  SpO2: 100%    see chart         COMMENTS:    The patient is a suitable candidate for the planned procedure unless box checked [ ]  No, explain:

## 2023-01-08 NOTE — PROGRESS NOTE ADULT - ASSESSMENT
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   Transferred from [facility] 12/20 for further management of acute liver failure and liver transplant evaluation 2/2 known ETOH Cirrhosis.     Decompensated ETOH Cirrhosis  - Wean off pressors as able, continue Midodrine  - on CVVH, resumed 1/5  - ID: Leukocytosis. 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   Transferred from [facility] 12/20 for further management of acute liver failure and liver transplant evaluation 2/2 known ETOH Cirrhosis.     Decompensated ETOH Cirrhosis  - Wean off pressors as able, continue Midodrine  - on CVVH, resumed 1/5  - ID: Leukocytosis. 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   Transferred from [facility] 12/20 for further management of acute liver failure and liver transplant evaluation 2/2 known ETOH Cirrhosis.     Decompensated ETOH Cirrhosis  - Wean off pressors as able, continue Midodrine  - on CVVH, resumed 1/5  - ID: Leukocytosis. Continue anbx (caspo/CTX), c diff neg  - HE: continue Miralax, Rifaximin   - Continue TF at goal  - on Keppra for seizures, (no activity since admission)  - Continue excellent ICU care

## 2023-01-08 NOTE — CONSULT NOTE ADULT - SUBJECTIVE AND OBJECTIVE BOX
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   Transferred from [facility] 12/20 for further management of acute liver failure and liver transplant evaluation 2/2 known ETOH Cirrhosis.    Allergies: Macrobid (Rash)    Medications (Abx/Cardiac/Anticoagulation/Blood Products)    caspofungin IVPB: 260 mL/Hr IV Intermittent (01-08 @ 09:16)  cefTRIAXone   IVPB: 100 mL/Hr IV Intermittent (01-08 @ 05:33)  midodrine: 20 milliGRAM(s) Oral (01-08 @ 14:34)  norepinephrine Infusion: 6.21 mL/Hr IV Continuous (01-08 @ 09:18)  rifAXIMin: 550 milliGRAM(s) Oral (01-08 @ 17:06)  vancomycin    Solution: 125 milliGRAM(s) Oral (01-07 @ 12:56)    Data:    T(C): 36.5  HR: 102  BP: 81/50  RR: 19  SpO2: 93%    LABS:                          7.3    37.40 )-----------( 38       ( 08 Jan 2023 20:02 )             20.9     01-08    136  |  103  |  28<H>  ----------------------------<  138<H>  3.6   |  19<L>  |  1.21    Ca    9.2      08 Jan 2023 20:02  Phos  4.0     01-08  Mg     2.2     01-08    TPro  6.4  /  Alb  3.3  /  TBili  21.3<H>  /  DBili  x   /  AST  105<H>  /  ALT  35  /  AlkPhos  183<H>  01-08    PT/INR - ( 08 Jan 2023 03:20 )   PT: 24.8 sec;   INR: 2.12 ratio         PTT - ( 08 Jan 2023 03:20 )  PTT:47.3 sec    Radiology: no recent imaging    Assessment/Plan: 61 yo F with cirrhosis with massive bleeding of unclear source but may be rectal in origin.     -- No imaging has been performed as patient is too unstable to be moved currently  -- Plans for bedside scope by GI  -- conservative management - transfuse PRN  -- INR elevated to 2.1  -- Patient has not been on AC    Power Solomon MD   Interventional Radiology PGY 4  Available on [employer] TEAMS    For EMERGENT inquiries/questions:  IR Pager ([facility]): [phone]  IR Pager ([facility]): [phone] ; [phone]    For non-emergent consults/questions:   Please place a sunrise order "Consult- Interventional Radiology" with an appropriate callback number    For questions about scheduling during appropriate work hours, call IR :  [facility]: [phone]  [facility]: [phone]    For outpatient IR booking:  [facility]: [phone]  [facility]: [phone] Interventional Radiology    HPI: 62 y.o Hx significant for remote AUD, h/o thyroid cancer in her 20s s/p total thyroidectomy + RTX + radioactive iodide, HTN, 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   Transferred from [facility] 12/20 for further management of acute liver failure and liver transplant evaluation 2/2 known ETOH Cirrhosis.    Allergies: Macrobid (Rash)    Medications (Abx/Cardiac/Anticoagulation/Blood Products)    caspofungin IVPB: 260 mL/Hr IV Intermittent (01-08 @ 09:16)  cefTRIAXone   IVPB: 100 mL/Hr IV Intermittent (01-08 @ 05:33)  midodrine: 20 milliGRAM(s) Oral (01-08 @ 14:34)  norepinephrine Infusion: 6.21 mL/Hr IV Continuous (01-08 @ 09:18)  rifAXIMin: 550 milliGRAM(s) Oral (01-08 @ 17:06)  vancomycin    Solution: 125 milliGRAM(s) Oral (01-07 @ 12:56)    Data:    T(C): 36.5  HR: 102  BP: 81/50  RR: 19  SpO2: 93%    LABS:                          7.3    37.40 )-----------( 38       ( 08 Jan 2023 20:02 )             20.9     01-08    136  |  103  |  28<H>  ----------------------------<  138<H>  3.6   |  19<L>  |  1.21    Ca    9.2      08 Jan 2023 20:02  Phos  4.0     01-08  Mg     2.2     01-08    TPro  6.4  /  Alb  3.3  /  TBili  21.3<H>  /  DBili  x   /  AST  105<H>  /  ALT  35  /  AlkPhos  183<H>  01-08    PT/INR - ( 08 Jan 2023 03:20 )   PT: 24.8 sec;   INR: 2.12 ratio         PTT - ( 08 Jan 2023 03:20 )  PTT:47.3 sec    Radiology: no recent imaging    Assessment/Plan: 61 yo F with cirrhosis with massive bleeding of unclear source but may be rectal in origin.     -- No imaging has been performed as patient is too unstable to be moved currently  -- Plans for bedside scope by GI  -- conservative management - transfuse PRN  -- INR elevated to 2.1  -- Patient has not been on AC    Power Solomon MD   Interventional Radiology PGY 4  Available on [employer] TEAMS    For EMERGENT inquiries/questions:  IR Pager ([facility]): [phone]  IR Pager ([facility]): [phone] ; [phone]    For non-emergent consults/questions:   Please place a sunrise order "Consult- Interventional Radiology" with an appropriate callback number    For questions about scheduling during appropriate work hours, call IR :  [facility]: [phone]  [facility]: [phone]    For outpatient IR booking:  [facility]: [phone]  [facility]: [phone] Interventional Radiology    HPI: 62 y.o Hx significant for remote AUD, h/o thyroid cancer in her 20s s/p total thyroidectomy + RTX + radioactive iodide, HTN, 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   Transferred from [facility] 12/20 for further management of acute liver failure and liver transplant evaluation 2/2 known ETOH Cirrhosis.    Allergies: Macrobid (Rash)    Medications (Abx/Cardiac/Anticoagulation/Blood Products)    caspofungin IVPB: 260 mL/Hr IV Intermittent (01-08 @ 09:16)  cefTRIAXone   IVPB: 100 mL/Hr IV Intermittent (01-08 @ 05:33)  midodrine: 20 milliGRAM(s) Oral (01-08 @ 14:34)  norepinephrine Infusion: 6.21 mL/Hr IV Continuous (01-08 @ 09:18)  rifAXIMin: 550 milliGRAM(s) Oral (01-08 @ 17:06)  vancomycin    Solution: 125 milliGRAM(s) Oral (01-07 @ 12:56)    Data:    T(C): 36.5  HR: 102  BP: 81/50  RR: 19  SpO2: 93%    LABS:                          7.3    37.40 )-----------( 38       ( 08 Jan 2023 20:02 )             20.9     01-08    136  |  103  |  28<H>  ----------------------------<  138<H>  3.6   |  19<L>  |  1.21    Ca    9.2      08 Jan 2023 20:02  Phos  4.0     01-08  Mg     2.2     01-08    TPro  6.4  /  Alb  3.3  /  TBili  21.3<H>  /  DBili  x   /  AST  105<H>  /  ALT  35  /  AlkPhos  183<H>  01-08    PT/INR - ( 08 Jan 2023 03:20 )   PT: 24.8 sec;   INR: 2.12 ratio         PTT - ( 08 Jan 2023 03:20 )  PTT:47.3 sec    Radiology: no recent imaging    Assessment/Plan: 63 yo F with cirrhosis with massive bleeding of unclear source but may be rectal in origin.     -- No imaging has been performed as patient is too unstable to be moved currently  -- Plans for bedside scope by GI  -- conservative management - transfuse PRN  -- INR elevated to 2.1  -- Patient has not been on AC    Power Solomon MD   Interventional Radiology PGY 4  Available on [employer] TEAMS    For EMERGENT inquiries/questions:  IR Pager ([facility]): [phone]  IR Pager ([facility]): [phone] ; [phone]    For non-emergent consults/questions:   Please place a sunrise order "Consult- Interventional Radiology" with an appropriate callback number    For questions about scheduling during appropriate work hours, call IR :  [facility]: [phone]  [facility]: [phone]    For outpatient IR booking:  [facility]: [phone]  [facility]: [phone]

## 2023-01-08 NOTE — PROGRESS NOTE ADULT - ATTENDING COMMENTS
63 yo F with remote history of thyroid cancer (s/p total thyroidectomy in her 20s, radiation, and BARONE), hypertension, GERD, history of ventricular neurocytoma (s/p R frontal craniotomy in 3/2022 with post-resection course complicated by right lateral ventricular hemorrhage managed non-operatively, with MRI in 5/2022 with residual neoplasm but no ICH), history of mild COVID-19 (11/2021), AUD (with a past history of detoxification at [place] 1 year ago and more recent rehab attempt with relapse, in early remission since 11/2022), and decompensated ALD cirrhosis complicated by ascites and first diagnosed in 11/2022 when she was hospitalized and treated with steroids but non-responder, transferred from [facility] to [facility] and currently remaining in SICU due to septic shock (still requiring norepinephrine and vasopressin support), recent acute hypoxic respiratory failure (intubated 12/19-23, currently back on 2L supplemental O2 via NC today), anuric RDE (s/p first HD on 12/20, on CVVHD 12/21- ), and acute on chronic liver failure with hepatic encephalopathy. Alert but disoriented today, however with no asterixis and with normal venous ammonia levels suggesting there may now be more ICU delirium than persistent HE. Continue rifaximin and Miralax (holding lactulose given recent diarrhea and gaseous abdominal distension). GI PCR (1/5) and stool C diff (1/7) both negative, though interestingly did have partial improvement in her leukocytosis from 41.2 yesterday to 26.5 today after she briefly received oral vancomycin yesterday while awaiting the C diff results. If leukocytosis and diarrhea worsening, would empirically resume the oral vancomycin. Continuing ceftriaxone (1/5- ) and caspofungin (12/26- ) empirically. ABO O with current MELD-Na 40 and liver transplant evaluation in progress, but needs significant improvements in her hypotension and frailty in order to be able to proceed with transplant. Prognosis guarded as there has not been consistent improvement in her shock or frailty in the past 2 weeks. Appreciate continued excellent SICU care. Family updated at bedside.    Please don't hesitate to call with any questions or concerns.    Javier Goldstein M.D., Ph.D.  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 with post-resection course complicated by right lateral ventricular hemorrhage managed non-operatively, with MRI in 5/2022 with residual neoplasm but no ICH), history of mild COVID-19 (11/2021), AUD (with a past history of detoxification at [place] 1 year ago and more recent rehab attempt with relapse, in early remission since 11/2022), and decompensated ALD cirrhosis complicated by ascites and first diagnosed in 11/2022 when she was hospitalized and treated with steroids but non-responder, transferred from [facility] to [facility] and currently remaining in SICU due to septic shock (still requiring norepinephrine and vasopressin support), recent acute hypoxic respiratory failure (intubated 12/19-23, currently back on 2L supplemental O2 via NC today), anuric RED (s/p first HD on 12/20, on CVVHD 12/21- ), and acute on chronic liver failure with hepatic encephalopathy. Alert but disoriented today, however with no asterixis and with normal venous ammonia levels suggesting there may now be more ICU delirium than persistent HE. Continue rifaximin and Miralax (holding lactulose given recent diarrhea and gaseous abdominal distension). GI PCR (1/5) and stool C diff (1/7) both negative, though interestingly did have partial improvement in her leukocytosis from 41.2 yesterday to 26.5 today after she briefly received oral vancomycin yesterday while awaiting the C diff results. If leukocytosis and diarrhea worsening, would empirically resume the oral vancomycin. Continuing ceftriaxone (1/5- ) and caspofungin (12/26- ) empirically. ABO O with current MELD-Na 40 and liver transplant evaluation in progress, but needs significant improvements in her hypotension and frailty in order to be able to proceed with transplant. Prognosis guarded as there has not been consistent improvement in her shock or frailty in the past 2 weeks. Appreciate continued excellent SICU care. Family updated at bedside.    Please don't hesitate to call with any questions or concerns.    Javier Goldstein M.D., Ph.D.  Transplant Hepatology 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 complicated by right lateral ventricular hemorrhage managed non-operatively, with MRI in 5/2022 with residual neoplasm but no ICH), history of mild COVID-19 (11/2021), AUD (with a past history of detoxification at [place] 1 year ago and more recent rehab attempt with relapse, in early remission since 11/2022), and decompensated ALD cirrhosis complicated by ascites and first diagnosed in 11/2022 when she was hospitalized and treated with steroids but non-responder, transferred from [facility] to [facility] and currently remaining in SICU due to septic shock (still requiring norepinephrine and vasopressin support), recent acute hypoxic respiratory failure (intubated 12/19-23, currently back on 2L supplemental O2 via NC today), anuric RED (s/p first HD on 12/20, on CVVHD 12/21- ), and acute on chronic liver failure with hepatic encephalopathy. Alert but disoriented today, however with no asterixis and with normal venous ammonia levels suggesting there may now be more ICU delirium than persistent HE. Continue rifaximin and Miralax (holding lactulose given recent diarrhea and gaseous abdominal distension). GI PCR (1/5) and stool C diff (1/7) both negative, though interestingly did have partial improvement in her leukocytosis from 41.2 yesterday to 26.5 today after she briefly received oral vancomycin yesterday while awaiting the C diff results. If leukocytosis and diarrhea worsening, would empirically resume the oral vancomycin. Continuing ceftriaxone (1/5- ) and caspofungin (12/26- ) empirically. ABO O with current MELD-Na 40 and liver transplant evaluation in progress, but needs significant improvements in her hypotension and frailty in order to be able to proceed with transplant. Prognosis guarded as there has not been consistent improvement in her shock or frailty in the past 2 weeks. Appreciate continued excellent SICU care. Family updated at bedside.    Please don't hesitate to call with any questions or concerns.    Javier Goldstein M.D., Ph.D.  Transplant Hepatology

## 2023-01-09 LAB
% ALBUMIN: 63.4 % — SIGNIFICANT CHANGE UP
% ALPHA 1: 4.2 % — SIGNIFICANT CHANGE UP
% ALPHA 2: 4.7 % — SIGNIFICANT CHANGE UP
% BETA: 12.6 % — SIGNIFICANT CHANGE UP
% GAMMA: 15.1 % — SIGNIFICANT CHANGE UP
ALBUMIN SERPL ELPH-MCNC: 4 G/DL — SIGNIFICANT CHANGE UP (ref 3.6–5.5)
ALBUMIN/GLOB SERPL ELPH: 1.7 RATIO — SIGNIFICANT CHANGE UP
ALPHA1 GLOB SERPL ELPH-MCNC: 0.3 G/DL — SIGNIFICANT CHANGE UP (ref 0.1–0.4)
ALPHA2 GLOB SERPL ELPH-MCNC: 0.3 G/DL — LOW (ref 0.5–1)
B-GLOBULIN SERPL ELPH-MCNC: 0.8 G/DL — SIGNIFICANT CHANGE UP (ref 0.5–1)
CULTURE RESULTS: SIGNIFICANT CHANGE UP
GAMMA GLOBULIN: 1 G/DL — SIGNIFICANT CHANGE UP (ref 0.6–1.6)
GAS PNL BLDA: SIGNIFICANT CHANGE UP
INTERPRETATION SERPL IFE-IMP: SIGNIFICANT CHANGE UP
PROT PATTERN SERPL ELPH-IMP: SIGNIFICANT CHANGE UP
SPECIMEN SOURCE: SIGNIFICANT CHANGE UP

## 2023-01-09 PROCEDURE — 86900 BLOOD TYPING SEROLOGIC ABO: CPT

## 2023-01-09 PROCEDURE — 86692 HEPATITIS DELTA AGENT ANTBDY: CPT

## 2023-01-09 PROCEDURE — 86381 MITOCHONDRIAL ANTIBODY EACH: CPT

## 2023-01-09 PROCEDURE — P9016: CPT

## 2023-01-09 PROCEDURE — 86965 POOLING BLOOD PLATELETS: CPT

## 2023-01-09 PROCEDURE — 81001 URINALYSIS AUTO W/SCOPE: CPT

## 2023-01-09 PROCEDURE — 87075 CULTR BACTERIA EXCEPT BLOOD: CPT

## 2023-01-09 PROCEDURE — 84702 CHORIONIC GONADOTROPIN TEST: CPT

## 2023-01-09 PROCEDURE — 36415 COLL VENOUS BLD VENIPUNCTURE: CPT

## 2023-01-09 PROCEDURE — 97165 OT EVAL LOW COMPLEX 30 MIN: CPT

## 2023-01-09 PROCEDURE — 86255 FLUORESCENT ANTIBODY SCREEN: CPT

## 2023-01-09 PROCEDURE — 86985 SPLIT BLOOD OR PRODUCTS: CPT

## 2023-01-09 PROCEDURE — 82784 ASSAY IGA/IGD/IGG/IGM EACH: CPT

## 2023-01-09 PROCEDURE — 97530 THERAPEUTIC ACTIVITIES: CPT

## 2023-01-09 PROCEDURE — G0480: CPT

## 2023-01-09 PROCEDURE — 82962 GLUCOSE BLOOD TEST: CPT

## 2023-01-09 PROCEDURE — 84165 PROTEIN E-PHORESIS SERUM: CPT

## 2023-01-09 PROCEDURE — 82105 ALPHA-FETOPROTEIN SERUM: CPT

## 2023-01-09 PROCEDURE — 94002 VENT MGMT INPAT INIT DAY: CPT

## 2023-01-09 PROCEDURE — 87799 DETECT AGENT NOS DNA QUANT: CPT

## 2023-01-09 PROCEDURE — 88305 TISSUE EXAM BY PATHOLOGIST: CPT

## 2023-01-09 PROCEDURE — 86663 EPSTEIN-BARR ANTIBODY: CPT

## 2023-01-09 PROCEDURE — 86708 HEPATITIS A ANTIBODY: CPT

## 2023-01-09 PROCEDURE — 84153 ASSAY OF PSA TOTAL: CPT

## 2023-01-09 PROCEDURE — P9012: CPT

## 2023-01-09 PROCEDURE — 85730 THROMBOPLASTIN TIME PARTIAL: CPT

## 2023-01-09 PROCEDURE — 84295 ASSAY OF SERUM SODIUM: CPT

## 2023-01-09 PROCEDURE — 87205 SMEAR GRAM STAIN: CPT

## 2023-01-09 PROCEDURE — C1729: CPT

## 2023-01-09 PROCEDURE — 84300 ASSAY OF URINE SODIUM: CPT

## 2023-01-09 PROCEDURE — 84443 ASSAY THYROID STIM HORMONE: CPT

## 2023-01-09 PROCEDURE — 86850 RBC ANTIBODY SCREEN: CPT

## 2023-01-09 PROCEDURE — 49083 ABD PARACENTESIS W/IMAGING: CPT

## 2023-01-09 PROCEDURE — 74176 CT ABD & PELVIS W/O CONTRAST: CPT

## 2023-01-09 PROCEDURE — 36430 TRANSFUSION BLD/BLD COMPNT: CPT

## 2023-01-09 PROCEDURE — P9040: CPT

## 2023-01-09 PROCEDURE — 86803 HEPATITIS C AB TEST: CPT

## 2023-01-09 PROCEDURE — 82533 TOTAL CORTISOL: CPT

## 2023-01-09 PROCEDURE — 97163 PT EVAL HIGH COMPLEX 45 MIN: CPT

## 2023-01-09 PROCEDURE — 87389 HIV-1 AG W/HIV-1&-2 AB AG IA: CPT

## 2023-01-09 PROCEDURE — 84630 ASSAY OF ZINC: CPT

## 2023-01-09 PROCEDURE — 85384 FIBRINOGEN ACTIVITY: CPT

## 2023-01-09 PROCEDURE — U0005: CPT

## 2023-01-09 PROCEDURE — 82977 ASSAY OF GGT: CPT

## 2023-01-09 PROCEDURE — 86709 HEPATITIS A IGM ANTIBODY: CPT

## 2023-01-09 PROCEDURE — 87102 FUNGUS ISOLATION CULTURE: CPT

## 2023-01-09 PROCEDURE — 83540 ASSAY OF IRON: CPT

## 2023-01-09 PROCEDURE — 86923 COMPATIBILITY TEST ELECTRIC: CPT

## 2023-01-09 PROCEDURE — 71250 CT THORAX DX C-: CPT

## 2023-01-09 PROCEDURE — P9011: CPT

## 2023-01-09 PROCEDURE — 84100 ASSAY OF PHOSPHORUS: CPT

## 2023-01-09 PROCEDURE — 82390 ASSAY OF CERULOPLASMIN: CPT

## 2023-01-09 PROCEDURE — 70486 CT MAXILLOFACIAL W/O DYE: CPT

## 2023-01-09 PROCEDURE — 86480 TB TEST CELL IMMUN MEASURE: CPT

## 2023-01-09 PROCEDURE — 94003 VENT MGMT INPAT SUBQ DAY: CPT

## 2023-01-09 PROCEDURE — 97116 GAIT TRAINING THERAPY: CPT

## 2023-01-09 PROCEDURE — 87449 NOS EACH ORGANISM AG IA: CPT

## 2023-01-09 PROCEDURE — 86696 HERPES SIMPLEX TYPE 2 TEST: CPT

## 2023-01-09 PROCEDURE — 86777 TOXOPLASMA ANTIBODY: CPT

## 2023-01-09 PROCEDURE — 87324 CLOSTRIDIUM AG IA: CPT

## 2023-01-09 PROCEDURE — 86334 IMMUNOFIX E-PHORESIS SERUM: CPT

## 2023-01-09 PROCEDURE — 74177 CT ABD & PELVIS W/CONTRAST: CPT

## 2023-01-09 PROCEDURE — 87086 URINE CULTURE/COLONY COUNT: CPT

## 2023-01-09 PROCEDURE — 86769 SARS-COV-2 COVID-19 ANTIBODY: CPT

## 2023-01-09 PROCEDURE — 87640 STAPH A DNA AMP PROBE: CPT

## 2023-01-09 PROCEDURE — 82330 ASSAY OF CALCIUM: CPT

## 2023-01-09 PROCEDURE — 86682 HELMINTH ANTIBODY: CPT

## 2023-01-09 PROCEDURE — 0225U NFCT DS DNA&RNA 21 SARSCOV2: CPT

## 2023-01-09 PROCEDURE — 87340 HEPATITIS B SURFACE AG IA: CPT

## 2023-01-09 PROCEDURE — 82570 ASSAY OF URINE CREATININE: CPT

## 2023-01-09 PROCEDURE — 70450 CT HEAD/BRAIN W/O DYE: CPT

## 2023-01-09 PROCEDURE — 82945 GLUCOSE OTHER FLUID: CPT

## 2023-01-09 PROCEDURE — P9059: CPT

## 2023-01-09 PROCEDURE — 83605 ASSAY OF LACTIC ACID: CPT

## 2023-01-09 PROCEDURE — 86790 VIRUS ANTIBODY NOS: CPT

## 2023-01-09 PROCEDURE — 86664 EPSTEIN-BARR NUCLEAR ANTIGEN: CPT

## 2023-01-09 PROCEDURE — 82435 ASSAY OF BLOOD CHLORIDE: CPT

## 2023-01-09 PROCEDURE — 84145 PROCALCITONIN (PCT): CPT

## 2023-01-09 PROCEDURE — 97535 SELF CARE MNGMENT TRAINING: CPT

## 2023-01-09 PROCEDURE — 86665 EPSTEIN-BARR CAPSID VCA: CPT

## 2023-01-09 PROCEDURE — 84157 ASSAY OF PROTEIN OTHER: CPT

## 2023-01-09 PROCEDURE — 86706 HEP B SURFACE ANTIBODY: CPT

## 2023-01-09 PROCEDURE — C1889: CPT

## 2023-01-09 PROCEDURE — 82728 ASSAY OF FERRITIN: CPT

## 2023-01-09 PROCEDURE — 86376 MICROSOMAL ANTIBODY EACH: CPT

## 2023-01-09 PROCEDURE — 82140 ASSAY OF AMMONIA: CPT

## 2023-01-09 PROCEDURE — 85025 COMPLETE CBC W/AUTO DIFF WBC: CPT

## 2023-01-09 PROCEDURE — 85027 COMPLETE CBC AUTOMATED: CPT

## 2023-01-09 PROCEDURE — P9073: CPT

## 2023-01-09 PROCEDURE — 86765 RUBEOLA ANTIBODY: CPT

## 2023-01-09 PROCEDURE — 87521 HEPATITIS C PROBE&RVRS TRNSC: CPT

## 2023-01-09 PROCEDURE — 83935 ASSAY OF URINE OSMOLALITY: CPT

## 2023-01-09 PROCEDURE — 84166 PROTEIN E-PHORESIS/URINE/CSF: CPT

## 2023-01-09 PROCEDURE — 86038 ANTINUCLEAR ANTIBODIES: CPT

## 2023-01-09 PROCEDURE — 87040 BLOOD CULTURE FOR BACTERIA: CPT

## 2023-01-09 PROCEDURE — 83615 LACTATE (LD) (LDH) ENZYME: CPT

## 2023-01-09 PROCEDURE — 87517 HEPATITIS B DNA QUANT: CPT

## 2023-01-09 PROCEDURE — P9100: CPT

## 2023-01-09 PROCEDURE — 85610 PROTHROMBIN TIME: CPT

## 2023-01-09 PROCEDURE — 83735 ASSAY OF MAGNESIUM: CPT

## 2023-01-09 PROCEDURE — 76705 ECHO EXAM OF ABDOMEN: CPT

## 2023-01-09 PROCEDURE — 86704 HEP B CORE ANTIBODY TOTAL: CPT

## 2023-01-09 PROCEDURE — 80202 ASSAY OF VANCOMYCIN: CPT

## 2023-01-09 PROCEDURE — 84132 ASSAY OF SERUM POTASSIUM: CPT

## 2023-01-09 PROCEDURE — 84155 ASSAY OF PROTEIN SERUM: CPT

## 2023-01-09 PROCEDURE — 87507 IADNA-DNA/RNA PROBE TQ 12-25: CPT

## 2023-01-09 PROCEDURE — 71260 CT THORAX DX C+: CPT

## 2023-01-09 PROCEDURE — 86140 C-REACTIVE PROTEIN: CPT

## 2023-01-09 PROCEDURE — 84439 ASSAY OF FREE THYROXINE: CPT

## 2023-01-09 PROCEDURE — 86762 RUBELLA ANTIBODY: CPT

## 2023-01-09 PROCEDURE — 82042 OTHER SOURCE ALBUMIN QUAN EA: CPT

## 2023-01-09 PROCEDURE — 82248 BILIRUBIN DIRECT: CPT

## 2023-01-09 PROCEDURE — 80307 DRUG TEST PRSMV CHEM ANLYZR: CPT

## 2023-01-09 PROCEDURE — 93975 VASCULAR STUDY: CPT

## 2023-01-09 PROCEDURE — 86703 HIV-1/HIV-2 1 RESULT ANTBDY: CPT

## 2023-01-09 PROCEDURE — 87070 CULTURE OTHR SPECIMN AEROBIC: CPT

## 2023-01-09 PROCEDURE — 87641 MR-STAPH DNA AMP PROBE: CPT

## 2023-01-09 PROCEDURE — 82565 ASSAY OF CREATININE: CPT

## 2023-01-09 PROCEDURE — 80053 COMPREHEN METABOLIC PANEL: CPT

## 2023-01-09 PROCEDURE — 85014 HEMATOCRIT: CPT

## 2023-01-09 PROCEDURE — 86695 HERPES SIMPLEX TYPE 1 TEST: CPT

## 2023-01-09 PROCEDURE — 88112 CYTOPATH CELL ENHANCE TECH: CPT

## 2023-01-09 PROCEDURE — 82947 ASSAY GLUCOSE BLOOD QUANT: CPT

## 2023-01-09 PROCEDURE — U0003: CPT

## 2023-01-09 PROCEDURE — P9047: CPT

## 2023-01-09 PROCEDURE — 86901 BLOOD TYPING SEROLOGIC RH(D): CPT

## 2023-01-09 PROCEDURE — 86735 MUMPS ANTIBODY: CPT

## 2023-01-09 PROCEDURE — 85396 CLOTTING ASSAY WHOLE BLOOD: CPT

## 2023-01-09 PROCEDURE — 80061 LIPID PANEL: CPT

## 2023-01-09 PROCEDURE — 71045 X-RAY EXAM CHEST 1 VIEW: CPT

## 2023-01-09 PROCEDURE — 83550 IRON BINDING TEST: CPT

## 2023-01-09 PROCEDURE — 86780 TREPONEMA PALLIDUM: CPT

## 2023-01-09 PROCEDURE — C8929: CPT

## 2023-01-09 PROCEDURE — 89051 BODY FLUID CELL COUNT: CPT

## 2023-01-09 PROCEDURE — 97110 THERAPEUTIC EXERCISES: CPT

## 2023-01-09 PROCEDURE — 82803 BLOOD GASES ANY COMBINATION: CPT

## 2023-01-09 PROCEDURE — 94799 UNLISTED PULMONARY SVC/PX: CPT

## 2023-01-09 PROCEDURE — 84550 ASSAY OF BLOOD/URIC ACID: CPT

## 2023-01-09 PROCEDURE — 82378 CARCINOEMBRYONIC ANTIGEN: CPT

## 2023-01-09 PROCEDURE — P9037: CPT

## 2023-01-09 PROCEDURE — 83036 HEMOGLOBIN GLYCOSYLATED A1C: CPT

## 2023-01-09 PROCEDURE — 86644 CMV ANTIBODY: CPT

## 2023-01-09 PROCEDURE — 83930 ASSAY OF BLOOD OSMOLALITY: CPT

## 2023-01-09 PROCEDURE — 74018 RADEX ABDOMEN 1 VIEW: CPT

## 2023-01-09 PROCEDURE — 86787 VARICELLA-ZOSTER ANTIBODY: CPT

## 2023-01-09 PROCEDURE — P9045: CPT

## 2023-01-09 PROCEDURE — 87077 CULTURE AEROBIC IDENTIFY: CPT

## 2023-01-09 PROCEDURE — 85018 HEMOGLOBIN: CPT

## 2023-01-09 PROCEDURE — 84133 ASSAY OF URINE POTASSIUM: CPT

## 2023-01-09 RX ORDER — TRANEXAMIC ACID 100 MG/ML
2.21 INJECTION, SOLUTION INTRAVENOUS
Qty: 1000 | Refills: 0 | Status: DISCONTINUED | OUTPATIENT
Start: 2023-01-09 | End: 2023-01-09

## 2023-01-09 RX ORDER — FENTANYL CITRATE 50 UG/ML
50 INJECTION INTRAVENOUS ONCE
Refills: 0 | Status: DISCONTINUED | OUTPATIENT
Start: 2023-01-09 | End: 2023-01-08

## 2023-01-09 RX ORDER — NOREPINEPHRINE BITARTRATE/D5W 8 MG/250ML
0.05 PLASTIC BAG, INJECTION (ML) INTRAVENOUS
Qty: 8 | Refills: 0 | Status: DISCONTINUED | OUTPATIENT
Start: 2023-01-09 | End: 2023-01-09

## 2023-01-09 RX ORDER — FENTANYL CITRATE 50 UG/ML
50 INJECTION INTRAVENOUS ONCE
Refills: 0 | Status: DISCONTINUED | OUTPATIENT
Start: 2023-01-09 | End: 2023-01-09

## 2023-01-09 RX ORDER — TRANEXAMIC ACID 100 MG/ML
1000 INJECTION, SOLUTION INTRAVENOUS ONCE
Refills: 0 | Status: COMPLETED | OUTPATIENT
Start: 2023-01-09 | End: 2023-01-09

## 2023-01-09 RX ORDER — FENTANYL CITRATE 50 UG/ML
100 INJECTION INTRAVENOUS ONCE
Refills: 0 | Status: DISCONTINUED | OUTPATIENT
Start: 2023-01-09 | End: 2023-01-09

## 2023-01-09 RX ORDER — KETAMINE HYDROCHLORIDE 100 MG/ML
90 INJECTION INTRAMUSCULAR; INTRAVENOUS ONCE
Refills: 0 | Status: DISCONTINUED | OUTPATIENT
Start: 2023-01-09 | End: 2023-01-08

## 2023-01-09 RX ORDER — SODIUM BICARBONATE 1 MEQ/ML
0.26 SYRINGE (ML) INTRAVENOUS
Qty: 150 | Refills: 0 | Status: DISCONTINUED | OUTPATIENT
Start: 2023-01-09 | End: 2023-01-09

## 2023-01-09 RX ADMIN — TRANEXAMIC ACID 220 MILLIGRAM(S): 100 INJECTION, SOLUTION INTRAVENOUS at 00:30

## 2023-01-09 RX ADMIN — FENTANYL CITRATE 100 MICROGRAM(S): 50 INJECTION INTRAVENOUS at 01:48

## 2023-01-09 RX ADMIN — FENTANYL CITRATE 50 MICROGRAM(S): 50 INJECTION INTRAVENOUS at 01:34

## 2023-01-09 RX ADMIN — FENTANYL CITRATE 50 MICROGRAM(S): 50 INJECTION INTRAVENOUS at 01:00

## 2023-01-09 NOTE — PROVIDER CONTACT NOTE (CHANGE IN STATUS NOTIFICATION) - ASSESSMENT
on initial assessment pt with new constant weak nonproductive cough. attempted to suction pt and pt vomited. concerned pt aspirating on regular diet due to poor mental status and weakness, pt very lethargic  pt on 3L NC, O2 saturations in lower 90s. coarse lung sounds.
Abdomen is more distended & hard on palpation than previous assessment at 1900. Gabriel red blood is pouring out of the patients rectum. Patient is still A&O3.

## 2023-01-09 NOTE — PRE-ANESTHESIA EVALUATION ADULT - NSRADCARDRESULTSFT_GEN_ALL_CORE
EF ([place] Rule): 66 %Doppler Peak Velocity (m/sec):  AoV=1.4  ------------------------------------------------------------------------  Observations:  Mitral Valve: Normal mitral valve. Minimal mitral  regurgitation.  Aortic Valve/Aorta: Calcified trileaflet aortic valve with  normal opening. Peak transaortic valve gradient equals 8 mm  Hg. No aorticvalve regurgitation seen. Peak left  ventricular outflow tract gradient equals 4 mm Hg, mean  gradient is equal to 2 mm Hg, LVOT velocity time integral  equals 19 cm.  Aortic Root: 3.1 cm.  Ascending Aorta: 3.3 cm.  LVOT diameter: 2 cm.  Left Atrium:Normal left atrium.  LA volume index = 33  cc/m2.  Left Ventricle: Endocardial visualization enhanced with  intravenous injection of Ultrasonic Enhancing Agent  (Definity). Left ventricle suboptimally visualized despite  intravenous injection of ultrasonic enhancing agent; normal  left ventricular systolic function. No segmental wall  motion abnormalities. Normal left ventricular internal  dimensions and wall thicknesses. Normal diastolic function.  Right Heart: Normal right atrium. Normal right ventricular  size and function. Normal tricuspid valve. Mild-moderate  tricuspid regurgitation. Normal pulmonic valve. Minimal  pulmonic regurgitation.

## 2023-01-09 NOTE — PRE-ANESTHESIA EVALUATION ADULT - NSANTHPMHFT_GEN_ALL_CORE
62 y.o Hx significant for remote AUD, h/o thyroid cancer in her 20s s/p total thyroidectomy + RTX + radioactive iodide, HTN,  Ventricular Neurocytoma  dx 2021) s/p right frontal craniotomy (03/2022) for resection post operative course c/b hemorrhage right lateral ventricle (managed non-operatively) who was initially admitted to  12/19 with new onset seizures.  ETOH abuse; alcoholic cirrhosis; CRRT; hct 16; BRBPR; rectal ulcer bleeding profusely; s/p endoscopy with banding; on levo and vasopressin

## 2023-01-09 NOTE — CHART NOTE - NSCHARTNOTESELECT_GEN_ALL_CORE
CRRT/Event Note
Event Note
IR/Event Note
CRRT/Event Note
Nutrition Services
GI Hemorrhage/Event Note
Nutrition Services
Nutrition Services

## 2023-01-09 NOTE — PROVIDER CONTACT NOTE (CHANGE IN STATUS NOTIFICATION) - BACKGROUND
Patient is in liver failure, liver workup, on CRRT & requiring pressors.
pt started on regular diet on previous shift with tube feeds at goal (50 cc). family members have been giving pt small sips of water and broth as per previous RN.  on initial assessment pt has new constant weak nonproductive cough. attempted to suction pt and pt vomited. concerned pt aspirating on regular diet due to poor mental status  pt on 3L NC, O2 saturations in lower 90s. coarse lung sounds.

## 2023-01-09 NOTE — PROVIDER CONTACT NOTE (CHANGE IN STATUS NOTIFICATION) - SITUATION
on initial assessment, pt has new constant weak nonproductive cough. attempted to suction pt and pt vomited. concerned pt aspirating on regular diet due to poor mental status
Patients pressor requirements are increasing. Rectal tube is no longer in place & christi red blood is pouring out of the patients rectum.

## 2023-01-09 NOTE — PROVIDER CONTACT NOTE (CHANGE IN STATUS NOTIFICATION) - ACTION/TREATMENT ORDERED:
MD Tuttle & ANJEL Godinez are at bedside   -MTP initiated   -stop CRRT & return the blood   - bedside upper endoscopy & colonoscopy with possible interventions ordered & to be completed MD uTttle & ANJEL Godinez are at bedside   -MTP initiated   -stop CRRT & return the blood   - bedside upper endoscopy & colonoscopy with possible interventions ordered & to be completed MD Tuttle & ANJEL Godinez are at bedside   -MTP initiated   -stop CRRT & return the blood   - Patient to be intubated for bedside upper endoscopy & colonoscopy with possible interventions ordered & to be completed

## 2023-01-09 NOTE — PRE PROCEDURE NOTE - PRE PROCEDURE EVALUATION
Interventional Radiology    HPI: 62y Female with active rectal bleeding. GI unable to stop bleeding. Will present to IR for angiogram and possible embolization. To obtain CTA on way to IR.     Allergies: Macrobid (Rash)    Medications (Abx/Cardiac/Anticoagulation/Blood Products)    caspofungin IVPB: 260 mL/Hr IV Intermittent (01-08 @ 09:16)  cefTRIAXone   IVPB: 100 mL/Hr IV Intermittent (01-08 @ 05:33)  midodrine: 20 milliGRAM(s) Oral (01-08 @ 14:34)  norepinephrine Infusion: 6.21 mL/Hr IV Continuous (01-08 @ 09:18)  rifAXIMin: 550 milliGRAM(s) Oral (01-08 @ 17:06)  vancomycin    Solution: 125 milliGRAM(s) Oral (01-07 @ 12:56)    Data:  165.1  57  T(C): 36.5  HR: 120  BP: 81/50  RR: 19  SpO2: 100%    Exam  General: No acute distress  Chest: Non labored breathing  Abdomen: Non-distended  Extremities: No swelling, warm    -WBC 20.20 / HgB 5.5 / Hct 16.2 / Plt 75  -Na 141 / Cl 108 / BUN 25 / Glucose 163  -K 3.3 / CO2 17 / Cr 1.15  -ALT 24 / Alk Phos 101 / T.Bili 10.3  -INR1.59    Plan: 62y Female presents for angiogram and possible embolization  -Risks/Benefits/alternatives explained with the patient and/or healthcare proxy and witnessed informed consent obtained.

## 2023-01-09 NOTE — CHART NOTE - NSCHARTNOTEFT_GEN_A_CORE
Documentation delayed for urgent patient care.     At PM rounds pt was noted to have large volume rectal bleeding. After discussing with Dr Goldstein the hepatologist GI, planned for intubation after resuscitation in anticipation of endoscopy. patient understood and agreed to treatment. updated the family and asked them to come in. MTP was activated and levo max at 5, after 3/4/1/cryo 4 was able to stabilize and safely intubate uneventfully. during endoscopy, pt became hypotensive again, at this point IR and acute care surgery were already involved and reviewed the case with me. given the difficulty of clear visualization, despite identifying a rectal bleeding source in the base of an ulcer, the arterial bleeder. EGD also was reported to have some variceal bleeds that thought not to be the source. upon consultation with IR attending, plan to go directly to IR while continuing resuscitation. patient received over 30 u of blood products, with balanced resuscitation, warmed product, calcium and active warming to promote coagulation. however patient never became stable enough for transport and eventually have cardiac arrest. family were in the waiting area. after epi, bicarb , ca and chest compressions, had ROSC. however, given no ability to control the source of bleeding, explained to the family that further resuscitation attempts is futile and they agree to allow her to pass peacefully. they were able to come in and see her for one final time and were appreciative of the team's efforts.  she was pronounced dead at 2:08 AM.

## 2023-01-09 NOTE — CHART NOTE - NSCHARTNOTEFT_GEN_A_CORE
Notified by SICU that patient coded. I went up there to bedside. Patient actively dying. SICU and Transplant hepatology attendings discussed with family.

## 2023-01-13 ENCOUNTER — NON-APPOINTMENT (OUTPATIENT)
Age: 63
End: 2023-01-13

## 2023-01-21 LAB
CULTURE RESULTS: SIGNIFICANT CHANGE UP
SPECIMEN SOURCE: SIGNIFICANT CHANGE UP

## 2023-01-25 LAB
CULTURE RESULTS: SIGNIFICANT CHANGE UP
SPECIMEN SOURCE: SIGNIFICANT CHANGE UP

## 2023-02-04 LAB
CULTURE RESULTS: SIGNIFICANT CHANGE UP
SPECIMEN SOURCE: SIGNIFICANT CHANGE UP

## 2023-06-01 NOTE — PROGRESS NOTE ADULT - TIME-BASED
How Severe Are They?: mild
Is This A New Presentation, Or A Follow-Up?: Skin Lesion
35
30
35
30

## 2023-06-23 NOTE — PHYSICAL THERAPY INITIAL EVALUATION ADULT - BED MOBILITY LIMITATIONS, REHAB EVAL
Impression: Age-related nuclear cataract, bilateral: H25.13. Bilateral. Plan: Cleared from retina perspective for Cataract surgery. decreased ability to use arms for pushing/pulling/decreased ability to use legs for bridging/pushing

## 2023-08-15 NOTE — DIETITIAN NUTRITION RISK NOTIFICATION - FINDINGS BASED ON COMPREHENSIVE NUTRITION ASSESSMENT, CONSULTATION PERFORMED ON
Spoke with patient in regards to her nerve test results per Dr. Rodriguez(Podiatry)            ----- Message from Mark Rodriguez DPM sent at 8/15/2023 12:13 PM CDT -----  Please call her and let her know that her nerve test showed some damage to one of her lumber nerves likely contributing to her pain and weakness. Follow up with Pain management as scheduled. Thanks.     
16-Mar-2022

## 2023-12-07 NOTE — H&P ADULT - NS_MD_PANP_GEN_ALL_CORE
4 = No assist / stand by assistance
Attending and PA/NP shared services statement (NON-critical care):

## 2023-12-11 RX ORDER — FOLIC ACID 0.8 MG
1 TABLET ORAL
Qty: 0 | Refills: 0 | DISCHARGE

## 2023-12-11 RX ORDER — ESCITALOPRAM OXALATE 10 MG/1
1 TABLET, FILM COATED ORAL
Qty: 0 | Refills: 0 | DISCHARGE

## 2023-12-11 RX ORDER — CHOLECALCIFEROL (VITAMIN D3) 125 MCG
1 CAPSULE ORAL
Qty: 0 | Refills: 0 | DISCHARGE

## 2023-12-11 RX ORDER — SODIUM CHLORIDE 9 MG/ML
1 INJECTION INTRAMUSCULAR; INTRAVENOUS; SUBCUTANEOUS
Qty: 0 | Refills: 0 | DISCHARGE

## 2023-12-11 RX ORDER — LEVOTHYROXINE SODIUM 125 MCG
1 TABLET ORAL
Qty: 0 | Refills: 0 | DISCHARGE

## 2023-12-11 RX ORDER — THIAMINE MONONITRATE (VIT B1) 100 MG
1 TABLET ORAL
Qty: 0 | Refills: 0 | DISCHARGE

## 2023-12-11 RX ORDER — PANTOPRAZOLE SODIUM 20 MG/1
1 TABLET, DELAYED RELEASE ORAL
Qty: 0 | Refills: 0 | DISCHARGE

## 2023-12-11 RX ORDER — NYSTATIN 500MM UNIT
5 POWDER (EA) MISCELLANEOUS
Qty: 0 | Refills: 0 | DISCHARGE

## 2024-06-04 NOTE — PROCEDURE NOTE - NSAPPROACH_GEN_A_CORE
Contact:             Phone number: [phone]     Name of person spoken with and relationship to patient: TIMMY VIVAS  Medication:   Patient’s Adherence:             How patient is doing on medication: well     How many missed doses and reason: 0     Any new medications: no     Any new conditions: no     Any new allergies: no     Any new side effects: no      Any new diagnoses: no      How many doses remainin Aimovig     Did patient want to speak with pharmacist: no   Delivery:             Delivery date and method: FED[facility]      Needs by Date:      Signature required: no     Any additional details for : May leave at the door   Teach Appointment Date: NA   Shipping Address: [address] [place] [address]   Medication(name, strength and dose): AIMOVIG 140mg/ml & UBRELVY TABLET 100 MG   Copay: $10   Payment Method: [facility] 7082   Supplies:    Additional Information: NO QUESTIONS OR CONCERNS AT THIS TIME  
percutaneous

## 2024-06-12 NOTE — H&P PST ADULT - TEACHING/LEARNING FACTORS IMPACT ABILITY TO LEARN
Onset: 6/10/24    Location / description: The pt reports his last bp reading on Monday was 145/126 \"I was at a pain shot in my back\", the pt is unable to check his bp at this time, the pt denies cp/sob/dizziness \"I am just concerned that it is very high\"    Precipitating Factors: as above, hx of htn    Pain Scale (1-10), 10 highest: 0/10    What  improves / worsens symptoms: unsure if anything improves/unsure if anything worsens    Symptom specific medications: amlodipine 5 mg    Recent Care: 4/29/24 with int med    PLAN:    Provider's office  See Provider within 2 weeks    Patient/Caller agrees to follow recommendations.  Reason for Disposition   [1] Systolic BP  >= 130 OR Diastolic >= 80 AND [2] taking BP medications    Protocols used: Blood Pressure - High-A-     none

## 2025-01-08 NOTE — PROGRESS NOTE ADULT - PROBLEM SELECTOR PLAN 1
[Negative] : Heme/Lymph [Abdominal Pain] : no abdominal pain Pt with RED in the setting of ETOH cirrhosis. Upon review of [place], SCr was 0.78 on 12/6/22, increased to 5.31 on admission (12/20), and improved with CRRT. Pt remains anuric. Pt with likely HRS vs ATN vs bile cast nephropathy. CRRT held on 1/3 for line exchange/holiday. still anuric. Labs with no emergent need to resume CRRT today. Monitor labs and urine output. Avoid nephrotoxins. Dose medications as per eGFR.     Liver transplant on hold given bacteremia and UTI, ID following. 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 Upon review of [place], SCr was 0.78 on 12/6/22, increased to 5.31 on admission (12/20), and improved with CRRT. Pt remains anuric. Pt with likely HRS vs ATN vs bile cast nephropathy. CRRT held on 1/3 for line exchange/holiday. still anuric. Labs with no emergent need to resume CRRT today. Monitor labs and urine output. Avoid nephrotoxins. Dose medications as per eGFR.     Liver transplant on hold given bacteremia and UTI, ID following. 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 Upon review of [place], SCr was 0.78 on 12/6/22, increased to 5.31 on admission (12/20), and improved with CRRT. Pt remains anuric. Pt with likely HRS vs ATN vs bile cast nephropathy. CRRT held on 1/3 for line exchange/holiday. still anuric. Labs with no emergent need to resume CRRT today. Monitor labs and urine output. Avoid nephrotoxins. Dose medications as per eGFR.     Liver transplant on hold given bacteremia and UTI, ID following.

## 2025-01-20 NOTE — OCCUPATIONAL THERAPY INITIAL EVALUATION ADULT - VISUAL ASSESSMENT: TRACKING
Jason is a 16 y.o. male brought by mom for Follow-up (ED follow up for asthma 1/16/25 , in office today with mom. /)    HPI:     4 days ago went to the ED for asthma flare. He woke that day not feeling well, and at school he stared to feel like when he was breathing he wasn't getting enough air. This was getting worse so he went to the nurse and sounded like he was wheezing. He was picked up from school and went to the ED (stand alone). He received albuterol x2 and prednisone. Since then, he has been doing well but still every once and awhile feels like he isn't getting enough air. He has been using albuterol once BID + PRN but has only needed 1-2x during the day. He had pneumonia 1 month ago and has been having breathing difficulties since then. He has not been sick in the last week. No other clear trigger for this exacerbation.   He typically requires albuterol with colds. Mom reports he has no inhaler at school. She would like him to have this and needs a form.   He also reports difficulty sleeping. He will try to go to bed around 9, but he usually doesn't fall asleep until 5 or 6am. Gets up at 7am for school. Doesn't feel tired during the day and doesn't take naps. He has tried to remove screens from his bedtime routine but it wasn't helpful so he does get on screens at night after laying in bed for an hour or so.       Histories:     Social History     Social History Narrative    Not on file     Medical/Surgical:  Patient Active Problem List    Diagnosis Date Noted    Mild intermittent asthma without complication 01/20/2025    Behavioral insomnia of childhood, sleep-onset association type 01/20/2025    Facial cellulitis 09/12/2017    BMI (body mass index), pediatric, 85% to less than 95% for age 02/20/2017    Wheezing 12/22/2015    Recurrent tonsillitis 01/05/2015     -  has a past surgical history that includes other surgical history (09/13/2017) and Tonsillectomy and adenoidectomy (1/6/2015).     No current  normal

## (undated) DEVICE — CATH ELECHMSTAT  INJ 7FR 210CM

## (undated) DEVICE — SOL INJ NS 0.9% 500ML 2 PORT

## (undated) DEVICE — POLY TRAP ETRAP

## (undated) DEVICE — BIOPSY FORCEP RADIAL JAW 4 STANDARD WITH NEEDLE

## (undated) DEVICE — PACK IV START WITH CHG

## (undated) DEVICE — SENSOR O2 FINGER ADULT

## (undated) DEVICE — SUCTION YANKAUER NO CONTROL VENT

## (undated) DEVICE — TUBING SUCTION 20FT

## (undated) DEVICE — FORCEP RADIAL JAW 4 JUMBO 2.8MM 3.2MM 240CM ORANGE DISP

## (undated) DEVICE — CLAMP BX HOT RAD JAW 3

## (undated) DEVICE — FOLEY HOLDER STATLOCK 2 WAY ADULT

## (undated) DEVICE — BITE BLOCK ADULT 20 X 27MM (GREEN)

## (undated) DEVICE — SYR LUER LOK 50CC

## (undated) DEVICE — TUBING IV SET GRAVITY 3Y 100" MACRO

## (undated) DEVICE — COLONOSCOPE 2416901: Type: DURABLE MEDICAL EQUIPMENT

## (undated) DEVICE — BALLOON US ENDO

## (undated) DEVICE — BRUSH COLONOSCOPY CYTOLOGY

## (undated) DEVICE — SYR ALLIANCE II INFLATION 60ML

## (undated) DEVICE — CATH IV SAFE BC 20G X 1.16" (PINK)

## (undated) DEVICE — CATH IV SAFE BC 22G X 1" (BLUE)

## (undated) DEVICE — TUBING SUCTION CONN 6FT STERILE

## (undated) DEVICE — ELCTR GROUNDING PAD ADULT COVIDIEN

## (undated) DEVICE — IRRIGATOR BIO SHIELD